# Patient Record
Sex: FEMALE | Race: WHITE | NOT HISPANIC OR LATINO | Employment: FULL TIME | URBAN - METROPOLITAN AREA
[De-identification: names, ages, dates, MRNs, and addresses within clinical notes are randomized per-mention and may not be internally consistent; named-entity substitution may affect disease eponyms.]

---

## 2018-08-25 ENCOUNTER — HOSPITAL ENCOUNTER (EMERGENCY)
Facility: HOSPITAL | Age: 58
Discharge: HOME | End: 2018-08-25
Attending: FAMILY MEDICINE
Payer: MEDICAID

## 2018-08-25 VITALS
TEMPERATURE: 98 F | HEIGHT: 62 IN | HEART RATE: 83 BPM | SYSTOLIC BLOOD PRESSURE: 131 MMHG | DIASTOLIC BLOOD PRESSURE: 58 MMHG | RESPIRATION RATE: 16 BRPM | BODY MASS INDEX: 32.2 KG/M2 | WEIGHT: 175 LBS | OXYGEN SATURATION: 96 %

## 2018-08-25 DIAGNOSIS — K57.92 DIVERTICULITIS: Primary | ICD-10-CM

## 2018-08-25 LAB
ALBUMIN SERPL BCP-MCNC: 3.6 G/DL
ALP SERPL-CCNC: 72 U/L
ALT SERPL W/O P-5'-P-CCNC: 19 U/L
ANION GAP SERPL CALC-SCNC: 8 MMOL/L
AST SERPL-CCNC: 20 U/L
BASOPHILS # BLD AUTO: 0.03 K/UL
BASOPHILS NFR BLD: 0.3 %
BILIRUB SERPL-MCNC: 0.9 MG/DL
BUN SERPL-MCNC: 17 MG/DL
CALCIUM SERPL-MCNC: 8.7 MG/DL
CHLORIDE SERPL-SCNC: 106 MMOL/L
CO2 SERPL-SCNC: 27 MMOL/L
CREAT SERPL-MCNC: 0.98 MG/DL
DIFFERENTIAL METHOD: NORMAL
EOSINOPHIL # BLD AUTO: 0.2 K/UL
EOSINOPHIL NFR BLD: 2.1 %
ERYTHROCYTE [DISTWIDTH] IN BLOOD BY AUTOMATED COUNT: 13.9 %
EST. GFR  (AFRICAN AMERICAN): >60 ML/MIN/1.73 M^2
EST. GFR  (NON AFRICAN AMERICAN): >60 ML/MIN/1.73 M^2
GLUCOSE SERPL-MCNC: 125 MG/DL
HCT VFR BLD AUTO: 47.2 %
HGB BLD-MCNC: 15.5 G/DL
LIPASE SERPL-CCNC: 184 U/L
LYMPHOCYTES # BLD AUTO: 2.7 K/UL
LYMPHOCYTES NFR BLD: 28.8 %
MCH RBC QN AUTO: 30.1 PG
MCHC RBC AUTO-ENTMCNC: 32.8 G/DL
MCV RBC AUTO: 92 FL
MONOCYTES # BLD AUTO: 0.7 K/UL
MONOCYTES NFR BLD: 7.6 %
NEUTROPHILS # BLD AUTO: 5.6 K/UL
NEUTROPHILS NFR BLD: 60.9 %
PLATELET # BLD AUTO: 230 K/UL
PMV BLD AUTO: 9.8 FL
POTASSIUM SERPL-SCNC: 4 MMOL/L
PROT SERPL-MCNC: 6.8 G/DL
RBC # BLD AUTO: 5.15 M/UL
SODIUM SERPL-SCNC: 141 MMOL/L
WBC # BLD AUTO: 9.19 K/UL

## 2018-08-25 PROCEDURE — 96374 THER/PROPH/DIAG INJ IV PUSH: CPT | Mod: 25

## 2018-08-25 PROCEDURE — 63600175 PHARM REV CODE 636 W HCPCS: Performed by: FAMILY MEDICINE

## 2018-08-25 PROCEDURE — 83690 ASSAY OF LIPASE: CPT

## 2018-08-25 PROCEDURE — 25500020 PHARM REV CODE 255: Performed by: FAMILY MEDICINE

## 2018-08-25 PROCEDURE — 99284 EMERGENCY DEPT VISIT MOD MDM: CPT | Mod: 25

## 2018-08-25 PROCEDURE — 85025 COMPLETE CBC W/AUTO DIFF WBC: CPT

## 2018-08-25 PROCEDURE — 96375 TX/PRO/DX INJ NEW DRUG ADDON: CPT

## 2018-08-25 PROCEDURE — 80053 COMPREHEN METABOLIC PANEL: CPT

## 2018-08-25 RX ORDER — LISINOPRIL 10 MG/1
10 TABLET ORAL DAILY
COMMUNITY
End: 2019-06-20 | Stop reason: SDUPTHER

## 2018-08-25 RX ORDER — CIPROFLOXACIN 500 MG/1
500 TABLET ORAL 2 TIMES DAILY
Qty: 20 TABLET | Refills: 0 | Status: SHIPPED | OUTPATIENT
Start: 2018-08-25 | End: 2018-09-04

## 2018-08-25 RX ORDER — METRONIDAZOLE 500 MG/1
500 TABLET ORAL
COMMUNITY
End: 2018-08-25

## 2018-08-25 RX ORDER — CIPROFLOXACIN 500 MG/1
500 TABLET ORAL
COMMUNITY
End: 2018-08-25

## 2018-08-25 RX ORDER — ONDANSETRON 4 MG/1
4 TABLET, FILM COATED ORAL 3 TIMES DAILY PRN
Qty: 15 TABLET | Refills: 0 | Status: SHIPPED | OUTPATIENT
Start: 2018-08-25 | End: 2019-03-26

## 2018-08-25 RX ORDER — METOPROLOL SUCCINATE 50 MG/1
50 TABLET, EXTENDED RELEASE ORAL DAILY
COMMUNITY
End: 2019-06-20 | Stop reason: SDUPTHER

## 2018-08-25 RX ORDER — HYDROCODONE BITARTRATE AND ACETAMINOPHEN 5; 325 MG/1; MG/1
1 TABLET ORAL EVERY 4 HOURS PRN
Qty: 18 TABLET | Refills: 0 | Status: SHIPPED | OUTPATIENT
Start: 2018-08-25 | End: 2019-03-26

## 2018-08-25 RX ORDER — LORATADINE 10 MG/1
10 TABLET ORAL DAILY
COMMUNITY
End: 2019-03-26

## 2018-08-25 RX ORDER — BUSPIRONE HYDROCHLORIDE 30 MG/1
30 TABLET ORAL 2 TIMES DAILY
COMMUNITY
End: 2019-06-20 | Stop reason: SDUPTHER

## 2018-08-25 RX ORDER — ZOLPIDEM TARTRATE 10 MG/1
5 TABLET ORAL NIGHTLY PRN
COMMUNITY
End: 2019-06-20 | Stop reason: SDUPTHER

## 2018-08-25 RX ORDER — MORPHINE SULFATE 4 MG/ML
6 INJECTION, SOLUTION INTRAMUSCULAR; INTRAVENOUS
Status: COMPLETED | OUTPATIENT
Start: 2018-08-25 | End: 2018-08-25

## 2018-08-25 RX ORDER — DIPHENHYDRAMINE HYDROCHLORIDE 50 MG/ML
25 INJECTION INTRAMUSCULAR; INTRAVENOUS
Status: COMPLETED | OUTPATIENT
Start: 2018-08-25 | End: 2018-08-25

## 2018-08-25 RX ORDER — METRONIDAZOLE 500 MG/1
500 TABLET ORAL EVERY 6 HOURS
Qty: 28 TABLET | Refills: 0 | Status: SHIPPED | OUTPATIENT
Start: 2018-08-25 | End: 2019-01-09

## 2018-08-25 RX ORDER — CLONAZEPAM 1 MG/1
0.5 TABLET ORAL 2 TIMES DAILY PRN
COMMUNITY
End: 2019-06-20 | Stop reason: SDUPTHER

## 2018-08-25 RX ORDER — MORPHINE SULFATE 4 MG/ML
4 INJECTION, SOLUTION INTRAMUSCULAR; INTRAVENOUS
Status: COMPLETED | OUTPATIENT
Start: 2018-08-25 | End: 2018-08-25

## 2018-08-25 RX ADMIN — MORPHINE SULFATE 6 MG: 4 INJECTION INTRAVENOUS at 07:08

## 2018-08-25 RX ADMIN — IOHEXOL 100 ML: 350 INJECTION, SOLUTION INTRAVENOUS at 08:08

## 2018-08-25 RX ADMIN — MORPHINE SULFATE 4 MG: 4 INJECTION INTRAVENOUS at 09:08

## 2018-08-25 RX ADMIN — DIPHENHYDRAMINE HYDROCHLORIDE 25 MG: 50 INJECTION, SOLUTION INTRAMUSCULAR; INTRAVENOUS at 08:08

## 2018-08-25 NOTE — ED TRIAGE NOTES
Pt reports lower abdominal pain x a couple of days. Pt denies NVD. Pt denies urinary symptoms. HX of diverticulitis

## 2018-08-26 NOTE — ED PROVIDER NOTES
Encounter Date: 2018       History     Chief Complaint   Patient presents with    Abdominal Pain     Pt reports lower abdominal pain x a couple of days. Pt denies NVD. Pt denies urinary symptoms. HX of diverticulitis     58-year-old female patient comes in with complaint of abdominal pain. Patient has a history of recurring diverticulitis.  Patient states that it feels like she has diverticulitis this occasion.  Patient otherwise has no nausea no vomiting no diarrhea but does have pain.  Patient has Cipro and Flagyl at home that she starts when she feels like she is having flare up.          Review of patient's allergies indicates:   Allergen Reactions    Succinimides Anaphylaxis     Past Medical History:   Diagnosis Date    Anxiety     Arthritis     Diverticulitis     Hypertension      Past Surgical History:   Procedure Laterality Date    BLADDER SUSPENSION      (x2)     SECTION      (x2)    TONSILLECTOMY       History reviewed. No pertinent family history.  Social History     Tobacco Use    Smoking status: Current Every Day Smoker     Packs/day: 0.50     Years: 20.00     Pack years: 10.00     Types: Cigarettes    Smokeless tobacco: Never Used   Substance Use Topics    Alcohol use: No    Drug use: No     Review of Systems   Constitutional: Negative for fever.   HENT: Negative for sore throat.    Respiratory: Negative for shortness of breath.    Cardiovascular: Negative for chest pain.   Gastrointestinal: Positive for abdominal pain. Negative for nausea.   Genitourinary: Negative for dysuria.   Musculoskeletal: Negative for back pain.   Skin: Negative for rash.   Neurological: Negative for weakness.   Hematological: Does not bruise/bleed easily.   All other systems reviewed and are negative.      Physical Exam     Initial Vitals [18 1857]   BP Pulse Resp Temp SpO2   139/64 94 18 98.2 °F (36.8 °C) 98 %      MAP       --         Physical Exam    Nursing note and vitals  reviewed.  Constitutional: She appears well-developed.   HENT:   Head: Normocephalic and atraumatic.   Right Ear: External ear normal.   Left Ear: External ear normal.   Nose: Nose normal.   Mouth/Throat: Oropharynx is clear and moist.   Eyes: Conjunctivae and EOM are normal. Pupils are equal, round, and reactive to light. Right eye exhibits no discharge. Left eye exhibits no discharge.   Neck: Normal range of motion. Neck supple. No tracheal deviation present.   Cardiovascular: Normal rate, regular rhythm and normal heart sounds.   No murmur heard.  Pulmonary/Chest: Breath sounds normal. No respiratory distress. She has no wheezes.   Abdominal: Soft. Bowel sounds are normal. There is tenderness.   Neurological: She is alert and oriented to person, place, and time.   Skin: Skin is warm and dry.         ED Course   Procedures  Labs Reviewed   COMPREHENSIVE METABOLIC PANEL - Abnormal; Notable for the following components:       Result Value    Glucose 125 (*)     All other components within normal limits   CBC W/ AUTO DIFFERENTIAL   LIPASE   URINALYSIS, REFLEX TO URINE CULTURE          Imaging Results          CT Abdomen Pelvis With Contrast (Final result)  Result time 08/25/18 20:52:56    Final result by Chalo Zhong MD (08/25/18 20:52:56)                 Impression:      1. Acute diverticulitis mid sigmoid colon.  Negative for perforation or abscess.  New since 07/20/2016.  2. Bilateral renal cysts.  No change.  All CT scans at this facility are performed  using dose modulation techniques as appropriate to performed exam including the following:  automated exposure control; adjustment of mA and/or kV according to the patients size (this includes techniques or standardized protocols for targeted exams where dose is matched to indication/reason for exam: i.e. extremities or head);  iterative reconstruction technique.      Electronically signed by: Chalo Zhong MD  Date:    08/25/2018  Time:    20:52              Narrative:    EXAMINATION:  CT ABDOMEN PELVIS WITH CONTRAST    CLINICAL HISTORY:  Infection, abdomen-pelvis;    TECHNIQUE:  Standard axial imaging performed extending from the lung bases through the pubic symphysis, following administration of intravenous contrast.  Additional 2D  reformatted sagittal and coronal images obtained.    COMPARISON:  07/20/2016.    FINDINGS:  Lung bases are clear.    The liver is normal. Gallbladder is normal.  Portal vein is patent.    The spleen is normal in size and appearance.  The pancreas is normal.  The adrenal glands are normal.  The aorta and IVC are normal.  No retroperitoneal adenopathy.    Bilateral renal cysts are present.  The largest on the left is at the upper pole measures 2.8 cm.  The largest on the right is at the upper pole measuring 3.4 cm.  No significant change compared to the previous examination.  Ureters are normal in caliber.    The stomach is normal.  The small intestine is normal.  The appendix is normal.  Diverticulosis descending colon.  Severe diverticulosis sigmoid colon.  Short segment colonic wall thickening and moderate grade surrounding inflammatory change in the mid sigmoid colon measuring approximately 6 cm in length.  Findings compatible with colitis.  No perforation or abscess.  The rectum is normal.    The pelvis demonstrates normal appearing bladder.  Uterus is unremarkable.  Adnexal regions are normal..    Degenerative changes of the spine noted.  No suspicious bony abnormality.  Grade 1 spondylolisthesis of L4 on L5 noted.  Abdominal and pelvic wall soft tissues are normal.                                 Medical Decision Making:   Initial Assessment:   Patient in moderate distress and no other complains    Differential Diagnosis:   Appendicitis , constipation, diverticulitis, colitis, gastroenteritis                        Clinical Impression:   The encounter diagnosis was Diverticulitis.                             Herb EMERSON  MD Dionicio  08/25/18 [id number]

## 2018-09-20 ENCOUNTER — HOSPITAL ENCOUNTER (OUTPATIENT)
Dept: RADIOLOGY | Facility: HOSPITAL | Age: 58
Discharge: HOME | End: 2018-09-20
Attending: NURSE PRACTITIONER
Payer: MEDICAID

## 2018-09-20 DIAGNOSIS — K57.92 DIVERTICULITIS OF INTESTINE WITHOUT PERFORATION OR ABSCESS WITHOUT BLEEDING: Primary | ICD-10-CM

## 2018-09-20 DIAGNOSIS — K57.92 DIVERTICULITIS OF INTESTINE WITHOUT PERFORATION OR ABSCESS WITHOUT BLEEDING: ICD-10-CM

## 2018-09-20 PROCEDURE — 74018 RADEX ABDOMEN 1 VIEW: CPT | Mod: TC,FY,PO

## 2018-12-05 DIAGNOSIS — Z12.39 ENCOUNTER FOR SPECIAL SCREENING EXAMINATION FOR NEOPLASM OF BREAST: Primary | ICD-10-CM

## 2018-12-05 DIAGNOSIS — Z13.820 SCREENING FOR OSTEOPOROSIS: Primary | ICD-10-CM

## 2018-12-05 LAB
HUMAN PAPILLOMAVIRUS (HPV): NORMAL
PAP SMEAR: NORMAL

## 2018-12-21 ENCOUNTER — HOSPITAL ENCOUNTER (OUTPATIENT)
Dept: RADIOLOGY | Facility: HOSPITAL | Age: 58
Discharge: HOME | End: 2018-12-21
Attending: NURSE PRACTITIONER
Payer: MEDICAID

## 2018-12-21 DIAGNOSIS — Z13.820 SCREENING FOR OSTEOPOROSIS: ICD-10-CM

## 2018-12-21 DIAGNOSIS — Z12.39 ENCOUNTER FOR SPECIAL SCREENING EXAMINATION FOR NEOPLASM OF BREAST: ICD-10-CM

## 2018-12-21 PROCEDURE — 77080 DXA BONE DENSITY AXIAL: CPT | Mod: TC,PO

## 2018-12-21 PROCEDURE — 77067 SCR MAMMO BI INCL CAD: CPT | Mod: TC,PO

## 2019-01-09 ENCOUNTER — HOSPITAL ENCOUNTER (EMERGENCY)
Facility: HOSPITAL | Age: 59
Discharge: HOME | End: 2019-01-09
Attending: EMERGENCY MEDICINE
Payer: MEDICAID

## 2019-01-09 VITALS
TEMPERATURE: 99 F | SYSTOLIC BLOOD PRESSURE: 151 MMHG | HEART RATE: 50 BPM | DIASTOLIC BLOOD PRESSURE: 70 MMHG | WEIGHT: 200 LBS | OXYGEN SATURATION: 100 % | RESPIRATION RATE: 16 BRPM | BODY MASS INDEX: 36.8 KG/M2 | HEIGHT: 62 IN

## 2019-01-09 DIAGNOSIS — Z86.69 HISTORY OF NERVE IMPINGEMENT: Primary | ICD-10-CM

## 2019-01-09 PROCEDURE — 99284 EMERGENCY DEPT VISIT MOD MDM: CPT | Mod: 25

## 2019-01-09 PROCEDURE — 25000003 PHARM REV CODE 250: Performed by: EMERGENCY MEDICINE

## 2019-01-09 PROCEDURE — 63600175 PHARM REV CODE 636 W HCPCS: Performed by: EMERGENCY MEDICINE

## 2019-01-09 PROCEDURE — 99284 EMERGENCY DEPT VISIT MOD MDM: CPT | Mod: ,,, | Performed by: EMERGENCY MEDICINE

## 2019-01-09 PROCEDURE — 99284 PR EMERGENCY DEPT VISIT,LEVEL IV: ICD-10-PCS | Mod: ,,, | Performed by: EMERGENCY MEDICINE

## 2019-01-09 PROCEDURE — 96372 THER/PROPH/DIAG INJ SC/IM: CPT

## 2019-01-09 RX ORDER — CYCLOBENZAPRINE HCL 10 MG
10 TABLET ORAL 3 TIMES DAILY PRN
Qty: 15 TABLET | Refills: 0 | Status: SHIPPED | OUTPATIENT
Start: 2019-01-09 | End: 2019-01-14

## 2019-01-09 RX ORDER — CYCLOBENZAPRINE HCL 10 MG
10 TABLET ORAL
Status: COMPLETED | OUTPATIENT
Start: 2019-01-09 | End: 2019-01-09

## 2019-01-09 RX ORDER — MELOXICAM 15 MG/1
15 TABLET ORAL DAILY
Qty: 7 TABLET | Refills: 0 | Status: SHIPPED | OUTPATIENT
Start: 2019-01-09 | End: 2019-06-20

## 2019-01-09 RX ORDER — KETOROLAC TROMETHAMINE 30 MG/ML
30 INJECTION, SOLUTION INTRAMUSCULAR; INTRAVENOUS
Status: COMPLETED | OUTPATIENT
Start: 2019-01-09 | End: 2019-01-09

## 2019-01-09 RX ADMIN — CYCLOBENZAPRINE HYDROCHLORIDE 10 MG: 10 TABLET, FILM COATED ORAL at 12:01

## 2019-01-09 RX ADMIN — KETOROLAC TROMETHAMINE 30 MG: 30 INJECTION, SOLUTION INTRAMUSCULAR at 12:01

## 2019-01-09 NOTE — ED TRIAGE NOTES
Patient with left upper back/shoulder blade pain that radiates up neck and down arm. States she is sleeping on sofa bed in hospital that has made the pain worse. No OTC meds today.

## 2019-01-09 NOTE — ED PROVIDER NOTES
"Encounter Date: 2019    SCRIBE #1 NOTE: I, Hong Barbour, am scribing for, and in the presence of,  Dr. Mcghee. I have scribed the entire note.       History     Chief Complaint   Patient presents with    Shoulder Pain     left shoulder blade pain x 4 days radiating down to left arm and neck , thinks she has a pinced nerve. slept on hospital sofa last night and woke up this am and "couldn't move"     Time patient was seen by the provider: 12:14 PM      The patient is a 58 y.o. female with co-morbidities including: HTN, diverticulitis, pancreatitis, and anxiety, who presents to the ED with a complaint of left shoulder pain radiating to neck and down arm for 4 days. She reports the pain started as discomfort like she slept wrong but has progressively worsened. She states she slept on a couch last night and reports difficulty moving her arm due to pain this morning. Taking Advil provides some relief. She endorses numbness down the back of her upper extremity to her hand.       The history is provided by the patient.     Review of patient's allergies indicates:   Allergen Reactions    Succinimides Anaphylaxis     Past Medical History:   Diagnosis Date    Anxiety     Arthritis     Diverticulitis     Hypertension     Pancreatitis      Past Surgical History:   Procedure Laterality Date    BLADDER SUSPENSION      (x2)     SECTION      (x2)    TONSILLECTOMY       Family History   Adopted: Yes   Family history unknown: Yes     Social History     Tobacco Use    Smoking status: Current Every Day Smoker     Packs/day: 0.50     Years: 20.00     Pack years: 10.00     Types: Cigarettes    Smokeless tobacco: Never Used   Substance Use Topics    Alcohol use: No    Drug use: No     Review of Systems   Constitutional: Negative for fever.   HENT: Negative for sore throat.    Eyes: Negative for visual disturbance.   Respiratory: Negative for shortness of breath.    Cardiovascular: Negative for chest pain. "   Gastrointestinal: Negative for nausea.   Genitourinary: Negative for dysuria.   Musculoskeletal: Positive for neck pain. Negative for back pain.        Left shoulder pain radiating to neck and arm.    Skin: Negative for rash.   Neurological: Positive for numbness (from left shoulder down to hand). Negative for weakness.       Physical Exam     Initial Vitals [01/09/19 1138]   BP Pulse Resp Temp SpO2   (!) 140/65 61 18 97.7 °F (36.5 °C) 97 %      MAP       --         Physical Exam    Nursing note and vitals reviewed.  Constitutional: She appears well-developed and well-nourished.   HENT:   Head: Normocephalic and atraumatic.   Eyes: Pupils are equal, round, and reactive to light.   Neck: Normal range of motion.   Neck non tender to palpation.    Cardiovascular: Normal rate and regular rhythm.   Pulmonary/Chest: Breath sounds normal.   Abdominal: Soft. There is no tenderness.   Musculoskeletal:   Tenderness to palpation over inferior border of left scapula.    Neurological: She is alert and oriented to person, place, and time.   Skin: Skin is warm and dry.         ED Course   Procedures  Labs Reviewed - No data to display       Imaging Results    None          Medical Decision Making:   History:   Old Medical Records: I decided to obtain old medical records.  ED Management:  58-year-old female complaining pain to the left scapula worse with movement that radiates under the left arm with some mild numbness under the arm intermittently.  Patient reports that she has taken ibuprofen and has felt relief from however states that the pain comes back after the ibuprofen wears.  Patient states that she has spending a lot of time in the hospital as her  is receiving chemo in recently slept on a cot which she think it is exacerbated her symptoms. Patient given Flexeril Toradol in the ED she reports her pain is gone down to 4/10.  She reports that her pain is tolerable.  Will discharge home with Mobic Flexeril and  recommend heat to say            Scribe Attestation:   Scribe #1: I performed the above scribed service and the documentation accurately describes the services I performed. I attest to the accuracy of the note.               Clinical Impression:   The encounter diagnosis was History of nerve impingement.      Disposition:   Disposition: Discharged  Condition: Stable                        Monty Mcghee MD  01/09/19 6103

## 2019-01-09 NOTE — ED NOTES
Patient identifiers verified and correct for Ms Matias  C/C: Neck and shoulder pain with no injury  APPEARANCE: awake and alert in NAD.  SKIN: warm, dry and intact. No breakdown or bruising.  MUSCULOSKELETAL: Patient moving all extremities spontaneously, no obvious swelling or deformities noted. Ambulates independently.  RESPIRATORY: Denies shortness of breath.Respirations unlabored.   CARDIAC: Denies CP, 2+ distal pulses; no peripheral edema  ABDOMEN: S/ND/NT, Denies nausea  : voids spontaneously, denies difficulty  Neurologic: AAO x 4; follows commands equal strength in all extremities; denies numbness/tingling. Denies dizziness

## 2019-02-03 ENCOUNTER — HOSPITAL ENCOUNTER (EMERGENCY)
Facility: HOSPITAL | Age: 59
Discharge: HOME | End: 2019-02-03
Attending: SURGERY
Payer: MEDICAID

## 2019-02-03 VITALS
HEIGHT: 62 IN | RESPIRATION RATE: 18 BRPM | HEART RATE: 90 BPM | BODY MASS INDEX: 34.04 KG/M2 | DIASTOLIC BLOOD PRESSURE: 58 MMHG | WEIGHT: 185 LBS | OXYGEN SATURATION: 97 % | TEMPERATURE: 98 F | SYSTOLIC BLOOD PRESSURE: 118 MMHG

## 2019-02-03 DIAGNOSIS — R50.9 FEVER: ICD-10-CM

## 2019-02-03 DIAGNOSIS — J20.9 SUBACUTE BRONCHITIS: Primary | ICD-10-CM

## 2019-02-03 LAB
FLUAV AG SPEC QL IA: NEGATIVE
FLUBV AG SPEC QL IA: NEGATIVE
SPECIMEN SOURCE: NORMAL

## 2019-02-03 PROCEDURE — 25000242 PHARM REV CODE 250 ALT 637 W/ HCPCS: Mod: ER | Performed by: SURGERY

## 2019-02-03 PROCEDURE — 63600175 PHARM REV CODE 636 W HCPCS: Mod: ER | Performed by: SURGERY

## 2019-02-03 PROCEDURE — 94640 AIRWAY INHALATION TREATMENT: CPT | Mod: ER

## 2019-02-03 PROCEDURE — 96372 THER/PROPH/DIAG INJ SC/IM: CPT | Mod: ER

## 2019-02-03 PROCEDURE — 99284 EMERGENCY DEPT VISIT MOD MDM: CPT | Mod: 25,ER

## 2019-02-03 PROCEDURE — 87400 INFLUENZA A/B EACH AG IA: CPT | Mod: 59,ER

## 2019-02-03 RX ORDER — IPRATROPIUM BROMIDE AND ALBUTEROL SULFATE 2.5; .5 MG/3ML; MG/3ML
3 SOLUTION RESPIRATORY (INHALATION) ONCE
Status: COMPLETED | OUTPATIENT
Start: 2019-02-03 | End: 2019-02-03

## 2019-02-03 RX ORDER — LEVOFLOXACIN 500 MG/1
500 TABLET, FILM COATED ORAL DAILY
Qty: 5 TABLET | Refills: 0 | Status: SHIPPED | OUTPATIENT
Start: 2019-02-03 | End: 2019-02-08

## 2019-02-03 RX ORDER — LEVOFLOXACIN 500 MG/1
500 TABLET, FILM COATED ORAL DAILY
Qty: 5 TABLET | Refills: 0 | Status: SHIPPED | OUTPATIENT
Start: 2019-02-03 | End: 2019-02-03 | Stop reason: SDUPTHER

## 2019-02-03 RX ORDER — DEXAMETHASONE SODIUM PHOSPHATE 4 MG/ML
8 INJECTION, SOLUTION INTRA-ARTICULAR; INTRALESIONAL; INTRAMUSCULAR; INTRAVENOUS; SOFT TISSUE
Status: COMPLETED | OUTPATIENT
Start: 2019-02-03 | End: 2019-02-03

## 2019-02-03 RX ORDER — HYDROCODONE BITARTRATE AND HOMATROPINE METHYLBROMIDE ORAL SOLUTION 5; 1.5 MG/5ML; MG/5ML
5 LIQUID ORAL EVERY 6 HOURS PRN
Qty: 120 ML | Refills: 0 | Status: SHIPPED | OUTPATIENT
Start: 2019-02-03 | End: 2019-03-26

## 2019-02-03 RX ADMIN — DEXAMETHASONE SODIUM PHOSPHATE 8 MG: 4 INJECTION, SOLUTION INTRAMUSCULAR; INTRAVENOUS at 05:02

## 2019-02-03 RX ADMIN — IPRATROPIUM BROMIDE AND ALBUTEROL SULFATE 3 ML: .5; 3 SOLUTION RESPIRATORY (INHALATION) at 05:02

## 2019-02-03 NOTE — ED PROVIDER NOTES
"Encounter Date: 2/3/2019       History     Chief Complaint   Patient presents with    Fever     "I have been having fever and have a cough and feel SOB at times for 1 week"     Chief complaint of fever and a cough and occasional wheezing for a week  denies chest pain      The history is provided by the patient.   Cough   This is a new problem. The current episode started several days ago. The problem has been unchanged. The cough is non-productive. There has been no fever. Associated symptoms include shortness of breath and wheezing. She has tried nothing for the symptoms. She is a smoker. Her past medical history is significant for bronchitis.     Review of patient's allergies indicates:   Allergen Reactions    Succinimides Anaphylaxis     Past Medical History:   Diagnosis Date    Anxiety     Arthritis     Diverticulitis     Hypertension     Pancreatitis      Past Surgical History:   Procedure Laterality Date    BLADDER SUSPENSION      (x2)     SECTION      (x2)    TONSILLECTOMY       Family History   Adopted: Yes   Family history unknown: Yes     Social History     Tobacco Use    Smoking status: Current Every Day Smoker     Packs/day: 0.50     Years: 20.00     Pack years: 10.00     Types: Cigarettes    Smokeless tobacco: Never Used   Substance Use Topics    Alcohol use: No    Drug use: No     Review of Systems   Constitutional: Negative.    HENT: Negative.    Eyes: Negative.    Respiratory: Positive for cough, shortness of breath and wheezing.    Gastrointestinal: Negative.    Endocrine: Negative.    Genitourinary: Negative.    Musculoskeletal: Negative.    Skin: Negative.    Allergic/Immunologic: Negative.    Neurological: Negative.    Hematological: Negative.        Physical Exam     Initial Vitals [19 1627]   BP Pulse Resp Temp SpO2   127/60 82 (!) 22 98.2 °F (36.8 °C) 95 %      MAP       --         Physical Exam    Nursing note and vitals reviewed.  Constitutional: She appears " well-developed and well-nourished.   HENT:   Head: Normocephalic.   Eyes: Conjunctivae are normal.   Neck: Normal range of motion.   Cardiovascular: Normal rate, regular rhythm and intact distal pulses.   Pulmonary/Chest: She has wheezes.   Abdominal: Soft.   Musculoskeletal: Normal range of motion.   Neurological: She is alert and oriented to person, place, and time. She has normal strength. GCS score is 15. GCS eye subscore is 4. GCS verbal subscore is 5. GCS motor subscore is 6.   Skin: Skin is warm and dry. Capillary refill takes less than 2 seconds.   Psychiatric: She has a normal mood and affect.         ED Course   Procedures  Labs Reviewed   INFLUENZA A AND B ANTIGEN          Imaging Results          X-Ray Chest PA And Lateral (Final result)  Result time 02/03/19 16:57:28    Final result by Vince Al MD (02/03/19 16:57:28)                 Impression:      No acute findings.      Electronically signed by: Vince Al MD  Date:    02/03/2019  Time:    16:57             Narrative:    EXAMINATION:  XR CHEST PA AND LATERAL    CLINICAL HISTORY  Fever,    COMPARISON:  None    FINDINGS:  The heart size is normal.  The lung fields are clear.  No acute cardiopulmonary infiltrative.                                                      Clinical Impression:   The primary encounter diagnosis was Subacute bronchitis. A diagnosis of Fever was also pertinent to this visit.      Disposition:   Disposition: Discharged  Condition: Stable                        CJohanna Cornejo III, MD  02/03/19 9006

## 2019-03-11 DIAGNOSIS — S83.91XA SPRAIN OF UNSPECIFIED SITE OF RIGHT KNEE, INITIAL ENCOUNTER: Primary | ICD-10-CM

## 2019-03-14 ENCOUNTER — HOSPITAL ENCOUNTER (OUTPATIENT)
Dept: RADIOLOGY | Facility: HOSPITAL | Age: 59
Discharge: HOME | End: 2019-03-14
Attending: NURSE PRACTITIONER
Payer: MEDICAID

## 2019-03-14 DIAGNOSIS — S83.91XA SPRAIN OF UNSPECIFIED SITE OF RIGHT KNEE, INITIAL ENCOUNTER: ICD-10-CM

## 2019-03-14 PROCEDURE — 73562 X-RAY EXAM OF KNEE 3: CPT | Mod: TC,FY,PO,RT

## 2019-03-26 ENCOUNTER — OFFICE VISIT (OUTPATIENT)
Dept: ORTHOPEDICS | Facility: CLINIC | Age: 59
End: 2019-03-26
Payer: MEDICAID

## 2019-03-26 VITALS — BODY MASS INDEX: 34.04 KG/M2 | HEIGHT: 62 IN | WEIGHT: 185 LBS

## 2019-03-26 DIAGNOSIS — M22.41 CHONDROMALACIA OF RIGHT PATELLA: Primary | ICD-10-CM

## 2019-03-26 PROCEDURE — 99202 PR OFFICE/OUTPT VISIT, NEW, LEVL II, 15-29 MIN: ICD-10-PCS | Mod: S$PBB,,, | Performed by: ORTHOPAEDIC SURGERY

## 2019-03-26 PROCEDURE — 99213 OFFICE O/P EST LOW 20 MIN: CPT | Mod: PBBFAC,PN | Performed by: ORTHOPAEDIC SURGERY

## 2019-03-26 PROCEDURE — 99202 OFFICE O/P NEW SF 15 MIN: CPT | Mod: S$PBB,,, | Performed by: ORTHOPAEDIC SURGERY

## 2019-03-26 PROCEDURE — 99999 PR PBB SHADOW E&M-EST. PATIENT-LVL III: ICD-10-PCS | Mod: PBBFAC,,, | Performed by: ORTHOPAEDIC SURGERY

## 2019-03-26 PROCEDURE — 99999 PR PBB SHADOW E&M-EST. PATIENT-LVL III: CPT | Mod: PBBFAC,,, | Performed by: ORTHOPAEDIC SURGERY

## 2019-03-26 NOTE — PROGRESS NOTES
Subjective:      Patient ID: Lucia Matias is a 58 y.o. female.    Chief Complaint: Pain, Injury, and Swelling of the Right Knee    HPI     They have experienced problems with their right knee over the past 1 month. The patient reports relevant history of injury/aggravation.  She fell on the anterior aspect of her knee at home.  Pain is located  anteriorly Associated symptoms include pseudolocking.  Symptoms are aggravated by flexion loading. They have been treated with NSAIDS.   Symptoms have recently stayed the same. Ambulation reportedly has not been impaired. Self care ADLs are not painful.  History relevant for psoriasis and rheumatoid arthritis  Review of Systems   Constitution: Negative for fever and weight loss.   HENT: Negative for congestion.    Eyes: Negative for visual disturbance.   Cardiovascular: Negative for chest pain.   Respiratory: Negative for shortness of breath.    Hematologic/Lymphatic: Negative for bleeding problem. Does not bruise/bleed easily.   Skin: Negative for poor wound healing.   Musculoskeletal: Positive for joint pain.   Gastrointestinal: Negative for abdominal pain.   Genitourinary: Negative for dysuria.   Neurological: Negative for seizures.   Psychiatric/Behavioral: Negative for altered mental status.   Allergic/Immunologic: Negative for persistent infections.         Objective:            Ortho/SPM Exam    Right knee    The patient is not in acute distress.   Body habitus is normal.   Sclera appear normal  No respiratory distress  The patient walks without a limp.  Resisted SLR negative.   The skin over the knee is remarkable for mild psoriasis.  Knee effusion 0  Tendernes is located absent.  Range of motion- Flexion full, Extension full.   Ligament exam:   MCL intact   Lachman intact              Post sag intact    LCL intact  Patellar apprehension negative.  Popliteal cyst negative  Patellar crepitation present  Flexion/pinch negative.  Pulses DP present, PT  present.  Motor normal 5/5 strength in all tested muscle groups.   Sensory normal.    I reviewed the relevant radiographic images for the patient's condition:  Recent right knee films are normal for the patient's age      Assessment:       Encounter Diagnosis   Name Primary?    Chondromalacia of right patella Yes          Plan:       Lucia was seen today for pain, injury and swelling.    Diagnoses and all orders for this visit:    Chondromalacia of right patella      I explained my diagnostic impression and the reasoning behind it in detail, using layman's terms.  Models and/or pictures were used to help in the explanation.    Continue NSAIDs per prescribing physician    Activity modification explained    Physical therapy

## 2019-03-26 NOTE — LETTER
March 26, 2019      Faby Forman NP  [id number] [facility] [id number]           [facility]  [address] [place] [address]  [facility] [address]  Phone: [phone]  Fax: [phone]          Patient: Lucia Matias   MR Number: [id number]   YOB: 1960   Date of Visit: 3/26/2019       Dear Faby Forman:    Thank you for referring Lucia Matias to me for evaluation. Attached you will find relevant portions of my assessment and plan of care.    If you have questions, please do not hesitate to call me. I look forward to following Lucia Matias along with you.    Sincerely,    Maurice De Jesus MD    Enclosure  CC:  No Recipients    If you would like to receive this communication electronically, please contact [email or web address] or [phone] to request more information on [facility] access.    For providers and/or their staff who would like to refer a patient to [facility], please contact us through our one-stop-shop provider referral line, [facility], at [phone].    If you feel you have received this communication in error or would no longer like to receive these types of communications, please e-mail [email or web address]

## 2019-05-30 ENCOUNTER — TELEPHONE (OUTPATIENT)
Dept: INTERNAL MEDICINE | Facility: CLINIC | Age: 59
End: 2019-05-30

## 2019-05-30 NOTE — TELEPHONE ENCOUNTER
----- Message from Bartolome Sears sent at 5/30/2019 11:34 AM CDT -----  Contact: self  New HIV PrEP PATIENT  Currently  on Prep started in Oct 2018 at [facility] ([facility])  Patient did not have the providers name at time of call      Requesting to be seen by  at the [facility] to est care and treatment pt can be reached at [phone]    I was not able to schedule the appointment due to no access for NP

## 2019-06-06 PROBLEM — Z78.9 IMMUNE TO HEPATITIS B: Status: ACTIVE | Noted: 2018-12-03

## 2019-06-06 PROBLEM — Z28.39 HEPATITIS A VACCINATION NOT UP TO DATE: Status: ACTIVE | Noted: 2018-12-03

## 2019-06-06 PROBLEM — Z20.6 HIV EXPOSURE: Status: ACTIVE | Noted: 2018-11-01

## 2019-06-06 PROBLEM — K57.30 DIVERTICULAR DISEASE OF COLON: Status: ACTIVE | Noted: 2019-06-06

## 2019-06-06 PROBLEM — Z20.2 POTENTIAL EXPOSURE TO STD: Status: ACTIVE | Noted: 2018-11-01

## 2019-06-06 PROBLEM — K57.32 DIVERTICULITIS OF COLON: Status: ACTIVE | Noted: 2019-06-06

## 2019-06-07 ENCOUNTER — TELEPHONE (OUTPATIENT)
Dept: PHARMACY | Facility: CLINIC | Age: 59
End: 2019-06-07

## 2019-06-07 NOTE — TELEPHONE ENCOUNTER
Patient send  with whom we are authorized to speak, to pharmacy to  [facility] as PrEP.  Per caregiver, the patient is not new the PrEP and has been taking it for some time.  He denies any difficulties, side effects, issues or complaints.  He denies any episodes of non-adherence and came to  because patient was upon her last dose from previous pharmacy and did not want to miss even one dose.     Denied full clinical consult however we will follow up with patient directly at 7 day touch base to establish a baseline.     Refill readiness for Truada confirmed with patient; name/ confirmed; no missed doses; no new medications; no side effects noted; picked up at [facility] on .  Patient states they have 1 doses remaining.     Chavez Martínez, R.Ph., AAHIVP  Clinical Pharmacist, HIV/HCV  [facility]  Phone: [phone]

## 2019-06-07 NOTE — TELEPHONE ENCOUNTER
Documentation Only:     Prior Authorization for [facility]ada IS NOT required     Patient co-pay: $250     Patient Assistance IS required.     Patient has been enrolled in [employer]ada copay card program which will reduce patient copay to $0. Copay card info is listed below.     [employer]ada Copay Card  Bin: [id number]  PCN: [id number]  Grp: [id number]  ID: [id number]  Help Desk: [phone]        Forwarding to clinical pharmacist for consult and shipment.    AKARNOLDO 9:51am

## 2019-06-14 ENCOUNTER — TELEPHONE (OUTPATIENT)
Dept: PHARMACY | Facility: CLINIC | Age: 59
End: 2019-06-14

## 2019-06-14 NOTE — TELEPHONE ENCOUNTER
Patient doing well on Truvada with no issues.  Patient was on [facility]ada prior to filling at [facility].  Patient reports no issues and denies any side effects.

## 2019-06-20 ENCOUNTER — TELEPHONE (OUTPATIENT)
Dept: DERMATOLOGY | Facility: CLINIC | Age: 59
End: 2019-06-20

## 2019-06-20 ENCOUNTER — LAB VISIT (OUTPATIENT)
Dept: LAB | Facility: OTHER | Age: 59
End: 2019-06-20
Attending: FAMILY MEDICINE
Payer: COMMERCIAL

## 2019-06-20 ENCOUNTER — OFFICE VISIT (OUTPATIENT)
Dept: INTERNAL MEDICINE | Facility: CLINIC | Age: 59
End: 2019-06-20
Attending: FAMILY MEDICINE
Payer: COMMERCIAL

## 2019-06-20 ENCOUNTER — PATIENT MESSAGE (OUTPATIENT)
Dept: INTERNAL MEDICINE | Facility: CLINIC | Age: 59
End: 2019-06-20

## 2019-06-20 VITALS
WEIGHT: 209.44 LBS | DIASTOLIC BLOOD PRESSURE: 82 MMHG | HEIGHT: 62 IN | HEART RATE: 64 BPM | SYSTOLIC BLOOD PRESSURE: 122 MMHG | BODY MASS INDEX: 38.54 KG/M2 | OXYGEN SATURATION: 96 %

## 2019-06-20 DIAGNOSIS — L40.9 PSORIASIS: ICD-10-CM

## 2019-06-20 DIAGNOSIS — R00.2 PALPITATIONS: ICD-10-CM

## 2019-06-20 DIAGNOSIS — J44.9 CHRONIC OBSTRUCTIVE PULMONARY DISEASE, UNSPECIFIED COPD TYPE: ICD-10-CM

## 2019-06-20 DIAGNOSIS — Z00.00 ANNUAL PHYSICAL EXAM: Primary | ICD-10-CM

## 2019-06-20 DIAGNOSIS — M06.9 RHEUMATOID ARTHRITIS, INVOLVING UNSPECIFIED SITE, UNSPECIFIED RHEUMATOID FACTOR PRESENCE: ICD-10-CM

## 2019-06-20 DIAGNOSIS — E78.5 HYPERLIPIDEMIA, UNSPECIFIED HYPERLIPIDEMIA TYPE: ICD-10-CM

## 2019-06-20 DIAGNOSIS — Z20.6 EXPOSURE TO HIV: ICD-10-CM

## 2019-06-20 DIAGNOSIS — R06.02 SOB (SHORTNESS OF BREATH): ICD-10-CM

## 2019-06-20 DIAGNOSIS — F41.9 ANXIETY: ICD-10-CM

## 2019-06-20 DIAGNOSIS — Z72.0 TOBACCO USE: ICD-10-CM

## 2019-06-20 DIAGNOSIS — I10 HYPERTENSION, UNSPECIFIED TYPE: ICD-10-CM

## 2019-06-20 DIAGNOSIS — Z12.9 SCREENING FOR CANCER: ICD-10-CM

## 2019-06-20 DIAGNOSIS — Z00.00 ANNUAL PHYSICAL EXAM: ICD-10-CM

## 2019-06-20 DIAGNOSIS — R07.9 CHEST PAIN, UNSPECIFIED TYPE: ICD-10-CM

## 2019-06-20 LAB
25(OH)D3+25(OH)D2 SERPL-MCNC: 24 NG/ML (ref 30–96)
ALBUMIN SERPL BCP-MCNC: 3.7 G/DL (ref 3.5–5.2)
ALP SERPL-CCNC: 107 U/L (ref 55–135)
ALT SERPL W/O P-5'-P-CCNC: 22 U/L (ref 10–44)
ANION GAP SERPL CALC-SCNC: 6 MMOL/L (ref 8–16)
AST SERPL-CCNC: 19 U/L (ref 10–40)
BASOPHILS # BLD AUTO: 0.02 K/UL (ref 0–0.2)
BASOPHILS NFR BLD: 0.4 % (ref 0–1.9)
BILIRUB SERPL-MCNC: 0.9 MG/DL (ref 0.1–1)
BUN SERPL-MCNC: 14 MG/DL (ref 6–20)
CALCIUM SERPL-MCNC: 9.3 MG/DL (ref 8.7–10.5)
CHLORIDE SERPL-SCNC: 107 MMOL/L (ref 95–110)
CHOLEST SERPL-MCNC: 109 MG/DL (ref 120–199)
CHOLEST/HDLC SERPL: 2.6 {RATIO} (ref 2–5)
CO2 SERPL-SCNC: 30 MMOL/L (ref 23–29)
CREAT SERPL-MCNC: 1 MG/DL (ref 0.5–1.4)
DIFFERENTIAL METHOD: ABNORMAL
EOSINOPHIL # BLD AUTO: 0.1 K/UL (ref 0–0.5)
EOSINOPHIL NFR BLD: 2.2 % (ref 0–8)
ERYTHROCYTE [DISTWIDTH] IN BLOOD BY AUTOMATED COUNT: 12.9 % (ref 11.5–14.5)
EST. GFR  (AFRICAN AMERICAN): >60 ML/MIN/1.73 M^2
EST. GFR  (NON AFRICAN AMERICAN): >60 ML/MIN/1.73 M^2
ESTIMATED AVG GLUCOSE: 108 MG/DL (ref 68–131)
FERRITIN SERPL-MCNC: 61 NG/ML (ref 20–300)
GLUCOSE SERPL-MCNC: 91 MG/DL (ref 70–110)
HBA1C MFR BLD HPLC: 5.4 % (ref 4–5.6)
HCT VFR BLD AUTO: 47.9 % (ref 37–48.5)
HDLC SERPL-MCNC: 42 MG/DL (ref 40–75)
HDLC SERPL: 38.5 % (ref 20–50)
HGB BLD-MCNC: 15.7 G/DL (ref 12–16)
IRON SERPL-MCNC: 67 UG/DL (ref 30–160)
LDLC SERPL CALC-MCNC: 46.4 MG/DL (ref 63–159)
LYMPHOCYTES # BLD AUTO: 1.6 K/UL (ref 1–4.8)
LYMPHOCYTES NFR BLD: 29.6 % (ref 18–48)
MCH RBC QN AUTO: 32.1 PG (ref 27–31)
MCHC RBC AUTO-ENTMCNC: 32.8 G/DL (ref 32–36)
MCV RBC AUTO: 98 FL (ref 82–98)
MONOCYTES # BLD AUTO: 0.6 K/UL (ref 0.3–1)
MONOCYTES NFR BLD: 11.3 % (ref 4–15)
NEUTROPHILS # BLD AUTO: 3.1 K/UL (ref 1.8–7.7)
NEUTROPHILS NFR BLD: 56.3 % (ref 38–73)
NONHDLC SERPL-MCNC: 67 MG/DL
PLATELET # BLD AUTO: 221 K/UL (ref 150–350)
PMV BLD AUTO: 9.9 FL (ref 9.2–12.9)
POTASSIUM SERPL-SCNC: 4.5 MMOL/L (ref 3.5–5.1)
PROT SERPL-MCNC: 6.9 G/DL (ref 6–8.4)
RBC # BLD AUTO: 4.89 M/UL (ref 4–5.4)
RPR SER QL: NORMAL
SATURATED IRON: 18 % (ref 20–50)
SODIUM SERPL-SCNC: 143 MMOL/L (ref 136–145)
T4 FREE SERPL-MCNC: 0.81 NG/DL (ref 0.71–1.51)
TOTAL IRON BINDING CAPACITY: 379 UG/DL (ref 250–450)
TRANSFERRIN SERPL-MCNC: 256 MG/DL (ref 200–375)
TRIGL SERPL-MCNC: 103 MG/DL (ref 30–150)
TSH SERPL DL<=0.005 MIU/L-ACNC: 0.61 UIU/ML (ref 0.4–4)
VIT B12 SERPL-MCNC: 322 PG/ML (ref 210–950)
WBC # BLD AUTO: 5.51 K/UL (ref 3.9–12.7)

## 2019-06-20 PROCEDURE — 80053 COMPREHEN METABOLIC PANEL: CPT

## 2019-06-20 PROCEDURE — 83540 ASSAY OF IRON: CPT

## 2019-06-20 PROCEDURE — 84443 ASSAY THYROID STIM HORMONE: CPT

## 2019-06-20 PROCEDURE — 86703 HIV-1/HIV-2 1 RESULT ANTBDY: CPT

## 2019-06-20 PROCEDURE — 80061 LIPID PANEL: CPT

## 2019-06-20 PROCEDURE — 36415 COLL VENOUS BLD VENIPUNCTURE: CPT

## 2019-06-20 PROCEDURE — 85025 COMPLETE CBC W/AUTO DIFF WBC: CPT

## 2019-06-20 PROCEDURE — 3079F DIAST BP 80-89 MM HG: CPT | Mod: CPTII,S$GLB,, | Performed by: FAMILY MEDICINE

## 2019-06-20 PROCEDURE — 99386 PREV VISIT NEW AGE 40-64: CPT | Mod: S$GLB,,, | Performed by: FAMILY MEDICINE

## 2019-06-20 PROCEDURE — 82728 ASSAY OF FERRITIN: CPT

## 2019-06-20 PROCEDURE — 82306 VITAMIN D 25 HYDROXY: CPT

## 2019-06-20 PROCEDURE — 87491 CHLMYD TRACH DNA AMP PROBE: CPT

## 2019-06-20 PROCEDURE — 3074F SYST BP LT 130 MM HG: CPT | Mod: CPTII,S$GLB,, | Performed by: FAMILY MEDICINE

## 2019-06-20 PROCEDURE — 99386 PR PREVENTIVE VISIT,NEW,40-64: ICD-10-PCS | Mod: S$GLB,,, | Performed by: FAMILY MEDICINE

## 2019-06-20 PROCEDURE — 86592 SYPHILIS TEST NON-TREP QUAL: CPT

## 2019-06-20 PROCEDURE — 99999 PR PBB SHADOW E&M-EST. PATIENT-LVL V: ICD-10-PCS | Mod: PBBFAC,,, | Performed by: FAMILY MEDICINE

## 2019-06-20 PROCEDURE — 3074F PR MOST RECENT SYSTOLIC BLOOD PRESSURE < 130 MM HG: ICD-10-PCS | Mod: CPTII,S$GLB,, | Performed by: FAMILY MEDICINE

## 2019-06-20 PROCEDURE — 3079F PR MOST RECENT DIASTOLIC BLOOD PRESSURE 80-89 MM HG: ICD-10-PCS | Mod: CPTII,S$GLB,, | Performed by: FAMILY MEDICINE

## 2019-06-20 PROCEDURE — 99999 PR PBB SHADOW E&M-EST. PATIENT-LVL V: CPT | Mod: PBBFAC,,, | Performed by: FAMILY MEDICINE

## 2019-06-20 PROCEDURE — 84439 ASSAY OF FREE THYROXINE: CPT

## 2019-06-20 PROCEDURE — 82607 VITAMIN B-12: CPT

## 2019-06-20 PROCEDURE — 80074 ACUTE HEPATITIS PANEL: CPT

## 2019-06-20 PROCEDURE — 83036 HEMOGLOBIN GLYCOSYLATED A1C: CPT

## 2019-06-20 RX ORDER — ATORVASTATIN CALCIUM 20 MG/1
20 TABLET, FILM COATED ORAL NIGHTLY
Qty: 90 TABLET | Refills: 3 | Status: SHIPPED | OUTPATIENT
Start: 2019-06-20 | End: 2020-06-25 | Stop reason: SDUPTHER

## 2019-06-20 RX ORDER — BUDESONIDE AND FORMOTEROL FUMARATE DIHYDRATE 160; 4.5 UG/1; UG/1
2 AEROSOL RESPIRATORY (INHALATION) EVERY 12 HOURS
Qty: 10.2 G | Refills: 2 | Status: SHIPPED | OUTPATIENT
Start: 2019-06-20 | End: 2019-09-05 | Stop reason: SDUPTHER

## 2019-06-20 RX ORDER — LISINOPRIL 10 MG/1
10 TABLET ORAL DAILY
Qty: 90 TABLET | Refills: 3 | Status: SHIPPED | OUTPATIENT
Start: 2019-06-20 | End: 2020-06-15 | Stop reason: SDUPTHER

## 2019-06-20 NOTE — TELEPHONE ENCOUNTER
L/M on pt,'s cell, she's to call back to schedule.----- Message from Becka Frances MA sent at 6/20/2019  9:10 AM CDT -----  Good morning,    Dr. Bee was hoping to get a pt in within the next 2 weeks. Referral is placed. Please advise.    Thank you,  Becka

## 2019-06-20 NOTE — PATIENT INSTRUCTIONS
Call to make an appointment within [facility] for psychiatry/psychology [phone]    100 fl oz water daily

## 2019-06-20 NOTE — PROGRESS NOTES
"Low dose ctSubjective:      Patient ID: Lucia Matias is a 58 y.o. female.    Chief Complaint: Establish Care; Weight Loss; and Nicotine Dependence (smoking cessation)    HPI   Patient here today for annual exam. She has anxiety and is seeing psychiatry.  She does injections every two weeks and takes cimzia. She has been on this for 1 year and has been effective. She has history of diverticulitis that is managed by diet. She ran out of lisinopril one week ago and then has been on this for 3 years. She has been sob for the last 3 months with all activities. She has copd and has been on flovent and albuterol. She has been on truvada for 8 months. Generalized headache with coughing, resolves when she stops coughing.     Breakfast  latte (2 sugars/low fat milk)  Bagel     Lunch  [place]/fruit      Dinner  Dutch quach      Review of Systems   Constitutional: Positive for unexpected weight change. Negative for activity change.   HENT: Negative for hearing loss, rhinorrhea and trouble swallowing.    Eyes: Negative for discharge and visual disturbance.   Respiratory: Positive for wheezing. Negative for chest tightness.    Cardiovascular: Positive for palpitations. Negative for chest pain.   Gastrointestinal: Negative for blood in stool, constipation, diarrhea and vomiting.   Endocrine: Negative for polydipsia and polyuria.   Genitourinary: Negative for difficulty urinating, dysuria, hematuria and menstrual problem.   Musculoskeletal: Positive for arthralgias. Negative for joint swelling and neck pain.   Neurological: Positive for headaches. Negative for weakness.   Psychiatric/Behavioral: Negative for confusion and dysphoric mood.     I personally reviewed Past Medical History, Past Surgical history,  Past Social History and Family History      Objective:   /82 (BP Location: Left arm, Patient Position: Sitting, BP Method: Large (Manual))   Pulse 64   Ht 5' 2" (1.575 m)   Wt 95 kg (209 lb 7 oz)   " SpO2 96%   BMI 38.31 kg/m²     Physical Exam   Constitutional: She is oriented to person, place, and time. She appears well-developed and well-nourished. No distress.   HENT:   Head: Normocephalic and atraumatic.   Right Ear: Hearing, tympanic membrane, external ear and ear canal normal.   Left Ear: Hearing, tympanic membrane, external ear and ear canal normal.   Nose: Nose normal.   Mouth/Throat: Uvula is midline and oropharynx is clear and moist. No oropharyngeal exudate.   Eyes: Pupils are equal, round, and reactive to light. Conjunctivae and EOM are normal. Right eye exhibits no discharge. Left eye exhibits no discharge. No scleral icterus.   Neck: Normal range of motion. Neck supple.   Cardiovascular: Normal rate, regular rhythm, normal heart sounds and intact distal pulses. Exam reveals no gallop.   No murmur heard.  Pulmonary/Chest: Effort normal and breath sounds normal. No respiratory distress. She has no wheezes. She has no rales. She exhibits no tenderness.   Abdominal: Soft. Bowel sounds are normal. She exhibits no distension and no mass. There is no tenderness. There is no rebound and no guarding.   Neurological: She is alert and oriented to person, place, and time.   Skin: Skin is warm and dry.   Vitals reviewed.      1. Annual physical exam    2. Anxiety    3. Tobacco use    4. Psoriasis    5. Rheumatoid arthritis, involving unspecified site, unspecified rheumatoid factor presence    6. SOB (shortness of breath)    7. Chest pain, unspecified type    8. Chronic obstructive pulmonary disease, unspecified COPD type    9. Hyperlipidemia, unspecified hyperlipidemia type    10. Exposure to HIV    11. Palpitations    12. Screening for cancer    13. Hypertension, unspecified type        1. Labs   2. Controlled on twice daily klonipin/ambien nightly and buspirone, she understands she will need to see psychiatry for refills for this regimen   -per patient she has failed all SSRI/SNRIs  3. Smoking cessation  program  4/5. stable, cont current regimen, derm eval to continue injections  6/7. ED prompts/stress test and PFTs  8. Switch to symbicort, return in 2 weeks   9. stable, cont current regimen   10. Cont truvada  11. holter monitor   12. Ct scan   13. Stable continue current regimen   Release of records for immunizations/colonscopy/pap smear     Orders Placed This Encounter   Procedures    C. trachomatis/N. gonorrhoeae by AMP DNA    CT Chest Lung Screening Low Dose    CBC auto differential    Comprehensive metabolic panel    Hemoglobin A1c    Lipid panel    Iron and TIBC    Ferritin    Hepatitis panel, acute    HIV 1/2 Ag/Ab (4th Gen)    Vitamin D    Vitamin B12    TSH    T4, free    RPR    Ambulatory referral to Smoking Cessation Program    Ambulatory consult to Psychiatry    Ambulatory consult to Dermatology    Holter monitor - 24 hour    SCHEDULED EKG 12-LEAD (to [place])    Echocardiogram stress test    Complete PFT with bronchodilator and FeNO     Medications Ordered This Encounter   Medications    atorvastatin (LIPITOR) 20 MG tablet     Sig: Take 1 tablet (20 mg total) by mouth every evening.     Dispense:  90 tablet     Refill:  3    budesonide-formoterol 160-4.5 mcg (SYMBICORT) 160-4.5 mcg/actuation HFAA     Sig: Inhale 2 puffs into the lungs every 12 (twelve) hours. Controller     Dispense:  10.2 g     Refill:  2    lisinopril 10 MG tablet     Sig: Take 1 tablet (10 mg total) by mouth once daily.     Dispense:  90 tablet     Refill:  3

## 2019-06-21 LAB
C TRACH DNA SPEC QL NAA+PROBE: NOT DETECTED
HAV IGM SERPL QL IA: NEGATIVE
HBV CORE IGM SERPL QL IA: NEGATIVE
HBV SURFACE AG SERPL QL IA: NEGATIVE
HCV AB SERPL QL IA: NEGATIVE
HIV 1+2 AB+HIV1 P24 AG SERPL QL IA: NEGATIVE
N GONORRHOEA DNA SPEC QL NAA+PROBE: NOT DETECTED

## 2019-06-26 ENCOUNTER — HOSPITAL ENCOUNTER (OUTPATIENT)
Dept: CARDIOLOGY | Facility: CLINIC | Age: 59
Discharge: HOME | End: 2019-06-26
Attending: FAMILY MEDICINE
Payer: COMMERCIAL

## 2019-06-26 VITALS
WEIGHT: 209.44 LBS | HEIGHT: 62 IN | SYSTOLIC BLOOD PRESSURE: 111 MMHG | BODY MASS INDEX: 38.54 KG/M2 | HEART RATE: 61 BPM | DIASTOLIC BLOOD PRESSURE: 58 MMHG

## 2019-06-26 DIAGNOSIS — R06.02 SOB (SHORTNESS OF BREATH): ICD-10-CM

## 2019-06-26 DIAGNOSIS — Z00.00 ANNUAL PHYSICAL EXAM: ICD-10-CM

## 2019-06-26 DIAGNOSIS — R00.2 PALPITATIONS: ICD-10-CM

## 2019-06-26 DIAGNOSIS — R07.9 CHEST PAIN, UNSPECIFIED TYPE: ICD-10-CM

## 2019-06-26 LAB
ASCENDING AORTA: 2.94 CM
BSA FOR ECHO PROCEDURE: 2.04 M2
CV ECHO LV RWT: 0.34 CM
CV STRESS BASE HR: 58 BPM
DIASTOLIC BLOOD PRESSURE: 58 MMHG
DOP CALC LVOT AREA: 2.8 CM2
DOP CALC LVOT DIAMETER: 1.9 CM
DOP CALC LVOT PEAK VEL: 0.98 M/S
DOP CALC LVOT STROKE VOLUME: 56.22 CM3
DOP CALCLVOT PEAK VEL VTI: 19.84 CM
E WAVE DECELERATION TIME: 147.93 MSEC
E/A RATIO: 1.47
E/E' RATIO: 10 M/S
ECHO LV POSTERIOR WALL: 0.78 CM (ref 0.6–1.1)
FRACTIONAL SHORTENING: 31 % (ref 28–44)
INTERVENTRICULAR SEPTUM: 0.78 CM (ref 0.6–1.1)
IVRT: 0.08 MSEC
LA MAJOR: 5.08 CM
LA MINOR: 5.26 CM
LA WIDTH: 3.52 CM
LEFT ATRIUM SIZE: 3.7 CM
LEFT ATRIUM VOLUME INDEX: 29.4 ML/M2
LEFT ATRIUM VOLUME: 57.22 CM3
LEFT INTERNAL DIMENSION IN SYSTOLE: 3.12 CM (ref 2.1–4)
LEFT VENTRICLE DIASTOLIC VOLUME INDEX: 48.05 ML/M2
LEFT VENTRICLE DIASTOLIC VOLUME: 93.66 ML
LEFT VENTRICLE MASS INDEX: 57 G/M2
LEFT VENTRICLE SYSTOLIC VOLUME INDEX: 19.8 ML/M2
LEFT VENTRICLE SYSTOLIC VOLUME: 38.52 ML
LEFT VENTRICULAR INTERNAL DIMENSION IN DIASTOLE: 4.53 CM (ref 3.5–6)
LEFT VENTRICULAR MASS: 111.18 G
LV LATERAL E/E' RATIO: 10 M/S
LV SEPTAL E/E' RATIO: 10 M/S
MV PEAK A VEL: 0.68 M/S
MV PEAK E VEL: 1 M/S
OHS CV CPX 1 MINUTE RECOVERY HEART RATE: 133 BPM
OHS CV CPX 85 PERCENT MAX PREDICTED HEART RATE MALE: 133
OHS CV CPX MAX PREDICTED HEART RATE: 157
OHS CV CPX PATIENT IS FEMALE: 1
OHS CV CPX PATIENT IS MALE: 0
OHS CV CPX PEAK DIASTOLIC BLOOD PRESSURE: 60 MMHG
OHS CV CPX PEAK HEAR RATE: 142 BPM
OHS CV CPX PEAK RATE PRESSURE PRODUCT: NORMAL
OHS CV CPX PEAK SYSTOLIC BLOOD PRESSURE: 131 MMHG
OHS CV CPX PERCENT MAX PREDICTED HEART RATE ACHIEVED: 91
OHS CV CPX RATE PRESSURE PRODUCT PRESENTING: 6438
PISA TR MAX VEL: 2.34 M/S
PULM VEIN S/D RATIO: 1.27
PV PEAK D VEL: 0.49 M/S
PV PEAK S VEL: 0.62 M/S
RA MAJOR: 5.01 CM
RA PRESSURE: 3 MMHG
RA WIDTH: 3.32 CM
RIGHT VENTRICULAR END-DIASTOLIC DIMENSION: 4 CM
RV TISSUE DOPPLER FREE WALL SYSTOLIC VELOCITY 1 (APICAL 4 CHAMBER VIEW): 10.1 CM/S
SINUS: 2.98 CM
STJ: 2.56 CM
SYSTOLIC BLOOD PRESSURE: 111 MMHG
TDI LATERAL: 0.1 M/S
TDI SEPTAL: 0.1 M/S
TDI: 0.1 M/S
TR MAX PG: 22 MMHG
TRICUSPID ANNULAR PLANE SYSTOLIC EXCURSION: 2.22 CM
TV REST PULMONARY ARTERY PRESSURE: 25 MMHG

## 2019-06-26 PROCEDURE — 93010 EKG 12-LEAD: ICD-10-PCS | Mod: ,,, | Performed by: INTERNAL MEDICINE

## 2019-06-26 PROCEDURE — 96372 PR INJECTION,THERAP/PROPH/DIAG2ST, IM OR SUBCUT: ICD-10-PCS | Mod: S$GLB,,, | Performed by: INTERNAL MEDICINE

## 2019-06-26 PROCEDURE — 93351 STRESS TTE COMPLETE: CPT | Mod: S$GLB,,, | Performed by: INTERNAL MEDICINE

## 2019-06-26 PROCEDURE — 96372 THER/PROPH/DIAG INJ SC/IM: CPT | Mod: S$GLB,,, | Performed by: INTERNAL MEDICINE

## 2019-06-26 PROCEDURE — 93005 EKG 12-LEAD: ICD-10-PCS | Mod: S$GLB,,, | Performed by: FAMILY MEDICINE

## 2019-06-26 PROCEDURE — 93005 ELECTROCARDIOGRAM TRACING: CPT | Mod: S$GLB,,, | Performed by: FAMILY MEDICINE

## 2019-06-26 PROCEDURE — 93351 ECHOCARDIOGRAM STRESS TEST (CUPID ONLY): ICD-10-PCS | Mod: S$GLB,,, | Performed by: INTERNAL MEDICINE

## 2019-06-26 PROCEDURE — 93010 ELECTROCARDIOGRAM REPORT: CPT | Mod: ,,, | Performed by: INTERNAL MEDICINE

## 2019-06-26 RX ORDER — DOBUTAMINE HYDROCHLORIDE 200 MG/100ML
10 INJECTION INTRAVENOUS CONTINUOUS
Status: DISCONTINUED | OUTPATIENT
Start: 2019-06-26 | End: 2021-03-31

## 2019-06-26 RX ORDER — ATROPINE SULFATE 0.1 MG/ML
1 INJECTION INTRAVENOUS
Status: COMPLETED | OUTPATIENT
Start: 2019-06-26 | End: 2019-06-26

## 2019-06-26 RX ADMIN — DOBUTAMINE HYDROCHLORIDE 40 MCG/KG/MIN: 200 INJECTION INTRAVENOUS at 04:06

## 2019-06-26 RX ADMIN — ATROPINE SULFATE 0.75 MG: 0.1 INJECTION INTRAVENOUS at 04:06

## 2019-06-26 NOTE — NURSING NOTE
Patient verified by 2 identifiers and allergies reviewed.  22g IV placed to Rt Hand.  DSE explained to patient, consent obtained & testing completed.  Pt tolerated testing well. IV removed post testing.  Post study discharge instructions reviewed with patient, patient verbalized understanding.  Patient discharged to home in stable condition.

## 2019-06-27 ENCOUNTER — HOSPITAL ENCOUNTER (OUTPATIENT)
Dept: PULMONOLOGY | Facility: OTHER | Age: 59
Discharge: HOME | End: 2019-06-27
Attending: FAMILY MEDICINE
Payer: COMMERCIAL

## 2019-06-27 DIAGNOSIS — R06.02 SOB (SHORTNESS OF BREATH): ICD-10-CM

## 2019-06-27 DIAGNOSIS — R07.9 CHEST PAIN, UNSPECIFIED TYPE: ICD-10-CM

## 2019-06-27 DIAGNOSIS — Z00.00 ANNUAL PHYSICAL EXAM: ICD-10-CM

## 2019-06-27 PROCEDURE — 95012 NITRIC OXIDE EXP GAS DETER: CPT

## 2019-06-27 PROCEDURE — 94727 GAS DIL/WSHOT DETER LNG VOL: CPT

## 2019-06-27 PROCEDURE — 94060 EVALUATION OF WHEEZING: CPT

## 2019-06-27 PROCEDURE — 94729 DIFFUSING CAPACITY: CPT

## 2019-06-28 ENCOUNTER — HOSPITAL ENCOUNTER (OUTPATIENT)
Dept: RADIOLOGY | Facility: OTHER | Age: 59
Discharge: HOME | End: 2019-06-28
Attending: FAMILY MEDICINE
Payer: COMMERCIAL

## 2019-06-28 DIAGNOSIS — Z12.9 SCREENING FOR CANCER: ICD-10-CM

## 2019-06-28 DIAGNOSIS — Z00.00 ANNUAL PHYSICAL EXAM: ICD-10-CM

## 2019-06-28 PROCEDURE — G0297 CT CHEST LUNG SCREENING LOW DOSE: ICD-10-PCS | Mod: 26,,, | Performed by: RADIOLOGY

## 2019-06-28 PROCEDURE — G0297 LDCT FOR LUNG CA SCREEN: HCPCS | Mod: 26,,, | Performed by: RADIOLOGY

## 2019-06-28 PROCEDURE — G0297 LDCT FOR LUNG CA SCREEN: HCPCS | Mod: TC

## 2019-06-30 NOTE — PROCEDURES
HISTORY OF PRESENT ILLNESS:  The patient is a 58-year-old female, 62 inches   tall, 207 pounds.  Diagnosis is shortness of breath.  The patient has a forced   vital capacity of 2.00, which is 68% of predicted.  FEV1 is 0.99 or 45% of   predicted.  FEV1/FVC is 67% of predicted.  FEF 25-75 is decreased at 15% of   predicted.  There is no significant response to bronchodilators.  Lung volume   shows air trapping.  There is no evidence of diffusion defect.  Flow volume loop   is consistent with above.  Spirogram is consistent with above.  Fractional   excretion of nitrogen oxide is at 6 parts per billion suggesting unlikely   response to corticosteroids.     Assessment  1. Spirometry shows moderately severe obstructive lung disease with no significant response to bronchodilators.  2. Lung volumes show air trapping.  3. There is no diffusion defect.  4. FeNO discussed above.  PRETTY/ISIAH  dd: 06/30/2019 12:27:05 (CDT)  td: 06/30/2019 16:14:29 (CDT)  Doc ID   #[id number]  Job ID #[id number]    CC: Saige Bee M.D.

## 2019-07-01 ENCOUNTER — TELEPHONE (OUTPATIENT)
Dept: INTERNAL MEDICINE | Facility: CLINIC | Age: 59
End: 2019-07-01

## 2019-07-01 DIAGNOSIS — R91.8 OPACITY OF LUNG ON IMAGING STUDY: Primary | ICD-10-CM

## 2019-07-01 DIAGNOSIS — R91.1 PULMONARY NODULE: ICD-10-CM

## 2019-07-01 RX ORDER — EMTRICITABINE AND TENOFOVIR DISOPROXIL FUMARATE 200; 300 MG/1; MG/1
1 TABLET, FILM COATED ORAL DAILY
Qty: 90 TABLET | Refills: 3 | Status: SHIPPED | OUTPATIENT
Start: 2019-07-01 | End: 2020-09-25

## 2019-07-01 NOTE — TELEPHONE ENCOUNTER
Called and reviewed CT scan results   She reports kidney's have been worked up at  and does not need further work up   Please schedule CT scan in 6 months

## 2019-07-05 ENCOUNTER — TELEPHONE (OUTPATIENT)
Dept: PHARMACY | Facility: CLINIC | Age: 59
End: 2019-07-05

## 2019-07-05 NOTE — TELEPHONE ENCOUNTER
Refill readiness for Truvada confirmed with patient's  and caregiver Ike with whom we are authorized to speak; name/ confirmed; no missed doses; no new medications; no side effects noted; address confirmed for  shipment and  delivery.  Patient states they have 5 doses remaining.     Chavez Martínez R.Ph., AAHIVP  Clinical Pharmacist, HIV/HCV  [facility]  Phone: [phone]

## 2019-07-10 ENCOUNTER — TELEPHONE (OUTPATIENT)
Dept: SPORTS MEDICINE | Facility: CLINIC | Age: 59
End: 2019-07-10

## 2019-07-10 NOTE — TELEPHONE ENCOUNTER
Appt scheduled with /[facility] on 7/15/19 at 4:15pm  for pt c/o continued R knee/cap pain s/p fall in January. Pt confirms time and location of appt. Appt slip mailed.

## 2019-07-10 NOTE — TELEPHONE ENCOUNTER
----- Message from SILVIA Cotton sent at 7/9/2019  9:47 AM CDT -----  Contact: Pt Portal  Could potentially be a patient for Dr. Woodward? I got this sent to our basket but Dr. Murguia does not see knees.   ----- Message -----  From: Arielle Arreguin  Sent: 7/9/2019   9:43 AM  To: [facility] Staff    Appointment Request From: Lucia Matias    With Provider: kaitlyn murguia    Preferred Date Range: 7/9/2019 - 7/10/2019    Preferred Times: Any time    Reason for visit: knee pain    Comments:  find out why my knee hurts all the time

## 2019-07-15 ENCOUNTER — OFFICE VISIT (OUTPATIENT)
Dept: SPORTS MEDICINE | Facility: CLINIC | Age: 59
End: 2019-07-15
Payer: COMMERCIAL

## 2019-07-15 ENCOUNTER — HOSPITAL ENCOUNTER (OUTPATIENT)
Dept: RADIOLOGY | Facility: HOSPITAL | Age: 59
Discharge: HOME | End: 2019-07-15
Attending: FAMILY MEDICINE
Payer: COMMERCIAL

## 2019-07-15 VITALS — TEMPERATURE: 99 F | HEIGHT: 62 IN | WEIGHT: 209 LBS | BODY MASS INDEX: 38.46 KG/M2

## 2019-07-15 DIAGNOSIS — M25.561 PAIN IN BOTH KNEES, UNSPECIFIED CHRONICITY: ICD-10-CM

## 2019-07-15 DIAGNOSIS — M25.561 MECHANICAL KNEE PAIN, RIGHT: Primary | ICD-10-CM

## 2019-07-15 DIAGNOSIS — M25.562 PAIN IN BOTH KNEES, UNSPECIFIED CHRONICITY: ICD-10-CM

## 2019-07-15 PROCEDURE — 3008F BODY MASS INDEX DOCD: CPT | Mod: CPTII,S$GLB,, | Performed by: FAMILY MEDICINE

## 2019-07-15 PROCEDURE — 73564 X-RAY EXAM KNEE 4 OR MORE: CPT | Mod: TC,50,FY,PO

## 2019-07-15 PROCEDURE — 73564 XR KNEE ORTHO BILAT WITH FLEXION: ICD-10-PCS | Mod: 26,50,, | Performed by: RADIOLOGY

## 2019-07-15 PROCEDURE — 99999 PR PBB SHADOW E&M-EST. PATIENT-LVL III: ICD-10-PCS | Mod: PBBFAC,,, | Performed by: FAMILY MEDICINE

## 2019-07-15 PROCEDURE — 99999 PR PBB SHADOW E&M-EST. PATIENT-LVL III: CPT | Mod: PBBFAC,,, | Performed by: FAMILY MEDICINE

## 2019-07-15 PROCEDURE — 99204 PR OFFICE/OUTPT VISIT, NEW, LEVL IV, 45-59 MIN: ICD-10-PCS | Mod: S$GLB,,, | Performed by: FAMILY MEDICINE

## 2019-07-15 PROCEDURE — 3008F PR BODY MASS INDEX (BMI) DOCUMENTED: ICD-10-PCS | Mod: CPTII,S$GLB,, | Performed by: FAMILY MEDICINE

## 2019-07-15 PROCEDURE — 73564 X-RAY EXAM KNEE 4 OR MORE: CPT | Mod: 26,50,, | Performed by: RADIOLOGY

## 2019-07-15 PROCEDURE — 99204 OFFICE O/P NEW MOD 45 MIN: CPT | Mod: S$GLB,,, | Performed by: FAMILY MEDICINE

## 2019-07-15 NOTE — PROGRESS NOTES
Lucia Matias, a 58 y.o. female, presents today for evaluation of her Right knee.      History of Present Illness (HPI)  Location: anterior knee, Right  Onset: Chronic, since 12.2018  Palliative:    Relative rest   Oral analgesics - IBU prn   Heat   hgPT since injury with no improvement  Provocative:    ADLS   Prolonged ambulation  Prior: none  Progression: worsening discomfort  Quality:    sharp pain  Radiation: none  Severity: per nursing documentation  Timing: intermittent w/ use  Trauma: Patient had trauma of right knee on 12.2018 with a mechanical fall. Patient reports hearing a pop.     Review of Systems (ROS)  A 10+ review of systems was performed with pertinent positives and negatives noted above in the history of present illness. Other systems were negative unless otherwise specified.    Physical Examination (PE)  General:  The patient is alert and oriented x 3. Mood is pleasant. Observation of ears, eyes and nose reveal no gross abnormalities. HEENT: NCAT, sclera anicteric.   Lungs: Respirations are equal and unlabored.  Gait is coordinated. Patient can toe walk and heel walk without difficulty.    RIGHT KNEE EXAMINATION    Observation/Inspection  Gait:   Nonantalgic   Alignment:  Neutral   Scars:   None   Muscle atrophy: Mild  Effusion:  None   Warmth:  None   Discoloration:   none     Tenderness / Crepitus (T / C):         T / C      T / C  Patella   + / -   Lateral joint line   - / -     Peripatellar medial  +  Medial joint line    + / -  Peripatellar lateral -  Medial plica   - / -  Patellar tendon -   Popliteal fossa   - / -  Quad tendon   -   Gastrocnemius   -  Prepatellar Bursa - / -   Quadricep   -  Tibial tubercle  -  Thigh/hamstring  -  Pes anserine/HS -  Fibula    -  ITB   - / -  Tibia     -  Tib/fib joint  - / -  LCL    -    MFC   - / -   MCL: Proximal  -    LFC   - / -   Distal    -          ROM: (* = pain)  PASSIVE   ACTIVE    Left :   5 / 0 / 145   5 / 0 / 145     Right :    5 / 0 /  110*   5 / 0 / 110*    Patellofemoral examination:  See above noted areas of tenderness.   Patella position    Subluxation / dislocation: Centered        Sup. / Inf;   Normal   Crepitus (PF):    present   Patellar Mobility:       Medial-lateral:   Normal    Superior-inferior:  Normal    Inferior tilt   Normal    Patellar tendon:  Normal   Lateral tilt:    Normal   J-sign:     None   Patellofemoral grind:   pain     Meniscal Signs:     Pain on terminal extension:  +  Pain on terminal flexion:  +  Costas maneuver:  +*  Squat     +*  Thessaly    +*    Ligament Examination:  ACL / Lachman:  WNL  PCL-Post.  drawer: normal 0 to 2mm  MCL- Valgus:  normal 0 to 2mm  LCL- Varus:    normal 0 to 2mm  Pivot shift:  guarding   Dial Test:   difference c/w other side   At 30° flexion: normal (< 5°)    At 90° flexion: normal (< 5°)   Reverse Pivot Shift:   normal (Equal)     Strength: (* = with pain) Painful Side  Quadriceps   3+/5 *  Hamstring:   3+/5 *    Extremity Neuro-vascular Examination:   Sensation:  Grossly intact to light touch all dermatomal regions.   Motor Function:  Fully intact motor function at hip, knee, foot and ankle    DTRs;  quadriceps and  achilles 2+.  No clonus and downgoing Babinski.    Vascular status:  DP and PT pulses 2+, brisk capillary refill, symmetric.     Other Findings:    ASSESSMENT & PLAN  Assessment:   #1 Knee pain w/ mechanical symptoms, s/p trauma, right    No evidence of neurologic pathology  No evidence of vascular pathology    Imaging studies reviewed:   X-ray knee, bilateral 19.07    Plan:    Given extreme dysfunction and discomfort, coupled with physical examination suggesting meniscal pathology and non-diagnostic x-ray imaging, we will obtain MRI imaging for further evaluation of the soft tissue structures of the knee.     [We discussed the importance of appropriate diet, weight, and regular exercise including quadriceps strengthening     We discussed options including:  #1  watchful waiting  #2 physical therapy aimed at:   Core stability   RoM knee   Strengthening quadriceps   Gait training   #3 injection therapy:   CSI iaknee     Right,     Left,    VSI iaknee    Right,     Left,    Orthobiologics   #4 consultation      The patient chooses #]    Pain management: handout given  Bracing:   Physical therapy:   Activity (e.g. sports, work) restrictions: as tolerated   school/vocation: clinical research coordinator at [employer]     Follow up after MRI knee  Should symptoms worsen or fail to resolve, consider:  Revisiting the above options

## 2019-07-17 ENCOUNTER — OFFICE VISIT (OUTPATIENT)
Dept: INTERNAL MEDICINE | Facility: CLINIC | Age: 59
End: 2019-07-17
Attending: FAMILY MEDICINE
Payer: COMMERCIAL

## 2019-07-17 VITALS
OXYGEN SATURATION: 95 % | HEIGHT: 62 IN | SYSTOLIC BLOOD PRESSURE: 120 MMHG | HEART RATE: 60 BPM | WEIGHT: 212.06 LBS | DIASTOLIC BLOOD PRESSURE: 82 MMHG | BODY MASS INDEX: 39.02 KG/M2

## 2019-07-17 DIAGNOSIS — N28.1 BILATERAL RENAL CYSTS: Primary | ICD-10-CM

## 2019-07-17 DIAGNOSIS — J44.9 CHRONIC OBSTRUCTIVE PULMONARY DISEASE, UNSPECIFIED COPD TYPE: ICD-10-CM

## 2019-07-17 DIAGNOSIS — E78.5 HYPERLIPIDEMIA, UNSPECIFIED HYPERLIPIDEMIA TYPE: ICD-10-CM

## 2019-07-17 DIAGNOSIS — E66.01 SEVERE OBESITY (BMI 35.0-39.9) WITH COMORBIDITY: ICD-10-CM

## 2019-07-17 PROCEDURE — 99214 PR OFFICE/OUTPT VISIT, EST, LEVL IV, 30-39 MIN: ICD-10-PCS | Mod: S$GLB,,, | Performed by: FAMILY MEDICINE

## 2019-07-17 PROCEDURE — 3008F PR BODY MASS INDEX (BMI) DOCUMENTED: ICD-10-PCS | Mod: CPTII,S$GLB,, | Performed by: FAMILY MEDICINE

## 2019-07-17 PROCEDURE — 3074F SYST BP LT 130 MM HG: CPT | Mod: CPTII,S$GLB,, | Performed by: FAMILY MEDICINE

## 2019-07-17 PROCEDURE — 99214 OFFICE O/P EST MOD 30 MIN: CPT | Mod: S$GLB,,, | Performed by: FAMILY MEDICINE

## 2019-07-17 PROCEDURE — 3079F DIAST BP 80-89 MM HG: CPT | Mod: CPTII,S$GLB,, | Performed by: FAMILY MEDICINE

## 2019-07-17 PROCEDURE — 99999 PR PBB SHADOW E&M-EST. PATIENT-LVL III: ICD-10-PCS | Mod: PBBFAC,,, | Performed by: FAMILY MEDICINE

## 2019-07-17 PROCEDURE — 99999 PR PBB SHADOW E&M-EST. PATIENT-LVL III: CPT | Mod: PBBFAC,,, | Performed by: FAMILY MEDICINE

## 2019-07-17 PROCEDURE — 3008F BODY MASS INDEX DOCD: CPT | Mod: CPTII,S$GLB,, | Performed by: FAMILY MEDICINE

## 2019-07-17 PROCEDURE — 3079F PR MOST RECENT DIASTOLIC BLOOD PRESSURE 80-89 MM HG: ICD-10-PCS | Mod: CPTII,S$GLB,, | Performed by: FAMILY MEDICINE

## 2019-07-17 PROCEDURE — 3074F PR MOST RECENT SYSTOLIC BLOOD PRESSURE < 130 MM HG: ICD-10-PCS | Mod: CPTII,S$GLB,, | Performed by: FAMILY MEDICINE

## 2019-07-17 RX ORDER — METFORMIN HYDROCHLORIDE 500 MG/1
500 TABLET ORAL
Qty: 30 TABLET | Refills: 3 | Status: SHIPPED | OUTPATIENT
Start: 2019-07-17 | End: 2019-12-20

## 2019-07-17 NOTE — PROGRESS NOTES
"Subjective:      Patient ID: Lucia Matias is a 59 y.o. female.    Chief Complaint: Follow-up    HPI   Patient here today for follow up. She reports resolution of chest pain. She reports also complete resolution of heart racing. She is drinking about 2-3 bottles water daily and she is drinking one coke zero daily. She has a psychiatry appointment on August 6th. She missed her smoking cessation appointment and needs to reschedule it. She was able to  Symbicort and she reports resolution of wheezing/sob on this medication. She is sleeping 8 hours interrupted.     Breakfast  Toast/margerine   Grapefruit     Lunch   Turkey sandwich (griffin/swiss cheese)    Dinner   Protein/veggie    Snacks   Chocolate     BMR 1503         Review of Systems   Constitutional: Negative for activity change and unexpected weight change.   HENT: Negative for hearing loss, rhinorrhea and trouble swallowing.    Eyes: Negative for discharge and visual disturbance.   Respiratory: Positive for wheezing. Negative for chest tightness.    Cardiovascular: Negative for chest pain and palpitations.   Gastrointestinal: Negative for blood in stool, constipation, diarrhea and vomiting.   Endocrine: Negative for polydipsia and polyuria.   Genitourinary: Negative for difficulty urinating, dysuria, hematuria and menstrual problem.   Musculoskeletal: Positive for arthralgias and joint swelling. Negative for neck pain.   Neurological: Negative for weakness and headaches.   Psychiatric/Behavioral: Negative for confusion and dysphoric mood.     I personally reviewed Past Medical History, Past Surgical history,  Past Social History and Family History      Objective:   /82 (BP Location: Left arm, Patient Position: Sitting)   Pulse 60   Ht 5' 2" (1.575 m)   Wt 96.2 kg (212 lb 1.3 oz)   SpO2 95%   BMI 38.79 kg/m²     Physical Exam   Constitutional: She is oriented to person, place, and time. She appears well-developed and well-nourished. No " distress.   HENT:   Head: Normocephalic and atraumatic.   Right Ear: Hearing, tympanic membrane, external ear and ear canal normal.   Left Ear: Hearing, tympanic membrane, external ear and ear canal normal.   Nose: Nose normal.   Mouth/Throat: Uvula is midline and oropharynx is clear and moist. No oropharyngeal exudate.   Eyes: Pupils are equal, round, and reactive to light. Conjunctivae and EOM are normal. Right eye exhibits no discharge. Left eye exhibits no discharge. No scleral icterus.   Neck: Normal range of motion. Neck supple.   Cardiovascular: Normal rate, regular rhythm, normal heart sounds and intact distal pulses. Exam reveals no gallop.   No murmur heard.  Pulmonary/Chest: Effort normal and breath sounds normal. No respiratory distress. She has no wheezes. She has no rales. She exhibits no tenderness.   Abdominal: Soft. Bowel sounds are normal. She exhibits no distension and no mass. There is no tenderness. There is no rebound and no guarding.   Neurological: She is alert and oriented to person, place, and time.   Skin: Skin is warm and dry.   Vitals reviewed.      1. Bilateral renal cysts    2. Hyperlipidemia, unspecified hyperlipidemia type    3. Chronic obstructive pulmonary disease, unspecified COPD type    4. Severe obesity (BMI 35.0-39.9) with comorbidity        1. Schedule US    2. stable, cont lipitor  3. Improved cont symbicort   4. Start metformin, discuss with psychiatrist weight loss medication options that are compatible with her medication regimen  -reviewed changes in eatings habits  -return in 4-6 weeks for weight check     Orders Placed This Encounter   Procedures    US Retroperitoneal Limited     Medications Ordered This Encounter   Medications    metFORMIN (GLUCOPHAGE) 500 MG tablet     Sig: Take 1 tablet (500 mg total) by mouth daily with breakfast.     Dispense:  30 tablet     Refill:  3

## 2019-07-17 NOTE — PATIENT INSTRUCTIONS
1350 calories a day  30% carbs (101 grams)  30 % fat (45 grams)  40 % protein (135 grams)      100 fl oz water daily       Meal Ideas for Regular Bariatric Diet  *Recipes and products available at [email or web address]      Breakfast: (15-20g protein)    - Egg white omelet: 2 egg whites or ½ cup Egg Beaters. (Optional proteins: cheese, shrimp, black beans, chicken, sliced turkey) (Optional veggies: tomatoes, salsa, spinach, mushrooms, onions, green peppers, or small slice avocado)     - Egg and sausage: 1 egg or ¼ cup Egg Beaters (any variety), with 1 sydney or 2 links of Turkey sausage or Veggie breakfast sausage ([employer] or [employer])    - Crust-less breakfast quiche: To make a glass pie dish, mix 4oz part skim Ricotta, 1 cup skim milk, and 2 eggs as your base. Add protein: shredded cheese, sliced lean ham or turkey, turkey ledesma/sausage. Add veggies: tomato, onion, green onion, mushroom, green pepper, spinach, etc.    - Yogurt parfait: Mix 1 - 6oz container Dannon Light N Fit vanilla yogurt, with ¼ cup Kashi Go Lean cereal    - Cottage cheese and fruit: ½ cup part-skim cottage cheese or ricotta cheese topped with fresh fruit or sugar free preserves     - Mya Celina's Vanilla Egg custard* (add 2 Tbsp instant coffee granules to make Cappuccino Custard*)    - Hi-Protein café latte (skim milk, decaf coffee, 1 scoop protein powder). Optional to add Sugar free syrup or extract flavoring.    Lunch: (20-30g protein)    - ½ cup Black bean soup (Homemade or Progresso), with ¼ cup shredded low-fat cheese. Top with chopped tomato or fresh salsa.     - Lean deli turkey breast and low-fat sliced cheese, mustard or light griffin to moisten, rolled up together, or wrapped in a Miguel lettuce leaf    - Chicken salad made from dinner leftovers, moisten with low-fat salad dressing or light griffin. Also try leftover salmon, shrimp, tuna or boiled eggs. Serve ½ cup over dark green salad    - Fat-free canned refried beans, topped  with ¼ cup shredded low-fat cheese. Top with chopped tomato or fresh salsa.     - Greek salad: Top mixed greens with 1-2oz grilled chicken, tomatoes, red onions, 2-3 kalamata olives, and sprinkle lightly with feta cheese. Spritz with Balsamic vinegar to taste.     - Crust-less lunch quiche: To make a glass pie dish, mix 4oz part skim Ricotta, 1 cup skim milk, and 2 eggs as your base. Add protein: shredded cheese, sliced lean ham or turkey, shrimp, chicken. Add veggies: tomato, onion, green onion, mushroom, green pepper, spinach, artichoke, broccoli, etc.    - Pizza bake: tomato sauce, low-fat shredded mozzarella and turkey pepperoni or [place] ledesma. Add any veggies.    - Cucumber crab bites: Spread ¼ cup crab dip (lump crabmeat + light cream cheese and green onions) over sliced cucumber.     - Chicken with light spinach and artichoke dip*: Puree in : 6oz cooked and drained spinach, 2 cloves garlic, 1 can cannelloni beans, ½ cup chopped green onions, 1 can drained artichoke hearts (not marinated in oil), lemon juice and basil. Mix in 2oz chopped up chicken.    Supper: (20-30g protein)    - Serve grilled fish over dark green salad tossed with low-fat dressing, served with grilled asparagus cooper     - Rotisserie chicken salad: served with sliced strawberries, walnuts, fat-free feta cheese crumbles and 1 tbsp Bolanoss Own Light Raspberry [place] Vinaigrette    - Shrimp cocktail: Dip cold boiled shrimp in homemade low-sugar cocktail sauce (1/2 cup Isaiah One Carb ketchup, 2 tbsp horseradish, 1/4 tsp hot sauce, 1 tsp Worcestershire sauce, 1 tbsp freshly-squeezed lemon juice). Serve with dark green salad, walnuts, and crumbled blue cheese drizzled with olive oil and Balsamic vinegar    - Tuna Melt: Spread tuna salad onto 2 thick slices of tomato. Top with low-fat cheese and broil until cheese is melted. May also be made with chicken salad of shrimp salad. Hercules with different types of cheeses.    -  Homemade low-fat Chili using extra lean ground beef or ground turkey. Top with shredded cheese and salsa as desired. May add dollop fat-free sour cream if desired    - Dinner Omelet with shrimp or chicken and onion, green peppers and chives.    - No noodle lasagna: Use sliced zucchini or eggplant in place of noodles.  Layer with part skim ricotta cheese and low sugar meat sauce (use very lean ground beef or ground turkey).    - Mexican chicken bake: Bake chunks of chicken breast or thigh with taco seasoning, Pace brand enchilada sauce, green onions and low-fat cheese. Serve with ¼ cup black beans or fat free refried beans topped with chopped tomatoes or salsa.    - Frances frozen meatballs, simmered in Classico Marinara sauce. Different flavors of salsa or spaghetti sauce create different dishes! Sprinkle with parmesan cheese. Serve with grilled or steamed veggies, or a dark green salad.    - Simmer boneless skinless chicken thigh chunks in Classico Marinara sauce or roasted salsa until tender with chopped onion, bell pepper, garlic, mushrooms, spinach, etc.     - Hamburger, without the bun, dressed the way you like. Served with grilled or steamed veggies.    - Eggplant parmesan: Bake slices of eggplant at 350 degrees for 15 minutes. Layer tomato sauce, sliced eggplant and low-fat mozzarella cheese in a baking dish and cover with foil. Bake 30-40 more minutes or until bubbly. Uncover and bake at 400 degrees for about 15 more minutes, or until top is slightly crisp.    - Fish tacos: grilled/baked white fish, wrapped in Miguel lettuce leaf, topped with salsa, shredded low-fat cheese, and light coleslaw.    Snacks: (100-200 calories; >5g protein)    - 1 low-fat cheese stick with 8 cherry tomatoes or 1 serving fresh fruit  - 4 thin slices fat-free turkey breast and 1 slice low-fat cheese  - 4 thin slices fat-free honey ham with wedge of melon  - 1/4 cup unsalted nuts with ½ cup fruit  - 6-oz container Oj Montilla n  Fit vanilla yogurt, topped with 1oz unsalted nuts         - apple, celery or baby carrots spread with 2 Tbsp natural peanut butter or almond butter   - apple slices with 1 oz slice low-fat cheese  - celery, cucumber, bell pepper or baby carrots dipped in ¼ cup hummus bean spread or light spinach and artichoke dip (*recipe in lunch section)  - 100 calorie bag microwave light popcorn with 3 tbsp grated parmesan cheese  - Austin Links Beef Steak - 14g protein! (similar to beef jerky)  - 2 wedges L[place] Cow - Light Herb & Garlic Cheese with sliced cucumber or green bell pepper  - 1/2 cup low-fat cottage cheese with ¼ cup fruit or ¼ cup salsa  - RTD Protein drinks: Atkins, Low Carb Slim Fast, EAS light, Muscle Milk Light, etc.  - Homemade Protein drinks: GNC Soy95, Isopure, Nectar, UNJURY, Whey Gourmet, etc. Mix 1 scoop powder with 8oz skim/1% milk or light soymilk.  - Protein bars: Atkins, EAS, Pure Protein, Think Thin, Detour, etc. Must have 0-4 grams sugar - Read the label.    Takeout Options: No more than twice/week  Deli - Salads (no pasta or rice), meats, cheeses. Roasted chicken. Lox (salmon)    Mexican - Platters which don't include tortillas, chips, or rice. Go easy on the beans. Example: Fajitas without the tortillas. Ask the  not to bring chips to the table if they are too tempting.    Greek - Meat or fish and vegetable, but no bread or rice. Including hummus, baba ganoush, etc, is OK. Most sit-down Greek restaurants can provide you with cucumber slices for dipping instead of gonzalo bread.    Fast Food (Avoid as much as possible) - Salads (no croutons and limit salad dressing to 2 tbsp), grilled chicken sandwich without the bun and ask for no griffin. Mayuris low fat chili or Taco Bell pintos and cheese.    BBQ - The meats are fine if you ask for sauces on the side, but most of the traditional side dishes are loaded with carbs. Dickson slaw, baked beans and BBQ sauce are typically made with sugar.    Chinese -  Nothing deep-fried, no rice or noodles. Many Chinese sauces have starch and sugar in them, so you'll have to use your judgement. If you find that these sauces trigger cravings, or cause Dumping, you can ask for the sauce to be made without sugar or just use soy sauce.      Liraglutide injection (Weight Management)  What is this medicine?  LIRAGLUTIDE (LIR a GLOO tide) is used with a reduced calorie diet and exercise to help you lose weight.  How should I use this medicine?  This medicine is for injection under the skin of your upper leg, stomach area, or upper arm. You will be taught how to prepare and give this medicine. Use exactly as directed. Take your medicine at regular intervals. Do not take it more often than directed.   It is important that you put your used needles and syringes in a special sharps container. Do not put them in a trash can. If you do not have a sharps container, call your pharmacist or healthcare provider to get one.   A special MedGuide will be given to you by the pharmacist with each prescription and refill. Be sure to read this information carefully each time.   Talk to your pediatrician regarding the use of this medicine in children. Special care may be needed.  What side effects may I notice from receiving this medicine?  Side effects that you should report to your doctor or health care professional as soon as possible:  · allergic reactions like skin rash, itching or hives, swelling of the face, lips, or tongue  · breathing problems  · fever, chills  · loss of appetite  · signs and symptoms of low blood sugar such as feeling anxious, confusion, dizziness, increased hunger, unusually weak or tired, sweating, shakiness, cold, irritable, headache, blurred vision, fast heartbeat, loss of consciousness  · trouble passing urine or change in the amount of urine  · unusual stomach pain or upset  · vomiting  Side effects that usually do not require medical attention (Report these to your  doctor or health care professional if they continue or are bothersome.):  · constipation  · diarrhea  · fatigue  · headache  · nausea  What may interact with this medicine?  · acetaminophen  · atorvastatin  · birth control pills  · digoxin  · griseofulvin  · lisinopril  What if I miss a dose?  If you miss a dose, take it as soon as you can. If it is almost time for your next dose, take only that dose. Do not take double or extra doses. If you miss your dose for 3 days or more, call your doctor or health care professional to talk about how to restart this medicine.  Where should I keep my medicine?  Keep out of the reach of children.  Store unopened pen in a refrigerator between 2 and 8 degrees C (36 and 46 degrees F). Do not freeze or use if the medicine has been frozen. Protect from light and excessive heat. After you first use the pen, it can be stored at room temperature between 15 and 30 degrees C (59 and 86 degrees F) or in a refrigerator. Throw away your used pen after 30 days or after the expiration date, whichever comes first.  Do not store your pen with the needle attached. If the needle is left on, medicine may leak from the pen.  What should I tell my health care provider before I take this medicine?  They need to know if you have any of these conditions:  · endocrine tumors (MEN 2) or if someone in your family had these tumors  · gallstones  · high cholesterol  · history of alcohol abuse problem  · history of pancreatitis  · kidney disease or if you are on dialysis  · liver disease  · previous swelling of the tongue, face, or lips with difficulty breathing, difficulty swallowing, hoarseness, or tightening of the throat  · stomach problems  · suicidal thoughts, plans, or attempt; a previous suicide attempt by you or a family member  · thyroid cancer or if someone in your family had thyroid cancer  · an unusual or allergic reaction to liraglutide, medicines, foods, dyes, or preservatives  · pregnant or  trying to get pregnant  · breast-feeding  What should I watch for while using this medicine?  Visit your doctor or health care professional for regular checks on your progress. This medicine is intended to be used in addition to a healthy diet and appropriate exercise. The best results are achieved this way. Do not increase or in any way change your dose without consulting your doctor or health care professional.  This medicine may affect blood sugar levels. If you have diabetes, check with your doctor or health care professional before you change your diet or the dose of your diabetic medicine.  Patients and their families should watch out for worsening depression or thoughts of suicide. Also watch out for sudden changes in feelings such as feeling anxious, agitated, panicky, irritable, hostile, aggressive, impulsive, severely restless, overly excited and hyperactive, or not being able to sleep. If this happens, especially at the beginning of treatment or after a change in dose, call your health care professional.  NOTE:This sheet is a summary. It may not cover all possible information. If you have questions about this medicine, talk to your doctor, pharmacist, or health care provider. Copyright© 2017 Gold Standard

## 2019-07-18 ENCOUNTER — HOSPITAL ENCOUNTER (OUTPATIENT)
Dept: RADIOLOGY | Facility: OTHER | Age: 59
Discharge: HOME | End: 2019-07-18
Attending: FAMILY MEDICINE
Payer: COMMERCIAL

## 2019-07-18 DIAGNOSIS — M25.561 MECHANICAL KNEE PAIN, RIGHT: ICD-10-CM

## 2019-07-18 DIAGNOSIS — N28.1 BILATERAL RENAL CYSTS: ICD-10-CM

## 2019-07-18 DIAGNOSIS — M25.562 PAIN IN BOTH KNEES, UNSPECIFIED CHRONICITY: ICD-10-CM

## 2019-07-18 DIAGNOSIS — M25.561 PAIN IN BOTH KNEES, UNSPECIFIED CHRONICITY: ICD-10-CM

## 2019-07-18 PROCEDURE — 76770 US EXAM ABDO BACK WALL COMP: CPT | Mod: 26,,, | Performed by: RADIOLOGY

## 2019-07-18 PROCEDURE — 73721 MRI JNT OF LWR EXTRE W/O DYE: CPT | Mod: 26,RT,, | Performed by: RADIOLOGY

## 2019-07-18 PROCEDURE — 76770 US RETROPERITONEAL COMPLETE: ICD-10-PCS | Mod: 26,,, | Performed by: RADIOLOGY

## 2019-07-18 PROCEDURE — 73721 MRI JNT OF LWR EXTRE W/O DYE: CPT | Mod: TC,RT

## 2019-07-18 PROCEDURE — 73721 MRI KNEE WITHOUT CONTRAST RIGHT: ICD-10-PCS | Mod: 26,RT,, | Performed by: RADIOLOGY

## 2019-07-18 PROCEDURE — 76770 US EXAM ABDO BACK WALL COMP: CPT | Mod: TC

## 2019-07-22 ENCOUNTER — OFFICE VISIT (OUTPATIENT)
Dept: SPORTS MEDICINE | Facility: CLINIC | Age: 59
End: 2019-07-22
Payer: COMMERCIAL

## 2019-07-22 ENCOUNTER — PATIENT OUTREACH (OUTPATIENT)
Dept: ADMINISTRATIVE | Facility: OTHER | Age: 59
End: 2019-07-22

## 2019-07-22 VITALS — WEIGHT: 212 LBS | BODY MASS INDEX: 39.01 KG/M2 | HEIGHT: 62 IN | TEMPERATURE: 98 F

## 2019-07-22 DIAGNOSIS — M17.0 PRIMARY OSTEOARTHRITIS OF KNEES, BILATERAL: ICD-10-CM

## 2019-07-22 DIAGNOSIS — Z12.11 ENCOUNTER FOR FIT (FECAL IMMUNOCHEMICAL TEST) SCREENING: Primary | ICD-10-CM

## 2019-07-22 DIAGNOSIS — S83.411D SPRAIN OF MEDIAL COLLATERAL LIGAMENT OF RIGHT KNEE, SUBSEQUENT ENCOUNTER: ICD-10-CM

## 2019-07-22 DIAGNOSIS — M17.11 PRIMARY OSTEOARTHRITIS OF RIGHT KNEE: Primary | ICD-10-CM

## 2019-07-22 DIAGNOSIS — M25.561 ACUTE PAIN OF RIGHT KNEE: ICD-10-CM

## 2019-07-22 PROCEDURE — 99999 PR PBB SHADOW E&M-EST. PATIENT-LVL III: ICD-10-PCS | Mod: PBBFAC,,, | Performed by: FAMILY MEDICINE

## 2019-07-22 PROCEDURE — 20611 DRAIN/INJ JOINT/BURSA W/US: CPT | Mod: RT,S$GLB,, | Performed by: FAMILY MEDICINE

## 2019-07-22 PROCEDURE — 20611 LARGE JOINT ASPIRATION/INJECTION: R KNEE: ICD-10-PCS | Mod: RT,S$GLB,, | Performed by: FAMILY MEDICINE

## 2019-07-22 PROCEDURE — 99214 PR OFFICE/OUTPT VISIT, EST, LEVL IV, 30-39 MIN: ICD-10-PCS | Mod: 25,S$GLB,, | Performed by: FAMILY MEDICINE

## 2019-07-22 PROCEDURE — 3008F BODY MASS INDEX DOCD: CPT | Mod: CPTII,S$GLB,, | Performed by: FAMILY MEDICINE

## 2019-07-22 PROCEDURE — 99214 OFFICE O/P EST MOD 30 MIN: CPT | Mod: 25,S$GLB,, | Performed by: FAMILY MEDICINE

## 2019-07-22 PROCEDURE — 99999 PR PBB SHADOW E&M-EST. PATIENT-LVL III: CPT | Mod: PBBFAC,,, | Performed by: FAMILY MEDICINE

## 2019-07-22 PROCEDURE — 3008F PR BODY MASS INDEX (BMI) DOCUMENTED: ICD-10-PCS | Mod: CPTII,S$GLB,, | Performed by: FAMILY MEDICINE

## 2019-07-22 RX ORDER — TRIAMCINOLONE ACETONIDE 40 MG/ML
40 INJECTION, SUSPENSION INTRA-ARTICULAR; INTRAMUSCULAR
Status: DISCONTINUED | OUTPATIENT
Start: 2019-07-22 | End: 2019-07-22 | Stop reason: HOSPADM

## 2019-07-22 RX ADMIN — TRIAMCINOLONE ACETONIDE 40 MG: 40 INJECTION, SUSPENSION INTRA-ARTICULAR; INTRAMUSCULAR at 05:07

## 2019-07-22 NOTE — PROGRESS NOTES
Lucia Matias, a 59 y.o. female, presents today for evaluation of her Right knee.      History of Present Illness (HPI)  Location: anterior knee, Right  Onset: Chronic, since 12.2018  Palliative:    Relative rest   Oral analgesics - IBU prn   Heat   hgPT since injury with no improvement  Provocative:    ADLS   Prolonged ambulation  Prior: none  Progression: worsening discomfort  Quality:    sharp pain  Radiation: none  Severity: per nursing documentation  Timing: intermittent w/ use  Trauma: Patient had trauma of right knee on 12.2018 with a mechanical fall. Patient reports hearing a pop.     Review of Systems (ROS)  A 10+ review of systems was performed with pertinent positives and negatives noted above in the history of present illness. Other systems were negative unless otherwise specified.    Physical Examination (PE)  General:  The patient is alert and oriented x 3. Mood is pleasant. Observation of ears, eyes and nose reveal no gross abnormalities. HEENT: NCAT, sclera anicteric.   Lungs: Respirations are equal and unlabored.  Gait is coordinated. Patient can toe walk and heel walk without difficulty.    RIGHT KNEE EXAMINATION    Observation/Inspection  Gait:   Nonantalgic   Alignment:  Neutral   Scars:   None   Muscle atrophy: Mild  Effusion:  None   Warmth:  None   Discoloration:   none     Tenderness / Crepitus (T / C):         T / C      T / C  Patella   + / -   Lateral joint line   - / -     Peripatellar medial  +  Medial joint line    + / -  Peripatellar lateral -  Medial plica   - / -  Patellar tendon -   Popliteal fossa   - / -  Quad tendon   -   Gastrocnemius   -  Prepatellar Bursa - / -   Quadricep   -  Tibial tubercle  -  Thigh/hamstring  -  Pes anserine/HS -  Fibula    -  ITB   - / -  Tibia     -  Tib/fib joint  - / -  LCL    -    MFC   - / -   MCL: Proximal  -    LFC   - / -   Distal    -          ROM: (* = pain)  PASSIVE   ACTIVE    Left :   5 / 0 / 145   5 / 0 / 145     Right :    5 / 0 /  110*   5 / 0 / 110*    Patellofemoral examination:  See above noted areas of tenderness.   Patella position    Subluxation / dislocation: Centered        Sup. / Inf;   Normal   Crepitus (PF):    present   Patellar Mobility:       Medial-lateral:   Normal    Superior-inferior:  Normal    Inferior tilt   Normal    Patellar tendon:  Normal   Lateral tilt:    Normal   J-sign:     None   Patellofemoral grind:   pain     Meniscal Signs:     Pain on terminal extension:  +  Pain on terminal flexion:  +  Costas maneuver:  +*  Squat     +*  Thessaly    +*    Ligament Examination:  ACL / Lachman:  WNL  PCL-Post.  drawer: normal 0 to 2mm  MCL- Valgus:  normal 0 to 2mm  LCL- Varus:    normal 0 to 2mm  Pivot shift:  guarding   Dial Test:   difference c/w other side   At 30° flexion: normal (< 5°)    At 90° flexion: normal (< 5°)   Reverse Pivot Shift:   normal (Equal)     Strength: (* = with pain) Painful Side  Quadriceps   3+/5 *  Hamstring:   3+/5 *    Extremity Neuro-vascular Examination:   Sensation:  Grossly intact to light touch all dermatomal regions.   Motor Function:  Fully intact motor function at hip, knee, foot and ankle    DTRs;  quadriceps and  achilles 2+.  No clonus and downgoing Babinski.    Vascular status:  DP and PT pulses 2+, brisk capillary refill, symmetric.     Other Findings:    ASSESSMENT & PLAN  Assessment:   #1 osteoarthritis of knee, bilat   W/ recent sprain of knee, right  #2 rheumatoid arth diagnosis    No evidence of neurologic pathology  No evidence of vascular pathology    Imaging studies reviewed:   X-ray knee, bilateral 19.07  MRI knee, right 19.07    Plan:    We discussed the importance of appropriate diet, weight, and regular exercise including quadriceps strengthening     We discussed options including:  #1 watchful waiting  #2 physical therapy aimed at:   Core stability   RoM knee   Strengthening quadriceps   Gait training   #3 injection therapy:   ROSA ELENAI aarti     Right,     Left,     VSI iaknee    Right,     Left,    Orthobiologics   #4 consultation      The patient chooses #2 and #3 csi iaknee right    Pain management: handout given  Bracing:   Physical therapy: fPT, @ [facility], begin as above   Activity (e.g. sports, work) restrictions: as tolerated   school/vocation: clinical research coordinator at [employer]     Follow up in 12 w  Should symptoms worsen or fail to resolve, consider:  Revisiting the above options

## 2019-07-22 NOTE — PROCEDURES
"Large Joint Aspiration/Injection: R knee  Date/Time: 7/22/2019 5:38 PM  Performed by: Eliel Hutchinson MD  Authorized by: Eliel Hutchinson MD     Consent Done?:  Yes (Verbal)  Indications:  Pain  Procedure site marked: Yes    Timeout: Prior to procedure the correct patient, procedure, and site was verified      Location:  Knee  Site:  R knee  Prep: Patient was prepped and draped in usual sterile fashion    Ultrasonic Guidance for needle placement: Yes  Images are saved and documented.  Needle size:  20 G  Approach:  Lateral  Medications:  40 mg triamcinolone acetonide 40 mg/mL  Patient tolerance:  Patient tolerated the procedure well with no immediate complications    Additional Comments: Description of ultrasound utilization for needle guidance:   Ultrasound guidance used for needle localization. Images saved and stored for documentation. The knee joint was visualized. Dynamic visualization of the 20g x 3.5" needle was continuous throughout the procedure.      "

## 2019-07-23 ENCOUNTER — OFFICE VISIT (OUTPATIENT)
Dept: DERMATOLOGY | Facility: CLINIC | Age: 59
End: 2019-07-23
Payer: COMMERCIAL

## 2019-07-23 DIAGNOSIS — L40.0 PSORIASIS VULGARIS: Primary | ICD-10-CM

## 2019-07-23 DIAGNOSIS — Z79.899 LONG-TERM USE OF HIGH-RISK MEDICATION: ICD-10-CM

## 2019-07-23 DIAGNOSIS — L40.50 PSORIATIC ARTHRITIS: ICD-10-CM

## 2019-07-23 PROCEDURE — 99203 PR OFFICE/OUTPT VISIT, NEW, LEVL III, 30-44 MIN: ICD-10-PCS | Mod: S$GLB,,, | Performed by: DERMATOLOGY

## 2019-07-23 PROCEDURE — 99203 OFFICE O/P NEW LOW 30 MIN: CPT | Mod: S$GLB,,, | Performed by: DERMATOLOGY

## 2019-07-23 RX ORDER — FLUOCINONIDE 0.5 MG/G
OINTMENT TOPICAL
Qty: 60 G | Refills: 5 | Status: SHIPPED | OUTPATIENT
Start: 2019-07-23 | End: 2019-12-20

## 2019-07-23 RX ORDER — BETAMETHASONE DIPROPIONATE 0.5 MG/G
CREAM TOPICAL
Qty: 50 G | Refills: 5 | Status: SHIPPED | OUTPATIENT
Start: 2019-07-23 | End: 2019-12-20

## 2019-07-23 RX ORDER — CERTOLIZUMAB PEGOL 200 MG/ML
400 INJECTION, SOLUTION SUBCUTANEOUS
Qty: 1 BOX | Refills: 2 | Status: SHIPPED | OUTPATIENT
Start: 2019-07-23 | End: 2019-10-22 | Stop reason: SDUPTHER

## 2019-07-23 NOTE — PROGRESS NOTES
Subjective:       Patient ID:  Lucia Matias is a 59 y.o. female who presents for   Chief Complaint   Patient presents with    Psoriasis     elbows, left lower leg, right and left hands      Psoriasis  - Initial  Affected locations: diffuse  Duration: 15 years  Signs / symptoms: scaling, rough and redness  Severity: mild  Timing: constant  Aggravated by: stress  Treatments tried: Cimzia; failed Enbrel and Humira.  Improvement on treatment: moderate (Cimzia helps a lot with joint pain, but still has some skin lesions)      Review of Systems   Constitutional: Negative for fever.   HENT: Negative for sore throat.    Gastrointestinal: Negative for diarrhea.   Musculoskeletal: Positive for arthralgias.   Skin: Positive for itching and rash.        Objective:    Physical Exam   Constitutional: She appears well-developed and well-nourished. No distress.   HENT:   Mouth/Throat: Lips normal.    Eyes: Lids are normal.  No conjunctival no injection.   Neurological: She is alert and oriented to person, place, and time. She is not disoriented.   Psychiatric: She has a normal mood and affect.   Skin:   Areas Examined (abnormalities noted in diagram):   Scalp / Hair Palpated and Inspected  Head / Face Inspection Performed  Neck Inspection Performed  Chest / Axilla Inspection Performed  Abdomen Inspection Performed  Back Inspection Performed  RUE Inspected  LUE Inspection Performed  RLE Inspected  LLE Inspection Performed  Nails and Digits Inspection Performed                  Diagram Legend     Erythematous scaling macule/papule c/w actinic keratosis       Vascular papule c/w angioma      Pigmented verrucoid papule/plaque c/w seborrheic keratosis      Yellow umbilicated papule c/w sebaceous hyperplasia      Irregularly shaped tan macule c/w lentigo     1-2 mm smooth white papules consistent with Milia      Movable subcutaneous cyst with punctum c/w epidermal inclusion cyst      Subcutaneous movable cyst c/w pilar cyst       Firm pink to brown papule c/w dermatofibroma      Pedunculated fleshy papule(s) c/w skin tag(s)      Evenly pigmented macule c/w junctional nevus     Mildly variegated pigmented, slightly irregular-bordered macule c/w mildly atypical nevus      Flesh colored to evenly pigmented papule c/w intradermal nevus       Pink pearly papule/plaque c/w basal cell carcinoma      Erythematous hyperkeratotic cursted plaque c/w SCC      Surgical scar with no sign of skin cancer recurrence      Open and closed comedones      Inflammatory papules and pustules      Verrucoid papule consistent consistent with wart     Erythematous eczematous patches and plaques     Dystrophic onycholytic nail with subungual debris c/w onychomycosis     Umbilicated papule    Erythematous-base heme-crusted tan verrucoid plaque consistent with inflamed seborrheic keratosis     Erythematous Silvery Scaling Plaque c/w Psoriasis     See annotation      Assessment / Plan:        Psoriasis vulgaris  Psoriatic arthritis  Long-term use of high-risk medication  -     fluocinonide (LIDEX) 0.05 % ointment; Apply to affected areas of body twice daily as needed for psoriasis.  Dispense: 60 g; Refill: 5  -     augmented betamethasone dipropionate (DIPROLENE-AF) 0.05 % cream; Apply to affected areas of body twice daily as needed for psoriasis.  Dispense: 50 g; Refill: 5  -     certolizumab pegol (CIMZIA) 400 mg/2 mL (200 mg/mL x 2) SyKt; Inject 2 mLs (400 mg total) into the skin every 14 (fourteen) days.  Dispense: 1 Box; Refill: 2    Check labs in 3 months:  -     CBC auto differential; Future; Expected date: 10/23/2019  -     Comprehensive metabolic panel; Future; Expected date: 10/23/2019    Last quantiferon gold 12/2018       Follow up in about 3 months (around 10/23/2019).

## 2019-07-23 NOTE — LETTER
July 23, 2019      Other  [address] [place] [address]           [facility] - Dermatology  [address] [place] [id number]  Phone: [phone]  Fax: [phone]          Patient: Lucia Matias   MR Number: [id number]   YOB: 1960   Date of Visit: 7/23/2019       Dear Other:    Thank you for referring Lucia Matias to me for evaluation. Attached you will find relevant portions of my assessment and plan of care.    If you have questions, please do not hesitate to call me. I look forward to following Lucia Matias along with you.    Sincerely,    Minerva Lara MD    Enclosure  CC:  No Recipients    If you would like to receive this communication electronically, please contact [email or web address] or [phone] to request more information on [employer] access.    For providers and/or their staff who would like to refer a patient to [facility], please contact us through our one-stop-shop provider referral line, [facility], at [phone].    If you feel you have received this communication in error or would no longer like to receive these types of communications, please e-mail [email or web address]

## 2019-07-31 ENCOUNTER — TELEPHONE (OUTPATIENT)
Dept: PHARMACY | Facility: CLINIC | Age: 59
End: 2019-07-31

## 2019-08-05 ENCOUNTER — CLINICAL SUPPORT (OUTPATIENT)
Dept: SMOKING CESSATION | Facility: CLINIC | Age: 59
End: 2019-08-05
Payer: COMMERCIAL

## 2019-08-05 DIAGNOSIS — F17.210 LIGHT CIGARETTE SMOKER (1-9 CIGS/DAY): Primary | ICD-10-CM

## 2019-08-05 PROCEDURE — 99404 PR PREVENT COUNSEL,INDIV,60 MIN: ICD-10-PCS | Mod: S$GLB,,,

## 2019-08-05 PROCEDURE — 99999 PR PBB SHADOW E&M-EST. PATIENT-LVL II: CPT | Mod: PBBFAC,,,

## 2019-08-05 PROCEDURE — 99999 PR PBB SHADOW E&M-EST. PATIENT-LVL II: ICD-10-PCS | Mod: PBBFAC,,,

## 2019-08-05 PROCEDURE — 99404 PREV MED CNSL INDIV APPRX 60: CPT | Mod: S$GLB,,,

## 2019-08-05 RX ORDER — MICONAZOLE NITRATE 2 %
2 CREAM (GRAM) TOPICAL
Qty: 100 EACH | Refills: 0 | Status: SHIPPED | OUTPATIENT
Start: 2019-08-05 | End: 2019-12-20

## 2019-08-05 RX ORDER — IBUPROFEN 200 MG
1 TABLET ORAL DAILY
Qty: 14 PATCH | Refills: 0 | Status: SHIPPED | OUTPATIENT
Start: 2019-08-05 | End: 2019-09-24 | Stop reason: DRUGHIGH

## 2019-08-05 NOTE — Clinical Note
Patient was seen for intake.  She will begin on 14 mg nicotine patch and 2 mg nicotine lozenge.  Patient has agreed to bi weekly follow in clinic.

## 2019-08-06 ENCOUNTER — OFFICE VISIT (OUTPATIENT)
Dept: PSYCHIATRY | Facility: CLINIC | Age: 59
End: 2019-08-06
Payer: COMMERCIAL

## 2019-08-06 VITALS
HEIGHT: 62 IN | SYSTOLIC BLOOD PRESSURE: 117 MMHG | BODY MASS INDEX: 39.19 KG/M2 | HEART RATE: 63 BPM | WEIGHT: 212.94 LBS | DIASTOLIC BLOOD PRESSURE: 56 MMHG

## 2019-08-06 DIAGNOSIS — F41.0 PANIC ATTACK: ICD-10-CM

## 2019-08-06 DIAGNOSIS — G47.00 INSOMNIA, UNSPECIFIED TYPE: ICD-10-CM

## 2019-08-06 DIAGNOSIS — F41.1 GAD (GENERALIZED ANXIETY DISORDER): ICD-10-CM

## 2019-08-06 DIAGNOSIS — F33.40 MDD (RECURRENT MAJOR DEPRESSIVE DISORDER) IN REMISSION: ICD-10-CM

## 2019-08-06 PROCEDURE — 3008F PR BODY MASS INDEX (BMI) DOCUMENTED: ICD-10-PCS | Mod: CPTII,S$GLB,, | Performed by: PSYCHIATRY & NEUROLOGY

## 2019-08-06 PROCEDURE — 3074F PR MOST RECENT SYSTOLIC BLOOD PRESSURE < 130 MM HG: ICD-10-PCS | Mod: CPTII,S$GLB,, | Performed by: PSYCHIATRY & NEUROLOGY

## 2019-08-06 PROCEDURE — 99203 OFFICE O/P NEW LOW 30 MIN: CPT | Mod: S$GLB,,, | Performed by: PSYCHIATRY & NEUROLOGY

## 2019-08-06 PROCEDURE — 3074F SYST BP LT 130 MM HG: CPT | Mod: CPTII,S$GLB,, | Performed by: PSYCHIATRY & NEUROLOGY

## 2019-08-06 PROCEDURE — 3008F BODY MASS INDEX DOCD: CPT | Mod: CPTII,S$GLB,, | Performed by: PSYCHIATRY & NEUROLOGY

## 2019-08-06 PROCEDURE — 99203 PR OFFICE/OUTPT VISIT, NEW, LEVL III, 30-44 MIN: ICD-10-PCS | Mod: S$GLB,,, | Performed by: PSYCHIATRY & NEUROLOGY

## 2019-08-06 PROCEDURE — 3078F PR MOST RECENT DIASTOLIC BLOOD PRESSURE < 80 MM HG: ICD-10-PCS | Mod: CPTII,S$GLB,, | Performed by: PSYCHIATRY & NEUROLOGY

## 2019-08-06 PROCEDURE — 3078F DIAST BP <80 MM HG: CPT | Mod: CPTII,S$GLB,, | Performed by: PSYCHIATRY & NEUROLOGY

## 2019-08-06 PROCEDURE — 99999 PR PBB SHADOW E&M-EST. PATIENT-LVL II: ICD-10-PCS | Mod: PBBFAC,,, | Performed by: PSYCHIATRY & NEUROLOGY

## 2019-08-06 PROCEDURE — 99999 PR PBB SHADOW E&M-EST. PATIENT-LVL II: CPT | Mod: PBBFAC,,, | Performed by: PSYCHIATRY & NEUROLOGY

## 2019-08-06 RX ORDER — CLONAZEPAM 1 MG/1
1 TABLET ORAL 2 TIMES DAILY PRN
Qty: 60 TABLET | Refills: 0 | Status: SHIPPED | OUTPATIENT
Start: 2019-09-06 | End: 2020-01-06 | Stop reason: SDUPTHER

## 2019-08-06 RX ORDER — CLONAZEPAM 1 MG/1
1 TABLET ORAL 2 TIMES DAILY PRN
Qty: 60 TABLET | Refills: 0 | Status: SHIPPED | OUTPATIENT
Start: 2019-08-06 | End: 2019-10-07 | Stop reason: SDUPTHER

## 2019-08-06 RX ORDER — ZOLPIDEM TARTRATE 10 MG/1
10 TABLET ORAL NIGHTLY PRN
Qty: 30 TABLET | Refills: 1 | Status: SHIPPED | OUTPATIENT
Start: 2019-08-06 | End: 2019-10-07 | Stop reason: SDUPTHER
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
## 2019-08-06 NOTE — PATIENT INSTRUCTIONS
To set up an appointment with a therapist, please call the [facility] behavioral health department at [phone]. Let them know that you are an established patient at [facility] and that your psychiatrist is Dr. Miller. Wait times for appointments can be long, so please call as soon as you are able!

## 2019-08-27 NOTE — PROGRESS NOTES
STAFF NOTE:  I have seen the pt & I have fully reviewed patient's medical record. Case formally discussed with resident and I concur with the resident's history, assessment, and recommendations.

## 2019-08-29 ENCOUNTER — TELEPHONE (OUTPATIENT)
Dept: PHARMACY | Facility: CLINIC | Age: 59
End: 2019-08-29

## 2019-08-29 NOTE — TELEPHONE ENCOUNTER
Clinical follow-up conducted for Truvada. Name/ confirmed. no missed doses; no new medications; no side effects noted. Patient understands to report any medication changes to [facility] and provider. All questions answered and addressed to patients satisfaction.      Patient requested call back for refill readiness on .    YADIEL Reynoso.Ph., AAHIVP  Clinical Pharmacist, HIV/HCV  [facility]  Phone: [phone]

## 2019-09-05 DIAGNOSIS — J44.9 CHRONIC OBSTRUCTIVE PULMONARY DISEASE, UNSPECIFIED COPD TYPE: ICD-10-CM

## 2019-09-05 RX ORDER — BUDESONIDE AND FORMOTEROL FUMARATE DIHYDRATE 160; 4.5 UG/1; UG/1
2 AEROSOL RESPIRATORY (INHALATION) EVERY 12 HOURS
Qty: 30.6 G | Refills: 2 | Status: SHIPPED | OUTPATIENT
Start: 2019-09-05 | End: 2019-12-11 | Stop reason: SDUPTHER

## 2019-09-12 ENCOUNTER — PATIENT MESSAGE (OUTPATIENT)
Dept: INTERNAL MEDICINE | Facility: CLINIC | Age: 59
End: 2019-09-12

## 2019-09-13 ENCOUNTER — PATIENT MESSAGE (OUTPATIENT)
Dept: INTERNAL MEDICINE | Facility: CLINIC | Age: 59
End: 2019-09-13

## 2019-09-19 ENCOUNTER — CLINICAL SUPPORT (OUTPATIENT)
Dept: SMOKING CESSATION | Facility: CLINIC | Age: 59
End: 2019-09-19
Payer: COMMERCIAL

## 2019-09-19 DIAGNOSIS — F17.210 LIGHT CIGARETTE SMOKER (1-9 CIGS/DAY): Primary | ICD-10-CM

## 2019-09-19 PROCEDURE — 99999 PR PBB SHADOW E&M-EST. PATIENT-LVL I: ICD-10-PCS | Mod: PBBFAC,,,

## 2019-09-19 PROCEDURE — 99999 PR PBB SHADOW E&M-EST. PATIENT-LVL I: CPT | Mod: PBBFAC,,,

## 2019-09-19 PROCEDURE — 99406 PR TOBACCO USE CESSATION INTERMEDIATE 3-10 MINUTES: ICD-10-PCS | Mod: S$GLB,,,

## 2019-09-19 PROCEDURE — 99406 BEHAV CHNG SMOKING 3-10 MIN: CPT | Mod: S$GLB,,,

## 2019-09-24 ENCOUNTER — CLINICAL SUPPORT (OUTPATIENT)
Dept: SMOKING CESSATION | Facility: CLINIC | Age: 59
End: 2019-09-24
Payer: COMMERCIAL

## 2019-09-24 DIAGNOSIS — F17.210 LIGHT CIGARETTE SMOKER (1-9 CIGS/DAY): Primary | ICD-10-CM

## 2019-09-24 PROCEDURE — 99999 PR PBB SHADOW E&M-EST. PATIENT-LVL I: ICD-10-PCS | Mod: PBBFAC,,,

## 2019-09-24 PROCEDURE — 99403 PREV MED CNSL INDIV APPRX 45: CPT | Mod: S$GLB,,,

## 2019-09-24 PROCEDURE — 99999 PR PBB SHADOW E&M-EST. PATIENT-LVL I: CPT | Mod: PBBFAC,,,

## 2019-09-24 PROCEDURE — 99403 PR PREVENT COUNSEL,INDIV,45 MIN: ICD-10-PCS | Mod: S$GLB,,,

## 2019-09-24 RX ORDER — NICOTINE 7MG/24HR
1 PATCH, TRANSDERMAL 24 HOURS TRANSDERMAL DAILY
Qty: 14 PATCH | Refills: 0 | Status: SHIPPED | OUTPATIENT
Start: 2019-09-24 | End: 2019-12-20

## 2019-09-24 NOTE — Clinical Note
Patient continue to smoke 10 cpd or less.  Patient discontinued 14 mg nicotine patch as she reports nausea.  Patient will try 7 mg nicotine patch this week.  We discussed triggers, cues, willpower, benefits and reasons for quitting.  We discussed tobacco cessation strategies.  Patient will rate fade to 6 cpd this week.  The patient denies any abnormal behavioral or mental changes at this time. The patient will continue with group therapy sessions and medication monitoring by CTTS. Prescribed medication management will be by physician.

## 2019-09-24 NOTE — PROGRESS NOTES
Individual Follow-Up Form    9/24/2019    Quit Date:     Clinical Status of Patient: Outpatient    Length of Service: 45 minutes    Continuing Medication: no    Other Medications: nicotine gum     Target Symptoms: Withdrawal and medication side effects. The following were  rated moderate (3) to severe (4) on TCRS:  · Moderate (3): none  · Severe (4): none    Comments: Patient continue to smoke 10 cpd or less.  Patient discontinued 14 mg nicotine patch as she reports nausea.  Patient will try 7 mg nicotine patch this week.  We discussed triggers, cues, willpower, benefits and reasons for quitting.  We discussed tobacco cessation strategies.  Patient will rate fade to 6 cpd this week.  The patient denies any abnormal behavioral or mental changes at this time. The patient will continue with group therapy sessions and medication monitoring by CTTS. Prescribed medication management will be by physician.       Diagnosis: F17.210    Next Visit: 1 week

## 2019-10-07 ENCOUNTER — OFFICE VISIT (OUTPATIENT)
Dept: PSYCHIATRY | Facility: CLINIC | Age: 59
End: 2019-10-07
Payer: COMMERCIAL

## 2019-10-07 VITALS — HEART RATE: 63 BPM | SYSTOLIC BLOOD PRESSURE: 111 MMHG | DIASTOLIC BLOOD PRESSURE: 57 MMHG

## 2019-10-07 DIAGNOSIS — F41.1 GAD (GENERALIZED ANXIETY DISORDER): Primary | ICD-10-CM

## 2019-10-07 DIAGNOSIS — G47.00 INSOMNIA, UNSPECIFIED TYPE: ICD-10-CM

## 2019-10-07 PROCEDURE — 3078F DIAST BP <80 MM HG: CPT | Mod: CPTII,S$GLB,, | Performed by: PSYCHIATRY & NEUROLOGY

## 2019-10-07 PROCEDURE — 99212 OFFICE O/P EST SF 10 MIN: CPT | Mod: S$GLB,,, | Performed by: PSYCHIATRY & NEUROLOGY

## 2019-10-07 PROCEDURE — 99999 PR PBB SHADOW E&M-EST. PATIENT-LVL II: CPT | Mod: PBBFAC,,, | Performed by: PSYCHIATRY & NEUROLOGY

## 2019-10-07 PROCEDURE — 99999 PR PBB SHADOW E&M-EST. PATIENT-LVL II: ICD-10-PCS | Mod: PBBFAC,,, | Performed by: PSYCHIATRY & NEUROLOGY

## 2019-10-07 PROCEDURE — 3078F PR MOST RECENT DIASTOLIC BLOOD PRESSURE < 80 MM HG: ICD-10-PCS | Mod: CPTII,S$GLB,, | Performed by: PSYCHIATRY & NEUROLOGY

## 2019-10-07 PROCEDURE — 3074F PR MOST RECENT SYSTOLIC BLOOD PRESSURE < 130 MM HG: ICD-10-PCS | Mod: CPTII,S$GLB,, | Performed by: PSYCHIATRY & NEUROLOGY

## 2019-10-07 PROCEDURE — 99212 PR OFFICE/OUTPT VISIT, EST, LEVL II, 10-19 MIN: ICD-10-PCS | Mod: S$GLB,,, | Performed by: PSYCHIATRY & NEUROLOGY

## 2019-10-07 PROCEDURE — 3074F SYST BP LT 130 MM HG: CPT | Mod: CPTII,S$GLB,, | Performed by: PSYCHIATRY & NEUROLOGY

## 2019-10-07 RX ORDER — CLONAZEPAM 1 MG/1
1 TABLET ORAL 2 TIMES DAILY PRN
Qty: 60 TABLET | Refills: 2 | Status: SHIPPED | OUTPATIENT
Start: 2019-10-07 | End: 2019-12-20

## 2019-10-07 RX ORDER — BUSPIRONE HYDROCHLORIDE 30 MG/1
30 TABLET ORAL 2 TIMES DAILY
Qty: 60 TABLET | Refills: 6 | Status: SHIPPED | OUTPATIENT
Start: 2019-10-07 | End: 2020-02-03 | Stop reason: SDUPTHER

## 2019-10-07 RX ORDER — ZOLPIDEM TARTRATE 5 MG/1
TABLET ORAL
Qty: 45 TABLET | Refills: 2 | Status: SHIPPED | OUTPATIENT
Start: 2019-10-07 | End: 2020-02-24 | Stop reason: SDUPTHER

## 2019-10-07 NOTE — PROGRESS NOTES
"Outpatient Psychiatry Follow-Up Visit (MD/NP)    10/7/2019    Clinical Status of Patient:  Outpatient (Ambulatory)    Chief Complaint:  Lucia Matias is a 59 y.o. female who presents today for follow-up of anxiety and MDD in remission, insomnoa Met with patient.  Last seen by my 7/8. At that time continued medicatins    Interval History and Content of Current Session:  Interim Events/Subjective Report/Content of Current Session:     Patient states that she has been doing "ok" since her last appointment, reports a good mood generally and denies feeling depressed.. Pt states that her medications are working well and has no complaints about her current regimen. Pt states that her anxiety is dependent on the day and her current stressors, says majority of her days are "good" although is having some work conflict and lost her tooth yesterday which is contributing to her anxiety. She has made an appointment with her dentist to get an implant. Sleep is good, gets 6-7 hours of sleep a night. No issues with appetite, is in the smoking cessation currently and is interested in getting hypnotized for cessation. Pt states that she only takes klonopin in the AM and at night. Did not take it this AM but says she has noticed some palpitations when she missed it and has been more anxious today. She states she does not take ambien every night and has multiple pills left over. Discussed decreasing ambien dose as 10 mg is not recommended for females, pt amenable to lowering dose. Pt reports overall doing well, Denies SI, HI. No objective s/sx of psychosis or mayur.       Review of Systems   · PSYCHIATRIC: Pertinant items are noted in the narrative.  · CONSTITUTIONAL: +wt gain (attributes to smoking cessation)   · CARDIOVASCULAR: +palpitations (attributes to missing klonopin dose today)    Past Medical, Family and Social History: The patient's past medical, family and social history have been reviewed and updated as appropriate " "within the electronic medical record - see encounter notes.    Compliance: yes    Side effects: None    Risk Parameters:  Patient reports no suicidal ideation  Patient reports no homicidal ideation  Patient reports no self-injurious behavior  Patient reports no violent behavior    Exam (detailed: at least 9 elements; comprehensive: all 15 elements)   Constitutional  Vitals:  Most recent vital signs, dated less than 90 days prior to this appointment, were reviewed.   Vitals:    10/07/19 1636   BP: (!) 111/57   Pulse: 63        General:  unremarkable, age appropriate, neatly groomed, overweight     Musculoskeletal  Muscle Strength/Tone:  no rigidity, no flaccidity, no dystonia, no tremor   Gait & Station:  non-ataxic     Psychiatric  Speech:  no latency; no press   Mood & Affect:  "Ok"  congruent and appropriate, bright, full and reactive   Thought Process:  normal and logical   Associations:  intact   Thought Content:  normal, no suicidality, no homicidality, delusions, or paranoia   Insight:  intact   Judgement: behavior is adequate to circumstances   Orientation:  grossly intact   Memory: intact for content of interview   Language: grossly intact   Attention Span & Concentration:  able to focus   Fund of Knowledge:  intact and appropriate to age and level of education     Assessment and Diagnosis   Status/Progress: Based on the examination today, the patient's problem(s) is/are well controlled.  New problems have not been presented today.   Co-morbidities, Diagnostic uncertainty and Lack of compliance are not complicating management of the primary condition.  There are no active rule-out diagnoses for this patient at this time.     General Impression:     RANDI  Panic attacks  MDD, in remission        Strengths and Liabilities: Strength: Patient accepts guidance/feedback, Strength: Patient is expressive/articulate., Strength: Patient is motivated for change., Strength: Patient has positive support network., Strength: " Patient has reasonable judgment.     Treatment Goals:  Specify outcomes written in observable, behavioral terms:   Anxiety: reducing negative automatic thoughts, reducing physical symptoms of anxiety and reducing time spent worrying (<30 minutes/day)  Panic: acquiring breathing skills, acquiring relapse prevention skills and reducing physical symptoms of anxiety/panic     Treatment Plan/Recommendations:   · continue buspar 30 mg BID for anxiety  · Continue klonopin 1 mg BID PRN for panic attacks- discussed plan to decrease over time with pt. Will start taper once ambien dose as been lowered.-3 month supply  · Taper ambien from  10 mg qhs to 5 mg qhs as this is the recommended dose for female patients.Provided pt with #45 5 mg tablets/mo (this allows for alternating of 5mg/10 mgif necessary) -3 month supply  · Provided pt with instructions about making therapy appointment as pt would likely benefit from learning new/different ways to cope and other skills that will allow for better control of sx and easier taper    RTC 3 months or sooner if necessary     Discussed diagnosis, risks and benefits of proposed treatment vs alternative treatments vs no treatment, and potential side effects of these treatments. The patient expresses understanding of the above and displays the capacity to agree with this treatment given said understanding. Patient also agrees that, currently, the benefits outweigh the risks and would like to pursue treatment at this time.        Discussed with patient potential side effects and adverse effects of benzodiazepines, including but not limited to drowsiness, dizziness, risk of falls, and abuse potential. Counseled patient on avoiding alcohol while using this medication due to risk of respiratory depression. Patient instructed to not operate any heavy machinery and use caution with driving while on this medication.     Will discuss with attending psychiatrist Dr. Rin Miller,  MD  [facility] PGY3

## 2019-10-07 NOTE — PATIENT INSTRUCTIONS
To set up an appointment with a therapist, please call the [facility] department at [phone]. Let them know that you are an established patient at [facility] and that your psychiatrist is Dr. Miller. Wait times for appointments can be long, so please call as soon as you are able!

## 2019-10-17 ENCOUNTER — PATIENT MESSAGE (OUTPATIENT)
Dept: INTERNAL MEDICINE | Facility: CLINIC | Age: 59
End: 2019-10-17

## 2019-10-17 ENCOUNTER — LAB VISIT (OUTPATIENT)
Dept: LAB | Facility: OTHER | Age: 59
End: 2019-10-17
Attending: DERMATOLOGY
Payer: COMMERCIAL

## 2019-10-17 DIAGNOSIS — Z20.6 HIV EXPOSURE: ICD-10-CM

## 2019-10-17 DIAGNOSIS — L40.0 PSORIASIS VULGARIS: ICD-10-CM

## 2019-10-17 DIAGNOSIS — Z20.6 HIV EXPOSURE: Primary | ICD-10-CM

## 2019-10-17 DIAGNOSIS — Z79.899 LONG-TERM USE OF HIGH-RISK MEDICATION: ICD-10-CM

## 2019-10-17 LAB
ALBUMIN SERPL BCP-MCNC: 3.6 G/DL (ref 3.5–5.2)
ALP SERPL-CCNC: 103 U/L (ref 55–135)
ALT SERPL W/O P-5'-P-CCNC: 28 U/L (ref 10–44)
ANION GAP SERPL CALC-SCNC: 9 MMOL/L (ref 8–16)
AST SERPL-CCNC: 18 U/L (ref 10–40)
BASOPHILS # BLD AUTO: 0.06 K/UL (ref 0–0.2)
BASOPHILS NFR BLD: 0.9 % (ref 0–1.9)
BILIRUB SERPL-MCNC: 0.8 MG/DL (ref 0.1–1)
BUN SERPL-MCNC: 21 MG/DL (ref 6–20)
CALCIUM SERPL-MCNC: 9 MG/DL (ref 8.7–10.5)
CHLORIDE SERPL-SCNC: 106 MMOL/L (ref 95–110)
CO2 SERPL-SCNC: 25 MMOL/L (ref 23–29)
CREAT SERPL-MCNC: 1.1 MG/DL (ref 0.5–1.4)
DIFFERENTIAL METHOD: ABNORMAL
EOSINOPHIL # BLD AUTO: 0.3 K/UL (ref 0–0.5)
EOSINOPHIL NFR BLD: 4.5 % (ref 0–8)
ERYTHROCYTE [DISTWIDTH] IN BLOOD BY AUTOMATED COUNT: 12.5 % (ref 11.5–14.5)
EST. GFR  (AFRICAN AMERICAN): >60 ML/MIN/1.73 M^2
EST. GFR  (NON AFRICAN AMERICAN): 56 ML/MIN/1.73 M^2
GLUCOSE SERPL-MCNC: 85 MG/DL (ref 70–110)
HCT VFR BLD AUTO: 48.8 % (ref 37–48.5)
HGB BLD-MCNC: 15.7 G/DL (ref 12–16)
IMM GRANULOCYTES # BLD AUTO: 0.03 K/UL (ref 0–0.04)
IMM GRANULOCYTES NFR BLD AUTO: 0.4 % (ref 0–0.5)
LYMPHOCYTES # BLD AUTO: 1.4 K/UL (ref 1–4.8)
LYMPHOCYTES NFR BLD: 20.2 % (ref 18–48)
MCH RBC QN AUTO: 31.5 PG (ref 27–31)
MCHC RBC AUTO-ENTMCNC: 32.2 G/DL (ref 32–36)
MCV RBC AUTO: 98 FL (ref 82–98)
MONOCYTES # BLD AUTO: 0.7 K/UL (ref 0.3–1)
MONOCYTES NFR BLD: 10.3 % (ref 4–15)
NEUTROPHILS # BLD AUTO: 4.3 K/UL (ref 1.8–7.7)
NEUTROPHILS NFR BLD: 63.7 % (ref 38–73)
NRBC BLD-RTO: 0 /100 WBC
PLATELET # BLD AUTO: 239 K/UL (ref 150–350)
PMV BLD AUTO: 10.3 FL (ref 9.2–12.9)
POTASSIUM SERPL-SCNC: 4.7 MMOL/L (ref 3.5–5.1)
PROT SERPL-MCNC: 7 G/DL (ref 6–8.4)
RBC # BLD AUTO: 4.99 M/UL (ref 4–5.4)
SODIUM SERPL-SCNC: 140 MMOL/L (ref 136–145)
WBC # BLD AUTO: 6.72 K/UL (ref 3.9–12.7)

## 2019-10-17 PROCEDURE — 36415 COLL VENOUS BLD VENIPUNCTURE: CPT

## 2019-10-17 PROCEDURE — 80053 COMPREHEN METABOLIC PANEL: CPT

## 2019-10-17 PROCEDURE — 86703 HIV-1/HIV-2 1 RESULT ANTBDY: CPT

## 2019-10-17 PROCEDURE — 86803 HEPATITIS C AB TEST: CPT

## 2019-10-17 PROCEDURE — 85025 COMPLETE CBC W/AUTO DIFF WBC: CPT

## 2019-10-18 LAB
HCV AB SERPL QL IA: NEGATIVE
HIV 1+2 AB+HIV1 P24 AG SERPL QL IA: NEGATIVE

## 2019-10-19 ENCOUNTER — PATIENT OUTREACH (OUTPATIENT)
Dept: ADMINISTRATIVE | Facility: OTHER | Age: 59
End: 2019-10-19

## 2019-10-22 ENCOUNTER — OFFICE VISIT (OUTPATIENT)
Dept: DERMATOLOGY | Facility: CLINIC | Age: 59
End: 2019-10-22
Payer: COMMERCIAL

## 2019-10-22 DIAGNOSIS — Z79.899 LONG-TERM USE OF HIGH-RISK MEDICATION: ICD-10-CM

## 2019-10-22 DIAGNOSIS — Z12.11 COLON CANCER SCREENING: ICD-10-CM

## 2019-10-22 DIAGNOSIS — L28.0 LICHEN SIMPLEX CHRONICUS: Primary | ICD-10-CM

## 2019-10-22 DIAGNOSIS — L40.0 PSORIASIS VULGARIS: ICD-10-CM

## 2019-10-22 DIAGNOSIS — J06.9 UPPER RESPIRATORY TRACT INFECTION, UNSPECIFIED TYPE: ICD-10-CM

## 2019-10-22 DIAGNOSIS — L40.50 PSORIATIC ARTHRITIS: ICD-10-CM

## 2019-10-22 DIAGNOSIS — L82.1 SEBORRHEIC KERATOSIS: ICD-10-CM

## 2019-10-22 PROCEDURE — 99214 OFFICE O/P EST MOD 30 MIN: CPT | Mod: S$GLB,,, | Performed by: DERMATOLOGY

## 2019-10-22 PROCEDURE — 99214 PR OFFICE/OUTPT VISIT, EST, LEVL IV, 30-39 MIN: ICD-10-PCS | Mod: S$GLB,,, | Performed by: DERMATOLOGY

## 2019-10-22 RX ORDER — UREA 450 MG/ML
GEL TOPICAL
Qty: 28 ML | Refills: 3 | Status: SHIPPED | OUTPATIENT
Start: 2019-10-22 | End: 2019-12-20

## 2019-10-22 RX ORDER — CERTOLIZUMAB PEGOL 200 MG/ML
400 INJECTION, SOLUTION SUBCUTANEOUS
Qty: 1 BOX | Refills: 2 | Status: SHIPPED | OUTPATIENT
Start: 2019-10-22 | End: 2019-12-27 | Stop reason: SDUPTHER

## 2019-10-22 NOTE — PROGRESS NOTES
"  Subjective:       Patient ID:  Lucia Matias is a 59 y.o. female who presents for   Chief Complaint   Patient presents with    Psoriasis     follow up, same "hot" spots.     Psoriasis  - Follow-up  Diagnosis: psoriasis, PsA.  Symptom course: stable  Currently using: Cimzia, Betamethasone 0.05% cream.  Affected locations: left lower leg, right hand and left hand  Signs / symptoms: scaling and redness  Severity: mild to moderate    Growth  - Initial  Affected locations: left cheek  Duration: 2 months  Signs / symptoms: rough and non-healing  Severity: mild  Timing: constant  Aggravated by: nothing  Relieving factors/Treatments tried: nothing      Review of Systems   Constitutional: Positive for fever.   HENT: Positive for sore throat.    Eyes: Negative for visual change.   Respiratory: Positive for cough. Negative for shortness of breath.    Gastrointestinal: Negative for diarrhea.   Musculoskeletal: Negative for joint swelling and arthralgias.   Skin: Positive for itching and rash. Negative for abscesses.   Neurological: Negative for focal weakness, seizures and numbness.        Objective:    Physical Exam   Constitutional: She appears well-developed and well-nourished. No distress.   HENT:   Mouth/Throat: Lips normal.    Eyes: Lids are normal.  No conjunctival no injection.   Neurological: She is alert and oriented to person, place, and time. She is not disoriented.   Psychiatric: She has a normal mood and affect.   Skin:   Areas Examined (abnormalities noted in diagram):   Scalp / Hair Palpated and Inspected  Head / Face Inspection Performed  Neck Inspection Performed  Chest / Axilla Inspection Performed  Abdomen Inspection Performed  Back Inspection Performed  RUE Inspected  LUE Inspection Performed  RLE Inspected  LLE Inspection Performed  Nails and Digits Inspection Performed                       Diagram Legend     Erythematous scaling macule/papule c/w actinic keratosis       Vascular papule c/w " angioma      Pigmented verrucoid papule/plaque c/w seborrheic keratosis      Yellow umbilicated papule c/w sebaceous hyperplasia      Irregularly shaped tan macule c/w lentigo     1-2 mm smooth white papules consistent with Milia      Movable subcutaneous cyst with punctum c/w epidermal inclusion cyst      Subcutaneous movable cyst c/w pilar cyst      Firm pink to brown papule c/w dermatofibroma      Pedunculated fleshy papule(s) c/w skin tag(s)      Evenly pigmented macule c/w junctional nevus     Mildly variegated pigmented, slightly irregular-bordered macule c/w mildly atypical nevus      Flesh colored to evenly pigmented papule c/w intradermal nevus       Pink pearly papule/plaque c/w basal cell carcinoma      Erythematous hyperkeratotic cursted plaque c/w SCC      Surgical scar with no sign of skin cancer recurrence      Open and closed comedones      Inflammatory papules and pustules      Verrucoid papule consistent consistent with wart     Erythematous eczematous patches and plaques     Dystrophic onycholytic nail with subungual debris c/w onychomycosis     Umbilicated papule    Erythematous-base heme-crusted tan verrucoid plaque consistent with inflamed seborrheic keratosis     Erythematous Silvery Scaling Plaque c/w Psoriasis     See annotation      Assessment / Plan:        Lichen simplex chronicus  -     urea 45 % Gel; AAA qday - bid after soak  Dispense: 1 Bottle; Refill: 3    Psoriasis vulgaris  Psoriatic arthritis  Long-term use of high-risk medication  Overall, patient's joints are well-controlled and her skin is improving on Cimzia. Will continue.  -     certolizumab pegol (CIMZIA) 400 mg/2 mL (200 mg/mL x 2) SyKt; Inject 2 mLs (400 mg total) into the skin every 14 (fourteen) days.  Dispense: 1 Box; Refill: 2  -     CBC auto differential; Future; Expected date: 10/22/2019  -     Comprehensive metabolic panel; Future; Expected date: 10/22/2019  -     Quantiferon Gold TB; Future; Expected date:  10/22/2019  -     Hepatitis B core antibody, total; Future; Expected date: 10/22/2019    Seborrheic keratosis  These are benign, inherited growths without a malignant potential. Reassurance given to patient. No treatment is necessary. Handout was provided.    Upper respiratory tract infection, unspecified type  Pt plans to f/u with her PCP. She will hold her Cimzia until improved.    Follow up in about 3 months (around 1/22/2020) for follow up, or sooner if symptoms worsening or not improving.

## 2019-10-29 ENCOUNTER — OFFICE VISIT (OUTPATIENT)
Dept: URGENT CARE | Facility: CLINIC | Age: 59
End: 2019-10-29
Payer: COMMERCIAL

## 2019-10-29 VITALS
SYSTOLIC BLOOD PRESSURE: 136 MMHG | TEMPERATURE: 97 F | HEART RATE: 70 BPM | RESPIRATION RATE: 19 BRPM | DIASTOLIC BLOOD PRESSURE: 75 MMHG | OXYGEN SATURATION: 96 % | WEIGHT: 200 LBS | HEIGHT: 62 IN | BODY MASS INDEX: 36.8 KG/M2

## 2019-10-29 DIAGNOSIS — J41.1 PURULENT BRONCHITIS: ICD-10-CM

## 2019-10-29 DIAGNOSIS — S93.402A SPRAIN OF LEFT ANKLE, UNSPECIFIED LIGAMENT, INITIAL ENCOUNTER: Primary | ICD-10-CM

## 2019-10-29 DIAGNOSIS — R05.9 COUGH: ICD-10-CM

## 2019-10-29 PROCEDURE — 3008F PR BODY MASS INDEX (BMI) DOCUMENTED: ICD-10-PCS | Mod: CPTII,S$GLB,, | Performed by: FAMILY MEDICINE

## 2019-10-29 PROCEDURE — 3078F DIAST BP <80 MM HG: CPT | Mod: CPTII,S$GLB,, | Performed by: FAMILY MEDICINE

## 2019-10-29 PROCEDURE — 3075F SYST BP GE 130 - 139MM HG: CPT | Mod: CPTII,S$GLB,, | Performed by: FAMILY MEDICINE

## 2019-10-29 PROCEDURE — 99214 PR OFFICE/OUTPT VISIT, EST, LEVL IV, 30-39 MIN: ICD-10-PCS | Mod: 25,S$GLB,, | Performed by: FAMILY MEDICINE

## 2019-10-29 PROCEDURE — 3075F PR MOST RECENT SYSTOLIC BLOOD PRESS GE 130-139MM HG: ICD-10-PCS | Mod: CPTII,S$GLB,, | Performed by: FAMILY MEDICINE

## 2019-10-29 PROCEDURE — 3008F BODY MASS INDEX DOCD: CPT | Mod: CPTII,S$GLB,, | Performed by: FAMILY MEDICINE

## 2019-10-29 PROCEDURE — 96372 PR INJECTION,THERAP/PROPH/DIAG2ST, IM OR SUBCUT: ICD-10-PCS | Mod: S$GLB,,, | Performed by: FAMILY MEDICINE

## 2019-10-29 PROCEDURE — 99214 OFFICE O/P EST MOD 30 MIN: CPT | Mod: 25,S$GLB,, | Performed by: FAMILY MEDICINE

## 2019-10-29 PROCEDURE — 73610 X-RAY EXAM OF ANKLE: CPT | Mod: FY,LT,S$GLB, | Performed by: RADIOLOGY

## 2019-10-29 PROCEDURE — 96372 THER/PROPH/DIAG INJ SC/IM: CPT | Mod: S$GLB,,, | Performed by: FAMILY MEDICINE

## 2019-10-29 PROCEDURE — 3078F PR MOST RECENT DIASTOLIC BLOOD PRESSURE < 80 MM HG: ICD-10-PCS | Mod: CPTII,S$GLB,, | Performed by: FAMILY MEDICINE

## 2019-10-29 PROCEDURE — 73610 XR ANKLE COMPLETE 3 VIEW LEFT: ICD-10-PCS | Mod: FY,LT,S$GLB, | Performed by: RADIOLOGY

## 2019-10-29 RX ORDER — BETAMETHASONE SODIUM PHOSPHATE AND BETAMETHASONE ACETATE 3; 3 MG/ML; MG/ML
9 INJECTION, SUSPENSION INTRA-ARTICULAR; INTRALESIONAL; INTRAMUSCULAR; SOFT TISSUE
Status: COMPLETED | OUTPATIENT
Start: 2019-10-29 | End: 2019-10-29

## 2019-10-29 RX ORDER — DOXYCYCLINE 100 MG/1
100 CAPSULE ORAL 2 TIMES DAILY
Qty: 20 CAPSULE | Refills: 0 | Status: SHIPPED | OUTPATIENT
Start: 2019-10-29 | End: 2019-12-20

## 2019-10-29 RX ORDER — BENZONATATE 200 MG/1
200 CAPSULE ORAL 3 TIMES DAILY PRN
Qty: 30 CAPSULE | Refills: 0 | Status: SHIPPED | OUTPATIENT
Start: 2019-10-29 | End: 2019-11-09

## 2019-10-29 RX ADMIN — BETAMETHASONE SODIUM PHOSPHATE AND BETAMETHASONE ACETATE 9 MG: 3; 3 INJECTION, SUSPENSION INTRA-ARTICULAR; INTRALESIONAL; INTRAMUSCULAR; SOFT TISSUE at 01:10

## 2019-10-29 NOTE — PROGRESS NOTES
"Subjective:       Patient ID: Lucia Matias is a 59 y.o. female.    Vitals:  height is 5' 2" (1.575 m) and weight is 90.7 kg (200 lb). Her oral temperature is 97 °F (36.1 °C). Her blood pressure is 136/75 and her pulse is 70. Her respiration is 19 and oxygen saturation is 96%.     Chief Complaint: Ankle Injury and URI    Ankle Injury    The incident occurred 3 to 5 days ago. The incident occurred at home. The injury mechanism was a twisting injury. The pain is present in the left ankle. The quality of the pain is described as aching and shooting. The pain is at a severity of 8/10. The pain is severe. The pain has been constant since onset. Associated symptoms include an inability to bear weight. She reports no foreign bodies present. The symptoms are aggravated by movement and weight bearing. She has tried NSAIDs and ice for the symptoms. The treatment provided mild relief.   URI    This is a new problem. The current episode started 1 to 4 weeks ago (3 weeks ago). The problem has been gradually worsening. There has been no fever. Associated symptoms include coughing and wheezing. Pertinent negatives include no abdominal pain, congestion, ear pain, nausea, rash, sinus pain, sore throat or vomiting. Treatments tried: Mucinex DM. The treatment provided moderate relief.       Constitution: Negative for chills, sweating, fatigue and fever.   HENT: Negative for ear pain, facial swelling, facial trauma, congestion, sinus pain, sinus pressure, sore throat and voice change.    Neck: Negative for neck stiffness and painful lymph nodes.   Cardiovascular: Negative for chest trauma.   Eyes: Negative for eye trauma, eye redness, double vision and blurred vision.   Respiratory: Positive for cough, sputum production, shortness of breath and wheezing. Negative for chest tightness, bloody sputum, COPD, stridor and asthma.    Gastrointestinal: Negative for abdominal trauma, abdominal pain, nausea, vomiting and rectal bleeding. "   Genitourinary: Negative for hematuria, missed menses, genital trauma and pelvic pain.   Musculoskeletal: Negative for pain, trauma, joint swelling, abnormal ROM of joint and muscle ache.   Skin: Negative for color change, rash, wound, abrasion, laceration and bruising.   Allergic/Immunologic: Negative for seasonal allergies and asthma.   Neurological: Negative for dizziness, history of vertigo, light-headedness, coordination disturbances, altered mental status and loss of consciousness.   Hematologic/Lymphatic: Negative for swollen lymph nodes and history of bleeding disorder.   Psychiatric/Behavioral: Negative for altered mental status.       Objective:      Physical Exam   Constitutional: She is oriented to person, place, and time. She appears well-developed and well-nourished. She is cooperative.  Non-toxic appearance. She does not appear ill. No distress.   HENT:   Head: Normocephalic and atraumatic.   Right Ear: Hearing, tympanic membrane, external ear and ear canal normal.   Left Ear: Hearing, tympanic membrane, external ear and ear canal normal.   Nose: Nose normal. No mucosal edema, rhinorrhea or nasal deformity. No epistaxis. Right sinus exhibits no maxillary sinus tenderness and no frontal sinus tenderness. Left sinus exhibits no maxillary sinus tenderness and no frontal sinus tenderness.   Mouth/Throat: Uvula is midline, oropharynx is clear and moist and mucous membranes are normal. No trismus in the jaw. Normal dentition. No uvula swelling. No posterior oropharyngeal erythema.   Eyes: Conjunctivae and lids are normal. Right eye exhibits no discharge. Left eye exhibits no discharge. No scleral icterus.   Neck: Trachea normal, normal range of motion, full passive range of motion without pain and phonation normal. Neck supple.   Cardiovascular: Normal rate, regular rhythm, normal heart sounds, intact distal pulses and normal pulses.   Pulmonary/Chest: Effort normal and breath sounds normal. No respiratory  distress.   Abdominal: Soft. Normal appearance and bowel sounds are normal. She exhibits no distension and no pulsatile midline mass. There is no tenderness.   Musculoskeletal:   Left ankle on the lateral side has swelling, tenderness, no bruise, no redness.   Good ROM, pain increase with weight bearing on the left foot.   Neurological: She is alert and oriented to person, place, and time. She exhibits normal muscle tone. Coordination normal.   Skin: Skin is warm, dry, intact, not diaphoretic and not pale.   Psychiatric: She has a normal mood and affect. Her speech is normal and behavior is normal. Judgment and thought content normal. Cognition and memory are normal.   Nursing note and vitals reviewed.        Assessment:       1. Sprain of left ankle, unspecified ligament, initial encounter    2. Purulent bronchitis    3. Cough        Plan:         Sprain of left ankle, unspecified ligament, initial encounter  -     X-Ray Ankle Complete Left; Future; Expected date: 10/29/2019  -     ORTHOPEDIC BRACING FOR HOME USE - LOWER EXTREMITY    Purulent bronchitis  -     doxycycline (VIBRAMYCIN) 100 MG Cap; Take 1 capsule (100 mg total) by mouth 2 (two) times daily.  Dispense: 20 capsule; Refill: 0  -     betamethasone acetate-betamethasone sodium phosphate injection 9 mg    Cough  -     benzonatate (TESSALON) 200 MG capsule; Take 1 capsule (200 mg total) by mouth 3 (three) times daily as needed for Cough.  Dispense: 30 capsule; Refill: 0  -     betamethasone acetate-betamethasone sodium phosphate injection 9 mg         Patient Instructions       Treating Ankle Sprains  Treatment will depend on how bad your sprain is. For a severe sprain, healing may take 3 months or more.  Right after your injury: Use R.I.C.E.  · Rest: At first, keep weight off the ankle as much as you can. You may be given crutches to help you walk without putting weight on the ankle.  · Ice: Put an ice pack on the ankle for 15 minutes. Remove the pack and  wait at least 30 minutes. Repeat for up to 3 days. This helps reduce swelling.  · Compression: To reduce swelling and keep the joint stable, you may need to wrap the ankle with an elastic bandage. For more severe sprains, you may need an ankle brace or a cast.  · Elevation: To reduce swelling, keep your ankle raised above your heart when you sit or lie down.  Medicine  Your healthcare provider may suggest oral non-steroidal anti-inflammatory medicine (NSAIDs), such as ibuprofen. This relieves the pain and helps reduce any swelling. Be sure to take your medicine as directed.  Contrast baths  After 3 days, soak your ankle in warm water for 30 seconds, then in cool water for 30 seconds. Go back and forth for 5 minutes. Doing this every 2 hours will help keep the swelling down.  Exercises    After about 2 to 3 weeks, you may be given exercises to strengthen the ligaments and muscles in the ankle. Doing these exercises will help prevent another ankle sprain. Exercises may include standing on your toes and then on your heels and doing ankle curls.  Ankle curls  · Sit on the edge of a sturdy table or lie on your back.  · Pull your toes toward you. Then point them away from you. Repeat for 2 to 3 minutes.   Date Last Reviewed: 9/28/2015  © 1455-1430 [employer]. [address] [place] [address], [place] [address]. All rights reserved. This information is not intended as a substitute for professional medical care. Always follow your healthcare professional's instructions.      What Is Acute Bronchitis?  Acute bronchitis is when the airways in your lungs (bronchial tubes) become red and swollen (inflamed). It is usually caused by a viral infection. But it can also occur because of a bacteria or allergen. Symptoms include a cough that produces yellow or greenish mucus and can last for days or sometimes weeks.  Inside healthy lungs    Air travels in and out of the lungs through the airways. The linings of these airways  produce sticky mucus. This mucus traps particles that enter the lungs. Tiny structures called cilia then sweep the particles out of the airways.     Healthy airway: Airways are normally open. Air moves in and out easily.      Healthy cilia: Tiny, hairlike cilia sweep mucus and particles up and out of the airways.   Lungs with bronchitis  Bronchitis often occurs with a cold or the flu virus. The airways become inflamed (red and swollen). There is a deep hacking cough from the extra mucus. Other symptoms may include:  · Wheezing  · Chest discomfort  · Shortness of breath  · Mild fever  A second infection, this time due to bacteria, may then occur. And airways irritated by allergens or smoke are more likely to get infected.        Inflamed airway: Inflammation and extra mucus narrow the airway, causing shortness of breath.      Impaired cilia: Extra mucus impairs cilia, causing congestion and wheezing. Smoking makes the problem worse.   Making a diagnosis  A physical exam, health history, and certain tests help your healthcare provider make the diagnosis.  Health history  Your healthcare provider will ask you about your symptoms.  The exam  Your provider listens to your chest for signs of congestion. He or she may also check your ears, nose, and throat.  Possible tests  · A sputum test for bacteria. This requires a sample of mucus from your lungs.  · A nasal or throat swab. This tests to see if you have a bacterial infection.  · A chest X-ray. This is done if your healthcare provider thinks you have pneumonia.  · Tests to check for an underlying condition. Other tests may be done to check for things such as allergies, asthma, or COPD (chronic obstructive pulmonary disease). You may need to see a specialist for more lung function testing.  Treating a cough  The main treatment for bronchitis is easing symptoms. Avoiding smoke, allergens, and other things that trigger coughing can often help. If the infection is  bacterial, you may be given antibiotics. During the illness, it's important to get plenty of sleep. To ease symptoms:  · Dont smoke. Also avoid secondhand smoke.  · Use a humidifier. Or try breathing in steam from a hot shower. This may help loosen mucus.  · Drink a lot of water and juice. They can soothe the throat and may help thin mucus.  · Sit up or use extra pillows when in bed. This helps to lessen coughing and congestion.  · Ask your provider about using medicine. Ask about using cough medicine, pain and fever medicine, or a decongestant.  Antibiotics  Most cases of bronchitis are caused by cold or flu viruses. They dont need antibiotics to treat them, even if your mucus is thick and green or yellow. Antibiotics dont treat viral illness and antibiotics have not been shown to have any benefit in cases of acute bronchitis. Taking antibiotics when they are not needed increases your risk of getting an infection later that is antibiotic-resistant. Antibiotics can also cause severe cases of diarrhea that require other antibiotics to treat.  It is important that you accept your healthcare provider's opinion to not use antibiotics. Your provider will prescribe antibiotics if the infection is caused by bacteria. If they are prescribed:  · Take all of the medicine. Take the medicine until it is used up, even if symptoms have improved. If you dont, the bronchitis may come back.  · Take the medicines as directed. For instance, some medicines should be taken with food.  · Ask about side effects. Ask your provider or pharmacist what side effects are common, and what to do about them.  Follow-up care  You should see your provider again in 2 to 3 weeks. By this time, symptoms should have improved. An infection that lasts longer may mean you have a more serious problem.  Prevention  · Avoid tobacco smoke. If you smoke, quit. Stay away from smoky places. Ask friends and family not to smoke around you, or in your home or  car.  · Get checked for allergies.  · Ask your provider about getting a yearly flu shot. Also ask about pneumococcal or pneumonia shots.  · Wash your hands often. This helps reduce the chance of picking up viruses that cause colds and flu.  Call your healthcare provider if:  · Symptoms worsen, or you have new symptoms  · Breathing problems worsen or  become severe  · Symptoms dont get better within a week, or within 3 days of taking antibiotics   Date Last Reviewed: 2/1/2017  © 0835-0936 [employer]. [address] [place] [id number]. All rights reserved. This information is not intended as a substitute for professional medical care. Always follow your healthcare professional's instructions.          Follow up with your doctor in a few days as needed.  Return to the urgent care or go to the ER if symptoms get worse.    Fidel Dorado MD

## 2019-10-29 NOTE — PATIENT INSTRUCTIONS
Treating Ankle Sprains  Treatment will depend on how bad your sprain is. For a severe sprain, healing may take 3 months or more.  Right after your injury: Use R.I.C.E.  · Rest: At first, keep weight off the ankle as much as you can. You may be given crutches to help you walk without putting weight on the ankle.  · Ice: Put an ice pack on the ankle for 15 minutes. Remove the pack and wait at least 30 minutes. Repeat for up to 3 days. This helps reduce swelling.  · Compression: To reduce swelling and keep the joint stable, you may need to wrap the ankle with an elastic bandage. For more severe sprains, you may need an ankle brace or a cast.  · Elevation: To reduce swelling, keep your ankle raised above your heart when you sit or lie down.  Medicine  Your healthcare provider may suggest oral non-steroidal anti-inflammatory medicine (NSAIDs), such as ibuprofen. This relieves the pain and helps reduce any swelling. Be sure to take your medicine as directed.  Contrast baths  After 3 days, soak your ankle in warm water for 30 seconds, then in cool water for 30 seconds. Go back and forth for 5 minutes. Doing this every 2 hours will help keep the swelling down.  Exercises    After about 2 to 3 weeks, you may be given exercises to strengthen the ligaments and muscles in the ankle. Doing these exercises will help prevent another ankle sprain. Exercises may include standing on your toes and then on your heels and doing ankle curls.  Ankle curls  · Sit on the edge of a sturdy table or lie on your back.  · Pull your toes toward you. Then point them away from you. Repeat for 2 to 3 minutes.   Date Last Reviewed: 9/28/2015  © 3624-5873 [employer]. [address] [place] [address], [place] [address]. All rights reserved. This information is not intended as a substitute for professional medical care. Always follow your healthcare professional's instructions.      What Is Acute Bronchitis?  Acute bronchitis is when the airways  in your lungs (bronchial tubes) become red and swollen (inflamed). It is usually caused by a viral infection. But it can also occur because of a bacteria or allergen. Symptoms include a cough that produces yellow or greenish mucus and can last for days or sometimes weeks.  Inside healthy lungs    Air travels in and out of the lungs through the airways. The linings of these airways produce sticky mucus. This mucus traps particles that enter the lungs. Tiny structures called cilia then sweep the particles out of the airways.     Healthy airway: Airways are normally open. Air moves in and out easily.      Healthy cilia: Tiny, hairlike cilia sweep mucus and particles up and out of the airways.   Lungs with bronchitis  Bronchitis often occurs with a cold or the flu virus. The airways become inflamed (red and swollen). There is a deep hacking cough from the extra mucus. Other symptoms may include:  · Wheezing  · Chest discomfort  · Shortness of breath  · Mild fever  A second infection, this time due to bacteria, may then occur. And airways irritated by allergens or smoke are more likely to get infected.        Inflamed airway: Inflammation and extra mucus narrow the airway, causing shortness of breath.      Impaired cilia: Extra mucus impairs cilia, causing congestion and wheezing. Smoking makes the problem worse.   Making a diagnosis  A physical exam, health history, and certain tests help your healthcare provider make the diagnosis.  Health history  Your healthcare provider will ask you about your symptoms.  The exam  Your provider listens to your chest for signs of congestion. He or she may also check your ears, nose, and throat.  Possible tests  · A sputum test for bacteria. This requires a sample of mucus from your lungs.  · A nasal or throat swab. This tests to see if you have a bacterial infection.  · A chest X-ray. This is done if your healthcare provider thinks you have pneumonia.  · Tests to check for an  underlying condition. Other tests may be done to check for things such as allergies, asthma, or COPD (chronic obstructive pulmonary disease). You may need to see a specialist for more lung function testing.  Treating a cough  The main treatment for bronchitis is easing symptoms. Avoiding smoke, allergens, and other things that trigger coughing can often help. If the infection is bacterial, you may be given antibiotics. During the illness, it's important to get plenty of sleep. To ease symptoms:  · Dont smoke. Also avoid secondhand smoke.  · Use a humidifier. Or try breathing in steam from a hot shower. This may help loosen mucus.  · Drink a lot of water and juice. They can soothe the throat and may help thin mucus.  · Sit up or use extra pillows when in bed. This helps to lessen coughing and congestion.  · Ask your provider about using medicine. Ask about using cough medicine, pain and fever medicine, or a decongestant.  Antibiotics  Most cases of bronchitis are caused by cold or flu viruses. They dont need antibiotics to treat them, even if your mucus is thick and green or yellow. Antibiotics dont treat viral illness and antibiotics have not been shown to have any benefit in cases of acute bronchitis. Taking antibiotics when they are not needed increases your risk of getting an infection later that is antibiotic-resistant. Antibiotics can also cause severe cases of diarrhea that require other antibiotics to treat.  It is important that you accept your healthcare provider's opinion to not use antibiotics. Your provider will prescribe antibiotics if the infection is caused by bacteria. If they are prescribed:  · Take all of the medicine. Take the medicine until it is used up, even if symptoms have improved. If you dont, the bronchitis may come back.  · Take the medicines as directed. For instance, some medicines should be taken with food.  · Ask about side effects. Ask your provider or pharmacist what side  effects are common, and what to do about them.  Follow-up care  You should see your provider again in 2 to 3 weeks. By this time, symptoms should have improved. An infection that lasts longer may mean you have a more serious problem.  Prevention  · Avoid tobacco smoke. If you smoke, quit. Stay away from smoky places. Ask friends and family not to smoke around you, or in your home or car.  · Get checked for allergies.  · Ask your provider about getting a yearly flu shot. Also ask about pneumococcal or pneumonia shots.  · Wash your hands often. This helps reduce the chance of picking up viruses that cause colds and flu.  Call your healthcare provider if:  · Symptoms worsen, or you have new symptoms  · Breathing problems worsen or  become severe  · Symptoms dont get better within a week, or within 3 days of taking antibiotics   Date Last Reviewed: 2/1/2017  © 5616-1466 [employer]. [address] [place] [address], [place] [address]. All rights reserved. This information is not intended as a substitute for professional medical care. Always follow your healthcare professional's instructions.          Follow up with your doctor in a few days as needed.  Return to the urgent care or go to the ER if symptoms get worse.    Fidel Dorado MD

## 2019-10-31 ENCOUNTER — PATIENT MESSAGE (OUTPATIENT)
Dept: ADMINISTRATIVE | Facility: HOSPITAL | Age: 59
End: 2019-10-31

## 2019-10-31 ENCOUNTER — PATIENT OUTREACH (OUTPATIENT)
Dept: ADMINISTRATIVE | Facility: HOSPITAL | Age: 59
End: 2019-10-31

## 2019-10-31 DIAGNOSIS — Z12.4 PAP SMEAR FOR CERVICAL CANCER SCREENING: Primary | ICD-10-CM

## 2019-10-31 PROBLEM — Z79.899 OTHER LONG TERM (CURRENT) DRUG THERAPY: Status: ACTIVE | Noted: 2019-10-31

## 2019-10-31 PROBLEM — F33.2 SEVERE RECURRENT MAJOR DEPRESSION WITHOUT PSYCHOTIC FEATURES: Status: ACTIVE | Noted: 2019-10-31

## 2019-10-31 PROBLEM — F41.0 PANIC DISORDER: Status: ACTIVE | Noted: 2019-10-31

## 2019-10-31 PROBLEM — Z72.0 TOBACCO USER: Status: ACTIVE | Noted: 2019-10-31

## 2019-11-22 ENCOUNTER — TELEPHONE (OUTPATIENT)
Dept: ADMINISTRATIVE | Facility: OTHER | Age: 59
End: 2019-11-22

## 2019-11-22 NOTE — TELEPHONE ENCOUNTER
Spoke with Ms. Matias in efforts to encourage the completion and return of her colon cancer screening fit kit. Ms. Matias confirmed she still has fit kit and verbalized her understanding of returning kit before end of year.

## 2019-11-27 ENCOUNTER — TELEPHONE (OUTPATIENT)
Dept: PHARMACY | Facility: CLINIC | Age: 59
End: 2019-11-27

## 2019-12-02 NOTE — TELEPHONE ENCOUNTER
Clinical follow-up conducted for Truvada. Name/ confirmed. no missed doses; no new medications; no side effects noted. Patient understands to report any medication changes to [facility] and provider. All questions answered and addressed to patients satisfaction.      Refill readiness for Truvada confirmed with patient; name/ confirmed; no missed doses; no new medications; no side effects noted; address confirmed for  shipment and  delivery. $0 copay. Patient states she has 5 doses remaining.     Tom Torrez, Pharm.D.  Pharmacy Resident, PGY-1   [facility]

## 2019-12-11 ENCOUNTER — PATIENT MESSAGE (OUTPATIENT)
Dept: INTERNAL MEDICINE | Facility: CLINIC | Age: 59
End: 2019-12-11

## 2019-12-11 DIAGNOSIS — J44.9 CHRONIC OBSTRUCTIVE PULMONARY DISEASE, UNSPECIFIED COPD TYPE: ICD-10-CM

## 2019-12-11 RX ORDER — BUDESONIDE AND FORMOTEROL FUMARATE DIHYDRATE 160; 4.5 UG/1; UG/1
2 AEROSOL RESPIRATORY (INHALATION) EVERY 12 HOURS
Qty: 30.6 G | Refills: 2 | Status: SHIPPED | OUTPATIENT
Start: 2019-12-11 | End: 2019-12-12 | Stop reason: SDUPTHER

## 2019-12-12 ENCOUNTER — PATIENT MESSAGE (OUTPATIENT)
Dept: INTERNAL MEDICINE | Facility: CLINIC | Age: 59
End: 2019-12-12

## 2019-12-12 DIAGNOSIS — J44.9 CHRONIC OBSTRUCTIVE PULMONARY DISEASE, UNSPECIFIED COPD TYPE: ICD-10-CM

## 2019-12-12 DIAGNOSIS — I10 HYPERTENSION, UNSPECIFIED TYPE: Primary | ICD-10-CM

## 2019-12-12 RX ORDER — METOPROLOL TARTRATE 50 MG/1
TABLET ORAL
Qty: 60 TABLET | Refills: 3 | Status: SHIPPED | OUTPATIENT
Start: 2019-12-12 | End: 2020-04-15 | Stop reason: SDUPTHER

## 2019-12-13 RX ORDER — BUDESONIDE AND FORMOTEROL FUMARATE DIHYDRATE 160; 4.5 UG/1; UG/1
2 AEROSOL RESPIRATORY (INHALATION) EVERY 12 HOURS
Qty: 30.6 G | Refills: 2 | Status: SHIPPED | OUTPATIENT
Start: 2019-12-13 | End: 2021-05-26

## 2019-12-20 ENCOUNTER — DOCUMENTATION ONLY (OUTPATIENT)
Dept: INTERNAL MEDICINE | Facility: CLINIC | Age: 59
End: 2019-12-20

## 2019-12-20 ENCOUNTER — OFFICE VISIT (OUTPATIENT)
Dept: FAMILY MEDICINE | Facility: CLINIC | Age: 59
End: 2019-12-20
Payer: COMMERCIAL

## 2019-12-20 ENCOUNTER — PATIENT MESSAGE (OUTPATIENT)
Dept: INTERNAL MEDICINE | Facility: CLINIC | Age: 59
End: 2019-12-20

## 2019-12-20 VITALS
BODY MASS INDEX: 41.46 KG/M2 | OXYGEN SATURATION: 95 % | TEMPERATURE: 98 F | WEIGHT: 225.31 LBS | HEART RATE: 63 BPM | DIASTOLIC BLOOD PRESSURE: 76 MMHG | HEIGHT: 62 IN | SYSTOLIC BLOOD PRESSURE: 124 MMHG

## 2019-12-20 DIAGNOSIS — Z72.0 TOBACCO USER: ICD-10-CM

## 2019-12-20 DIAGNOSIS — Z20.6 HIV EXPOSURE: ICD-10-CM

## 2019-12-20 DIAGNOSIS — Z87.19 HISTORY OF PANCREATITIS: ICD-10-CM

## 2019-12-20 DIAGNOSIS — F51.01 PRIMARY INSOMNIA: ICD-10-CM

## 2019-12-20 DIAGNOSIS — G44.029 CHRONIC CLUSTER HEADACHE, NOT INTRACTABLE: ICD-10-CM

## 2019-12-20 DIAGNOSIS — E78.2 MIXED HYPERLIPIDEMIA: ICD-10-CM

## 2019-12-20 DIAGNOSIS — J41.1 MUCOPURULENT CHRONIC BRONCHITIS: ICD-10-CM

## 2019-12-20 DIAGNOSIS — R10.13 EPIGASTRIC PAIN: ICD-10-CM

## 2019-12-20 DIAGNOSIS — I10 ESSENTIAL HYPERTENSION: Primary | ICD-10-CM

## 2019-12-20 DIAGNOSIS — L40.0 PSORIASIS VULGARIS: ICD-10-CM

## 2019-12-20 DIAGNOSIS — F41.8 DEPRESSION WITH ANXIETY: ICD-10-CM

## 2019-12-20 LAB — LIPASE SERPL-CCNC: 79 U/L (ref 4–60)

## 2019-12-20 PROCEDURE — 99999 PR PBB SHADOW E&M-EST. PATIENT-LVL III: CPT | Mod: PBBFAC,,, | Performed by: INTERNAL MEDICINE

## 2019-12-20 PROCEDURE — 3078F PR MOST RECENT DIASTOLIC BLOOD PRESSURE < 80 MM HG: ICD-10-PCS | Mod: CPTII,S$GLB,, | Performed by: INTERNAL MEDICINE

## 2019-12-20 PROCEDURE — 99999 PR PBB SHADOW E&M-EST. PATIENT-LVL III: ICD-10-PCS | Mod: PBBFAC,,, | Performed by: INTERNAL MEDICINE

## 2019-12-20 PROCEDURE — 99214 PR OFFICE/OUTPT VISIT, EST, LEVL IV, 30-39 MIN: ICD-10-PCS | Mod: S$GLB,,, | Performed by: INTERNAL MEDICINE

## 2019-12-20 PROCEDURE — 3008F PR BODY MASS INDEX (BMI) DOCUMENTED: ICD-10-PCS | Mod: CPTII,S$GLB,, | Performed by: INTERNAL MEDICINE

## 2019-12-20 PROCEDURE — 3074F SYST BP LT 130 MM HG: CPT | Mod: CPTII,S$GLB,, | Performed by: INTERNAL MEDICINE

## 2019-12-20 PROCEDURE — 99214 OFFICE O/P EST MOD 30 MIN: CPT | Mod: S$GLB,,, | Performed by: INTERNAL MEDICINE

## 2019-12-20 PROCEDURE — 3078F DIAST BP <80 MM HG: CPT | Mod: CPTII,S$GLB,, | Performed by: INTERNAL MEDICINE

## 2019-12-20 PROCEDURE — 83690 ASSAY OF LIPASE: CPT

## 2019-12-20 PROCEDURE — 3008F BODY MASS INDEX DOCD: CPT | Mod: CPTII,S$GLB,, | Performed by: INTERNAL MEDICINE

## 2019-12-20 PROCEDURE — 3074F PR MOST RECENT SYSTOLIC BLOOD PRESSURE < 130 MM HG: ICD-10-PCS | Mod: CPTII,S$GLB,, | Performed by: INTERNAL MEDICINE

## 2019-12-20 RX ORDER — ALBUTEROL SULFATE 90 UG/1
2 AEROSOL, METERED RESPIRATORY (INHALATION) EVERY 6 HOURS PRN
Qty: 18 G | Refills: 5 | Status: SHIPPED | OUTPATIENT
Start: 2019-12-20 | End: 2020-02-02 | Stop reason: SDUPTHER

## 2019-12-20 RX ORDER — CERTOLIZUMAB PEGOL 200 MG/ML
400 INJECTION, SOLUTION SUBCUTANEOUS
COMMUNITY
End: 2019-12-27 | Stop reason: ALTCHOICE

## 2019-12-20 NOTE — ASSESSMENT & PLAN NOTE
Starts just below ribs and shoots across left side of chest.  Happens 3 times per week.  Lasts a few minutes then goes away.  No issues with heartburn right now, did have pancreatitis in 2015/16 and had a lot of pain afterward.  Had negative stress 6/2019.  No issues with GERD right now.  Has been coughing a lot.

## 2019-12-20 NOTE — ASSESSMENT & PLAN NOTE
Gets almost every afternoon for last few weeks, sometimes wakes up with it.  Always on right side of the head.  Takes advil which works sometimes.  Had really bad one in shower last week, got dizzy.  Felt shaky. No blurry vision/double vision with normal headaches.  Drinks a lot of caffeine, trying to incorporate more water. Went to psychiatrist at [facility] in past.

## 2019-12-20 NOTE — ASSESSMENT & PLAN NOTE
Stable on buspar and klonopin BID, has taken for many years. Refuses antidepressants because aunt committed suicide on them before, feels like she is more depressed on antidepressants.  Has been on prozac, celexa, wellbutrin before.  No SI/HI/panic attack.

## 2019-12-20 NOTE — ASSESSMENT & PLAN NOTE
Has been coughing up lots of mucus.  Took mucinex in past.  SOB on exertion, feels like it has gotten worse.  Gets SOB after 1 set of stairs, can't walk 1 block without SOB.

## 2019-12-20 NOTE — PROGRESS NOTES
[facility] Note    Chief Complaint      Chief Complaint   Patient presents with    Establish Care     f/u from [facility]     Headache     History of Present Illness      Lucia Matias is a 59 y.o. female who presents today headaches.  Patient comes to appointment alone.     Problem List Items Addressed This Visit     HIV exposure    Overview     4th Generation HIV 1/2 Ag/Ab Combo Screen with Reflex   Negative 11/1/18.  Serum creatinine clearance 0.88 mg/dL, GFR 73 ml/min 11/1/2018. UA without proteinuria 11/1/2018. Started Truvada as PrEP mid-November 2018.    Partners  Single male, HIV positive, recent start of Biktarvy, approaching virologic suppression per her report.     Practices/Protection  Oral Sex:  Yes.  Give and Receive.  Barrier Protection:  No  Anal Sex:  No  Vaginal Sex:  Yes.  Receptive.  Barrier Protection:  No      Last Assessment & Plan:         Current Assessment & Plan     On Truvada for post exposure prophylaxis.         Psoriasis vulgaris    Overview     Tried and failed Enbrel x 1 yr  Tried and failed Humira x 1-2 yrs  Has been on Cimzia since 2018    Last quantiferon gold 12/2018         Current Assessment & Plan     Stable on Cimzia, sees Dr. Lara.  Hasn't taken last couple weeks because of cough.         COPD (chronic obstructive pulmonary disease)    Current Assessment & Plan     Has been coughing up lots of mucus.  Took mucinex in past.  SOB on exertion, feels like it has gotten worse.  Gets SOB after 1 set of stairs, can't walk 1 block without SOB.         Relevant Medications    albuterol (PROVENTIL/VENTOLIN HFA) 90 mcg/actuation inhaler    HLD (hyperlipidemia)    Current Assessment & Plan     Stable on lipitor 20 mg daily, no myalgias.         Depression with anxiety    Current Assessment & Plan     Stable on buspar and klonopin BID, has taken for many years. Refuses antidepressants because aunt committed suicide on them before, feels like she is more  depressed on antidepressants.  Has been on prozac, celexa, wellbutrin before.  No SI/HI/panic attack.         Insomnia    Current Assessment & Plan     Stable on ambien PRN, works well for her.         Tobacco user    Current Assessment & Plan     Still smoking.         Essential hypertension - Primary    Current Assessment & Plan     BP controlled on lisinopril 10 mg daily and metoprolol 50 mg BID         Chronic cluster headache, not intractable    Current Assessment & Plan     Gets almost every afternoon for last few weeks, sometimes wakes up with it.  Always on right side of the head.  Takes advil which works sometimes.  Had really bad one in shower last week, got dizzy.  Felt shaky. No blurry vision/double vision with normal headaches.  Drinks a lot of caffeine, trying to incorporate more water. Went to psychiatrist at [facility] in past.         Epigastric pain    Current Assessment & Plan     Starts just below ribs and shoots across left side of chest.  Happens 3 times per week.  Lasts a few minutes then goes away.  No issues with heartburn right now, did have pancreatitis in 2015/16 and had a lot of pain afterward.  Had negative stress 6/2019.  No issues with GERD right now.  Has been coughing a lot.           Other Visit Diagnoses     History of pancreatitis              Health Maintenance   Topic Date Due    Pap Smear with HPV Cotest  07/16/1983    Colonoscopy  07/16/2012    Pneumococcal Vaccine (Medium Risk) (1 of 1 - PPSV23) 12/20/2020 (Originally [birth year]/16/1981)    Mammogram  12/21/2020    Lipid Panel  06/20/2024    TETANUS VACCINE  12/05/2028    Hepatitis C Screening  Completed       Past Medical History:   Diagnosis Date    Anxiety     Arthritis     COPD (chronic obstructive pulmonary disease)     Diverticulitis     HLD (hyperlipidemia)     Hypertension     Pancreatitis     Psoriasis     Rheumatoid arthritis     Severe obesity (BMI 35.0-39.9) with comorbidity        Past Surgical  History:   Procedure Laterality Date    BLADDER SUSPENSION      (x2)     SECTION      (x2)    RHINOPLASTY      TONSILLECTOMY         She was adopted. Family history is unknown by patient.    Social History     Tobacco Use    Smoking status: Current Every Day Smoker     Packs/day: 0.50     Years: 20.00     Pack years: 10.00     Types: Cigarettes    Smokeless tobacco: Never Used   Substance Use Topics    Alcohol use: No     Frequency: Never     Drinks per session: Patient refused     Binge frequency: Never    Drug use: No       Review of Systems   Constitutional: Negative for chills and fever.   HENT: Negative for congestion, ear pain and sore throat.    Eyes: Negative for blurred vision and discharge.   Respiratory: Positive for sputum production, shortness of breath and wheezing. Negative for cough and hemoptysis.    Cardiovascular: Positive for chest pain, orthopnea, claudication and leg swelling. Negative for palpitations and PND.   Gastrointestinal: Positive for abdominal pain. Negative for constipation, diarrhea, nausea and vomiting.   Genitourinary: Negative for dysuria and hematuria.   Musculoskeletal: Negative for falls, myalgias and neck pain.   Skin: Negative for itching and rash.   Neurological: Positive for headaches. Negative for dizziness.        Outpatient Encounter Medications as of 2019   Medication Sig Dispense Refill    atorvastatin (LIPITOR) 20 MG tablet Take 1 tablet (20 mg total) by mouth every evening. 90 tablet 3    budesonide-formoterol 160-4.5 mcg (SYMBICORT) 160-4.5 mcg/actuation HFAA Inhale 2 puffs into the lungs every 12 (twelve) hours. Controller 30.6 g 2    busPIRone (BUSPAR) 30 MG Tab Take 1 tablet (30 mg total) by mouth 2 (two) times daily. 60 tablet 6    certolizumab pegol (CIMZIA) 400 mg/2 mL (200 mg/mL x 2) SyKt Inject 400 mg into the skin. Inject sub q every other week      clonazePAM (KLONOPIN) 1 MG tablet Take 1 tablet (1 mg total) by mouth 2 (two)  times daily as needed for Anxiety. 60 tablet 0    emtricitabine-tenofovir 200-300 mg (TRUVADA) 200-300 mg Tab Take 1 tablet by mouth once daily. 90 tablet 3    lisinopril 10 MG tablet Take 1 tablet (10 mg total) by mouth once daily. 90 tablet 3    metoprolol tartrate (LOPRESSOR) 50 MG tablet Take 1 tablet by mouth twice a day with food 60 tablet 3    zolpidem (AMBIEN) 5 MG Tab Take 1-2 tablets by mouth nightly as needed for sleep 45 tablet 2    albuterol (PROVENTIL/VENTOLIN HFA) 90 mcg/actuation inhaler Inhale 2 puffs into the lungs every 6 (six) hours as needed for Wheezing. Rescue 18 g 5    certolizumab pegol (CIMZIA) 400 mg/2 mL (200 mg/mL x 2) SyKt Inject 2 mLs (400 mg total) into the skin every 14 (fourteen) days. 1 Box 2    [DISCONTINUED] augmented betamethasone dipropionate (DIPROLENE-AF) 0.05 % cream Apply to affected areas of body twice daily as needed for psoriasis. (Patient not taking: Reported on 12/20/2019) 50 g 5    [DISCONTINUED] clonazePAM (KLONOPIN) 1 MG tablet Take 1 tablet (1 mg total) by mouth 2 (two) times daily as needed for Anxiety. (Patient not taking: Reported on 10/22/2019) 60 tablet 2    [DISCONTINUED] doxycycline (VIBRAMYCIN) 100 MG Cap Take 1 capsule (100 mg total) by mouth 2 (two) times daily. (Patient not taking: Reported on 12/20/2019) 20 capsule 0    [DISCONTINUED] fluocinonide (LIDEX) 0.05 % ointment Apply to affected areas of body twice daily as needed for psoriasis. (Patient not taking: Reported on 10/22/2019) 60 g 5    [DISCONTINUED] HYDROcodone-acetaminophen (NORCO) 5-325 mg per tablet Take 1 tablet by mouth every 4 to 6 hours as needed for pain (Patient not taking: Reported on 10/22/2019) 16 tablet 0    [DISCONTINUED] metFORMIN (GLUCOPHAGE) 500 MG tablet Take 1 tablet (500 mg total) by mouth daily with breakfast. (Patient not taking: Reported on 10/22/2019) 30 tablet 3    [DISCONTINUED] nicotine (NICODERM CQ) 7 mg/24 hr Place 1 patch onto the skin once daily.  "(Patient not taking: Reported on 12/20/2019) 14 patch 0    [DISCONTINUED] nicotine, polacrilex, (NICORETTE) 2 mg Gum Take 1 each (2 mg total) by mouth as needed (Use in place of cigarettes). (Patient not taking: Reported on 12/20/2019) 100 each 0    [DISCONTINUED] urea 45 % Gel Apply topically once to twice daily after soak as directed (Patient not taking: Reported on 12/20/2019) 28 mL 3     Facility-Administered Encounter Medications as of 12/20/2019   Medication Dose Route Frequency Provider Last Rate Last Dose    DOBUTamine 500mg in D5W 250mL infusion (premix)  10 mcg/kg/min Intravenous Continuous aSige Bee  mL/hr at 06/26/19 1629 40 mcg/kg/min at 06/26/19 1629        Review of patient's allergies indicates:   Allergen Reactions    Succinimides Anaphylaxis    Succinylcholine     Succinylcholine chloride      Other reaction(s): Unknown       Physical Exam      Vital Signs  Temp: 98.4 °F (36.9 °C)  Temp src: Oral  Pulse: 63  SpO2: 95 %  BP: 124/76  BP Location: Left arm  Patient Position: Sitting  Pain Score: 0-No pain  Height and Weight  Height: 5' 2" (157.5 cm)  Weight: 102.2 kg (225 lb 5 oz)  BSA (Calculated - sq m): 2.11 sq meters  BMI (Calculated): 41.2  Weight in (lb) to have BMI = 25: 136.4]    Physical Exam   Constitutional: She is oriented to person, place, and time. She appears well-developed and well-nourished.   HENT:   Head: Normocephalic and atraumatic.   Right Ear: External ear normal.   Left Ear: External ear normal.   Eyes: Right eye exhibits no discharge. Left eye exhibits no discharge.   Neck: Normal range of motion. No thyromegaly present.   Cardiovascular: Normal rate, regular rhythm, normal heart sounds and intact distal pulses.   No murmur heard.  Pulmonary/Chest: Effort normal and breath sounds normal. No respiratory distress.   Abdominal: Soft. Bowel sounds are normal. She exhibits no distension. There is no tenderness.   Musculoskeletal: Normal range of motion. She " exhibits no deformity.   Neurological: She is alert and oriented to person, place, and time.   Skin: Skin is warm and dry. No rash noted.   Psychiatric: She has a normal mood and affect. Her behavior is normal.        Laboratory:  CBC:  Recent Labs   Lab Result Units 10/17/19  0715   WBC K/uL 6.72   RBC M/uL 4.99   Hemoglobin g/dL 15.7   Hematocrit % 48.8*   Platelets K/uL 239   Mean Corpuscular Volume fL 98   Mean Corpuscular Hemoglobin pg 31.5*   Mean Corpuscular Hemoglobin Conc g/dL 32.2     CMP:  Recent Labs   Lab Result Units 10/17/19  0715   Glucose mg/dL 85   Calcium mg/dL 9.0   Albumin g/dL 3.6   Total Protein g/dL 7.0   Sodium mmol/L 140   Potassium mmol/L 4.7   CO2 mmol/L 25   Chloride mmol/L 106   BUN, Bld mg/dL 21*   Alkaline Phosphatase U/L 103   ALT U/L 28   AST U/L 18   Total Bilirubin mg/dL 0.8     URINALYSIS:  No results for input(s): COLORU, CLARITYU, SPECGRAV, PHUR, PROTEINUA, GLUCOSEU, BILIRUBINCON, BLOODU, WBCU, RBCU, BACTERIA, MUCUS, NITRITE, LEUKOCYTESUR, UROBILINOGEN, HYALINECASTS in the last 2160 hours.   LIPIDS:  No results for input(s): TSH, HDL, CHOL, TRIG, LDLCALC, CHOLHDL, NONHDLCHOL, TOTALCHOLEST in the last 2160 hours.  TSH:  No results for input(s): TSH in the last 2160 hours.  A1C:  No results for input(s): HGBA1C in the last 2160 hours.    Radiology:  No imaging on file    Assessment/Plan     Lucia Matias is a 59 y.o.female with:    1. Mixed hyperlipidemia    2. Mucopurulent chronic bronchitis  - albuterol (PROVENTIL/VENTOLIN HFA) 90 mcg/actuation inhaler; Inhale 2 puffs into the lungs every 6 (six) hours as needed for Wheezing. Rescue  Dispense: 18 g; Refill: 5    3. Psoriasis vulgaris    4. HIV exposure    5. Primary insomnia    6. Depression with anxiety    7. Essential hypertension    8. Chronic cluster headache, not intractable    9. History of pancreatitis    10. Epigastric pain    11. Tobacco user    -has a lot of post nasal drip, likely worsening COPD.  Will start  antihistamine, flonase and mucinex.  If not improving, will add Spiriva to regimen.  Send rx for rescue inhaler.  PFT's, CT chest and TTE done in 6/2019 looked stable.  Repeat CT in 1/2020 to eval left lung nodule.  -Epigastric pain likely MSK from coughing, but will check lipase to R/O recurrence of pancreatitis  -Still smoking 1/2 ppd, wants to quit in 2020  -Continue current medications and maintain follow up with specialists.  Return to clinic in 1 month.      Hetal Banks MD  [facility]    Answers for HPI/ROS submitted by the patient on 12/19/2019   Shortness of breath  Chronicity: chronic  Onset: more than 1 month ago  Frequency: daily  Progression since onset: waxing and waning  Episode duration: 2 minutes  coryza: Yes  leg pain: Yes  rhinorrhea: Yes  swollen glands: No  syncope: No  Aggravating factors: any activity  Improvement on treatment: mild  Risk factors for DVT/PE: smoking  Treatments tried: beta agonist inhalers, leukotriene antagonists, prescription cough suppressants  asthma: No  allergies: No  COPD: Yes  pneumonia: No  aspirin allergies: No  CAD: No  DVT: No  heart failure: No  PE: No  recent surgery: No  bronchiolitis: Yes  chronic lung disease: No

## 2019-12-21 ENCOUNTER — LAB VISIT (OUTPATIENT)
Dept: LAB | Facility: HOSPITAL | Age: 59
End: 2019-12-21
Attending: FAMILY MEDICINE
Payer: COMMERCIAL

## 2019-12-21 DIAGNOSIS — Z12.11 COLON CANCER SCREENING: ICD-10-CM

## 2019-12-21 PROCEDURE — 82274 ASSAY TEST FOR BLOOD FECAL: CPT

## 2019-12-23 ENCOUNTER — PATIENT OUTREACH (OUTPATIENT)
Dept: ADMINISTRATIVE | Facility: OTHER | Age: 59
End: 2019-12-23

## 2019-12-23 DIAGNOSIS — R10.13 EPIGASTRIC PAIN: Primary | ICD-10-CM

## 2019-12-23 NOTE — PROGRESS NOTES
Spoke with patient and reminded her to complete and return fitkit. Patient stated she would have complete and return after holidays.

## 2019-12-23 NOTE — PROGRESS NOTES
Lipase is only minimally elevated, likely related to chronic inflammation from her previous pancreatitis.  However, given her pain, I would like to do a CT of her abdomen to make sure there is nothing new going on.  Where would she like to go for this?

## 2019-12-26 ENCOUNTER — TELEPHONE (OUTPATIENT)
Dept: PHARMACY | Facility: CLINIC | Age: 59
End: 2019-12-26

## 2019-12-26 NOTE — TELEPHONE ENCOUNTER
RX call attempt 1 regarding Truvada from [facility]. Patient reached -- shipping 12/30 for 12/31 arrival with patients consent.   copay 0.00 @ 004.

## 2019-12-27 ENCOUNTER — PATIENT MESSAGE (OUTPATIENT)
Dept: DERMATOLOGY | Facility: CLINIC | Age: 59
End: 2019-12-27

## 2019-12-27 DIAGNOSIS — L40.50 PSORIATIC ARTHRITIS: ICD-10-CM

## 2019-12-27 DIAGNOSIS — Z79.899 LONG-TERM USE OF HIGH-RISK MEDICATION: ICD-10-CM

## 2019-12-27 DIAGNOSIS — L40.0 PSORIASIS VULGARIS: ICD-10-CM

## 2019-12-27 RX ORDER — CERTOLIZUMAB PEGOL 200 MG/ML
400 INJECTION, SOLUTION SUBCUTANEOUS
Qty: 1 BOX | Refills: 1 | Status: SHIPPED | OUTPATIENT
Start: 2019-12-27 | End: 2020-09-08

## 2020-01-02 ENCOUNTER — TELEPHONE (OUTPATIENT)
Dept: DERMATOLOGY | Facility: CLINIC | Age: 60
End: 2020-01-02

## 2020-01-02 ENCOUNTER — PATIENT MESSAGE (OUTPATIENT)
Dept: FAMILY MEDICINE | Facility: CLINIC | Age: 60
End: 2020-01-02

## 2020-01-02 ENCOUNTER — TELEPHONE (OUTPATIENT)
Dept: INTERNAL MEDICINE | Facility: CLINIC | Age: 60
End: 2020-01-02

## 2020-01-02 ENCOUNTER — TELEPHONE (OUTPATIENT)
Dept: FAMILY MEDICINE | Facility: CLINIC | Age: 60
End: 2020-01-02

## 2020-01-02 ENCOUNTER — PATIENT MESSAGE (OUTPATIENT)
Dept: INTERNAL MEDICINE | Facility: CLINIC | Age: 60
End: 2020-01-02

## 2020-01-02 ENCOUNTER — HOSPITAL ENCOUNTER (OUTPATIENT)
Dept: RADIOLOGY | Facility: OTHER | Age: 60
Discharge: HOME | End: 2020-01-02
Attending: INTERNAL MEDICINE
Payer: COMMERCIAL

## 2020-01-02 ENCOUNTER — HOSPITAL ENCOUNTER (OUTPATIENT)
Dept: RADIOLOGY | Facility: OTHER | Age: 60
Discharge: HOME | End: 2020-01-02
Attending: FAMILY MEDICINE
Payer: COMMERCIAL

## 2020-01-02 DIAGNOSIS — R91.8 PULMONARY NODULES: Primary | ICD-10-CM

## 2020-01-02 DIAGNOSIS — I10 ESSENTIAL HYPERTENSION: Primary | ICD-10-CM

## 2020-01-02 DIAGNOSIS — R10.13 EPIGASTRIC PAIN: ICD-10-CM

## 2020-01-02 DIAGNOSIS — R91.1 PULMONARY NODULE: ICD-10-CM

## 2020-01-02 DIAGNOSIS — R91.8 OPACITY OF LUNG ON IMAGING STUDY: ICD-10-CM

## 2020-01-02 PROCEDURE — 74160 CT ABDOMEN W/CONTRAST: CPT | Mod: 26,,, | Performed by: RADIOLOGY

## 2020-01-02 PROCEDURE — 25500020 PHARM REV CODE 255: Performed by: INTERNAL MEDICINE

## 2020-01-02 PROCEDURE — 71250 CT THORAX DX C-: CPT | Mod: TC

## 2020-01-02 PROCEDURE — 71250 CT CHEST WITHOUT CONTRAST: ICD-10-PCS | Mod: 26,,, | Performed by: RADIOLOGY

## 2020-01-02 PROCEDURE — 74160 CT ABDOMEN WITH CONTRAST: ICD-10-PCS | Mod: 26,,, | Performed by: RADIOLOGY

## 2020-01-02 PROCEDURE — 74160 CT ABDOMEN W/CONTRAST: CPT | Mod: TC

## 2020-01-02 PROCEDURE — 71250 CT THORAX DX C-: CPT | Mod: 26,,, | Performed by: RADIOLOGY

## 2020-01-02 RX ADMIN — IOHEXOL 100 ML: 350 INJECTION, SOLUTION INTRAVENOUS at 10:01

## 2020-01-02 NOTE — TELEPHONE ENCOUNTER
Called and spoke with pt in regards of her CT Scan results. Pt verbalized understanding of message.

## 2020-01-02 NOTE — TELEPHONE ENCOUNTER
Spoke with patient re: lab results and recent change in pulmonary nodules. Rec'd that she discontinue Cimzia until we have more information regarding the lung nodules. Rec'd that she keep appt with her PCP this month to discuss lab abnormalities--decreased GFR and elevated H&H--and to see if any further work up needs to be done.

## 2020-01-02 NOTE — TELEPHONE ENCOUNTER
Concerning changes and increase in size od pulmonary nodules on the CT scan will need further evaluation with pulmonology   Please schedule for first available appt asap

## 2020-01-02 NOTE — PROGRESS NOTES
CT scan looks fine, no issues seen with pancreas, no pancreatitis.    Labs show minor decrease in kidney function and very slight increase in blood counts.  Could be related to dehydration.  Drink plenty of water and we will repeat labs at visit scheduled later this month.

## 2020-01-03 LAB — HEMOCCULT STL QL IA: NEGATIVE

## 2020-01-06 ENCOUNTER — PATIENT MESSAGE (OUTPATIENT)
Dept: PSYCHIATRY | Facility: CLINIC | Age: 60
End: 2020-01-06

## 2020-01-06 RX ORDER — CLONAZEPAM 1 MG/1
1 TABLET ORAL 2 TIMES DAILY PRN
Qty: 60 TABLET | Refills: 0 | Status: SHIPPED | OUTPATIENT
Start: 2020-01-06 | End: 2020-02-03 | Stop reason: SDUPTHER

## 2020-01-06 RX ORDER — CLONAZEPAM 1 MG/1
1 TABLET ORAL 2 TIMES DAILY PRN
Qty: 60 TABLET | Refills: 0 | OUTPATIENT
Start: 2020-01-06 | End: 2021-01-05

## 2020-01-07 ENCOUNTER — OFFICE VISIT (OUTPATIENT)
Dept: PULMONOLOGY | Facility: CLINIC | Age: 60
End: 2020-01-07
Payer: COMMERCIAL

## 2020-01-07 VITALS
WEIGHT: 229.81 LBS | BODY MASS INDEX: 42.29 KG/M2 | OXYGEN SATURATION: 95 % | SYSTOLIC BLOOD PRESSURE: 104 MMHG | DIASTOLIC BLOOD PRESSURE: 80 MMHG | HEART RATE: 74 BPM | HEIGHT: 62 IN

## 2020-01-07 DIAGNOSIS — Z72.0 TOBACCO USER: ICD-10-CM

## 2020-01-07 DIAGNOSIS — R91.8 LUNG NODULES: Primary | ICD-10-CM

## 2020-01-07 DIAGNOSIS — R91.8 MULTIPLE LUNG NODULES ON CT: ICD-10-CM

## 2020-01-07 DIAGNOSIS — J41.1 MUCOPURULENT CHRONIC BRONCHITIS: ICD-10-CM

## 2020-01-07 PROCEDURE — 3074F PR MOST RECENT SYSTOLIC BLOOD PRESSURE < 130 MM HG: ICD-10-PCS | Mod: CPTII,S$GLB,, | Performed by: NURSE PRACTITIONER

## 2020-01-07 PROCEDURE — 99204 OFFICE O/P NEW MOD 45 MIN: CPT | Mod: S$GLB,,, | Performed by: NURSE PRACTITIONER

## 2020-01-07 PROCEDURE — 3079F DIAST BP 80-89 MM HG: CPT | Mod: CPTII,S$GLB,, | Performed by: NURSE PRACTITIONER

## 2020-01-07 PROCEDURE — 3008F BODY MASS INDEX DOCD: CPT | Mod: CPTII,S$GLB,, | Performed by: NURSE PRACTITIONER

## 2020-01-07 PROCEDURE — 3074F SYST BP LT 130 MM HG: CPT | Mod: CPTII,S$GLB,, | Performed by: NURSE PRACTITIONER

## 2020-01-07 PROCEDURE — 3079F PR MOST RECENT DIASTOLIC BLOOD PRESSURE 80-89 MM HG: ICD-10-PCS | Mod: CPTII,S$GLB,, | Performed by: NURSE PRACTITIONER

## 2020-01-07 PROCEDURE — 3008F PR BODY MASS INDEX (BMI) DOCUMENTED: ICD-10-PCS | Mod: CPTII,S$GLB,, | Performed by: NURSE PRACTITIONER

## 2020-01-07 PROCEDURE — 99999 PR PBB SHADOW E&M-EST. PATIENT-LVL III: ICD-10-PCS | Mod: PBBFAC,,, | Performed by: NURSE PRACTITIONER

## 2020-01-07 PROCEDURE — 99204 PR OFFICE/OUTPT VISIT, NEW, LEVL IV, 45-59 MIN: ICD-10-PCS | Mod: S$GLB,,, | Performed by: NURSE PRACTITIONER

## 2020-01-07 PROCEDURE — 99999 PR PBB SHADOW E&M-EST. PATIENT-LVL III: CPT | Mod: PBBFAC,,, | Performed by: NURSE PRACTITIONER

## 2020-01-07 NOTE — PROGRESS NOTES
Subjective:       Patient ID: Lucia Matias is a 59 y.o. female.    Chief Complaint: abnormal CT  HPI:   Lucia Matias is a 59 y.o. female who presents with evaluation after an abnormal CT lung screening. Nodules seen on baseline CT around 6 months ago.  Started smoking about age 19, smoked at times a pack at the most  Takes Claritin and Mucinex most days  Uses albuterol  Once daily.   Has RA and psoriasis.  Stopped Cimzia recently  Increasing mucus production over the last 6 months or so.    Lung infections as a child, possible bronchitis, did have nebs as a child that she remembers.  She is caring for her  who has just completed treatment for cancer.    Review of Systems   Constitutional: Positive for weight gain (has been caring for very ill ) and fatigue (occasionally). Negative for chills, weight loss, activity change and night sweats.   HENT: Positive for postnasal drip and rhinorrhea. Negative for trouble swallowing and congestion.    Eyes: Negative for itching.   Respiratory: Positive for cough, sputum production (clear), wheezing, dyspnea on extertion and use of rescue inhaler. Negative for hemoptysis, choking and shortness of breath.    Cardiovascular: Positive for chest pain (spasm-like, left sided lower rib cage). Negative for palpitations.   Genitourinary: Negative for difficulty urinating.   Endocrine: Negative for cold intolerance and heat intolerance.    Musculoskeletal: Negative for arthralgias.   Skin: Negative for rash.   Gastrointestinal: Negative for nausea, vomiting and acid reflux.   Neurological: Positive for dizziness (with coughing ) and light-headedness (with coughing).   Hematological: Negative for adenopathy.   Psychiatric/Behavioral: Positive for sleep disturbance (nocturia).         Social History     Tobacco Use    Smoking status: Current Every Day Smoker     Packs/day: 0.50     Years: 20.00     Pack years: 10.00     Types: Cigarettes    Smokeless tobacco:  Never Used   Substance Use Topics    Alcohol use: No     Frequency: Never     Drinks per session: Patient refused     Binge frequency: Never       Review of patient's allergies indicates:   Allergen Reactions    Succinimides Anaphylaxis    Succinylcholine     Succinylcholine chloride      Other reaction(s): Unknown     Past Medical History:   Diagnosis Date    Anxiety     Arthritis     COPD (chronic obstructive pulmonary disease)     Diverticular disease of colon 2017    Diverticulitis     HLD (hyperlipidemia)     Hypertension     Pancreatitis     Psoriasis     Rheumatoid arthritis     Severe obesity (BMI 35.0-39.9) with comorbidity      Past Surgical History:   Procedure Laterality Date    BLADDER SUSPENSION      (x2)     SECTION      (x2)    RHINOPLASTY      TONSILLECTOMY       Current Outpatient Medications on File Prior to Visit   Medication Sig    albuterol (PROVENTIL/VENTOLIN HFA) 90 mcg/actuation inhaler Inhale 2 puffs into the lungs every 6 (six) hours as needed for Wheezing. Rescue    atorvastatin (LIPITOR) 20 MG tablet Take 1 tablet (20 mg total) by mouth every evening.    budesonide-formoterol 160-4.5 mcg (SYMBICORT) 160-4.5 mcg/actuation HFAA Inhale 2 puffs into the lungs every 12 (twelve) hours. Controller    busPIRone (BUSPAR) 30 MG Tab Take 1 tablet (30 mg total) by mouth 2 (two) times daily.    certolizumab pegol (CIMZIA) 400 mg/2 mL (200 mg/mL x 2) SyKt Inject 2 mLs (400 mg total) into the skin every 14 (fourteen) days.    clonazePAM (KLONOPIN) 1 MG tablet Take 1 tablet (1 mg total) by mouth 2 (two) times daily as needed for Anxiety.    emtricitabine-tenofovir 200-300 mg (TRUVADA) 200-300 mg Tab Take 1 tablet by mouth once daily.    lisinopril 10 MG tablet Take 1 tablet (10 mg total) by mouth once daily.    metoprolol tartrate (LOPRESSOR) 50 MG tablet Take 1 tablet by mouth twice a day with food    zolpidem (AMBIEN) 5 MG Tab Take 1-2 tablets by mouth  nightly as needed for sleep     Current Facility-Administered Medications on File Prior to Visit   Medication    DOBUTamine 500mg in D5W 250mL infusion (premix)       Objective:      There were no vitals filed for this visit.  Physical Exam   Constitutional: She is oriented to person, place, and time. She appears well-developed and well-nourished. No distress.   HENT:   Head: Normocephalic.   Neck: Normal range of motion. Neck supple.   Cardiovascular: Normal rate and regular rhythm.   No murmur heard.  Pulmonary/Chest: Normal expansion, symmetric chest wall expansion and effort normal. No respiratory distress. She has decreased breath sounds. She has no wheezes. She has no rhonchi. She has no rales.   Abdominal: Soft. She exhibits no distension. There is no hepatosplenomegaly. There is no tenderness.   Musculoskeletal: Normal range of motion. She exhibits no edema.   Lymphadenopathy:     She has no cervical adenopathy.   Neurological: She is alert and oriented to person, place, and time. Gait normal.   Skin: Skin is warm and dry. Rash (psoriatic lesions on hands bilaterally) noted. No cyanosis. Nails show no clubbing.   Psychiatric: She has a normal mood and affect.   Vitals reviewed.    Personal Diagnostic Review    Imaging personally reviewed with patient CT 6/28/19, 1/2/20  PFTs personally reviewed and discussed with patient          Assessment:     Problem List Items Addressed This Visit        Pulmonary    COPD (chronic obstructive pulmonary disease)    Overview     Trial Trelegy, continue PRN GIA         Current Assessment & Plan     PFTs showed significant obstruction.  Some increasing needs for GIA. Taking Symbicort only.  Based on her obstruction recommend triple therapy.         Multiple lung nodules on CT    Overview     Interval development of multiple left-sided pulmonary nodules measuring up to 1.7 cm in size.          Current Assessment & Plan     Refer to IR for potential biopsy            Other     Tobacco user    Overview     Long time smoker, motivated to quit         Current Assessment & Plan     Will refer to smoking cessation program in coming weeks.  With pending biopsy she will need to quit asap           Other Visit Diagnoses     Lung nodules    -  Primary    Relevant Orders    NM PET CT Routine Skull to Mid Thigh

## 2020-01-07 NOTE — LETTER
January 12, 2020      Saige Bee MD  [address]  [facility] [address]           [facility]  [address], [facility] [address]  [facility] [address]  Phone: [phone]  Fax: [phone]          Patient: Lucia Matias   MR Number: [id number]   YOB: 1960   Date of Visit: 1/7/2020       Dear Dr. Saige Bee:    Thank you for referring Lucia Matias to me for evaluation. Attached you will find relevant portions of my assessment and plan of care.    If you have questions, please do not hesitate to call me. I look forward to following Lucia Matias along with you.    Sincerely,    Nayeli Matute, [facility]    Enclosure  CC:  No Recipients    If you would like to receive this communication electronically, please contact [email or web address] or [phone] to request more information on [employer] Link access.    For providers and/or their staff who would like to refer a patient to [facility], please contact us through our one-stop-shop provider referral line, [facility], at [phone].    If you feel you have received this communication in error or would no longer like to receive these types of communications, please e-mail [email or web address]

## 2020-01-08 ENCOUNTER — TELEPHONE (OUTPATIENT)
Dept: PULMONOLOGY | Facility: CLINIC | Age: 60
End: 2020-01-08

## 2020-01-08 DIAGNOSIS — R91.8 LUNG NODULES: Primary | ICD-10-CM

## 2020-01-12 PROBLEM — R91.8 MULTIPLE LUNG NODULES ON CT: Status: ACTIVE | Noted: 2020-01-12

## 2020-01-12 NOTE — ASSESSMENT & PLAN NOTE
Will refer to smoking cessation program in coming weeks.  With pending biopsy she will need to quit asap

## 2020-01-12 NOTE — ASSESSMENT & PLAN NOTE
PFTs showed significant obstruction.  Some increasing needs for GIA. Taking Symbicort only.  Based on her obstruction recommend triple therapy.

## 2020-01-16 ENCOUNTER — OFFICE VISIT (OUTPATIENT)
Dept: INTERVENTIONAL RADIOLOGY/VASCULAR | Facility: CLINIC | Age: 60
End: 2020-01-16
Payer: COMMERCIAL

## 2020-01-16 ENCOUNTER — LAB VISIT (OUTPATIENT)
Dept: LAB | Facility: HOSPITAL | Age: 60
End: 2020-01-16
Attending: RADIOLOGY
Payer: COMMERCIAL

## 2020-01-16 VITALS
BODY MASS INDEX: 42.55 KG/M2 | HEART RATE: 61 BPM | HEIGHT: 62 IN | WEIGHT: 231.25 LBS | SYSTOLIC BLOOD PRESSURE: 113 MMHG | DIASTOLIC BLOOD PRESSURE: 60 MMHG

## 2020-01-16 DIAGNOSIS — D49.1 LUNG NEOPLASM: Primary | ICD-10-CM

## 2020-01-16 DIAGNOSIS — D49.1 LUNG NEOPLASM: ICD-10-CM

## 2020-01-16 LAB
INR PPP: 0.9 (ref 0.8–1.2)
PROTHROMBIN TIME: 9.6 SEC (ref 9–12.5)

## 2020-01-16 PROCEDURE — 3074F PR MOST RECENT SYSTOLIC BLOOD PRESSURE < 130 MM HG: ICD-10-PCS | Mod: CPTII,S$GLB,, | Performed by: RADIOLOGY

## 2020-01-16 PROCEDURE — 99203 PR OFFICE/OUTPT VISIT, NEW, LEVL III, 30-44 MIN: ICD-10-PCS | Mod: S$GLB,,, | Performed by: RADIOLOGY

## 2020-01-16 PROCEDURE — 99999 PR PBB SHADOW E&M-EST. PATIENT-LVL III: CPT | Mod: PBBFAC,,,

## 2020-01-16 PROCEDURE — 36415 COLL VENOUS BLD VENIPUNCTURE: CPT

## 2020-01-16 PROCEDURE — 3008F PR BODY MASS INDEX (BMI) DOCUMENTED: ICD-10-PCS | Mod: CPTII,S$GLB,, | Performed by: RADIOLOGY

## 2020-01-16 PROCEDURE — 3008F BODY MASS INDEX DOCD: CPT | Mod: CPTII,S$GLB,, | Performed by: RADIOLOGY

## 2020-01-16 PROCEDURE — 3078F DIAST BP <80 MM HG: CPT | Mod: CPTII,S$GLB,, | Performed by: RADIOLOGY

## 2020-01-16 PROCEDURE — 99203 OFFICE O/P NEW LOW 30 MIN: CPT | Mod: S$GLB,,, | Performed by: RADIOLOGY

## 2020-01-16 PROCEDURE — 85610 PROTHROMBIN TIME: CPT

## 2020-01-16 PROCEDURE — 3074F SYST BP LT 130 MM HG: CPT | Mod: CPTII,S$GLB,, | Performed by: RADIOLOGY

## 2020-01-16 PROCEDURE — 3078F PR MOST RECENT DIASTOLIC BLOOD PRESSURE < 80 MM HG: ICD-10-PCS | Mod: CPTII,S$GLB,, | Performed by: RADIOLOGY

## 2020-01-16 PROCEDURE — 99999 PR PBB SHADOW E&M-EST. PATIENT-LVL III: ICD-10-PCS | Mod: PBBFAC,,,

## 2020-01-16 NOTE — PROGRESS NOTES
Radiology History & Physical      SUBJECTIVE:     Chief Complaint: Abnormal Lung CT    History of Present Illness:  Lucia Matias is a 59 y.o. female who presents for evaluation for lung lesion biopsy.     Patient past history reviewed with patient.     Past Medical History:   Diagnosis Date    Anxiety     Arthritis     COPD (chronic obstructive pulmonary disease)     Diverticular disease of colon 2017    Diverticulitis     HLD (hyperlipidemia)     Hypertension     Pancreatitis     Psoriasis     Rheumatoid arthritis     Severe obesity (BMI 35.0-39.9) with comorbidity      Past Surgical History:   Procedure Laterality Date    BLADDER SUSPENSION      (x2)     SECTION      (x2)    RHINOPLASTY      TONSILLECTOMY         Home Meds:   Prior to Admission medications    Medication Sig Start Date End Date Taking? Authorizing Provider   albuterol (PROVENTIL/VENTOLIN HFA) 90 mcg/actuation inhaler Inhale 2 puffs into the lungs every 6 (six) hours as needed for Wheezing. Rescue 19  Hetal Banks MD   atorvastatin (LIPITOR) 20 MG tablet Take 1 tablet (20 mg total) by mouth every evening. 19   Saige Bee MD   budesonide-formoterol 160-4.5 mcg (SYMBICORT) 160-4.5 mcg/actuation HFAA Inhale 2 puffs into the lungs every 12 (twelve) hours. Controller 19  Saige Bee MD   busPIRone (BUSPAR) 30 MG Tab Take 1 tablet (30 mg total) by mouth 2 (two) times daily. 10/7/19   Madelyn Miller MD   certolizumab pegol (CIMZIA) 400 mg/2 mL (200 mg/mL x 2) SyKt Inject 2 mLs (400 mg total) into the skin every 14 (fourteen) days.  Patient not taking: Reported on 19   Minerva Lara MD   clonazePAM (KLONOPIN) 1 MG tablet Take 1 tablet (1 mg total) by mouth 2 (two) times daily as needed for Anxiety. 20   Leta Gar MD   emtricitabine-tenofovir 200-300 mg (TRUVADA) 200-300 mg Tab Take 1 tablet by mouth once daily. 19   Saige  MD Cristal   fluticasone-umeclidin-vilanter (TRELEGY ELLIPTA) 100-62.5-25 mcg DsDv Inhale 1 puff into the lungs once daily. 1/7/20   Nayeli Matute DNP   lisinopril 10 MG tablet Take 1 tablet (10 mg total) by mouth once daily. 6/20/19   Saige Bee MD   metoprolol tartrate (LOPRESSOR) 50 MG tablet Take 1 tablet by mouth twice a day with food 12/12/19   Saige Bee MD   zolpidem (AMBIEN) 5 MG Tab Take 1-2 tablets by mouth nightly as needed for sleep 10/7/19   Madelyn Miller MD     Anticoagulants/Antiplatelets: no anticoagulation    Allergies:   Review of patient's allergies indicates:   Allergen Reactions    Succinimides Anaphylaxis    Succinylcholine     Succinylcholine chloride      Other reaction(s): Unknown     Sedation History:  no adverse reactions    Review of Systems:   Hematological: no known coagulopathies  Respiratory: no shortness of breath  Cardiovascular: no chest pain  Gastrointestinal: no abdominal pain  Genito-Urinary: no dysuria  Musculoskeletal: negative  Neurological: no TIA or stroke symptoms         OBJECTIVE:        Physical Exam:  ASA: 2  Mallampati: 2    General: no acute distress  Mental Status: alert and oriented to person, place and time  HEENT: normocephalic, atraumatic  Chest: unlabored breathing       Laboratory  No results found for: INR    Lab Results   Component Value Date    WBC 7.47 01/02/2020    HGB 16.1 (H) 01/02/2020    HCT 51.2 (H) 01/02/2020     (H) 01/02/2020     01/02/2020      Lab Results   Component Value Date    GLU 89 01/02/2020     01/02/2020    K 5.1 01/02/2020     01/02/2020    CO2 30 (H) 01/02/2020    BUN 15 01/02/2020    CREATININE 1.3 01/02/2020    CALCIUM 9.2 01/02/2020    ALT 27 01/02/2020    AST 19 01/02/2020    ALBUMIN 3.6 01/02/2020    BILITOT 1.1 (H) 01/02/2020       ASSESSMENT/PLAN:     Sedation Plan: Moderate  Patient will undergo Will plan to go under image guided biopsy of dominant left lung lesion  for tissue sampling given smoking history and abnormal screening CT. Have discussed risks,  Benefits, alternatives at length with patient including risk of bleeding, infection, pneumothorax, hemothorax, and death. Patient and family willing to move forward.     .I, Darek Garza M.D. personally reviewed and agree with the above dictated note.

## 2020-01-20 ENCOUNTER — PATIENT MESSAGE (OUTPATIENT)
Dept: FAMILY MEDICINE | Facility: CLINIC | Age: 60
End: 2020-01-20

## 2020-01-20 ENCOUNTER — TELEPHONE (OUTPATIENT)
Dept: INTERVENTIONAL RADIOLOGY/VASCULAR | Facility: HOSPITAL | Age: 60
End: 2020-01-20

## 2020-01-20 DIAGNOSIS — D49.1 LUNG NEOPLASM: Primary | ICD-10-CM

## 2020-01-22 ENCOUNTER — TELEPHONE (OUTPATIENT)
Dept: INTERVENTIONAL RADIOLOGY/VASCULAR | Facility: HOSPITAL | Age: 60
End: 2020-01-22

## 2020-01-24 ENCOUNTER — PATIENT MESSAGE (OUTPATIENT)
Dept: PULMONOLOGY | Facility: CLINIC | Age: 60
End: 2020-01-24

## 2020-01-24 ENCOUNTER — PATIENT MESSAGE (OUTPATIENT)
Dept: FAMILY MEDICINE | Facility: CLINIC | Age: 60
End: 2020-01-24

## 2020-01-24 DIAGNOSIS — J40 BRONCHITIS: Primary | ICD-10-CM

## 2020-01-24 RX ORDER — DOXYCYCLINE 100 MG/1
100 CAPSULE ORAL 2 TIMES DAILY
Qty: 14 CAPSULE | Refills: 0 | Status: SHIPPED | OUTPATIENT
Start: 2020-01-24 | End: 2020-09-25

## 2020-01-24 RX ORDER — AZITHROMYCIN 250 MG/1
TABLET, FILM COATED ORAL
Qty: 6 TABLET | Refills: 0 | Status: SHIPPED | OUTPATIENT
Start: 2020-01-24 | End: 2020-02-10

## 2020-01-26 ENCOUNTER — HOSPITAL ENCOUNTER (EMERGENCY)
Facility: HOSPITAL | Age: 60
Discharge: HOME | End: 2020-01-26
Attending: EMERGENCY MEDICINE
Payer: COMMERCIAL

## 2020-01-26 VITALS
RESPIRATION RATE: 19 BRPM | OXYGEN SATURATION: 96 % | TEMPERATURE: 101 F | HEIGHT: 62 IN | BODY MASS INDEX: 40.48 KG/M2 | SYSTOLIC BLOOD PRESSURE: 131 MMHG | HEART RATE: 102 BPM | WEIGHT: 220 LBS | DIASTOLIC BLOOD PRESSURE: 57 MMHG

## 2020-01-26 DIAGNOSIS — J10.1 INFLUENZA A: Primary | ICD-10-CM

## 2020-01-26 LAB
INFLUENZA A, MOLECULAR: POSITIVE
INFLUENZA B, MOLECULAR: NEGATIVE
SPECIMEN SOURCE: ABNORMAL

## 2020-01-26 PROCEDURE — 99284 EMERGENCY DEPT VISIT MOD MDM: CPT | Mod: 25,ER

## 2020-01-26 PROCEDURE — 25000003 PHARM REV CODE 250: Mod: ER | Performed by: EMERGENCY MEDICINE

## 2020-01-26 PROCEDURE — 87502 INFLUENZA DNA AMP PROBE: CPT | Mod: ER

## 2020-01-26 PROCEDURE — 25000242 PHARM REV CODE 250 ALT 637 W/ HCPCS: Mod: ER | Performed by: EMERGENCY MEDICINE

## 2020-01-26 PROCEDURE — 94640 AIRWAY INHALATION TREATMENT: CPT | Mod: ER

## 2020-01-26 PROCEDURE — 63600175 PHARM REV CODE 636 W HCPCS: Mod: ER | Performed by: EMERGENCY MEDICINE

## 2020-01-26 RX ORDER — BENZONATATE 100 MG/1
100 CAPSULE ORAL 3 TIMES DAILY PRN
Qty: 9 CAPSULE | Refills: 0 | Status: SHIPPED | OUTPATIENT
Start: 2020-01-26 | End: 2020-01-29

## 2020-01-26 RX ORDER — IPRATROPIUM BROMIDE AND ALBUTEROL SULFATE 2.5; .5 MG/3ML; MG/3ML
6 SOLUTION RESPIRATORY (INHALATION) ONCE
Status: COMPLETED | OUTPATIENT
Start: 2020-01-26 | End: 2020-01-26

## 2020-01-26 RX ORDER — ONDANSETRON 4 MG/1
8 TABLET, ORALLY DISINTEGRATING ORAL
Status: COMPLETED | OUTPATIENT
Start: 2020-01-26 | End: 2020-01-26

## 2020-01-26 RX ORDER — ALBUTEROL SULFATE 90 UG/1
1-2 AEROSOL, METERED RESPIRATORY (INHALATION) EVERY 6 HOURS PRN
Qty: 90 G | Refills: 0 | Status: SHIPPED | OUTPATIENT
Start: 2020-01-26 | End: 2021-04-26

## 2020-01-26 RX ORDER — PREDNISONE 20 MG/1
60 TABLET ORAL
Status: COMPLETED | OUTPATIENT
Start: 2020-01-26 | End: 2020-01-26

## 2020-01-26 RX ORDER — PREDNISONE 20 MG/1
60 TABLET ORAL DAILY
Qty: 15 TABLET | Refills: 0 | Status: SHIPPED | OUTPATIENT
Start: 2020-01-26 | End: 2020-01-31

## 2020-01-26 RX ADMIN — ONDANSETRON 8 MG: 4 TABLET, ORALLY DISINTEGRATING ORAL at 11:01

## 2020-01-26 RX ADMIN — IPRATROPIUM BROMIDE AND ALBUTEROL SULFATE 6 ML: .5; 3 SOLUTION RESPIRATORY (INHALATION) at 11:01

## 2020-01-26 RX ADMIN — IPRATROPIUM BROMIDE AND ALBUTEROL SULFATE 6 ML: .5; 3 SOLUTION RESPIRATORY (INHALATION) at 12:01

## 2020-01-26 RX ADMIN — PREDNISONE 60 MG: 20 TABLET ORAL at 11:01

## 2020-01-26 NOTE — ED PROVIDER NOTES
Chief Complaint  Chief Complaint   Patient presents with    Cough     Pt c/o cough, diarrhea, fever x 4 days.         HPI  Lucia Matias is a 59 y.o. female who presents with cough and fever and diarrhea in nausea.  Patient admits she still smokes cigarettes.  She has been sick with this infection for at least 4 days.  Her  reports it has been 7-10 days.  She denies any worsening but she is not improving.  She understands that quitting smoking would improve her health.  She denies syncope or chest pain or palpitations or lethargy.  Body aches are diffuse and moderate in intensity and exacerbated by nothing and relieved by nothing      Past medical history  Past Medical History:   Diagnosis Date    Anxiety     Arthritis     COPD (chronic obstructive pulmonary disease)     Diverticular disease of colon 06/06/2017    Diverticulitis     HLD (hyperlipidemia)     Hypertension     Pancreatitis     Psoriasis     Rheumatoid arthritis     Severe obesity (BMI 35.0-39.9) with comorbidity        Current Medications    Current Facility-Administered Medications:     DOBUTamine 500mg in D5W 250mL infusion (premix), 10 mcg/kg/min, Intravenous, Continuous, Saige Bee MD, Last Rate: 114 mL/hr at 06/26/19 1629, 40 mcg/kg/min at 06/26/19 1629    Current Outpatient Medications:     albuterol (PROVENTIL/VENTOLIN HFA) 90 mcg/actuation inhaler, Inhale 2 puffs into the lungs every 6 (six) hours as needed for Wheezing. Rescue, Disp: 18 g, Rfl: 5    albuterol (PROVENTIL/VENTOLIN HFA) 90 mcg/actuation inhaler, Inhale 1-2 puffs into the lungs every 6 (six) hours as needed for Wheezing. Rescue, Disp: 90 g, Rfl: 0    atorvastatin (LIPITOR) 20 MG tablet, Take 1 tablet (20 mg total) by mouth every evening., Disp: 90 tablet, Rfl: 3    azithromycin (Z-PEGGY) 250 MG tablet, Take 2 tablets by mouth on day 1; Take 1 tablet by mouth on days 2-5, Disp: 6 tablet, Rfl: 0    benzonatate (TESSALON) 100 MG capsule, Take 1  capsule (100 mg total) by mouth 3 (three) times daily as needed for Cough., Disp: 9 capsule, Rfl: 0    budesonide-formoterol 160-4.5 mcg (SYMBICORT) 160-4.5 mcg/actuation HFAA, Inhale 2 puffs into the lungs every 12 (twelve) hours. Controller (Patient not taking: Reported on 1/16/2020), Disp: 30.6 g, Rfl: 2    busPIRone (BUSPAR) 30 MG Tab, Take 1 tablet (30 mg total) by mouth 2 (two) times daily. (Patient not taking: Reported on 1/16/2020), Disp: 60 tablet, Rfl: 6    certolizumab pegol (CIMZIA) 400 mg/2 mL (200 mg/mL x 2) SyKt, Inject 2 mLs (400 mg total) into the skin every 14 (fourteen) days. (Patient not taking: Reported on 1/7/2020), Disp: 1 Box, Rfl: 1    clonazePAM (KLONOPIN) 1 MG tablet, Take 1 tablet (1 mg total) by mouth 2 (two) times daily as needed for Anxiety., Disp: 60 tablet, Rfl: 0    doxycycline (VIBRAMYCIN) 100 MG Cap, Take 1 capsule (100 mg total) by mouth 2 (two) times daily., Disp: 14 capsule, Rfl: 0    emtricitabine-tenofovir 200-300 mg (TRUVADA) 200-300 mg Tab, Take 1 tablet by mouth once daily., Disp: 90 tablet, Rfl: 3    fluticasone-umeclidin-vilanter (TRELEGY ELLIPTA) 100-62.5-25 mcg DsDv, Inhale 1 puff into the lungs once daily., Disp: 60 each, Rfl: 11    lisinopril 10 MG tablet, Take 1 tablet (10 mg total) by mouth once daily., Disp: 90 tablet, Rfl: 3    metoprolol tartrate (LOPRESSOR) 50 MG tablet, Take 1 tablet by mouth twice a day with food, Disp: 60 tablet, Rfl: 3    predniSONE (DELTASONE) 20 MG tablet, Take 3 tablets (60 mg total) by mouth once daily. for 5 days, Disp: 15 tablet, Rfl: 0    zolpidem (AMBIEN) 5 MG Tab, Take 1-2 tablets by mouth nightly as needed for sleep, Disp: 45 tablet, Rfl: 2    Allergies  Review of patient's allergies indicates:   Allergen Reactions    Succinimides Anaphylaxis    Succinylcholine     Succinylcholine chloride      Other reaction(s): Unknown       Surgical history  Past Surgical History:   Procedure Laterality Date    BLADDER SUSPENSION       (x2)     SECTION      (x2)    RHINOPLASTY      TONSILLECTOMY         Social history  Social History     Socioeconomic History    Marital status:      Spouse name: Not on file    Number of children: Not on file    Years of education: Not on file    Highest education level: Not on file   Occupational History    Occupation: clinical research coordinator    Social Needs    Financial resource strain: Somewhat hard    Food insecurity:     Worry: Sometimes true     Inability: Patient refused    Transportation needs:     Medical: No     Non-medical: No   Tobacco Use    Smoking status: Current Every Day Smoker     Packs/day: 0.50     Years: 20.00     Pack years: 10.00     Types: Cigarettes    Smokeless tobacco: Never Used   Substance and Sexual Activity    Alcohol use: No     Frequency: Never     Drinks per session: Patient refused     Binge frequency: Never    Drug use: No    Sexual activity: Yes     Partners: Male   Lifestyle    Physical activity:     Days per week: 0 days     Minutes per session: 0 min    Stress: Very much   Relationships    Social connections:     Talks on phone: Three times a week     Gets together: Once a week     Attends [employer] service: Not on file     Active member of club or organization: Yes     Attends meetings of clubs or organizations: Not on file     Relationship status:    Other Topics Concern    Are you pregnant or think you may be? No    Breast-feeding No   Social History Narrative    Not on file       Family History  Family History   Adopted: Yes   Family history unknown: Yes       Review of systems  Musculoskeletal: No injury; full range of motion  Skin: No rash, abscess, or laceration  Neurologic: No new focal weakness or sensory changes  All systems otherwise negative except as noted in ROS and HPI    Physical Exam  Vital signs: BP (!) 131/57 (BP Location: Left arm, Patient Position: Sitting)   Pulse 102   Temp (!) 100.5 °F (38.1 °C)  "(Oral)   Resp 19   Ht 5' 2" (1.575 m)   Wt 99.8 kg (220 lb)   SpO2 96%   BMI 40.24 kg/m²   Constitutional: No acute distress.  Well developed, alert, oriented and appropriate.  HENT: Normocephalic, atraumatic. Normal ear, nose, and throat.  Eyes: PERRL, EOMI, normal conjunctiva  Neck: Normal range of motion, no tenderness; supple.  Respiratory: Nonlabored breathing with wheezing bilaterally and diffusely  Cardiovascular: RRR with no pulse deficit  GI: Soft, nontender, no rebound or guarding  Musculoskeletal: Normal ROM, no tenderness, injury, or edema  Skin: Warm, dry skin without infection or injury  Neurologic: Normal motor, sensation with no new focal deficit  Psychiatric: Affect normal, judgement normal, mood normal.  No SI, HI, and not gravely disabled.    Labs  Pertinent labs reviewed (see chart for details)  Labs Reviewed   INFLUENZA A & B BY MOLECULAR - Abnormal; Notable for the following components:       Result Value    Influenza A, Molecular Positive (*)     All other components within normal limits       ECG  Results for orders placed or performed during the hospital encounter of 06/26/19   SCHEDULED EKG 12-LEAD (to Muse)    Collection Time: 06/26/19  3:02 PM    Narrative    Test Reason : Z00.00,R00.2,    Vent. Rate : 061 BPM     Atrial Rate : 061 BPM     P-R Int : 118 ms          QRS Dur : 082 ms      QT Int : 378 ms       P-R-T Axes : 037 064 052 degrees     QTc Int : 380 ms    Normal sinus rhythm  Normal ECG  When compared with ECG of 20-JUL-2016 03:40,  No significant change was found  Confirmed by OREN CARR MD ([id number]) on 6/26/2019 8:01:40 PM    Referred By: KATRIN SANFORD           Confirmed By:OREN CARR MD     ECG interpreted by ED MD    Radiology  X-Ray Chest AP Portable   Final Result      No acute radiographic abnormality in the chest.      Nodular opacities in the left lung likely reflect nodules seen on recent chest CT.         Electronically signed by: Eliel Paez "   Date:    01/26/2020   Time:    12:01          Procedures  Procedures    Medications   predniSONE tablet 60 mg (60 mg Oral Given 1/26/20 1125)   albuterol-ipratropium 2.5 mg-0.5 mg/3 mL nebulizer solution 6 mL (6 mLs Nebulization Given 1/26/20 1131)   ondansetron disintegrating tablet 8 mg (8 mg Oral Given 1/26/20 1128)   albuterol-ipratropium 2.5 mg-0.5 mg/3 mL nebulizer solution 6 mL (6 mLs Nebulization Given 1/26/20 1215)       ED course and medical decision making         Patient improved and was ready to go home.  She understands strict instructions to return emergency department if any change or worsening symptoms. She is not a candidate for Tamiflu.  She does feel safe going home at this time.  She understands the possible risk of worsening and the necessity to return if symptoms change or worsen in any way.  She is strongly encouraged to avoid smoking cigarettes and she will follow up with the primary doctor if not improving    Disposition    Patient discharge in stable condition      Final impression  1. Influenza A        Critical care time spent with this patient was 0 minutes.           Alli Fraga MD  01/26/20 3794

## 2020-01-26 NOTE — ED NOTES
Pt with bowel incontinence with cough.  Provided wipes, disposable underwear, peripad and paper scrub bottoms for pt.

## 2020-01-27 ENCOUNTER — TELEPHONE (OUTPATIENT)
Dept: PHARMACY | Facility: CLINIC | Age: 60
End: 2020-01-27

## 2020-01-27 NOTE — TELEPHONE ENCOUNTER
RX call attempt 1 regarding Truvada from [facility]. Patient reached -- shipping 1/29 for 1/30 arrival with patients consent.  copay 0.00 @ 004.

## 2020-02-03 ENCOUNTER — PATIENT MESSAGE (OUTPATIENT)
Dept: PSYCHIATRY | Facility: CLINIC | Age: 60
End: 2020-02-03

## 2020-02-03 DIAGNOSIS — F41.1 GAD (GENERALIZED ANXIETY DISORDER): ICD-10-CM

## 2020-02-03 DIAGNOSIS — G47.00 INSOMNIA, UNSPECIFIED TYPE: ICD-10-CM

## 2020-02-03 RX ORDER — BUSPIRONE HYDROCHLORIDE 30 MG/1
30 TABLET ORAL 2 TIMES DAILY
Qty: 60 TABLET | Refills: 0 | Status: SHIPPED | OUTPATIENT
Start: 2020-02-03 | End: 2020-02-24 | Stop reason: SDUPTHER

## 2020-02-03 RX ORDER — CLONAZEPAM 1 MG/1
1 TABLET ORAL 2 TIMES DAILY PRN
Qty: 60 TABLET | Refills: 0 | Status: SHIPPED | OUTPATIENT
Start: 2020-02-03 | End: 2020-02-24 | Stop reason: SDUPTHER

## 2020-02-06 ENCOUNTER — OFFICE VISIT (OUTPATIENT)
Dept: FAMILY MEDICINE | Facility: CLINIC | Age: 60
End: 2020-02-06
Payer: COMMERCIAL

## 2020-02-06 VITALS
HEART RATE: 75 BPM | TEMPERATURE: 98 F | OXYGEN SATURATION: 91 % | WEIGHT: 226.19 LBS | BODY MASS INDEX: 41.62 KG/M2 | HEIGHT: 62 IN | DIASTOLIC BLOOD PRESSURE: 80 MMHG | SYSTOLIC BLOOD PRESSURE: 126 MMHG

## 2020-02-06 DIAGNOSIS — J44.1 CHRONIC OBSTRUCTIVE PULMONARY DISEASE WITH ACUTE EXACERBATION: Primary | ICD-10-CM

## 2020-02-06 DIAGNOSIS — J11.1 FLU: ICD-10-CM

## 2020-02-06 DIAGNOSIS — R05.9 COUGH: ICD-10-CM

## 2020-02-06 PROCEDURE — 99214 OFFICE O/P EST MOD 30 MIN: CPT | Mod: S$GLB,,, | Performed by: INTERNAL MEDICINE

## 2020-02-06 PROCEDURE — 3079F DIAST BP 80-89 MM HG: CPT | Mod: CPTII,S$GLB,, | Performed by: INTERNAL MEDICINE

## 2020-02-06 PROCEDURE — 3008F PR BODY MASS INDEX (BMI) DOCUMENTED: ICD-10-PCS | Mod: CPTII,S$GLB,, | Performed by: INTERNAL MEDICINE

## 2020-02-06 PROCEDURE — 3079F PR MOST RECENT DIASTOLIC BLOOD PRESSURE 80-89 MM HG: ICD-10-PCS | Mod: CPTII,S$GLB,, | Performed by: INTERNAL MEDICINE

## 2020-02-06 PROCEDURE — 3008F BODY MASS INDEX DOCD: CPT | Mod: CPTII,S$GLB,, | Performed by: INTERNAL MEDICINE

## 2020-02-06 PROCEDURE — 99214 PR OFFICE/OUTPT VISIT, EST, LEVL IV, 30-39 MIN: ICD-10-PCS | Mod: S$GLB,,, | Performed by: INTERNAL MEDICINE

## 2020-02-06 PROCEDURE — 3074F SYST BP LT 130 MM HG: CPT | Mod: CPTII,S$GLB,, | Performed by: INTERNAL MEDICINE

## 2020-02-06 PROCEDURE — 3074F PR MOST RECENT SYSTOLIC BLOOD PRESSURE < 130 MM HG: ICD-10-PCS | Mod: CPTII,S$GLB,, | Performed by: INTERNAL MEDICINE

## 2020-02-06 PROCEDURE — 99999 PR PBB SHADOW E&M-EST. PATIENT-LVL IV: CPT | Mod: PBBFAC,,, | Performed by: INTERNAL MEDICINE

## 2020-02-06 PROCEDURE — 99999 PR PBB SHADOW E&M-EST. PATIENT-LVL IV: ICD-10-PCS | Mod: PBBFAC,,, | Performed by: INTERNAL MEDICINE

## 2020-02-06 RX ORDER — IPRATROPIUM BROMIDE AND ALBUTEROL SULFATE 2.5; .5 MG/3ML; MG/3ML
3 SOLUTION RESPIRATORY (INHALATION) EVERY 6 HOURS PRN
Qty: 1 BOX | Refills: 5 | Status: SHIPPED | OUTPATIENT
Start: 2020-02-06 | End: 2021-04-26

## 2020-02-06 RX ORDER — PREDNISONE 20 MG/1
40 TABLET ORAL DAILY
Qty: 14 TABLET | Refills: 0 | Status: SHIPPED | OUTPATIENT
Start: 2020-02-06 | End: 2020-09-25

## 2020-02-06 RX ORDER — NYSTATIN 100000 U/G
CREAM TOPICAL 2 TIMES DAILY
Qty: 30 G | Refills: 3 | Status: SHIPPED | OUTPATIENT
Start: 2020-02-06 | End: 2022-10-17

## 2020-02-06 RX ORDER — LEVOFLOXACIN 500 MG/1
500 TABLET, FILM COATED ORAL DAILY
Qty: 7 TABLET | Refills: 0 | Status: SHIPPED | OUTPATIENT
Start: 2020-02-06 | End: 2020-02-24

## 2020-02-06 NOTE — PROGRESS NOTES
[facility] Note    Chief Complaint      Chief Complaint   Patient presents with    URI    Cough     History of Present Illness      Lucia Matias is a 59 y.o. female who presents today for cough, flu.  Patient comes to appointment alone.     Had flu last week, seen in ED.  Was very weak but this has improved.  No fever/chills.  Was supposed to be having bronchoscope, postponed because she had flu, was supposed to have yesterday, postponed again because  is in hospital with PNA.  Was put on doxycycline by me, felt somewhat better but never felt completely well.  Given 3 days of oral steroids in ER and tessalon perls.  Still having wheezing/SOB/chest tightness.  Taking mucinex, but having lots of mucus.   Problem List Items Addressed This Visit     COPD (chronic obstructive pulmonary disease) - Primary    Overview     Trial Trelegy, continue PRN GIA         Relevant Medications    predniSONE (DELTASONE) 20 MG tablet    levoFLOXacin (LEVAQUIN) 500 MG tablet    albuterol-ipratropium (DUO-NEB) 2.5 mg-0.5 mg/3 mL nebulizer solution    Other Relevant Orders    NEBULIZER FOR HOME USE    NEBULIZER KIT (SUPPLIES) FOR HOME USE      Other Visit Diagnoses     Cough        Flu              Health Maintenance   Topic Date Due    Pap Smear with HPV Cotest  07/16/1983    Pneumococcal Vaccine (Medium Risk) (1 of 1 - PPSV23) 12/20/2020 (Originally 7/16/1981)    Mammogram  12/21/2020    Fecal Occult Blood Test (FOBT)/FitKit  01/03/2021    Lipid Panel  06/20/2024    TETANUS VACCINE  12/05/2028    Hepatitis C Screening  Completed       Past Medical History:   Diagnosis Date    Anxiety     Arthritis     COPD (chronic obstructive pulmonary disease)     Diverticular disease of colon 06/06/2017    Diverticulitis     HLD (hyperlipidemia)     Hypertension     Pancreatitis     Psoriasis     Rheumatoid arthritis     Severe obesity (BMI 35.0-39.9) with comorbidity        Past Surgical  History:   Procedure Laterality Date    BLADDER SUSPENSION      (x2)     SECTION      (x2)    RHINOPLASTY      TONSILLECTOMY         She was adopted. Family history is unknown by patient.    Social History     Tobacco Use    Smoking status: Current Every Day Smoker     Packs/day: 0.50     Years: 20.00     Pack years: 10.00     Types: Cigarettes    Smokeless tobacco: Never Used   Substance Use Topics    Alcohol use: No     Frequency: Never     Drinks per session: Patient refused     Binge frequency: Never    Drug use: No       Review of Systems   Constitutional: Negative for chills and fever.   HENT: Negative for congestion, ear pain and sore throat.    Eyes: Negative for blurred vision and discharge.   Respiratory: Positive for sputum production, shortness of breath and wheezing. Negative for cough and hemoptysis.    Cardiovascular: Positive for chest pain, orthopnea, claudication and leg swelling. Negative for palpitations and PND.   Gastrointestinal: Positive for abdominal pain. Negative for constipation, diarrhea, nausea and vomiting.   Genitourinary: Negative for dysuria and hematuria.   Musculoskeletal: Negative for falls, myalgias and neck pain.   Skin: Negative for itching and rash.   Neurological: Positive for headaches. Negative for dizziness.        Outpatient Encounter Medications as of 2020   Medication Sig Dispense Refill    albuterol (PROVENTIL/VENTOLIN HFA) 90 mcg/actuation inhaler Inhale 1-2 puffs into the lungs every 6 (six) hours as needed for Wheezing. Rescue 90 g 0    atorvastatin (LIPITOR) 20 MG tablet Take 1 tablet (20 mg total) by mouth every evening. 90 tablet 3    busPIRone (BUSPAR) 30 MG Tab Take 1 tablet (30 mg total) by mouth 2 (two) times daily. 60 tablet 0    clonazePAM (KLONOPIN) 1 MG tablet Take 1 tablet (1 mg total) by mouth 2 (two) times daily as needed for Anxiety. 60 tablet 0    emtricitabine-tenofovir 200-300 mg (TRUVADA) 200-300 mg Tab Take 1 tablet by  mouth once daily. 90 tablet 3    fluticasone-umeclidin-vilanter (TRELEGY ELLIPTA) 100-62.5-25 mcg DsDv Inhale 1 puff into the lungs once daily. 60 each 11    lisinopril 10 MG tablet Take 1 tablet (10 mg total) by mouth once daily. 90 tablet 3    metoprolol tartrate (LOPRESSOR) 50 MG tablet Take 1 tablet by mouth twice a day with food 60 tablet 3    zolpidem (AMBIEN) 5 MG Tab Take 1-2 tablets by mouth nightly as needed for sleep 45 tablet 2    albuterol-ipratropium (DUO-NEB) 2.5 mg-0.5 mg/3 mL nebulizer solution Take 3 mLs by nebulization every 6 (six) hours as needed for Wheezing. Rescue 1 Box 5    azithromycin (Z-PEGGY) 250 MG tablet Take 2 tablets by mouth on day 1; Take 1 tablet by mouth on days 2-5 (Patient not taking: Reported on 2/6/2020) 6 tablet 0    budesonide-formoterol 160-4.5 mcg (SYMBICORT) 160-4.5 mcg/actuation HFAA Inhale 2 puffs into the lungs every 12 (twelve) hours. Controller (Patient not taking: Reported on 1/16/2020) 30.6 g 2    certolizumab pegol (CIMZIA) 400 mg/2 mL (200 mg/mL x 2) SyKt Inject 2 mLs (400 mg total) into the skin every 14 (fourteen) days. (Patient not taking: Reported on 1/7/2020) 1 Box 1    doxycycline (VIBRAMYCIN) 100 MG Cap Take 1 capsule (100 mg total) by mouth 2 (two) times daily. (Patient not taking: Reported on 2/6/2020) 14 capsule 0    levoFLOXacin (LEVAQUIN) 500 MG tablet Take 1 tablet (500 mg total) by mouth once daily. 7 tablet 0    predniSONE (DELTASONE) 20 MG tablet Take 2 tablets (40 mg total) by mouth once daily. 14 tablet 0     Facility-Administered Encounter Medications as of 2/6/2020   Medication Dose Route Frequency Provider Last Rate Last Dose    DOBUTamine 500mg in D5W 250mL infusion (premix)  10 mcg/kg/min Intravenous Continuous Saige Bee  mL/hr at 06/26/19 1629 40 mcg/kg/min at 06/26/19 1629        Review of patient's allergies indicates:   Allergen Reactions    Succinimides Anaphylaxis    Succinylcholine     Succinylcholine  "chloride      Other reaction(s): Unknown       Physical Exam      Vital Signs  Temp: 98.3 °F (36.8 °C)  Temp src: Oral  Pulse: 75  SpO2: (!) 91 %  BP: 126/80  BP Location: Left arm  Patient Position: Sitting  Pain Score: 0-No pain  Height and Weight  Height: 5' 2" (157.5 cm)  Weight: 102.6 kg (226 lb 3.1 oz)  BSA (Calculated - sq m): 2.12 sq meters  BMI (Calculated): 41.4  Weight in (lb) to have BMI = 25: 136.4]    Physical Exam   Constitutional: She is oriented to person, place, and time. She appears well-developed and well-nourished.   HENT:   Head: Normocephalic and atraumatic.   Right Ear: External ear normal.   Left Ear: External ear normal.   Eyes: Right eye exhibits no discharge. Left eye exhibits no discharge.   Neck: Normal range of motion. No thyromegaly present.   Cardiovascular: Normal rate, regular rhythm, normal heart sounds and intact distal pulses.   No murmur heard.  Pulmonary/Chest: Effort normal. No respiratory distress. She has wheezes.   Abdominal: Soft. Bowel sounds are normal. She exhibits no distension. There is no tenderness.   Musculoskeletal: Normal range of motion. She exhibits no deformity.   Neurological: She is alert and oriented to person, place, and time.   Skin: Skin is warm and dry. No rash noted.   Psychiatric: She has a normal mood and affect. Her behavior is normal.        Laboratory:  CBC:  Recent Labs   Lab Result Units 01/02/20  0854   WBC K/uL 7.47   RBC M/uL 5.14   Hemoglobin g/dL 16.1*   Hematocrit % 51.2*   Platelets K/uL 240   Mean Corpuscular Volume fL 100*   Mean Corpuscular Hemoglobin pg 31.3*   Mean Corpuscular Hemoglobin Conc g/dL 31.4*     CMP:  Recent Labs   Lab Result Units 01/02/20  0854   Glucose mg/dL 89   Calcium mg/dL 9.2   Albumin g/dL 3.6   Total Protein g/dL 6.9   Sodium mmol/L 142   Potassium mmol/L 5.1   CO2 mmol/L 30*   Chloride mmol/L 104   BUN, Bld mg/dL 15   Alkaline Phosphatase U/L 111   ALT U/L 27   AST U/L 19   Total Bilirubin mg/dL 1.1* "     URINALYSIS:  No results for input(s): COLORU, CLARITYU, SPECGRAV, PHUR, PROTEINUA, GLUCOSEU, BILIRUBINCON, BLOODU, WBCU, RBCU, BACTERIA, MUCUS, NITRITE, LEUKOCYTESUR, UROBILINOGEN, HYALINECASTS in the last 2160 hours.   LIPIDS:  No results for input(s): TSH, HDL, CHOL, TRIG, LDLCALC, CHOLHDL, NONHDLCHOL, TOTALCHOLEST in the last 2160 hours.  TSH:  No results for input(s): TSH in the last 2160 hours.  A1C:  No results for input(s): HGBA1C in the last 2160 hours.    Radiology:  No imaging on file    Assessment/Plan     Lucia Matias is a 59 y.o.female with:    1. Chronic obstructive pulmonary disease with acute exacerbation  - predniSONE (DELTASONE) 20 MG tablet; Take 2 tablets (40 mg total) by mouth once daily.  Dispense: 14 tablet; Refill: 0  - levoFLOXacin (LEVAQUIN) 500 MG tablet; Take 1 tablet (500 mg total) by mouth once daily.  Dispense: 7 tablet; Refill: 0  - NEBULIZER FOR HOME USE  - NEBULIZER KIT (SUPPLIES) FOR HOME USE  - albuterol-ipratropium (DUO-NEB) 2.5 mg-0.5 mg/3 mL nebulizer solution; Take 3 mLs by nebulization every 6 (six) hours as needed for Wheezing. Rescue  Dispense: 1 Box; Refill: 5    2. Cough    3. Flu     -treat for COPD exacerbation with abx and steroids.  Will send rx for neb machine and duonebs PRN as well.  -Pending bronch with Pulm once she gets over acute illness.  PFT's, CT chest and TTE done in 6/2019 looked stable.  Repeat CT in 1/2020 to eval left lung nodule.  -Still smoking 1/2 ppd, wants to quit in 2020  -Continue current medications and maintain follow up with specialists.  Return to clinic PRN      Hetal Banks MD  [facility]

## 2020-02-10 ENCOUNTER — PATIENT OUTREACH (OUTPATIENT)
Dept: ADMINISTRATIVE | Facility: HOSPITAL | Age: 60
End: 2020-02-10

## 2020-02-19 ENCOUNTER — CLINICAL SUPPORT (OUTPATIENT)
Dept: SMOKING CESSATION | Facility: CLINIC | Age: 60
End: 2020-02-19
Payer: COMMERCIAL

## 2020-02-19 DIAGNOSIS — F17.200 NICOTINE DEPENDENCE: Primary | ICD-10-CM

## 2020-02-19 PROCEDURE — 99407 PR TOBACCO USE CESSATION INTENSIVE >10 MINUTES: ICD-10-PCS | Mod: S$GLB,,, | Performed by: INTERNAL MEDICINE

## 2020-02-19 PROCEDURE — 99407 BEHAV CHNG SMOKING > 10 MIN: CPT | Mod: S$GLB,,, | Performed by: INTERNAL MEDICINE

## 2020-02-20 NOTE — PROGRESS NOTES
Spoke with patient today in regard to smoking cessation progress for 6 month telephone follow up, she states not tobacco free. Patient not ready to return to the program at this time and states she will call to schedule at a later date. Informed patient of benefit period, future follow up, and contact information if any further help or support is needed. Will complete smart form for 3 and 6 month follow up on Quit attempt #1.

## 2020-02-24 ENCOUNTER — OFFICE VISIT (OUTPATIENT)
Dept: PSYCHIATRY | Facility: CLINIC | Age: 60
End: 2020-02-24
Payer: COMMERCIAL

## 2020-02-24 ENCOUNTER — TELEPHONE (OUTPATIENT)
Dept: FAMILY MEDICINE | Facility: CLINIC | Age: 60
End: 2020-02-24

## 2020-02-24 ENCOUNTER — PATIENT MESSAGE (OUTPATIENT)
Dept: FAMILY MEDICINE | Facility: CLINIC | Age: 60
End: 2020-02-24

## 2020-02-24 VITALS
HEART RATE: 71 BPM | SYSTOLIC BLOOD PRESSURE: 120 MMHG | BODY MASS INDEX: 42.66 KG/M2 | WEIGHT: 231.81 LBS | HEIGHT: 62 IN | DIASTOLIC BLOOD PRESSURE: 57 MMHG

## 2020-02-24 DIAGNOSIS — F41.8 DEPRESSION WITH ANXIETY: Primary | ICD-10-CM

## 2020-02-24 DIAGNOSIS — G47.00 INSOMNIA, UNSPECIFIED TYPE: ICD-10-CM

## 2020-02-24 DIAGNOSIS — F41.1 GAD (GENERALIZED ANXIETY DISORDER): ICD-10-CM

## 2020-02-24 PROCEDURE — 99213 OFFICE O/P EST LOW 20 MIN: CPT | Mod: S$GLB,,, | Performed by: PSYCHIATRY & NEUROLOGY

## 2020-02-24 PROCEDURE — 3074F PR MOST RECENT SYSTOLIC BLOOD PRESSURE < 130 MM HG: ICD-10-PCS | Mod: CPTII,S$GLB,, | Performed by: PSYCHIATRY & NEUROLOGY

## 2020-02-24 PROCEDURE — 99213 PR OFFICE/OUTPT VISIT, EST, LEVL III, 20-29 MIN: ICD-10-PCS | Mod: S$GLB,,, | Performed by: PSYCHIATRY & NEUROLOGY

## 2020-02-24 PROCEDURE — 3074F SYST BP LT 130 MM HG: CPT | Mod: CPTII,S$GLB,, | Performed by: PSYCHIATRY & NEUROLOGY

## 2020-02-24 PROCEDURE — 3008F BODY MASS INDEX DOCD: CPT | Mod: CPTII,S$GLB,, | Performed by: PSYCHIATRY & NEUROLOGY

## 2020-02-24 PROCEDURE — 3078F DIAST BP <80 MM HG: CPT | Mod: CPTII,S$GLB,, | Performed by: PSYCHIATRY & NEUROLOGY

## 2020-02-24 PROCEDURE — 99999 PR PBB SHADOW E&M-EST. PATIENT-LVL II: ICD-10-PCS | Mod: PBBFAC,,, | Performed by: PSYCHIATRY & NEUROLOGY

## 2020-02-24 PROCEDURE — 3078F PR MOST RECENT DIASTOLIC BLOOD PRESSURE < 80 MM HG: ICD-10-PCS | Mod: CPTII,S$GLB,, | Performed by: PSYCHIATRY & NEUROLOGY

## 2020-02-24 PROCEDURE — 3008F PR BODY MASS INDEX (BMI) DOCUMENTED: ICD-10-PCS | Mod: CPTII,S$GLB,, | Performed by: PSYCHIATRY & NEUROLOGY

## 2020-02-24 PROCEDURE — 99999 PR PBB SHADOW E&M-EST. PATIENT-LVL II: CPT | Mod: PBBFAC,,, | Performed by: PSYCHIATRY & NEUROLOGY

## 2020-02-24 RX ORDER — CLONAZEPAM 1 MG/1
1 TABLET ORAL 2 TIMES DAILY PRN
Qty: 60 TABLET | Refills: 2 | Status: SHIPPED | OUTPATIENT
Start: 2020-03-05 | End: 2020-05-05 | Stop reason: SDUPTHER

## 2020-02-24 RX ORDER — PHENAZOPYRIDINE HYDROCHLORIDE 200 MG/1
200 TABLET, FILM COATED ORAL 3 TIMES DAILY PRN
Qty: 30 TABLET | Refills: 0 | Status: SHIPPED | OUTPATIENT
Start: 2020-02-24 | End: 2020-03-05

## 2020-02-24 RX ORDER — BUSPIRONE HYDROCHLORIDE 30 MG/1
30 TABLET ORAL 2 TIMES DAILY
Qty: 180 TABLET | Refills: 0 | Status: SHIPPED | OUTPATIENT
Start: 2020-02-24 | End: 2020-05-05 | Stop reason: SDUPTHER

## 2020-02-24 RX ORDER — NITROFURANTOIN 25; 75 MG/1; MG/1
100 CAPSULE ORAL EVERY 12 HOURS
Qty: 10 CAPSULE | Refills: 0 | Status: SHIPPED | OUTPATIENT
Start: 2020-02-24 | End: 2020-09-04

## 2020-02-24 RX ORDER — AMITRIPTYLINE HYDROCHLORIDE 25 MG/1
25 TABLET, FILM COATED ORAL NIGHTLY PRN
Qty: 30 TABLET | Refills: 2 | Status: SHIPPED | OUTPATIENT
Start: 2020-02-24 | End: 2020-04-27 | Stop reason: SDUPTHER

## 2020-02-24 RX ORDER — ZOLPIDEM TARTRATE 5 MG/1
5 TABLET ORAL NIGHTLY
Qty: 30 TABLET | Refills: 2 | Status: SHIPPED | OUTPATIENT
Start: 2020-02-24 | End: 2020-05-05 | Stop reason: SDUPTHER

## 2020-02-24 NOTE — PROGRESS NOTES
"Outpatient Psychiatry Follow-Up Visit (MD/NP)    2/24/2020    Clinical Status of Patient:  Outpatient (Ambulatory)    Chief Complaint:  Lucia Matias is a 59 y.o. female who presents today for follow-up of anxiety and MDD in remission, insomnoa Met with patient.  Last seen by my 10/7/2019. At that time continued medications of buspar 30 BID, klonopin 1 mg BID, ambien 5 mg qhs (was given #45, was previously taking 10 mg qhs but per recommendations of FDA, only provided with #45 so that slow taper could be done)  Reviewed , klonopin 0.5 mg #60 filled 2/5/2020, ambien 5 mg #15 filled 2/5/2020     Interval History and Content of Current Session:  Interim Events/Subjective Report/Content of Current Session:   Today, pt states that she is not doing well and discusses a number of recent stressors. She and her  got the flu a few weeks ago; she was also dx with bronchitis and her  was dx with PNA and was in the hospital for 10 days. States that this was the reason why she cancelled her last appointment. Discussed at length 's prolonged hospital course and his dx of hyponatremia which lead to unusual behavior; pt describes event as "traumatic" because she had never seen him like this before. . Describes anxiety as a "roller coaster ride" with previously mentioned event and yesterday was anniversary of mother's passing. Describes mood as "down" and states she has been more isolative; she has felt this way since delgado. Partially attributes to "empty nest syndrome" and not seeing her children as often as she likes. Sleeps about 6 hours a night, will get an additional hour of sleep on the weekend when she naps. Finds that decreased ambien dose has lead to more frequent night time awakenings and will often wake up and snack. Denies anhdeonia (likes going out to eat, going to the movies, spending time with her children), denies guilt, occasional hopelessness ("it's always something"), endorses low " "energy, some difficulty concentrating. No changes in appetite. Denies SI/HI. No objective ssx of mayur or psychosis    Expresses interest in starting medication for mood. She voices concern about antidepressants because they have made her feel suicidal in the past.   Previous medication: celexa, wellbutrin, prozac, xanax, lexapro, says that she has tried multiple antidepressants and they made her feel more depressed although unable to name them all at this time "If you name it I probably tried it". Says that "they all eventually made me feel suicidal and I did not feel like they helped". Discussed options including remeron (may also aid with sleep) and elavil (may also aid with sleep and pain from arthiritis). Amenable to trial of elavil; not interested in remeron d/t possible SE of weight gain. Denies SI/HI . Cites protective factors of children and Church beliefs.         Review of Systems     MEDICAL REVIEW OF SYSTEMS  History obtained from the patient   General : NO chills or fever   Eyes: NO  visual changes   ENT: NO hearing change, nasal discharge or sore throat   Endocrine: NO weight changes   Dermatological: NO rashes   Respiratory: NO cough, shortness of breath   Cardiovascular: NO chest pain, palpitations or racing heart   Gastrointestinal: NO nausea, vomiting, constipation or diarrhea   Musculoskeletal: NO muscle pain or stiffness   Neurological: NO  dizziness, headaches or tremors   Psychiatric: please see HPI      Past Medical, Family and Social History: The patient's past medical, family and social history have been reviewed and updated as appropriate within the electronic medical record - see encounter notes.    Compliance: yes    Side effects: None    Risk Parameters:  Patient reports no suicidal ideation  Patient reports no homicidal ideation  Patient reports no self-injurious behavior  Patient reports no violent behavior    Exam (detailed: at least 9 elements; comprehensive: all 15 " "elements)   Constitutional  Vitals:  Most recent vital signs, dated less than 90 days prior to this appointment, were reviewed.   Vitals:    02/24/20 1323   BP: (!) 120/57   Pulse: 71   Weight: 105.2 kg (231 lb 13 oz)   Height: 5' 2" (1.575 m)        General:  unremarkable, age appropriate, neatly groomed, overweight     Musculoskeletal  Muscle Strength/Tone:  no rigidity, no flaccidity, no dystonia, no tremor   Gait & Station:  non-ataxic     Psychiatric  Speech:  no latency; no press   Mood & Affect:  "down"  apprpriate, full, reactive   Thought Process:  normal and logical   Associations:  intact   Thought Content:  normal, no suicidality, no homicidality, delusions, or paranoia   Insight:  intact   Judgement: behavior is adequate to circumstances   Orientation:  grossly intact   Memory: intact for content of interview   Language: grossly intact   Attention Span & Concentration:  able to focus   Fund of Knowledge:  intact and appropriate to age and level of education     Assessment and Diagnosis   Status/Progress: Based on the examination today, the patient's problem(s) is/are well controlled.  New problems have not been presented today.   Co-morbidities, Diagnostic uncertainty and Lack of compliance are not complicating management of the primary condition.  There are no active rule-out diagnoses for this patient at this time.     General Impression:     RANDI  Panic attacks  MDD, in remission        The use of this medication has been discussed in detail with this patient. I've discussed the spectrum of common side effects such as drowsiness, dry mouth, and initial daytime grogginess; and more rarely blurry vision, dizziness, constipation or trouble urinating. The medication should be taken before sleep, and an initial adjustment period is to be expected.  The initial dose will be small to minimize side effects, and the onset of action may be slow, thus an increase in the dose may be required.  I have described " this process, and she may increase to two tablets before sleep if not improving in one week, but should call me if a higher dose is felt necessary, or if there are problems with side-effects.      Strengths and Liabilities: Strength: Patient accepts guidance/feedback, Strength: Patient is expressive/articulate., Strength: Patient is motivated for change., Strength: Patient has positive support network., Strength: Patient has reasonable judgment.     Treatment Goals:  Specify outcomes written in observable, behavioral terms:   Anxiety: reducing negative automatic thoughts, reducing physical symptoms of anxiety and reducing time spent worrying (<30 minutes/day)  Panic: acquiring breathing skills, acquiring relapse prevention skills and reducing physical symptoms of anxiety/panic     Treatment Plan/Recommendations:   · continue buspar 30 mg BID for anxiety  · Start elavil 25 mg daily for mood, anxiety, and to aid with sleep  · Continue klonopin 1 mg BID PRN for panic attacks- discussed plan to decrease over time with pt. Will start taper once ambien dose as been lowered.-3 month supply  · Continue ambien 5 mg qhs as this is the recommended dose for female patients.Provided w#30  · Provided pt with instructions about making therapy appointment as pt would likely benefit from learning new/different ways to cope and other skills that will allow for better control of sx and easier taper    RTC 3 months or sooner if necessary     Discussed diagnosis, risks and benefits of proposed treatment vs alternative treatments vs no treatment, and potential side effects of these treatments. The patient expresses understanding of the above and displays the capacity to agree with this treatment given said understanding. Patient also agrees that, currently, the benefits outweigh the risks and would like to pursue treatment at this time.        Discussed with patient potential side effects and adverse effects of benzodiazepines, including  but not limited to drowsiness, dizziness, risk of falls, and abuse potential. Counseled patient on avoiding alcohol while using this medication due to risk of respiratory depression. Patient instructed to not operate any heavy machinery and use caution with driving while on this medication.     Will discuss with attending psychiatrist Dr. Rin Miller MD  [facility] PGY3

## 2020-02-24 NOTE — TELEPHONE ENCOUNTER
----- Message from Nica Goldberg sent at 2/24/2020  7:40 AM CST -----  Contact: Lucia  Type:  Sooner Apoointment Request    Caller is requesting a sooner appointment.  Caller declined first available appointment listed below.  Caller will not accept being placed on the waitlist and is requesting a message be sent to doctor.  Name of Caller: Osmel  When is the first available appointment? March 2, 2020  Symptoms: Pain when urinating and blood when she wipe after urinating.  Would the patient rather a call back or a response via [facility]?  Call Back  Best Call Back Number: [phone]  Additional Information: Pt states she has pain when urinating and blood when she wipes. Pt is requesting a call back.

## 2020-02-24 NOTE — TELEPHONE ENCOUNTER
Called and spoke with pt in regards of her message. Pt informed me that she has a bladder infection and she is having bladder spasms as well. Pt states feels like she peeing glass. Informed pt that Dr. Banks's schedule for today is full. Pt wanted to know if there is anyway you can place an order for a urine culture, and some antibiotics for her as well. Pt also would like a medication called in for her bladder spasms too. Please advise.

## 2020-02-24 NOTE — TELEPHONE ENCOUNTER
Tell patient to check her Mychart, I replied to her message earlier this morning and sent her antibiotics already.

## 2020-02-27 ENCOUNTER — TELEPHONE (OUTPATIENT)
Dept: PHARMACY | Facility: CLINIC | Age: 60
End: 2020-02-27

## 2020-02-27 NOTE — TELEPHONE ENCOUNTER
RX call attempt 1 regarding Truvada refill from [facility]. Patient reached-- shipping out 3/2 for 3/3 arrival with patients consent. Copay of 0.00 @ 004. Address and  confirmed. Patient has about 7 days of medication on hand at this time. Patient has not started any new medications, has had no missed doses and no side effects present. Patient is currently taking the medication as directed by doctors instruction, Take 1 tablet by mouth once daily. Patient does have a safe place in their residence to keep medication at desired temperature away from small children and pets. Patient also does have the capability of contacting 911 in the event of an emergency. Patient states they do not have any questions or concerns at this time.

## 2020-03-10 ENCOUNTER — TELEPHONE (OUTPATIENT)
Dept: INTERVENTIONAL RADIOLOGY/VASCULAR | Facility: HOSPITAL | Age: 60
End: 2020-03-10

## 2020-03-10 DIAGNOSIS — R91.8 LUNG NODULES: Primary | ICD-10-CM

## 2020-03-24 ENCOUNTER — PATIENT MESSAGE (OUTPATIENT)
Dept: FAMILY MEDICINE | Facility: CLINIC | Age: 60
End: 2020-03-24

## 2020-03-26 ENCOUNTER — TELEPHONE (OUTPATIENT)
Dept: INTERVENTIONAL RADIOLOGY/VASCULAR | Facility: HOSPITAL | Age: 60
End: 2020-03-26

## 2020-03-26 ENCOUNTER — TELEPHONE (OUTPATIENT)
Dept: PHARMACY | Facility: CLINIC | Age: 60
End: 2020-03-26

## 2020-03-31 ENCOUNTER — PATIENT MESSAGE (OUTPATIENT)
Dept: FAMILY MEDICINE | Facility: CLINIC | Age: 60
End: 2020-03-31

## 2020-04-15 ENCOUNTER — PATIENT MESSAGE (OUTPATIENT)
Dept: FAMILY MEDICINE | Facility: CLINIC | Age: 60
End: 2020-04-15

## 2020-04-15 DIAGNOSIS — I10 HYPERTENSION, UNSPECIFIED TYPE: ICD-10-CM

## 2020-04-15 RX ORDER — METOPROLOL TARTRATE 50 MG/1
TABLET ORAL
Qty: 180 TABLET | Refills: 3 | Status: SHIPPED | OUTPATIENT
Start: 2020-04-15 | End: 2021-04-04 | Stop reason: SDUPTHER

## 2020-04-21 DIAGNOSIS — Z01.84 ANTIBODY RESPONSE EXAMINATION: ICD-10-CM

## 2020-04-27 ENCOUNTER — PATIENT MESSAGE (OUTPATIENT)
Dept: PSYCHIATRY | Facility: CLINIC | Age: 60
End: 2020-04-27

## 2020-04-27 DIAGNOSIS — F41.1 GAD (GENERALIZED ANXIETY DISORDER): ICD-10-CM

## 2020-04-27 DIAGNOSIS — G47.00 INSOMNIA, UNSPECIFIED TYPE: ICD-10-CM

## 2020-04-27 RX ORDER — AMITRIPTYLINE HYDROCHLORIDE 25 MG/1
25 TABLET, FILM COATED ORAL NIGHTLY PRN
Qty: 30 TABLET | Refills: 2 | Status: SHIPPED | OUTPATIENT
Start: 2020-04-27 | End: 2020-05-05 | Stop reason: SDUPTHER

## 2020-04-27 RX ORDER — ZOLPIDEM TARTRATE 5 MG/1
5 TABLET ORAL NIGHTLY
Qty: 30 TABLET | Refills: 2 | Status: CANCELLED | OUTPATIENT
Start: 2020-04-27 | End: 2020-07-26

## 2020-04-29 ENCOUNTER — PATIENT MESSAGE (OUTPATIENT)
Dept: DERMATOLOGY | Facility: CLINIC | Age: 60
End: 2020-04-29

## 2020-05-01 ENCOUNTER — OFFICE VISIT (OUTPATIENT)
Dept: DERMATOLOGY | Facility: CLINIC | Age: 60
End: 2020-05-01
Payer: COMMERCIAL

## 2020-05-01 ENCOUNTER — TELEPHONE (OUTPATIENT)
Dept: RHEUMATOLOGY | Facility: CLINIC | Age: 60
End: 2020-05-01

## 2020-05-01 DIAGNOSIS — M05.79 RHEUMATOID ARTHRITIS INVOLVING MULTIPLE SITES WITH POSITIVE RHEUMATOID FACTOR: Primary | ICD-10-CM

## 2020-05-01 DIAGNOSIS — L40.0 PSORIASIS VULGARIS: ICD-10-CM

## 2020-05-01 DIAGNOSIS — R91.8 MULTIPLE LUNG NODULES ON CT: ICD-10-CM

## 2020-05-01 PROCEDURE — 99442 PR PHYSICIAN TELEPHONE EVALUATION 11-20 MIN: ICD-10-PCS | Mod: 95,,, | Performed by: DERMATOLOGY

## 2020-05-01 PROCEDURE — 99442 PR PHYSICIAN TELEPHONE EVALUATION 11-20 MIN: CPT | Mod: 95,,, | Performed by: DERMATOLOGY

## 2020-05-01 NOTE — PROGRESS NOTES
"  Subjective:       Patient ID:  Lucia Matias is a 59 y.o. female who presents for   Chief Complaint   Patient presents with    Psoriasis     The patient location is: home  The chief complaint leading to consultation is: psoriasis  Visit type: virtual visit with synchronous audio  Total time spent with patient: 15 minutes  Each patient to whom I provide medical services by telemedicine is:  (1) informed of the relationship between the physician and patient and the respective role of any other health care provider with respect to management of the patient; and (2) notified that he or she may decline to receive medical services by telemedicine and may withdraw from such care at any time.      Psoriasis  - Initial  Affected locations: scalp, left elbow, right elbow, left hand, right hand, left lower leg and right lower leg  Duration: over a year.  Signs / symptoms: cracking, crusting, irritated, itching, pain, redness and scaling (pt states that her joints are bothering her the most)  Severity: severe  Timing: constant (has rapidly gotten worse)  Aggravated by: scratching and stress  Relieving factors/Treatments tried: Rx topical steroids (was previously doing well on Cimzia but had to d/c due to lung nodules; lung biopsy was postponed due to pt having the flu, then cancelled due to COVID-19 pandemic)  Improvement on treatment: no relief      Review of Systems   Constitutional: Positive for fatigue. Negative for fever.   Musculoskeletal: Positive for arthralgias (so severe that she can barely stand; "I feel like I'm 89 years old").   Skin: Positive for itching and rash.   Psychiatric/Behavioral: Positive for high stress.        Objective:    Physical Exam   Constitutional: She appears well-developed and well-nourished. No distress.   Neurological: She is alert and oriented to person, place, and time. She is not disoriented.   Psychiatric: She has a normal mood and affect.   Skin:   Areas Examined (abnormalities " noted in diagram):   Head / Face Inspection Performed  RUE Inspected  LUE Inspection Performed             Diagram Legend     Erythematous scaling macule/papule c/w actinic keratosis       Vascular papule c/w angioma      Pigmented verrucoid papule/plaque c/w seborrheic keratosis      Yellow umbilicated papule c/w sebaceous hyperplasia      Irregularly shaped tan macule c/w lentigo     1-2 mm smooth white papules consistent with Milia      Movable subcutaneous cyst with punctum c/w epidermal inclusion cyst      Subcutaneous movable cyst c/w pilar cyst      Firm pink to brown papule c/w dermatofibroma      Pedunculated fleshy papule(s) c/w skin tag(s)      Evenly pigmented macule c/w junctional nevus     Mildly variegated pigmented, slightly irregular-bordered macule c/w mildly atypical nevus      Flesh colored to evenly pigmented papule c/w intradermal nevus       Pink pearly papule/plaque c/w basal cell carcinoma      Erythematous hyperkeratotic cursted plaque c/w SCC      Surgical scar with no sign of skin cancer recurrence      Open and closed comedones      Inflammatory papules and pustules      Verrucoid papule consistent consistent with wart     Erythematous eczematous patches and plaques     Dystrophic onycholytic nail with subungual debris c/w onychomycosis     Umbilicated papule    Erythematous-base heme-crusted tan verrucoid plaque consistent with inflamed seborrheic keratosis     Erythematous Silvery Scaling Plaque c/w Psoriasis     See annotation      Assessment / Plan:        Rheumatoid arthritis involving multiple sites with positive rheumatoid factor  Patient states that her RF was positive at [facility]. Will defer to rheum for best treatment option for her joint disease.  -     Ambulatory referral/consult to Rheumatology; Future; Expected date: 05/08/2020    Psoriasis vulgaris  Prescribed clobetasol 0.1% / niacinamide 2% cream. Apply to affected areas of body BID prn psoriasis.  Prescribed clobetasol  0.1% / niacinamide 2% solution. Apply to affected areas of scalp BID prn psoriasis.    Multiple lung nodules on CT  Discussed that immunosuppressive biologics such as Cimzia may not be the best option for her given her undiagnosed lung nodules, as well as increasing potential risk of COVID-19.  Will await recs from rheum.       Follow up in about 2 months (around 7/1/2020).

## 2020-05-04 ENCOUNTER — TELEPHONE (OUTPATIENT)
Dept: RHEUMATOLOGY | Facility: CLINIC | Age: 60
End: 2020-05-04

## 2020-05-04 NOTE — PROGRESS NOTES
Outpatient Psychiatry Follow-Up Visit (MD/NP)    5/5/2020     TELE PSYCHIATRY   Disclaimer   *The patient was informed despite using HIPPA compliant technology there may be risks including security breach, technological failure, inability to perform a comprehensive physical exam which could delay or prevent an accurate diagnosis, and potential complications from treatment decisions rendered over a telemedical platform. The patient understands and consented to the use of tele-health service as being a safe measure to mitigate during COVID Crisis.  The patient was also informed of the relationship between the physician and patient and the respective role of any other health care provider with respect to management of the patient; and notified that the pt may decline to receive medical services by telemedicine and may withdraw from such care at any time.     Patient's Current location, exact physical address: [address] [place] [address]  In Case of Emergency pts next of kin  Name: eric hood  Phone number:  [phone]  Visit type: Virtual visit with synchronous audio and video  Total time spent with patient: 16 minutes        Clinical Status of Patient:  Outpatient (Ambulatory)    Chief Complaint:  Lucia Matias is a 59 y.o. female who presents today for follow-up of anxiety and MDD in remission, insomnoa Met with patient.  Last seen by my 2/24/2020. At that time continued medications of buspar 30 BID, klonopin 1 mg BID, ambien 5 mg qhs, and started elavil 25 mg for mood, sleep, pain.  Reviewed , klonopin 0.5 mg #60 filled 4/7/2020 (refill 0/1), ambien 5 mg #30 filled 4/27/2020 (no refills left)    Interval History and Content of Current Session:  Interim Events/Subjective Report/Content of Current Session:     Pt states that she has been doing well since the last appointment and has found elavil helpful for mood, sleep, and anxiety and has noticed no SE. She rates her anxiety is a 4-5/10 (10 being the worst)  "and says that it is much better controlled than when she initially presented as a new patient.  Mood is "really good", rates mood as 7-8/10 (10 being the best), factimes with daughter every morning who is about to have a baby. Sleep has improved with amitripyline, sleeps 7-8 hours and feels well rested; however she reports energy is a little low and attributes it to arthritis pain as she has had to stop all of her injections lowering her immunity response in the setting of current pandemic. States that she will see a rheumatologist once she is able to go to the clinic.  No changes in appetite. Denies hopelessness or SI, plan or intent. Denies HI Pt is happy with current medications and would like to continue them. No objective ssx of mayur or psychosis          Review of Systems         Past Medical, Family and Social History: The patient's past medical, family and social history have been reviewed and updated as appropriate within the electronic medical record - see encounter notes.    Compliance: yes    Side effects: None    Risk Parameters:  Patient reports no suicidal ideation  Patient reports no homicidal ideation  Patient reports no self-injurious behavior  Patient reports no violent behavior    Exam (detailed: at least 9 elements; comprehensive: all 15 elements)   Constitutional  Vitals:  Most recent vital signs, dated less than 90 days prior to this appointment, were reviewed.   There were no vitals filed for this visit.     General:  unremarkable, age appropriate, neatly groomed, overweight     Musculoskeletal  Muscle Strength/Tone:  no rigidity, no flaccidity, no dystonia, no tremor   Gait & Station:  non-ataxic     Psychiatric  Speech:  no latency; no press   Mood & Affect:  happy,    apprpriate, full, reactive   Thought Process:  normal and logical   Associations:  intact   Thought Content:  normal, no suicidality, no homicidality, delusions, or paranoia   Insight:  intact   Judgement: behavior is " adequate to circumstances   Orientation:  grossly intact   Memory: intact for content of interview   Language: grossly intact   Attention Span & Concentration:  able to focus   Fund of Knowledge:  intact and appropriate to age and level of education     Assessment and Diagnosis   Status/Progress: Based on the examination today, the patient's problem(s) is/are well controlled.  New problems have not been presented today.   Co-morbidities, Diagnostic uncertainty and Lack of compliance are not complicating management of the primary condition.  There are no active rule-out diagnoses for this patient at this time.     General Impression:     RANDI  Panic attacks  MDD, in remission        The use of this medication has been discussed in detail with this patient. I've discussed the spectrum of common side effects such as drowsiness, dry mouth, and initial daytime grogginess; and more rarely blurry vision, dizziness, constipation or trouble urinating. The medication should be taken before sleep, and an initial adjustment period is to be expected.  The initial dose will be small to minimize side effects, and the onset of action may be slow, thus an increase in the dose may be required.  I have described this process, and she may increase to two tablets before sleep if not improving in one week, but should call me if a higher dose is felt necessary, or if there are problems with side-effects.      Strengths and Liabilities: Strength: Patient accepts guidance/feedback, Strength: Patient is expressive/articulate., Strength: Patient is motivated for change., Strength: Patient has positive support network., Strength: Patient has reasonable judgment.     Treatment Goals:  Specify outcomes written in observable, behavioral terms:   Anxiety: reducing negative automatic thoughts, reducing physical symptoms of anxiety and reducing time spent worrying (<30 minutes/day)  Panic: acquiring breathing skills, acquiring relapse prevention  skills and reducing physical symptoms of anxiety/panic     Treatment Plan/Recommendations:   · continue buspar 30 mg BID for anxiety  · continue elavil 25 mg daily for mood, anxiety, and to aid with sleep  · Continue klonopin 1 mg BID PRN for panic attacks- discussed plan to decrease over time with pt. Will start taper once ambien dose as been lowered  · Continue ambien 5 mg qhs as this is the recommended dose for female patients. Was previously at 10 mg when came from outside provider  · If sx continue to be stable, continue decreasing klonopin and starting gabapentin for anxiety as this could also aid with mood. May also consider increasing elavil dose while decreasing ambien. Pt would benefit from simplification of regimen and minimizing use of controlled substances  · Provided pt with instructions about making therapy appointment as pt would likely benefit from learning new/different ways to cope and other skills that will allow for better control of sx and easier taper    RTC 3 months or sooner if necessary     Discussed diagnosis, risks and benefits of proposed treatment vs alternative treatments vs no treatment, and potential side effects of these treatments. The patient expresses understanding of the above and displays the capacity to agree with this treatment given said understanding. Patient also agrees that, currently, the benefits outweigh the risks and would like to pursue treatment at this time.        Discussed with patient potential side effects and adverse effects of benzodiazepines, including but not limited to drowsiness, dizziness, risk of falls, and abuse potential. Counseled patient on avoiding alcohol while using this medication due to risk of respiratory depression. Patient instructed to not operate any heavy machinery and use caution with driving while on this medication.     Will discuss with attending psychiatrist Dr. Rin Miller MD  [facility] PGY3

## 2020-05-05 ENCOUNTER — OFFICE VISIT (OUTPATIENT)
Dept: PSYCHIATRY | Facility: CLINIC | Age: 60
End: 2020-05-05
Payer: COMMERCIAL

## 2020-05-05 DIAGNOSIS — F41.8 DEPRESSION WITH ANXIETY: Primary | ICD-10-CM

## 2020-05-05 DIAGNOSIS — F41.1 GAD (GENERALIZED ANXIETY DISORDER): ICD-10-CM

## 2020-05-05 DIAGNOSIS — G47.00 INSOMNIA, UNSPECIFIED TYPE: ICD-10-CM

## 2020-05-05 PROCEDURE — 99212 OFFICE O/P EST SF 10 MIN: CPT | Mod: 95,,, | Performed by: PSYCHIATRY & NEUROLOGY

## 2020-05-05 PROCEDURE — 99212 PR OFFICE/OUTPT VISIT, EST, LEVL II, 10-19 MIN: ICD-10-PCS | Mod: 95,,, | Performed by: PSYCHIATRY & NEUROLOGY

## 2020-05-05 RX ORDER — CLONAZEPAM 1 MG/1
1 TABLET ORAL 2 TIMES DAILY PRN
Qty: 60 TABLET | Refills: 1 | Status: SHIPPED | OUTPATIENT
Start: 2020-06-07 | End: 2020-07-21 | Stop reason: SDUPTHER

## 2020-05-05 RX ORDER — BUSPIRONE HYDROCHLORIDE 30 MG/1
30 TABLET ORAL 2 TIMES DAILY
Qty: 180 TABLET | Refills: 0 | Status: SHIPPED | OUTPATIENT
Start: 2020-05-05 | End: 2020-08-17 | Stop reason: SDUPTHER

## 2020-05-05 RX ORDER — ZOLPIDEM TARTRATE 5 MG/1
5 TABLET ORAL NIGHTLY
Qty: 30 TABLET | Refills: 2 | Status: SHIPPED | OUTPATIENT
Start: 2020-05-27 | End: 2020-07-21 | Stop reason: SDUPTHER

## 2020-05-05 RX ORDER — AMITRIPTYLINE HYDROCHLORIDE 25 MG/1
25 TABLET, FILM COATED ORAL NIGHTLY PRN
Qty: 30 TABLET | Refills: 2 | Status: SHIPPED | OUTPATIENT
Start: 2020-05-05 | End: 2020-08-17 | Stop reason: SDUPTHER

## 2020-05-15 ENCOUNTER — LAB VISIT (OUTPATIENT)
Dept: LAB | Facility: HOSPITAL | Age: 60
End: 2020-05-15
Attending: INTERNAL MEDICINE
Payer: COMMERCIAL

## 2020-05-15 DIAGNOSIS — Z01.84 ANTIBODY RESPONSE EXAMINATION: ICD-10-CM

## 2020-05-15 LAB — SARS-COV-2 IGG SERPLBLD QL IA.RAPID: NEGATIVE

## 2020-05-15 PROCEDURE — 86769 SARS-COV-2 COVID-19 ANTIBODY: CPT | Mod: PO

## 2020-05-15 PROCEDURE — 36415 COLL VENOUS BLD VENIPUNCTURE: CPT | Mod: PO

## 2020-05-22 ENCOUNTER — PATIENT MESSAGE (OUTPATIENT)
Dept: RHEUMATOLOGY | Facility: CLINIC | Age: 60
End: 2020-05-22

## 2020-05-29 ENCOUNTER — PATIENT MESSAGE (OUTPATIENT)
Dept: PSYCHIATRY | Facility: CLINIC | Age: 60
End: 2020-05-29

## 2020-06-25 ENCOUNTER — PATIENT MESSAGE (OUTPATIENT)
Dept: FAMILY MEDICINE | Facility: CLINIC | Age: 60
End: 2020-06-25

## 2020-06-26 RX ORDER — ATORVASTATIN CALCIUM 20 MG/1
20 TABLET, FILM COATED ORAL NIGHTLY
Qty: 90 TABLET | Refills: 3 | Status: SHIPPED | OUTPATIENT
Start: 2020-06-26 | End: 2020-09-26 | Stop reason: SDUPTHER

## 2020-07-17 NOTE — PROGRESS NOTES
Subjective:       Patient ID:  Lucia Matias is a 60 y.o. female who presents for   Chief Complaint   Patient presents with    Psoriasis     The patient location is: home  The chief complaint leading to consultation is: psoriasis  Visit type: virtual visit with synchronous audio and video  Total time spent with patient: 18 minutes  Each patient to whom I provide medical services by telemedicine is:  (1) informed of the relationship between the physician and patient and the respective role of any other health care provider with respect to management of the patient; and (2) notified that he or she may decline to receive medical services by telemedicine and may withdraw from such care at any time.      Lucia Matias is a 60 y.o. female with history of psoriasis, RA (per pt had a +RF in past), and with multiple lung nodules (still undiagnosed) on CT. Last seen by me on 5/1/20 with plan to see rheum for further management of joint disease. She cancelled her 5/5/20 appt with rheum due to fear of COVID-19.   She was also supposed to have a CT-guided lung biopsy on 2/4/20 which she cancelled due to flu/COPD exacerbation and again on 3/27/20 which she cancelled due to fear/concerns of COVID-19.     Rash - Follow-up  Diagnosis: psoriasis vulgaris.  Symptom course: worsening  Affected locations: diffuse  Duration: 1 year or more ago.  Signs / symptoms: crusting, dryness, redness, scaling and itching  Severity: severe  Timing: constant (It is always present, but gets better and worse)  Aggravated by: heat and scratching  Relieving factors/Treatments tried: Rx topical steroids (clobetasol was too expensive)  Improvement on treatment: no relief      Review of Systems   Constitutional: Negative for fever.   Respiratory: Negative for cough and shortness of breath.    Musculoskeletal: Positive for joint swelling (knee) and arthralgias (takes over an hour to loosen her joints (all over) in the morning ).   Skin:  Positive for itching and rash.        Objective:    Physical Exam   Constitutional: She appears well-developed and well-nourished. No distress.   HENT:   Mouth/Throat: Lips normal.    Eyes: Lids are normal.  No conjunctival no injection.   Neurological: She is alert and oriented to person, place, and time. She is not disoriented.   Psychiatric: She has a normal mood and affect.   Skin:   Areas Examined (abnormalities noted in diagram):   Head / Face Inspection Performed  Neck Inspection Performed  RUE Inspected  LUE Inspection Performed  RLE Inspected  LLE Inspection Performed             Diagram Legend     Erythematous scaling macule/papule c/w actinic keratosis       Vascular papule c/w angioma      Pigmented verrucoid papule/plaque c/w seborrheic keratosis      Yellow umbilicated papule c/w sebaceous hyperplasia      Irregularly shaped tan macule c/w lentigo     1-2 mm smooth white papules consistent with Milia      Movable subcutaneous cyst with punctum c/w epidermal inclusion cyst      Subcutaneous movable cyst c/w pilar cyst      Firm pink to brown papule c/w dermatofibroma      Pedunculated fleshy papule(s) c/w skin tag(s)      Evenly pigmented macule c/w junctional nevus     Mildly variegated pigmented, slightly irregular-bordered macule c/w mildly atypical nevus      Flesh colored to evenly pigmented papule c/w intradermal nevus       Pink pearly papule/plaque c/w basal cell carcinoma      Erythematous hyperkeratotic cursted plaque c/w SCC      Surgical scar with no sign of skin cancer recurrence      Open and closed comedones      Inflammatory papules and pustules      Verrucoid papule consistent consistent with wart     Erythematous eczematous patches and plaques     Dystrophic onycholytic nail with subungual debris c/w onychomycosis     Umbilicated papule    Erythematous-base heme-crusted tan verrucoid plaque consistent with inflamed seborrheic keratosis     Erythematous Silvery Scaling Plaque c/w  Psoriasis     See annotation      Assessment / Plan:        Psoriasis vulgaris  -     triamcinolone acetonide 0.1% (KENALOG) 0.1 % ointment; Apply to affected areas of body BID prn rash. Do not use on face, underarms, or groin.  Dispense: 454 g; Refill: 1  After applying medication, wear a layer of slightly damp pajamas or tight fitting clothes under a layer of dry pajamas or jogging suit. Wear overnight or all day. This will help the medication penetrate better for faster relief.  Discussed with pt that I cannot in good eddie start her on a biologic to treat her psoriasis while she has undiagnosed lung nodules that could be cancerous. Discussed risk of continued growth, metastasis, and death if it is a lung cancer. Emphasized the importance of following through with the biopsy.    Rheumatoid arthritis involving multiple sites with positive rheumatoid factor (+/- psoriatic arthritis)  Pt states RF was positive in the past. Will defer to rheum for best treatment option for her joint disease.  Message was sent to Dr. Abdi's staff to please call pt to reschedule her rheum appt.    Multiple lung nodules on CT  Message was sent to IR schedulers to please call pt to reschedule her CT-guided lung biopsy.    Follow up in about 2 months (around 9/20/2020) for follow up, or sooner if symptoms worsening or not improving.

## 2020-07-20 ENCOUNTER — OFFICE VISIT (OUTPATIENT)
Dept: DERMATOLOGY | Facility: CLINIC | Age: 60
End: 2020-07-20
Payer: COMMERCIAL

## 2020-07-20 DIAGNOSIS — R91.8 MULTIPLE LUNG NODULES ON CT: ICD-10-CM

## 2020-07-20 DIAGNOSIS — L40.0 PSORIASIS VULGARIS: Primary | ICD-10-CM

## 2020-07-20 DIAGNOSIS — M05.79 RHEUMATOID ARTHRITIS INVOLVING MULTIPLE SITES WITH POSITIVE RHEUMATOID FACTOR: ICD-10-CM

## 2020-07-20 PROCEDURE — 99214 OFFICE O/P EST MOD 30 MIN: CPT | Mod: 95,,, | Performed by: DERMATOLOGY

## 2020-07-20 PROCEDURE — 99214 PR OFFICE/OUTPT VISIT, EST, LEVL IV, 30-39 MIN: ICD-10-PCS | Mod: 95,,, | Performed by: DERMATOLOGY

## 2020-07-20 RX ORDER — TRIAMCINOLONE ACETONIDE 1 MG/G
OINTMENT TOPICAL
Qty: 454 G | Refills: 1 | Status: SHIPPED | OUTPATIENT
Start: 2020-07-20 | End: 2021-08-16

## 2020-07-21 DIAGNOSIS — F41.1 GAD (GENERALIZED ANXIETY DISORDER): ICD-10-CM

## 2020-07-21 DIAGNOSIS — G47.00 INSOMNIA, UNSPECIFIED TYPE: ICD-10-CM

## 2020-07-21 RX ORDER — CLONAZEPAM 1 MG/1
1 TABLET ORAL 2 TIMES DAILY PRN
Qty: 60 TABLET | Refills: 0 | Status: SHIPPED | OUTPATIENT
Start: 2020-08-05 | End: 2020-08-17 | Stop reason: SDUPTHER

## 2020-07-21 RX ORDER — ZOLPIDEM TARTRATE 5 MG/1
5 TABLET ORAL NIGHTLY
Qty: 30 TABLET | Refills: 0 | Status: SHIPPED | OUTPATIENT
Start: 2020-07-29 | End: 2020-08-17 | Stop reason: SDUPTHER

## 2020-07-28 ENCOUNTER — TELEPHONE (OUTPATIENT)
Dept: INTERVENTIONAL RADIOLOGY/VASCULAR | Facility: HOSPITAL | Age: 60
End: 2020-07-28

## 2020-08-10 ENCOUNTER — TELEPHONE (OUTPATIENT)
Dept: SMOKING CESSATION | Facility: CLINIC | Age: 60
End: 2020-08-10

## 2020-08-10 NOTE — TELEPHONE ENCOUNTER
Spoke with patient today in regard to smoking cessation progress for 12 month telephone follow up on quit 1. Patient states that she is not tobacco free at this time. Not interested in making an appointment at this time.  Informed patient of benefit period, future follow up, and contact information if any further help or support is needed. Will complete smart form for 12 month follow up on Quit attempt #1.Did not drop phone charge due to benefits not available the time of call.

## 2020-08-17 ENCOUNTER — OFFICE VISIT (OUTPATIENT)
Dept: PSYCHIATRY | Facility: CLINIC | Age: 60
End: 2020-08-17
Payer: COMMERCIAL

## 2020-08-17 DIAGNOSIS — F41.1 GAD (GENERALIZED ANXIETY DISORDER): Primary | ICD-10-CM

## 2020-08-17 DIAGNOSIS — G47.00 INSOMNIA, UNSPECIFIED TYPE: ICD-10-CM

## 2020-08-17 DIAGNOSIS — F33.40 MDD (RECURRENT MAJOR DEPRESSIVE DISORDER) IN REMISSION: ICD-10-CM

## 2020-08-17 PROCEDURE — 99213 OFFICE O/P EST LOW 20 MIN: CPT | Mod: 95,,, | Performed by: STUDENT IN AN ORGANIZED HEALTH CARE EDUCATION/TRAINING PROGRAM

## 2020-08-17 PROCEDURE — 99213 PR OFFICE/OUTPT VISIT, EST, LEVL III, 20-29 MIN: ICD-10-PCS | Mod: 95,,, | Performed by: STUDENT IN AN ORGANIZED HEALTH CARE EDUCATION/TRAINING PROGRAM

## 2020-08-17 RX ORDER — AMITRIPTYLINE HYDROCHLORIDE 25 MG/1
25 TABLET, FILM COATED ORAL NIGHTLY PRN
Qty: 30 TABLET | Refills: 2 | Status: SHIPPED | OUTPATIENT
Start: 2020-08-17 | End: 2020-11-23 | Stop reason: SDUPTHER

## 2020-08-17 RX ORDER — BUSPIRONE HYDROCHLORIDE 30 MG/1
30 TABLET ORAL 2 TIMES DAILY
Qty: 180 TABLET | Refills: 0 | Status: SHIPPED | OUTPATIENT
Start: 2020-08-17 | End: 2020-11-23 | Stop reason: SDUPTHER

## 2020-08-17 RX ORDER — ZOLPIDEM TARTRATE 5 MG/1
5 TABLET ORAL NIGHTLY
Qty: 30 TABLET | Refills: 2 | Status: SHIPPED | OUTPATIENT
Start: 2020-09-01 | End: 2020-11-30

## 2020-08-17 RX ORDER — CLONAZEPAM 1 MG/1
1 TABLET ORAL 2 TIMES DAILY PRN
Qty: 60 TABLET | Refills: 2 | Status: SHIPPED | OUTPATIENT
Start: 2020-09-05 | End: 2021-03-01

## 2020-08-17 NOTE — PROGRESS NOTES
OUTPATIENT PSYCHIATRY FOLLOW UP VISIT    ENCOUNTER DATE:  8/17/2020  SITE:  [facility], [facility]  LENGTH OF SESSION:  17 minutes    The patient location is: [address] [place] [address] [place], [place]  The chief complaint leading to consultation is: F/u for anxiety/depression & insomnia    Visit type: audiovisual    Face to Face time with patient: 17 minutes  31 minutes of total time spent on the encounter, which includes face to face time and non-face to face time preparing to see the patient (eg, review of tests), Obtaining and/or reviewing separately obtained history, Documenting clinical information in the electronic or other health record, Independently interpreting results (not separately reported) and communicating results to the patient/family/caregiver, or Care coordination (not separately reported).         Each patient to whom he or she provides medical services by telemedicine is:  (1) informed of the relationship between the physician and patient and the respective role of any other health care provider with respect to management of the patient; and (2) notified that he or she may decline to receive medical services by telemedicine and may withdraw from such care at any time.      CHIEF COMPLAINT:  Anxiety/depression, Insomnia    HISTORY OF PRESENTING ILLNESS:  Lucia Matisa is a 60 y.o. female with history of anxiety/depression & insomnia who presents for follow up appointment.      Plan at last appointment on 5/4/20 with Dr. Miller:   · continue buspar 30 mg BID for anxiety  · continue elavil 25 mg daily for mood, anxiety, and to aid with sleep  · Continue klonopin 1 mg BID PRN for panic attacks- discussed plan to decrease over time with pt. Will start taper once ambien dose as been lowered  · Continue ambien 5 mg qhs as this is the recommended dose for female patients. Was previously at 10 mg when came from outside provider  · If sx continue to be stable, continue decreasing klonopin and  starting gabapentin for anxiety as this could also aid with mood. May also consider increasing elavil dose while decreasing ambien. Pt would benefit from simplification of regimen and minimizing use of controlled substances  · Provided pt with instructions about making therapy appointment as pt would likely benefit from learning new/different ways to cope and other skills that will allow for better control of sx and easier taper      Interval history as told by Patient - & or family/friend/spouse/caregiver with pts permission    Anxiety has been higher lately (generally requiring both doses PRN klonpin daily), which pt ascribes to recent finding of nodule in one of her lungs does not know yet dx.  Also blames Covid stressors, as she is immunocompromised and working from home.  Elavil still helpful with depression, which is well controlled. Denies SI.  Appetite & sleep good so long as she takes sleeping medication.  Though she limits time out of the house due to Covid risks, still visits family and watches grandon 1 day a week to help avoid becoming too isolated. Discussed potential medication changes to address anxiety; pt prefers to remain on same regimen.      PSYCHIATRIC REVIEW OF SYSTEMS:    Symptoms of Depression: Stable & well controlled    Symptoms of Anxiety/ panic attacks:  Anxiety heightened due to Covid & recent medical findings; however, generally still functional and working    Symptoms of Stacy/Hypomania:  None    Symptoms of psychosis:  None    Sleep:  Good/stable on current meds    Appetite: Good/stable    Other: N/a    Alcohol:  N/a    Illicit Drugs:  N/a    Psychosocial stressors:     Risk Parameters:  Patient reports no suicidal ideation  Patient reports no homicidal ideation  Patient reports no self-injurious behavior  Patient reports no violent behavior    PSYCHIATRIC MED REVIEW    Current psych meds  Medication side effects:  none  Medication compliance:  full    Previous psych meds  trials  SSRIs in the  1990s - reports ineffective    PAST PSYCHIATRIC, MEDICAL, AND SOCIAL HISTORY REVIEWED  The patient's past medical, family and social history have been reviewed and updated as appropriate within the electronic medical record - see encounter notes.    MEDICAL REVIEW OF SYSTEMS:  Complete review of systems performed covering Constitutional, Musculoskeletal, Neurologic.  All systems negative/ except nervousness (no medical complaints)    ALL MEDICATIONS:    Current Outpatient Medications:     albuterol (PROVENTIL/VENTOLIN HFA) 90 mcg/actuation inhaler, Inhale 1-2 puffs into the lungs every 6 (six) hours as needed for Wheezing. Rescue, Disp: 90 g, Rfl: 0    albuterol-ipratropium (DUO-NEB) 2.5 mg-0.5 mg/3 mL nebulizer solution, Take 3 mLs by nebulization every 6 (six) hours as needed for Wheezing. Rescue, Disp: 1 Box, Rfl: 5    amitriptyline (ELAVIL) 25 MG tablet, Take 1 tablet (25 mg total) by mouth nightly as needed for Insomnia., Disp: 30 tablet, Rfl: 2    atorvastatin (LIPITOR) 20 MG tablet, Take 1 tablet (20 mg total) by mouth every evening., Disp: 90 tablet, Rfl: 3    budesonide-formoterol 160-4.5 mcg (SYMBICORT) 160-4.5 mcg/actuation HFAA, Inhale 2 puffs into the lungs every 12 (twelve) hours. Controller (Patient not taking: Reported on 1/16/2020), Disp: 30.6 g, Rfl: 2    busPIRone (BUSPAR) 30 MG Tab, Take 1 tablet (30 mg total) by mouth 2 (two) times daily., Disp: 180 tablet, Rfl: 0    certolizumab pegol (CIMZIA) 400 mg/2 mL (200 mg/mL x 2) SyKt, Inject 2 mLs (400 mg total) into the skin every 14 (fourteen) days. (Patient not taking: Reported on 1/7/2020), Disp: 1 Box, Rfl: 1    clonazePAM (KLONOPIN) 1 MG tablet, Take 1 tablet (1 mg total) by mouth 2 (two) times daily as needed for Anxiety., Disp: 60 tablet, Rfl: 0    doxycycline (VIBRAMYCIN) 100 MG Cap, Take 1 capsule (100 mg total) by mouth 2 (two) times daily. (Patient not taking: Reported on 2/6/2020), Disp: 14 capsule, Rfl: 0     emtricitabine-tenofovir 200-300 mg (TRUVADA) 200-300 mg Tab, Take 1 tablet by mouth once daily., Disp: 90 tablet, Rfl: 3    fluticasone-umeclidin-vilanter (TRELEGY ELLIPTA) 100-62.5-25 mcg DsDv, Inhale 1 puff into the lungs once daily., Disp: 60 each, Rfl: 11    lisinopriL 10 MG tablet, Take 1 tablet (10 mg total) by mouth once daily., Disp: 90 tablet, Rfl: 3    metoprolol tartrate (LOPRESSOR) 50 MG tablet, Take 1 tablet by mouth twice a day with food, Disp: 180 tablet, Rfl: 3    nitrofurantoin, macrocrystal-monohydrate, (MACROBID) 100 MG capsule, Take 1 capsule (100 mg total) by mouth every 12 (twelve) hours., Disp: 10 capsule, Rfl: 0    nystatin (MYCOSTATIN) cream, Apply topically 2 (two) times daily., Disp: 30 g, Rfl: 3    predniSONE (DELTASONE) 20 MG tablet, Take 2 tablets (40 mg total) by mouth once daily., Disp: 14 tablet, Rfl: 0    triamcinolone acetonide 0.1% (KENALOG) 0.1 % ointment, Apply to affected areas of body BID prn rash. Do not use on face, underarms, or groin., Disp: 454 g, Rfl: 1    zolpidem (AMBIEN) 5 MG Tab, Take 1 tablet (5 mg total) by mouth every evening., Disp: 30 tablet, Rfl: 0    Current Facility-Administered Medications:     DOBUTamine 500mg in D5W 250mL infusion (premix), 10 mcg/kg/min, Intravenous, Continuous, Saige Bee MD, Last Rate: 114 mL/hr at 06/26/19 1629, 40 mcg/kg/min at 06/26/19 1629    ALLERGIES:  Review of patient's allergies indicates:   Allergen Reactions    Succinimides Anaphylaxis    Succinylcholine     Succinylcholine chloride      Other reaction(s): Unknown       RELEVANT LABS/STUDIES:    Lab Results   Component Value Date    WBC 7.47 01/02/2020    HGB 16.1 (H) 01/02/2020    HCT 51.2 (H) 01/02/2020     (H) 01/02/2020     01/02/2020     BMP  Lab Results   Component Value Date     01/02/2020    K 5.1 01/02/2020     01/02/2020    CO2 30 (H) 01/02/2020    BUN 15 01/02/2020    CREATININE 1.3 01/02/2020    CALCIUM 9.2  "01/02/2020    ANIONGAP 8 01/02/2020    ESTGFRAFRICA 53 (A) 01/02/2020    EGFRNONAA 46 (A) 01/02/2020     Lab Results   Component Value Date    ALT 27 01/02/2020    AST 19 01/02/2020    ALKPHOS 111 01/02/2020    BILITOT 1.1 (H) 01/02/2020     Lab Results   Component Value Date    TSH 0.612 06/20/2019     Lab Results   Component Value Date    HGBA1C 5.4 06/20/2019       VITALS  There were no vitals filed for this visit.    PHYSICAL EXAM  General: well developed, well nourished, appears stated age, no distress  Neurologic:   Gait: Did not observe ambulation   Psychomotor signs:  No involuntary movements or tremor  AIMS: none    PSYCHIATRIC EXAM:     Mental Status Exam:  Appearance: unremarkable, age appropriate, well nourished, casually dressed  Behavior/Cooperation: normal, cooperative, eye contact normal  Speech: normal tone, normal rate, normal pitch, normal volume  Language: uses words appropriately; NO aphasia or dysarthria  Mood: "good"  Affect Full range & appropriate  Thought Process: normal and logical  Thought Content: normal, no suicidality, no homicidality, delusions, or paranoia  Level of Consciousness: Alert and Oriented x3  Memory:  Intact   3/3 immediate, 3/3 at 5 minutes    Recent:  Intact; able to report recent events   Remote:  Intact; Named 4/4 past presidents   Attention/concentration: appropriate for age/education.   Fund of Knowledge: appears adequate  Insight:   Intact  Judgment: Intact       IMPRESSION:    Lucia Matias is a 60 y.o. female with history of anxiety & depression with insomina who presents for follow up appointment for medication management.    Status/Progress:  Based on the examination today, the patient's problem(s) is/are adequately but not ideally controlled.  New problems have not been presented today.     DIAGNOSES:    ICD-10-CM ICD-9-CM   1. RANDI (generalized anxiety disorder)  F41.1 300.02   2. MDD (recurrent major depressive disorder) in remission  F33.40 296.35   3. " Insomnia, unspecified type  G47.00 780.52       PLAN:  Psych Med:   Continue  o Buspar 30mg BID  o Klonpin 1mg BID PRN anxiety/panic  o Elavil 25mg qHS  o Ambien 5mg qHS   Discussed with patient informed consent, risks vs. benefits, alternative treatments, side effect profile and the inherent unpredictability of individual responses to these treatments. Answered any questions patient may have had. The patient expresses understanding of the above and displays the capacity to agree with this current plan     Other: chart reviewed    RETURN TO CLINIC:  3 months    Sloan Carranza MD  [employer] [facility]  PGY-3  8/17/2020 1:26 PM

## 2020-08-21 DIAGNOSIS — Z01.818 PREOP EXAMINATION: Primary | ICD-10-CM

## 2020-08-24 DIAGNOSIS — R91.8 LUNG NODULES: Primary | ICD-10-CM

## 2020-08-27 ENCOUNTER — ANESTHESIA EVENT (OUTPATIENT)
Dept: ENDOSCOPY | Facility: HOSPITAL | Age: 60
End: 2020-08-27
Payer: COMMERCIAL

## 2020-08-27 DIAGNOSIS — R91.8 LUNG NODULES: Primary | ICD-10-CM

## 2020-08-27 NOTE — ANESTHESIA PREPROCEDURE EVALUATION
08/27/2020  Lucia Matias is a 60 y.o., female.    Patient Active Problem List   Diagnosis    Diverticular disease of colon    HIV exposure    Potential exposure to STD    Psoriasis vulgaris    Rheumatoid arthritis    COPD (chronic obstructive pulmonary disease)    HLD (hyperlipidemia)    Psoriatic arthritis    Depression with anxiety    Insomnia    Other long term (current) drug therapy    Tobacco user    Essential hypertension    Chronic cluster headache, not intractable    Epigastric pain    Multiple lung nodules on CT    RANDI (generalized anxiety disorder)    Lung nodules     No current facility-administered medications on file prior to encounter.      Current Outpatient Medications on File Prior to Encounter   Medication Sig Dispense Refill    amitriptyline (ELAVIL) 25 MG tablet Take 1 tablet (25 mg total) by mouth nightly as needed for Insomnia. 30 tablet 2    atorvastatin (LIPITOR) 20 MG tablet Take 1 tablet (20 mg total) by mouth every evening. 90 tablet 3    busPIRone (BUSPAR) 30 MG Tab Take 1 tablet (30 mg total) by mouth 2 (two) times daily. 180 tablet 0    [START ON 9/5/2020] clonazePAM (KLONOPIN) 1 MG tablet Take 1 tablet (1 mg total) by mouth 2 (two) times daily as needed for Anxiety. 60 tablet 2    fluticasone-umeclidin-vilanter (TRELEGY ELLIPTA) 100-62.5-25 mcg DsDv Inhale 1 puff into the lungs once daily. 60 each 11    lisinopriL 10 MG tablet Take 1 tablet (10 mg total) by mouth once daily. (Patient taking differently: Take 10 mg by mouth every morning. ) 90 tablet 3    metoprolol tartrate (LOPRESSOR) 50 MG tablet Take 1 tablet by mouth twice a day with food 180 tablet 3    triamcinolone acetonide 0.1% (KENALOG) 0.1 % ointment Apply to affected areas of body BID prn rash. Do not use on face, underarms, or groin. 454 g 1    [START ON 9/1/2020] zolpidem (AMBIEN)  5 MG Tab Take 1 tablet (5 mg total) by mouth every evening. 30 tablet 2    albuterol (PROVENTIL/VENTOLIN HFA) 90 mcg/actuation inhaler Inhale 1-2 puffs into the lungs every 6 (six) hours as needed for Wheezing. Rescue 90 g 0    albuterol-ipratropium (DUO-NEB) 2.5 mg-0.5 mg/3 mL nebulizer solution Take 3 mLs by nebulization every 6 (six) hours as needed for Wheezing. Rescue 1 Box 5    budesonide-formoterol 160-4.5 mcg (SYMBICORT) 160-4.5 mcg/actuation HFAA Inhale 2 puffs into the lungs every 12 (twelve) hours. Controller (Patient not taking: Reported on 1/16/2020) 30.6 g 2    certolizumab pegol (CIMZIA) 400 mg/2 mL (200 mg/mL x 2) SyKt Inject 2 mLs (400 mg total) into the skin every 14 (fourteen) days. (Patient not taking: Reported on 1/7/2020) 1 Box 1    doxycycline (VIBRAMYCIN) 100 MG Cap Take 1 capsule (100 mg total) by mouth 2 (two) times daily. (Patient not taking: Reported on 2/6/2020) 14 capsule 0    emtricitabine-tenofovir 200-300 mg (TRUVADA) 200-300 mg Tab Take 1 tablet by mouth once daily. 90 tablet 3    nitrofurantoin, macrocrystal-monohydrate, (MACROBID) 100 MG capsule Take 1 capsule (100 mg total) by mouth every 12 (twelve) hours. 10 capsule 0    nystatin (MYCOSTATIN) cream Apply topically 2 (two) times daily. 30 g 3    predniSONE (DELTASONE) 20 MG tablet Take 2 tablets (40 mg total) by mouth once daily. 14 tablet 0       Anesthesia Evaluation    I have reviewed the Patient Summary Reports.    I have reviewed the Nursing Notes. I have reviewed the NPO Status.      Review of Systems  Anesthesia Hx:  Family Hx of Anesthesia complications:  Malignant Hyperthermia, in a child    Social:  Smoker    Cardiovascular:   Hypertension    Pulmonary:   COPD Lung nodules   Neurological:   Headaches    Psych:   Psychiatric History anxiety depression          Physical Exam  General:  Obesity    Airway/Jaw/Neck:  Airway Findings: Mouth Opening: Normal Tongue: Normal  General Airway Assessment: Adult   Mallampati: I  TM Distance: Normal, at least 6 cm       Chest/Lungs:  Chest/Lungs Clear    Heart/Vascular:  Heart Findings: Normal       Mental Status:  Mental Status Findings:  Anxious         Anesthesia Plan  Type of Anesthesia, risks & benefits discussed:  Anesthesia Type:  general  Patient's Preference: TIVA  Intra-op Monitoring Plan: standard ASA monitors  Intra-op Monitoring Plan Comments:   Post Op Pain Control Plan: IV/PO Opioids PRN, multimodal analgesia and per primary service following discharge from PACU  Post Op Pain Control Plan Comments:   Induction:   IV  Beta Blocker:  Patient is on a Beta-Blocker and has received one dose within the past 24 hours (No further documentation required).       Informed Consent: Patient understands risks and agrees with Anesthesia plan.  Questions answered. Anesthesia consent signed with patient.  ASA Score: 3     Day of Surgery Review of History & Physical:            Ready For Surgery From Anesthesia Perspective.   Patient and her  were both adopted children with no medical family history.Their Son had his tonsils out at age 4 and spent 4-5 days in the hospital as a result of Malignant Hyperthermia.Both the patient and her  were told to never receive Succinylcholine as the origin of the son's allergy was not identified(insurance would not cover the cost of the test) .The only surgery the patient reported as having prior to the birth of her son was a tonsillectomy in 1960 and she experienced no complications or issues.

## 2020-08-27 NOTE — PRE-PROCEDURE INSTRUCTIONS
PRE-OP INSTRUCTIONS:Instructed patient to have nothing by mouth past midnight.It is ok to take AM medications with a few sips of water.Patient instructed to shower the night before surgery as well as the morning of surgery with an antibacterial soap like dial or hibiclens from the neck down.Encouraged patient to wear loose fitting, comfortable clothing .Medication instructions for pm prior to and am of surgery reviewed.Instructed patient to avoid taking vitamins,supplements,aspirin or ibuprofen the am of surgery.Patient stated an understanding.Patient informed of the current visitor policy and advised patient that one visitor may accompany each patient into the hospital and wait (socially distanced) until a member of the medical team provides an update at the conclusion of the procedure.When they enter the hospital both patient and visitor will have their temperature checked.All visitors are asked to arrive with a mask and to keep their mask on throughout the visit.Each inpatient is allowed 1 visitor per day between the hours of 8am and 6pm.This visitor must remain in the patient's room and not gather in common areas such as waiting rooms or cafeterias.The visitor must be 18 years or older and arrive with a mask and keep the mask on throughout the visit.    Patient denies any side effects or issues with anesthesia or sedation.

## 2020-08-28 ENCOUNTER — HOSPITAL ENCOUNTER (OUTPATIENT)
Facility: HOSPITAL | Age: 60
Discharge: HOME | End: 2020-08-28
Attending: RADIOLOGY | Admitting: RADIOLOGY
Payer: COMMERCIAL

## 2020-08-28 ENCOUNTER — ANESTHESIA (OUTPATIENT)
Dept: ENDOSCOPY | Facility: HOSPITAL | Age: 60
End: 2020-08-28
Payer: COMMERCIAL

## 2020-08-28 VITALS
HEART RATE: 81 BPM | SYSTOLIC BLOOD PRESSURE: 138 MMHG | TEMPERATURE: 98 F | OXYGEN SATURATION: 90 % | DIASTOLIC BLOOD PRESSURE: 65 MMHG | RESPIRATION RATE: 18 BRPM

## 2020-08-28 DIAGNOSIS — R91.8 LUNG NODULES: ICD-10-CM

## 2020-08-28 LAB
GRAM STN SPEC: NORMAL
GRAM STN SPEC: NORMAL
SARS-COV-2 RDRP RESP QL NAA+PROBE: NEGATIVE

## 2020-08-28 PROCEDURE — 63600175 PHARM REV CODE 636 W HCPCS

## 2020-08-28 PROCEDURE — 87205 SMEAR GRAM STAIN: CPT

## 2020-08-28 PROCEDURE — 87102 FUNGUS ISOLATION CULTURE: CPT

## 2020-08-28 PROCEDURE — 37000008 HC ANESTHESIA 1ST 15 MINUTES

## 2020-08-28 PROCEDURE — 88342 IMHCHEM/IMCYTCHM 1ST ANTB: CPT | Mod: 26,,, | Performed by: PATHOLOGY

## 2020-08-28 PROCEDURE — 63600175 PHARM REV CODE 636 W HCPCS: Performed by: ANESTHESIOLOGY

## 2020-08-28 PROCEDURE — D9220A PRA ANESTHESIA: Mod: ANES,,, | Performed by: ANESTHESIOLOGY

## 2020-08-28 PROCEDURE — D9220A PRA ANESTHESIA: Mod: CRNA,,, | Performed by: NURSE ANESTHETIST, CERTIFIED REGISTERED

## 2020-08-28 PROCEDURE — 63600175 PHARM REV CODE 636 W HCPCS: Performed by: NURSE ANESTHETIST, CERTIFIED REGISTERED

## 2020-08-28 PROCEDURE — 87070 CULTURE OTHR SPECIMN AEROBIC: CPT

## 2020-08-28 PROCEDURE — 87206 SMEAR FLUORESCENT/ACID STAI: CPT

## 2020-08-28 PROCEDURE — 88342 IMHCHEM/IMCYTCHM 1ST ANTB: CPT | Performed by: PATHOLOGY

## 2020-08-28 PROCEDURE — 37000009 HC ANESTHESIA EA ADD 15 MINS

## 2020-08-28 PROCEDURE — 88342 CHG IMMUNOCYTOCHEMISTRY: ICD-10-PCS | Mod: 26,,, | Performed by: PATHOLOGY

## 2020-08-28 PROCEDURE — 88312 SPECIAL STAINS GROUP 1: CPT | Performed by: PATHOLOGY

## 2020-08-28 PROCEDURE — 71000033 HC RECOVERY, INTIAL HOUR

## 2020-08-28 PROCEDURE — 88312 PR  SPECIAL STAINS,GROUP I: ICD-10-PCS | Mod: 26,,, | Performed by: PATHOLOGY

## 2020-08-28 PROCEDURE — 87116 MYCOBACTERIA CULTURE: CPT

## 2020-08-28 PROCEDURE — 88333 PATH CONSLTJ SURG CYTO XM 1: CPT | Mod: 26,,, | Performed by: PATHOLOGY

## 2020-08-28 PROCEDURE — 88305 TISSUE EXAM BY PATHOLOGIST: ICD-10-PCS | Mod: 26,,, | Performed by: PATHOLOGY

## 2020-08-28 PROCEDURE — 87015 SPECIMEN INFECT AGNT CONCNTJ: CPT

## 2020-08-28 PROCEDURE — D9220A PRA ANESTHESIA: ICD-10-PCS | Mod: CRNA,,, | Performed by: NURSE ANESTHETIST, CERTIFIED REGISTERED

## 2020-08-28 PROCEDURE — D9220A PRA ANESTHESIA: ICD-10-PCS | Mod: ANES,,, | Performed by: ANESTHESIOLOGY

## 2020-08-28 PROCEDURE — 88312 SPECIAL STAINS GROUP 1: CPT | Mod: 26,,, | Performed by: PATHOLOGY

## 2020-08-28 PROCEDURE — 25000003 PHARM REV CODE 250: Performed by: NURSE ANESTHETIST, CERTIFIED REGISTERED

## 2020-08-28 PROCEDURE — 88333 PATH CONSLTJ SURG CYTO XM 1: CPT | Performed by: PATHOLOGY

## 2020-08-28 PROCEDURE — 88305 TISSUE EXAM BY PATHOLOGIST: CPT | Mod: 26,,, | Performed by: PATHOLOGY

## 2020-08-28 PROCEDURE — 87075 CULTR BACTERIA EXCEPT BLOOD: CPT

## 2020-08-28 PROCEDURE — 88333 PR  INTRAOPERATIVE CYTO PATH CONSULT, INITIAL SITE: ICD-10-PCS | Mod: 26,,, | Performed by: PATHOLOGY

## 2020-08-28 PROCEDURE — U0002 COVID-19 LAB TEST NON-CDC: HCPCS

## 2020-08-28 PROCEDURE — 87176 TISSUE HOMOGENIZATION CULTR: CPT

## 2020-08-28 PROCEDURE — 94761 N-INVAS EAR/PLS OXIMETRY MLT: CPT

## 2020-08-28 PROCEDURE — 88305 TISSUE EXAM BY PATHOLOGIST: CPT | Performed by: PATHOLOGY

## 2020-08-28 PROCEDURE — 25000003 PHARM REV CODE 250: Performed by: PHYSICIAN ASSISTANT

## 2020-08-28 RX ORDER — SODIUM CHLORIDE 0.9 % (FLUSH) 0.9 %
10 SYRINGE (ML) INJECTION
Status: DISCONTINUED | OUTPATIENT
Start: 2020-08-28 | End: 2020-08-28 | Stop reason: HOSPADM

## 2020-08-28 RX ORDER — ONDANSETRON HYDROCHLORIDE 2 MG/ML
INJECTION, SOLUTION INTRAMUSCULAR; INTRAVENOUS
Status: DISCONTINUED | OUTPATIENT
Start: 2020-08-28 | End: 2020-08-30

## 2020-08-28 RX ORDER — LIDOCAINE HCL/PF 100 MG/5ML
SYRINGE (ML) INTRAVENOUS
Status: DISCONTINUED | OUTPATIENT
Start: 2020-08-28 | End: 2020-08-30

## 2020-08-28 RX ORDER — GLYCOPYRROLATE 0.2 MG/ML
INJECTION INTRAMUSCULAR; INTRAVENOUS
Status: DISCONTINUED | OUTPATIENT
Start: 2020-08-28 | End: 2020-08-30

## 2020-08-28 RX ORDER — FENTANYL CITRATE 50 UG/ML
25 INJECTION, SOLUTION INTRAMUSCULAR; INTRAVENOUS EVERY 5 MIN PRN
Status: COMPLETED | OUTPATIENT
Start: 2020-08-28 | End: 2020-08-28

## 2020-08-28 RX ORDER — SODIUM CHLORIDE 9 MG/ML
INJECTION, SOLUTION INTRAVENOUS CONTINUOUS
Status: DISCONTINUED | OUTPATIENT
Start: 2020-08-28 | End: 2022-10-17

## 2020-08-28 RX ORDER — PROPOFOL 10 MG/ML
VIAL (ML) INTRAVENOUS
Status: DISCONTINUED | OUTPATIENT
Start: 2020-08-28 | End: 2020-08-30

## 2020-08-28 RX ORDER — PROPOFOL 10 MG/ML
VIAL (ML) INTRAVENOUS CONTINUOUS PRN
Status: DISCONTINUED | OUTPATIENT
Start: 2020-08-28 | End: 2020-08-30

## 2020-08-28 RX ORDER — ROCURONIUM BROMIDE 10 MG/ML
INJECTION, SOLUTION INTRAVENOUS
Status: DISCONTINUED | OUTPATIENT
Start: 2020-08-28 | End: 2020-08-30

## 2020-08-28 RX ORDER — MIDAZOLAM HYDROCHLORIDE 1 MG/ML
INJECTION INTRAMUSCULAR; INTRAVENOUS
Status: DISCONTINUED | OUTPATIENT
Start: 2020-08-28 | End: 2020-08-30

## 2020-08-28 RX ORDER — FENTANYL CITRATE 50 UG/ML
INJECTION, SOLUTION INTRAMUSCULAR; INTRAVENOUS
Status: DISCONTINUED | OUTPATIENT
Start: 2020-08-28 | End: 2020-08-30

## 2020-08-28 RX ORDER — FENTANYL CITRATE 50 UG/ML
INJECTION, SOLUTION INTRAMUSCULAR; INTRAVENOUS
Status: COMPLETED
Start: 2020-08-28 | End: 2020-08-28

## 2020-08-28 RX ADMIN — SODIUM CHLORIDE: 0.9 INJECTION, SOLUTION INTRAVENOUS at 09:08

## 2020-08-28 RX ADMIN — SUGAMMADEX 400 MG: 100 INJECTION, SOLUTION INTRAVENOUS at 10:08

## 2020-08-28 RX ADMIN — FENTANYL CITRATE 25 MCG: 50 INJECTION INTRAMUSCULAR; INTRAVENOUS at 12:08

## 2020-08-28 RX ADMIN — MIDAZOLAM HYDROCHLORIDE 1 MG: 1 INJECTION, SOLUTION INTRAMUSCULAR; INTRAVENOUS at 09:08

## 2020-08-28 RX ADMIN — PROPOFOL 150 MG: 10 INJECTION, EMULSION INTRAVENOUS at 09:08

## 2020-08-28 RX ADMIN — FENTANYL CITRATE 25 MCG: 50 INJECTION INTRAMUSCULAR; INTRAVENOUS at 11:08

## 2020-08-28 RX ADMIN — ROCURONIUM BROMIDE 40 MG: 10 INJECTION, SOLUTION INTRAVENOUS at 09:08

## 2020-08-28 RX ADMIN — MIDAZOLAM HYDROCHLORIDE 2 MG: 1 INJECTION, SOLUTION INTRAMUSCULAR; INTRAVENOUS at 09:08

## 2020-08-28 RX ADMIN — LIDOCAINE HYDROCHLORIDE 100 MG: 20 INJECTION, SOLUTION INTRAVENOUS at 09:08

## 2020-08-28 RX ADMIN — ONDANSETRON 4 MG: 2 INJECTION, SOLUTION INTRAMUSCULAR; INTRAVENOUS at 10:08

## 2020-08-28 RX ADMIN — FENTANYL CITRATE 25 MCG: 50 INJECTION INTRAMUSCULAR; INTRAVENOUS at 10:08

## 2020-08-28 RX ADMIN — PROPOFOL 200 MCG/KG/MIN: 10 INJECTION, EMULSION INTRAVENOUS at 09:08

## 2020-08-28 RX ADMIN — FENTANYL CITRATE 50 MCG: 50 INJECTION, SOLUTION INTRAMUSCULAR; INTRAVENOUS at 09:08

## 2020-08-28 RX ADMIN — ROCURONIUM BROMIDE 10 MG: 10 INJECTION, SOLUTION INTRAVENOUS at 10:08

## 2020-08-28 RX ADMIN — GLYCOPYRROLATE 0.2 MG: 0.2 INJECTION, SOLUTION INTRAMUSCULAR; INTRAVENOUS at 09:08

## 2020-08-28 NOTE — H&P
Radiology History & Physical      SUBJECTIVE:     Chief Complaint: Left lung lesions requiring biopsy    History of Present Illness:  Lucia Matias is a 60 y.o. female who presents for left lung lesion biopsy.    Past Medical History:   Diagnosis Date    Anxiety     Arthritis     COPD (chronic obstructive pulmonary disease)     Diverticular disease of colon 2017    Diverticulitis     HLD (hyperlipidemia)     Hypertension     Pancreatitis     Psoriasis     Rheumatoid arthritis     Severe obesity (BMI 35.0-39.9) with comorbidity      Past Surgical History:   Procedure Laterality Date    BLADDER SUSPENSION      (x2)     SECTION      (x2)    RHINOPLASTY      TONSILLECTOMY         Home Meds:   Prior to Admission medications    Medication Sig Start Date End Date Taking? Authorizing Provider   amitriptyline (ELAVIL) 25 MG tablet Take 1 tablet (25 mg total) by mouth nightly as needed for Insomnia. 20 Yes Vince Carranza MD   atorvastatin (LIPITOR) 20 MG tablet Take 1 tablet (20 mg total) by mouth every evening. 20  Yes Hetal Banks MD   busPIRone (BUSPAR) 30 MG Tab Take 1 tablet (30 mg total) by mouth 2 (two) times daily. 20  Yes Vince Carranza MD   clonazePAM (KLONOPIN) 1 MG tablet Take 1 tablet (1 mg total) by mouth 2 (two) times daily as needed for Anxiety. 20  Yes Vince Carranza MD   fluticasone-umeclidin-vilanter (TRELEGY ELLIPTA) 100-62.5-25 mcg DsDv Inhale 1 puff into the lungs once daily. 3/16/20  Yes Nayeli Matute, GENESIS   lisinopriL 10 MG tablet Take 1 tablet (10 mg total) by mouth once daily.  Patient taking differently: Take 10 mg by mouth every morning.  6/15/20  Yes Hetal Bansk MD   metoprolol tartrate (LOPRESSOR) 50 MG tablet Take 1 tablet by mouth twice a day with food 4/15/20  Yes Hetal Banks MD   triamcinolone acetonide 0.1% (KENALOG) 0.1 % ointment Apply to affected areas of body BID prn rash. Do not use on face, underarms, or groin.  7/20/20  Yes Minerva Lara MD   zolpidem (AMBIEN) 5 MG Tab Take 1 tablet (5 mg total) by mouth every evening. 9/1/20 11/30/20 Yes Vince Carranza MD   albuterol (PROVENTIL/VENTOLIN HFA) 90 mcg/actuation inhaler Inhale 1-2 puffs into the lungs every 6 (six) hours as needed for Wheezing. Rescue 1/26/20 2/25/20  Alli Fraga MD   albuterol-ipratropium (DUO-NEB) 2.5 mg-0.5 mg/3 mL nebulizer solution Take 3 mLs by nebulization every 6 (six) hours as needed for Wheezing. Rescue 2/6/20 2/5/21  Hetal Banks MD   budesonide-formoterol 160-4.5 mcg (SYMBICORT) 160-4.5 mcg/actuation HFAA Inhale 2 puffs into the lungs every 12 (twelve) hours. Controller  Patient not taking: Reported on 1/16/2020 12/13/19 12/12/20  Saige Bee MD   certolizumab pegol (CIMZIA) 400 mg/2 mL (200 mg/mL x 2) SyKt Inject 2 mLs (400 mg total) into the skin every 14 (fourteen) days.  Patient not taking: Reported on 1/7/2020 12/27/19   Minerva Lara MD   doxycycline (VIBRAMYCIN) 100 MG Cap Take 1 capsule (100 mg total) by mouth 2 (two) times daily.  Patient not taking: Reported on 2/6/2020 1/24/20   Hetal Banks MD   emtricitabine-tenofovir 200-300 mg (TRUVADA) 200-300 mg Tab Take 1 tablet by mouth once daily. 7/1/19   Saige Bee MD   nitrofurantoin, macrocrystal-monohydrate, (MACROBID) 100 MG capsule Take 1 capsule (100 mg total) by mouth every 12 (twelve) hours. 2/24/20   Hetal Banks MD   nystatin (MYCOSTATIN) cream Apply topically 2 (two) times daily. 2/6/20   Hetal Banks MD   predniSONE (DELTASONE) 20 MG tablet Take 2 tablets (40 mg total) by mouth once daily. 2/6/20   Hetal Banks MD     Anticoagulants/Antiplatelets: no anticoagulation    Allergies:   Review of patient's allergies indicates:   Allergen Reactions    Succinimides Anaphylaxis    Succinylcholine     Succinylcholine chloride      Other reaction(s): Unknown     Sedation History:  no adverse reactions    Review of Systems:    Hematological: no known coagulopathies  Respiratory: no shortness of breath  Cardiovascular: no chest pain  Gastrointestinal: no abdominal pain  Genito-Urinary: no dysuria  Musculoskeletal: negative  Neurological: no TIA or stroke symptoms         OBJECTIVE:     Vital Signs (Most Recent)  Temp: 98.1 °F (36.7 °C) (08/28/20 0817)  Pulse: 75 (08/28/20 0817)  Resp: 20 (08/28/20 0817)  BP: 113/64 (08/28/20 0817)  SpO2: 95 % (08/28/20 0817)    Physical Exam:  ASA: II  Mallampati: II    General: no acute distress  Mental Status: alert and oriented to person, place and time  HEENT: normocephalic, atraumatic  Chest: unlabored breathing  Heart: regular heart rate  Abdomen: nondistended  Extremity: moves all extremities    Laboratory  Lab Results   Component Value Date    INR 0.9 08/28/2020       Lab Results   Component Value Date    WBC 6.88 08/28/2020    HGB 16.3 (H) 08/28/2020    HCT 50.6 (H) 08/28/2020    MCV 96 08/28/2020     08/28/2020      Lab Results   Component Value Date    GLU 89 01/02/2020     01/02/2020    K 5.1 01/02/2020     01/02/2020    CO2 30 (H) 01/02/2020    BUN 15 01/02/2020    CREATININE 1.3 01/02/2020    CALCIUM 9.2 01/02/2020    ALT 27 01/02/2020    AST 19 01/02/2020    ALBUMIN 3.6 01/02/2020    BILITOT 1.1 (H) 01/02/2020       ASSESSMENT/PLAN:     Sedation Plan: Moderate  Patient will undergo left lung lesion biopsy.    Teofilo Morrison M.D.  PGY-4  Radiology

## 2020-08-28 NOTE — PLAN OF CARE
Pt and  became frustrated with delay in bedside chest x ray and requested to go AMA. An hour delay occurred from the scheduled 1220 time of post operative x ray. OC Ilsa and bedside RN address importance and safety issues of not receiving a post operative x ray after a lung biopsy. Pt and  stated understanding. Once chest x ray was obtained, MD Morrison notified and reviewed chest x ray. Stated ok for discharge. Discharge instructions reviewed with pt at bedside. Understanding verbalized. Pt able to tolerate po intake. transported to car by RN.

## 2020-08-28 NOTE — DISCHARGE INSTRUCTIONS
CT-Guided Lung Biopsy  CT-guided lung biopsy is a procedure to collect small tissue samples from an abnormal area in your lung. During the procedure, an imaging method called CT (computed tomography) is used to show live pictures of your lung. Then a thin needle is used to remove the tissue samples. The samples are tested in a lab for cancer and other problems.     For the biopsy, a thin needle is used to remove samples of tissue from an abnormal area in the lung.   Getting ready for your procedure  Follow any instructions from your healthcare provider.  Tell your provider about any medicines you are taking. It is important for your provider to know if you are taking any blood-thinning medicines or have a bleeding disorder.  You may need to stop taking all or some medicine before the procedure. This includes:  · All prescription medicines  · Blood-thinning medicines (anticoagulants)  · Over-the-counter medicines such as aspirin or ibuprofen  · Street drugs  · Herbs, vitamins, and other supplements  Also tell your provider if you:  · Are pregnant or think you may be pregnant  · Are breastfeeding  · Are allergic to or have intolerances to any medicines  · Have a chronic cough, new cough, or other illness  · Use oxygen therapy at home  · Smoke or drink alcohol on a regular basis  Follow any directions youre given for not eating or drinking before the procedure.  The day of your procedure  The procedure takes about 60 minutes. The entire procedure (including time to prepare and recover) takes several hours. Youll likely go home the same day.  Before the procedure begins:  · An IV (intravenous) line may be put into a vein in your hand or arm. This line supplies fluids and medicines.  · To keep you free of pain during the procedure, you may be given anesthesia. Depending on the type of anesthesia used, you may be awake, drowsy, or in a deep sleep for the procedure.  During the procedure:  · Youll lie on a CT  scan table. You may be on your back, side, or stomach. Pictures of your lung are then taken using the CT scanner. This helps your provider find the best place to position the needle in your lungs.  · A saige is made on your skin where the needle will be inserted (biopsy site). The site is injected with numbing medicine.  · Using the CT pictures as a guide, the needle is passed through the numbed skin between your ribs and into your lung. Samples of tissue are then removed from the abnormal area in your lung. The samples are sent to a lab to be checked for problems.  · When the procedure is complete, the needle is removed. Pressure is applied to the biopsy site to help stop any bleeding. The site is then bandaged.  After the procedure:  · Youll be taken to a room to rest until the anesthesia wears off.  · A chest X-ray may be done. This is to make sure there was no damage to your lungs or the area where the needle was placed.  · When its time for you to go home, have an adult family member or friend ready to drive you.  Recovering at home  You may cough up a small amount of blood shortly after the procedure. Later, you may also have some soreness around the biopsy site. Once at home, follow any instructions youre given. Be sure to:  · Take all medicines as directed.  · Care for the biopsy site as instructed.  · Check for signs of infection at the biopsy site (see below).  · Do not bathe or shower until your provider says it's OK. If you wish, you may wash with a sponge or washcloth.  · Avoid heavy lifting and other strenuous activities as directed.  · If you plan any air travel, ask your provider when you can do so. You may be told not to fly for a few weeks. This is because pressure changes may affect your lungs.  When to call your provider  Call 911 or your local emergency number if you have sudden, severe difficulty breathing. This could be a life-threatening emergency.  Call your healthcare provider for less  severe symptoms that are still concerning. If you are unable to speak with your provider, go to the emergency room if you have any of the following symptoms:  · Fever of 100.4°F (38°C) or higher, or as directed by your provider  · Sudden chest pain, shortness of breath, or fainting  · Coughing up increasing amounts of blood  · Signs of infection at the biopsy site, such as increased redness or swelling, warmth, more pain, bleeding, or bad-smelling drainage   Follow-up  The provider who ordered your test will discuss the biopsy results with you during a follow-up visit. Results are usually ready within 1 to 2 weeks. If more tests or treatments are needed, your provider will discuss these with you.  Risks and possible complications  All procedures have some risk. Possible risks of this procedure include:  · An air leak in your lung (pneumothorax). This may require a stay in the hospital and treatment to re-inflate the lung.  · Bleeding into or around the lung  · Infection in the skin or lung  · Injury to other structures in the chest  · Risks of anesthesia. This will be discussed with you before the procedure.   Date Last Reviewed: 6/11/2015  © 3495-2699 [employer]. [address] [place] [address], [place] [address]. All rights reserved. This information is not intended as a substitute for professional medical care. Always follow your healthcare professional's instructions.

## 2020-08-28 NOTE — DISCHARGE SUMMARY
Radiology Discharge Summary      Hospital Course: No complications    Admit Date: 8/28/2020  Discharge Date: 08/28/2020     Instructions Given to Patient: Yes  Diet: Resume prior diet  Activity: activity as tolerated    Description of Condition on Discharge: Stable  Vital Signs (Most Recent): Temp: 97.6 °F (36.4 °C) (08/28/20 1300)  Pulse: 81 (08/28/20 1300)  Resp: 18 (08/28/20 1300)  BP: 138/65 (08/28/20 1300)  SpO2: (!) 90 % (08/28/20 1300)    Discharge Disposition: Home    Discharge Diagnosis: Post lung biopsy     Follow-up: As needed with PCP    Teofilo Morrison M.D.  PGY-4  Radiology

## 2020-08-28 NOTE — PLAN OF CARE
Patient tolerated procedure well, remains under care of anes., patient to recover in pacu 18, dressing to left back CDI, sample for cultures

## 2020-08-28 NOTE — PROCEDURES
Radiology Post-Procedure Note    Pre Op Diagnosis: Left lung mass    Post Op Diagnosis: Left lung mass    Procedure: left lung biopsy    Procedure performed by: Pavan Dash and Teofilo Morrison    Written Informed Consent Obtained: Yes    Specimen Removed: YES     Estimated Blood Loss: Minimal    Findings:   Using CT guidance a 17g sheath needle was placed into a Left lung mass. 18g Bard Warren used to take 6 core biopsy samples. Specimen sent to pathology.     Additional specimen sent to lab: Yes, for culture    Patient tolerated procedure well.    Teofilo Morrison M.D.  PGY-4  Radiology

## 2020-08-30 NOTE — TRANSFER OF CARE
Anesthesia Transfer of Care Note    Patient: Lucia Matias    Procedure(s) Performed: Procedure(s) (LRB):  [id number] (N/A)    Patient location: PACU    Anesthesia Type: general    Transport from OR: Transported from OR on 6-10 L/min O2 by face mask with adequate spontaneous ventilation    Post pain: adequate analgesia    Post assessment: no apparent anesthetic complications and tolerated procedure well    Post vital signs: stable    Level of consciousness: awake and responds to stimulation    Nausea/Vomiting: no nausea/vomiting    Complications: none    Transfer of care protocol was followed      Last vitals:   Visit Vitals  /65   Pulse 81   Temp 36.4 °C (97.6 °F) (Skin)   Resp 18   SpO2 (!) 90%   Breastfeeding No

## 2020-08-31 ENCOUNTER — PATIENT MESSAGE (OUTPATIENT)
Dept: PULMONOLOGY | Facility: CLINIC | Age: 60
End: 2020-08-31

## 2020-08-31 NOTE — ANESTHESIA POSTPROCEDURE EVALUATION
Anesthesia Post Evaluation    Patient: Lucia Matias    Procedure(s) Performed: Procedure(s) (LRB):  [id number] (N/A)    Final Anesthesia Type: general    Patient location during evaluation: PACU  Patient participation: Yes- Able to Participate  Level of consciousness: awake and alert  Post-procedure vital signs: reviewed and stable  Pain management: adequate  Airway patency: patent    PONV status at discharge: No PONV  Anesthetic complications: no      Cardiovascular status: stable  Respiratory status: spontaneous ventilation and room air  Hydration status: euvolemic  Follow-up not needed.          Vitals Value Taken Time   /65 08/28/20 1300   Temp 36.4 °C (97.6 °F) 08/28/20 1300   Pulse 81 08/28/20 1300   Resp 18 08/28/20 1300   SpO2 90 % 08/28/20 1300         Event Time   Out of Recovery 11:15:00         Pain/Fred Score: No data recorded

## 2020-09-01 LAB
ADEQUACY: NORMAL
BACTERIA SPEC AEROBE CULT: NO GROWTH
FINAL PATHOLOGIC DIAGNOSIS: NORMAL
GROSS: NORMAL

## 2020-09-02 ENCOUNTER — PATIENT OUTREACH (OUTPATIENT)
Dept: ADMINISTRATIVE | Facility: OTHER | Age: 60
End: 2020-09-02

## 2020-09-02 ENCOUNTER — TELEPHONE (OUTPATIENT)
Dept: PULMONOLOGY | Facility: CLINIC | Age: 60
End: 2020-09-02

## 2020-09-02 NOTE — TELEPHONE ENCOUNTER
Breathing ok- finds that she gets short of breath with exertion.  Seeing rheum Friday.  Nodules likely from rheum disease.  She has been off of her Cimzia for some time. She will follow with us for pulm needs, still recommend annual CT screenings, smoking cessation, continued triple therapy for COPD

## 2020-09-04 ENCOUNTER — HOSPITAL ENCOUNTER (OUTPATIENT)
Dept: RADIOLOGY | Facility: HOSPITAL | Age: 60
Discharge: HOME | End: 2020-09-04
Attending: INTERNAL MEDICINE
Payer: COMMERCIAL

## 2020-09-04 ENCOUNTER — OFFICE VISIT (OUTPATIENT)
Dept: RHEUMATOLOGY | Facility: CLINIC | Age: 60
End: 2020-09-04
Payer: COMMERCIAL

## 2020-09-04 ENCOUNTER — OFFICE VISIT (OUTPATIENT)
Dept: DERMATOLOGY | Facility: CLINIC | Age: 60
End: 2020-09-04
Payer: COMMERCIAL

## 2020-09-04 VITALS — TEMPERATURE: 98 F

## 2020-09-04 VITALS
DIASTOLIC BLOOD PRESSURE: 71 MMHG | TEMPERATURE: 99 F | BODY MASS INDEX: 43.45 KG/M2 | HEIGHT: 62 IN | SYSTOLIC BLOOD PRESSURE: 124 MMHG | HEART RATE: 117 BPM | WEIGHT: 236.13 LBS

## 2020-09-04 DIAGNOSIS — R91.8 LUNG NODULES: ICD-10-CM

## 2020-09-04 DIAGNOSIS — L40.0 PSORIASIS VULGARIS: ICD-10-CM

## 2020-09-04 DIAGNOSIS — M13.0 POLYARTHRITIS: Primary | ICD-10-CM

## 2020-09-04 DIAGNOSIS — F33.40 RECURRENT MAJOR DEPRESSIVE DISORDER, IN REMISSION: ICD-10-CM

## 2020-09-04 DIAGNOSIS — R63.5 WEIGHT GAIN: ICD-10-CM

## 2020-09-04 DIAGNOSIS — M13.0 POLYARTHRITIS: ICD-10-CM

## 2020-09-04 DIAGNOSIS — L03.317 CELLULITIS AND ABSCESS OF BUTTOCK: Primary | ICD-10-CM

## 2020-09-04 DIAGNOSIS — M54.9 DORSALGIA, UNSPECIFIED: ICD-10-CM

## 2020-09-04 DIAGNOSIS — L02.31 CELLULITIS AND ABSCESS OF BUTTOCK: Primary | ICD-10-CM

## 2020-09-04 DIAGNOSIS — J84.10 GRANULOMA PRESENT ON BIOPSY OF LUNG: ICD-10-CM

## 2020-09-04 LAB — BACTERIA SPEC ANAEROBE CULT: NORMAL

## 2020-09-04 PROCEDURE — 73030 XR SHOULDER COMPLETE 2 OR MORE VIEWS BILATERAL: ICD-10-PCS | Mod: 26,RT,, | Performed by: RADIOLOGY

## 2020-09-04 PROCEDURE — 72100 XR LUMBAR SPINE AP AND LATERAL: ICD-10-PCS | Mod: 26,,, | Performed by: RADIOLOGY

## 2020-09-04 PROCEDURE — 99999 PR PBB SHADOW E&M-EST. PATIENT-LVL V: ICD-10-PCS | Mod: PBBFAC,,, | Performed by: INTERNAL MEDICINE

## 2020-09-04 PROCEDURE — 3078F DIAST BP <80 MM HG: CPT | Mod: CPTII,S$GLB,, | Performed by: DERMATOLOGY

## 2020-09-04 PROCEDURE — 99214 OFFICE O/P EST MOD 30 MIN: CPT | Mod: 25,S$GLB,, | Performed by: DERMATOLOGY

## 2020-09-04 PROCEDURE — 87070 CULTURE OTHR SPECIMN AEROBIC: CPT

## 2020-09-04 PROCEDURE — 77077 JOINT SURVEY SINGLE VIEW: CPT | Mod: 26,,, | Performed by: RADIOLOGY

## 2020-09-04 PROCEDURE — 73030 X-RAY EXAM OF SHOULDER: CPT | Mod: TC,50

## 2020-09-04 PROCEDURE — 3078F PR MOST RECENT DIASTOLIC BLOOD PRESSURE < 80 MM HG: ICD-10-PCS | Mod: CPTII,S$GLB,, | Performed by: DERMATOLOGY

## 2020-09-04 PROCEDURE — 3074F PR MOST RECENT SYSTOLIC BLOOD PRESSURE < 130 MM HG: ICD-10-PCS | Mod: CPTII,S$GLB,, | Performed by: DERMATOLOGY

## 2020-09-04 PROCEDURE — 10060 I&D ABSCESS SIMPLE/SINGLE: CPT | Mod: S$GLB,,, | Performed by: DERMATOLOGY

## 2020-09-04 PROCEDURE — 3074F SYST BP LT 130 MM HG: CPT | Mod: CPTII,S$GLB,, | Performed by: DERMATOLOGY

## 2020-09-04 PROCEDURE — 10060 PR DRAIN SKIN ABSCESS SIMPLE: ICD-10-PCS | Mod: S$GLB,,, | Performed by: DERMATOLOGY

## 2020-09-04 PROCEDURE — 73030 X-RAY EXAM OF SHOULDER: CPT | Mod: 26,59,LT, | Performed by: RADIOLOGY

## 2020-09-04 PROCEDURE — 77077 XR ARTHRITIS SURVEY: ICD-10-PCS | Mod: 26,,, | Performed by: RADIOLOGY

## 2020-09-04 PROCEDURE — 77077 JOINT SURVEY SINGLE VIEW: CPT | Mod: TC

## 2020-09-04 PROCEDURE — 72100 X-RAY EXAM L-S SPINE 2/3 VWS: CPT | Mod: TC

## 2020-09-04 PROCEDURE — 73030 X-RAY EXAM OF SHOULDER: CPT | Mod: 26,RT,, | Performed by: RADIOLOGY

## 2020-09-04 PROCEDURE — 87075 CULTR BACTERIA EXCEPT BLOOD: CPT

## 2020-09-04 PROCEDURE — 99214 PR OFFICE/OUTPT VISIT, EST, LEVL IV, 30-39 MIN: ICD-10-PCS | Mod: 25,S$GLB,, | Performed by: DERMATOLOGY

## 2020-09-04 PROCEDURE — 99203 PR OFFICE/OUTPT VISIT, NEW, LEVL III, 30-44 MIN: ICD-10-PCS | Mod: S$GLB,,, | Performed by: INTERNAL MEDICINE

## 2020-09-04 PROCEDURE — 99999 PR PBB SHADOW E&M-EST. PATIENT-LVL V: CPT | Mod: PBBFAC,,, | Performed by: INTERNAL MEDICINE

## 2020-09-04 PROCEDURE — 72100 X-RAY EXAM L-S SPINE 2/3 VWS: CPT | Mod: 26,,, | Performed by: RADIOLOGY

## 2020-09-04 PROCEDURE — 99203 OFFICE O/P NEW LOW 30 MIN: CPT | Mod: S$GLB,,, | Performed by: INTERNAL MEDICINE

## 2020-09-04 PROCEDURE — 87076 CULTURE ANAEROBE IDENT EACH: CPT | Mod: 59

## 2020-09-04 PROCEDURE — 87147 CULTURE TYPE IMMUNOLOGIC: CPT

## 2020-09-04 RX ORDER — SULFAMETHOXAZOLE AND TRIMETHOPRIM 800; 160 MG/1; MG/1
1 TABLET ORAL 2 TIMES DAILY
Qty: 14 TABLET | Refills: 0 | Status: SHIPPED | OUTPATIENT
Start: 2020-09-04 | End: 2020-09-11

## 2020-09-04 RX ORDER — FLUOCINONIDE TOPICAL SOLUTION USP, 0.05% 0.5 MG/ML
SOLUTION TOPICAL
Qty: 60 ML | Refills: 3 | Status: SHIPPED | OUTPATIENT
Start: 2020-09-04 | End: 2021-08-16

## 2020-09-04 RX ORDER — SULINDAC 200 MG/1
200 TABLET ORAL 2 TIMES DAILY
Qty: 60 TABLET | Refills: 5 | Status: SHIPPED | OUTPATIENT
Start: 2020-09-04 | End: 2020-10-05

## 2020-09-04 ASSESSMENT — ROUTINE ASSESSMENT OF PATIENT INDEX DATA (RAPID3)
PAIN SCORE: 3.5
FATIGUE SCORE: 4
WHEN YOU AWAKENED IN THE MORNING OVER THE LAST WEEK, PLEASE INDICATE THE AMOUNT OF TIME IT TAKES UNTIL YOU ARE AS LIMBER AS YOU WILL BE FOR THE DAY: 2
MDHAQ FUNCTION SCORE: 1
PATIENT GLOBAL ASSESSMENT SCORE: 5
AM STIFFNESS SCORE: 1, YES
PSYCHOLOGICAL DISTRESS SCORE: 2.2
TOTAL RAPID3 SCORE: 3.94

## 2020-09-04 NOTE — PROGRESS NOTES
Rapid3 Question Responses and Scores 9/1/2020   MDHAQ Score 1   Psychologic Score 2.2   Pain Score 3.5   When you awakened in the morning OVER THE LAST WEEK, did you feel stiff? Yes   If Yes, please indicate the number of hours until you are as limber as you will be for the day 2   Fatigue Score 4   Global Health Score 5   RAPID3 Score 3.94

## 2020-09-04 NOTE — PROGRESS NOTES
Subjective:       Patient ID:  Lucia Matias is a 60 y.o. female who presents for   Chief Complaint   Patient presents with    Psoriasis     diffuse    Mass     left buttock     Psoriasis - Follow-up  Symptom course: worsening  Currently using: TAC 0.1% ointment   Affected locations: diffuse  Signs / symptoms: scaling, spreading, peeling and redness  Severity: moderate    Mass - Initial  Affected locations: left buttock  Duration: 2 days  Signs / symptoms: irritated, inflamed and pain  Severity: moderate to severe  Timing: constant  Aggravated by: pressure (it hurts to sit)  Relieving factors/Treatments tried: nothing      Review of Systems   Musculoskeletal: Positive for arthralgias.   Skin: Positive for rash and dry skin.   Psychiatric/Behavioral: Positive for depressed mood.        Objective:    Physical Exam   Constitutional: She appears well-developed and well-nourished. No distress.   HENT:   Mouth/Throat: Lips normal.    Eyes: Lids are normal.  No conjunctival no injection.   Neurological: She is alert and oriented to person, place, and time. She is not disoriented.   Psychiatric: She has a normal mood and affect.   Skin:   Areas Examined (abnormalities noted in diagram):   Scalp / Hair Palpated and Inspected  Head / Face Inspection Performed  Neck Inspection Performed  Chest / Axilla Inspection Performed  Abdomen Inspection Performed  Genitals / Buttocks / Groin Inspection Performed  Back Inspection Performed  RUE Inspected  LUE Inspection Performed  RLE Inspected  LLE Inspection Performed  Nails and Digits Inspection Performed                      Diagram Legend     Erythematous scaling macule/papule c/w actinic keratosis       Vascular papule c/w angioma      Pigmented verrucoid papule/plaque c/w seborrheic keratosis      Yellow umbilicated papule c/w sebaceous hyperplasia      Irregularly shaped tan macule c/w lentigo     1-2 mm smooth white papules consistent with Milia      Movable  subcutaneous cyst with punctum c/w epidermal inclusion cyst      Subcutaneous movable cyst c/w pilar cyst      Firm pink to brown papule c/w dermatofibroma      Pedunculated fleshy papule(s) c/w skin tag(s)      Evenly pigmented macule c/w junctional nevus     Mildly variegated pigmented, slightly irregular-bordered macule c/w mildly atypical nevus      Flesh colored to evenly pigmented papule c/w intradermal nevus       Pink pearly papule/plaque c/w basal cell carcinoma      Erythematous hyperkeratotic cursted plaque c/w SCC      Surgical scar with no sign of skin cancer recurrence      Open and closed comedones      Inflammatory papules and pustules      Verrucoid papule consistent consistent with wart     Erythematous eczematous patches and plaques     Dystrophic onycholytic nail with subungual debris c/w onychomycosis     Umbilicated papule    Erythematous-base heme-crusted tan verrucoid plaque consistent with inflamed seborrheic keratosis     Erythematous Silvery Scaling Plaque c/w Psoriasis     See annotation      Assessment / Plan:        Cellulitis and abscess of buttock  -     sulfamethoxazole-trimethoprim 800-160mg (BACTRIM DS) 800-160 mg Tab; Take 1 tablet by mouth 2 (two) times daily. for 7 days  Dispense: 14 tablet; Refill: 0  -     Aerobic culture  -     Culture, Anaerobic    Incision and drainage procedure note:  The site was cleaned with alcohol and anesthetized with 1% lidocaine with epinephrine. The site was incised with #15 blade, and the purulent and/or keratinous material was drained along with blunt dissection of any loculated adhesions. Bacterial culture was performed on purulent drainage. The wound was dressed with sterile gauze and paper tape. The patient tolerated the procedure well without complications.  Standard post-procedure care is explained and return precautions are given.    Discussed with the patient that the procedure performed today was not definitive removal of the underlying  suspected cyst. Discussed risks, benefits, and alternatives of excision, and if patient would like definitive removal, this procedure should be scheduled at a later date when the cyst inflammation has resolved.     Psoriasis vulgaris  Recurrent major depressive disorder, in remission  Granuloma present on biopsy of lung  Pt was seen by rheum today who suggested the possibility of Otezla; however, given her recent episode of depression, I do not think that Otezla would be her best treatment option.   Additionally, given the potentially infectious etiology of her lung nodules, I would like to have input from ID prior to placing pt on a biologic as she desires.  Will continue topical steroids for now.  -     fluocinonide (LIDEX) 0.05 % external solution; Apply to affected areas of body  once to twice daily as needed for psoriasis.  Dispense: 60 mL; Refill: 3   Pt to mix in a jar of CeraVe cream.      Follow up in about 4 weeks (around 10/2/2020) for follow up, or sooner if symptoms worsening or not improving.

## 2020-09-04 NOTE — PROGRESS NOTES
History of present illness:  60-year-old female developed psoriasis in .  It was on her arms, legs, and scalp.  She also had joint pain, especially in the hands.  She had swelling of the MCPs and PIP is.  She had some pain in the elbows.  She was seeing Dr. Mitchell.  She was told she had a positive rheumatoid factor.  She never saw a rheumatologist.  She was initially placed on methotrexate but had side effects.  She had been on Enbrel and Humira.  More recently she had been on Cimzia.  This had been helping both her joint pain and her psoriasis.    One year ago she developed some dyspnea on exertion.  She had a CT scan of the chest which was abnormal showing multiple nodules.  She was scheduled for biopsy but it was delayed until the past week.  She was taken off Cimzia because of the abnormal CT scan.  Since then her psoriasis has been worse.  She also developed increased aching all over.  She states her hands are swollen in the morning but she has no persistent joint swelling.  She has 2 hr of morning stiffness.  She has pain with activity, especially the left hip.  It does wake her up at night.    She does admit to chronic low back pain predating the onset of psoriasis by at least 20 years.  The pain is intermittent.  It does not radiate down the legs.    She has been taking Tylenol or Aleve without much response.  She has not tried heat, ice, or topical medications.    She has had no fevers.  She has occasional frontal headache when she does not eat.  She has no conjunctivitis or oral ulcers.  She has dry mouth since starting amitriptyline but no dry eye.  She has no Raynaud's phenomena, pleurisy, vaginal discharge or ulcers, chronic or bloody diarrhea.  She has no numbness or tingling.  She has no thrombophlebitis.  She is a  4 para 3 with a first-trimester miscarriage.  She does not know her family history.    Systems review:  General:  Weight has increased 75 lb over the past 18 months  Respiratory:   She has a chronic cough in the morning productive of clear sputum.  She has no history of hemoptysis.  She has a history of nasal polyps.  She has no chronic sinusitis.  She has no epistaxis.  GI:  No abdominal pain or peptic ulcer disease.  She has a prior history of pancreatitis.  No liver problems.  :  She had previous bladder suspension but has had no problems recently.    Physical examination:  Skin:  She has extensive psoriasis of the arms and legs.  ENT:  No conjunctivitis or oral ulcers.  Adequate tears in saliva.  Chest:  Clear to auscultation and percussion  Cardiac:  No murmurs, gallops, rubs  Abdomen:  No organomegaly or masses.  No tenderness to palpation  Extremities:  No pedal edema.  No clubbing or cyanosis.  No subcutaneous nodules.  Musculoskeletal:  Cervical spine has full range of motion without pain on range of motion.  She has pain on range of motion of the left shoulder.  She is tender over the acromioclavicular joint.  Right shoulder is unremarkable.  Elbows, wrists, small joints the hands are unremarkable.  She has no evidence of synovitis or arthritic deformities.  Lumbar spine has good range of motion without pain on range of motion.  Straight leg raising is negative.  Hips, knees, ankles, small joints the feet are unremarkable.  Pathology:  Biopsy shows noncaseating granuloma    Assessment:  1.  Psoriasis  2.  She has diffuse joint aching.  She has no evidence of joint inflammation.  I cannot confirm the presence of either rheumatoid arthritis or psoriatic arthritis  3.  Multiple pulmonary nodules which are granulomas.  Rheumatoid arthritis can cause granulomas of the lung but usually they are seen in association with peripheral rheumatoid nodules, which she does not have.  I do need to rule out other forms of granulomatous disease such as vasculitis or sarcoid.  Her bacterial cultures are normal but the mycobacterial and fungal cultures are not completed as yet.  This may take 6  weeks.    Plans:  1.  Laboratory studies and x-rays are obtained  2.  I have referred her to Infectious Disease to make sure we are not missing an infection  3.  I placed her on Clinoril 200 mg twice daily for her arthritic pain  4.  I have referred her to bariatric for her weight gain  5.  Would not recommend restarting biologic agents until we have ruled out infection or sarcoid.  I wonder if she would be a candidate for Otezla for her psoriasis since this does not increase the risk of infection and can be used along with biologic agents.  6.  Return to see me in 1 month    Assessment:    Answers for HPI/ROS submitted by the patient on 9/1/2020   fever: No  eye redness: No  mouth sores: No  headaches: No  shortness of breath: Yes  chest pain: No  trouble swallowing: No  diarrhea: No  constipation: No  unexpected weight change: No  genital sore: No  dysuria: No  During the last 3 days, have you had a skin rash?: Yes  Bruises or bleeds easily: No  cough: No

## 2020-09-07 LAB — BACTERIA SPEC AEROBE CULT: ABNORMAL

## 2020-09-08 ENCOUNTER — PATIENT MESSAGE (OUTPATIENT)
Dept: DERMATOLOGY | Facility: CLINIC | Age: 60
End: 2020-09-08

## 2020-09-08 ENCOUNTER — TELEPHONE (OUTPATIENT)
Dept: DERMATOLOGY | Facility: CLINIC | Age: 60
End: 2020-09-08

## 2020-09-08 DIAGNOSIS — A49.1 STREPTOCOCCUS GROUP B INFECTION: Primary | ICD-10-CM

## 2020-09-08 RX ORDER — PENICILLIN V POTASSIUM 500 MG/1
500 TABLET, FILM COATED ORAL EVERY 6 HOURS
Qty: 28 TABLET | Refills: 0 | Status: SHIPPED | OUTPATIENT
Start: 2020-09-08 | End: 2020-09-15

## 2020-09-08 NOTE — TELEPHONE ENCOUNTER
Called PT in regards to culture results. LVM for PT to call back at their convenience to discuss.

## 2020-09-10 ENCOUNTER — TELEPHONE (OUTPATIENT)
Dept: DERMATOLOGY | Facility: CLINIC | Age: 60
End: 2020-09-10

## 2020-09-10 NOTE — TELEPHONE ENCOUNTER
----- Message from Minerva Lara MD sent at 9/10/2020 12:03 PM CDT -----  Please call the lab and ask if they have sensitivities for the anaerobic culture that grew prevotella.  kk

## 2020-09-11 ENCOUNTER — CLINICAL SUPPORT (OUTPATIENT)
Dept: INFECTIOUS DISEASES | Facility: CLINIC | Age: 60
End: 2020-09-11
Payer: COMMERCIAL

## 2020-09-11 ENCOUNTER — OFFICE VISIT (OUTPATIENT)
Dept: INFECTIOUS DISEASES | Facility: CLINIC | Age: 60
End: 2020-09-11
Payer: COMMERCIAL

## 2020-09-11 ENCOUNTER — LAB VISIT (OUTPATIENT)
Dept: LAB | Facility: HOSPITAL | Age: 60
End: 2020-09-11
Attending: INTERNAL MEDICINE
Payer: COMMERCIAL

## 2020-09-11 VITALS
SYSTOLIC BLOOD PRESSURE: 124 MMHG | BODY MASS INDEX: 42.44 KG/M2 | HEIGHT: 62 IN | TEMPERATURE: 98 F | HEART RATE: 70 BPM | WEIGHT: 230.63 LBS | DIASTOLIC BLOOD PRESSURE: 70 MMHG

## 2020-09-11 DIAGNOSIS — L40.0 PSORIASIS VULGARIS: ICD-10-CM

## 2020-09-11 DIAGNOSIS — M13.0 POLYARTHRITIS: ICD-10-CM

## 2020-09-11 DIAGNOSIS — R91.8 LUNG NODULES: Primary | ICD-10-CM

## 2020-09-11 PROCEDURE — 87340 HEPATITIS B SURFACE AG IA: CPT

## 2020-09-11 PROCEDURE — 3008F PR BODY MASS INDEX (BMI) DOCUMENTED: ICD-10-PCS | Mod: CPTII,S$GLB,, | Performed by: INTERNAL MEDICINE

## 2020-09-11 PROCEDURE — 3008F BODY MASS INDEX DOCD: CPT | Mod: CPTII,S$GLB,, | Performed by: INTERNAL MEDICINE

## 2020-09-11 PROCEDURE — 86803 HEPATITIS C AB TEST: CPT

## 2020-09-11 PROCEDURE — 87449 NOS EACH ORGANISM AG IA: CPT

## 2020-09-11 PROCEDURE — 86790 VIRUS ANTIBODY NOS: CPT

## 2020-09-11 PROCEDURE — 3074F SYST BP LT 130 MM HG: CPT | Mod: CPTII,S$GLB,, | Performed by: INTERNAL MEDICINE

## 2020-09-11 PROCEDURE — 86403 PARTICLE AGGLUT ANTBDY SCRN: CPT

## 2020-09-11 PROCEDURE — 86703 HIV-1/HIV-2 1 RESULT ANTBDY: CPT

## 2020-09-11 PROCEDURE — 86706 HEP B SURFACE ANTIBODY: CPT

## 2020-09-11 PROCEDURE — 87305 ASPERGILLUS AG IA: CPT

## 2020-09-11 PROCEDURE — 3078F DIAST BP <80 MM HG: CPT | Mod: CPTII,S$GLB,, | Performed by: INTERNAL MEDICINE

## 2020-09-11 PROCEDURE — 90471 FLU VACCINE (QUAD) GREATER THAN OR EQUAL TO 3YO PRESERVATIVE FREE IM: ICD-10-PCS | Mod: S$GLB,,, | Performed by: INTERNAL MEDICINE

## 2020-09-11 PROCEDURE — 99999 PR PBB SHADOW E&M-EST. PATIENT-LVL IV: CPT | Mod: PBBFAC,,, | Performed by: INTERNAL MEDICINE

## 2020-09-11 PROCEDURE — 90686 FLU VACCINE (QUAD) GREATER THAN OR EQUAL TO 3YO PRESERVATIVE FREE IM: ICD-10-PCS | Mod: S$GLB,,, | Performed by: INTERNAL MEDICINE

## 2020-09-11 PROCEDURE — 99205 OFFICE O/P NEW HI 60 MIN: CPT | Mod: 25,S$GLB,, | Performed by: INTERNAL MEDICINE

## 2020-09-11 PROCEDURE — 90471 IMMUNIZATION ADMIN: CPT | Mod: S$GLB,,, | Performed by: INTERNAL MEDICINE

## 2020-09-11 PROCEDURE — 36415 COLL VENOUS BLD VENIPUNCTURE: CPT

## 2020-09-11 PROCEDURE — 87385 HISTOPLASMA CAPSUL AG IA: CPT

## 2020-09-11 PROCEDURE — 3078F PR MOST RECENT DIASTOLIC BLOOD PRESSURE < 80 MM HG: ICD-10-PCS | Mod: CPTII,S$GLB,, | Performed by: INTERNAL MEDICINE

## 2020-09-11 PROCEDURE — 99205 PR OFFICE/OUTPT VISIT, NEW, LEVL V, 60-74 MIN: ICD-10-PCS | Mod: 25,S$GLB,, | Performed by: INTERNAL MEDICINE

## 2020-09-11 PROCEDURE — 86635 COCCIDIOIDES ANTIBODY: CPT

## 2020-09-11 PROCEDURE — 99999 PR PBB SHADOW E&M-EST. PATIENT-LVL IV: ICD-10-PCS | Mod: PBBFAC,,, | Performed by: INTERNAL MEDICINE

## 2020-09-11 PROCEDURE — 3074F PR MOST RECENT SYSTOLIC BLOOD PRESSURE < 130 MM HG: ICD-10-PCS | Mod: CPTII,S$GLB,, | Performed by: INTERNAL MEDICINE

## 2020-09-11 PROCEDURE — 90686 IIV4 VACC NO PRSV 0.5 ML IM: CPT | Mod: S$GLB,,, | Performed by: INTERNAL MEDICINE

## 2020-09-11 NOTE — PROGRESS NOTES
Infectious Diseases Clinic Note    Subjective:       Patient ID: Lucia Matias is a 60 y.o. female.    Chief Complaint: No chief complaint on file.    HPI      59 y/o F h/o COPD, HLD, HTN, Psoriasis dx in 2005, RA on enbrel, humira in past most recently on cimzia which has helped her joint pain and psoriasis, also with pulmonary nodules on L 1.7cm (1/2/20) s/p IR biopsy of lesion 8/28/20 with large necrotizing granuloma on path with GMS and AFB negative, with cultures pending. She is here for evaluation of lung nodules.  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 [employer]  No travel outside of [place]  Went to [place] as child   [place]  No known tB exposure  0.5 pack per day, no injection drugs    1/2020 started to have SOB which was after her influenza infection       fam hx N/a    Past Medical History:   Diagnosis Date    Anxiety     Arthritis     COPD (chronic obstructive pulmonary disease)     Diverticular disease of colon 06/06/2017    Diverticulitis     HLD (hyperlipidemia)     Hypertension     Pancreatitis     Psoriasis     Rheumatoid arthritis     Severe obesity (BMI 35.0-39.9) with comorbidity        Social History     Socioeconomic History    Marital status:      Spouse name: Not on file    Number of children: Not on file    Years of education: Not on file    Highest education level: Not on file   Occupational History    Occupation: clinical research coordinator    Social Needs    Financial resource strain: Somewhat hard    Food insecurity     Worry: Sometimes true     Inability: Patient refused    Transportation needs     Medical: No     Non-medical: No    Tobacco Use    Smoking status: Current Every Day Smoker     Packs/day: 0.50     Years: 20.00     Pack years: 10.00     Types: Cigarettes    Smokeless tobacco: Never Used   Substance and Sexual Activity    Alcohol use: No     Frequency: Never     Drinks per session: Patient refused     Binge frequency: Never    Drug use: No    Sexual activity: Yes     Partners: Male   Lifestyle    Physical activity     Days per week: 0 days     Minutes per session: 0 min    Stress: Very much   Relationships    Social connections     Talks on phone: Three times a week     Gets together: Once a week     Attends Sikhism service: Not on file     Active member of club or organization: Yes     Attends meetings of clubs or organizations: Not on file     Relationship status:    Other Topics Concern    Are you pregnant or think you may be? No    Breast-feeding No   Social History Narrative    Not on file         Current Outpatient Medications:     amitriptyline (ELAVIL) 25 MG tablet, Take 1 tablet (25 mg total) by mouth nightly as needed for Insomnia., Disp: 30 tablet, Rfl: 2    atorvastatin (LIPITOR) 20 MG tablet, Take 1 tablet (20 mg total) by mouth every evening., Disp: 90 tablet, Rfl: 3    busPIRone (BUSPAR) 30 MG Tab, Take 1 tablet (30 mg total) by mouth 2 (two) times daily., Disp: 180 tablet, Rfl: 0    clonazePAM (KLONOPIN) 1 MG tablet, Take 1 tablet (1 mg total) by mouth 2 (two) times daily as needed for Anxiety., Disp: 60 tablet, Rfl: 2    fluocinonide (LIDEX) 0.05 % external solution, Apply to affected areas of body  once to twice daily as needed for psoriasis., Disp: 60 mL, Rfl: 3    fluticasone-umeclidin-vilanter (TRELEGY ELLIPTA) 100-62.5-25 mcg DsDv, Inhale 1 puff into the lungs once daily., Disp: 60 each, Rfl: 11    lisinopriL 10 MG tablet, Take 1 tablet (10 mg total) by mouth once daily. (Patient taking differently: Take 10 mg by mouth every morning. ), Disp: 90 tablet, Rfl: 3    metoprolol  tartrate (LOPRESSOR) 50 MG tablet, Take 1 tablet by mouth twice a day with food, Disp: 180 tablet, Rfl: 3    penicillin v potassium (VEETID) 500 MG tablet, Take 1 tablet (500 mg total) by mouth every 6 (six) hours. for 7 days, Disp: 28 tablet, Rfl: 0    sulindac (CLINORIL) 200 MG Tab, Take 1 tablet (200 mg total) by mouth 2 (two) times daily., Disp: 60 tablet, Rfl: 5    triamcinolone acetonide 0.1% (KENALOG) 0.1 % ointment, Apply to affected areas of body BID prn rash. Do not use on face, underarms, or groin., Disp: 454 g, Rfl: 1    zolpidem (AMBIEN) 5 MG Tab, Take 1 tablet (5 mg total) by mouth every evening., Disp: 30 tablet, Rfl: 2    albuterol (PROVENTIL/VENTOLIN HFA) 90 mcg/actuation inhaler, Inhale 1-2 puffs into the lungs every 6 (six) hours as needed for Wheezing. Rescue (Patient not taking: Reported on 9/11/2020), Disp: 90 g, Rfl: 0    albuterol-ipratropium (DUO-NEB) 2.5 mg-0.5 mg/3 mL nebulizer solution, Take 3 mLs by nebulization every 6 (six) hours as needed for Wheezing. Rescue (Patient not taking: Reported on 9/11/2020), Disp: 1 Box, Rfl: 5    budesonide-formoterol 160-4.5 mcg (SYMBICORT) 160-4.5 mcg/actuation HFAA, Inhale 2 puffs into the lungs every 12 (twelve) hours. Controller (Patient not taking: Reported on 1/16/2020), Disp: 30.6 g, Rfl: 2    doxycycline (VIBRAMYCIN) 100 MG Cap, Take 1 capsule (100 mg total) by mouth 2 (two) times daily. (Patient not taking: Reported on 2/6/2020), Disp: 14 capsule, Rfl: 0    emtricitabine-tenofovir 200-300 mg (TRUVADA) 200-300 mg Tab, Take 1 tablet by mouth once daily., Disp: 90 tablet, Rfl: 3    nystatin (MYCOSTATIN) cream, Apply topically 2 (two) times daily. (Patient not taking: Reported on 9/11/2020), Disp: 30 g, Rfl: 3    predniSONE (DELTASONE) 20 MG tablet, Take 2 tablets (40 mg total) by mouth once daily., Disp: 14 tablet, Rfl: 0    sulfamethoxazole-trimethoprim 800-160mg (BACTRIM DS) 800-160 mg Tab, Take 1 tablet by mouth 2 (two) times daily.  for 7 days (Patient not taking: Reported on 9/11/2020), Disp: 14 tablet, Rfl: 0    Current Facility-Administered Medications:     DOBUTamine 500mg in D5W 250mL infusion (premix), 10 mcg/kg/min, Intravenous, Continuous, Saige Bee MD, Last Rate: 114 mL/hr at 06/26/19 1629, 40 mcg/kg/min at 06/26/19 1629    Facility-Administered Medications Ordered in Other Visits:     0.9%  NaCl infusion, , Intravenous, Continuous, Sirena Matias PA-C    Review of Systems   Constitutional: Positive for activity change. Negative for chills and fever.   HENT: Negative for congestion, mouth sores, rhinorrhea, sinus pressure and sore throat.    Eyes: Negative for photophobia, pain and redness.   Respiratory: Negative for cough, chest tightness, shortness of breath and wheezing.    Cardiovascular: Negative for chest pain and leg swelling.   Gastrointestinal: Negative for abdominal distention, abdominal pain, diarrhea, nausea and vomiting.   Endocrine: Negative for polyuria.   Genitourinary: Negative for decreased urine volume, dysuria and flank pain.   Musculoskeletal: Positive for arthralgias, joint swelling and myalgias. Negative for neck pain.   Skin: Positive for rash. Negative for color change.   Allergic/Immunologic: Negative for food allergies.   Neurological: Negative for dizziness, weakness and headaches.   Hematological: Negative for adenopathy.   Psychiatric/Behavioral: Negative for agitation and confusion. The patient is not nervous/anxious.            Objective:      Vitals:    09/11/20 1325   BP: 124/70   Pulse: 70   Temp: 97.9 °F (36.6 °C)     Physical Exam  Constitutional:       Appearance: She is well-developed.   HENT:      Head: Normocephalic and atraumatic.   Eyes:      Pupils: Pupils are equal, round, and reactive to light.   Neck:      Musculoskeletal: Normal range of motion and neck supple.   Cardiovascular:      Rate and Rhythm: Normal rate.   Pulmonary:      Effort: Pulmonary effort is normal.       Breath sounds: Normal breath sounds.   Abdominal:      General: Bowel sounds are normal.      Palpations: Abdomen is soft.   Musculoskeletal:         General: No tenderness.   Skin:     General: Skin is warm and dry.      Comments: Psoriatic skin lesions diffusely   Neurological:      Mental Status: She is alert and oriented to person, place, and time.             Assessment/Plan:       1. Psoriasis vulgaris    2. Polyarthritis          59 y/o F h/o COPD, HLD, HTN, Psoriasis dx in 2005, RA on enbrel, humira in past most recently on cimzia which has helped her joint pain and psoriasis, also with pulmonary nodules on L 1.7cm (1/2/20) s/p IR biopsy of lesion 8/28/20 with large necrotizing granuloma on path with GMS and AFB negative, with cultures pending. She is here for evaluation of lung nodules.  Of note she was seen by derm 9/4 for cellulitis/abscess of buttock s/p I&D given empiric tmpsmx with cultures ultimately growing GBS and prevotella sp on PCN and improving  - do not think small nodule is infectious particularly with stains negative for AFB/GMS but will check  Fungal markers.  Additionally recent CXR does not reveal any progression of pulmonary nodule or have other concerning findings one would expect with an uncontrolled fungal infection (particularly in setting of immunosuppression (though last dose in 1/2020, has longer lasting immune effects)).  - even if non-tuberculous mycobacterium subsequently grows, we can decide if needs therapy and would not be absolute contraindication to IS  - Flu vaccine today  - Needs prevnar f/b pneumovax after 8 weeks  - if fungal markers sent are negative, there is no absolute obvious active infectious contraindication to immunosuppression to treat patients underlying autoimmune processes.  If there is clinical concern for worsening pulmonary process after initiation of immunosuppressive therapy, then would recommend CT chest imaging and f/u with me.  Patient is well known  to me through her husbands care and will reach out to me if any concerns  - spent over 45 minutes re above

## 2020-09-11 NOTE — LETTER
September 14, 2020      Naseem Abdi MD  [address]  [place] [address]           [facility] [address] - Infectious Disease 1st Fl  [address]  [facility] [address]  Phone: [phone]  Fax: [phone]          Patient: Lucia Matias   MR Number: [id number]   YOB: 1960   Date of Visit: 9/11/2020       Dear Dr. Naseem Abdi:    Thank you for referring Lucia Matias to me for evaluation. Attached you will find relevant portions of my assessment and plan of care.    If you have questions, please do not hesitate to call me. I look forward to following Lucia Matias along with you.    Sincerely,    Lopez Juarez MD    Enclosure  CC:  No Recipients    If you would like to receive this communication electronically, please contact [email or web address] or [phone] to request more information on [facility] access.    For providers and/or their staff who would like to refer a patient to [facility], please contact us through our one-stop-shop provider referral line, [facility], at [phone].    If you feel you have received this communication in error or would no longer like to receive these types of communications, please e-mail [email or web address]

## 2020-09-12 LAB — CRYPTOC AG SER QL LA: NEGATIVE

## 2020-09-14 LAB
GALACTOMANNAN AG SERPL IA-ACNC: <0.5 INDEX
HBV SURFACE AB SER-ACNC: POSITIVE M[IU]/ML
HBV SURFACE AG SERPL QL IA: NEGATIVE
HCV AB SERPL QL IA: NEGATIVE
HEPATITIS A ANTIBODY, IGG: NEGATIVE
HIV 1+2 AB+HIV1 P24 AG SERPL QL IA: NEGATIVE

## 2020-09-15 ENCOUNTER — PATIENT MESSAGE (OUTPATIENT)
Dept: DERMATOLOGY | Facility: CLINIC | Age: 60
End: 2020-09-15

## 2020-09-15 DIAGNOSIS — Z79.899 LONG-TERM USE OF HIGH-RISK MEDICATION: ICD-10-CM

## 2020-09-15 DIAGNOSIS — L40.0 PSORIASIS VULGARIS: Primary | ICD-10-CM

## 2020-09-15 RX ORDER — LISINOPRIL 10 MG/1
10 TABLET ORAL DAILY
Qty: 90 TABLET | Refills: 3 | Status: SHIPPED | OUTPATIENT
Start: 2020-09-15 | End: 2020-12-14 | Stop reason: SDUPTHER

## 2020-09-16 ENCOUNTER — TELEPHONE (OUTPATIENT)
Dept: PHARMACY | Facility: CLINIC | Age: 60
End: 2020-09-16

## 2020-09-16 LAB
BACTERIA SPEC ANAEROBE CULT: ABNORMAL
BACTERIA SPEC ANAEROBE CULT: ABNORMAL
BLASTOMYCES AG INTERP: NEGATIVE
BLASTOMYCES AG RESULT: NORMAL NG/ML
HISTOPLASMA AG VALUE: 0 NG/ML
HISTOPLASMA ANTIGEN URINE: NEGATIVE

## 2020-09-16 RX ORDER — SECUKINUMAB 150 MG/ML
INJECTION SUBCUTANEOUS
Qty: 6 ML | Refills: 1 | Status: SHIPPED | OUTPATIENT
Start: 2020-09-16 | End: 2021-05-04 | Stop reason: SDUPTHER

## 2020-09-16 RX ORDER — SECUKINUMAB 150 MG/ML
INJECTION SUBCUTANEOUS
Qty: 10 ML | Refills: 0 | Status: SHIPPED | OUTPATIENT
Start: 2020-09-16 | End: 2021-03-01

## 2020-09-18 LAB
C IMMITIS AB TITR SER CF: NEGATIVE {TITER}
C IMMITIS IGG SER QL: NEGATIVE
C IMMITIS IGM SER QL: NEGATIVE

## 2020-09-24 ENCOUNTER — PATIENT MESSAGE (OUTPATIENT)
Dept: DERMATOLOGY | Facility: CLINIC | Age: 60
End: 2020-09-24

## 2020-09-24 NOTE — TELEPHONE ENCOUNTER
DOCUMENTATION ONLY:  Cosentyx prior authorization approved x 6 fills.   Dates: 9/16/20 through 4/4/21  Case ID: [id number]  Co pay: $580.70  Co pay assistance IS required  Forwarded to [facility] Financial assistance team for review.  CEB

## 2020-09-25 ENCOUNTER — TELEPHONE (OUTPATIENT)
Dept: PHARMACY | Facility: CLINIC | Age: 60
End: 2020-09-25

## 2020-09-25 NOTE — TELEPHONE ENCOUNTER
Initial Cosentyx SensoReady Pens 150mg consult completed on . Cosentyx SensoReady Pens 150mg will be shipped on  to arrive at patient's home on  via [facility]. $580.40 copay. Patient will start Cosentyx SensoReady Pens 150mg on . Address confirmed, CC on file. Confirmed 2 patient identifiers - name and . Therapy Appropriate.    - Cosentyx SensoReady Pens 150mg:  Inject 2 pen (300mg) every week x 5 weeks, then 2 pen (300mg) every 4 weeks.    -Storage: Refrigerate between 36-46 degrees Fahrenheit  Use within ONE HOUR of taking out of fridge.    -Injection technique:  - Wash hands before and after injection.  - Monthly RX will come with gauze, bandaids, and alcohol swabs.  - Patient may inject in either the tops of the thighs, abdomen- but at least 2 inches away from her belly button, or the outer part of her upper arm. Patient was instructed to rotate injections sites.  - Examine device to ensure no particulates, cloudiness, etc.  - Patient is to wipe down the injection site with the alcohol pad, wait to dry.  Gently squeeze the area of the cleaned skin and hold it firmly. Place the pen flat at a 90 degree angle against the raised area of skin that is being squeezed, and then push down firmly on the pen to start the injection. The 1st 'click' indicates the start and the second 'click' indicates that the injection is almost complete. A green indicator will fill the window when completed, and pen can be removed.  - Patient will use sharps container; once full, per [place] law, she may lock the sharps container and place in her trash. She can then contact the Pharmacy and we will replace the sharps at no additional charge.    -Potential Side effects:  Injection site reactions, diarrhea, cold like symptoms.  Patient advised to call if any signs of allergic reaction, infection, or use of live vaccines.    Patient verbalized understanding. Compliance stressed. Patient advised to keep a calendar marking dates of  injections to ensure better compliance. Patient advised to call myself or provider should any questions arise. Patient plans to start Cosentyx SensoReady Pens on 9/29. Consultation included: indication; goals of treatment; administration; storage and handling; side effects; how to handle side effects; the importance of compliance; how to handle missed doses; the importance of laboratory monitoring; the importance of keeping all follow up appointments. Patient understands to report any medication changes to [facility] and provider. All questions answered and addressed to patients satisfaction. [facility] to contact patient in 3 weeks for refills.

## 2020-09-28 ENCOUNTER — OFFICE VISIT (OUTPATIENT)
Dept: BARIATRICS | Facility: CLINIC | Age: 60
End: 2020-09-28
Payer: COMMERCIAL

## 2020-09-28 VITALS
SYSTOLIC BLOOD PRESSURE: 134 MMHG | RESPIRATION RATE: 18 BRPM | BODY MASS INDEX: 42.63 KG/M2 | HEART RATE: 70 BPM | WEIGHT: 231.69 LBS | DIASTOLIC BLOOD PRESSURE: 70 MMHG | HEIGHT: 62 IN

## 2020-09-28 DIAGNOSIS — Z72.0 TOBACCO USER: ICD-10-CM

## 2020-09-28 DIAGNOSIS — E78.2 MIXED HYPERLIPIDEMIA: ICD-10-CM

## 2020-09-28 DIAGNOSIS — J44.1 CHRONIC OBSTRUCTIVE PULMONARY DISEASE WITH ACUTE EXACERBATION: ICD-10-CM

## 2020-09-28 DIAGNOSIS — I10 ESSENTIAL HYPERTENSION: ICD-10-CM

## 2020-09-28 PROCEDURE — 99999 PR PBB SHADOW E&M-EST. PATIENT-LVL V: ICD-10-PCS | Mod: PBBFAC,,, | Performed by: PHYSICIAN ASSISTANT

## 2020-09-28 PROCEDURE — 3008F BODY MASS INDEX DOCD: CPT | Mod: CPTII,S$GLB,, | Performed by: PHYSICIAN ASSISTANT

## 2020-09-28 PROCEDURE — 3075F SYST BP GE 130 - 139MM HG: CPT | Mod: CPTII,S$GLB,, | Performed by: PHYSICIAN ASSISTANT

## 2020-09-28 PROCEDURE — 3078F PR MOST RECENT DIASTOLIC BLOOD PRESSURE < 80 MM HG: ICD-10-PCS | Mod: CPTII,S$GLB,, | Performed by: PHYSICIAN ASSISTANT

## 2020-09-28 PROCEDURE — 99999 PR PBB SHADOW E&M-EST. PATIENT-LVL V: CPT | Mod: PBBFAC,,, | Performed by: PHYSICIAN ASSISTANT

## 2020-09-28 PROCEDURE — 99205 PR OFFICE/OUTPT VISIT, NEW, LEVL V, 60-74 MIN: ICD-10-PCS | Mod: S$GLB,,, | Performed by: PHYSICIAN ASSISTANT

## 2020-09-28 PROCEDURE — 3078F DIAST BP <80 MM HG: CPT | Mod: CPTII,S$GLB,, | Performed by: PHYSICIAN ASSISTANT

## 2020-09-28 PROCEDURE — 99205 OFFICE O/P NEW HI 60 MIN: CPT | Mod: S$GLB,,, | Performed by: PHYSICIAN ASSISTANT

## 2020-09-28 PROCEDURE — 3008F PR BODY MASS INDEX (BMI) DOCUMENTED: ICD-10-PCS | Mod: CPTII,S$GLB,, | Performed by: PHYSICIAN ASSISTANT

## 2020-09-28 PROCEDURE — 3075F PR MOST RECENT SYSTOLIC BLOOD PRESS GE 130-139MM HG: ICD-10-PCS | Mod: CPTII,S$GLB,, | Performed by: PHYSICIAN ASSISTANT

## 2020-09-28 RX ORDER — ATORVASTATIN CALCIUM 20 MG/1
20 TABLET, FILM COATED ORAL NIGHTLY
Qty: 90 TABLET | Refills: 3 | Status: SHIPPED | OUTPATIENT
Start: 2020-09-28 | End: 2021-10-04 | Stop reason: SDUPTHER

## 2020-09-28 NOTE — PATIENT INSTRUCTIONS
Prior to surgery you will need to complete:  - Dietitian consult and follow up appointments as needed  - Pulmonary clearance  - Labs  - Chest X-ray  - EKG  - Psychological evaluation, Please call psychiatry [phone] to schedule  - Nicotine testing       In preparation for bariatric surgery, please complete the following:   · Discuss your current medications with your primary care provider, remember your medications will need to be crushed, chewable, or in liquid form for the first 3-6 months after a gastric bypass or sleeve.  For a gastric band, your medications will need to be crushed indefinitely.    · If you take a blood thinner such as: Coumadin (warfarin), Pradaxa (dabigatran), or Plavix (clopidogrel), you will need to speak with your prescribing provider on how or if this medication can be stopped before surgery.   · If you take a medication for depression or anxiety, you will need to begin crushing or opening the capsule 1-3 months prior to surgery.  Remember to discuss this with the psychologist or psychiatrist that you see.   · If you take medication for arthritis on a daily basis that is considered a non-steroidal anti-inflammatory (NSAID), please discuss with your prescribing physician an alternative medication.  After having gastric bypass or gastric sleeve, this group of medications is not appropriate to take due to increased risk of bleeding stomach ulcers.      DEFINITIONS  Appointments: Pre-scheduled meetings or consultations with any physician, advanced practice provider, dietitian, or psychologist, and labs, imaging studies, sleep studies, etc.   Late cancellation: Cancelling an appointment 24-48 hours prior to scheduled time.  No-Show appointment:  is when    You do NOT arrive to your appointment at the time its scheduled.   You call to cancel or cancel via [facility] less than 24 hours in advance of your scheduled appointment   You show up 15 minutes AFTER your scheduled appointment time  without any notification of being late.     POLICY  1. You are allowed up to 3 cancellations for appointments.    After 3 cancellations your case will be placed on hold for 2 months and after that time you can resume the program.   2. You are allowed only 1 no-show for an appointment.    You will be re-scheduled one time and if there is a 2nd no-show at any point, your case will be placed on hold for 3 months.  After 3 months you can resume the program.     3. Upon resuming the program after being placed on hold for either above mentioned reasons, if you have a late cancel or no show for any appointment, the bariatric team will review if youre an appropriate candidate for surgery at the monthly meeting.

## 2020-09-28 NOTE — PROGRESS NOTES
BARIATRIC NEW PATIENT EVALUATION    CHIEF COMPLAINT:   Morbid Obesity Body mass index is 42.38 kg/m². and inability to lose weight.    HISTORY OF PRESENT ILLNESS:  Lucia Matias  is a 60 y.o. female presenting for morbid obesity Body mass index is 42.38 kg/m². and inability to lose weight.Pt states that she has not always been overweight, states that in the last five years life has been difficult with a lot of death so she has gained tremendous amounts of weight. Believes that she eats out of depression and anxiety. States now she finds that she has aches and pains that she had not previously had prior to the weight.   The patient has tried portion control. Presently at highest weight. Lowest adult weight 145 lbs    Pt goal: 130's   IBW: 135 lb    CXR: 2020     Impression:     No acute process seen.  ECHO: 2019  · There were no arrhythmias during stress.  · The EKG portion of this study is negative for myocardial ischemia.  · The patient reached the end of the protocol.  · Mild right ventricular enlargement.  · Normal left ventricular systolic function. The estimated ejection fraction is 60%  · Normal LV diastolic function.  · Normal right ventricular systolic function.  · Normal central venous pressure (3 mm Hg).  · The estimated PA systolic pressure is 25 mm Hg  · The stress echo portion of this study is negative for myocardial ischemia.    PAST MEDICAL HISTORY:  Past Medical History:   Diagnosis Date    Allergy     Anxiety     Arthritis     COPD (chronic obstructive pulmonary disease)     Depression     Diverticular disease of colon 2017    Diverticulitis     HLD (hyperlipidemia)     Hypertension     Pancreatitis     Psoriasis     Rheumatoid arthritis     Severe obesity (BMI 35.0-39.9) with comorbidity          PAST SURGICAL HISTORY:  Past Surgical History:   Procedure Laterality Date    BLADDER SUSPENSION      (x2)     SECTION      (x2)    LUNG BIOPSY  2020     RHINOPLASTY      TONSILLECTOMY         FAMILY HISTORY:  Family History   Adopted: Yes   Problem Relation Age of Onset    No Known Problems Daughter     No Known Problems Son     No Known Problems Daughter        SOCIAL HISTORY:  Social History     Socioeconomic History    Marital status:      Spouse name: Not on file    Number of children: Not on file    Years of education: Not on file    Highest education level: Not on file   Occupational History    Occupation: clinical research coordinator    Social Needs    Financial resource strain: Somewhat hard    Food insecurity     Worry: Sometimes true     Inability: Patient refused    Transportation needs     Medical: No     Non-medical: No   Tobacco Use    Smoking status: Current Every Day Smoker     Packs/day: 0.50     Years: 20.00     Pack years: 10.00     Types: Cigarettes    Smokeless tobacco: Never Used   Substance and Sexual Activity    Alcohol use: No     Frequency: Never     Drinks per session: Patient refused     Binge frequency: Never    Drug use: No    Sexual activity: Yes     Partners: Male     Birth control/protection: Post-menopausal   Lifestyle    Physical activity     Days per week: 0 days     Minutes per session: 0 min    Stress: Very much   Relationships    Social connections     Talks on phone: Three times a week     Gets together: Once a week     Attends Christianity service: Not on file     Active member of club or organization: Yes     Attends meetings of clubs or organizations: Not on file     Relationship status:    Other Topics Concern    Are you pregnant or think you may be? No    Breast-feeding No   Social History Narrative    Not on file       MEDICATIONS:  Current Outpatient Medications   Medication Sig Dispense Refill    albuterol (PROVENTIL/VENTOLIN HFA) 90 mcg/actuation inhaler Inhale 1-2 puffs into the lungs every 6 (six) hours as needed for Wheezing. Rescue 90 g 0    amitriptyline (ELAVIL) 25 MG tablet  Take 1 tablet (25 mg total) by mouth nightly as needed for Insomnia. 30 tablet 2    atorvastatin (LIPITOR) 20 MG tablet Take 1 tablet (20 mg total) by mouth every evening. 90 tablet 3    busPIRone (BUSPAR) 30 MG Tab Take 1 tablet (30 mg total) by mouth 2 (two) times daily. 180 tablet 0    clonazePAM (KLONOPIN) 1 MG tablet Take 1 tablet (1 mg total) by mouth 2 (two) times daily as needed for Anxiety. 60 tablet 2    COSENTYX PEN, 2 PENS, 150 mg/mL PnIj Inject 2 pens (300 mg) into the skin every week for 5 weeks, then inject 2 pens (300 mg) into the skin every 4 weeks 10 mL 0    COSENTYX PEN, 2 PENS, 150 mg/mL PnIj Inject 300mg (2 pens) into the skin every 4 weeks 6 mL 1    fluocinonide (LIDEX) 0.05 % external solution Apply to affected areas of body  once to twice daily as needed for psoriasis. 60 mL 3    fluticasone-umeclidin-vilanter (TRELEGY ELLIPTA) 100-62.5-25 mcg DsDv Inhale 1 puff into the lungs once daily. 60 each 11    lisinopriL 10 MG tablet Take 1 tablet (10 mg total) by mouth once daily. 90 tablet 3    metoprolol tartrate (LOPRESSOR) 50 MG tablet Take 1 tablet by mouth twice a day with food 180 tablet 3    nystatin (MYCOSTATIN) cream Apply topically 2 (two) times daily. 30 g 3    sulindac (CLINORIL) 200 MG Tab Take 1 tablet (200 mg total) by mouth 2 (two) times daily. 60 tablet 5    triamcinolone acetonide 0.1% (KENALOG) 0.1 % ointment Apply to affected areas of body BID prn rash. Do not use on face, underarms, or groin. 454 g 1    zolpidem (AMBIEN) 5 MG Tab Take 1 tablet (5 mg total) by mouth every evening. 30 tablet 2    albuterol-ipratropium (DUO-NEB) 2.5 mg-0.5 mg/3 mL nebulizer solution Take 3 mLs by nebulization every 6 (six) hours as needed for Wheezing. Rescue (Patient not taking: Reported on 9/11/2020) 1 Box 5     Current Facility-Administered Medications   Medication Dose Route Frequency Provider Last Rate Last Dose    DOBUTamine 500mg in D5W 250mL infusion (premix)  10 mcg/kg/min  "Intravenous Continuous Saige Bee  mL/hr at 06/26/19 1629 40 mcg/kg/min at 06/26/19 1629     Facility-Administered Medications Ordered in Other Visits   Medication Dose Route Frequency Provider Last Rate Last Dose    0.9%  NaCl infusion   Intravenous Continuous Sirena Matias PA-C           ALLERGIES:  Review of patient's allergies indicates:   Allergen Reactions    Succinimides Anaphylaxis    Succinylcholine     Succinylcholine chloride      Other reaction(s): Unknown       ROS:  Review of Systems   Constitutional: Negative for chills and fever.   HENT: Negative for sore throat and tinnitus.    Eyes: Negative for blurred vision and double vision.   Respiratory: Positive for shortness of breath. Negative for cough.    Cardiovascular: Negative for chest pain, palpitations and leg swelling.   Gastrointestinal: Negative for abdominal pain, constipation, diarrhea, heartburn, nausea and vomiting.   Genitourinary: Negative for dysuria and hematuria.   Musculoskeletal: Positive for back pain and joint pain.   Skin: Positive for rash.   Neurological: Negative for dizziness, tingling and headaches.   Psychiatric/Behavioral: Negative for depression and suicidal ideas. The patient is not nervous/anxious.        PE:  Vitals:    09/28/20 1042   BP: 134/70   Pulse: 70   Resp: 18   Weight: 105.1 kg (231 lb 11.3 oz)   Height: 5' 2" (1.575 m)       Physical Exam  Constitutional:       Appearance: She is obese.   HENT:      Head: Normocephalic and atraumatic.   Eyes:      Extraocular Movements: Extraocular movements intact.      Conjunctiva/sclera: Conjunctivae normal.      Pupils: Pupils are equal, round, and reactive to light.   Neck:      Musculoskeletal: Normal range of motion and neck supple.   Cardiovascular:      Rate and Rhythm: Normal rate.      Pulses: Normal pulses.   Pulmonary:      Effort: Pulmonary effort is normal. No respiratory distress.      Breath sounds: Wheezing (intermittent diffuse " posterior) present.   Abdominal:      General: Bowel sounds are normal. There is no distension.      Palpations: Abdomen is soft. There is no mass.      Tenderness: There is no abdominal tenderness.      Hernia: No hernia is present.   Musculoskeletal: Normal range of motion.         General: No swelling.   Skin:     General: Skin is warm.   Neurological:      General: No focal deficit present.      Mental Status: She is alert and oriented to person, place, and time.   Psychiatric:         Mood and Affect: Mood normal.         Behavior: Behavior normal.         Thought Content: Thought content normal.         Judgment: Judgment normal.          DIAGNOSIS:  1. Morbid Obesity with Body mass index is 42.38 kg/m². and inability to lose weight.  2. Co-morbidities: HTN COPD HLD   PLAN:  The patient is Good candidate for Bariatric Surgery. she is interested in sleeve gastrectomy with Dr. Rodriguez. The surgery and post-op care were discussed in detail with the patient. All questions were answered.    she understands that bariatric surgery is a tool to aid in weight loss and that she needs to be committed to the diet and exercise post-operatively for successful weight loss.  Discussed expected weight loss outcomes after surgery which is 50% of the excess weight on her frame.  Goal weight is 183 #s.  Discussed with patient that bariatric surgery is not the easy way out and that it will take plenty of dedication on the patient's part to be successful. Also discussed the possibility of weight regain if the patient strays from the diet guidelines or exercise requirements. Patient verbalized understanding and wishes to proceed with the work-up.    ORDERS:  1. Chest X-Ray and EKG  2. Psychological Consult, Bariatric Dietician Consult and Pulmonology Clearance  3. Bariatric Labs: BMP, CBC, Folate Serum, H. pylori, HgA1C, Hepatic Panel/LFT, Iron & TIBC, Lipid Profile, Magnesium, Phosphate, T3, T4, TSH, Free T4, Vitamin B12, and  Vitamin B1.  4. No NSAIDS after surgery due to increased risk of stomach ulcers. Talk to your prescribing provider about alternative options for your pain.  5. Explained to pt that she must stop smoking prior to surgery. Nicotine testing 30 days after after stop smoking     Primary Physician: Hetal Banks MD  RTC: As scheduled.    60 minute visit, over 50% of time spent counseling patient face to face on surgical options, risks, benefits, expected diet, recommended exercise regimen, and expected weight loss.

## 2020-09-28 NOTE — LETTER
September 28, 2020      Naseem Abdi MD  [address]  [facility] [address]           [facility] - Bariatric Surg 2nd Fl  [address]  [facility] [address]  Phone: [phone]  Fax: [phone]          Patient: Lucia Matias   MR Number: [id number]   YOB: 1960   Date of Visit: 9/28/2020       Dear Dr. Naseem Abdi:    Thank you for referring Lucia Matias to me for evaluation. Attached you will find relevant portions of my assessment and plan of care.    If you have questions, please do not hesitate to call me. I look forward to following Lucia aMtias along with you.    Sincerely,    Raj James PA-C    Enclosure  CC:  No Recipients    If you would like to receive this communication electronically, please contact [email or web address] or [phone] to request more information on [employer] access.    For providers and/or their staff who would like to refer a patient to [facility], please contact us through our one-stop-shop provider referral line, [facility], at [phone].    If you feel you have received this communication in error or would no longer like to receive these types of communications, please e-mail [email or web address]

## 2020-09-29 ENCOUNTER — PATIENT MESSAGE (OUTPATIENT)
Dept: OTHER | Facility: OTHER | Age: 60
End: 2020-09-29

## 2020-09-29 LAB — FUNGUS SPEC CULT: NORMAL

## 2020-10-05 ENCOUNTER — OFFICE VISIT (OUTPATIENT)
Dept: RHEUMATOLOGY | Facility: CLINIC | Age: 60
End: 2020-10-05
Payer: COMMERCIAL

## 2020-10-05 VITALS — TEMPERATURE: 99 F | HEIGHT: 62 IN | BODY MASS INDEX: 43.57 KG/M2 | WEIGHT: 236.75 LBS

## 2020-10-05 DIAGNOSIS — M13.0 POLYARTHRITIS: Primary | ICD-10-CM

## 2020-10-05 DIAGNOSIS — L40.0 PSORIASIS VULGARIS: ICD-10-CM

## 2020-10-05 PROCEDURE — 99999 PR PBB SHADOW E&M-EST. PATIENT-LVL IV: CPT | Mod: PBBFAC,,, | Performed by: INTERNAL MEDICINE

## 2020-10-05 PROCEDURE — 99213 PR OFFICE/OUTPT VISIT, EST, LEVL III, 20-29 MIN: ICD-10-PCS | Mod: S$GLB,,, | Performed by: INTERNAL MEDICINE

## 2020-10-05 PROCEDURE — 3008F BODY MASS INDEX DOCD: CPT | Mod: CPTII,S$GLB,, | Performed by: INTERNAL MEDICINE

## 2020-10-05 PROCEDURE — 99213 OFFICE O/P EST LOW 20 MIN: CPT | Mod: S$GLB,,, | Performed by: INTERNAL MEDICINE

## 2020-10-05 PROCEDURE — 99999 PR PBB SHADOW E&M-EST. PATIENT-LVL IV: ICD-10-PCS | Mod: PBBFAC,,, | Performed by: INTERNAL MEDICINE

## 2020-10-05 PROCEDURE — 3008F PR BODY MASS INDEX (BMI) DOCUMENTED: ICD-10-PCS | Mod: CPTII,S$GLB,, | Performed by: INTERNAL MEDICINE

## 2020-10-05 RX ORDER — HYDROCODONE BITARTRATE AND ACETAMINOPHEN 5; 325 MG/1; MG/1
TABLET ORAL
COMMUNITY
Start: 2020-10-01 | End: 2021-03-01

## 2020-10-05 ASSESSMENT — ROUTINE ASSESSMENT OF PATIENT INDEX DATA (RAPID3)
PAIN SCORE: 2
AM STIFFNESS SCORE: 1, YES
WHEN YOU AWAKENED IN THE MORNING OVER THE LAST WEEK, PLEASE INDICATE THE AMOUNT OF TIME IT TAKES UNTIL YOU ARE AS LIMBER AS YOU WILL BE FOR THE DAY: 5 MINUTES
MDHAQ FUNCTION SCORE: 0.6
PATIENT GLOBAL ASSESSMENT SCORE: 4
TOTAL RAPID3 SCORE: 2.67
PSYCHOLOGICAL DISTRESS SCORE: 1.1
FATIGUE SCORE: 3

## 2020-10-05 NOTE — PROGRESS NOTES
Rapid3 Question Responses and Scores 10/3/2020   MDHAQ Score 0.6   Psychologic Score 1.1   Pain Score 2   When you awakened in the morning OVER THE LAST WEEK, did you feel stiff? Yes   If Yes, please indicate the number of hours until you are as limber as you will be for the day 5   Fatigue Score 3   Global Health Score 4   RAPID3 Score 2.66

## 2020-10-06 NOTE — PROGRESS NOTES
History of present illness:  60-year-old female developed psoriasis in 2005.  It was on her arms, legs, and scalp.  She also had joint pain, especially in the hands.  She had swelling of the MCPs and PIP is.  She had some pain in the elbows.  She was seeing Dr. Mitchell.  She was told she had a positive rheumatoid factor.  She never saw a rheumatologist.  She was initially placed on methotrexate but had side effects.  She had been on Enbrel and Humira.  More recently she had been on Cimzia.  This had been helping both her joint pain and her psoriasis.     One year ago she developed some dyspnea on exertion.  She had a CT scan of the chest which was abnormal showing multiple nodules.  She was scheduled for biopsy but it was delayed until the past week.  She was taken off Cimzia because of the abnormal CT scan.  Since then her psoriasis has been worse.  She also developed increased aching all over.  She states her hands are swollen in the morning but she has no persistent joint swelling.  She has 2 hr of morning stiffness.  She has pain with activity, especially the left hip.  It does wake her up at night.    I saw her initially 1 month ago.  She was still complaining of aches and pains.  She had evidence of osteoarthritis but no inflammatory arthritis.  Her lung biopsy revealed granulomas.  Infectious workup was negative.  I referred her to Infectious Disease and the fungal workup was negative.  I placed her on sulindac for her arthritis which has been helping.  She has had less aching and stiffness.  She still complains of some tightness in her hands in the morning.  Her dermatologist started her on Cosentyx but she just received the 1st injection.    Physical examination:  Skin:  She has significant psoriatic plaques  Musculoskeletal:  She has good range of motion of all joints.  She has no synovitis.  She has scattered tender areas to palpation.  Laboratory:  Rheumatoid factor was minimally elevated 18.  This is not  clinically significant.  She had a negative DIANE and CCP antibody.  Inflammatory markers were normal.    Assessment:  No evidence of inflammatory arthritis    Plans:  1.  Continue sulindac as before  2.  Return in 6 months  Answers for HPI/ROS submitted by the patient on 10/3/2020   fever: No  eye redness: No  mouth sores: No  headaches: No  shortness of breath: No  chest pain: No  trouble swallowing: No  diarrhea: No  constipation: No  unexpected weight change: No  genital sore: No  dysuria: No  During the last 3 days, have you had a skin rash?: No  Bruises or bleeds easily: No  cough: No

## 2020-10-23 ENCOUNTER — LAB VISIT (OUTPATIENT)
Dept: LAB | Facility: HOSPITAL | Age: 60
End: 2020-10-23
Attending: INTERNAL MEDICINE
Payer: COMMERCIAL

## 2020-10-23 ENCOUNTER — CLINICAL SUPPORT (OUTPATIENT)
Dept: BARIATRICS | Facility: CLINIC | Age: 60
End: 2020-10-23
Payer: COMMERCIAL

## 2020-10-23 ENCOUNTER — HOSPITAL ENCOUNTER (OUTPATIENT)
Dept: CARDIOLOGY | Facility: CLINIC | Age: 60
Discharge: HOME | End: 2020-10-23
Payer: COMMERCIAL

## 2020-10-23 VITALS — WEIGHT: 230.81 LBS | BODY MASS INDEX: 42.22 KG/M2

## 2020-10-23 DIAGNOSIS — Z72.0 TOBACCO USER: ICD-10-CM

## 2020-10-23 DIAGNOSIS — E78.2 MIXED HYPERLIPIDEMIA: ICD-10-CM

## 2020-10-23 DIAGNOSIS — E66.01 MORBID OBESITY WITH BMI OF 40.0-44.9, ADULT: ICD-10-CM

## 2020-10-23 DIAGNOSIS — J44.1 CHRONIC OBSTRUCTIVE PULMONARY DISEASE WITH ACUTE EXACERBATION: ICD-10-CM

## 2020-10-23 DIAGNOSIS — I10 ESSENTIAL HYPERTENSION: ICD-10-CM

## 2020-10-23 DIAGNOSIS — Z71.3 DIETARY COUNSELING: ICD-10-CM

## 2020-10-23 LAB
25(OH)D3+25(OH)D2 SERPL-MCNC: 27 NG/ML (ref 30–96)
ALBUMIN SERPL BCP-MCNC: 3.3 G/DL (ref 3.5–5.2)
ALP SERPL-CCNC: 124 U/L (ref 55–135)
ALT SERPL W/O P-5'-P-CCNC: 16 U/L (ref 10–44)
ANION GAP SERPL CALC-SCNC: 10 MMOL/L (ref 8–16)
AST SERPL-CCNC: 16 U/L (ref 10–40)
BASOPHILS # BLD AUTO: 0.07 K/UL (ref 0–0.2)
BASOPHILS NFR BLD: 1 % (ref 0–1.9)
BILIRUB DIRECT SERPL-MCNC: 0.5 MG/DL (ref 0.1–0.3)
BILIRUB SERPL-MCNC: 1.1 MG/DL (ref 0.1–1)
BUN SERPL-MCNC: 15 MG/DL (ref 6–20)
CALCIUM SERPL-MCNC: 8.8 MG/DL (ref 8.7–10.5)
CHLORIDE SERPL-SCNC: 102 MMOL/L (ref 95–110)
CHOLEST SERPL-MCNC: 119 MG/DL (ref 120–199)
CHOLEST/HDLC SERPL: 3.6 {RATIO} (ref 2–5)
CO2 SERPL-SCNC: 28 MMOL/L (ref 23–29)
CREAT SERPL-MCNC: 1 MG/DL (ref 0.5–1.4)
DIFFERENTIAL METHOD: ABNORMAL
EOSINOPHIL # BLD AUTO: 0.3 K/UL (ref 0–0.5)
EOSINOPHIL NFR BLD: 4.6 % (ref 0–8)
ERYTHROCYTE [DISTWIDTH] IN BLOOD BY AUTOMATED COUNT: 13 % (ref 11.5–14.5)
EST. GFR  (AFRICAN AMERICAN): >60 ML/MIN/1.73 M^2
EST. GFR  (NON AFRICAN AMERICAN): >60 ML/MIN/1.73 M^2
ESTIMATED AVG GLUCOSE: 114 MG/DL (ref 68–131)
FOLATE SERPL-MCNC: 3.9 NG/ML (ref 4–24)
GLUCOSE SERPL-MCNC: 103 MG/DL (ref 70–110)
HBA1C MFR BLD HPLC: 5.6 % (ref 4–5.6)
HCT VFR BLD AUTO: 52.1 % (ref 37–48.5)
HDLC SERPL-MCNC: 33 MG/DL (ref 40–75)
HDLC SERPL: 27.7 % (ref 20–50)
HGB BLD-MCNC: 16.2 G/DL (ref 12–16)
IMM GRANULOCYTES # BLD AUTO: 0.03 K/UL (ref 0–0.04)
IMM GRANULOCYTES NFR BLD AUTO: 0.4 % (ref 0–0.5)
IRON SERPL-MCNC: 67 UG/DL (ref 30–160)
LDLC SERPL CALC-MCNC: 61.6 MG/DL (ref 63–159)
LYMPHOCYTES # BLD AUTO: 1.3 K/UL (ref 1–4.8)
LYMPHOCYTES NFR BLD: 19.4 % (ref 18–48)
MAGNESIUM SERPL-MCNC: 2 MG/DL (ref 1.6–2.6)
MCH RBC QN AUTO: 30.2 PG (ref 27–31)
MCHC RBC AUTO-ENTMCNC: 31.1 G/DL (ref 32–36)
MCV RBC AUTO: 97 FL (ref 82–98)
MONOCYTES # BLD AUTO: 0.5 K/UL (ref 0.3–1)
MONOCYTES NFR BLD: 7.2 % (ref 4–15)
NEUTROPHILS # BLD AUTO: 4.6 K/UL (ref 1.8–7.7)
NEUTROPHILS NFR BLD: 67.4 % (ref 38–73)
NONHDLC SERPL-MCNC: 86 MG/DL
NRBC BLD-RTO: 0 /100 WBC
PHOSPHATE SERPL-MCNC: 3.4 MG/DL (ref 2.7–4.5)
PLATELET # BLD AUTO: 289 K/UL (ref 150–350)
PMV BLD AUTO: 9.7 FL (ref 9.2–12.9)
POTASSIUM SERPL-SCNC: 4 MMOL/L (ref 3.5–5.1)
PROT SERPL-MCNC: 7.3 G/DL (ref 6–8.4)
RBC # BLD AUTO: 5.36 M/UL (ref 4–5.4)
SATURATED IRON: 19 % (ref 20–50)
SODIUM SERPL-SCNC: 140 MMOL/L (ref 136–145)
T3 SERPL-MCNC: 81 NG/DL (ref 60–180)
T4 FREE SERPL-MCNC: 0.89 NG/DL (ref 0.71–1.51)
T4 SERPL-MCNC: 5.8 UG/DL (ref 4.5–11.5)
TOTAL IRON BINDING CAPACITY: 360 UG/DL (ref 250–450)
TRANSFERRIN SERPL-MCNC: 243 MG/DL (ref 200–375)
TRIGL SERPL-MCNC: 122 MG/DL (ref 30–150)
TSH SERPL DL<=0.005 MIU/L-ACNC: 1.26 UIU/ML (ref 0.4–4)
VIT B12 SERPL-MCNC: 400 PG/ML (ref 210–950)
WBC # BLD AUTO: 6.77 K/UL (ref 3.9–12.7)

## 2020-10-23 PROCEDURE — 99499 NO LOS: ICD-10-PCS | Mod: S$GLB,,, | Performed by: DIETITIAN, REGISTERED

## 2020-10-23 PROCEDURE — 86677 HELICOBACTER PYLORI ANTIBODY: CPT

## 2020-10-23 PROCEDURE — 80076 HEPATIC FUNCTION PANEL: CPT

## 2020-10-23 PROCEDURE — 84436 ASSAY OF TOTAL THYROXINE: CPT

## 2020-10-23 PROCEDURE — 97802 MEDICAL NUTRITION INDIV IN: CPT | Mod: S$GLB,,, | Performed by: DIETITIAN, REGISTERED

## 2020-10-23 PROCEDURE — 93000 EKG 12-LEAD: ICD-10-PCS | Mod: S$GLB,,, | Performed by: INTERNAL MEDICINE

## 2020-10-23 PROCEDURE — 83036 HEMOGLOBIN GLYCOSYLATED A1C: CPT

## 2020-10-23 PROCEDURE — 82607 VITAMIN B-12: CPT

## 2020-10-23 PROCEDURE — 85025 COMPLETE CBC W/AUTO DIFF WBC: CPT

## 2020-10-23 PROCEDURE — 99999 PR PBB SHADOW E&M-EST. PATIENT-LVL II: CPT | Mod: PBBFAC,,, | Performed by: DIETITIAN, REGISTERED

## 2020-10-23 PROCEDURE — 84480 ASSAY TRIIODOTHYRONINE (T3): CPT

## 2020-10-23 PROCEDURE — 80323 ALKALOIDS NOS: CPT

## 2020-10-23 PROCEDURE — 82306 VITAMIN D 25 HYDROXY: CPT

## 2020-10-23 PROCEDURE — 84100 ASSAY OF PHOSPHORUS: CPT

## 2020-10-23 PROCEDURE — 80048 BASIC METABOLIC PNL TOTAL CA: CPT

## 2020-10-23 PROCEDURE — 83540 ASSAY OF IRON: CPT

## 2020-10-23 PROCEDURE — 84425 ASSAY OF VITAMIN B-1: CPT

## 2020-10-23 PROCEDURE — 97802 PR MED NUTR THER, 1ST, INDIV, EA 15 MIN: ICD-10-PCS | Mod: S$GLB,,, | Performed by: DIETITIAN, REGISTERED

## 2020-10-23 PROCEDURE — 80061 LIPID PANEL: CPT

## 2020-10-23 PROCEDURE — 83735 ASSAY OF MAGNESIUM: CPT

## 2020-10-23 PROCEDURE — 99499 UNLISTED E&M SERVICE: CPT | Mod: S$GLB,,, | Performed by: DIETITIAN, REGISTERED

## 2020-10-23 PROCEDURE — 93000 ELECTROCARDIOGRAM COMPLETE: CPT | Mod: S$GLB,,, | Performed by: INTERNAL MEDICINE

## 2020-10-23 PROCEDURE — 82746 ASSAY OF FOLIC ACID SERUM: CPT

## 2020-10-23 PROCEDURE — 84439 ASSAY OF FREE THYROXINE: CPT

## 2020-10-23 PROCEDURE — 84443 ASSAY THYROID STIM HORMONE: CPT

## 2020-10-23 PROCEDURE — 99999 PR PBB SHADOW E&M-EST. PATIENT-LVL II: ICD-10-PCS | Mod: PBBFAC,,, | Performed by: DIETITIAN, REGISTERED

## 2020-10-23 NOTE — PATIENT INSTRUCTIONS
1200- 1500 calorie Sample meal plan  80-120g protein per day.   Protein drinks and bars: 0-4 grams sugar  Drink lots of water throughout the day and exercise!  MENU # 1  Breakfast: 2 eggs, 1 turkey sausage sydney, 1 apple  Snack: Atkins bar  Lunch: 2 roll-ups (sliced turkey, low-fat sliced cheese, mustard), 12 baby carrots dipped in 1 Tbsp natural peanut butter  Mid-Day Snack: ¼ cup unsalted almonds, ½ cup fruit  Dinner: 1 chicken thigh simmered in tomato sauce + 2 Tbsp mozzarella cheese, ½ cup black beans, 1/2 cup steamed carrots  Evening Snack: 1/2 cup grapes with 4 cubes low-fat cheddar cheese   ___________________________________________________  MENU # 2  Breakfast: 200 calorie protein drink  Mid-morning snack : ¼ cup unsalted nuts, medium banana  Lunch: 3oz tuna or chicken salad made with 2 tbsp light girffin, over salad with tomatoes and cucumbers.   Snack: low-fat cheese stick  Dinner: 3oz hamburger sydney, slice of low-fat cheese, 1 cup boiled yellow squash and zucchini.   Snack: 6oz light yogurt  _______________________________________________________  Menu #3  Breakfast: 6oz plain Greek yogurt + fruit (½ banana, ½ cup fruit - pineapple, blueberries, strawberries, peach), may add Splenda to mitchell.  Lunch: Grilled  chicken breast w/ slice low-fat pepper julia cheese, 1/2 cup grilled/baked asparagus and small salad with Salad Spritzer.    Mid-Day snack: 200 calorie protein drink   Dinner: 4oz Grilled fish, ½ cup white beans, ½ cup cooked spinach  Evening Snack: fudgsicle no-sugar-added    Menu # 4  Breakfast: 1 scoop vanilla protein powder + 4oz skim milk + 4oz coffee   Snack: Pure protein bar  Lunch: 2 Lettuce tacos: 3oz seasoned ground turkey wrapped in a Miguel lettuce leaves with 1 Tbsp shredded cheese and dollop low-fat sour cream  Snack: ½ cup cottage cheese, ½ cup pineapple chunks  Dinner: Shrimp omelet: 2 eggs, ½ cup shrimp, green onions, and shredded  cheese  ______________________________________________________  Menu #5  Breakfast: 1 cup low-fat cottage cheese, ½ cup peaches (no sugar added)  Snack: 1 apple, 4 cubes cheese  Lunch: 3oz baked pork chop, 1 cup okra and tomato stew  Snack: 1/2 cup black beans + salsa + dollop light sour cream  Dinner: Caprese chicken salad: 3 oz chicken breast, 1oz fresh mozzarella, sliced tomato, 1 Tbsp olive oil, basil  Snack: sugar-free popsicle    Menu #6  Breakfast: ½ cup part-skim ricotta cheese, 2 Tbsp sugar-free strawberry preserves, sprinkle of slivered almonds  Snack: 1 orange  Lunch: 3 oz canned chicken, 1oz shredded cheddar cheese, ½  cup black beans, 2 Tbsp salsa  Snack: 200 calorie Protein drink  Dinner: 4 oz grilled salmon steak, over mixed green salad with 2 tbsp light dressing    Meal Ideas for Regular Bariatric Diet  *Recipes and products available at [email or web address]      Breakfast: (15-20g protein)    - Egg white omelet: 2 egg whites or ½ cup Egg Beaters. (Optional proteins: cheese, shrimp, black beans, chicken, sliced turkey) (Optional veggies: tomatoes, salsa, spinach, mushrooms, onions, green peppers, or small slice avocado)     - Egg and sausage: 1 egg or ¼ cup Egg Beaters (any variety), with 1 sydney or 2 links of Turkey sausage or Veggie breakfast sausage (dooyoo or [employer])    - Crust-less breakfast quiche: To make a glass pie dish, mix 4oz part skim Ricotta, 1 cup skim milk, and 2 eggs as your base. Add protein: shredded cheese, sliced lean ham or turkey, turkey ledesma/sausage. Add veggies: tomato, onion, green onion, mushroom, green pepper, spinach, etc.    - Yogurt parfait: Mix 1 - 6oz container Dannon Light N Fit vanilla yogurt, with ¼ cup crushed unsalted nuts    - Cottage cheese and fruit: ½ cup part-skim cottage cheese or ricotta cheese topped with fresh fruit or sugar free preserves     - Mya Patrick's Vanilla Egg custard* (add 2 Tbsp instant coffee granules to make Cappuccino  Custard*)    - Hi-Protein café latte (skim milk, decaf coffee, 1 scoop protein powder). Optional to add Sugar free syrup or extract flavoring.    - Breakfast Lox: spread fat free cream cheese on slices of smoked salmon. Serve over scrambled or egg over easy (sauteed with nonstick cookspray) OR on a cucumber slice    - Eggwhich: Scramble or cook 1 large egg over easy using nonstick cookspray. Place between 2 slices of [place]inamese ledesma and low fat cheese.     Lunch: (20-30g protein)    - ½ cup Black bean soup (Homemade or Progresso), with ¼ cup shredded low-fat cheese. Top with chopped tomato or fresh salsa.     - Lean deli turkey breast and low-fat sliced cheese, mustard or light griffin to moisten, rolled up together, or wrapped in a Miguel lettuce leaf    - Chicken salad made from dinner leftovers, moisten with low-fat salad dressing or light griffin. Also try leftover salmon, shrimp, tuna or boiled eggs. Serve ½ cup over dark green salad    - Fat-free canned refried beans, topped with ¼ cup shredded low-fat cheese. Top with chopped tomato or fresh salsa.     - Greek salad: Top mixed greens with 1-2oz grilled chicken, tomatoes, red onions, 2-3 kalamata olives, and sprinkle lightly with feta cheese. Spritz with Balsamic vinegar to taste.     - Crust-less lunch quiche: To make a glass pie dish, mix 4oz part skim Ricotta, 1 cup skim milk, and 2 eggs as your base. Add protein: shredded cheese, sliced lean ham or turkey, shrimp, chicken. Add veggies: tomato, onion, green onion, mushroom, green pepper, spinach, artichoke, broccoli, etc.    - Pizza bake: spread a  brendon johana mushroom with tomato sauce, low-fat shredded mozzarella and turkey pepperoni or [place] ledesma. Add any veggies. Roast for 10-15 minutes, until cheese melted.     - Cucumber crab bites: Spread ¼ cup crab dip (lump crabmeat + light cream cheese and green onions) over sliced cucumber.     - Chicken with light spinach and artichoke dip*: Puree in food  processor: 6oz cooked and drained spinach, 2 cloves garlic, 1 can cannelloni beans, ½ cup chopped green onions, 1 can drained artichoke hearts (not marinated in oil), lemon juice and basil. Mix in 2oz chopped up chicken.    Supper: (20-30g protein)    - Serve grilled fish over dark green salad tossed with low-fat dressing, served with grilled asparagus cooper     - Rotisserie chicken salad: served with sliced strawberries, walnuts, fat-free feta cheese crumbles and 1 tbsp Bolanoss Own Light Raspberry Payette Vinaigrette    - Shrimp cocktail: Dip cold boiled shrimp in homemade low-sugar cocktail sauce (1/2 cup Isaiah One Carb ketchup, 2 tbsp horseradish, 1/4 tsp hot sauce, 1 tsp Worcestershire sauce, 1 tbsp freshly-squeezed lemon juice). Serve with dark green salad, walnuts, and crumbled blue cheese drizzled with olive oil and Balsamic vinegar    - Tuna Melt: Spread tuna salad onto 2 thick slices of tomato. Top with low-fat cheese and broil until cheese is melted. May also be made with chicken salad of shrimp salad. [place] with different types of cheeses.    - Chicken or beef fajitas (no tortilla, rice, beans, chips). Top meat and veggies w/ fresh salsa, fat free sour cream.     - Homemade low-fat Chili using extra lean ground beef or ground turkey. Top with shredded cheese and salsa as desired. May add dollop fat-free sour cream if desired    - Chicken parmesan: Top chicken breast w/ low sugar marinara sauce, mozzarella cheese and bake until chicken reaches 165*.  Serve w/ spaghetti SQUASH or Yi cut green beans    - Dinner Omelet with shrimp or chicken and onion, green peppers and chives.    - No noodle lasagna: Use sliced zucchini or eggplant in place of noodles.  Layer with part skim ricotta cheese and low sugar meat sauce (use very lean ground beef or ground turkey).    - Mexican chicken bake: Bake chunks of chicken breast or thigh with taco seasoning, Pace brand enchilada sauce, green onions and low-fat  cheese. Serve with ¼ cup black beans or fat free refried beans topped with chopped tomatoes or salsa.    - Frances frozen meatballs, simmered in Classico Marinara sauce. Different flavors of salsa or spaghetti sauce create different dishes! Sprinkle with parmesan cheese. Serve with grilled or steamed veggies, or a dark green salad.    - Simmer boneless skinless chicken thigh chunks in Classico Marinara sauce or roasted salsa until tender with chopped onion, bell pepper, garlic, mushrooms, spinach, etc.     - Hamburger or veggie burger, without the bun, dressed the way you like. Served with grilled or steamed veggies.    - Eggplant parmesan: Bake slices of eggplant at 350 degrees for 15 minutes. Layer tomato sauce, sliced eggplant and low-fat mozzarella cheese in a baking dish and cover with foil. Bake 30-40 more minutes or until bubbly. Uncover and bake at 400 degrees for about 15 more minutes, or until top is slightly crisp.    - Fish tacos: grilled/baked white fish, wrapped in Miguel lettuce leaf, topped with salsa, shredded low-fat cheese, and light coleslaw.    - Chicken nisha: Sprinkle chicken w/ 1 tsp of hidden [place] ranch dip mix. Then grill chicken and top with black beans, salsa and 1 tsp fat free sour cream.     - Cauliflower pizza crust: Use cauliflower as crust (see recipe on bernadette, no flour!). Top w/ low fat cheese, turkey pepperoni and veggies and bake again    - chicken or turkey crust pizza: use ground chicken or turkey instead of cauliflower, spread in [place] and bake at 350 for about 20-30 minutes(may want to add garlic, black pepper, oregano and other herbs to ground meat mixture).  Remove and top w/ low fat cheese, turkey pepperoni and veggies and bake again for another 10 minutes or until cheese is browned.     Snacks: (100-200 calories; >5g protein)    - 1 low-fat cheese stick with 8 cherry tomatoes or 1 serving fresh fruit  - 4 thin slices fat-free turkey breast and 1 slice low-fat  cheese  - 4 thin slices fat-free honey ham with wedge of melon  - 6-8 edamame pods (equivalent to about 1/4 cup edamame without pods).   - 1/4 cup unsalted nuts with ½ cup fruit  - 6-oz container Dannon Light n Fit vanilla yogurt, topped with 1oz unsalted nuts         - apple, celery or baby carrots spread with 2 Tbsp PB2  - apple slices with 1 oz slice low-fat cheese  - Apple slices dipped in 2 Tbsp of PB2  - celery, cucumber, bell pepper or baby carrots dipped in ¼ cup hummus bean spread or light spinach and artichoke dip (*recipe in lunch section)  - celery, cucumber, baby carrots dipped in high protein greek yogurt (Mix 16 oz plain greek yogurt + 1 packet of hidden valley ranch dip mix)  - Austin Links Beef Steak - 14g protein! (similar to beef jerky)  - 2 wedges Laughing Cow - Light Herb & Garlic Cheese with sliced cucumber or green bell pepper  - 1/2 cup low-fat cottage cheese with ¼ cup fruit or ¼ cup salsa  - RTD Protein drinks: Atkins, Low Carb Slim Fast, EAS light, Muscle Milk Light, etc.  - Homemade Protein drinks: GNC Soy95, Isopure, Nectar, UNJURY, Whey Gourmet, etc. Mix 1 scoop powder with 8oz skim/1% milk or light soymilk.  - Protein bars: Atkins, EAS, Pure Protein, Think Thin, Detour, etc. Must have 0-4 grams sugar - Read the label.    Takeout Options: No more than twice/week  Deli - Salads (no pasta or rice), meats, cheeses. Roasted chicken. Lox (salmon)    Mexican - Platters which don't include tortillas, chips, or rice. Go easy on the beans. Example: Fajitas without the tortillas. Ask the  not to bring chips to the table if they are too tempting.    Greek - Meat or fish and vegetable, but no bread or rice. Including hummus, baba ganoush, etc, is OK. Most sit-down Greek restaurants can provide you with cucumber slices for dipping instead of gonzalo bread.    Fast Food (Avoid as much as possible) - Salads (no croutons and limit salad dressing to 2 tbsp), grilled chicken sandwich without the bun  and ask for no griffin. Mayuris low fat chili or Taco Bell pintos and cheese.    BBQ - The meats are fine if you ask for sauces on the side, but most of the traditional side dishes are loaded with carbs. Dickson slaw, baked beans and BBQ sauce are typically made with sugar.    Chinese - Nothing deep-fried, no rice or noodles. Many Chinese sauces have starch and sugar in them, so you'll have to use your judgement. If you find that these sauces trigger cravings, or cause Dumping, you can ask for the sauce to be made without sugar or just use soy sauce.

## 2020-10-23 NOTE — PROGRESS NOTES
NUTRITIONAL CONSULT    Referring Physician: Vince Rodriguez M.D.  Reason for MNT Referral: Initial assessment for sleeve gastrectomy work-up    PAST MEDICAL HISTORY:   60 y.o. female  Body mass index is 42.22 kg/m².  Weight history includes Pt states that she has not always been overweight, states that in the last five years life has been difficult with a lot of death so she has gained tremendous amounts of weight. Believes that she eats out of depression and anxiety. States now she finds that she has aches and pains that she had not previously had prior to the weight. The patient has tried portion control. Presently at highest weight. Lowest adult weight 145 lbs.    She has lost 6 lbs over the past month by making small changes to eating habits since initial consult for bariatric surgery.    Past Medical History:   Diagnosis Date    Allergy     Anxiety     Arthritis     COPD (chronic obstructive pulmonary disease)     Depression     Diverticular disease of colon 06/06/2017    Diverticulitis     HLD (hyperlipidemia)     Hypertension     Pancreatitis     Psoriasis     Rheumatoid arthritis     Severe obesity (BMI 35.0-39.9) with comorbidity        CLINICAL DATA:  60 y.o.-year-old White female.  Height: 5 ft. 2 in.  Weight: 230 lbs  IBW: 132 lbs  BMI: 42.2  The patient's goal weight (50% EBW): 180 lbs  Personal goal weight: 140 lbs    Goal for Bariatric Surgery: to improve health, to improve quality of life, to lose weight and to prevent future medical conditions    NUTRITIONAL NEEDS: Work up for bariatric surgery  9832-9556 Calories    Grams Protein    NUTRITION & HEALTH HISTORY:  Greatest challenge: sweets, starchy CHO, portion control, snacking at night, irregular meal patterns and emotional eating    Current diet recall:     - iced coffee with 2% milk and tsp sugar  B: toast, bagel or Eggo Waffle in toaster  - 1 can Coke Zero  D: fried shrimp and french fries OR white beans with sausage and  rice with mixed vegetables, can of Coke Zero  - can Coke Zero  Sn: cookie or brownie or cake or jose antonio candy      Current Diet:  Meal pattern: 2 meals + sweet snack at night  Protein supplements: none  Snacking: not a day snacker. Sweet snack at night: cookie or brownie or cake or jose antonio candy  Vegetables: Likes a variety. Doesn't like broccoli.  hates spinach. Eats almost daily.  Fruits: Likes a variety. Eats 2-3 times per week.  Beverages: water, diet soda, coffee with sugar and 2% milk  Dining out: Once Weekly. Mostly restaurants and take-out.  Cooking at home: Daily. Weekly. Mostly baked, grilled, smothered and fried meat, fish, starchy CHO and vegetables.    Note: Cannot eat seeds/nuts, popcorn d/t diverticulitis.    Exercise:  Past exercise: Fair    Current exercise: None. Swims laps in her pool during the summer. Stays busy with 11 grandchildren.  Restrictions to exercise: none    Vitamins / Minerals / Herbs:   none      Labs:   none available at this time    Food Allergies:   None known    Social:  Works regular daytime shifts. Working from home. Seated.  Lives with her .  Groceries delivered and food prep done by the pt.  Patient believes the household will be supportive after surgery.  Alcohol: None.  Smokin packs per week.    ASSESSMENT:  · Patient reports attempts at weight loss, only to regain lost weight.  · Patient demonstrated knowledge of healthy eating behaviors and exercise patterns; admits to not eating healthy and not exercising at this point.  · Patient states willingness to change lifestyle and make behavior modifications.      Barriers to Education: none    Stage of change: determination    NUTRITION DIAGNOSIS:     Morbid Obesity related to Excessive carbohydrate intake and Physical inactivity as evidence by BMI.    BARIATRIC DIET DISCUSSION/PLAN:  Discussed diet after surgery and related to patient's food record.  Reviewed nutrition guidelines for before and after  surgery.  Answered all questions.  Continue to review Bariatric Nutrition Guidebook at home and call with any questions.  Work on Bariatric Nutrition Checklist.  Work on expanding variety of vegetables.  Work on gradually cutting back on starchy CHO in the diet.  Begin trying various protein supplements to determine preference.  1200-calorie diet.  1500-calorie diet.  5-6 meals per day.  Start including protein supplements in the diet plan daily.  Increase exercise. You tube seated chair exercises    RECOMMENDATIONS:  Patient is a potential candidate for bariatric surgery - Sleeve.    Follow up in one month.  Needs additional visit(s) with RD for MSD.    Patient verbalized understanding.    Expect fair  compliance after surgery at this time.    Communicated nutrition plan with bariatric team.    SESSION TIME:  45 minutes

## 2020-10-24 ENCOUNTER — SPECIALTY PHARMACY (OUTPATIENT)
Dept: PHARMACY | Facility: CLINIC | Age: 60
End: 2020-10-24

## 2020-10-24 NOTE — TELEPHONE ENCOUNTER
Spoke with patient on 10/24.  She has 2 pens on hand for her last loading dose which will be 10/27.  She is aware [facility] will contact her 11/17 for her next refill as she will not need to start her maintenance injection until 11/24

## 2020-10-26 LAB
COTININE SERPL-MCNC: 120 NG/ML
H PYLORI IGG SERPL QL IA: NEGATIVE
NICOTINE SERPL-MCNC: 10 NG/ML

## 2020-10-28 LAB — VIT B1 BLD-MCNC: 58 UG/L (ref 38–122)

## 2020-10-30 ENCOUNTER — PATIENT MESSAGE (OUTPATIENT)
Dept: ADMINISTRATIVE | Facility: HOSPITAL | Age: 60
End: 2020-10-30

## 2020-10-30 LAB
ACID FAST MOD KINY STN SPEC: NORMAL
MYCOBACTERIUM SPEC QL CULT: NORMAL

## 2020-11-17 ENCOUNTER — SPECIALTY PHARMACY (OUTPATIENT)
Dept: PHARMACY | Facility: CLINIC | Age: 60
End: 2020-11-17

## 2020-11-20 ENCOUNTER — PATIENT MESSAGE (OUTPATIENT)
Dept: BARIATRICS | Facility: CLINIC | Age: 60
End: 2020-11-20

## 2020-11-20 ENCOUNTER — CLINICAL SUPPORT (OUTPATIENT)
Dept: BARIATRICS | Facility: CLINIC | Age: 60
End: 2020-11-20
Payer: COMMERCIAL

## 2020-11-20 DIAGNOSIS — E66.01 MORBID OBESITY WITH BMI OF 40.0-44.9, ADULT: ICD-10-CM

## 2020-11-20 DIAGNOSIS — I10 ESSENTIAL HYPERTENSION: ICD-10-CM

## 2020-11-20 DIAGNOSIS — Z71.3 DIETARY COUNSELING: ICD-10-CM

## 2020-11-20 PROCEDURE — 97803 MED NUTRITION INDIV SUBSEQ: CPT | Mod: 95,,, | Performed by: DIETITIAN, REGISTERED

## 2020-11-20 PROCEDURE — 99499 NO LOS: ICD-10-PCS | Mod: 95,,, | Performed by: DIETITIAN, REGISTERED

## 2020-11-20 PROCEDURE — 97803 PR MED NUTR THER, SUBSQ, INDIV, EA 15 MIN: ICD-10-PCS | Mod: 95,,, | Performed by: DIETITIAN, REGISTERED

## 2020-11-20 PROCEDURE — 99499 UNLISTED E&M SERVICE: CPT | Mod: 95,,, | Performed by: DIETITIAN, REGISTERED

## 2020-11-20 NOTE — PROGRESS NOTES
The patient location is: home ([place])  The chief complaint leading to consultation is: morbid obesity    Visit type: audiovisual    Face to Face time with patient: 30 min  30 minutes of total time spent on the encounter, which includes face to face time and non-face to face time preparing to see the patient (eg, review of tests), Obtaining and/or reviewing separately obtained history, Documenting clinical information in the electronic or other health record, Independently interpreting results (not separately reported) and communicating results to the patient/family/caregiver, or Care coordination (not separately reported).         Each patient to whom he or she provides medical services by telemedicine is:  (1) informed of the relationship between the physician and patient and the respective role of any other health care provider with respect to management of the patient; and (2) notified that he or she may decline to receive medical services by telemedicine and may withdraw from such care at any time.      NUTRITION NOTE    Referring Physician: Vince Rodriguez M.D.  Reason for MNT Referral: Medically Supervised Diet pending Gastric Sleeve    Patient presents for f/u visit for MSD with 8 lbs weight loss over the past month by making numerous dietary and lifestyle changes in preparation for bariatric surgery. No exercise yet. No progress with quitting smoking. Wants to conquer sweets and starchy CHO first. Plans to quit in the new year 2021. Discussed various methods to help quit and discussing with PCP for recommendations (medication, hypnosis, smoking cessation, patches, gum, etc)    CLINICAL DATA:  60 y.o. female.    Current Weight: 222 lbs  Weight Change Since Initial Visit: - 8 lbs  Ideal Body Weight: 132 lbs  BMI: 40.5    CURRENT DIET:  Reduced-calorie diet. Low carb  Diet Recall: Food records are not present.    - Atjessenia shake  - Atjessenia shake  - Atkins bar  D: meat + veg (Ex: petite filet, asparagus, green  beans, salad with oil and vinegar)  - Atkins bar    Diet Includes:   Meal Pattern: Improved.  Protein Supplements: 4 per day. Atkins shakes and bars.  Snacking: Adequate. Snacks include healthy choices.  Vegetables: Likes a variety. Eats daily.  Fruits: Likes a variety. Eats 2-3 times per week.  Beverages: diet soda and coffee without sugar. No water.  Dining Out:  Weekly. Mostly take-out.  Cooking at home: Daily. Weekly. Mostly baked, grilled and smothered meat and vegetables.    CURRENT EXERCISE:  None    Vitamins / Minerals / Herbs:   None    Social:  Works regular daytime shifts. Working from home. Seated.  Lives with her .  Groceries delivered and food prep done by the pt.  Patient believes the household will be supportive after surgery.  Alcohol: None.  Smoking: Trying to quit.     ASSESSMENT:  Patient demonstrates willingness to change lifestyle habits as evidenced by weight loss, dietary changes, including protein drinks, increased vegetables, healthier cooking at home and healthier snacking at home.    Doing well with working on greatest challenges (sweets, starchy CHO, snacking at night and emotional eating).    Barriers to Education:  none  Stage of Change:  action    NUTRITION DIAGNOSIS:  Morbid Obesity related to Excessive carbohydrate intake and Physical inactivity as evidence by BMI.  Status: Improved    PLAN:  Discuss various methods to help quit smoking with PCP (medication, hypnosis, smoking cessation, patches, gum, etc)    Diet: Maintain diet plan.    Exercise: Increase movement    Behavior Modification: Continue to document food & activity logs daily.    Weight loss prior to surgery (5-10% TBW): 11-23 lbs    Follow up in 2 months, or sooner if needed.  Needs additional visit(s) with RD for MSD.    Communicated nutrition plan with bariatric team.    SESSION TIME:  30 minutes

## 2020-11-20 NOTE — PATIENT INSTRUCTIONS
PLAN:  Call to schedule Psychology evaluation for bariatric surgery [phone]  Discuss various methods to help quit smoking with PCP (medication, hypnosis, smoking cessation, patches, gum, etc)    Diet: Maintain diet plan.    Exercise: Increase movement    Behavior Modification: Continue to document food & activity logs daily.    Weight loss prior to surgery (5-10% TBW): 11-23 lbs    Follow up in 2 months, or sooner if needed.  Needs additional visit(s) with RD for MSD.

## 2020-11-23 ENCOUNTER — OFFICE VISIT (OUTPATIENT)
Dept: PSYCHIATRY | Facility: CLINIC | Age: 60
End: 2020-11-23
Payer: COMMERCIAL

## 2020-11-23 DIAGNOSIS — F41.0 PANIC ATTACK: ICD-10-CM

## 2020-11-23 DIAGNOSIS — F41.1 GAD (GENERALIZED ANXIETY DISORDER): Primary | ICD-10-CM

## 2020-11-23 DIAGNOSIS — G47.00 INSOMNIA, UNSPECIFIED TYPE: ICD-10-CM

## 2020-11-23 DIAGNOSIS — F33.40 MDD (RECURRENT MAJOR DEPRESSIVE DISORDER) IN REMISSION: ICD-10-CM

## 2020-11-23 PROCEDURE — 99213 OFFICE O/P EST LOW 20 MIN: CPT | Mod: 95,,, | Performed by: STUDENT IN AN ORGANIZED HEALTH CARE EDUCATION/TRAINING PROGRAM

## 2020-11-23 PROCEDURE — 99213 PR OFFICE/OUTPT VISIT, EST, LEVL III, 20-29 MIN: ICD-10-PCS | Mod: 95,,, | Performed by: STUDENT IN AN ORGANIZED HEALTH CARE EDUCATION/TRAINING PROGRAM

## 2020-11-23 RX ORDER — BUSPIRONE HYDROCHLORIDE 30 MG/1
30 TABLET ORAL 2 TIMES DAILY
Qty: 180 TABLET | Refills: 0 | Status: SHIPPED | OUTPATIENT
Start: 2020-11-23 | End: 2021-02-22 | Stop reason: SDUPTHER

## 2020-11-23 RX ORDER — ZOLPIDEM TARTRATE 5 MG/1
5 TABLET ORAL NIGHTLY PRN
Qty: 30 TABLET | Refills: 2 | Status: SHIPPED | OUTPATIENT
Start: 2020-12-02 | End: 2020-12-30 | Stop reason: SDUPTHER

## 2020-11-23 RX ORDER — AMITRIPTYLINE HYDROCHLORIDE 25 MG/1
25 TABLET, FILM COATED ORAL NIGHTLY PRN
Qty: 30 TABLET | Refills: 2 | Status: SHIPPED | OUTPATIENT
Start: 2020-11-23 | End: 2021-02-22 | Stop reason: SDUPTHER

## 2020-11-23 RX ORDER — CLONAZEPAM 1 MG/1
1 TABLET ORAL 2 TIMES DAILY PRN
Qty: 60 TABLET | Refills: 2 | Status: SHIPPED | OUTPATIENT
Start: 2020-12-02 | End: 2020-12-30 | Stop reason: SDUPTHER

## 2020-11-23 NOTE — PROGRESS NOTES
OUTPATIENT PSYCHIATRY FOLLOW UP VISIT    ENCOUNTER DATE:  11/23/2020  SITE:  [facility], [address]  LENGTH OF SESSION:  19 minutes    The patient location is: confirmed pt is at home address - [address], [place], [place]  The chief complaint leading to consultation is: f/u anxiety & depression, insomnia & panic attacks    Visit type: audiovisual    Face to Face time with patient: 19 min  30 minutes of total time spent on the encounter, which includes face to face time and non-face to face time preparing to see the patient (eg, review of tests), Obtaining and/or reviewing separately obtained history, Documenting clinical information in the electronic or other health record, Independently interpreting results (not separately reported) and communicating results to the patient/family/caregiver, or Care coordination (not separately reported).         Each patient to whom he or she provides medical services by telemedicine is:  (1) informed of the relationship between the physician and patient and the respective role of any other health care provider with respect to management of the patient; and (2) notified that he or she may decline to receive medical services by telemedicine and may withdraw from such care at any time.      CHIEF COMPLAINT:  F/u anxiety & depression, insomnia, panic attacks    HISTORY OF PRESENTING ILLNESS:  Lucia Matias is a 60 y.o. female with history of anxiety/depression, panic attacks & insomnia who presents for follow up appointment.      Plan at last appointment on 8/17/2020:  PLAN:  Psych Med:  · Continue  ? Buspar 30mg BID  ? Klonpin 1mg BID PRN anxiety/panic  ? Elavil 25mg qHS  ? Ambien 5mg qHS  · Discussed with patient informed consent, risks vs. benefits, alternative treatments, side effect profile and the inherent unpredictability of individual responses to these treatments. Answered any questions patient may have had. The patient expresses understanding of the above  "and displays the capacity to agree with this current plan     Interval history as told by Patient     "Alright" doing as well as she can. Relieved noduuleis not cancerous.  Considering bariatric surgery now, possibly a gastric sleve surgery in February.  Anxiety and depression well controlled on current medications, prefers no changes to current medications.  No SI/HI, notes she had two grand children this year and is trying to make healthy choices in order to be around longer for them (forward thinking).       PSYCHIATRIC REVIEW OF SYSTEMS:(none/ yes- better/worse/stable/& what symptoms)    Symptoms of Depression: Stable/well controlled    Symptoms of Anxiety/ panic attacks: Stable/well controlled    Symptoms of Stacy/Hypomania: None    Symptoms of psychosis: None    Sleep: stable/adequate on current meds    Appetite: stable, trying to diet    Other: n/a    Alcohol: no    Illicit Drugs: no    Psychosocial stressors: no new identified, ongoing covid stress challenges    Risk Parameters:  Patient reports no suicidal ideation  Patient reports no homicidal ideation  Patient reports no self-injurious behavior  Patient reports no violent behavior    PSYCHIATRIC MED REVIEW    Current psych meds  Medication side effects:  none  Medication compliance:  full    Previous psych meds trials  Reports failing several SSRIs in 1990s    PAST PSYCHIATRIC, MEDICAL, AND SOCIAL HISTORY REVIEWED  The patient's past medical, family and social history have been reviewed and updated as appropriate within the electronic medical record - see encounter notes.    MEDICAL REVIEW OF SYSTEMS:  Complete review of systems performed covering Constitutional, Musculoskeletal, Neurologic.  All systems negative today    ALL MEDICATIONS:    Current Outpatient Medications:     albuterol (PROVENTIL/VENTOLIN HFA) 90 mcg/actuation inhaler, Inhale 1-2 puffs into the lungs every 6 (six) hours as needed for Wheezing. Rescue, Disp: 90 g, Rfl: 0    " albuterol-ipratropium (DUO-NEB) 2.5 mg-0.5 mg/3 mL nebulizer solution, Take 3 mLs by nebulization every 6 (six) hours as needed for Wheezing. Rescue, Disp: 1 Box, Rfl: 5    amitriptyline (ELAVIL) 25 MG tablet, Take 1 tablet (25 mg total) by mouth nightly as needed for Insomnia., Disp: 30 tablet, Rfl: 2    atorvastatin (LIPITOR) 20 MG tablet, Take 1 tablet (20 mg total) by mouth every evening., Disp: 90 tablet, Rfl: 3    busPIRone (BUSPAR) 30 MG Tab, Take 1 tablet (30 mg total) by mouth 2 (two) times daily., Disp: 180 tablet, Rfl: 0    clonazePAM (KLONOPIN) 1 MG tablet, Take 1 tablet (1 mg total) by mouth 2 (two) times daily as needed for Anxiety., Disp: 60 tablet, Rfl: 2    COSENTYX PEN, 2 PENS, 150 mg/mL PnIj, Inject 2 pens (300 mg) into the skin every week for 5 weeks, then inject 2 pens (300 mg) into the skin every 4 weeks, Disp: 10 mL, Rfl: 0    COSENTYX PEN, 2 PENS, 150 mg/mL PnIj, Inject 300mg (2 pens) into the skin every 4 weeks, Disp: 6 mL, Rfl: 1    fluocinonide (LIDEX) 0.05 % external solution, Apply to affected areas of body  once to twice daily as needed for psoriasis., Disp: 60 mL, Rfl: 3    fluticasone-umeclidin-vilanter (TRELEGY ELLIPTA) 100-62.5-25 mcg DsDv, Inhale 1 puff into the lungs once daily., Disp: 60 each, Rfl: 11    HYDROcodone-acetaminophen (NORCO) 5-325 mg per tablet, , Disp: , Rfl:     lisinopriL 10 MG tablet, Take 1 tablet (10 mg total) by mouth once daily., Disp: 90 tablet, Rfl: 3    metoprolol tartrate (LOPRESSOR) 50 MG tablet, Take 1 tablet by mouth twice a day with food, Disp: 180 tablet, Rfl: 3    nystatin (MYCOSTATIN) cream, Apply topically 2 (two) times daily., Disp: 30 g, Rfl: 3    triamcinolone acetonide 0.1% (KENALOG) 0.1 % ointment, Apply to affected areas of body BID prn rash. Do not use on face, underarms, or groin., Disp: 454 g, Rfl: 1    zolpidem (AMBIEN) 5 MG Tab, Take 1 tablet (5 mg total) by mouth every evening., Disp: 30 tablet, Rfl: 2    Current  Facility-Administered Medications:     DOBUTamine 500mg in D5W 250mL infusion (premix), 10 mcg/kg/min, Intravenous, Continuous, Saige Bee MD, Last Rate: 114 mL/hr at 06/26/19 1629, 40 mcg/kg/min at 06/26/19 1629    Facility-Administered Medications Ordered in Other Visits:     0.9%  NaCl infusion, , Intravenous, Continuous, Sirena Matias PA-C    ALLERGIES:  Review of patient's allergies indicates:   Allergen Reactions    Succinimides Anaphylaxis    Succinylcholine     Succinylcholine chloride      Other reaction(s): Unknown       RELEVANT LABS/STUDIES:    Lab Results   Component Value Date    WBC 6.77 10/23/2020    HGB 16.2 (H) 10/23/2020    HCT 52.1 (H) 10/23/2020    MCV 97 10/23/2020     10/23/2020     BMP  Lab Results   Component Value Date     10/23/2020    K 4.0 10/23/2020     10/23/2020    CO2 28 10/23/2020    BUN 15 10/23/2020    CREATININE 1.0 10/23/2020    CALCIUM 8.8 10/23/2020    ANIONGAP 10 10/23/2020    ESTGFRAFRICA >60.0 10/23/2020    EGFRNONAA >60.0 10/23/2020     Lab Results   Component Value Date    ALT 16 10/23/2020    AST 16 10/23/2020    ALKPHOS 124 10/23/2020    BILITOT 1.1 (H) 10/23/2020     Lab Results   Component Value Date    TSH 1.259 10/23/2020     Lab Results   Component Value Date    HGBA1C 5.6 10/23/2020       VITALS  There were no vitals filed for this visit.    PHYSICAL EXAM  General: well developed, well nourished, appears stated age, no distress, moderately obese  Neurologic:   Gait: not observed due to telemedicine visit   Psychomotor signs:  No involuntary movements or tremor  AIMS: none    PSYCHIATRIC EXAM:     Mental Status Exam:  Appearance: unremarkable, age appropriate, casually dressed, neatly groomed, overweight  Behavior/Cooperation: normal, cooperative, eye contact normal  Speech: normal tone, normal rate, normal pitch, normal volume  Language: uses words appropriately; NO aphasia or dysarthria  Mood: euthymic  Affect: full range &  appropriate  Thought Process: normal and logical  Thought Content: normal, no suicidality, no homicidality, delusions, or paranoia  Level of Consciousness: Alert and Oriented x3  Memory:  Intact   Recent:  Intact; able to report recent events   Remote:  Intact; Named 4/4 past presidents   Attention/concentration: appropriate for age/education.   Fund of Knowledge: appears adequate  Insight:  Intact  Judgment:Intact       IMPRESSION:    Lucia Matias is a 60 y.o. female with history of MDD, RANDI, Panic attacks, insomnia who presents for follow up appointment for medication management.    Status/Progress:  Based on the examination today, the patient's problem(s) is/are well controlled.  New problems have not been presented today.     DIAGNOSES:    ICD-10-CM ICD-9-CM   1. RANDI (generalized anxiety disorder)  F41.1 300.02   2. MDD (recurrent major depressive disorder) in remission  F33.40 296.35   3. Insomnia, unspecified type  G47.00 780.52   4. Panic attack  F41.0 300.01       PLAN:  Psych Med:  · Continue  ? Buspar 30mg BID  ? Klonpin 1mg BID PRN anxiety/panic  ? Elavil 25mg qHS  ? Ambien 5mg qHS  · Discussed with pt the risks associated with these medications and pt does not want to try to reduce any at this time.      Discussed with patient informed consent, risks vs. benefits, alternative treatments, side effect profile and the inherent unpredictability of individual responses to these treatments. Answered any questions patient may have had. The patient expresses understanding of the above and displays the capacity to agree with this current plan     Other: Chart reviewed, no new ordered this visit    RETURN TO CLINIC:  3 months    Sloan Carranza MD  [facility] [facility]  PGY-3  11/23/2020 10:04 AM

## 2020-12-02 ENCOUNTER — OFFICE VISIT (OUTPATIENT)
Dept: PSYCHIATRY | Facility: CLINIC | Age: 60
End: 2020-12-02
Payer: COMMERCIAL

## 2020-12-02 DIAGNOSIS — Z01.818 PREOP EXAMINATION: Primary | ICD-10-CM

## 2020-12-02 DIAGNOSIS — I10 ESSENTIAL HYPERTENSION, BENIGN: ICD-10-CM

## 2020-12-02 DIAGNOSIS — F33.40 MDD (RECURRENT MAJOR DEPRESSIVE DISORDER) IN REMISSION: ICD-10-CM

## 2020-12-02 DIAGNOSIS — E66.01 MORBID OBESITY: ICD-10-CM

## 2020-12-02 DIAGNOSIS — E78.5 HYPERLIPIDEMIA, UNSPECIFIED HYPERLIPIDEMIA TYPE: ICD-10-CM

## 2020-12-02 DIAGNOSIS — F41.1 GAD (GENERALIZED ANXIETY DISORDER): ICD-10-CM

## 2020-12-02 PROCEDURE — 90791 PR PSYCHIATRIC DIAGNOSTIC EVALUATION: ICD-10-PCS | Mod: 95,,, | Performed by: PSYCHOLOGIST

## 2020-12-02 PROCEDURE — 90791 PSYCH DIAGNOSTIC EVALUATION: CPT | Mod: 95,,, | Performed by: PSYCHOLOGIST

## 2020-12-02 NOTE — PROGRESS NOTES
"  Psychiatry Initial Visit (PhD/LCSW)   Diagnostic Interview - CPT 75006     Date: 12/02/2020    Site: [facility]     Referral source: Vince Rodriguez M.D.    Clinical status of patient: Outpatient     Mrs. Lucia Matias, a 60 y.o. female, presented for initial evaluation visit. Before this evaluation was initiated, the purposes and process of the assessment and the limits of confidentiality were discussed with the patient who expressed understanding of these issues and orally consented to proceed with the evaluation.     Chief complaint/reason for encounter: Routine psychological evaluation prior to bariatric surgery.     Type of surgery sought: Sleeve    History of present illness: Mrs. avila is a 60-year-old white female who is pursuing bariatric surgery to improve her health and quality of life. She has struggled with depression and anxiety since approximately 2017 but reports her symptoms are well-managed on her current medication regimen. She has never been hospitalized for psychiatric reasons, and denied any history of suicidality.  She has begun making positive lifestyle changes in anticipation for surgery, such as reducing portion sizes and eliminating carbohydrates, with good benefit. The patient has a Body Mass Index of 42 as documented by the referring provider.    Mrs. Matias has struggled with weight most of her life. She notes she "always had a little extra weight, even in high school when playing sports." However, in the last three to four years she reports her weight gain has been unmanageable. A number of family members passed away in close succession and her  was diagnosed with cancer and HIV.  Factors that have contributed to her weight gain over the years include: eating large portions, late night snacking, and eating lots of sweets.   In addition, Mrs. Matias acknowledges that she is an emotional eater, with anxiety as her primary emotional trigger to overeat.  She is working " "on increasing her coping mechanisms for stress, including painting and asking her  for help.  She has tried many weight loss methods on her own (i.e. Atkins diet, exercise) with little success, and she believes that her biggest weight loss challenge is late night snacking.  Her motivation for seeking surgery now is to live as long as possible and to be around to see her grandchildren grow up.  Her postsurgical goals include reducing her pain and weighing under 140 pounds.      Mrs. Matias has met with Ms. Susana Metz RD, bariatric dietician, and reports that she has made the following lifestyle changes since beginning the bariatric program: consuming protein drinks, increased vegetables, healthier cooking at home and healthier snacking at home.   She must continue meeting with Ms. Metz to demonstrate the implementation of lifestyle changes prior to clearance for bariatric surgery. She has her next follow-up visit scheduled in January 2021.     Co-morbidities: HTN, HLD    Knowledge of surgery information:  - Basics of procedure: Y- make small pouch, significantly reduce the size,"I watched an entire procedure on youtube."  - Risks: Y - infection, death  - Basics of diet: Y- liquid for a while then gradually shift to solids, will always have to focus on portion sizes    Pain: 4/10    Psychiatric Symptoms:   Depression - denied significant symptoms of depression. (PHQ-9=3)  Mayur/Hypomania - denied significant symptoms of mayur/hypomania.  Anxiety - reported mild worry related to COVID. Anxiety is well-managed with current medication (RANDI-7= 5 )  Thoughts - denied delusions, hallucinations.  Suicidal thoughts/behaviors - denied.  Substance abuse - denied abuse or dependence.   Sleep - "very good"  Self-injury - denied.    Current psychiatric treatment:Medications: Mrs. Matias has seen several psychiatry residents, all supervised by Dr. Isaiah Gar, since 2018. Currently, she takes Buspar 30mg BID, Klonpin " "1mg BID PRN, Elavil 25mg qHS, Ambien 5mg qHS.     Psychotherapy: None    Treating clinicians: Dr. Isaiah Gar MD    Health behaviors: Reported adherence to pharmacologic regimen.      Psychiatric history:   Previous diagnosis: Generalized anxiety disorder; major depressive disorder in remission    Previous hospitalizations: None     History of outpatient treatment: No outpatient psychotherapy history; outpatient psychiatry med management as described above.    Previous suicide attempt: denies      Trauma history:  Denies.    History of eating disorders:  History of bulimia: denies    History of binge-eating episodes: denies      Family history of psychiatric illness: Unknown; patient is adopted and unfamiliar with biological family history    Social history (marriage, employment, etc.): Patient was born in [place] and raised in [place], [place]. She was raised by her adoptive parents, who were  for 55 years prior to her mother's passing. She has an adopted sister, who is three years older than her. Patient completed two years of college. She denied ever having to repeat a grade or any history of behavioral problems in school. She was  to her first  for 18 years and has three children from that marriage. After her divorce, she remarried her current , who has four adult children. Together, she and her  have 11 grandchildren. 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 Prior to her current position, she did similar work for a different company.    Current psychosocial stressors: COVID    Report of coping skills: "take deep breaths, prioritize what needs to be done first and what can wait, ask my  for help"    Support system: , daughters    Substance use:   Alcohol: Denied current use; denied history of abuse or dependency.   Drugs: Denied current use; denied history of abuse or dependency.  Tobacco: Actively trying to " quit; smokes about half a pack per day. Has appointment next week to try hypnosis for tobacco cessation   Caffeine:  Working on cutting back in anticipation for surgery. Has coffee daily in morning and 1-2 Coke Zeros daily    Current medications and drug reactions (include OTC, herbal): see medication list     Strengths and liabilities: Strength: Patient accepts guidance/feedback, Strength: Patient is expressive/articulate., Strength: Patient is intelligent., Strength: Patient is motivated for change., Strength: Patient has positive support network., Strength: Patient has reasonable judgment., Strength: Patient is stable.     Current Evaluation:    Mental Status Exam:   General Appearance:  age appropriate, well dressed, neatly groomed, overweight    Speech:  normal tone, normal rate, normal pitch, normal volume    Level of Cooperation:  cooperative    Thought Processes:  normal and logical    Mood:  euthymic    Thought Content:  normal, no suicidality, no homicidality, delusions, or paranoia    Affect:  congruent and appropriate    Orientation:  oriented x3    Memory:  recent memory intact; immediate and delayed word recall 3/3  remote memory intact; able to recall remote personal events   Attention Span & Concentration:  appropriate   Fund of General Knowledge:  appropriate for education    Abstract Reasoning:  Not directly assessed   Judgment & Insight:  good    Language  intact        Diagnostic Impression - Plan:      Diagnostic Impression:  Z01.818 Pre-operative examination   E66.01   Morbid obesity  I10          Hypertension  E78.5      Hyperlipidemia  Z68.41    Body Mass Index of 42  F41.1      Generalized Anxiety Disorder  F33.40     MDD, in remission    Summary/Conclusion:   There are no overt psychological contraindications for proceeding with bariatric surgery.  The patient has a history of anxiety and depression that is well-managed by medication prescribed by her psychiatrist. She reports no  significant symptoms currently.  The patient has reasonable expectations for surgery, good social support, and has already begun making appropriate lifestyle changes in anticipation for surgery. The patient has verbalized appropriate awareness and commitment to the necessary behavioral changes associated with bariatric surgery and appears willing to comply with long-term lifestyle changes.     Recommendations:  -This patient is psychologically cleared to proceed with bariatric surgery.  - There are no recommendations for psychological treatment at this time. The patient is aware of resources available should her/his needs change in the future.

## 2020-12-04 DIAGNOSIS — Z12.11 COLON CANCER SCREENING: ICD-10-CM

## 2020-12-11 ENCOUNTER — PATIENT MESSAGE (OUTPATIENT)
Dept: OTHER | Facility: OTHER | Age: 60
End: 2020-12-11

## 2020-12-14 ENCOUNTER — PATIENT MESSAGE (OUTPATIENT)
Dept: FAMILY MEDICINE | Facility: CLINIC | Age: 60
End: 2020-12-14

## 2020-12-14 RX ORDER — LISINOPRIL 10 MG/1
10 TABLET ORAL DAILY
Qty: 90 TABLET | Refills: 3 | Status: SHIPPED | OUTPATIENT
Start: 2020-12-14 | End: 2021-03-12 | Stop reason: SDUPTHER

## 2020-12-18 ENCOUNTER — SPECIALTY PHARMACY (OUTPATIENT)
Dept: PHARMACY | Facility: CLINIC | Age: 60
End: 2020-12-18

## 2020-12-18 NOTE — TELEPHONE ENCOUNTER
Specialty Pharmacy - Refill Coordination    Specialty Medication Orders Linked to Encounter      Most Recent Value   Medication #1  COSENTYX PEN, 2 PENS, 150 mg/mL PnIj (Order#[id number], Rx#[id number])          Refill Questions - Documented Responses      Most Recent Value   Relationship to patient of person spoken to?  Self   HIPAA/medical authority confirmed?  Yes   Any changes in contact preferences or allowed representatives?  No   Has the patient had any insurance changes?  No   Has the patient had any changes to specialty medication, dose, or instructions?  No   Has the patient started taking any new medications, herbals, or supplements?  No   Has the patient been diagnosed with any new medical conditions?  No   Does the patient have any new allergies to medications or foods?  No   Does the patient have any concerns about side effects?  No   Can the patient store medication/sharps container properly (at the correct temperature, away from children/pets, etc.)?  Yes   Can the patient call emergency services (911) in the event of an emergency?  Yes   Does the patient have any concerns or questions about taking or administering this medication as prescribed?  No   How many doses did the patient miss in the past 4 weeks or since the last fill?  0   How many doses does the patient have on hand?  0   How many days does the patient report on hand quantity will last?  4   Does the number of doses/days supply remaining match pharmacy expected amounts?  Yes   Does the patient feel that this medication is effective?  Yes   During the past 4 weeks, has patient missed any activities due to condition or medication?  No   During the past 4 weeks, did patient have any of the following urgent care visits?  None   How will the patient receive the medication?  Mail   When does the patient need to receive the medication?  12/22/20   Shipping Address  Home   Address in [facility] confirmed and updated if neccessary?  Yes    Expected Copay ($)  0   Is the patient able to afford the medication copay?  Yes   Payment Method  zero copay   Days supply of Refill  28   Would patient like to speak to a pharmacist?  No   Do you want to trigger an intervention?  No   Do you want to trigger an additional referral task?  No   Refill activity completed?  Yes   Refill activity plan  Refill scheduled   Shipment/Pickup Date:  12/21/20          Current Outpatient Medications   Medication Sig    albuterol (PROVENTIL/VENTOLIN HFA) 90 mcg/actuation inhaler Inhale 1-2 puffs into the lungs every 6 (six) hours as needed for Wheezing. Rescue    albuterol-ipratropium (DUO-NEB) 2.5 mg-0.5 mg/3 mL nebulizer solution Take 3 mLs by nebulization every 6 (six) hours as needed for Wheezing. Rescue    amitriptyline (ELAVIL) 25 MG tablet Take 1 tablet (25 mg total) by mouth nightly as needed for Insomnia.    atorvastatin (LIPITOR) 20 MG tablet Take 1 tablet (20 mg total) by mouth every evening.    busPIRone (BUSPAR) 30 MG Tab Take 1 tablet (30 mg total) by mouth 2 (two) times daily.    clonazePAM (KLONOPIN) 1 MG tablet Take 1 tablet (1 mg total) by mouth 2 (two) times daily as needed for Anxiety.    clonazePAM (KLONOPIN) 1 MG tablet Take 1 tablet (1 mg total) by mouth 2 (two) times daily as needed for Anxiety (panic).    COSENTYX PEN, 2 PENS, 150 mg/mL PnIj Inject 2 pens (300 mg) into the skin every week for 5 weeks, then inject 2 pens (300 mg) into the skin every 4 weeks    COSENTYX PEN, 2 PENS, 150 mg/mL PnIj Inject 300mg (2 pens) into the skin every 4 weeks    fluocinonide (LIDEX) 0.05 % external solution Apply to affected areas of body  once to twice daily as needed for psoriasis.    fluticasone-umeclidin-vilanter (TRELEGY ELLIPTA) 100-62.5-25 mcg DsDv Inhale 1 puff into the lungs once daily.    HYDROcodone-acetaminophen (NORCO) 5-325 mg per tablet     lisinopriL 10 MG tablet Take 1 tablet (10 mg total) by mouth once daily.    metoprolol tartrate  (LOPRESSOR) 50 MG tablet Take 1 tablet by mouth twice a day with food    nystatin (MYCOSTATIN) cream Apply topically 2 (two) times daily.    triamcinolone acetonide 0.1% (KENALOG) 0.1 % ointment Apply to affected areas of body BID prn rash. Do not use on face, underarms, or groin.    zolpidem (AMBIEN) 5 MG Tab Take 1 tablet (5 mg total) by mouth nightly as needed (insomnia).   Last reviewed on 12/18/2020 11:44 AM by Nya Cardoso    Review of patient's allergies indicates:   Allergen Reactions    Succinimides Anaphylaxis    Succinylcholine     Succinylcholine chloride      Other reaction(s): Unknown    Last reviewed on  12/18/2020 11:44 AM by Nya Cardoso      Tasks added this encounter   No tasks added.   Tasks due within next 3 months   12/15/2020 - Refill Call (Auto Added)     NYA CARDOSO  [facility]  [address] [place] [id number]  Phone: [phone]  Fax: [phone]

## 2020-12-19 ENCOUNTER — PATIENT MESSAGE (OUTPATIENT)
Dept: INFECTIOUS DISEASES | Facility: CLINIC | Age: 60
End: 2020-12-19

## 2020-12-28 ENCOUNTER — PATIENT MESSAGE (OUTPATIENT)
Dept: ADMINISTRATIVE | Facility: OTHER | Age: 60
End: 2020-12-28

## 2020-12-30 ENCOUNTER — PATIENT MESSAGE (OUTPATIENT)
Dept: INFECTIOUS DISEASES | Facility: CLINIC | Age: 60
End: 2020-12-30

## 2021-01-04 ENCOUNTER — PATIENT MESSAGE (OUTPATIENT)
Dept: ADMINISTRATIVE | Facility: HOSPITAL | Age: 61
End: 2021-01-04

## 2021-01-06 DIAGNOSIS — Z12.31 OTHER SCREENING MAMMOGRAM: ICD-10-CM

## 2021-01-13 ENCOUNTER — PATIENT OUTREACH (OUTPATIENT)
Dept: ADMINISTRATIVE | Facility: OTHER | Age: 61
End: 2021-01-13

## 2021-01-14 ENCOUNTER — SPECIALTY PHARMACY (OUTPATIENT)
Dept: PHARMACY | Facility: CLINIC | Age: 61
End: 2021-01-14

## 2021-01-15 ENCOUNTER — PATIENT MESSAGE (OUTPATIENT)
Dept: PULMONOLOGY | Facility: CLINIC | Age: 61
End: 2021-01-15

## 2021-01-15 ENCOUNTER — CLINICAL SUPPORT (OUTPATIENT)
Dept: BARIATRICS | Facility: CLINIC | Age: 61
End: 2021-01-15
Payer: COMMERCIAL

## 2021-01-15 DIAGNOSIS — Z71.3 DIETARY COUNSELING: ICD-10-CM

## 2021-01-15 DIAGNOSIS — E66.01 MORBID OBESITY WITH BMI OF 40.0-44.9, ADULT: ICD-10-CM

## 2021-01-15 DIAGNOSIS — Z72.0 TOBACCO ABUSE: ICD-10-CM

## 2021-01-15 DIAGNOSIS — J41.8 MIXED SIMPLE AND MUCOPURULENT CHRONIC BRONCHITIS: Primary | ICD-10-CM

## 2021-01-15 DIAGNOSIS — Z01.818 PRE-OP TESTING: ICD-10-CM

## 2021-01-15 PROCEDURE — 97803 MED NUTRITION INDIV SUBSEQ: CPT | Mod: 95,,, | Performed by: DIETITIAN, REGISTERED

## 2021-01-15 PROCEDURE — 99499 UNLISTED E&M SERVICE: CPT | Mod: 95,,, | Performed by: DIETITIAN, REGISTERED

## 2021-01-15 PROCEDURE — 97803 PR MED NUTR THER, SUBSQ, INDIV, EA 15 MIN: ICD-10-PCS | Mod: 95,,, | Performed by: DIETITIAN, REGISTERED

## 2021-01-15 PROCEDURE — 99499 NO LOS: ICD-10-PCS | Mod: 95,,, | Performed by: DIETITIAN, REGISTERED

## 2021-01-19 ENCOUNTER — PATIENT MESSAGE (OUTPATIENT)
Dept: PULMONOLOGY | Facility: CLINIC | Age: 61
End: 2021-01-19

## 2021-01-26 ENCOUNTER — LAB VISIT (OUTPATIENT)
Dept: INTERNAL MEDICINE | Facility: CLINIC | Age: 61
End: 2021-01-26
Payer: COMMERCIAL

## 2021-01-26 DIAGNOSIS — Z01.818 PRE-OP TESTING: ICD-10-CM

## 2021-01-26 PROCEDURE — U0003 INFECTIOUS AGENT DETECTION BY NUCLEIC ACID (DNA OR RNA); SEVERE ACUTE RESPIRATORY SYNDROME CORONAVIRUS 2 (SARS-COV-2) (CORONAVIRUS DISEASE [COVID-19]), AMPLIFIED PROBE TECHNIQUE, MAKING USE OF HIGH THROUGHPUT TECHNOLOGIES AS DESCRIBED BY CMS-2020-01-R: HCPCS

## 2021-01-27 LAB — SARS-COV-2 RNA RESP QL NAA+PROBE: NOT DETECTED

## 2021-01-28 ENCOUNTER — OFFICE VISIT (OUTPATIENT)
Dept: PULMONOLOGY | Facility: CLINIC | Age: 61
End: 2021-01-28
Payer: COMMERCIAL

## 2021-01-28 ENCOUNTER — HOSPITAL ENCOUNTER (OUTPATIENT)
Dept: PULMONOLOGY | Facility: CLINIC | Age: 61
Discharge: HOME | End: 2021-01-28
Payer: COMMERCIAL

## 2021-01-28 VITALS
OXYGEN SATURATION: 96 % | HEIGHT: 62 IN | SYSTOLIC BLOOD PRESSURE: 132 MMHG | HEART RATE: 79 BPM | BODY MASS INDEX: 41.41 KG/M2 | DIASTOLIC BLOOD PRESSURE: 83 MMHG | WEIGHT: 225 LBS

## 2021-01-28 VITALS — WEIGHT: 225 LBS | BODY MASS INDEX: 41.41 KG/M2 | HEIGHT: 62 IN

## 2021-01-28 DIAGNOSIS — J41.8 MIXED SIMPLE AND MUCOPURULENT CHRONIC BRONCHITIS: ICD-10-CM

## 2021-01-28 DIAGNOSIS — R91.8 MULTIPLE LUNG NODULES ON CT: ICD-10-CM

## 2021-01-28 DIAGNOSIS — Z12.2 ENCOUNTER FOR SCREENING FOR MALIGNANT NEOPLASM OF RESPIRATORY ORGANS: ICD-10-CM

## 2021-01-28 DIAGNOSIS — J44.9 CHRONIC OBSTRUCTIVE PULMONARY DISEASE, UNSPECIFIED COPD TYPE: Primary | ICD-10-CM

## 2021-01-28 DIAGNOSIS — Z01.811 PREOPERATIVE RESPIRATORY EXAMINATION: ICD-10-CM

## 2021-01-28 PROCEDURE — 94060 PR EVAL OF BRONCHOSPASM: ICD-10-PCS | Mod: S$GLB,,, | Performed by: INTERNAL MEDICINE

## 2021-01-28 PROCEDURE — 99999 PR PBB SHADOW E&M-EST. PATIENT-LVL IV: CPT | Mod: PBBFAC,,, | Performed by: NURSE PRACTITIONER

## 2021-01-28 PROCEDURE — 3079F PR MOST RECENT DIASTOLIC BLOOD PRESSURE 80-89 MM HG: ICD-10-PCS | Mod: CPTII,S$GLB,, | Performed by: NURSE PRACTITIONER

## 2021-01-28 PROCEDURE — 94618 PULMONARY STRESS TESTING: CPT | Mod: S$GLB,,, | Performed by: INTERNAL MEDICINE

## 2021-01-28 PROCEDURE — 99214 PR OFFICE/OUTPT VISIT, EST, LEVL IV, 30-39 MIN: ICD-10-PCS | Mod: 25,S$GLB,, | Performed by: NURSE PRACTITIONER

## 2021-01-28 PROCEDURE — 3075F SYST BP GE 130 - 139MM HG: CPT | Mod: CPTII,S$GLB,, | Performed by: NURSE PRACTITIONER

## 2021-01-28 PROCEDURE — 3008F BODY MASS INDEX DOCD: CPT | Mod: CPTII,S$GLB,, | Performed by: NURSE PRACTITIONER

## 2021-01-28 PROCEDURE — 94729 DIFFUSING CAPACITY: CPT | Mod: S$GLB,,, | Performed by: INTERNAL MEDICINE

## 2021-01-28 PROCEDURE — 3008F PR BODY MASS INDEX (BMI) DOCUMENTED: ICD-10-PCS | Mod: CPTII,S$GLB,, | Performed by: NURSE PRACTITIONER

## 2021-01-28 PROCEDURE — 94727 PR PULM FUNCTION TEST BY GAS: ICD-10-PCS | Mod: S$GLB,,, | Performed by: INTERNAL MEDICINE

## 2021-01-28 PROCEDURE — 1126F AMNT PAIN NOTED NONE PRSNT: CPT | Mod: S$GLB,,, | Performed by: NURSE PRACTITIONER

## 2021-01-28 PROCEDURE — 3075F PR MOST RECENT SYSTOLIC BLOOD PRESS GE 130-139MM HG: ICD-10-PCS | Mod: CPTII,S$GLB,, | Performed by: NURSE PRACTITIONER

## 2021-01-28 PROCEDURE — 94727 GAS DIL/WSHOT DETER LNG VOL: CPT | Mod: S$GLB,,, | Performed by: INTERNAL MEDICINE

## 2021-01-28 PROCEDURE — 94618 PULMONARY STRESS TESTING: ICD-10-PCS | Mod: S$GLB,,, | Performed by: INTERNAL MEDICINE

## 2021-01-28 PROCEDURE — 94729 PR C02/MEMBANE DIFFUSE CAPACITY: ICD-10-PCS | Mod: S$GLB,,, | Performed by: INTERNAL MEDICINE

## 2021-01-28 PROCEDURE — 1126F PR PAIN SEVERITY QUANTIFIED, NO PAIN PRESENT: ICD-10-PCS | Mod: S$GLB,,, | Performed by: NURSE PRACTITIONER

## 2021-01-28 PROCEDURE — 99214 OFFICE O/P EST MOD 30 MIN: CPT | Mod: 25,S$GLB,, | Performed by: NURSE PRACTITIONER

## 2021-01-28 PROCEDURE — 3079F DIAST BP 80-89 MM HG: CPT | Mod: CPTII,S$GLB,, | Performed by: NURSE PRACTITIONER

## 2021-01-28 PROCEDURE — 94060 EVALUATION OF WHEEZING: CPT | Mod: S$GLB,,, | Performed by: INTERNAL MEDICINE

## 2021-01-28 PROCEDURE — 99999 PR PBB SHADOW E&M-EST. PATIENT-LVL IV: ICD-10-PCS | Mod: PBBFAC,,, | Performed by: NURSE PRACTITIONER

## 2021-01-28 RX ORDER — LISINOPRIL 10 MG/1
TABLET ORAL
COMMUNITY
End: 2021-03-01

## 2021-01-28 RX ORDER — EMTRICITABINE AND TENOFOVIR DISOPROXIL FUMARATE 200; 300 MG/1; MG/1
TABLET, FILM COATED ORAL
COMMUNITY
End: 2021-03-01

## 2021-01-28 RX ORDER — SULINDAC 200 MG/1
200 TABLET ORAL 2 TIMES DAILY
COMMUNITY
Start: 2020-12-08 | End: 2021-03-01

## 2021-01-28 RX ORDER — ATORVASTATIN CALCIUM 20 MG/1
TABLET, FILM COATED ORAL
COMMUNITY
End: 2021-03-16 | Stop reason: ALTCHOICE

## 2021-01-29 ENCOUNTER — HOSPITAL ENCOUNTER (OUTPATIENT)
Dept: RADIOLOGY | Facility: HOSPITAL | Age: 61
Discharge: HOME | End: 2021-01-29
Attending: INTERNAL MEDICINE
Payer: COMMERCIAL

## 2021-01-29 DIAGNOSIS — Z12.31 OTHER SCREENING MAMMOGRAM: ICD-10-CM

## 2021-01-29 PROCEDURE — 77067 SCR MAMMO BI INCL CAD: CPT | Mod: TC,PO

## 2021-02-01 ENCOUNTER — PATIENT MESSAGE (OUTPATIENT)
Dept: PSYCHIATRY | Facility: CLINIC | Age: 61
End: 2021-02-01

## 2021-02-01 ENCOUNTER — PATIENT MESSAGE (OUTPATIENT)
Dept: BARIATRICS | Facility: CLINIC | Age: 61
End: 2021-02-01

## 2021-02-02 ENCOUNTER — PATIENT MESSAGE (OUTPATIENT)
Dept: FAMILY MEDICINE | Facility: CLINIC | Age: 61
End: 2021-02-02

## 2021-02-02 ENCOUNTER — TELEPHONE (OUTPATIENT)
Dept: BARIATRICS | Facility: CLINIC | Age: 61
End: 2021-02-02

## 2021-02-02 ENCOUNTER — PATIENT MESSAGE (OUTPATIENT)
Dept: BARIATRICS | Facility: CLINIC | Age: 61
End: 2021-02-02

## 2021-02-02 ENCOUNTER — TELEPHONE (OUTPATIENT)
Dept: FAMILY MEDICINE | Facility: CLINIC | Age: 61
End: 2021-02-02

## 2021-02-02 DIAGNOSIS — E66.01 MORBID OBESITY WITH BMI OF 40.0-44.9, ADULT: Primary | ICD-10-CM

## 2021-02-02 RX ORDER — AMOXICILLIN AND CLAVULANATE POTASSIUM 875; 125 MG/1; MG/1
1 TABLET, FILM COATED ORAL EVERY 12 HOURS
Qty: 20 TABLET | Refills: 0 | Status: SHIPPED | OUTPATIENT
Start: 2021-02-02 | End: 2021-03-01

## 2021-02-09 PROBLEM — Z01.811 PREOPERATIVE RESPIRATORY EXAMINATION: Status: ACTIVE | Noted: 2021-02-09

## 2021-02-10 ENCOUNTER — SPECIALTY PHARMACY (OUTPATIENT)
Dept: PHARMACY | Facility: CLINIC | Age: 61
End: 2021-02-10

## 2021-02-17 ENCOUNTER — IMMUNIZATION (OUTPATIENT)
Dept: INTERNAL MEDICINE | Facility: CLINIC | Age: 61
End: 2021-02-17
Payer: COMMERCIAL

## 2021-02-17 DIAGNOSIS — Z23 NEED FOR VACCINATION: Primary | ICD-10-CM

## 2021-02-17 PROCEDURE — 91300 COVID-19, MRNA, LNP-S, PF, 30 MCG/0.3 ML DOSE VACCINE: CPT | Mod: PBBFAC | Performed by: INTERNAL MEDICINE

## 2021-02-18 ENCOUNTER — HOSPITAL ENCOUNTER (OUTPATIENT)
Dept: CARDIOLOGY | Facility: HOSPITAL | Age: 61
Discharge: HOME | End: 2021-02-18
Attending: PHYSICIAN ASSISTANT
Payer: COMMERCIAL

## 2021-02-18 VITALS
SYSTOLIC BLOOD PRESSURE: 112 MMHG | HEIGHT: 62 IN | DIASTOLIC BLOOD PRESSURE: 78 MMHG | BODY MASS INDEX: 40.85 KG/M2 | RESPIRATION RATE: 18 BRPM | WEIGHT: 222 LBS

## 2021-02-18 DIAGNOSIS — E66.01 MORBID OBESITY WITH BMI OF 40.0-44.9, ADULT: ICD-10-CM

## 2021-02-18 LAB
ASCENDING AORTA: 3.17 CM
BSA FOR ECHO PROCEDURE: 2.1 M2
CV ECHO LV RWT: 0.38 CM
CV STRESS BASE HR: 79 BPM
DIASTOLIC BLOOD PRESSURE: 70 MMHG
DOP CALC LVOT PEAK VEL: 0.85 M/S
DOP CALCLVOT PEAK VEL VTI: 17.59 CM
E WAVE DECELERATION TIME: 217.82 MSEC
E/A RATIO: 0.98
E/E' RATIO: 7.88 M/S
ECHO LV POSTERIOR WALL: 0.88 CM (ref 0.6–1.1)
FRACTIONAL SHORTENING: 36 % (ref 28–44)
INTERVENTRICULAR SEPTUM: 0.92 CM (ref 0.6–1.1)
LA MAJOR: 3.54 CM
LA MINOR: 3.8 CM
LA WIDTH: 2.93 CM
LEFT ATRIUM SIZE: 3.29 CM
LEFT ATRIUM VOLUME INDEX MOD: 11.6 ML/M2
LEFT ATRIUM VOLUME INDEX: 15 ML/M2
LEFT ATRIUM VOLUME MOD: 23.25 CM3
LEFT ATRIUM VOLUME: 30.03 CM3
LEFT INTERNAL DIMENSION IN SYSTOLE: 3.01 CM (ref 2.1–4)
LEFT VENTRICLE DIASTOLIC VOLUME INDEX: 50.4 ML/M2
LEFT VENTRICLE DIASTOLIC VOLUME: 100.8 ML
LEFT VENTRICLE MASS INDEX: 71 G/M2
LEFT VENTRICLE SYSTOLIC VOLUME INDEX: 17.6 ML/M2
LEFT VENTRICLE SYSTOLIC VOLUME: 35.24 ML
LEFT VENTRICULAR INTERNAL DIMENSION IN DIASTOLE: 4.67 CM (ref 3.5–6)
LEFT VENTRICULAR MASS: 141.2 G
LV LATERAL E/E' RATIO: 7 M/S
LV SEPTAL E/E' RATIO: 9 M/S
MV A" WAVE DURATION": 9.13 MSEC
MV PEAK A VEL: 0.64 M/S
MV PEAK E VEL: 0.63 M/S
MV STENOSIS PRESSURE HALF TIME: 63.17 MS
MV VALVE AREA P 1/2 METHOD: 3.48 CM2
OHS CV CPX 1 MINUTE RECOVERY HEART RATE: 118 BPM
OHS CV CPX 85 PERCENT MAX PREDICTED HEART RATE MALE: 132
OHS CV CPX MAX PREDICTED HEART RATE: 155
OHS CV CPX PATIENT IS FEMALE: 1
OHS CV CPX PATIENT IS MALE: 0
OHS CV CPX PEAK DIASTOLIC BLOOD PRESSURE: 46 MMHG
OHS CV CPX PEAK HEAR RATE: 123 BPM
OHS CV CPX PEAK RATE PRESSURE PRODUCT: 8610
OHS CV CPX PEAK SYSTOLIC BLOOD PRESSURE: 70 MMHG
OHS CV CPX PERCENT MAX PREDICTED HEART RATE ACHIEVED: 79
OHS CV CPX RATE PRESSURE PRODUCT PRESENTING: 7900
PISA TR MAX VEL: 1.99 M/S
PULM VEIN S/D RATIO: 1.09
PV PEAK D VEL: 0.46 M/S
PV PEAK S VEL: 0.5 M/S
RA MAJOR: 3.28 CM
RA PRESSURE: 3 MMHG
RA WIDTH: 2.48 CM
RIGHT VENTRICULAR END-DIASTOLIC DIMENSION: 2.72 CM
RV TISSUE DOPPLER FREE WALL SYSTOLIC VELOCITY 1 (APICAL 4 CHAMBER VIEW): 8.36 CM/S
SINUS: 2.86 CM
STJ: 2.78 CM
SYSTOLIC BLOOD PRESSURE: 100 MMHG
TDI LATERAL: 0.09 M/S
TDI SEPTAL: 0.07 M/S
TDI: 0.08 M/S
TR MAX PG: 16 MMHG
TRICUSPID ANNULAR PLANE SYSTOLIC EXCURSION: 1.88 CM
TV REST PULMONARY ARTERY PRESSURE: 19 MMHG

## 2021-02-18 PROCEDURE — 93352 STRESS ECHO (CUPID ONLY): ICD-10-PCS | Mod: ,,, | Performed by: INTERNAL MEDICINE

## 2021-02-18 PROCEDURE — 93351 STRESS ECHO (CUPID ONLY): ICD-10-PCS | Mod: 26,,, | Performed by: INTERNAL MEDICINE

## 2021-02-18 PROCEDURE — 63600175 PHARM REV CODE 636 W HCPCS: Performed by: PHYSICIAN ASSISTANT

## 2021-02-18 PROCEDURE — 93352 ADMIN ECG CONTRAST AGENT: CPT | Mod: ,,, | Performed by: INTERNAL MEDICINE

## 2021-02-18 PROCEDURE — 93351 STRESS TTE COMPLETE: CPT | Mod: 26,,, | Performed by: INTERNAL MEDICINE

## 2021-02-18 PROCEDURE — 25500020 PHARM REV CODE 255: Performed by: INTERNAL MEDICINE

## 2021-02-18 PROCEDURE — 93351 STRESS TTE COMPLETE: CPT

## 2021-02-18 RX ORDER — DOBUTAMINE HYDROCHLORIDE 400 MG/100ML
10 INJECTION INTRAVENOUS
Status: COMPLETED | OUTPATIENT
Start: 2021-02-18 | End: 2021-02-18

## 2021-02-18 RX ORDER — ATROPINE SULFATE 0.1 MG/ML
1 INJECTION INTRAVENOUS
Status: COMPLETED | OUTPATIENT
Start: 2021-02-18 | End: 2021-02-18

## 2021-02-18 RX ADMIN — ATROPINE SULFATE 1 MG: 0.1 INJECTION PARENTERAL at 09:02

## 2021-02-18 RX ADMIN — HUMAN ALBUMIN MICROSPHERES AND PERFLUTREN 0.66 MG: 10; .22 INJECTION, SOLUTION INTRAVENOUS at 09:02

## 2021-02-18 RX ADMIN — DOBUTAMINE HYDROCHLORIDE 10 MCG/KG/MIN: 400 INJECTION INTRAVENOUS at 09:02

## 2021-02-19 ENCOUNTER — PATIENT MESSAGE (OUTPATIENT)
Dept: PSYCHIATRY | Facility: CLINIC | Age: 61
End: 2021-02-19

## 2021-02-19 ENCOUNTER — PATIENT MESSAGE (OUTPATIENT)
Dept: BARIATRICS | Facility: CLINIC | Age: 61
End: 2021-02-19

## 2021-02-22 ENCOUNTER — PATIENT MESSAGE (OUTPATIENT)
Dept: BARIATRICS | Facility: CLINIC | Age: 61
End: 2021-02-22

## 2021-02-22 ENCOUNTER — OFFICE VISIT (OUTPATIENT)
Dept: PSYCHIATRY | Facility: CLINIC | Age: 61
End: 2021-02-22
Payer: COMMERCIAL

## 2021-02-22 DIAGNOSIS — F41.1 GAD (GENERALIZED ANXIETY DISORDER): ICD-10-CM

## 2021-02-22 DIAGNOSIS — G47.00 INSOMNIA, UNSPECIFIED TYPE: ICD-10-CM

## 2021-02-22 DIAGNOSIS — F41.0 PANIC ATTACK: ICD-10-CM

## 2021-02-22 DIAGNOSIS — F33.40 MDD (RECURRENT MAJOR DEPRESSIVE DISORDER) IN REMISSION: Primary | ICD-10-CM

## 2021-02-22 PROCEDURE — 99213 OFFICE O/P EST LOW 20 MIN: CPT | Mod: 95,,, | Performed by: STUDENT IN AN ORGANIZED HEALTH CARE EDUCATION/TRAINING PROGRAM

## 2021-02-22 PROCEDURE — 99213 PR OFFICE/OUTPT VISIT, EST, LEVL III, 20-29 MIN: ICD-10-PCS | Mod: 95,,, | Performed by: STUDENT IN AN ORGANIZED HEALTH CARE EDUCATION/TRAINING PROGRAM

## 2021-02-22 RX ORDER — CLONAZEPAM 1 MG/1
1 TABLET ORAL 2 TIMES DAILY PRN
Qty: 60 TABLET | Refills: 2 | Status: SHIPPED | OUTPATIENT
Start: 2021-03-01 | End: 2021-05-05 | Stop reason: SDUPTHER

## 2021-02-22 RX ORDER — BUSPIRONE HYDROCHLORIDE 30 MG/1
30 TABLET ORAL 2 TIMES DAILY
Qty: 180 TABLET | Refills: 2 | Status: SHIPPED | OUTPATIENT
Start: 2021-02-22 | End: 2021-05-18 | Stop reason: SDUPTHER

## 2021-02-22 RX ORDER — AMITRIPTYLINE HYDROCHLORIDE 25 MG/1
25 TABLET, FILM COATED ORAL NIGHTLY PRN
Qty: 30 TABLET | Refills: 2 | Status: SHIPPED | OUTPATIENT
Start: 2021-02-22 | End: 2021-05-18 | Stop reason: SDUPTHER

## 2021-02-22 RX ORDER — ZOLPIDEM TARTRATE 5 MG/1
5 TABLET ORAL NIGHTLY PRN
Qty: 30 TABLET | Refills: 2 | Status: SHIPPED | OUTPATIENT
Start: 2021-03-01 | End: 2021-05-05 | Stop reason: SDUPTHER

## 2021-02-23 ENCOUNTER — PATIENT MESSAGE (OUTPATIENT)
Dept: RHEUMATOLOGY | Facility: CLINIC | Age: 61
End: 2021-02-23

## 2021-03-01 ENCOUNTER — PATIENT OUTREACH (OUTPATIENT)
Dept: ADMINISTRATIVE | Facility: OTHER | Age: 61
End: 2021-03-01

## 2021-03-01 ENCOUNTER — OFFICE VISIT (OUTPATIENT)
Dept: DERMATOLOGY | Facility: CLINIC | Age: 61
End: 2021-03-01
Payer: COMMERCIAL

## 2021-03-01 ENCOUNTER — PATIENT MESSAGE (OUTPATIENT)
Dept: DERMATOLOGY | Facility: CLINIC | Age: 61
End: 2021-03-01

## 2021-03-01 VITALS — TEMPERATURE: 98 F

## 2021-03-01 DIAGNOSIS — L30.9 DERMATITIS: ICD-10-CM

## 2021-03-01 DIAGNOSIS — Z79.899 LONG-TERM USE OF HIGH-RISK MEDICATION: ICD-10-CM

## 2021-03-01 DIAGNOSIS — L81.4 LENTIGINES: ICD-10-CM

## 2021-03-01 DIAGNOSIS — L82.1 SEBORRHEIC KERATOSIS: ICD-10-CM

## 2021-03-01 DIAGNOSIS — D48.5 NEOPLASM OF UNCERTAIN BEHAVIOR OF SKIN: Primary | ICD-10-CM

## 2021-03-01 DIAGNOSIS — D18.01 ANGIOMA OF SKIN: ICD-10-CM

## 2021-03-01 DIAGNOSIS — L29.9 PRURITUS: ICD-10-CM

## 2021-03-01 DIAGNOSIS — L40.0 PSORIASIS VULGARIS: ICD-10-CM

## 2021-03-01 PROCEDURE — 99214 PR OFFICE/OUTPT VISIT, EST, LEVL IV, 30-39 MIN: ICD-10-PCS | Mod: 25,S$GLB,, | Performed by: DERMATOLOGY

## 2021-03-01 PROCEDURE — 88305 TISSUE EXAM BY PATHOLOGIST: CPT | Performed by: PATHOLOGY

## 2021-03-01 PROCEDURE — 88342 IMHCHEM/IMCYTCHM 1ST ANTB: CPT | Performed by: PATHOLOGY

## 2021-03-01 PROCEDURE — 11102 TANGNTL BX SKIN SINGLE LES: CPT | Mod: S$GLB,,, | Performed by: DERMATOLOGY

## 2021-03-01 PROCEDURE — 11102 PR TANGENTIAL BIOPSY, SKIN, SINGLE LESION: ICD-10-PCS | Mod: S$GLB,,, | Performed by: DERMATOLOGY

## 2021-03-01 PROCEDURE — 88312 PR  SPECIAL STAINS,GROUP I: ICD-10-PCS | Mod: 26,,, | Performed by: PATHOLOGY

## 2021-03-01 PROCEDURE — 88305 TISSUE EXAM BY PATHOLOGIST: CPT | Mod: 26,,, | Performed by: PATHOLOGY

## 2021-03-01 PROCEDURE — 88342 IMHCHEM/IMCYTCHM 1ST ANTB: CPT | Mod: 26,,, | Performed by: PATHOLOGY

## 2021-03-01 PROCEDURE — 11301 PR SHAV SKIN LES 0.6-1.0 CM TRUNK,ARM,LEG: ICD-10-PCS | Mod: 59,S$GLB,, | Performed by: DERMATOLOGY

## 2021-03-01 PROCEDURE — 88312 SPECIAL STAINS GROUP 1: CPT | Mod: 26,,, | Performed by: PATHOLOGY

## 2021-03-01 PROCEDURE — 11301 SHAVE SKIN LESION 0.6-1.0 CM: CPT | Mod: 59,S$GLB,, | Performed by: DERMATOLOGY

## 2021-03-01 PROCEDURE — 1126F AMNT PAIN NOTED NONE PRSNT: CPT | Mod: S$GLB,,, | Performed by: DERMATOLOGY

## 2021-03-01 PROCEDURE — 99214 OFFICE O/P EST MOD 30 MIN: CPT | Mod: 25,S$GLB,, | Performed by: DERMATOLOGY

## 2021-03-01 PROCEDURE — 88305 TISSUE EXAM BY PATHOLOGIST: ICD-10-PCS | Mod: 26,,, | Performed by: PATHOLOGY

## 2021-03-01 PROCEDURE — 88312 SPECIAL STAINS GROUP 1: CPT | Performed by: PATHOLOGY

## 2021-03-01 PROCEDURE — 88342 CHG IMMUNOCYTOCHEMISTRY: ICD-10-PCS | Mod: 26,,, | Performed by: PATHOLOGY

## 2021-03-01 PROCEDURE — 1126F PR PAIN SEVERITY QUANTIFIED, NO PAIN PRESENT: ICD-10-PCS | Mod: S$GLB,,, | Performed by: DERMATOLOGY

## 2021-03-01 RX ORDER — TRIAMCINOLONE ACETONIDE 1 MG/G
OINTMENT TOPICAL
Qty: 454 G | Refills: 1 | Status: SHIPPED | OUTPATIENT
Start: 2021-03-01 | End: 2021-03-16 | Stop reason: ALTCHOICE

## 2021-03-03 ENCOUNTER — PATIENT MESSAGE (OUTPATIENT)
Dept: BARIATRICS | Facility: CLINIC | Age: 61
End: 2021-03-03

## 2021-03-03 ENCOUNTER — TELEPHONE (OUTPATIENT)
Dept: BARIATRICS | Facility: CLINIC | Age: 61
End: 2021-03-03

## 2021-03-04 ENCOUNTER — PATIENT MESSAGE (OUTPATIENT)
Dept: ADMINISTRATIVE | Facility: OTHER | Age: 61
End: 2021-03-04

## 2021-03-04 ENCOUNTER — TELEPHONE (OUTPATIENT)
Dept: BARIATRICS | Facility: CLINIC | Age: 61
End: 2021-03-04

## 2021-03-04 DIAGNOSIS — I10 ESSENTIAL HYPERTENSION: ICD-10-CM

## 2021-03-04 DIAGNOSIS — E66.01 MORBID OBESITY: Primary | ICD-10-CM

## 2021-03-04 DIAGNOSIS — E78.2 MIXED HYPERLIPIDEMIA: ICD-10-CM

## 2021-03-04 DIAGNOSIS — J44.1 CHRONIC OBSTRUCTIVE PULMONARY DISEASE WITH ACUTE EXACERBATION: ICD-10-CM

## 2021-03-04 LAB
FINAL PATHOLOGIC DIAGNOSIS: NORMAL
GROSS: NORMAL
MICROSCOPIC EXAM: NORMAL

## 2021-03-09 ENCOUNTER — LAB VISIT (OUTPATIENT)
Dept: LAB | Facility: HOSPITAL | Age: 61
End: 2021-03-09
Attending: DERMATOLOGY
Payer: COMMERCIAL

## 2021-03-09 ENCOUNTER — SPECIALTY PHARMACY (OUTPATIENT)
Dept: PHARMACY | Facility: CLINIC | Age: 61
End: 2021-03-09

## 2021-03-09 DIAGNOSIS — J44.1 CHRONIC OBSTRUCTIVE PULMONARY DISEASE WITH ACUTE EXACERBATION: ICD-10-CM

## 2021-03-09 DIAGNOSIS — L40.0 PSORIASIS VULGARIS: ICD-10-CM

## 2021-03-09 DIAGNOSIS — E66.01 MORBID OBESITY: ICD-10-CM

## 2021-03-09 DIAGNOSIS — I10 ESSENTIAL HYPERTENSION: ICD-10-CM

## 2021-03-09 DIAGNOSIS — E78.2 MIXED HYPERLIPIDEMIA: ICD-10-CM

## 2021-03-09 DIAGNOSIS — Z79.899 LONG-TERM USE OF HIGH-RISK MEDICATION: ICD-10-CM

## 2021-03-09 LAB
ALBUMIN SERPL BCP-MCNC: 4.3 G/DL (ref 3.5–5.2)
ALP SERPL-CCNC: 109 U/L (ref 38–126)
ALT SERPL W/O P-5'-P-CCNC: 31 U/L (ref 10–44)
ANION GAP SERPL CALC-SCNC: 7 MMOL/L (ref 8–16)
AST SERPL-CCNC: 26 U/L (ref 15–46)
BASOPHILS # BLD AUTO: 0.04 K/UL (ref 0–0.2)
BASOPHILS NFR BLD: 0.7 % (ref 0–1.9)
BILIRUB SERPL-MCNC: 1.3 MG/DL (ref 0.1–1)
CALCIUM SERPL-MCNC: 9.7 MG/DL (ref 8.7–10.5)
CHLORIDE SERPL-SCNC: 102 MMOL/L (ref 95–110)
CO2 SERPL-SCNC: 32 MMOL/L (ref 23–29)
CREAT SERPL-MCNC: 1.03 MG/DL (ref 0.5–1.4)
DIFFERENTIAL METHOD: NORMAL
EOSINOPHIL # BLD AUTO: 0.4 K/UL (ref 0–0.5)
EOSINOPHIL NFR BLD: 6.1 % (ref 0–8)
ERYTHROCYTE [DISTWIDTH] IN BLOOD BY AUTOMATED COUNT: 11.9 % (ref 11.5–14.5)
EST. GFR  (AFRICAN AMERICAN): >60 ML/MIN/1.73 M^2
EST. GFR  (NON AFRICAN AMERICAN): >60 ML/MIN/1.73 M^2
GLUCOSE SERPL-MCNC: 107 MG/DL (ref 70–110)
HCT VFR BLD AUTO: 47.6 % (ref 37–48.5)
HGB BLD-MCNC: 15.4 G/DL (ref 12–16)
IMM GRANULOCYTES # BLD AUTO: 0.02 K/UL (ref 0–0.04)
IMM GRANULOCYTES NFR BLD AUTO: 0.3 % (ref 0–0.5)
LYMPHOCYTES # BLD AUTO: 1.2 K/UL (ref 1–4.8)
LYMPHOCYTES NFR BLD: 20 % (ref 18–48)
MCH RBC QN AUTO: 30.1 PG (ref 27–31)
MCHC RBC AUTO-ENTMCNC: 32.4 G/DL (ref 32–36)
MCV RBC AUTO: 93 FL (ref 82–98)
MONOCYTES # BLD AUTO: 0.5 K/UL (ref 0.3–1)
MONOCYTES NFR BLD: 9.2 % (ref 4–15)
NEUTROPHILS # BLD AUTO: 3.7 K/UL (ref 1.8–7.7)
NEUTROPHILS NFR BLD: 63.7 % (ref 38–73)
NRBC BLD-RTO: 0 /100 WBC
PLATELET # BLD AUTO: 265 K/UL (ref 150–350)
PMV BLD AUTO: 9.7 FL (ref 9.2–12.9)
POTASSIUM SERPL-SCNC: 4.4 MMOL/L (ref 3.5–5.1)
PROT SERPL-MCNC: 7.9 G/DL (ref 6–8.4)
RBC # BLD AUTO: 5.11 M/UL (ref 4–5.4)
SODIUM SERPL-SCNC: 141 MMOL/L (ref 136–145)
UUN UR-MCNC: 23 MG/DL (ref 7–17)
WBC # BLD AUTO: 5.75 K/UL (ref 3.9–12.7)

## 2021-03-09 PROCEDURE — 80053 COMPREHEN METABOLIC PANEL: CPT | Mod: 91,PO | Performed by: DERMATOLOGY

## 2021-03-09 PROCEDURE — 85025 COMPLETE CBC W/AUTO DIFF WBC: CPT | Mod: 91,PO | Performed by: DERMATOLOGY

## 2021-03-09 PROCEDURE — 80323 ALKALOIDS NOS: CPT | Mod: PO | Performed by: SURGERY

## 2021-03-09 RX ORDER — UBIDECARENONE 75 MG
500 CAPSULE ORAL DAILY
COMMUNITY

## 2021-03-10 ENCOUNTER — IMMUNIZATION (OUTPATIENT)
Dept: INTERNAL MEDICINE | Facility: CLINIC | Age: 61
End: 2021-03-10
Payer: COMMERCIAL

## 2021-03-10 DIAGNOSIS — Z23 NEED FOR VACCINATION: Primary | ICD-10-CM

## 2021-03-10 PROCEDURE — 0002A COVID-19, MRNA, LNP-S, PF, 30 MCG/0.3 ML DOSE VACCINE: ICD-10-PCS | Mod: CV19,S$GLB,, | Performed by: INTERNAL MEDICINE

## 2021-03-10 PROCEDURE — 91300 COVID-19, MRNA, LNP-S, PF, 30 MCG/0.3 ML DOSE VACCINE: CPT | Mod: S$GLB,,, | Performed by: INTERNAL MEDICINE

## 2021-03-10 PROCEDURE — 91300 COVID-19, MRNA, LNP-S, PF, 30 MCG/0.3 ML DOSE VACCINE: ICD-10-PCS | Mod: S$GLB,,, | Performed by: INTERNAL MEDICINE

## 2021-03-10 PROCEDURE — 0002A COVID-19, MRNA, LNP-S, PF, 30 MCG/0.3 ML DOSE VACCINE: CPT | Mod: CV19,S$GLB,, | Performed by: INTERNAL MEDICINE

## 2021-03-11 LAB
COTININE SERPL-MCNC: <3 NG/ML
NICOTINE SERPL-MCNC: <3 NG/ML

## 2021-03-14 RX ORDER — LISINOPRIL 10 MG/1
10 TABLET ORAL DAILY
Qty: 90 TABLET | Refills: 3 | Status: SHIPPED | OUTPATIENT
Start: 2021-03-14 | End: 2021-05-17

## 2021-03-16 ENCOUNTER — OFFICE VISIT (OUTPATIENT)
Dept: BARIATRICS | Facility: CLINIC | Age: 61
End: 2021-03-16
Payer: COMMERCIAL

## 2021-03-16 VITALS
BODY MASS INDEX: 41.09 KG/M2 | SYSTOLIC BLOOD PRESSURE: 124 MMHG | HEIGHT: 62 IN | DIASTOLIC BLOOD PRESSURE: 56 MMHG | OXYGEN SATURATION: 97 % | WEIGHT: 223.31 LBS | HEART RATE: 67 BPM

## 2021-03-16 DIAGNOSIS — E66.01 SEVERE OBESITY (BMI >= 40): ICD-10-CM

## 2021-03-16 PROCEDURE — 1126F PR PAIN SEVERITY QUANTIFIED, NO PAIN PRESENT: ICD-10-PCS | Mod: S$GLB,,, | Performed by: SURGERY

## 2021-03-16 PROCEDURE — 3078F DIAST BP <80 MM HG: CPT | Mod: CPTII,S$GLB,, | Performed by: SURGERY

## 2021-03-16 PROCEDURE — 99214 PR OFFICE/OUTPT VISIT, EST, LEVL IV, 30-39 MIN: ICD-10-PCS | Mod: S$GLB,,, | Performed by: SURGERY

## 2021-03-16 PROCEDURE — 3074F PR MOST RECENT SYSTOLIC BLOOD PRESSURE < 130 MM HG: ICD-10-PCS | Mod: CPTII,S$GLB,, | Performed by: SURGERY

## 2021-03-16 PROCEDURE — 3008F BODY MASS INDEX DOCD: CPT | Mod: CPTII,S$GLB,, | Performed by: SURGERY

## 2021-03-16 PROCEDURE — 3078F PR MOST RECENT DIASTOLIC BLOOD PRESSURE < 80 MM HG: ICD-10-PCS | Mod: CPTII,S$GLB,, | Performed by: SURGERY

## 2021-03-16 PROCEDURE — 99999 PR PBB SHADOW E&M-EST. PATIENT-LVL IV: ICD-10-PCS | Mod: PBBFAC,,, | Performed by: SURGERY

## 2021-03-16 PROCEDURE — 99999 PR PBB SHADOW E&M-EST. PATIENT-LVL IV: CPT | Mod: PBBFAC,,, | Performed by: SURGERY

## 2021-03-16 PROCEDURE — 1126F AMNT PAIN NOTED NONE PRSNT: CPT | Mod: S$GLB,,, | Performed by: SURGERY

## 2021-03-16 PROCEDURE — 99214 OFFICE O/P EST MOD 30 MIN: CPT | Mod: S$GLB,,, | Performed by: SURGERY

## 2021-03-16 PROCEDURE — 3008F PR BODY MASS INDEX (BMI) DOCUMENTED: ICD-10-PCS | Mod: CPTII,S$GLB,, | Performed by: SURGERY

## 2021-03-16 PROCEDURE — 3074F SYST BP LT 130 MM HG: CPT | Mod: CPTII,S$GLB,, | Performed by: SURGERY

## 2021-03-16 RX ORDER — URSODIOL 500 MG/1
TABLET, FILM COATED ORAL
Qty: 90 TABLET | Refills: 1 | Status: SHIPPED | OUTPATIENT
Start: 2021-03-16 | End: 2021-11-02

## 2021-03-16 RX ORDER — LIDOCAINE HYDROCHLORIDE 10 MG/ML
1 INJECTION, SOLUTION EPIDURAL; INFILTRATION; INTRACAUDAL; PERINEURAL ONCE
Status: CANCELLED | OUTPATIENT
Start: 2021-03-16 | End: 2021-03-16

## 2021-03-16 RX ORDER — ONDANSETRON 2 MG/ML
8 INJECTION INTRAMUSCULAR; INTRAVENOUS EVERY 6 HOURS PRN
Status: CANCELLED | OUTPATIENT
Start: 2021-03-16

## 2021-03-16 RX ORDER — ONDANSETRON 8 MG/1
8 TABLET, ORALLY DISINTEGRATING ORAL EVERY 6 HOURS PRN
Qty: 30 TABLET | Refills: 0 | Status: SHIPPED | OUTPATIENT
Start: 2021-03-16 | End: 2022-10-17

## 2021-03-16 RX ORDER — GABAPENTIN 250 MG/5ML
300 SOLUTION ORAL 3 TIMES DAILY
Status: CANCELLED | OUTPATIENT
Start: 2021-03-16

## 2021-03-16 RX ORDER — HYDROCODONE BITARTRATE AND ACETAMINOPHEN 7.5; 325 MG/15ML; MG/15ML
15 SOLUTION ORAL 4 TIMES DAILY PRN
Qty: 200 ML | Refills: 0 | Status: SHIPPED | OUTPATIENT
Start: 2021-03-16 | End: 2021-04-14

## 2021-03-16 RX ORDER — PROCHLORPERAZINE EDISYLATE 5 MG/ML
5 INJECTION INTRAMUSCULAR; INTRAVENOUS EVERY 6 HOURS PRN
Status: CANCELLED | OUTPATIENT
Start: 2021-03-16

## 2021-03-16 RX ORDER — ACETAMINOPHEN 650 MG/20.3ML
500 LIQUID ORAL EVERY 8 HOURS
Status: CANCELLED | OUTPATIENT
Start: 2021-03-16 | End: 2021-03-17

## 2021-03-16 RX ORDER — PANTOPRAZOLE SODIUM 40 MG/10ML
40 INJECTION, POWDER, LYOPHILIZED, FOR SOLUTION INTRAVENOUS 2 TIMES DAILY
Status: CANCELLED | OUTPATIENT
Start: 2021-03-16

## 2021-03-16 RX ORDER — FAMOTIDINE 10 MG/ML
20 INJECTION INTRAVENOUS ONCE
Status: CANCELLED | OUTPATIENT
Start: 2021-03-16 | End: 2021-03-16

## 2021-03-16 RX ORDER — HYDROCODONE BITARTRATE AND ACETAMINOPHEN 7.5; 325 MG/15ML; MG/15ML
15 SOLUTION ORAL EVERY 4 HOURS PRN
Status: CANCELLED | OUTPATIENT
Start: 2021-03-17

## 2021-03-16 RX ORDER — OMEPRAZOLE 40 MG/1
40 CAPSULE, DELAYED RELEASE ORAL EVERY MORNING
Qty: 30 CAPSULE | Refills: 2 | Status: SHIPPED | OUTPATIENT
Start: 2021-03-16 | End: 2021-06-28 | Stop reason: SDUPTHER

## 2021-03-16 RX ORDER — DEXTROMETHORPHAN/PSEUDOEPHED 2.5-7.5/.8
40 DROPS ORAL 4 TIMES DAILY PRN
Status: CANCELLED | OUTPATIENT
Start: 2021-03-16

## 2021-03-16 RX ORDER — SODIUM CHLORIDE 9 MG/ML
INJECTION, SOLUTION INTRAVENOUS CONTINUOUS
Status: CANCELLED | OUTPATIENT
Start: 2021-03-16

## 2021-03-16 RX ORDER — SODIUM CITRATE AND CITRIC ACID MONOHYDRATE 334; 500 MG/5ML; MG/5ML
30 SOLUTION ORAL ONCE
Status: CANCELLED | OUTPATIENT
Start: 2021-03-16 | End: 2021-03-16

## 2021-03-16 RX ORDER — MUPIROCIN 20 MG/G
OINTMENT TOPICAL
Status: CANCELLED | OUTPATIENT
Start: 2021-03-16

## 2021-03-16 RX ORDER — HYDROMORPHONE HYDROCHLORIDE 1 MG/ML
0.5 INJECTION, SOLUTION INTRAMUSCULAR; INTRAVENOUS; SUBCUTANEOUS
Status: CANCELLED | OUTPATIENT
Start: 2021-03-16

## 2021-03-16 RX ORDER — CHOLECALCIFEROL (VITAMIN D3) 50 MCG
TABLET ORAL DAILY
COMMUNITY
End: 2022-10-17

## 2021-03-16 RX ORDER — MULTIVITAMIN
1 TABLET ORAL DAILY
COMMUNITY

## 2021-03-16 RX ORDER — ENOXAPARIN SODIUM 100 MG/ML
40 INJECTION SUBCUTANEOUS EVERY 12 HOURS
Status: CANCELLED | OUTPATIENT
Start: 2021-03-16

## 2021-03-16 RX ORDER — SODIUM CHLORIDE, SODIUM LACTATE, POTASSIUM CHLORIDE, CALCIUM CHLORIDE 600; 310; 30; 20 MG/100ML; MG/100ML; MG/100ML; MG/100ML
INJECTION, SOLUTION INTRAVENOUS CONTINUOUS
Status: CANCELLED | OUTPATIENT
Start: 2021-03-16

## 2021-03-16 RX ORDER — HEPARIN SODIUM 5000 [USP'U]/ML
5000 INJECTION, SOLUTION INTRAVENOUS; SUBCUTANEOUS ONCE
Status: CANCELLED | OUTPATIENT
Start: 2021-03-16 | End: 2021-03-16

## 2021-03-16 RX ORDER — POLYETHYLENE GLYCOL 3350 17 G/17G
17 POWDER, FOR SOLUTION ORAL DAILY
Qty: 510 G | Refills: 3 | Status: SHIPPED | OUTPATIENT
Start: 2021-03-16 | End: 2021-04-14

## 2021-03-16 RX ORDER — METOCLOPRAMIDE HYDROCHLORIDE 5 MG/ML
10 INJECTION INTRAMUSCULAR; INTRAVENOUS ONCE
Status: CANCELLED | OUTPATIENT
Start: 2021-03-16 | End: 2021-03-16

## 2021-03-17 ENCOUNTER — TELEPHONE (OUTPATIENT)
Dept: BARIATRICS | Facility: CLINIC | Age: 61
End: 2021-03-17

## 2021-03-28 ENCOUNTER — LAB VISIT (OUTPATIENT)
Dept: URGENT CARE | Facility: CLINIC | Age: 61
End: 2021-03-28
Payer: COMMERCIAL

## 2021-03-28 DIAGNOSIS — I10 ESSENTIAL HYPERTENSION: ICD-10-CM

## 2021-03-28 DIAGNOSIS — E78.2 MIXED HYPERLIPIDEMIA: ICD-10-CM

## 2021-03-28 DIAGNOSIS — J44.1 CHRONIC OBSTRUCTIVE PULMONARY DISEASE WITH ACUTE EXACERBATION: ICD-10-CM

## 2021-03-28 DIAGNOSIS — E66.01 MORBID OBESITY: ICD-10-CM

## 2021-03-28 DIAGNOSIS — Z01.818 PRE-OP TESTING: ICD-10-CM

## 2021-03-28 PROCEDURE — U0003 INFECTIOUS AGENT DETECTION BY NUCLEIC ACID (DNA OR RNA); SEVERE ACUTE RESPIRATORY SYNDROME CORONAVIRUS 2 (SARS-COV-2) (CORONAVIRUS DISEASE [COVID-19]), AMPLIFIED PROBE TECHNIQUE, MAKING USE OF HIGH THROUGHPUT TECHNOLOGIES AS DESCRIBED BY CMS-2020-01-R: HCPCS | Performed by: SURGERY

## 2021-03-28 PROCEDURE — U0005 INFEC AGEN DETEC AMPLI PROBE: HCPCS | Performed by: SURGERY

## 2021-03-28 PROCEDURE — 99211 OFF/OP EST MAY X REQ PHY/QHP: CPT | Mod: S$GLB,,, | Performed by: INTERNAL MEDICINE

## 2021-03-28 PROCEDURE — 99211 PR OFFICE/OUTPT VISIT, EST, LEVL I: ICD-10-PCS | Mod: S$GLB,,, | Performed by: INTERNAL MEDICINE

## 2021-03-29 LAB — SARS-COV-2 RNA RESP QL NAA+PROBE: NOT DETECTED

## 2021-03-30 ENCOUNTER — ANESTHESIA EVENT (OUTPATIENT)
Dept: SURGERY | Facility: HOSPITAL | Age: 61
DRG: 620 | End: 2021-03-30
Payer: COMMERCIAL

## 2021-03-30 ENCOUNTER — PATIENT MESSAGE (OUTPATIENT)
Dept: SURGERY | Facility: HOSPITAL | Age: 61
End: 2021-03-30

## 2021-03-30 ENCOUNTER — TELEPHONE (OUTPATIENT)
Dept: BARIATRICS | Facility: CLINIC | Age: 61
End: 2021-03-30

## 2021-03-31 ENCOUNTER — HOSPITAL ENCOUNTER (INPATIENT)
Facility: HOSPITAL | Age: 61
LOS: 1 days | Discharge: HOME | DRG: 620 | End: 2021-04-01
Attending: SURGERY | Admitting: SURGERY
Payer: COMMERCIAL

## 2021-03-31 ENCOUNTER — ANESTHESIA (OUTPATIENT)
Dept: SURGERY | Facility: HOSPITAL | Age: 61
DRG: 620 | End: 2021-03-31
Payer: COMMERCIAL

## 2021-03-31 DIAGNOSIS — E66.9 OBESITY, UNSPECIFIED CLASSIFICATION, UNSPECIFIED OBESITY TYPE, UNSPECIFIED WHETHER SERIOUS COMORBIDITY PRESENT: Primary | ICD-10-CM

## 2021-03-31 DIAGNOSIS — E66.01 SEVERE OBESITY (BMI >= 40): ICD-10-CM

## 2021-03-31 DIAGNOSIS — E87.5 HYPERKALEMIA: ICD-10-CM

## 2021-03-31 PROCEDURE — 25000003 PHARM REV CODE 250: Performed by: SURGERY

## 2021-03-31 PROCEDURE — 36000711: Performed by: SURGERY

## 2021-03-31 PROCEDURE — 88307 TISSUE EXAM BY PATHOLOGIST: CPT | Performed by: PATHOLOGY

## 2021-03-31 PROCEDURE — 88307 TISSUE EXAM BY PATHOLOGIST: CPT | Mod: 26,,, | Performed by: PATHOLOGY

## 2021-03-31 PROCEDURE — 37000009 HC ANESTHESIA EA ADD 15 MINS: Performed by: SURGERY

## 2021-03-31 PROCEDURE — 71000016 HC POSTOP RECOV ADDL HR: Performed by: SURGERY

## 2021-03-31 PROCEDURE — 94799 UNLISTED PULMONARY SVC/PX: CPT

## 2021-03-31 PROCEDURE — 63600175 PHARM REV CODE 636 W HCPCS: Performed by: NURSE ANESTHETIST, CERTIFIED REGISTERED

## 2021-03-31 PROCEDURE — 25000242 PHARM REV CODE 250 ALT 637 W/ HCPCS: Performed by: STUDENT IN AN ORGANIZED HEALTH CARE EDUCATION/TRAINING PROGRAM

## 2021-03-31 PROCEDURE — 88307 PR  SURG PATH,LEVEL V: ICD-10-PCS | Mod: 26,,, | Performed by: PATHOLOGY

## 2021-03-31 PROCEDURE — 25000003 PHARM REV CODE 250: Performed by: STUDENT IN AN ORGANIZED HEALTH CARE EDUCATION/TRAINING PROGRAM

## 2021-03-31 PROCEDURE — 37000008 HC ANESTHESIA 1ST 15 MINUTES: Performed by: SURGERY

## 2021-03-31 PROCEDURE — 27201423 OPTIME MED/SURG SUP & DEVICES STERILE SUPPLY: Performed by: SURGERY

## 2021-03-31 PROCEDURE — 25000003 PHARM REV CODE 250: Performed by: NURSE ANESTHETIST, CERTIFIED REGISTERED

## 2021-03-31 PROCEDURE — D9220A PRA ANESTHESIA: Mod: CRNA,,, | Performed by: NURSE ANESTHETIST, CERTIFIED REGISTERED

## 2021-03-31 PROCEDURE — 71000015 HC POSTOP RECOV 1ST HR: Performed by: SURGERY

## 2021-03-31 PROCEDURE — 71000033 HC RECOVERY, INTIAL HOUR: Performed by: SURGERY

## 2021-03-31 PROCEDURE — D9220A PRA ANESTHESIA: Mod: ANES,,, | Performed by: ANESTHESIOLOGY

## 2021-03-31 PROCEDURE — 63600175 PHARM REV CODE 636 W HCPCS: Performed by: SURGERY

## 2021-03-31 PROCEDURE — 94761 N-INVAS EAR/PLS OXIMETRY MLT: CPT

## 2021-03-31 PROCEDURE — 99900035 HC TECH TIME PER 15 MIN (STAT)

## 2021-03-31 PROCEDURE — 43775 LAP SLEEVE GASTRECTOMY: CPT | Mod: ,,, | Performed by: SURGERY

## 2021-03-31 PROCEDURE — D9220A PRA ANESTHESIA: ICD-10-PCS | Mod: CRNA,,, | Performed by: NURSE ANESTHETIST, CERTIFIED REGISTERED

## 2021-03-31 PROCEDURE — 36000710: Performed by: SURGERY

## 2021-03-31 PROCEDURE — 25000003 PHARM REV CODE 250: Performed by: PHYSICIAN ASSISTANT

## 2021-03-31 PROCEDURE — 63600175 PHARM REV CODE 636 W HCPCS: Performed by: ANESTHESIOLOGY

## 2021-03-31 PROCEDURE — C9113 INJ PANTOPRAZOLE SODIUM, VIA: HCPCS | Performed by: SURGERY

## 2021-03-31 PROCEDURE — 11000001 HC ACUTE MED/SURG PRIVATE ROOM

## 2021-03-31 PROCEDURE — 25000242 PHARM REV CODE 250 ALT 637 W/ HCPCS: Performed by: ANESTHESIOLOGY

## 2021-03-31 PROCEDURE — 94640 AIRWAY INHALATION TREATMENT: CPT

## 2021-03-31 PROCEDURE — D9220A PRA ANESTHESIA: ICD-10-PCS | Mod: ANES,,, | Performed by: ANESTHESIOLOGY

## 2021-03-31 PROCEDURE — 43775 PR LAP, GAST RESTRICT PROC, LONGITUDINAL GASTRECTOMY: ICD-10-PCS | Mod: ,,, | Performed by: SURGERY

## 2021-03-31 RX ORDER — BUSPIRONE HYDROCHLORIDE 10 MG/1
30 TABLET ORAL 2 TIMES DAILY
Status: DISCONTINUED | OUTPATIENT
Start: 2021-03-31 | End: 2021-04-01 | Stop reason: HOSPADM

## 2021-03-31 RX ORDER — ACETAMINOPHEN 10 MG/ML
INJECTION, SOLUTION INTRAVENOUS
Status: DISCONTINUED | OUTPATIENT
Start: 2021-03-31 | End: 2021-03-31

## 2021-03-31 RX ORDER — SODIUM CHLORIDE 9 MG/ML
INJECTION, SOLUTION INTRAVENOUS CONTINUOUS
Status: DISCONTINUED | OUTPATIENT
Start: 2021-03-31 | End: 2021-03-31

## 2021-03-31 RX ORDER — BUPIVACAINE HYDROCHLORIDE 5 MG/ML
INJECTION, SOLUTION EPIDURAL; INTRACAUDAL
Status: DISCONTINUED | OUTPATIENT
Start: 2021-03-31 | End: 2021-03-31 | Stop reason: HOSPADM

## 2021-03-31 RX ORDER — LIDOCAINE HYDROCHLORIDE 20 MG/ML
INJECTION INTRAVENOUS
Status: DISCONTINUED | OUTPATIENT
Start: 2021-03-31 | End: 2021-03-31

## 2021-03-31 RX ORDER — MUPIROCIN 20 MG/G
OINTMENT TOPICAL
Status: DISCONTINUED | OUTPATIENT
Start: 2021-03-31 | End: 2021-03-31

## 2021-03-31 RX ORDER — SODIUM CITRATE AND CITRIC ACID MONOHYDRATE 334; 500 MG/5ML; MG/5ML
30 SOLUTION ORAL ONCE
Status: COMPLETED | OUTPATIENT
Start: 2021-03-31 | End: 2021-03-31

## 2021-03-31 RX ORDER — HYDROMORPHONE HYDROCHLORIDE 1 MG/ML
0.2 INJECTION, SOLUTION INTRAMUSCULAR; INTRAVENOUS; SUBCUTANEOUS EVERY 5 MIN PRN
Status: DISCONTINUED | OUTPATIENT
Start: 2021-03-31 | End: 2021-03-31 | Stop reason: HOSPADM

## 2021-03-31 RX ORDER — IPRATROPIUM BROMIDE AND ALBUTEROL SULFATE 2.5; .5 MG/3ML; MG/3ML
SOLUTION RESPIRATORY (INHALATION)
Status: DISPENSED
Start: 2021-03-31 | End: 2021-03-31

## 2021-03-31 RX ORDER — BUPIVACAINE HYDROCHLORIDE 2.5 MG/ML
INJECTION, SOLUTION EPIDURAL; INFILTRATION; INTRACAUDAL
Status: DISCONTINUED | OUTPATIENT
Start: 2021-03-31 | End: 2021-03-31 | Stop reason: HOSPADM

## 2021-03-31 RX ORDER — LIDOCAINE HYDROCHLORIDE 10 MG/ML
1 INJECTION, SOLUTION EPIDURAL; INFILTRATION; INTRACAUDAL; PERINEURAL ONCE
Status: COMPLETED | OUTPATIENT
Start: 2021-03-31 | End: 2021-03-31

## 2021-03-31 RX ORDER — PROCHLORPERAZINE EDISYLATE 5 MG/ML
5 INJECTION INTRAMUSCULAR; INTRAVENOUS EVERY 6 HOURS PRN
Status: DISCONTINUED | OUTPATIENT
Start: 2021-03-31 | End: 2021-04-01 | Stop reason: HOSPADM

## 2021-03-31 RX ORDER — DROPERIDOL 2.5 MG/ML
0.62 INJECTION, SOLUTION INTRAMUSCULAR; INTRAVENOUS
Status: DISCONTINUED | OUTPATIENT
Start: 2021-03-31 | End: 2021-03-31 | Stop reason: HOSPADM

## 2021-03-31 RX ORDER — METOPROLOL TARTRATE 50 MG/1
50 TABLET ORAL 2 TIMES DAILY
Status: DISCONTINUED | OUTPATIENT
Start: 2021-03-31 | End: 2021-04-01 | Stop reason: HOSPADM

## 2021-03-31 RX ORDER — ENOXAPARIN SODIUM 100 MG/ML
40 INJECTION SUBCUTANEOUS EVERY 12 HOURS
Status: DISCONTINUED | OUTPATIENT
Start: 2021-03-31 | End: 2021-04-01 | Stop reason: HOSPADM

## 2021-03-31 RX ORDER — FENTANYL CITRATE 50 UG/ML
INJECTION, SOLUTION INTRAMUSCULAR; INTRAVENOUS
Status: DISCONTINUED | OUTPATIENT
Start: 2021-03-31 | End: 2021-03-31

## 2021-03-31 RX ORDER — PROPOFOL 10 MG/ML
VIAL (ML) INTRAVENOUS
Status: DISCONTINUED | OUTPATIENT
Start: 2021-03-31 | End: 2021-03-31

## 2021-03-31 RX ORDER — METOCLOPRAMIDE HYDROCHLORIDE 5 MG/ML
10 INJECTION INTRAMUSCULAR; INTRAVENOUS ONCE
Status: COMPLETED | OUTPATIENT
Start: 2021-03-31 | End: 2021-03-31

## 2021-03-31 RX ORDER — NEOSTIGMINE METHYLSULFATE 0.5 MG/ML
INJECTION, SOLUTION INTRAVENOUS
Status: DISCONTINUED | OUTPATIENT
Start: 2021-03-31 | End: 2021-03-31

## 2021-03-31 RX ORDER — DEXAMETHASONE SODIUM PHOSPHATE 4 MG/ML
INJECTION, SOLUTION INTRA-ARTICULAR; INTRALESIONAL; INTRAMUSCULAR; INTRAVENOUS; SOFT TISSUE
Status: DISCONTINUED | OUTPATIENT
Start: 2021-03-31 | End: 2021-03-31

## 2021-03-31 RX ORDER — SODIUM CHLORIDE 0.9 % (FLUSH) 0.9 %
2 SYRINGE (ML) INJECTION
Status: DISCONTINUED | OUTPATIENT
Start: 2021-03-31 | End: 2021-03-31 | Stop reason: HOSPADM

## 2021-03-31 RX ORDER — ACETAMINOPHEN 650 MG/20.3ML
500 LIQUID ORAL EVERY 8 HOURS
Status: COMPLETED | OUTPATIENT
Start: 2021-03-31 | End: 2021-04-01

## 2021-03-31 RX ORDER — PHENYLEPHRINE HYDROCHLORIDE 10 MG/ML
INJECTION INTRAVENOUS
Status: DISCONTINUED | OUTPATIENT
Start: 2021-03-31 | End: 2021-03-31

## 2021-03-31 RX ORDER — FAMOTIDINE 10 MG/ML
20 INJECTION INTRAVENOUS ONCE
Status: COMPLETED | OUTPATIENT
Start: 2021-03-31 | End: 2021-03-31

## 2021-03-31 RX ORDER — PANTOPRAZOLE SODIUM 40 MG/10ML
40 INJECTION, POWDER, LYOPHILIZED, FOR SOLUTION INTRAVENOUS 2 TIMES DAILY
Status: DISCONTINUED | OUTPATIENT
Start: 2021-03-31 | End: 2021-04-01 | Stop reason: HOSPADM

## 2021-03-31 RX ORDER — GABAPENTIN 250 MG/5ML
300 SOLUTION ORAL 3 TIMES DAILY
Status: DISCONTINUED | OUTPATIENT
Start: 2021-03-31 | End: 2021-04-01 | Stop reason: HOSPADM

## 2021-03-31 RX ORDER — IPRATROPIUM BROMIDE AND ALBUTEROL SULFATE 2.5; .5 MG/3ML; MG/3ML
3 SOLUTION RESPIRATORY (INHALATION) EVERY 6 HOURS
Status: DISCONTINUED | OUTPATIENT
Start: 2021-03-31 | End: 2021-04-01

## 2021-03-31 RX ORDER — SODIUM CHLORIDE, SODIUM LACTATE, POTASSIUM CHLORIDE, CALCIUM CHLORIDE 600; 310; 30; 20 MG/100ML; MG/100ML; MG/100ML; MG/100ML
INJECTION, SOLUTION INTRAVENOUS CONTINUOUS
Status: DISCONTINUED | OUTPATIENT
Start: 2021-03-31 | End: 2021-04-01

## 2021-03-31 RX ORDER — IPRATROPIUM BROMIDE AND ALBUTEROL SULFATE 2.5; .5 MG/3ML; MG/3ML
3 SOLUTION RESPIRATORY (INHALATION)
Status: COMPLETED | OUTPATIENT
Start: 2021-03-31 | End: 2021-03-31

## 2021-03-31 RX ORDER — ONDANSETRON 2 MG/ML
INJECTION INTRAMUSCULAR; INTRAVENOUS
Status: DISCONTINUED | OUTPATIENT
Start: 2021-03-31 | End: 2021-03-31

## 2021-03-31 RX ORDER — DEXTROMETHORPHAN/PSEUDOEPHED 2.5-7.5/.8
40 DROPS ORAL 4 TIMES DAILY PRN
Status: DISCONTINUED | OUTPATIENT
Start: 2021-03-31 | End: 2021-04-01 | Stop reason: HOSPADM

## 2021-03-31 RX ORDER — KETAMINE HCL IN 0.9 % NACL 50 MG/5 ML
SYRINGE (ML) INTRAVENOUS
Status: DISCONTINUED | OUTPATIENT
Start: 2021-03-31 | End: 2021-03-31

## 2021-03-31 RX ORDER — PROPOFOL 10 MG/ML
VIAL (ML) INTRAVENOUS CONTINUOUS PRN
Status: DISCONTINUED | OUTPATIENT
Start: 2021-03-31 | End: 2021-03-31

## 2021-03-31 RX ORDER — ROCURONIUM BROMIDE 10 MG/ML
INJECTION, SOLUTION INTRAVENOUS
Status: DISCONTINUED | OUTPATIENT
Start: 2021-03-31 | End: 2021-03-31

## 2021-03-31 RX ORDER — HYDROCODONE BITARTRATE AND ACETAMINOPHEN 7.5; 325 MG/15ML; MG/15ML
15 SOLUTION ORAL EVERY 4 HOURS PRN
Status: DISCONTINUED | OUTPATIENT
Start: 2021-03-31 | End: 2021-04-01 | Stop reason: HOSPADM

## 2021-03-31 RX ORDER — MIDAZOLAM HYDROCHLORIDE 1 MG/ML
INJECTION, SOLUTION INTRAMUSCULAR; INTRAVENOUS
Status: DISCONTINUED | OUTPATIENT
Start: 2021-03-31 | End: 2021-03-31

## 2021-03-31 RX ORDER — HYDROMORPHONE HYDROCHLORIDE 1 MG/ML
0.5 INJECTION, SOLUTION INTRAMUSCULAR; INTRAVENOUS; SUBCUTANEOUS
Status: DISCONTINUED | OUTPATIENT
Start: 2021-03-31 | End: 2021-04-01

## 2021-03-31 RX ORDER — CLONAZEPAM 1 MG/1
1 TABLET ORAL 2 TIMES DAILY PRN
Status: DISCONTINUED | OUTPATIENT
Start: 2021-03-31 | End: 2021-04-01 | Stop reason: HOSPADM

## 2021-03-31 RX ORDER — ONDANSETRON 2 MG/ML
8 INJECTION INTRAMUSCULAR; INTRAVENOUS EVERY 6 HOURS PRN
Status: DISCONTINUED | OUTPATIENT
Start: 2021-03-31 | End: 2021-04-01 | Stop reason: HOSPADM

## 2021-03-31 RX ORDER — HEPARIN SODIUM 5000 [USP'U]/ML
5000 INJECTION, SOLUTION INTRAVENOUS; SUBCUTANEOUS ONCE
Status: COMPLETED | OUTPATIENT
Start: 2021-03-31 | End: 2021-03-31

## 2021-03-31 RX ADMIN — HYDROMORPHONE HYDROCHLORIDE 0.2 MG: 1 INJECTION, SOLUTION INTRAMUSCULAR; INTRAVENOUS; SUBCUTANEOUS at 02:03

## 2021-03-31 RX ADMIN — GABAPENTIN 300 MG: 250 SOLUTION ORAL at 02:03

## 2021-03-31 RX ADMIN — SODIUM CHLORIDE: 0.9 INJECTION, SOLUTION INTRAVENOUS at 11:03

## 2021-03-31 RX ADMIN — LIDOCAINE HYDROCHLORIDE 100 MG: 20 INJECTION, SOLUTION INTRAVENOUS at 12:03

## 2021-03-31 RX ADMIN — REMIFENTANIL HYDROCHLORIDE 0.2 MCG/KG/MIN: 1 INJECTION, POWDER, LYOPHILIZED, FOR SOLUTION INTRAVENOUS at 12:03

## 2021-03-31 RX ADMIN — Medication 30 MG: at 12:03

## 2021-03-31 RX ADMIN — ONDANSETRON 4 MG: 2 INJECTION, SOLUTION INTRAMUSCULAR; INTRAVENOUS at 01:03

## 2021-03-31 RX ADMIN — HYDROMORPHONE HYDROCHLORIDE 0.5 MG: 1 INJECTION, SOLUTION INTRAMUSCULAR; INTRAVENOUS; SUBCUTANEOUS at 04:03

## 2021-03-31 RX ADMIN — BUSPIRONE HYDROCHLORIDE 30 MG: 10 TABLET ORAL at 09:03

## 2021-03-31 RX ADMIN — IPRATROPIUM BROMIDE AND ALBUTEROL SULFATE 3 ML: .5; 2.5 SOLUTION RESPIRATORY (INHALATION) at 11:03

## 2021-03-31 RX ADMIN — MIDAZOLAM HYDROCHLORIDE 2 MG: 1 INJECTION, SOLUTION INTRAMUSCULAR; INTRAVENOUS at 12:03

## 2021-03-31 RX ADMIN — PHENYLEPHRINE HYDROCHLORIDE 200 MCG: 10 INJECTION INTRAVENOUS at 12:03

## 2021-03-31 RX ADMIN — PHENYLEPHRINE HYDROCHLORIDE 100 MCG: 10 INJECTION INTRAVENOUS at 01:03

## 2021-03-31 RX ADMIN — GABAPENTIN 300 MG: 250 SOLUTION ORAL at 09:03

## 2021-03-31 RX ADMIN — METOCLOPRAMIDE 10 MG: 5 INJECTION, SOLUTION INTRAMUSCULAR; INTRAVENOUS at 11:03

## 2021-03-31 RX ADMIN — HYDROMORPHONE HYDROCHLORIDE 0.2 MG: 1 INJECTION, SOLUTION INTRAMUSCULAR; INTRAVENOUS; SUBCUTANEOUS at 03:03

## 2021-03-31 RX ADMIN — SODIUM CITRATE AND CITRIC ACID MONOHYDRATE 30 ML: 500; 334 SOLUTION ORAL at 11:03

## 2021-03-31 RX ADMIN — ROCURONIUM BROMIDE 50 MG: 10 INJECTION, SOLUTION INTRAVENOUS at 12:03

## 2021-03-31 RX ADMIN — FAMOTIDINE 20 MG: 10 INJECTION INTRAVENOUS at 11:03

## 2021-03-31 RX ADMIN — HEPARIN SODIUM 5000 UNITS: 5000 INJECTION INTRAVENOUS; SUBCUTANEOUS at 11:03

## 2021-03-31 RX ADMIN — NEOSTIGMINE METHYLSULFATE 4 MG: 0.5 INJECTION INTRAVENOUS at 01:03

## 2021-03-31 RX ADMIN — FENTANYL CITRATE 100 MCG: 50 INJECTION, SOLUTION INTRAMUSCULAR; INTRAVENOUS at 12:03

## 2021-03-31 RX ADMIN — PROCHLORPERAZINE EDISYLATE 5 MG: 5 INJECTION INTRAMUSCULAR; INTRAVENOUS at 02:03

## 2021-03-31 RX ADMIN — PANTOPRAZOLE SODIUM 40 MG: 40 INJECTION, POWDER, FOR SOLUTION INTRAVENOUS at 09:03

## 2021-03-31 RX ADMIN — PROPOFOL 150 MCG/KG/MIN: 10 INJECTION, EMULSION INTRAVENOUS at 12:03

## 2021-03-31 RX ADMIN — PHENYLEPHRINE HYDROCHLORIDE 100 MCG: 10 INJECTION INTRAVENOUS at 12:03

## 2021-03-31 RX ADMIN — ACETAMINOPHEN 1000 MG: 10 INJECTION, SOLUTION INTRAVENOUS at 12:03

## 2021-03-31 RX ADMIN — SODIUM CHLORIDE, SODIUM LACTATE, POTASSIUM CHLORIDE, AND CALCIUM CHLORIDE: .6; .31; .03; .02 INJECTION, SOLUTION INTRAVENOUS at 02:03

## 2021-03-31 RX ADMIN — Medication 20 MG: at 01:03

## 2021-03-31 RX ADMIN — METOPROLOL TARTRATE 50 MG: 50 TABLET, FILM COATED ORAL at 09:03

## 2021-03-31 RX ADMIN — ACETAMINOPHEN ORAL SOLUTION 499.51 MG: 650 SOLUTION ORAL at 09:03

## 2021-03-31 RX ADMIN — HYDROCODONE BITARTRATE AND ACETAMINOPHEN 15 ML: 7.5; 325 SOLUTION ORAL at 02:03

## 2021-03-31 RX ADMIN — CEFAZOLIN 2 ML: 1 INJECTION, POWDER, FOR SOLUTION INTRAVENOUS at 12:03

## 2021-03-31 RX ADMIN — PROPOFOL 50 MG: 10 INJECTION, EMULSION INTRAVENOUS at 01:03

## 2021-03-31 RX ADMIN — HYDROCODONE BITARTRATE AND ACETAMINOPHEN 15 ML: 7.5; 325 SOLUTION ORAL at 09:03

## 2021-03-31 RX ADMIN — GLYCOPYRROLATE 0.4 MG: 0.2 INJECTION, SOLUTION INTRAMUSCULAR; INTRAVITREAL at 01:03

## 2021-03-31 RX ADMIN — ENOXAPARIN SODIUM 40 MG: 40 INJECTION SUBCUTANEOUS at 09:03

## 2021-03-31 RX ADMIN — DEXAMETHASONE SODIUM PHOSPHATE 8 MG: 4 INJECTION, SOLUTION INTRAMUSCULAR; INTRAVENOUS at 12:03

## 2021-03-31 RX ADMIN — MUPIROCIN: 20 OINTMENT TOPICAL at 11:03

## 2021-03-31 RX ADMIN — PROPOFOL 150 MG: 10 INJECTION, EMULSION INTRAVENOUS at 12:03

## 2021-03-31 RX ADMIN — LIDOCAINE HYDROCHLORIDE 10 MG: 10 INJECTION, SOLUTION EPIDURAL; INFILTRATION; INTRACAUDAL at 11:03

## 2021-03-31 RX ADMIN — IPRATROPIUM BROMIDE AND ALBUTEROL SULFATE 3 ML: .5; 2.5 SOLUTION RESPIRATORY (INHALATION) at 07:03

## 2021-04-01 VITALS
OXYGEN SATURATION: 94 % | HEART RATE: 95 BPM | RESPIRATION RATE: 17 BRPM | DIASTOLIC BLOOD PRESSURE: 71 MMHG | BODY MASS INDEX: 39.27 KG/M2 | SYSTOLIC BLOOD PRESSURE: 138 MMHG | TEMPERATURE: 98 F | WEIGHT: 213.38 LBS | HEIGHT: 62 IN

## 2021-04-01 LAB
ANION GAP SERPL CALC-SCNC: 7 MMOL/L (ref 8–16)
ANION GAP SERPL CALC-SCNC: 9 MMOL/L (ref 8–16)
BASOPHILS # BLD AUTO: 0.02 K/UL (ref 0–0.2)
BASOPHILS NFR BLD: 0.2 % (ref 0–1.9)
BUN SERPL-MCNC: 14 MG/DL (ref 6–20)
BUN SERPL-MCNC: 17 MG/DL (ref 6–20)
CALCIUM SERPL-MCNC: 8.3 MG/DL (ref 8.7–10.5)
CALCIUM SERPL-MCNC: 8.9 MG/DL (ref 8.7–10.5)
CHLORIDE SERPL-SCNC: 104 MMOL/L (ref 95–110)
CHLORIDE SERPL-SCNC: 105 MMOL/L (ref 95–110)
CO2 SERPL-SCNC: 25 MMOL/L (ref 23–29)
CO2 SERPL-SCNC: 27 MMOL/L (ref 23–29)
CREAT SERPL-MCNC: 1 MG/DL (ref 0.5–1.4)
CREAT SERPL-MCNC: 1 MG/DL (ref 0.5–1.4)
DIFFERENTIAL METHOD: ABNORMAL
EOSINOPHIL # BLD AUTO: 0 K/UL (ref 0–0.5)
EOSINOPHIL NFR BLD: 0 % (ref 0–8)
ERYTHROCYTE [DISTWIDTH] IN BLOOD BY AUTOMATED COUNT: 12.4 % (ref 11.5–14.5)
EST. GFR  (AFRICAN AMERICAN): >60 ML/MIN/1.73 M^2
EST. GFR  (AFRICAN AMERICAN): >60 ML/MIN/1.73 M^2
EST. GFR  (NON AFRICAN AMERICAN): >60 ML/MIN/1.73 M^2
EST. GFR  (NON AFRICAN AMERICAN): >60 ML/MIN/1.73 M^2
GLUCOSE SERPL-MCNC: 138 MG/DL (ref 70–110)
GLUCOSE SERPL-MCNC: 179 MG/DL (ref 70–110)
HCT VFR BLD AUTO: 44.3 % (ref 37–48.5)
HGB BLD-MCNC: 14 G/DL (ref 12–16)
IMM GRANULOCYTES # BLD AUTO: 0.05 K/UL (ref 0–0.04)
IMM GRANULOCYTES NFR BLD AUTO: 0.6 % (ref 0–0.5)
LYMPHOCYTES # BLD AUTO: 0.5 K/UL (ref 1–4.8)
LYMPHOCYTES NFR BLD: 5.7 % (ref 18–48)
MAGNESIUM SERPL-MCNC: 2.1 MG/DL (ref 1.6–2.6)
MCH RBC QN AUTO: 29.6 PG (ref 27–31)
MCHC RBC AUTO-ENTMCNC: 31.6 G/DL (ref 32–36)
MCV RBC AUTO: 94 FL (ref 82–98)
MONOCYTES # BLD AUTO: 0.4 K/UL (ref 0.3–1)
MONOCYTES NFR BLD: 5 % (ref 4–15)
NEUTROPHILS # BLD AUTO: 7.1 K/UL (ref 1.8–7.7)
NEUTROPHILS NFR BLD: 88.5 % (ref 38–73)
NRBC BLD-RTO: 0 /100 WBC
PHOSPHATE SERPL-MCNC: 2.6 MG/DL (ref 2.7–4.5)
PLATELET # BLD AUTO: 148 K/UL (ref 150–450)
PMV BLD AUTO: 11.7 FL (ref 9.2–12.9)
POTASSIUM SERPL-SCNC: 4.3 MMOL/L (ref 3.5–5.1)
POTASSIUM SERPL-SCNC: 5.2 MMOL/L (ref 3.5–5.1)
RBC # BLD AUTO: 4.73 M/UL (ref 4–5.4)
SODIUM SERPL-SCNC: 138 MMOL/L (ref 136–145)
SODIUM SERPL-SCNC: 139 MMOL/L (ref 136–145)
WBC # BLD AUTO: 8.05 K/UL (ref 3.9–12.7)

## 2021-04-01 PROCEDURE — 85025 COMPLETE CBC W/AUTO DIFF WBC: CPT | Performed by: SURGERY

## 2021-04-01 PROCEDURE — 83735 ASSAY OF MAGNESIUM: CPT | Performed by: SURGERY

## 2021-04-01 PROCEDURE — 36415 COLL VENOUS BLD VENIPUNCTURE: CPT | Performed by: STUDENT IN AN ORGANIZED HEALTH CARE EDUCATION/TRAINING PROGRAM

## 2021-04-01 PROCEDURE — 25000242 PHARM REV CODE 250 ALT 637 W/ HCPCS: Performed by: STUDENT IN AN ORGANIZED HEALTH CARE EDUCATION/TRAINING PROGRAM

## 2021-04-01 PROCEDURE — 99900035 HC TECH TIME PER 15 MIN (STAT)

## 2021-04-01 PROCEDURE — 27000221 HC OXYGEN, UP TO 24 HOURS

## 2021-04-01 PROCEDURE — 93010 ELECTROCARDIOGRAM REPORT: CPT | Mod: ,,, | Performed by: INTERNAL MEDICINE

## 2021-04-01 PROCEDURE — 36415 COLL VENOUS BLD VENIPUNCTURE: CPT | Performed by: SURGERY

## 2021-04-01 PROCEDURE — 94640 AIRWAY INHALATION TREATMENT: CPT

## 2021-04-01 PROCEDURE — 93010 RHYTHM STRIP: ICD-10-PCS | Mod: ,,, | Performed by: INTERNAL MEDICINE

## 2021-04-01 PROCEDURE — 94799 UNLISTED PULMONARY SVC/PX: CPT

## 2021-04-01 PROCEDURE — 80048 BASIC METABOLIC PNL TOTAL CA: CPT | Performed by: STUDENT IN AN ORGANIZED HEALTH CARE EDUCATION/TRAINING PROGRAM

## 2021-04-01 PROCEDURE — 25000003 PHARM REV CODE 250: Performed by: SURGERY

## 2021-04-01 PROCEDURE — 25000003 PHARM REV CODE 250: Performed by: STUDENT IN AN ORGANIZED HEALTH CARE EDUCATION/TRAINING PROGRAM

## 2021-04-01 PROCEDURE — C9113 INJ PANTOPRAZOLE SODIUM, VIA: HCPCS | Performed by: SURGERY

## 2021-04-01 PROCEDURE — 84100 ASSAY OF PHOSPHORUS: CPT | Performed by: SURGERY

## 2021-04-01 PROCEDURE — 80048 BASIC METABOLIC PNL TOTAL CA: CPT | Mod: 91 | Performed by: SURGERY

## 2021-04-01 PROCEDURE — 93005 ELECTROCARDIOGRAM TRACING: CPT

## 2021-04-01 PROCEDURE — 63600175 PHARM REV CODE 636 W HCPCS: Performed by: SURGERY

## 2021-04-01 PROCEDURE — 94761 N-INVAS EAR/PLS OXIMETRY MLT: CPT

## 2021-04-01 RX ORDER — IPRATROPIUM BROMIDE AND ALBUTEROL SULFATE 2.5; .5 MG/3ML; MG/3ML
3 SOLUTION RESPIRATORY (INHALATION) EVERY 6 HOURS PRN
Status: DISCONTINUED | OUTPATIENT
Start: 2021-04-01 | End: 2021-04-01 | Stop reason: HOSPADM

## 2021-04-01 RX ORDER — ALBUTEROL SULFATE 2.5 MG/.5ML
10 SOLUTION RESPIRATORY (INHALATION) ONCE
Status: COMPLETED | OUTPATIENT
Start: 2021-04-01 | End: 2021-04-01

## 2021-04-01 RX ORDER — TALC
6 POWDER (GRAM) TOPICAL NIGHTLY PRN
Status: DISCONTINUED | OUTPATIENT
Start: 2021-04-01 | End: 2021-04-01 | Stop reason: HOSPADM

## 2021-04-01 RX ADMIN — BUSPIRONE HYDROCHLORIDE 30 MG: 10 TABLET ORAL at 10:04

## 2021-04-01 RX ADMIN — ENOXAPARIN SODIUM 40 MG: 40 INJECTION SUBCUTANEOUS at 10:04

## 2021-04-01 RX ADMIN — ACETAMINOPHEN ORAL SOLUTION 499.51 MG: 650 SOLUTION ORAL at 02:04

## 2021-04-01 RX ADMIN — ACETAMINOPHEN ORAL SOLUTION 499.51 MG: 650 SOLUTION ORAL at 05:04

## 2021-04-01 RX ADMIN — IPRATROPIUM BROMIDE AND ALBUTEROL SULFATE 3 ML: .5; 2.5 SOLUTION RESPIRATORY (INHALATION) at 07:04

## 2021-04-01 RX ADMIN — PANTOPRAZOLE SODIUM 40 MG: 40 INJECTION, POWDER, FOR SOLUTION INTRAVENOUS at 10:04

## 2021-04-01 RX ADMIN — SODIUM PHOSPHATE, MONOBASIC, MONOHYDRATE 30 MMOL: 276; 142 INJECTION, SOLUTION INTRAVENOUS at 10:04

## 2021-04-01 RX ADMIN — METOPROLOL TARTRATE 50 MG: 50 TABLET, FILM COATED ORAL at 10:04

## 2021-04-01 RX ADMIN — MELATONIN TAB 3 MG 6 MG: 3 TAB at 01:04

## 2021-04-01 RX ADMIN — SODIUM CHLORIDE, SODIUM LACTATE, POTASSIUM CHLORIDE, AND CALCIUM CHLORIDE: .6; .31; .03; .02 INJECTION, SOLUTION INTRAVENOUS at 07:04

## 2021-04-01 RX ADMIN — IPRATROPIUM BROMIDE AND ALBUTEROL SULFATE 3 ML: .5; 2.5 SOLUTION RESPIRATORY (INHALATION) at 12:04

## 2021-04-01 RX ADMIN — ALBUTEROL SULFATE 10 MG: 2.5 SOLUTION RESPIRATORY (INHALATION) at 09:04

## 2021-04-01 RX ADMIN — HYDROCODONE BITARTRATE AND ACETAMINOPHEN 15 ML: 7.5; 325 SOLUTION ORAL at 01:04

## 2021-04-01 RX ADMIN — HYDROCODONE BITARTRATE AND ACETAMINOPHEN 15 ML: 7.5; 325 SOLUTION ORAL at 05:04

## 2021-04-01 RX ADMIN — GABAPENTIN 300 MG: 250 SOLUTION ORAL at 10:04

## 2021-04-01 RX ADMIN — GABAPENTIN 300 MG: 250 SOLUTION ORAL at 02:04

## 2021-04-01 RX ADMIN — HYDROCODONE BITARTRATE AND ACETAMINOPHEN 15 ML: 7.5; 325 SOLUTION ORAL at 09:04

## 2021-04-05 ENCOUNTER — PATIENT MESSAGE (OUTPATIENT)
Dept: ADMINISTRATIVE | Facility: HOSPITAL | Age: 61
End: 2021-04-05

## 2021-04-07 ENCOUNTER — SPECIALTY PHARMACY (OUTPATIENT)
Dept: PHARMACY | Facility: CLINIC | Age: 61
End: 2021-04-07

## 2021-04-07 ENCOUNTER — TELEPHONE (OUTPATIENT)
Dept: BARIATRICS | Facility: CLINIC | Age: 61
End: 2021-04-07

## 2021-04-07 LAB
FINAL PATHOLOGIC DIAGNOSIS: NORMAL
GROSS: NORMAL
Lab: NORMAL

## 2021-04-14 ENCOUNTER — TELEPHONE (OUTPATIENT)
Dept: BARIATRICS | Facility: CLINIC | Age: 61
End: 2021-04-14

## 2021-04-14 ENCOUNTER — OFFICE VISIT (OUTPATIENT)
Dept: BARIATRICS | Facility: CLINIC | Age: 61
End: 2021-04-14
Payer: COMMERCIAL

## 2021-04-14 ENCOUNTER — LAB VISIT (OUTPATIENT)
Dept: LAB | Facility: HOSPITAL | Age: 61
End: 2021-04-14
Attending: INTERNAL MEDICINE
Payer: COMMERCIAL

## 2021-04-14 ENCOUNTER — CLINICAL SUPPORT (OUTPATIENT)
Dept: BARIATRICS | Facility: CLINIC | Age: 61
End: 2021-04-14
Payer: COMMERCIAL

## 2021-04-14 VITALS
HEART RATE: 128 BPM | SYSTOLIC BLOOD PRESSURE: 126 MMHG | DIASTOLIC BLOOD PRESSURE: 74 MMHG | OXYGEN SATURATION: 93 % | BODY MASS INDEX: 37.85 KG/M2 | WEIGHT: 205.69 LBS | HEIGHT: 62 IN

## 2021-04-14 DIAGNOSIS — Z98.890 POST-OPERATIVE STATE: ICD-10-CM

## 2021-04-14 DIAGNOSIS — E66.01 MORBID OBESITY: ICD-10-CM

## 2021-04-14 DIAGNOSIS — Z98.84 S/P LAPAROSCOPIC SLEEVE GASTRECTOMY: ICD-10-CM

## 2021-04-14 DIAGNOSIS — E78.2 MIXED HYPERLIPIDEMIA: ICD-10-CM

## 2021-04-14 DIAGNOSIS — R63.4 WEIGHT LOSS: Primary | ICD-10-CM

## 2021-04-14 DIAGNOSIS — J44.1 CHRONIC OBSTRUCTIVE PULMONARY DISEASE WITH ACUTE EXACERBATION: ICD-10-CM

## 2021-04-14 DIAGNOSIS — I10 HYPERTENSION, UNSPECIFIED TYPE: ICD-10-CM

## 2021-04-14 DIAGNOSIS — I10 ESSENTIAL HYPERTENSION: ICD-10-CM

## 2021-04-14 LAB
ALBUMIN SERPL BCP-MCNC: 4.1 G/DL (ref 3.5–5.2)
ALP SERPL-CCNC: 118 U/L (ref 55–135)
ALT SERPL W/O P-5'-P-CCNC: 23 U/L (ref 10–44)
ANION GAP SERPL CALC-SCNC: 10 MMOL/L (ref 8–16)
AST SERPL-CCNC: 23 U/L (ref 10–40)
BASOPHILS # BLD AUTO: 0.05 K/UL (ref 0–0.2)
BASOPHILS NFR BLD: 0.6 % (ref 0–1.9)
BILIRUB SERPL-MCNC: 1.5 MG/DL (ref 0.1–1)
BUN SERPL-MCNC: 16 MG/DL (ref 6–20)
CALCIUM SERPL-MCNC: 10 MG/DL (ref 8.7–10.5)
CHLORIDE SERPL-SCNC: 102 MMOL/L (ref 95–110)
CO2 SERPL-SCNC: 28 MMOL/L (ref 23–29)
CREAT SERPL-MCNC: 1.1 MG/DL (ref 0.5–1.4)
DIFFERENTIAL METHOD: ABNORMAL
EOSINOPHIL # BLD AUTO: 0.2 K/UL (ref 0–0.5)
EOSINOPHIL NFR BLD: 2.7 % (ref 0–8)
ERYTHROCYTE [DISTWIDTH] IN BLOOD BY AUTOMATED COUNT: 12.8 % (ref 11.5–14.5)
EST. GFR  (AFRICAN AMERICAN): >60 ML/MIN/1.73 M^2
EST. GFR  (NON AFRICAN AMERICAN): 55.5 ML/MIN/1.73 M^2
GLUCOSE SERPL-MCNC: 119 MG/DL (ref 70–110)
HCT VFR BLD AUTO: 49.1 % (ref 37–48.5)
HGB BLD-MCNC: 16.3 G/DL (ref 12–16)
IMM GRANULOCYTES # BLD AUTO: 0.03 K/UL (ref 0–0.04)
IMM GRANULOCYTES NFR BLD AUTO: 0.4 % (ref 0–0.5)
LYMPHOCYTES # BLD AUTO: 1.1 K/UL (ref 1–4.8)
LYMPHOCYTES NFR BLD: 14.2 % (ref 18–48)
MCH RBC QN AUTO: 30.6 PG (ref 27–31)
MCHC RBC AUTO-ENTMCNC: 33.2 G/DL (ref 32–36)
MCV RBC AUTO: 92 FL (ref 82–98)
MONOCYTES # BLD AUTO: 0.5 K/UL (ref 0.3–1)
MONOCYTES NFR BLD: 7 % (ref 4–15)
NEUTROPHILS # BLD AUTO: 5.8 K/UL (ref 1.8–7.7)
NEUTROPHILS NFR BLD: 75.1 % (ref 38–73)
NRBC BLD-RTO: 0 /100 WBC
PLATELET # BLD AUTO: 267 K/UL (ref 150–450)
PMV BLD AUTO: 10.7 FL (ref 9.2–12.9)
POTASSIUM SERPL-SCNC: 4 MMOL/L (ref 3.5–5.1)
PROT SERPL-MCNC: 8.1 G/DL (ref 6–8.4)
RBC # BLD AUTO: 5.32 M/UL (ref 4–5.4)
SODIUM SERPL-SCNC: 140 MMOL/L (ref 136–145)
VIT B12 SERPL-MCNC: 938 PG/ML (ref 210–950)
WBC # BLD AUTO: 7.73 K/UL (ref 3.9–12.7)

## 2021-04-14 PROCEDURE — 82607 VITAMIN B-12: CPT | Performed by: PHYSICIAN ASSISTANT

## 2021-04-14 PROCEDURE — 99499 UNLISTED E&M SERVICE: CPT | Mod: S$GLB,,, | Performed by: DIETITIAN, REGISTERED

## 2021-04-14 PROCEDURE — 36415 COLL VENOUS BLD VENIPUNCTURE: CPT | Performed by: PHYSICIAN ASSISTANT

## 2021-04-14 PROCEDURE — 99499 NO LOS: ICD-10-PCS | Mod: S$GLB,,, | Performed by: DIETITIAN, REGISTERED

## 2021-04-14 PROCEDURE — 1126F AMNT PAIN NOTED NONE PRSNT: CPT | Mod: S$GLB,,, | Performed by: PHYSICIAN ASSISTANT

## 2021-04-14 PROCEDURE — 80053 COMPREHEN METABOLIC PANEL: CPT | Performed by: PHYSICIAN ASSISTANT

## 2021-04-14 PROCEDURE — 1126F PR PAIN SEVERITY QUANTIFIED, NO PAIN PRESENT: ICD-10-PCS | Mod: S$GLB,,, | Performed by: PHYSICIAN ASSISTANT

## 2021-04-14 PROCEDURE — 3008F BODY MASS INDEX DOCD: CPT | Mod: CPTII,S$GLB,, | Performed by: PHYSICIAN ASSISTANT

## 2021-04-14 PROCEDURE — 3008F PR BODY MASS INDEX (BMI) DOCUMENTED: ICD-10-PCS | Mod: CPTII,S$GLB,, | Performed by: PHYSICIAN ASSISTANT

## 2021-04-14 PROCEDURE — 84425 ASSAY OF VITAMIN B-1: CPT | Performed by: PHYSICIAN ASSISTANT

## 2021-04-14 PROCEDURE — 99024 PR POST-OP FOLLOW-UP VISIT: ICD-10-PCS | Mod: S$GLB,,, | Performed by: PHYSICIAN ASSISTANT

## 2021-04-14 PROCEDURE — 99999 PR PBB SHADOW E&M-EST. PATIENT-LVL IV: ICD-10-PCS | Mod: PBBFAC,,, | Performed by: PHYSICIAN ASSISTANT

## 2021-04-14 PROCEDURE — 85025 COMPLETE CBC W/AUTO DIFF WBC: CPT | Performed by: PHYSICIAN ASSISTANT

## 2021-04-14 PROCEDURE — 99024 POSTOP FOLLOW-UP VISIT: CPT | Mod: S$GLB,,, | Performed by: PHYSICIAN ASSISTANT

## 2021-04-14 PROCEDURE — 99999 PR PBB SHADOW E&M-EST. PATIENT-LVL IV: CPT | Mod: PBBFAC,,, | Performed by: PHYSICIAN ASSISTANT

## 2021-04-14 RX ORDER — METOPROLOL TARTRATE 50 MG/1
50 TABLET ORAL 2 TIMES DAILY WITH MEALS
Qty: 180 TABLET | Refills: 3 | Status: SHIPPED | OUTPATIENT
Start: 2021-04-14 | End: 2021-10-04 | Stop reason: SDUPTHER

## 2021-04-19 ENCOUNTER — TELEPHONE (OUTPATIENT)
Dept: BARIATRICS | Facility: CLINIC | Age: 61
End: 2021-04-19

## 2021-04-19 ENCOUNTER — PATIENT MESSAGE (OUTPATIENT)
Dept: BARIATRICS | Facility: CLINIC | Age: 61
End: 2021-04-19

## 2021-04-19 DIAGNOSIS — Z98.84 S/P LAPAROSCOPIC SLEEVE GASTRECTOMY: Primary | ICD-10-CM

## 2021-04-19 DIAGNOSIS — Z98.890 POST-OPERATIVE STATE: ICD-10-CM

## 2021-04-19 LAB — VIT B1 BLD-MCNC: 107 UG/L (ref 38–122)

## 2021-04-20 ENCOUNTER — LAB VISIT (OUTPATIENT)
Dept: LAB | Facility: HOSPITAL | Age: 61
End: 2021-04-20
Attending: PHYSICIAN ASSISTANT
Payer: COMMERCIAL

## 2021-04-20 ENCOUNTER — PATIENT MESSAGE (OUTPATIENT)
Dept: BARIATRICS | Facility: CLINIC | Age: 61
End: 2021-04-20

## 2021-04-20 ENCOUNTER — TELEPHONE (OUTPATIENT)
Dept: BARIATRICS | Facility: CLINIC | Age: 61
End: 2021-04-20

## 2021-04-20 DIAGNOSIS — Z98.890 POST-OPERATIVE STATE: ICD-10-CM

## 2021-04-20 DIAGNOSIS — Z98.84 S/P LAPAROSCOPIC SLEEVE GASTRECTOMY: ICD-10-CM

## 2021-04-20 LAB
BACTERIA #/AREA URNS AUTO: ABNORMAL /HPF
BILIRUB UR QL STRIP: ABNORMAL
CLARITY UR REFRACT.AUTO: ABNORMAL
COLOR UR AUTO: ABNORMAL
GLUCOSE UR QL STRIP: NEGATIVE
HGB UR QL STRIP: ABNORMAL
KETONES UR QL STRIP: NEGATIVE
LEUKOCYTE ESTERASE UR QL STRIP: ABNORMAL
MICROSCOPIC COMMENT: ABNORMAL
NITRITE UR QL STRIP: POSITIVE
PH UR STRIP: 5 [PH] (ref 5–8)
PROT UR QL STRIP: ABNORMAL
RBC #/AREA URNS AUTO: 3 /HPF (ref 0–4)
SP GR UR STRIP: 1.02 (ref 1–1.03)
URN SPEC COLLECT METH UR: ABNORMAL
UROBILINOGEN UR STRIP-ACNC: >=8 EU/DL
WBC #/AREA URNS AUTO: 25 /HPF (ref 0–5)

## 2021-04-20 PROCEDURE — 81000 URINALYSIS NONAUTO W/SCOPE: CPT | Mod: PO | Performed by: PHYSICIAN ASSISTANT

## 2021-04-26 ENCOUNTER — PATIENT MESSAGE (OUTPATIENT)
Dept: ADMINISTRATIVE | Facility: OTHER | Age: 61
End: 2021-04-26

## 2021-04-26 ENCOUNTER — PATIENT OUTREACH (OUTPATIENT)
Dept: ADMINISTRATIVE | Facility: HOSPITAL | Age: 61
End: 2021-04-26

## 2021-04-26 ENCOUNTER — OFFICE VISIT (OUTPATIENT)
Dept: FAMILY MEDICINE | Facility: CLINIC | Age: 61
End: 2021-04-26
Payer: COMMERCIAL

## 2021-04-26 ENCOUNTER — TELEPHONE (OUTPATIENT)
Dept: ADMINISTRATIVE | Facility: HOSPITAL | Age: 61
End: 2021-04-26

## 2021-04-26 VITALS
BODY MASS INDEX: 37.3 KG/M2 | TEMPERATURE: 98 F | DIASTOLIC BLOOD PRESSURE: 74 MMHG | HEART RATE: 59 BPM | WEIGHT: 203.94 LBS | SYSTOLIC BLOOD PRESSURE: 116 MMHG | OXYGEN SATURATION: 98 %

## 2021-04-26 DIAGNOSIS — F51.01 PRIMARY INSOMNIA: ICD-10-CM

## 2021-04-26 DIAGNOSIS — E66.09 CLASS 2 OBESITY DUE TO EXCESS CALORIES WITHOUT SERIOUS COMORBIDITY WITH BODY MASS INDEX (BMI) OF 37.0 TO 37.9 IN ADULT: ICD-10-CM

## 2021-04-26 DIAGNOSIS — J41.8 MIXED SIMPLE AND MUCOPURULENT CHRONIC BRONCHITIS: ICD-10-CM

## 2021-04-26 DIAGNOSIS — Z90.3 HISTORY OF SLEEVE GASTRECTOMY: ICD-10-CM

## 2021-04-26 DIAGNOSIS — L40.0 PSORIASIS VULGARIS: ICD-10-CM

## 2021-04-26 DIAGNOSIS — Z87.891 HISTORY OF TOBACCO ABUSE: ICD-10-CM

## 2021-04-26 DIAGNOSIS — E78.2 MIXED HYPERLIPIDEMIA: ICD-10-CM

## 2021-04-26 DIAGNOSIS — Z00.00 ANNUAL PHYSICAL EXAM: Primary | ICD-10-CM

## 2021-04-26 DIAGNOSIS — Z20.6 HIV EXPOSURE: ICD-10-CM

## 2021-04-26 DIAGNOSIS — Z12.11 SCREENING FOR MALIGNANT NEOPLASM OF COLON: ICD-10-CM

## 2021-04-26 DIAGNOSIS — F41.8 DEPRESSION WITH ANXIETY: ICD-10-CM

## 2021-04-26 DIAGNOSIS — R91.8 MULTIPLE LUNG NODULES ON CT: ICD-10-CM

## 2021-04-26 DIAGNOSIS — I10 ESSENTIAL HYPERTENSION: ICD-10-CM

## 2021-04-26 PROBLEM — R10.13 EPIGASTRIC PAIN: Status: RESOLVED | Noted: 2019-12-20 | Resolved: 2021-04-26

## 2021-04-26 PROBLEM — E66.9 OBESITY: Status: RESOLVED | Noted: 2021-03-31 | Resolved: 2021-04-26

## 2021-04-26 PROBLEM — E66.812 CLASS 2 OBESITY DUE TO EXCESS CALORIES WITHOUT SERIOUS COMORBIDITY WITH BODY MASS INDEX (BMI) OF 37.0 TO 37.9 IN ADULT: Status: ACTIVE | Noted: 2021-03-16

## 2021-04-26 PROBLEM — Z01.811 PREOPERATIVE RESPIRATORY EXAMINATION: Status: RESOLVED | Noted: 2021-02-09 | Resolved: 2021-04-26

## 2021-04-26 PROCEDURE — 3008F PR BODY MASS INDEX (BMI) DOCUMENTED: ICD-10-PCS | Mod: CPTII,S$GLB,, | Performed by: INTERNAL MEDICINE

## 2021-04-26 PROCEDURE — 99999 PR PBB SHADOW E&M-EST. PATIENT-LVL IV: ICD-10-PCS | Mod: PBBFAC,,, | Performed by: INTERNAL MEDICINE

## 2021-04-26 PROCEDURE — 99396 PR PREVENTIVE VISIT,EST,40-64: ICD-10-PCS | Mod: 25,S$GLB,, | Performed by: INTERNAL MEDICINE

## 2021-04-26 PROCEDURE — 99396 PREV VISIT EST AGE 40-64: CPT | Mod: 25,S$GLB,, | Performed by: INTERNAL MEDICINE

## 2021-04-26 PROCEDURE — 1126F PR PAIN SEVERITY QUANTIFIED, NO PAIN PRESENT: ICD-10-PCS | Mod: S$GLB,,, | Performed by: INTERNAL MEDICINE

## 2021-04-26 PROCEDURE — 90732 PPSV23 VACC 2 YRS+ SUBQ/IM: CPT | Mod: S$GLB,,, | Performed by: INTERNAL MEDICINE

## 2021-04-26 PROCEDURE — 90732 PNEUMOCOCCAL POLYSACCHARIDE VACCINE 23-VALENT =>2YO SQ IM: ICD-10-PCS | Mod: S$GLB,,, | Performed by: INTERNAL MEDICINE

## 2021-04-26 PROCEDURE — 3008F BODY MASS INDEX DOCD: CPT | Mod: CPTII,S$GLB,, | Performed by: INTERNAL MEDICINE

## 2021-04-26 PROCEDURE — 99999 PR PBB SHADOW E&M-EST. PATIENT-LVL IV: CPT | Mod: PBBFAC,,, | Performed by: INTERNAL MEDICINE

## 2021-04-26 PROCEDURE — 90471 PNEUMOCOCCAL POLYSACCHARIDE VACCINE 23-VALENT =>2YO SQ IM: ICD-10-PCS | Mod: S$GLB,,, | Performed by: INTERNAL MEDICINE

## 2021-04-26 PROCEDURE — 90471 IMMUNIZATION ADMIN: CPT | Mod: S$GLB,,, | Performed by: INTERNAL MEDICINE

## 2021-04-26 PROCEDURE — 1126F AMNT PAIN NOTED NONE PRSNT: CPT | Mod: S$GLB,,, | Performed by: INTERNAL MEDICINE

## 2021-04-30 ENCOUNTER — PATIENT MESSAGE (OUTPATIENT)
Dept: FAMILY MEDICINE | Facility: CLINIC | Age: 61
End: 2021-04-30

## 2021-04-30 RX ORDER — CIPROFLOXACIN HYDROCHLORIDE 3 MG/ML
1 SOLUTION/ DROPS OPHTHALMIC EVERY 4 HOURS
Qty: 2.5 ML | Refills: 0 | Status: SHIPPED | OUTPATIENT
Start: 2021-04-30 | End: 2021-05-07

## 2021-05-03 ENCOUNTER — OFFICE VISIT (OUTPATIENT)
Dept: OPTOMETRY | Facility: CLINIC | Age: 61
End: 2021-05-03
Payer: COMMERCIAL

## 2021-05-03 ENCOUNTER — PATIENT OUTREACH (OUTPATIENT)
Dept: ADMINISTRATIVE | Facility: OTHER | Age: 61
End: 2021-05-03

## 2021-05-03 ENCOUNTER — OFFICE VISIT (OUTPATIENT)
Dept: OPHTHALMOLOGY | Facility: CLINIC | Age: 61
End: 2021-05-03
Payer: COMMERCIAL

## 2021-05-03 DIAGNOSIS — H20.12 CHRONIC IRIDOCYCLITIS, LEFT EYE: Primary | ICD-10-CM

## 2021-05-03 DIAGNOSIS — Z12.11 ENCOUNTER FOR FIT (FECAL IMMUNOCHEMICAL TEST) SCREENING: Primary | ICD-10-CM

## 2021-05-03 DIAGNOSIS — H21.1X2 RUBEOSIS IRIDIS OF LEFT EYE: ICD-10-CM

## 2021-05-03 DIAGNOSIS — H20.9 IRITIS: Primary | ICD-10-CM

## 2021-05-03 DIAGNOSIS — H25.12 NUCLEAR SCLEROSIS OF LEFT EYE: ICD-10-CM

## 2021-05-03 PROCEDURE — 99999 PR PBB SHADOW E&M-EST. PATIENT-LVL II: CPT | Mod: PBBFAC,,, | Performed by: OPTOMETRIST

## 2021-05-03 PROCEDURE — 1125F AMNT PAIN NOTED PAIN PRSNT: CPT | Mod: S$GLB,,, | Performed by: OPHTHALMOLOGY

## 2021-05-03 PROCEDURE — 92002 PR EYE EXAM, NEW PATIENT,INTERMED: ICD-10-PCS | Mod: S$GLB,,, | Performed by: OPTOMETRIST

## 2021-05-03 PROCEDURE — 92002 INTRM OPH EXAM NEW PATIENT: CPT | Mod: S$GLB,,, | Performed by: OPHTHALMOLOGY

## 2021-05-03 PROCEDURE — 92002 INTRM OPH EXAM NEW PATIENT: CPT | Mod: S$GLB,,, | Performed by: OPTOMETRIST

## 2021-05-03 PROCEDURE — 1125F PR PAIN SEVERITY QUANTIFIED, PAIN PRESENT: ICD-10-PCS | Mod: S$GLB,,, | Performed by: OPHTHALMOLOGY

## 2021-05-03 PROCEDURE — 99999 PR PBB SHADOW E&M-EST. PATIENT-LVL III: ICD-10-PCS | Mod: PBBFAC,,, | Performed by: OPHTHALMOLOGY

## 2021-05-03 PROCEDURE — 99999 PR PBB SHADOW E&M-EST. PATIENT-LVL II: ICD-10-PCS | Mod: PBBFAC,,, | Performed by: OPTOMETRIST

## 2021-05-03 PROCEDURE — 92002 PR EYE EXAM, NEW PATIENT,INTERMED: ICD-10-PCS | Mod: S$GLB,,, | Performed by: OPHTHALMOLOGY

## 2021-05-03 PROCEDURE — 99999 PR PBB SHADOW E&M-EST. PATIENT-LVL III: CPT | Mod: PBBFAC,,, | Performed by: OPHTHALMOLOGY

## 2021-05-03 RX ORDER — ATROPINE SULFATE 10 MG/ML
1 SOLUTION/ DROPS OPHTHALMIC 2 TIMES DAILY
Qty: 1 BOTTLE | Refills: 1 | Status: SHIPPED | OUTPATIENT
Start: 2021-05-03 | End: 2021-08-16

## 2021-05-03 RX ORDER — PREDNISOLONE ACETATE 10 MG/ML
1 SUSPENSION/ DROPS OPHTHALMIC EVERY 4 HOURS
Qty: 5 ML | Refills: 0 | Status: SHIPPED | OUTPATIENT
Start: 2021-05-03 | End: 2021-08-16

## 2021-05-04 DIAGNOSIS — Z79.899 LONG-TERM USE OF HIGH-RISK MEDICATION: ICD-10-CM

## 2021-05-04 DIAGNOSIS — L40.0 PSORIASIS VULGARIS: ICD-10-CM

## 2021-05-04 RX ORDER — SECUKINUMAB 150 MG/ML
INJECTION SUBCUTANEOUS
Qty: 6 ML | Refills: 1 | Status: SHIPPED | OUTPATIENT
Start: 2021-05-04 | End: 2021-09-24 | Stop reason: SDUPTHER

## 2021-05-04 RX ORDER — SECUKINUMAB 150 MG/ML
INJECTION SUBCUTANEOUS
Qty: 6 ML | Refills: 1 | Status: CANCELLED | OUTPATIENT
Start: 2021-05-04

## 2021-05-06 ENCOUNTER — PATIENT MESSAGE (OUTPATIENT)
Dept: PSYCHIATRY | Facility: CLINIC | Age: 61
End: 2021-05-06

## 2021-05-06 ENCOUNTER — SPECIALTY PHARMACY (OUTPATIENT)
Dept: PHARMACY | Facility: CLINIC | Age: 61
End: 2021-05-06

## 2021-05-07 ENCOUNTER — OFFICE VISIT (OUTPATIENT)
Dept: OPHTHALMOLOGY | Facility: CLINIC | Age: 61
End: 2021-05-07
Payer: COMMERCIAL

## 2021-05-07 DIAGNOSIS — B02.32 HERPES ZOSTER ANTERIOR UVEITIS: ICD-10-CM

## 2021-05-07 DIAGNOSIS — H20.12 CHRONIC IRIDOCYCLITIS, LEFT EYE: Primary | ICD-10-CM

## 2021-05-07 PROCEDURE — 99214 OFFICE O/P EST MOD 30 MIN: CPT | Mod: S$GLB,,, | Performed by: OPHTHALMOLOGY

## 2021-05-07 PROCEDURE — 99999 PR PBB SHADOW E&M-EST. PATIENT-LVL IV: ICD-10-PCS | Mod: PBBFAC,,, | Performed by: OPHTHALMOLOGY

## 2021-05-07 PROCEDURE — 1126F PR PAIN SEVERITY QUANTIFIED, NO PAIN PRESENT: ICD-10-PCS | Mod: S$GLB,,, | Performed by: OPHTHALMOLOGY

## 2021-05-07 PROCEDURE — 1126F AMNT PAIN NOTED NONE PRSNT: CPT | Mod: S$GLB,,, | Performed by: OPHTHALMOLOGY

## 2021-05-07 PROCEDURE — 99999 PR PBB SHADOW E&M-EST. PATIENT-LVL IV: CPT | Mod: PBBFAC,,, | Performed by: OPHTHALMOLOGY

## 2021-05-07 PROCEDURE — 99214 PR OFFICE/OUTPT VISIT, EST, LEVL IV, 30-39 MIN: ICD-10-PCS | Mod: S$GLB,,, | Performed by: OPHTHALMOLOGY

## 2021-05-07 RX ORDER — DUREZOL 0.5 MG/ML
1 EMULSION OPHTHALMIC 2 TIMES DAILY
Qty: 5 ML | Refills: 3 | Status: SHIPPED | OUTPATIENT
Start: 2021-05-07 | End: 2021-06-06

## 2021-05-12 ENCOUNTER — PATIENT MESSAGE (OUTPATIENT)
Dept: DERMATOLOGY | Facility: CLINIC | Age: 61
End: 2021-05-12

## 2021-05-14 ENCOUNTER — PATIENT MESSAGE (OUTPATIENT)
Dept: DERMATOLOGY | Facility: CLINIC | Age: 61
End: 2021-05-14

## 2021-05-18 ENCOUNTER — OFFICE VISIT (OUTPATIENT)
Dept: PSYCHIATRY | Facility: CLINIC | Age: 61
End: 2021-05-18
Payer: COMMERCIAL

## 2021-05-18 VITALS
HEART RATE: 55 BPM | SYSTOLIC BLOOD PRESSURE: 112 MMHG | DIASTOLIC BLOOD PRESSURE: 57 MMHG | WEIGHT: 200.38 LBS | BODY MASS INDEX: 36.65 KG/M2

## 2021-05-18 DIAGNOSIS — G47.00 INSOMNIA, UNSPECIFIED TYPE: ICD-10-CM

## 2021-05-18 DIAGNOSIS — F41.0 PANIC ATTACK: ICD-10-CM

## 2021-05-18 DIAGNOSIS — F41.1 GAD (GENERALIZED ANXIETY DISORDER): ICD-10-CM

## 2021-05-18 DIAGNOSIS — F33.40 MDD (RECURRENT MAJOR DEPRESSIVE DISORDER) IN REMISSION: ICD-10-CM

## 2021-05-18 PROCEDURE — 99999 PR PBB SHADOW E&M-EST. PATIENT-LVL I: CPT | Mod: PBBFAC,,, | Performed by: STUDENT IN AN ORGANIZED HEALTH CARE EDUCATION/TRAINING PROGRAM

## 2021-05-18 PROCEDURE — 99213 OFFICE O/P EST LOW 20 MIN: CPT | Mod: S$GLB,,, | Performed by: STUDENT IN AN ORGANIZED HEALTH CARE EDUCATION/TRAINING PROGRAM

## 2021-05-18 PROCEDURE — 99999 PR PBB SHADOW E&M-EST. PATIENT-LVL I: ICD-10-PCS | Mod: PBBFAC,,, | Performed by: STUDENT IN AN ORGANIZED HEALTH CARE EDUCATION/TRAINING PROGRAM

## 2021-05-18 PROCEDURE — 99213 PR OFFICE/OUTPT VISIT, EST, LEVL III, 20-29 MIN: ICD-10-PCS | Mod: S$GLB,,, | Performed by: STUDENT IN AN ORGANIZED HEALTH CARE EDUCATION/TRAINING PROGRAM

## 2021-05-18 RX ORDER — BUSPIRONE HYDROCHLORIDE 15 MG/1
15 TABLET ORAL 2 TIMES DAILY
Qty: 60 TABLET | Refills: 3 | Status: SHIPPED | OUTPATIENT
Start: 2021-05-18 | End: 2021-08-16 | Stop reason: SDUPTHER

## 2021-05-18 RX ORDER — ZOLPIDEM TARTRATE 5 MG/1
5 TABLET ORAL NIGHTLY PRN
Qty: 30 TABLET | Refills: 3 | Status: SHIPPED | OUTPATIENT
Start: 2021-06-05 | End: 2021-07-06 | Stop reason: SDUPTHER

## 2021-05-18 RX ORDER — AMITRIPTYLINE HYDROCHLORIDE 25 MG/1
25 TABLET, FILM COATED ORAL NIGHTLY PRN
Qty: 30 TABLET | Refills: 3 | Status: SHIPPED | OUTPATIENT
Start: 2021-05-18 | End: 2021-08-16 | Stop reason: SDUPTHER

## 2021-05-18 RX ORDER — CLONAZEPAM 1 MG/1
1 TABLET ORAL 2 TIMES DAILY PRN
Qty: 60 TABLET | Refills: 3 | Status: SHIPPED | OUTPATIENT
Start: 2021-06-05 | End: 2021-07-06 | Stop reason: SDUPTHER

## 2021-05-26 ENCOUNTER — OFFICE VISIT (OUTPATIENT)
Dept: BARIATRICS | Facility: CLINIC | Age: 61
End: 2021-05-26
Payer: COMMERCIAL

## 2021-05-26 ENCOUNTER — CLINICAL SUPPORT (OUTPATIENT)
Dept: BARIATRICS | Facility: CLINIC | Age: 61
End: 2021-05-26
Payer: COMMERCIAL

## 2021-05-26 ENCOUNTER — LAB VISIT (OUTPATIENT)
Dept: LAB | Facility: HOSPITAL | Age: 61
End: 2021-05-26
Payer: COMMERCIAL

## 2021-05-26 VITALS
SYSTOLIC BLOOD PRESSURE: 105 MMHG | WEIGHT: 199.94 LBS | HEIGHT: 62 IN | OXYGEN SATURATION: 95 % | DIASTOLIC BLOOD PRESSURE: 52 MMHG | HEART RATE: 56 BPM | BODY MASS INDEX: 36.79 KG/M2

## 2021-05-26 DIAGNOSIS — E66.9 OBESITY (BMI 30-39.9): ICD-10-CM

## 2021-05-26 DIAGNOSIS — Z71.3 DIETARY COUNSELING AND SURVEILLANCE: ICD-10-CM

## 2021-05-26 DIAGNOSIS — E78.2 MIXED HYPERLIPIDEMIA: ICD-10-CM

## 2021-05-26 DIAGNOSIS — J44.1 CHRONIC OBSTRUCTIVE PULMONARY DISEASE WITH ACUTE EXACERBATION: ICD-10-CM

## 2021-05-26 DIAGNOSIS — Z98.890 POST-OPERATIVE STATE: Primary | ICD-10-CM

## 2021-05-26 DIAGNOSIS — Z98.84 S/P LAPAROSCOPIC SLEEVE GASTRECTOMY: ICD-10-CM

## 2021-05-26 DIAGNOSIS — R63.4 WEIGHT LOSS: ICD-10-CM

## 2021-05-26 DIAGNOSIS — Z98.84 S/P BARIATRIC SURGERY: ICD-10-CM

## 2021-05-26 DIAGNOSIS — I10 ESSENTIAL HYPERTENSION: ICD-10-CM

## 2021-05-26 DIAGNOSIS — E66.01 MORBID OBESITY: ICD-10-CM

## 2021-05-26 DIAGNOSIS — I10 ESSENTIAL HYPERTENSION: Primary | ICD-10-CM

## 2021-05-26 PROBLEM — F33.2 SEVERE RECURRENT MAJOR DEPRESSION WITHOUT PSYCHOTIC FEATURES: Status: ACTIVE | Noted: 2021-05-26

## 2021-05-26 PROBLEM — F41.0 PANIC DISORDER: Status: ACTIVE | Noted: 2020-02-24

## 2021-05-26 LAB
25(OH)D3+25(OH)D2 SERPL-MCNC: 44 NG/ML (ref 30–96)
ALBUMIN SERPL BCP-MCNC: 3.7 G/DL (ref 3.5–5.2)
ALP SERPL-CCNC: 98 U/L (ref 55–135)
ALT SERPL W/O P-5'-P-CCNC: 19 U/L (ref 10–44)
ANION GAP SERPL CALC-SCNC: 8 MMOL/L (ref 8–16)
AST SERPL-CCNC: 17 U/L (ref 10–40)
BASOPHILS # BLD AUTO: 0.04 K/UL (ref 0–0.2)
BASOPHILS NFR BLD: 0.7 % (ref 0–1.9)
BILIRUB SERPL-MCNC: 1.4 MG/DL (ref 0.1–1)
BUN SERPL-MCNC: 17 MG/DL (ref 6–20)
CALCIUM SERPL-MCNC: 9.7 MG/DL (ref 8.7–10.5)
CHLORIDE SERPL-SCNC: 105 MMOL/L (ref 95–110)
CHOLEST SERPL-MCNC: 114 MG/DL (ref 120–199)
CHOLEST/HDLC SERPL: 3.3 {RATIO} (ref 2–5)
CO2 SERPL-SCNC: 28 MMOL/L (ref 23–29)
CREAT SERPL-MCNC: 1 MG/DL (ref 0.5–1.4)
DIFFERENTIAL METHOD: NORMAL
EOSINOPHIL # BLD AUTO: 0.2 K/UL (ref 0–0.5)
EOSINOPHIL NFR BLD: 3.5 % (ref 0–8)
ERYTHROCYTE [DISTWIDTH] IN BLOOD BY AUTOMATED COUNT: 13.2 % (ref 11.5–14.5)
EST. GFR  (AFRICAN AMERICAN): >60 ML/MIN/1.73 M^2
EST. GFR  (NON AFRICAN AMERICAN): >60 ML/MIN/1.73 M^2
GLUCOSE SERPL-MCNC: 91 MG/DL (ref 70–110)
HCT VFR BLD AUTO: 46.6 % (ref 37–48.5)
HDLC SERPL-MCNC: 35 MG/DL (ref 40–75)
HDLC SERPL: 30.7 % (ref 20–50)
HGB BLD-MCNC: 15.2 G/DL (ref 12–16)
IMM GRANULOCYTES # BLD AUTO: 0.02 K/UL (ref 0–0.04)
IMM GRANULOCYTES NFR BLD AUTO: 0.3 % (ref 0–0.5)
IRON SERPL-MCNC: 100 UG/DL (ref 30–160)
LDLC SERPL CALC-MCNC: 54.8 MG/DL (ref 63–159)
LYMPHOCYTES # BLD AUTO: 1.1 K/UL (ref 1–4.8)
LYMPHOCYTES NFR BLD: 19.5 % (ref 18–48)
MCH RBC QN AUTO: 30.3 PG (ref 27–31)
MCHC RBC AUTO-ENTMCNC: 32.6 G/DL (ref 32–36)
MCV RBC AUTO: 93 FL (ref 82–98)
MONOCYTES # BLD AUTO: 0.5 K/UL (ref 0.3–1)
MONOCYTES NFR BLD: 8.4 % (ref 4–15)
NEUTROPHILS # BLD AUTO: 3.9 K/UL (ref 1.8–7.7)
NEUTROPHILS NFR BLD: 67.6 % (ref 38–73)
NONHDLC SERPL-MCNC: 79 MG/DL
NRBC BLD-RTO: 0 /100 WBC
PLATELET # BLD AUTO: 250 K/UL (ref 150–450)
PMV BLD AUTO: 10.2 FL (ref 9.2–12.9)
POTASSIUM SERPL-SCNC: 4.4 MMOL/L (ref 3.5–5.1)
PROT SERPL-MCNC: 7.3 G/DL (ref 6–8.4)
RBC # BLD AUTO: 5.01 M/UL (ref 4–5.4)
SATURATED IRON: 28 % (ref 20–50)
SODIUM SERPL-SCNC: 141 MMOL/L (ref 136–145)
TOTAL IRON BINDING CAPACITY: 355 UG/DL (ref 250–450)
TRANSFERRIN SERPL-MCNC: 240 MG/DL (ref 200–375)
TRIGL SERPL-MCNC: 121 MG/DL (ref 30–150)
VIT B12 SERPL-MCNC: 582 PG/ML (ref 210–950)
WBC # BLD AUTO: 5.74 K/UL (ref 3.9–12.7)

## 2021-05-26 PROCEDURE — 36415 COLL VENOUS BLD VENIPUNCTURE: CPT | Performed by: PHYSICIAN ASSISTANT

## 2021-05-26 PROCEDURE — 99999 PR PBB SHADOW E&M-EST. PATIENT-LVL IV: ICD-10-PCS | Mod: PBBFAC,,, | Performed by: PHYSICIAN ASSISTANT

## 2021-05-26 PROCEDURE — 82607 VITAMIN B-12: CPT | Performed by: PHYSICIAN ASSISTANT

## 2021-05-26 PROCEDURE — 82306 VITAMIN D 25 HYDROXY: CPT | Performed by: PHYSICIAN ASSISTANT

## 2021-05-26 PROCEDURE — 1126F PR PAIN SEVERITY QUANTIFIED, NO PAIN PRESENT: ICD-10-PCS | Mod: S$GLB,,, | Performed by: PHYSICIAN ASSISTANT

## 2021-05-26 PROCEDURE — 99499 NO LOS: ICD-10-PCS | Mod: S$GLB,,, | Performed by: DIETITIAN, REGISTERED

## 2021-05-26 PROCEDURE — 3008F PR BODY MASS INDEX (BMI) DOCUMENTED: ICD-10-PCS | Mod: CPTII,S$GLB,, | Performed by: PHYSICIAN ASSISTANT

## 2021-05-26 PROCEDURE — 84425 ASSAY OF VITAMIN B-1: CPT | Performed by: PHYSICIAN ASSISTANT

## 2021-05-26 PROCEDURE — 3008F BODY MASS INDEX DOCD: CPT | Mod: CPTII,S$GLB,, | Performed by: PHYSICIAN ASSISTANT

## 2021-05-26 PROCEDURE — 99499 UNLISTED E&M SERVICE: CPT | Mod: S$GLB,,, | Performed by: DIETITIAN, REGISTERED

## 2021-05-26 PROCEDURE — 99024 POSTOP FOLLOW-UP VISIT: CPT | Mod: S$GLB,,, | Performed by: PHYSICIAN ASSISTANT

## 2021-05-26 PROCEDURE — 80053 COMPREHEN METABOLIC PANEL: CPT | Performed by: PHYSICIAN ASSISTANT

## 2021-05-26 PROCEDURE — 80061 LIPID PANEL: CPT | Performed by: PHYSICIAN ASSISTANT

## 2021-05-26 PROCEDURE — 85025 COMPLETE CBC W/AUTO DIFF WBC: CPT | Performed by: PHYSICIAN ASSISTANT

## 2021-05-26 PROCEDURE — 1126F AMNT PAIN NOTED NONE PRSNT: CPT | Mod: S$GLB,,, | Performed by: PHYSICIAN ASSISTANT

## 2021-05-26 PROCEDURE — 83540 ASSAY OF IRON: CPT | Performed by: PHYSICIAN ASSISTANT

## 2021-05-26 PROCEDURE — 99024 PR POST-OP FOLLOW-UP VISIT: ICD-10-PCS | Mod: S$GLB,,, | Performed by: PHYSICIAN ASSISTANT

## 2021-05-26 PROCEDURE — 99999 PR PBB SHADOW E&M-EST. PATIENT-LVL IV: CPT | Mod: PBBFAC,,, | Performed by: PHYSICIAN ASSISTANT

## 2021-05-27 ENCOUNTER — PATIENT MESSAGE (OUTPATIENT)
Dept: FAMILY MEDICINE | Facility: CLINIC | Age: 61
End: 2021-05-27

## 2021-05-31 ENCOUNTER — PATIENT MESSAGE (OUTPATIENT)
Dept: FAMILY MEDICINE | Facility: CLINIC | Age: 61
End: 2021-05-31

## 2021-06-01 LAB — VIT B1 BLD-MCNC: 124 UG/L (ref 38–122)

## 2021-06-09 ENCOUNTER — PATIENT MESSAGE (OUTPATIENT)
Dept: BARIATRICS | Facility: CLINIC | Age: 61
End: 2021-06-09

## 2021-06-15 ENCOUNTER — TELEPHONE (OUTPATIENT)
Dept: BARIATRICS | Facility: CLINIC | Age: 61
End: 2021-06-15

## 2021-06-22 ENCOUNTER — PATIENT MESSAGE (OUTPATIENT)
Dept: FAMILY MEDICINE | Facility: CLINIC | Age: 61
End: 2021-06-22

## 2021-06-23 ENCOUNTER — PATIENT OUTREACH (OUTPATIENT)
Dept: ADMINISTRATIVE | Facility: HOSPITAL | Age: 61
End: 2021-06-23

## 2021-06-23 ENCOUNTER — PATIENT MESSAGE (OUTPATIENT)
Dept: FAMILY MEDICINE | Facility: CLINIC | Age: 61
End: 2021-06-23

## 2021-06-23 ENCOUNTER — TELEPHONE (OUTPATIENT)
Dept: ADMINISTRATIVE | Facility: HOSPITAL | Age: 61
End: 2021-06-23

## 2021-06-23 DIAGNOSIS — Z12.11 SCREENING FOR MALIGNANT NEOPLASM OF COLON: Primary | ICD-10-CM

## 2021-06-30 RX ORDER — OMEPRAZOLE 40 MG/1
40 CAPSULE, DELAYED RELEASE ORAL EVERY MORNING
Qty: 30 CAPSULE | Refills: 2 | Status: SHIPPED | OUTPATIENT
Start: 2021-06-30 | End: 2021-10-04 | Stop reason: SDUPTHER

## 2021-07-06 ENCOUNTER — SPECIALTY PHARMACY (OUTPATIENT)
Dept: PHARMACY | Facility: CLINIC | Age: 61
End: 2021-07-06

## 2021-07-06 ENCOUNTER — PATIENT MESSAGE (OUTPATIENT)
Dept: PHARMACY | Facility: CLINIC | Age: 61
End: 2021-07-06

## 2021-07-27 ENCOUNTER — PATIENT MESSAGE (OUTPATIENT)
Dept: FAMILY MEDICINE | Facility: CLINIC | Age: 61
End: 2021-07-27

## 2021-07-27 RX ORDER — NITROFURANTOIN 25; 75 MG/1; MG/1
100 CAPSULE ORAL 2 TIMES DAILY
Qty: 10 CAPSULE | Refills: 0 | Status: SHIPPED | OUTPATIENT
Start: 2021-07-27 | End: 2021-08-16

## 2021-08-03 ENCOUNTER — SPECIALTY PHARMACY (OUTPATIENT)
Dept: PHARMACY | Facility: CLINIC | Age: 61
End: 2021-08-03

## 2021-08-03 ENCOUNTER — PATIENT MESSAGE (OUTPATIENT)
Dept: PHARMACY | Facility: CLINIC | Age: 61
End: 2021-08-03

## 2021-08-05 RX ORDER — CLONAZEPAM 1 MG/1
1 TABLET ORAL 2 TIMES DAILY PRN
Qty: 60 TABLET | Refills: 0 | Status: SHIPPED | OUTPATIENT
Start: 2021-08-06 | End: 2021-08-16

## 2021-08-06 DIAGNOSIS — G47.00 INSOMNIA, UNSPECIFIED TYPE: ICD-10-CM

## 2021-08-06 RX ORDER — ZOLPIDEM TARTRATE 5 MG/1
5 TABLET ORAL NIGHTLY PRN
Qty: 30 TABLET | Refills: 0 | Status: SHIPPED | OUTPATIENT
Start: 2021-08-06 | End: 2021-08-16 | Stop reason: SDUPTHER

## 2021-08-06 RX ORDER — ZOLPIDEM TARTRATE 5 MG/1
TABLET ORAL
Qty: 45 TABLET | Refills: 2 | Status: CANCELLED | OUTPATIENT
Start: 2021-08-06

## 2021-08-16 ENCOUNTER — PATIENT MESSAGE (OUTPATIENT)
Dept: FAMILY MEDICINE | Facility: CLINIC | Age: 61
End: 2021-08-16

## 2021-08-16 ENCOUNTER — OFFICE VISIT (OUTPATIENT)
Dept: PSYCHIATRY | Facility: CLINIC | Age: 61
End: 2021-08-16
Payer: COMMERCIAL

## 2021-08-16 DIAGNOSIS — F41.0 PANIC ATTACK: ICD-10-CM

## 2021-08-16 DIAGNOSIS — G47.00 INSOMNIA, UNSPECIFIED TYPE: ICD-10-CM

## 2021-08-16 DIAGNOSIS — F41.1 GAD (GENERALIZED ANXIETY DISORDER): ICD-10-CM

## 2021-08-16 DIAGNOSIS — F33.40 MDD (RECURRENT MAJOR DEPRESSIVE DISORDER) IN REMISSION: ICD-10-CM

## 2021-08-16 PROCEDURE — 1159F PR MEDICATION LIST DOCUMENTED IN MEDICAL RECORD: ICD-10-PCS | Mod: CPTII,,, | Performed by: NURSE PRACTITIONER

## 2021-08-16 PROCEDURE — 90792 PR PSYCHIATRIC DIAGNOSTIC EVALUATION W/MEDICAL SERVICES: ICD-10-PCS | Mod: 95,,, | Performed by: NURSE PRACTITIONER

## 2021-08-16 PROCEDURE — 1159F MED LIST DOCD IN RCRD: CPT | Mod: CPTII,,, | Performed by: NURSE PRACTITIONER

## 2021-08-16 PROCEDURE — 90792 PSYCH DIAG EVAL W/MED SRVCS: CPT | Mod: 95,,, | Performed by: NURSE PRACTITIONER

## 2021-08-16 PROCEDURE — 1160F PR REVIEW ALL MEDS BY PRESCRIBER/CLIN PHARMACIST DOCUMENTED: ICD-10-PCS | Mod: CPTII,,, | Performed by: NURSE PRACTITIONER

## 2021-08-16 PROCEDURE — 1160F RVW MEDS BY RX/DR IN RCRD: CPT | Mod: CPTII,,, | Performed by: NURSE PRACTITIONER

## 2021-08-16 RX ORDER — ZOLPIDEM TARTRATE 5 MG/1
5 TABLET ORAL NIGHTLY PRN
Qty: 30 TABLET | Refills: 2 | Status: SHIPPED | OUTPATIENT
Start: 2021-09-04 | End: 2021-10-05 | Stop reason: SDUPTHER

## 2021-08-16 RX ORDER — BUSPIRONE HYDROCHLORIDE 15 MG/1
15 TABLET ORAL 2 TIMES DAILY
Qty: 60 TABLET | Refills: 3 | Status: SHIPPED | OUTPATIENT
Start: 2021-08-16 | End: 2021-10-05 | Stop reason: SDUPTHER

## 2021-08-16 RX ORDER — CLONAZEPAM 1 MG/1
1 TABLET ORAL 2 TIMES DAILY PRN
Qty: 60 TABLET | Refills: 2 | Status: SHIPPED | OUTPATIENT
Start: 2021-09-03 | End: 2021-10-05 | Stop reason: SDUPTHER

## 2021-08-16 RX ORDER — AMITRIPTYLINE HYDROCHLORIDE 25 MG/1
25 TABLET, FILM COATED ORAL NIGHTLY PRN
Qty: 30 TABLET | Refills: 3 | Status: SHIPPED | OUTPATIENT
Start: 2021-08-16 | End: 2021-10-05 | Stop reason: SDUPTHER

## 2021-09-09 ENCOUNTER — PATIENT MESSAGE (OUTPATIENT)
Dept: FAMILY MEDICINE | Facility: CLINIC | Age: 61
End: 2021-09-09

## 2021-09-09 RX ORDER — AMOXICILLIN AND CLAVULANATE POTASSIUM 875; 125 MG/1; MG/1
1 TABLET, FILM COATED ORAL EVERY 12 HOURS
Qty: 20 TABLET | Refills: 0 | Status: SHIPPED | OUTPATIENT
Start: 2021-09-09 | End: 2021-10-05

## 2021-09-16 ENCOUNTER — SPECIALTY PHARMACY (OUTPATIENT)
Dept: PHARMACY | Facility: CLINIC | Age: 61
End: 2021-09-16

## 2021-09-22 ENCOUNTER — TELEPHONE (OUTPATIENT)
Dept: FAMILY MEDICINE | Facility: CLINIC | Age: 61
End: 2021-09-22

## 2021-09-22 ENCOUNTER — PATIENT MESSAGE (OUTPATIENT)
Dept: DERMATOLOGY | Facility: CLINIC | Age: 61
End: 2021-09-22

## 2021-09-22 ENCOUNTER — OFFICE VISIT (OUTPATIENT)
Dept: BARIATRICS | Facility: CLINIC | Age: 61
End: 2021-09-22
Payer: COMMERCIAL

## 2021-09-22 VITALS
WEIGHT: 183 LBS | OXYGEN SATURATION: 98 % | BODY MASS INDEX: 33.47 KG/M2 | SYSTOLIC BLOOD PRESSURE: 118 MMHG | DIASTOLIC BLOOD PRESSURE: 82 MMHG | HEART RATE: 128 BPM

## 2021-09-22 DIAGNOSIS — Z98.84 S/P LAPAROSCOPIC SLEEVE GASTRECTOMY: ICD-10-CM

## 2021-09-22 DIAGNOSIS — R63.4 WEIGHT LOSS: Primary | ICD-10-CM

## 2021-09-22 PROCEDURE — 1159F PR MEDICATION LIST DOCUMENTED IN MEDICAL RECORD: ICD-10-PCS | Mod: CPTII,S$GLB,, | Performed by: PHYSICIAN ASSISTANT

## 2021-09-22 PROCEDURE — 3008F PR BODY MASS INDEX (BMI) DOCUMENTED: ICD-10-PCS | Mod: CPTII,S$GLB,, | Performed by: PHYSICIAN ASSISTANT

## 2021-09-22 PROCEDURE — 3079F DIAST BP 80-89 MM HG: CPT | Mod: CPTII,S$GLB,, | Performed by: PHYSICIAN ASSISTANT

## 2021-09-22 PROCEDURE — 4010F ACE/ARB THERAPY RXD/TAKEN: CPT | Mod: CPTII,S$GLB,, | Performed by: PHYSICIAN ASSISTANT

## 2021-09-22 PROCEDURE — 3074F PR MOST RECENT SYSTOLIC BLOOD PRESSURE < 130 MM HG: ICD-10-PCS | Mod: CPTII,S$GLB,, | Performed by: PHYSICIAN ASSISTANT

## 2021-09-22 PROCEDURE — 3079F PR MOST RECENT DIASTOLIC BLOOD PRESSURE 80-89 MM HG: ICD-10-PCS | Mod: CPTII,S$GLB,, | Performed by: PHYSICIAN ASSISTANT

## 2021-09-22 PROCEDURE — 1160F RVW MEDS BY RX/DR IN RCRD: CPT | Mod: CPTII,S$GLB,, | Performed by: PHYSICIAN ASSISTANT

## 2021-09-22 PROCEDURE — 99999 PR PBB SHADOW E&M-EST. PATIENT-LVL IV: ICD-10-PCS | Mod: PBBFAC,,, | Performed by: PHYSICIAN ASSISTANT

## 2021-09-22 PROCEDURE — 3008F BODY MASS INDEX DOCD: CPT | Mod: CPTII,S$GLB,, | Performed by: PHYSICIAN ASSISTANT

## 2021-09-22 PROCEDURE — 99213 PR OFFICE/OUTPT VISIT, EST, LEVL III, 20-29 MIN: ICD-10-PCS | Mod: S$GLB,,, | Performed by: PHYSICIAN ASSISTANT

## 2021-09-22 PROCEDURE — 1160F PR REVIEW ALL MEDS BY PRESCRIBER/CLIN PHARMACIST DOCUMENTED: ICD-10-PCS | Mod: CPTII,S$GLB,, | Performed by: PHYSICIAN ASSISTANT

## 2021-09-22 PROCEDURE — 99213 OFFICE O/P EST LOW 20 MIN: CPT | Mod: S$GLB,,, | Performed by: PHYSICIAN ASSISTANT

## 2021-09-22 PROCEDURE — 99999 PR PBB SHADOW E&M-EST. PATIENT-LVL IV: CPT | Mod: PBBFAC,,, | Performed by: PHYSICIAN ASSISTANT

## 2021-09-22 PROCEDURE — 4010F PR ACE/ARB THEARPY RXD/TAKEN: ICD-10-PCS | Mod: CPTII,S$GLB,, | Performed by: PHYSICIAN ASSISTANT

## 2021-09-22 PROCEDURE — 3074F SYST BP LT 130 MM HG: CPT | Mod: CPTII,S$GLB,, | Performed by: PHYSICIAN ASSISTANT

## 2021-09-22 PROCEDURE — 1159F MED LIST DOCD IN RCRD: CPT | Mod: CPTII,S$GLB,, | Performed by: PHYSICIAN ASSISTANT

## 2021-09-23 ENCOUNTER — PATIENT OUTREACH (OUTPATIENT)
Dept: ADMINISTRATIVE | Facility: OTHER | Age: 61
End: 2021-09-23

## 2021-09-24 ENCOUNTER — OFFICE VISIT (OUTPATIENT)
Dept: DERMATOLOGY | Facility: CLINIC | Age: 61
End: 2021-09-24
Payer: COMMERCIAL

## 2021-09-24 DIAGNOSIS — L40.50 PSORIATIC ARTHRITIS: ICD-10-CM

## 2021-09-24 DIAGNOSIS — L40.0 PSORIASIS VULGARIS: Primary | ICD-10-CM

## 2021-09-24 DIAGNOSIS — Z79.899 LONG-TERM USE OF HIGH-RISK MEDICATION: ICD-10-CM

## 2021-09-24 PROCEDURE — 1159F PR MEDICATION LIST DOCUMENTED IN MEDICAL RECORD: ICD-10-PCS | Mod: CPTII,S$GLB,, | Performed by: DERMATOLOGY

## 2021-09-24 PROCEDURE — 99214 OFFICE O/P EST MOD 30 MIN: CPT | Mod: S$GLB,,, | Performed by: DERMATOLOGY

## 2021-09-24 PROCEDURE — 4010F ACE/ARB THERAPY RXD/TAKEN: CPT | Mod: CPTII,S$GLB,, | Performed by: DERMATOLOGY

## 2021-09-24 PROCEDURE — 4010F PR ACE/ARB THEARPY RXD/TAKEN: ICD-10-PCS | Mod: CPTII,S$GLB,, | Performed by: DERMATOLOGY

## 2021-09-24 PROCEDURE — 99214 PR OFFICE/OUTPT VISIT, EST, LEVL IV, 30-39 MIN: ICD-10-PCS | Mod: S$GLB,,, | Performed by: DERMATOLOGY

## 2021-09-24 PROCEDURE — 1159F MED LIST DOCD IN RCRD: CPT | Mod: CPTII,S$GLB,, | Performed by: DERMATOLOGY

## 2021-09-24 PROCEDURE — 1160F PR REVIEW ALL MEDS BY PRESCRIBER/CLIN PHARMACIST DOCUMENTED: ICD-10-PCS | Mod: CPTII,S$GLB,, | Performed by: DERMATOLOGY

## 2021-09-24 PROCEDURE — 1160F RVW MEDS BY RX/DR IN RCRD: CPT | Mod: CPTII,S$GLB,, | Performed by: DERMATOLOGY

## 2021-09-24 RX ORDER — SECUKINUMAB 150 MG/ML
INJECTION SUBCUTANEOUS
Qty: 6 ML | Refills: 1 | Status: SHIPPED | OUTPATIENT
Start: 2021-09-24 | End: 2022-04-12 | Stop reason: SDUPTHER

## 2021-09-28 ENCOUNTER — IMMUNIZATION (OUTPATIENT)
Dept: INTERNAL MEDICINE | Facility: CLINIC | Age: 61
End: 2021-09-28
Payer: COMMERCIAL

## 2021-09-28 DIAGNOSIS — Z23 NEED FOR VACCINATION: Primary | ICD-10-CM

## 2021-09-28 PROCEDURE — 91300 COVID-19, MRNA, LNP-S, PF, 30 MCG/0.3 ML DOSE VACCINE: CPT | Mod: PBBFAC | Performed by: INTERNAL MEDICINE

## 2021-09-28 PROCEDURE — 0003A COVID-19, MRNA, LNP-S, PF, 30 MCG/0.3 ML DOSE VACCINE: CPT | Mod: PBBFAC | Performed by: INTERNAL MEDICINE

## 2021-10-04 ENCOUNTER — PATIENT MESSAGE (OUTPATIENT)
Dept: PSYCHIATRY | Facility: CLINIC | Age: 61
End: 2021-10-04

## 2021-10-04 DIAGNOSIS — I10 HYPERTENSION, UNSPECIFIED TYPE: ICD-10-CM

## 2021-10-05 DIAGNOSIS — F41.0 PANIC ATTACK: ICD-10-CM

## 2021-10-05 DIAGNOSIS — F33.40 MDD (RECURRENT MAJOR DEPRESSIVE DISORDER) IN REMISSION: ICD-10-CM

## 2021-10-05 DIAGNOSIS — G47.00 INSOMNIA, UNSPECIFIED TYPE: ICD-10-CM

## 2021-10-05 DIAGNOSIS — F41.1 GAD (GENERALIZED ANXIETY DISORDER): ICD-10-CM

## 2021-10-05 RX ORDER — BUSPIRONE HYDROCHLORIDE 15 MG/1
15 TABLET ORAL 2 TIMES DAILY
Qty: 60 TABLET | Refills: 0 | Status: SHIPPED | OUTPATIENT
Start: 2021-10-05 | End: 2021-11-02 | Stop reason: SDUPTHER

## 2021-10-05 RX ORDER — ATORVASTATIN CALCIUM 20 MG/1
20 TABLET, FILM COATED ORAL NIGHTLY
Qty: 90 TABLET | Refills: 3 | Status: SHIPPED | OUTPATIENT
Start: 2021-10-05 | End: 2022-10-06 | Stop reason: SDUPTHER

## 2021-10-05 RX ORDER — AMITRIPTYLINE HYDROCHLORIDE 25 MG/1
25 TABLET, FILM COATED ORAL NIGHTLY PRN
Qty: 30 TABLET | Refills: 0 | Status: SHIPPED | OUTPATIENT
Start: 2021-10-05 | End: 2021-11-02 | Stop reason: SDUPTHER

## 2021-10-05 RX ORDER — AMITRIPTYLINE HYDROCHLORIDE 25 MG/1
25 TABLET, FILM COATED ORAL NIGHTLY PRN
Qty: 30 TABLET | Refills: 0 | Status: SHIPPED | OUTPATIENT
Start: 2021-10-05 | End: 2021-10-05 | Stop reason: SDUPTHER

## 2021-10-05 RX ORDER — CLONAZEPAM 1 MG/1
1 TABLET ORAL 2 TIMES DAILY PRN
Qty: 60 TABLET | Refills: 0 | Status: SHIPPED | OUTPATIENT
Start: 2021-10-05 | End: 2021-11-02 | Stop reason: SDUPTHER

## 2021-10-05 RX ORDER — ZOLPIDEM TARTRATE 5 MG/1
5 TABLET ORAL NIGHTLY PRN
Qty: 30 TABLET | Refills: 0 | Status: SHIPPED | OUTPATIENT
Start: 2021-10-05 | End: 2021-10-05 | Stop reason: SDUPTHER

## 2021-10-05 RX ORDER — BUSPIRONE HYDROCHLORIDE 15 MG/1
15 TABLET ORAL 2 TIMES DAILY
Qty: 60 TABLET | Refills: 0 | Status: SHIPPED | OUTPATIENT
Start: 2021-10-05 | End: 2021-10-05 | Stop reason: SDUPTHER

## 2021-10-05 RX ORDER — METOPROLOL TARTRATE 50 MG/1
50 TABLET ORAL 2 TIMES DAILY WITH MEALS
Qty: 180 TABLET | Refills: 3 | Status: SHIPPED | OUTPATIENT
Start: 2021-10-05 | End: 2022-10-06 | Stop reason: SDUPTHER

## 2021-10-05 RX ORDER — ZOLPIDEM TARTRATE 5 MG/1
5 TABLET ORAL NIGHTLY PRN
Qty: 30 TABLET | Refills: 0 | Status: SHIPPED | OUTPATIENT
Start: 2021-10-05 | End: 2021-11-02 | Stop reason: SDUPTHER

## 2021-10-05 RX ORDER — CLONAZEPAM 1 MG/1
1 TABLET ORAL 2 TIMES DAILY PRN
Qty: 60 TABLET | Refills: 0 | Status: SHIPPED | OUTPATIENT
Start: 2021-10-05 | End: 2021-10-05 | Stop reason: SDUPTHER

## 2021-10-06 RX ORDER — OMEPRAZOLE 40 MG/1
40 CAPSULE, DELAYED RELEASE ORAL EVERY MORNING
Qty: 30 CAPSULE | Refills: 2 | Status: SHIPPED | OUTPATIENT
Start: 2021-10-06 | End: 2022-02-18 | Stop reason: SDUPTHER

## 2021-10-09 ENCOUNTER — SPECIALTY PHARMACY (OUTPATIENT)
Dept: PHARMACY | Facility: CLINIC | Age: 61
End: 2021-10-09

## 2021-10-15 DIAGNOSIS — J44.9 CHRONIC OBSTRUCTIVE PULMONARY DISEASE, UNSPECIFIED COPD TYPE: ICD-10-CM

## 2021-10-20 ENCOUNTER — TELEPHONE (OUTPATIENT)
Dept: FAMILY MEDICINE | Facility: CLINIC | Age: 61
End: 2021-10-20

## 2021-10-20 ENCOUNTER — PATIENT MESSAGE (OUTPATIENT)
Dept: CARDIOLOGY | Facility: CLINIC | Age: 61
End: 2021-10-20

## 2021-10-20 ENCOUNTER — PATIENT MESSAGE (OUTPATIENT)
Dept: FAMILY MEDICINE | Facility: CLINIC | Age: 61
End: 2021-10-20
Payer: COMMERCIAL

## 2021-10-20 DIAGNOSIS — J44.9 CHRONIC OBSTRUCTIVE PULMONARY DISEASE, UNSPECIFIED COPD TYPE: ICD-10-CM

## 2021-10-20 RX ORDER — METHYLPREDNISOLONE 4 MG/1
TABLET ORAL
Qty: 21 EACH | Refills: 0 | Status: SHIPPED | OUTPATIENT
Start: 2021-10-20 | End: 2021-10-21 | Stop reason: SDUPTHER

## 2021-10-20 RX ORDER — CEFDINIR 300 MG/1
300 CAPSULE ORAL 2 TIMES DAILY
Qty: 20 CAPSULE | Refills: 0 | Status: SHIPPED | OUTPATIENT
Start: 2021-10-20 | End: 2021-10-21 | Stop reason: SDUPTHER

## 2021-10-21 RX ORDER — METHYLPREDNISOLONE 4 MG/1
TABLET ORAL
Qty: 21 EACH | Refills: 0 | Status: SHIPPED | OUTPATIENT
Start: 2021-10-21 | End: 2021-11-11

## 2021-10-21 RX ORDER — CEFDINIR 300 MG/1
300 CAPSULE ORAL 2 TIMES DAILY
Qty: 20 CAPSULE | Refills: 0 | Status: SHIPPED | OUTPATIENT
Start: 2021-10-21 | End: 2021-10-31

## 2021-11-02 ENCOUNTER — OFFICE VISIT (OUTPATIENT)
Dept: PSYCHIATRY | Facility: CLINIC | Age: 61
End: 2021-11-02
Payer: COMMERCIAL

## 2021-11-02 DIAGNOSIS — G47.00 INSOMNIA, UNSPECIFIED TYPE: ICD-10-CM

## 2021-11-02 DIAGNOSIS — F33.40 MDD (RECURRENT MAJOR DEPRESSIVE DISORDER) IN REMISSION: ICD-10-CM

## 2021-11-02 DIAGNOSIS — F41.1 GAD (GENERALIZED ANXIETY DISORDER): ICD-10-CM

## 2021-11-02 DIAGNOSIS — F41.0 PANIC ATTACK: ICD-10-CM

## 2021-11-02 PROCEDURE — 1159F MED LIST DOCD IN RCRD: CPT | Mod: CPTII,95,, | Performed by: NURSE PRACTITIONER

## 2021-11-02 PROCEDURE — 1160F PR REVIEW ALL MEDS BY PRESCRIBER/CLIN PHARMACIST DOCUMENTED: ICD-10-PCS | Mod: CPTII,95,, | Performed by: NURSE PRACTITIONER

## 2021-11-02 PROCEDURE — 1160F RVW MEDS BY RX/DR IN RCRD: CPT | Mod: CPTII,95,, | Performed by: NURSE PRACTITIONER

## 2021-11-02 PROCEDURE — 99214 PR OFFICE/OUTPT VISIT, EST, LEVL IV, 30-39 MIN: ICD-10-PCS | Mod: 95,,, | Performed by: NURSE PRACTITIONER

## 2021-11-02 PROCEDURE — 99214 OFFICE O/P EST MOD 30 MIN: CPT | Mod: 95,,, | Performed by: NURSE PRACTITIONER

## 2021-11-02 PROCEDURE — 1159F PR MEDICATION LIST DOCUMENTED IN MEDICAL RECORD: ICD-10-PCS | Mod: CPTII,95,, | Performed by: NURSE PRACTITIONER

## 2021-11-02 PROCEDURE — 4010F ACE/ARB THERAPY RXD/TAKEN: CPT | Mod: CPTII,95,, | Performed by: NURSE PRACTITIONER

## 2021-11-02 PROCEDURE — 4010F PR ACE/ARB THEARPY RXD/TAKEN: ICD-10-PCS | Mod: CPTII,95,, | Performed by: NURSE PRACTITIONER

## 2021-11-02 RX ORDER — BUSPIRONE HYDROCHLORIDE 15 MG/1
15 TABLET ORAL 2 TIMES DAILY
Qty: 60 TABLET | Refills: 2 | Status: SHIPPED | OUTPATIENT
Start: 2021-11-02 | End: 2022-02-17 | Stop reason: SDUPTHER

## 2021-11-02 RX ORDER — AMITRIPTYLINE HYDROCHLORIDE 50 MG/1
50 TABLET, FILM COATED ORAL NIGHTLY PRN
Qty: 30 TABLET | Refills: 0 | Status: SHIPPED | OUTPATIENT
Start: 2021-11-02 | End: 2021-12-30 | Stop reason: SDUPTHER

## 2021-11-02 RX ORDER — CLONAZEPAM 1 MG/1
1 TABLET ORAL 2 TIMES DAILY PRN
Qty: 60 TABLET | Refills: 2 | Status: SHIPPED | OUTPATIENT
Start: 2021-11-02 | End: 2022-02-07 | Stop reason: SDUPTHER

## 2021-11-02 RX ORDER — ZOLPIDEM TARTRATE 5 MG/1
5 TABLET ORAL NIGHTLY PRN
Qty: 30 TABLET | Refills: 2 | Status: SHIPPED | OUTPATIENT
Start: 2021-11-02 | End: 2022-02-07 | Stop reason: SDUPTHER

## 2021-11-05 ENCOUNTER — SPECIALTY PHARMACY (OUTPATIENT)
Dept: PHARMACY | Facility: CLINIC | Age: 61
End: 2021-11-05
Payer: COMMERCIAL

## 2021-12-01 ENCOUNTER — SPECIALTY PHARMACY (OUTPATIENT)
Dept: PHARMACY | Facility: CLINIC | Age: 61
End: 2021-12-01
Payer: COMMERCIAL

## 2021-12-30 ENCOUNTER — SPECIALTY PHARMACY (OUTPATIENT)
Dept: PHARMACY | Facility: CLINIC | Age: 61
End: 2021-12-30
Payer: COMMERCIAL

## 2021-12-30 DIAGNOSIS — F41.1 GAD (GENERALIZED ANXIETY DISORDER): ICD-10-CM

## 2021-12-30 DIAGNOSIS — G47.00 INSOMNIA, UNSPECIFIED TYPE: ICD-10-CM

## 2021-12-30 DIAGNOSIS — F33.40 MDD (RECURRENT MAJOR DEPRESSIVE DISORDER) IN REMISSION: ICD-10-CM

## 2021-12-30 RX ORDER — AMITRIPTYLINE HYDROCHLORIDE 50 MG/1
50 TABLET, FILM COATED ORAL NIGHTLY PRN
Qty: 30 TABLET | Refills: 0 | Status: SHIPPED | OUTPATIENT
Start: 2021-12-30 | End: 2022-01-23 | Stop reason: SDUPTHER

## 2022-01-18 ENCOUNTER — LAB VISIT (OUTPATIENT)
Dept: LAB | Facility: OTHER | Age: 62
End: 2022-01-18
Attending: DERMATOLOGY
Payer: COMMERCIAL

## 2022-01-18 ENCOUNTER — PATIENT MESSAGE (OUTPATIENT)
Dept: DERMATOLOGY | Facility: CLINIC | Age: 62
End: 2022-01-18
Payer: COMMERCIAL

## 2022-01-18 DIAGNOSIS — L40.50 PSORIATIC ARTHRITIS: ICD-10-CM

## 2022-01-18 DIAGNOSIS — L40.0 PSORIASIS VULGARIS: ICD-10-CM

## 2022-01-18 DIAGNOSIS — L40.0 PSORIASIS VULGARIS: Primary | ICD-10-CM

## 2022-01-18 DIAGNOSIS — Z79.899 LONG-TERM USE OF HIGH-RISK MEDICATION: ICD-10-CM

## 2022-01-18 PROCEDURE — 86480 TB TEST CELL IMMUN MEASURE: CPT | Performed by: DERMATOLOGY

## 2022-01-18 PROCEDURE — 36415 COLL VENOUS BLD VENIPUNCTURE: CPT | Performed by: DERMATOLOGY

## 2022-01-20 ENCOUNTER — PATIENT MESSAGE (OUTPATIENT)
Dept: DERMATOLOGY | Facility: CLINIC | Age: 62
End: 2022-01-20
Payer: COMMERCIAL

## 2022-01-20 LAB
GAMMA INTERFERON BACKGROUND BLD IA-ACNC: 0.02 IU/ML
M TB IFN-G CD4+ BCKGRND COR BLD-ACNC: 0.01 IU/ML
MITOGEN IGNF BCKGRD COR BLD-ACNC: 4.38 IU/ML
TB GOLD PLUS: NEGATIVE
TB2 - NIL: 0.01 IU/ML

## 2022-01-23 DIAGNOSIS — F41.1 GAD (GENERALIZED ANXIETY DISORDER): ICD-10-CM

## 2022-01-23 DIAGNOSIS — F33.40 MDD (RECURRENT MAJOR DEPRESSIVE DISORDER) IN REMISSION: ICD-10-CM

## 2022-01-23 DIAGNOSIS — G47.00 INSOMNIA, UNSPECIFIED TYPE: ICD-10-CM

## 2022-01-24 RX ORDER — AMITRIPTYLINE HYDROCHLORIDE 50 MG/1
50 TABLET, FILM COATED ORAL NIGHTLY PRN
Qty: 30 TABLET | Refills: 0 | Status: SHIPPED | OUTPATIENT
Start: 2022-01-24 | End: 2022-02-07 | Stop reason: SDUPTHER

## 2022-01-26 ENCOUNTER — PATIENT OUTREACH (OUTPATIENT)
Dept: ADMINISTRATIVE | Facility: OTHER | Age: 62
End: 2022-01-26
Payer: COMMERCIAL

## 2022-01-26 DIAGNOSIS — Z12.31 ENCOUNTER FOR SCREENING MAMMOGRAM FOR MALIGNANT NEOPLASM OF BREAST: Primary | ICD-10-CM

## 2022-01-27 ENCOUNTER — OFFICE VISIT (OUTPATIENT)
Dept: OPTOMETRY | Facility: CLINIC | Age: 62
End: 2022-01-27
Payer: COMMERCIAL

## 2022-01-27 DIAGNOSIS — Z97.3 WEARS CONTACT LENSES: ICD-10-CM

## 2022-01-27 DIAGNOSIS — H52.4 HYPEROPIA WITH ASTIGMATISM AND PRESBYOPIA, LEFT: ICD-10-CM

## 2022-01-27 DIAGNOSIS — H52.02 HYPEROPIA WITH ASTIGMATISM AND PRESBYOPIA, LEFT: ICD-10-CM

## 2022-01-27 DIAGNOSIS — H25.13 SENILE NUCLEAR SCLEROSIS, BILATERAL: ICD-10-CM

## 2022-01-27 DIAGNOSIS — H52.201 MYOPIA WITH ASTIGMATISM AND PRESBYOPIA, RIGHT: Primary | ICD-10-CM

## 2022-01-27 DIAGNOSIS — H52.11 MYOPIA WITH ASTIGMATISM AND PRESBYOPIA, RIGHT: Primary | ICD-10-CM

## 2022-01-27 DIAGNOSIS — H52.202 HYPEROPIA WITH ASTIGMATISM AND PRESBYOPIA, LEFT: ICD-10-CM

## 2022-01-27 DIAGNOSIS — H52.4 MYOPIA WITH ASTIGMATISM AND PRESBYOPIA, RIGHT: Primary | ICD-10-CM

## 2022-01-27 DIAGNOSIS — Z46.0 FITTING AND ADJUSTMENT OF SPECTACLES AND CONTACT LENSES: Primary | ICD-10-CM

## 2022-01-27 PROCEDURE — 92015 DETERMINE REFRACTIVE STATE: CPT | Mod: S$GLB,,, | Performed by: OPTOMETRIST

## 2022-01-27 PROCEDURE — 92014 PR EYE EXAM, EST PATIENT,COMPREHESV: ICD-10-PCS | Mod: S$GLB,,, | Performed by: OPTOMETRIST

## 2022-01-27 PROCEDURE — 99999 PR PBB SHADOW E&M-EST. PATIENT-LVL III: ICD-10-PCS | Mod: PBBFAC,,, | Performed by: OPTOMETRIST

## 2022-01-27 PROCEDURE — 99999 PR PBB SHADOW E&M-EST. PATIENT-LVL III: CPT | Mod: PBBFAC,,, | Performed by: OPTOMETRIST

## 2022-01-27 PROCEDURE — 99999 PR PBB SHADOW E&M-EST. PATIENT-LVL II: ICD-10-PCS | Mod: PBBFAC,,, | Performed by: OPTOMETRIST

## 2022-01-27 PROCEDURE — 92014 COMPRE OPH EXAM EST PT 1/>: CPT | Mod: S$GLB,,, | Performed by: OPTOMETRIST

## 2022-01-27 PROCEDURE — 92310 PR CONTACT LENS FITTING (NO CHANGE): ICD-10-PCS | Mod: S$GLB,,, | Performed by: OPTOMETRIST

## 2022-01-27 PROCEDURE — 92015 PR REFRACTION: ICD-10-PCS | Mod: S$GLB,,, | Performed by: OPTOMETRIST

## 2022-01-27 PROCEDURE — 99999 PR PBB SHADOW E&M-EST. PATIENT-LVL II: CPT | Mod: PBBFAC,,, | Performed by: OPTOMETRIST

## 2022-01-27 PROCEDURE — 92310 CONTACT LENS FITTING OU: CPT | Mod: S$GLB,,, | Performed by: OPTOMETRIST

## 2022-01-27 NOTE — PROGRESS NOTES
Care Everywhere: updated  Immunization: updated  Health Maintenance: updated  Media Review: review for outside colon cancer report   Legacy Review:   DIS:  Order placed: mammogram   Upcoming appts:  EFAX:sent to Dr. Vu office to possibly obtain update colon cancer report   Task Tickets:  Referrals:

## 2022-01-27 NOTE — PROGRESS NOTES
ESTHER LIU: 2015   Chief complaint (CC): patient complains of blurred vision at distance and   near with glasses   Glasses? +  Contacts? -  H/o eye surgery, injections or laser: -  H/o eye injury: -  Known eye conditions? -  Family h/o eye conditions? -  Eye gtts? -      (-) Flashes (-)  Floaters (-) Mucous   (-)  Tearing (-) Itching (-) Burning   (-) Headaches (-) Eye Pain/discomfort (-) Irritation   (-)  Redness (-) Double vision (+) Blurry vision    Diabetic? -  A1c? -        Last edited by Ebony Cummings on 1/27/2022  3:36 PM. (History)            Assessment /Plan     For exam results, see Encounter Report.    Myopia with astigmatism and presbyopia, right    Hyperopia with astigmatism and presbyopia, left    Senile nuclear sclerosis, bilateral      1-2. SRx released to patient. Patient educated on lens options. Normal ocular health. RTC 1 year for routine exam.   3. Nuclear sclerotic cataract - not visually significant. Observe.

## 2022-01-31 ENCOUNTER — TELEPHONE (OUTPATIENT)
Dept: OPTOMETRY | Facility: CLINIC | Age: 62
End: 2022-01-31
Payer: COMMERCIAL

## 2022-01-31 NOTE — TELEPHONE ENCOUNTER
----- Message from Brenda Bella sent at 1/31/2022  1:32 PM CST -----  Regarding: trial contacts  The trial contacts are in for this patient.  Thanks,  Brenda

## 2022-02-01 ENCOUNTER — TELEPHONE (OUTPATIENT)
Dept: OPTOMETRY | Facility: CLINIC | Age: 62
End: 2022-02-01
Payer: COMMERCIAL

## 2022-02-01 ENCOUNTER — SPECIALTY PHARMACY (OUTPATIENT)
Dept: PHARMACY | Facility: CLINIC | Age: 62
End: 2022-02-01
Payer: COMMERCIAL

## 2022-02-01 NOTE — TELEPHONE ENCOUNTER
Specialty Pharmacy - Refill Coordination    Specialty Medication Orders Linked to Encounter    Flowsheet Row Most Recent Value   Medication #1 COSENTYX PEN, 2 PENS, 150 mg/mL PnIj (Order#[id number], Rx#[id number])          Refill Questions - Documented Responses    Flowsheet Row Most Recent Value   Patient Availability and HIPAA Verification    Does patient want to proceed with activity? Yes   HIPAA/medical authority confirmed? Yes   Relationship to patient of person spoken to? Self   Refill Screening Questions    Changes to allergies? No   Changes to medications? No   New conditions since last clinic visit? No   Unplanned office visit, urgent care, ED, or hospital admission in the last 4 weeks? No   How does patient/caregiver feel medication is working? Good   Financial problems or insurance changes? No   How many doses of your specialty medications were missed in the last 4 weeks? 0   Would patient like to speak to a pharmacist? No   When does the patient need to receive the medication? 02/09/22   Refill Delivery Questions    How will the patient receive the medication? Delivery Lorene   When does the patient need to receive the medication? 02/09/22   Shipping Address Home   Address in [facility] confirmed and updated if neccessary? Yes   Expected Copay ($) 441.99   Is the patient able to afford the medication copay? Yes   Payment Method  CC on file   Days supply of Refill 28   Supplies needed? No supplies needed   Refill activity completed? Yes   Refill activity plan Refill scheduled   Shipment/Pickup Date: 02/07/22          Current Outpatient Medications   Medication Sig    amitriptyline (ELAVIL) 50 MG tablet Take 1 tablet (50 mg total) by mouth nightly as needed for Insomnia.    atorvastatin (LIPITOR) 20 MG tablet Take 1 tablet (20 mg total) by mouth every evening.    B-complex with vitamin C (VITAMIN B COMPLEX-C ORAL) Take by mouth.    busPIRone (BUSPAR) 15 MG tablet Take 1 tablet (15 mg  total) by mouth 2 (two) times daily.    calcium-vitamin D 250-100 mg-unit per tablet Take 1 tablet by mouth 2 (two) times daily.    cholecalciferol, vitamin D3, (VITAMIN D3) 50 mcg (2,000 unit) Tab Take by mouth once daily.    clonazePAM (KLONOPIN) 1 MG tablet Take 1 tablet (1 mg total) by mouth 2 (two) times daily as needed for Anxiety (panic).    COSENTYX PEN, 2 PENS, 150 mg/mL PnIj Inject 300mg (2 pens) into the skin every 4 weeks (Patient taking differently: Inject 300mg (2 pens) into the skin every 4 weeks)    cyanocobalamin 500 MCG tablet Take 500 mcg by mouth once daily.    fluticasone-umeclidin-vilanter (TRELEGY ELLIPTA) 100-62.5-25 mcg DsDv Inhale 1 puff into the lungs once daily.    metoprolol tartrate (LOPRESSOR) 50 MG tablet Take 1 tablet (50 mg total) by mouth 2 (two) times daily with meals.    multivitamin (THERAGRAN) per tablet Take 1 tablet by mouth once daily.    nystatin (MYCOSTATIN) cream Apply topically 2 (two) times daily.    omeprazole (PRILOSEC) 40 MG capsule Take 1 capsule (40 mg total) by mouth every morning. Open capsule and take with apple sauce.    ondansetron (ZOFRAN-ODT) 8 MG TbDL Dissolve 1 tablet (8 mg total) by mouth every 6 (six) hours as needed.    zolpidem (AMBIEN) 5 MG Tab Take 1 tablet (5 mg total) by mouth nightly as needed (insomnia).   Last reviewed on 1/31/2022 10:30 AM by Indira Paez OD    Review of patient's allergies indicates:   Allergen Reactions    Succinimides Anaphylaxis     Son has     Last reviewed on  1/31/2022 10:30 AM by Indira Paez      Tasks added this encounter   No tasks added.   Tasks due within next 3 months   1/29/2022 - Refill Call (Auto Added)     Mykel Jurado [facility]  [address] [place] [address]  Phone: [phone]  Fax: [phone]

## 2022-02-01 NOTE — TELEPHONE ENCOUNTER
Spoke to patient and scheduled CL fu----- Message from Orlin Lau sent at 2/1/2022  8:03 AM CST -----  Pt calling to speak w/ nurse about scheduling contacts fitting appt today.    [phone]

## 2022-02-07 DIAGNOSIS — F33.40 MDD (RECURRENT MAJOR DEPRESSIVE DISORDER) IN REMISSION: ICD-10-CM

## 2022-02-07 DIAGNOSIS — F41.0 PANIC ATTACK: ICD-10-CM

## 2022-02-07 DIAGNOSIS — G47.00 INSOMNIA, UNSPECIFIED TYPE: ICD-10-CM

## 2022-02-07 DIAGNOSIS — F41.1 GAD (GENERALIZED ANXIETY DISORDER): ICD-10-CM

## 2022-02-09 ENCOUNTER — OFFICE VISIT (OUTPATIENT)
Dept: OPTOMETRY | Facility: CLINIC | Age: 62
End: 2022-02-09
Payer: COMMERCIAL

## 2022-02-09 ENCOUNTER — PATIENT MESSAGE (OUTPATIENT)
Dept: PSYCHIATRY | Facility: CLINIC | Age: 62
End: 2022-02-09
Payer: COMMERCIAL

## 2022-02-09 DIAGNOSIS — H52.4 MYOPIA WITH ASTIGMATISM AND PRESBYOPIA, RIGHT: Primary | ICD-10-CM

## 2022-02-09 DIAGNOSIS — H52.202 HYPEROPIA WITH ASTIGMATISM AND PRESBYOPIA, LEFT: ICD-10-CM

## 2022-02-09 DIAGNOSIS — Z12.31 ENCOUNTER FOR SCREENING MAMMOGRAM FOR MALIGNANT NEOPLASM OF BREAST: ICD-10-CM

## 2022-02-09 DIAGNOSIS — H52.201 MYOPIA WITH ASTIGMATISM AND PRESBYOPIA, RIGHT: Primary | ICD-10-CM

## 2022-02-09 DIAGNOSIS — H52.4 HYPEROPIA WITH ASTIGMATISM AND PRESBYOPIA, LEFT: ICD-10-CM

## 2022-02-09 DIAGNOSIS — H52.02 HYPEROPIA WITH ASTIGMATISM AND PRESBYOPIA, LEFT: ICD-10-CM

## 2022-02-09 DIAGNOSIS — H52.11 MYOPIA WITH ASTIGMATISM AND PRESBYOPIA, RIGHT: Primary | ICD-10-CM

## 2022-02-09 PROCEDURE — 4010F PR ACE/ARB THEARPY RXD/TAKEN: ICD-10-PCS | Mod: CPTII,S$GLB,, | Performed by: OPTOMETRIST

## 2022-02-09 PROCEDURE — 1160F PR REVIEW ALL MEDS BY PRESCRIBER/CLIN PHARMACIST DOCUMENTED: ICD-10-PCS | Mod: CPTII,S$GLB,, | Performed by: OPTOMETRIST

## 2022-02-09 PROCEDURE — 4010F ACE/ARB THERAPY RXD/TAKEN: CPT | Mod: CPTII,S$GLB,, | Performed by: OPTOMETRIST

## 2022-02-09 PROCEDURE — 1159F MED LIST DOCD IN RCRD: CPT | Mod: CPTII,S$GLB,, | Performed by: OPTOMETRIST

## 2022-02-09 PROCEDURE — 1160F RVW MEDS BY RX/DR IN RCRD: CPT | Mod: CPTII,S$GLB,, | Performed by: OPTOMETRIST

## 2022-02-09 PROCEDURE — 92499 PR CONTACT LENS F/U LEV 1: ICD-10-PCS | Mod: S$GLB,,, | Performed by: OPTOMETRIST

## 2022-02-09 PROCEDURE — 1159F PR MEDICATION LIST DOCUMENTED IN MEDICAL RECORD: ICD-10-PCS | Mod: CPTII,S$GLB,, | Performed by: OPTOMETRIST

## 2022-02-09 PROCEDURE — 92499 UNLISTED OPH SVC/PROCEDURE: CPT | Mod: S$GLB,,, | Performed by: OPTOMETRIST

## 2022-02-09 RX ORDER — CLONAZEPAM 1 MG/1
1 TABLET ORAL 2 TIMES DAILY PRN
Qty: 60 TABLET | Refills: 0 | Status: SHIPPED | OUTPATIENT
Start: 2022-02-09 | End: 2022-02-17 | Stop reason: SDUPTHER

## 2022-02-09 RX ORDER — AMITRIPTYLINE HYDROCHLORIDE 50 MG/1
50 TABLET, FILM COATED ORAL NIGHTLY PRN
Qty: 30 TABLET | Refills: 0 | Status: SHIPPED | OUTPATIENT
Start: 2022-02-09 | End: 2022-02-17 | Stop reason: SDUPTHER

## 2022-02-09 RX ORDER — LISINOPRIL 10 MG/1
TABLET ORAL
COMMUNITY
End: 2022-05-03 | Stop reason: CLARIF

## 2022-02-09 RX ORDER — EMTRICITABINE AND TENOFOVIR DISOPROXIL FUMARATE 200; 300 MG/1; MG/1
TABLET, FILM COATED ORAL
COMMUNITY
End: 2022-08-05

## 2022-02-09 RX ORDER — ZOLPIDEM TARTRATE 5 MG/1
5 TABLET ORAL NIGHTLY PRN
Qty: 30 TABLET | Refills: 0 | Status: SHIPPED | OUTPATIENT
Start: 2022-02-09 | End: 2022-02-17 | Stop reason: SDUPTHER

## 2022-02-09 NOTE — PROGRESS NOTES
ESTHER LIU 01/27/22 Patient is here to  trial contacts today    Last edited by Magui Goodwin on 2/9/2022  3:09 PM. (History)            Assessment /Plan     For exam results, see Encounter Report.    Myopia with astigmatism and presbyopia, right    Encounter for screening mammogram for malignant neoplasm of breast  -     Mammo Digital Screening Bilat w/ Gil    Hyperopia with astigmatism and presbyopia, left      Poor DVA w/MF CL Tried spherical Monovision lenses.   Good VA w/monovision.   CLRx released.   RTC 1 year.

## 2022-02-10 ENCOUNTER — PATIENT OUTREACH (OUTPATIENT)
Dept: ADMINISTRATIVE | Facility: OTHER | Age: 62
End: 2022-02-10
Payer: COMMERCIAL

## 2022-02-17 ENCOUNTER — OFFICE VISIT (OUTPATIENT)
Dept: PSYCHIATRY | Facility: CLINIC | Age: 62
End: 2022-02-17
Payer: COMMERCIAL

## 2022-02-17 DIAGNOSIS — G47.00 INSOMNIA, UNSPECIFIED TYPE: ICD-10-CM

## 2022-02-17 DIAGNOSIS — F41.0 PANIC ATTACK: ICD-10-CM

## 2022-02-17 DIAGNOSIS — F33.40 MDD (RECURRENT MAJOR DEPRESSIVE DISORDER) IN REMISSION: ICD-10-CM

## 2022-02-17 DIAGNOSIS — F41.1 GAD (GENERALIZED ANXIETY DISORDER): Primary | ICD-10-CM

## 2022-02-17 PROCEDURE — 99214 PR OFFICE/OUTPT VISIT, EST, LEVL IV, 30-39 MIN: ICD-10-PCS | Mod: 95,,, | Performed by: NURSE PRACTITIONER

## 2022-02-17 PROCEDURE — 4010F ACE/ARB THERAPY RXD/TAKEN: CPT | Mod: CPTII,95,, | Performed by: NURSE PRACTITIONER

## 2022-02-17 PROCEDURE — 99214 OFFICE O/P EST MOD 30 MIN: CPT | Mod: 95,,, | Performed by: NURSE PRACTITIONER

## 2022-02-17 PROCEDURE — 4010F PR ACE/ARB THEARPY RXD/TAKEN: ICD-10-PCS | Mod: CPTII,95,, | Performed by: NURSE PRACTITIONER

## 2022-02-17 RX ORDER — AMITRIPTYLINE HYDROCHLORIDE 50 MG/1
50 TABLET, FILM COATED ORAL NIGHTLY PRN
Qty: 90 TABLET | Refills: 1 | Status: SHIPPED | OUTPATIENT
Start: 2022-02-17 | End: 2022-08-05 | Stop reason: SDUPTHER

## 2022-02-17 RX ORDER — CLONAZEPAM 1 MG/1
1 TABLET ORAL 2 TIMES DAILY PRN
Qty: 60 TABLET | Refills: 2 | Status: SHIPPED | OUTPATIENT
Start: 2022-03-09 | End: 2022-06-13 | Stop reason: SDUPTHER

## 2022-02-17 RX ORDER — BUSPIRONE HYDROCHLORIDE 15 MG/1
15 TABLET ORAL 2 TIMES DAILY
Qty: 180 TABLET | Refills: 1 | Status: SHIPPED | OUTPATIENT
Start: 2022-02-17 | End: 2022-08-05 | Stop reason: SDUPTHER

## 2022-02-17 RX ORDER — ZOLPIDEM TARTRATE 5 MG/1
5 TABLET ORAL NIGHTLY PRN
Qty: 30 TABLET | Refills: 2 | Status: SHIPPED | OUTPATIENT
Start: 2022-03-09 | End: 2022-06-13 | Stop reason: SDUPTHER

## 2022-02-17 NOTE — PROGRESS NOTES
Outpatient Psychiatry Follow-Up Visit (MD/NP)    2/17/2022     The patient location is: [place]  The chief complaint leading to consultation is: RANDI, insomnia    Visit type: audiovisual    Face to Face time with patient: 12 minutes  14 minutes of total time spent on the encounter, which includes face to face time and non-face to face time preparing to see the patient (eg, review of tests), Obtaining and/or reviewing separately obtained history, Documenting clinical information in the electronic or other health record, Independently interpreting results (not separately reported) and communicating results to the patient/family/caregiver, or Care coordination (not separately reported).     Each patient to whom he or she provides medical services by telemedicine is:  (1) informed of the relationship between the physician and patient and the respective role of any other health care provider with respect to management of the patient; and (2) notified that he or she may decline to receive medical services by telemedicine and may withdraw from such care at any time.    Clinical Status of Patient:  Outpatient (Ambulatory)    Chief Complaint:  Lucia Matias is a 61 y.o. female who presents today for follow-up of depression, anxiety and insomnia.  Met with patient.      Interval History and Content of Current Session:  Interim Events/Subjective Report/Content of Current Session:    Lucia Matias checked in on time for her appointment. Last visit we increased Elavil for anxiety. Today she reports not feeling as anxious despite being in a situation right now that she feels would normally cause great anxiety. Living in a camper in her front yard, battling insurance companies about house. Overall she feels she is at a good place considering but her end goal is to get off of both klonopin and Ambien but does not feel right now is not the correct time. Denies SI/HI/AVH. No objective s/sx of psychosis or mayur. Patient verbalized  motivation for compliance with medications and all other elements of treatment plan.       Previous medication trial:  Prozac, Paxil, Wellbutrin - notes she felt worse, more depressed, SI - admits it could have also been the situation she was in at the time.        Review of Systems   · PSYCHIATRIC: Pertinant items are noted in the narrative.  · CONSTITUTIONAL: No weight gain or loss.   · MUSCULOSKELETAL: No pain or stiffness of the joints.  · NEUROLOGIC: No weakness, sensory changes, seizures, confusion, memory loss, tremor or other abnormal movements.  · ENT: No dizziness, tinnitus or hearing loss.  · RESPIRATORY: No shortness of breath.  · CARDIOVASCULAR: No tachycardia or chest pain.    Past Medical, Family and Social History: The patient's past medical, family and social history have been reviewed and updated as appropriate within the electronic medical record - see encounter notes.    Compliance: yes    Side effects: None    Risk Parameters:  Patient reports no suicidal ideation  Patient reports no homicidal ideation  Patient reports no self-injurious behavior  Patient reports no violent behavior    Exam (detailed: at least 9 elements; comprehensive: all 15 elements)   Constitutional  Vitals:     There were no vitals filed for this visit.     General:  unremarkable, age appropriate     Musculoskeletal  Muscle Strength/Tone:  not examined   Gait & Station:  ANASTASIIA due to virtual visit     Psychiatric  Speech:  no latency; no press   Mood & Affect:  steady  congruent and appropriate   Thought Process:  normal and logical   Associations:  intact   Thought Content:  normal, no suicidality, no homicidality, delusions, or paranoia   Insight:  intact   Judgement: behavior is adequate to circumstances   Orientation:  grossly intact   Memory: intact for content of interview   Language: grossly intact   Attention Span & Concentration:  able to focus   Fund of Knowledge:  intact and appropriate to age and level of education  "    Assessment and Diagnosis   Status/Progress: Based on the examination today, the patient's problem(s) is/are improved.  New problems have not been presented today.   Co-morbidities, Diagnostic uncertainty and Lack of compliance are not complicating management of the primary condition.  There are no active rule-out diagnoses for this patient at this time.     General Impression:Lucia Matias is a 61 y.o. female who presents today for follow-up of anxiety. She reports she has "always been anxious and had occasional panic attacks but that her depression started ~ 5 years ago after a number of family members passed away in close succession and her  being dx with cancer and HIV."  Patient seen and chart reviewed. Previous patient of Dr. TREVOR Carranza - Last seen 5/18/21- continue on Elavil, Ambien, Klonopin, Buspar. Presents 8/16/21- no changes. Presents 11/2/21- increase Elavil Presents 2/17/22- symptoms improvement with increase in Elavil.       ICD-10-CM ICD-9-CM   1. RANDI (generalized anxiety disorder)  F41.1 300.02   2. Insomnia, unspecified type  G47.00 780.52   3. MDD (recurrent major depressive disorder) in remission  F33.40 296.35   4. Panic attack  F41.0 300.01       Intervention/Counseling/Treatment Plan   · Medication Management: Continue current medications.  · Buspirone 15 mg BID  · Elavil 50 mg qHs  · Ambien 5mg qHS  · Klonpin 1mg BID PRN anxiety/panic for past couple of years  · Checked [place]  and no irregularities were noted.  · Informed pt of the risks of continuous Benzodiazepine use including tolerance, dependence and withdrawals that may be life threatening upon abrupt cessation. Also advised not to take Benzodiazepines with Opiates or other sedatives and also not to drive or operate heavy machinery while using Benzodiazepines.  · The risks and benefits of medication were discussed with the patient.  · Discussed diagnosis, risks and benefits of proposed treatment above vs alternative treatments vs " no treatment, and potential side effects of these treatments. The patient expresses understanding of the above and displays the capacity to agree with this treatment given said understanding. Patient also agrees that, currently, the benefits outweigh the risks and would like to pursue treatment at this time.  · Discussed inherent unpredictability of medications in each individual.   · Encouraged Patient to keep future appointments.   · Take medications as prescribed and abstain from substance abuse.   · In the event of an emergency patient was advised to go to the emergency room      Return to Clinic: 3 months    Atiya Pruett DNP-BC PMHNP  [facility]

## 2022-02-18 DIAGNOSIS — Z98.84 S/P LAPAROSCOPIC SLEEVE GASTRECTOMY: Primary | ICD-10-CM

## 2022-02-18 DIAGNOSIS — R63.4 WEIGHT LOSS: ICD-10-CM

## 2022-02-22 ENCOUNTER — CLINICAL SUPPORT (OUTPATIENT)
Dept: OTHER | Facility: CLINIC | Age: 62
End: 2022-02-22
Payer: COMMERCIAL

## 2022-02-22 DIAGNOSIS — Z00.8 ENCOUNTER FOR OTHER GENERAL EXAMINATION: ICD-10-CM

## 2022-02-23 VITALS — BODY MASS INDEX: 33.47 KG/M2 | HEIGHT: 62 IN

## 2022-02-23 LAB
GLUCOSE SERPL-MCNC: 97 MG/DL (ref 60–140)
HDLC SERPL-MCNC: 29 MG/DL
POC CHOLESTEROL, LDL (DOCK): 48 MG/DL
POC CHOLESTEROL, TOTAL: 110 MG/DL
TRIGL SERPL-MCNC: 169 MG/DL

## 2022-02-23 RX ORDER — OMEPRAZOLE 40 MG/1
40 CAPSULE, DELAYED RELEASE ORAL EVERY MORNING
Qty: 90 CAPSULE | Refills: 1 | Status: SHIPPED | OUTPATIENT
Start: 2022-02-23 | End: 2022-08-18 | Stop reason: SDUPTHER

## 2022-03-03 ENCOUNTER — SPECIALTY PHARMACY (OUTPATIENT)
Dept: PHARMACY | Facility: CLINIC | Age: 62
End: 2022-03-03
Payer: COMMERCIAL

## 2022-03-03 ENCOUNTER — PATIENT MESSAGE (OUTPATIENT)
Dept: PHARMACY | Facility: CLINIC | Age: 62
End: 2022-03-03
Payer: COMMERCIAL

## 2022-03-03 NOTE — TELEPHONE ENCOUNTER
Specialty Pharmacy - Refill Coordination    Specialty Medication Orders Linked to Encounter    Flowsheet Row Most Recent Value   Medication #1 COSENTYX PEN, 2 PENS, 150 mg/mL PnIj (Order#[id number], Rx#[id number])          Refill Questions - Documented Responses    Flowsheet Row Most Recent Value   Patient Availability and HIPAA Verification    Does patient want to proceed with activity? Yes   HIPAA/medical authority confirmed? Yes   Relationship to patient of person spoken to? Self   Refill Screening Questions    Changes to allergies? No   Changes to medications? No   New conditions since last clinic visit? No   Unplanned office visit, urgent care, ED, or hospital admission in the last 4 weeks? No   How does patient/caregiver feel medication is working? Good   Financial problems or insurance changes? No   How many doses of your specialty medications were missed in the last 4 weeks? 0   Would patient like to speak to a pharmacist? No   When does the patient need to receive the medication? 03/12/22   Refill Delivery Questions    How will the patient receive the medication? Delivery Lorene   When does the patient need to receive the medication? 03/12/22   Shipping Address Home   Address in [facility] confirmed and updated if neccessary? Yes   Expected Copay ($) 441.99   Is the patient able to afford the medication copay? Yes   Payment Method  CC on file   Days supply of Refill 28   Supplies needed? No supplies needed   Refill activity completed? Yes   Refill activity plan Refill scheduled   Shipment/Pickup Date: 03/10/22          Current Outpatient Medications   Medication Sig    amitriptyline (ELAVIL) 50 MG tablet Take 1 tablet (50 mg total) by mouth nightly as needed for Insomnia.    atorvastatin (LIPITOR) 20 MG tablet Take 1 tablet (20 mg total) by mouth every evening.    B-complex with vitamin C (VITAMIN B COMPLEX-C ORAL) Take by mouth.    busPIRone (BUSPAR) 15 MG tablet Take 1 tablet (15 mg  total) by mouth 2 (two) times daily.    calcium-vitamin D 250-100 mg-unit per tablet Take 1 tablet by mouth 2 (two) times daily.    cholecalciferol, vitamin D3, (VITAMIN D3) 50 mcg (2,000 unit) Tab Take by mouth once daily.    [START ON 3/9/2022] clonazePAM (KLONOPIN) 1 MG tablet Take 1 tablet (1 mg total) by mouth 2 (two) times daily as needed for Anxiety (panic).    COSENTYX PEN, 2 PENS, 150 mg/mL PnIj Inject 300mg (2 pens) into the skin every 4 weeks (Patient taking differently: Inject 300mg (2 pens) into the skin every 4 weeks)    cyanocobalamin 500 MCG tablet Take 500 mcg by mouth once daily.    emtricitabine-tenofovir 200-300 mg (TRUVADA) 200-300 mg Tab 1    fluticasone-umeclidin-vilanter (TRELEGY ELLIPTA) 100-62.5-25 mcg DsDv Inhale 1 puff into the lungs once daily.    lisinopriL 10 MG tablet 1 tablet    metoprolol tartrate (LOPRESSOR) 50 MG tablet Take 1 tablet (50 mg total) by mouth 2 (two) times daily with meals.    multivitamin (THERAGRAN) per tablet Take 1 tablet by mouth once daily.    nystatin (MYCOSTATIN) cream Apply topically 2 (two) times daily.    omeprazole (PRILOSEC) 40 MG capsule Take 1 capsule (40 mg total) by mouth every morning. Take one capsule by mouth every morning.    ondansetron (ZOFRAN-ODT) 8 MG TbDL Dissolve 1 tablet (8 mg total) by mouth every 6 (six) hours as needed.    [START ON 3/9/2022] zolpidem (AMBIEN) 5 MG Tab Take 1 tablet (5 mg total) by mouth nightly as needed (insomnia).   Last reviewed on 2/9/2022  3:47 PM by Indira Paez OD    Review of patient's allergies indicates:   Allergen Reactions    Succinimides Anaphylaxis     Son has     Last reviewed on  2/17/2022 2:33 PM by Atiya Pruett      Tasks added this encounter   4/2/2022 - Refill Call (Auto Added)   Tasks due within next 3 months   No tasks due.     Yamile Amezquita, PharmD  [facility]  [address]  [facility] [address]  Phone: [phone]  Fax:  [phone]

## 2022-03-07 ENCOUNTER — PATIENT MESSAGE (OUTPATIENT)
Dept: BARIATRICS | Facility: CLINIC | Age: 62
End: 2022-03-07
Payer: COMMERCIAL

## 2022-04-01 ENCOUNTER — TELEPHONE (OUTPATIENT)
Dept: DERMATOLOGY | Facility: CLINIC | Age: 62
End: 2022-04-01
Payer: COMMERCIAL

## 2022-04-01 NOTE — TELEPHONE ENCOUNTER
Informed that we would work on the Pa and should hear back next week----- Message from Marva Glynn sent at 4/1/2022  2:43 PM CDT -----  Contact: [phone]  Pt needs to get a 6 month follw up appt.    Pt pharmacy called to get another prescription sent over to the pharmacy for \COSENTYX PEN, 2 PENS, 150 mg/mL PnIj.    Cover my meds would need a PA for the new medication.    Cover my meds said they are gonna sent over the paperwork.    Cover My med     [employer] rep  [phone]  Case #   [id number]

## 2022-04-02 DIAGNOSIS — Z79.899 LONG-TERM USE OF HIGH-RISK MEDICATION: ICD-10-CM

## 2022-04-02 DIAGNOSIS — L40.50 PSORIATIC ARTHRITIS: ICD-10-CM

## 2022-04-02 DIAGNOSIS — L40.0 PSORIASIS VULGARIS: ICD-10-CM

## 2022-04-04 RX ORDER — SECUKINUMAB 150 MG/ML
INJECTION SUBCUTANEOUS
Qty: 6 ML | Refills: 1 | OUTPATIENT
Start: 2022-04-04

## 2022-04-06 ENCOUNTER — SPECIALTY PHARMACY (OUTPATIENT)
Dept: PHARMACY | Facility: CLINIC | Age: 62
End: 2022-04-06
Payer: COMMERCIAL

## 2022-04-06 ENCOUNTER — PATIENT MESSAGE (OUTPATIENT)
Dept: DERMATOLOGY | Facility: CLINIC | Age: 62
End: 2022-04-06
Payer: COMMERCIAL

## 2022-04-06 NOTE — TELEPHONE ENCOUNTER
Outgoing call regarding Cosentyx. Informed pt that the MDO denied refill request due to pt needing appointment. Pt will follow up with Md regarding appointment and sending new rx.

## 2022-04-07 ENCOUNTER — PATIENT MESSAGE (OUTPATIENT)
Dept: BARIATRICS | Facility: CLINIC | Age: 62
End: 2022-04-07
Payer: COMMERCIAL

## 2022-04-12 ENCOUNTER — PATIENT MESSAGE (OUTPATIENT)
Dept: BARIATRICS | Facility: CLINIC | Age: 62
End: 2022-04-12
Payer: COMMERCIAL

## 2022-04-12 DIAGNOSIS — L40.50 PSORIATIC ARTHRITIS: ICD-10-CM

## 2022-04-12 DIAGNOSIS — Z79.899 LONG-TERM USE OF HIGH-RISK MEDICATION: ICD-10-CM

## 2022-04-12 DIAGNOSIS — L40.0 PSORIASIS VULGARIS: ICD-10-CM

## 2022-04-12 RX ORDER — SECUKINUMAB 150 MG/ML
INJECTION SUBCUTANEOUS
Qty: 2 ML | Refills: 0 | Status: SHIPPED | OUTPATIENT
Start: 2022-04-12 | End: 2022-05-19 | Stop reason: SDUPTHER

## 2022-04-18 ENCOUNTER — PATIENT MESSAGE (OUTPATIENT)
Dept: ADMINISTRATIVE | Facility: OTHER | Age: 62
End: 2022-04-18
Payer: COMMERCIAL

## 2022-04-18 ENCOUNTER — PATIENT MESSAGE (OUTPATIENT)
Dept: CARDIOLOGY | Facility: CLINIC | Age: 62
End: 2022-04-18
Payer: COMMERCIAL

## 2022-04-18 DIAGNOSIS — J44.9 CHRONIC OBSTRUCTIVE PULMONARY DISEASE, UNSPECIFIED COPD TYPE: ICD-10-CM

## 2022-04-25 NOTE — TELEPHONE ENCOUNTER
Specialty Pharmacy - Refill Coordination    Specialty Medication Orders Linked to Encounter    Flowsheet Row Most Recent Value   Medication #1 COSENTYX PEN, 2 PENS, 150 mg/mL PnIj (Order#[id number], Rx#[id number])          Refill Questions - Documented Responses    Flowsheet Row Most Recent Value   Patient Availability and HIPAA Verification    Does patient want to proceed with activity? Yes   HIPAA/medical authority confirmed? Yes   Relationship to patient of person spoken to? Self   Refill Screening Questions    Changes to allergies? No   Changes to medications? No   New conditions since last clinic visit? No   Unplanned office visit, urgent care, ED, or hospital admission in the last 4 weeks? No   How does patient/caregiver feel medication is working? Excellent   Financial problems or insurance changes? No   How many doses of your specialty medications were missed in the last 4 weeks? 0   Would patient like to speak to a pharmacist? No   When does the patient need to receive the medication? 04/26/22   Refill Delivery Questions    How will the patient receive the medication? Delivery Lorene   When does the patient need to receive the medication? 04/26/22   Shipping Address Home   Address in [facility] confirmed and updated if neccessary? Yes   Expected Copay ($) 441.99   Is the patient able to afford the medication copay? Yes   Payment Method one time CC provided   Days supply of Refill 28   Supplies needed? No supplies needed   Refill activity completed? Yes   Refill activity plan Refill scheduled   Shipment/Pickup Date: 04/26/22          Current Outpatient Medications   Medication Sig    amitriptyline (ELAVIL) 50 MG tablet Take 1 tablet (50 mg total) by mouth nightly as needed for Insomnia.    atorvastatin (LIPITOR) 20 MG tablet Take 1 tablet (20 mg total) by mouth every evening.    B-complex with vitamin C (VITAMIN B COMPLEX-C ORAL) Take by mouth.    busPIRone (BUSPAR) 15 MG tablet Take 1 tablet (15 mg  total) by mouth 2 (two) times daily.    calcium-vitamin D 250-100 mg-unit per tablet Take 1 tablet by mouth 2 (two) times daily.    cholecalciferol, vitamin D3, (VITAMIN D3) 50 mcg (2,000 unit) Tab Take by mouth once daily.    clonazePAM (KLONOPIN) 1 MG tablet Take 1 tablet (1 mg total) by mouth 2 (two) times daily as needed for Anxiety (panic).    COSENTYX PEN, 2 PENS, 150 mg/mL PnIj Inject 300mg (2 pens) into the skin every 4 weeks    cyanocobalamin 500 MCG tablet Take 500 mcg by mouth once daily.    emtricitabine-tenofovir 200-300 mg (TRUVADA) 200-300 mg Tab 1    fluticasone-umeclidin-vilanter (TRELEGY ELLIPTA) 100-62.5-25 mcg DsDv Inhale 1 puff into the lungs once daily.    lisinopriL 10 MG tablet 1 tablet    metoprolol tartrate (LOPRESSOR) 50 MG tablet Take 1 tablet (50 mg total) by mouth 2 (two) times daily with meals.    multivitamin (THERAGRAN) per tablet Take 1 tablet by mouth once daily.    nystatin (MYCOSTATIN) cream Apply topically 2 (two) times daily.    omeprazole (PRILOSEC) 40 MG capsule Take 1 capsule (40 mg total) by mouth every morning. Take one capsule by mouth every morning.    ondansetron (ZOFRAN-ODT) 8 MG TbDL Dissolve 1 tablet (8 mg total) by mouth every 6 (six) hours as needed.    zolpidem (AMBIEN) 5 MG Tab Take 1 tablet (5 mg total) by mouth nightly as needed (insomnia).   Last reviewed on 2/9/2022  3:47 PM by Indira Paez OD    Review of patient's allergies indicates:   Allergen Reactions    Succinimides Anaphylaxis     Son has     Last reviewed on  2/17/2022 2:33 PM by Atiya Pruett      Tasks added this encounter   No tasks added.   Tasks due within next 3 months   4/2/2022 - Refill Call (Auto Added)     [facility]  [address] [place], [place] [address]  [facility] [address]  Phone: [phone]  Fax: [phone]

## 2022-04-27 ENCOUNTER — OFFICE VISIT (OUTPATIENT)
Dept: DERMATOLOGY | Facility: CLINIC | Age: 62
End: 2022-04-27
Payer: COMMERCIAL

## 2022-04-27 DIAGNOSIS — Z79.899 LONG-TERM USE OF HIGH-RISK MEDICATION: ICD-10-CM

## 2022-04-27 DIAGNOSIS — L81.4 LENTIGINES: ICD-10-CM

## 2022-04-27 DIAGNOSIS — L82.1 SEBORRHEIC KERATOSIS: ICD-10-CM

## 2022-04-27 DIAGNOSIS — L40.50 PSORIATIC ARTHRITIS: ICD-10-CM

## 2022-04-27 DIAGNOSIS — L40.0 PSORIASIS VULGARIS: Primary | ICD-10-CM

## 2022-04-27 PROCEDURE — 1159F PR MEDICATION LIST DOCUMENTED IN MEDICAL RECORD: ICD-10-PCS | Mod: CPTII,S$GLB,, | Performed by: DERMATOLOGY

## 2022-04-27 PROCEDURE — 4010F ACE/ARB THERAPY RXD/TAKEN: CPT | Mod: CPTII,S$GLB,, | Performed by: DERMATOLOGY

## 2022-04-27 PROCEDURE — 99214 OFFICE O/P EST MOD 30 MIN: CPT | Mod: S$GLB,,, | Performed by: DERMATOLOGY

## 2022-04-27 PROCEDURE — 99214 PR OFFICE/OUTPT VISIT, EST, LEVL IV, 30-39 MIN: ICD-10-PCS | Mod: S$GLB,,, | Performed by: DERMATOLOGY

## 2022-04-27 PROCEDURE — 1159F MED LIST DOCD IN RCRD: CPT | Mod: CPTII,S$GLB,, | Performed by: DERMATOLOGY

## 2022-04-27 PROCEDURE — 4010F PR ACE/ARB THEARPY RXD/TAKEN: ICD-10-PCS | Mod: CPTII,S$GLB,, | Performed by: DERMATOLOGY

## 2022-04-27 PROCEDURE — 1160F RVW MEDS BY RX/DR IN RCRD: CPT | Mod: CPTII,S$GLB,, | Performed by: DERMATOLOGY

## 2022-04-27 PROCEDURE — 1160F PR REVIEW ALL MEDS BY PRESCRIBER/CLIN PHARMACIST DOCUMENTED: ICD-10-PCS | Mod: CPTII,S$GLB,, | Performed by: DERMATOLOGY

## 2022-04-27 NOTE — PROGRESS NOTES
"  Patient Information  Name: Lucia Matias  : 1960  MRN: [id number]     Referring Physician:  No ref. provider found   Primary Care Physician:  Hetal Banks MD   Date of Visit: 2022      Subjective:     History of Present lllness:    Lucia Matias is a 61 y.o. female who presents with a chief complaint of psoriasis.    Diagnosis: Psoriasis vulgaris  Signs/Symptoms: currently clear  Symptom course: stable  Current treatment: Cosentyx    Location: face  Duration: years  Signs/Symptoms: "age spots"  Relieving factors/Prior treatments: none    Clinical documentation obtained by nursing staff reviewed.    Review of Systems   Constitutional: Negative for fever.   HENT: Negative for sore throat.    Eyes: Negative for visual change.   Respiratory: Negative for cough and shortness of breath.    Gastrointestinal: Negative for diarrhea.   Musculoskeletal: Negative for joint swelling and arthralgias.   Skin: Positive for rash. Negative for itching and abscesses.   Neurological: Negative for focal weakness, seizures and numbness.       Objective:   Physical Exam   Constitutional: She appears well-developed and well-nourished. No distress.   Neurological: She is alert and oriented to person, place, and time. She is not disoriented.   Psychiatric: She has a normal mood and affect.   Skin:   Areas Examined (abnormalities noted in diagram):   Head / Face Inspection Performed  Neck Inspection Performed  RUE Inspected  LUE Inspection Performed  RLE Inspected  LLE Inspection Performed                 Diagram Legend     Erythematous scaling macule/papule c/w actinic keratosis       Vascular papule c/w angioma      Pigmented verrucoid papule/plaque c/w seborrheic keratosis      Yellow umbilicated papule c/w sebaceous hyperplasia      Irregularly shaped tan macule c/w lentigo     1-2 mm smooth white papules consistent with Milia      Movable subcutaneous cyst with punctum c/w epidermal inclusion cyst      Subcutaneous movable " cyst c/w pilar cyst      Firm pink to brown papule c/w dermatofibroma      Pedunculated fleshy papule(s) c/w skin tag(s)      Evenly pigmented macule c/w junctional nevus     Mildly variegated pigmented, slightly irregular-bordered macule c/w mildly atypical nevus      Flesh colored to evenly pigmented papule c/w intradermal nevus       Pink pearly papule/plaque c/w basal cell carcinoma      Erythematous hyperkeratotic cursted plaque c/w SCC      Surgical scar with no sign of skin cancer recurrence      Open and closed comedones      Inflammatory papules and pustules      Verrucoid papule consistent consistent with wart     Erythematous eczematous patches and plaques     Dystrophic onycholytic nail with subungual debris c/w onychomycosis     Umbilicated papule    Erythematous-base heme-crusted tan verrucoid plaque consistent with inflamed seborrheic keratosis     Erythematous Silvery Scaling Plaque c/w Psoriasis     See annotation    Lab Results   Component Value Date    TBGOLDPLUS Negative 01/18/2022       Assessment / Plan:        Psoriasis vulgaris  - chronic problem, stable, with medication management  Psoriatic arthritis  - chronic problem, stable, with medication management  Long-term use of high-risk medication  Doing well on current treatment. Continue Cosentyx.  Pt to follow up with PCP and schedule all age appropriate cancer screenings.  Check CBC, BMP, LFTs this week. Last quant gold negative 1/2022.     Lentigines  These are benign sun spots which should be monitored for changes. Daily sun protection will reduce the number of new lesions.   Recommend using a broad-spectrum, water-resistant sunscreen with SPF of 30 or higher--reapply every 2 hours. Seek shade, wear sun-protective clothing, and perform regular skin self-exams.    Seborrheic keratosis  These are benign, inherited growths without a malignant potential. Reassurance given to patient. No treatment is necessary.    Follow up in about 1 year  (around 4/27/2023) for follow up, or sooner if symptoms worsening or not improving.      Minerva Lara MD, FAAD  [facility]

## 2022-05-03 ENCOUNTER — HOSPITAL ENCOUNTER (EMERGENCY)
Facility: HOSPITAL | Age: 62
Discharge: HOME | End: 2022-05-03
Attending: EMERGENCY MEDICINE
Payer: COMMERCIAL

## 2022-05-03 VITALS
SYSTOLIC BLOOD PRESSURE: 139 MMHG | HEART RATE: 121 BPM | TEMPERATURE: 98 F | WEIGHT: 160 LBS | DIASTOLIC BLOOD PRESSURE: 60 MMHG | OXYGEN SATURATION: 98 % | RESPIRATION RATE: 23 BRPM | BODY MASS INDEX: 29.26 KG/M2

## 2022-05-03 DIAGNOSIS — R00.0 TACHYCARDIA: ICD-10-CM

## 2022-05-03 DIAGNOSIS — N30.01 ACUTE CYSTITIS WITH HEMATURIA: Primary | ICD-10-CM

## 2022-05-03 LAB
ALBUMIN SERPL BCP-MCNC: 4.5 G/DL (ref 3.5–5.2)
ALP SERPL-CCNC: 99 U/L (ref 38–126)
ALT SERPL W/O P-5'-P-CCNC: 25 U/L (ref 10–44)
ANION GAP SERPL CALC-SCNC: 10 MMOL/L (ref 8–16)
AST SERPL-CCNC: 27 U/L (ref 15–46)
BACTERIA #/AREA URNS AUTO: ABNORMAL /HPF
BASOPHILS # BLD AUTO: 0.05 K/UL (ref 0–0.2)
BASOPHILS NFR BLD: 0.5 % (ref 0–1.9)
BILIRUB SERPL-MCNC: 1.2 MG/DL (ref 0.1–1)
BILIRUB UR QL STRIP: NEGATIVE
CALCIUM SERPL-MCNC: 9.5 MG/DL (ref 8.7–10.5)
CHLORIDE SERPL-SCNC: 101 MMOL/L (ref 95–110)
CLARITY UR REFRACT.AUTO: CLEAR
CO2 SERPL-SCNC: 31 MMOL/L (ref 23–29)
COLOR UR AUTO: YELLOW
CREAT SERPL-MCNC: 0.91 MG/DL (ref 0.5–1.4)
DIFFERENTIAL METHOD: ABNORMAL
EOSINOPHIL # BLD AUTO: 0.2 K/UL (ref 0–0.5)
EOSINOPHIL NFR BLD: 1.4 % (ref 0–8)
ERYTHROCYTE [DISTWIDTH] IN BLOOD BY AUTOMATED COUNT: 12.5 % (ref 11.5–14.5)
EST. GFR  (AFRICAN AMERICAN): >60 ML/MIN/1.73 M^2
EST. GFR  (NON AFRICAN AMERICAN): >60 ML/MIN/1.73 M^2
GLUCOSE SERPL-MCNC: 90 MG/DL (ref 70–110)
GLUCOSE UR QL STRIP: NEGATIVE
HCT VFR BLD AUTO: 49.8 % (ref 37–48.5)
HGB BLD-MCNC: 16.5 G/DL (ref 12–16)
HGB UR QL STRIP: ABNORMAL
HYALINE CASTS UR QL AUTO: 0 /LPF
IMM GRANULOCYTES # BLD AUTO: 0.04 K/UL (ref 0–0.04)
IMM GRANULOCYTES NFR BLD AUTO: 0.4 % (ref 0–0.5)
KETONES UR QL STRIP: NEGATIVE
LEUKOCYTE ESTERASE UR QL STRIP: ABNORMAL
LIPASE SERPL-CCNC: 172 U/L (ref 23–300)
LYMPHOCYTES # BLD AUTO: 1.9 K/UL (ref 1–4.8)
LYMPHOCYTES NFR BLD: 17.6 % (ref 18–48)
MCH RBC QN AUTO: 31 PG (ref 27–31)
MCHC RBC AUTO-ENTMCNC: 33.1 G/DL (ref 32–36)
MCV RBC AUTO: 94 FL (ref 82–98)
MICROSCOPIC COMMENT: ABNORMAL
MONOCYTES # BLD AUTO: 0.7 K/UL (ref 0.3–1)
MONOCYTES NFR BLD: 6.8 % (ref 4–15)
NEUTROPHILS # BLD AUTO: 7.7 K/UL (ref 1.8–7.7)
NEUTROPHILS NFR BLD: 73.3 % (ref 38–73)
NITRITE UR QL STRIP: NEGATIVE
NRBC BLD-RTO: 0 /100 WBC
PH UR STRIP: 7 [PH] (ref 5–8)
PLATELET # BLD AUTO: 267 K/UL (ref 150–450)
PMV BLD AUTO: 10.2 FL (ref 9.2–12.9)
POTASSIUM SERPL-SCNC: 4 MMOL/L (ref 3.5–5.1)
PROT SERPL-MCNC: 8.1 G/DL (ref 6–8.4)
PROT UR QL STRIP: ABNORMAL
RBC # BLD AUTO: 5.32 M/UL (ref 4–5.4)
RBC #/AREA URNS AUTO: 40 /HPF (ref 0–4)
SODIUM SERPL-SCNC: 142 MMOL/L (ref 136–145)
SP GR UR STRIP: 1.01 (ref 1–1.03)
URN SPEC COLLECT METH UR: ABNORMAL
UROBILINOGEN UR STRIP-ACNC: NEGATIVE EU/DL
UUN UR-MCNC: 14 MG/DL (ref 7–17)
WBC # BLD AUTO: 10.56 K/UL (ref 3.9–12.7)
WBC #/AREA URNS AUTO: >100 /HPF (ref 0–5)
WBC CLUMPS UR QL AUTO: ABNORMAL

## 2022-05-03 PROCEDURE — 96375 TX/PRO/DX INJ NEW DRUG ADDON: CPT | Mod: ER

## 2022-05-03 PROCEDURE — 80053 COMPREHEN METABOLIC PANEL: CPT | Mod: ER | Performed by: EMERGENCY MEDICINE

## 2022-05-03 PROCEDURE — 81000 URINALYSIS NONAUTO W/SCOPE: CPT | Mod: ER | Performed by: EMERGENCY MEDICINE

## 2022-05-03 PROCEDURE — 83690 ASSAY OF LIPASE: CPT | Mod: ER | Performed by: EMERGENCY MEDICINE

## 2022-05-03 PROCEDURE — 87186 SC STD MICRODIL/AGAR DIL: CPT | Mod: ER | Performed by: EMERGENCY MEDICINE

## 2022-05-03 PROCEDURE — 96365 THER/PROPH/DIAG IV INF INIT: CPT | Mod: ER

## 2022-05-03 PROCEDURE — 99285 EMERGENCY DEPT VISIT HI MDM: CPT | Mod: 25,ER

## 2022-05-03 PROCEDURE — 85025 COMPLETE CBC W/AUTO DIFF WBC: CPT | Mod: ER | Performed by: EMERGENCY MEDICINE

## 2022-05-03 PROCEDURE — 87077 CULTURE AEROBIC IDENTIFY: CPT | Mod: ER | Performed by: EMERGENCY MEDICINE

## 2022-05-03 PROCEDURE — 96361 HYDRATE IV INFUSION ADD-ON: CPT | Mod: ER

## 2022-05-03 PROCEDURE — 25000003 PHARM REV CODE 250: Mod: ER | Performed by: EMERGENCY MEDICINE

## 2022-05-03 PROCEDURE — 87088 URINE BACTERIA CULTURE: CPT | Mod: ER | Performed by: EMERGENCY MEDICINE

## 2022-05-03 PROCEDURE — 93010 EKG 12-LEAD: ICD-10-PCS | Mod: ,,, | Performed by: INTERNAL MEDICINE

## 2022-05-03 PROCEDURE — 87086 URINE CULTURE/COLONY COUNT: CPT | Mod: ER | Performed by: EMERGENCY MEDICINE

## 2022-05-03 PROCEDURE — 93005 ELECTROCARDIOGRAM TRACING: CPT | Mod: ER

## 2022-05-03 PROCEDURE — 63600175 PHARM REV CODE 636 W HCPCS: Mod: ER | Performed by: EMERGENCY MEDICINE

## 2022-05-03 PROCEDURE — 93010 ELECTROCARDIOGRAM REPORT: CPT | Mod: ,,, | Performed by: INTERNAL MEDICINE

## 2022-05-03 RX ORDER — PHENAZOPYRIDINE HYDROCHLORIDE 200 MG/1
200 TABLET, FILM COATED ORAL 3 TIMES DAILY
Qty: 6 TABLET | Refills: 0 | Status: SHIPPED | OUTPATIENT
Start: 2022-05-03 | End: 2022-05-05

## 2022-05-03 RX ORDER — SODIUM CHLORIDE 9 MG/ML
INJECTION, SOLUTION INTRAVENOUS
Status: COMPLETED | OUTPATIENT
Start: 2022-05-03 | End: 2022-05-03

## 2022-05-03 RX ORDER — CEPHALEXIN 500 MG/1
500 CAPSULE ORAL 4 TIMES DAILY
Qty: 20 CAPSULE | Refills: 0 | Status: SHIPPED | OUTPATIENT
Start: 2022-05-03 | End: 2022-05-08

## 2022-05-03 RX ORDER — METOPROLOL TARTRATE 1 MG/ML
5 INJECTION, SOLUTION INTRAVENOUS
Status: COMPLETED | OUTPATIENT
Start: 2022-05-03 | End: 2022-05-03

## 2022-05-03 RX ADMIN — LORAZEPAM 0.5 MG: 2 INJECTION INTRAMUSCULAR; INTRAVENOUS at 03:05

## 2022-05-03 RX ADMIN — SODIUM CHLORIDE: 0.9 INJECTION, SOLUTION INTRAVENOUS at 03:05

## 2022-05-03 RX ADMIN — CEFTRIAXONE 1 G: 1 INJECTION, SOLUTION INTRAVENOUS at 04:05

## 2022-05-03 RX ADMIN — METOROPROLOL TARTRATE 5 MG: 5 INJECTION, SOLUTION INTRAVENOUS at 05:05

## 2022-05-03 NOTE — ED PROVIDER NOTES
"Encounter Date: 5/3/2022       History     Chief Complaint   Patient presents with    Rectal Bleeding     On Saturday I was impacted and had to manually get it out then again on  it happened but not as much. Then today at work I urinated and had stomach pain and then have pain when I urinated and feels like glass when I urinate. I also dont know where the blood is coming from rectal urinary or vaginal. I havent seen a gyn in a very long time.        61-year-old female with a history of diverticulitis, COPD, hypertension, colon polyps, pancreatitis, rheumatoid arthritis presents with intermittent rectal bleeding since the weekend.  Also complains of pain with urination described as "urinating glass. "  Patient said today when she urinated she saw a clot of blood in the toilet but is on sure where it came from.  She denies fever, vomiting.  Patient says she frequently gets constipated from her vitamin supplements.  Over the weekend she had to manually disimpact herself which is when she noticed the blood.  She presumed that it was a hemorrhoid.        Review of patient's allergies indicates:   Allergen Reactions    Succinimides Anaphylaxis     Son has MH     Past Medical History:   Diagnosis Date    Allergy     Anxiety     Arthritis     Colon polyps     COPD (chronic obstructive pulmonary disease)     Depression     Diverticular disease of colon 2017    Diverticulitis     HLD (hyperlipidemia)     Hypertension     Pancreatitis     Psoriasis     Rheumatoid arthritis     Severe obesity (BMI 35.0-39.9) with comorbidity      Past Surgical History:   Procedure Laterality Date    BLADDER SUSPENSION      (x2)     SECTION      (x2)    ESOPHAGOGASTRODUODENOSCOPY N/A 3/31/2021    Procedure: EGD (ESOPHAGOGASTRODUODENOSCOPY);  Surgeon: Vince Rodriguez MD;  Location: [facility] OR [address] [place] [address];  Service: General;  Laterality: N/A;    LAPAROSCOPIC SLEEVE GASTRECTOMY N/A 3/31/2021    Procedure: " GASTRECTOMY, SLEEVE, LAPAROSCOPIC, with intraop EGD;  Surgeon: Vince Rodriguez MD;  Location: [facility] OR [address] [place] [address];  Service: General;  Laterality: N/A;    LUNG BIOPSY  09/2020    RHINOPLASTY      TONSILLECTOMY       Family History   Adopted: Yes   Problem Relation Age of Onset    No Known Problems Daughter     No Known Problems Son     No Known Problems Daughter      Social History     Tobacco Use    Smoking status: Former Smoker     Packs/day: 0.00     Years: 20.00     Pack years: 0.00    Smokeless tobacco: Never Used   Substance Use Topics    Alcohol use: No    Drug use: No     Review of Systems   Constitutional: Negative for fever.   Respiratory: Negative for shortness of breath.    Cardiovascular: Negative for chest pain.   Gastrointestinal: Positive for abdominal pain and blood in stool. Negative for vomiting.   Genitourinary: Positive for dysuria.   Musculoskeletal: Negative for back pain and neck pain.   Skin: Negative for rash.   Neurological: Negative for headaches.       Physical Exam     Initial Vitals [05/03/22 1440]   BP Pulse Resp Temp SpO2   122/73 (!) 123 17 98.1 °F (36.7 °C) 98 %      MAP       --         Physical Exam    Nursing note and vitals reviewed.  HENT:   Head: Atraumatic.   Eyes: Conjunctivae and EOM are normal.   Neck:   Normal range of motion.  Cardiovascular: Exam reveals no gallop and no friction rub.    No murmur heard.  Tachycardia   Pulmonary/Chest: Breath sounds normal. No respiratory distress.   Abdominal: Abdomen is soft. She exhibits no distension. There is abdominal tenderness (Mild suprapubic tenderness).   PAULINA: no gross blood, + External hemorrhoids but not bleeding There is no rebound.   Genitourinary:    Genitourinary Comments: No blood in vagina     Musculoskeletal:      Cervical back: Normal range of motion.     Neurological: She is alert and oriented to person, place, and time.   Psychiatric: She has a normal mood and affect.         ED Course    Procedures  Labs Reviewed   CBC W/ AUTO DIFFERENTIAL - Abnormal; Notable for the following components:       Result Value    Hemoglobin 16.5 (*)     Hematocrit 49.8 (*)     Gran % 73.3 (*)     Lymph % 17.6 (*)     All other components within normal limits   COMPREHENSIVE METABOLIC PANEL - Abnormal; Notable for the following components:    CO2 31 (*)     Total Bilirubin 1.2 (*)     All other components within normal limits   URINALYSIS, REFLEX TO URINE CULTURE - Abnormal; Notable for the following components:    Protein, UA 1+ (*)     Occult Blood UA 3+ (*)     Leukocytes, UA 3+ (*)     All other components within normal limits    Narrative:     Preferred Collection Type->Urine, Clean Catch  Specimen Source->Urine  Collection Type->Urine, Clean Catch   URINALYSIS MICROSCOPIC - Abnormal; Notable for the following components:    RBC, UA 40 (*)     WBC, UA >100 (*)     WBC Clumps, UA Few (*)     Bacteria Few (*)     All other components within normal limits    Narrative:     Preferred Collection Type->Urine, Clean Catch  Specimen Source->Urine  Collection Type->Urine, Clean Catch   CULTURE, URINE   LIPASE        ECG Results          EKG 12-lead (In process)  Result time 05/03/22 16:38:05    In process by Interface, Lab In [facility] (05/03/22 16:38:05)                 Narrative:    Test Reason : R00.0,    Vent. Rate : 119 BPM     Atrial Rate : 238 BPM     P-R Int : 000 ms          QRS Dur : 084 ms      QT Int : 308 ms       P-R-T Axes : 261 093 075 degrees     QTc Int : 433 ms    Atrial flutter with 2:1 A-V conduction  Pulmonary disease pattern  Possible Right ventricular hypertrophy  Nonspecific ST abnormality  Abnormal ECG  When compared with ECG of 01-APR-2021 08:53,  Atrial flutter has replaced Sinus rhythm  ST now depressed in Inferior leads  ST now depressed in Anterior leads    Referred By: SARAH HENDERSON           Confirmed By:                             Imaging Results          CT Abdomen Pelvis  Without Contrast  (Final result)  Result time 05/03/22 16:47:27    Final result by Jesus Zayas III, MD (05/03/22 16:47:27)                 Impression:      No acute abnormality identified in the abdomen or pelvis.  Chronic findings, as above.    Note: All CT scans at this facility are performed  using dose modulation techniques as appropriate to performed exam including the following:  automated exposure control; adjustment of mA and/or kV according to the patients size (this includes techniques or standardized protocols for targeted exams where dose is matched to indication/reason for exam: i.e. extremities or head);  iterative reconstruction technique.      Electronically signed by: Jesus Zayas MD  Date:    05/03/2022  Time:    16:47             Narrative:    EXAMINATION:  CT ABDOMEN PELVIS WITHOUT CONTRAST    CLINICAL HISTORY:  Flank pain, kidney stone suspected;    TECHNIQUE:  Routine CT abdomen and pelvis performed without IV contrast.  Coronal and sagittal reformatted images obtained.    COMPARISON:  1/2/20    FINDINGS:  Lung bases: No acute findings.  Interval decrease in size and number of the left lower lobe pulmonary nodules measuring up to 1.2 cm, previously 1.7 cm.    Bones: No acute osseous abnormality or suspicious bone lesions.    Kidneys and ureters: Bilateral renal cysts are again noted.  No evidence of urolithiasis, hydronephrosis or other acute findings.    Stomach and bowel: No acute findings.  Prior sleeve gastrectomy without acute complication.  Client diverticulosis without acute diverticulitis.    Appendix: No evidence of appendicitis.    Liver: Unremarkable for noncontrast technique.    Gallbladder and bile ducts: Possible cholelithiasis or sludge without acute cholecystitis.    Pancreas: Unremarkable for noncontrast technique.    Spleen: Unremarkable for noncontrast technique.    Adrenals: Unremarkable.    Bladder: Unremarkable.    Aorta: No aneurysm.    Reproductive organs: Within normal  limits.    Miscellaneous: No free intraperitoneal fluid, free air or abscess identified. No pathologic lymphadenopathy.                                 Medications   0.9%  NaCl infusion ( Intravenous Stopped 5/3/22 1617)   lorazepam (ATIVAN) injection 0.5 mg (0.5 mg Intravenous Given 5/3/22 1549)   cefTRIAXone (ROCEPHIN) 1 g/50 mL D5W IVPB (0 g Intravenous Stopped 5/3/22 1704)   metoprolol injection 5 mg (5 mg Intravenous Given 5/3/22 1704)     Medical Decision Making:   Initial Assessment:     61-year-old female presenting with intermittent rectal bleeding and suprapubic abdominal pain with dysuria.  Vital signs notable for tachycardia.  Mild suprapubic tenderness.  Rectal exam shows no gross blood.  Vaginal exam without blood.   Labs show no leukocytosis or anemia. No metabolic disturbance.  U/a shows evidence of UTI. Rocephin given.  Of note, patient has been tachycardic in the ED with a heart rate in the 120s.  Patient says she has a baseline tachycardia and her heart rate is usually above 100. On review of records, the patient has had clinic visits in the past with heart rates in the 120s and she said this has been evaluated and worked up and she used to be on a beta-blocker for it.  An EKG today was performed which shows atrial flutter.  Patient is already on beta blocker.  It sounds like patient is at baseline.  CT abd/pelvis shows no acute abnormalities.   Plan to treat for hemorrhagic cystitis. pcp follow up                      Clinical Impression:   Final diagnoses:  [R00.0] Tachycardia  [N30.01] Acute cystitis with hematuria (Primary)          ED Disposition Condition    Discharge Stable        ED Prescriptions     Medication Sig Dispense Start Date End Date Auth. Provider    cephALEXin (KEFLEX) 500 MG capsule Take 1 capsule (500 mg total) by mouth 4 (four) times daily. for 5 days 20 capsule 5/3/2022 5/8/2022 Bryan Crowell MD    phenazopyridine (PYRIDIUM) 200 MG tablet Take 1 tablet (200 mg total) by  mouth 3 (three) times daily. for 2 days 6 tablet 5/3/2022 5/5/2022 Bryan Crowell MD        Follow-up Information     Follow up With Specialties Details Why Contact Info    Hetal Banks MD Internal Medicine Schedule an appointment as soon as possible for a visit in 2 days  [address] [facility]  [address]  [facility] [address]  [phone]             Bryan Crowell MD  05/03/22 7482

## 2022-05-03 NOTE — ED NOTES
APPEARANCE: Pt clean and well groomed with clothes appropriately fastened. No distress is noted.    NEURO: Pt is awake and alert x3, Pt follows commands and affect is appropriate. Pt is moving all extremities well and sensation is intact. Speech is clear.    RESPIRATORY: Respirations are even and unlabored on room air. No accessory muscle use noted. Normal rate and effort is noted. Pt placed on continuous pulse ox with O2 sats noted at  98    CARDIAC:  No abnormal heart sounds noted. Radial and dorsalis pedis pulses are palpable and +2. No edema noted. Cap refill is < 3    GASTRO: States bowel movements have been regular. Bowel sounds are present and normal.     : see comments below    SKIN: Skin is warm, dry and intact. Skin color is appropriate for ethnicity. Normal skin turgor noted. Mucous membranes are moist.     MUSCULOSKELETAL: No abnormalities are noted.    Pt presents to ER with c/o lower pelvic pain and cramping. States she went to the restroom this afternoon had sharp stabbing like glass pain to lower pelvic area and passed a blood clot. Unsure if it was in her urine, from her vagina, or from her rectum. Denies any fever or n/v/d.   Pt provided urine specimen no blood but urine is dark in color. Denies any blood when she wiped.

## 2022-05-03 NOTE — ED NOTES
Assisted Dr. Crowell with vaginal and rectal exam. Pt tolerated well. No obvious bleeding. Occult blood specimen obtained and sent to lab.

## 2022-05-05 ENCOUNTER — PATIENT MESSAGE (OUTPATIENT)
Dept: BARIATRICS | Facility: CLINIC | Age: 62
End: 2022-05-05
Payer: COMMERCIAL

## 2022-05-06 LAB — BACTERIA UR CULT: ABNORMAL

## 2022-05-10 ENCOUNTER — PATIENT MESSAGE (OUTPATIENT)
Dept: BARIATRICS | Facility: CLINIC | Age: 62
End: 2022-05-10
Payer: COMMERCIAL

## 2022-05-11 NOTE — TELEPHONE ENCOUNTER
Specialty Pharmacy - Refill Coordination    Specialty Medication Orders Linked to Encounter      Most Recent Value   Medication #1  COSENTYX PEN, 2 PENS, 150 mg/mL PnIj (Order#[id number], Rx#[id number])          Refill Questions - Documented Responses      Most Recent Value   Relationship to patient of person spoken to?  Self   HIPAA/medical authority confirmed?  Yes   Any changes in contact preferences or allowed representatives?  No   Has the patient had any insurance changes?  No   Has the patient had any changes to specialty medication, dose, or instructions?  No   Has the patient started taking any new medications, herbals, or supplements?  No   Has the patient been diagnosed with any new medical conditions?  No   Does the patient have any new allergies to medications or foods?  No   Does the patient have any concerns about side effects?  No   Can the patient store medication/sharps container properly (at the correct temperature, away from children/pets, etc.)?  Yes   Can the patient call emergency services (021) in the event of an emergency?  Yes   Does the patient have any concerns or questions about taking or administering this medication as prescribed?  No   How many doses did the patient miss in the past 4 weeks or since the last fill?  0   How many doses does the patient have on hand?  0   How many days does the patient report on hand quantity will last?  0   Does the number of doses/days supply remaining match pharmacy expected amounts?  Yes   Does the patient feel that this medication is effective?  Yes   How will the patient receive the medication?  Mail   When does the patient need to receive the medication?  11/24/20   Shipping Address  Home   Address in [facility] confirmed and updated if neccessary?  Yes   Expected Copay ($)  53.07   Is the patient able to afford the medication copay?  Yes   Payment Method  CC on file   Days supply of Refill  28   Would patient like to speak to a pharmacist?   No   Do you want to trigger an intervention?  No   Do you want to trigger an additional referral task?  No   Refill activity completed?  Yes   Refill activity plan  Refill scheduled   Shipment/Pickup Date:  11/19/20          Current Outpatient Medications   Medication Sig    albuterol (PROVENTIL/VENTOLIN HFA) 90 mcg/actuation inhaler Inhale 1-2 puffs into the lungs every 6 (six) hours as needed for Wheezing. Rescue    albuterol-ipratropium (DUO-NEB) 2.5 mg-0.5 mg/3 mL nebulizer solution Take 3 mLs by nebulization every 6 (six) hours as needed for Wheezing. Rescue    amitriptyline (ELAVIL) 25 MG tablet Take 1 tablet (25 mg total) by mouth nightly as needed for Insomnia.    atorvastatin (LIPITOR) 20 MG tablet Take 1 tablet (20 mg total) by mouth every evening.    busPIRone (BUSPAR) 30 MG Tab Take 1 tablet (30 mg total) by mouth 2 (two) times daily.    clonazePAM (KLONOPIN) 1 MG tablet Take 1 tablet (1 mg total) by mouth 2 (two) times daily as needed for Anxiety.    COSENTYX PEN, 2 PENS, 150 mg/mL PnIj Inject 2 pens (300 mg) into the skin every week for 5 weeks, then inject 2 pens (300 mg) into the skin every 4 weeks    COSENTYX PEN, 2 PENS, 150 mg/mL PnIj Inject 300mg (2 pens) into the skin every 4 weeks    fluocinonide (LIDEX) 0.05 % external solution Apply to affected areas of body  once to twice daily as needed for psoriasis.    fluticasone-umeclidin-vilanter (TRELEGY ELLIPTA) 100-62.5-25 mcg DsDv Inhale 1 puff into the lungs once daily.    HYDROcodone-acetaminophen (NORCO) 5-325 mg per tablet     lisinopriL 10 MG tablet Take 1 tablet (10 mg total) by mouth once daily.    metoprolol tartrate (LOPRESSOR) 50 MG tablet Take 1 tablet by mouth twice a day with food    nystatin (MYCOSTATIN) cream Apply topically 2 (two) times daily.    triamcinolone acetonide 0.1% (KENALOG) 0.1 % ointment Apply to affected areas of body BID prn rash. Do not use on face, underarms, or groin.    zolpidem (AMBIEN) 5 MG Tab  Take 1 tablet (5 mg total) by mouth every evening.   Last reviewed on 11/17/2020  1:56 PM by Lorne Claire    Review of patient's allergies indicates:   Allergen Reactions    Succinimides Anaphylaxis    Succinylcholine     Succinylcholine chloride      Other reaction(s): Unknown    Last reviewed on  11/17/2020 1:56 PM by Lorne Claire      Tasks added this encounter   No tasks added.   Tasks due within next 3 months   11/17/2020 - Refill Call  12/17/2020 - Clinical - Follow Up Assesement (90 day)     Lorne Claire  [facility]  [address] [place] [id number]  Phone: [phone]  Fax: [phone]       Never

## 2022-05-13 ENCOUNTER — PATIENT MESSAGE (OUTPATIENT)
Dept: PSYCHIATRY | Facility: CLINIC | Age: 62
End: 2022-05-13
Payer: COMMERCIAL

## 2022-05-19 DIAGNOSIS — L40.0 PSORIASIS VULGARIS: ICD-10-CM

## 2022-05-19 DIAGNOSIS — Z79.899 LONG-TERM USE OF HIGH-RISK MEDICATION: ICD-10-CM

## 2022-05-19 DIAGNOSIS — L40.50 PSORIATIC ARTHRITIS: ICD-10-CM

## 2022-05-19 RX ORDER — SECUKINUMAB 150 MG/ML
INJECTION SUBCUTANEOUS
Qty: 2 ML | Refills: 11 | Status: SHIPPED | OUTPATIENT
Start: 2022-05-19 | End: 2023-06-30 | Stop reason: SDUPTHER

## 2022-05-20 ENCOUNTER — SPECIALTY PHARMACY (OUTPATIENT)
Dept: PHARMACY | Facility: CLINIC | Age: 62
End: 2022-05-20
Payer: COMMERCIAL

## 2022-05-20 NOTE — TELEPHONE ENCOUNTER
Specialty Pharmacy - Refill Coordination    Specialty Medication Orders Linked to Encounter    Flowsheet Row Most Recent Value   Medication #1 COSENTYX PEN, 2 PENS, 150 mg/mL PnIj (Order#[id number], Rx#[id number])          Refill Questions - Documented Responses    Flowsheet Row Most Recent Value   Patient Availability and HIPAA Verification    Does patient want to proceed with activity? Yes   HIPAA/medical authority confirmed? Yes   Relationship to patient of person spoken to? Self   Refill Screening Questions    Changes to allergies? No   Changes to medications? No   New conditions since last clinic visit? No   Unplanned office visit, urgent care, ED, or hospital admission in the last 4 weeks? No   How does patient/caregiver feel medication is working? Excellent   Financial problems or insurance changes? No   How many doses of your specialty medications were missed in the last 4 weeks? 0   Would patient like to speak to a pharmacist? No   When does the patient need to receive the medication? 05/24/22   Refill Delivery Questions    How will the patient receive the medication? Delivery Lorene   When does the patient need to receive the medication? 05/24/22   Shipping Address Home   Address in [facility] confirmed and updated if neccessary? Yes   Expected Copay ($) 441.99   Is the patient able to afford the medication copay? Yes   Payment Method  CC on file, one time CC provided   Days supply of Refill 28   Supplies needed? No supplies needed   Refill activity completed? Yes   Refill activity plan Refill scheduled   Shipment/Pickup Date: 05/24/22          Current Outpatient Medications   Medication Sig    amitriptyline (ELAVIL) 50 MG tablet Take 1 tablet (50 mg total) by mouth nightly as needed for Insomnia.    atorvastatin (LIPITOR) 20 MG tablet Take 1 tablet (20 mg total) by mouth every evening.    B-complex with vitamin C (VITAMIN B COMPLEX-C ORAL) Take by mouth.    busPIRone (BUSPAR) 15 MG  tablet Take 1 tablet (15 mg total) by mouth 2 (two) times daily.    calcium-vitamin D 250-100 mg-unit per tablet Take 1 tablet by mouth 2 (two) times daily.    cholecalciferol, vitamin D3, (VITAMIN D3) 50 mcg (2,000 unit) Tab Take by mouth once daily.    clonazePAM (KLONOPIN) 1 MG tablet Take 1 tablet (1 mg total) by mouth 2 (two) times daily as needed for Anxiety (panic).    COSENTYX PEN, 2 PENS, 150 mg/mL PnIj Inject 300mg (2 pens) into the skin every 4 weeks    cyanocobalamin 500 MCG tablet Take 500 mcg by mouth once daily.    emtricitabine-tenofovir 200-300 mg (TRUVADA) 200-300 mg Tab 1    fluticasone-umeclidin-vilanter (TRELEGY ELLIPTA) 100-62.5-25 mcg DsDv Inhale 1 puff into the lungs once daily.    metoprolol tartrate (LOPRESSOR) 50 MG tablet Take 1 tablet (50 mg total) by mouth 2 (two) times daily with meals.    multivitamin (THERAGRAN) per tablet Take 1 tablet by mouth once daily.    nystatin (MYCOSTATIN) cream Apply topically 2 (two) times daily.    omeprazole (PRILOSEC) 40 MG capsule Take 1 capsule (40 mg total) by mouth every morning. Take one capsule by mouth every morning.    ondansetron (ZOFRAN-ODT) 8 MG TbDL Dissolve 1 tablet (8 mg total) by mouth every 6 (six) hours as needed.    sulfamethoxazole-trimethoprim 800-160mg (BACTRIM DS) 800-160 mg Tab Take 1 tablet by mouth twice daily for 7 days    zolpidem (AMBIEN) 5 MG Tab Take 1 tablet (5 mg total) by mouth nightly as needed (insomnia).   Last reviewed on 5/3/2022  2:43 PM by Jacquelyn Dickerson, RN    Review of patient's allergies indicates:   Allergen Reactions    Succinimides Anaphylaxis     Son has     Last reviewed on  5/3/2022 2:41 PM by Jacquelyn Dickerson      Tasks added this encounter   No tasks added.   Tasks due within next 3 months   5/17/2022 - Refill Call (Auto Added)     Master HogueD  [facility]  [address] [facility] [address]  Phone: [phone]  Fax: [phone]

## 2022-06-06 DIAGNOSIS — J44.9 CHRONIC OBSTRUCTIVE PULMONARY DISEASE, UNSPECIFIED COPD TYPE: ICD-10-CM

## 2022-06-07 DIAGNOSIS — J44.9 CHRONIC OBSTRUCTIVE PULMONARY DISEASE, UNSPECIFIED COPD TYPE: ICD-10-CM

## 2022-06-10 ENCOUNTER — PATIENT MESSAGE (OUTPATIENT)
Dept: PSYCHIATRY | Facility: CLINIC | Age: 62
End: 2022-06-10
Payer: COMMERCIAL

## 2022-06-10 ENCOUNTER — PATIENT MESSAGE (OUTPATIENT)
Dept: BARIATRICS | Facility: CLINIC | Age: 62
End: 2022-06-10
Payer: COMMERCIAL

## 2022-06-11 ENCOUNTER — SPECIALTY PHARMACY (OUTPATIENT)
Dept: PHARMACY | Facility: CLINIC | Age: 62
End: 2022-06-11
Payer: COMMERCIAL

## 2022-06-11 NOTE — TELEPHONE ENCOUNTER
Specialty Pharmacy - Clinical Reassessment    Specialty Medication Orders Linked to Encounter    Flowsheet Row Most Recent Value   Medication #1 COSENTYX PEN, 2 PENS, 150 mg/mL PnIj (Order#[id number], Rx#[id number])        Patient Diagnosis   L40.0 - Psoriasis vulgaris    Specialty clinical pharmacist review completed for an annual review of reassessment. Reviewed the following areas: current med list, reports of adverse effects, adherence and progress towards therapeutic goals.    Recommendations: none at this time.   Patient recently seen by provider in clinic. Doing well on Cosentyx therapy, most recent gap/delay due to needing to be seen by provider in clinic + insurance reauthorization.     Tasks added this encounter   3/11/2023 - Clinical - Follow Up Assesement (Annual)   Tasks due within next 3 months   6/14/2022 - Refill Call (Auto Added)     Indu Gutierrez, PharmD  [facility]  [address] [place], [place] [address]  [facility] [address]  Phone: [phone]  Fax: [phone]

## 2022-06-13 ENCOUNTER — PATIENT MESSAGE (OUTPATIENT)
Dept: PSYCHIATRY | Facility: CLINIC | Age: 62
End: 2022-06-13
Payer: COMMERCIAL

## 2022-06-13 DIAGNOSIS — F41.0 PANIC ATTACK: ICD-10-CM

## 2022-06-13 DIAGNOSIS — F41.1 GAD (GENERALIZED ANXIETY DISORDER): ICD-10-CM

## 2022-06-13 DIAGNOSIS — G47.00 INSOMNIA, UNSPECIFIED TYPE: ICD-10-CM

## 2022-06-13 RX ORDER — CLONAZEPAM 1 MG/1
1 TABLET ORAL 2 TIMES DAILY PRN
Qty: 60 TABLET | Refills: 0 | Status: SHIPPED | OUTPATIENT
Start: 2022-06-13 | End: 2022-06-13 | Stop reason: SDUPTHER

## 2022-06-13 RX ORDER — ZOLPIDEM TARTRATE 5 MG/1
5 TABLET ORAL NIGHTLY PRN
Qty: 30 TABLET | Refills: 0 | Status: SHIPPED | OUTPATIENT
Start: 2022-06-13 | End: 2022-06-13 | Stop reason: SDUPTHER

## 2022-06-13 RX ORDER — CLONAZEPAM 1 MG/1
1 TABLET ORAL 2 TIMES DAILY PRN
Qty: 75 TABLET | Refills: 0 | Status: SHIPPED | OUTPATIENT
Start: 2022-06-13 | End: 2022-07-21 | Stop reason: SDUPTHER

## 2022-06-13 RX ORDER — ZOLPIDEM TARTRATE 5 MG/1
5 TABLET ORAL NIGHTLY PRN
Qty: 30 TABLET | Refills: 1 | Status: SHIPPED | OUTPATIENT
Start: 2022-06-13 | End: 2022-08-05 | Stop reason: SDUPTHER

## 2022-06-14 ENCOUNTER — SPECIALTY PHARMACY (OUTPATIENT)
Dept: PHARMACY | Facility: CLINIC | Age: 62
End: 2022-06-14
Payer: COMMERCIAL

## 2022-06-14 ENCOUNTER — PATIENT MESSAGE (OUTPATIENT)
Dept: BARIATRICS | Facility: CLINIC | Age: 62
End: 2022-06-14
Payer: COMMERCIAL

## 2022-06-14 NOTE — TELEPHONE ENCOUNTER
Specialty Pharmacy - Refill Coordination    Specialty Medication Orders Linked to Encounter    Flowsheet Row Most Recent Value   Medication #1 COSENTYX PEN, 2 PENS, 150 mg/mL PnIj (Order#[id number], Rx#[id number])          Refill Questions - Documented Responses    Flowsheet Row Most Recent Value   Patient Availability and HIPAA Verification    Does patient want to proceed with activity? Yes   HIPAA/medical authority confirmed? Yes   Relationship to patient of person spoken to? Self   Refill Screening Questions    Changes to allergies? No   Changes to medications? No   New conditions since last clinic visit? No   Unplanned office visit, urgent care, ED, or hospital admission in the last 4 weeks? No   How does patient/caregiver feel medication is working? Good   Financial problems or insurance changes? No   How many doses of your specialty medications were missed in the last 4 weeks? 0   Would patient like to speak to a pharmacist? No   When does the patient need to receive the medication? 06/21/22   Refill Delivery Questions    How will the patient receive the medication? Delivery Lorene   When does the patient need to receive the medication? 06/21/22   Shipping Address Home   Address in [facility] confirmed and updated if neccessary? Yes   Expected Copay ($) 441.99   Is the patient able to afford the medication copay? Yes   Payment Method one time CC provided   Days supply of Refill 28   Supplies needed? No supplies needed   Refill activity completed? Yes   Refill activity plan Refill scheduled   Shipment/Pickup Date: 06/17/22          Current Outpatient Medications   Medication Sig    amitriptyline (ELAVIL) 50 MG tablet Take 1 tablet (50 mg total) by mouth nightly as needed for Insomnia.    atorvastatin (LIPITOR) 20 MG tablet Take 1 tablet (20 mg total) by mouth every evening.    B-complex with vitamin C (VITAMIN B COMPLEX-C ORAL) Take by mouth.    busPIRone (BUSPAR) 15 MG tablet Take 1 tablet (15 mg  total) by mouth 2 (two) times daily.    calcium-vitamin D 250-100 mg-unit per tablet Take 1 tablet by mouth 2 (two) times daily.    cholecalciferol, vitamin D3, (VITAMIN D3) 50 mcg (2,000 unit) Tab Take by mouth once daily.    clonazePAM (KLONOPIN) 1 MG tablet Take 1 tablet (1 mg total) by mouth 2 (two) times daily as needed for Anxiety (panic).    COSENTYX PEN, 2 PENS, 150 mg/mL PnIj Inject 300mg (2 pens) into the skin every 4 weeks    cyanocobalamin 500 MCG tablet Take 500 mcg by mouth once daily.    emtricitabine-tenofovir 200-300 mg (TRUVADA) 200-300 mg Tab 1    fluticasone-umeclidin-vilanter (TRELEGY ELLIPTA) 100-62.5-25 mcg DsDv Inhale 1 puff into the lungs once daily.    metoprolol tartrate (LOPRESSOR) 50 MG tablet Take 1 tablet (50 mg total) by mouth 2 (two) times daily with meals.    multivitamin (THERAGRAN) per tablet Take 1 tablet by mouth once daily.    nystatin (MYCOSTATIN) cream Apply topically 2 (two) times daily.    omeprazole (PRILOSEC) 40 MG capsule Take 1 capsule (40 mg total) by mouth every morning. Take one capsule by mouth every morning.    ondansetron (ZOFRAN-ODT) 8 MG TbDL Dissolve 1 tablet (8 mg total) by mouth every 6 (six) hours as needed.    sulfamethoxazole-trimethoprim 800-160mg (BACTRIM DS) 800-160 mg Tab Take 1 tablet by mouth twice daily for 7 days    zolpidem (AMBIEN) 5 MG Tab Take 1 tablet (5 mg total) by mouth nightly as needed (insomnia).   Last reviewed on 5/3/2022  2:43 PM by Jacquelyn Dickerson, RN    Review of patient's allergies indicates:   Allergen Reactions    Succinimides Anaphylaxis     Son has     Last reviewed on  5/3/2022 2:41 PM by Jacquelyn Dickerson      Tasks added this encounter   7/12/2022 - Refill Call (Auto Added)   Tasks due within next 3 months   No tasks due.     Tami Huynh, Patient Care Assistant  [facility]  [address] [facility] [address]  Phone: [phone]  Fax: [phone]

## 2022-06-16 ENCOUNTER — RESEARCH ENCOUNTER (OUTPATIENT)
Dept: RESEARCH | Facility: HOSPITAL | Age: 62
End: 2022-06-16
Payer: COMMERCIAL

## 2022-06-16 ENCOUNTER — PATIENT MESSAGE (OUTPATIENT)
Dept: RESEARCH | Facility: HOSPITAL | Age: 62
End: 2022-06-16
Payer: COMMERCIAL

## 2022-06-16 NOTE — PROGRESS NOTES
The Patient, Lucia Matias, was called today regarding the patient's participation in (IRB #[id number] PI: Nba).   The Verbal Informed Consent was read and discussed by the consenter. The following was discussed:   Types of specimens to be collected   All medical information released to researchers will be stripped of identifiers and no patient information will be given to anyone outside of this research project.    Participating in a research study is not the same as getting regular medical care and will not improve the patient's health. The purpose of a research study is to gather information.  Being in this study does not interfere with your regular medical care.   The patient does not have to participate in this study. If they do not join, their care at [facility] will not be affected.  The person granting permission was provided adequate time to ask questions regarding the scope and purpose of the study.  Permission was obtained by telephone.   The above statements were read by the person obtaining permission to the person granting permission and witnessed by Pilar Vera, who also confirmed the patient's consent to the study. The witness information was documented on the verbal consent form as well.  This Verbal Informed Consent process was conducted prior to initiation of any study procedures.

## 2022-07-05 ENCOUNTER — PATIENT MESSAGE (OUTPATIENT)
Dept: BARIATRICS | Facility: CLINIC | Age: 62
End: 2022-07-05
Payer: COMMERCIAL

## 2022-07-06 ENCOUNTER — OFFICE VISIT (OUTPATIENT)
Dept: URGENT CARE | Facility: CLINIC | Age: 62
End: 2022-07-06
Payer: COMMERCIAL

## 2022-07-06 VITALS
RESPIRATION RATE: 18 BRPM | HEIGHT: 62 IN | WEIGHT: 160 LBS | OXYGEN SATURATION: 96 % | BODY MASS INDEX: 29.44 KG/M2 | HEART RATE: 128 BPM | TEMPERATURE: 99 F

## 2022-07-06 DIAGNOSIS — J02.9 SORE THROAT: ICD-10-CM

## 2022-07-06 DIAGNOSIS — H60.501 ACUTE OTITIS EXTERNA OF RIGHT EAR, UNSPECIFIED TYPE: Primary | ICD-10-CM

## 2022-07-06 LAB
CTP QC/QA: YES
SARS-COV-2 RDRP RESP QL NAA+PROBE: NEGATIVE

## 2022-07-06 PROCEDURE — 1160F RVW MEDS BY RX/DR IN RCRD: CPT | Mod: CPTII,S$GLB,, | Performed by: NURSE PRACTITIONER

## 2022-07-06 PROCEDURE — 3008F BODY MASS INDEX DOCD: CPT | Mod: CPTII,S$GLB,, | Performed by: NURSE PRACTITIONER

## 2022-07-06 PROCEDURE — 1159F MED LIST DOCD IN RCRD: CPT | Mod: CPTII,S$GLB,, | Performed by: NURSE PRACTITIONER

## 2022-07-06 PROCEDURE — 3008F PR BODY MASS INDEX (BMI) DOCUMENTED: ICD-10-PCS | Mod: CPTII,S$GLB,, | Performed by: NURSE PRACTITIONER

## 2022-07-06 PROCEDURE — U0002 COVID-19 LAB TEST NON-CDC: HCPCS | Mod: QW,S$GLB,, | Performed by: NURSE PRACTITIONER

## 2022-07-06 PROCEDURE — 99214 PR OFFICE/OUTPT VISIT, EST, LEVL IV, 30-39 MIN: ICD-10-PCS | Mod: S$GLB,,, | Performed by: NURSE PRACTITIONER

## 2022-07-06 PROCEDURE — 99214 OFFICE O/P EST MOD 30 MIN: CPT | Mod: S$GLB,,, | Performed by: NURSE PRACTITIONER

## 2022-07-06 PROCEDURE — U0002: ICD-10-PCS | Mod: QW,S$GLB,, | Performed by: NURSE PRACTITIONER

## 2022-07-06 PROCEDURE — 1159F PR MEDICATION LIST DOCUMENTED IN MEDICAL RECORD: ICD-10-PCS | Mod: CPTII,S$GLB,, | Performed by: NURSE PRACTITIONER

## 2022-07-06 PROCEDURE — 1160F PR REVIEW ALL MEDS BY PRESCRIBER/CLIN PHARMACIST DOCUMENTED: ICD-10-PCS | Mod: CPTII,S$GLB,, | Performed by: NURSE PRACTITIONER

## 2022-07-06 RX ORDER — NEOMYCIN SULFATE, POLYMYXIN B SULFATE, HYDROCORTISONE 3.5; 10000; 1 MG/ML; [USP'U]/ML; MG/ML
4 SOLUTION/ DROPS AURICULAR (OTIC) EVERY 6 HOURS
Qty: 10 ML | Refills: 0 | Status: SHIPPED | OUTPATIENT
Start: 2022-07-06 | End: 2022-07-16

## 2022-07-06 NOTE — PATIENT INSTRUCTIONS
Restart Flonase and Claritin daily for 1 week.  Drops as directed.  Rest.  F/u with PCP and or ENT as needed.  Return here or go to the ER as needed for worsening condition.  Okay to take otc Tylenol or Ibuprofen as directed as needed for pain.

## 2022-07-06 NOTE — PROGRESS NOTES
"Subjective:       Patient ID: Lucia Matias is a 61 y.o. female.    Vitals:  height is 5' 2" (1.575 m) and weight is 72.6 kg (160 lb). Her temperature is 98.8 °F (37.1 °C). Her pulse is 128 (abnormal). Her respiration is 18 and oxygen saturation is 96%.     Chief Complaint: Sore Throat    This is a 61 y.o. female who presents today with a chief complaint of   Sore throat, right ear pain, headache, and some congestion. Symptoms started at 330am. Pt taking Tylenol     Sore Throat   This is a new problem. The current episode started today. The problem has been gradually worsening. The pain is worse on the right side. There has been no fever. The pain is at a severity of 4/10. The pain is moderate. Associated symptoms include ear pain and headaches. Pertinent negatives include no abdominal pain, congestion, coughing, diarrhea, drooling, ear discharge, hoarse voice, plugged ear sensation, neck pain, shortness of breath, stridor, swollen glands, trouble swallowing or vomiting. She has had no exposure to strep or mono. She has tried acetaminophen for the symptoms. The treatment provided mild relief.       HENT: Positive for ear pain and sore throat. Negative for ear discharge, drooling, congestion and trouble swallowing.    Neck: Negative for neck pain.   Respiratory: Negative for cough, shortness of breath and stridor.    Gastrointestinal: Negative for abdominal pain, vomiting and diarrhea.   Neurological: Positive for headaches.       Objective:      Physical Exam   Constitutional: She is oriented to person, place, and time. She appears well-developed. She is cooperative.  Non-toxic appearance. She does not appear ill. No distress.   HENT:   Head: Normocephalic and atraumatic.   Ears:   Right Ear: Hearing, external ear and ear canal normal. There is tenderness. No drainage or swelling. Tympanic membrane is injected. Tympanic membrane is not perforated. No middle ear effusion.   Left Ear: Hearing, tympanic membrane, " external ear and ear canal normal.   Nose: Nose normal. No mucosal edema, rhinorrhea or nasal deformity. No epistaxis. Right sinus exhibits no maxillary sinus tenderness and no frontal sinus tenderness. Left sinus exhibits no maxillary sinus tenderness and no frontal sinus tenderness.   Mouth/Throat: Uvula is midline, oropharynx is clear and moist and mucous membranes are normal. No trismus in the jaw. Normal dentition. No uvula swelling. No oropharyngeal exudate, posterior oropharyngeal edema or posterior oropharyngeal erythema.   Eyes: Conjunctivae and lids are normal. No scleral icterus.   Neck: Trachea normal and phonation normal. Neck supple. No edema present. No erythema present. No neck rigidity present.   Cardiovascular: Normal rate, regular rhythm, normal heart sounds and normal pulses.   Pulmonary/Chest: Effort normal and breath sounds normal. No respiratory distress. She has no decreased breath sounds. She has no rhonchi.   Abdominal: Normal appearance.   Musculoskeletal: Normal range of motion.         General: No deformity. Normal range of motion.   Neurological: She is alert and oriented to person, place, and time. She exhibits normal muscle tone. Coordination normal.   Skin: Skin is warm, dry, intact, not diaphoretic and not pale.   Psychiatric: Her speech is normal and behavior is normal. Judgment and thought content normal.   Nursing note and vitals reviewed.    Results for orders placed or performed in visit on 07/06/22   POCT COVID-19 Rapid Screening   Result Value Ref Range    POC Rapid COVID Negative Negative     Acceptable Yes            Assessment:       1. Acute otitis externa of right ear, unspecified type    2. Sore throat          Plan:       Lab reviewed.  BP cuff unavailable in clinic for reading.  Acute otitis externa of right ear, unspecified type  -     neomycin-polymyxin-hydrocortisone (CORTISPORIN) otic solution; Place 4 drops into the right ear every 6 (six) hours.  for 10 days  Dispense: 10 mL; Refill: 0    Sore throat  -     POCT COVID-19 Rapid Screening      Patient Instructions   Restart Flonase and Claritin daily for 1 week.  Drops as directed.  Rest.  F/u with PCP and or ENT as needed.  Return here or go to the ER as needed for worsening condition.  Okay to take otc Tylenol or Ibuprofen as directed as needed for pain.

## 2022-07-12 ENCOUNTER — TELEPHONE (OUTPATIENT)
Dept: INTERNAL MEDICINE | Facility: CLINIC | Age: 62
End: 2022-07-12
Payer: COMMERCIAL

## 2022-07-12 ENCOUNTER — SPECIALTY PHARMACY (OUTPATIENT)
Dept: PHARMACY | Facility: CLINIC | Age: 62
End: 2022-07-12
Payer: COMMERCIAL

## 2022-07-12 DIAGNOSIS — J01.10 ACUTE NON-RECURRENT FRONTAL SINUSITIS: Primary | ICD-10-CM

## 2022-07-12 RX ORDER — AMOXICILLIN 500 MG/1
500 CAPSULE ORAL EVERY 12 HOURS
Qty: 20 CAPSULE | Refills: 0 | Status: SHIPPED | OUTPATIENT
Start: 2022-07-12 | End: 2022-07-23

## 2022-07-12 NOTE — TELEPHONE ENCOUNTER
Went to  last week, was told she has swimmers ear -didn't listen her chest or anything else. Did covid testing twice, both negative. C/o low fever, ear pain, cough, congestion x1 week. States she is prone to bronchitis. Cannot take zpak. Can you address for Dr PIERO gracia?

## 2022-07-12 NOTE — TELEPHONE ENCOUNTER
----- Message from Mirella Feliciano sent at 7/12/2022 11:39 AM CDT -----  Contact: [phone]  [email or web address]     Patient is calling for Medical Advice regarding:loww fever cough and tightness in chest sinus and right ear     How long has patient had these symptoms:1 week     Pharmacy name and phone#:    [facility]  [address] [address]  Phone: [phone] Fax: [phone]       Would like response via [employer]:  no     Comments:

## 2022-07-12 NOTE — TELEPHONE ENCOUNTER
Specialty Pharmacy - Refill Coordination    Specialty Medication Orders Linked to Encounter    Flowsheet Row Most Recent Value   Medication #1 COSENTYX PEN, 2 PENS, 150 mg/mL PnIj (Order#[id number], Rx#[id number])          Refill Questions - Documented Responses    Flowsheet Row Most Recent Value   Patient Availability and HIPAA Verification    Does patient want to proceed with activity? Yes   HIPAA/medical authority confirmed? Yes   Relationship to patient of person spoken to? Self   Refill Screening Questions    Changes to allergies? No   Changes to medications? No   New conditions since last clinic visit? No   Unplanned office visit, urgent care, ED, or hospital admission in the last 4 weeks? No   How does patient/caregiver feel medication is working? Good   Financial problems or insurance changes? No   How many doses of your specialty medications were missed in the last 4 weeks? 0   Would patient like to speak to a pharmacist? No   When does the patient need to receive the medication? 07/19/22   Refill Delivery Questions    How will the patient receive the medication? Delivery Lorene   When does the patient need to receive the medication? 07/19/22   Shipping Address Home   Address in [facility] confirmed and updated if neccessary? Yes   Expected Copay ($) 441.99   Is the patient able to afford the medication copay? Yes   Payment Method  CC on file   Days supply of Refill 28   Supplies needed? No supplies needed   Refill activity completed? Yes   Refill activity plan Refill scheduled   Shipment/Pickup Date: 07/14/22          Current Outpatient Medications   Medication Sig    amitriptyline (ELAVIL) 50 MG tablet Take 1 tablet (50 mg total) by mouth nightly as needed for Insomnia.    atorvastatin (LIPITOR) 20 MG tablet Take 1 tablet (20 mg total) by mouth every evening.    B-complex with vitamin C (VITAMIN B COMPLEX-C ORAL) Take by mouth.    busPIRone (BUSPAR) 15 MG tablet Take 1 tablet (15 mg  total) by mouth 2 (two) times daily.    calcium-vitamin D 250-100 mg-unit per tablet Take 1 tablet by mouth 2 (two) times daily.    cholecalciferol, vitamin D3, (VITAMIN D3) 50 mcg (2,000 unit) Tab Take by mouth once daily.    clonazePAM (KLONOPIN) 1 MG tablet Take 1 tablet (1 mg total) by mouth 2 (two) times daily as needed for Anxiety (panic).    COSENTYX PEN, 2 PENS, 150 mg/mL PnIj Inject 300mg (2 pens) into the skin every 4 weeks    cyanocobalamin 500 MCG tablet Take 500 mcg by mouth once daily.    emtricitabine-tenofovir 200-300 mg (TRUVADA) 200-300 mg Tab 1    fluticasone-umeclidin-vilanter (TRELEGY ELLIPTA) 100-62.5-25 mcg DsDv Inhale 1 puff into the lungs once daily.    metoprolol tartrate (LOPRESSOR) 50 MG tablet Take 1 tablet (50 mg total) by mouth 2 (two) times daily with meals.    multivitamin (THERAGRAN) per tablet Take 1 tablet by mouth once daily.    neomycin-polymyxin-hydrocortisone (CORTISPORIN) otic solution Place 4 drops into the right ear every 6 (six) hours. for 10 days    nystatin (MYCOSTATIN) cream Apply topically 2 (two) times daily.    omeprazole (PRILOSEC) 40 MG capsule Take 1 capsule (40 mg total) by mouth every morning. Take one capsule by mouth every morning.    ondansetron (ZOFRAN-ODT) 8 MG TbDL Dissolve 1 tablet (8 mg total) by mouth every 6 (six) hours as needed.    sulfamethoxazole-trimethoprim 800-160mg (BACTRIM DS) 800-160 mg Tab Take 1 tablet by mouth twice daily for 7 days (Patient not taking: Reported on 7/6/2022)    zolpidem (AMBIEN) 5 MG Tab Take 1 tablet (5 mg total) by mouth nightly as needed (insomnia).   Last reviewed on 7/6/2022 11:09 AM by Mercy Issa, NP    Review of patient's allergies indicates:   Allergen Reactions    Succinimides Anaphylaxis     Son has MH    Last reviewed on  7/6/2022 11:09 AM by Mercy Issa      Tasks added this encounter   8/9/2022 - Refill Call (Auto Added)   Tasks due within next 3 months   No tasks due.      [facility]  [address]  [facility] [address]  Phone: [phone]  Fax: [phone]

## 2022-07-15 ENCOUNTER — PATIENT MESSAGE (OUTPATIENT)
Dept: INFECTIOUS DISEASES | Facility: CLINIC | Age: 62
End: 2022-07-15
Payer: COMMERCIAL

## 2022-07-15 DIAGNOSIS — R53.83 FATIGUE, UNSPECIFIED TYPE: Primary | ICD-10-CM

## 2022-07-15 LAB
CTP QC/QA: YES
SARS-COV-2 AG RESP QL IA.RAPID: POSITIVE

## 2022-07-19 ENCOUNTER — TELEPHONE (OUTPATIENT)
Dept: INFECTIOUS DISEASES | Facility: HOSPITAL | Age: 62
End: 2022-07-19
Payer: COMMERCIAL

## 2022-07-20 ENCOUNTER — PATIENT MESSAGE (OUTPATIENT)
Dept: PSYCHIATRY | Facility: CLINIC | Age: 62
End: 2022-07-20
Payer: COMMERCIAL

## 2022-07-20 NOTE — TELEPHONE ENCOUNTER
Spoke to patient tested positive for covid few days ago, feeling overall improved but still with cough, no fever or SOB.  paxlovid with significant drug interactions. She will reach out if feeling worse

## 2022-07-21 DIAGNOSIS — F41.0 PANIC ATTACK: ICD-10-CM

## 2022-07-21 DIAGNOSIS — F41.1 GAD (GENERALIZED ANXIETY DISORDER): ICD-10-CM

## 2022-07-21 RX ORDER — CLONAZEPAM 1 MG/1
1 TABLET ORAL 2 TIMES DAILY PRN
Qty: 60 TABLET | Refills: 0 | Status: SHIPPED | OUTPATIENT
Start: 2022-07-21 | End: 2022-08-05 | Stop reason: SDUPTHER

## 2022-08-02 ENCOUNTER — PATIENT MESSAGE (OUTPATIENT)
Dept: OPTOMETRY | Facility: CLINIC | Age: 62
End: 2022-08-02
Payer: COMMERCIAL

## 2022-08-02 ENCOUNTER — PATIENT MESSAGE (OUTPATIENT)
Dept: BARIATRICS | Facility: CLINIC | Age: 62
End: 2022-08-02
Payer: COMMERCIAL

## 2022-08-04 ENCOUNTER — PATIENT MESSAGE (OUTPATIENT)
Dept: BARIATRICS | Facility: CLINIC | Age: 62
End: 2022-08-04
Payer: COMMERCIAL

## 2022-08-05 ENCOUNTER — OFFICE VISIT (OUTPATIENT)
Dept: PSYCHIATRY | Facility: CLINIC | Age: 62
End: 2022-08-05
Payer: COMMERCIAL

## 2022-08-05 DIAGNOSIS — G47.00 INSOMNIA, UNSPECIFIED TYPE: ICD-10-CM

## 2022-08-05 DIAGNOSIS — F33.40 MDD (RECURRENT MAJOR DEPRESSIVE DISORDER) IN REMISSION: ICD-10-CM

## 2022-08-05 DIAGNOSIS — F41.0 PANIC ATTACK: ICD-10-CM

## 2022-08-05 DIAGNOSIS — F41.1 GAD (GENERALIZED ANXIETY DISORDER): ICD-10-CM

## 2022-08-05 PROCEDURE — 1159F PR MEDICATION LIST DOCUMENTED IN MEDICAL RECORD: ICD-10-PCS | Mod: CPTII,95,, | Performed by: NURSE PRACTITIONER

## 2022-08-05 PROCEDURE — 99214 PR OFFICE/OUTPT VISIT, EST, LEVL IV, 30-39 MIN: ICD-10-PCS | Mod: 95,,, | Performed by: NURSE PRACTITIONER

## 2022-08-05 PROCEDURE — 1160F PR REVIEW ALL MEDS BY PRESCRIBER/CLIN PHARMACIST DOCUMENTED: ICD-10-PCS | Mod: CPTII,95,, | Performed by: NURSE PRACTITIONER

## 2022-08-05 PROCEDURE — 1159F MED LIST DOCD IN RCRD: CPT | Mod: CPTII,95,, | Performed by: NURSE PRACTITIONER

## 2022-08-05 PROCEDURE — 1160F RVW MEDS BY RX/DR IN RCRD: CPT | Mod: CPTII,95,, | Performed by: NURSE PRACTITIONER

## 2022-08-05 PROCEDURE — 99214 OFFICE O/P EST MOD 30 MIN: CPT | Mod: 95,,, | Performed by: NURSE PRACTITIONER

## 2022-08-05 RX ORDER — AMITRIPTYLINE HYDROCHLORIDE 50 MG/1
50 TABLET, FILM COATED ORAL NIGHTLY PRN
Qty: 90 TABLET | Refills: 1 | Status: SHIPPED | OUTPATIENT
Start: 2022-08-05 | End: 2022-12-06 | Stop reason: ALTCHOICE

## 2022-08-05 RX ORDER — BUSPIRONE HYDROCHLORIDE 15 MG/1
15 TABLET ORAL 2 TIMES DAILY
Qty: 180 TABLET | Refills: 1 | Status: SHIPPED | OUTPATIENT
Start: 2022-08-05 | End: 2022-10-17

## 2022-08-05 RX ORDER — CLONAZEPAM 1 MG/1
1 TABLET ORAL 2 TIMES DAILY PRN
Qty: 60 TABLET | Refills: 0 | Status: SHIPPED | OUTPATIENT
Start: 2022-08-05 | End: 2022-09-19 | Stop reason: SDUPTHER

## 2022-08-05 RX ORDER — ZOLPIDEM TARTRATE 5 MG/1
5 TABLET ORAL NIGHTLY PRN
Qty: 30 TABLET | Refills: 1 | Status: SHIPPED | OUTPATIENT
Start: 2022-08-05 | End: 2022-10-18 | Stop reason: SDUPTHER

## 2022-08-05 NOTE — PROGRESS NOTES
Outpatient Psychiatry Follow-Up Visit (MD/NP)    8/5/2022     The patient location is: [place]  The chief complaint leading to consultation is: RANDI, insomnia    Visit type: audiovisual    Face to Face time with patient: 17 minutes  19 minutes of total time spent on the encounter, which includes face to face time and non-face to face time preparing to see the patient (eg, review of tests), Obtaining and/or reviewing separately obtained history, Documenting clinical information in the electronic or other health record, Independently interpreting results (not separately reported) and communicating results to the patient/family/caregiver, or Care coordination (not separately reported).     Each patient to whom he or she provides medical services by telemedicine is:  (1) informed of the relationship between the physician and patient and the respective role of any other health care provider with respect to management of the patient; and (2) notified that he or she may decline to receive medical services by telemedicine and may withdraw from such care at any time.    Clinical Status of Patient:  Outpatient (Ambulatory)    Chief Complaint:  Lucia Matias is a 62 y.o. female who presents today for follow-up of depression, anxiety and insomnia.  Met with patient.      Interval History and Content of Current Session:  Interim Events/Subjective Report/Content of Current Session:    Lucia Matias checked in on time for her appointment. Presents for follow-up - no changes made last visit. Today she reports she has been doing well - moved into her house last week. Regarding anxiety she reports there have been some rough patches related to house issues but this is coming to an end so anxiety overall isn't bad. Reports when living in the trailer she was talking in her sleep and even grabbing her , sleeping walking too. Notes she doesn't believe she has done this since she moved into her house last week. Agreeable to klonopin  taper starting next visit. Denies SI/HI/AVH. No objective s/sx of psychosis or mayur. Patient verbalized motivation for compliance with medications and all other elements of treatment plan.       Previous medication trial:  Prozac, Paxil, Wellbutrin - notes she felt worse, more depressed, SI - admits it could have also been the situation she was in at the time.        Review of Systems   · PSYCHIATRIC: Pertinant items are noted in the narrative.  · CONSTITUTIONAL: No weight gain or loss.   · MUSCULOSKELETAL: No pain or stiffness of the joints.  · ENT: No dizziness, tinnitus or hearing loss.  · RESPIRATORY: No shortness of breath.  · CARDIOVASCULAR: No tachycardia or chest pain.  · GASTROINTESTINAL: No nausea, vomiting, pain, constipation or diarrhea.    Past Medical, Family and Social History: The patient's past medical, family and social history have been reviewed and updated as appropriate within the electronic medical record - see encounter notes.    Compliance: yes    Side effects: None    Risk Parameters:  Patient reports no suicidal ideation  Patient reports no homicidal ideation  Patient reports no self-injurious behavior  Patient reports no violent behavior    Exam (detailed: at least 9 elements; comprehensive: all 15 elements)   Constitutional  Vitals:     There were no vitals filed for this visit.     General:  unremarkable, age appropriate     Musculoskeletal  Muscle Strength/Tone:  not examined   Gait & Station:  ANASTASIIA due to virtual visit     Psychiatric  Speech:  no latency; no press   Mood & Affect:  steady  congruent and appropriate   Thought Process:  normal and logical   Associations:  intact   Thought Content:  normal, no suicidality, no homicidality, delusions, or paranoia   Insight:  intact   Judgement: behavior is adequate to circumstances   Orientation:  grossly intact   Memory: intact for content of interview   Language: grossly intact   Attention Span & Concentration:  able to focus   Fund of  "Knowledge:  intact and appropriate to age and level of education     Assessment and Diagnosis   Status/Progress: Based on the examination today, the patient's problem(s) is/are well controlled.  New problems have not been presented today.   Co-morbidities, Diagnostic uncertainty and Lack of compliance are not complicating management of the primary condition.  There are no active rule-out diagnoses for this patient at this time.     General Impression:Lucia Matias is a 61 y.o. female who presents today for follow-up of anxiety. She reports she has "always been anxious and had occasional panic attacks but that her depression started ~ 5 years ago after a number of family members passed away in close succession and her  being dx with cancer and HIV."  Patient seen and chart reviewed. Previous patient of Dr. TREVOR Carranza - Last seen 5/18/21- continue on Elavil, Ambien, Klonopin, Buspar. Presents 8/16/21- no changes. Presents 11/2/21- increase Elavil Presents 2/17/22- symptoms improvement with increase in Elavil. Presents 8/5/22- well controled- will start klonopin taper next visit      ICD-10-CM ICD-9-CM   1. RANDI (generalized anxiety disorder)  F41.1 300.02   2. Panic attack  F41.0 300.01   3. Insomnia, unspecified type  G47.00 780.52   4. MDD (recurrent major depressive disorder) in remission  F33.40 296.35       Intervention/Counseling/Treatment Plan   · Medication Management: Continue current medications.  · Buspirone 15 mg BID  · Elavil 50 mg qHs  · Ambien 5mg qHS - sleep walking   · Klonpin 1mg BID PRN anxiety/panic for past couple of years  · Checked [place]  and no irregularities were noted.  · Informed pt of the risks of continuous Benzodiazepine use including tolerance, dependence and withdrawals that may be life threatening upon abrupt cessation. Also advised not to take Benzodiazepines with Opiates or other sedatives and also not to drive or operate heavy machinery while using Benzodiazepines.  · The risks " and benefits of medication were discussed with the patient.  · Discussed diagnosis, risks and benefits of proposed treatment above vs alternative treatments vs no treatment, and potential side effects of these treatments. The patient expresses understanding of the above and displays the capacity to agree with this treatment given said understanding. Patient also agrees that, currently, the benefits outweigh the risks and would like to pursue treatment at this time.  · Discussed inherent unpredictability of medications in each individual.   · Encouraged Patient to keep future appointments.   · Take medications as prescribed and abstain from substance abuse.   · In the event of an emergency patient was advised to go to the emergency room      Return to Clinic: 3 months    Atiya Pruett DNP-BC PMHNP  [facility]

## 2022-08-09 ENCOUNTER — SPECIALTY PHARMACY (OUTPATIENT)
Dept: PHARMACY | Facility: CLINIC | Age: 62
End: 2022-08-09
Payer: COMMERCIAL

## 2022-08-09 NOTE — TELEPHONE ENCOUNTER
Patient missed dose due to COVID in July. Patient is due to inject on 8/15. Patient reports skin is fine. [facility] will set up refill for Sep 15.

## 2022-08-17 ENCOUNTER — OFFICE VISIT (OUTPATIENT)
Dept: URGENT CARE | Facility: CLINIC | Age: 62
End: 2022-08-17
Payer: COMMERCIAL

## 2022-08-17 VITALS
WEIGHT: 165 LBS | RESPIRATION RATE: 18 BRPM | BODY MASS INDEX: 30.36 KG/M2 | HEART RATE: 123 BPM | OXYGEN SATURATION: 96 % | TEMPERATURE: 98 F | HEIGHT: 62 IN | SYSTOLIC BLOOD PRESSURE: 122 MMHG | DIASTOLIC BLOOD PRESSURE: 88 MMHG

## 2022-08-17 DIAGNOSIS — N10 ACUTE PYELONEPHRITIS: Primary | ICD-10-CM

## 2022-08-17 DIAGNOSIS — R10.9 FLANK PAIN: ICD-10-CM

## 2022-08-17 LAB
BILIRUB UR QL STRIP: NEGATIVE
CLARITY UR: CLEAR
COLOR URINE: YELLOW
GLUCOSE UR QL STRIP: NEGATIVE
KETONES UR QL STRIP: NEGATIVE
LEUKOCYTE ESTERASE UR QL STRIP: POSITIVE
PH, POC UA: 7.5 (ref 5–8)
POC BLOOD, URINE: POSITIVE
POC NITRATES, URINE: NEGATIVE
PROT UR QL STRIP: POSITIVE
SP GR UR STRIP: 1 (ref 1–1.03)
UROBILINOGEN UR STRIP-ACNC: NORMAL (ref 0.1–1.1)

## 2022-08-17 PROCEDURE — 1159F MED LIST DOCD IN RCRD: CPT | Mod: CPTII,S$GLB,, | Performed by: NURSE PRACTITIONER

## 2022-08-17 PROCEDURE — 1159F PR MEDICATION LIST DOCUMENTED IN MEDICAL RECORD: ICD-10-PCS | Mod: CPTII,S$GLB,, | Performed by: NURSE PRACTITIONER

## 2022-08-17 PROCEDURE — 1160F PR REVIEW ALL MEDS BY PRESCRIBER/CLIN PHARMACIST DOCUMENTED: ICD-10-PCS | Mod: CPTII,S$GLB,, | Performed by: NURSE PRACTITIONER

## 2022-08-17 PROCEDURE — 87086 URINE CULTURE/COLONY COUNT: CPT | Performed by: NURSE PRACTITIONER

## 2022-08-17 PROCEDURE — 81003 POCT URINALYSIS, DIPSTICK, AUTOMATED, W/O SCOPE: ICD-10-PCS | Mod: QW,S$GLB,, | Performed by: NURSE PRACTITIONER

## 2022-08-17 PROCEDURE — 87186 SC STD MICRODIL/AGAR DIL: CPT | Performed by: NURSE PRACTITIONER

## 2022-08-17 PROCEDURE — 3079F DIAST BP 80-89 MM HG: CPT | Mod: CPTII,S$GLB,, | Performed by: NURSE PRACTITIONER

## 2022-08-17 PROCEDURE — 1160F RVW MEDS BY RX/DR IN RCRD: CPT | Mod: CPTII,S$GLB,, | Performed by: NURSE PRACTITIONER

## 2022-08-17 PROCEDURE — 96372 THER/PROPH/DIAG INJ SC/IM: CPT | Mod: S$GLB,,, | Performed by: NURSE PRACTITIONER

## 2022-08-17 PROCEDURE — 3008F PR BODY MASS INDEX (BMI) DOCUMENTED: ICD-10-PCS | Mod: CPTII,S$GLB,, | Performed by: NURSE PRACTITIONER

## 2022-08-17 PROCEDURE — 3008F BODY MASS INDEX DOCD: CPT | Mod: CPTII,S$GLB,, | Performed by: NURSE PRACTITIONER

## 2022-08-17 PROCEDURE — 3079F PR MOST RECENT DIASTOLIC BLOOD PRESSURE 80-89 MM HG: ICD-10-PCS | Mod: CPTII,S$GLB,, | Performed by: NURSE PRACTITIONER

## 2022-08-17 PROCEDURE — 87088 URINE BACTERIA CULTURE: CPT | Performed by: NURSE PRACTITIONER

## 2022-08-17 PROCEDURE — 81003 URINALYSIS AUTO W/O SCOPE: CPT | Mod: QW,S$GLB,, | Performed by: NURSE PRACTITIONER

## 2022-08-17 PROCEDURE — 87077 CULTURE AEROBIC IDENTIFY: CPT | Mod: 59 | Performed by: NURSE PRACTITIONER

## 2022-08-17 PROCEDURE — 3074F SYST BP LT 130 MM HG: CPT | Mod: CPTII,S$GLB,, | Performed by: NURSE PRACTITIONER

## 2022-08-17 PROCEDURE — 3074F PR MOST RECENT SYSTOLIC BLOOD PRESSURE < 130 MM HG: ICD-10-PCS | Mod: CPTII,S$GLB,, | Performed by: NURSE PRACTITIONER

## 2022-08-17 PROCEDURE — 99213 PR OFFICE/OUTPT VISIT, EST, LEVL III, 20-29 MIN: ICD-10-PCS | Mod: 25,S$GLB,, | Performed by: NURSE PRACTITIONER

## 2022-08-17 PROCEDURE — 96372 PR INJECTION,THERAP/PROPH/DIAG2ST, IM OR SUBCUT: ICD-10-PCS | Mod: S$GLB,,, | Performed by: NURSE PRACTITIONER

## 2022-08-17 PROCEDURE — 99213 OFFICE O/P EST LOW 20 MIN: CPT | Mod: 25,S$GLB,, | Performed by: NURSE PRACTITIONER

## 2022-08-17 RX ORDER — KETOROLAC TROMETHAMINE 30 MG/ML
30 INJECTION, SOLUTION INTRAMUSCULAR; INTRAVENOUS
Status: COMPLETED | OUTPATIENT
Start: 2022-08-17 | End: 2022-08-17

## 2022-08-17 RX ORDER — CEFDINIR 300 MG/1
300 CAPSULE ORAL 2 TIMES DAILY
Qty: 14 CAPSULE | Refills: 0 | Status: SHIPPED | OUTPATIENT
Start: 2022-08-17 | End: 2022-08-24

## 2022-08-17 RX ADMIN — KETOROLAC TROMETHAMINE 30 MG: 30 INJECTION, SOLUTION INTRAMUSCULAR; INTRAVENOUS at 01:08

## 2022-08-17 NOTE — PROGRESS NOTES
"Subjective:       Patient ID: Lucia Matias is a 62 y.o. female.    Vitals:  height is 5' 2" (1.575 m) and weight is 74.8 kg (165 lb). Her oral temperature is 98 °F (36.7 °C). Her blood pressure is 122/88 and her pulse is 123 (abnormal). Her respiration is 18 and oxygen saturation is 96%.     Chief Complaint: Flank Pain    Right flank pain x 2 weeks with dysuria which began this am.  Pain is severe. No trauma but did recently move into her home. No fever or chills.     Flank Pain  This is a new problem. The current episode started 1 to 4 weeks ago. The problem occurs constantly. The quality of the pain is described as aching and burning. The pain does not radiate. The pain is at a severity of 8/10. The pain is moderate. The pain is the same all the time. The symptoms are aggravated by bending, lying down, urinating, coughing and position. Stiffness is present all day. Associated symptoms include dysuria and pelvic pain. Pertinent negatives include no abdominal pain, chest pain or fever. Treatments tried: advil. The treatment provided no relief.       Constitution: Negative for chills, sweating, fatigue and fever.   Neck: Negative for painful lymph nodes.   Cardiovascular: Negative for chest pain, palpitations and sob on exertion.   Respiratory: Negative for chest tightness, cough and shortness of breath.    Gastrointestinal: Negative for abdominal pain, nausea, vomiting, constipation, diarrhea, bright red blood in stool, dark colored stools and rectal bleeding.   Genitourinary: Positive for dysuria, flank pain and pelvic pain. Negative for frequency, urgency, hematuria, vaginal pain, vaginal discharge, vaginal bleeding, vaginal odor and genital sore.   Musculoskeletal: Negative for muscle ache.   Skin: Negative for color change, pale and rash.   Hematologic/Lymphatic: Negative for swollen lymph nodes.       Objective:      Physical Exam   Constitutional: She is oriented to person, place, and time. She appears " well-developed. She is cooperative.  Non-toxic appearance. She does not appear ill. No distress.   HENT:   Head: Normocephalic and atraumatic.   Ears:   Right Ear: External ear normal.   Left Ear: External ear normal.   Nose: Nose normal.   Mouth/Throat: Oropharynx is clear and moist and mucous membranes are normal.   Eyes: Conjunctivae and lids are normal.   Neck: Trachea normal and phonation normal. Neck supple.   Cardiovascular: Normal rate.   Pulmonary/Chest: Effort normal. No respiratory distress.   Abdominal: Normal appearance and bowel sounds are normal. She exhibits no distension, no abdominal bruit, no pulsatile midline mass and no mass. Soft. There is no abdominal tenderness. There is no rebound, no guarding, no left CVA tenderness and no right CVA tenderness.   Musculoskeletal: Normal range of motion.         General: No deformity. Normal range of motion.   Neurological: She is alert and oriented to person, place, and time. She has normal strength and normal reflexes. No sensory deficit.   Skin: Skin is warm, dry, intact, not diaphoretic and not pale.   Psychiatric: Her speech is normal and behavior is normal. Judgment and thought content normal.   Nursing note and vitals reviewed.        Results for orders placed or performed in visit on 08/17/22   POCT Urinalysis, Dipstick, Automated, W/O Scope   Result Value Ref Range    POC Blood, Urine Positive (A) Negative    POC Bilirubin, Urine Negative Negative    POC Urobilinogen, Urine Normal 0.1 - 1.1    POC Ketones, Urine Negative Negative    POC Protein, Urine Positive (A) Negative    POC Nitrates, Urine Negative Negative    POC Glucose, Urine Negative Negative    pH, UA 7.5 5 - 8    POC Specific Gravity, Urine 1.005 1.003 - 1.029    POC Leukocytes, Urine Positive (A) Negative    COLOR URINE Yellow     Clarity, UA Clear        Assessment:       1. Acute pyelonephritis    2. Flank pain          Plan:         Acute pyelonephritis - possible nephrolithiasis,  unable to perform radiology services. Recommend xray at other location. ER precautions.    -     cefdinir (OMNICEF) 300 MG capsule; Take 1 capsule (300 mg total) by mouth 2 (two) times daily. for 7 days  Dispense: 14 capsule; Refill: 0  -     ketorolac injection 30 mg    Flank pain  -     POCT Urinalysis, Dipstick, Automated, W/O Scope        -     Urine culture               Patient Instructions     See additional patient Instructions provided    Drink plenty of water  You may take AZO (Pyridium or phenazophyridine) for discomfort which is over the counter. Take as directed on  packaging. It will turn urine bright orange but this will resolve when you stop the medication  Avoid a full bladder, do not postpone urination  Avoid deodorant soaps, body wash, bubble bath, douches, scented toilet paper, deodorant tampons or pads, feminine wipes, chronic pad use   Avoid tight, synthetic clothing, chlorine and wearing wet bathing suits for prolonged periods  Wear cotton underwear, avoid thong underwear and no underwear to bed   Take showers instead of baths and use a hair dryer on cool setting afterwards to dry   Wear cotton to exercise and shower immediately after exercise and change clothes   Perineal hygiene, wipe front to back, regular bladder habits, increase oral fluid intake at first sign of infection  Void after intercourse  Go to the ER for : fever, chills, n/v, back pain, worsening dysuria, hematuria;  Call your PCP if symptoms are not resolved at end of therapy or if new symptoms develop       Patient Instructions   - You must understand that you have received an Urgent Care treatment only and that you may be released before all of your medical problems are known or treated.   - You, the patient, will arrange for follow up care as instructed.   - If your condition worsens or fails to improve we recommend that you receive another evaluation at the ER immediately or contact your PCP to discuss your  concerns or return here.     Advised on return/follow-up precautions. Advised on ER precautions. Answered all patient questions. Patient verbalized understanding and voiced agreement with current treatment plan.

## 2022-08-17 NOTE — PATIENT INSTRUCTIONS
See additional patient Instructions provided    Drink plenty of water  You may take AZO (Pyridium or phenazophyridine) for discomfort which is over the counter. Take as directed on  packaging. It will turn urine bright orange but this will resolve when you stop the medication  Avoid a full bladder, do not postpone urination  Avoid deodorant soaps, body wash, bubble bath, douches, scented toilet paper, deodorant tampons or pads, feminine wipes, chronic pad use   Avoid tight, synthetic clothing, chlorine and wearing wet bathing suits for prolonged periods  Wear cotton underwear, avoid thong underwear and no underwear to bed   Take showers instead of baths and use a hair dryer on cool setting afterwards to dry   Wear cotton to exercise and shower immediately after exercise and change clothes   Perineal hygiene, wipe front to back, regular bladder habits, increase oral fluid intake at first sign of infection  Void after intercourse  Go to the ER for : fever, chills, n/v, back pain, worsening dysuria, hematuria;  Call your PCP if symptoms are not resolved at end of therapy or if new symptoms develop       Patient Instructions   - You must understand that you have received an Urgent Care treatment only and that you may be released before all of your medical problems are known or treated.   - You, the patient, will arrange for follow up care as instructed.   - If your condition worsens or fails to improve we recommend that you receive another evaluation at the ER immediately or contact your PCP to discuss your concerns or return here.     Advised on return/follow-up precautions. Advised on ER precautions. Answered all patient questions. Patient verbalized understanding and voiced agreement with current treatment plan.

## 2022-08-18 DIAGNOSIS — Z98.84 S/P LAPAROSCOPIC SLEEVE GASTRECTOMY: ICD-10-CM

## 2022-08-18 DIAGNOSIS — R63.4 WEIGHT LOSS: ICD-10-CM

## 2022-08-19 RX ORDER — OMEPRAZOLE 40 MG/1
40 CAPSULE, DELAYED RELEASE ORAL EVERY MORNING
Qty: 90 CAPSULE | Refills: 1 | Status: SHIPPED | OUTPATIENT
Start: 2022-08-19 | End: 2023-03-22 | Stop reason: SDUPTHER

## 2022-08-20 LAB
BACTERIA UR CULT: ABNORMAL
BACTERIA UR CULT: ABNORMAL

## 2022-08-23 ENCOUNTER — TELEPHONE (OUTPATIENT)
Dept: NEPHROLOGY | Facility: CLINIC | Age: 62
End: 2022-08-23
Payer: COMMERCIAL

## 2022-08-23 NOTE — TELEPHONE ENCOUNTER
Tried calling pt 3 times phone was answered then hung up .    ----- Message from Carmen Rivers sent at 8/22/2022  8:08 AM CDT -----  Regarding: New patient appointment  Pt contacted office on this morning through Live Chat wanting to schedule an appointment with Nephrology as soon as possible.  She has not had any labs done and has not been referred by another physician.  I informed labs are required.  Can we contact the patient at [phone] to inform of the required information and discuss what may be going on with her?  Thank you

## 2022-08-30 ENCOUNTER — PATIENT MESSAGE (OUTPATIENT)
Dept: ADMINISTRATIVE | Facility: OTHER | Age: 62
End: 2022-08-30
Payer: COMMERCIAL

## 2022-08-31 ENCOUNTER — TELEPHONE (OUTPATIENT)
Dept: URGENT CARE | Facility: CLINIC | Age: 62
End: 2022-08-31
Payer: COMMERCIAL

## 2022-08-31 ENCOUNTER — TELEPHONE (OUTPATIENT)
Dept: UROLOGY | Facility: CLINIC | Age: 62
End: 2022-08-31

## 2022-08-31 ENCOUNTER — OFFICE VISIT (OUTPATIENT)
Dept: UROLOGY | Facility: CLINIC | Age: 62
End: 2022-08-31
Payer: COMMERCIAL

## 2022-08-31 VITALS — HEART RATE: 120 BPM | DIASTOLIC BLOOD PRESSURE: 59 MMHG | SYSTOLIC BLOOD PRESSURE: 124 MMHG

## 2022-08-31 DIAGNOSIS — R30.0 DYSURIA: Primary | ICD-10-CM

## 2022-08-31 DIAGNOSIS — N39.0 RECURRENT UTI: ICD-10-CM

## 2022-08-31 DIAGNOSIS — R39.15 URINARY URGENCY: ICD-10-CM

## 2022-08-31 DIAGNOSIS — R35.0 URINARY FREQUENCY: ICD-10-CM

## 2022-08-31 PROCEDURE — 1160F PR REVIEW ALL MEDS BY PRESCRIBER/CLIN PHARMACIST DOCUMENTED: ICD-10-PCS | Mod: CPTII,S$GLB,, | Performed by: NURSE PRACTITIONER

## 2022-08-31 PROCEDURE — 99203 OFFICE O/P NEW LOW 30 MIN: CPT | Mod: S$GLB,,, | Performed by: NURSE PRACTITIONER

## 2022-08-31 PROCEDURE — 99203 PR OFFICE/OUTPT VISIT, NEW, LEVL III, 30-44 MIN: ICD-10-PCS | Mod: S$GLB,,, | Performed by: NURSE PRACTITIONER

## 2022-08-31 PROCEDURE — 3078F PR MOST RECENT DIASTOLIC BLOOD PRESSURE < 80 MM HG: ICD-10-PCS | Mod: CPTII,S$GLB,, | Performed by: NURSE PRACTITIONER

## 2022-08-31 PROCEDURE — 3078F DIAST BP <80 MM HG: CPT | Mod: CPTII,S$GLB,, | Performed by: NURSE PRACTITIONER

## 2022-08-31 PROCEDURE — 1159F PR MEDICATION LIST DOCUMENTED IN MEDICAL RECORD: ICD-10-PCS | Mod: CPTII,S$GLB,, | Performed by: NURSE PRACTITIONER

## 2022-08-31 PROCEDURE — 1159F MED LIST DOCD IN RCRD: CPT | Mod: CPTII,S$GLB,, | Performed by: NURSE PRACTITIONER

## 2022-08-31 PROCEDURE — 3074F SYST BP LT 130 MM HG: CPT | Mod: CPTII,S$GLB,, | Performed by: NURSE PRACTITIONER

## 2022-08-31 PROCEDURE — 3074F PR MOST RECENT SYSTOLIC BLOOD PRESSURE < 130 MM HG: ICD-10-PCS | Mod: CPTII,S$GLB,, | Performed by: NURSE PRACTITIONER

## 2022-08-31 PROCEDURE — 1160F RVW MEDS BY RX/DR IN RCRD: CPT | Mod: CPTII,S$GLB,, | Performed by: NURSE PRACTITIONER

## 2022-08-31 RX ORDER — PHENAZOPYRIDINE HYDROCHLORIDE 200 MG/1
200 TABLET, FILM COATED ORAL 3 TIMES DAILY PRN
Qty: 30 TABLET | Refills: 0 | Status: SHIPPED | OUTPATIENT
Start: 2022-08-31 | End: 2022-09-10

## 2022-08-31 RX ORDER — CIPROFLOXACIN 500 MG/1
500 TABLET ORAL EVERY 12 HOURS
Qty: 28 TABLET | Refills: 0 | Status: SHIPPED | OUTPATIENT
Start: 2022-08-31 | End: 2022-09-14

## 2022-08-31 NOTE — PROGRESS NOTES
"Subjective:      Lucia Matias is a 62 y.o. female who was self-referred for evaluation of her frequent UTIs.      Recurrent UTIs  Patient complains of recurrent urinary tract infections.  These have been occuring for 4 months and she reports 3 infections in the past year.  Her most recent infection was in August of this year.  Of her recent infections, 2 are culture proven, including E. coli.  She describes associated symptoms during these episodes as burning with urination, frequency, urgency, and suprapubic pain and right flank pain .  She denies associated fever.  She reports that symptoms improve briefly with antibiotic treatment, which have included Amoxicillin, Trimethoprim/sulfa, and cefdinir .  She is is sexually active.  The timing of her infections is related to intercourse.  Other treatments have included none antibiotics.  Other urologic history includes none.    Component Urine Culture, Routine   Latest Ref Rng & Units    8/17/2022    ESCHERICHIA COLI ESBL (A) . . ESCHERICHIA COLI (A) . . .   5/3/2022 ESCHERICHIA COLI ESBL (A) . . .     Completed rx for keflex x 5 days in May adjusted to bactrim x 7 days once the sensitivities were reviewed. Most recently omnicef x 7 days; however the bacteria is resistant to omnicef.  CT abdomen/pelvis (5/3/22)- "Kidneys and ureters: Bilateral renal cysts are again noted.  No evidence of urolithiasis, hydronephrosis or other acute findings."    Today she reports dysuria, frequency, urgency, suprapubic pain and right flank pain. Denies N/V and fever/chills. Drinks mostly water during the day. Eats yogurt. Denies vaginal dryness.     The following portions of the patient's history were reviewed and updated as appropriate: allergies, current medications, past family history, past medical history, past social history, past surgical history and problem list.    Review of Systems  Constitutional: no fever or chills  ENT: no nasal congestion or sore throat  Respiratory: no " "cough or shortness of breath  Cardiovascular: no chest pain or palpitations  Gastrointestinal: no nausea or vomiting, tolerating diet  Genitourinary: as per HPI  Hematologic/Lymphatic: no easy bruising or lymphadenopathy  Musculoskeletal: no arthralgias or myalgias  Neurological: no seizures or tremors  Behavioral/Psych: no auditory or visual hallucinations     Objective:   Vital Signs:  Vitals:    08/31/22 0713   BP: (!) 124/59   Pulse: (!) 120     Physical Exam   General: alert and oriented, no acute distress  Head: normocephalic, atraumatic  Neck: supple, normal ROM  Respiratory: Symmetric expansion, non-labored breathing  Cardiovascular: regular rate and rhythm  Abdomen: soft, non tender, non distended  Pelvic: deferred  Skin: normal coloration and turgor, no rashes, no suspicious skin lesions noted  Neuro: alert and oriented x3, no gross deficits  Psych: normal judgment and insight, normal mood/affect, and non-anxious    Lab Review   Urinalysis demonstrates : leukocytes and RBCs  Lab Results   Component Value Date    WBC 10.56 05/03/2022    HGB 16.5 (H) 05/03/2022    HCT 49.8 (H) 05/03/2022    MCV 94 05/03/2022     05/03/2022     Lab Results   Component Value Date    CREATININE 0.91 05/03/2022    BUN 14 05/03/2022       Imaging   (all images personally reviewed; agree with report below)  CT abdomen/pelvis- "Kidneys and ureters: Bilateral renal cysts are again noted.  No evidence of urolithiasis, hydronephrosis or other acute findings."  Assessment:   Dysuria  Recurrent UTI  Urinary frequency  Urinary urgency  Plan:   Diagnoses and all orders for this visit:    Dysuria  -     Urine culture  -     ciprofloxacin HCl (CIPRO) 500 MG tablet; Take 1 tablet (500 mg total) by mouth every 12 (twelve) hours. for 14 days  -     phenazopyridine (PYRIDIUM) 200 MG tablet; Take 1 tablet (200 mg total) by mouth 3 (three) times daily as needed for Pain.    Recurrent UTI  -     Urine culture; Standing    Urinary " frequency    Urinary urgency    Plan:    Discussed various etiologies for recurrent UTIs as well as the difference between recurrent and persistent UTIs. Suspect that her infection has been undertreated  Will send urine culture today and treat as indicated. Cipro BID x 14 days, will adjust if needed based on culture results   Repeat culture after she completes the antibiotic to ensure it has resolved- standing order placed   Expressed importance of obtaining culture any time she thinks she has UT  Discussed preventive measures including adequate hydration, regular voiding, and voiding after intercourse.  Defer estrace for now  Discussed antibiotic prophylaxis and post coital antibiotics, defer for now

## 2022-09-01 ENCOUNTER — LAB VISIT (OUTPATIENT)
Dept: LAB | Facility: OTHER | Age: 62
End: 2022-09-01
Attending: NURSE PRACTITIONER
Payer: COMMERCIAL

## 2022-09-01 DIAGNOSIS — N39.0 RECURRENT UTI: ICD-10-CM

## 2022-09-01 DIAGNOSIS — R30.0 DYSURIA: ICD-10-CM

## 2022-09-01 PROCEDURE — 87086 URINE CULTURE/COLONY COUNT: CPT | Mod: 59 | Performed by: NURSE PRACTITIONER

## 2022-09-02 LAB
BACTERIA UR CULT: NO GROWTH
BACTERIA UR CULT: NO GROWTH

## 2022-09-06 ENCOUNTER — SPECIALTY PHARMACY (OUTPATIENT)
Dept: PHARMACY | Facility: CLINIC | Age: 62
End: 2022-09-06
Payer: COMMERCIAL

## 2022-09-06 NOTE — TELEPHONE ENCOUNTER
Specialty Pharmacy - Refill Coordination    Specialty Medication Orders Linked to Encounter      Flowsheet Row Most Recent Value   Medication #1 COSENTYX PEN, 2 PENS, 150 mg/mL PnIj (Order#[id number], Rx#[id number])            Refill Questions - Documented Responses      Flowsheet Row Most Recent Value   Patient Availability and HIPAA Verification    Does patient want to proceed with activity? Yes   HIPAA/medical authority confirmed? Yes   Relationship to patient of person spoken to? Self   Refill Screening Questions    Changes to allergies? No   Changes to medications? Yes  [Macrobid, penicillin, ciprofloxacin]   New conditions since last clinic visit? No   Unplanned office visit, urgent care, ED, or hospital admission in the last 4 weeks? No   How does patient/caregiver feel medication is working? Very good   Financial problems or insurance changes? No   How many doses of your specialty medications were missed in the last 4 weeks? 0   Would patient like to speak to a pharmacist? No   When does the patient need to receive the medication? 09/15/22   Refill Delivery Questions    How will the patient receive the medication? Delivery Lorene   When does the patient need to receive the medication? 09/15/22   Shipping Address Home   Address in [facility] confirmed and updated if neccessary? Yes   Expected Copay ($) 441.99   Is the patient able to afford the medication copay? Yes   Payment Method  CC on file, one time CC provided   Days supply of Refill 28   Supplies needed? No supplies needed   Refill activity completed? Yes   Refill activity plan Refill scheduled   Shipment/Pickup Date: 09/12/22            Current Outpatient Medications   Medication Sig    amitriptyline (ELAVIL) 50 MG tablet Take 1 tablet (50 mg total) by mouth nightly as needed for Insomnia.    atorvastatin (LIPITOR) 20 MG tablet Take 1 tablet (20 mg total) by mouth every evening.    B-complex with vitamin C (VITAMIN B COMPLEX-C ORAL)  Take by mouth.    busPIRone (BUSPAR) 15 MG tablet Take 1 tablet (15 mg total) by mouth 2 (two) times daily.    calcium-vitamin D 250-100 mg-unit per tablet Take 1 tablet by mouth 2 (two) times daily.    cholecalciferol, vitamin D3, (VITAMIN D3) 50 mcg (2,000 unit) Tab Take by mouth once daily.    ciprofloxacin HCl (CIPRO) 500 MG tablet Take 1 tablet (500 mg total) by mouth every 12 (twelve) hours. for 14 days    clonazePAM (KLONOPIN) 1 MG tablet Take 1 tablet (1 mg total) by mouth 2 (two) times daily as needed for Anxiety (panic).    COSENTYX PEN, 2 PENS, 150 mg/mL PnIj Inject 300mg (2 pens) into the skin every 4 weeks    cyanocobalamin 500 MCG tablet Take 500 mcg by mouth once daily.    fluticasone-umeclidin-vilanter (TRELEGY ELLIPTA) 100-62.5-25 mcg DsDv Inhale 1 puff into the lungs once daily.    metoprolol tartrate (LOPRESSOR) 50 MG tablet Take 1 tablet (50 mg total) by mouth 2 (two) times daily with meals.    multivitamin (THERAGRAN) per tablet Take 1 tablet by mouth once daily.    nystatin (MYCOSTATIN) cream Apply topically 2 (two) times daily. (Patient not taking: No sig reported)    omeprazole (PRILOSEC) 40 MG capsule Take 1 capsule (40 mg total) by mouth every morning. Take one capsule by mouth every morning.    ondansetron (ZOFRAN-ODT) 8 MG TbDL Dissolve 1 tablet (8 mg total) by mouth every 6 (six) hours as needed. (Patient not taking: Reported on 8/31/2022)    phenazopyridine (PYRIDIUM) 200 MG tablet Take 1 tablet (200 mg total) by mouth 3 (three) times daily as needed for Pain.    zolpidem (AMBIEN) 5 MG Tab Take 1 tablet (5 mg total) by mouth nightly as needed (insomnia).   Last reviewed on 8/31/2022  7:51 AM by Gardenia Irizarry, NP    Review of patient's allergies indicates:   Allergen Reactions    Succinimides Anaphylaxis     Son has MH    Last reviewed on  8/31/2022 7:28 AM by Gardenia Irizarry      Tasks added this encounter   10/6/2022 - Refill Call (Auto Added)   Tasks due within next 3 months   No  tasks due.     Lisseth Johnson, PharmD  [facility]  [address]  [facility] [address]  Phone: [phone]  Fax: [phone]

## 2022-09-07 ENCOUNTER — PATIENT MESSAGE (OUTPATIENT)
Dept: BARIATRICS | Facility: CLINIC | Age: 62
End: 2022-09-07
Payer: COMMERCIAL

## 2022-09-19 DIAGNOSIS — F41.0 PANIC ATTACK: ICD-10-CM

## 2022-09-19 DIAGNOSIS — F41.1 GAD (GENERALIZED ANXIETY DISORDER): ICD-10-CM

## 2022-09-19 RX ORDER — CLONAZEPAM 1 MG/1
1 TABLET ORAL 2 TIMES DAILY PRN
Qty: 60 TABLET | Refills: 0 | Status: SHIPPED | OUTPATIENT
Start: 2022-09-19 | End: 2022-10-18 | Stop reason: SDUPTHER

## 2022-09-27 ENCOUNTER — IMMUNIZATION (OUTPATIENT)
Dept: INTERNAL MEDICINE | Facility: CLINIC | Age: 62
End: 2022-09-27
Payer: COMMERCIAL

## 2022-09-27 DIAGNOSIS — Z23 NEED FOR VACCINATION: Primary | ICD-10-CM

## 2022-09-27 PROCEDURE — 91312 COVID-19, MRNA, LNP-S, BIVALENT BOOSTER, PF, 30 MCG/0.3 ML DOSE: ICD-10-PCS | Mod: S$GLB,,, | Performed by: INTERNAL MEDICINE

## 2022-09-27 PROCEDURE — 91312 COVID-19, MRNA, LNP-S, BIVALENT BOOSTER, PF, 30 MCG/0.3 ML DOSE: CPT | Mod: S$GLB,,, | Performed by: INTERNAL MEDICINE

## 2022-09-27 PROCEDURE — 0124A COVID-19, MRNA, LNP-S, BIVALENT BOOSTER, PF, 30 MCG/0.3 ML DOSE: CPT | Mod: CV19,PBBFAC | Performed by: INTERNAL MEDICINE

## 2022-09-28 ENCOUNTER — RESEARCH ENCOUNTER (OUTPATIENT)
Dept: RESEARCH | Facility: OTHER | Age: 62
End: 2022-09-28
Payer: COMMERCIAL

## 2022-09-28 NOTE — RESEARCH
Sponsor: [employer]     Study Title/IRB Number: [id number]    Principle Investigator: Dr. Gil Calvin    Patient eligibility was checked prior to enrollment in the study. Patient met the following inclusion and exclusion criteria:     INCLUSION CRITERIA  Participant age is 50 years or older at the time of signing the Informed Consent form  Participant is capable of giving signed Informed Consent, which includes compliance with the requirements and restrictions in the informed consent form and the protocol    EXCLUSION CRITERIA  Participant is undergoing or referred for diagnostic evaluation due to clinical suspicion for cancer  Participant has a personal history of invasive or hematologic malignancy, diagnosed within the last 3 years prior to expected enrollment date or diagnosed greater than 3 years prior to expected enrollment date and never treated  Participant has had definitive treatment for invasive or hematologic malignancy within the 3 years prior to expected enrollment date  Participant is not able to comply with protocol procedures  Participant is not a current patient at a participating center  Participant is currently enrolled or was previously enrolled in another [employer]-sponsored study  Participant is current or previous employee/contractor of [employer]  Participant is currently pregnant (by participant's self-report of pregnancy status)    Prior to the Informed Consent (IC) being signed, or any study protocol required data collection, testing, procedure, or intervention being performed, the following was done and/or discussed:  Patient was given a copy of the IC for review   Purpose of the study and qualifications to participate   Study design, Follow up schedule, and tests or procedures done at each visit  Confidentiality and HIPAA Authorization for Release of Medical Records for the research trial/ subject's rights/research related injury  Risk, Benefits, Alternative Treatments, Compensation and  Costs  Participation in the research trial is voluntary and patient may withdraw at anytime  Contact information for study related questions    Patient verbalizes understanding of the above: Yes  Contact information for CRC and PI given to patient: Yes  Patient able to adequately summarize: the purpose of the study, the risks associated with the study, and all procedures, testing, and follow-ups associated with the study: Yes    Patient signed the informed consent form for the research study with an IRB approval date of 4/25/2022. Each page of the consent form was reviewed with patient and all questions answered satisfactorily.   Patient received a copy of the consent form. The original consent was scanned into electronic medical records.    Following IC being signed and prior to blood draw, patient completed all baseline/pretest questionnaires. The following specimens were collected from the pt at the time of this encounter: 40ml blood.     Blood draw time: 9:07 am  Blood draw location: Right arm    Rey Jovel was present in the room during consent.

## 2022-10-04 DIAGNOSIS — I10 HYPERTENSION, UNSPECIFIED TYPE: ICD-10-CM

## 2022-10-04 RX ORDER — METOPROLOL TARTRATE 50 MG/1
50 TABLET ORAL 2 TIMES DAILY WITH MEALS
Qty: 180 TABLET | Refills: 3 | OUTPATIENT
Start: 2022-10-04

## 2022-10-04 RX ORDER — ATORVASTATIN CALCIUM 20 MG/1
20 TABLET, FILM COATED ORAL NIGHTLY
Qty: 90 TABLET | Refills: 3 | OUTPATIENT
Start: 2022-10-04

## 2022-10-04 NOTE — TELEPHONE ENCOUNTER
Care Due:                  Date            Visit Type   Department     Provider  --------------------------------------------------------------------------------                                EP -                              PRIMARY      DESC FAMILY  Last Visit: 04-      [facility]  Hetal Banks  Next Visit: None Scheduled  None         None Found                                                            Last  Test          Frequency    Reason                     Performed    Due Date  --------------------------------------------------------------------------------    Office Visit  12 months..  atorvastatin,              04- 04-                             fluticasone-umeclidin-migel                             anter, metoprolol........    [employer] Embedded Care Gaps. Reference number: [id number]. 10/04/2022   9:01:39 AM CDT

## 2022-10-06 ENCOUNTER — PATIENT MESSAGE (OUTPATIENT)
Dept: BARIATRICS | Facility: CLINIC | Age: 62
End: 2022-10-06
Payer: COMMERCIAL

## 2022-10-06 ENCOUNTER — SPECIALTY PHARMACY (OUTPATIENT)
Dept: PHARMACY | Facility: CLINIC | Age: 62
End: 2022-10-06
Payer: COMMERCIAL

## 2022-10-06 ENCOUNTER — PATIENT OUTREACH (OUTPATIENT)
Dept: ADMINISTRATIVE | Facility: HOSPITAL | Age: 62
End: 2022-10-06
Payer: COMMERCIAL

## 2022-10-06 DIAGNOSIS — I10 HYPERTENSION, UNSPECIFIED TYPE: ICD-10-CM

## 2022-10-06 RX ORDER — ATORVASTATIN CALCIUM 20 MG/1
20 TABLET, FILM COATED ORAL NIGHTLY
Qty: 30 TABLET | Refills: 0 | Status: SHIPPED | OUTPATIENT
Start: 2022-10-06 | End: 2022-10-17 | Stop reason: SDUPTHER

## 2022-10-06 RX ORDER — METOPROLOL TARTRATE 50 MG/1
50 TABLET ORAL 2 TIMES DAILY WITH MEALS
Qty: 60 TABLET | Refills: 0 | Status: SHIPPED | OUTPATIENT
Start: 2022-10-06 | End: 2022-10-17 | Stop reason: SDUPTHER

## 2022-10-06 NOTE — TELEPHONE ENCOUNTER
No new care gaps identified.  [facility] Embedded Care Gaps. Reference number: [id number]. 10/06/2022   2:58:39 PM CDT

## 2022-10-06 NOTE — TELEPHONE ENCOUNTER
Specialty Pharmacy - Refill Coordination    Specialty Medication Orders Linked to Encounter      Flowsheet Row Most Recent Value   Medication #1 COSENTYX PEN, 2 PENS, 150 mg/mL PnIj (Order#[id number], Rx#[id number])            Refill Questions - Documented Responses      Flowsheet Row Most Recent Value   Patient Availability and HIPAA Verification    Does patient want to proceed with activity? Yes   HIPAA/medical authority confirmed? Yes   Relationship to patient of person spoken to? Self   Refill Screening Questions    Changes to allergies? No   Changes to medications? No   New conditions since last clinic visit? No   Unplanned office visit, urgent care, ED, or hospital admission in the last 4 weeks? No   How does patient/caregiver feel medication is working? Good   Financial problems or insurance changes? No   How many doses of your specialty medications were missed in the last 4 weeks? 0   Would patient like to speak to a pharmacist? No   When does the patient need to receive the medication? 10/15/22   Refill Delivery Questions    How will the patient receive the medication? MEDRx   When does the patient need to receive the medication? 10/15/22   Shipping Address Home   Address in [facility] confirmed and updated if neccessary? Yes   Expected Copay ($) 441.99   Is the patient able to afford the medication copay? Yes   Payment Method  CC on file   Days supply of Refill 28   Supplies needed? No supplies needed   Refill activity completed? Yes   Refill activity plan Refill scheduled   Shipment/Pickup Date: 10/11/22            Current Outpatient Medications   Medication Sig    amitriptyline (ELAVIL) 50 MG tablet Take 1 tablet (50 mg total) by mouth nightly as needed for Insomnia.    atorvastatin (LIPITOR) 20 MG tablet Take 1 tablet (20 mg total) by mouth every evening.    B-complex with vitamin C (VITAMIN B COMPLEX-C ORAL) Take by mouth.    busPIRone (BUSPAR) 15 MG tablet Take 1 tablet (15 mg  total) by mouth 2 (two) times daily.    calcium-vitamin D 250-100 mg-unit per tablet Take 1 tablet by mouth 2 (two) times daily.    cholecalciferol, vitamin D3, (VITAMIN D3) 50 mcg (2,000 unit) Tab Take by mouth once daily.    clonazePAM (KLONOPIN) 1 MG tablet Take 1 tablet (1 mg total) by mouth 2 (two) times daily as needed for Anxiety (panic).    COSENTYX PEN, 2 PENS, 150 mg/mL PnIj Inject 300mg (2 pens) into the skin every 4 weeks    cyanocobalamin 500 MCG tablet Take 500 mcg by mouth once daily.    fluticasone-umeclidin-vilanter (TRELEGY ELLIPTA) 100-62.5-25 mcg DsDv Inhale 1 puff into the lungs once daily.    metoprolol tartrate (LOPRESSOR) 50 MG tablet Take 1 tablet (50 mg total) by mouth 2 (two) times daily with meals.    multivitamin (THERAGRAN) per tablet Take 1 tablet by mouth once daily.    nystatin (MYCOSTATIN) cream Apply topically 2 (two) times daily. (Patient not taking: No sig reported)    omeprazole (PRILOSEC) 40 MG capsule Take 1 capsule (40 mg total) by mouth every morning. Take one capsule by mouth every morning.    ondansetron (ZOFRAN-ODT) 8 MG TbDL Dissolve 1 tablet (8 mg total) by mouth every 6 (six) hours as needed. (Patient not taking: Reported on 8/31/2022)    zolpidem (AMBIEN) 5 MG Tab Take 1 tablet (5 mg total) by mouth nightly as needed (insomnia).   Last reviewed on 8/31/2022  7:51 AM by Gardenia Irizarry NP    Review of patient's allergies indicates:   Allergen Reactions    Succinimides Anaphylaxis     Son has     Last reviewed on  8/31/2022 7:28 AM by Gardenia Irizarry      Tasks added this encounter   11/5/2022 - Refill Call (Auto Added)   Tasks due within next 3 months   No tasks due.     Marina Hernandez  [facility]  [address] [facility] [address]  Phone: [phone]  Fax: [phone]

## 2022-10-11 DIAGNOSIS — G47.00 INSOMNIA, UNSPECIFIED TYPE: ICD-10-CM

## 2022-10-11 DIAGNOSIS — J44.9 CHRONIC OBSTRUCTIVE PULMONARY DISEASE, UNSPECIFIED COPD TYPE: ICD-10-CM

## 2022-10-11 RX ORDER — ZOLPIDEM TARTRATE 5 MG/1
5 TABLET ORAL NIGHTLY PRN
Qty: 30 TABLET | Refills: 1 | Status: CANCELLED | OUTPATIENT
Start: 2022-10-11

## 2022-10-13 ENCOUNTER — RESEARCH ENCOUNTER (OUTPATIENT)
Dept: RESEARCH | Facility: OTHER | Age: 62
End: 2022-10-13
Payer: COMMERCIAL

## 2022-10-13 NOTE — PROGRESS NOTES
Angel Ortiz ID: [id number]  Date:  10/13/2022     Patient was called and notified about test results, there was no signal detected.  Patient was reminded to not interpret cancer signal not detected test results as the absence of cancer and to continue to adhere to guideline-recommended cancer screening.  Patient was asked about completing 30 day questionnaire online and was agreeable.

## 2022-10-14 DIAGNOSIS — F41.0 PANIC ATTACK: ICD-10-CM

## 2022-10-14 DIAGNOSIS — F41.1 GAD (GENERALIZED ANXIETY DISORDER): ICD-10-CM

## 2022-10-14 RX ORDER — CLONAZEPAM 1 MG/1
1 TABLET ORAL 2 TIMES DAILY PRN
Qty: 60 TABLET | Refills: 0 | Status: CANCELLED | OUTPATIENT
Start: 2022-10-14

## 2022-10-17 ENCOUNTER — OFFICE VISIT (OUTPATIENT)
Dept: INTERNAL MEDICINE | Facility: CLINIC | Age: 62
End: 2022-10-17
Payer: COMMERCIAL

## 2022-10-17 ENCOUNTER — PATIENT MESSAGE (OUTPATIENT)
Dept: PSYCHIATRY | Facility: CLINIC | Age: 62
End: 2022-10-17
Payer: COMMERCIAL

## 2022-10-17 VITALS
DIASTOLIC BLOOD PRESSURE: 86 MMHG | TEMPERATURE: 98 F | BODY MASS INDEX: 31.89 KG/M2 | HEART RATE: 83 BPM | WEIGHT: 173.31 LBS | SYSTOLIC BLOOD PRESSURE: 138 MMHG | OXYGEN SATURATION: 96 % | HEIGHT: 62 IN

## 2022-10-17 DIAGNOSIS — Z12.11 SCREENING FOR MALIGNANT NEOPLASM OF COLON: ICD-10-CM

## 2022-10-17 DIAGNOSIS — F51.01 PRIMARY INSOMNIA: ICD-10-CM

## 2022-10-17 DIAGNOSIS — F41.8 DEPRESSION WITH ANXIETY: ICD-10-CM

## 2022-10-17 DIAGNOSIS — L40.0 PSORIASIS VULGARIS: ICD-10-CM

## 2022-10-17 DIAGNOSIS — E78.2 MIXED HYPERLIPIDEMIA: ICD-10-CM

## 2022-10-17 DIAGNOSIS — J41.8 MIXED SIMPLE AND MUCOPURULENT CHRONIC BRONCHITIS: Primary | ICD-10-CM

## 2022-10-17 DIAGNOSIS — R91.8 MULTIPLE LUNG NODULES ON CT: ICD-10-CM

## 2022-10-17 DIAGNOSIS — Z00.00 ANNUAL PHYSICAL EXAM: ICD-10-CM

## 2022-10-17 DIAGNOSIS — I10 HYPERTENSION, UNSPECIFIED TYPE: ICD-10-CM

## 2022-10-17 DIAGNOSIS — Z12.31 ENCOUNTER FOR SCREENING MAMMOGRAM FOR MALIGNANT NEOPLASM OF BREAST: ICD-10-CM

## 2022-10-17 DIAGNOSIS — I10 ESSENTIAL HYPERTENSION: ICD-10-CM

## 2022-10-17 PROBLEM — F41.0 PANIC DISORDER: Status: RESOLVED | Noted: 2020-02-24 | Resolved: 2022-10-17

## 2022-10-17 PROBLEM — Z20.6 HIV EXPOSURE: Status: RESOLVED | Noted: 2018-11-01 | Resolved: 2022-10-17

## 2022-10-17 PROBLEM — F33.2 SEVERE RECURRENT MAJOR DEPRESSION WITHOUT PSYCHOTIC FEATURES: Status: RESOLVED | Noted: 2021-05-26 | Resolved: 2022-10-17

## 2022-10-17 PROBLEM — Z20.2 POTENTIAL EXPOSURE TO STD: Status: RESOLVED | Noted: 2018-11-01 | Resolved: 2022-10-17

## 2022-10-17 PROCEDURE — 3079F PR MOST RECENT DIASTOLIC BLOOD PRESSURE 80-89 MM HG: ICD-10-PCS | Mod: CPTII,S$GLB,, | Performed by: INTERNAL MEDICINE

## 2022-10-17 PROCEDURE — 99999 PR PBB SHADOW E&M-EST. PATIENT-LVL V: ICD-10-PCS | Mod: PBBFAC,,, | Performed by: INTERNAL MEDICINE

## 2022-10-17 PROCEDURE — 1159F PR MEDICATION LIST DOCUMENTED IN MEDICAL RECORD: ICD-10-PCS | Mod: CPTII,S$GLB,, | Performed by: INTERNAL MEDICINE

## 2022-10-17 PROCEDURE — 99999 PR PBB SHADOW E&M-EST. PATIENT-LVL V: CPT | Mod: PBBFAC,,, | Performed by: INTERNAL MEDICINE

## 2022-10-17 PROCEDURE — 3075F SYST BP GE 130 - 139MM HG: CPT | Mod: CPTII,S$GLB,, | Performed by: INTERNAL MEDICINE

## 2022-10-17 PROCEDURE — 99214 OFFICE O/P EST MOD 30 MIN: CPT | Mod: S$GLB,,, | Performed by: INTERNAL MEDICINE

## 2022-10-17 PROCEDURE — 3075F PR MOST RECENT SYSTOLIC BLOOD PRESS GE 130-139MM HG: ICD-10-PCS | Mod: CPTII,S$GLB,, | Performed by: INTERNAL MEDICINE

## 2022-10-17 PROCEDURE — 3079F DIAST BP 80-89 MM HG: CPT | Mod: CPTII,S$GLB,, | Performed by: INTERNAL MEDICINE

## 2022-10-17 PROCEDURE — 1159F MED LIST DOCD IN RCRD: CPT | Mod: CPTII,S$GLB,, | Performed by: INTERNAL MEDICINE

## 2022-10-17 PROCEDURE — 99214 PR OFFICE/OUTPT VISIT, EST, LEVL IV, 30-39 MIN: ICD-10-PCS | Mod: S$GLB,,, | Performed by: INTERNAL MEDICINE

## 2022-10-17 RX ORDER — METOPROLOL TARTRATE 50 MG/1
75 TABLET ORAL 2 TIMES DAILY WITH MEALS
Qty: 270 TABLET | Refills: 3 | Status: ON HOLD | OUTPATIENT
Start: 2022-10-17 | End: 2022-11-03 | Stop reason: HOSPADM

## 2022-10-17 RX ORDER — ATORVASTATIN CALCIUM 20 MG/1
20 TABLET, FILM COATED ORAL NIGHTLY
Qty: 90 TABLET | Refills: 3 | Status: SHIPPED | OUTPATIENT
Start: 2022-10-17 | End: 2023-10-31 | Stop reason: SDUPTHER

## 2022-10-17 NOTE — ASSESSMENT & PLAN NOTE
Stable on Trelegy with PRN Ventolin/Duoneb.  Breathing has been better since quitting smoking and losing weight.

## 2022-10-17 NOTE — PROGRESS NOTES
[facility]    Chief Complaint      Chief Complaint   Patient presents with    Hypertension    Medication Refill     History of Present Illness      Lucia Matias is a 62 y.o. female who presents today for HTN follow up.  Patient comes to appointment alone.    Had Galleri testing done, results were negative.    Problem List Items Addressed This Visit       Psoriasis vulgaris    Overview     Tried and failed Enbrel x 1 yr  Tried and failed Humira x 1-2 yrs  Cimzia 5910-8444  Cosentyx 2020 -     Lab Results   Component Value Date    TBGOLDPLUS Negative 03/09/2021              Current Assessment & Plan     Sees Dr. Lara, stable on Cosentyx which is working well.         COPD (chronic obstructive pulmonary disease)    Current Assessment & Plan     Stable on Trelegy with PRN Ventolin/Duoneb.  Breathing has been better since quitting smoking and losing weight.         HLD (hyperlipidemia)    Current Assessment & Plan     Stable on lipitor 20 mg daily, no myalgias.  The ASCVD Risk score (Caren DK, et al., 2019) failed to calculate for the following reasons:    The valid total cholesterol range is 130 to 320 mg/dL           Relevant Medications    atorvastatin (LIPITOR) 20 MG tablet    Depression with anxiety    Current Assessment & Plan     Sees Psych Dr. Carranza. Stable on elavil and klonopin BID, has taken for many years. Refuses antidepressants because aunt committed suicide on them before, feels like she is more depressed on antidepressants.  Has been on prozac, celexa, buspar, wellbutrin before.  No SI/HI/panic attack.         Insomnia    Current Assessment & Plan     Stable on ambien PRN, works well for her.         Essential hypertension    Current Assessment & Plan     BP controlled on metoprolol 50 mg BID. On lisinopril prior to weight loss. No CP/HA.         Multiple lung nodules on CT    Overview     Nodules thought to be secondary to RA.  No malignancy detected on biopsy 8/2020  Recommend  annual LDCT- sooner if symptomatic  Discussed LDCT at length,  shared decision making occurred.           Current Assessment & Plan     Sees Pulm NP Nayeli Matute.  Pending repeat LDCT to follow up.         RESOLVED: HIV exposure     Other Visit Diagnoses       Screening for malignant neoplasm of colon    -  Primary    Relevant Orders    Ambulatory referral/consult to Endo Procedure     Hypertension, unspecified type        Relevant Medications    metoprolol tartrate (LOPRESSOR) 50 MG tablet    Encounter for screening mammogram for malignant neoplasm of breast        Relevant Orders    Mammo Digital Screening Bilat w/ Gil            Health Maintenance   Topic Date Due    Mammogram  2022    Lipid Panel  2027    TETANUS VACCINE  2028    Hepatitis C Screening  Completed       Past Medical History:   Diagnosis Date    Allergy     Anxiety     Arthritis     Colon polyps     COPD (chronic obstructive pulmonary disease)     Depression     Diverticular disease of colon 2017    Diverticulitis     HLD (hyperlipidemia)     Hypertension     Pancreatitis     Psoriasis     Rheumatoid arthritis     Severe obesity (BMI 35.0-39.9) with comorbidity        Past Surgical History:   Procedure Laterality Date    ADENOIDECTOMY  1966    Tonsillitis and adnoids    BLADDER SUSPENSION      (x2)     SECTION      (x2)    ESOPHAGOGASTRODUODENOSCOPY N/A 2021    Procedure: EGD (ESOPHAGOGASTRODUODENOSCOPY);  Surgeon: Vince Rodriguez MD;  Location: [facility] OR [address] [place] [address];  Service: General;  Laterality: N/A;    LAPAROSCOPIC SLEEVE GASTRECTOMY N/A 2021    Procedure: GASTRECTOMY, SLEEVE, LAPAROSCOPIC, with intraop EGD;  Surgeon: Vince Rodriguez MD;  Location: [facility] OR [address] [place] [address];  Service: General;  Laterality: N/A;    LUNG BIOPSY  2020    RHINOPLASTY      TONSILLECTOMY         family history includes No Known Problems in her daughter, daughter, and son. She was adopted.    Social History      Tobacco Use    Smoking status: Former     Packs/day: 0.00     Years: 20.00     Pack years: 0.00     Types: Cigarettes     Start date: 1979     Quit date: 2020     Years since quittin.8    Smokeless tobacco: Never   Substance Use Topics    Alcohol use: Yes     Alcohol/week: 1.0 standard drink     Types: 1 Glasses of wine per week    Drug use: No       Review of Systems   Constitutional:  Negative for chills and fever.   HENT:  Negative for sore throat.    Respiratory:  Negative for cough and shortness of breath.    Cardiovascular:  Negative for chest pain and palpitations.   Gastrointestinal:  Negative for constipation, diarrhea, nausea and vomiting.   Genitourinary:  Negative for dysuria and hematuria.   Musculoskeletal:  Negative for falls.   Neurological:  Negative for headaches.      Outpatient Encounter Medications as of 10/17/2022   Medication Sig Note Dispense Refill    amitriptyline (ELAVIL) 50 MG tablet Take 1 tablet (50 mg total) by mouth nightly as needed for Insomnia.  90 tablet 1    B-complex with vitamin C (VITAMIN B COMPLEX-C ORAL) Take by mouth.       calcium-vitamin D 250-100 mg-unit per tablet Take 1 tablet by mouth 2 (two) times daily.       clonazePAM (KLONOPIN) 1 MG tablet Take 1 tablet (1 mg total) by mouth 2 (two) times daily as needed for Anxiety (panic).  60 tablet 0    COSENTYX PEN, 2 PENS, 150 mg/mL PnIj Inject 300mg (2 pens) into the skin every 4 weeks  2 mL 11    cyanocobalamin 500 MCG tablet Take 500 mcg by mouth once daily.       fluticasone-umeclidin-vilanter (TRELEGY ELLIPTA) 100-62.5-25 mcg DsDv Inhale 1 puff into the lungs once daily.  60 each 3    multivitamin (THERAGRAN) per tablet Take 1 tablet by mouth once daily.       omeprazole (PRILOSEC) 40 MG capsule Take 1 capsule (40 mg total) by mouth every morning. Take one capsule by mouth every morning.  90 capsule 1    zolpidem (AMBIEN) 5 MG Tab Take 1 tablet (5 mg total) by mouth nightly as needed (insomnia).  30  "tablet 1    [DISCONTINUED] atorvastatin (LIPITOR) 20 MG tablet Take 1 tablet (20 mg total) by mouth every evening.  30 tablet 0    [DISCONTINUED] metoprolol tartrate (LOPRESSOR) 50 MG tablet Take 1 tablet (50 mg total) by mouth 2 (two) times daily with meals.  60 tablet 0    atorvastatin (LIPITOR) 20 MG tablet Take 1 tablet (20 mg total) by mouth every evening.  90 tablet 3    metoprolol tartrate (LOPRESSOR) 50 MG tablet Take 1.5 tablets (75 mg total) by mouth 2 (two) times daily with meals.  270 tablet 3    [DISCONTINUED] budesonide-formoterol 160-4.5 mcg (SYMBICORT) 160-4.5 mcg/actuation HFAA Inhale 2 puffs into the lungs every 12 (twelve) hours. Controller  30.6 g 2    [DISCONTINUED] busPIRone (BUSPAR) 15 MG tablet Take 1 tablet (15 mg total) by mouth 2 (two) times daily.  180 tablet 1    [DISCONTINUED] cholecalciferol, vitamin D3, (VITAMIN D3) 50 mcg (2,000 unit) Tab Take by mouth once daily.       [DISCONTINUED] nystatin (MYCOSTATIN) cream Apply topically 2 (two) times daily. (Patient not taking: No sig reported)  30 g 3    [DISCONTINUED] ondansetron (ZOFRAN-ODT) 8 MG TbDL Dissolve 1 tablet (8 mg total) by mouth every 6 (six) hours as needed. (Patient not taking: Reported on 8/31/2022) 5/26/2021: PRN 30 tablet 0     Facility-Administered Encounter Medications as of 10/17/2022   Medication Dose Route Frequency Provider Last Rate Last Admin    [DISCONTINUED] 0.9%  NaCl infusion   Intravenous Continuous Sirena Matias PA-C   New Bag at 08/28/20 0910        Review of patient's allergies indicates:   Allergen Reactions    Succinimides Anaphylaxis     Son has MH       Physical Exam      Vital Signs  Temp: 97.7 °F (36.5 °C)  Pulse: 83  SpO2: 96 %  BP: 138/86  Pain Score: 0-No pain  Height and Weight  Height: 5' 2" (157.5 cm)  Weight: 78.6 kg (173 lb 4.5 oz)  BSA (Calculated - sq m): 1.85 sq meters  BMI (Calculated): 31.7  Weight in (lb) to have BMI = 25: 136.4]    Physical Exam  Constitutional:       Appearance: " She is well-developed.   HENT:      Head: Normocephalic and atraumatic.      Right Ear: External ear normal.      Left Ear: External ear normal.   Eyes:      General:         Right eye: No discharge.         Left eye: No discharge.   Cardiovascular:      Rate and Rhythm: Normal rate and regular rhythm.      Heart sounds: Normal heart sounds. No murmur heard.  Pulmonary:      Effort: Pulmonary effort is normal. No respiratory distress.      Breath sounds: Normal breath sounds.   Abdominal:      General: There is no distension.      Palpations: Abdomen is soft.      Tenderness: There is no abdominal tenderness. There is no guarding.   Musculoskeletal:         General: Normal range of motion.      Cervical back: Normal range of motion.   Skin:     General: Skin is warm and dry.   Neurological:      Mental Status: She is alert and oriented to person, place, and time.   Psychiatric:         Behavior: Behavior normal.        Laboratory:  CBC:  No results for input(s): WBC, RBC, HGB, HCT, PLT, MCV, MCH, MCHC in the last 2160 hours.  CMP:  No results for input(s): GLU, CALCIUM, ALBUMIN, PROT, NA, K, CO2, CL, BUN, ALKPHOS, ALT, AST, BILITOT in the last 2160 hours.    Invalid input(s): CREATININ  URINALYSIS:  Recent Labs   Lab Result Units 08/17/22  1259   pH, UA  7.5      LIPIDS:  No results for input(s): TSH, HDL, CHOL, TRIG, LDLCALC, CHOLHDL, NONHDLCHOL, TOTALCHOLEST in the last 2160 hours.  TSH:  No results for input(s): TSH in the last 2160 hours.  A1C:  No results for input(s): HGBA1C in the last 2160 hours.    Radiology:  No results found in the last 30 days.     Assessment/Plan     Lucia Matias is a 62 y.o.female with:    1. HIV exposure    2. Mixed simple and mucopurulent chronic bronchitis    3. Psoriasis vulgaris    4. Depression with anxiety    5. Essential hypertension    6. Mixed hyperlipidemia  - atorvastatin (LIPITOR) 20 MG tablet; Take 1 tablet (20 mg total) by mouth every evening.  Dispense: 90 tablet;  Refill: 3    7. Hypertension, unspecified type  - metoprolol tartrate (LOPRESSOR) 50 MG tablet; Take 1.5 tablets (75 mg total) by mouth 2 (two) times daily with meals.  Dispense: 270 tablet; Refill: 3    8. Primary insomnia    9. Multiple lung nodules on CT    10. Screening for malignant neoplasm of colon  - Ambulatory referral/consult to Endo Procedure ; Future    11. Encounter for screening mammogram for malignant neoplasm of breast  - Mammo Digital Screening Bilat w/ Gil; Future    -Continue current medications and maintain follow up with specialists.    -Follow up in about 6 months (around 4/17/2023) for follow up of medical problems.       Hetal Banks MD  [facility]

## 2022-10-17 NOTE — ASSESSMENT & PLAN NOTE
Stable on lipitor 20 mg daily, no myalgias.  The ASCVD Risk score (Caren BRAR, et al., 2019) failed to calculate for the following reasons:    The valid total cholesterol range is 130 to 320 mg/dL

## 2022-10-17 NOTE — ASSESSMENT & PLAN NOTE
Sees Psych Dr. Carranza. Stable on elavil and klonopin BID, has taken for many years. Refuses antidepressants because aunt committed suicide on them before, feels like she is more depressed on antidepressants.  Has been on prozac, celexa, buspar, wellbutrin before.  No SI/HI/panic attack.

## 2022-10-18 DIAGNOSIS — F41.0 PANIC ATTACK: ICD-10-CM

## 2022-10-18 DIAGNOSIS — F41.1 GAD (GENERALIZED ANXIETY DISORDER): ICD-10-CM

## 2022-10-18 DIAGNOSIS — G47.00 INSOMNIA, UNSPECIFIED TYPE: ICD-10-CM

## 2022-10-18 RX ORDER — ZOLPIDEM TARTRATE 5 MG/1
5 TABLET ORAL NIGHTLY PRN
Qty: 30 TABLET | Refills: 2 | Status: SHIPPED | OUTPATIENT
Start: 2022-10-18 | End: 2022-11-07 | Stop reason: SDUPTHER

## 2022-10-18 RX ORDER — CLONAZEPAM 1 MG/1
1 TABLET ORAL 2 TIMES DAILY PRN
Qty: 60 TABLET | Refills: 2 | Status: SHIPPED | OUTPATIENT
Start: 2022-10-18 | End: 2022-11-07 | Stop reason: SDUPTHER

## 2022-10-19 ENCOUNTER — LAB VISIT (OUTPATIENT)
Dept: LAB | Facility: OTHER | Age: 62
End: 2022-10-19
Attending: OBSTETRICS & GYNECOLOGY
Payer: COMMERCIAL

## 2022-10-19 DIAGNOSIS — Z00.00 ANNUAL PHYSICAL EXAM: ICD-10-CM

## 2022-10-19 LAB
ALBUMIN SERPL BCP-MCNC: 3.6 G/DL (ref 3.5–5.2)
ALP SERPL-CCNC: 104 U/L (ref 55–135)
ALT SERPL W/O P-5'-P-CCNC: 33 U/L (ref 10–44)
ANION GAP SERPL CALC-SCNC: 2 MMOL/L (ref 8–16)
AST SERPL-CCNC: 25 U/L (ref 10–40)
BASOPHILS # BLD AUTO: 0.03 K/UL (ref 0–0.2)
BASOPHILS NFR BLD: 0.5 % (ref 0–1.9)
BILIRUB SERPL-MCNC: 1 MG/DL (ref 0.1–1)
BUN SERPL-MCNC: 17 MG/DL (ref 6–20)
CALCIUM SERPL-MCNC: 9.7 MG/DL (ref 8.7–10.5)
CHLORIDE SERPL-SCNC: 105 MMOL/L (ref 95–110)
CHOLEST SERPL-MCNC: 121 MG/DL (ref 120–199)
CHOLEST/HDLC SERPL: 2.9 {RATIO} (ref 2–5)
CO2 SERPL-SCNC: 31 MMOL/L (ref 23–29)
CREAT SERPL-MCNC: 1 MG/DL (ref 0.5–1.4)
DIFFERENTIAL METHOD: ABNORMAL
EOSINOPHIL # BLD AUTO: 0.2 K/UL (ref 0–0.5)
EOSINOPHIL NFR BLD: 3 % (ref 0–8)
ERYTHROCYTE [DISTWIDTH] IN BLOOD BY AUTOMATED COUNT: 12.5 % (ref 11.5–14.5)
EST. GFR  (NO RACE VARIABLE): >60 ML/MIN/1.73 M^2
ESTIMATED AVG GLUCOSE: 94 MG/DL (ref 68–131)
GLUCOSE SERPL-MCNC: 71 MG/DL (ref 70–110)
HBA1C MFR BLD: 4.9 % (ref 4–5.6)
HCT VFR BLD AUTO: 49.5 % (ref 37–48.5)
HDLC SERPL-MCNC: 42 MG/DL (ref 40–75)
HDLC SERPL: 34.7 % (ref 20–50)
HGB BLD-MCNC: 16.4 G/DL (ref 12–16)
IMM GRANULOCYTES # BLD AUTO: 0.02 K/UL (ref 0–0.04)
IMM GRANULOCYTES NFR BLD AUTO: 0.3 % (ref 0–0.5)
LDLC SERPL CALC-MCNC: 49.2 MG/DL (ref 63–159)
LYMPHOCYTES # BLD AUTO: 1.5 K/UL (ref 1–4.8)
LYMPHOCYTES NFR BLD: 24.1 % (ref 18–48)
MCH RBC QN AUTO: 30.5 PG (ref 27–31)
MCHC RBC AUTO-ENTMCNC: 33.1 G/DL (ref 32–36)
MCV RBC AUTO: 92 FL (ref 82–98)
MONOCYTES # BLD AUTO: 0.6 K/UL (ref 0.3–1)
MONOCYTES NFR BLD: 9.1 % (ref 4–15)
NEUTROPHILS # BLD AUTO: 3.9 K/UL (ref 1.8–7.7)
NEUTROPHILS NFR BLD: 63 % (ref 38–73)
NONHDLC SERPL-MCNC: 79 MG/DL
NRBC BLD-RTO: 0 /100 WBC
PLATELET # BLD AUTO: 232 K/UL (ref 150–450)
PMV BLD AUTO: 10 FL (ref 9.2–12.9)
POTASSIUM SERPL-SCNC: 4.4 MMOL/L (ref 3.5–5.1)
PROT SERPL-MCNC: 7 G/DL (ref 6–8.4)
RBC # BLD AUTO: 5.38 M/UL (ref 4–5.4)
SODIUM SERPL-SCNC: 138 MMOL/L (ref 136–145)
TRIGL SERPL-MCNC: 149 MG/DL (ref 30–150)
TSH SERPL DL<=0.005 MIU/L-ACNC: 1.55 UIU/ML (ref 0.4–4)
WBC # BLD AUTO: 6.26 K/UL (ref 3.9–12.7)

## 2022-10-19 PROCEDURE — 84443 ASSAY THYROID STIM HORMONE: CPT | Performed by: INTERNAL MEDICINE

## 2022-10-19 PROCEDURE — 85025 COMPLETE CBC W/AUTO DIFF WBC: CPT | Performed by: INTERNAL MEDICINE

## 2022-10-19 PROCEDURE — 83036 HEMOGLOBIN GLYCOSYLATED A1C: CPT | Performed by: INTERNAL MEDICINE

## 2022-10-19 PROCEDURE — 80061 LIPID PANEL: CPT | Performed by: INTERNAL MEDICINE

## 2022-10-19 PROCEDURE — 80053 COMPREHEN METABOLIC PANEL: CPT | Performed by: INTERNAL MEDICINE

## 2022-10-19 PROCEDURE — 36415 COLL VENOUS BLD VENIPUNCTURE: CPT | Performed by: INTERNAL MEDICINE

## 2022-10-24 ENCOUNTER — PATIENT MESSAGE (OUTPATIENT)
Dept: INTERNAL MEDICINE | Facility: CLINIC | Age: 62
End: 2022-10-24
Payer: COMMERCIAL

## 2022-10-24 RX ORDER — BENZONATATE 200 MG/1
200 CAPSULE ORAL 3 TIMES DAILY PRN
Qty: 45 CAPSULE | Refills: 0 | Status: SHIPPED | OUTPATIENT
Start: 2022-10-24 | End: 2022-11-09

## 2022-10-24 RX ORDER — LEVOFLOXACIN 500 MG/1
500 TABLET, FILM COATED ORAL DAILY
Qty: 7 TABLET | Refills: 0 | Status: ON HOLD | OUTPATIENT
Start: 2022-10-24 | End: 2022-11-03 | Stop reason: HOSPADM

## 2022-10-31 ENCOUNTER — HOSPITAL ENCOUNTER (INPATIENT)
Facility: HOSPITAL | Age: 62
LOS: 1 days | Discharge: HOME | DRG: 274 | End: 2022-11-03
Attending: EMERGENCY MEDICINE | Admitting: INTERNAL MEDICINE
Payer: COMMERCIAL

## 2022-10-31 DIAGNOSIS — I48.92 ATRIAL FLUTTER, UNSPECIFIED TYPE: Primary | ICD-10-CM

## 2022-10-31 DIAGNOSIS — R05.9 COUGH: ICD-10-CM

## 2022-10-31 DIAGNOSIS — Z86.79 S/P ABLATION OF ATRIAL FLUTTER: ICD-10-CM

## 2022-10-31 DIAGNOSIS — R07.9 CHEST PAIN: ICD-10-CM

## 2022-10-31 DIAGNOSIS — R00.0 TACHYCARDIA: ICD-10-CM

## 2022-10-31 DIAGNOSIS — I48.92 ATRIAL FLUTTER: ICD-10-CM

## 2022-10-31 DIAGNOSIS — I48.3 TYPICAL ATRIAL FLUTTER: ICD-10-CM

## 2022-10-31 DIAGNOSIS — R06.02 SHORTNESS OF BREATH: ICD-10-CM

## 2022-10-31 DIAGNOSIS — Z98.890 S/P ABLATION OF ATRIAL FLUTTER: ICD-10-CM

## 2022-10-31 DIAGNOSIS — J44.1 COPD EXACERBATION: ICD-10-CM

## 2022-10-31 PROBLEM — M06.9 RHEUMATOID ARTHRITIS: Status: ACTIVE | Noted: 2022-10-31

## 2022-10-31 PROBLEM — L40.50 PSORIATIC ARTHRITIS: Status: ACTIVE | Noted: 2022-10-31

## 2022-10-31 LAB
ALBUMIN SERPL BCP-MCNC: 3.5 G/DL (ref 3.5–5.2)
ALLENS TEST: ABNORMAL
ALP SERPL-CCNC: 89 U/L (ref 55–135)
ALT SERPL W/O P-5'-P-CCNC: 26 U/L (ref 10–44)
ANION GAP SERPL CALC-SCNC: 11 MMOL/L (ref 8–16)
ASCENDING AORTA: 2.79 CM
AST SERPL-CCNC: 20 U/L (ref 10–40)
AV INDEX (PROSTH): 0.87
AV MEAN GRADIENT: 2 MMHG
AV PEAK GRADIENT: 4 MMHG
AV VALVE AREA: 2.85 CM2
AV VELOCITY RATIO: 0.77
BASOPHILS # BLD AUTO: 0.05 K/UL (ref 0–0.2)
BASOPHILS NFR BLD: 0.5 % (ref 0–1.9)
BILIRUB SERPL-MCNC: 1.2 MG/DL (ref 0.1–1)
BNP SERPL-MCNC: 19 PG/ML (ref 0–99)
BSA FOR ECHO PROCEDURE: 1.78 M2
BUN SERPL-MCNC: 20 MG/DL (ref 6–20)
CALCIUM SERPL-MCNC: 9.6 MG/DL (ref 8.7–10.5)
CHLORIDE SERPL-SCNC: 106 MMOL/L (ref 95–110)
CO2 SERPL-SCNC: 23 MMOL/L (ref 23–29)
CREAT SERPL-MCNC: 1.1 MG/DL (ref 0.5–1.4)
CV ECHO LV RWT: 0.29 CM
D DIMER PPP IA.FEU-MCNC: 0.53 MG/L FEU
DELSYS: ABNORMAL
DIFFERENTIAL METHOD: ABNORMAL
DOP CALC AO PEAK VEL: 1.03 M/S
DOP CALC AO VTI: 15.38 CM
DOP CALC LVOT AREA: 3.3 CM2
DOP CALC LVOT DIAMETER: 2.04 CM
DOP CALC LVOT PEAK VEL: 0.79 M/S
DOP CALC LVOT STROKE VOLUME: 43.81 CM3
DOP CALCLVOT PEAK VEL VTI: 13.41 CM
E/E' RATIO: 12.56 M/S
ECHO LV POSTERIOR WALL: 0.68 CM (ref 0.6–1.1)
EJECTION FRACTION: 50 %
EOSINOPHIL # BLD AUTO: 0.2 K/UL (ref 0–0.5)
EOSINOPHIL NFR BLD: 2.2 % (ref 0–8)
ERYTHROCYTE [DISTWIDTH] IN BLOOD BY AUTOMATED COUNT: 12.6 % (ref 11.5–14.5)
EST. GFR  (NO RACE VARIABLE): 57.5 ML/MIN/1.73 M^2
FRACTIONAL SHORTENING: 28 % (ref 28–44)
GLUCOSE SERPL-MCNC: 81 MG/DL (ref 70–110)
HCO3 UR-SCNC: 23.1 MMOL/L (ref 24–28)
HCT VFR BLD AUTO: 50.2 % (ref 37–48.5)
HCV AB SERPL QL IA: NORMAL
HGB BLD-MCNC: 16.3 G/DL (ref 12–16)
HIV 1+2 AB+HIV1 P24 AG SERPL QL IA: NORMAL
IMM GRANULOCYTES # BLD AUTO: 0.04 K/UL (ref 0–0.04)
IMM GRANULOCYTES NFR BLD AUTO: 0.4 % (ref 0–0.5)
INFLUENZA A, MOLECULAR: NEGATIVE
INFLUENZA B, MOLECULAR: NEGATIVE
INTERVENTRICULAR SEPTUM: 0.74 CM (ref 0.6–1.1)
IVRT: 57.09 MSEC
LA MAJOR: 5.02 CM
LA MINOR: 5.01 CM
LA WIDTH: 4.11 CM
LEFT ATRIUM SIZE: 3.56 CM
LEFT ATRIUM VOLUME INDEX MOD: 30.2 ML/M2
LEFT ATRIUM VOLUME INDEX: 35.8 ML/M2
LEFT ATRIUM VOLUME MOD: 52.51 CM3
LEFT ATRIUM VOLUME: 62.37 CM3
LEFT INTERNAL DIMENSION IN SYSTOLE: 3.37 CM (ref 2.1–4)
LEFT VENTRICLE DIASTOLIC VOLUME INDEX: 58.58 ML/M2
LEFT VENTRICLE DIASTOLIC VOLUME: 101.93 ML
LEFT VENTRICLE MASS INDEX: 60 G/M2
LEFT VENTRICLE SYSTOLIC VOLUME INDEX: 26.7 ML/M2
LEFT VENTRICLE SYSTOLIC VOLUME: 46.5 ML
LEFT VENTRICULAR INTERNAL DIMENSION IN DIASTOLE: 4.69 CM (ref 3.5–6)
LEFT VENTRICULAR MASS: 104.55 G
LV LATERAL E/E' RATIO: 12.56 M/S
LV SEPTAL E/E' RATIO: 12.56 M/S
LYMPHOCYTES # BLD AUTO: 2 K/UL (ref 1–4.8)
LYMPHOCYTES NFR BLD: 21.7 % (ref 18–48)
MCH RBC QN AUTO: 30.4 PG (ref 27–31)
MCHC RBC AUTO-ENTMCNC: 32.5 G/DL (ref 32–36)
MCV RBC AUTO: 94 FL (ref 82–98)
MONOCYTES # BLD AUTO: 0.8 K/UL (ref 0.3–1)
MONOCYTES NFR BLD: 8.3 % (ref 4–15)
MV PEAK E VEL: 1.13 M/S
NEUTROPHILS # BLD AUTO: 6.2 K/UL (ref 1.8–7.7)
NEUTROPHILS NFR BLD: 66.9 % (ref 38–73)
NRBC BLD-RTO: 0 /100 WBC
PCO2 BLDA: 40.7 MMHG (ref 35–45)
PH SMN: 7.36 [PH] (ref 7.35–7.45)
PISA TR MAX VEL: 2.08 M/S
PLATELET # BLD AUTO: 268 K/UL (ref 150–450)
PMV BLD AUTO: 10.1 FL (ref 9.2–12.9)
PO2 BLDA: 72 MMHG (ref 80–100)
POC BE: -2 MMOL/L
POC SATURATED O2: 94 % (ref 95–100)
POC TCO2: 24 MMOL/L (ref 23–27)
POTASSIUM SERPL-SCNC: 3.8 MMOL/L (ref 3.5–5.1)
PROT SERPL-MCNC: 6.9 G/DL (ref 6–8.4)
PULM VEIN S/D RATIO: 0.86
PV PEAK D VEL: 0.64 M/S
PV PEAK S VEL: 0.55 M/S
RA MAJOR: 4.97 CM
RA PRESSURE: 3 MMHG
RA WIDTH: 3.87 CM
RBC # BLD AUTO: 5.37 M/UL (ref 4–5.4)
RIGHT VENTRICULAR END-DIASTOLIC DIMENSION: 3.5 CM
RSV AG SPEC QL IA: NEGATIVE
RV TISSUE DOPPLER FREE WALL SYSTOLIC VELOCITY 1 (APICAL 4 CHAMBER VIEW): 11.88 CM/S
SAMPLE: ABNORMAL
SARS-COV-2 RDRP RESP QL NAA+PROBE: NEGATIVE
SINUS: 3.21 CM
SITE: ABNORMAL
SODIUM SERPL-SCNC: 140 MMOL/L (ref 136–145)
SPECIMEN SOURCE: NORMAL
SPECIMEN SOURCE: NORMAL
STJ: 2.46 CM
TDI LATERAL: 0.09 M/S
TDI SEPTAL: 0.09 M/S
TDI: 0.09 M/S
TR MAX PG: 17 MMHG
TRICUSPID ANNULAR PLANE SYSTOLIC EXCURSION: 1.7 CM
TROPONIN I SERPL DL<=0.01 NG/ML-MCNC: <0.006 NG/ML (ref 0–0.03)
TV REST PULMONARY ARTERY PRESSURE: 20 MMHG
WBC # BLD AUTO: 9.23 K/UL (ref 3.9–12.7)

## 2022-10-31 PROCEDURE — 84484 ASSAY OF TROPONIN QUANT: CPT | Performed by: PHYSICIAN ASSISTANT

## 2022-10-31 PROCEDURE — 83880 ASSAY OF NATRIURETIC PEPTIDE: CPT | Performed by: PHYSICIAN ASSISTANT

## 2022-10-31 PROCEDURE — 85025 COMPLETE CBC W/AUTO DIFF WBC: CPT | Performed by: PHYSICIAN ASSISTANT

## 2022-10-31 PROCEDURE — 80053 COMPREHEN METABOLIC PANEL: CPT | Performed by: PHYSICIAN ASSISTANT

## 2022-10-31 PROCEDURE — 25000242 PHARM REV CODE 250 ALT 637 W/ HCPCS: Performed by: PHYSICIAN ASSISTANT

## 2022-10-31 PROCEDURE — 25000003 PHARM REV CODE 250: Performed by: PHYSICIAN ASSISTANT

## 2022-10-31 PROCEDURE — 99900035 HC TECH TIME PER 15 MIN (STAT)

## 2022-10-31 PROCEDURE — 99220 PR INITIAL OBSERVATION CARE,LEVL III: CPT | Mod: ,,,

## 2022-10-31 PROCEDURE — U0002 COVID-19 LAB TEST NON-CDC: HCPCS | Performed by: PHYSICIAN ASSISTANT

## 2022-10-31 PROCEDURE — G0378 HOSPITAL OBSERVATION PER HR: HCPCS

## 2022-10-31 PROCEDURE — 96361 HYDRATE IV INFUSION ADD-ON: CPT

## 2022-10-31 PROCEDURE — 99285 EMERGENCY DEPT VISIT HI MDM: CPT | Mod: 25

## 2022-10-31 PROCEDURE — 87634 RSV DNA/RNA AMP PROBE: CPT | Performed by: PHYSICIAN ASSISTANT

## 2022-10-31 PROCEDURE — 87502 INFLUENZA DNA AMP PROBE: CPT | Performed by: PHYSICIAN ASSISTANT

## 2022-10-31 PROCEDURE — 36600 WITHDRAWAL OF ARTERIAL BLOOD: CPT

## 2022-10-31 PROCEDURE — 96375 TX/PRO/DX INJ NEW DRUG ADDON: CPT

## 2022-10-31 PROCEDURE — 99220 PR INITIAL OBSERVATION CARE,LEVL III: ICD-10-PCS | Mod: ,,,

## 2022-10-31 PROCEDURE — 94640 AIRWAY INHALATION TREATMENT: CPT | Mod: XB

## 2022-10-31 PROCEDURE — 87389 HIV-1 AG W/HIV-1&-2 AB AG IA: CPT | Performed by: PHYSICIAN ASSISTANT

## 2022-10-31 PROCEDURE — 25000003 PHARM REV CODE 250

## 2022-10-31 PROCEDURE — 87502 INFLUENZA DNA AMP PROBE: CPT

## 2022-10-31 PROCEDURE — 36415 COLL VENOUS BLD VENIPUNCTURE: CPT

## 2022-10-31 PROCEDURE — 93010 ELECTROCARDIOGRAM REPORT: CPT | Mod: ,,, | Performed by: INTERNAL MEDICINE

## 2022-10-31 PROCEDURE — 93010 EKG 12-LEAD: ICD-10-PCS | Mod: ,,, | Performed by: INTERNAL MEDICINE

## 2022-10-31 PROCEDURE — 63600175 PHARM REV CODE 636 W HCPCS: Performed by: PHYSICIAN ASSISTANT

## 2022-10-31 PROCEDURE — 85379 FIBRIN DEGRADATION QUANT: CPT

## 2022-10-31 PROCEDURE — 99285 EMERGENCY DEPT VISIT HI MDM: CPT | Mod: CS,,, | Performed by: PHYSICIAN ASSISTANT

## 2022-10-31 PROCEDURE — 99285 PR EMERGENCY DEPT VISIT,LEVEL V: ICD-10-PCS | Mod: CS,,, | Performed by: PHYSICIAN ASSISTANT

## 2022-10-31 PROCEDURE — 86803 HEPATITIS C AB TEST: CPT | Performed by: PHYSICIAN ASSISTANT

## 2022-10-31 PROCEDURE — 93005 ELECTROCARDIOGRAM TRACING: CPT

## 2022-10-31 PROCEDURE — 94761 N-INVAS EAR/PLS OXIMETRY MLT: CPT

## 2022-10-31 RX ORDER — LEVALBUTEROL 1.25 MG/.5ML
1.25 SOLUTION, CONCENTRATE RESPIRATORY (INHALATION) EVERY 8 HOURS
Status: DISCONTINUED | OUTPATIENT
Start: 2022-10-31 | End: 2022-11-02

## 2022-10-31 RX ORDER — IBUPROFEN 200 MG
24 TABLET ORAL
Status: DISCONTINUED | OUTPATIENT
Start: 2022-10-31 | End: 2022-11-01

## 2022-10-31 RX ORDER — GLUCAGON 1 MG
1 KIT INJECTION
Status: DISCONTINUED | OUTPATIENT
Start: 2022-10-31 | End: 2022-11-01

## 2022-10-31 RX ORDER — PANTOPRAZOLE SODIUM 40 MG/1
40 TABLET, DELAYED RELEASE ORAL DAILY
Status: DISCONTINUED | OUTPATIENT
Start: 2022-11-01 | End: 2022-11-03 | Stop reason: HOSPADM

## 2022-10-31 RX ORDER — ATORVASTATIN CALCIUM 20 MG/1
20 TABLET, FILM COATED ORAL NIGHTLY
Status: DISCONTINUED | OUTPATIENT
Start: 2022-10-31 | End: 2022-11-03 | Stop reason: HOSPADM

## 2022-10-31 RX ORDER — METHYLPREDNISOLONE SOD SUCC 125 MG
125 VIAL (EA) INJECTION
Status: COMPLETED | OUTPATIENT
Start: 2022-10-31 | End: 2022-10-31

## 2022-10-31 RX ORDER — PREDNISONE 20 MG/1
40 TABLET ORAL DAILY
Qty: 10 TABLET | Refills: 0 | Status: SHIPPED | OUTPATIENT
Start: 2022-10-31 | End: 2022-10-31 | Stop reason: CLARIF

## 2022-10-31 RX ORDER — ACETAMINOPHEN 325 MG/1
650 TABLET ORAL EVERY 8 HOURS PRN
Status: DISCONTINUED | OUTPATIENT
Start: 2022-10-31 | End: 2022-11-03 | Stop reason: HOSPADM

## 2022-10-31 RX ORDER — POLYETHYLENE GLYCOL 3350 17 G/17G
17 POWDER, FOR SOLUTION ORAL DAILY PRN
Status: DISCONTINUED | OUTPATIENT
Start: 2022-10-31 | End: 2022-11-03 | Stop reason: HOSPADM

## 2022-10-31 RX ORDER — PREDNISONE 20 MG/1
40 TABLET ORAL DAILY
Status: DISCONTINUED | OUTPATIENT
Start: 2022-11-01 | End: 2022-11-02

## 2022-10-31 RX ORDER — METOPROLOL TARTRATE 1 MG/ML
5 INJECTION, SOLUTION INTRAVENOUS ONCE
Status: DISCONTINUED | OUTPATIENT
Start: 2022-10-31 | End: 2022-11-01

## 2022-10-31 RX ORDER — IPRATROPIUM BROMIDE AND ALBUTEROL SULFATE 2.5; .5 MG/3ML; MG/3ML
3 SOLUTION RESPIRATORY (INHALATION)
Status: COMPLETED | OUTPATIENT
Start: 2022-10-31 | End: 2022-10-31

## 2022-10-31 RX ORDER — CLONAZEPAM 0.5 MG/1
1 TABLET ORAL 2 TIMES DAILY PRN
Status: DISCONTINUED | OUTPATIENT
Start: 2022-10-31 | End: 2022-11-03 | Stop reason: HOSPADM

## 2022-10-31 RX ORDER — TALC
6 POWDER (GRAM) TOPICAL NIGHTLY PRN
Status: DISCONTINUED | OUTPATIENT
Start: 2022-10-31 | End: 2022-11-03 | Stop reason: HOSPADM

## 2022-10-31 RX ORDER — NALOXONE HCL 0.4 MG/ML
0.02 VIAL (ML) INJECTION
Status: DISCONTINUED | OUTPATIENT
Start: 2022-10-31 | End: 2022-11-03 | Stop reason: HOSPADM

## 2022-10-31 RX ORDER — BENZONATATE 100 MG/1
200 CAPSULE ORAL 3 TIMES DAILY PRN
Status: DISCONTINUED | OUTPATIENT
Start: 2022-10-31 | End: 2022-11-03 | Stop reason: HOSPADM

## 2022-10-31 RX ORDER — BISACODYL 10 MG
10 SUPPOSITORY, RECTAL RECTAL DAILY PRN
Status: DISCONTINUED | OUTPATIENT
Start: 2022-10-31 | End: 2022-11-03 | Stop reason: HOSPADM

## 2022-10-31 RX ORDER — SODIUM CHLORIDE 0.9 % (FLUSH) 0.9 %
10 SYRINGE (ML) INJECTION EVERY 12 HOURS PRN
Status: DISCONTINUED | OUTPATIENT
Start: 2022-10-31 | End: 2022-11-03 | Stop reason: HOSPADM

## 2022-10-31 RX ORDER — FLUTICASONE FUROATE AND VILANTEROL 100; 25 UG/1; UG/1
1 POWDER RESPIRATORY (INHALATION) DAILY
Status: DISCONTINUED | OUTPATIENT
Start: 2022-10-31 | End: 2022-11-03 | Stop reason: HOSPADM

## 2022-10-31 RX ORDER — AMITRIPTYLINE HYDROCHLORIDE 25 MG/1
50 TABLET, FILM COATED ORAL NIGHTLY
Status: DISCONTINUED | OUTPATIENT
Start: 2022-10-31 | End: 2022-11-03 | Stop reason: HOSPADM

## 2022-10-31 RX ORDER — ZOLPIDEM TARTRATE 5 MG/1
5 TABLET ORAL NIGHTLY PRN
Status: DISCONTINUED | OUTPATIENT
Start: 2022-10-31 | End: 2022-11-03 | Stop reason: HOSPADM

## 2022-10-31 RX ORDER — IBUPROFEN 200 MG
16 TABLET ORAL
Status: DISCONTINUED | OUTPATIENT
Start: 2022-10-31 | End: 2022-11-01

## 2022-10-31 RX ORDER — SODIUM CHLORIDE 0.9 % (FLUSH) 0.9 %
3 SYRINGE (ML) INJECTION
Status: DISCONTINUED | OUTPATIENT
Start: 2022-10-31 | End: 2022-11-03 | Stop reason: HOSPADM

## 2022-10-31 RX ORDER — IBUPROFEN 600 MG/1
600 TABLET ORAL
Status: COMPLETED | OUTPATIENT
Start: 2022-10-31 | End: 2022-10-31

## 2022-10-31 RX ORDER — ONDANSETRON 8 MG/1
8 TABLET, ORALLY DISINTEGRATING ORAL EVERY 8 HOURS PRN
Status: DISCONTINUED | OUTPATIENT
Start: 2022-10-31 | End: 2022-11-03 | Stop reason: HOSPADM

## 2022-10-31 RX ORDER — IPRATROPIUM BROMIDE AND ALBUTEROL SULFATE 2.5; .5 MG/3ML; MG/3ML
3 SOLUTION RESPIRATORY (INHALATION)
Status: DISCONTINUED | OUTPATIENT
Start: 2022-10-31 | End: 2022-10-31

## 2022-10-31 RX ORDER — PROCHLORPERAZINE EDISYLATE 5 MG/ML
5 INJECTION INTRAMUSCULAR; INTRAVENOUS EVERY 6 HOURS PRN
Status: DISCONTINUED | OUTPATIENT
Start: 2022-10-31 | End: 2022-11-03 | Stop reason: HOSPADM

## 2022-10-31 RX ORDER — AMITRIPTYLINE HYDROCHLORIDE 50 MG/1
50 TABLET, FILM COATED ORAL NIGHTLY PRN
Status: DISCONTINUED | OUTPATIENT
Start: 2022-10-31 | End: 2022-10-31

## 2022-10-31 RX ADMIN — SODIUM CHLORIDE 1000 ML: 0.9 INJECTION, SOLUTION INTRAVENOUS at 12:10

## 2022-10-31 RX ADMIN — IPRATROPIUM BROMIDE AND ALBUTEROL SULFATE 3 ML: 2.5; .5 SOLUTION RESPIRATORY (INHALATION) at 08:10

## 2022-10-31 RX ADMIN — CLONAZEPAM 1 MG: 0.5 TABLET ORAL at 09:10

## 2022-10-31 RX ADMIN — IBUPROFEN 600 MG: 600 TABLET ORAL at 08:10

## 2022-10-31 RX ADMIN — METHYLPREDNISOLONE SODIUM SUCCINATE 125 MG: 125 INJECTION, POWDER, FOR SOLUTION INTRAMUSCULAR; INTRAVENOUS at 09:10

## 2022-10-31 RX ADMIN — ZOLPIDEM TARTRATE 5 MG: 5 TABLET ORAL at 09:10

## 2022-10-31 RX ADMIN — METOPROLOL TARTRATE 75 MG: 50 TABLET, FILM COATED ORAL at 06:10

## 2022-10-31 RX ADMIN — ATORVASTATIN CALCIUM 20 MG: 20 TABLET, FILM COATED ORAL at 09:10

## 2022-10-31 RX ADMIN — SODIUM CHLORIDE 1000 ML: 0.9 INJECTION, SOLUTION INTRAVENOUS at 08:10

## 2022-10-31 RX ADMIN — AMITRIPTYLINE HYDROCHLORIDE 50 MG: 50 TABLET, FILM COATED ORAL at 09:10

## 2022-10-31 RX ADMIN — APIXABAN 5 MG: 5 TABLET, FILM COATED ORAL at 09:10

## 2022-10-31 NOTE — SUBJECTIVE & OBJECTIVE
Past Medical History:   Diagnosis Date    Allergy     Anxiety     Arthritis     Colon polyps     COPD (chronic obstructive pulmonary disease)     Depression     Diverticular disease of colon 2017    Diverticulitis     HLD (hyperlipidemia)     Hypertension     Pancreatitis     Psoriasis     Rheumatoid arthritis     Severe obesity (BMI 35.0-39.9) with comorbidity        Past Surgical History:   Procedure Laterality Date    ADENOIDECTOMY  1966    Tonsillitis and adnoids    BLADDER SUSPENSION      (x2)     SECTION      (x2)    ESOPHAGOGASTRODUODENOSCOPY N/A 2021    Procedure: EGD (ESOPHAGOGASTRODUODENOSCOPY);  Surgeon: Vince Rodriguez MD;  Location: [facility] OR [address] [place] [address];  Service: General;  Laterality: N/A;    LAPAROSCOPIC SLEEVE GASTRECTOMY N/A 2021    Procedure: GASTRECTOMY, SLEEVE, LAPAROSCOPIC, with intraop EGD;  Surgeon: Vince Rodriguez MD;  Location: [facility] OR [address] [place] [address];  Service: General;  Laterality: N/A;    LUNG BIOPSY  2020    RHINOPLASTY      TONSILLECTOMY         Review of patient's allergies indicates:   Allergen Reactions    Succinimides Anaphylaxis     Son has        No current facility-administered medications on file prior to encounter.     Current Outpatient Medications on File Prior to Encounter   Medication Sig    amitriptyline (ELAVIL) 50 MG tablet Take 1 tablet (50 mg total) by mouth nightly as needed for Insomnia.    atorvastatin (LIPITOR) 20 MG tablet Take 1 tablet (20 mg total) by mouth every evening.    B-complex with vitamin C (VITAMIN B COMPLEX-C ORAL) Take by mouth.    benzonatate (TESSALON) 200 MG capsule Take 1 capsule (200 mg total) by mouth 3 (three) times daily as needed for Cough.    calcium-vitamin D 250-100 mg-unit per tablet Take 1 tablet by mouth 2 (two) times daily.    clonazePAM (KLONOPIN) 1 MG tablet Take 1 tablet (1 mg total) by mouth 2 (two) times daily as needed for Anxiety (panic).    COSENTYX PEN, 2 PENS, 150 mg/mL PnIj Inject 300mg  (2 pens) into the skin every 4 weeks    cyanocobalamin 500 MCG tablet Take 500 mcg by mouth once daily.    fluticasone-umeclidin-vilanter (TRELEGY ELLIPTA) 100-62.5-25 mcg DsDv Inhale 1 puff into the lungs once daily.    levoFLOXacin (LEVAQUIN) 500 MG tablet Take 1 tablet (500 mg total) by mouth once daily.    metoprolol tartrate (LOPRESSOR) 50 MG tablet Take 1.5 tablets (75 mg total) by mouth 2 (two) times daily with meals.    multivitamin (THERAGRAN) per tablet Take 1 tablet by mouth once daily.    omeprazole (PRILOSEC) 40 MG capsule Take 1 capsule (40 mg total) by mouth every morning. Take one capsule by mouth every morning.    zolpidem (AMBIEN) 5 MG Tab Take 1 tablet (5 mg total) by mouth nightly as needed (insomnia).    [DISCONTINUED] budesonide-formoterol 160-4.5 mcg (SYMBICORT) 160-4.5 mcg/actuation HFAA Inhale 2 puffs into the lungs every 12 (twelve) hours. Controller     Family History       Problem Relation (Age of Onset)    No Known Problems Daughter, Son, Daughter          Tobacco Use    Smoking status: Former     Packs/day: 0.00     Years: 20.00     Pack years: 0.00     Types: Cigarettes     Start date: 1979     Quit date: 2020     Years since quittin.8    Smokeless tobacco: Never   Substance and Sexual Activity    Alcohol use: Yes     Alcohol/week: 1.0 standard drink     Types: 1 Glasses of wine per week    Drug use: No    Sexual activity: Yes     Partners: Male     Birth control/protection: Post-menopausal     Review of Systems   Constitutional:  Positive for activity change and fatigue. Negative for chills, diaphoresis and fever.   HENT:  Negative for congestion, facial swelling, rhinorrhea and sore throat.    Eyes:  Negative for photophobia, itching and visual disturbance.   Respiratory:  Positive for cough, chest tightness, shortness of breath and wheezing.    Cardiovascular:  Negative for chest pain, palpitations and leg swelling.   Gastrointestinal:  Negative for abdominal  distention, abdominal pain, blood in stool, constipation, diarrhea, nausea and vomiting.   Genitourinary:  Negative for difficulty urinating, dysuria, frequency, hematuria and urgency.   Musculoskeletal:  Negative for arthralgias, back pain and neck stiffness.   Neurological:  Positive for dizziness and light-headedness. Negative for tremors, seizures, syncope, weakness, numbness and headaches.   Psychiatric/Behavioral:  Negative for agitation, confusion and hallucinations.      Objective:     Vital Signs (Most Recent):  Temp: 97.8 °F (36.6 °C) (10/31/22 1219)  Pulse: (!) 118 (10/31/22 1219)  Resp: 20 (10/31/22 1219)  BP: (!) 120/57 (10/31/22 1219)  SpO2: 97 % (10/31/22 1219)   Vital Signs (24h Range):  Temp:  [97.6 °F (36.4 °C)-97.8 °F (36.6 °C)] 97.8 °F (36.6 °C)  Pulse:  [] 118  Resp:  [18-20] 20  SpO2:  [97 %-98 %] 97 %  BP: (106-120)/(57-71) 120/57     Weight: 72.6 kg (160 lb)  Body mass index is 29.26 kg/m².    Physical Exam  Vitals and nursing note reviewed.   Constitutional:       General: She is not in acute distress.     Appearance: She is well-developed. She is not diaphoretic.   HENT:      Head: Normocephalic and atraumatic.      Right Ear: External ear normal.      Left Ear: External ear normal.      Nose: Nose normal. No congestion.      Mouth/Throat:      Pharynx: Oropharynx is clear.   Eyes:      General: No scleral icterus.     Extraocular Movements: Extraocular movements intact.   Cardiovascular:      Rate and Rhythm: Normal rate and regular rhythm.      Pulses: Normal pulses.      Heart sounds: Normal heart sounds. No murmur heard.  Pulmonary:      Effort: Pulmonary effort is normal. No respiratory distress.      Breath sounds: Wheezing present. No rales.      Comments: Expiratory wheezing in all lung fields  Abdominal:      General: Bowel sounds are normal. There is no distension.      Palpations: Abdomen is soft.      Tenderness: There is no abdominal tenderness. There is no guarding or  "rebound.   Musculoskeletal:      Cervical back: Normal range of motion.      Right lower leg: No edema.      Left lower leg: No edema.   Skin:     General: Skin is warm and dry.      Capillary Refill: Capillary refill takes less than 2 seconds.   Neurological:      General: No focal deficit present.      Mental Status: She is alert and oriented to person, place, and time. Mental status is at baseline.   Psychiatric:         Mood and Affect: Mood normal.         Behavior: Behavior normal.         Thought Content: Thought content normal.        Significant Labs: All pertinent labs within the past 24 hours have been reviewed.    ABGs:   Recent Labs   Lab 10/31/22  1347   PH 7.362   PCO2 40.7   HCO3 23.1*   POCSATURATED 94*   BE -2   PO2 72*     CBC:   Recent Labs   Lab 10/31/22  0808   WBC 9.23   HGB 16.3*   HCT 50.2*        CMP:   Recent Labs   Lab 10/31/22  0808      K 3.8      CO2 23   GLU 81   BUN 20   CREATININE 1.1   CALCIUM 9.6   PROT 6.9   ALBUMIN 3.5   BILITOT 1.2*   ALKPHOS 89   AST 20   ALT 26   ANIONGAP 11     Troponin:   Recent Labs   Lab 10/31/22  0921   TROPONINI <0.006     Significant Imaging: I have reviewed all pertinent imaging results/findings within the past 24 hours.      Imaging Results               X-Ray Chest PA And Lateral (Final result)  Result time 10/31/22 11:56:11      Final result by Yoshi Olivo MD (10/31/22 11:56:11)                   Impression:      No acute cardiopulmonary finding.    Lung nodules in the left lung which may correlate with findings identified on prior CT chest.  Suggest correlation with prior lung biopsy and consider follow-up CT chest if the patient has risk factors for malignancy.    This report was flagged in Epic as abnormal.      Electronically signed by: Yoshi Olivo MD  Date:    10/31/2022  Time:    11:56               Narrative:    EXAMINATION:  XR CHEST PA AND LATERAL    CLINICAL HISTORY:  Provided history is "  Cough, " "unspecified".    TECHNIQUE:  Frontal and lateral views of the chest were performed.    COMPARISON:  Chest radiograph, 08/28/2020.  Chest CT, 01/02/2020.    FINDINGS:  Cardiac silhouette is stable in size and not significantly enlarged.  No focal consolidation.  There are nodular opacities in the left lung which may correlate with abnormal pulmonary nodules identified on prior CT chest dated 01/02/2020.  Correlation with prior lung biopsy on 08/28/2020 is advised.  No sizable pleural effusion.  No pneumothorax.                                      "

## 2022-10-31 NOTE — ED NOTES
Telemetry Verification   Patient placed on Telemetry Box  Verified with War Room  Box # [id number]   Monitor Tech Robyn   Rate 114   Rhythm Sinus tach

## 2022-10-31 NOTE — ASSESSMENT & PLAN NOTE
COPD (chronic obstructive pulmonary disease)    62 y.o. with COPD arrived to the ED complaining of shortness of breath. Patient had URI that began last week, prescribed a course of Levaquin that was completed today. Now with continued shortness of breath, dyspnea on exertion, dizziness, and intermittently productive cough (+white sputum) which prompted her to come to the ED.  - Afebrile, tachycardic, hypotensive in ED  - Given DuoNebs x 3, Solumedrol x 1, NS bolus x 2 in ED  - Maintaining O2 saturation on RA, does not use O2 at home  - CXR with no acute cardiopulmonary process  - Labs wnl  - Orthostatics pending  - Patient is on the COPD exacerbation pathway  - Trelegy, rescue albuterol inhaler at home  - Levalbuterol q8h   - avoiding albuterol in the setting of a flutter  - Breo qD  - Prednisone 40 mg qD  - Tessalon prn cough  - Monitor labs, vitals, and tele  - Fall precautions
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
## 2022-10-31 NOTE — ASSESSMENT & PLAN NOTE
Psoriatic Arthritis  Psoriasis Vulgaris  - Chronic, controlled  - Cosentyx at home  - Per patient, skipped this month due to URI, will continue once exacerbation resolves
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
## 2022-10-31 NOTE — ASSESSMENT & PLAN NOTE
62 y.o. with COPD arrived to the ED complaining of shortness of breath. Patient had URI that began last week, prescribed a course of Levaquin that was completed today. Now with continued shortness of breath, dyspnea on exertion, dizziness, and intermittently productive cough (+white sputum) which prompted her to come to the ED.  - Afebrile, tachycardic, hypotensive in ED  - Given DuoNebs x 3, Solumedrol x 1, NS bolus x 2 in ED  - Maintaining O2 saturation on RA, does not use O2 at home  - CXR with no acute cardiopulmonary process  - Labs wnl  - Orthostatics pending  - Patient is on the COPD exacerbation pathway  - Trelegy, rescue albuterol inhaler at home  - Levalbuterol q8h   - avoiding albuterol in the setting of a flutter  - Breo qD  - Prednisone 40 mg qD  - Tessalon prn cough  - Monitor labs, vitals, and tele  - Fall precautions

## 2022-10-31 NOTE — ASSESSMENT & PLAN NOTE
Patient has persistent depression which is mild and is currently controlled. Will Continue anti-depressant medications. We will not consult psychiatry at this time. Patient does not display psychosis at this time. Continue to monitor closely and adjust plan of care as needed.  - Continue home amitriptyline qhs, clonazepam prn BID

## 2022-10-31 NOTE — ED PROVIDER NOTES
Encounter Date: 10/31/2022       History     Chief Complaint   Patient presents with    Cough     Patient states her  is currently hospitalized for RSV and pt has been taking abx and finished her course this AM for a URI. Endorses cough and SOB on exertion.      This is a 62 year old female with a PMH of HTN, COPD, psoriasis (on cosentyx), sleeve gastrectomy presenting to the ED with a chief complaint of shortness of breath. She started with URI symptoms last week including fever, chills, nasal congestion, myalgias, cough, and chest tightness. She started Levaquin and tessalon that her PCP sent for her . The URI symptoms improved but she continues to experience significant SOB and coughing. She reports lightheadedness with standing and KRUSE. She notes post tussive emesis. Her  is currently admitted with respiratory complications from RSV and she reports her 2 year old grandson who just started  has similar symptoms. Denies PND or orthopnea, edema, nausea, abdominal pain, diarrhea, dysuria. She is vaccinated for flu and covid.    Review of patient's allergies indicates:   Allergen Reactions    Succinimides Anaphylaxis     Son has MH     Past Medical History:   Diagnosis Date    Allergy     Anxiety     Arthritis     Colon polyps     COPD (chronic obstructive pulmonary disease)     Depression     Diverticular disease of colon 2017    Diverticulitis     HLD (hyperlipidemia)     Hypertension     Pancreatitis     Psoriasis     Rheumatoid arthritis     Severe obesity (BMI 35.0-39.9) with comorbidity      Past Surgical History:   Procedure Laterality Date    ADENOIDECTOMY  1966    Tonsillitis and adnoids    BLADDER SUSPENSION      (x2)     SECTION      (x2)    ESOPHAGOGASTRODUODENOSCOPY N/A 2021    Procedure: EGD (ESOPHAGOGASTRODUODENOSCOPY);  Surgeon: Vince Rodriguez MD;  Location: [facility] OR [address] [place] [address];  Service: General;  Laterality: N/A;    LAPAROSCOPIC SLEEVE GASTRECTOMY N/A  2021    Procedure: GASTRECTOMY, SLEEVE, LAPAROSCOPIC, with intraop EGD;  Surgeon: Vince Rodriguez MD;  Location: [facility] OR [address] [place] [address];  Service: General;  Laterality: N/A;    LUNG BIOPSY  2020    RHINOPLASTY      TONSILLECTOMY       Family History   Adopted: Yes   Problem Relation Age of Onset    No Known Problems Daughter     No Known Problems Son     No Known Problems Daughter      Social History     Tobacco Use    Smoking status: Former     Packs/day: 0.00     Years: 20.00     Pack years: 0.00     Types: Cigarettes     Start date: 1979     Quit date: 2020     Years since quittin.8    Smokeless tobacco: Never   Substance Use Topics    Alcohol use: Yes     Alcohol/week: 1.0 standard drink     Types: 1 Glasses of wine per week    Drug use: No     Review of Systems   Constitutional:  Negative for chills and fever.   HENT:  Positive for congestion. Negative for sore throat.    Respiratory:  Positive for cough, chest tightness, shortness of breath and wheezing.    Cardiovascular:  Positive for chest pain. Negative for leg swelling.   Gastrointestinal:  Negative for nausea and vomiting.   Genitourinary:  Negative for dysuria.   Musculoskeletal:  Negative for back pain.   Skin:  Negative for rash.   Allergic/Immunologic: Positive for immunocompromised state.   Neurological:  Negative for weakness and numbness.   Hematological:  Does not bruise/bleed easily.     Physical Exam     Initial Vitals [10/31/22 0727]   BP Pulse Resp Temp SpO2   118/66 (!) 122 20 97.6 °F (36.4 °C) 98 %      MAP       --         Physical Exam    Constitutional: She appears well-developed and well-nourished. She appears lethargic. She is Obese . She is easily aroused. No distress.   HENT:   Head: Atraumatic.   Eyes: Conjunctivae and EOM are normal. Pupils are equal, round, and reactive to light.   Cardiovascular:  Regular rhythm and normal heart sounds.   Tachycardia present.         Pulmonary/Chest: No accessory muscle  usage. No tachypnea. No respiratory distress. She has decreased breath sounds. She has wheezes.   Abdominal: Abdomen is soft. Bowel sounds are normal. There is no abdominal tenderness.     Neurological: She is oriented to person, place, and time and easily aroused. She appears lethargic.   Skin: Skin is warm and dry. No rash noted.       ED Course   Procedures  Labs Reviewed   CBC W/ AUTO DIFFERENTIAL - Abnormal; Notable for the following components:       Result Value    Hemoglobin 16.3 (*)     Hematocrit 50.2 (*)     All other components within normal limits    Narrative:     Release to patient->Immediate   COMPREHENSIVE METABOLIC PANEL - Abnormal; Notable for the following components:    Total Bilirubin 1.2 (*)     eGFR 57.5 (*)     All other components within normal limits    Narrative:     Release to patient->Immediate   ISTAT PROCEDURE - Abnormal; Notable for the following components:    POC PO2 72 (*)     POC HCO3 23.1 (*)     POC SATURATED O2 94 (*)     All other components within normal limits   INFLUENZA A & B BY MOLECULAR   HIV 1 / 2 ANTIBODY    Narrative:     Release to patient->Immediate   HEPATITIS C ANTIBODY    Narrative:     Release to patient->Immediate   SARS-COV-2 RNA AMPLIFICATION, QUAL   RSV ANTIGEN DETECTION   B-TYPE NATRIURETIC PEPTIDE   TROPONIN I        ECG Results              EKG 12-lead (Final result)  Result time 10/31/22 08:25:02      Final result by Interface, Lab In [facility] (10/31/22 08:25:02)                   Narrative:    Test Reason : R06.02,    Vent. Rate : 122 BPM     Atrial Rate : 244 BPM     P-R Int : 000 ms          QRS Dur : 084 ms      QT Int : 254 ms       P-R-T Axes : 260 -80 079 degrees     QTc Int : 361 ms    Atrial flutter with 2:1 A-V conduction  Left axis deviation  Nonspecific ST abnormality  Abnormal ECG  When compared with ECG of 03-MAY-2022 16:26,  No significant change was found  Confirmed by Alonso ENAMORADO MD ([id number]) on 10/31/2022 8:24:52 AM    Referred By:  "[email or web address]   SELF           Confirmed By:Alonso ENAMORADO MD                                  Imaging Results               X-Ray Chest PA And Lateral (Final result)  Result time 10/31/22 11:56:11      Final result by Yoshi Olivo MD (10/31/22 11:56:11)                   Impression:      No acute cardiopulmonary finding.    Lung nodules in the left lung which may correlate with findings identified on prior CT chest.  Suggest correlation with prior lung biopsy and consider follow-up CT chest if the patient has risk factors for malignancy.    This report was flagged in Epic as abnormal.      Electronically signed by: Yoshi Olivo MD  Date:    10/31/2022  Time:    11:56               Narrative:    EXAMINATION:  XR CHEST PA AND LATERAL    CLINICAL HISTORY:  Provided history is "  Cough, unspecified".    TECHNIQUE:  Frontal and lateral views of the chest were performed.    COMPARISON:  Chest radiograph, 08/28/2020.  Chest CT, 01/02/2020.    FINDINGS:  Cardiac silhouette is stable in size and not significantly enlarged.  No focal consolidation.  There are nodular opacities in the left lung which may correlate with abnormal pulmonary nodules identified on prior CT chest dated 01/02/2020.  Correlation with prior lung biopsy on 08/28/2020 is advised.  No sizable pleural effusion.  No pneumothorax.                                       Medications   sodium chloride 0.9% flush 3 mL (has no administration in time range)   melatonin tablet 6 mg (has no administration in time range)   ondansetron disintegrating tablet 8 mg (has no administration in time range)   prochlorperazine injection Soln 5 mg (has no administration in time range)   sodium chloride 0.9% flush 10 mL (has no administration in time range)   polyethylene glycol packet 17 g (has no administration in time range)   bisacodyL suppository 10 mg (has no administration in time range)   acetaminophen tablet 650 mg (has no administration in time range)   naloxone 0.4 " mg/mL injection 0.02 mg (has no administration in time range)   glucose chewable tablet 16 g (has no administration in time range)   glucose chewable tablet 24 g (has no administration in time range)   glucagon (human recombinant) injection 1 mg (has no administration in time range)   dextrose 10% bolus 125 mL (has no administration in time range)   dextrose 10% bolus 250 mL (has no administration in time range)   predniSONE tablet 40 mg (has no administration in time range)   levalbuterol nebulizer solution 1.25 mg (0 mg Nebulization Hold 10/31/22 1345)   fluticasone furoate-vilanteroL 100-25 mcg/dose diskus inhaler 1 puff (has no administration in time range)   atorvastatin tablet 20 mg (has no administration in time range)   benzonatate capsule 200 mg (has no administration in time range)   clonazePAM tablet 1 mg (has no administration in time range)   metoprolol tartrate tablet 75 mg (has no administration in time range)   pantoprazole EC tablet 40 mg (has no administration in time range)   zolpidem tablet 5 mg (has no administration in time range)   amitriptyline tablet 50 mg (has no administration in time range)   metoprolol injection 5 mg (has no administration in time range)   apixaban tablet 5 mg (has no administration in time range)   sodium chloride 0.9% bolus 1,000 mL (0 mLs Intravenous Stopped 10/31/22 0930)   albuterol-ipratropium 2.5 mg-0.5 mg/3 mL nebulizer solution 3 mL (3 mLs Nebulization Given 10/31/22 0830)   ibuprofen tablet 600 mg (600 mg Oral Given 10/31/22 0811)   methylPREDNISolone sodium succinate injection 125 mg (125 mg Intravenous Given 10/31/22 0930)   sodium chloride 0.9% bolus 1,000 mL (0 mLs Intravenous Stopped 10/31/22 1335)     Medical Decision Making:   History:   Old Medical Records: I decided to obtain old medical records.  Old Records Summarized: records from clinic visits.  Clinical Tests:   Lab Tests: Ordered and Reviewed  Radiological Study: Ordered and Reviewed     APC /  Resident Notes:   62 y.o. year old female presenting with cough and SOB.    DDx includes but is not limited to influenza, covid, viral syndrome, COPD exacerbation. ACS, CHF and PE felt to be less likely.    EKG a flutter, rate of 122  Flu and Covid negative, RSV pending  Labs with Hgb 16    Plan  Duonebs and steroids given in ED for COPD exacerbation. She continues to exhibit wheezing on reassessment. Her HR remained elevated to 120 despite fluid resuscitation. She has taken her home dose of Metoprolol. The Atrial flutter was seen on a prior EKG in May but does not look like she was started on anticoagulation. CHADsVASC2 - would consider anticoagulating her. Will admit for COPD pathway.    Discussed findings and plan with patient who verbalized understanding and agrees with the plan and course of treatment. I discussed the care of this patient with my supervising physician.                       Clinical Impression:   Final diagnoses:  [R06.02] Shortness of breath  [R05.9] Cough  [J44.1] COPD exacerbation (Primary)  [I48.92] Atrial flutter, unspecified type        ED Disposition Condition    Observation Stable                Nisa Turcios PA-C  10/31/22 6685

## 2022-10-31 NOTE — ASSESSMENT & PLAN NOTE
- New onset atrial flutter  - Tachy to 122  - Took morning home Metoprolol 75 mg BID  - Metoprolol 5 mg IV once  - [id number] score: 2 (HTN and female)  - Will begin anticoagulation with Eliquis 5 BID  - Echo pending  - Continue to monitor on tele  - Cardiology outpatient referral to be placed at discharge

## 2022-11-01 LAB
ANION GAP SERPL CALC-SCNC: 6 MMOL/L (ref 8–16)
BASOPHILS # BLD AUTO: 0.02 K/UL (ref 0–0.2)
BASOPHILS NFR BLD: 0.1 % (ref 0–1.9)
BUN SERPL-MCNC: 19 MG/DL (ref 6–20)
CALCIUM SERPL-MCNC: 8.8 MG/DL (ref 8.7–10.5)
CHLORIDE SERPL-SCNC: 107 MMOL/L (ref 95–110)
CO2 SERPL-SCNC: 23 MMOL/L (ref 23–29)
CREAT SERPL-MCNC: 0.9 MG/DL (ref 0.5–1.4)
DIFFERENTIAL METHOD: ABNORMAL
EOSINOPHIL # BLD AUTO: 0 K/UL (ref 0–0.5)
EOSINOPHIL NFR BLD: 0 % (ref 0–8)
ERYTHROCYTE [DISTWIDTH] IN BLOOD BY AUTOMATED COUNT: 12.4 % (ref 11.5–14.5)
EST. GFR  (NO RACE VARIABLE): >60 ML/MIN/1.73 M^2
GLUCOSE SERPL-MCNC: 177 MG/DL (ref 70–110)
HCT VFR BLD AUTO: 43.7 % (ref 37–48.5)
HGB BLD-MCNC: 14.8 G/DL (ref 12–16)
IMM GRANULOCYTES # BLD AUTO: 0.14 K/UL (ref 0–0.04)
IMM GRANULOCYTES NFR BLD AUTO: 1 % (ref 0–0.5)
LYMPHOCYTES # BLD AUTO: 1 K/UL (ref 1–4.8)
LYMPHOCYTES NFR BLD: 7 % (ref 18–48)
MAGNESIUM SERPL-MCNC: 2.8 MG/DL (ref 1.6–2.6)
MCH RBC QN AUTO: 30.6 PG (ref 27–31)
MCHC RBC AUTO-ENTMCNC: 33.9 G/DL (ref 32–36)
MCV RBC AUTO: 91 FL (ref 82–98)
MONOCYTES # BLD AUTO: 0.4 K/UL (ref 0.3–1)
MONOCYTES NFR BLD: 3.2 % (ref 4–15)
NEUTROPHILS # BLD AUTO: 12 K/UL (ref 1.8–7.7)
NEUTROPHILS NFR BLD: 88.7 % (ref 38–73)
NRBC BLD-RTO: 0 /100 WBC
PHOSPHATE SERPL-MCNC: 1.9 MG/DL (ref 2.7–4.5)
PLATELET # BLD AUTO: 235 K/UL (ref 150–450)
PMV BLD AUTO: 10.3 FL (ref 9.2–12.9)
POCT GLUCOSE: 184 MG/DL (ref 70–110)
POTASSIUM SERPL-SCNC: 4.1 MMOL/L (ref 3.5–5.1)
RBC # BLD AUTO: 4.83 M/UL (ref 4–5.4)
SODIUM SERPL-SCNC: 136 MMOL/L (ref 136–145)
WBC # BLD AUTO: 13.57 K/UL (ref 3.9–12.7)

## 2022-11-01 PROCEDURE — 84100 ASSAY OF PHOSPHORUS: CPT

## 2022-11-01 PROCEDURE — G0378 HOSPITAL OBSERVATION PER HR: HCPCS

## 2022-11-01 PROCEDURE — 63600175 PHARM REV CODE 636 W HCPCS: Performed by: NURSE PRACTITIONER

## 2022-11-01 PROCEDURE — 93010 ELECTROCARDIOGRAM REPORT: CPT | Mod: ,,, | Performed by: INTERNAL MEDICINE

## 2022-11-01 PROCEDURE — 93005 ELECTROCARDIOGRAM TRACING: CPT

## 2022-11-01 PROCEDURE — 80048 BASIC METABOLIC PNL TOTAL CA: CPT

## 2022-11-01 PROCEDURE — 63600175 PHARM REV CODE 636 W HCPCS

## 2022-11-01 PROCEDURE — 94640 AIRWAY INHALATION TREATMENT: CPT | Mod: XB

## 2022-11-01 PROCEDURE — 99222 PR INITIAL HOSPITAL CARE,LEVL II: ICD-10-PCS | Mod: ,,, | Performed by: INTERNAL MEDICINE

## 2022-11-01 PROCEDURE — 27000221 HC OXYGEN, UP TO 24 HOURS

## 2022-11-01 PROCEDURE — 25500020 PHARM REV CODE 255: Performed by: INTERNAL MEDICINE

## 2022-11-01 PROCEDURE — 85025 COMPLETE CBC W/AUTO DIFF WBC: CPT

## 2022-11-01 PROCEDURE — 94761 N-INVAS EAR/PLS OXIMETRY MLT: CPT

## 2022-11-01 PROCEDURE — 25000242 PHARM REV CODE 250 ALT 637 W/ HCPCS

## 2022-11-01 PROCEDURE — 99226 PR SUBSEQUENT OBSERVATION CARE,LEVEL III: ICD-10-PCS | Mod: ,,,

## 2022-11-01 PROCEDURE — 83735 ASSAY OF MAGNESIUM: CPT

## 2022-11-01 PROCEDURE — 99900035 HC TECH TIME PER 15 MIN (STAT)

## 2022-11-01 PROCEDURE — 25000003 PHARM REV CODE 250: Performed by: NURSE PRACTITIONER

## 2022-11-01 PROCEDURE — 25000003 PHARM REV CODE 250

## 2022-11-01 PROCEDURE — 99222 1ST HOSP IP/OBS MODERATE 55: CPT | Mod: ,,, | Performed by: INTERNAL MEDICINE

## 2022-11-01 PROCEDURE — 36415 COLL VENOUS BLD VENIPUNCTURE: CPT

## 2022-11-01 PROCEDURE — 99226 PR SUBSEQUENT OBSERVATION CARE,LEVEL III: CPT | Mod: ,,,

## 2022-11-01 PROCEDURE — 96365 THER/PROPH/DIAG IV INF INIT: CPT

## 2022-11-01 PROCEDURE — 96366 THER/PROPH/DIAG IV INF ADDON: CPT

## 2022-11-01 PROCEDURE — 93010 EKG 12-LEAD: ICD-10-PCS | Mod: ,,, | Performed by: INTERNAL MEDICINE

## 2022-11-01 RX ORDER — INSULIN ASPART 100 [IU]/ML
0-5 INJECTION, SOLUTION INTRAVENOUS; SUBCUTANEOUS
Status: DISCONTINUED | OUTPATIENT
Start: 2022-11-01 | End: 2022-11-03 | Stop reason: HOSPADM

## 2022-11-01 RX ORDER — MAGNESIUM SULFATE HEPTAHYDRATE 40 MG/ML
2 INJECTION, SOLUTION INTRAVENOUS ONCE
Status: COMPLETED | OUTPATIENT
Start: 2022-11-01 | End: 2022-11-01

## 2022-11-01 RX ORDER — GUAIFENESIN 600 MG/1
600 TABLET, EXTENDED RELEASE ORAL 2 TIMES DAILY
Status: DISCONTINUED | OUTPATIENT
Start: 2022-11-01 | End: 2022-11-03 | Stop reason: HOSPADM

## 2022-11-01 RX ORDER — IBUPROFEN 200 MG
24 TABLET ORAL
Status: DISCONTINUED | OUTPATIENT
Start: 2022-11-01 | End: 2022-11-03 | Stop reason: HOSPADM

## 2022-11-01 RX ORDER — METOPROLOL TARTRATE 25 MG/1
25 TABLET, FILM COATED ORAL ONCE
Status: COMPLETED | OUTPATIENT
Start: 2022-11-01 | End: 2022-11-01

## 2022-11-01 RX ORDER — IBUPROFEN 200 MG
16 TABLET ORAL
Status: DISCONTINUED | OUTPATIENT
Start: 2022-11-01 | End: 2022-11-03 | Stop reason: HOSPADM

## 2022-11-01 RX ORDER — GLUCAGON 1 MG
1 KIT INJECTION
Status: DISCONTINUED | OUTPATIENT
Start: 2022-11-01 | End: 2022-11-03 | Stop reason: HOSPADM

## 2022-11-01 RX ADMIN — LEVALBUTEROL 1.25 MG: 1.25 SOLUTION, CONCENTRATE RESPIRATORY (INHALATION) at 07:11

## 2022-11-01 RX ADMIN — MAGNESIUM SULFATE 2 G: 2 INJECTION INTRAVENOUS at 01:11

## 2022-11-01 RX ADMIN — APIXABAN 5 MG: 5 TABLET, FILM COATED ORAL at 08:11

## 2022-11-01 RX ADMIN — METOPROLOL TARTRATE 25 MG: 25 TABLET, FILM COATED ORAL at 01:11

## 2022-11-01 RX ADMIN — GUAIFENESIN 600 MG: 600 TABLET, EXTENDED RELEASE ORAL at 12:11

## 2022-11-01 RX ADMIN — IOHEXOL 75 ML: 350 INJECTION, SOLUTION INTRAVENOUS at 01:11

## 2022-11-01 RX ADMIN — BENZONATATE 200 MG: 100 CAPSULE ORAL at 08:11

## 2022-11-01 RX ADMIN — PANTOPRAZOLE SODIUM 40 MG: 40 TABLET, DELAYED RELEASE ORAL at 09:11

## 2022-11-01 RX ADMIN — METOPROLOL TARTRATE 75 MG: 50 TABLET, FILM COATED ORAL at 09:11

## 2022-11-01 RX ADMIN — FLUTICASONE FUROATE AND VILANTEROL TRIFENATATE 1 PUFF: 100; 25 POWDER RESPIRATORY (INHALATION) at 07:11

## 2022-11-01 RX ADMIN — ATORVASTATIN CALCIUM 20 MG: 20 TABLET, FILM COATED ORAL at 08:11

## 2022-11-01 RX ADMIN — LEVALBUTEROL 1.25 MG: 1.25 SOLUTION, CONCENTRATE RESPIRATORY (INHALATION) at 02:11

## 2022-11-01 RX ADMIN — PREDNISONE 40 MG: 20 TABLET ORAL at 09:11

## 2022-11-01 RX ADMIN — METOPROLOL TARTRATE 75 MG: 50 TABLET, FILM COATED ORAL at 05:11

## 2022-11-01 RX ADMIN — CLONAZEPAM 1 MG: 0.5 TABLET ORAL at 09:11

## 2022-11-01 RX ADMIN — APIXABAN 5 MG: 5 TABLET, FILM COATED ORAL at 09:11

## 2022-11-01 RX ADMIN — AMITRIPTYLINE HYDROCHLORIDE 50 MG: 50 TABLET, FILM COATED ORAL at 08:11

## 2022-11-01 RX ADMIN — DIBASIC SODIUM PHOSPHATE, MONOBASIC POTASSIUM PHOSPHATE AND MONOBASIC SODIUM PHOSPHATE 2 TABLET: 852; 155; 130 TABLET ORAL at 08:11

## 2022-11-01 RX ADMIN — GUAIFENESIN 600 MG: 600 TABLET, EXTENDED RELEASE ORAL at 08:11

## 2022-11-01 RX ADMIN — CLONAZEPAM 1 MG: 0.5 TABLET ORAL at 08:11

## 2022-11-01 RX ADMIN — LEVALBUTEROL 1.25 MG: 1.25 SOLUTION, CONCENTRATE RESPIRATORY (INHALATION) at 12:11

## 2022-11-01 RX ADMIN — ZOLPIDEM TARTRATE 5 MG: 5 TABLET ORAL at 08:11

## 2022-11-01 NOTE — NURSING
Notified ANAYELI Crespo NP of patient's HR sustaining in 120s. One time dose of metoprolol 25mg and magnesium 2g IV ordered. No other interventions at this time. Patient asystematic. Will continue to monitor.
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
## 2022-11-01 NOTE — ASSESSMENT & PLAN NOTE
COPD (chronic obstructive pulmonary disease)  62 y.o. with COPD arrived to the ED complaining of shortness of breath. Patient had URI that began last week, prescribed a course of Levaquin that was completed today. Now with continued shortness of breath, dyspnea on exertion, dizziness, and intermittently productive cough (+white sputum) which prompted her to come to the ED.  - Afebrile, tachycardic, hypotensive in ED  - Given DuoNebs x 3, Solumedrol x 1, NS bolus x 2 in ED  - Maintaining O2 saturation on RA, does not use O2 at home  - CXR with no acute cardiopulmonary process  - Labs wnl  - Orthostatics pending  - Patient is on the COPD exacerbation pathway  - Trelegy, rescue albuterol inhaler at home  - Levalbuterol q8h   - avoiding albuterol in the setting of a flutter  - Breo qD  - Prednisone 40 mg qD  - Mucinex BID  - Tessalon prn cough  - Monitor labs, vitals, and tele  - Fall precautions

## 2022-11-01 NOTE — CONSULTS
[facility]  Cardiology  Consult Note    Patient Name: Lucia Matias  MRN: [id number]  Admission Date: 10/31/2022  Hospital Length of Stay: 0 days  Code Status: Full Code   Attending Provider: Tee Montes MD   Consulting Provider: Razia Vogt MD  Primary Care Physician: Hetal Banks MD  Principal Problem:COPD exacerbation    Patient information was obtained from patient and ER records.     Inpatient consult to Cardiology  Consult performed by: Razia Vogt MD  Consult ordered by: Darren Gregory PA-C        Subjective:     Chief Complaint:  dyspnea     HPI:   62 year old female with HTN, HLD, depression/anxiety, insomnia, RA, sleeve gastrectomy, and COPD admitted with COPD exacerbation. Had URI and completed a course of levaquin.  unwell with RSV. Wheezing improved and had ongoing oxygen requirement. EKG obtained which showed atrial flutter with HR ~125. Atrial flutter shown on telemetry as well. TTE showing EF 50%, indeterminate LV diastolic function. Pt asymptomatic. Denies previous h/o arrhthymias. Started on apixaban and lopressor.     Cardiology consulted for new onset atrial flutter.      Past Medical History:   Diagnosis Date    Allergy     Anxiety     Arthritis     Colon polyps     COPD (chronic obstructive pulmonary disease)     COPD exacerbation 10/31/2022    Depression     Diverticular disease of colon 2017    Diverticulitis     HLD (hyperlipidemia)     Hypertension     Pancreatitis     Psoriasis     Rheumatoid arthritis     Severe obesity (BMI 35.0-39.9) with comorbidity      Past Surgical History:   Procedure Laterality Date    ADENOIDECTOMY  1966    Tonsillitis and adnoids    BLADDER SUSPENSION      (x2)     SECTION      (x2)    ESOPHAGOGASTRODUODENOSCOPY N/A 2021    Procedure: EGD (ESOPHAGOGASTRODUODENOSCOPY);  Surgeon: Vince Rodriguez MD;  Location: [facility] OR [address] [place] [address];  Service: General;  Laterality: N/A;    LAPAROSCOPIC SLEEVE  GASTRECTOMY N/A 03/31/2021    Procedure: GASTRECTOMY, SLEEVE, LAPAROSCOPIC, with intraop EGD;  Surgeon: Vince Rodriguez MD;  Location: [facility] OR [address] [place] [address];  Service: General;  Laterality: N/A;    LUNG BIOPSY  09/2020    RHINOPLASTY      TONSILLECTOMY       Review of patient's allergies indicates:   Allergen Reactions    Succinimides Anaphylaxis     Son has MH     No current facility-administered medications on file prior to encounter.     Current Outpatient Medications on File Prior to Encounter   Medication Sig    amitriptyline (ELAVIL) 50 MG tablet Take 1 tablet (50 mg total) by mouth nightly as needed for Insomnia.    atorvastatin (LIPITOR) 20 MG tablet Take 1 tablet (20 mg total) by mouth every evening.    B-complex with vitamin C (VITAMIN B COMPLEX-C ORAL) Take by mouth.    benzonatate (TESSALON) 200 MG capsule Take 1 capsule (200 mg total) by mouth 3 (three) times daily as needed for Cough.    calcium-vitamin D 250-100 mg-unit per tablet Take 1 tablet by mouth 2 (two) times daily.    clonazePAM (KLONOPIN) 1 MG tablet Take 1 tablet (1 mg total) by mouth 2 (two) times daily as needed for Anxiety (panic).    COSENTYX PEN, 2 PENS, 150 mg/mL PnIj Inject 300mg (2 pens) into the skin every 4 weeks    cyanocobalamin 500 MCG tablet Take 500 mcg by mouth once daily.    fluticasone-umeclidin-vilanter (TRELEGY ELLIPTA) 100-62.5-25 mcg DsDv Inhale 1 puff into the lungs once daily.    levoFLOXacin (LEVAQUIN) 500 MG tablet Take 1 tablet (500 mg total) by mouth once daily.    metoprolol tartrate (LOPRESSOR) 50 MG tablet Take 1.5 tablets (75 mg total) by mouth 2 (two) times daily with meals.    multivitamin (THERAGRAN) per tablet Take 1 tablet by mouth once daily.    omeprazole (PRILOSEC) 40 MG capsule Take 1 capsule (40 mg total) by mouth every morning. Take one capsule by mouth every morning.    zolpidem (AMBIEN) 5 MG Tab Take 1 tablet (5 mg total) by mouth nightly as needed (insomnia).    [DISCONTINUED]  budesonide-formoterol 160-4.5 mcg (SYMBICORT) 160-4.5 mcg/actuation HFAA Inhale 2 puffs into the lungs every 12 (twelve) hours. Controller     Family History       Problem Relation (Age of Onset)    No Known Problems Daughter, Son, Daughter          Tobacco Use    Smoking status: Former     Packs/day: 0.00     Years: 20.00     Pack years: 0.00     Types: Cigarettes     Start date: 1979     Quit date: 2020     Years since quittin.9    Smokeless tobacco: Never   Substance and Sexual Activity    Alcohol use: Yes     Alcohol/week: 1.0 standard drink     Types: 1 Glasses of wine per week    Drug use: No    Sexual activity: Yes     Partners: Male     Birth control/protection: Post-menopausal     Review of Systems   Constitutional: Negative for chills and fever.   HENT:  Negative for congestion and sore throat.    Eyes:  Negative for pain and photophobia.   Cardiovascular:  Negative for chest pain, dyspnea on exertion, leg swelling and palpitations.   Respiratory:  Positive for cough, shortness of breath and sputum production. Negative for wheezing.    Skin: Negative.    Musculoskeletal: Negative.    Gastrointestinal:  Negative for abdominal pain, nausea and vomiting.   Genitourinary:  Negative for dysuria and hematuria.   Neurological: Negative.    Psychiatric/Behavioral: Negative.     Objective:     Vital Signs (Most Recent):  Temp: 98 °F (36.7 °C) (22)  Pulse: (!) 126 (22)  Resp: 17 (22)  BP: 129/61 (22)  SpO2: 95 % (22)   Vital Signs (24h Range):  Temp:  [96 °F (35.6 °C)-98 °F (36.7 °C)] 98 °F (36.7 °C)  Pulse:  [105-126] 126  Resp:  [16-18] 17  SpO2:  [90 %-99 %] 95 %  BP: (107-129)/(53-68) 129/61     Weight: 73.5 kg (162 lb 1.6 oz)  Body mass index is 29.65 kg/m².    SpO2: 95 %  O2 Device (Oxygen Therapy): room air (nasal. cannula connected for use prn)    No intake or output data in the 24 hours ending 22 1823    Lines/Drains/Airways        Peripheral Intravenous Line  Duration                  Peripheral IV - Single Lumen 10/31/22 0808 20 G Right Antecubital 1 day                    Physical Exam  Vitals and nursing note reviewed.   Constitutional:       General: She is not in acute distress.     Appearance: Normal appearance. She is not ill-appearing.   HENT:      Head: Normocephalic and atraumatic.      Mouth/Throat:      Mouth: Mucous membranes are moist.   Eyes:      Extraocular Movements: Extraocular movements intact.      Pupils: Pupils are equal, round, and reactive to light.   Cardiovascular:      Rate and Rhythm: Tachycardia present. Rhythm irregular.      Pulses: Normal pulses.      Heart sounds: Normal heart sounds. No murmur heard.  Pulmonary:      Effort: Pulmonary effort is normal.      Breath sounds: Normal breath sounds. No wheezing, rhonchi or rales.   Abdominal:      General: Bowel sounds are normal. There is no distension.      Palpations: Abdomen is soft.      Tenderness: There is no abdominal tenderness.   Musculoskeletal:         General: No tenderness.      Right lower leg: No edema.      Left lower leg: No edema.   Skin:     General: Skin is warm.      Capillary Refill: Capillary refill takes less than 2 seconds.      Findings: No erythema or rash.   Neurological:      General: No focal deficit present.      Mental Status: She is alert and oriented to person, place, and time.     Significant Labs: CMP   Recent Labs   Lab 10/31/22  0808 11/01/22 0341    136   K 3.8 4.1    107   CO2 23 23   GLU 81 177*   BUN 20 19   CREATININE 1.1 0.9   CALCIUM 9.6 8.8   PROT 6.9  --    ALBUMIN 3.5  --    BILITOT 1.2*  --    ALKPHOS 89  --    AST 20  --    ALT 26  --    ANIONGAP 11 6*   , CBC   Recent Labs   Lab 10/31/22  0808 11/01/22 0341   WBC 9.23 13.57*   HGB 16.3* 14.8   HCT 50.2* 43.7    235   , Troponin   Recent Labs   Lab 10/31/22  0921   TROPONINI <0.006   , and   Recent Lab Results         11/01/22 0341    10/31/22  1908        Anion Gap 6         Baso # 0.02         Basophil % 0.1         BUN 19         Calcium 8.8         Chloride 107         CO2 23         Creatinine 0.9         D-Dimer   0.53  Comment: The quantitative D-dimer assay should be used as an aid in   the diagnosis of deep vein thrombosis and pulmonary embolism  in patients with the appropriate presentation and clinical  history. The upper limit of the reference interval and the clinical   cut off   point are identical. Causes of a positive (>0.50 mg/L FEU) D-Dimer   test  include, but are not limited to: DVT, PE, DIC, thrombolytic   therapy, anticoagulant therapy, recent surgery, trauma, or   pregnancy, disseminated malignancy, aortic aneurysm, cirrhosis,  and severe infection. False negative results may occur in   patients with distal DVT.         Differential Method Automated         eGFR >60.0         Eos # 0.0         Eosinophil % 0.0         Glucose 177         Gran # (ANC) 12.0         Gran % 88.7         Hematocrit 43.7         Hemoglobin 14.8         Immature Grans (Abs) 0.14  Comment: Mild elevation in immature granulocytes is non specific and   can be seen in a variety of conditions including stress response,   acute inflammation, trauma and pregnancy. Correlation with other   laboratory and clinical findings is essential.           Immature Granulocytes 1.0         Lymph # 1.0         Lymph % 7.0         Magnesium 2.8         MCH 30.6         MCHC 33.9         MCV 91         Mono # 0.4         Mono % 3.2         MPV 10.3         nRBC 0         Phosphorus 1.9         Platelets 235         Potassium 4.1         RBC 4.83         RDW 12.4         Sodium 136         WBC 13.57                 Significant Imaging: All imaging from the past 24 hours has been reviewed.     Assessment and Plan:     Atrial flutter  Admitted with COPD exacerbation  Has new onset atrial flutter  Pt asymptomatic, denies chest pain or palpitations;dyspnea improved and on  RA  Started on apixaban and lopressor    Recommendations:  - continue apixaban 5mg BID  - continue lopressor 75mg BID  - cardiac monitoring  - inpatient EP consult for consideration of DCCV vs ablation      VTE Risk Mitigation (From admission, onward)           Ordered     apixaban tablet 5 mg  2 times daily         10/31/22 1535     Place sequential compression device  Until discontinued         10/31/22 1326     IP VTE LOW RISK PATIENT  Once         10/31/22 1326                  Thank you for your consult. I will sign off. Please contact us if you have any additional questions.    Razia Vogt MD  Cardiology   [facility]

## 2022-11-01 NOTE — ASSESSMENT & PLAN NOTE
Admitted with COPD exacerbation  Has new onset atrial flutter  Pt asymptomatic, denies chest pain or palpitations;dyspnea improved and on RA  Started on apixaban and lopressor    Recommendations:  - continue apixaban 5mg BID  - continue lopressor 75mg BID  - cardiac monitoring  - inpatient EP consult for consideration of DCCV vs ablation

## 2022-11-01 NOTE — PLAN OF CARE
[facility]  Initial Discharge Assessment       Primary Care Provider: Hetal Banks MD    Admission Diagnosis: Atrial flutter [I48.92]  Shortness of breath [R06.02]  Cough [R05.9]  COPD exacerbation [J44.1]  Chest pain [R07.9]  Atrial flutter, unspecified type [I48.92]    Admission Date: 10/31/2022  Expected Discharge Date: 11/2/2022         Payor: BLUE CROSS [employer]EFIT / Plan: [employer] [place]UE [place]OSS [place] / Product Type: Self Funded /     Extended Emergency Contact Information  Primary Emergency Contact: Luan Matias  Address: [address] [place] [address] [place] [address] [facility]  Home Phone: [phone]  Mobile Phone: [phone]  Relation: Spouse  Secondary Emergency Contact: BimalKasandra  Address: [address] [place] [address] [place] [address] [facility]  Home Phone: [phone]  Mobile Phone: [phone]  Relation: Daughter    Discharge Plan A: (P) Home with family  Discharge Plan B: (P) Home with family      [facility]  [address]  [facility] [address]  Phone: [phone] Fax: [phone]    [facility]  [address]  [place] [address]  Phone: [phone] Fax: [phone]    [id number] - [place], [place] - [address] [address]  [address] [facility], [address]  [place] [address]  Phone: [phone] Fax: [phone]    [id number] - [place], [place] - [address] AT [facility]  [address]  [facility] [facility] [address]  Phone: [phone] Fax: [phone]           SW completed Discharge Planning Assessment with patient via bedside. Discharge planning booklet given to patient/family and whiteboard updated with LESTER and phone #. All questions answered.    Patient reported that family will provide transportation upon discharge.     Patient reported that she lives at home with her  and prior to hospitalization she was independent with her ADL's.  Patient reported that she is not on dialysis and does not go to a Coumadin clinic.      Patient lives in a [facility] with 0 steps to enter.       Suzanna Katz LMSW  [facility]  Ext. [phone]

## 2022-11-01 NOTE — HPI
62 year old female with prior atrial flutter, HTN, HLD, depression/anxiety, insomnia, RA, sleeve gastrectomy, and COPD admitted with COPD exacerbation. Had URI and completed a course of levaquin.  unwell with RSV. Wheezing improved and had ongoing oxygen requirement. EKG obtained which showed atrial flutter with HR ~125. Atrial flutter shown on telemetry as well. TTE showing EF 50%, indeterminate LV diastolic function. Pt presented with dyspnea and dizziness but denies palpitations. Started on apixaban and lopressor.      EP consulted for recurrent onset atrial flutter. Pt doing well at this time and denies any issues       2/18/21 Stress ECHO  Sensitivity is impaired due to the patient's failure to achieve target heart rate ( stopped due to hypotension).  There were no arrhythmias during stress.  The ECG portion of this study is negative for myocardial ischemia.  The left ventricle is normal in size with normal systolic function. The estimated ejection fraction is 58%  Normal left ventricular diastolic function.  Normal right ventricular size with normal right ventricular systolic function.  Normal central venous pressure (3 mmHg).  The estimated PA systolic pressure is 19 mmHg.  The stress echo portion of this study is negative for myocardial ischemia.     Dysphagia or odynophagia:  No  Liver Disease, esophageal disease, or known varices:  No  Upper GI Bleeding: No  Snoring:  No  Sleep Apnea:  No  Prior neck surgery or radiation:  No  History of anesthetic difficulties:  No  Family history of anesthetic difficulties:  No  Last oral intake:  24 hours ago  Able to move neck in all directions:  Yes

## 2022-11-01 NOTE — ASSESSMENT & PLAN NOTE
- New onset atrial flutter  - Tachy to 125  - Metoprolol 75 mg BID  - Metoprolol 5 mg IV once (ordered, not given)  - Metoprolol 25 mg PO and Magnesium 2 g IV given overnight on 10/31 for persistent tachycardia with minimal improvement  - [id number] score: 2 (HTN and female) > will begin anticoagulation with Eliquis 5 BID  - Echo with EF of 50%, left/right ventricles with low normal systolic function, and indeterminate left ventricular diastolic function  - D-dimer: 0.53  - CTA ordered  - Repeat EKG on 11/1 with continued flutter  - Cardiology consulted, appreciate assistance  - Continue to monitor on tele  - Cardiology outpatient referral to be placed at discharge

## 2022-11-01 NOTE — SUBJECTIVE & OBJECTIVE
Interval History: Patient requiring 2 L NC overnight as she desatted to 90% on RA. Additionally, she was given Metoprolol 25 mg PO and IV Mag for persistent tachycardia. Cardiology consulted due to persistent new onset atrial flutter, appreciate assistance. Initiated eliquis 5 mg BID last night. Patient reports improvement in SOB and wheezing despite new oxygen requirement. D-dimer mildly elevated, CTA ordered in setting of persistent tachycardia and new hypoxia. Denies chest pain, shortness of breath. Reports continued cough and chest congestion/tightness. Will schedule Mucinex.    Review of Systems   Constitutional:  Positive for activity change and fatigue. Negative for chills, diaphoresis and fever.   HENT:  Negative for congestion, facial swelling, rhinorrhea and sore throat.    Eyes:  Negative for photophobia, itching and visual disturbance.   Respiratory:  Positive for cough and chest tightness. Negative for shortness of breath and wheezing.    Cardiovascular:  Negative for chest pain, palpitations and leg swelling.   Gastrointestinal:  Negative for abdominal distention, abdominal pain, blood in stool, constipation, diarrhea, nausea and vomiting.   Genitourinary:  Negative for difficulty urinating, dysuria, frequency, hematuria and urgency.   Musculoskeletal:  Negative for arthralgias, back pain and neck stiffness.   Neurological:  Positive for dizziness and light-headedness. Negative for tremors, seizures, syncope, weakness, numbness and headaches.   Psychiatric/Behavioral:  Negative for agitation, confusion and hallucinations.      Objective:     Vital Signs (Most Recent):  Temp: 97.8 °F (36.6 °C) (11/01/22 1131)  Pulse: (!) 119 (11/01/22 1131)  Resp: 17 (11/01/22 1131)  BP: (!) 107/57 (11/01/22 1131)  SpO2: 98 % (11/01/22 1131)   Vital Signs (24h Range):  Temp:  [96 °F (35.6 °C)-97.8 °F (36.6 °C)] 97.8 °F (36.6 °C)  Pulse:  [103-125] 119  Resp:  [17-18] 17  SpO2:  [90 %-99 %] 98 %  BP: (107-124)/(53-68)  107/57     Weight: 73.5 kg (162 lb 1.6 oz)  Body mass index is 29.65 kg/m².  No intake or output data in the 24 hours ending 11/01/22 1237    Physical Exam  Vitals and nursing note reviewed.   Constitutional:       General: She is not in acute distress.     Appearance: She is well-developed. She is not diaphoretic.      Comments: On 2 L NC   HENT:      Head: Normocephalic and atraumatic.      Right Ear: External ear normal.      Left Ear: External ear normal.      Nose: Nose normal. No congestion.      Mouth/Throat:      Pharynx: Oropharynx is clear.   Eyes:      General: No scleral icterus.     Extraocular Movements: Extraocular movements intact.   Cardiovascular:      Rate and Rhythm: Normal rate and regular rhythm.      Pulses: Normal pulses.      Heart sounds: Normal heart sounds. No murmur heard.  Pulmonary:      Effort: Pulmonary effort is normal. No respiratory distress.      Breath sounds: No wheezing or rales.   Abdominal:      General: Bowel sounds are normal. There is no distension.      Palpations: Abdomen is soft.      Tenderness: There is no abdominal tenderness. There is no guarding or rebound.   Musculoskeletal:      Cervical back: Normal range of motion.      Right lower leg: No edema.      Left lower leg: No edema.   Skin:     General: Skin is warm and dry.      Capillary Refill: Capillary refill takes less than 2 seconds.   Neurological:      General: No focal deficit present.      Mental Status: She is alert and oriented to person, place, and time. Mental status is at baseline.   Psychiatric:         Mood and Affect: Mood normal.         Behavior: Behavior normal.         Thought Content: Thought content normal.       Significant Labs: All pertinent labs within the past 24 hours have been reviewed.    Significant Imaging: I have reviewed all pertinent imaging results/findings within the past 24 hours.

## 2022-11-01 NOTE — SUBJECTIVE & OBJECTIVE
Past Medical History:   Diagnosis Date    Allergy     Anxiety     Arthritis     Colon polyps     COPD (chronic obstructive pulmonary disease)     COPD exacerbation 10/31/2022    Depression     Diverticular disease of colon 2017    Diverticulitis     HLD (hyperlipidemia)     Hypertension     Pancreatitis     Psoriasis     Rheumatoid arthritis     Severe obesity (BMI 35.0-39.9) with comorbidity      Past Surgical History:   Procedure Laterality Date    ADENOIDECTOMY  1966    Tonsillitis and adnoids    BLADDER SUSPENSION      (x2)     SECTION      (x2)    ESOPHAGOGASTRODUODENOSCOPY N/A 2021    Procedure: EGD (ESOPHAGOGASTRODUODENOSCOPY);  Surgeon: Vince Rodriguez MD;  Location: [facility] OR [address] [place] [address];  Service: General;  Laterality: N/A;    LAPAROSCOPIC SLEEVE GASTRECTOMY N/A 2021    Procedure: GASTRECTOMY, SLEEVE, LAPAROSCOPIC, with intraop EGD;  Surgeon: Vince Rodriguez MD;  Location: [facility] OR [address] [place] [address];  Service: General;  Laterality: N/A;    LUNG BIOPSY  2020    RHINOPLASTY      TONSILLECTOMY       Review of patient's allergies indicates:   Allergen Reactions    Succinimides Anaphylaxis     Son has      No current facility-administered medications on file prior to encounter.     Current Outpatient Medications on File Prior to Encounter   Medication Sig    amitriptyline (ELAVIL) 50 MG tablet Take 1 tablet (50 mg total) by mouth nightly as needed for Insomnia.    atorvastatin (LIPITOR) 20 MG tablet Take 1 tablet (20 mg total) by mouth every evening.    B-complex with vitamin C (VITAMIN B COMPLEX-C ORAL) Take by mouth.    benzonatate (TESSALON) 200 MG capsule Take 1 capsule (200 mg total) by mouth 3 (three) times daily as needed for Cough.    calcium-vitamin D 250-100 mg-unit per tablet Take 1 tablet by mouth 2 (two) times daily.    clonazePAM (KLONOPIN) 1 MG tablet Take 1 tablet (1 mg total) by mouth 2 (two) times daily as needed for Anxiety (panic).    COSENTYX PEN, 2 PENS, 150  mg/mL PnIj Inject 300mg (2 pens) into the skin every 4 weeks    cyanocobalamin 500 MCG tablet Take 500 mcg by mouth once daily.    fluticasone-umeclidin-vilanter (TRELEGY ELLIPTA) 100-62.5-25 mcg DsDv Inhale 1 puff into the lungs once daily.    levoFLOXacin (LEVAQUIN) 500 MG tablet Take 1 tablet (500 mg total) by mouth once daily.    metoprolol tartrate (LOPRESSOR) 50 MG tablet Take 1.5 tablets (75 mg total) by mouth 2 (two) times daily with meals.    multivitamin (THERAGRAN) per tablet Take 1 tablet by mouth once daily.    omeprazole (PRILOSEC) 40 MG capsule Take 1 capsule (40 mg total) by mouth every morning. Take one capsule by mouth every morning.    zolpidem (AMBIEN) 5 MG Tab Take 1 tablet (5 mg total) by mouth nightly as needed (insomnia).    [DISCONTINUED] budesonide-formoterol 160-4.5 mcg (SYMBICORT) 160-4.5 mcg/actuation HFAA Inhale 2 puffs into the lungs every 12 (twelve) hours. Controller     Family History       Problem Relation (Age of Onset)    No Known Problems Daughter, Son, Daughter          Tobacco Use    Smoking status: Former     Packs/day: 0.00     Years: 20.00     Pack years: 0.00     Types: Cigarettes     Start date: 1979     Quit date: 2020     Years since quittin.9    Smokeless tobacco: Never   Substance and Sexual Activity    Alcohol use: Yes     Alcohol/week: 1.0 standard drink     Types: 1 Glasses of wine per week    Drug use: No    Sexual activity: Yes     Partners: Male     Birth control/protection: Post-menopausal     Review of Systems   Constitutional: Negative for chills and fever.   HENT:  Negative for congestion and sore throat.    Eyes:  Negative for pain and photophobia.   Cardiovascular:  Negative for chest pain, dyspnea on exertion, leg swelling and palpitations.   Respiratory:  Positive for cough, shortness of breath and sputum production. Negative for wheezing.    Skin: Negative.    Musculoskeletal: Negative.    Gastrointestinal:  Negative for abdominal pain,  nausea and vomiting.   Genitourinary:  Negative for dysuria and hematuria.   Neurological: Negative.    Psychiatric/Behavioral: Negative.     Objective:     Vital Signs (Most Recent):  Temp: 98 °F (36.7 °C) (11/01/22 1557)  Pulse: (!) 126 (11/01/22 1557)  Resp: 17 (11/01/22 1557)  BP: 129/61 (11/01/22 1557)  SpO2: 95 % (11/01/22 1557)   Vital Signs (24h Range):  Temp:  [96 °F (35.6 °C)-98 °F (36.7 °C)] 98 °F (36.7 °C)  Pulse:  [105-126] 126  Resp:  [16-18] 17  SpO2:  [90 %-99 %] 95 %  BP: (107-129)/(53-68) 129/61     Weight: 73.5 kg (162 lb 1.6 oz)  Body mass index is 29.65 kg/m².    SpO2: 95 %  O2 Device (Oxygen Therapy): room air (nasal. cannula connected for use prn)    No intake or output data in the 24 hours ending 11/01/22 1823    Lines/Drains/Airways       Peripheral Intravenous Line  Duration                  Peripheral IV - Single Lumen 10/31/22 0808 20 G Right Antecubital 1 day                    Physical Exam  Vitals and nursing note reviewed.   Constitutional:       General: She is not in acute distress.     Appearance: Normal appearance. She is not ill-appearing.   HENT:      Head: Normocephalic and atraumatic.      Mouth/Throat:      Mouth: Mucous membranes are moist.   Eyes:      Extraocular Movements: Extraocular movements intact.      Pupils: Pupils are equal, round, and reactive to light.   Cardiovascular:      Rate and Rhythm: Tachycardia present. Rhythm irregular.      Pulses: Normal pulses.      Heart sounds: Normal heart sounds. No murmur heard.  Pulmonary:      Effort: Pulmonary effort is normal.      Breath sounds: Normal breath sounds. No wheezing, rhonchi or rales.   Abdominal:      General: Bowel sounds are normal. There is no distension.      Palpations: Abdomen is soft.      Tenderness: There is no abdominal tenderness.   Musculoskeletal:         General: No tenderness.      Right lower leg: No edema.      Left lower leg: No edema.   Skin:     General: Skin is warm.      Capillary  Refill: Capillary refill takes less than 2 seconds.      Findings: No erythema or rash.   Neurological:      General: No focal deficit present.      Mental Status: She is alert and oriented to person, place, and time.     Significant Labs: CMP   Recent Labs   Lab 10/31/22  0808 11/01/22  0341    136   K 3.8 4.1    107   CO2 23 23   GLU 81 177*   BUN 20 19   CREATININE 1.1 0.9   CALCIUM 9.6 8.8   PROT 6.9  --    ALBUMIN 3.5  --    BILITOT 1.2*  --    ALKPHOS 89  --    AST 20  --    ALT 26  --    ANIONGAP 11 6*   , CBC   Recent Labs   Lab 10/31/22  0808 11/01/22  0341   WBC 9.23 13.57*   HGB 16.3* 14.8   HCT 50.2* 43.7    235   , Troponin   Recent Labs   Lab 10/31/22  0921   TROPONINI <0.006   , and   Recent Lab Results         11/01/22  0341   10/31/22  1908        Anion Gap 6         Baso # 0.02         Basophil % 0.1         BUN 19         Calcium 8.8         Chloride 107         CO2 23         Creatinine 0.9         D-Dimer   0.53  Comment: The quantitative D-dimer assay should be used as an aid in   the diagnosis of deep vein thrombosis and pulmonary embolism  in patients with the appropriate presentation and clinical  history. The upper limit of the reference interval and the clinical   cut off   point are identical. Causes of a positive (>0.50 mg/L FEU) D-Dimer   test  include, but are not limited to: DVT, PE, DIC, thrombolytic   therapy, anticoagulant therapy, recent surgery, trauma, or   pregnancy, disseminated malignancy, aortic aneurysm, cirrhosis,  and severe infection. False negative results may occur in   patients with distal DVT.         Differential Method Automated         eGFR >60.0         Eos # 0.0         Eosinophil % 0.0         Glucose 177         Gran # (ANC) 12.0         Gran % 88.7         Hematocrit 43.7         Hemoglobin 14.8         Immature Grans (Abs) 0.14  Comment: Mild elevation in immature granulocytes is non specific and   can be seen in a variety of conditions  including stress response,   acute inflammation, trauma and pregnancy. Correlation with other   laboratory and clinical findings is essential.           Immature Granulocytes 1.0         Lymph # 1.0         Lymph % 7.0         Magnesium 2.8         MCH 30.6         MCHC 33.9         MCV 91         Mono # 0.4         Mono % 3.2         MPV 10.3         nRBC 0         Phosphorus 1.9         Platelets 235         Potassium 4.1         RBC 4.83         RDW 12.4         Sodium 136         WBC 13.57                 Significant Imaging: All imaging from the past 24 hours has been reviewed.

## 2022-11-01 NOTE — HOSPITAL COURSE
Patient admitted to Obs for COPD exacerbation and new onset atrial flutter. Patient's wheezing improved. However, patient requiring 2 L NC to maintain O2 saturation >92%. Given shortness of breath, chest tightness, and smoking history, a d-dimer was drawn and mildly elevated to 0.53. Repeat EKG with continued A flutter. CTA was ordered, no evidence of PE, with previously observed pulmonary nodules. Orthostatics negative. Patient no longer in need of NC, maintaining O2 saturation on RA. She remains tachycardic to 125 despite Metoprolol 75 mg BID, a one time dose of Metoprolol 25 mg, and Magnesium 2g IV. Metoprolol IV was ordered, but not given due patient being in the ER. Echo showed a tachycardia to 120, EF of 50%, left/right ventricles with low normal systolic function, and indeterminate left ventricular diastolic function. Cardiology was consulted, recommended continuing current medication regimen and an inpatient EP consult for consideration of DCCV vs ablation. EP consulted, ablation performed 11/3 without complication. HR controlled, cards rec d/c metoprolol and follow up with Dr. Duke. Patient agreeable to plan.

## 2022-11-02 ENCOUNTER — ANESTHESIA (OUTPATIENT)
Dept: MEDSURG UNIT | Facility: HOSPITAL | Age: 62
DRG: 274 | End: 2022-11-02
Payer: COMMERCIAL

## 2022-11-02 ENCOUNTER — ANESTHESIA EVENT (OUTPATIENT)
Dept: MEDSURG UNIT | Facility: HOSPITAL | Age: 62
DRG: 274 | End: 2022-11-02
Payer: COMMERCIAL

## 2022-11-02 LAB
ABO + RH BLD: NORMAL
ANION GAP SERPL CALC-SCNC: 6 MMOL/L (ref 8–16)
BASOPHILS # BLD AUTO: 0.03 K/UL (ref 0–0.2)
BASOPHILS NFR BLD: 0.2 % (ref 0–1.9)
BLD GP AB SCN CELLS X3 SERPL QL: NORMAL
BSA FOR ECHO PROCEDURE: 1.79 M2
BUN SERPL-MCNC: 20 MG/DL (ref 6–20)
CALCIUM SERPL-MCNC: 8.6 MG/DL (ref 8.7–10.5)
CHLORIDE SERPL-SCNC: 108 MMOL/L (ref 95–110)
CO2 SERPL-SCNC: 26 MMOL/L (ref 23–29)
CREAT SERPL-MCNC: 0.8 MG/DL (ref 0.5–1.4)
DIFFERENTIAL METHOD: ABNORMAL
EJECTION FRACTION: 45 %
EOSINOPHIL # BLD AUTO: 0 K/UL (ref 0–0.5)
EOSINOPHIL NFR BLD: 0.1 % (ref 0–8)
ERYTHROCYTE [DISTWIDTH] IN BLOOD BY AUTOMATED COUNT: 12.9 % (ref 11.5–14.5)
EST. GFR  (NO RACE VARIABLE): >60 ML/MIN/1.73 M^2
GLUCOSE SERPL-MCNC: 109 MG/DL (ref 70–110)
HCT VFR BLD AUTO: 42.1 % (ref 37–48.5)
HGB BLD-MCNC: 14 G/DL (ref 12–16)
IMM GRANULOCYTES # BLD AUTO: 0.12 K/UL (ref 0–0.04)
IMM GRANULOCYTES NFR BLD AUTO: 0.9 % (ref 0–0.5)
INR PPP: 1 (ref 0.8–1.2)
LACTATE SERPL-SCNC: 1.6 MMOL/L (ref 0.5–2.2)
LYMPHOCYTES # BLD AUTO: 1.8 K/UL (ref 1–4.8)
LYMPHOCYTES NFR BLD: 14 % (ref 18–48)
MAGNESIUM SERPL-MCNC: 2.2 MG/DL (ref 1.6–2.6)
MCH RBC QN AUTO: 30.8 PG (ref 27–31)
MCHC RBC AUTO-ENTMCNC: 33.3 G/DL (ref 32–36)
MCV RBC AUTO: 93 FL (ref 82–98)
MONOCYTES # BLD AUTO: 0.9 K/UL (ref 0.3–1)
MONOCYTES NFR BLD: 7.2 % (ref 4–15)
NEUTROPHILS # BLD AUTO: 10.1 K/UL (ref 1.8–7.7)
NEUTROPHILS NFR BLD: 77.6 % (ref 38–73)
NRBC BLD-RTO: 0 /100 WBC
PHOSPHATE SERPL-MCNC: 2.3 MG/DL (ref 2.7–4.5)
PLATELET # BLD AUTO: 219 K/UL (ref 150–450)
PMV BLD AUTO: 10.6 FL (ref 9.2–12.9)
POCT GLUCOSE: 107 MG/DL (ref 70–110)
POCT GLUCOSE: 96 MG/DL (ref 70–110)
POCT GLUCOSE: 99 MG/DL (ref 70–110)
POTASSIUM SERPL-SCNC: 3.7 MMOL/L (ref 3.5–5.1)
PROTHROMBIN TIME: 10.7 SEC (ref 9–12.5)
RBC # BLD AUTO: 4.54 M/UL (ref 4–5.4)
SODIUM SERPL-SCNC: 140 MMOL/L (ref 136–145)
WBC # BLD AUTO: 12.99 K/UL (ref 3.9–12.7)

## 2022-11-02 PROCEDURE — 36415 COLL VENOUS BLD VENIPUNCTURE: CPT | Performed by: INTERNAL MEDICINE

## 2022-11-02 PROCEDURE — 85025 COMPLETE CBC W/AUTO DIFF WBC: CPT

## 2022-11-02 PROCEDURE — 93005 ELECTROCARDIOGRAM TRACING: CPT

## 2022-11-02 PROCEDURE — C1733 CATH, EP, OTHR THAN COOL-TIP: HCPCS | Performed by: INTERNAL MEDICINE

## 2022-11-02 PROCEDURE — 63600175 PHARM REV CODE 636 W HCPCS: Performed by: INTERNAL MEDICINE

## 2022-11-02 PROCEDURE — 99233 SBSQ HOSP IP/OBS HIGH 50: CPT | Mod: ,,,

## 2022-11-02 PROCEDURE — 25000003 PHARM REV CODE 250: Performed by: INTERNAL MEDICINE

## 2022-11-02 PROCEDURE — 93010 EKG 12-LEAD: ICD-10-PCS | Mod: ,,, | Performed by: INTERNAL MEDICINE

## 2022-11-02 PROCEDURE — 63600175 PHARM REV CODE 636 W HCPCS: Performed by: ANESTHESIOLOGY

## 2022-11-02 PROCEDURE — 25000003 PHARM REV CODE 250: Performed by: ANESTHESIOLOGY

## 2022-11-02 PROCEDURE — 83605 ASSAY OF LACTIC ACID: CPT | Performed by: INTERNAL MEDICINE

## 2022-11-02 PROCEDURE — 99233 PR SUBSEQUENT HOSPITAL CARE,LEVL III: ICD-10-PCS | Mod: ,,,

## 2022-11-02 PROCEDURE — 25000003 PHARM REV CODE 250: Performed by: STUDENT IN AN ORGANIZED HEALTH CARE EDUCATION/TRAINING PROGRAM

## 2022-11-02 PROCEDURE — 27201423 OPTIME MED/SURG SUP & DEVICES STERILE SUPPLY: Performed by: INTERNAL MEDICINE

## 2022-11-02 PROCEDURE — 85610 PROTHROMBIN TIME: CPT | Performed by: STUDENT IN AN ORGANIZED HEALTH CARE EDUCATION/TRAINING PROGRAM

## 2022-11-02 PROCEDURE — 36415 COLL VENOUS BLD VENIPUNCTURE: CPT

## 2022-11-02 PROCEDURE — 63600175 PHARM REV CODE 636 W HCPCS: Performed by: STUDENT IN AN ORGANIZED HEALTH CARE EDUCATION/TRAINING PROGRAM

## 2022-11-02 PROCEDURE — 25000003 PHARM REV CODE 250

## 2022-11-02 PROCEDURE — 94640 AIRWAY INHALATION TREATMENT: CPT

## 2022-11-02 PROCEDURE — D9220A PRA ANESTHESIA: Mod: CRNA,,, | Performed by: STUDENT IN AN ORGANIZED HEALTH CARE EDUCATION/TRAINING PROGRAM

## 2022-11-02 PROCEDURE — 93010 ELECTROCARDIOGRAM REPORT: CPT | Mod: 76,,, | Performed by: INTERNAL MEDICINE

## 2022-11-02 PROCEDURE — C1894 INTRO/SHEATH, NON-LASER: HCPCS | Performed by: INTERNAL MEDICINE

## 2022-11-02 PROCEDURE — 93010 ELECTROCARDIOGRAM REPORT: CPT | Mod: ,,, | Performed by: INTERNAL MEDICINE

## 2022-11-02 PROCEDURE — 93653 COMPRE EP EVAL TX SVT: CPT | Mod: ,,, | Performed by: INTERNAL MEDICINE

## 2022-11-02 PROCEDURE — D9220A PRA ANESTHESIA: Mod: ANES,,, | Performed by: ANESTHESIOLOGY

## 2022-11-02 PROCEDURE — D9220A PRA ANESTHESIA: ICD-10-PCS | Mod: ANES,,, | Performed by: ANESTHESIOLOGY

## 2022-11-02 PROCEDURE — 84100 ASSAY OF PHOSPHORUS: CPT

## 2022-11-02 PROCEDURE — 86901 BLOOD TYPING SEROLOGIC RH(D): CPT | Performed by: STUDENT IN AN ORGANIZED HEALTH CARE EDUCATION/TRAINING PROGRAM

## 2022-11-02 PROCEDURE — 83735 ASSAY OF MAGNESIUM: CPT

## 2022-11-02 PROCEDURE — 25000242 PHARM REV CODE 250 ALT 637 W/ HCPCS

## 2022-11-02 PROCEDURE — 93653 PR ELECTROPHYS EVAL, COMPREHEN, W/SUPRAVENT TACHYCARD TRMT: ICD-10-PCS | Mod: ,,, | Performed by: INTERNAL MEDICINE

## 2022-11-02 PROCEDURE — C1893 INTRO/SHEATH, FIXED,NON-PEEL: HCPCS | Performed by: INTERNAL MEDICINE

## 2022-11-02 PROCEDURE — 94761 N-INVAS EAR/PLS OXIMETRY MLT: CPT

## 2022-11-02 PROCEDURE — 99233 SBSQ HOSP IP/OBS HIGH 50: CPT | Mod: ,,, | Performed by: INTERNAL MEDICINE

## 2022-11-02 PROCEDURE — 37000009 HC ANESTHESIA EA ADD 15 MINS: Performed by: INTERNAL MEDICINE

## 2022-11-02 PROCEDURE — 99233 PR SUBSEQUENT HOSPITAL CARE,LEVL III: ICD-10-PCS | Mod: ,,, | Performed by: INTERNAL MEDICINE

## 2022-11-02 PROCEDURE — 93653 COMPRE EP EVAL TX SVT: CPT | Performed by: INTERNAL MEDICINE

## 2022-11-02 PROCEDURE — 37000008 HC ANESTHESIA 1ST 15 MINUTES: Performed by: INTERNAL MEDICINE

## 2022-11-02 PROCEDURE — 80048 BASIC METABOLIC PNL TOTAL CA: CPT

## 2022-11-02 PROCEDURE — C1730 CATH, EP, 19 OR FEW ELECT: HCPCS | Performed by: INTERNAL MEDICINE

## 2022-11-02 PROCEDURE — 20600001 HC STEP DOWN PRIVATE ROOM

## 2022-11-02 PROCEDURE — D9220A PRA ANESTHESIA: ICD-10-PCS | Mod: CRNA,,, | Performed by: STUDENT IN AN ORGANIZED HEALTH CARE EDUCATION/TRAINING PROGRAM

## 2022-11-02 PROCEDURE — 63600175 PHARM REV CODE 636 W HCPCS

## 2022-11-02 RX ORDER — LIDOCAINE HYDROCHLORIDE 20 MG/ML
INJECTION INTRAVENOUS
Status: DISCONTINUED | OUTPATIENT
Start: 2022-11-02 | End: 2022-11-02

## 2022-11-02 RX ORDER — MIDAZOLAM HYDROCHLORIDE 1 MG/ML
INJECTION INTRAMUSCULAR; INTRAVENOUS
Status: DISCONTINUED | OUTPATIENT
Start: 2022-11-02 | End: 2022-11-02

## 2022-11-02 RX ORDER — LIDOCAINE HYDROCHLORIDE 10 MG/ML
INJECTION INFILTRATION; PERINEURAL
Status: DISCONTINUED | OUTPATIENT
Start: 2022-11-02 | End: 2022-11-03

## 2022-11-02 RX ORDER — PHENYLEPHRINE HCL IN 0.9% NACL 1 MG/10 ML
SYRINGE (ML) INTRAVENOUS
Status: DISCONTINUED | OUTPATIENT
Start: 2022-11-02 | End: 2022-11-02

## 2022-11-02 RX ORDER — ONDANSETRON 2 MG/ML
4 INJECTION INTRAMUSCULAR; INTRAVENOUS DAILY PRN
Status: DISCONTINUED | OUTPATIENT
Start: 2022-11-02 | End: 2022-11-03 | Stop reason: HOSPADM

## 2022-11-02 RX ORDER — LIDOCAINE HYDROCHLORIDE 20 MG/ML
SOLUTION OROPHARYNGEAL
Status: DISCONTINUED | OUTPATIENT
Start: 2022-11-02 | End: 2022-11-02

## 2022-11-02 RX ORDER — FENTANYL CITRATE 50 UG/ML
INJECTION, SOLUTION INTRAMUSCULAR; INTRAVENOUS
Status: DISCONTINUED | OUTPATIENT
Start: 2022-11-02 | End: 2022-11-02

## 2022-11-02 RX ORDER — FENTANYL CITRATE 50 UG/ML
25 INJECTION, SOLUTION INTRAMUSCULAR; INTRAVENOUS EVERY 5 MIN PRN
Status: DISCONTINUED | OUTPATIENT
Start: 2022-11-02 | End: 2022-11-03

## 2022-11-02 RX ORDER — KETAMINE HCL IN 0.9 % NACL 50 MG/5 ML
SYRINGE (ML) INTRAVENOUS
Status: DISCONTINUED | OUTPATIENT
Start: 2022-11-02 | End: 2022-11-02

## 2022-11-02 RX ORDER — DEXMEDETOMIDINE HYDROCHLORIDE 100 UG/ML
INJECTION, SOLUTION INTRAVENOUS
Status: DISCONTINUED | OUTPATIENT
Start: 2022-11-02 | End: 2022-11-02

## 2022-11-02 RX ORDER — PROPOFOL 10 MG/ML
VIAL (ML) INTRAVENOUS CONTINUOUS PRN
Status: DISCONTINUED | OUTPATIENT
Start: 2022-11-02 | End: 2022-11-02

## 2022-11-02 RX ORDER — DIPHENHYDRAMINE HYDROCHLORIDE 50 MG/ML
INJECTION INTRAMUSCULAR; INTRAVENOUS
Status: DISCONTINUED | OUTPATIENT
Start: 2022-11-02 | End: 2022-11-02

## 2022-11-02 RX ORDER — PROPOFOL 10 MG/ML
VIAL (ML) INTRAVENOUS
Status: DISCONTINUED | OUTPATIENT
Start: 2022-11-02 | End: 2022-11-02

## 2022-11-02 RX ORDER — ONDANSETRON 2 MG/ML
INJECTION INTRAMUSCULAR; INTRAVENOUS
Status: DISCONTINUED | OUTPATIENT
Start: 2022-11-02 | End: 2022-11-02

## 2022-11-02 RX ORDER — HEPARIN SOD,PORCINE/0.9 % NACL 1000/500ML
INTRAVENOUS SOLUTION INTRAVENOUS
Status: DISCONTINUED | OUTPATIENT
Start: 2022-11-02 | End: 2022-11-03

## 2022-11-02 RX ADMIN — PANTOPRAZOLE SODIUM 40 MG: 40 TABLET, DELAYED RELEASE ORAL at 08:11

## 2022-11-02 RX ADMIN — FLUTICASONE FUROATE AND VILANTEROL TRIFENATATE 1 PUFF: 100; 25 POWDER RESPIRATORY (INHALATION) at 11:11

## 2022-11-02 RX ADMIN — DEXMEDETOMIDINE HYDROCHLORIDE 2 MCG: 100 INJECTION, SOLUTION INTRAVENOUS at 02:11

## 2022-11-02 RX ADMIN — Medication 100 MCG: at 12:11

## 2022-11-02 RX ADMIN — DIPHENHYDRAMINE HYDROCHLORIDE 25 MG: 50 INJECTION, SOLUTION INTRAMUSCULAR; INTRAVENOUS at 12:11

## 2022-11-02 RX ADMIN — FENTANYL CITRATE 50 MCG: 0.05 INJECTION, SOLUTION INTRAMUSCULAR; INTRAVENOUS at 12:11

## 2022-11-02 RX ADMIN — LIDOCAINE HYDROCHLORIDE 15 ML: 20 SOLUTION ORAL; TOPICAL at 12:11

## 2022-11-02 RX ADMIN — APIXABAN 5 MG: 5 TABLET, FILM COATED ORAL at 08:11

## 2022-11-02 RX ADMIN — ZOLPIDEM TARTRATE 5 MG: 5 TABLET ORAL at 09:11

## 2022-11-02 RX ADMIN — LIDOCAINE HYDROCHLORIDE 100 MG: 20 INJECTION INTRAVENOUS at 12:11

## 2022-11-02 RX ADMIN — DEXMEDETOMIDINE HYDROCHLORIDE 2 MCG: 100 INJECTION, SOLUTION INTRAVENOUS at 01:11

## 2022-11-02 RX ADMIN — LEVALBUTEROL 1.25 MG: 1.25 SOLUTION, CONCENTRATE RESPIRATORY (INHALATION) at 12:11

## 2022-11-02 RX ADMIN — SODIUM CHLORIDE, SODIUM GLUCONATE, SODIUM ACETATE, POTASSIUM CHLORIDE, MAGNESIUM CHLORIDE, SODIUM PHOSPHATE, DIBASIC, AND POTASSIUM PHOSPHATE: .53; .5; .37; .037; .03; .012; .00082 INJECTION, SOLUTION INTRAVENOUS at 12:11

## 2022-11-02 RX ADMIN — ONDANSETRON 4 MG: 2 INJECTION INTRAMUSCULAR; INTRAVENOUS at 12:11

## 2022-11-02 RX ADMIN — LEVALBUTEROL 1.25 MG: 1.25 SOLUTION, CONCENTRATE RESPIRATORY (INHALATION) at 11:11

## 2022-11-02 RX ADMIN — SODIUM CHLORIDE 250 ML: 0.9 INJECTION, SOLUTION INTRAVENOUS at 04:11

## 2022-11-02 RX ADMIN — PROPOFOL 150 MCG/KG/MIN: 10 INJECTION, EMULSION INTRAVENOUS at 12:11

## 2022-11-02 RX ADMIN — PROPOFOL 50 MG: 10 INJECTION, EMULSION INTRAVENOUS at 12:11

## 2022-11-02 RX ADMIN — GUAIFENESIN 600 MG: 600 TABLET, EXTENDED RELEASE ORAL at 09:11

## 2022-11-02 RX ADMIN — METOPROLOL TARTRATE 75 MG: 50 TABLET, FILM COATED ORAL at 08:11

## 2022-11-02 RX ADMIN — APIXABAN 5 MG: 5 TABLET, FILM COATED ORAL at 09:11

## 2022-11-02 RX ADMIN — ATORVASTATIN CALCIUM 20 MG: 20 TABLET, FILM COATED ORAL at 09:11

## 2022-11-02 RX ADMIN — FENTANYL CITRATE 25 MCG: 0.05 INJECTION, SOLUTION INTRAMUSCULAR; INTRAVENOUS at 04:11

## 2022-11-02 RX ADMIN — Medication 10 MG: at 01:11

## 2022-11-02 RX ADMIN — GLYCOPYRROLATE 0.2 MG: 0.2 INJECTION, SOLUTION INTRAMUSCULAR; INTRAVITREAL at 12:11

## 2022-11-02 RX ADMIN — PREDNISONE 40 MG: 20 TABLET ORAL at 08:11

## 2022-11-02 RX ADMIN — FENTANYL CITRATE 25 MCG: 0.05 INJECTION, SOLUTION INTRAMUSCULAR; INTRAVENOUS at 01:11

## 2022-11-02 RX ADMIN — Medication 20 MG: at 12:11

## 2022-11-02 RX ADMIN — AMITRIPTYLINE HYDROCHLORIDE 50 MG: 50 TABLET, FILM COATED ORAL at 09:11

## 2022-11-02 RX ADMIN — MIDAZOLAM HYDROCHLORIDE 2 MG: 1 INJECTION, SOLUTION INTRAMUSCULAR; INTRAVENOUS at 12:11

## 2022-11-02 RX ADMIN — CLONAZEPAM 1 MG: 0.5 TABLET ORAL at 09:11

## 2022-11-02 RX ADMIN — FENTANYL CITRATE 25 MCG: 0.05 INJECTION, SOLUTION INTRAMUSCULAR; INTRAVENOUS at 02:11

## 2022-11-02 RX ADMIN — METOPROLOL TARTRATE 75 MG: 50 TABLET, FILM COATED ORAL at 04:11

## 2022-11-02 RX ADMIN — ACETAMINOPHEN 650 MG: 325 TABLET ORAL at 03:11

## 2022-11-02 RX ADMIN — GUAIFENESIN 600 MG: 600 TABLET, EXTENDED RELEASE ORAL at 08:11
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
## 2022-11-02 NOTE — HOSPITAL COURSE
HPI:   62 year old female with prior atrial flutter, HTN, HLD, depression/anxiety, insomnia, RA, sleeve gastrectomy, and COPD admitted with COPD exacerbation. Had URI and completed a course of levaquin.  unwell with RSV. Wheezing improved and had ongoing oxygen requirement. EKG obtained which showed atrial flutter with HR ~125. Atrial flutter shown on telemetry as well. TTE showing EF 50%, indeterminate LV diastolic function. Pt presented with dyspnea and dizziness but denies palpitations. Started on apixaban and lopressor.      EP consulted for recurrent onset atrial flutter.

## 2022-11-02 NOTE — AI DETERIORATION ALERT
"RAPID RESPONSE NURSE AI ALERT       AI alert received.    Chart Reviewed: 11/02/2022, 11:40 AM    MRN: [id number]  Bed: 1106/1106 A    Dx: COPD exacerbation    Lucia Matias has a past medical history of Allergy, Anxiety, Arthritis, Colon polyps, COPD (chronic obstructive pulmonary disease), COPD exacerbation, Depression, Diverticular disease of colon, Diverticulitis, HLD (hyperlipidemia), Hypertension, Pancreatitis, Psoriasis, Rheumatoid arthritis, and Severe obesity (BMI 35.0-39.9) with comorbidity.    Last VS: /75 (BP Location: Right arm, Patient Position: Lying)   Pulse (!) 117   Temp 97.8 °F (36.6 °C) (Oral)   Resp 18   Ht 5' 2" (1.575 m)   Wt 73.5 kg (162 lb 1.6 oz)   SpO2 (!) 94%   Breastfeeding No   BMI 29.65 kg/m²     24H Vital Sign Range:  Temp:  [96.3 °F (35.7 °C)-98 °F (36.7 °C)]   Pulse:  []   Resp:  [16-18]   BP: (107-138)/(55-75)   SpO2:  [93 %-97 %]     Level of Consciousness (AVPU): alert    Recent Labs     10/31/22  0808 11/01/22  0341 11/02/22  0256   WBC 9.23 13.57* 12.99*   HGB 16.3* 14.8 14.0   HCT 50.2* 43.7 42.1    235 219       Recent Labs     10/31/22  0808 11/01/22  0341 11/02/22  0256    136 140   K 3.8 4.1 3.7    107 108   CO2 23 23 26   CREATININE 1.1 0.9 0.8   GLU 81 177* 109   PHOS  --  1.9* 2.3*   MG  --  2.8* 2.2        Recent Labs     10/31/22  1347   PH 7.362   PCO2 40.7   PO2 72*   HCO3 23.1*   POCSATURATED 94*   BE -2        OXYGEN:  Flow (L/min): 2     O2 Device (Oxygen Therapy): room air    MEWS score: 4    Please call [phone] for further concerns or assistance.    Jessenia Hua RN        "

## 2022-11-02 NOTE — ASSESSMENT & PLAN NOTE
1. SHAYLEE for evaluation of OLIVIA prior to DCCV   -No absolute contraindications of esophageal stricture, tumor, perforation, laceration,or diverticulum and/or active GI bleed  -The risks, benefits & alternatives of the procedure were explained to the patient.   -The risks of transesophageal echo include but are not limited to:  Dental trauma, esophageal trauma/perforation, bleeding, laryngospasm/brochospasm, aspiration, sore throat/hoarseness, & dislodgement of the endotracheal tube/nasogastric tube (where applicable).    -The risks of moderate sedation include hypotension, respiratory depression, arrhythmias, bronchospasm, & death.    -Informed consent was obtained. The patient is agreeable to proceed with the procedure and all questions and concerns addressed.    Case discussed with an attending in echocardiography lab.     Further recommendations per attending addendum

## 2022-11-02 NOTE — PLAN OF CARE
Problem: Functional Ability Impaired COPD (Chronic Obstructive Pulmonary Disease)  Goal: Optimal Level of Functional N[place]  Outcome: Ongoing, Progressing     Problem: Infection COPD (Chronic Obstructive Pulmonary Disease)  Goal: Absence of Infection Signs and Symptoms  Outcome: Ongoing, Progressing     Problem: Oral Intake Inadequate COPD (Chronic Obstructive Pulmonary Disease)  Goal: Improved Nutrition Intake  Outcome: Ongoing, Progressing     Problem: Adult Inpatient Plan of Care  Goal: Absence of Hospital-Acquired Illness or Injury  Outcome: Ongoing, Progressing  Goal: Optimal Comfort and Wellbeing  Outcome: Ongoing, Progressing     Problem: Infection  Goal: Absence of Infection Signs and Symptoms  Outcome: Ongoing, Progressing

## 2022-11-02 NOTE — ASSESSMENT & PLAN NOTE
COPD (chronic obstructive pulmonary disease)  62 y.o. with COPD arrived to the ED complaining of shortness of breath. Patient had URI that began last week, prescribed a course of Levaquin that was completed today. Now with continued shortness of breath, dyspnea on exertion, dizziness, and intermittently productive cough (+white sputum) which prompted her to come to the ED.  - Afebrile, tachycardic, hypotensive in ED  - Given DuoNebs x 3, Solumedrol x 1, NS bolus x 2 in ED  - Maintaining O2 saturation on RA, does not use O2 at home   - Required 2L ON 10/31-11/01, now back on RA  - CXR with no acute cardiopulmonary process  - Labs wnl  - Orthostatics negative  - Patient is on the COPD exacerbation pathway.   - Exacerbation resolved, lungs CTAB  - Trelegy, rescue albuterol inhaler at home  - Levalbuterol q8h   - avoiding albuterol in the setting of a flutter   - Discontinued  - Prednisone 40 mg qD   - Discontinued  - Breo qD  - Mucinex BID  - Tessalon prn cough  - Monitor labs, vitals, and tele  - Fall precautions

## 2022-11-02 NOTE — ASSESSMENT & PLAN NOTE
History of previous atrial flutter based on prior EKGs  On metoprolol tartrate, recently outpatient increased from 50mg to 75mg BID  Not on anticoagulation prior to admission 10/31    Plan  NPO for typical atrial flutter ablation today with Dr. Duke. SHAYLEE prior to ablation  Had a popsickle and sip of water with meds at 730am, discuss with anesthesia but plan for case this afternoon  Consents signed with EP fellow and discussed with staff  Allergy to succinimides (anaphylaxis)

## 2022-11-02 NOTE — SUBJECTIVE & OBJECTIVE
Past Medical History:   Diagnosis Date    Allergy     Anxiety     Arthritis     Colon polyps     COPD (chronic obstructive pulmonary disease)     COPD exacerbation 10/31/2022    Depression     Diverticular disease of colon 2017    Diverticulitis     HLD (hyperlipidemia)     Hypertension     Pancreatitis     Psoriasis     Rheumatoid arthritis     Severe obesity (BMI 35.0-39.9) with comorbidity      Past Surgical History:   Procedure Laterality Date    ADENOIDECTOMY  1966    Tonsillitis and adnoids    BLADDER SUSPENSION      (x2)     SECTION      (x2)    ESOPHAGOGASTRODUODENOSCOPY N/A 2021    Procedure: EGD (ESOPHAGOGASTRODUODENOSCOPY);  Surgeon: Vince Rodriguez MD;  Location: [facility] OR [address] [place] [address];  Service: General;  Laterality: N/A;    LAPAROSCOPIC SLEEVE GASTRECTOMY N/A 2021    Procedure: GASTRECTOMY, SLEEVE, LAPAROSCOPIC, with intraop EGD;  Surgeon: Vince Rodriguez MD;  Location: [facility] OR [address] [place] [address];  Service: General;  Laterality: N/A;    LUNG BIOPSY  2020    RHINOPLASTY      TONSILLECTOMY       Review of patient's allergies indicates:   Allergen Reactions    Succinimides Anaphylaxis     Son has      No current facility-administered medications on file prior to encounter.     Current Outpatient Medications on File Prior to Encounter   Medication Sig    amitriptyline (ELAVIL) 50 MG tablet Take 1 tablet (50 mg total) by mouth nightly as needed for Insomnia.    atorvastatin (LIPITOR) 20 MG tablet Take 1 tablet (20 mg total) by mouth every evening.    B-complex with vitamin C (VITAMIN B COMPLEX-C ORAL) Take by mouth.    benzonatate (TESSALON) 200 MG capsule Take 1 capsule (200 mg total) by mouth 3 (three) times daily as needed for Cough.    calcium-vitamin D 250-100 mg-unit per tablet Take 1 tablet by mouth 2 (two) times daily.    clonazePAM (KLONOPIN) 1 MG tablet Take 1 tablet (1 mg total) by mouth 2 (two) times daily as needed for Anxiety (panic).    COSENTYX PEN, 2 PENS, 150  mg/mL PnIj Inject 300mg (2 pens) into the skin every 4 weeks    cyanocobalamin 500 MCG tablet Take 500 mcg by mouth once daily.    fluticasone-umeclidin-vilanter (TRELEGY ELLIPTA) 100-62.5-25 mcg DsDv Inhale 1 puff into the lungs once daily.    levoFLOXacin (LEVAQUIN) 500 MG tablet Take 1 tablet (500 mg total) by mouth once daily.    metoprolol tartrate (LOPRESSOR) 50 MG tablet Take 1.5 tablets (75 mg total) by mouth 2 (two) times daily with meals.    multivitamin (THERAGRAN) per tablet Take 1 tablet by mouth once daily.    omeprazole (PRILOSEC) 40 MG capsule Take 1 capsule (40 mg total) by mouth every morning. Take one capsule by mouth every morning.    zolpidem (AMBIEN) 5 MG Tab Take 1 tablet (5 mg total) by mouth nightly as needed (insomnia).    [DISCONTINUED] budesonide-formoterol 160-4.5 mcg (SYMBICORT) 160-4.5 mcg/actuation HFAA Inhale 2 puffs into the lungs every 12 (twelve) hours. Controller     Family History       Problem Relation (Age of Onset)    No Known Problems Daughter, Son, Daughter          Tobacco Use    Smoking status: Former     Packs/day: 0.00     Years: 20.00     Pack years: 0.00     Types: Cigarettes     Start date: 1979     Quit date: 2020     Years since quittin.9    Smokeless tobacco: Never   Substance and Sexual Activity    Alcohol use: Yes     Alcohol/week: 1.0 standard drink     Types: 1 Glasses of wine per week    Drug use: No    Sexual activity: Yes     Partners: Male     Birth control/protection: Post-menopausal     Review of Systems   Constitutional: Negative for chills and fever.   HENT:  Negative for congestion and sore throat.    Eyes:  Negative for pain and photophobia.   Cardiovascular:  Positive for dyspnea on exertion and irregular heartbeat. Negative for chest pain, leg swelling and palpitations.   Respiratory:  Positive for cough, shortness of breath and sputum production. Negative for wheezing.    Skin: Negative.    Musculoskeletal: Negative.     Gastrointestinal:  Negative for abdominal pain, nausea and vomiting.   Genitourinary:  Negative for dysuria and hematuria.   Neurological:  Positive for dizziness.   Psychiatric/Behavioral: Negative.     Objective:     Vital Signs (Most Recent):  Temp: 96.8 °F (36 °C) (11/02/22 0728)  Pulse: 96 (11/02/22 0853)  Resp: 17 (11/02/22 0853)  BP: 138/63 (11/02/22 0728)  SpO2: 95 % (11/02/22 0853)   Vital Signs (24h Range):  Temp:  [96.3 °F (35.7 °C)-98 °F (36.7 °C)] 96.8 °F (36 °C)  Pulse:  [] 96  Resp:  [16-18] 17  SpO2:  [93 %-98 %] 95 %  BP: (107-138)/(55-71) 138/63     Weight: 73.5 kg (162 lb 1.6 oz)  Body mass index is 29.65 kg/m².    SpO2: 95 %  O2 Device (Oxygen Therapy): room air    No intake or output data in the 24 hours ending 11/02/22 0902    Lines/Drains/Airways       Peripheral Intravenous Line  Duration                  Peripheral IV - Single Lumen 10/31/22 0808 20 G Right Antecubital 2 days                  Physical Exam  Vitals and nursing note reviewed.   Constitutional:       General: She is not in acute distress.     Appearance: Normal appearance. She is not ill-appearing.   HENT:      Head: Normocephalic and atraumatic.      Mouth/Throat:      Mouth: Mucous membranes are moist.   Eyes:      Extraocular Movements: Extraocular movements intact.      Pupils: Pupils are equal, round, and reactive to light.   Cardiovascular:      Rate and Rhythm: Regular rhythm. Tachycardia present.      Pulses: Normal pulses.      Heart sounds: Normal heart sounds. No murmur heard.  Pulmonary:      Effort: Pulmonary effort is normal.      Breath sounds: Normal breath sounds. No wheezing, rhonchi or rales.   Abdominal:      General: Bowel sounds are normal. There is no distension.      Palpations: Abdomen is soft.      Tenderness: There is no abdominal tenderness.   Musculoskeletal:         General: No tenderness.      Right lower leg: No edema.      Left lower leg: No edema.   Skin:     General: Skin is warm.       Capillary Refill: Capillary refill takes less than 2 seconds.      Findings: No erythema or rash.   Neurological:      General: No focal deficit present.      Mental Status: She is alert and oriented to person, place, and time.     Significant Labs: CMP   Recent Labs   Lab 11/01/22  0341 11/02/22  0256    140   K 4.1 3.7    108   CO2 23 26   * 109   BUN 19 20   CREATININE 0.9 0.8   CALCIUM 8.8 8.6*   ANIONGAP 6* 6*     CBC   Recent Labs   Lab 11/01/22  0341 11/02/22  0256   WBC 13.57* 12.99*   HGB 14.8 14.0   HCT 43.7 42.1    219     , Troponin   Recent Labs   Lab 10/31/22  0921   TROPONINI <0.006       Significant Imaging: All imaging from the past 24 hours has been reviewed.

## 2022-11-02 NOTE — SUBJECTIVE & OBJECTIVE
Past Medical History:   Diagnosis Date    Allergy     Anxiety     Arthritis     Colon polyps     COPD (chronic obstructive pulmonary disease)     COPD exacerbation 10/31/2022    Depression     Diverticular disease of colon 2017    Diverticulitis     HLD (hyperlipidemia)     Hypertension     Pancreatitis     Psoriasis     Rheumatoid arthritis     Severe obesity (BMI 35.0-39.9) with comorbidity        Past Surgical History:   Procedure Laterality Date    ADENOIDECTOMY  1966    Tonsillitis and adnoids    BLADDER SUSPENSION      (x2)     SECTION      (x2)    ESOPHAGOGASTRODUODENOSCOPY N/A 2021    Procedure: EGD (ESOPHAGOGASTRODUODENOSCOPY);  Surgeon: Vince Rodriguez MD;  Location: [facility] OR [address] [place] [address];  Service: General;  Laterality: N/A;    LAPAROSCOPIC SLEEVE GASTRECTOMY N/A 2021    Procedure: GASTRECTOMY, SLEEVE, LAPAROSCOPIC, with intraop EGD;  Surgeon: Vince Rodriguez MD;  Location: [facility] OR [address] [place] [address];  Service: General;  Laterality: N/A;    LUNG BIOPSY  2020    RHINOPLASTY      TONSILLECTOMY         Review of patient's allergies indicates:   Allergen Reactions    Succinimides Anaphylaxis     Son has        No current facility-administered medications on file prior to encounter.     Current Outpatient Medications on File Prior to Encounter   Medication Sig    amitriptyline (ELAVIL) 50 MG tablet Take 1 tablet (50 mg total) by mouth nightly as needed for Insomnia.    atorvastatin (LIPITOR) 20 MG tablet Take 1 tablet (20 mg total) by mouth every evening.    B-complex with vitamin C (VITAMIN B COMPLEX-C ORAL) Take by mouth.    benzonatate (TESSALON) 200 MG capsule Take 1 capsule (200 mg total) by mouth 3 (three) times daily as needed for Cough.    calcium-vitamin D 250-100 mg-unit per tablet Take 1 tablet by mouth 2 (two) times daily.    clonazePAM (KLONOPIN) 1 MG tablet Take 1 tablet (1 mg total) by mouth 2 (two) times daily as needed for Anxiety (panic).    COSENTYX PEN, 2  PENS, 150 mg/mL PnIj Inject 300mg (2 pens) into the skin every 4 weeks    cyanocobalamin 500 MCG tablet Take 500 mcg by mouth once daily.    fluticasone-umeclidin-vilanter (TRELEGY ELLIPTA) 100-62.5-25 mcg DsDv Inhale 1 puff into the lungs once daily.    levoFLOXacin (LEVAQUIN) 500 MG tablet Take 1 tablet (500 mg total) by mouth once daily.    metoprolol tartrate (LOPRESSOR) 50 MG tablet Take 1.5 tablets (75 mg total) by mouth 2 (two) times daily with meals.    multivitamin (THERAGRAN) per tablet Take 1 tablet by mouth once daily.    omeprazole (PRILOSEC) 40 MG capsule Take 1 capsule (40 mg total) by mouth every morning. Take one capsule by mouth every morning.    zolpidem (AMBIEN) 5 MG Tab Take 1 tablet (5 mg total) by mouth nightly as needed (insomnia).    [DISCONTINUED] budesonide-formoterol 160-4.5 mcg (SYMBICORT) 160-4.5 mcg/actuation HFAA Inhale 2 puffs into the lungs every 12 (twelve) hours. Controller     Family History       Problem Relation (Age of Onset)    No Known Problems Daughter, Son, Daughter          Tobacco Use    Smoking status: Former     Packs/day: 0.00     Years: 20.00     Pack years: 0.00     Types: Cigarettes     Start date: 1979     Quit date: 2020     Years since quittin.9    Smokeless tobacco: Never   Substance and Sexual Activity    Alcohol use: Yes     Alcohol/week: 1.0 standard drink     Types: 1 Glasses of wine per week    Drug use: No    Sexual activity: Yes     Partners: Male     Birth control/protection: Post-menopausal     Review of Systems   Constitutional: Negative for fever and malaise/fatigue.   Eyes:  Negative for blurred vision and pain.   Cardiovascular:  Negative for chest pain, dyspnea on exertion, leg swelling, orthopnea, palpitations and paroxysmal nocturnal dyspnea.   Respiratory:  Negative for cough, shortness of breath, sputum production and wheezing.    Hematologic/Lymphatic: Negative for adenopathy and bleeding problem.   Skin:  Negative for rash.    Musculoskeletal:  Negative for back pain and neck pain.   Gastrointestinal:  Negative for abdominal pain, constipation, diarrhea, nausea and vomiting.   Genitourinary:  Negative for dysuria.   Neurological:  Negative for dizziness, headaches, light-headedness and weakness.   Objective:     Vital Signs (Most Recent):  Temp: 96.8 °F (36 °C) (11/02/22 0728)  Pulse: 96 (11/02/22 0853)  Resp: 17 (11/02/22 0853)  BP: 138/63 (11/02/22 0728)  SpO2: 95 % (11/02/22 0853) Vital Signs (24h Range):  Temp:  [96.3 °F (35.7 °C)-98 °F (36.7 °C)] 96.8 °F (36 °C)  Pulse:  [] 96  Resp:  [16-18] 17  SpO2:  [93 %-98 %] 95 %  BP: (107-138)/(55-71) 138/63     Weight: 73.5 kg (162 lb 1.6 oz)  Body mass index is 29.65 kg/m².    SpO2: 95 %  O2 Device (Oxygen Therapy): room air      Intake/Output Summary (Last 24 hours) at 11/2/2022 0949  Last data filed at 11/2/2022 0945  Gross per 24 hour   Intake --   Output 1 ml   Net -1 ml       Lines/Drains/Airways       Peripheral Intravenous Line  Duration                  Peripheral IV - Single Lumen 10/31/22 0808 20 G Right Antecubital 2 days                    Physical Exam  Constitutional:       General: She is not in acute distress.     Appearance: Normal appearance. She is not ill-appearing, toxic-appearing or diaphoretic.   HENT:      Head: Normocephalic and atraumatic.      Nose: Nose normal.   Eyes:      Extraocular Movements: Extraocular movements intact.      Pupils: Pupils are equal, round, and reactive to light.   Cardiovascular:      Rate and Rhythm: Normal rate and regular rhythm.      Heart sounds: No murmur heard.    No friction rub. No gallop.   Pulmonary:      Effort: Pulmonary effort is normal. No respiratory distress.      Breath sounds: Normal breath sounds. No wheezing or rales.   Abdominal:      General: Abdomen is flat. There is no distension.      Palpations: Abdomen is soft. There is no mass.      Tenderness: There is no abdominal tenderness.   Musculoskeletal:          General: No swelling. Normal range of motion.      Cervical back: Normal range of motion. No rigidity.      Right lower leg: No edema.      Left lower leg: No edema.   Skin:     General: Skin is warm and dry.      Coloration: Skin is not jaundiced.      Findings: No bruising.   Neurological:      General: No focal deficit present.      Mental Status: She is alert and oriented to person, place, and time.      Cranial Nerves: No cranial nerve deficit.      Motor: No weakness.       Significant Labs: BMP:   Recent Labs   Lab 11/01/22  0341 11/02/22  0256   * 109    140   K 4.1 3.7    108   CO2 23 26   BUN 19 20   CREATININE 0.9 0.8   CALCIUM 8.8 8.6*   MG 2.8* 2.2    and CBC   Recent Labs   Lab 11/01/22 0341 11/02/22  0256   WBC 13.57* 12.99*   HGB 14.8 14.0   HCT 43.7 42.1    219       Significant Imaging: Reviewed

## 2022-11-02 NOTE — ASSESSMENT & PLAN NOTE
New onset atrial flutter, denies chest pain, but endorses lightheadedness/dizziness when standing.  - Tachy to 126  - Metoprolol 75 mg BID  - Metoprolol 5 mg IV once (ordered, not given)  - Metoprolol 25 mg PO and Magnesium 2 g IV given overnight on 10/31 for persistent tachycardia with minimal improvement  - [id number] score: 2 (HTN and female) > will begin anticoagulation with Eliquis 5 BID  - Echo with EF of 50%, left/right ventricles with low normal systolic function, and indeterminate left ventricular diastolic function  - D-dimer: 0.53  - CTA negative for PE  - Repeat EKG on 11/1 with continued flutter  - Cardiology consulted   - Recommend continuing current medication regimen and consulting EP  - EP consulted   - Plan for ablation 11/02   - NPO at this time  - Continue to monitor on tele  - Cardiology outpatient referral to be placed at discharge

## 2022-11-02 NOTE — CONSULTS
[facility] [place]  Cardiac Electrophysiology  Progress Note    Admission Date: 10/31/2022  Code Status: Full Code   Attending Physician: Tee Montes MD   Expected Discharge Date: 11/3/2022  Principal Problem:COPD exacerbation    Subjective:   HPI:   62 year old female with prior atrial flutter, HTN, HLD, depression/anxiety, insomnia, RA, sleeve gastrectomy, and COPD admitted with COPD exacerbation. Had URI and completed a course of levaquin.  unwell with RSV. Wheezing improved and had ongoing oxygen requirement. EKG obtained which showed atrial flutter with HR ~125. Atrial flutter shown on telemetry as well. TTE showing EF 50%, indeterminate LV diastolic function. Pt presented with dyspnea and dizziness but denies palpitations. Started on apixaban and lopressor.      EP consulted for recurrent onset atrial flutter.     Past Medical History:   Diagnosis Date    Allergy     Anxiety     Arthritis     Colon polyps     COPD (chronic obstructive pulmonary disease)     COPD exacerbation 10/31/2022    Depression     Diverticular disease of colon 2017    Diverticulitis     HLD (hyperlipidemia)     Hypertension     Pancreatitis     Psoriasis     Rheumatoid arthritis     Severe obesity (BMI 35.0-39.9) with comorbidity      Past Surgical History:   Procedure Laterality Date    ADENOIDECTOMY  1966    Tonsillitis and adnoids    BLADDER SUSPENSION      (x2)     SECTION      (x2)    ESOPHAGOGASTRODUODENOSCOPY N/A 2021    Procedure: EGD (ESOPHAGOGASTRODUODENOSCOPY);  Surgeon: Vince Rodriguez MD;  Location: [facility] OR [address] [place] [address];  Service: General;  Laterality: N/A;    LAPAROSCOPIC SLEEVE GASTRECTOMY N/A 2021    Procedure: GASTRECTOMY, SLEEVE, LAPAROSCOPIC, with intraop EGD;  Surgeon: Vince Rodriguez MD;  Location: [facility] OR [address] [place] [address];  Service: General;  Laterality: N/A;    LUNG BIOPSY  2020    RHINOPLASTY      TONSILLECTOMY       Review of patient's  allergies indicates:   Allergen Reactions    Succinimides Anaphylaxis     Son has MH     No current facility-administered medications on file prior to encounter.     Current Outpatient Medications on File Prior to Encounter   Medication Sig    amitriptyline (ELAVIL) 50 MG tablet Take 1 tablet (50 mg total) by mouth nightly as needed for Insomnia.    atorvastatin (LIPITOR) 20 MG tablet Take 1 tablet (20 mg total) by mouth every evening.    B-complex with vitamin C (VITAMIN B COMPLEX-C ORAL) Take by mouth.    benzonatate (TESSALON) 200 MG capsule Take 1 capsule (200 mg total) by mouth 3 (three) times daily as needed for Cough.    calcium-vitamin D 250-100 mg-unit per tablet Take 1 tablet by mouth 2 (two) times daily.    clonazePAM (KLONOPIN) 1 MG tablet Take 1 tablet (1 mg total) by mouth 2 (two) times daily as needed for Anxiety (panic).    COSENTYX PEN, 2 PENS, 150 mg/mL PnIj Inject 300mg (2 pens) into the skin every 4 weeks    cyanocobalamin 500 MCG tablet Take 500 mcg by mouth once daily.    fluticasone-umeclidin-vilanter (TRELEGY ELLIPTA) 100-62.5-25 mcg DsDv Inhale 1 puff into the lungs once daily.    levoFLOXacin (LEVAQUIN) 500 MG tablet Take 1 tablet (500 mg total) by mouth once daily.    metoprolol tartrate (LOPRESSOR) 50 MG tablet Take 1.5 tablets (75 mg total) by mouth 2 (two) times daily with meals.    multivitamin (THERAGRAN) per tablet Take 1 tablet by mouth once daily.    omeprazole (PRILOSEC) 40 MG capsule Take 1 capsule (40 mg total) by mouth every morning. Take one capsule by mouth every morning.    zolpidem (AMBIEN) 5 MG Tab Take 1 tablet (5 mg total) by mouth nightly as needed (insomnia).    [DISCONTINUED] budesonide-formoterol 160-4.5 mcg (SYMBICORT) 160-4.5 mcg/actuation HFAA Inhale 2 puffs into the lungs every 12 (twelve) hours. Controller     Family History       Problem Relation (Age of Onset)    No Known Problems Daughter, Son, Daughter          Tobacco Use    Smoking status:  Former     Packs/day: 0.00     Years: 20.00     Pack years: 0.00     Types: Cigarettes     Start date: 1979     Quit date: 2020     Years since quittin.9    Smokeless tobacco: Never   Substance and Sexual Activity    Alcohol use: Yes     Alcohol/week: 1.0 standard drink     Types: 1 Glasses of wine per week    Drug use: No    Sexual activity: Yes     Partners: Male     Birth control/protection: Post-menopausal     Review of Systems   Constitutional: Negative for chills and fever.   HENT:  Negative for congestion and sore throat.    Eyes:  Negative for pain and photophobia.   Cardiovascular:  Positive for dyspnea on exertion and irregular heartbeat. Negative for chest pain, leg swelling and palpitations.   Respiratory:  Positive for cough, shortness of breath and sputum production. Negative for wheezing.    Skin: Negative.    Musculoskeletal: Negative.    Gastrointestinal:  Negative for abdominal pain, nausea and vomiting.   Genitourinary:  Negative for dysuria and hematuria.   Neurological:  Positive for dizziness.   Psychiatric/Behavioral: Negative.     Objective:     Vital Signs (Most Recent):  Temp: 96.8 °F (36 °C) (22)  Pulse: 96 (22)  Resp: 17 (22)  BP: 138/63 (22)  SpO2: 95 % (22)   Vital Signs (24h Range):  Temp:  [96.3 °F (35.7 °C)-98 °F (36.7 °C)] 96.8 °F (36 °C)  Pulse:  [] 96  Resp:  [16-18] 17  SpO2:  [93 %-98 %] 95 %  BP: (107-138)/(55-71) 138/63     Weight: 73.5 kg (162 lb 1.6 oz)  Body mass index is 29.65 kg/m².    SpO2: 95 %  O2 Device (Oxygen Therapy): room air    No intake or output data in the 24 hours ending 22 0902    Lines/Drains/Airways       Peripheral Intravenous Line  Duration                  Peripheral IV - Single Lumen 10/31/22 0808 20 G Right Antecubital 2 days                  Physical Exam  Vitals and nursing note reviewed.   Constitutional:       General: She is not in acute distress.     Appearance:  Normal appearance. She is not ill-appearing.   HENT:      Head: Normocephalic and atraumatic.      Mouth/Throat:      Mouth: Mucous membranes are moist.   Eyes:      Extraocular Movements: Extraocular movements intact.      Pupils: Pupils are equal, round, and reactive to light.   Cardiovascular:      Rate and Rhythm: Regular rhythm. Tachycardia present.      Pulses: Normal pulses.      Heart sounds: Normal heart sounds. No murmur heard.  Pulmonary:      Effort: Pulmonary effort is normal.      Breath sounds: Normal breath sounds. No wheezing, rhonchi or rales.   Abdominal:      General: Bowel sounds are normal. There is no distension.      Palpations: Abdomen is soft.      Tenderness: There is no abdominal tenderness.   Musculoskeletal:         General: No tenderness.      Right lower leg: No edema.      Left lower leg: No edema.   Skin:     General: Skin is warm.      Capillary Refill: Capillary refill takes less than 2 seconds.      Findings: No erythema or rash.   Neurological:      General: No focal deficit present.      Mental Status: She is alert and oriented to person, place, and time.     Significant Labs: CMP   Recent Labs   Lab 11/01/22  0341 11/02/22  0256    140   K 4.1 3.7    108   CO2 23 26   * 109   BUN 19 20   CREATININE 0.9 0.8   CALCIUM 8.8 8.6*   ANIONGAP 6* 6*     CBC   Recent Labs   Lab 11/01/22  0341 11/02/22  0256   WBC 13.57* 12.99*   HGB 14.8 14.0   HCT 43.7 42.1    219     , Troponin   Recent Labs   Lab 10/31/22  0921   TROPONINI <0.006       Significant Imaging: All imaging from the past 24 hours has been reviewed.     Assessment and Plan:     Atrial flutter  History of previous atrial flutter based on prior EKGs  On metoprolol tartrate, recently outpatient increased from 50mg to 75mg BID  Not on anticoagulation prior to admission 10/31    Plan  NPO for typical atrial flutter ablation today with Dr. Duke. SHAYLEE prior to ablation  Had a popsickle and sip of water  with meds at 730am, discuss with anesthesia but plan for case this afternoon  Consents signed with EP fellow and discussed with staff  Allergy to succinimides (anaphylaxis)          Lake Echeverria MD  Cardiac Electrophysiology  [facility] - [facility]

## 2022-11-02 NOTE — SUBJECTIVE & OBJECTIVE
Interval History: NAEON, patient remains tachycardic to 124, otherwise AFVSS. Patient seen and examined at bedside this am. Reports resolution in shortness of breath. She endorses continued light-headedness when standing from sitting and an intermittently productive cough (+white sputum). Denies headaches, chest pain, abdominal pain, or lower extremity edema. Evaluated by EP this morning with plans for an ablation this afternoon. Patient agreeable to this plan.     Review of Systems   Constitutional:  Positive for activity change and fatigue. Negative for chills, diaphoresis and fever.   HENT:  Negative for congestion, facial swelling, rhinorrhea and sore throat.    Eyes:  Negative for photophobia, itching and visual disturbance.   Respiratory:  Positive for cough. Negative for chest tightness, shortness of breath and wheezing.    Cardiovascular:  Negative for chest pain, palpitations and leg swelling.   Gastrointestinal:  Negative for abdominal distention, abdominal pain, blood in stool, constipation, diarrhea, nausea and vomiting.   Genitourinary:  Negative for difficulty urinating, dysuria, frequency, hematuria and urgency.   Musculoskeletal:  Negative for arthralgias, back pain and neck stiffness.   Neurological:  Positive for dizziness and light-headedness. Negative for tremors, seizures, syncope, weakness, numbness and headaches.   Psychiatric/Behavioral:  Negative for agitation, confusion and hallucinations.      Objective:     Vital Signs (Most Recent):  Temp: 97.8 °F (36.6 °C) (11/02/22 1140)  Pulse: (!) 117 (11/02/22 1140)  Resp: 18 (11/02/22 1140)  BP: 125/75 (11/02/22 1222)  SpO2: (!) 94 % (11/02/22 1140)   Vital Signs (24h Range):  Temp:  [96.3 °F (35.7 °C)-98 °F (36.7 °C)] 97.8 °F (36.6 °C)  Pulse:  [] 117  Resp:  [16-18] 18  SpO2:  [93 %-97 %] 94 %  BP: (107-138)/(55-75) 125/75     Weight: 73.5 kg (162 lb)  Body mass index is 29.63 kg/m².    Intake/Output Summary (Last 24 hours) at 11/2/2022  1514  Last data filed at 11/2/2022 1448  Gross per 24 hour   Intake 700 ml   Output 1 ml   Net 699 ml      Physical Exam  Vitals and nursing note reviewed.   Constitutional:       General: She is not in acute distress.     Appearance: She is well-developed. She is not diaphoretic.   HENT:      Head: Normocephalic and atraumatic.      Right Ear: External ear normal.      Left Ear: External ear normal.      Nose: Nose normal. No congestion.      Mouth/Throat:      Pharynx: Oropharynx is clear.   Eyes:      General: No scleral icterus.     Extraocular Movements: Extraocular movements intact.   Cardiovascular:      Rate and Rhythm: Regular rhythm. Tachycardia present.      Pulses: Normal pulses.      Heart sounds: Normal heart sounds. No murmur heard.  Pulmonary:      Effort: Pulmonary effort is normal. No respiratory distress.      Breath sounds: No wheezing or rales.   Abdominal:      General: Bowel sounds are normal. There is no distension.      Palpations: Abdomen is soft.      Tenderness: There is no abdominal tenderness. There is no guarding or rebound.   Musculoskeletal:      Cervical back: Normal range of motion.      Right lower leg: No edema.      Left lower leg: No edema.   Skin:     General: Skin is warm and dry.      Capillary Refill: Capillary refill takes less than 2 seconds.   Neurological:      General: No focal deficit present.      Mental Status: She is alert and oriented to person, place, and time. Mental status is at baseline.   Psychiatric:         Mood and Affect: Mood normal.         Behavior: Behavior normal.         Thought Content: Thought content normal.       Significant Labs: All pertinent labs within the past 24 hours have been reviewed.    Significant Imaging: I have reviewed all pertinent imaging results/findings within the past 24 hours.

## 2022-11-02 NOTE — PLAN OF CARE
Problem: Adjustment to Illness COPD (Chronic Obstructive Pulmonary Disease)  Goal: Optimal Chronic Illness Coping  11/2/2022 0614 by Carlita Mendez LPN  Outcome: Ongoing, Progressing  11/2/2022 0613 by Carlita Mendez LPN  Outcome: Ongoing, Progressing     Problem: Functional Ability Impaired COPD (Chronic Obstructive Pulmonary Disease)  Goal: Optimal Level of Functional [place]  11/2/2022 0614 by Carlita Mendez LPN  Outcome: Ongoing, Progressing  11/2/2022 0613 by Carlita Mendez LPN  Outcome: Ongoing, Progressing     Problem: Infection COPD (Chronic Obstructive Pulmonary Disease)  Goal: Absence of Infection Signs and Symptoms  11/2/2022 0614 by Carlita Mendez LPN  Outcome: Ongoing, Progressing  11/2/2022 0613 by Carlita Mendez LPN  Outcome: Ongoing, Progressing     Problem: Oral Intake Inadequate COPD (Chronic Obstructive Pulmonary Disease)  Goal: Improved Nutrition Intake  11/2/2022 0614 by Carlita Mendez LPN  Outcome: Ongoing, Progressing  11/2/2022 0613 by Carlita Mendez LPN  Outcome: Ongoing, Progressing     Problem: Respiratory Compromise COPD (Chronic Obstructive Pulmonary Disease)  Goal: Effective Oxygenation and Ventilation  11/2/2022 0614 by Carlita Mendez LPN  Outcome: Ongoing, Progressing  11/2/2022 0613 by Carlita Mendez LPN  Outcome: Ongoing, Progressing     Problem: Adult Inpatient Plan of Care  Goal: Plan of Care Review  11/2/2022 0614 by Carlita Mendez LPN  Outcome: Ongoing, Progressing  11/2/2022 0613 by Carlita Mendez LPN  Outcome: Ongoing, Progressing  Goal: Patient-Specific Goal (Individualized)  11/2/2022 0614 by Carlita Mendez LPN  Outcome: Ongoing, Progressing  11/2/2022 0613 by Carlita Mendez LPN  Outcome: Ongoing, Progressing  Goal: Absence of Hospital-Acquired Illness or Injury  11/2/2022 0614 by Carlita Mendez LPN  Outcome: Ongoing, Progressing  11/2/2022 0613 by Carlita Mendez LPN  Outcome: Ongoing, Progressing  Goal: Optimal Comfort and Wellbeing  11/2/2022 0614 by Carlita Mendez  LPN  Outcome: Ongoing, Progressing  11/2/2022 0613 by Carlita Mendez LPN  Outcome: Ongoing, Progressing  Goal: Readiness for Transition of Care  11/2/2022 0614 by Carlita Mendez LPN  Outcome: Ongoing, Progressing  11/2/2022 0613 by Carlita Mendez LPN  Outcome: Ongoing, Progressing     Problem: Infection  Goal: Absence of Infection Signs and Symptoms  11/2/2022 0614 by Carlita Mendez LPN  Outcome: Ongoing, Progressing  11/2/2022 0613 by Carlita Mendez LPN  Outcome: Ongoing, Progressing

## 2022-11-02 NOTE — NURSING TRANSFER
Nursing Transfer Note      11/2/2022     Reason patient is being transferred: to assigned room post recovery    Transfer To: 300    Transfer via bed    Transfer with cardiac monitoring    Transported by escort x2    Medicines sent: none    Any special needs or follow-up needed: continue frequent monitoring and lay flat until 7pm    Chart send with patient: Yes    Notified: spouse, daughter    Patient reassessed at: to be done by receiving RN (date, time)

## 2022-11-02 NOTE — ANESTHESIA PREPROCEDURE EVALUATION
11/02/2022  Lucia Matias is a 62 y.o., female with atrial flutter here for ablation and SHAYLEE.    Pre-operative evaluation for Procedure(s) (LRB):  Ablation, Atrial Flutter, Typical (N/A)  ECHOCARDIOGRAM, TRANSESOPHAGEAL (N/A)        Patient Active Problem List   Diagnosis    Diverticular disease of colon    Psoriasis vulgaris    COPD (chronic obstructive pulmonary disease)    HLD (hyperlipidemia)    Depression with anxiety    Insomnia    Other long term (current) drug therapy    History of tobacco abuse    Essential hypertension    Multiple lung nodules on CT    Class 2 obesity due to excess calories without serious comorbidity with body mass index (BMI) of 37.0 to 37.9 in adult    History of sleeve gastrectomy    Wears contact lenses    Rheumatoid arthritis    Atrial flutter    Psoriatic arthritis    COPD exacerbation       Review of patient's allergies indicates:   Allergen Reactions    Succinimides Anaphylaxis     Son has MH       No current facility-administered medications on file prior to encounter.     Current Outpatient Medications on File Prior to Encounter   Medication Sig Dispense Refill    amitriptyline (ELAVIL) 50 MG tablet Take 1 tablet (50 mg total) by mouth nightly as needed for Insomnia. 90 tablet 1    atorvastatin (LIPITOR) 20 MG tablet Take 1 tablet (20 mg total) by mouth every evening. 90 tablet 3    B-complex with vitamin C (VITAMIN B COMPLEX-C ORAL) Take by mouth.      benzonatate (TESSALON) 200 MG capsule Take 1 capsule (200 mg total) by mouth 3 (three) times daily as needed for Cough. 45 capsule 0    calcium-vitamin D 250-100 mg-unit per tablet Take 1 tablet by mouth 2 (two) times daily.      clonazePAM (KLONOPIN) 1 MG tablet Take 1 tablet (1 mg total) by mouth 2 (two) times daily as needed for Anxiety (panic). 60 tablet 2    COSENTYX PEN, 2 PENS, 150 mg/mL PnIj  Inject 300mg (2 pens) into the skin every 4 weeks 2 mL 11    cyanocobalamin 500 MCG tablet Take 500 mcg by mouth once daily.      fluticasone-umeclidin-vilanter (TRELEGY ELLIPTA) 100-62.5-25 mcg DsDv Inhale 1 puff into the lungs once daily. 60 each 3    levoFLOXacin (LEVAQUIN) 500 MG tablet Take 1 tablet (500 mg total) by mouth once daily. 7 tablet 0    metoprolol tartrate (LOPRESSOR) 50 MG tablet Take 1.5 tablets (75 mg total) by mouth 2 (two) times daily with meals. 270 tablet 3    multivitamin (THERAGRAN) per tablet Take 1 tablet by mouth once daily.      omeprazole (PRILOSEC) 40 MG capsule Take 1 capsule (40 mg total) by mouth every morning. Take one capsule by mouth every morning. 90 capsule 1    zolpidem (AMBIEN) 5 MG Tab Take 1 tablet (5 mg total) by mouth nightly as needed (insomnia). 30 tablet 2    [DISCONTINUED] budesonide-formoterol 160-4.5 mcg (SYMBICORT) 160-4.5 mcg/actuation HFAA Inhale 2 puffs into the lungs every 12 (twelve) hours. Controller 30.6 g 2       Past Surgical History:   Procedure Laterality Date    ADENOIDECTOMY  1966    Tonsillitis and adnoids    BLADDER SUSPENSION      (x2)     SECTION      (x2)    ESOPHAGOGASTRODUODENOSCOPY N/A 2021    Procedure: EGD (ESOPHAGOGASTRODUODENOSCOPY);  Surgeon: Vince Rodriguez MD;  Location: [address];  Service: General;  Laterality: N/A;    LAPAROSCOPIC SLEEVE GASTRECTOMY N/A 2021    Procedure: GASTRECTOMY, SLEEVE, LAPAROSCOPIC, with intraop EGD;  Surgeon: Vince Rodriguez MD;  Location: [facility] OR [address] [place] [address];  Service: General;  Laterality: N/A;    LUNG BIOPSY  2020    RHINOPLASTY      TONSILLECTOMY         Social History     Socioeconomic History    Marital status:    Occupational History    Occupation: clinical research coordinator    Tobacco Use    Smoking status: Former     Packs/day: 0.00     Years: 20.00     Pack years: 0.00     Types: Cigarettes     Start date: 1979     Quit date:  2020     Years since quittin.9    Smokeless tobacco: Never   Substance and Sexual Activity    Alcohol use: Yes     Alcohol/week: 1.0 standard drink     Types: 1 Glasses of wine per week    Drug use: No    Sexual activity: Yes     Partners: Male     Birth control/protection: Post-menopausal   Other Topics Concern    Are you pregnant or think you may be? No    Breast-feeding No     Social Determinants of Health     Financial Resource Strain: Low Risk     Difficulty of Paying Living Expenses: Not very hard   Food Insecurity: No Food Insecurity    Worried About Running Out of Food in the Last Year: Never true    Ran Out of Food in the Last Year: Never true   Transportation Needs: No Transportation Needs    Lack of Transportation (Medical): No    Lack of Transportation (Non-Medical): No   Physical Activity: Inactive    Days of Exercise per Week: 0 days    Minutes of Exercise per Session: 0 min   Stress: Stress Concern Present    Feeling of Stress : Very much   Social Connections: Unknown    Frequency of Communication with Friends and Family: More than three times a week    Frequency of Social Gatherings with Friends and Family: Once a week    Active Member of Clubs or Organizations: Yes    Attends Club or Organization Meetings: Patient refused    Marital Status:    Housing Stability: High Risk    Unable to Pay for Housing in the Last Year: No    Number of Places Lived in the Last Year: 4    Unstable Housing in the Last Year: No         CBC:   Recent Labs     22  0341 22  0256   WBC 13.57* 12.99*   RBC 4.83 4.54   HGB 14.8 14.0   HCT 43.7 42.1    219   MCV 91 93   MCH 30.6 30.8   MCHC 33.9 33.3       CMP:   Recent Labs     10/31/22  0808 22  0341 22  0256    136 140   K 3.8 4.1 3.7    107 108   CO2 23 23 26   BUN 20 19 20   CREATININE 1.1 0.9 0.8   GLU 81 177* 109   MG  --  2.8* 2.2   PHOS  --  1.9* 2.3*   CALCIUM 9.6 8.8 8.6*   ALBUMIN 3.5   --   --    PROT 6.9  --   --    ALKPHOS 89  --   --    ALT 26  --   --    AST 20  --   --    BILITOT 1.2*  --   --        INR  No results for input(s): PT, INR, PROTIME, APTT in the last 72 hours.          2D Echo:  No results found for this or any previous visit.        Pre-op Assessment    I have reviewed the Patient Summary Reports.     I have reviewed the Nursing Notes.    I have reviewed the Medications.     Review of Systems  Anesthesia Hx:  No problems with previous Anesthesia    Hematology/Oncology:  Hematology Normal   Oncology Normal     EENT/Dental:EENT/Dental Normal   Cardiovascular:   Hypertension Dysrhythmias    Pulmonary:   COPD, moderate Asthma    Renal/:  Renal/ Normal     Hepatic/GI:  Hepatic/GI Normal    Musculoskeletal:  Musculoskeletal Normal    Neurological:  Neurology Normal    Endocrine:  Endocrine Normal    Dermatological:  Skin Normal    Psych:   Psychiatric History          Physical Exam  General: Well nourished    Airway:  Mallampati: II   Mouth Opening: Normal  TM Distance: Normal  Tongue: Normal  Neck ROM: Normal ROM    Dental:  Intact    Chest/Lungs:  Normal Respiratory Rate, Rhonchi    Heart:  Rate: Normal  Rhythm: Irregularly Irregular  Sounds: Normal        Anesthesia Plan  Type of Anesthesia, risks & benefits discussed:    Anesthesia Type: Gen Natural Airway, Gen ETT  Intra-op Monitoring Plan: Standard ASA Monitors  Post Op Pain Control Plan: multimodal analgesia  Induction:  IV  Informed Consent: Informed consent signed with the Patient and all parties understand the risks and agree with anesthesia plan.  All questions answered.   ASA Score: 3  Anesthesia Plan Notes: Pt with son with diagnosis of malignant hyperthermia. Will proceed with non triggering anesthetic plan. Pt has tolerated well in the past. All questions answered and plan discussed. Consent obtained.    Ready For Surgery From Anesthesia Perspective.     .

## 2022-11-02 NOTE — TRANSFER OF CARE
"Anesthesia Transfer of Care Note    Patient: Lucia Matias    Procedure(s) Performed: Procedure(s) (LRB):  Ablation, Atrial Flutter, Typical (N/A)  ECHOCARDIOGRAM, TRANSESOPHAGEAL (N/A)    Patient location: PACU    Anesthesia Type: general    Transport from OR: Transported from OR on 6-10 L/min O2 by face mask with adequate spontaneous ventilation    Post pain: adequate analgesia    Post assessment: no apparent anesthetic complications    Post vital signs: stable    Level of consciousness: sedated and responds to stimulation    Nausea/Vomiting: no nausea/vomiting    Complications: none    Transfer of care protocol was followed      Last vitals:   Visit Vitals  /75   Pulse (!) 117   Temp 36.6 °C (97.8 °F) (Oral)   Resp 18   Ht 5' 2" (1.575 m)   Wt 73.5 kg (162 lb)   SpO2 (!) 94%   Breastfeeding No   BMI 29.63 kg/m²     "

## 2022-11-02 NOTE — H&P
[facility] - [facility]  Cardiology  History and Physical     Patient Name: Lucia Matias  MRN: [id number]  Admission Date: 10/31/2022  Code Status: Full Code   Attending Provider: Tee Montes MD   Primary Care Physician: Hetal Banks MD  Principal Problem:COPD exacerbation    Patient information was obtained from patient and ER records.     Subjective:     Chief Complaint:  AFL     HPI:  62 year old female with prior atrial flutter, HTN, HLD, depression/anxiety, insomnia, RA, sleeve gastrectomy, and COPD admitted with COPD exacerbation. Had URI and completed a course of levaquin.  unwell with RSV. Wheezing improved and had ongoing oxygen requirement. EKG obtained which showed atrial flutter with HR ~125. Atrial flutter shown on telemetry as well. TTE showing EF 50%, indeterminate LV diastolic function. Pt presented with dyspnea and dizziness but denies palpitations. Started on apixaban and lopressor.      EP consulted for recurrent onset atrial flutter. Pt doing well at this time and denies any issues       2/18/21 Stress ECHO   Sensitivity is impaired due to the patient's failure to achieve target heart rate ( stopped due to hypotension).   There were no arrhythmias during stress.   The ECG portion of this study is negative for myocardial ischemia.   The left ventricle is normal in size with normal systolic function. The estimated ejection fraction is 58%   Normal left ventricular diastolic function.   Normal right ventricular size with normal right ventricular systolic function.   Normal central venous pressure (3 mmHg).   The estimated PA systolic pressure is 19 mmHg.   The stress echo portion of this study is negative for myocardial ischemia.     Dysphagia or odynophagia:  No  Liver Disease, esophageal disease, or known varices:  No  Upper GI Bleeding: No  Snoring:  No  Sleep Apnea:  No  Prior neck surgery or radiation:  No  History of anesthetic difficulties:  No  Family history of  anesthetic difficulties:  No  Last oral intake:  24 hours ago  Able to move neck in all directions:  Yes        Past Medical History:   Diagnosis Date    Allergy     Anxiety     Arthritis     Colon polyps     COPD (chronic obstructive pulmonary disease)     COPD exacerbation 10/31/2022    Depression     Diverticular disease of colon 2017    Diverticulitis     HLD (hyperlipidemia)     Hypertension     Pancreatitis     Psoriasis     Rheumatoid arthritis     Severe obesity (BMI 35.0-39.9) with comorbidity        Past Surgical History:   Procedure Laterality Date    ADENOIDECTOMY  1966    Tonsillitis and adnoids    BLADDER SUSPENSION      (x2)     SECTION      (x2)    ESOPHAGOGASTRODUODENOSCOPY N/A 2021    Procedure: EGD (ESOPHAGOGASTRODUODENOSCOPY);  Surgeon: Vince Rodriguez MD;  Location: [facility] OR [address] [place] [address];  Service: General;  Laterality: N/A;    LAPAROSCOPIC SLEEVE GASTRECTOMY N/A 2021    Procedure: GASTRECTOMY, SLEEVE, LAPAROSCOPIC, with intraop EGD;  Surgeon: Vince Rodriguez MD;  Location: [facility] OR [address] [place] [address];  Service: General;  Laterality: N/A;    LUNG BIOPSY  2020    RHINOPLASTY      TONSILLECTOMY         Review of patient's allergies indicates:   Allergen Reactions    Succinimides Anaphylaxis     Son has        No current facility-administered medications on file prior to encounter.     Current Outpatient Medications on File Prior to Encounter   Medication Sig    amitriptyline (ELAVIL) 50 MG tablet Take 1 tablet (50 mg total) by mouth nightly as needed for Insomnia.    atorvastatin (LIPITOR) 20 MG tablet Take 1 tablet (20 mg total) by mouth every evening.    B-complex with vitamin C (VITAMIN B COMPLEX-C ORAL) Take by mouth.    benzonatate (TESSALON) 200 MG capsule Take 1 capsule (200 mg total) by mouth 3 (three) times daily as needed for Cough.    calcium-vitamin D 250-100 mg-unit per tablet Take 1 tablet by mouth 2 (two) times daily.     clonazePAM (KLONOPIN) 1 MG tablet Take 1 tablet (1 mg total) by mouth 2 (two) times daily as needed for Anxiety (panic).    COSENTYX PEN, 2 PENS, 150 mg/mL PnIj Inject 300mg (2 pens) into the skin every 4 weeks    cyanocobalamin 500 MCG tablet Take 500 mcg by mouth once daily.    fluticasone-umeclidin-vilanter (TRELEGY ELLIPTA) 100-62.5-25 mcg DsDv Inhale 1 puff into the lungs once daily.    levoFLOXacin (LEVAQUIN) 500 MG tablet Take 1 tablet (500 mg total) by mouth once daily.    metoprolol tartrate (LOPRESSOR) 50 MG tablet Take 1.5 tablets (75 mg total) by mouth 2 (two) times daily with meals.    multivitamin (THERAGRAN) per tablet Take 1 tablet by mouth once daily.    omeprazole (PRILOSEC) 40 MG capsule Take 1 capsule (40 mg total) by mouth every morning. Take one capsule by mouth every morning.    zolpidem (AMBIEN) 5 MG Tab Take 1 tablet (5 mg total) by mouth nightly as needed (insomnia).    [DISCONTINUED] budesonide-formoterol 160-4.5 mcg (SYMBICORT) 160-4.5 mcg/actuation HFAA Inhale 2 puffs into the lungs every 12 (twelve) hours. Controller     Family History       Problem Relation (Age of Onset)    No Known Problems Daughter, Son, Daughter          Tobacco Use    Smoking status: Former     Packs/day: 0.00     Years: 20.00     Pack years: 0.00     Types: Cigarettes     Start date: 1979     Quit date: 2020     Years since quittin.9    Smokeless tobacco: Never   Substance and Sexual Activity    Alcohol use: Yes     Alcohol/week: 1.0 standard drink     Types: 1 Glasses of wine per week    Drug use: No    Sexual activity: Yes     Partners: Male     Birth control/protection: Post-menopausal     Review of Systems   Constitutional: Negative for fever and malaise/fatigue.   Eyes:  Negative for blurred vision and pain.   Cardiovascular:  Negative for chest pain, dyspnea on exertion, leg swelling, orthopnea, palpitations and paroxysmal nocturnal dyspnea.   Respiratory:  Negative for cough,  shortness of breath, sputum production and wheezing.    Hematologic/Lymphatic: Negative for adenopathy and bleeding problem.   Skin:  Negative for rash.   Musculoskeletal:  Negative for back pain and neck pain.   Gastrointestinal:  Negative for abdominal pain, constipation, diarrhea, nausea and vomiting.   Genitourinary:  Negative for dysuria.   Neurological:  Negative for dizziness, headaches, light-headedness and weakness.   Objective:     Vital Signs (Most Recent):  Temp: 96.8 °F (36 °C) (11/02/22 0728)  Pulse: 96 (11/02/22 0853)  Resp: 17 (11/02/22 0853)  BP: 138/63 (11/02/22 0728)  SpO2: 95 % (11/02/22 0853) Vital Signs (24h Range):  Temp:  [96.3 °F (35.7 °C)-98 °F (36.7 °C)] 96.8 °F (36 °C)  Pulse:  [] 96  Resp:  [16-18] 17  SpO2:  [93 %-98 %] 95 %  BP: (107-138)/(55-71) 138/63     Weight: 73.5 kg (162 lb 1.6 oz)  Body mass index is 29.65 kg/m².    SpO2: 95 %  O2 Device (Oxygen Therapy): room air      Intake/Output Summary (Last 24 hours) at 11/2/2022 0949  Last data filed at 11/2/2022 0945  Gross per 24 hour   Intake --   Output 1 ml   Net -1 ml       Lines/Drains/Airways       Peripheral Intravenous Line  Duration                  Peripheral IV - Single Lumen 10/31/22 0808 20 G Right Antecubital 2 days                    Physical Exam  Constitutional:       General: She is not in acute distress.     Appearance: Normal appearance. She is not ill-appearing, toxic-appearing or diaphoretic.   HENT:      Head: Normocephalic and atraumatic.      Nose: Nose normal.   Eyes:      Extraocular Movements: Extraocular movements intact.      Pupils: Pupils are equal, round, and reactive to light.   Cardiovascular:      Rate and Rhythm: Normal rate and regular rhythm.      Heart sounds: No murmur heard.    No friction rub. No gallop.   Pulmonary:      Effort: Pulmonary effort is normal. No respiratory distress.      Breath sounds: Normal breath sounds. No wheezing or rales.   Abdominal:      General: Abdomen is flat.  There is no distension.      Palpations: Abdomen is soft. There is no mass.      Tenderness: There is no abdominal tenderness.   Musculoskeletal:         General: No swelling. Normal range of motion.      Cervical back: Normal range of motion. No rigidity.      Right lower leg: No edema.      Left lower leg: No edema.   Skin:     General: Skin is warm and dry.      Coloration: Skin is not jaundiced.      Findings: No bruising.   Neurological:      General: No focal deficit present.      Mental Status: She is alert and oriented to person, place, and time.      Cranial Nerves: No cranial nerve deficit.      Motor: No weakness.       Significant Labs: BMP:   Recent Labs   Lab 11/01/22  0341 11/02/22  0256   * 109    140   K 4.1 3.7    108   CO2 23 26   BUN 19 20   CREATININE 0.9 0.8   CALCIUM 8.8 8.6*   MG 2.8* 2.2    and CBC   Recent Labs   Lab 11/01/22 0341 11/02/22  0256   WBC 13.57* 12.99*   HGB 14.8 14.0   HCT 43.7 42.1    219       Significant Imaging: Reviewed     Assessment and Plan:     Atrial flutter  1. SHAYLEE for evaluation of OLIVIA prior to DCCV   -No absolute contraindications of esophageal stricture, tumor, perforation, laceration,or diverticulum and/or active GI bleed  -The risks, benefits & alternatives of the procedure were explained to the patient.   -The risks of transesophageal echo include but are not limited to:  Dental trauma, esophageal trauma/perforation, bleeding, laryngospasm/brochospasm, aspiration, sore throat/hoarseness, & dislodgement of the endotracheal tube/nasogastric tube (where applicable).    -The risks of moderate sedation include hypotension, respiratory depression, arrhythmias, bronchospasm, & death.    -Informed consent was obtained. The patient is agreeable to proceed with the procedure and all questions and concerns addressed.    Case discussed with an attending in echocardiography lab.     Further recommendations per attending addendum          VTE Risk  Mitigation (From admission, onward)         Ordered     apixaban tablet 5 mg  2 times daily         10/31/22 1535     Place sequential compression device  Until discontinued         10/31/22 1326     IP VTE LOW RISK PATIENT  Once         10/31/22 1326                Darren Guidry MD  Cardiology   [facility] - Observation 11H

## 2022-11-03 VITALS
DIASTOLIC BLOOD PRESSURE: 57 MMHG | OXYGEN SATURATION: 94 % | WEIGHT: 162 LBS | BODY MASS INDEX: 29.81 KG/M2 | TEMPERATURE: 98 F | HEART RATE: 92 BPM | RESPIRATION RATE: 14 BRPM | SYSTOLIC BLOOD PRESSURE: 105 MMHG | HEIGHT: 62 IN

## 2022-11-03 DIAGNOSIS — I48.3 TYPICAL ATRIAL FLUTTER: Primary | ICD-10-CM

## 2022-11-03 LAB
ANION GAP SERPL CALC-SCNC: 6 MMOL/L (ref 8–16)
BASOPHILS # BLD AUTO: 0.02 K/UL (ref 0–0.2)
BASOPHILS NFR BLD: 0.2 % (ref 0–1.9)
BUN SERPL-MCNC: 24 MG/DL (ref 6–20)
CALCIUM SERPL-MCNC: 8.1 MG/DL (ref 8.7–10.5)
CHLORIDE SERPL-SCNC: 106 MMOL/L (ref 95–110)
CO2 SERPL-SCNC: 28 MMOL/L (ref 23–29)
CREAT SERPL-MCNC: 0.9 MG/DL (ref 0.5–1.4)
DIFFERENTIAL METHOD: NORMAL
EOSINOPHIL # BLD AUTO: 0 K/UL (ref 0–0.5)
EOSINOPHIL NFR BLD: 0.2 % (ref 0–8)
ERYTHROCYTE [DISTWIDTH] IN BLOOD BY AUTOMATED COUNT: 13.6 % (ref 11.5–14.5)
EST. GFR  (NO RACE VARIABLE): >60 ML/MIN/1.73 M^2
GLUCOSE SERPL-MCNC: 75 MG/DL (ref 70–110)
HCT VFR BLD AUTO: 41.4 % (ref 37–48.5)
HGB BLD-MCNC: 13.4 G/DL (ref 12–16)
IMM GRANULOCYTES # BLD AUTO: 0.03 K/UL (ref 0–0.04)
IMM GRANULOCYTES NFR BLD AUTO: 0.3 % (ref 0–0.5)
LYMPHOCYTES # BLD AUTO: 2.7 K/UL (ref 1–4.8)
LYMPHOCYTES NFR BLD: 30 % (ref 18–48)
MAGNESIUM SERPL-MCNC: 2.1 MG/DL (ref 1.6–2.6)
MCH RBC QN AUTO: 30.8 PG (ref 27–31)
MCHC RBC AUTO-ENTMCNC: 32.4 G/DL (ref 32–36)
MCV RBC AUTO: 95 FL (ref 82–98)
MONOCYTES # BLD AUTO: 1 K/UL (ref 0.3–1)
MONOCYTES NFR BLD: 10.8 % (ref 4–15)
NEUTROPHILS # BLD AUTO: 5.2 K/UL (ref 1.8–7.7)
NEUTROPHILS NFR BLD: 58.5 % (ref 38–73)
NRBC BLD-RTO: 0 /100 WBC
PHOSPHATE SERPL-MCNC: 3.7 MG/DL (ref 2.7–4.5)
PLATELET # BLD AUTO: 197 K/UL (ref 150–450)
PMV BLD AUTO: 10.4 FL (ref 9.2–12.9)
POTASSIUM SERPL-SCNC: 4 MMOL/L (ref 3.5–5.1)
RBC # BLD AUTO: 4.35 M/UL (ref 4–5.4)
SODIUM SERPL-SCNC: 140 MMOL/L (ref 136–145)
WBC # BLD AUTO: 8.83 K/UL (ref 3.9–12.7)

## 2022-11-03 PROCEDURE — 85025 COMPLETE CBC W/AUTO DIFF WBC: CPT

## 2022-11-03 PROCEDURE — 80048 BASIC METABOLIC PNL TOTAL CA: CPT

## 2022-11-03 PROCEDURE — 99233 SBSQ HOSP IP/OBS HIGH 50: CPT | Mod: ,,, | Performed by: INTERNAL MEDICINE

## 2022-11-03 PROCEDURE — 25000003 PHARM REV CODE 250

## 2022-11-03 PROCEDURE — 83735 ASSAY OF MAGNESIUM: CPT

## 2022-11-03 PROCEDURE — 99233 PR SUBSEQUENT HOSPITAL CARE,LEVL III: ICD-10-PCS | Mod: ,,, | Performed by: INTERNAL MEDICINE

## 2022-11-03 PROCEDURE — 36415 COLL VENOUS BLD VENIPUNCTURE: CPT

## 2022-11-03 PROCEDURE — 84100 ASSAY OF PHOSPHORUS: CPT

## 2022-11-03 PROCEDURE — 99239 PR HOSPITAL DISCHARGE DAY,>30 MIN: ICD-10-PCS | Mod: ,,,

## 2022-11-03 PROCEDURE — 99239 HOSP IP/OBS DSCHRG MGMT >30: CPT | Mod: ,,,

## 2022-11-03 RX ADMIN — PANTOPRAZOLE SODIUM 40 MG: 40 TABLET, DELAYED RELEASE ORAL at 08:11

## 2022-11-03 RX ADMIN — GUAIFENESIN 600 MG: 600 TABLET, EXTENDED RELEASE ORAL at 08:11

## 2022-11-03 RX ADMIN — CLONAZEPAM 1 MG: 0.5 TABLET ORAL at 08:11

## 2022-11-03 RX ADMIN — APIXABAN 5 MG: 5 TABLET, FILM COATED ORAL at 08:11

## 2022-11-03 NOTE — PLAN OF CARE
Pt discharged home with no post-acute needs, transported by her spouse who will provide care if needed.    Atiya King [facility]  Case Management Department  [email or web address]         11/03/22 1137   Final Note   Assessment Type Final Discharge Note   Anticipated Discharge Disposition Home   Hospital Resources/Appts/Education Provided Appointments scheduled by Navigator/Coordinator;Provided patient/caregiver with written discharge plan information   Post-Acute Status   Discharge Delays None known at this time       Future Appointments   Date Time Provider Department Center   11/4/2022 11:30 AM Atiya Pruett DNP [facility] PSYCH [facility]   11/21/2022  7:00 AM [facility] MAMMO1 [facility] MAMMO [facility]   11/30/2022  3:00 PM PRE-ADMIT, ENDO -[facility] ENDO4 [facility]

## 2022-11-03 NOTE — PROGRESS NOTES
[facility] - Cardiology Stepdown  Cardiac Electrophysiology  Progress Note    Admission Date: 10/31/2022  Code Status: Full Code   Attending Physician: Swetha Barnard MD   Expected Discharge Date: 11/3/2022  Principal Problem:Atrial flutter    Subjective:     Doing well overnight post-ablation.    Objective:     Vital Signs (Most Recent):  Temp: 97.8 °F (36.6 °C) (11/03/22 0757)  Pulse: 78 (11/03/22 0757)  Resp: 14 (11/03/22 0757)  BP: (!) 105/57 (11/03/22 0757)  SpO2: (!) 94 % (11/03/22 0757)   Vital Signs (24h Range):  Temp:  [97.4 °F (36.3 °C)-98.6 °F (37 °C)] 97.8 °F (36.6 °C)  Pulse:  [] 78  Resp:  [13-20] 14  SpO2:  [93 %-100 %] 94 %  BP: ()/() 105/57     Weight: 73.5 kg (162 lb)  Body mass index is 29.63 kg/m².    SpO2: (!) 94 %  O2 Device (Oxygen Therapy): room air      Intake/Output Summary (Last 24 hours) at 11/3/2022 0857  Last data filed at 11/2/2022 1652  Gross per 24 hour   Intake 1010 ml   Output 1 ml   Net 1009 ml       Physical Exam  Constitutional:       Appearance: She is not ill-appearing.   HENT:      Head: Normocephalic and atraumatic.   Cardiovascular:      Rate and Rhythm: Normal rate and regular rhythm.      Pulses: Normal pulses.      Heart sounds: Normal heart sounds. No murmur heard.  Pulmonary:      Effort: Pulmonary effort is normal.      Breath sounds: No wheezing, rhonchi or rales.   Abdominal:      General: There is no distension.      Tenderness: There is no abdominal tenderness.   Musculoskeletal:      Right lower leg: No edema.      Left lower leg: No edema.      Comments: Groin site c/d/I. No hematoma.   Skin:     General: Skin is warm.      Capillary Refill: Capillary refill takes less than 2 seconds.      Findings: No erythema or rash.   Neurological:      General: No focal deficit present.      Mental Status: She is alert and oriented to person, place, and time.     Significant Labs: CMP   Recent Labs   Lab 11/02/22  0256 11/03/22  0517    140   K 3.7  4.0    106   CO2 26 28    75   BUN 20 24*   CREATININE 0.8 0.9   CALCIUM 8.6* 8.1*   ANIONGAP 6* 6*     CBC   Recent Labs   Lab 11/02/22  0256 11/03/22  0517   WBC 12.99* 8.83   HGB 14.0 13.4   HCT 42.1 41.4    197     Significant Imaging: reviewed  Assessment and Plan:     * Atrial flutter  History of previous atrial flutter based on prior EKGs  On metoprolol tartrate, recently outpatient increased from 50mg to 75mg BID  Not on anticoagulation prior to admission 10/31    Plan  Successful ablation for typical atrial flutter 11/2/22 with Dr. Duke.   Continue eliquis 5mg BID  Discontinue metoprolol  Stable for discharge from EP perspective   on smoking cessation  Follow up with Dr. Duke in 4 months.      Lake Echeverria MD  Cardiac Electrophysiology  [facility] - Cardiology Stepdown

## 2022-11-03 NOTE — SUBJECTIVE & OBJECTIVE
Objective:     Vital Signs (Most Recent):  Temp: 98.6 °F (37 °C) (11/03/22 0418)  Pulse: 78 (11/03/22 0418)  Resp: 17 (11/03/22 0418)  BP: (!) 96/54 (11/03/22 0418)  SpO2: (!) 93 % (11/03/22 0418)   Vital Signs (24h Range):  Temp:  [96.8 °F (36 °C)-98.6 °F (37 °C)] 98.6 °F (37 °C)  Pulse:  [] 78  Resp:  [13-20] 17  SpO2:  [93 %-100 %] 93 %  BP: ()/() 96/54     Weight: 73.5 kg (162 lb)  Body mass index is 29.63 kg/m².    SpO2: (!) 93 %  O2 Device (Oxygen Therapy): room air      Intake/Output Summary (Last 24 hours) at 11/3/2022 0548  Last data filed at 11/2/2022 1652  Gross per 24 hour   Intake 1010 ml   Output 1 ml   Net 1009 ml     Physical Exam  Constitutional:       Appearance: She is not ill-appearing.   HENT:      Head: Normocephalic and atraumatic.   Cardiovascular:      Rate and Rhythm: Normal rate and regular rhythm.      Pulses: Normal pulses.      Heart sounds: Normal heart sounds. No murmur heard.  Pulmonary:      Effort: Pulmonary effort is normal.      Breath sounds: No wheezing, rhonchi or rales.   Abdominal:      General: There is no distension.      Tenderness: There is no abdominal tenderness.   Musculoskeletal:      Right lower leg: No edema.      Left lower leg: No edema.      Comments: Groin site c/d/I. No hematoma.   Skin:     General: Skin is warm.      Capillary Refill: Capillary refill takes less than 2 seconds.      Findings: No erythema or rash.   Neurological:      General: No focal deficit present.      Mental Status: She is alert and oriented to person, place, and time.     Significant Labs: CMP   Recent Labs   Lab 11/02/22 0256      K 3.7      CO2 26      BUN 20   CREATININE 0.8   CALCIUM 8.6*   ANIONGAP 6*     CBC   Recent Labs   Lab 11/02/22 0256   WBC 12.99*   HGB 14.0   HCT 42.1        Significant Imaging: reviewed

## 2022-11-03 NOTE — ANESTHESIA POSTPROCEDURE EVALUATION
Anesthesia Post Evaluation    Patient: Lucia Matias    Procedure(s) Performed: Procedure(s) (LRB):  Ablation, Atrial Flutter, Typical (N/A)  ECHOCARDIOGRAM, TRANSESOPHAGEAL (N/A)    Final Anesthesia Type: general      Patient location during evaluation: PACU  Patient participation: Yes- Able to Participate  Level of consciousness: awake and alert  Post-procedure vital signs: reviewed and stable  Pain management: adequate  Airway patency: patent    PONV status at discharge: No PONV  Anesthetic complications: no      Cardiovascular status: blood pressure returned to baseline  Respiratory status: unassisted  Hydration status: euvolemic  Follow-up not needed.          Vitals Value Taken Time   /85 11/02/22 1753   Temp 36.3 °C (97.4 °F) 11/02/22 1753   Pulse 92 11/02/22 1905   Resp 16 11/02/22 1753   SpO2 94 % 11/02/22 1753         No case tracking events are documented in the log.      Pain/Fred Score: Pain Rating Prior to Med Admin: 6 (11/2/2022  4:14 PM)  Pain Rating Post Med Admin: 5 (11/2/2022  5:15 PM)  Fred Score: 10 (11/2/2022  5:53 PM)

## 2022-11-03 NOTE — ASSESSMENT & PLAN NOTE
History of previous atrial flutter based on prior EKGs  On metoprolol tartrate, recently outpatient increased from 50mg to 75mg BID  Not on anticoagulation prior to admission 10/31    Plan  Successful ablation for typical atrial flutter 11/2/22 with Dr. Duke.   Continue eliquis 5mg BID  Discontinue metoprolol  Stable for discharge from EP perspective   on smoking cessation  Follow up with Dr. Duke in 4 months.

## 2022-11-03 NOTE — ASSESSMENT & PLAN NOTE
History of previous atrial flutter based on prior EKGs  On metoprolol tartrate, recently outpatient increased from 50mg to 75mg BID  Not on anticoagulation prior to admission 10/31    Plan  Successful ablation for typical atrial flutter 11/2/22 with Dr. Duke.   Continue eliquis 5mg BID  Switch metoprolol tartrate 75mg BID to metoprolol succinate 150mg daily  Stable for discharge from EP perspective   on smoking cessation  Follow up with Dr. Duke in 4 months.

## 2022-11-03 NOTE — PLAN OF CARE
Patient remained free from falls and injury throughout shift. No acute events overnight. Patient alert and oriented x4; vital signs stable.  Safety measures addressed --bed locked and in low position with siderails up x2 and call light/personal items within reach. Plan of care reviewed with patient; all questions and concerns addressed. Patient verbalizes understanding. Will continue to monitor.
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
## 2022-11-03 NOTE — SUBJECTIVE & OBJECTIVE
Objective:     Vital Signs (Most Recent):  Temp: 97.8 °F (36.6 °C) (11/03/22 0757)  Pulse: 78 (11/03/22 0757)  Resp: 14 (11/03/22 0757)  BP: (!) 105/57 (11/03/22 0757)  SpO2: (!) 94 % (11/03/22 0757)   Vital Signs (24h Range):  Temp:  [97.4 °F (36.3 °C)-98.6 °F (37 °C)] 97.8 °F (36.6 °C)  Pulse:  [] 78  Resp:  [13-20] 14  SpO2:  [93 %-100 %] 94 %  BP: ()/() 105/57     Weight: 73.5 kg (162 lb)  Body mass index is 29.63 kg/m².    SpO2: (!) 94 %  O2 Device (Oxygen Therapy): room air      Intake/Output Summary (Last 24 hours) at 11/3/2022 0857  Last data filed at 11/2/2022 1652  Gross per 24 hour   Intake 1010 ml   Output 1 ml   Net 1009 ml       Physical Exam  Constitutional:       Appearance: She is not ill-appearing.   HENT:      Head: Normocephalic and atraumatic.   Cardiovascular:      Rate and Rhythm: Normal rate and regular rhythm.      Pulses: Normal pulses.      Heart sounds: Normal heart sounds. No murmur heard.  Pulmonary:      Effort: Pulmonary effort is normal.      Breath sounds: No wheezing, rhonchi or rales.   Abdominal:      General: There is no distension.      Tenderness: There is no abdominal tenderness.   Musculoskeletal:      Right lower leg: No edema.      Left lower leg: No edema.      Comments: Groin site c/d/I. No hematoma.   Skin:     General: Skin is warm.      Capillary Refill: Capillary refill takes less than 2 seconds.      Findings: No erythema or rash.   Neurological:      General: No focal deficit present.      Mental Status: She is alert and oriented to person, place, and time.     Significant Labs: CMP   Recent Labs   Lab 11/02/22  0256 11/03/22  0517    140   K 3.7 4.0    106   CO2 26 28    75   BUN 20 24*   CREATININE 0.8 0.9   CALCIUM 8.6* 8.1*   ANIONGAP 6* 6*     CBC   Recent Labs   Lab 11/02/22  0256 11/03/22  0517   WBC 12.99* 8.83   HGB 14.0 13.4   HCT 42.1 41.4    197     Significant Imaging: reviewed

## 2022-11-03 NOTE — ASSESSMENT & PLAN NOTE
New onset atrial flutter, denies chest pain, but endorses lightheadedness/dizziness when standing.  - Tachy to 126  - Metoprolol 75 mg BID  - Metoprolol 5 mg IV once (ordered, not given)  - Metoprolol 25 mg PO and Magnesium 2 g IV given overnight on 10/31 for persistent tachycardia with minimal improvement  - [id number] score: 2 (HTN and female) > will begin anticoagulation with Eliquis 5 BID  - Echo with EF of 50%, left/right ventricles with low normal systolic function, and indeterminate left ventricular diastolic function  - D-dimer: 0.53  - CTA negative for PE  - Repeat EKG on 11/1 with continued flutter  - Cardiology consulted   - Recommend continuing current medication regimen and consulting EP  - EP consulted   - Performed ablation 11/3.   - Continue to monitor on tele  - Dc metoprolol at discharge. EP f/u with Dr. Duke

## 2022-11-04 ENCOUNTER — PATIENT MESSAGE (OUTPATIENT)
Dept: BARIATRICS | Facility: CLINIC | Age: 62
End: 2022-11-04
Payer: COMMERCIAL

## 2022-11-04 ENCOUNTER — PATIENT MESSAGE (OUTPATIENT)
Dept: PSYCHIATRY | Facility: CLINIC | Age: 62
End: 2022-11-04
Payer: COMMERCIAL

## 2022-11-05 ENCOUNTER — PATIENT MESSAGE (OUTPATIENT)
Dept: ELECTROPHYSIOLOGY | Facility: CLINIC | Age: 62
End: 2022-11-05
Payer: COMMERCIAL

## 2022-11-07 ENCOUNTER — SPECIALTY PHARMACY (OUTPATIENT)
Dept: PHARMACY | Facility: CLINIC | Age: 62
End: 2022-11-07
Payer: COMMERCIAL

## 2022-11-07 ENCOUNTER — PATIENT MESSAGE (OUTPATIENT)
Dept: PSYCHIATRY | Facility: CLINIC | Age: 62
End: 2022-11-07

## 2022-11-07 ENCOUNTER — OFFICE VISIT (OUTPATIENT)
Dept: PSYCHIATRY | Facility: CLINIC | Age: 62
End: 2022-11-07
Payer: COMMERCIAL

## 2022-11-07 DIAGNOSIS — F41.0 PANIC ATTACK: ICD-10-CM

## 2022-11-07 DIAGNOSIS — F41.1 GAD (GENERALIZED ANXIETY DISORDER): ICD-10-CM

## 2022-11-07 DIAGNOSIS — G47.00 INSOMNIA, UNSPECIFIED TYPE: ICD-10-CM

## 2022-11-07 PROCEDURE — 1111F PR DISCHARGE MEDS RECONCILED W/ CURRENT OUTPATIENT MED LIST: ICD-10-PCS | Mod: CPTII,95,S$GLB, | Performed by: NURSE PRACTITIONER

## 2022-11-07 PROCEDURE — 1160F PR REVIEW ALL MEDS BY PRESCRIBER/CLIN PHARMACIST DOCUMENTED: ICD-10-PCS | Mod: CPTII,95,S$GLB, | Performed by: NURSE PRACTITIONER

## 2022-11-07 PROCEDURE — 1159F PR MEDICATION LIST DOCUMENTED IN MEDICAL RECORD: ICD-10-PCS | Mod: CPTII,95,S$GLB, | Performed by: NURSE PRACTITIONER

## 2022-11-07 PROCEDURE — 99214 PR OFFICE/OUTPT VISIT, EST, LEVL IV, 30-39 MIN: ICD-10-PCS | Mod: 95,S$GLB,, | Performed by: NURSE PRACTITIONER

## 2022-11-07 PROCEDURE — 1111F DSCHRG MED/CURRENT MED MERGE: CPT | Mod: CPTII,95,S$GLB, | Performed by: NURSE PRACTITIONER

## 2022-11-07 PROCEDURE — 99999 PR PBB SHADOW E&M-EST. PATIENT-LVL III: CPT | Mod: PBBFAC,,, | Performed by: NURSE PRACTITIONER

## 2022-11-07 PROCEDURE — 99214 OFFICE O/P EST MOD 30 MIN: CPT | Mod: 95,S$GLB,, | Performed by: NURSE PRACTITIONER

## 2022-11-07 PROCEDURE — 1160F RVW MEDS BY RX/DR IN RCRD: CPT | Mod: CPTII,95,S$GLB, | Performed by: NURSE PRACTITIONER

## 2022-11-07 PROCEDURE — 1159F MED LIST DOCD IN RCRD: CPT | Mod: CPTII,95,S$GLB, | Performed by: NURSE PRACTITIONER

## 2022-11-07 PROCEDURE — 3044F HG A1C LEVEL LT 7.0%: CPT | Mod: CPTII,95,S$GLB, | Performed by: NURSE PRACTITIONER

## 2022-11-07 PROCEDURE — 3044F PR MOST RECENT HEMOGLOBIN A1C LEVEL <7.0%: ICD-10-PCS | Mod: CPTII,95,S$GLB, | Performed by: NURSE PRACTITIONER

## 2022-11-07 PROCEDURE — 99999 PR PBB SHADOW E&M-EST. PATIENT-LVL III: ICD-10-PCS | Mod: PBBFAC,,, | Performed by: NURSE PRACTITIONER

## 2022-11-07 RX ORDER — ZOLPIDEM TARTRATE 5 MG/1
5 TABLET ORAL NIGHTLY PRN
Qty: 30 TABLET | Refills: 2 | Status: SHIPPED | OUTPATIENT
Start: 2022-11-07 | End: 2023-01-12

## 2022-11-07 RX ORDER — CLONAZEPAM 1 MG/1
1 TABLET ORAL 2 TIMES DAILY PRN
Qty: 60 TABLET | Refills: 2 | Status: SHIPPED | OUTPATIENT
Start: 2022-11-07 | End: 2023-01-12

## 2022-11-07 NOTE — PROGRESS NOTES
Outpatient Psychiatry Follow-Up Visit (MD/NP)    11/7/2022     The patient location is: [place]  The chief complaint leading to consultation is: RANDI, insomnia    Visit type: audiovisual    Face to Face time with patient: 27 minutes  30 minutes of total time spent on the encounter, which includes face to face time and non-face to face time preparing to see the patient (eg, review of tests), Obtaining and/or reviewing separately obtained history, Documenting clinical information in the electronic or other health record, Independently interpreting results (not separately reported) and communicating results to the patient/family/caregiver, or Care coordination (not separately reported).     Each patient to whom he or she provides medical services by telemedicine is:  (1) informed of the relationship between the physician and patient and the respective role of any other health care provider with respect to management of the patient; and (2) notified that he or she may decline to receive medical services by telemedicine and may withdraw from such care at any time.    Clinical Status of Patient:  Outpatient (Ambulatory)    Chief Complaint:  Lucia Matias is a 62 y.o. female who presents today for follow-up of depression, anxiety and insomnia .  Met with patient.      Interval History and Content of Current Session:  Interim Events/Subjective Report/Content of Current Session:    Lucia Matias checked in on time for her appointment. Presents for follow-up - no changes made last visit. Was recently admitted for Atrial Flutter. Now questions if her physical symptoms were caused my medical issues rather than anxiety. Her end goal is to stop all medications except vitamins if possible. Will stop Elavil today due to cardiac arrhythmia. Discussed stopping Ambien- she notes when she does not take it she finds it harder to fall asleep but does eventually. Will taper off over the next few months. Has stopped Buspirone due to cost  and has not notice a difference.  Denies SI/HI/AVH. No objective s/sx of psychosis or mayur. Patient verbalized motivation for compliance with medications and all other elements of treatment plan.       Previous medication trial:  Prozac, Paxil, Wellbutrin - notes she felt worse, more depressed, SI - admits it could have also been the situation she was in at the time.        Review of Systems   PSYCHIATRIC: Pertinant items are noted in the narrative.  CONSTITUTIONAL: No weight gain or loss.   MUSCULOSKELETAL: No pain or stiffness of the joints.  NEUROLOGIC: No weakness, sensory changes, seizures, confusion, memory loss, tremor or other abnormal movements.  RESPIRATORY: No shortness of breath.  CARDIOVASCULAR: No tachycardia or chest pain.  GASTROINTESTINAL: No nausea, vomiting, pain, constipation or diarrhea.    Past Medical, Family and Social History: The patient's past medical, family and social history have been reviewed and updated as appropriate within the electronic medical record - see encounter notes.    Compliance: No    Side effects: None    Risk Parameters:  Patient reports no suicidal ideation  Patient reports no homicidal ideation  Patient reports no self-injurious behavior  Patient reports no violent behavior    Exam (detailed: at least 9 elements; comprehensive: all 15 elements)   Constitutional  Vitals:     There were no vitals filed for this visit.     General:  unremarkable, age appropriate     Musculoskeletal  Muscle Strength/Tone:  not examined   Gait & Station:  ANASTASIIA due to virtual visit     Psychiatric  Speech:  no latency; no press   Mood & Affect:  steady  congruent and appropriate   Thought Process:  normal and logical   Associations:  intact   Thought Content:  normal, no suicidality, no homicidality, delusions, or paranoia   Insight:  intact   Judgement: behavior is adequate to circumstances   Orientation:  grossly intact   Memory: intact for content of interview   Language: grossly intact  "  Attention Span & Concentration:  able to focus   Fund of Knowledge:  intact and appropriate to age and level of education     Assessment and Diagnosis   Status/Progress: Based on the examination today, the patient's problem(s) is/are well controlled.  New problems have not been presented today.   Co-morbidities, Diagnostic uncertainty and Lack of compliance are not complicating management of the primary condition.  There are no active rule-out diagnoses for this patient at this time.     General Impression:Lucia Matias is a 62 y.o. female who presents today for follow-up of anxiety. She reports she has "always been anxious and had occasional panic attacks but that her depression started ~ 5 years ago after a number of family members passed away in close succession and her  being dx with cancer and HIV."  Patient seen and chart reviewed. Previous patient of Dr. TREVOR Carranza - Last seen 5/18/21- continue on Elavil, Ambien, Klonopin, Buspar. Presents 8/16/21- no changes. Presents 11/2/21- increase Elavil Presents 2/17/22- symptoms improvement with increase in Elavil. Presents 8/5/22- well controled- will start klonopin taper next visit Presents 11/7/22- now believes her palpitations are due to cardiac issues rather than anxiety - motivated to taper off medications. Will stop Elavil      ICD-10-CM ICD-9-CM   1. RANDI (generalized anxiety disorder)  F41.1 300.02   2. Panic attack  F41.0 300.01   3. Insomnia, unspecified type  G47.00 780.52         Intervention/Counseling/Treatment Plan   Medication Management: Continue current medications.  Stopped Buspirone 15 mg BID  Stop Elavil 25 mg qHs  Ambien 5mg qHS - sleep walking   Klonpin 1mg BID PRN anxiety/panic for past couple of years  Checked LA  and no irregularities were noted.  Informed pt of the risks of continuous Benzodiazepine use including tolerance, dependence and withdrawals that may be life threatening upon abrupt cessation. Also advised not to take " Benzodiazepines with Opiates or other sedatives and also not to drive or operate heavy machinery while using Benzodiazepines.  The risks and benefits of medication were discussed with the patient.  Discussed diagnosis, risks and benefits of proposed treatment above vs alternative treatments vs no treatment, and potential side effects of these treatments. The patient expresses understanding of the above and displays the capacity to agree with this treatment given said understanding. Patient also agrees that, currently, the benefits outweigh the risks and would like to pursue treatment at this time.  Discussed inherent unpredictability of medications in each individual.   Encouraged Patient to keep future appointments.   Take medications as prescribed and abstain from substance abuse.   In the event of an emergency patient was advised to go to the emergency room      Return to Clinic: 3 months    Atiya Pruett DNP-BC PMHNP  [facility]

## 2022-11-07 NOTE — TELEPHONE ENCOUNTER
Specialty Pharmacy - Refill Coordination    Specialty Medication Orders Linked to Encounter      Flowsheet Row Most Recent Value   Medication #1 COSENTYX PEN, 2 PENS, 150 mg/mL PnIj (Order#[id number], Rx#[id number])            Refill Questions - Documented Responses      Flowsheet Row Most Recent Value   Patient Availability and HIPAA Verification    Does patient want to proceed with activity? Yes   HIPAA/medical authority confirmed? Yes   Relationship to patient of person spoken to? Self   Refill Screening Questions    Changes to allergies? No   Changes to medications? No   New conditions since last clinic visit? No   Unplanned office visit, urgent care, ED, or hospital admission in the last 4 weeks? No   How does patient/caregiver feel medication is working? Excellent   Financial problems or insurance changes? No   How many doses of your specialty medications were missed in the last 4 weeks? 0   Would patient like to speak to a pharmacist? No   When does the patient need to receive the medication? 11/15/22   Refill Delivery Questions    How will the patient receive the medication? MEDRx   When does the patient need to receive the medication? 11/15/22   Shipping Address Home   Address in [facility] confirmed and updated if neccessary? Yes   Expected Copay ($) 441.99   Is the patient able to afford the medication copay? Yes   Payment Method one time CC provided,  CC on file   Days supply of Refill 28   Supplies needed? No supplies needed   Refill activity completed? Yes   Refill activity plan Refill scheduled   Shipment/Pickup Date: 11/10/22            Current Outpatient Medications   Medication Sig    amitriptyline (ELAVIL) 50 MG tablet Take 1 tablet (50 mg total) by mouth nightly as needed for Insomnia.    apixaban (ELIQUIS) 5 mg Tab Take 1 tablet (5 mg total) by mouth 2 (two) times daily.    atorvastatin (LIPITOR) 20 MG tablet Take 1 tablet (20 mg total) by mouth every evening.    B-complex  with vitamin C (VITAMIN B COMPLEX-C ORAL) Take by mouth.    benzonatate (TESSALON) 200 MG capsule Take 1 capsule (200 mg total) by mouth 3 (three) times daily as needed for Cough.    calcium-vitamin D 250-100 mg-unit per tablet Take 1 tablet by mouth 2 (two) times daily.    clonazePAM (KLONOPIN) 1 MG tablet Take 1 tablet (1 mg total) by mouth 2 (two) times daily as needed for Anxiety (panic).    COSENTYX PEN, 2 PENS, 150 mg/mL PnIj Inject 300mg (2 pens) into the skin every 4 weeks    cyanocobalamin 500 MCG tablet Take 500 mcg by mouth once daily.    fluticasone-umeclidin-vilanter (TRELEGY ELLIPTA) 100-62.5-25 mcg DsDv Inhale 1 puff into the lungs once daily.    multivitamin (THERAGRAN) per tablet Take 1 tablet by mouth once daily.    omeprazole (PRILOSEC) 40 MG capsule Take 1 capsule (40 mg total) by mouth every morning. Take one capsule by mouth every morning.    zolpidem (AMBIEN) 5 MG Tab Take 1 tablet (5 mg total) by mouth nightly as needed (insomnia).   Last reviewed on 11/7/2022  8:37 AM by Atiya Pruett DNP    Review of patient's allergies indicates:   Allergen Reactions    Succinimides Anaphylaxis     Son has     Last reviewed on  11/7/2022 8:37 AM by Atiya Pruett      Tasks added this encounter   No tasks added.   Tasks due within next 3 months   11/5/2022 - Refill Call (Auto Added)     Master HogueD  [facility]  [address] [place] [id number]  Phone: [phone]  Fax: [phone]

## 2022-11-08 ENCOUNTER — TELEPHONE (OUTPATIENT)
Dept: ELECTROPHYSIOLOGY | Facility: CLINIC | Age: 62
End: 2022-11-08
Payer: COMMERCIAL

## 2022-11-08 DIAGNOSIS — I48.92 ATRIAL FLUTTER, UNSPECIFIED TYPE: Primary | ICD-10-CM

## 2022-11-08 NOTE — TELEPHONE ENCOUNTER
Spoke to patient regarding post op care. Patient states she has been in communication with Dr. Duke's nurse.   She stated that for the first few days post procedure her HR was staying elevated.  She notified her MD and was told to stop taking the Amitriptyline.  So she notes that now off the medication her HR is in the 80's and she is feeling so much better.   She denies any CP or SOB.  Wound site dry and intact without redness, swelling, hematoma or drainage. Patient denies any fever or pain.   Patient  has resumed medications and has started new prescriptions Eliquis 5mg BID as ordered at discharge.Patient instructed on risk of intercranial bleeding if she were to fall and hit her head.  Instructed that all patients are instructed to go to the ER if they experience a fall and strike their head.   Patient verbalizes understanding of all post op instructions.

## 2022-11-09 ENCOUNTER — PATIENT MESSAGE (OUTPATIENT)
Dept: ELECTROPHYSIOLOGY | Facility: CLINIC | Age: 62
End: 2022-11-09
Payer: COMMERCIAL

## 2022-11-09 ENCOUNTER — HOSPITAL ENCOUNTER (OUTPATIENT)
Dept: CARDIOLOGY | Facility: HOSPITAL | Age: 62
Discharge: HOME | End: 2022-11-09
Attending: INTERNAL MEDICINE
Payer: COMMERCIAL

## 2022-11-09 DIAGNOSIS — I48.92 ATRIAL FLUTTER, UNSPECIFIED TYPE: ICD-10-CM

## 2022-11-09 PROCEDURE — 93005 ELECTROCARDIOGRAM TRACING: CPT | Mod: PO

## 2022-11-09 PROCEDURE — 93010 ELECTROCARDIOGRAM REPORT: CPT | Mod: ,,, | Performed by: INTERNAL MEDICINE

## 2022-11-09 PROCEDURE — 93010 EKG 12-LEAD: ICD-10-PCS | Mod: ,,, | Performed by: INTERNAL MEDICINE

## 2022-11-21 ENCOUNTER — HOSPITAL ENCOUNTER (OUTPATIENT)
Dept: RADIOLOGY | Facility: HOSPITAL | Age: 62
Discharge: HOME | End: 2022-11-21
Attending: INTERNAL MEDICINE
Payer: COMMERCIAL

## 2022-11-21 DIAGNOSIS — R92.8 ABNORMAL MAMMOGRAM OF LEFT BREAST: Primary | ICD-10-CM

## 2022-11-21 DIAGNOSIS — Z12.31 ENCOUNTER FOR SCREENING MAMMOGRAM FOR MALIGNANT NEOPLASM OF BREAST: ICD-10-CM

## 2022-11-21 PROCEDURE — 77067 SCR MAMMO BI INCL CAD: CPT | Mod: TC,PO

## 2022-11-30 ENCOUNTER — CLINICAL SUPPORT (OUTPATIENT)
Dept: ENDOSCOPY | Facility: HOSPITAL | Age: 62
End: 2022-11-30
Attending: INTERNAL MEDICINE
Payer: COMMERCIAL

## 2022-11-30 DIAGNOSIS — Z12.11 SCREENING FOR MALIGNANT NEOPLASM OF COLON: ICD-10-CM

## 2022-11-30 NOTE — PLAN OF CARE
Pt. Needs to postpone Colonoscopy neha 2023. Had ablation on 11/5/22 and on Blood thinners which cannot be held at this time. New PAT scheduled for March 2023.

## 2022-12-01 ENCOUNTER — PATIENT MESSAGE (OUTPATIENT)
Dept: DERMATOLOGY | Facility: CLINIC | Age: 62
End: 2022-12-01
Payer: COMMERCIAL

## 2022-12-01 DIAGNOSIS — L40.50 PSORIATIC ARTHRITIS: ICD-10-CM

## 2022-12-01 DIAGNOSIS — Z79.899 LONG-TERM USE OF HIGH-RISK MEDICATION: ICD-10-CM

## 2022-12-01 DIAGNOSIS — L40.0 PSORIASIS VULGARIS: Primary | ICD-10-CM

## 2022-12-02 ENCOUNTER — LAB VISIT (OUTPATIENT)
Dept: LAB | Facility: OTHER | Age: 62
End: 2022-12-02
Attending: DERMATOLOGY
Payer: COMMERCIAL

## 2022-12-02 DIAGNOSIS — L40.0 PSORIASIS VULGARIS: ICD-10-CM

## 2022-12-02 DIAGNOSIS — Z79.899 LONG-TERM USE OF HIGH-RISK MEDICATION: ICD-10-CM

## 2022-12-02 DIAGNOSIS — L40.50 PSORIATIC ARTHRITIS: ICD-10-CM

## 2022-12-02 LAB
ALBUMIN SERPL BCP-MCNC: 3.7 G/DL (ref 3.5–5.2)
ALP SERPL-CCNC: 96 U/L (ref 55–135)
ALT SERPL W/O P-5'-P-CCNC: 29 U/L (ref 10–44)
ANION GAP SERPL CALC-SCNC: 6 MMOL/L (ref 8–16)
AST SERPL-CCNC: 20 U/L (ref 10–40)
BILIRUB SERPL-MCNC: 0.9 MG/DL (ref 0.1–1)
BUN SERPL-MCNC: 13 MG/DL (ref 6–20)
CALCIUM SERPL-MCNC: 9.6 MG/DL (ref 8.7–10.5)
CHLORIDE SERPL-SCNC: 108 MMOL/L (ref 95–110)
CO2 SERPL-SCNC: 28 MMOL/L (ref 23–29)
CREAT SERPL-MCNC: 0.8 MG/DL (ref 0.5–1.4)
EST. GFR  (NO RACE VARIABLE): >60 ML/MIN/1.73 M^2
GLUCOSE SERPL-MCNC: 94 MG/DL (ref 70–110)
POTASSIUM SERPL-SCNC: 3.8 MMOL/L (ref 3.5–5.1)
PROT SERPL-MCNC: 7 G/DL (ref 6–8.4)
SODIUM SERPL-SCNC: 142 MMOL/L (ref 136–145)

## 2022-12-02 PROCEDURE — 80053 COMPREHEN METABOLIC PANEL: CPT | Performed by: DERMATOLOGY

## 2022-12-02 PROCEDURE — 36415 COLL VENOUS BLD VENIPUNCTURE: CPT | Performed by: DERMATOLOGY

## 2022-12-06 ENCOUNTER — SPECIALTY PHARMACY (OUTPATIENT)
Dept: PHARMACY | Facility: CLINIC | Age: 62
End: 2022-12-06
Payer: COMMERCIAL

## 2022-12-06 NOTE — TELEPHONE ENCOUNTER
Outgoing call regarding Cosentyx refill. Pt was admitted to hospital in early November and stated some changes to her medications. Transferred to [facility].

## 2022-12-06 NOTE — TELEPHONE ENCOUNTER
Specialty Pharmacy - Refill Coordination    Specialty Medication Orders Linked to Encounter      Flowsheet Row Most Recent Value   Medication #1 COSENTYX PEN, 2 PENS, 150 mg/mL PnIj (Order#[id number], Rx#[id number])            Refill Questions - Documented Responses      Flowsheet Row Most Recent Value   Patient Availability and HIPAA Verification    Does patient want to proceed with activity? Yes   HIPAA/medical authority confirmed? Yes   Relationship to patient of person spoken to? Self   Refill Screening Questions    Changes to allergies? No   Changes to medications? Yes  [Stopped metoprolol and stopped elavil. Started Eliquis 5 mg BID d/t atrial flutter event from 10/31 through 11/4]   New conditions since last clinic visit? Yes  [Had an atrial flutter event 10/31 through 11/04]   Unplanned office visit, urgent care, ED, or hospital admission in the last 4 weeks? Yes  [yes was hospitalzied from 10/31 through 11/04]   How does patient/caregiver feel medication is working? Very good   Financial problems or insurance changes? No   How many doses of your specialty medications were missed in the last 4 weeks? 0   Would patient like to speak to a pharmacist? No  [call transferred to me]   When does the patient need to receive the medication? 12/15/22   Refill Delivery Questions    How will the patient receive the medication? [facility]   When does the patient need to receive the medication? 12/15/22   Shipping Address Home   Address in [facility] confirmed and updated if neccessary? Yes   Expected Copay ($) 441.99   Is the patient able to afford the medication copay? Yes   Payment Method  CC on file   Days supply of Refill 28   Supplies needed? No supplies needed   Refill activity completed? Yes   Refill activity plan Refill scheduled   Shipment/Pickup Date: 12/07/22            Current Outpatient Medications   Medication Sig    apixaban (ELIQUIS) 5 mg Tab Take 1 tablet (5 mg total) by mouth 2 (two) times  daily.    atorvastatin (LIPITOR) 20 MG tablet Take 1 tablet (20 mg total) by mouth every evening.    B-complex with vitamin C (VITAMIN B COMPLEX-C ORAL) Take by mouth.    calcium-vitamin D 250-100 mg-unit per tablet Take 1 tablet by mouth 2 (two) times daily.    clonazePAM (KLONOPIN) 1 MG tablet Take 1 tablet (1 mg total) by mouth 2 (two) times daily as needed for Anxiety (panic).    COSENTYX PEN, 2 PENS, 150 mg/mL PnIj Inject 300mg (2 pens) into the skin every 4 weeks    cyanocobalamin 500 MCG tablet Take 500 mcg by mouth once daily.    fluticasone-umeclidin-vilanter (TRELEGY ELLIPTA) 100-62.5-25 mcg DsDv Inhale 1 puff into the lungs once daily.    multivitamin (THERAGRAN) per tablet Take 1 tablet by mouth once daily.    omeprazole (PRILOSEC) 40 MG capsule Take 1 capsule (40 mg total) by mouth every morning.    zolpidem (AMBIEN) 5 MG Tab Take 1 tablet (5 mg total) by mouth nightly as needed (insomnia).   Last reviewed on 11/7/2022  8:37 AM by Atiya Pruett DNP    Review of patient's allergies indicates:   Allergen Reactions    Succinimides Anaphylaxis     Son has     Last reviewed on  11/8/2022 3:57 PM by Corina Sullivan    Interventions added this encounter   Closed: [facility] Patient Intervention - Drug safety     Tasks added this encounter   1/5/2023 - Refill Call (Auto Added)   Tasks due within next 3 months   No tasks due.     Mecry Edouard, PharmD  [facility]  [address] [place] [id number]  Phone: [phone]  Fax: [phone]

## 2022-12-07 ENCOUNTER — PATIENT MESSAGE (OUTPATIENT)
Dept: BARIATRICS | Facility: CLINIC | Age: 62
End: 2022-12-07
Payer: COMMERCIAL

## 2022-12-13 ENCOUNTER — PATIENT MESSAGE (OUTPATIENT)
Dept: ELECTROPHYSIOLOGY | Facility: CLINIC | Age: 62
End: 2022-12-13
Payer: COMMERCIAL

## 2022-12-13 ENCOUNTER — HOSPITAL ENCOUNTER (OUTPATIENT)
Dept: RADIOLOGY | Facility: HOSPITAL | Age: 62
Discharge: HOME | End: 2022-12-13
Attending: INTERNAL MEDICINE
Payer: COMMERCIAL

## 2022-12-13 DIAGNOSIS — R92.8 ABNORMAL MAMMOGRAM OF LEFT BREAST: ICD-10-CM

## 2022-12-13 PROCEDURE — 77065 DX MAMMO INCL CAD UNI: CPT | Mod: TC,PO,LT

## 2022-12-13 PROCEDURE — 76642 ULTRASOUND BREAST LIMITED: CPT | Mod: TC,PO,LT

## 2022-12-14 ENCOUNTER — PATIENT MESSAGE (OUTPATIENT)
Dept: INTERNAL MEDICINE | Facility: CLINIC | Age: 62
End: 2022-12-14
Payer: COMMERCIAL

## 2022-12-14 RX ORDER — ALPRAZOLAM 0.5 MG/1
0.5 TABLET ORAL DAILY PRN
Qty: 10 TABLET | Refills: 0 | Status: SHIPPED | OUTPATIENT
Start: 2022-12-14 | End: 2023-01-03 | Stop reason: SDUPTHER

## 2022-12-15 ENCOUNTER — HOSPITAL ENCOUNTER (OUTPATIENT)
Dept: RADIOLOGY | Facility: OTHER | Age: 62
Discharge: HOME | End: 2022-12-15
Attending: INTERNAL MEDICINE
Payer: COMMERCIAL

## 2022-12-15 DIAGNOSIS — N63.0 BREAST MASS IN FEMALE: Primary | ICD-10-CM

## 2022-12-15 DIAGNOSIS — R92.8 ABNORMAL MAMMOGRAM: ICD-10-CM

## 2022-12-15 PROCEDURE — 88341 IMHCHEM/IMCYTCHM EA ADD ANTB: CPT | Mod: 26,,, | Performed by: STUDENT IN AN ORGANIZED HEALTH CARE EDUCATION/TRAINING PROGRAM

## 2022-12-15 PROCEDURE — 88341 PR IHC OR ICC EACH ADD'L SINGLE ANTIBODY  STAINPR: ICD-10-PCS | Mod: 26,,, | Performed by: STUDENT IN AN ORGANIZED HEALTH CARE EDUCATION/TRAINING PROGRAM

## 2022-12-15 PROCEDURE — 88305 TISSUE EXAM BY PATHOLOGIST: ICD-10-PCS | Mod: 26,,, | Performed by: STUDENT IN AN ORGANIZED HEALTH CARE EDUCATION/TRAINING PROGRAM

## 2022-12-15 PROCEDURE — 88305 TISSUE EXAM BY PATHOLOGIST: CPT | Mod: 26,,, | Performed by: STUDENT IN AN ORGANIZED HEALTH CARE EDUCATION/TRAINING PROGRAM

## 2022-12-15 PROCEDURE — 25000003 PHARM REV CODE 250: Performed by: INTERNAL MEDICINE

## 2022-12-15 PROCEDURE — 77065 DX MAMMO INCL CAD UNI: CPT | Mod: TC,LT

## 2022-12-15 PROCEDURE — 77065 MAMMO DIGITAL DIAGNOSTIC LEFT: ICD-10-PCS | Mod: 26,LT,, | Performed by: RADIOLOGY

## 2022-12-15 PROCEDURE — 77065 DX MAMMO INCL CAD UNI: CPT | Mod: 26,LT,, | Performed by: RADIOLOGY

## 2022-12-15 PROCEDURE — 88305 TISSUE EXAM BY PATHOLOGIST: CPT | Performed by: STUDENT IN AN ORGANIZED HEALTH CARE EDUCATION/TRAINING PROGRAM

## 2022-12-15 PROCEDURE — 88342 IMHCHEM/IMCYTCHM 1ST ANTB: CPT | Mod: 26,,, | Performed by: STUDENT IN AN ORGANIZED HEALTH CARE EDUCATION/TRAINING PROGRAM

## 2022-12-15 PROCEDURE — 19083 US BREAST BIOPSY WITH IMAGING 1ST SITE LEFT: ICD-10-PCS | Mod: LT,,, | Performed by: RADIOLOGY

## 2022-12-15 PROCEDURE — 88342 CHG IMMUNOCYTOCHEMISTRY: ICD-10-PCS | Mod: 26,,, | Performed by: STUDENT IN AN ORGANIZED HEALTH CARE EDUCATION/TRAINING PROGRAM

## 2022-12-15 PROCEDURE — 88342 IMHCHEM/IMCYTCHM 1ST ANTB: CPT | Performed by: STUDENT IN AN ORGANIZED HEALTH CARE EDUCATION/TRAINING PROGRAM

## 2022-12-15 PROCEDURE — 19083 BX BREAST 1ST LESION US IMAG: CPT | Mod: LT,,, | Performed by: RADIOLOGY

## 2022-12-15 PROCEDURE — 88341 IMHCHEM/IMCYTCHM EA ADD ANTB: CPT | Mod: 59 | Performed by: STUDENT IN AN ORGANIZED HEALTH CARE EDUCATION/TRAINING PROGRAM

## 2022-12-15 PROCEDURE — 27201068 US BREAST BIOPSY WITH IMAGING 1ST SITE LEFT

## 2022-12-15 RX ORDER — LIDOCAINE HYDROCHLORIDE AND EPINEPHRINE 20; 10 MG/ML; UG/ML
10 INJECTION, SOLUTION INFILTRATION; PERINEURAL ONCE
Status: COMPLETED | OUTPATIENT
Start: 2022-12-15 | End: 2022-12-15

## 2022-12-15 RX ORDER — LIDOCAINE HYDROCHLORIDE 10 MG/ML
5 INJECTION INFILTRATION; PERINEURAL ONCE
Status: COMPLETED | OUTPATIENT
Start: 2022-12-15 | End: 2022-12-15

## 2022-12-15 RX ADMIN — LIDOCAINE HYDROCHLORIDE 5 ML: 10 INJECTION, SOLUTION EPIDURAL; INFILTRATION; INTRACAUDAL at 02:12

## 2022-12-15 RX ADMIN — LIDOCAINE HYDROCHLORIDE,EPINEPHRINE BITARTRATE 10 ML: 20; .01 INJECTION, SOLUTION INFILTRATION; PERINEURAL at 02:12

## 2022-12-21 ENCOUNTER — TELEPHONE (OUTPATIENT)
Dept: RADIOLOGY | Facility: OTHER | Age: 62
End: 2022-12-21
Payer: COMMERCIAL

## 2022-12-21 ENCOUNTER — TELEPHONE (OUTPATIENT)
Dept: SURGERY | Facility: CLINIC | Age: 62
End: 2022-12-21
Payer: COMMERCIAL

## 2022-12-21 NOTE — TELEPHONE ENCOUNTER
Patient notified of left breast biopsy results per pathology reported on 12/21/2022. Diagnosis: INVASIVE DUCTAL CARCINOMA. Explained to patient results are positive for breast cancer. Instructed on need for consultation with breast surgeon. Questions answered. Appointment with breast surgeon requested. Office will call patient directly to confirm appointment. Patient verbalized understanding. Biopsy site WNL. Encouraged to call [phone] for further questions or concerns.

## 2022-12-21 NOTE — TELEPHONE ENCOUNTER
Called patient and scheduled appt with Dr. Chung on 12/28/2022. Reviewed location of appt. Patient verbalized understanding    ----- Message from Yoli Berrios RN sent at 12/21/2022  2:03 PM CST -----  Regarding: New Cancer  Hello,    Path dx: invasive ductal carcinoma    Please schedule with a breast surgeon. Pt is anxious. She is an [employer] employee.    Thank you,  CD

## 2022-12-28 ENCOUNTER — LAB VISIT (OUTPATIENT)
Dept: LAB | Facility: HOSPITAL | Age: 62
End: 2022-12-28
Attending: SURGERY
Payer: COMMERCIAL

## 2022-12-28 ENCOUNTER — PATIENT MESSAGE (OUTPATIENT)
Dept: SURGERY | Facility: CLINIC | Age: 62
End: 2022-12-28

## 2022-12-28 ENCOUNTER — DOCUMENTATION ONLY (OUTPATIENT)
Dept: HEMATOLOGY/ONCOLOGY | Facility: CLINIC | Age: 62
End: 2022-12-28
Payer: COMMERCIAL

## 2022-12-28 ENCOUNTER — DOCUMENTATION ONLY (OUTPATIENT)
Dept: SURGERY | Facility: CLINIC | Age: 62
End: 2022-12-28
Payer: COMMERCIAL

## 2022-12-28 ENCOUNTER — PATIENT MESSAGE (OUTPATIENT)
Dept: ADMINISTRATIVE | Facility: OTHER | Age: 62
End: 2022-12-28
Payer: COMMERCIAL

## 2022-12-28 ENCOUNTER — OFFICE VISIT (OUTPATIENT)
Dept: SURGERY | Facility: CLINIC | Age: 62
End: 2022-12-28
Payer: COMMERCIAL

## 2022-12-28 VITALS
SYSTOLIC BLOOD PRESSURE: 116 MMHG | TEMPERATURE: 98 F | HEIGHT: 62 IN | OXYGEN SATURATION: 97 % | WEIGHT: 178 LBS | HEART RATE: 89 BPM | DIASTOLIC BLOOD PRESSURE: 67 MMHG | BODY MASS INDEX: 32.76 KG/M2

## 2022-12-28 DIAGNOSIS — Z01.818 PRE-OP TESTING: ICD-10-CM

## 2022-12-28 DIAGNOSIS — C50.112 MALIGNANT NEOPLASM OF CENTRAL PORTION OF LEFT BREAST IN FEMALE, ESTROGEN RECEPTOR POSITIVE: Primary | ICD-10-CM

## 2022-12-28 DIAGNOSIS — C50.912 INVASIVE DUCTAL CARCINOMA OF BREAST, LEFT: ICD-10-CM

## 2022-12-28 DIAGNOSIS — C50.912 INVASIVE DUCTAL CARCINOMA OF BREAST, LEFT: Primary | ICD-10-CM

## 2022-12-28 DIAGNOSIS — Z17.0 MALIGNANT NEOPLASM OF CENTRAL PORTION OF LEFT BREAST IN FEMALE, ESTROGEN RECEPTOR POSITIVE: Primary | ICD-10-CM

## 2022-12-28 LAB
ALBUMIN SERPL BCP-MCNC: 3.7 G/DL (ref 3.5–5.2)
ALP SERPL-CCNC: 101 U/L (ref 55–135)
ALT SERPL W/O P-5'-P-CCNC: 24 U/L (ref 10–44)
ANION GAP SERPL CALC-SCNC: 9 MMOL/L (ref 8–16)
AST SERPL-CCNC: 21 U/L (ref 10–40)
BASOPHILS # BLD AUTO: 0.06 K/UL (ref 0–0.2)
BASOPHILS NFR BLD: 0.9 % (ref 0–1.9)
BILIRUB SERPL-MCNC: 1.4 MG/DL (ref 0.1–1)
BUN SERPL-MCNC: 13 MG/DL (ref 6–20)
CALCIUM SERPL-MCNC: 10.2 MG/DL (ref 8.7–10.5)
CHLORIDE SERPL-SCNC: 106 MMOL/L (ref 95–110)
CO2 SERPL-SCNC: 28 MMOL/L (ref 23–29)
CREAT SERPL-MCNC: 0.9 MG/DL (ref 0.5–1.4)
DIFFERENTIAL METHOD: ABNORMAL
EOSINOPHIL # BLD AUTO: 0.1 K/UL (ref 0–0.5)
EOSINOPHIL NFR BLD: 1.9 % (ref 0–8)
ERYTHROCYTE [DISTWIDTH] IN BLOOD BY AUTOMATED COUNT: 12.2 % (ref 11.5–14.5)
EST. GFR  (NO RACE VARIABLE): >60 ML/MIN/1.73 M^2
GLUCOSE SERPL-MCNC: 101 MG/DL (ref 70–110)
HCT VFR BLD AUTO: 47.4 % (ref 37–48.5)
HGB BLD-MCNC: 15 G/DL (ref 12–16)
IMM GRANULOCYTES # BLD AUTO: 0.02 K/UL (ref 0–0.04)
IMM GRANULOCYTES NFR BLD AUTO: 0.3 % (ref 0–0.5)
LYMPHOCYTES # BLD AUTO: 1.6 K/UL (ref 1–4.8)
LYMPHOCYTES NFR BLD: 23.7 % (ref 18–48)
MCH RBC QN AUTO: 29.7 PG (ref 27–31)
MCHC RBC AUTO-ENTMCNC: 31.6 G/DL (ref 32–36)
MCV RBC AUTO: 94 FL (ref 82–98)
MONOCYTES # BLD AUTO: 0.5 K/UL (ref 0.3–1)
MONOCYTES NFR BLD: 7.2 % (ref 4–15)
NEUTROPHILS # BLD AUTO: 4.4 K/UL (ref 1.8–7.7)
NEUTROPHILS NFR BLD: 66 % (ref 38–73)
NRBC BLD-RTO: 0 /100 WBC
PLATELET # BLD AUTO: 267 K/UL (ref 150–450)
PMV BLD AUTO: 10.1 FL (ref 9.2–12.9)
POTASSIUM SERPL-SCNC: 3.9 MMOL/L (ref 3.5–5.1)
PROT SERPL-MCNC: 7.1 G/DL (ref 6–8.4)
RBC # BLD AUTO: 5.05 M/UL (ref 4–5.4)
SODIUM SERPL-SCNC: 143 MMOL/L (ref 136–145)
WBC # BLD AUTO: 6.68 K/UL (ref 3.9–12.7)

## 2022-12-28 PROCEDURE — 1159F MED LIST DOCD IN RCRD: CPT | Mod: CPTII,S$GLB,, | Performed by: SURGERY

## 2022-12-28 PROCEDURE — 1159F PR MEDICATION LIST DOCUMENTED IN MEDICAL RECORD: ICD-10-PCS | Mod: CPTII,S$GLB,, | Performed by: SURGERY

## 2022-12-28 PROCEDURE — 99205 PR OFFICE/OUTPT VISIT, NEW, LEVL V, 60-74 MIN: ICD-10-PCS | Mod: S$GLB,,, | Performed by: SURGERY

## 2022-12-28 PROCEDURE — 99205 OFFICE O/P NEW HI 60 MIN: CPT | Mod: S$GLB,,, | Performed by: SURGERY

## 2022-12-28 PROCEDURE — 3008F BODY MASS INDEX DOCD: CPT | Mod: CPTII,S$GLB,, | Performed by: SURGERY

## 2022-12-28 PROCEDURE — 3078F PR MOST RECENT DIASTOLIC BLOOD PRESSURE < 80 MM HG: ICD-10-PCS | Mod: CPTII,S$GLB,, | Performed by: SURGERY

## 2022-12-28 PROCEDURE — 85025 COMPLETE CBC W/AUTO DIFF WBC: CPT | Performed by: SURGERY

## 2022-12-28 PROCEDURE — 3008F PR BODY MASS INDEX (BMI) DOCUMENTED: ICD-10-PCS | Mod: CPTII,S$GLB,, | Performed by: SURGERY

## 2022-12-28 PROCEDURE — 3074F PR MOST RECENT SYSTOLIC BLOOD PRESSURE < 130 MM HG: ICD-10-PCS | Mod: CPTII,S$GLB,, | Performed by: SURGERY

## 2022-12-28 PROCEDURE — 3044F PR MOST RECENT HEMOGLOBIN A1C LEVEL <7.0%: ICD-10-PCS | Mod: CPTII,S$GLB,, | Performed by: SURGERY

## 2022-12-28 PROCEDURE — 3078F DIAST BP <80 MM HG: CPT | Mod: CPTII,S$GLB,, | Performed by: SURGERY

## 2022-12-28 PROCEDURE — 3044F HG A1C LEVEL LT 7.0%: CPT | Mod: CPTII,S$GLB,, | Performed by: SURGERY

## 2022-12-28 PROCEDURE — 99999 PR PBB SHADOW E&M-EST. PATIENT-LVL IV: CPT | Mod: PBBFAC,,, | Performed by: SURGERY

## 2022-12-28 PROCEDURE — 1160F PR REVIEW ALL MEDS BY PRESCRIBER/CLIN PHARMACIST DOCUMENTED: ICD-10-PCS | Mod: CPTII,S$GLB,, | Performed by: SURGERY

## 2022-12-28 PROCEDURE — 80053 COMPREHEN METABOLIC PANEL: CPT | Performed by: SURGERY

## 2022-12-28 PROCEDURE — 99999 PR PBB SHADOW E&M-EST. PATIENT-LVL IV: ICD-10-PCS | Mod: PBBFAC,,, | Performed by: SURGERY

## 2022-12-28 PROCEDURE — 3074F SYST BP LT 130 MM HG: CPT | Mod: CPTII,S$GLB,, | Performed by: SURGERY

## 2022-12-28 PROCEDURE — 36415 COLL VENOUS BLD VENIPUNCTURE: CPT | Performed by: SURGERY

## 2022-12-28 PROCEDURE — 1160F RVW MEDS BY RX/DR IN RCRD: CPT | Mod: CPTII,S$GLB,, | Performed by: SURGERY

## 2022-12-28 NOTE — PROGRESS NOTES
Breast Surgery  [facility]  Department of Surgery      REFERRING PROVIDER: Hetal Banks MD  [address]   [address]    Chief Complaint: Breast Cancer (New Patient Left Breast Invasive Ductal Carcinoma 12/15/22 .)      Subjective:      Patient ID: Lucia Matias is a 62 y.o. female who presents with left breast Invasive Ductal Carcinoma.     She presented for screening mammogram on 22 for yearly scheduled screening. This identified Left breast focal asymmetry at the upper outer position. Follow-up mammogram and ultrasound on 22 showed a worrisome spiculated mass, 1.4 x 0.7 x 0.6 cm, 12 o'clock left breast 7 CMFN. An ultrasound guided biopsy was performed on 12/15/22 with pathology revealing infiltrating ductal carcinoma of the breast.     Findings at that time were the following:   Lesion 1:    Location: 12:00, 7cm FTN    Clip: twirl, in expected position  Tumor size: 1.4 cm   Tumor stgstrstastdstest:st st1st Estrogen Receptor: pos   Progesterone Receptor: pos   Her-2 isaiah: neg   Lymph node status: clinically negative     Patient does routinely do self breast exams.  Patient has not noted a change on breast exam.  Patient denies nipple discharge. Patient denies previous breast biopsy. Patient denies a personal history of breast cancer. Family history is unknown as she was adopted.    GYN History:  Age of menarche was 13. Age of menopause was 46.  Patient denies hormonal therapy but took OCP for approximately 15 years in the past. Patient is . Age of first live birth was 25. Patient did breast feed for 6 months.    Past Medical History:   Diagnosis Date    Allergy     Anxiety     Arthritis     Colon polyps     COPD (chronic obstructive pulmonary disease)     COPD exacerbation 10/31/2022    Depression     Diverticular disease of colon 2017    Diverticulitis     HLD (hyperlipidemia)     Hypertension     Pancreatitis     Psoriasis     Rheumatoid arthritis      Severe obesity (BMI 35.0-39.9) with comorbidity      Past Surgical History:   Procedure Laterality Date    ADENOIDECTOMY  1966    Tonsillitis and adnoids    BLADDER SUSPENSION      (x2)     SECTION      (x2)    ESOPHAGOGASTRODUODENOSCOPY N/A 2021    Procedure: EGD (ESOPHAGOGASTRODUODENOSCOPY);  Surgeon: Vince Rodriguez MD;  Location: [address];  Service: General;  Laterality: N/A;    LAPAROSCOPIC SLEEVE GASTRECTOMY N/A 2021    Procedure: GASTRECTOMY, SLEEVE, LAPAROSCOPIC, with intraop EGD;  Surgeon: Vince Rodriguez MD;  Location: [address];  Service: General;  Laterality: N/A;    LUNG BIOPSY  2020    RHINOPLASTY      TONSILLECTOMY      TRANSESOPHAGEAL ECHOCARDIOGRAPHY N/A 2022    Procedure: ECHOCARDIOGRAM, TRANSESOPHAGEAL;  Surgeon: Kellie Grover MD;  Location: [facility] EP LAB;  Service: Cardiology;  Laterality: N/A;     Current Outpatient Medications on File Prior to Visit   Medication Sig Dispense Refill    ALPRAZolam (XANAX) 0.5 MG tablet Take 1 tablet (0.5 mg total) by mouth daily as needed for Anxiety. 10 tablet 0    apixaban (ELIQUIS) 5 mg Tab Take 1 tablet (5 mg total) by mouth 2 (two) times daily. 60 tablet 5    atorvastatin (LIPITOR) 20 MG tablet Take 1 tablet (20 mg total) by mouth every evening. 90 tablet 3    B-complex with vitamin C (VITAMIN B COMPLEX-C ORAL) Take by mouth.      calcium-vitamin D 250-100 mg-unit per tablet Take 1 tablet by mouth 2 (two) times daily.      clonazePAM (KLONOPIN) 1 MG tablet Take 1 tablet (1 mg total) by mouth 2 (two) times daily as needed for Anxiety (panic). 60 tablet 2    COSENTYX PEN, 2 PENS, 150 mg/mL PnIj Inject 300mg (2 pens) into the skin every 4 weeks 2 mL 11    cyanocobalamin 500 MCG tablet Take 500 mcg by mouth once daily.      fluticasone-umeclidin-vilanter (TRELEGY ELLIPTA) 100-62.5-25 mcg DsDv Inhale 1 puff into the lungs once daily. 60 each 3    multivitamin (THERAGRAN) per tablet Take 1 tablet by mouth  once daily.      omeprazole (PRILOSEC) 40 MG capsule Take 1 capsule (40 mg total) by mouth every morning. 90 capsule 1    zolpidem (AMBIEN) 5 MG Tab Take 1 tablet (5 mg total) by mouth nightly as needed (insomnia). 30 tablet 2    [DISCONTINUED] budesonide-formoterol 160-4.5 mcg (SYMBICORT) 160-4.5 mcg/actuation HFAA Inhale 2 puffs into the lungs every 12 (twelve) hours. Controller 30.6 g 2     No current facility-administered medications on file prior to visit.     Social History     Socioeconomic History    Marital status:    Occupational History    Occupation: clinical research coordinator    Tobacco Use    Smoking status: Every Day     Packs/day: 0.00     Years: 20.00     Pack years: 0.00     Types: Cigarettes     Start date: 1979     Last attempt to quit: 2020     Years since quittin.0    Smokeless tobacco: Current   Substance and Sexual Activity    Alcohol use: Yes     Alcohol/week: 1.0 standard drink     Types: 1 Glasses of wine per week    Drug use: No    Sexual activity: Yes     Partners: Male     Birth control/protection: Post-menopausal   Other Topics Concern    Are you pregnant or think you may be? No    Breast-feeding No     Social Determinants of Health     Financial Resource Strain: Low Risk     Difficulty of Paying Living Expenses: Not very hard   Food Insecurity: No Food Insecurity    Worried About Running Out of Food in the Last Year: Never true    Ran Out of Food in the Last Year: Never true   Transportation Needs: No Transportation Needs    Lack of Transportation (Medical): No    Lack of Transportation (Non-Medical): No   Physical Activity: Inactive    Days of Exercise per Week: 0 days    Minutes of Exercise per Session: 0 min   Stress: Stress Concern Present    Feeling of Stress : Very much   Social Connections: Unknown    Frequency of Communication with Friends and Family: More than three times a week    Frequency of Social Gatherings with Friends and Family: Once a week     "Active Member of Clubs or Organizations: Yes    Attends Club or Organization Meetings: Patient refused    Marital Status:    Housing Stability: High Risk    Unable to Pay for Housing in the Last Year: No    Number of Places Lived in the Last Year: 4    Unstable Housing in the Last Year: No     Family History   Adopted: Yes   Problem Relation Age of Onset    No Known Problems Daughter     No Known Problems Son     No Known Problems Daughter         Review of Systems   All other systems reviewed and are negative.  Objective:   /67 (BP Location: Left arm, Patient Position: Sitting, BP Method: Large (Automatic))   Pulse 89   Temp 97.8 °F (36.6 °C) (Oral)   Ht 5' 2" (1.575 m)   Wt 80.7 kg (178 lb)   SpO2 97%   BMI 32.56 kg/m²     Physical Exam   Vitals reviewed.  Constitutional: She is oriented to person, place, and time.   Cardiovascular:  Normal rate.      Exam reveals no gallop.       No murmur heard.  Pulmonary/Chest: Effort normal. No respiratory distress. She has no wheezes. Right breast exhibits no inverted nipple, no mass, no nipple discharge, no skin change and no tenderness. Left breast exhibits no inverted nipple, no mass, no nipple discharge, no skin change and no tenderness.       Abdominal: Normal appearance.   Lymphadenopathy:     She has no cervical adenopathy.        Right cervical: No superficial cervical adenopathy present.       Left cervical: No superficial cervical adenopathy present.        Right: No supraclavicular adenopathy present.        Left: No supraclavicular adenopathy present.   Neurological: She is alert and oriented to person, place, and time.   Skin: Skin is warm and dry.     Psychiatric: Her behavior is normal. Mood, judgment and thought content normal.     Radiology review: Images personally reviewed by me in the clinic and shown to the patient during the consultation.     11/21/22  Result:   Mammo Digital Screening Bilat w/ Gil     History:  Patient is 62 y.o. " and is seen for a screening mammogram.     Films Compared:  Prior images (if available) were compared.     Findings:  This procedure was performed using tomosynthesis. Computer-aided detection was utilized in the interpretation of this examination.  The breasts are almost entirely fatty.      Left  There is a focal asymmetry seen in the upper outer quadrant of the left breast, 8 cm from the nipple.      Right  There is no evidence of suspicious masses, calcifications, or other abnormal findings in the right breast.     Impression:  Left  Focal Asymmetry: Left breast focal asymmetry at the upper outer position. Assessment: 0 - Incomplete. Diagnostic Mammogram and/or Ultrasound is recommended.      Right  There is no mammographic evidence of malignancy in the right breast.     BI-RADS Category:   Overall: 0 - Incomplete: Needs Additional Imaging Evaluation    12/13/22  Result:   US Breast Left Limited     History:  Patient is 62 y.o. and is seen for diagnostic imaging.     Films Compared:  Prior images (if available) were compared.     Findings:      Targeted left breast ultrasound 12 o'clock 7 cm from the nipple reveals a worrisome solid mass with irregular spiculated margins, 1.4 x 0.7 x 0.6 cm. No color doppler flow. This represents the mammographic finding.      Impression:  Worrisome spiculated mass, 1.4 x 0.7 x 0.6 cm, 12 o'clock left breast 7 CMFN.         BI-RADS Category:   Overall: 5 - Highly Suggestive of Malignancy    Assessment:       1. Malignant neoplasm of central portion of left breast in female, estrogen receptor positive    2. Pre-op testing        Plan:   left breast, Clinical Stage IA (T1N0M0), invasive ductal carcinoma of the Central portion of breast, estrogen receptor Positive, progesterone receptor Positive, HER2 Negative    Multidisciplinary nature of breast cancer care was discussed in detail at today's visit.    Smoking cessation was discussed with the patient.  We discussed importance of  smoking cessation for surgery.  Smoking affects but the skin healing ability and she would be at high risk of wound complications if she continues to smoke.  The plastic surgeon would likely limit her reconstructive options in the setting of active tobacco use.  The use of nicotine has an impact on wound healing in the us the use of nicotine products for smoking cessation is not recommended.    We discussed that surgical options would include a lumpectomy versus a mastectomy.  We discussed there is no survival benefit to undergoing a mastectomy compared to lumpectomy.  Based on clinical exam and imaging, she would be a good candidate for breast conservation.  Surgical technique and rationale was discussed with the patient.  We discussed that this would be done as a reflector localized excision of the primary tumor along with a surrounding margin of normal breast tissue.  The concept of local control was explained to the patient.  She understands that we would aim to achieve negative margins at the time of initial surgery.  There is however an approximately 15% risk of a positive margin that would require take back for reexcision.  Risks and benefits of the procedure were discussed in detail.  Risks include but are not limited to infection, bleeding, poor cosmesis, positive margins requiring reexcision, need for additional surgery, and local and distant recurrence.     We discussed the option for mastectomy.  We discussed the risks and benefits of mastectomy. Risks include but are not limited to risk of bleeding, infection, poor cosmesis, need for additional surgery, clip placement, scaring, pain including phantom pain, fluid collections, shoulder stiffness, prolonged healing, and recurrence. Reconstruction after mastectomy was discussed.  Reconstructive generally include implant or autologous tissue reconstruction. There are certain health qualifications that the patient must meet in order to be a candidate for  reconstruction.  Due to her history of tobacco use she would not currently be a candidate for breast reconstruction.  She reports she is willing to quit smoking today and will not do so in the future.  She was told the importance of avoiding all nicotine products while pursuing smoking cessation.  We discussed that if she is unable to meet these goals or the plastic surgeon does not deem her a good candidate for reconstruction then she could be considered for delayed reconstruction.  We discussed that most patients require tissue expander placement at the time of her initial breast surgery with a 2nd surgery for final reconstruction.  Reconstruction often requires multiple operations for the desired cosmetic result.  She strongly desires bilateral mastectomy with reconstruction.  She would prefer autologous tissue based reconstruction. We discussed that this may not be an option for her given her tobacco use.  However due to her desire for the surgery and willingness to quit smoking,  I will facilitate consultation with Plastic surgery to further discuss her options for reconstruction.    We discussed surgical incision would be based on both oncologic and cosmetic concerns. Based on the location of her tumor she is  a candidate for nipple sparing approach. The plastics surgeon will also evaluate her breast shape and size to determine if it be cosmetically acceptable to spare the nipple.  However due to the large degree of ptosis in her breast she most likely will not be a good candidate for nipple sparing approach.  Local recurrence at the nipple is about 3%. There is about a 5% risk of necrosis or ischemia requiring reoperation and a 10% risk of epidermolysis not requiring reoperation. We discussed the option for contralateral prophylactic mastectomy.  Undergoing a contralateral prophylactic mastectomy does not improve the cure rate for the known cancer or reduce the risk of the that cancer returning.  Overall most  women diagnosed with breast cancer have a 2-6% risk of developing a breast cancer in the opposite breast in the next 10 years.  Contralateral prophylactic mastectomy is not 100% protected against development of a new breast cancer.  Undergoing surgery on the contralateral breast has the risk of surgical site complications which could delay cancer treatments.  Most women who proceed with contralateral prophylactic mastectomy do so due to symmetry concerns or for peace of mind.  Based on our discussion she would like to proceed with contralateral mastectomy if reconstruction is an option.    We also discussed axillary staging using sentinel node biopsy.  Rector lymph node biopsies performed utilizing the injection of blue and radioactive dye.  This dye travels to the 1st few lymph nodes that drain the breast.  Lymph nodes that uptake the blue or radioactive dye or are palpable are surgically removed and sent to pathology.  Typically 1-5 lymph nodes are removed during this procedure although exact numbers vary depending on the patient.  This procedure allows sampling of the lymph nodes most at risk for metastasis.  The risks and benefits of the procedure discussed the patient.  Risks include but are not limited to lymphedema, bleeding, infection, poor cosmesis, numbness of the incision site, seroma, failure of dye to map, nerve or vascular injury leading to permanent arm numbness and or muscle weakness, possibility of a false negative finding, blood clots and need for additional surgery.  If metastatic disease is identified on pathology of the lymph nodes been axillary dissection (a second surgery) may be recommended.  Side effects of the blue dye which include: discoloration of the urine, stool and breast and a small risk of anaphylaxis.      Comordities include current tobacco smoking, COPD and obesity with a BMI of 32. Based on her medical history she is at a moderate risk of complications in particular wound  complications. Materials utilized in the surgery include surgical metal clips, suture material, and skin adhesives. These materials can lead to allergic reactions, skin irration, and other complications. Medications utilized in surgery include but are not limited to antibiotics, isosulfan blue dye, and technetium sulfa colloid.  Given the patient's allergy history there is no expected allergic reaction.     The role of adjuvant radiation therapy following breast conservation surgery was also discussed with the patient. We discussed that when looking at all patient populations risk of local recurrence with lumpectomy alone is high and radiation therapy is recommended in most cases.  Radiation is only sometimes utilized after mastectomy when the cancer is larger or there is lymph node involvement.  We discussed that final recommendations on radiation would be based on final surgical pathology. The duration and treatment side effects were discussed with  the patient.  She will be referred to Radiation Oncology after surgery if indicated.    We also discussed the role of systemic therapy in the treatment of early stage breast cancer. We discussed that this is based on tumor biology and shane status and will be determined based on final pathology.  She has an early stage hormone receptor positive tumor. I do not anticipate she will need chemotherapy although final recommendations are pending surgical pathology.  Due to the tumor size she will likely require Oncotype DX testing after surgery.  She will be recommended for anti-estrogen therapy. We discussed that if the cancer is hormone positive, endocrine therapy would be recommended in most cases and its use can reduce the risk of recurrence as well as improve survival. Side effects of treatment were briefly discussed. She'll be referred to medical oncology post-operatively for further discussion of her options.     We discussed option for genetic testing she would like  to proceed.    Following her discussion today, Lucia Matias is most interested in bilateral mastectomy with left axillary sentinel lymph node biopsy however desires autologous tissue based reconstruction.  We discussed at length that the she may not be a candidate for this type of reconstruction immediately due to her tobacco use.  She is also considering left EDDA localized lumpectomy with left axillary sentinel lymph node biopsy as an alternative.  She does not desire to proceed with bilateral mastectomy without reconstruction even if delayed reconstruction is planned.  To aid in her decision we will have her meet with Plastic surgery to discuss reconstructive options further and to determine what qualifications she would need to meet in order to get the type of reconstruction she desires.     Surgery will be scheduled. Follow-up in clinic roughly 14 days after surgery.      [facility].  All her questions were answered.    Total time spent with the patient: 60 minutes.  55 minutes of face to face consultation and 5 minutes of chart review and coordination of care.

## 2022-12-28 NOTE — PROGRESS NOTES
Genetics Lay Navigator Note:    Nurse Navigator : LEEROY Quan RN    Met with patient at her consult with Dr. Chung (12/28/2022), to facilitate genetic testing. Set patient up with [employer] genetic counselor over the phone to complete counseling prior to testing. Patient verbalized understanding to all counseling information. [employer] brochure with number to call with questions or concerns provided to patient as well as my card. Encouraged patient to call me or [employer] at any time.     Lab appointment made and patient escorted with [employer] kit to lab for specimen draw and processing. Patient instructed that results will be provided as soon as they are available. No questions or concerns from patient about plan of care.     [employer] Genetic Pedigree scanned in media and attached to documentation note.    [employer] Tracking # [id number]

## 2022-12-28 NOTE — PROGRESS NOTES
Nurse Navigator Note:     Met with patient during her consult with Dr. Chung.  Patient and I reviewed the information she discussed with Dr. Chung, including treatment options, diagnosis, and future plans for workup. Patient and I went through the new patient binder, explained some of the information and why it is provided.     Also offered patient consults with our other specialty clinics: Dr. Bowman for Integrative Therapies, Survivorship and/or Women's Gynecologic needs, our breast physical therapy department for pre-op and post-operative assessments, Oncologic Psychology for psychological support, and Oncologic Nutrition for nutritional counseling. Explained to patient that all of these support services are completely optional. Discussed that physical therapy may call patient to offer pre-op appt, and what that appt would entail.     Patient was given a copy of her appointments, Dr. Chung's card, and my card. Encouraged her to call me if she has any questions or concerns or would like to schedule any additional appointments. Verbalized understanding of all information.

## 2022-12-29 PROBLEM — Z17.0 MALIGNANT NEOPLASM OF CENTRAL PORTION OF LEFT BREAST IN FEMALE, ESTROGEN RECEPTOR POSITIVE: Status: ACTIVE | Noted: 2022-12-29

## 2022-12-29 PROBLEM — C50.112 MALIGNANT NEOPLASM OF CENTRAL PORTION OF LEFT BREAST IN FEMALE, ESTROGEN RECEPTOR POSITIVE: Status: ACTIVE | Noted: 2022-12-29

## 2023-01-03 ENCOUNTER — PATIENT MESSAGE (OUTPATIENT)
Dept: PSYCHIATRY | Facility: CLINIC | Age: 63
End: 2023-01-03
Payer: COMMERCIAL

## 2023-01-03 ENCOUNTER — TELEPHONE (OUTPATIENT)
Dept: SURGERY | Facility: CLINIC | Age: 63
End: 2023-01-03
Payer: COMMERCIAL

## 2023-01-03 ENCOUNTER — PATIENT MESSAGE (OUTPATIENT)
Dept: INTERNAL MEDICINE | Facility: CLINIC | Age: 63
End: 2023-01-03
Payer: COMMERCIAL

## 2023-01-03 RX ORDER — ALPRAZOLAM 0.5 MG/1
0.5 TABLET ORAL DAILY PRN
Qty: 30 TABLET | Refills: 0 | Status: SHIPPED | OUTPATIENT
Start: 2023-01-03 | End: 2023-02-09

## 2023-01-05 ENCOUNTER — OFFICE VISIT (OUTPATIENT)
Dept: SURGERY | Facility: CLINIC | Age: 63
End: 2023-01-05
Payer: COMMERCIAL

## 2023-01-05 ENCOUNTER — SPECIALTY PHARMACY (OUTPATIENT)
Dept: PHARMACY | Facility: CLINIC | Age: 63
End: 2023-01-05
Payer: COMMERCIAL

## 2023-01-05 ENCOUNTER — PATIENT MESSAGE (OUTPATIENT)
Dept: SURGERY | Facility: CLINIC | Age: 63
End: 2023-01-05

## 2023-01-05 VITALS
BODY MASS INDEX: 32.76 KG/M2 | DIASTOLIC BLOOD PRESSURE: 57 MMHG | WEIGHT: 178 LBS | HEIGHT: 62 IN | TEMPERATURE: 98 F | OXYGEN SATURATION: 98 % | HEART RATE: 62 BPM | SYSTOLIC BLOOD PRESSURE: 115 MMHG

## 2023-01-05 DIAGNOSIS — Z01.818 PRE-OP TESTING: ICD-10-CM

## 2023-01-05 DIAGNOSIS — C50.112 MALIGNANT NEOPLASM OF CENTRAL PORTION OF LEFT BREAST IN FEMALE, ESTROGEN RECEPTOR POSITIVE: Primary | ICD-10-CM

## 2023-01-05 DIAGNOSIS — Z17.0 MALIGNANT NEOPLASM OF CENTRAL PORTION OF LEFT BREAST IN FEMALE, ESTROGEN RECEPTOR POSITIVE: Primary | ICD-10-CM

## 2023-01-05 LAB
FINAL PATHOLOGIC DIAGNOSIS: NORMAL
GROSS: NORMAL
Lab: NORMAL

## 2023-01-05 PROCEDURE — 99215 OFFICE O/P EST HI 40 MIN: CPT | Mod: S$GLB,,, | Performed by: SURGERY

## 2023-01-05 PROCEDURE — 99999 PR PBB SHADOW E&M-EST. PATIENT-LVL IV: CPT | Mod: PBBFAC,,, | Performed by: SURGERY

## 2023-01-05 PROCEDURE — 3074F SYST BP LT 130 MM HG: CPT | Mod: CPTII,S$GLB,, | Performed by: SURGERY

## 2023-01-05 PROCEDURE — 99215 PR OFFICE/OUTPT VISIT, EST, LEVL V, 40-54 MIN: ICD-10-PCS | Mod: S$GLB,,, | Performed by: SURGERY

## 2023-01-05 PROCEDURE — 3008F BODY MASS INDEX DOCD: CPT | Mod: CPTII,S$GLB,, | Performed by: SURGERY

## 2023-01-05 PROCEDURE — 3008F PR BODY MASS INDEX (BMI) DOCUMENTED: ICD-10-PCS | Mod: CPTII,S$GLB,, | Performed by: SURGERY

## 2023-01-05 PROCEDURE — 99999 PR PBB SHADOW E&M-EST. PATIENT-LVL IV: ICD-10-PCS | Mod: PBBFAC,,, | Performed by: SURGERY

## 2023-01-05 PROCEDURE — 3074F PR MOST RECENT SYSTOLIC BLOOD PRESSURE < 130 MM HG: ICD-10-PCS | Mod: CPTII,S$GLB,, | Performed by: SURGERY

## 2023-01-05 PROCEDURE — 3078F PR MOST RECENT DIASTOLIC BLOOD PRESSURE < 80 MM HG: ICD-10-PCS | Mod: CPTII,S$GLB,, | Performed by: SURGERY

## 2023-01-05 PROCEDURE — 3078F DIAST BP <80 MM HG: CPT | Mod: CPTII,S$GLB,, | Performed by: SURGERY

## 2023-01-05 NOTE — PROGRESS NOTES
Breast Surgery  [facility]  Department of Surgery      REFERRING PROVIDER: Aaareferral Self  No address on file    Chief Complaint: Breast Cancer (New Patient 2nd Opinion Left Breast Invasive Ductal Carcinoma 12/15/22 .)      Subjective:      Patient ID: Lucia Matias is a 62 y.o. female who presents with left breast Invasive Ductal Carcinoma.      She presented for screening mammogram on 22 for yearly scheduled screening. This identified Left breast focal asymmetry at the upper outer position. Follow-up mammogram and ultrasound on 22 showed a worrisome spiculated mass, 1.4 x 0.7 x 0.6 cm, 12 o'clock left breast 7 CMFN. An ultrasound guided biopsy was performed on 12/15/22 with pathology revealing infiltrating ductal carcinoma of the breast.      Findings at that time were the following:   Lesion 1:               Location: 12:00, 7cm FTN               Clip: twirl, in expected position  Tumor size: 1.4 cm   Tumor stgstrstastdstest:st st1st Estrogen Receptor: pos   Progesterone Receptor: pos   Her-2 isaiah: neg   Lymph node status: clinically negative      Patient does routinely do self breast exams.  Patient has not noted a change on breast exam.  Patient denies nipple discharge. Patient denies previous breast biopsy. Patient denies a personal history of breast cancer. Family history is unknown as she was adopted.     GYN History:  Age of menarche was 13. Age of menopause was 46.  Patient denies hormonal therapy but took OCP for approximately 15 years in the past. Patient is . Age of first live birth was 25. Patient did breast feed for 6 months.    Past Medical History:   Diagnosis Date    Allergy     Anxiety     Arthritis     Colon polyps     COPD (chronic obstructive pulmonary disease)     COPD exacerbation 10/31/2022    Depression     Diverticular disease of colon 2017    Diverticulitis     HLD (hyperlipidemia)     Hypertension     Malignant neoplasm of central portion of left breast in female,  estrogen receptor positive 2022    Pancreatitis     Psoriasis     Rheumatoid arthritis     Severe obesity (BMI 35.0-39.9) with comorbidity      Past Surgical History:   Procedure Laterality Date    ADENOIDECTOMY  1966    Tonsillitis and adnoids    BLADDER SUSPENSION      (x2)     SECTION      (x2)    ESOPHAGOGASTRODUODENOSCOPY N/A 2021    Procedure: EGD (ESOPHAGOGASTRODUODENOSCOPY);  Surgeon: Vince Rodriguez MD;  Location: [facility] OR [address] [place] [address];  Service: General;  Laterality: N/A;    LAPAROSCOPIC SLEEVE GASTRECTOMY N/A 2021    Procedure: GASTRECTOMY, SLEEVE, LAPAROSCOPIC, with intraop EGD;  Surgeon: Vince Rodriguez MD;  Location: [facility] OR [address] [place] [address];  Service: General;  Laterality: N/A;    LUNG BIOPSY  2020    RHINOPLASTY      TONSILLECTOMY      TRANSESOPHAGEAL ECHOCARDIOGRAPHY N/A 2022    Procedure: ECHOCARDIOGRAM, TRANSESOPHAGEAL;  Surgeon: Kellie Grover MD;  Location: [facility] EP LAB;  Service: Cardiology;  Laterality: N/A;     Current Outpatient Medications on File Prior to Visit   Medication Sig Dispense Refill    ALPRAZolam (XANAX) 0.5 MG tablet Take 1 tablet (0.5 mg total) by mouth daily as needed for Anxiety. 30 tablet 0    apixaban (ELIQUIS) 5 mg Tab Take 1 tablet (5 mg total) by mouth 2 (two) times daily. 60 tablet 5    atorvastatin (LIPITOR) 20 MG tablet Take 1 tablet (20 mg total) by mouth every evening. 90 tablet 3    B-complex with vitamin C (VITAMIN B COMPLEX-C ORAL) Take by mouth.      calcium-vitamin D 250-100 mg-unit per tablet Take 1 tablet by mouth 2 (two) times daily.      clonazePAM (KLONOPIN) 1 MG tablet Take 1 tablet (1 mg total) by mouth 2 (two) times daily as needed for Anxiety (panic). 60 tablet 2    COSENTYX PEN, 2 PENS, 150 mg/mL PnIj Inject 300mg (2 pens) into the skin every 4 weeks 2 mL 11    cyanocobalamin 500 MCG tablet Take 500 mcg by mouth once daily.      fluticasone-umeclidin-vilanter (TRELEGY ELLIPTA) 100-62.5-25 mcg DsDv Inhale 1 puff  into the lungs once daily. 60 each 3    multivitamin (THERAGRAN) per tablet Take 1 tablet by mouth once daily.      omeprazole (PRILOSEC) 40 MG capsule Take 1 capsule (40 mg total) by mouth every morning. 90 capsule 1    zolpidem (AMBIEN) 5 MG Tab Take 1 tablet (5 mg total) by mouth nightly as needed (insomnia). 30 tablet 2    [DISCONTINUED] budesonide-formoterol 160-4.5 mcg (SYMBICORT) 160-4.5 mcg/actuation HFAA Inhale 2 puffs into the lungs every 12 (twelve) hours. Controller 30.6 g 2     No current facility-administered medications on file prior to visit.     Social History     Socioeconomic History    Marital status:    Occupational History    Occupation: clinical research coordinator    Tobacco Use    Smoking status: Every Day     Packs/day: 0.00     Years: 20.00     Pack years: 0.00     Types: Cigarettes     Start date: 1979     Last attempt to quit: 2020     Years since quittin.0    Smokeless tobacco: Current   Substance and Sexual Activity    Alcohol use: Yes     Alcohol/week: 1.0 standard drink     Types: 1 Glasses of wine per week    Drug use: No    Sexual activity: Yes     Partners: Male     Birth control/protection: Post-menopausal   Other Topics Concern    Are you pregnant or think you may be? No    Breast-feeding No     Social Determinants of Health     Financial Resource Strain: Low Risk     Difficulty of Paying Living Expenses: Not very hard   Food Insecurity: No Food Insecurity    Worried About Running Out of Food in the Last Year: Never true    Ran Out of Food in the Last Year: Never true   Transportation Needs: No Transportation Needs    Lack of Transportation (Medical): No    Lack of Transportation (Non-Medical): No   Physical Activity: Inactive    Days of Exercise per Week: 0 days    Minutes of Exercise per Session: 0 min   Stress: Stress Concern Present    Feeling of Stress : Very much   Social Connections: Unknown    Frequency of Communication with Friends and Family: More  "than three times a week    Frequency of Social Gatherings with Friends and Family: Once a week    Active Member of Clubs or Organizations: Yes    Attends Club or Organization Meetings: Patient refused    Marital Status:    Housing Stability: High Risk    Unable to Pay for Housing in the Last Year: No    Number of Places Lived in the Last Year: 4    Unstable Housing in the Last Year: No     Family History   Adopted: Yes   Problem Relation Age of Onset    No Known Problems Daughter     No Known Problems Son     No Known Problems Daughter         Review of Systems   Constitutional:  Negative for appetite change, chills, fever and unexpected weight change.   HENT:  Negative for facial swelling, postnasal drip and sore throat.    Eyes:  Negative for redness and itching.   Respiratory:  Negative for chest tightness and shortness of breath.    Cardiovascular:  Negative for chest pain and palpitations.   Gastrointestinal:  Negative for blood in stool, diarrhea, nausea and vomiting.   Genitourinary:  Negative for difficulty urinating and dysuria.   Musculoskeletal:  Negative for arthralgias and joint swelling.   Skin:  Negative for rash and wound.   Neurological:  Negative for dizziness and syncope.   Hematological:  Negative for adenopathy.   Psychiatric/Behavioral:  Negative for agitation. The patient is not nervous/anxious.    Objective:   BP (!) 115/57 (BP Location: Left arm, Patient Position: Sitting, BP Method: Large (Automatic))   Pulse 62   Temp 98.2 °F (36.8 °C) (Oral)   Ht 5' 2" (1.575 m)   Wt 80.7 kg (178 lb)   SpO2 98%   BMI 32.56 kg/m²     Physical Exam   Vitals reviewed.  Constitutional: She is oriented to person, place, and time.   HENT:   Head: Normocephalic and atraumatic.   Nose: Nose normal.   Eyes: Pupils are equal, round, and reactive to light. Right eye exhibits no discharge. Left eye exhibits no discharge.   Pulmonary/Chest: Effort normal and breath sounds normal. No stridor. No " respiratory distress. She exhibits no mass, no tenderness and no edema. Right breast exhibits no inverted nipple, no mass, no nipple discharge, no skin change and no tenderness. Left breast exhibits no inverted nipple, no mass, no nipple discharge, no skin change and no tenderness. No breast swelling or bleeding. Breasts are symmetrical.   Abdominal: Normal appearance.   Genitourinary: No breast swelling or bleeding.   Neurological: She is alert and oriented to person, place, and time.   Skin: Skin is warm and dry.     Psychiatric: Her behavior is normal. Mood, judgment and thought content normal.     Radiology review: Images personally reviewed by me in the clinic.     11/21/22 Bilateral Screening Mammo:    Findings:  This procedure was performed using tomosynthesis. Computer-aided detection was utilized in the interpretation of this examination.  The breasts are almost entirely fatty.      Left  There is a focal asymmetry seen in the upper outer quadrant of the left breast, 8 cm from the nipple.      Right  There is no evidence of suspicious masses, calcifications, or other abnormal findings in the right breast.     Impression:  Left  Focal Asymmetry: Left breast focal asymmetry at the upper outer position. Assessment: 0 - Incomplete. Diagnostic Mammogram and/or Ultrasound is recommended.      Right  There is no mammographic evidence of malignancy in the right breast.     BI-RADS Category:   Overall: 0 - Incomplete: Needs Additional Imaging Evaluation        Recommendation:  Diagnostic mammogram with possible ultrasound (if indicated) is recommended.    12/13/22 Left Diagnostic Mammo/US:    Findings:  This procedure was performed using tomosynthesis. Computer-aided detection was utilized in the interpretation of this examination.  Left  The left breast is almost entirely fatty.     There is an irregularly shaped mass with spiculated margins seen in the left breast at 12 o'clock in the middle depth on the MLO and  CC views.       Targeted left breast ultrasound 12 o'clock 7 cm from the nipple reveals a worrisome solid mass with irregular spiculated margins, 1.4 x 0.7 x 0.6 cm. No color doppler flow. This represents the mammographic finding.         Impression:  Worrisome spiculated mass, 1.4 x 0.7 x 0.6 cm, 12 o'clock left breast 7 CMFN.         BI-RADS Category:   Overall: 5 - Highly Suggestive of Malignancy        Recommendation:  Biopsy is recommended.      Assessment:       1. Malignant neoplasm of central portion of left breast in female, estrogen receptor positive    2. Pre-op testing        Plan:     Options for management were discussed with the patient and her family. We reviewed the existing data noting the equivalency of breast conserving surgery with radiation therapy and mastectomy. We also reviewed the guidelines of the National Comprehensive Cancer Network for Stage Ia carcinoma. We discussed the need for lumpectomy margins to be negative for carcinoma, the necessity for postoperative radiation therapy after breast conservation in most cases, the possibility of a failed or false negative sentinel lymph node biopsy and the potential need for complete lymphadenectomy for a failed or positive sentinel lymph node biopsy were fully discussed. In the setting of mastectomy, delayed or immediate reconstruction options are available and were discussed.     In the setting of lumpectomy, radiation therapy would be recommended majority of the time.  The duration and treatment side effects were discussed with the patient.  This will coordinated with the radiation oncologist pending final pathology.    We also discussed the role of systemic therapy in the treatment of early stage breast cancer.  We discussed that this is based on tumor biology and shane status and will be determined based on final pathology.  We discussed that if the cancer is hormone positive, endocrine therapy would be recommended in most cases and its use  can reduce the risk of recurrence as well as improve survival. Side effects of treatment were briefly discussed. We also discussed the potential role for chemotherapy based on a number of factors such as tumor phenotype (ER+ vs. triple negative vs. Srt0iyh+) and this would be determined in coordination with the medical oncologist.    Upon further review of pathology, suspect invasive lobular carcinoma instead of originally stated invasive ductal carcinoma due to negative e-cadherin.  Due to the possibility of undetected cancer on mammography, further imaging with MRI with contrast was recommended.  Following imaging results and plastic surgery recommendations, we will establish a surgical plan at her follow-up visit.  Patient expressed understanding and was amenable to the plan.       Follow-up for surgical planning will be scheduled after MRI.   She would like a bilateral mastectomy with NEHA, but understands she is high risk for complications due to smoking history (although she says she quit on 12/28).  Referral to plastic surgery.    Patient was educated on breast cancer, receptors, wire localization lumpectomy, mastectomy, sentinel lymph node mapping and biopsy, axillary lymph node dissection, reconstruction, breast prosthesis with post-mastectomy bra and radiation therapy. Patient was given patient information [facility].  All her questions were answered.    Total time spent with the patient: 60 minutes.  40 minutes of face to face consultation and 20 minutes of chart review and coordination of care.

## 2023-01-05 NOTE — TELEPHONE ENCOUNTER
Outgoing call regarding Cosentyx refill. PT stated that she is due to inject on 1/15/23. Pt stated that she was just diagnosed with Carcinoma in her right breast and is unsure what her next step is going to be. PT asked for a call back at a later time. Routing to assigned pharmacist.

## 2023-01-06 ENCOUNTER — DOCUMENTATION ONLY (OUTPATIENT)
Dept: HEMATOLOGY/ONCOLOGY | Facility: CLINIC | Age: 63
End: 2023-01-06
Payer: COMMERCIAL

## 2023-01-06 ENCOUNTER — SURGICAL CONSULT (OUTPATIENT)
Dept: PLASTIC SURGERY | Facility: CLINIC | Age: 63
End: 2023-01-06
Attending: PLASTIC SURGERY
Payer: COMMERCIAL

## 2023-01-06 VITALS — HEART RATE: 70 BPM | SYSTOLIC BLOOD PRESSURE: 130 MMHG | DIASTOLIC BLOOD PRESSURE: 58 MMHG

## 2023-01-06 DIAGNOSIS — C50.912 MALIGNANT NEOPLASM OF LEFT FEMALE BREAST, UNSPECIFIED ESTROGEN RECEPTOR STATUS, UNSPECIFIED SITE OF BREAST: Primary | ICD-10-CM

## 2023-01-06 PROCEDURE — 99202 OFFICE O/P NEW SF 15 MIN: CPT | Mod: S$GLB,,, | Performed by: PLASTIC SURGERY

## 2023-01-06 PROCEDURE — 99202 PR OFFICE/OUTPT VISIT, NEW, LEVL II, 15-29 MIN: ICD-10-PCS | Mod: S$GLB,,, | Performed by: PLASTIC SURGERY

## 2023-01-06 NOTE — NURSING
Met with pt during her 2nd opinion discussion with Dr Meehan.  MRI scheduled, no additional needs at present.  Oncology Navigation   Intake  Date Worked: 01/06/23     Treatment              Procedures: MRI  MRI Schedule Date: 01/09/23                 Acuity      Follow Up  No follow-ups on file.

## 2023-01-06 NOTE — PROGRESS NOTES
Patient presents for evaluation.  She is most recently saw Dr. Huynh and is awaiting the results of her genetic testing along the MRI.    Saw she is here to discuss risks reconstructive options.      She did undergo bariatric surgery quite sometimes ago that resulted in 75 lb weight loss.  Unfortunately she did put on an additional 15 lb for the past few months.      She is been an active smoker for quite some time but quit on December 29th.      On exam:  Imf to nipple is 29 cm bilaterally    Base width is 16 cm bilaterally     Imf to nipple is 8 cm bilaterally     Her BMI is 32     There was adequate infraumbilical adipose tissue     Assessment: Breast cancer     Plan we discussed extensively the restorative options.      If the patient decides to proceed with a mastectomy then bridge expander would be the option in order to reshape the soft tissue envelope and because she has a recent history of smoking.      Eventually would proceed with autogenous reconstruction once we have a longer period nicotine free     The option of oncoplastic procedure was also discussed with the patient     We will have a better understanding of the plan once additional testing is obtained

## 2023-01-09 ENCOUNTER — PATIENT MESSAGE (OUTPATIENT)
Dept: BARIATRICS | Facility: CLINIC | Age: 63
End: 2023-01-09
Payer: COMMERCIAL

## 2023-01-09 ENCOUNTER — HOSPITAL ENCOUNTER (OUTPATIENT)
Dept: RADIOLOGY | Facility: OTHER | Age: 63
Discharge: HOME | End: 2023-01-09
Attending: SURGERY
Payer: COMMERCIAL

## 2023-01-09 DIAGNOSIS — C50.112 MALIGNANT NEOPLASM OF CENTRAL PORTION OF LEFT BREAST IN FEMALE, ESTROGEN RECEPTOR POSITIVE: ICD-10-CM

## 2023-01-09 DIAGNOSIS — Z17.0 MALIGNANT NEOPLASM OF CENTRAL PORTION OF LEFT BREAST IN FEMALE, ESTROGEN RECEPTOR POSITIVE: ICD-10-CM

## 2023-01-09 PROCEDURE — 77049 MRI BREAST C-+ W/CAD BI: CPT | Mod: 26,,, | Performed by: RADIOLOGY

## 2023-01-09 PROCEDURE — 77049 MRI BREAST W/WO CONTRAST, W/CAD, BILATERAL: ICD-10-PCS | Mod: 26,,, | Performed by: RADIOLOGY

## 2023-01-09 PROCEDURE — A9577 INJ MULTIHANCE: HCPCS | Performed by: SURGERY

## 2023-01-09 PROCEDURE — 25500020 PHARM REV CODE 255: Performed by: SURGERY

## 2023-01-09 PROCEDURE — 77049 MRI BREAST C-+ W/CAD BI: CPT | Mod: TC

## 2023-01-09 RX ADMIN — GADOBENATE DIMEGLUMINE 17 ML: 529 INJECTION, SOLUTION INTRAVENOUS at 02:01

## 2023-01-10 ENCOUNTER — PATIENT MESSAGE (OUTPATIENT)
Dept: INTERNAL MEDICINE | Facility: CLINIC | Age: 63
End: 2023-01-10
Payer: COMMERCIAL

## 2023-01-10 ENCOUNTER — PATIENT MESSAGE (OUTPATIENT)
Dept: ELECTROPHYSIOLOGY | Facility: CLINIC | Age: 63
End: 2023-01-10
Payer: COMMERCIAL

## 2023-01-10 ENCOUNTER — PATIENT MESSAGE (OUTPATIENT)
Dept: CARDIOLOGY | Facility: CLINIC | Age: 63
End: 2023-01-10
Payer: COMMERCIAL

## 2023-01-10 ENCOUNTER — PATIENT MESSAGE (OUTPATIENT)
Dept: PLASTIC SURGERY | Facility: CLINIC | Age: 63
End: 2023-01-10

## 2023-01-10 ENCOUNTER — HOSPITAL ENCOUNTER (OUTPATIENT)
Dept: RADIOLOGY | Facility: OTHER | Age: 63
Discharge: HOME | End: 2023-01-10
Attending: INTERNAL MEDICINE
Payer: COMMERCIAL

## 2023-01-10 ENCOUNTER — OFFICE VISIT (OUTPATIENT)
Dept: PLASTIC SURGERY | Facility: CLINIC | Age: 63
End: 2023-01-10
Attending: PLASTIC SURGERY
Payer: COMMERCIAL

## 2023-01-10 DIAGNOSIS — C50.912 MALIGNANT NEOPLASM OF LEFT FEMALE BREAST, UNSPECIFIED ESTROGEN RECEPTOR STATUS, UNSPECIFIED SITE OF BREAST: Primary | ICD-10-CM

## 2023-01-10 DIAGNOSIS — J01.10 ACUTE NON-RECURRENT FRONTAL SINUSITIS: Primary | ICD-10-CM

## 2023-01-10 DIAGNOSIS — J01.10 ACUTE NON-RECURRENT FRONTAL SINUSITIS: ICD-10-CM

## 2023-01-10 PROCEDURE — 1159F PR MEDICATION LIST DOCUMENTED IN MEDICAL RECORD: ICD-10-PCS | Mod: CPTII,S$GLB,, | Performed by: PLASTIC SURGERY

## 2023-01-10 PROCEDURE — 71046 XR CHEST PA AND LATERAL: ICD-10-PCS | Mod: 26,,, | Performed by: RADIOLOGY

## 2023-01-10 PROCEDURE — 99211 PR OFFICE/OUTPT VISIT, EST, LEVL I: ICD-10-PCS | Mod: S$GLB,,, | Performed by: PLASTIC SURGERY

## 2023-01-10 PROCEDURE — 99211 OFF/OP EST MAY X REQ PHY/QHP: CPT | Mod: S$GLB,,, | Performed by: PLASTIC SURGERY

## 2023-01-10 PROCEDURE — 71046 X-RAY EXAM CHEST 2 VIEWS: CPT | Mod: TC,FY

## 2023-01-10 PROCEDURE — 1159F MED LIST DOCD IN RCRD: CPT | Mod: CPTII,S$GLB,, | Performed by: PLASTIC SURGERY

## 2023-01-10 PROCEDURE — 71046 X-RAY EXAM CHEST 2 VIEWS: CPT | Mod: 26,,, | Performed by: RADIOLOGY

## 2023-01-10 RX ORDER — DOXYCYCLINE HYCLATE 100 MG
100 TABLET ORAL 2 TIMES DAILY
Qty: 14 TABLET | Refills: 0 | Status: SHIPPED | OUTPATIENT
Start: 2023-01-10 | End: 2023-02-09

## 2023-01-10 NOTE — TELEPHONE ENCOUNTER
Scheduled pre op clearance with Dr. Pool Dorado on 1/25/23 @ 10:20 am, Patient accepted appointment.

## 2023-01-10 NOTE — PROGRESS NOTES
Patient presents for follow-up.  She is here today to as she has more question regarding our consult that we had last week.      She spends a significant amount of time and now would desire a bilateral mastectomy with autogenous reconstruction    She still has to discuss this with Dr. Huynh.      We discussed also the fact that she will require cardiology clearance along with pulmonology clearance    She is still nicotine free and stop on December 28     The plan date for surgery is February 15 which will give her a full 6 weeks nicotine free     She will reach out again to Dr. Huynh to confirm that she is comfortable with this plan

## 2023-01-11 ENCOUNTER — PATIENT MESSAGE (OUTPATIENT)
Dept: INTERNAL MEDICINE | Facility: CLINIC | Age: 63
End: 2023-01-11
Payer: COMMERCIAL

## 2023-01-12 ENCOUNTER — TELEPHONE (OUTPATIENT)
Dept: SURGERY | Facility: CLINIC | Age: 63
End: 2023-01-12
Payer: COMMERCIAL

## 2023-01-12 ENCOUNTER — OFFICE VISIT (OUTPATIENT)
Dept: INTERNAL MEDICINE | Facility: CLINIC | Age: 63
End: 2023-01-12
Payer: COMMERCIAL

## 2023-01-12 ENCOUNTER — OFFICE VISIT (OUTPATIENT)
Dept: PSYCHIATRY | Facility: CLINIC | Age: 63
End: 2023-01-12
Payer: COMMERCIAL

## 2023-01-12 ENCOUNTER — PATIENT MESSAGE (OUTPATIENT)
Dept: HEMATOLOGY/ONCOLOGY | Facility: CLINIC | Age: 63
End: 2023-01-12
Payer: COMMERCIAL

## 2023-01-12 VITALS
DIASTOLIC BLOOD PRESSURE: 70 MMHG | SYSTOLIC BLOOD PRESSURE: 122 MMHG | BODY MASS INDEX: 33.41 KG/M2 | WEIGHT: 181.56 LBS | TEMPERATURE: 98 F | HEIGHT: 62 IN | HEART RATE: 76 BPM | OXYGEN SATURATION: 96 %

## 2023-01-12 VITALS
WEIGHT: 181.75 LBS | BODY MASS INDEX: 33.25 KG/M2 | DIASTOLIC BLOOD PRESSURE: 62 MMHG | HEART RATE: 76 BPM | SYSTOLIC BLOOD PRESSURE: 128 MMHG

## 2023-01-12 DIAGNOSIS — Z01.818 PRE-OP EXAM: ICD-10-CM

## 2023-01-12 DIAGNOSIS — Z87.891 HISTORY OF TOBACCO ABUSE: ICD-10-CM

## 2023-01-12 DIAGNOSIS — C50.112 MALIGNANT NEOPLASM OF CENTRAL PORTION OF LEFT BREAST IN FEMALE, ESTROGEN RECEPTOR POSITIVE: Primary | ICD-10-CM

## 2023-01-12 DIAGNOSIS — Z17.0 MALIGNANT NEOPLASM OF CENTRAL PORTION OF LEFT BREAST IN FEMALE, ESTROGEN RECEPTOR POSITIVE: Primary | ICD-10-CM

## 2023-01-12 DIAGNOSIS — F41.1 GAD (GENERALIZED ANXIETY DISORDER): Primary | ICD-10-CM

## 2023-01-12 DIAGNOSIS — I10 ESSENTIAL HYPERTENSION: ICD-10-CM

## 2023-01-12 DIAGNOSIS — E66.09 CLASS 1 OBESITY DUE TO EXCESS CALORIES WITHOUT SERIOUS COMORBIDITY WITH BODY MASS INDEX (BMI) OF 33.0 TO 33.9 IN ADULT: ICD-10-CM

## 2023-01-12 DIAGNOSIS — F41.0 PANIC ATTACK: ICD-10-CM

## 2023-01-12 DIAGNOSIS — F51.01 PRIMARY INSOMNIA: ICD-10-CM

## 2023-01-12 DIAGNOSIS — Z17.0 MALIGNANT NEOPLASM OF CENTRAL PORTION OF LEFT BREAST IN FEMALE, ESTROGEN RECEPTOR POSITIVE: ICD-10-CM

## 2023-01-12 DIAGNOSIS — C50.112 MALIGNANT NEOPLASM OF CENTRAL PORTION OF LEFT BREAST IN FEMALE, ESTROGEN RECEPTOR POSITIVE: ICD-10-CM

## 2023-01-12 DIAGNOSIS — I48.92 ATRIAL FLUTTER, UNSPECIFIED TYPE: ICD-10-CM

## 2023-01-12 DIAGNOSIS — J41.8 MIXED SIMPLE AND MUCOPURULENT CHRONIC BRONCHITIS: ICD-10-CM

## 2023-01-12 DIAGNOSIS — F33.40 MDD (RECURRENT MAJOR DEPRESSIVE DISORDER) IN REMISSION: ICD-10-CM

## 2023-01-12 DIAGNOSIS — Z15.89 MONOALLELIC MUTATION OF MUTYH GENE: Primary | ICD-10-CM

## 2023-01-12 DIAGNOSIS — G47.00 INSOMNIA, UNSPECIFIED TYPE: ICD-10-CM

## 2023-01-12 PROBLEM — J44.1 COPD EXACERBATION: Status: RESOLVED | Noted: 2022-10-31 | Resolved: 2023-01-12

## 2023-01-12 PROBLEM — E66.811 CLASS 1 OBESITY DUE TO EXCESS CALORIES WITHOUT SERIOUS COMORBIDITY WITH BODY MASS INDEX (BMI) OF 33.0 TO 33.9 IN ADULT: Status: ACTIVE | Noted: 2021-03-16

## 2023-01-12 LAB — INTEGRATED BRAC ANALYSIS: NORMAL

## 2023-01-12 PROCEDURE — 3078F DIAST BP <80 MM HG: CPT | Mod: CPTII,S$GLB,, | Performed by: NURSE PRACTITIONER

## 2023-01-12 PROCEDURE — 90677 PCV20 VACCINE IM: CPT | Mod: S$GLB,,, | Performed by: INTERNAL MEDICINE

## 2023-01-12 PROCEDURE — 90471 IMMUNIZATION ADMIN: CPT | Mod: S$GLB,,, | Performed by: INTERNAL MEDICINE

## 2023-01-12 PROCEDURE — 3078F PR MOST RECENT DIASTOLIC BLOOD PRESSURE < 80 MM HG: ICD-10-PCS | Mod: CPTII,S$GLB,, | Performed by: INTERNAL MEDICINE

## 2023-01-12 PROCEDURE — 1159F MED LIST DOCD IN RCRD: CPT | Mod: CPTII,S$GLB,, | Performed by: NURSE PRACTITIONER

## 2023-01-12 PROCEDURE — 1159F MED LIST DOCD IN RCRD: CPT | Mod: CPTII,S$GLB,, | Performed by: INTERNAL MEDICINE

## 2023-01-12 PROCEDURE — 99999 PR PBB SHADOW E&M-EST. PATIENT-LVL V: ICD-10-PCS | Mod: PBBFAC,,, | Performed by: INTERNAL MEDICINE

## 2023-01-12 PROCEDURE — 1160F PR REVIEW ALL MEDS BY PRESCRIBER/CLIN PHARMACIST DOCUMENTED: ICD-10-PCS | Mod: CPTII,S$GLB,, | Performed by: NURSE PRACTITIONER

## 2023-01-12 PROCEDURE — 99214 OFFICE O/P EST MOD 30 MIN: CPT | Mod: 25,S$GLB,, | Performed by: INTERNAL MEDICINE

## 2023-01-12 PROCEDURE — 3008F PR BODY MASS INDEX (BMI) DOCUMENTED: ICD-10-PCS | Mod: CPTII,S$GLB,, | Performed by: INTERNAL MEDICINE

## 2023-01-12 PROCEDURE — 1159F PR MEDICATION LIST DOCUMENTED IN MEDICAL RECORD: ICD-10-PCS | Mod: CPTII,S$GLB,, | Performed by: NURSE PRACTITIONER

## 2023-01-12 PROCEDURE — 3078F PR MOST RECENT DIASTOLIC BLOOD PRESSURE < 80 MM HG: ICD-10-PCS | Mod: CPTII,S$GLB,, | Performed by: NURSE PRACTITIONER

## 2023-01-12 PROCEDURE — 3008F BODY MASS INDEX DOCD: CPT | Mod: CPTII,S$GLB,, | Performed by: INTERNAL MEDICINE

## 2023-01-12 PROCEDURE — 99214 OFFICE O/P EST MOD 30 MIN: CPT | Mod: S$GLB,,, | Performed by: NURSE PRACTITIONER

## 2023-01-12 PROCEDURE — 90471 PNEUMOCOCCAL CONJUGATE VACCINE 20-VALENT: ICD-10-PCS | Mod: S$GLB,,, | Performed by: INTERNAL MEDICINE

## 2023-01-12 PROCEDURE — 90677 PNEUMOCOCCAL CONJUGATE VACCINE 20-VALENT: ICD-10-PCS | Mod: S$GLB,,, | Performed by: INTERNAL MEDICINE

## 2023-01-12 PROCEDURE — 3078F DIAST BP <80 MM HG: CPT | Mod: CPTII,S$GLB,, | Performed by: INTERNAL MEDICINE

## 2023-01-12 PROCEDURE — 3074F SYST BP LT 130 MM HG: CPT | Mod: CPTII,S$GLB,, | Performed by: INTERNAL MEDICINE

## 2023-01-12 PROCEDURE — 99999 PR PBB SHADOW E&M-EST. PATIENT-LVL IV: CPT | Mod: PBBFAC,,, | Performed by: NURSE PRACTITIONER

## 2023-01-12 PROCEDURE — 3074F PR MOST RECENT SYSTOLIC BLOOD PRESSURE < 130 MM HG: ICD-10-PCS | Mod: CPTII,S$GLB,, | Performed by: NURSE PRACTITIONER

## 2023-01-12 PROCEDURE — 3074F PR MOST RECENT SYSTOLIC BLOOD PRESSURE < 130 MM HG: ICD-10-PCS | Mod: CPTII,S$GLB,, | Performed by: INTERNAL MEDICINE

## 2023-01-12 PROCEDURE — 99214 PR OFFICE/OUTPT VISIT, EST, LEVL IV, 30-39 MIN: ICD-10-PCS | Mod: S$GLB,,, | Performed by: NURSE PRACTITIONER

## 2023-01-12 PROCEDURE — 3074F SYST BP LT 130 MM HG: CPT | Mod: CPTII,S$GLB,, | Performed by: NURSE PRACTITIONER

## 2023-01-12 PROCEDURE — 99999 PR PBB SHADOW E&M-EST. PATIENT-LVL IV: ICD-10-PCS | Mod: PBBFAC,,, | Performed by: NURSE PRACTITIONER

## 2023-01-12 PROCEDURE — 99214 PR OFFICE/OUTPT VISIT, EST, LEVL IV, 30-39 MIN: ICD-10-PCS | Mod: 25,S$GLB,, | Performed by: INTERNAL MEDICINE

## 2023-01-12 PROCEDURE — 1160F RVW MEDS BY RX/DR IN RCRD: CPT | Mod: CPTII,S$GLB,, | Performed by: NURSE PRACTITIONER

## 2023-01-12 PROCEDURE — 3008F PR BODY MASS INDEX (BMI) DOCUMENTED: ICD-10-PCS | Mod: CPTII,S$GLB,, | Performed by: NURSE PRACTITIONER

## 2023-01-12 PROCEDURE — 3008F BODY MASS INDEX DOCD: CPT | Mod: CPTII,S$GLB,, | Performed by: NURSE PRACTITIONER

## 2023-01-12 PROCEDURE — 99999 PR PBB SHADOW E&M-EST. PATIENT-LVL V: CPT | Mod: PBBFAC,,, | Performed by: INTERNAL MEDICINE

## 2023-01-12 PROCEDURE — 1159F PR MEDICATION LIST DOCUMENTED IN MEDICAL RECORD: ICD-10-PCS | Mod: CPTII,S$GLB,, | Performed by: INTERNAL MEDICINE

## 2023-01-12 RX ORDER — DIAZEPAM 5 MG/1
5 TABLET ORAL DAILY PRN
Qty: 30 TABLET | Refills: 2 | Status: SHIPPED | OUTPATIENT
Start: 2023-01-12 | End: 2023-02-13 | Stop reason: SDUPTHER

## 2023-01-12 RX ORDER — VENLAFAXINE HYDROCHLORIDE 75 MG/1
75 CAPSULE, EXTENDED RELEASE ORAL DAILY
Qty: 30 CAPSULE | Refills: 11 | Status: SHIPPED | OUTPATIENT
Start: 2023-01-12 | End: 2023-02-13 | Stop reason: SDUPTHER

## 2023-01-12 RX ORDER — VENLAFAXINE HYDROCHLORIDE 37.5 MG/1
37.5 CAPSULE, EXTENDED RELEASE ORAL DAILY
Qty: 7 CAPSULE | Refills: 0 | Status: SHIPPED | OUTPATIENT
Start: 2023-01-12 | End: 2023-02-13

## 2023-01-12 RX ORDER — TRAZODONE HYDROCHLORIDE 100 MG/1
100 TABLET ORAL NIGHTLY PRN
Qty: 30 TABLET | Refills: 11 | Status: SHIPPED | OUTPATIENT
Start: 2023-01-12 | End: 2023-02-13 | Stop reason: SDUPTHER

## 2023-01-12 NOTE — PROGRESS NOTES
Outpatient Psychiatry Follow-Up Visit (MD/NP)    1/12/2023    Clinical Status of Patient:  Outpatient (Ambulatory)    Chief Complaint:  Lucia Matias is a 62 y.o. female who presents today for follow-up of anxiety.  Met with patient.  Pt new to me    Last visit was: 11/07/22 with Atiya Pruett NP. Chart and  reviewed.     Interval History and Content of Current Session:  Current Psychiatric Medications/changes  Stopped Buspirone 15 mg BID  Stop Elavil 25 mg qHs  Ambien 5mg qHS - sleep walking   Klonpin 1mg BID PRN anxiety/panic for past couple of years      Established therapeutic rapport and transition of care from previous provider. Discussed medical stressors. States that she was recently dx with breast cancer. Discussed anxiety regarding cancer treatment. Pt has fears of body image issues from upcoming masectomy.  Recommended pt establish care with social work for therapy.     Discussed medications: Ambien helps her fall asleep but has hx of adverse effects. Reports no benefit from Klonopin for anxiety. Reports chronic anxiety but now having panic attacks.  Stuggling to focus at work. Works for [employer].      Affect bright. Thought processes appear clear and organized. Denies SI/HI/AVH.    Previous medication trial:  Prozac, Paxil, Wellbutrin - notes she felt worse, more depressed, SI - admits it could have also been the situation she was in at the time.     Psychotherapy:  Target symptoms: anxiety   Why chosen therapy is appropriate versus another modality: relevant to diagnosis  Outcome monitoring methods: self-report  Therapeutic intervention type: insight oriented psychotherapy  Topics discussed/themes: building skills sets for symptom management, symptom recognition  The patient's response to the intervention is accepting. The patient's progress toward treatment goals is good.   Duration of intervention: 15 minutes.    Review of Systems   PSYCHIATRIC: Pertinant items are noted in the  narrative.  CONSTITUTIONAL: No weight gain or loss.   MUSCULOSKELETAL: No pain or stiffness of the joints.  NEUROLOGIC: No weakness, sensory changes, seizures, confusion, memory loss, tremor or other abnormal movements.  ENDOCRINE: No polydipsia or polyuria.  INTEGUMENTARY: No rashes or lacerations.  EYES: No exophthalmos, jaundice or blindness.  ENT: No dizziness, tinnitus or hearing loss.  RESPIRATORY: No shortness of breath.  CARDIOVASCULAR: No tachycardia or chest pain.  GASTROINTESTINAL: No nausea, vomiting, pain, constipation or diarrhea.  GENITOURINARY: No frequency, dysuria or sexual dysfunction.  HEMATOLOGIC/LYMPHATIC: No excessive bleeding, prolonged or excessive bleeding after dental extraction/injury.  ALLERGIC/IMMUNOLOGIC: No allergic response to materials, foods or animals at this time.    Past Medical, Family and Social History: The patient's past medical, family and social history have been reviewed and updated as appropriate within the electronic medical record - see encounter notes.    Compliance: yes    Side effects: see above    Risk Parameters:  Patient reports no suicidal ideation  Patient reports no homicidal ideation  Patient reports no self-injurious behavior  Patient reports no violent behavior    Exam (detailed: at least 9 elements; comprehensive: all 15 elements)   Constitutional  Vitals:  Most recent vital signs, dated greater than 90 days prior to this appointment, were reviewed.   Vitals:    01/12/23 0815   BP: 128/62   Pulse: 76   Weight: 82.5 kg (181 lb 12.3 oz)        General:  unremarkable, age appropriate     Musculoskeletal  Muscle Strength/Tone:  not examined   Gait & Station:  non-ataxic     Psychiatric  Speech:  no latency; no press   Mood & Affect:  steady  congruent and appropriate   Thought Process:  normal and logical   Associations:  intact   Thought Content:  normal, no suicidality, no homicidality, delusions, or paranoia   Insight:  intact   Judgement: behavior is  adequate to circumstances   Orientation:  grossly intact   Memory: intact for content of interview   Language: grossly intact   Attention Span & Concentration:  able to focus   Fund of Knowledge:  intact and appropriate to age and level of education     Assessment and Diagnosis   Status/Progress: Based on the examination today, the patient's problem(s) is/are inadequately controlled.  New problems have not been presented today.   Co-morbidities and Lack of compliance are not complicating management of the primary condition.  There are no active rule-out diagnoses for this patient at this time.     General Impression:       ICD-10-CM ICD-9-CM   1. RANDI (generalized anxiety disorder)  F41.1 300.02   2. MDD (recurrent major depressive disorder) in remission  F33.40 296.35   3. Panic attack  F41.0 300.01   4. Insomnia, unspecified type  G47.00 780.52       Intervention/Counseling/Treatment Plan   Medication Management: The risks and benefits of medication were discussed with the patient.  Effexor XR 37.5 mg daily x 1 week then increase to 75 mg daily  Stop Ambien and Klonopin  Valium 5 mg daily as needed for severe anxiety  Trazodone 100 mg before bed as needed for insomnia    Follow up with me in 8 weeks    Recommend establishing care with  for counseling / therapy    Return to Clinic: 2 months    Risks, benefits, side effects and alternative treatments discussed with patient. Patient agrees with the current plan as documented.  Encouraged Patient to keep future appointments.  Take medications as prescribed and abstain from substance abuse.  Pt to present to ED for thoughts to harm herself or others

## 2023-01-12 NOTE — TELEPHONE ENCOUNTER
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 Discussed that this mutation carries an increased risk of colorectal cancer. Patient follows with a GI specialist but put off her colonoscopy due to her recent breast cancer diagnosis. Plans to follow up with them after her breast cancer is managed. Patient is adopted so limited family history. She would like to meet with a genetic counselor to discuss these findings in more detail. All questions were asked and answered. Referral to genetics will be placed.

## 2023-01-12 NOTE — PROGRESS NOTES
[facility] Note    Chief Complaint      Chief Complaint   Patient presents with    Pre-op Exam     History of Present Illness      Lucia Matias is a 62 y.o. female who presents today for HTN follow up.  Patient comes to appointment alone.  EP: Leilani, Cards: Ave, Psych: MUKUND Pruett, Breast Surg: Zoran    Planning for double mastectomy for breast cancer with Dr. Meehan on 2/15/23, reconstruction with Dr. Corbett.  No CP/SOB, no issues with anesthesia in past.  Does all ADL's without difficulty, can walk up stairs.  Seeing cards on 1/25/23 for clearance.    Problem List Items Addressed This Visit       COPD (chronic obstructive pulmonary disease)    Current Assessment & Plan     Stable on Trelegy with PRN Ventolin/Duoneb.  Breathing has been better since quitting smoking and losing weight.         Relevant Orders    Complete PFT w/ bronchodilator    Pneumococcal Conjugate Vaccine (20 Valent) (IM)    Insomnia    Current Assessment & Plan     Just swapped to Trazodone by psych in place of Ambien.          History of tobacco abuse    Overview     Long time smoker, motivated to quit         Current Assessment & Plan     Quit in preparation for double mastectomy.         Essential hypertension    Current Assessment & Plan     BP controlled on no meds. On lisinopril prior to weight loss. No CP/HA.         Class 1 obesity due to excess calories without serious comorbidity with body mass index (BMI) of 33.0 to 33.9 in adult    Current Assessment & Plan     Hx of bariatric surgery         Atrial flutter    Current Assessment & Plan     Ablation on 11/3 with Dr. Duke. Not on anything for rate control.  On Eliquis for AC.         Malignant neoplasm of central portion of left breast in female, estrogen receptor positive - Primary     Other Visit Diagnoses       Pre-op exam                Health Maintenance   Topic Date Due    Mammogram  12/15/2023    Lipid Panel  10/19/2027    TETANUS VACCINE  12/05/2028     Hepatitis C Screening  Completed       Past Medical History:   Diagnosis Date    Allergy     Anxiety     Arthritis     Colon polyps     COPD (chronic obstructive pulmonary disease)     COPD exacerbation 10/31/2022    Depression     Diverticular disease of colon 2017    Diverticulitis     HLD (hyperlipidemia)     Hypertension     Malignant neoplasm of central portion of left breast in female, estrogen receptor positive 2022    Pancreatitis     Psoriasis     Rheumatoid arthritis     Severe obesity (BMI 35.0-39.9) with comorbidity        Past Surgical History:   Procedure Laterality Date    ADENOIDECTOMY  1966    Tonsillitis and adnoids    BLADDER SUSPENSION      (x2)     SECTION      (x2)    ESOPHAGOGASTRODUODENOSCOPY N/A 2021    Procedure: EGD (ESOPHAGOGASTRODUODENOSCOPY);  Surgeon: Vince Rodriguez MD;  Location: [facility] OR [address] [place] [address];  Service: General;  Laterality: N/A;    LAPAROSCOPIC SLEEVE GASTRECTOMY N/A 2021    Procedure: GASTRECTOMY, SLEEVE, LAPAROSCOPIC, with intraop EGD;  Surgeon: Vince Rodriguez MD;  Location: [facility] OR [address] [place] [address];  Service: General;  Laterality: N/A;    LUNG BIOPSY  2020    RHINOPLASTY      TONSILLECTOMY      TRANSESOPHAGEAL ECHOCARDIOGRAPHY N/A 2022    Procedure: ECHOCARDIOGRAM, TRANSESOPHAGEAL;  Surgeon: Kellie Grover MD;  Location: [facility] EP LAB;  Service: Cardiology;  Laterality: N/A;       family history includes No Known Problems in her daughter, daughter, and son. She was adopted.    Social History     Tobacco Use    Smoking status: Every Day     Packs/day: 0.00     Years: 20.00     Pack years: 0.00     Types: Cigarettes     Start date: 1979     Last attempt to quit: 2020     Years since quittin.0    Smokeless tobacco: Current   Substance Use Topics    Alcohol use: Yes     Alcohol/week: 1.0 standard drink     Types: 1 Glasses of wine per week    Drug use: No       Review of Systems   Constitutional:  Negative for  chills and fever.   HENT:  Negative for sore throat.    Respiratory:  Negative for cough and shortness of breath.    Cardiovascular:  Negative for chest pain and palpitations.   Gastrointestinal:  Negative for constipation, diarrhea, nausea and vomiting.   Genitourinary:  Negative for dysuria and hematuria.   Musculoskeletal:  Negative for falls.   Neurological:  Negative for headaches.      Outpatient Encounter Medications as of 1/12/2023   Medication Sig Dispense Refill    apixaban (ELIQUIS) 5 mg Tab Take 1 tablet (5 mg total) by mouth 2 (two) times daily. 60 tablet 5    atorvastatin (LIPITOR) 20 MG tablet Take 1 tablet (20 mg total) by mouth every evening. 90 tablet 3    B-complex with vitamin C (VITAMIN B COMPLEX-C ORAL) Take by mouth.      calcium-vitamin D 250-100 mg-unit per tablet Take 1 tablet by mouth 2 (two) times daily.      COSENTYX PEN, 2 PENS, 150 mg/mL PnIj Inject 300mg (2 pens) into the skin every 4 weeks 2 mL 11    cyanocobalamin 500 MCG tablet Take 500 mcg by mouth once daily.      diazePAM (VALIUM) 5 MG tablet Take 1 tablet (5 mg total) by mouth daily as needed for Anxiety. 30 tablet 2    doxycycline (VIBRA-TABS) 100 MG tablet Take 1 tablet (100 mg total) by mouth 2 (two) times daily. 14 tablet 0    fluticasone-umeclidin-vilanter (TRELEGY ELLIPTA) 100-62.5-25 mcg DsDv Inhale 1 puff into the lungs once daily. 60 each 3    multivitamin (THERAGRAN) per tablet Take 1 tablet by mouth once daily.      omeprazole (PRILOSEC) 40 MG capsule Take 1 capsule (40 mg total) by mouth every morning. 90 capsule 1    traZODone (DESYREL) 100 MG tablet Take 1 tablet (100 mg total) by mouth nightly as needed for Insomnia. 30 tablet 11    venlafaxine (EFFEXOR-XR) 37.5 MG 24 hr capsule Take 1 capsule (37.5 mg total) by mouth once daily. for 7 days 7 capsule 0    venlafaxine (EFFEXOR-XR) 75 MG 24 hr capsule Take 1 capsule (75 mg total) by mouth once daily. Start on Week 2 30 capsule 11    ALPRAZolam (XANAX) 0.5 MG tablet  "Take 1 tablet (0.5 mg total) by mouth daily as needed for Anxiety. (Patient not taking: Reported on 1/12/2023) 30 tablet 0    [DISCONTINUED] budesonide-formoterol 160-4.5 mcg (SYMBICORT) 160-4.5 mcg/actuation HFAA Inhale 2 puffs into the lungs every 12 (twelve) hours. Controller 30.6 g 2    [DISCONTINUED] clonazePAM (KLONOPIN) 1 MG tablet Take 1 tablet (1 mg total) by mouth 2 (two) times daily as needed for Anxiety (panic). 60 tablet 2    [DISCONTINUED] zolpidem (AMBIEN) 5 MG Tab Take 1 tablet (5 mg total) by mouth nightly as needed (insomnia). 30 tablet 2     No facility-administered encounter medications on file as of 1/12/2023.        Review of patient's allergies indicates:   Allergen Reactions    Succinimides Anaphylaxis     Son has MH       Physical Exam      Vital Signs  Temp: 97.9 °F (36.6 °C)  Pulse: 76  SpO2: 96 %  BP: 122/70  Pain Score:   2  Pain Loc: Breast  Height and Weight  Height: 5' 2" (157.5 cm)  Weight: 82.3 kg (181 lb 8.8 oz)  BSA (Calculated - sq m): 1.9 sq meters  BMI (Calculated): 33.2  Weight in (lb) to have BMI = 25: 136.4]    Physical Exam  Constitutional:       Appearance: She is well-developed.   HENT:      Head: Normocephalic and atraumatic.      Right Ear: External ear normal.      Left Ear: External ear normal.   Eyes:      General:         Right eye: No discharge.         Left eye: No discharge.   Cardiovascular:      Rate and Rhythm: Normal rate and regular rhythm.      Heart sounds: Normal heart sounds. No murmur heard.  Pulmonary:      Effort: Pulmonary effort is normal. No respiratory distress.      Breath sounds: Normal breath sounds.   Abdominal:      General: There is no distension.      Palpations: Abdomen is soft.      Tenderness: There is no abdominal tenderness. There is no guarding.   Musculoskeletal:         General: Normal range of motion.      Cervical back: Normal range of motion.   Skin:     General: Skin is warm and dry.   Neurological:      Mental Status: She is " alert and oriented to person, place, and time.   Psychiatric:         Behavior: Behavior normal.        Laboratory:  CBC:  Recent Labs   Lab Result Units 11/02/22  0256 11/03/22  0517 12/28/22  1058   WBC K/uL 12.99* 8.83 6.68   RBC M/uL 4.54 4.35 5.05   Hemoglobin g/dL 14.0 13.4 15.0   Hematocrit % 42.1 41.4 47.4   Platelets K/uL 219 197 267   MCV fL 93 95 94   MCH pg 30.8 30.8 29.7   MCHC g/dL 33.3 32.4 31.6*     CMP:  Recent Labs   Lab Result Units 10/31/22  0808 11/01/22  0341 12/02/22  0724 12/28/22  1058   Glucose mg/dL 81   < > 94 101   Calcium mg/dL 9.6   < > 9.6 10.2   Albumin g/dL 3.5  --  3.7 3.7   Total Protein g/dL 6.9  --  7.0 7.1   Sodium mmol/L 140   < > 142 143   Potassium mmol/L 3.8   < > 3.8 3.9   CO2 mmol/L 23   < > 28 28   Chloride mmol/L 106   < > 108 106   BUN mg/dL 20   < > 13 13   Alkaline Phosphatase U/L 89  --  96 101   ALT U/L 26  --  29 24   AST U/L 20  --  20 21   Total Bilirubin mg/dL 1.2*  --  0.9 1.4*    < > = values in this interval not displayed.     URINALYSIS:  No results for input(s): COLORU, CLARITYU, SPECGRAV, PHUR, PROTEINUA, GLUCOSEU, BILIRUBINCON, BLOODU, WBCU, RBCU, BACTERIA, MUCUS, NITRITE, LEUKOCYTESUR, UROBILINOGEN, HYALINECASTS in the last 2160 hours.     LIPIDS:  Recent Labs   Lab Result Units 10/19/22  0718   TSH uIU/mL 1.555   HDL mg/dL 42   Cholesterol mg/dL 121   Triglycerides mg/dL 149   LDL Cholesterol mg/dL 49.2*   HDL/Cholesterol Ratio % 34.7   Non-HDL Cholesterol mg/dL 79   Total Cholesterol/HDL Ratio  2.9     TSH:  Recent Labs   Lab Result Units 10/19/22  0718   TSH uIU/mL 1.555     A1C:  Recent Labs   Lab Result Units 10/19/22  0718   Hemoglobin A1C % 4.9       Radiology:  No results found in the last 30 days.     Assessment/Plan     Lucia Matias is a 62 y.o.female with:    1. Mixed simple and mucopurulent chronic bronchitis  - Complete PFT w/ bronchodilator; Future  - Pneumococcal Conjugate Vaccine (20 Valent) (IM)    2. Essential hypertension    3.  Malignant neoplasm of central portion of left breast in female, estrogen receptor positive    4. Pre-op exam    5. Atrial flutter, unspecified type    6. Primary insomnia    7. History of tobacco abuse    8. Class 1 obesity due to excess calories without serious comorbidity with body mass index (BMI) of 33.0 to 33.9 in adult    -mod risk (controlled HTN, controlled COPD, Aflutter s/p ablation) for moderate risk procedure, ok to proceed pending cardiology clearance and PFT's  -PFT's ordered  -prevnar 20 today  -schedule colonoscopy once done with breast surgeries  -Continue current medications and maintain follow up with specialists.    -Follow up if symptoms worsen or fail to improve.       Hetal Banks MD  [facility]

## 2023-01-12 NOTE — PATIENT INSTRUCTIONS
Effexor XR 37.5 mg daily x 1 week then increase to 75 mg daily  Stop Ambien and Klonopin  Valium 5 mg daily as needed for severe anxiety  Trazodone 100 mg before bed as needed for insomnia    Follow up with me in 8 weeks    Recommend establishing care with  for counseling / therapy

## 2023-01-17 ENCOUNTER — HOSPITAL ENCOUNTER (OUTPATIENT)
Dept: PULMONOLOGY | Facility: OTHER | Age: 63
Discharge: HOME | End: 2023-01-17
Attending: INTERNAL MEDICINE
Payer: COMMERCIAL

## 2023-01-17 VITALS — RESPIRATION RATE: 18 BRPM | HEART RATE: 70 BPM

## 2023-01-17 DIAGNOSIS — J41.8 MIXED SIMPLE AND MUCOPURULENT CHRONIC BRONCHITIS: ICD-10-CM

## 2023-01-17 PROCEDURE — 94727 PR PULM FUNCTION TEST BY GAS: ICD-10-PCS | Mod: 26,,, | Performed by: INTERNAL MEDICINE

## 2023-01-17 PROCEDURE — 94060 PR EVAL OF BRONCHOSPASM: ICD-10-PCS | Mod: 26,,, | Performed by: INTERNAL MEDICINE

## 2023-01-17 PROCEDURE — 94729 PR C02/MEMBANE DIFFUSE CAPACITY: ICD-10-PCS | Mod: 26,,, | Performed by: INTERNAL MEDICINE

## 2023-01-17 PROCEDURE — 94729 DIFFUSING CAPACITY: CPT | Mod: 26,,, | Performed by: INTERNAL MEDICINE

## 2023-01-17 PROCEDURE — 94060 EVALUATION OF WHEEZING: CPT | Mod: 26,,, | Performed by: INTERNAL MEDICINE

## 2023-01-17 PROCEDURE — 94729 DIFFUSING CAPACITY: CPT

## 2023-01-17 PROCEDURE — 94727 GAS DIL/WSHOT DETER LNG VOL: CPT

## 2023-01-17 PROCEDURE — 94060 EVALUATION OF WHEEZING: CPT

## 2023-01-17 PROCEDURE — 25000242 PHARM REV CODE 250 ALT 637 W/ HCPCS: Performed by: INTERNAL MEDICINE

## 2023-01-17 PROCEDURE — 94727 GAS DIL/WSHOT DETER LNG VOL: CPT | Mod: 26,,, | Performed by: INTERNAL MEDICINE

## 2023-01-17 RX ORDER — ALBUTEROL SULFATE 2.5 MG/.5ML
2.5 SOLUTION RESPIRATORY (INHALATION) ONCE
Status: COMPLETED | OUTPATIENT
Start: 2023-01-17 | End: 2023-01-17

## 2023-01-17 RX ADMIN — ALBUTEROL SULFATE 2.5 MG: 2.5 SOLUTION RESPIRATORY (INHALATION) at 08:01

## 2023-01-17 NOTE — TELEPHONE ENCOUNTER
Pt is scheduled for breast cancer at the end of February, and she may have to hold a dose of Cosentyx when it gets closer. For now she will continue to inject on her normal schedule.    Specialty Pharmacy - Refill Coordination    Specialty Medication Orders Linked to Encounter      Flowsheet Row Most Recent Value   Medication #1 COSENTYX PEN, 2 PENS, 150 mg/mL PnIj (Order#[id number], Rx#[id number])            Refill Questions - Documented Responses      Flowsheet Row Most Recent Value   Patient Availability and HIPAA Verification    Does patient want to proceed with activity? Yes   HIPAA/medical authority confirmed? Yes   Relationship to patient of person spoken to? Self   Refill Screening Questions    Changes to allergies? No   Changes to medications? No   New conditions since last clinic visit? No   Unplanned office visit, urgent care, ED, or hospital admission in the last 4 weeks? No   How does patient/caregiver feel medication is working? Excellent   Financial problems or insurance changes? No   How many doses of your specialty medications were missed in the last 4 weeks? 0   Would patient like to speak to a pharmacist? No   When does the patient need to receive the medication? 01/19/23   Refill Delivery Questions    How will the patient receive the medication? [facility]   When does the patient need to receive the medication? 01/19/23   Shipping Address Home   Address in [facility] confirmed and updated if neccessary? Yes   Expected Copay ($) 441.99   Is the patient able to afford the medication copay? Yes   Payment Method  CC on file, one time CC provided   Days supply of Refill 28   Supplies needed? No supplies needed   Refill activity completed? Yes   Refill activity plan Refill scheduled   Shipment/Pickup Date: 01/18/23            Current Outpatient Medications   Medication Sig    ALPRAZolam (XANAX) 0.5 MG tablet Take 1 tablet (0.5 mg total) by mouth daily as needed for Anxiety. (Patient not  taking: Reported on 1/12/2023)    apixaban (ELIQUIS) 5 mg Tab Take 1 tablet (5 mg total) by mouth 2 (two) times daily.    atorvastatin (LIPITOR) 20 MG tablet Take 1 tablet (20 mg total) by mouth every evening.    B-complex with vitamin C (VITAMIN B COMPLEX-C ORAL) Take by mouth.    calcium-vitamin D 250-100 mg-unit per tablet Take 1 tablet by mouth 2 (two) times daily.    COSENTYX PEN, 2 PENS, 150 mg/mL PnIj Inject 300mg (2 pens) into the skin every 4 weeks    cyanocobalamin 500 MCG tablet Take 500 mcg by mouth once daily.    diazePAM (VALIUM) 5 MG tablet Take 1 tablet (5 mg total) by mouth daily as needed for Anxiety.    doxycycline (VIBRA-TABS) 100 MG tablet Take 1 tablet (100 mg total) by mouth 2 (two) times daily.    fluticasone-umeclidin-vilanter (TRELEGY ELLIPTA) 100-62.5-25 mcg DsDv Inhale 1 puff into the lungs once daily.    multivitamin (THERAGRAN) per tablet Take 1 tablet by mouth once daily.    omeprazole (PRILOSEC) 40 MG capsule Take 1 capsule (40 mg total) by mouth every morning.    traZODone (DESYREL) 100 MG tablet Take 1 tablet (100 mg total) by mouth nightly as needed for Insomnia.    venlafaxine (EFFEXOR-XR) 37.5 MG 24 hr capsule Take 1 capsule (37.5 mg total) by mouth once daily. for 7 days    venlafaxine (EFFEXOR-XR) 75 MG 24 hr capsule Take 1 capsule (75 mg total) by mouth once daily. Start on Week 2   Last reviewed on 1/12/2023 12:42 PM by Jose Morales III, MUKUND    Review of patient's allergies indicates:   Allergen Reactions    Succinimides Anaphylaxis     Son has     Last reviewed on  1/17/2023 8:14 AM by Shahnaz Woodward      Tasks added this encounter   No tasks added.   Tasks due within next 3 months   3/11/2023 - Clinical - Follow Up Assesement (Annual)  1/5/2023 - Refill Call (Auto Added)     [facility]  [address] [place] [id number]  Phone: [phone]  Fax: [phone]

## 2023-01-18 ENCOUNTER — PATIENT MESSAGE (OUTPATIENT)
Dept: INTERNAL MEDICINE | Facility: CLINIC | Age: 63
End: 2023-01-18
Payer: COMMERCIAL

## 2023-01-19 ENCOUNTER — HOSPITAL ENCOUNTER (OUTPATIENT)
Dept: RADIOLOGY | Facility: OTHER | Age: 63
Discharge: HOME | End: 2023-01-19
Attending: PLASTIC SURGERY
Payer: COMMERCIAL

## 2023-01-19 DIAGNOSIS — C50.912 MALIGNANT NEOPLASM OF LEFT FEMALE BREAST, UNSPECIFIED ESTROGEN RECEPTOR STATUS, UNSPECIFIED SITE OF BREAST: ICD-10-CM

## 2023-01-19 PROCEDURE — 74174 CTA ABD&PLVS W/CONTRAST: CPT | Mod: 26,,, | Performed by: RADIOLOGY

## 2023-01-19 PROCEDURE — 25500020 PHARM REV CODE 255: Performed by: PLASTIC SURGERY

## 2023-01-19 PROCEDURE — 74174 CTA ABD&PLVS W/CONTRAST: CPT | Mod: TC

## 2023-01-19 PROCEDURE — 74174: ICD-10-PCS | Mod: 26,,, | Performed by: RADIOLOGY

## 2023-01-19 RX ADMIN — IOHEXOL 100 ML: 350 INJECTION, SOLUTION INTRAVENOUS at 07:01

## 2023-01-23 ENCOUNTER — PATIENT MESSAGE (OUTPATIENT)
Dept: SURGERY | Facility: OTHER | Age: 63
End: 2023-01-23
Payer: COMMERCIAL

## 2023-01-25 ENCOUNTER — HOSPITAL ENCOUNTER (OUTPATIENT)
Dept: CARDIOLOGY | Facility: OTHER | Age: 63
Discharge: HOME | End: 2023-01-25
Attending: INTERNAL MEDICINE
Payer: COMMERCIAL

## 2023-01-25 ENCOUNTER — OFFICE VISIT (OUTPATIENT)
Dept: CARDIOLOGY | Facility: CLINIC | Age: 63
End: 2023-01-25
Payer: COMMERCIAL

## 2023-01-25 VITALS
WEIGHT: 183 LBS | OXYGEN SATURATION: 96 % | HEIGHT: 62 IN | SYSTOLIC BLOOD PRESSURE: 112 MMHG | HEART RATE: 80 BPM | BODY MASS INDEX: 33.68 KG/M2 | DIASTOLIC BLOOD PRESSURE: 76 MMHG

## 2023-01-25 VITALS
SYSTOLIC BLOOD PRESSURE: 112 MMHG | DIASTOLIC BLOOD PRESSURE: 76 MMHG | HEIGHT: 62 IN | BODY MASS INDEX: 33.68 KG/M2 | WEIGHT: 183 LBS

## 2023-01-25 DIAGNOSIS — I48.92 ATRIAL FLUTTER, UNSPECIFIED TYPE: ICD-10-CM

## 2023-01-25 DIAGNOSIS — Z17.0 MALIGNANT NEOPLASM OF CENTRAL PORTION OF LEFT BREAST IN FEMALE, ESTROGEN RECEPTOR POSITIVE: ICD-10-CM

## 2023-01-25 DIAGNOSIS — E78.2 MIXED HYPERLIPIDEMIA: ICD-10-CM

## 2023-01-25 DIAGNOSIS — C50.112 MALIGNANT NEOPLASM OF CENTRAL PORTION OF LEFT BREAST IN FEMALE, ESTROGEN RECEPTOR POSITIVE: ICD-10-CM

## 2023-01-25 DIAGNOSIS — I48.92 ATRIAL FLUTTER, UNSPECIFIED TYPE: Primary | ICD-10-CM

## 2023-01-25 DIAGNOSIS — I10 ESSENTIAL HYPERTENSION: ICD-10-CM

## 2023-01-25 LAB
ASCENDING AORTA: 3 CM
AV INDEX (PROSTH): 0.92
AV MEAN GRADIENT: 4 MMHG
AV PEAK GRADIENT: 8 MMHG
AV VALVE AREA: 2.8 CM2
AV VELOCITY RATIO: 0.91
BSA FOR ECHO PROCEDURE: 1.91 M2
CV ECHO LV RWT: 0.52 CM
DOP CALC AO PEAK VEL: 1.39 M/S
DOP CALC AO VTI: 26.1 CM
DOP CALC LVOT AREA: 3 CM2
DOP CALC LVOT DIAMETER: 1.97 CM
DOP CALC LVOT PEAK VEL: 1.26 M/S
DOP CALC LVOT STROKE VOLUME: 73.12 CM3
DOP CALCLVOT PEAK VEL VTI: 24 CM
E WAVE DECELERATION TIME: 182.4 MSEC
E/A RATIO: 1.27
E/E' RATIO: 9.89 M/S
ECHO LV POSTERIOR WALL: 1.1 CM (ref 0.6–1.1)
EJECTION FRACTION: 70 %
FRACTIONAL SHORTENING: 39 % (ref 28–44)
INTERVENTRICULAR SEPTUM: 1.01 CM (ref 0.6–1.1)
IVC DIAMETER: 1.34 CM
IVRT: 94.2 MSEC
LA MAJOR: 5.64 CM
LA MINOR: 5.39 CM
LA WIDTH: 3.4 CM
LEFT ATRIUM SIZE: 3.77 CM
LEFT ATRIUM VOLUME INDEX MOD: 20.4 ML/M2
LEFT ATRIUM VOLUME INDEX: 32.6 ML/M2
LEFT ATRIUM VOLUME MOD: 37.58 CM3
LEFT ATRIUM VOLUME: 60.06 CM3
LEFT INTERNAL DIMENSION IN SYSTOLE: 2.57 CM (ref 2.1–4)
LEFT VENTRICLE DIASTOLIC VOLUME INDEX: 43.75 ML/M2
LEFT VENTRICLE DIASTOLIC VOLUME: 80.5 ML
LEFT VENTRICLE MASS INDEX: 82 G/M2
LEFT VENTRICLE SYSTOLIC VOLUME INDEX: 13 ML/M2
LEFT VENTRICLE SYSTOLIC VOLUME: 23.92 ML
LEFT VENTRICULAR INTERNAL DIMENSION IN DIASTOLE: 4.24 CM (ref 3.5–6)
LEFT VENTRICULAR MASS: 150.21 G
LV LATERAL E/E' RATIO: 8.55 M/S
LV SEPTAL E/E' RATIO: 11.75 M/S
LVOT MG: 3.45 MMHG
LVOT MV: 0.87 CM/S
MV PEAK A VEL: 0.74 M/S
MV PEAK E VEL: 0.94 M/S
MV STENOSIS PRESSURE HALF TIME: 52.9 MS
MV VALVE AREA P 1/2 METHOD: 4.16 CM2
PISA TR MAX VEL: 2.42 M/S
PULM VEIN S/D RATIO: 1.28
PV PEAK D VEL: 0.5 M/S
PV PEAK S VEL: 0.64 M/S
PV PEAK VELOCITY: 0.99 CM/S
RA MAJOR: 4.8 CM
RA PRESSURE: 3 MMHG
RA WIDTH: 3.4 CM
RIGHT VENTRICULAR END-DIASTOLIC DIMENSION: 3.14 CM
RV TISSUE DOPPLER FREE WALL SYSTOLIC VELOCITY 1 (APICAL 4 CHAMBER VIEW): 13 CM/S
SINUS: 2.9 CM
STJ: 2.58 CM
TDI LATERAL: 0.11 M/S
TDI SEPTAL: 0.08 M/S
TDI: 0.1 M/S
TR MAX PG: 23 MMHG
TRICUSPID ANNULAR PLANE SYSTOLIC EXCURSION: 1.9 CM
TV REST PULMONARY ARTERY PRESSURE: 26 MMHG

## 2023-01-25 PROCEDURE — 99204 OFFICE O/P NEW MOD 45 MIN: CPT | Mod: 25,S$GLB,, | Performed by: INTERNAL MEDICINE

## 2023-01-25 PROCEDURE — 3078F DIAST BP <80 MM HG: CPT | Mod: CPTII,S$GLB,, | Performed by: INTERNAL MEDICINE

## 2023-01-25 PROCEDURE — 1159F PR MEDICATION LIST DOCUMENTED IN MEDICAL RECORD: ICD-10-PCS | Mod: CPTII,S$GLB,, | Performed by: INTERNAL MEDICINE

## 2023-01-25 PROCEDURE — 3008F PR BODY MASS INDEX (BMI) DOCUMENTED: ICD-10-PCS | Mod: CPTII,S$GLB,, | Performed by: INTERNAL MEDICINE

## 2023-01-25 PROCEDURE — 99999 PR PBB SHADOW E&M-EST. PATIENT-LVL IV: ICD-10-PCS | Mod: PBBFAC,,, | Performed by: INTERNAL MEDICINE

## 2023-01-25 PROCEDURE — 3074F PR MOST RECENT SYSTOLIC BLOOD PRESSURE < 130 MM HG: ICD-10-PCS | Mod: CPTII,S$GLB,, | Performed by: INTERNAL MEDICINE

## 2023-01-25 PROCEDURE — 93306 TTE W/DOPPLER COMPLETE: CPT

## 2023-01-25 PROCEDURE — 99999 PR PBB SHADOW E&M-EST. PATIENT-LVL IV: CPT | Mod: PBBFAC,,, | Performed by: INTERNAL MEDICINE

## 2023-01-25 PROCEDURE — 93005 ELECTROCARDIOGRAM TRACING: CPT

## 2023-01-25 PROCEDURE — 1159F MED LIST DOCD IN RCRD: CPT | Mod: CPTII,S$GLB,, | Performed by: INTERNAL MEDICINE

## 2023-01-25 PROCEDURE — 93306 ECHO (CUPID ONLY): ICD-10-PCS | Mod: 26,,, | Performed by: INTERNAL MEDICINE

## 2023-01-25 PROCEDURE — 93010 ELECTROCARDIOGRAM REPORT: CPT | Mod: S$GLB,,, | Performed by: INTERNAL MEDICINE

## 2023-01-25 PROCEDURE — 3078F PR MOST RECENT DIASTOLIC BLOOD PRESSURE < 80 MM HG: ICD-10-PCS | Mod: CPTII,S$GLB,, | Performed by: INTERNAL MEDICINE

## 2023-01-25 PROCEDURE — 93306 TTE W/DOPPLER COMPLETE: CPT | Mod: 26,,, | Performed by: INTERNAL MEDICINE

## 2023-01-25 PROCEDURE — 3008F BODY MASS INDEX DOCD: CPT | Mod: CPTII,S$GLB,, | Performed by: INTERNAL MEDICINE

## 2023-01-25 PROCEDURE — 99204 PR OFFICE/OUTPT VISIT, NEW, LEVL IV, 45-59 MIN: ICD-10-PCS | Mod: 25,S$GLB,, | Performed by: INTERNAL MEDICINE

## 2023-01-25 PROCEDURE — 3074F SYST BP LT 130 MM HG: CPT | Mod: CPTII,S$GLB,, | Performed by: INTERNAL MEDICINE

## 2023-01-25 PROCEDURE — 93010 EKG 12-LEAD: ICD-10-PCS | Mod: S$GLB,,, | Performed by: INTERNAL MEDICINE
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
## 2023-01-26 ENCOUNTER — PATIENT MESSAGE (OUTPATIENT)
Dept: PLASTIC SURGERY | Facility: CLINIC | Age: 63
End: 2023-01-26
Payer: COMMERCIAL

## 2023-01-30 ENCOUNTER — PATIENT MESSAGE (OUTPATIENT)
Dept: INTERNAL MEDICINE | Facility: CLINIC | Age: 63
End: 2023-01-30
Payer: COMMERCIAL

## 2023-01-30 ENCOUNTER — PATIENT MESSAGE (OUTPATIENT)
Dept: SURGERY | Facility: OTHER | Age: 63
End: 2023-01-30
Payer: COMMERCIAL

## 2023-01-30 RX ORDER — AMOXICILLIN AND CLAVULANATE POTASSIUM 875; 125 MG/1; MG/1
1 TABLET, FILM COATED ORAL EVERY 12 HOURS
Qty: 20 TABLET | Refills: 0 | Status: ON HOLD | OUTPATIENT
Start: 2023-01-30 | End: 2023-02-17 | Stop reason: HOSPADM

## 2023-01-31 ENCOUNTER — PATIENT MESSAGE (OUTPATIENT)
Dept: SURGERY | Facility: CLINIC | Age: 63
End: 2023-01-31
Payer: COMMERCIAL

## 2023-02-06 ENCOUNTER — PATIENT MESSAGE (OUTPATIENT)
Dept: SURGERY | Facility: OTHER | Age: 63
End: 2023-02-06
Payer: COMMERCIAL

## 2023-02-06 DIAGNOSIS — J44.9 CHRONIC OBSTRUCTIVE PULMONARY DISEASE, UNSPECIFIED COPD TYPE: ICD-10-CM

## 2023-02-06 NOTE — TELEPHONE ENCOUNTER
No new care gaps identified.  [facility] Embedded Care Gaps. Reference number: [id number]. 2/06/2023   3:46:26 PM CST

## 2023-02-06 NOTE — TELEPHONE ENCOUNTER
Refill Decision Note   Lucia Matias  is requesting a refill authorization.  Brief Assessment and Rationale for Refill:  Approve     Medication Therapy Plan:       Medication Reconciliation Completed: No   Comments:     No Care Gaps recommended.     Note composed:3:47 PM 02/06/2023

## 2023-02-08 DIAGNOSIS — C50.912 MALIGNANT NEOPLASM OF LEFT FEMALE BREAST, UNSPECIFIED ESTROGEN RECEPTOR STATUS, UNSPECIFIED SITE OF BREAST: Primary | ICD-10-CM

## 2023-02-08 DIAGNOSIS — C50.919 BREAST CANCER: ICD-10-CM

## 2023-02-08 RX ORDER — SODIUM CHLORIDE 9 MG/ML
INJECTION, SOLUTION INTRAVENOUS CONTINUOUS
Status: CANCELLED | OUTPATIENT
Start: 2023-02-08

## 2023-02-08 RX ORDER — HEPARIN SODIUM 5000 [USP'U]/ML
5000 INJECTION, SOLUTION INTRAVENOUS; SUBCUTANEOUS ONCE
Status: CANCELLED | OUTPATIENT
Start: 2023-02-15

## 2023-02-08 RX ORDER — MUPIROCIN 20 MG/G
OINTMENT TOPICAL
Status: CANCELLED | OUTPATIENT
Start: 2023-02-08

## 2023-02-09 ENCOUNTER — HOSPITAL ENCOUNTER (OUTPATIENT)
Dept: PREADMISSION TESTING | Facility: OTHER | Age: 63
Discharge: HOME | End: 2023-02-09
Attending: SURGERY
Payer: COMMERCIAL

## 2023-02-09 ENCOUNTER — OFFICE VISIT (OUTPATIENT)
Dept: PLASTIC SURGERY | Facility: CLINIC | Age: 63
End: 2023-02-09
Attending: PLASTIC SURGERY
Payer: COMMERCIAL

## 2023-02-09 ENCOUNTER — PATIENT MESSAGE (OUTPATIENT)
Dept: BARIATRICS | Facility: CLINIC | Age: 63
End: 2023-02-09
Payer: COMMERCIAL

## 2023-02-09 ENCOUNTER — ANESTHESIA EVENT (OUTPATIENT)
Dept: SURGERY | Facility: OTHER | Age: 63
DRG: 580 | End: 2023-02-09
Payer: COMMERCIAL

## 2023-02-09 VITALS
TEMPERATURE: 98 F | BODY MASS INDEX: 33.68 KG/M2 | WEIGHT: 183 LBS | OXYGEN SATURATION: 96 % | HEIGHT: 62 IN | SYSTOLIC BLOOD PRESSURE: 133 MMHG | HEART RATE: 97 BPM | RESPIRATION RATE: 16 BRPM | DIASTOLIC BLOOD PRESSURE: 70 MMHG

## 2023-02-09 VITALS — HEART RATE: 97 BPM | DIASTOLIC BLOOD PRESSURE: 70 MMHG | SYSTOLIC BLOOD PRESSURE: 133 MMHG

## 2023-02-09 DIAGNOSIS — C50.912 MALIGNANT NEOPLASM OF LEFT FEMALE BREAST, UNSPECIFIED ESTROGEN RECEPTOR STATUS, UNSPECIFIED SITE OF BREAST: Primary | ICD-10-CM

## 2023-02-09 PROCEDURE — 1159F MED LIST DOCD IN RCRD: CPT | Mod: CPTII,S$GLB,, | Performed by: PLASTIC SURGERY

## 2023-02-09 PROCEDURE — 1159F PR MEDICATION LIST DOCUMENTED IN MEDICAL RECORD: ICD-10-PCS | Mod: CPTII,S$GLB,, | Performed by: PLASTIC SURGERY

## 2023-02-09 PROCEDURE — 3078F PR MOST RECENT DIASTOLIC BLOOD PRESSURE < 80 MM HG: ICD-10-PCS | Mod: CPTII,S$GLB,, | Performed by: PLASTIC SURGERY

## 2023-02-09 PROCEDURE — 99211 PR OFFICE/OUTPT VISIT, EST, LEVL I: ICD-10-PCS | Mod: S$GLB,,, | Performed by: PLASTIC SURGERY

## 2023-02-09 PROCEDURE — 3075F SYST BP GE 130 - 139MM HG: CPT | Mod: CPTII,S$GLB,, | Performed by: PLASTIC SURGERY

## 2023-02-09 PROCEDURE — 3078F DIAST BP <80 MM HG: CPT | Mod: CPTII,S$GLB,, | Performed by: PLASTIC SURGERY

## 2023-02-09 PROCEDURE — 99211 OFF/OP EST MAY X REQ PHY/QHP: CPT | Mod: S$GLB,,, | Performed by: PLASTIC SURGERY

## 2023-02-09 PROCEDURE — 3075F PR MOST RECENT SYSTOLIC BLOOD PRESS GE 130-139MM HG: ICD-10-PCS | Mod: CPTII,S$GLB,, | Performed by: PLASTIC SURGERY

## 2023-02-09 RX ORDER — PREGABALIN 50 MG/1
50 CAPSULE ORAL ONCE
Status: CANCELLED | OUTPATIENT
Start: 2023-02-09 | End: 2023-02-09

## 2023-02-09 RX ORDER — ALBUTEROL SULFATE 2.5 MG/.5ML
2.5 SOLUTION RESPIRATORY (INHALATION)
Status: CANCELLED | OUTPATIENT
Start: 2023-02-09 | End: 2023-02-09

## 2023-02-09 RX ORDER — LIDOCAINE HYDROCHLORIDE 10 MG/ML
0.5 INJECTION, SOLUTION EPIDURAL; INFILTRATION; INTRACAUDAL; PERINEURAL ONCE
Status: CANCELLED | OUTPATIENT
Start: 2023-02-09 | End: 2023-02-09

## 2023-02-09 RX ORDER — ACETAMINOPHEN 500 MG
1000 TABLET ORAL
Status: CANCELLED | OUTPATIENT
Start: 2023-02-09 | End: 2023-02-09

## 2023-02-09 RX ORDER — SODIUM CHLORIDE, SODIUM LACTATE, POTASSIUM CHLORIDE, CALCIUM CHLORIDE 600; 310; 30; 20 MG/100ML; MG/100ML; MG/100ML; MG/100ML
INJECTION, SOLUTION INTRAVENOUS CONTINUOUS
Status: CANCELLED | OUTPATIENT
Start: 2023-02-09

## 2023-02-09 NOTE — H&P
Subjective:       Patient ID: Lucia Matias is a 62 y.o. female to undergo b/l mastectomy and immediate reconstruction    Chief Complaint: No chief complaint on file.      Past Medical History:   Diagnosis Date    Allergy     Anxiety     Arthritis     Atrial flutter     Colon polyps     COPD (chronic obstructive pulmonary disease)     COPD exacerbation 10/31/2022    Depression     Diverticular disease of colon 2017    Diverticulitis     HLD (hyperlipidemia)     Hypertension     Malignant neoplasm of central portion of left breast in female, estrogen receptor positive 2022    Pancreatitis     Psoriasis     Rheumatoid arthritis     Severe obesity (BMI 35.0-39.9) with comorbidity      Past Surgical History:   Procedure Laterality Date    ABLATION  2022    Cardiac Ablation for A Flutter, Successful    ADENOIDECTOMY  1966    Tonsillitis and adnoids    BLADDER SUSPENSION      (x2)     SECTION      (x2)    ESOPHAGOGASTRODUODENOSCOPY N/A 2021    Procedure: EGD (ESOPHAGOGASTRODUODENOSCOPY);  Surgeon: Vince Rodriguez MD;  Location: [facility] OR [address] [place] [address];  Service: General;  Laterality: N/A;    LAPAROSCOPIC SLEEVE GASTRECTOMY N/A 2021    Procedure: GASTRECTOMY, SLEEVE, LAPAROSCOPIC, with intraop EGD;  Surgeon: Vince Rodriguez MD;  Location: [facility] OR [address] [place] [address];  Service: General;  Laterality: N/A;    LUNG BIOPSY  2020    RHINOPLASTY      TONSILLECTOMY      TRANSESOPHAGEAL ECHOCARDIOGRAPHY N/A 2022    Procedure: ECHOCARDIOGRAM, TRANSESOPHAGEAL;  Surgeon: Kellie Grover MD;  Location: [facility] EP LAB;  Service: Cardiology;  Laterality: N/A;     Family History   Adopted: Yes   Problem Relation Age of Onset    No Known Problems Daughter     No Known Problems Son     No Known Problems Daughter      Social History     Socioeconomic History    Marital status:    Occupational History    Occupation: clinical research coordinator    Tobacco Use    Smoking status:  Former     Packs/day: 0.00     Years: 20.00     Pack years: 0.00     Types: Cigarettes     Start date: 1979     Quit date: 2020     Years since quittin.1    Smokeless tobacco: Current   Substance and Sexual Activity    Alcohol use: Yes     Alcohol/week: 1.0 standard drink     Types: 1 Glasses of wine per week     Comment: social    Drug use: No    Sexual activity: Yes     Partners: Male     Birth control/protection: Post-menopausal   Other Topics Concern    Are you pregnant or think you may be? No    Breast-feeding No     Social Determinants of Health     Financial Resource Strain: Low Risk     Difficulty of Paying Living Expenses: Not very hard   Food Insecurity: No Food Insecurity    Worried About Running Out of Food in the Last Year: Never true    Ran Out of Food in the Last Year: Never true   Transportation Needs: No Transportation Needs    Lack of Transportation (Medical): No    Lack of Transportation (Non-Medical): No   Physical Activity: Insufficiently Active    Days of Exercise per Week: 2 days    Minutes of Exercise per Session: 20 min   Stress: Stress Concern Present    Feeling of Stress : Very much   Social Connections: Unknown    Frequency of Communication with Friends and Family: More than three times a week    Frequency of Social Gatherings with Friends and Family: Once a week    Active Member of Clubs or Organizations: Yes    Attends Club or Organization Meetings: 1 to 4 times per year    Marital Status:    Housing Stability: Low Risk     Unable to Pay for Housing in the Last Year: No    Number of Places Lived in the Last Year: 1    Unstable Housing in the Last Year: No       Current Outpatient Medications   Medication Sig Dispense Refill    acetaminophen (TYLENOL ORAL) Take by mouth as needed.      amoxicillin-clavulanate 875-125mg (AUGMENTIN) 875-125 mg per tablet Take 1 tablet by mouth every 12 (twelve) hours. 20 tablet 0    apixaban (ELIQUIS) 5 mg Tab Take 1 tablet (5 mg total)  by mouth 2 (two) times daily. (Patient taking differently: Take 5 mg by mouth 2 (two) times daily. Will hold 2/13 & 2/14) 60 tablet 5    atorvastatin (LIPITOR) 20 MG tablet Take 1 tablet (20 mg total) by mouth every evening. 90 tablet 3    B-complex with vitamin C (VITAMIN B COMPLEX-C ORAL) Take by mouth.      calcium-vitamin D 250-100 mg-unit per tablet Take 1 tablet by mouth 2 (two) times daily.      COSENTYX PEN, 2 PENS, 150 mg/mL PnIj Inject 300mg (2 pens) into the skin every 4 weeks 2 mL 11    cyanocobalamin 500 MCG tablet Take 500 mcg by mouth once daily.      diazePAM (VALIUM) 5 MG tablet Take 1 tablet (5 mg total) by mouth daily as needed for Anxiety. 30 tablet 2    fluticasone-umeclidin-vilanter (TRELEGY ELLIPTA) 100-62.5-25 mcg DsDv Inhale 1 puff into the lungs once daily. 180 each 3    multivitamin (THERAGRAN) per tablet Take 1 tablet by mouth once daily.      multivitamin with minerals (HAIR,SKIN AND NAILS ORAL) Take by mouth.      mv-mn/iron/folic acid/herb 190 (VITAMIN D3 COMPLETE ORAL) Take by mouth.      omeprazole (PRILOSEC) 40 MG capsule Take 1 capsule (40 mg total) by mouth every morning. 90 capsule 1    traZODone (DESYREL) 100 MG tablet Take 1 tablet (100 mg total) by mouth nightly as needed for Insomnia. 30 tablet 11    venlafaxine (EFFEXOR-XR) 75 MG 24 hr capsule Take 1 capsule (75 mg total) by mouth once daily. Start on Week 2 30 capsule 11     No current facility-administered medications for this visit.     Review of patient's allergies indicates:   Allergen Reactions    Succinimides Anaphylaxis     Son has        Review of Systems   All other systems reviewed and are negative.    Objective:      Vitals:    02/09/23 1207   BP: 133/70   Pulse: 97     Physical Exam  Imf to nipple is 29 cm bilaterally    Base width is 16 cm bilaterally     Imf to nipple is 8 cm bilaterally     Her BMI is 32     There was adequate infraumbilical adipose tissue     Lab Review: CBC:   Lab Results   Component  Value Date    WBC 6.68 12/28/2022    RBC 5.05 12/28/2022    HGB 15.0 12/28/2022    HCT 47.4 12/28/2022     12/28/2022     Diagnostics Review: CT: Reviewed     Assessment:       Breast cancer    Plan:     To [facility] for b/l mastectomy and NEHA flfaps

## 2023-02-09 NOTE — PRE ADMISSION SCREENING
"As per Dr. Hetal Banks via secure chat, "her PFT"s are stable, she is ok for surgery"    PFT done on 1/17/2023 located, printed and reviewed by Dr. Banrard.   "

## 2023-02-09 NOTE — DISCHARGE INSTRUCTIONS
Information to Prepare you for your Surgery    PRE-ADMIT TESTING -  [phone]    [address] [facility]        Your surgery has been scheduled at [facility]. We are pleased to have the opportunity to serve you. For Further Information please call [phone].    On the day of surgery please report to the Information Desk on the 1st floor.    CONTACT YOUR PHYSICIAN'S OFFICE THE DAY PRIOR TO YOUR SURGERY TO OBTAIN YOUR ARRIVAL TIME.     The evening before surgery do not eat anything after 9 p.m. ( this includes hard candy, chewing gum and mints).  You may only have GATORADE, POWERADE AND WATER  from 9 p.m. until you leave your home.   DO NOT DRINK ANY LIQUIDS ON THE WAY TO THE HOSPITAL.      Why does your anesthesiologist allow you to drink Gatorade/Powerade before surgery?  Gatorade/Powerade helps to increase your comfort before surgery and to decrease your nausea after surgery. The carbohydrates in Gatorade/Powerade help reduce your body's stress response to surgery.  If you are a diabetic-drink only water prior to surgery.      Current Visitor policy(12/27/2021) - Patients may have 2 visitors pre and post procedure. Only 2 visitors will be allowed in the Surgical building with the patient. No one under the age of 12 will be allowed into the facility.    SPECIAL MEDICATION INSTRUCTIONS: TAKE medications checked off by the Anesthesiologist on your Medication List.    Surgery Patients:  If you take ASPIRIN - Your PHYSICIAN/SURGEON will need to inform you IF/OR when you need to stop taking aspirin prior to your surgery.     Starting one week prior to surgery, do not take NSAIDS (Advil, Aleve, Anaprox, BC, Celebrex, Diclofenac, Goody's, Ibuprofen, Indocin, Indomethacin, Motrin, Naproxen, Voltaren, etc)   If you are not sure if you should take a medicine please call your surgeon's office.  You may take Tylenol.     Do Not Wear any make-up (especially eye  make-up) to surgery. Please remove any false eyelashes or eyelash extensions. If you arrive the day of surgery with makeup/eyelashes on you will be required to remove prior to surgery. (There is a risk of corneal abrasions if eye makeup/eyelash extensions are not removed)    Leave all valuables at home.   Do not wear any jewelry or watches, including any metal in body piercings. Jewelry must be removed prior to coming to the hospital.  There is a possibility that rings that are unable to be removed may be cut off if they are on the surgical extremity.    Please remove all hair extensions, wigs, clips and any other metal accessories/ ornaments from your hair.  These items may pose a flammable/fire risk in Surgery and must be removed.    Do not shave your surgical area at least 5 days prior to your surgery. The surgical prep will be performed at the hospital according to Infection Control regulations.    Contact Lens must be removed before surgery. Please either do not wear contact lens or bring a case and solution for storage.  Please bring a container for eyeglasses &/or dentures.  Bring any paperwork your physician has provided, such as consent forms, history and physicals, doctor's orders, etc.   Bring comfortable clothes that are loose fitting to wear upon discharge. Take into consideration the type of surgery being performed.  Maintain your diet as advised per your physician the day prior to surgery.    Adequate rest the night before surgery is advised.   Park in the Parking lot behind the Interventional Imaging Building or in the Interventional Imaging Parking Garage across the street from the parking lot. Parking is complimentary.  If you will be discharged the same day as your procedure, please arrange for a responsible adult to drive you home or to accompany you if traveling by taxi.   YOU WILL NOT BE PERMITTED TO DRIVE OR TO LEAVE THE HOSPITAL ALONE AFTER SURGERY.   If you are being discharged the same day, it is strongly recommended  that you arrange for someone to remain with you for the first 24 hrs following your surgery.    The Surgeon will speak to your family/visitor after your surgery regarding the outcome of your surgery and post op care.  The Surgeon may speak to you after your surgery, but there is a possibility you may not remember the details.  Please check with your family members regarding the conversation with the Surgeon.    We strongly recommend whoever is bringing you home be present for discharge instructions.  This will ensure a thorough understanding for your post op home care.    Visitors-Refer to current Visitor policy handouts.    Thank you for your cooperation.  The Staff of [facility].            Bathing Instructions with Hibiclens    Shower the evening before and morning of your procedure with Chlorhexidine (Hibiclens)  Do not use Chlorhexidine on your face or genitals. Do not get in your eyes.  Wash your face with water and your regular face wash/soap  Use your regular shampoo  Apply Chlorhexidine (Hibiclens) directly on your skin or on a wet washcloth and wash gently. When showering: Move away from the shower stream when applying Chlorhexidine (Hibiclens) to avoid rinsing off too soon.  Rinse thoroughly with warm water  Do not dilute Chlorhexidine (Hibiclens)   Dry off as usual. Do not apply deodorant, powder, body lotion, perfume, after shave or cologne the morning of your procedure.

## 2023-02-09 NOTE — ANESTHESIA PREPROCEDURE EVALUATION
02/09/2023  Lucia Matias is a 62 y.o., female.      Pre-op Assessment    I have reviewed the Patient Summary Reports.     I have reviewed the Nursing Notes. I have reviewed the NPO Status.   I have reviewed the Medications.     Review of Systems  Anesthesia Hx:  Family Hx of Anesthesia complications:  Malignant Hyperthermia, in a child   Denies Personal Hx of Anesthesia complications.   Social:  Former Smoker Quit 12/22, 1/2 ppd previous   Hematology/Oncology:  Hematology Normal      Current/Recent Cancer. Breast left   EENT/Dental:EENT/Dental Normal   Cardiovascular:   Hypertension Dysrhythmias (hx atrial flutter, s/p successful ablation 11/22) hyperlipidemia ECG has been reviewed.    Pulmonary:   COPD (maintained on trilogy, does not use rescue inhaler), moderate Denies Asthma. Recent URI Nodules, bx neg  Serial CTs    Finished antibx 2/8 for sinus infection, rare cough, no fever, covid negative   Renal/:  Renal/ Normal     Hepatic/GI:  Hepatic/GI Normal Gastric sleeve   Musculoskeletal:   Arthritis (RA, psoriatic)     Neurological:  Neurology Normal    Endocrine:  Endocrine Normal  Obesity / BMI > 30  Dermatological:  Skin Normal Psoriasis   Psych:  Psychiatric Normal           Physical Exam  General: Cooperative, Alert and Oriented    Airway:  Mallampati: I   Mouth Opening: Normal  TM Distance: Normal  Neck ROM: Normal ROM    Dental:  Intact        Anesthesia Plan  Type of Anesthesia, risks & benefits discussed:    Anesthesia Type: Gen ETT  Intra-op Monitoring Plan: Standard ASA Monitors  Post Op Pain Control Plan: multimodal analgesia and IV/PO Opioids PRN  Induction:  IV  Airway Plan: Video  Informed Consent: Informed consent signed with the Patient and all parties understand the risks and agree with anesthesia plan.  All questions answered.   ASA Score: 3  Anesthesia Plan Notes: YADIEL  side devices    NOTE: FAMILY HX MH    EKG NSR 69, Repeat echo EF up to 70%  Card clearance, Dr. Dorado, in epic  Ok'd to be off [employer]    Labs in [facility]    PCP also cleared patient  PFTs on paper chart, show mod-severe COPD, no improvement with bronchodilator    Ready For Surgery From Anesthesia Perspective.     .

## 2023-02-13 ENCOUNTER — PATIENT MESSAGE (OUTPATIENT)
Dept: PHARMACY | Facility: CLINIC | Age: 63
End: 2023-02-13
Payer: COMMERCIAL

## 2023-02-13 ENCOUNTER — OFFICE VISIT (OUTPATIENT)
Dept: PSYCHIATRY | Facility: CLINIC | Age: 63
End: 2023-02-13
Payer: COMMERCIAL

## 2023-02-13 ENCOUNTER — SPECIALTY PHARMACY (OUTPATIENT)
Dept: PHARMACY | Facility: CLINIC | Age: 63
End: 2023-02-13
Payer: COMMERCIAL

## 2023-02-13 DIAGNOSIS — F33.40 MDD (RECURRENT MAJOR DEPRESSIVE DISORDER) IN REMISSION: ICD-10-CM

## 2023-02-13 DIAGNOSIS — F41.1 GAD (GENERALIZED ANXIETY DISORDER): ICD-10-CM

## 2023-02-13 DIAGNOSIS — F41.0 PANIC ATTACK: ICD-10-CM

## 2023-02-13 DIAGNOSIS — G47.00 INSOMNIA, UNSPECIFIED TYPE: ICD-10-CM

## 2023-02-13 PROCEDURE — 1160F RVW MEDS BY RX/DR IN RCRD: CPT | Mod: CPTII,95,, | Performed by: NURSE PRACTITIONER

## 2023-02-13 PROCEDURE — 99213 PR OFFICE/OUTPT VISIT, EST, LEVL III, 20-29 MIN: ICD-10-PCS | Mod: 95,,, | Performed by: NURSE PRACTITIONER

## 2023-02-13 PROCEDURE — 1160F PR REVIEW ALL MEDS BY PRESCRIBER/CLIN PHARMACIST DOCUMENTED: ICD-10-PCS | Mod: CPTII,95,, | Performed by: NURSE PRACTITIONER

## 2023-02-13 PROCEDURE — 1159F PR MEDICATION LIST DOCUMENTED IN MEDICAL RECORD: ICD-10-PCS | Mod: CPTII,95,, | Performed by: NURSE PRACTITIONER

## 2023-02-13 PROCEDURE — 1159F MED LIST DOCD IN RCRD: CPT | Mod: CPTII,95,, | Performed by: NURSE PRACTITIONER

## 2023-02-13 PROCEDURE — 99213 OFFICE O/P EST LOW 20 MIN: CPT | Mod: 95,,, | Performed by: NURSE PRACTITIONER

## 2023-02-13 RX ORDER — TRAZODONE HYDROCHLORIDE 100 MG/1
100 TABLET ORAL NIGHTLY PRN
Qty: 90 TABLET | Refills: 3 | Status: SHIPPED | OUTPATIENT
Start: 2023-02-13 | End: 2023-05-17

## 2023-02-13 RX ORDER — VENLAFAXINE HYDROCHLORIDE 75 MG/1
75 CAPSULE, EXTENDED RELEASE ORAL DAILY
Qty: 90 CAPSULE | Refills: 3 | Status: SHIPPED | OUTPATIENT
Start: 2023-02-13 | End: 2023-06-01 | Stop reason: SDUPTHER

## 2023-02-13 RX ORDER — DIAZEPAM 5 MG/1
5 TABLET ORAL DAILY PRN
Qty: 30 TABLET | Refills: 2 | Status: SHIPPED | OUTPATIENT
Start: 2023-02-13 | End: 2023-04-12 | Stop reason: SDUPTHER

## 2023-02-13 NOTE — PROGRESS NOTES
Outpatient Psychiatry Follow-Up Visit (MD/NP)    2/13/2023    Clinical Status of Patient:  Outpatient (Ambulatory)    Chief Complaint:  Lucia Matias is a 62 y.o. female who presents today for follow-up of anxiety.  Met with patient.      Last visit was: 1/12/23  Chart and  reviewed.   The patient location is: home   The chief complaint leading to consultation is: anxiety    Visit type: audiovisual    Face to Face time with patient: 29 minutes  30 minutes of total time spent on the encounter, which includes face to face time and non-face to face time preparing to see the patient (eg, review of tests), Obtaining and/or reviewing separately obtained history, Documenting clinical information in the electronic or other health record, Independently interpreting results (not separately reported) and communicating results to the patient/family/caregiver, or Care coordination (not separately reported).     Each patient to whom he or she provides medical services by telemedicine is:  (1) informed of the relationship between the physician and patient and the respective role of any other health care provider with respect to management of the patient; and (2) notified that he or she may decline to receive medical services by telemedicine and may withdraw from such care at any time.    Interval History and Content of Current Session:  Current Psychiatric Medications/changes  Effexor XR 37.5 mg daily x 1 week then increase to 75 mg daily  Stop Ambien and Klonopin  Valium 5 mg daily as needed for severe anxiety  Trazodone 100 mg before bed as needed for insomnia      Virtual Visit: Pt presents bright affect and euthymic mood. Pt reports good response to Effexor for anxiety and mood. Feels situational anxiety about masectomy this week but feels she will feel better afterwards.   Pt has fears of body image issues from upcoming masectomy.  Recommended pt establish care with social work for therapy. Reports good  response to Trazodone for sleep and Valium for anxiety. No overuse. Thought processes appear clear and organized. Denies SI/HI/AVH.    Previous medication trial:  Prozac, Paxil, Wellbutrin - notes she felt worse, more depressed, SI - admits it could have also been the situation she was in at the time.     Psychotherapy:  Target symptoms: anxiety   Why chosen therapy is appropriate versus another modality: relevant to diagnosis  Outcome monitoring methods: self-report  Therapeutic intervention type: insight oriented psychotherapy  Topics discussed/themes: building skills sets for symptom management, symptom recognition  The patient's response to the intervention is accepting. The patient's progress toward treatment goals is good.   Duration of intervention: 15 minutes.    Review of Systems   PSYCHIATRIC: Pertinant items are noted in the narrative.  CONSTITUTIONAL: No weight gain or loss.   MUSCULOSKELETAL: No pain or stiffness of the joints.  NEUROLOGIC: No weakness, sensory changes, seizures, confusion, memory loss, tremor or other abnormal movements.  ENDOCRINE: No polydipsia or polyuria.  INTEGUMENTARY: No rashes or lacerations.  EYES: No exophthalmos, jaundice or blindness.  ENT: No dizziness, tinnitus or hearing loss.  RESPIRATORY: No shortness of breath.  CARDIOVASCULAR: No tachycardia or chest pain.  GASTROINTESTINAL: No nausea, vomiting, pain, constipation or diarrhea.  GENITOURINARY: No frequency, dysuria or sexual dysfunction.  HEMATOLOGIC/LYMPHATIC: No excessive bleeding, prolonged or excessive bleeding after dental extraction/injury.  ALLERGIC/IMMUNOLOGIC: No allergic response to materials, foods or animals at this time.    Past Medical, Family and Social History: The patient's past medical, family and social history have been reviewed and updated as appropriate within the electronic medical record - see encounter notes.    Compliance: yes    Side effects: see above    Risk Parameters:  Patient reports no  suicidal ideation  Patient reports no homicidal ideation  Patient reports no self-injurious behavior  Patient reports no violent behavior    Exam (detailed: at least 9 elements; comprehensive: all 15 elements)   Constitutional  Vitals:  Most recent vital signs, dated greater than 90 days prior to this appointment, were reviewed.   There were no vitals filed for this visit.       General:  unremarkable, age appropriate     Musculoskeletal  Muscle Strength/Tone:  not examined   Gait & Station:  non-ataxic     Psychiatric  Speech:  no latency; no press   Mood & Affect:  steady  congruent and appropriate   Thought Process:  normal and logical   Associations:  intact   Thought Content:  normal, no suicidality, no homicidality, delusions, or paranoia   Insight:  intact   Judgement: behavior is adequate to circumstances   Orientation:  grossly intact   Memory: intact for content of interview   Language: grossly intact   Attention Span & Concentration:  able to focus   Fund of Knowledge:  intact and appropriate to age and level of education     Assessment and Diagnosis   Status/Progress: Based on the examination today, the patient's problem(s) is/are inadequately controlled.  New problems have not been presented today.   Co-morbidities and Lack of compliance are not complicating management of the primary condition.  There are no active rule-out diagnoses for this patient at this time.     General Impression:       ICD-10-CM ICD-9-CM   1. RANDI (generalized anxiety disorder)  F41.1 300.02   2. Panic attack  F41.0 300.01   3. Insomnia, unspecified type  G47.00 780.52   4. MDD (recurrent major depressive disorder) in remission  F33.40 296.35       Intervention/Counseling/Treatment Plan   Medication Management: The risks and benefits of medication were discussed with the patient.  Continue Effexor XR 75 mg daily  Continue Valium 5 mg daily as needed for severe anxiety  Trazodone 100 mg before bed as needed for insomnia    Return  to Clinic: 3 months    Risks, benefits, side effects and alternative treatments discussed with patient. Patient agrees with the current plan as documented.  Encouraged Patient to keep future appointments.  Take medications as prescribed and abstain from substance abuse.  Pt to present to ED for thoughts to harm herself or others

## 2023-02-13 NOTE — TELEPHONE ENCOUNTER
Incoming call regarding Cosentyx refill. Pt stated that she is due to inject on 2/15/23 and she is also having double Masectomy on 2/15/23. Routing to assigned pharmacist. Pt stated [facility] can call her back.

## 2023-02-14 ENCOUNTER — TELEPHONE (OUTPATIENT)
Dept: SURGERY | Facility: CLINIC | Age: 63
End: 2023-02-14
Payer: COMMERCIAL

## 2023-02-14 ENCOUNTER — PATIENT MESSAGE (OUTPATIENT)
Dept: BARIATRICS | Facility: CLINIC | Age: 63
End: 2023-02-14
Payer: COMMERCIAL

## 2023-02-14 NOTE — TELEPHONE ENCOUNTER
Confirmed arrival time for surgery at the [facility] on 2/15/23 with Dr.Amy Meehan. Arrival time for surgery is 5 am surgery is scheduled for 7 am . Nothing to eat after 9 pm this evening 2/14/23. Clear liquids Gatorade until leaving home . Leave all jewelry .  voiced understanding to this call.

## 2023-02-15 ENCOUNTER — HOSPITAL ENCOUNTER (INPATIENT)
Facility: OTHER | Age: 63
LOS: 2 days | Discharge: HOME | DRG: 580 | End: 2023-02-17
Attending: SURGERY | Admitting: PLASTIC SURGERY
Payer: COMMERCIAL

## 2023-02-15 ENCOUNTER — ANESTHESIA (OUTPATIENT)
Dept: SURGERY | Facility: OTHER | Age: 63
DRG: 580 | End: 2023-02-15
Payer: COMMERCIAL

## 2023-02-15 ENCOUNTER — HOSPITAL ENCOUNTER (OUTPATIENT)
Dept: RADIOLOGY | Facility: OTHER | Age: 63
Discharge: HOME | DRG: 580 | End: 2023-02-15
Attending: SURGERY
Payer: COMMERCIAL

## 2023-02-15 ENCOUNTER — HOSPITAL ENCOUNTER (OUTPATIENT)
Dept: RADIOLOGY | Facility: OTHER | Age: 63
Discharge: HOME | DRG: 580 | End: 2023-02-15
Attending: SURGERY | Admitting: SURGERY
Payer: COMMERCIAL

## 2023-02-15 DIAGNOSIS — C50.912 MALIGNANT NEOPLASM OF LEFT FEMALE BREAST, UNSPECIFIED ESTROGEN RECEPTOR STATUS, UNSPECIFIED SITE OF BREAST: ICD-10-CM

## 2023-02-15 DIAGNOSIS — C50.112 MALIGNANT NEOPLASM OF CENTRAL PORTION OF LEFT BREAST IN FEMALE, ESTROGEN RECEPTOR POSITIVE: ICD-10-CM

## 2023-02-15 DIAGNOSIS — I48.92 ATRIAL FLUTTER, UNSPECIFIED TYPE: ICD-10-CM

## 2023-02-15 DIAGNOSIS — C50.919 BREAST CANCER: ICD-10-CM

## 2023-02-15 DIAGNOSIS — Z17.0 MALIGNANT NEOPLASM OF CENTRAL PORTION OF LEFT BREAST IN FEMALE, ESTROGEN RECEPTOR POSITIVE: ICD-10-CM

## 2023-02-15 DIAGNOSIS — J44.9 CHRONIC OBSTRUCTIVE PULMONARY DISEASE, UNSPECIFIED COPD TYPE: Primary | ICD-10-CM

## 2023-02-15 DIAGNOSIS — I48.92 ATRIAL FLUTTER: ICD-10-CM

## 2023-02-15 LAB — POCT GLUCOSE: 95 MG/DL (ref 70–110)

## 2023-02-15 PROCEDURE — 36000709 HC OR TIME LEV III EA ADD 15 MIN: Performed by: SURGERY

## 2023-02-15 PROCEDURE — P9045 ALBUMIN (HUMAN), 5%, 250 ML: HCPCS | Mod: JG | Performed by: NURSE ANESTHETIST, CERTIFIED REGISTERED

## 2023-02-15 PROCEDURE — 88341 PR IHC OR ICC EACH ADD'L SINGLE ANTIBODY  STAINPR: ICD-10-PCS | Mod: 26,,, | Performed by: PATHOLOGY

## 2023-02-15 PROCEDURE — C9290 INJ, BUPIVACAINE LIPOSOME: HCPCS | Performed by: PLASTIC SURGERY

## 2023-02-15 PROCEDURE — A4216 STERILE WATER/SALINE, 10 ML: HCPCS | Performed by: NURSE ANESTHETIST, CERTIFIED REGISTERED

## 2023-02-15 PROCEDURE — 25000003 PHARM REV CODE 250: Performed by: PLASTIC SURGERY

## 2023-02-15 PROCEDURE — A6011 COLLAGEN GEL/PASTE WOUND FIL: HCPCS | Performed by: SURGERY

## 2023-02-15 PROCEDURE — 25000003 PHARM REV CODE 250: Performed by: NURSE ANESTHETIST, CERTIFIED REGISTERED

## 2023-02-15 PROCEDURE — 63600175 PHARM REV CODE 636 W HCPCS: Performed by: NURSE ANESTHETIST, CERTIFIED REGISTERED

## 2023-02-15 PROCEDURE — 88307 PR  SURG PATH,LEVEL V: ICD-10-PCS | Mod: 26,,, | Performed by: PATHOLOGY

## 2023-02-15 PROCEDURE — 37000009 HC ANESTHESIA EA ADD 15 MINS: Performed by: SURGERY

## 2023-02-15 PROCEDURE — 88342 IMHCHEM/IMCYTCHM 1ST ANTB: CPT | Mod: 26,,, | Performed by: PATHOLOGY

## 2023-02-15 PROCEDURE — 63600175 PHARM REV CODE 636 W HCPCS: Performed by: ANESTHESIOLOGY

## 2023-02-15 PROCEDURE — 25000003 PHARM REV CODE 250: Performed by: STUDENT IN AN ORGANIZED HEALTH CARE EDUCATION/TRAINING PROGRAM

## 2023-02-15 PROCEDURE — 63600175 PHARM REV CODE 636 W HCPCS: Performed by: PLASTIC SURGERY

## 2023-02-15 PROCEDURE — 19301 PR MASTECTOMY, PARTIAL: ICD-10-PCS | Mod: 50,,, | Performed by: SURGERY

## 2023-02-15 PROCEDURE — 36000708 HC OR TIME LEV III 1ST 15 MIN: Performed by: SURGERY

## 2023-02-15 PROCEDURE — 71000033 HC RECOVERY, INTIAL HOUR: Performed by: SURGERY

## 2023-02-15 PROCEDURE — 19301 PARTIAL MASTECTOMY: CPT | Mod: 50,,, | Performed by: SURGERY

## 2023-02-15 PROCEDURE — C1769 GUIDE WIRE: HCPCS | Performed by: SURGERY

## 2023-02-15 PROCEDURE — 88331 PATH CONSLTJ SURG 1 BLK 1SPC: CPT | Mod: 26,,, | Performed by: PATHOLOGY

## 2023-02-15 PROCEDURE — 71000039 HC RECOVERY, EACH ADD'L HOUR: Performed by: SURGERY

## 2023-02-15 PROCEDURE — 37000008 HC ANESTHESIA 1ST 15 MINUTES: Performed by: SURGERY

## 2023-02-15 PROCEDURE — 76098 X-RAY EXAM SURGICAL SPECIMEN: CPT | Mod: TC

## 2023-02-15 PROCEDURE — 88331 PATH CONSLTJ SURG 1 BLK 1SPC: CPT | Performed by: PATHOLOGY

## 2023-02-15 PROCEDURE — 27800903 OPTIME MED/SURG SUP & DEVICES OTHER IMPLANTS: Performed by: SURGERY

## 2023-02-15 PROCEDURE — 88341 IMHCHEM/IMCYTCHM EA ADD ANTB: CPT | Mod: 26,,, | Performed by: PATHOLOGY

## 2023-02-15 PROCEDURE — 88307 TISSUE EXAM BY PATHOLOGIST: CPT | Mod: 59 | Performed by: PATHOLOGY

## 2023-02-15 PROCEDURE — 88342 IMHCHEM/IMCYTCHM 1ST ANTB: CPT | Mod: 59 | Performed by: PATHOLOGY

## 2023-02-15 PROCEDURE — 38500 BIOPSY/REMOVAL LYMPH NODES: CPT | Mod: 51,LT,, | Performed by: SURGERY

## 2023-02-15 PROCEDURE — 88331 PR  PATH CONSULT IN SURG,W FRZ SEC: ICD-10-PCS | Mod: 26,,, | Performed by: PATHOLOGY

## 2023-02-15 PROCEDURE — C1729 CATH, DRAINAGE: HCPCS | Performed by: SURGERY

## 2023-02-15 PROCEDURE — 38792 PR IDENTIFY SENTINEL 2DE: ICD-10-PCS | Mod: LT,,, | Performed by: SURGERY

## 2023-02-15 PROCEDURE — 27201423 OPTIME MED/SURG SUP & DEVICES STERILE SUPPLY: Performed by: SURGERY

## 2023-02-15 PROCEDURE — 25000003 PHARM REV CODE 250: Performed by: ANESTHESIOLOGY

## 2023-02-15 PROCEDURE — A9520 TC99 TILMANOCEPT DIAG 0.5MCI: HCPCS

## 2023-02-15 PROCEDURE — 88341 IMHCHEM/IMCYTCHM EA ADD ANTB: CPT | Mod: 59 | Performed by: PATHOLOGY

## 2023-02-15 PROCEDURE — S2068 PR  BREAST DIEP OR SIEA FLAP: ICD-10-PCS | Mod: 82,50,, | Performed by: PLASTIC SURGERY

## 2023-02-15 PROCEDURE — 38792 RA TRACER ID OF SENTINL NODE: CPT | Mod: LT,,, | Performed by: SURGERY

## 2023-02-15 PROCEDURE — 99900035 HC TECH TIME PER 15 MIN (STAT)

## 2023-02-15 PROCEDURE — 88342 CHG IMMUNOCYTOCHEMISTRY: ICD-10-PCS | Mod: 26,,, | Performed by: PATHOLOGY

## 2023-02-15 PROCEDURE — 63600175 PHARM REV CODE 636 W HCPCS: Performed by: STUDENT IN AN ORGANIZED HEALTH CARE EDUCATION/TRAINING PROGRAM

## 2023-02-15 PROCEDURE — S5010 5% DEXTROSE AND 0.45% SALINE: HCPCS | Performed by: STUDENT IN AN ORGANIZED HEALTH CARE EDUCATION/TRAINING PROGRAM

## 2023-02-15 PROCEDURE — 88307 TISSUE EXAM BY PATHOLOGIST: CPT | Mod: 26,,, | Performed by: PATHOLOGY

## 2023-02-15 PROCEDURE — 94799 UNLISTED PULMONARY SVC/PX: CPT

## 2023-02-15 PROCEDURE — S2068 BREAST DIEP OR SIEA FLAP: HCPCS | Mod: 82,50,, | Performed by: PLASTIC SURGERY

## 2023-02-15 PROCEDURE — 11000001 HC ACUTE MED/SURG PRIVATE ROOM

## 2023-02-15 PROCEDURE — 38500 PR BIOPSY/EXCISION, LYMPH NODE(S): ICD-10-PCS | Mod: 51,LT,, | Performed by: SURGERY

## 2023-02-15 DEVICE — COUPLER 2.0MM: Type: IMPLANTABLE DEVICE | Site: BREAST | Status: FUNCTIONAL

## 2023-02-15 DEVICE — COUPLER MICROVAS ANSTMS 2.5MM: Type: IMPLANTABLE DEVICE | Site: BREAST | Status: FUNCTIONAL

## 2023-02-15 RX ORDER — MEPERIDINE HYDROCHLORIDE 25 MG/ML
12.5 INJECTION INTRAMUSCULAR; INTRAVENOUS; SUBCUTANEOUS ONCE AS NEEDED
Status: DISCONTINUED | OUTPATIENT
Start: 2023-02-15 | End: 2023-02-15 | Stop reason: HOSPADM

## 2023-02-15 RX ORDER — DIPHENHYDRAMINE HYDROCHLORIDE 50 MG/ML
25 INJECTION INTRAMUSCULAR; INTRAVENOUS EVERY 6 HOURS PRN
Status: DISCONTINUED | OUTPATIENT
Start: 2023-02-15 | End: 2023-02-15 | Stop reason: HOSPADM

## 2023-02-15 RX ORDER — SUCRALFATE 1 G/10ML
1 SUSPENSION ORAL EVERY 6 HOURS
Status: DISCONTINUED | OUTPATIENT
Start: 2023-02-15 | End: 2023-02-15

## 2023-02-15 RX ORDER — ACETAMINOPHEN 500 MG
1000 TABLET ORAL
Status: COMPLETED | OUTPATIENT
Start: 2023-02-15 | End: 2023-02-15

## 2023-02-15 RX ORDER — KETAMINE HCL IN 0.9 % NACL 50 MG/5 ML
SYRINGE (ML) INTRAVENOUS
Status: DISCONTINUED | OUTPATIENT
Start: 2023-02-15 | End: 2023-02-15

## 2023-02-15 RX ORDER — DIAZEPAM 5 MG/1
5 TABLET ORAL DAILY PRN
Status: DISCONTINUED | OUTPATIENT
Start: 2023-02-15 | End: 2023-02-17 | Stop reason: HOSPADM

## 2023-02-15 RX ORDER — CEFAZOLIN SODIUM 1 G/3ML
2 INJECTION, POWDER, FOR SOLUTION INTRAMUSCULAR; INTRAVENOUS
Status: COMPLETED | OUTPATIENT
Start: 2023-02-15 | End: 2023-02-15

## 2023-02-15 RX ORDER — PROPOFOL 10 MG/ML
VIAL (ML) INTRAVENOUS
Status: DISCONTINUED | OUTPATIENT
Start: 2023-02-15 | End: 2023-02-15

## 2023-02-15 RX ORDER — SODIUM CHLORIDE 0.9 % (FLUSH) 0.9 %
3 SYRINGE (ML) INJECTION
Status: DISCONTINUED | OUTPATIENT
Start: 2023-02-15 | End: 2023-02-15 | Stop reason: HOSPADM

## 2023-02-15 RX ORDER — OXYCODONE HYDROCHLORIDE 5 MG/1
5 TABLET ORAL
Status: DISCONTINUED | OUTPATIENT
Start: 2023-02-15 | End: 2023-02-15 | Stop reason: HOSPADM

## 2023-02-15 RX ORDER — ACETAMINOPHEN 325 MG/1
650 TABLET ORAL EVERY 6 HOURS
Status: DISCONTINUED | OUTPATIENT
Start: 2023-02-15 | End: 2023-02-17 | Stop reason: HOSPADM

## 2023-02-15 RX ORDER — ONDANSETRON 2 MG/ML
4 INJECTION INTRAMUSCULAR; INTRAVENOUS EVERY 8 HOURS PRN
Status: DISCONTINUED | OUTPATIENT
Start: 2023-02-15 | End: 2023-02-17 | Stop reason: HOSPADM

## 2023-02-15 RX ORDER — TRANEXAMIC ACID 100 MG/ML
INJECTION, SOLUTION INTRAVENOUS
Status: DISCONTINUED | OUTPATIENT
Start: 2023-02-15 | End: 2023-02-15

## 2023-02-15 RX ORDER — IPRATROPIUM BROMIDE AND ALBUTEROL SULFATE 2.5; .5 MG/3ML; MG/3ML
3 SOLUTION RESPIRATORY (INHALATION) EVERY 6 HOURS PRN
Status: DISCONTINUED | OUTPATIENT
Start: 2023-02-15 | End: 2023-02-17 | Stop reason: HOSPADM

## 2023-02-15 RX ORDER — SODIUM CHLORIDE 9 MG/ML
INJECTION, SOLUTION INTRAVENOUS CONTINUOUS
Status: DISCONTINUED | OUTPATIENT
Start: 2023-02-15 | End: 2023-02-15

## 2023-02-15 RX ORDER — ONDANSETRON HYDROCHLORIDE 2 MG/ML
INJECTION, SOLUTION INTRAMUSCULAR; INTRAVENOUS
Status: DISCONTINUED | OUTPATIENT
Start: 2023-02-15 | End: 2023-02-15

## 2023-02-15 RX ORDER — FENTANYL CITRATE 50 UG/ML
INJECTION, SOLUTION INTRAMUSCULAR; INTRAVENOUS
Status: DISCONTINUED | OUTPATIENT
Start: 2023-02-15 | End: 2023-02-15

## 2023-02-15 RX ORDER — ROCURONIUM BROMIDE 10 MG/ML
INJECTION, SOLUTION INTRAVENOUS
Status: DISCONTINUED | OUTPATIENT
Start: 2023-02-15 | End: 2023-02-15

## 2023-02-15 RX ORDER — OXYCODONE HYDROCHLORIDE 5 MG/1
5 TABLET ORAL EVERY 4 HOURS PRN
Status: DISCONTINUED | OUTPATIENT
Start: 2023-02-15 | End: 2023-02-17 | Stop reason: HOSPADM

## 2023-02-15 RX ORDER — DEXTROSE MONOHYDRATE AND SODIUM CHLORIDE 5; .45 G/100ML; G/100ML
INJECTION, SOLUTION INTRAVENOUS CONTINUOUS
Status: DISCONTINUED | OUTPATIENT
Start: 2023-02-15 | End: 2023-02-16

## 2023-02-15 RX ORDER — LIDOCAINE HYDROCHLORIDE AND EPINEPHRINE 10; 10 MG/ML; UG/ML
INJECTION, SOLUTION INFILTRATION; PERINEURAL
Status: DISCONTINUED | OUTPATIENT
Start: 2023-02-15 | End: 2023-02-15 | Stop reason: HOSPADM

## 2023-02-15 RX ORDER — PROCHLORPERAZINE EDISYLATE 5 MG/ML
5 INJECTION INTRAMUSCULAR; INTRAVENOUS EVERY 30 MIN PRN
Status: DISCONTINUED | OUTPATIENT
Start: 2023-02-15 | End: 2023-02-15 | Stop reason: HOSPADM

## 2023-02-15 RX ORDER — VENLAFAXINE HYDROCHLORIDE 37.5 MG/1
75 CAPSULE, EXTENDED RELEASE ORAL DAILY
Status: DISCONTINUED | OUTPATIENT
Start: 2023-02-15 | End: 2023-02-17 | Stop reason: HOSPADM

## 2023-02-15 RX ORDER — PAPAVERINE HYDROCHLORIDE 30 MG/ML
INJECTION INTRAMUSCULAR; INTRAVENOUS
Status: DISCONTINUED | OUTPATIENT
Start: 2023-02-15 | End: 2023-02-15 | Stop reason: HOSPADM

## 2023-02-15 RX ORDER — ACETAMINOPHEN 325 MG/1
650 TABLET ORAL EVERY 4 HOURS PRN
Status: DISCONTINUED | OUTPATIENT
Start: 2023-02-15 | End: 2023-02-15

## 2023-02-15 RX ORDER — ALBUMIN HUMAN 50 G/1000ML
SOLUTION INTRAVENOUS CONTINUOUS PRN
Status: DISCONTINUED | OUTPATIENT
Start: 2023-02-15 | End: 2023-02-15

## 2023-02-15 RX ORDER — SODIUM CHLORIDE, SODIUM LACTATE, POTASSIUM CHLORIDE, CALCIUM CHLORIDE 600; 310; 30; 20 MG/100ML; MG/100ML; MG/100ML; MG/100ML
INJECTION, SOLUTION INTRAVENOUS CONTINUOUS
Status: DISCONTINUED | OUTPATIENT
Start: 2023-02-15 | End: 2023-02-15

## 2023-02-15 RX ORDER — METHOCARBAMOL 500 MG/1
500 TABLET, FILM COATED ORAL 4 TIMES DAILY
Status: DISCONTINUED | OUTPATIENT
Start: 2023-02-15 | End: 2023-02-17 | Stop reason: HOSPADM

## 2023-02-15 RX ORDER — OXYCODONE HYDROCHLORIDE 5 MG/1
10 TABLET ORAL EVERY 4 HOURS PRN
Status: DISCONTINUED | OUTPATIENT
Start: 2023-02-15 | End: 2023-02-17 | Stop reason: HOSPADM

## 2023-02-15 RX ORDER — HYDROMORPHONE HYDROCHLORIDE 2 MG/ML
0.4 INJECTION, SOLUTION INTRAMUSCULAR; INTRAVENOUS; SUBCUTANEOUS EVERY 5 MIN PRN
Status: DISCONTINUED | OUTPATIENT
Start: 2023-02-15 | End: 2023-02-15 | Stop reason: HOSPADM

## 2023-02-15 RX ORDER — HYDROMORPHONE HYDROCHLORIDE 2 MG/ML
INJECTION, SOLUTION INTRAMUSCULAR; INTRAVENOUS; SUBCUTANEOUS
Status: DISCONTINUED | OUTPATIENT
Start: 2023-02-15 | End: 2023-02-15

## 2023-02-15 RX ORDER — MIDAZOLAM HYDROCHLORIDE 1 MG/ML
INJECTION INTRAMUSCULAR; INTRAVENOUS
Status: DISCONTINUED | OUTPATIENT
Start: 2023-02-15 | End: 2023-02-15

## 2023-02-15 RX ORDER — EPHEDRINE SULFATE 50 MG/ML
INJECTION, SOLUTION INTRAVENOUS
Status: DISCONTINUED | OUTPATIENT
Start: 2023-02-15 | End: 2023-02-15

## 2023-02-15 RX ORDER — TRAZODONE HYDROCHLORIDE 100 MG/1
100 TABLET ORAL NIGHTLY PRN
Status: DISCONTINUED | OUTPATIENT
Start: 2023-02-15 | End: 2023-02-17 | Stop reason: HOSPADM

## 2023-02-15 RX ORDER — HEPARIN SODIUM 10000 [USP'U]/ML
INJECTION, SOLUTION INTRAVENOUS; SUBCUTANEOUS
Status: DISCONTINUED | OUTPATIENT
Start: 2023-02-15 | End: 2023-02-15 | Stop reason: HOSPADM

## 2023-02-15 RX ORDER — DEXAMETHASONE SODIUM PHOSPHATE 4 MG/ML
INJECTION, SOLUTION INTRA-ARTICULAR; INTRALESIONAL; INTRAMUSCULAR; INTRAVENOUS; SOFT TISSUE
Status: DISCONTINUED | OUTPATIENT
Start: 2023-02-15 | End: 2023-02-15

## 2023-02-15 RX ORDER — KETOROLAC TROMETHAMINE 30 MG/ML
15 INJECTION, SOLUTION INTRAMUSCULAR; INTRAVENOUS 3 TIMES DAILY
Status: DISCONTINUED | OUTPATIENT
Start: 2023-02-15 | End: 2023-02-17 | Stop reason: HOSPADM

## 2023-02-15 RX ORDER — HEPARIN SODIUM 5000 [USP'U]/ML
5000 INJECTION, SOLUTION INTRAVENOUS; SUBCUTANEOUS ONCE
Status: COMPLETED | OUTPATIENT
Start: 2023-02-15 | End: 2023-02-15

## 2023-02-15 RX ORDER — VECURONIUM BROMIDE FOR INJECTION 1 MG/ML
INJECTION, POWDER, LYOPHILIZED, FOR SOLUTION INTRAVENOUS
Status: DISCONTINUED | OUTPATIENT
Start: 2023-02-15 | End: 2023-02-15

## 2023-02-15 RX ORDER — LIDOCAINE HYDROCHLORIDE 10 MG/ML
0.5 INJECTION, SOLUTION EPIDURAL; INFILTRATION; INTRACAUDAL; PERINEURAL ONCE
Status: DISCONTINUED | OUTPATIENT
Start: 2023-02-15 | End: 2023-02-15 | Stop reason: HOSPADM

## 2023-02-15 RX ORDER — MUPIROCIN 20 MG/G
OINTMENT TOPICAL
Status: DISCONTINUED | OUTPATIENT
Start: 2023-02-15 | End: 2023-02-15 | Stop reason: HOSPADM

## 2023-02-15 RX ORDER — PREGABALIN 50 MG/1
50 CAPSULE ORAL ONCE
Status: COMPLETED | OUTPATIENT
Start: 2023-02-15 | End: 2023-02-15

## 2023-02-15 RX ORDER — CEFAZOLIN SODIUM 2 G/50ML
2 SOLUTION INTRAVENOUS
Status: COMPLETED | OUTPATIENT
Start: 2023-02-15 | End: 2023-02-16

## 2023-02-15 RX ORDER — MAG HYDROX/ALUMINUM HYD/SIMETH 200-200-20
30 SUSPENSION, ORAL (FINAL DOSE FORM) ORAL
Status: DISCONTINUED | OUTPATIENT
Start: 2023-02-15 | End: 2023-02-15

## 2023-02-15 RX ORDER — LIDOCAINE HYDROCHLORIDE 20 MG/ML
INJECTION, SOLUTION EPIDURAL; INFILTRATION; INTRACAUDAL; PERINEURAL
Status: DISCONTINUED | OUTPATIENT
Start: 2023-02-15 | End: 2023-02-15

## 2023-02-15 RX ORDER — ENOXAPARIN SODIUM 100 MG/ML
40 INJECTION SUBCUTANEOUS EVERY 24 HOURS
Status: DISCONTINUED | OUTPATIENT
Start: 2023-02-15 | End: 2023-02-17 | Stop reason: HOSPADM

## 2023-02-15 RX ORDER — CYCLOBENZAPRINE HCL 5 MG
10 TABLET ORAL 3 TIMES DAILY
Status: DISCONTINUED | OUTPATIENT
Start: 2023-02-15 | End: 2023-02-15

## 2023-02-15 RX ADMIN — CEFAZOLIN 2 G: 330 INJECTION, POWDER, FOR SOLUTION INTRAMUSCULAR; INTRAVENOUS at 07:02

## 2023-02-15 RX ADMIN — OXYCODONE HYDROCHLORIDE 10 MG: 5 TABLET ORAL at 09:02

## 2023-02-15 RX ADMIN — PROPOFOL 50 MG: 10 INJECTION, EMULSION INTRAVENOUS at 08:02

## 2023-02-15 RX ADMIN — PROPOFOL 160 MG: 10 INJECTION, EMULSION INTRAVENOUS at 07:02

## 2023-02-15 RX ADMIN — VECURONIUM BROMIDE FOR INJECTION 2 MG: 1 INJECTION, POWDER, LYOPHILIZED, FOR SOLUTION INTRAVENOUS at 10:02

## 2023-02-15 RX ADMIN — HYDROMORPHONE HYDROCHLORIDE 0.6 MG: 2 INJECTION INTRAMUSCULAR; INTRAVENOUS; SUBCUTANEOUS at 08:02

## 2023-02-15 RX ADMIN — PROPOFOL 100 MG: 10 INJECTION, EMULSION INTRAVENOUS at 11:02

## 2023-02-15 RX ADMIN — CEFAZOLIN 2 G: 330 INJECTION, POWDER, FOR SOLUTION INTRAMUSCULAR; INTRAVENOUS at 11:02

## 2023-02-15 RX ADMIN — KETOROLAC TROMETHAMINE 15 MG: 30 INJECTION, SOLUTION INTRAMUSCULAR; INTRAVENOUS at 04:02

## 2023-02-15 RX ADMIN — SUGAMMADEX 100 MG: 100 INJECTION, SOLUTION INTRAVENOUS at 12:02

## 2023-02-15 RX ADMIN — TRAZODONE HYDROCHLORIDE 100 MG: 100 TABLET ORAL at 09:02

## 2023-02-15 RX ADMIN — SODIUM CHLORIDE 0.2 MCG/KG/HR: 9 INJECTION, SOLUTION INTRAMUSCULAR; INTRAVENOUS; SUBCUTANEOUS at 07:02

## 2023-02-15 RX ADMIN — PROPOFOL 50 MCG/KG/MIN: 10 INJECTION, EMULSION INTRAVENOUS at 07:02

## 2023-02-15 RX ADMIN — METHOCARBAMOL 500 MG: 500 TABLET ORAL at 09:02

## 2023-02-15 RX ADMIN — HYDROMORPHONE HYDROCHLORIDE 0.4 MG: 2 INJECTION INTRAMUSCULAR; INTRAVENOUS; SUBCUTANEOUS at 10:02

## 2023-02-15 RX ADMIN — VECURONIUM BROMIDE FOR INJECTION 4 MG: 1 INJECTION, POWDER, LYOPHILIZED, FOR SOLUTION INTRAVENOUS at 08:02

## 2023-02-15 RX ADMIN — DEXTROSE AND SODIUM CHLORIDE: 5; .45 INJECTION, SOLUTION INTRAVENOUS at 04:02

## 2023-02-15 RX ADMIN — ALBUMIN (HUMAN): 12.5 SOLUTION INTRAVENOUS at 08:02

## 2023-02-15 RX ADMIN — LIDOCAINE HYDROCHLORIDE 100 MG: 20 INJECTION, SOLUTION EPIDURAL; INFILTRATION; INTRACAUDAL; PERINEURAL at 07:02

## 2023-02-15 RX ADMIN — Medication 50 MG: at 07:02

## 2023-02-15 RX ADMIN — METHOCARBAMOL 500 MG: 500 TABLET ORAL at 04:02

## 2023-02-15 RX ADMIN — HYDROMORPHONE HYDROCHLORIDE 0.4 MG: 2 INJECTION INTRAMUSCULAR; INTRAVENOUS; SUBCUTANEOUS at 07:02

## 2023-02-15 RX ADMIN — SUGAMMADEX 200 MG: 100 INJECTION, SOLUTION INTRAVENOUS at 12:02

## 2023-02-15 RX ADMIN — FENTANYL CITRATE 100 MCG: 0.05 INJECTION, SOLUTION INTRAMUSCULAR; INTRAVENOUS at 07:02

## 2023-02-15 RX ADMIN — DEXAMETHASONE SODIUM PHOSPHATE 8 MG: 4 INJECTION, SOLUTION INTRAMUSCULAR; INTRAVENOUS at 07:02

## 2023-02-15 RX ADMIN — FENTANYL CITRATE 50 MCG: 0.05 INJECTION, SOLUTION INTRAMUSCULAR; INTRAVENOUS at 09:02

## 2023-02-15 RX ADMIN — ALBUMIN (HUMAN): 12.5 SOLUTION INTRAVENOUS at 07:02

## 2023-02-15 RX ADMIN — ONDANSETRON 4 MG: 2 INJECTION INTRAMUSCULAR; INTRAVENOUS at 11:02

## 2023-02-15 RX ADMIN — PHENYLEPHRINE HYDROCHLORIDE 0.2 MCG/KG/MIN: 10 INJECTION INTRAVENOUS at 07:02

## 2023-02-15 RX ADMIN — PROPOFOL 50 MG: 10 INJECTION, EMULSION INTRAVENOUS at 10:02

## 2023-02-15 RX ADMIN — ACETAMINOPHEN 650 MG: 325 TABLET, FILM COATED ORAL at 05:02

## 2023-02-15 RX ADMIN — DIAZEPAM 5 MG: 5 TABLET ORAL at 09:02

## 2023-02-15 RX ADMIN — VECURONIUM BROMIDE FOR INJECTION 2 MG: 1 INJECTION, POWDER, LYOPHILIZED, FOR SOLUTION INTRAVENOUS at 09:02

## 2023-02-15 RX ADMIN — EPHEDRINE SULFATE 5 MG: 50 INJECTION INTRAVENOUS at 07:02

## 2023-02-15 RX ADMIN — CEFAZOLIN SODIUM 2 G: 2 SOLUTION INTRAVENOUS at 06:02

## 2023-02-15 RX ADMIN — MUPIROCIN: 20 OINTMENT TOPICAL at 06:02

## 2023-02-15 RX ADMIN — KETOROLAC TROMETHAMINE 15 MG: 30 INJECTION, SOLUTION INTRAMUSCULAR; INTRAVENOUS at 09:02

## 2023-02-15 RX ADMIN — TRANEXAMIC ACID 1000 MG: 100 INJECTION, SOLUTION INTRAVENOUS at 09:02

## 2023-02-15 RX ADMIN — PREGABALIN 50 MG: 50 CAPSULE ORAL at 06:02

## 2023-02-15 RX ADMIN — SODIUM CHLORIDE, SODIUM LACTATE, POTASSIUM CHLORIDE, AND CALCIUM CHLORIDE: 600; 310; 30; 20 INJECTION, SOLUTION INTRAVENOUS at 07:02

## 2023-02-15 RX ADMIN — FENTANYL CITRATE 50 MCG: 0.05 INJECTION, SOLUTION INTRAMUSCULAR; INTRAVENOUS at 10:02

## 2023-02-15 RX ADMIN — ROCURONIUM BROMIDE 30 MG: 10 SOLUTION INTRAVENOUS at 07:02

## 2023-02-15 RX ADMIN — HEPARIN SODIUM 5000 UNITS: 5000 INJECTION INTRAVENOUS; SUBCUTANEOUS at 06:02

## 2023-02-15 RX ADMIN — ENOXAPARIN SODIUM 40 MG: 40 INJECTION SUBCUTANEOUS at 04:02

## 2023-02-15 RX ADMIN — ACETAMINOPHEN 1000 MG: 500 TABLET, FILM COATED ORAL at 06:02

## 2023-02-15 RX ADMIN — MIDAZOLAM HYDROCHLORIDE 2 MG: 1 INJECTION, SOLUTION INTRAMUSCULAR; INTRAVENOUS at 07:02

## 2023-02-15 RX ADMIN — ACETAMINOPHEN 650 MG: 325 TABLET, FILM COATED ORAL at 11:02

## 2023-02-15 RX ADMIN — FENTANYL CITRATE 50 MCG: 0.05 INJECTION, SOLUTION INTRAMUSCULAR; INTRAVENOUS at 08:02

## 2023-02-15 RX ADMIN — VECURONIUM BROMIDE FOR INJECTION 2 MG: 1 INJECTION, POWDER, LYOPHILIZED, FOR SOLUTION INTRAVENOUS at 11:02

## 2023-02-15 RX ADMIN — HYDROMORPHONE HYDROCHLORIDE 0.6 MG: 2 INJECTION INTRAMUSCULAR; INTRAVENOUS; SUBCUTANEOUS at 10:02

## 2023-02-15 NOTE — ANESTHESIA PROCEDURE NOTES
Intubation    Date/Time: 2/15/2023 7:23 AM  Performed by: Faby Braun CRNA  Authorized by: Lucio Clark MD     Intubation:     Induction:  Intravenous    Intubated:  Postinduction    Mask Ventilation:  Easy mask    Attempts:  1    Attempted By:  Student    Method of Intubation:  Video laryngoscopy    Blade:  Dean 3    Laryngeal View Grade: Grade I - full view of cords      Difficult Airway Encountered?: No      Complications:  None    Airway Device:  Direct and oral endotracheal tube    Airway Device Size:  7.0    Style/Cuff Inflation:  Cuffed (inflated to minimal occlusive pressure)    Inflation Amount (mL):  6    Tube secured:  22    Secured at:  The lips    Placement Verified By:  Capnometry    Complicating Factors:  None    Findings Post-Intubation:  BS equal bilateral and atraumatic/condition of teeth unchanged

## 2023-02-15 NOTE — ANESTHESIA POSTPROCEDURE EVALUATION
Anesthesia Post Evaluation    Patient: Lucia Matias    Procedure(s) Performed: Procedure(s) (LRB):  MASTECTOMY, BILATERAL (Bilateral)  BIOPSY, LYMPH NODE, SENTINEL (Left)  INJECTION, FOR SENTINEL NODE IDENTIFICATION (Left)  RECONSTRUCTION, BREAST, USING NEHA FREE FLAP (Bilateral)    Final Anesthesia Type: general      Patient location during evaluation: PACU  Patient participation: Yes- Able to Participate  Level of consciousness: awake and alert  Post-procedure vital signs: reviewed and stable  Pain management: adequate  Airway patency: patent    PONV status at discharge: No PONV  Anesthetic complications: no      Cardiovascular status: blood pressure returned to baseline  Respiratory status: unassisted and spontaneous ventilation  Hydration status: euvolemic  Follow-up not needed.          Vitals Value Taken Time   BP 95/51 02/15/23 1341   Temp 36.4 °C (97.5 °F) 02/15/23 1308   Pulse 93 02/15/23 1346   Resp 16 02/15/23 1308   SpO2 98 % 02/15/23 1346   Vitals shown include unvalidated device data.      No case tracking events are documented in the log.      Pain/Fred Score: Pain Rating Prior to Med Admin: 0 (2/15/2023  6:10 AM)  Fred Score: 4 (2/15/2023  1:38 PM)

## 2023-02-15 NOTE — BRIEF OP NOTE
[facility], [place] [facility] ([place])  Brief Operative Note    SUMMARY     Surgery Date: 2/15/2023     Surgeon(s) and Role:  Panel 1:     * Marcela Meehan MD - Primary  Panel 2:     * Ming Milian MD - Primary     * Chalo Yoon MD    Assisting Surgeon: None    Pre-op Diagnosis:  Malignant neoplasm of left female breast, unspecified estrogen receptor status, unspecified site of breast [C50.912]    Post-op Diagnosis:  Post-Op Diagnosis Codes:     * Malignant neoplasm of left female breast, unspecified estrogen receptor status, unspecified site of breast [C50.912]    Procedure(s) (LRB):  MASTECTOMY, BILATERAL (Bilateral)  BIOPSY, LYMPH NODE, SENTINEL (Left)  INJECTION, FOR SENTINEL NODE IDENTIFICATION (Left)    RECONSTRUCTION, BREAST, USING NEHA FREE FLAP (Bilateral)    Anesthesia: General    Operative Findings: Bilateral mastectomy without complication  Left sentinel lymph node biopsy  Case passed onto plastic surgery team for NEHA      Estimated Blood Loss: minimal    Estimated Blood Loss has been documented.         Specimens:   Specimen (24h ago, onward)      None        See path    [id number]

## 2023-02-15 NOTE — H&P
PRS H&P    H&P reviewed, no changes to be made  To OR for bilateral mastectomy and breast reconstruction with NEHA flaps    Fahad Coles MD  HOV  PRS  02/15/2023

## 2023-02-15 NOTE — OP NOTE
Operative Note     2/15/2023    PRE-OP DIAGNOSIS: Malignant neoplasm of left female breast, positive estrogen receptor status, overlapping site of breast       POST-OP DIAGNOSIS: same    PROCEDURES:    Procedure(s):  MASTECTOMY, BILATERAL  BIOPSY, LYMPH NODE, SENTINEL  INJECTION, FOR SENTINEL NODE IDENTIFICATION    RECONSTRUCTION, BREAST, USING NEHA FREE FLAP - to be dictated separately by Dr. Milian    SURGEON: Surgeon(s) and Role:  Panel 1:     * Marcela Meehan MD - Primary  Panel 2:     * Ming Milian MD - Primary     * Chalo Yoon MD     RESIDENT:       ANESTHESIA: General     OPERATIVE FINDINGS: Healthy appearing skin flaps at the completion of the mastectomy.  Right axillary sentinel nodes negative on frozen.    INDICATION FOR PROCEDURE: This patient presents with a history of invasive carcinoma of the left breast    PROCEDURE IN DETAIL:  Lucia Matias is a 62 y.o. female brought to the operating room for definitive surgery of invasive carcinoma of the left breast.  The patient has elected to undergo bilateral simple mastectomy with sentinel lymph node biopsy for shane assessment. The patient was informed of the possible risks and complications of the procedure, including but not limited to anesthetic risks, bleeding, infection, and need for additional surgery.  The patient concurred with the proposed plan, and has given informed consent.  The site of surgery was properly noted/marked in the preoperative holding area.     The patient was then brought to the operating room and placed in the supine position with both upper extremities extended.  local and general anesthesia was administered. Perioperative antibiotics were administered consisting of Ancef and a time out was performed confirming the patient, site, and procedure.  The patient's left breast was injected with technetium to facilitate sentinel lymph node identification. The bilateral chest and axilla was then prepped and  draped in the usual sterile fashion.    We first turned our attention to the right prophylactic breast where an a lollipop incision was created to incorporate the nipple areolar complex.  The incision was made with a plasma-blade and deepened through the subcutaneous tissues with Bovie electrocautery.  Skin flaps were raised to the clavicle superiorly, to the lateral border of the sternum medially, to the inframammary fold inferiorly, and to the anterior border of the latissimus dorsi muscle laterally. The breast tissue was sharply excised off the chest wall taking care to incorporate the pectoralis fascia while leaving the serratus fascia behind.  The resulting mastectomy specimen was marked using a short stitch superiorly and a long stitch laterally.  The breast was sent to pathology for permanent evaluation.   The operative field was irrigated with normal saline and all bleeding points were secured with Bovie electrocautery.       We then turned our attention to the left breast where a matching incision was fashioned to incorporate the nipple areolar complex.  The incision was made with a plasma-blade and extended through the subcutaneous tissues with Bovie electrocautery.  Skin flaps were raised to the clavicle superiorly.  We then  turned our attention to the left axilla.  The gamma probe was used to identify an area of increased radioactivity within the lower axilla. The clavipectoral sheath was sharply incised to reveal the level I axillary lymph nodes. The probe was used to identify a single node with increased radioactivity.  This node was brought into the operative field and carefully dissected free of the surrounding lymphovascular structures.  The highest ex vivo count of the node was elevated.  The node was then sent to pathology for frozen section evaluation, labeled as sentinel node #1.  A total of 2 axillary sentinel nodes and 0 axillary non-sentinel nodes were identified, excised and submitted to  pathology.  Bed counts were obtained to confirm that the 10% rule had not been violated.   The wound was irrigated with normal saline, and all bleeding points were secured with Bovie electrocautery.     We then proceeded to raise the remainder of the flaps to the lateral border of the sternum medially, to the inframammary fold inferiorly, and to the anterior border of the latissimus dorsi muscle laterally. The breast tissue was sharply excised off the chest wall taking care to incorporate the pectoralis fascia while leaving the serratus fascia behind.  The resulting mastectomy specimen was marked using a short stitch superiorly and long stitch laterally.  The breast was sent to pathology for permanent evaluation.      Frozen section shane evaluation revealed no evidence of metastatic disease.  Therefore, the operative field was irrigated with normal saline and all bleeding points were secured with Bovie electrocautery.  A 19 Fr shelbie drain was placed under the mastectomy flap. The incision was closed by the plastic surgery service.      Dermabond was applied. A post surgical bra was placed on the patient. At the end of the operation, all sponge, instrument, and needle counts x 2 were correct.    ESTIMATED BLOOD LOSS: less than 50 mL    COMPLICATIONS: none    DISPOSITION: PACU - hemodynamically stable.    ATTESTATION:   I performed the procedure.    [place] Node Biopsy for Breast Cancer - Left  Operation performed with curative intent Yes   Tracer(s) used to identify sentinel nodes in the upfront surgery (non-neoadjuvant) setting Radioactive tracer   Tracer(s) used to identify sentinel nodes in the neoadjuvant setting N/A   All nodes (colored or non-colored) present at the end of a dye-filled lymphatic channel were removed N/A   All significantly radioactive nodes were removed Yes   All palpably suspicious nodes were removed N/A   Biopsy-proven positive nodes marked with clips prior to chemotherapy were identified  and removed N/A

## 2023-02-15 NOTE — OR NURSING
Left breast appears larger. Continues warm, blanching and soft to palpate. Good arterial signal and no change. Secure chat message to Dr Sanket stovall. Pt without other changes.

## 2023-02-15 NOTE — H&P
Breast Surgery  [facility]  Department of Surgery        REFERRING PROVIDER: Aaareferral Self  No address on file     Chief Complaint: Breast Cancer (New Patient 2nd Opinion Left Breast Invasive Ductal Carcinoma 12/15/22 .)        Subjective:      Patient ID: Lucia Matias is a 62 y.o. female who presents with left breast Invasive Ductal Carcinoma.      She presented for screening mammogram on 22 for yearly scheduled screening. This identified Left breast focal asymmetry at the upper outer position. Follow-up mammogram and ultrasound on 22 showed a worrisome spiculated mass, 1.4 x 0.7 x 0.6 cm, 12 o'clock left breast 7 CMFN. An ultrasound guided biopsy was performed on 12/15/22 with pathology revealing infiltrating ductal carcinoma of the breast.      Findings at that time were the following:   Lesion 1:               Location: 12:00, 7cm FTN               Clip: twirl, in expected position  Tumor size: 1.4 cm   Tumor stgstrstastdstest:st st1st Estrogen Receptor: pos   Progesterone Receptor: pos   Her-2 isaiah: neg   Lymph node status: clinically negative      Patient does routinely do self breast exams.  Patient has not noted a change on breast exam.  Patient denies nipple discharge. Patient denies previous breast biopsy. Patient denies a personal history of breast cancer. Family history is unknown as she was adopted.     GYN History:  Age of menarche was 13. Age of menopause was 46.  Patient denies hormonal therapy but took OCP for approximately 15 years in the past. Patient is . Age of first live birth was 25. Patient did breast feed for 6 months.          Past Medical History:   Diagnosis Date    Allergy      Anxiety      Arthritis      Colon polyps     COPD (chronic obstructive pulmonary disease)      COPD exacerbation 10/31/2022    Depression      Diverticular disease of colon 2017    Diverticulitis      HLD (hyperlipidemia)      Hypertension      Malignant neoplasm of central portion of left  breast in female, estrogen receptor positive 2022    Pancreatitis      Psoriasis      Rheumatoid arthritis      Severe obesity (BMI 35.0-39.9) with comorbidity              Past Surgical History:   Procedure Laterality Date    ADENOIDECTOMY   1966     Tonsillitis and adnoids    BLADDER SUSPENSION         (x2)     SECTION         (x2)    ESOPHAGOGASTRODUODENOSCOPY N/A 2021     Procedure: EGD (ESOPHAGOGASTRODUODENOSCOPY);  Surgeon: Vince Rodriguez MD;  Location: [facility] OR [address] [place] [address];  Service: General;  Laterality: N/A;    LAPAROSCOPIC SLEEVE GASTRECTOMY N/A 2021     Procedure: GASTRECTOMY, SLEEVE, LAPAROSCOPIC, with intraop EGD;  Surgeon: Vince Rodriguez MD;  Location: [facility] OR [address] [place] [address];  Service: General;  Laterality: N/A;    LUNG BIOPSY   2020    RHINOPLASTY        TONSILLECTOMY        TRANSESOPHAGEAL ECHOCARDIOGRAPHY N/A 2022     Procedure: ECHOCARDIOGRAM, TRANSESOPHAGEAL;  Surgeon: Kellie Grover MD;  Location: [facility] EP LAB;  Service: Cardiology;  Laterality: N/A;             Current Outpatient Medications on File Prior to Visit   Medication Sig Dispense Refill    ALPRAZolam (XANAX) 0.5 MG tablet Take 1 tablet (0.5 mg total) by mouth daily as needed for Anxiety. 30 tablet 0    apixaban (ELIQUIS) 5 mg Tab Take 1 tablet (5 mg total) by mouth 2 (two) times daily. 60 tablet 5    atorvastatin (LIPITOR) 20 MG tablet Take 1 tablet (20 mg total) by mouth every evening. 90 tablet 3    B-complex with vitamin C (VITAMIN B COMPLEX-C ORAL) Take by mouth.        calcium-vitamin D 250-100 mg-unit per tablet Take 1 tablet by mouth 2 (two) times daily.        clonazePAM (KLONOPIN) 1 MG tablet Take 1 tablet (1 mg total) by mouth 2 (two) times daily as needed for Anxiety (panic). 60 tablet 2    COSENTYX PEN, 2 PENS, 150 mg/mL PnIj Inject 300mg (2 pens) into the skin every 4 weeks 2 mL 11    cyanocobalamin 500 MCG tablet Take 500 mcg by mouth once daily.         fluticasone-umeclidin-vilanter (TRELEGY ELLIPTA) 100-62.5-25 mcg DsDv Inhale 1 puff into the lungs once daily. 60 each 3    multivitamin (THERAGRAN) per tablet Take 1 tablet by mouth once daily.        omeprazole (PRILOSEC) 40 MG capsule Take 1 capsule (40 mg total) by mouth every morning. 90 capsule 1    zolpidem (AMBIEN) 5 MG Tab Take 1 tablet (5 mg total) by mouth nightly as needed (insomnia). 30 tablet 2    [DISCONTINUED] budesonide-formoterol 160-4.5 mcg (SYMBICORT) 160-4.5 mcg/actuation HFAA Inhale 2 puffs into the lungs every 12 (twelve) hours. Controller 30.6 g 2      No current facility-administered medications on file prior to visit.      Social History               Socioeconomic History    Marital status:    Occupational History    Occupation: clinical research coordinator    Tobacco Use    Smoking status: Every Day       Packs/day: 0.00       Years: 20.00       Pack years: 0.00       Types: Cigarettes       Start date: 1979       Last attempt to quit: 2020       Years since quittin.0    Smokeless tobacco: Current   Substance and Sexual Activity    Alcohol use: Yes       Alcohol/week: 1.0 standard drink       Types: 1 Glasses of wine per week    Drug use: No    Sexual activity: Yes       Partners: Male       Birth control/protection: Post-menopausal   Other Topics Concern    Are you pregnant or think you may be? No    Breast-feeding No      Social Determinants of Health          Financial Resource Strain: Low Risk     Difficulty of Paying Living Expenses: Not very hard   Food Insecurity: No Food Insecurity    Worried About Running Out of Food in the Last Year: Never true    Ran Out of Food in the Last Year: Never true   Transportation Needs: No Transportation Needs    Lack of Transportation (Medical): No    Lack of Transportation (Non-Medical): No   Physical Activity: Inactive    Days of Exercise per Week: 0 days    Minutes of Exercise per Session: 0 min   Stress: Stress Concern  "Present    Feeling of Stress : Very much   Social Connections: Unknown    Frequency of Communication with Friends and Family: More than three times a week    Frequency of Social Gatherings with Friends and Family: Once a week    Active Member of Clubs or Organizations: Yes    Attends Club or Organization Meetings: Patient refused    Marital Status:    Housing Stability: High Risk    Unable to Pay for Housing in the Last Year: No    Number of Places Lived in the Last Year: 4    Unstable Housing in the Last Year: No               Family History   Adopted: Yes   Problem Relation Age of Onset    No Known Problems Daughter      No Known Problems Son      No Known Problems Daughter           Review of Systems   Constitutional:  Negative for appetite change, chills, fever and unexpected weight change.   HENT:  Negative for facial swelling, postnasal drip and sore throat.    Eyes:  Negative for redness and itching.   Respiratory:  Negative for chest tightness and shortness of breath.    Cardiovascular:  Negative for chest pain and palpitations.   Gastrointestinal:  Negative for blood in stool, diarrhea, nausea and vomiting.   Genitourinary:  Negative for difficulty urinating and dysuria.   Musculoskeletal:  Negative for arthralgias and joint swelling.   Skin:  Negative for rash and wound.   Neurological:  Negative for dizziness and syncope.   Hematological:  Negative for adenopathy.   Psychiatric/Behavioral:  Negative for agitation. The patient is not nervous/anxious.    Objective:   BP (!) 115/57 (BP Location: Left arm, Patient Position: Sitting, BP Method: Large (Automatic))   Pulse 62   Temp 98.2 °F (36.8 °C) (Oral)   Ht 5' 2" (1.575 m)   Wt 80.7 kg (178 lb)   SpO2 98%   BMI 32.56 kg/m²      Physical Exam   Vitals reviewed.  Constitutional: She is oriented to person, place, and time.   HENT:   Head: Normocephalic and atraumatic.   Nose: Nose normal.   Eyes: Pupils are equal, round, and reactive to light. " Right eye exhibits no discharge. Left eye exhibits no discharge.   Pulmonary/Chest: Effort normal and breath sounds normal. No stridor. No respiratory distress. She exhibits no mass, no tenderness and no edema. Right breast exhibits no inverted nipple, no mass, no nipple discharge, no skin change and no tenderness. Left breast exhibits no inverted nipple, no mass, no nipple discharge, no skin change and no tenderness. No breast swelling or bleeding. Breasts are symmetrical.   Abdominal: Normal appearance.   Genitourinary: No breast swelling or bleeding.   Neurological: She is alert and oriented to person, place, and time.   Skin: Skin is warm and dry.      Psychiatric: Her behavior is normal. Mood, judgment and thought content normal.      Radiology review: Images personally reviewed by me in the clinic.      11/21/22 Bilateral Screening Mammo:     Findings:  This procedure was performed using tomosynthesis. Computer-aided detection was utilized in the interpretation of this examination.  The breasts are almost entirely fatty.      Left  There is a focal asymmetry seen in the upper outer quadrant of the left breast, 8 cm from the nipple.      Right  There is no evidence of suspicious masses, calcifications, or other abnormal findings in the right breast.     Impression:  Left  Focal Asymmetry: Left breast focal asymmetry at the upper outer position. Assessment: 0 - Incomplete. Diagnostic Mammogram and/or Ultrasound is recommended.      Right  There is no mammographic evidence of malignancy in the right breast.     BI-RADS Category:   Overall: 0 - Incomplete: Needs Additional Imaging Evaluation        Recommendation:  Diagnostic mammogram with possible ultrasound (if indicated) is recommended.     12/13/22 Left Diagnostic Mammo/US:     Findings:  This procedure was performed using tomosynthesis. Computer-aided detection was utilized in the interpretation of this examination.  Left  The left breast is almost entirely  fatty.     There is an irregularly shaped mass with spiculated margins seen in the left breast at 12 o'clock in the middle depth on the MLO and CC views.       Targeted left breast ultrasound 12 o'clock 7 cm from the nipple reveals a worrisome solid mass with irregular spiculated margins, 1.4 x 0.7 x 0.6 cm. No color doppler flow. This represents the mammographic finding.         Impression:  Worrisome spiculated mass, 1.4 x 0.7 x 0.6 cm, 12 o'clock left breast 7 CMFN.         BI-RADS Category:   Overall: 5 - Highly Suggestive of Malignancy        Recommendation:  Biopsy is recommended.        Assessment:       1. Malignant neoplasm of central portion of left breast in female, estrogen receptor positive    2. Pre-op testing        Plan:      Options for management were discussed with the patient and her family. We reviewed the existing data noting the equivalency of breast conserving surgery with radiation therapy and mastectomy. We also reviewed the guidelines of the National Comprehensive Cancer Network for Stage Ia carcinoma. We discussed the need for lumpectomy margins to be negative for carcinoma, the necessity for postoperative radiation therapy after breast conservation in most cases, the possibility of a failed or false negative sentinel lymph node biopsy and the potential need for complete lymphadenectomy for a failed or positive sentinel lymph node biopsy were fully discussed. In the setting of mastectomy, delayed or immediate reconstruction options are available and were discussed.      In the setting of lumpectomy, radiation therapy would be recommended majority of the time.  The duration and treatment side effects were discussed with the patient.  This will coordinated with the radiation oncologist pending final pathology.     We also discussed the role of systemic therapy in the treatment of early stage breast cancer.  We discussed that this is based on tumor biology and shane status and will be  determined based on final pathology.  We discussed that if the cancer is hormone positive, endocrine therapy would be recommended in most cases and its use can reduce the risk of recurrence as well as improve survival. Side effects of treatment were briefly discussed. We also discussed the potential role for chemotherapy based on a number of factors such as tumor phenotype (ER+ vs. triple negative vs. Smx7fgy+) and this would be determined in coordination with the medical oncologist.     Upon further review of pathology, suspect invasive lobular carcinoma instead of originally stated invasive ductal carcinoma due to negative e-cadherin.  Due to the possibility of undetected cancer on mammography, further imaging with MRI with contrast was recommended.  Following imaging results and plastic surgery recommendations, we will establish a surgical plan at her follow-up visit.  Patient expressed understanding and was amenable to the plan.        Follow-up for surgical planning will be scheduled after MRI.   She would like a bilateral mastectomy with NEAH, but understands she is high risk for complications due to smoking history (although she says she quit on 12/28).  Referral to plastic surgery.     Patient was educated on breast cancer, receptors, wire localization lumpectomy, mastectomy, sentinel lymph node mapping and biopsy, axillary lymph node dissection, reconstruction, breast prosthesis with post-mastectomy bra and radiation therapy. Patient was given patient information binder including [facility] breast cancer treatment brochure.  All her questions were answered.     Total time spent with the patient: 60 minutes.  40 minutes of face to face consultation and 20 minutes of chart review and coordination of care.

## 2023-02-15 NOTE — OP NOTE
[facility] Surg ([address])  Plastic Surgery  Operative Note    SUMMARY     Date of Procedure: 2/15/2023     Procedure: Bilateral breast reconstruction with SIEA flap    Surgeon(s) and Role:  Panel 1:     * Marcela Meehan MD - Primary  Panel 2:     * iMng Milian MD - Primary     * Chalo Yoon MD    Assisting Surgeon: None    Pre-Operative Diagnosis: Malignant neoplasm of left female breast, unspecified estrogen receptor status, unspecified site of breast [C50.912]    Post-Operative Diagnosis: * No post-op diagnosis entered *    Anesthesia: General    Description of Technical Procedures:   The patient was marked for appropriate anatomical landmarks and excision lines in the preoperative holding area by the Plastic Surgery Service. The patient was brought into the operating room and placed on a well-padded operating table in the supine position with her arms padded. Appropriate perioperative antibiotics were administered. Sequential compression devices were placed on her calves. General endotracheal anesthesia was administered via the anesthesia department. A Olson was placed under sterile conditions by nursing. The patient was prepped and draped in the usual sterile fashion. A timeout was performed per hospital protocol with all team members in agreement.     Left mastectomy was performed along with SLNB by the breast surgery team, please see their operative dictation for more information.   We then inspected the left breast pocket. Hemostasis was obtained, Exparel was injected locally to the tissue of the chest wall and mastectomy flaps. A 15F Rafi drain was placed in the breast pocket and secured at a separate exit site with 2-0 nylon suture. Lateral breast tissue was secured to the chest wall with 2-0 vicryl sutures. We then turned our attention to the medial aspect of the breast pocket. Large internal mammary vein (IMV) and internal mammary artery (JUSTIN) perforators were then identified and  dissected to the intercostal space between the second and third rib. These were deemed appropriate for anastomosis to the SIEA vessels of the flap. Hemostasis was assured. Moist sponge was placed in the chest.      Elevation of the right SIEA flaps then commenced. Beginning inferiorly, the markings of the abdomen were incised using 10 blade scalpel and dissection performed with Bovie electrocautery bilaterally. We were able to identify and preserve the superficial inferior epigastric vein (SIEV) and SIEA. The flaps was then elevated and  perforators were ligated and divided. The SIEA/SIEV vessels were dissected circumferentially to their takeoff. We then hemoclipped and transected the SIEA/SIEV at their origins. The flap weight was 812 grams.     Afterwards the flap was brought to the L breast mastectomy site. The vessels of both the flap and recipient artery and vein were prepared by trimming the adventitia. A 2.0 venous anastomotic  was used to anastomose the SIEV with the IMV . The arterial anastomosis was performed with a 9-0 nylon suture in interrupted fashion in an anterograde fashion. At the conclusion of the anastomosis, excellent blood flow was clinically observed in the artery and both veins. Bright red bleeding was observed on the flap and on the skin paddle. The flap was noted to be warm, pink, and well-perfused. A skin paddle was designed and the remainder of the flap was de-epithelialized with facelift scissors. The flap was then approximated within the breast pocket with staples.     We then inspected the right breast pocket. Exparel was injected locally to the tissue of the chest wall and mastectomy flaps. A 15F Rafi drain was placed in the breast pocket and secured at a separate exit site with 2-0 nylon suture. Lateral breast tissue was secured to the chest wall with 2-0 vicryl sutures. Hemostasis was assured. We were unable to find large perforators and therefore we then moved to  exposure of the internal mammary vessels on the right chest. The third rib was palpated. The pectoralis muscle overlying the third rib was then dissected from the sternum to reveal third rib. We then identified the third costochondral cartilage. The cartilage was then scored with the electrocautery. The perichodrium was then elevated off the cartilage using a freer elevator. The cartilage was then removed piecemeal with a Ronjeur. The internal mammary vein (IMV) and internal mammary artery (JUSTIN) were then identified running under a thin layer of the perichondrium posteriorly. An IMV  was also identified and preserved. To assure sufficient length for the anastomosis, the intercostal muscle in the intercostal space between the second and third rib was dissected free and removed. The JUSTIN and IMV were then circumferentially dissected with Bipolar cautery. Moist sponge was placed in the chest.     Elevation of the left SIEA flaps then commenced. Beginning inferiorly, the markings of the abdomen were incised using 10 blade scalpel and dissection performed with Bovie electrocautery bilaterally. We were able to identify and preserve the superficial inferior epigastric vein (SIEV) and SIEA. The flaps was then elevated and  perforators were ligated and divided. The SIEA/SIEV vessels were dissected circumferentially to their takeoff. We then hemoclipped and transected the SIEA/SIEV at their origins. The flap weight was 802 grams.    The flap was brought to the R breast mastectomy site. The vessels of both the flap and recipient artery and vein were prepared by trimming the adventitia. A 2.5 venous anastomotic  was used to anastomose the DIEV with the IMV VC in an anterograde fashion. The arterial anastomosis was performed with a 9-0 nylon suture in interrupted fashion in an anterograde fashion. At the conclusion of the anastomosis, excellent blood flow was clinically observed in the artery and both veins.  Bright red bleeding was observed on the flap and on the skin paddle. The flap was noted to be warm, pink, and well-perfused. A skin paddle was designed and the remainder of the flap was de-epithelialized with facelift scissors. The flap was then approximated within the breast pocket with staples.      After shaping the right and left flaps to create aesthetically pleasing appearance, we inset the flaps within the breast pockets. The mastectomy flaps were tailor tacked to remove excess inferior vertical and horizontal mastectomy skin. The flap was inset to the chest wall with 2-0 vicryl sutures. Then the breast envelope was closed using 2-0 Quill to reapproximate the dermis/epidermis. A strong external doppler signal was found on both skin paddles of bilateral breast flaps.      The donor site on the abdomen was then closed. The wound was irrigated with copious amounts of normal saline and hemostasis was achieved with Bovie electrocautery. The rectus fascia was then repaired with 0 prolene suture in a running fashion. The abdominal flap elevation was then continued superiorly toward the xiphoid and costal margins bilaterally.  The patient was then placed in a flexed position. Hemostasis was assured. Cellerate was applied to the abdominal wall. 2 19 Fr VIANEY drain was placed under the abdominal skin flaps bilaterally and secured laterally using 3-0 nylon suture. Exparel was injected locally to the tissue. Hemostasis was assured. The abdomen was then closed using 2-0 vicryl suture re-approximate Willi's fascia and 2 layer Quill to reapproximate the dermis/epidermis  The incisions of the abdomen were cleaned, dried, and Exofin surgical tape and adhesive were applied. Xeroform was applied to the breasts. dry dressings including ABDs were placed atop both the breast and the abdomen.     At this time we had difficulty finding the external doppler signal on the L breast. The incisions were re-opened and the anastomosis  inspected. A strong pulse up to the flap was observed with no issues of kinking. An internal doppler was then placed on the artery and breast was then closed in the previous manner.       A surgical bra were placed. At the end of the procedure, all needle, sponge, and instrument counts were correct on two occasions. An external and internal doppler signal was appreciated on both the right and left flaps respectively. The patient was then awakened from general anesthesia without complication and returned to the post-anesthesia recovery unit in stable condition.      Dr. Milian and Dr. Yoon were present and scrubbed through key portions of the case.     Estimated Blood Loss (EBL): 150 mL           Implants:   Implant Name Type Inv. Item Serial No.  Lot No. LRB No. Used Action    2.0MM - [id number]   2.0MM  [employer] [id number] Left 1 Implanted    [employer] ANSTMS 2.5MM - [id number]   [employer] ANSTMS 2.5MM  [employer] [id number] Right 1 Implanted       Specimens:   Specimen (24h ago, onward)       Start     Ordered    02/15/23 1203  Specimen to Pathology, Surgery Breast  Once        Comments: Pre-op Diagnosis: Malignant neoplasm of left female breast, unspecified estrogen receptor status, unspecified site of breast [C50.912]Procedure(s):MASTECTOMY, BILATERALBIOPSY, LYMPH NODE, SENTINELINJECTION, FOR SENTINEL NODE IDENTIFICATIONRECONSTRUCTION, BREAST, USING NEHA FREE FLAP Number of specimens: 4Name of specimens: 1. Left breast, short stitch superior, long lateral2. Left axillary sentinel lymph node, hot 445 (frozen)3. Left axillary sentinel lymph node, hot 85 (frozen)4. Right breast, short stitch superior, long lateral     References:    Click here for ordering Quick Tip   Question Answer Comment   Procedure Type: Breast    Specimen Class: Known or suspected malignancy    Which provider would you like to cc? STEVE DOUGLAS    Release to  patient Immediate        02/15/23 1229                            Condition: Good    Disposition: PACU - hemodynamically stable.

## 2023-02-15 NOTE — TRANSFER OF CARE
Anesthesia Transfer of Care Note    Patient: Lucia Matias    Procedure(s) Performed: Procedure(s) (LRB):  MASTECTOMY, BILATERAL (Bilateral)  BIOPSY, LYMPH NODE, SENTINEL (Left)  INJECTION, FOR SENTINEL NODE IDENTIFICATION (Left)  RECONSTRUCTION, BREAST, USING NEHA FREE FLAP (Bilateral)    Patient location: PACU    Anesthesia Type: general    Transport from OR: Transported from OR on 2-3 L/min O2 by NC with adequate spontaneous ventilation    Post pain: adequate analgesia    Post assessment: no apparent anesthetic complications    Post vital signs: stable    Level of consciousness: awake, alert and oriented    Nausea/Vomiting: no nausea/vomiting    Complications: none    Transfer of care protocol was followed      Last vitals:   Visit Vitals  /71   Temp 36.6 °C (97.9 °F)   Breastfeeding No

## 2023-02-16 PROBLEM — E66.811 CLASS 1 OBESITY DUE TO EXCESS CALORIES WITHOUT SERIOUS COMORBIDITY WITH BODY MASS INDEX (BMI) OF 33.0 TO 33.9 IN ADULT: Chronic | Status: ACTIVE | Noted: 2021-03-16

## 2023-02-16 PROBLEM — F41.8 DEPRESSION WITH ANXIETY: Chronic | Status: ACTIVE | Noted: 2019-08-06

## 2023-02-16 PROBLEM — E66.09 CLASS 1 OBESITY DUE TO EXCESS CALORIES WITHOUT SERIOUS COMORBIDITY WITH BODY MASS INDEX (BMI) OF 33.0 TO 33.9 IN ADULT: Chronic | Status: ACTIVE | Noted: 2021-03-16

## 2023-02-16 PROBLEM — I10 ESSENTIAL HYPERTENSION: Chronic | Status: ACTIVE | Noted: 2019-12-20

## 2023-02-16 LAB
ANION GAP SERPL CALC-SCNC: 8 MMOL/L (ref 8–16)
BASOPHILS # BLD AUTO: 0.01 K/UL (ref 0–0.2)
BASOPHILS NFR BLD: 0.1 % (ref 0–1.9)
BUN SERPL-MCNC: 12 MG/DL (ref 6–20)
CALCIUM SERPL-MCNC: 8.8 MG/DL (ref 8.7–10.5)
CHLORIDE SERPL-SCNC: 107 MMOL/L (ref 95–110)
CO2 SERPL-SCNC: 24 MMOL/L (ref 23–29)
CREAT SERPL-MCNC: 0.8 MG/DL (ref 0.5–1.4)
DIFFERENTIAL METHOD: ABNORMAL
EOSINOPHIL # BLD AUTO: 0 K/UL (ref 0–0.5)
EOSINOPHIL NFR BLD: 0 % (ref 0–8)
ERYTHROCYTE [DISTWIDTH] IN BLOOD BY AUTOMATED COUNT: 12.3 % (ref 11.5–14.5)
EST. GFR  (NO RACE VARIABLE): >60 ML/MIN/1.73 M^2
GLUCOSE SERPL-MCNC: 120 MG/DL (ref 70–110)
HCT VFR BLD AUTO: 38 % (ref 37–48.5)
HGB BLD-MCNC: 12.4 G/DL (ref 12–16)
IMM GRANULOCYTES # BLD AUTO: 0.09 K/UL (ref 0–0.04)
IMM GRANULOCYTES NFR BLD AUTO: 0.7 % (ref 0–0.5)
LYMPHOCYTES # BLD AUTO: 0.7 K/UL (ref 1–4.8)
LYMPHOCYTES NFR BLD: 5.5 % (ref 18–48)
MCH RBC QN AUTO: 30.2 PG (ref 27–31)
MCHC RBC AUTO-ENTMCNC: 32.6 G/DL (ref 32–36)
MCV RBC AUTO: 93 FL (ref 82–98)
MONOCYTES # BLD AUTO: 0.9 K/UL (ref 0.3–1)
MONOCYTES NFR BLD: 7.3 % (ref 4–15)
NEUTROPHILS # BLD AUTO: 10.8 K/UL (ref 1.8–7.7)
NEUTROPHILS NFR BLD: 86.4 % (ref 38–73)
NRBC BLD-RTO: 0 /100 WBC
PLATELET # BLD AUTO: 199 K/UL (ref 150–450)
PMV BLD AUTO: 9.6 FL (ref 9.2–12.9)
POTASSIUM SERPL-SCNC: 4.2 MMOL/L (ref 3.5–5.1)
RBC # BLD AUTO: 4.11 M/UL (ref 4–5.4)
SODIUM SERPL-SCNC: 139 MMOL/L (ref 136–145)
WBC # BLD AUTO: 12.52 K/UL (ref 3.9–12.7)

## 2023-02-16 PROCEDURE — 36415 COLL VENOUS BLD VENIPUNCTURE: CPT | Performed by: STUDENT IN AN ORGANIZED HEALTH CARE EDUCATION/TRAINING PROGRAM

## 2023-02-16 PROCEDURE — 25000003 PHARM REV CODE 250: Performed by: INTERNAL MEDICINE

## 2023-02-16 PROCEDURE — 97162 PT EVAL MOD COMPLEX 30 MIN: CPT

## 2023-02-16 PROCEDURE — 99223 1ST HOSP IP/OBS HIGH 75: CPT | Mod: ,,, | Performed by: INTERNAL MEDICINE

## 2023-02-16 PROCEDURE — 99222 1ST HOSP IP/OBS MODERATE 55: CPT | Mod: ,,, | Performed by: INTERNAL MEDICINE

## 2023-02-16 PROCEDURE — 25000003 PHARM REV CODE 250: Performed by: STUDENT IN AN ORGANIZED HEALTH CARE EDUCATION/TRAINING PROGRAM

## 2023-02-16 PROCEDURE — 80048 BASIC METABOLIC PNL TOTAL CA: CPT | Performed by: STUDENT IN AN ORGANIZED HEALTH CARE EDUCATION/TRAINING PROGRAM

## 2023-02-16 PROCEDURE — 94640 AIRWAY INHALATION TREATMENT: CPT

## 2023-02-16 PROCEDURE — 94761 N-INVAS EAR/PLS OXIMETRY MLT: CPT

## 2023-02-16 PROCEDURE — 25000242 PHARM REV CODE 250 ALT 637 W/ HCPCS: Performed by: INTERNAL MEDICINE

## 2023-02-16 PROCEDURE — 85025 COMPLETE CBC W/AUTO DIFF WBC: CPT | Performed by: STUDENT IN AN ORGANIZED HEALTH CARE EDUCATION/TRAINING PROGRAM

## 2023-02-16 PROCEDURE — 63600175 PHARM REV CODE 636 W HCPCS: Performed by: STUDENT IN AN ORGANIZED HEALTH CARE EDUCATION/TRAINING PROGRAM

## 2023-02-16 PROCEDURE — 97530 THERAPEUTIC ACTIVITIES: CPT

## 2023-02-16 PROCEDURE — 99223 PR INITIAL HOSPITAL CARE,LEVL III: ICD-10-PCS | Mod: ,,, | Performed by: INTERNAL MEDICINE

## 2023-02-16 PROCEDURE — 21400001 HC TELEMETRY ROOM

## 2023-02-16 PROCEDURE — 99900035 HC TECH TIME PER 15 MIN (STAT)

## 2023-02-16 PROCEDURE — 99222 PR INITIAL HOSPITAL CARE,LEVL II: ICD-10-PCS | Mod: ,,, | Performed by: INTERNAL MEDICINE

## 2023-02-16 PROCEDURE — 25000003 PHARM REV CODE 250: Performed by: PHYSICIAN ASSISTANT

## 2023-02-16 RX ORDER — FLUTICASONE FUROATE AND VILANTEROL 100; 25 UG/1; UG/1
1 POWDER RESPIRATORY (INHALATION) DAILY
Status: DISCONTINUED | OUTPATIENT
Start: 2023-02-16 | End: 2023-02-17 | Stop reason: HOSPADM

## 2023-02-16 RX ORDER — METOPROLOL TARTRATE 1 MG/ML
5 INJECTION, SOLUTION INTRAVENOUS EVERY 4 HOURS PRN
Status: DISCONTINUED | OUTPATIENT
Start: 2023-02-16 | End: 2023-02-17 | Stop reason: HOSPADM

## 2023-02-16 RX ORDER — AMOXICILLIN 250 MG
1 CAPSULE ORAL DAILY PRN
Status: DISCONTINUED | OUTPATIENT
Start: 2023-02-16 | End: 2023-02-17 | Stop reason: HOSPADM

## 2023-02-16 RX ORDER — PANTOPRAZOLE SODIUM 40 MG/1
40 TABLET, DELAYED RELEASE ORAL DAILY
Status: DISCONTINUED | OUTPATIENT
Start: 2023-02-17 | End: 2023-02-17 | Stop reason: HOSPADM

## 2023-02-16 RX ORDER — METOPROLOL SUCCINATE 25 MG/1
25 TABLET, EXTENDED RELEASE ORAL NIGHTLY
Status: DISCONTINUED | OUTPATIENT
Start: 2023-02-16 | End: 2023-02-17 | Stop reason: HOSPADM

## 2023-02-16 RX ORDER — ATORVASTATIN CALCIUM 20 MG/1
20 TABLET, FILM COATED ORAL NIGHTLY
Status: DISCONTINUED | OUTPATIENT
Start: 2023-02-16 | End: 2023-02-17 | Stop reason: HOSPADM

## 2023-02-16 RX ADMIN — OXYCODONE HYDROCHLORIDE 10 MG: 5 TABLET ORAL at 02:02

## 2023-02-16 RX ADMIN — CEFAZOLIN SODIUM 2 G: 2 SOLUTION INTRAVENOUS at 02:02

## 2023-02-16 RX ADMIN — ENOXAPARIN SODIUM 40 MG: 40 INJECTION SUBCUTANEOUS at 06:02

## 2023-02-16 RX ADMIN — ACETAMINOPHEN 650 MG: 325 TABLET, FILM COATED ORAL at 06:02

## 2023-02-16 RX ADMIN — TIOTROPIUM BROMIDE INHALATION SPRAY 2 PUFF: 3.12 SPRAY, METERED RESPIRATORY (INHALATION) at 04:02

## 2023-02-16 RX ADMIN — ATORVASTATIN CALCIUM 20 MG: 20 TABLET, FILM COATED ORAL at 08:02

## 2023-02-16 RX ADMIN — METHOCARBAMOL 500 MG: 500 TABLET ORAL at 02:02

## 2023-02-16 RX ADMIN — KETOROLAC TROMETHAMINE 15 MG: 30 INJECTION, SOLUTION INTRAMUSCULAR; INTRAVENOUS at 08:02

## 2023-02-16 RX ADMIN — FLUTICASONE FUROATE AND VILANTEROL TRIFENATATE 1 PUFF: 100; 25 POWDER RESPIRATORY (INHALATION) at 04:02

## 2023-02-16 RX ADMIN — METOPROLOL SUCCINATE 25 MG: 25 TABLET, EXTENDED RELEASE ORAL at 08:02

## 2023-02-16 RX ADMIN — ACETAMINOPHEN 650 MG: 325 TABLET, FILM COATED ORAL at 05:02

## 2023-02-16 RX ADMIN — SENNOSIDES AND DOCUSATE SODIUM 1 TABLET: 50; 8.6 TABLET ORAL at 10:02

## 2023-02-16 RX ADMIN — DIAZEPAM 5 MG: 5 TABLET ORAL at 08:02

## 2023-02-16 RX ADMIN — OXYCODONE HYDROCHLORIDE 10 MG: 5 TABLET ORAL at 11:02

## 2023-02-16 RX ADMIN — METHOCARBAMOL 500 MG: 500 TABLET ORAL at 06:02

## 2023-02-16 RX ADMIN — VENLAFAXINE HYDROCHLORIDE 75 MG: 37.5 CAPSULE, EXTENDED RELEASE ORAL at 08:02

## 2023-02-16 RX ADMIN — OXYCODONE HYDROCHLORIDE 10 MG: 5 TABLET ORAL at 04:02

## 2023-02-16 RX ADMIN — OXYCODONE HYDROCHLORIDE 10 MG: 5 TABLET ORAL at 07:02

## 2023-02-16 RX ADMIN — ACETAMINOPHEN 650 MG: 325 TABLET, FILM COATED ORAL at 11:02

## 2023-02-16 RX ADMIN — TRAZODONE HYDROCHLORIDE 100 MG: 100 TABLET ORAL at 09:02

## 2023-02-16 RX ADMIN — METHOCARBAMOL 500 MG: 500 TABLET ORAL at 08:02

## 2023-02-16 RX ADMIN — KETOROLAC TROMETHAMINE 15 MG: 30 INJECTION, SOLUTION INTRAMUSCULAR; INTRAVENOUS at 02:02

## 2023-02-16 NOTE — ASSESSMENT & PLAN NOTE
- Continue albuterol-ipratropium 2.5-0.5mg inhaled q6hr PRN.  - On Trelegy at home; continue with fluticasone-vilanterol 100-25mcg inhaled daily, tiotropium 5mcg inhaled daily.

## 2023-02-16 NOTE — ASSESSMENT & PLAN NOTE
- s/p ablation 11/2022; tachycardic post-procedurally to high 130s.  - Start metoprolol 5mg IV q4hr PRN for sustained rate >130; likely start metoprolol tartrate but will defer to cardiology.  - Resume apixaban when appropriate as per primary.

## 2023-02-16 NOTE — PROGRESS NOTES
PRS PROGRESS NOTE    S  Tachycardic this AM  Olson removed    O  Vitals:    02/16/23 0504   BP: (!) 126/56   Pulse: (!) 139   Resp: 18   Temp: 98.2 °F (36.8 °C)   AAO  Tachycardic by doppler pulse  Non labored breathing on room air  R breast: soft to palpation. Incisions intact with no drainage, flap with normal capillary refill. Mastectomy flap with no overlying skin changes. Strong external doppler signal. Vianey drain with SS output (30cc)  L breast:  large than R. Soft to palpation. Incisions intact with no drainage, flap with normal capillary refill. Mastectomy flap with no overlying skin changes. Strong internal doppler signal. Vianey drain with SS output (55cc)  Abdomen:Soft to palpation, incisions intact. VIANEY drains with SS output (20/18cc)    Labs  WBC 12.5  Hgb 12.4    A/P  POD 1 s/p b/l mastectomy and SIEA flaps for breast reconstruction  - will order stat EKG, holding eliquis   - continue q4h flap checks  - regular diet  - ambulate/PT  - MM pain control  - lov 40 for DVT ppx    Fahad Coles MD  HOV  PRS  02/16/2023

## 2023-02-16 NOTE — PLAN OF CARE
Problem: Physical Therapy  Goal: Physical Therapy Goal  Description: Goals to be met by: 3/18/23    Patient will perform the following to increase strength, improve mobility, and return to prior level of function:    1. Supine <> sit with SBA.  2. Sit<>stand with SPV with least restrictive assistive device.  3. Gait x 100 feet with SPV with least restrictive assistive device.  4. Verbalize post-mastectomy precautions.    Outcome: Ongoing, Progressing     PT orders received. PT evaluation completed and goals/POC established. Pt tolerated evaluation well with no adverse reactions. Discussed precautions s/p surgery: avoid flexion and abduction greater than 90 degrees, log rolling for bed mobility to protect abdomen. Precautions maintained throughout session. Pt performed bed mobility with minimum assistance, sit <> stand with CGA, and ambulation x 45 ft with contact guard assistance and HHA. Pt will benefit from skilled PT services to address impairments and functional limitations. Recommend discharge to home with family assistance as needed.

## 2023-02-16 NOTE — HPI
Ms. Matias is a 62/F with PMH HTN, HLD, depression, COPD, A-flutter s/p ablation 11/2022 who presented to [facility] 02/15 for bilateral mastectomy with SIEA flaps with Dr. Milian. POD #1 she was noted to have significant tachycardia with rate to 139. Of note, she was previously on metoprolol until her hospitalization for A-flutter with ablation 11/03/22; subsequently she had stable rate and metoprolol was discontinued. She reports she had been in her usual state of health prior to presentation other than intermittent anxiety. She denies any symptoms with her tachycardia the morning of consultation. With elevated HR, hospital medicine and cardiology were consulted.

## 2023-02-16 NOTE — NURSING
Pt HR tachycardic 130s-140s. Manual pulse taken with -126 bpm. MD Rich called and notified of pt vitals, history of aflutter, and pt not receiving blood thinners post surgery. MD informed that pt does attribute increase in heart rate to be relatively normal for her and due to anxiety. Valium given last night, but unable to give again. Order is only daily. Pt otherwise asymptomatic. Parameters requested. RN to continue to monitor vitals per MD. No new orders.

## 2023-02-16 NOTE — PLAN OF CARE
Problem: Adult Inpatient Plan of Care  Goal: Plan of Care Review  Outcome: Ongoing, Progressing  Goal: Patient-Specific Goal (Individualized)  Outcome: Ongoing, Progressing     Problem: Fall Injury Risk  Goal: Absence of Fall and Fall-Related Injury  Outcome: Ongoing, Progressing  Intervention: Promote Injury-Free Environment  Flowsheets (Taken 2/16/2023 8610)  Safety Promotion/Fall Prevention:   assistive device/personal item within reach   nonskid shoes/socks when out of bed   side rails raised x 2   instructed to call staff for mobility

## 2023-02-16 NOTE — PLAN OF CARE
02/16/23 1519   Discharge Assessment   Assessment Type Discharge Planning Assessment   Confirmed/corrected address, phone number and insurance Yes   Confirmed Demographics Correct on Facesheet   Source of Information patient   People in Home spouse   Do you expect to return to your current living situation? Yes   Do you have help at home or someone to help you manage your care at home? Yes   Prior to hospitilization cognitive status: Alert/Oriented   Current cognitive status: Alert/Oriented   Walking or Climbing Stairs   (None)   Dressing/Bathing   (None)   Equipment Currently Used at Home none   Readmission within 30 days? No   Patient currently being followed by outpatient case management? No   Do you currently have service(s) that help you manage your care at home? No   Do you take prescription medications? Yes   Do you have prescription coverage? Yes   Do you have any problems affording any of your prescribed medications? No   Is the patient taking medications as prescribed? yes   Who is going to help you get home at discharge? Spouse   How do you get to doctors appointments? car, drives self;family or friend will provide   Are you on dialysis? No   Do you take coumadin? No   Discharge Plan A Home Health   Discharge Plan B Home with family   DME Needed Upon Discharge  none   Discharge Plan discussed with: Patient   Discharge Barriers Identified None     Pt's  will take her home at discharge.  Pt's PCP is correct in Epic.

## 2023-02-16 NOTE — PROGRESS NOTES
Meter dose in haler given , instructed patient on medication, no averse reaction noted at this time.

## 2023-02-16 NOTE — PT/OT/SLP EVAL
Physical Therapy Evaluation    Patient Name:  Lucia Matias   MRN:  [id number]    Recommendations:     Discharge Recommendations: home   Discharge Equipment Recommendations: none   Barriers to discharge:  medical treatment    Assessment:     Lucia Matias is a 62 y.o. female admitted with a medical diagnosis of Malignant neoplasm of central portion of left breast in female, estrogen receptor positive. She is s/p bilateral mastectomy and breast reconstruction using NEHA free flap. She has a past medical history that includes but is not limited to diverticular disease of the colon, COPD, HLD, RA, HTN, anxiety and depression, tobacco abuse, sleeve gastrectomy and A-flutter.  She presents with the following impairments/functional limitations: weakness, impaired endurance, impaired self care skills, impaired functional mobility, gait instability, impaired balance, decreased upper extremity function, pain.    PT orders received. PT evaluation completed and goals/POC established. Pt tolerated evaluation well with no adverse reactions. Discussed precautions s/p surgery: avoid flexion and abduction greater than 90 degrees, log rolling for bed mobility to protect abdomen. Precautions maintained throughout session. Pt performed bed mobility with minimum assistance, sit <> stand with CGA, and ambulation x 45 ft with contact guard assistance and HHA. Pt will benefit from skilled PT services to address impairments and functional limitations. Recommend discharge to home with family assistance as needed.     Rehab Prognosis: Good; patient would benefit from acute skilled PT services to address these deficits and reach maximum level of function.    Recent Surgery: Procedure(s) (LRB):  MASTECTOMY, BILATERAL (Bilateral)  BIOPSY, LYMPH NODE, SENTINEL (Left)  INJECTION, FOR SENTINEL NODE IDENTIFICATION (Left)  RECONSTRUCTION, BREAST, USING NEHA FREE FLAP (Bilateral) 1 Day Post-Op    Plan:     During this  hospitalization, patient to be seen 4 x/week to address the identified rehab impairments via gait training, therapeutic activities, therapeutic exercises, neuromuscular re-education and progress toward the following goals:    Plan of Care Expires:  03/18/23    Subjective     Chief Complaint: breast / abdominal pain with transitional movements  Patient/Family Comments/goals: Pt agreeable to therapy session; pt interested in sitting up in chair, however, no bedside recliner in room. RN aware.   Pain/Comfort:  Pain Rating 1: 3/10  Location - Side 1: Bilateral  Location - Orientation 1: generalized  Location 1:  (breast and abdomen)  Pain Addressed 1: Pre-medicate for activity, Reposition, Distraction, Cessation of Activity, Nurse notified    Patients cultural, spiritual, Jainism conflicts given the current situation: no    Living Environment:  Pt lives with  in a [facility] with no steps to enter. She has access to a walk-in shower in the home. 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  Equipment used at home: none.  DME owned (not currently used): none.  Upon discharge, patient will have assistance from .    Objective:     Communicated with NORMA Randle prior to session.  Patient found HOB elevated with telemetry, peripheral IV, VIANEY drain, SCD  upon PT entry to room.    General Precautions: Standard, fall  Orthopedic Precautions:N/A   Braces: N/A  Respiratory Status: Room air    Exams:  Cognition:   Patient is oriented to person, place, time, and situation.  Pt follows approximately 100% of multiple step commands.    Mood: pleasant and cooperative  Musculoskeletal:  Posture:  Protective guarding of surgical sites (slightly forward flexed posture to guard abdomen and chest), forward head, rounded shoulders  LE ROM/Strength:   R ROM: WNL  L ROM: WNL  R Strength:   WFL  L Strength:   WFL  Neuromuscular:  Sensation: Intact to light touch bilateral LEs.    Tone/Reflexes/Coordination: No impairments identified with functional mobility. No formal testing performed.   Balance:   Static sitting: Normal - SBA for sitting EOB  Dynamic sitting: Normal - SBA for dynamic sitting balance at EOB  Static standing: Good - CGA  Dynamic standing: Good - CGA with HHA for ambulation  Visual-vestibular: No impairments identified with functional mobility. No formal testing performed.  Integument: Incisions to bilateral chest and abdomen. VIANEY drains x 4.  Surgical bra and abdominal binder in place.    Functional Mobility: Non-slip socks donned prior to mobility. Gait belt not used d/t location of incisions.  Bed Mobility:     Supine to Sit: minimum assistance with HOB elevated and using bedrails.  Assistance required to lift trunk due to pain and precautions s/p surgery.  Transfers:     Sit to Stand: contact guard assistance with HHA from edge of bed; supervision with use of grab bars from toilet.   Gait: ~45 ft combined with contact guard assistance and HHA.  Demonstrated decreased tay, impaired balance, and lack of arm swing.  Arms held in protective guarding position of chest/abdomen during ambulation.  Verbal cues for increased step length.    AM-PAC 6 CLICK MOBILITY  Total Score:18     Treatment & Education:  Bed mobility, transfer, and gait training as described above.  Pt sat EOB for ~5 minutes with SBA for balance.   Pt stood at sink for ~5 minutes with CGA for dynamic standing balance.   Pt stood EOB for ~5 minutes with CGA for dynamic balance.      PT educated patient re:   PT plan of care/role of PT  Precautions/movements to avoid  Activity recommendations  Fall and safety precautions  Gait deviations  Discharge recommendations   Use of call light (don't get up without assistance)  Pt verbalized understanding     The patient is safe to transfer with RN assist  Patient encouraged to sit up in chair throughout the day to prevent deconditioning.     Patient left HOB elevated  with all lines intact and call button in reach.    GOALS:   Multidisciplinary Problems       Physical Therapy Goals          Problem: Physical Therapy    Goal Priority Disciplines Outcome Goal Variances Interventions   Physical Therapy Goal     PT, PT/OT Ongoing, Progressing     Description: Goals to be met by: 3/18/23    Patient will perform the following to increase strength, improve mobility, and return to prior level of function:    1. Supine <> sit with SBA.  2. Sit<>stand with SPV with least restrictive assistive device.  3. Gait x 100 feet with SPV with least restrictive assistive device.  4. Verbalize post-mastectomy precautions.                         History:     Past Medical History:   Diagnosis Date    Allergy     Anxiety     Arthritis     Atrial flutter     Colon polyps     COPD (chronic obstructive pulmonary disease)     COPD exacerbation 10/31/2022    Depression     Diverticular disease of colon 2017    Diverticulitis     HLD (hyperlipidemia)     Hypertension     Malignant neoplasm of central portion of left breast in female, estrogen receptor positive 2022    Pancreatitis     Psoriasis     Rheumatoid arthritis     Severe obesity (BMI 35.0-39.9) with comorbidity        Past Surgical History:   Procedure Laterality Date    ABLATION  2022    Cardiac Ablation for A Flutter, Successful    ADENOIDECTOMY  1966    Tonsillitis and adnoids    BILATERAL MASTECTOMY Bilateral 2/15/2023    Procedure: MASTECTOMY, BILATERAL;  Surgeon: Marcela Meehan MD;  Location: [facility];  Service: General;  Laterality: Bilateral;  EMAIL SENT  @ 11:44 LK / 2.5 HOURS    BLADDER SUSPENSION      (x2)     SECTION      (x2)    ESOPHAGOGASTRODUODENOSCOPY N/A 2021    Procedure: EGD (ESOPHAGOGASTRODUODENOSCOPY);  Surgeon: Vince Rodriguez MD;  Location: [address];  Service: General;  Laterality: N/A;    INJECTION FOR SENTINEL NODE IDENTIFICATION Left 2/15/2023    Procedure: INJECTION, FOR  SENTINEL NODE IDENTIFICATION;  Surgeon: Marcela Meehan MD;  Location: [facility];  Service: General;  Laterality: Left;    LAPAROSCOPIC SLEEVE GASTRECTOMY N/A 03/31/2021    Procedure: GASTRECTOMY, SLEEVE, LAPAROSCOPIC, with intraop EGD;  Surgeon: Vince Rodriguez MD;  [address];  Service: General;  Laterality: N/A;    LUNG BIOPSY  09/2020    RECONSTRUCTION OF BREAST WITH DEEP INFERIOR EPIGASTRIC ARTERY  (NEHA) FREE FLAP Bilateral 2/15/2023    Procedure: RECONSTRUCTION, BREAST, USING NEHA FREE FLAP;  Surgeon: Ming Milian MD;  Location: [facility];  Service: Plastics;  Laterality: Bilateral;    RHINOPLASTY      SENTINEL LYMPH NODE BIOPSY Left 2/15/2023    Procedure: BIOPSY, LYMPH NODE, SENTINEL;  Surgeon: Marcela Meehan MD;  Location: [facility];  Service: General;  Laterality: Left;    TONSILLECTOMY      TRANSESOPHAGEAL ECHOCARDIOGRAPHY N/A 11/02/2022    Procedure: ECHOCARDIOGRAM, TRANSESOPHAGEAL;  Surgeon: Kellie Grover MD;  Location: [facility];  Service: Cardiology;  Laterality: N/A;       Time Tracking:     PT Received On: 02/16/23  PT Start Time: 0850     PT Stop Time: 0919  PT Total Time (min): 29 min     Billable Minutes: Evaluation 10 and Therapeutic Activity 19 02/16/2023

## 2023-02-16 NOTE — CONSULTS
[facility] ([address] Medicine  Consult Note    Patient Name: Lucia Matias  MRN: [id number]  Admission Date: 2/15/2023  Hospital Length of Stay: 1 days  Attending Physician: Ming Milian MD   Primary Care Provider: Hetal Banks MD     Patient information was obtained from patient and past medical records.     Inpatient consult to Hospital Medicine  Consult performed by: ABDOUL Granados MD  Consult ordered by: Fahad Coles MD      Subjective:     Principal Problem: Malignant neoplasm of central portion of left breast in female, estrogen receptor positive    Chief Complaint: No chief complaint on file.       HPI: Ms. Matias is a 62/F with PMH HTN, HLD, depression, COPD, A-flutter s/p ablation 11/2022 who presented to [facility] 02/15 for bilateral mastectomy with SIEA flaps with Dr. Milian. POD #1 she was noted to have significant tachycardia with rate to 139. Of note, she was previously on metoprolol until her hospitalization for A-flutter with ablation 11/03/22; subsequently she had stable rate and metoprolol was discontinued. She reports she had been in her usual state of health prior to presentation other than intermittent anxiety. She denies any symptoms with her tachycardia the morning of consultation. With elevated HR, hospital medicine and cardiology were consulted.    Past Medical History:   Diagnosis Date    Allergy     Anxiety     Arthritis     Atrial flutter     Colon polyps 2016    COPD (chronic obstructive pulmonary disease)     COPD exacerbation 10/31/2022    Depression     Diverticular disease of colon 06/06/2017    Diverticulitis     HLD (hyperlipidemia)     Hypertension     Malignant neoplasm of central portion of left breast in female, estrogen receptor positive 12/29/2022    Pancreatitis     Psoriasis     Rheumatoid arthritis     Severe obesity (BMI 35.0-39.9) with comorbidity        Past Surgical History:   Procedure Laterality Date    ABLATION  11/2022     Cardiac Ablation for A Flutter, Successful    ADENOIDECTOMY  1966    Tonsillitis and adnoids    BILATERAL MASTECTOMY Bilateral 2/15/2023    Procedure: MASTECTOMY, BILATERAL;  Surgeon: Marcela Meehan MD;  Location: [facility];  Service: General;  Laterality: Bilateral;  EMAIL SENT  @ 11:44 LK / 2.5 HOURS    BLADDER SUSPENSION      (x2)     SECTION      (x2)    ESOPHAGOGASTRODUODENOSCOPY N/A 2021    Procedure: EGD (ESOPHAGOGASTRODUODENOSCOPY);  Surgeon: Vince Rodriguez MD;  [address];  Service: General;  Laterality: N/A;    INJECTION FOR SENTINEL NODE IDENTIFICATION Left 2/15/2023    Procedure: INJECTION, FOR SENTINEL NODE IDENTIFICATION;  Surgeon: Marcela Meehan MD;  Location: [facility];  Service: General;  Laterality: Left;    LAPAROSCOPIC SLEEVE GASTRECTOMY N/A 2021    Procedure: GASTRECTOMY, SLEEVE, LAPAROSCOPIC, with intraop EGD;  Surgeon: Vince Rodriguez MD;  [address];  Service: General;  Laterality: N/A;    LUNG BIOPSY  2020    RECONSTRUCTION OF BREAST WITH DEEP INFERIOR EPIGASTRIC ARTERY  (NEHA) FREE FLAP Bilateral 2/15/2023    Procedure: RECONSTRUCTION, BREAST, USING NEHA FREE FLAP;  Surgeon: Ming Milian MD;  Location: [facility];  Service: Plastics;  Laterality: Bilateral;    RHINOPLASTY      SENTINEL LYMPH NODE BIOPSY Left 2/15/2023    Procedure: BIOPSY, LYMPH NODE, SENTINEL;  Surgeon: Marcela Meehan MD;  Location: [facility];  Service: General;  Laterality: Left;    TONSILLECTOMY      TRANSESOPHAGEAL ECHOCARDIOGRAPHY N/A 2022    Procedure: ECHOCARDIOGRAM, TRANSESOPHAGEAL;  Surgeon: Kellie Grover MD;  Location: [facility] EP LAB;  Service: Cardiology;  Laterality: N/A;       Review of patient's allergies indicates:   Allergen Reactions    Succinimides Anaphylaxis     Son has MH       No current facility-administered medications on file prior to encounter.     Current Outpatient Medications on File Prior to Encounter    Medication Sig    atorvastatin (LIPITOR) 20 MG tablet Take 1 tablet (20 mg total) by mouth every evening.    omeprazole (PRILOSEC) 40 MG capsule Take 1 capsule (40 mg total) by mouth every morning.    apixaban (ELIQUIS) 5 mg Tab Take 1 tablet (5 mg total) by mouth 2 (two) times daily. (Patient taking differently: Take 5 mg by mouth 2 (two) times daily. Will hold  & )    B-complex with vitamin C (VITAMIN B COMPLEX-C ORAL) Take by mouth.    calcium-vitamin D 250-100 mg-unit per tablet Take 1 tablet by mouth 2 (two) times daily.    COSENTYX PEN, 2 PENS, 150 mg/mL PnIj Inject 300mg (2 pens) into the skin every 4 weeks    cyanocobalamin 500 MCG tablet Take 500 mcg by mouth once daily.    multivitamin (THERAGRAN) per tablet Take 1 tablet by mouth once daily.    [DISCONTINUED] budesonide-formoterol 160-4.5 mcg (SYMBICORT) 160-4.5 mcg/actuation HFAA Inhale 2 puffs into the lungs every 12 (twelve) hours. Controller     Family History       Problem Relation (Age of Onset)    No Known Problems Daughter, Son, Daughter          Tobacco Use    Smoking status: Former     Packs/day: 0.00     Years: 20.00     Pack years: 0.00     Types: Cigarettes     Start date: 1979     Quit date: 2020     Years since quittin.1    Smokeless tobacco: Current   Substance and Sexual Activity    Alcohol use: Yes     Alcohol/week: 1.0 standard drink     Types: 1 Glasses of wine per week     Comment: social    Drug use: No    Sexual activity: Yes     Partners: Male     Birth control/protection: Post-menopausal     Review of Systems   Constitutional:  Negative for chills and fever.   HENT:  Negative for sore throat and trouble swallowing.    Eyes:  Negative for pain and visual disturbance.   Respiratory:  Negative for cough and shortness of breath.    Cardiovascular:  Negative for chest pain and palpitations.   Gastrointestinal:  Negative for abdominal pain, nausea and vomiting.   Genitourinary:  Negative for difficulty urinating  and dysuria.   Musculoskeletal:  Negative for arthralgias and myalgias.   Skin:  Negative for rash and wound.   Neurological:  Negative for weakness and numbness.   Objective:     Vital Signs (Most Recent):  Temp: 98.4 °F (36.9 °C) (02/16/23 1209)  Pulse: 101 (02/16/23 1400)  Resp: 16 (02/16/23 1209)  BP: 136/62 (02/16/23 1209)  SpO2: (!) 94 % (02/16/23 1209)   Vital Signs (24h Range):  Temp:  [98 °F (36.7 °C)-98.5 °F (36.9 °C)] 98.4 °F (36.9 °C)  Pulse:  [] 101  Resp:  [16-20] 16  SpO2:  [91 %-96 %] 94 %  BP: (106-136)/(52-62) 136/62     Weight: 83 kg (183 lb)  Body mass index is 33.47 kg/m².    Physical Exam  Vitals and nursing note reviewed.   Constitutional:       General: She is not in acute distress.     Appearance: She is well-developed.   HENT:      Head: Normocephalic and atraumatic.   Eyes:      General:         Right eye: No discharge.         Left eye: No discharge.      Conjunctiva/sclera: Conjunctivae normal.   Cardiovascular:      Rate and Rhythm: Normal rate and regular rhythm.      Pulses: Normal pulses.   Pulmonary:      Effort: Pulmonary effort is normal. No respiratory distress.   Abdominal:      Palpations: Abdomen is soft.      Tenderness: There is no abdominal tenderness.   Musculoskeletal:         General: Normal range of motion.      Right lower leg: No edema.      Left lower leg: No edema.   Skin:     General: Skin is warm and dry.   Neurological:      Mental Status: She is alert and oriented to person, place, and time.     Significant Labs:   CBC:  Recent Labs   Lab 02/16/23  0425   WBC 12.52   HGB 12.4   HCT 38.0      GRAN 86.4*  10.8*   LYMPH 5.5*  0.7*   MONO 7.3  0.9   EOS 0.0   BASO 0.01     BMP:  Recent Labs   Lab 02/16/23  0425      K 4.2      CO2 24   BUN 12   CREATININE 0.8   *   CALCIUM 8.8   ANIONGAP 8      Significant Imaging:   No new imaging this morning.    Assessment/Plan:     * Malignant neoplasm of central portion of left breast in  female, estrogen receptor positive  - Primary management, pain control as per plastics.    Atrial flutter  - s/p ablation 11/2022; tachycardic post-procedurally to high 130s.  - Start metoprolol 5mg IV q4hr PRN for sustained rate >130; likely start metoprolol tartrate but will defer to cardiology.  - Resume apixaban when appropriate as per primary.    Essential hypertension  - Normotensive; no listed home medications.    Depression with anxiety  - Continue venlafaxine 75mg PO daily, trazodone 100mg PO qHS PRN.    HLD (hyperlipidemia)  - Continue atorvastatin 20mg PO qHS.    COPD (chronic obstructive pulmonary disease)  - Continue albuterol-ipratropium 2.5-0.5mg inhaled q6hr PRN.  - On Trelegy at home; continue with fluticasone-vilanterol 100-25mcg inhaled daily, tiotropium 5mcg inhaled daily.      VTE Risk Mitigation (From admission, onward)           Ordered     enoxaparin injection 40 mg  Daily         02/15/23 1448     Place HERBER hose  Until discontinued         02/15/23 1448     IP VTE HIGH RISK PATIENT  Once         02/15/23 1448     Place sequential compression device  Until discontinued         02/15/23 1448                  Thank you for your consult. I will sign off. Please do not hesitate to contact me with any questions or concerns.    NIHARIKA Granados MD  Department of Hospital Medicine   [facility] ([address])

## 2023-02-16 NOTE — PROGRESS NOTES
S/P NEHA Flap. VSS. Shahida CL diet. Olson intact. IVF inf R hand. Hip to hip inc with prineo intact. B breast inc with sutures intact, vaseline gauze, & ABD in place. Surgical bra on. VIANEY to BS @ R breast, L breast, R hip, & L hip. L breast with internal doppler & R breast with external doppler. Teds & SCD on.  supportive. Pleasant.

## 2023-02-16 NOTE — SUBJECTIVE & OBJECTIVE
Past Medical History:   Diagnosis Date    Allergy     Anxiety     Arthritis     Atrial flutter     Colon polyps     COPD (chronic obstructive pulmonary disease)     COPD exacerbation 10/31/2022    Depression     Diverticular disease of colon 2017    Diverticulitis     HLD (hyperlipidemia)     Hypertension     Malignant neoplasm of central portion of left breast in female, estrogen receptor positive 2022    Pancreatitis     Psoriasis     Rheumatoid arthritis     Severe obesity (BMI 35.0-39.9) with comorbidity        Past Surgical History:   Procedure Laterality Date    ABLATION  2022    Cardiac Ablation for A Flutter, Successful    ADENOIDECTOMY  1966    Tonsillitis and adnoids    BILATERAL MASTECTOMY Bilateral 2/15/2023    Procedure: MASTECTOMY, BILATERAL;  Surgeon: Marcela Meehan MD;  Location: [facility];  Service: General;  Laterality: Bilateral;  EMAIL SENT  @ 11:44 LK / 2.5 HOURS    BLADDER SUSPENSION      (x2)     SECTION      (x2)    ESOPHAGOGASTRODUODENOSCOPY N/A 2021    Procedure: EGD (ESOPHAGOGASTRODUODENOSCOPY);  Surgeon: Vince Rodriguez MD;  Location: [facility] OR [address] [place] [address];  Service: General;  Laterality: N/A;    INJECTION FOR SENTINEL NODE IDENTIFICATION Left 2/15/2023    Procedure: INJECTION, FOR SENTINEL NODE IDENTIFICATION;  Surgeon: Marcela Meehan MD;  Location: [facility];  Service: General;  Laterality: Left;    LAPAROSCOPIC SLEEVE GASTRECTOMY N/A 2021    Procedure: GASTRECTOMY, SLEEVE, LAPAROSCOPIC, with intraop EGD;  Surgeon: Vince Rodriguez MD;  Location: [facility] OR 2ND FLR;  Service: General;  Laterality: N/A;    LUNG BIOPSY  2020    RECONSTRUCTION OF BREAST WITH DEEP INFERIOR EPIGASTRIC ARTERY  (NEHA) FREE FLAP Bilateral 2/15/2023    Procedure: RECONSTRUCTION, BREAST, USING NEHA FREE FLAP;  Surgeon: Ming Milian MD;  Location: [facility];  Service: Plastics;  Laterality: Bilateral;    RHINOPLASTY      SENTINEL LYMPH NODE BIOPSY Left  2/15/2023    Procedure: BIOPSY, LYMPH NODE, SENTINEL;  Surgeon: Marcela Meehan MD;  Location: [facility] OR;  Service: General;  Laterality: Left;    TONSILLECTOMY      TRANSESOPHAGEAL ECHOCARDIOGRAPHY N/A 2022    Procedure: ECHOCARDIOGRAM, TRANSESOPHAGEAL;  Surgeon: Kellie Grover MD;  Location: [facility] EP LAB;  Service: Cardiology;  Laterality: N/A;       Review of patient's allergies indicates:   Allergen Reactions    Succinimides Anaphylaxis     Son has MH       No current facility-administered medications on file prior to encounter.     Current Outpatient Medications on File Prior to Encounter   Medication Sig    atorvastatin (LIPITOR) 20 MG tablet Take 1 tablet (20 mg total) by mouth every evening.    omeprazole (PRILOSEC) 40 MG capsule Take 1 capsule (40 mg total) by mouth every morning.    apixaban (ELIQUIS) 5 mg Tab Take 1 tablet (5 mg total) by mouth 2 (two) times daily. (Patient taking differently: Take 5 mg by mouth 2 (two) times daily. Will hold  & )    B-complex with vitamin C (VITAMIN B COMPLEX-C ORAL) Take by mouth.    calcium-vitamin D 250-100 mg-unit per tablet Take 1 tablet by mouth 2 (two) times daily.    COSENTYX PEN, 2 PENS, 150 mg/mL PnIj Inject 300mg (2 pens) into the skin every 4 weeks    cyanocobalamin 500 MCG tablet Take 500 mcg by mouth once daily.    multivitamin (THERAGRAN) per tablet Take 1 tablet by mouth once daily.    [DISCONTINUED] budesonide-formoterol 160-4.5 mcg (SYMBICORT) 160-4.5 mcg/actuation HFAA Inhale 2 puffs into the lungs every 12 (twelve) hours. Controller     Family History       Problem Relation (Age of Onset)    No Known Problems Daughter, Son, Daughter          Tobacco Use    Smoking status: Former     Packs/day: 0.00     Years: 20.00     Pack years: 0.00     Types: Cigarettes     Start date: 1979     Quit date: 2020     Years since quittin.1    Smokeless tobacco: Current   Substance and Sexual Activity    Alcohol use: Yes     Alcohol/week:  1.0 standard drink     Types: 1 Glasses of wine per week     Comment: social    Drug use: No    Sexual activity: Yes     Partners: Male     Birth control/protection: Post-menopausal     Review of Systems   Constitutional:  Negative for chills and fever.   HENT:  Negative for sore throat and trouble swallowing.    Eyes:  Negative for pain and visual disturbance.   Respiratory:  Negative for cough and shortness of breath.    Cardiovascular:  Negative for chest pain and palpitations.   Gastrointestinal:  Negative for abdominal pain, nausea and vomiting.   Genitourinary:  Negative for difficulty urinating and dysuria.   Musculoskeletal:  Negative for arthralgias and myalgias.   Skin:  Negative for rash and wound.   Neurological:  Negative for weakness and numbness.   Objective:     Vital Signs (Most Recent):  Temp: 98.4 °F (36.9 °C) (02/16/23 1209)  Pulse: 101 (02/16/23 1400)  Resp: 16 (02/16/23 1209)  BP: 136/62 (02/16/23 1209)  SpO2: (!) 94 % (02/16/23 1209)   Vital Signs (24h Range):  Temp:  [98 °F (36.7 °C)-98.5 °F (36.9 °C)] 98.4 °F (36.9 °C)  Pulse:  [] 101  Resp:  [16-20] 16  SpO2:  [91 %-96 %] 94 %  BP: (106-136)/(52-62) 136/62     Weight: 83 kg (183 lb)  Body mass index is 33.47 kg/m².    Physical Exam  Vitals and nursing note reviewed.   Constitutional:       General: She is not in acute distress.     Appearance: She is well-developed.   HENT:      Head: Normocephalic and atraumatic.   Eyes:      General:         Right eye: No discharge.         Left eye: No discharge.      Conjunctiva/sclera: Conjunctivae normal.   Cardiovascular:      Rate and Rhythm: Normal rate and regular rhythm.      Pulses: Normal pulses.   Pulmonary:      Effort: Pulmonary effort is normal. No respiratory distress.   Abdominal:      Palpations: Abdomen is soft.      Tenderness: There is no abdominal tenderness.   Musculoskeletal:         General: Normal range of motion.      Right lower leg: No edema.      Left lower leg: No  edema.   Skin:     General: Skin is warm and dry.   Neurological:      Mental Status: She is alert and oriented to person, place, and time.     Significant Labs:   CBC:  Recent Labs   Lab 02/16/23  0425   WBC 12.52   HGB 12.4   HCT 38.0      GRAN 86.4*  10.8*   LYMPH 5.5*  0.7*   MONO 7.3  0.9   EOS 0.0   BASO 0.01     BMP:  Recent Labs   Lab 02/16/23  0425      K 4.2      CO2 24   BUN 12   CREATININE 0.8   *   CALCIUM 8.8   ANIONGAP 8      Significant Imaging:   No new imaging this morning.

## 2023-02-16 NOTE — PROGRESS NOTES
Aerosol therapy not indicated at this time, met incentive spirometer goals, oxygen not indicated at this time,

## 2023-02-16 NOTE — CONSULTS
Cardiology Consult  2/16/2023  4:26 PM    Attending Cardiologist: Pool Dorado M.D.  Primary Care Provider: Hetal Banks MD  Chief Complaint/Reason For Consultation:  Tachycardia      Problem list  Patient Active Problem List   Diagnosis    Diverticular disease of colon    Psoriasis vulgaris    COPD (chronic obstructive pulmonary disease)    HLD (hyperlipidemia)    Depression with anxiety    Insomnia    Other long term (current) drug therapy    History of tobacco abuse    Essential hypertension    Multiple lung nodules on CT    Class 1 obesity due to excess calories without serious comorbidity with body mass index (BMI) of 33.0 to 33.9 in adult    History of sleeve gastrectomy    Wears contact lenses    Rheumatoid arthritis    Atrial flutter    Psoriatic arthritis    Malignant neoplasm of central portion of left breast in female, estrogen receptor positive       CC:  Fast heart rate    HPI:  Lucia Matias is a 62 y.o.year-old female with PMH of HTN, HLP, atrial flutter s/p ablation, on Eliquis who underwent mastectomy for breast cancer.  Postop yesterday, when coming back to her room, she states heart rate was fast.  She was in pain.  Was also condition did not work and as result she was waiting she was given 1 dose metoprolol and heart rate improved.  Today she has no complaints.  She is in sinus rhythm on telemetry.    Medications  Current Facility-Administered Medications   Medication Dose Route Frequency Provider Last Rate Last Admin    acetaminophen tablet 650 mg  650 mg Oral Q6H Fahad Coles MD   650 mg at 02/16/23 1138    albuterol-ipratropium 2.5 mg-0.5 mg/3 mL nebulizer solution 3 mL  3 mL Nebulization Q6H PRN Fahad Coles MD        atorvastatin tablet 20 mg  20 mg Oral QHS D. Bryan Granados MD        diazePAM tablet 5 mg  5 mg Oral Daily PRN Fahad Coles MD   5 mg at 02/15/23 2141    enoxaparin injection 40 mg  40 mg Subcutaneous Daily Fahad Coles MD   40 mg at 02/15/23 1655    fluticasone  furoate-vilanteroL 100-25 mcg/dose diskus inhaler 1 puff  1 puff Inhalation Daily ABDOUL Granados MD        ketorolac injection 15 mg  15 mg Intravenous TID Fahad Coles MD   15 mg at 02/16/23 1423    methocarbamoL tablet 500 mg  500 mg Oral QID Fahad Coles MD   500 mg at 02/16/23 1423    metoprolol injection 5 mg  5 mg Intravenous Q4H PRN ABDOUL Granados MD        ondansetron injection 4 mg  4 mg Intravenous Q8H PRN Fahad Coles MD        oxyCODONE immediate release tablet 10 mg  10 mg Oral Q4H PRN Fahad Coles MD   10 mg at 02/16/23 1608    oxyCODONE immediate release tablet 5 mg  5 mg Oral Q4H PRN Fahad Coles MD        [START ON 2/17/2023] pantoprazole EC tablet 40 mg  40 mg Oral Daily ABDOUL Granados MD        tiotropium bromide 2.5 mcg/actuation inhaler 2 puff  2 puff Inhalation Daily ABDOUL Granados MD        traZODone tablet 100 mg  100 mg Oral Nightly PRN Fahad Coles MD   100 mg at 02/15/23 2140    venlafaxine 24 hr capsule 75 mg  75 mg Oral Daily Fahad Coles MD   75 mg at 02/16/23 0825      Prior to Admission medications    Medication Sig Start Date End Date Taking? Authorizing Provider   atorvastatin (LIPITOR) 20 MG tablet Take 1 tablet (20 mg total) by mouth every evening. 10/17/22  Yes Hetal Banks MD   diazePAM (VALIUM) 5 MG tablet Take 1 tablet (5 mg total) by mouth daily as needed for Anxiety. 2/13/23  Yes Jose Morales III, NP   fluticasone-umeclidin-vilanter (TRELEGY ELLIPTA) 100-62.5-25 mcg DsDv Inhale 1 puff into the lungs once daily. 2/6/23  Yes Hetal Banks MD   omeprazole (PRILOSEC) 40 MG capsule Take 1 capsule (40 mg total) by mouth every morning. 8/19/22  Yes Elisabet Rascon NP   traZODone (DESYREL) 100 MG tablet Take 1 tablet (100 mg total) by mouth nightly as needed for Insomnia. 2/13/23  Yes Jose Morales III, NP   venlafaxine (EFFEXOR-XR) 75 MG 24 hr capsule Take 1 capsule (75 mg total) by mouth once daily. Start on Week 2 2/13/23  Yes Jose Morales III, NP   acetaminophen (TYLENOL  ORAL) Take by mouth as needed.    Historical Provider   amoxicillin-clavulanate 875-125mg (AUGMENTIN) 875-125 mg per tablet Take 1 tablet by mouth every 12 (twelve) hours. 1/30/23   Hetal Banks MD   apixaban (ELIQUIS) 5 mg Tab Take 1 tablet (5 mg total) by mouth 2 (two) times daily.  Patient taking differently: Take 5 mg by mouth 2 (two) times daily. Will hold 2/13 & 2/14 11/3/22 5/2/23  [place] Echeverria MD   B-complex with vitamin C (VITAMIN B COMPLEX-C ORAL) Take by mouth.    Historical Provider   calcium-vitamin D 250-100 mg-unit per tablet Take 1 tablet by mouth 2 (two) times daily.    Historical Provider   COSENTYX PEN, 2 PENS, 150 mg/mL PnIj Inject 300mg (2 pens) into the skin every 4 weeks 5/19/22   Minerva Lara MD   cyanocobalamin 500 MCG tablet Take 500 mcg by mouth once daily.    Historical Provider   multivitamin (THERAGRAN) per tablet Take 1 tablet by mouth once daily.    Historical Provider   multivitamin with minerals (HAIR,SKIN AND NAILS ORAL) Take by mouth.    Historical Provider   mv-mn/iron/folic acid/herb 190 (VITAMIN D3 COMPLETE ORAL) Take by mouth.    Historical Provider   budesonide-formoterol 160-4.5 mcg (SYMBICORT) 160-4.5 mcg/actuation HFAA Inhale 2 puffs into the lungs every 12 (twelve) hours. Controller 12/13/19 5/26/21  Saige Bee MD         History  Past Medical History:   Diagnosis Date    Allergy     Anxiety     Arthritis     Atrial flutter     Colon polyps 2016    COPD (chronic obstructive pulmonary disease)     COPD exacerbation 10/31/2022    Depression     Diverticular disease of colon 06/06/2017    Diverticulitis     HLD (hyperlipidemia)     Hypertension     Malignant neoplasm of central portion of left breast in female, estrogen receptor positive 12/29/2022    Pancreatitis     Psoriasis     Rheumatoid arthritis     Severe obesity (BMI 35.0-39.9) with comorbidity      Past Surgical History:   Procedure Laterality Date    ABLATION  11/2022    Cardiac Ablation  for A Flutter, Successful    ADENOIDECTOMY  1966    Tonsillitis and adnoids    BILATERAL MASTECTOMY Bilateral 2/15/2023    Procedure: MASTECTOMY, BILATERAL;  Surgeon: Marcela Meehan MD;  Location: [facility];  Service: General;  Laterality: Bilateral;  EMAIL SENT  @ 11:44 LK / 2.5 HOURS    BLADDER SUSPENSION      (x2)     SECTION      (x2)    ESOPHAGOGASTRODUODENOSCOPY N/A 2021    Procedure: EGD (ESOPHAGOGASTRODUODENOSCOPY);  Surgeon: Vince Rodriguez MD;  [address];  Service: General;  Laterality: N/A;    INJECTION FOR SENTINEL NODE IDENTIFICATION Left 2/15/2023    Procedure: INJECTION, FOR SENTINEL NODE IDENTIFICATION;  Surgeon: Marcela Meehan MD;  Location: [facility];  Service: General;  Laterality: Left;    LAPAROSCOPIC SLEEVE GASTRECTOMY N/A 2021    Procedure: GASTRECTOMY, SLEEVE, LAPAROSCOPIC, with intraop EGD;  Surgeon: Vince Rodriguez MD;  [address];  Service: General;  Laterality: N/A;    LUNG BIOPSY  2020    RECONSTRUCTION OF BREAST WITH DEEP INFERIOR EPIGASTRIC ARTERY  (NEHA) FREE FLAP Bilateral 2/15/2023    Procedure: RECONSTRUCTION, BREAST, USING NEHA FREE FLAP;  Surgeon: Ming Milian MD;  Location: [facility];  Service: Plastics;  Laterality: Bilateral;    RHINOPLASTY      SENTINEL LYMPH NODE BIOPSY Left 2/15/2023    Procedure: BIOPSY, LYMPH NODE, SENTINEL;  Surgeon: Marcela Meehan MD;  Location: [facility];  Service: General;  Laterality: Left;    TONSILLECTOMY      TRANSESOPHAGEAL ECHOCARDIOGRAPHY N/A 2022    Procedure: ECHOCARDIOGRAM, TRANSESOPHAGEAL;  Surgeon: Kellie Grover MD;  Location: [facility];  Service: Cardiology;  Laterality: N/A;     Social History     Socioeconomic History    Marital status:    Occupational History    Occupation: clinical research coordinator    Tobacco Use    Smoking status: Former     Packs/day: 0.00     Years: 20.00     Pack years: 0.00     Types: Cigarettes     Start  date: 1979     Quit date: 2020     Years since quittin.1    Smokeless tobacco: Current   Substance and Sexual Activity    Alcohol use: Yes     Alcohol/week: 1.0 standard drink     Types: 1 Glasses of wine per week     Comment: social    Drug use: No    Sexual activity: Yes     Partners: Male     Birth control/protection: Post-menopausal   Other Topics Concern    Are you pregnant or think you may be? No    Breast-feeding No     Social Determinants of Health     Financial Resource Strain: Low Risk     Difficulty of Paying Living Expenses: Not very hard   Food Insecurity: No Food Insecurity    Worried About Running Out of Food in the Last Year: Never true    Ran Out of Food in the Last Year: Never true   Transportation Needs: No Transportation Needs    Lack of Transportation (Medical): No    Lack of Transportation (Non-Medical): No   Physical Activity: Insufficiently Active    Days of Exercise per Week: 2 days    Minutes of Exercise per Session: 20 min   Stress: Stress Concern Present    Feeling of Stress : Very much   Social Connections: Unknown    Frequency of Communication with Friends and Family: More than three times a week    Frequency of Social Gatherings with Friends and Family: Once a week    Active Member of Clubs or Organizations: Yes    Attends Club or Organization Meetings: 1 to 4 times per year    Marital Status:    Housing Stability: Low Risk     Unable to Pay for Housing in the Last Year: No    Number of Places Lived in the Last Year: 1    Unstable Housing in the Last Year: No         Allergies  Review of patient's allergies indicates:   Allergen Reactions    Succinimides Anaphylaxis     Son has          Review of Systems   Review of Systems   Cardiovascular: Negative.    Respiratory: Negative.         Physical Exam  Wt Readings from Last 1 Encounters:   02/15/23 83 kg (183 lb)     BP Readings from Last 3 Encounters:   23 138/63   23 133/70   23 133/70     Pulse  Readings from Last 1 Encounters:   02/16/23 86     Body mass index is 33.47 kg/m².    Physical Exam  Constitutional:       Appearance: She is well-developed.   HENT:      Head: Atraumatic.   Eyes:      General: No scleral icterus.  Neck:      Vascular: Normal carotid pulses. No carotid bruit, hepatojugular reflux or JVD.   Cardiovascular:      Rate and Rhythm: Normal rate and regular rhythm.      Chest Wall: PMI is not displaced.      Pulses: Intact distal pulses.           Carotid pulses are 2+ on the right side and 2+ on the left side.       Radial pulses are 2+ on the right side and 2+ on the left side.        Dorsalis pedis pulses are 2+ on the right side and 2+ on the left side.      Heart sounds: Normal heart sounds, S1 normal and S2 normal. No murmur heard.    No friction rub.   Pulmonary:      Effort: Pulmonary effort is normal. No respiratory distress.      Breath sounds: Normal breath sounds. No stridor. No wheezing or rales.   Chest:      Chest wall: No tenderness.   Abdominal:      General: Bowel sounds are normal.      Palpations: Abdomen is soft.   Musculoskeletal:      Cervical back: Neck supple. No edema.   Skin:     General: Skin is warm and dry.      Nails: There is no clubbing.   Neurological:      Mental Status: She is alert and oriented to person, place, and time.   Psychiatric:         Behavior: Behavior normal.         Thought Content: Thought content normal.         Laboratory:  Trended Lab Data:  Recent Labs   Lab 02/16/23  0425   WBC 12.52   HGB 12.4   HCT 38.0          Recent Labs   Lab 02/16/23  0425      K 4.2      CO2 24   BUN 12   *   CALCIUM 8.8       No results for input(s): PROT, ALBUMIN, BILITOT, AST, ALT, ALKPHOS in the last 168 hours.    No results for input(s): PROTIME, PTT, INR in the last 168 hours.    Cardiac: No results for input(s): TROPONINI, CKTOTAL, CKMB, BNP in the last 168 hours.    FLP:   Lab Results   Component Value Date    CHOL 121  10/19/2022    HDL 42 10/19/2022    LDLCALC 49.2 (L) 10/19/2022    TRIG 149 10/19/2022    CHOLHDL 34.7 10/19/2022     DM:   Lab Results   Component Value Date    HGBA1C 4.9 10/19/2022    HGBA1C 5.6 10/23/2020    HGBA1C 5.4 06/20/2019    LDLCALC 49.2 (L) 10/19/2022    CREATININE 0.8 02/16/2023     Thyroid:   Lab Results   Component Value Date    TSH 1.555 10/19/2022    FREET4 0.89 10/23/2020    T1WDSWH 5.8 10/23/2020    I4JICVQ 81 10/23/2020     Anemia:   Lab Results   Component Value Date    IRON 100 05/26/2021    TIBC 355 05/26/2021    FERRITIN 61 06/20/2019    DFBNIAIV64 582 05/26/2021    FOLATE 3.9 (L) 10/23/2020     Urinalysis:   Lab Results   Component Value Date    LABURIN No growth 09/01/2022    LABURIN No growth 09/01/2022    COLORU Yellow 05/03/2022    SPECGRAV 1.010 05/03/2022    NITRITE Negative 05/03/2022    KETONESU Negative 05/03/2022    UROBILINOGEN Negative 05/03/2022     @    Other Results:  EKG (my interpretation):    TELEMETRY:  sinus    Echo:   Results for orders placed or performed during the hospital encounter of 01/25/23   Echo   Result Value Ref Range    BSA 1.91 m2    TDI SEPTAL 0.08 m/s    LV LATERAL E/E' RATIO 8.55 m/s    LV SEPTAL E/E' RATIO 11.75 m/s    LA WIDTH 3.40 cm    Right Atrial Pressure (from IVC) 3 mmHg    IVC diameter 1.34 cm    Left Ventricular Outflow Tract Mean Velocity 0.87 cm/s    Left Ventricular Outflow Tract Mean Gradient 3.45 mmHg    TDI LATERAL 0.11 m/s    PV PEAK VELOCITY 0.99 cm/s    LVIDd 4.24 3.5 - 6.0 cm    IVS 1.01 0.6 - 1.1 cm    Posterior Wall 1.10 0.6 - 1.1 cm    LVIDs 2.57 2.1 - 4.0 cm    FS 39 28 - 44 %    LA volume 60.06 cm3    Sinus 2.90 cm    STJ 2.58 cm    Ascending aorta 3.00 cm    LV mass 150.21 g    LA size 3.77 cm    RVDD 3.14 cm    TAPSE 1.90 cm    RV S' 13 cm/s    Left Ventricle Relative Wall Thickness 0.52 cm    AV mean gradient 4 mmHg    AV valve area 2.80 cm2    AV Velocity Ratio 0.91     AV index (prosthetic) 0.92     MV valve area p 1/2 method  4.16 cm2    E/A ratio 1.27     Mean e' 0.10 m/s    E wave deceleration time 182.40 msec    IVRT 94.20 msec    Pulm vein S/D ratio 1.28     LVOT diameter 1.97 cm    LVOT area 3.0 cm2    LVOT peak titi 1.26 m/s    LVOT peak VTI 24.00 cm    Ao peak titi 1.39 m/s    Ao VTI 26.1 cm    LVOT stroke volume 73.12 cm3    AV peak gradient 8 mmHg    TV rest pulmonary artery pressure 26 mmHg    E/E' ratio 9.89 m/s    MV Peak E Titi 0.94 m/s    TR Max Titi 2.42 m/s    MV stenosis pressure 1/2 time 52.90 ms    MV Peak A Titi 0.74 m/s    PV Peak S Titi 0.64 m/s    PV Peak D Titi 0.50 m/s    LV Systolic Volume 23.92 mL    LV Systolic Volume Index 13.0 mL/m2    LV Diastolic Volume 80.50 mL    LV Diastolic Volume Index 43.75 mL/m2    LA Volume Index 32.6 mL/m2    LV Mass Index 82 g/m2    RA Major Axis 4.80 cm    Left Atrium Minor Axis 5.39 cm    Left Atrium Major Axis 5.64 cm    Triscuspid Valve Regurgitation Peak Gradient 23 mmHg    LA Volume Index (Mod) 20.4 mL/m2    LA volume (mod) 37.58 cm3    RA Width 3.40 cm    EF 70 %    Narrative    · The left ventricle is normal in size with normal systolic function.  · Normal central venous pressure (3 mmHg).  · The estimated PA systolic pressure is 26 mmHg.  · The estimated ejection fraction is 70%.  · Normal left ventricular diastolic function.  · Normal right ventricular size with normal right ventricular systolic   function.          Radiology:  Echo    Result Date: 1/25/2023  · The left ventricle is normal in size with normal systolic function. · Normal central venous pressure (3 mmHg). · The estimated PA systolic pressure is 26 mmHg. · The estimated ejection fraction is 70%. · Normal left ventricular diastolic function. · Normal right ventricular size with normal right ventricular systolic function.      CTA Abdominal Wall Fern Flap Incl Pelvis    Result Date: 1/19/2023  EXAMINATION: CTA ABDOMINAL WALL FERN FLAP INCL PELVIS CLINICAL HISTORY: preop for FERN reconstruction; Malignant neoplasm  of unspecified site of left female breast TECHNIQUE: Contiguous 2.5-mm axial images were obtained through the abdomen and pelvis through the mid thigh following the administration of 100 cc of intravenous Omnipaque 350.  Sagittal and coronal reformats and maximum intensity projections were also submitted. COMPARISON: None FINDINGS: Right deep inferior epigastric artery: The right DIEA demonstrates a type I branching pattern. Dominant  #1: This vessel perforates the anterior rectus abdominal muscle fascia approximately 6.8 cm lateral to and 6.4 cm caudal to the umbilicus. Dominant  #2: This vessel perforates the anterior rectus abdominal muscle fascia approximately 2.6 cm lateral to and 2.5 cm caudal to the umbilicus. Dominant  #3: This vessel perforates the anterior rectus abdominal muscle fascia approximately 7.7 cm lateral to and 0.7 cm cranial to the umbilicus. Left deep inferior epigastric artery: The left DIEA demonstrates a type I branching pattern. Dominant  #1: This vessel perforates the anterior rectus abdominal muscle fascia approximately 7.6 cm lateral to and at the level of the umbilicus. Dominant  #2:  This vessel perforates the anterior rectus abdominal muscle fascia approximately 5.0 cm lateral to and 0.3 cm cranial to the umbilicus. Dominant  #3: This vessel perforates the anterior rectus abdominal muscle fascia approximately 2.4 cm lateral to and 2.9 cm cranial to the umbilicus. Additional findings: Limited evaluation lung bases show no concerning masses or nodules.  The liver is normal in size.  No solid mass or biliary ductal dilatation.  Gallbladder is unremarkable.  Spleen, adrenal glands, and pancreas show no focal abnormality.  There are bilateral renal cysts.  No hydronephrosis or solid mass.  Urinary bladder shows no wall thickening.  Uterus is grossly unremarkable.  Gastrointestinal tract shows no wall thickening or obstruction.   There is extensive colonic diverticulosis without acute diverticulitis.  A normal appendix is present.  No free fluid or free gas.  No concerning lymphadenopathy.  Remaining vascular structures show atherosclerosis without aneurysm.  No acute fracture or destructive osseous lesion.  Grade 1 anterolisthesis of L4 on L5 with degenerative changes of the spine.     The vessels described as dominant perforators #1 on the right and #1 or 2 on the left would be the best DIE arterial perforators bilaterally. Electronically signed by: Azam Hammond MD Date:    01/19/2023 Time:    08:19    NM [place] Lymph Node Injection Only_Surgeon Performed    Result Date: 2/15/2023  See Surgery Notes This procedure was auto-finalized. Radiopharmaceutical injected by surgeon with no imaging acquired, please refer to surgery notes for further detailed report.         Current Medications:     Infusions:       Scheduled:   acetaminophen  650 mg Oral Q6H    atorvastatin  20 mg Oral QHS    enoxaparin  40 mg Subcutaneous Daily    fluticasone furoate-vilanteroL  1 puff Inhalation Daily    ketorolac  15 mg Intravenous TID    methocarbamoL  500 mg Oral QID    [START ON 2/17/2023] pantoprazole  40 mg Oral Daily    tiotropium bromide  2 puff Inhalation Daily    venlafaxine  75 mg Oral Daily        PRN:  albuterol-ipratropium, diazePAM, metoprolol, ondansetron, oxyCODONE, oxyCODONE, traZODone      Assessment and Plan:  Atrial flutter s/p ablation.  In sinus.  -resume Eliquis when cleared from surgery standpoint  -start low dose metoprolol xl 25mg QHS    HTN, controlled    HLP    Thank you for allowing me to participate in the care of Lucia Matias.      Pool Dorado MD, F.A.C.C, F.S.C.A.I.    Disclaimer: This document was created using voice recognition software (M*Modal Fluency Direct). Although it may be edited, this document may contain errors related to incorrect recognition of the spoken word. Please call the physician if  clarification is needed.

## 2023-02-16 NOTE — NURSING
Nurses Note -- 4 Eyes      2/15/2023   7:31 PM      Skin assessed during: Transfer from PACU      [x] No Pressure Injuries Present    [x]Prevention Measures Documented      [] Yes- Altered Skin Integrity Present or Discovered   [] LDA Added if Not in Epic (Describe Wound)   [] New Altered Skin Integrity was Present on Admit and Documented in LDA   [] Wound Image Taken    Wound Care Consulted? No    Attending Nurse:  Razia Garcia RN     Second RN/Staff Member:  NORMA Medrano--PACU

## 2023-02-17 VITALS
WEIGHT: 183 LBS | TEMPERATURE: 98 F | HEIGHT: 62 IN | HEART RATE: 80 BPM | BODY MASS INDEX: 33.68 KG/M2 | OXYGEN SATURATION: 96 % | SYSTOLIC BLOOD PRESSURE: 106 MMHG | RESPIRATION RATE: 18 BRPM | DIASTOLIC BLOOD PRESSURE: 52 MMHG

## 2023-02-17 PROCEDURE — 25000003 PHARM REV CODE 250: Performed by: INTERNAL MEDICINE

## 2023-02-17 PROCEDURE — 94640 AIRWAY INHALATION TREATMENT: CPT

## 2023-02-17 PROCEDURE — 63600175 PHARM REV CODE 636 W HCPCS: Performed by: STUDENT IN AN ORGANIZED HEALTH CARE EDUCATION/TRAINING PROGRAM

## 2023-02-17 PROCEDURE — 94761 N-INVAS EAR/PLS OXIMETRY MLT: CPT

## 2023-02-17 PROCEDURE — 25000003 PHARM REV CODE 250: Performed by: STUDENT IN AN ORGANIZED HEALTH CARE EDUCATION/TRAINING PROGRAM

## 2023-02-17 RX ORDER — OXYCODONE AND ACETAMINOPHEN 7.5; 325 MG/1; MG/1
1 TABLET ORAL EVERY 4 HOURS PRN
Qty: 20 TABLET | Refills: 0 | Status: SHIPPED | OUTPATIENT
Start: 2023-02-17 | End: 2023-02-22 | Stop reason: SDUPTHER

## 2023-02-17 RX ORDER — OXYCODONE AND ACETAMINOPHEN 7.5; 325 MG/1; MG/1
1 TABLET ORAL EVERY 4 HOURS PRN
Qty: 20 TABLET | Refills: 0 | Status: SHIPPED | OUTPATIENT
Start: 2023-02-17 | End: 2023-02-17 | Stop reason: SDUPTHER

## 2023-02-17 RX ORDER — AMOXICILLIN 250 MG
1 CAPSULE ORAL DAILY PRN
Qty: 15 TABLET | Refills: 0 | Status: ON HOLD | OUTPATIENT
Start: 2023-02-17 | End: 2023-03-29 | Stop reason: HOSPADM

## 2023-02-17 RX ORDER — METHOCARBAMOL 500 MG/1
500 TABLET, FILM COATED ORAL 4 TIMES DAILY
Qty: 40 TABLET | Refills: 0 | Status: SHIPPED | OUTPATIENT
Start: 2023-02-17 | End: 2023-02-27

## 2023-02-17 RX ADMIN — PANTOPRAZOLE SODIUM 40 MG: 40 TABLET, DELAYED RELEASE ORAL at 09:02

## 2023-02-17 RX ADMIN — ACETAMINOPHEN 650 MG: 325 TABLET, FILM COATED ORAL at 12:02

## 2023-02-17 RX ADMIN — TIOTROPIUM BROMIDE INHALATION SPRAY 2 PUFF: 3.12 SPRAY, METERED RESPIRATORY (INHALATION) at 07:02

## 2023-02-17 RX ADMIN — KETOROLAC TROMETHAMINE 15 MG: 30 INJECTION, SOLUTION INTRAMUSCULAR; INTRAVENOUS at 11:02

## 2023-02-17 RX ADMIN — VENLAFAXINE HYDROCHLORIDE 75 MG: 37.5 CAPSULE, EXTENDED RELEASE ORAL at 09:02

## 2023-02-17 RX ADMIN — OXYCODONE HYDROCHLORIDE 10 MG: 5 TABLET ORAL at 03:02

## 2023-02-17 RX ADMIN — FLUTICASONE FUROATE AND VILANTEROL TRIFENATATE 1 PUFF: 100; 25 POWDER RESPIRATORY (INHALATION) at 07:02

## 2023-02-17 RX ADMIN — OXYCODONE HYDROCHLORIDE 10 MG: 5 TABLET ORAL at 09:02

## 2023-02-17 RX ADMIN — METHOCARBAMOL 500 MG: 500 TABLET ORAL at 09:02

## 2023-02-17 NOTE — PLAN OF CARE
02/17/23 1001   Final Note   Assessment Type Final Discharge Note   Anticipated Discharge Disposition Home   Hospital Resources/Appts/Education Provided Appointments scheduled and added to AVS   Post-Acute Status   Discharge Delays None known at this time     Pt's  will take her home.  Pt is clear for discharge from a CM perspective.

## 2023-02-17 NOTE — CARE UPDATE
02/16/23 2001   Patient Assessment/Suction   Level of Consciousness (AVPU) alert   Respiratory Effort Normal;Unlabored   Expansion/Accessory Muscles/Retractions expansion symmetric;no retractions;no use of accessory muscles   All Lung Fields Breath Sounds clear;equal bilaterally   Rhythm/Pattern, Respiratory depth regular;pattern regular;unlabored   PRE-TX-O2   Device (Oxygen Therapy) room air   SpO2 96 %   Pulse Oximetry Type Intermittent   $ Pulse Oximetry - Multiple Charge Pulse Oximetry - Multiple   Pulse 97

## 2023-02-17 NOTE — PT/OT/SLP PROGRESS
Physical Therapy      Patient Name:  Lucia Matias   MRN:  [id number]    Patient not seen today secondary to pt dressed and ready to discharge home. Reviewed home safety , as well as mastectomy precautions w/ pt and . All questions answered.

## 2023-02-17 NOTE — DISCHARGE SUMMARY
[facility] ([address])  Discharge Note  Short Stay    Procedure(s) (LRB):  MASTECTOMY, BILATERAL (Bilateral)  BIOPSY, LYMPH NODE, SENTINEL (Left)  INJECTION, FOR SENTINEL NODE IDENTIFICATION (Left)  RECONSTRUCTION, BREAST, USING NEHA FREE FLAP (Bilateral)      OUTCOME: Patient tolerated treatment/procedure well without complication and is now ready for discharge. During hospital stay cardiology/internal medicine consulted regarding tachycardia and SVT. Resolved with metoprolol. Patient discharged once deemed stable.     DISPOSITION: Home or Self Care    FINAL DIAGNOSIS:  Malignant neoplasm of central portion of left breast in female, estrogen receptor positive    FOLLOWUP: In clinic    DISCHARGE INSTRUCTIONS:    Discharge Procedure Orders   Lifting restrictions   Order Comments: No heavy lifting, pushing, pulling     Notify your health care provider if you experience any of the following:  temperature >100.4     Notify your health care provider if you experience any of the following:  severe uncontrolled pain     Notify your health care provider if you experience any of the following:  redness, tenderness, or signs of infection (pain, swelling, redness, odor or green/yellow discharge around incision site)     No dressing needed   Order Comments: May take showers, no baths/pools        TIME SPENT ON DISCHARGE: 10 minutes

## 2023-02-17 NOTE — PROGRESS NOTES
VSS. R hand SL intact. B breast inc/ Flap with sutures & vaseline gauze intact. Surgical bra in place. Hip to hip inc with prineo intact. VIANEY to BS at R breast, L breast, R hip, & L hip. Telemetry in [facility]--[facility] consult. Teds on. SCD available. Neha diet. Voiding well. Up as neha.Neha Oxy 10mg for pain. Sat in recliner all afternoon.  @ bedside all day--supportive. Pleasant.

## 2023-02-20 NOTE — TELEPHONE ENCOUNTER
Specialty Pharmacy - Refill Coordination    Specialty Medication Orders Linked to Encounter      Flowsheet Row Most Recent Value   Medication #1 COSENTYX PEN, 2 PENS, 150 mg/mL PnIj (Order#[id number], Rx#[id number])            Refill Questions - Documented Responses      Flowsheet Row Most Recent Value   Patient Availability and HIPAA Verification    Does patient want to proceed with activity? Yes   HIPAA/medical authority confirmed? Yes   Relationship to patient of person spoken to? Self   Refill Screening Questions    Changes to allergies? No   Changes to medications? No   New conditions since last clinic visit? No   Unplanned office visit, urgent care, ED, or hospital admission in the last 4 weeks? No   How does patient/caregiver feel medication is working? Excellent   Financial problems or insurance changes? No   How many doses of your specialty medications were missed in the last 4 weeks? 0   Would patient like to speak to a pharmacist? No   When does the patient need to receive the medication? 02/23/23   Refill Delivery Questions    How will the patient receive the medication? MED[facility]   When does the patient need to receive the medication? 02/23/23   Shipping Address Home   Address in [facility] confirmed and updated if neccessary? Yes   Expected Copay ($) 441.99   Is the patient able to afford the medication copay? Yes   Payment Method one time CC provided,  CC on file   Days supply of Refill 28   Supplies needed? No supplies needed   Refill activity completed? Yes   Refill activity plan Refill scheduled   Shipment/Pickup Date: 02/22/23            Current Outpatient Medications   Medication Sig    acetaminophen (TYLENOL ORAL) Take by mouth as needed.    apixaban (ELIQUIS) 5 mg Tab Take 1 tablet (5 mg total) by mouth 2 (two) times daily. Will hold 2/13 & 2/14    atorvastatin (LIPITOR) 20 MG tablet Take 1 tablet (20 mg total) by mouth every evening.    B-complex with vitamin C (VITAMIN B  COMPLEX-C ORAL) Take by mouth.    calcium-vitamin D 250-100 mg-unit per tablet Take 1 tablet by mouth 2 (two) times daily.    COSENTYX PEN, 2 PENS, 150 mg/mL PnIj Inject 300mg (2 pens) into the skin every 4 weeks    cyanocobalamin 500 MCG tablet Take 500 mcg by mouth once daily.    diazePAM (VALIUM) 5 MG tablet Take 1 tablet (5 mg total) by mouth daily as needed for Anxiety.    fluticasone-umeclidin-vilanter (TRELEGY ELLIPTA) 100-62.5-25 mcg DsDv Inhale 1 puff into the lungs once daily.    methocarbamoL (ROBAXIN) 500 MG Tab Take 1 tablet (500 mg total) by mouth 4 (four) times daily. for 10 days    multivitamin (THERAGRAN) per tablet Take 1 tablet by mouth once daily.    multivitamin with minerals (HAIR,SKIN AND NAILS ORAL) Take by mouth.    mv-mn/iron/folic acid/herb 190 (VITAMIN D3 COMPLETE ORAL) Take by mouth.    omeprazole (PRILOSEC) 40 MG capsule Take 1 capsule (40 mg total) by mouth every morning.    oxyCODONE-acetaminophen (PERCOCET) 7.5-325 mg per tablet Take 1 tablet by mouth every 4 (four) hours as needed for Pain.    senna-docusate 8.6-50 mg (PERICOLACE) 8.6-50 mg per tablet Take 1 tablet by mouth daily as needed for Constipation.    traZODone (DESYREL) 100 MG tablet Take 1 tablet (100 mg total) by mouth nightly as needed for Insomnia.    venlafaxine (EFFEXOR-XR) 75 MG 24 hr capsule Take 1 capsule (75 mg total) by mouth once daily. Start on Week 2   Last reviewed on 2/15/2023  6:43 AM by Yomaira Clayton RN    Review of patient's allergies indicates:   Allergen Reactions    Succinimides Anaphylaxis     Son has     Last reviewed on  2/15/2023 12:52 PM by Marcela Edmond      Tasks added this encounter   No tasks added.   Tasks due within next 3 months   3/11/2023 - Clinical - Follow Up Assesement (Annual)  2/9/2023 - Refill Call (Auto Added)     Mykel, PharmD  [facility]  [address] [facility] [address]  Phone: [phone]  Fax: [phone]

## 2023-02-22 ENCOUNTER — OFFICE VISIT (OUTPATIENT)
Dept: PLASTIC SURGERY | Facility: CLINIC | Age: 63
End: 2023-02-22
Attending: PLASTIC SURGERY
Payer: COMMERCIAL

## 2023-02-22 ENCOUNTER — PATIENT MESSAGE (OUTPATIENT)
Dept: BARIATRICS | Facility: CLINIC | Age: 63
End: 2023-02-22
Payer: COMMERCIAL

## 2023-02-22 VITALS — DIASTOLIC BLOOD PRESSURE: 58 MMHG | HEART RATE: 102 BPM | SYSTOLIC BLOOD PRESSURE: 126 MMHG

## 2023-02-22 DIAGNOSIS — C50.912 MALIGNANT NEOPLASM OF LEFT FEMALE BREAST, UNSPECIFIED ESTROGEN RECEPTOR STATUS, UNSPECIFIED SITE OF BREAST: Primary | ICD-10-CM

## 2023-02-22 PROCEDURE — 99024 POSTOP FOLLOW-UP VISIT: CPT | Mod: S$GLB,,, | Performed by: PLASTIC SURGERY

## 2023-02-22 PROCEDURE — 1159F MED LIST DOCD IN RCRD: CPT | Mod: CPTII,S$GLB,, | Performed by: PLASTIC SURGERY

## 2023-02-22 PROCEDURE — 3078F DIAST BP <80 MM HG: CPT | Mod: CPTII,S$GLB,, | Performed by: PLASTIC SURGERY

## 2023-02-22 PROCEDURE — 3074F SYST BP LT 130 MM HG: CPT | Mod: CPTII,S$GLB,, | Performed by: PLASTIC SURGERY

## 2023-02-22 PROCEDURE — 3078F PR MOST RECENT DIASTOLIC BLOOD PRESSURE < 80 MM HG: ICD-10-PCS | Mod: CPTII,S$GLB,, | Performed by: PLASTIC SURGERY

## 2023-02-22 PROCEDURE — 99024 PR POST-OP FOLLOW-UP VISIT: ICD-10-PCS | Mod: S$GLB,,, | Performed by: PLASTIC SURGERY

## 2023-02-22 PROCEDURE — 3074F PR MOST RECENT SYSTOLIC BLOOD PRESSURE < 130 MM HG: ICD-10-PCS | Mod: CPTII,S$GLB,, | Performed by: PLASTIC SURGERY

## 2023-02-22 PROCEDURE — 1159F PR MEDICATION LIST DOCUMENTED IN MEDICAL RECORD: ICD-10-PCS | Mod: CPTII,S$GLB,, | Performed by: PLASTIC SURGERY

## 2023-02-22 RX ORDER — OXYCODONE AND ACETAMINOPHEN 10; 325 MG/1; MG/1
1 TABLET ORAL EVERY 4 HOURS PRN
Qty: 20 TABLET | Refills: 0 | Status: ON HOLD | OUTPATIENT
Start: 2023-02-22 | End: 2023-03-03 | Stop reason: SDUPTHER

## 2023-02-22 RX ORDER — OXYCODONE AND ACETAMINOPHEN 7.5; 325 MG/1; MG/1
1 TABLET ORAL EVERY 4 HOURS PRN
Qty: 20 TABLET | Refills: 0 | Status: ON HOLD | OUTPATIENT
Start: 2023-02-22 | End: 2023-03-03 | Stop reason: HOSPADM

## 2023-02-22 NOTE — PROGRESS NOTES
Patient presents for follow-up.  She is 1 week status post bilateral breast reconstruction with abdominal based flap.      No complaints     On exam: Both mastectomy skin flaps are well perfused     Skin paddles of both breasts are well perfused as well     Both drains at the chest produce less than 30/24 hours were removed without problem     The abdominal incision is intact but the drain still produce more than 30 cc per 24 hours of serosanguineous material     Assessment:  Stable     Plan will see her again in 1 week from now     She did request renewal of her pain medicine which we did

## 2023-02-28 ENCOUNTER — OFFICE VISIT (OUTPATIENT)
Dept: SURGERY | Facility: CLINIC | Age: 63
End: 2023-02-28
Payer: COMMERCIAL

## 2023-02-28 ENCOUNTER — PATIENT MESSAGE (OUTPATIENT)
Dept: HEMATOLOGY/ONCOLOGY | Facility: CLINIC | Age: 63
End: 2023-02-28
Payer: COMMERCIAL

## 2023-02-28 VITALS
WEIGHT: 180 LBS | SYSTOLIC BLOOD PRESSURE: 150 MMHG | DIASTOLIC BLOOD PRESSURE: 82 MMHG | BODY MASS INDEX: 33.13 KG/M2 | HEIGHT: 62 IN | HEART RATE: 100 BPM

## 2023-02-28 DIAGNOSIS — Z17.0 MALIGNANT NEOPLASM OF CENTRAL PORTION OF LEFT BREAST IN FEMALE, ESTROGEN RECEPTOR POSITIVE: Primary | ICD-10-CM

## 2023-02-28 DIAGNOSIS — C50.112 MALIGNANT NEOPLASM OF CENTRAL PORTION OF LEFT BREAST IN FEMALE, ESTROGEN RECEPTOR POSITIVE: Primary | ICD-10-CM

## 2023-02-28 PROCEDURE — 1159F MED LIST DOCD IN RCRD: CPT | Mod: CPTII,S$GLB,, | Performed by: SURGERY

## 2023-02-28 PROCEDURE — 1160F PR REVIEW ALL MEDS BY PRESCRIBER/CLIN PHARMACIST DOCUMENTED: ICD-10-PCS | Mod: CPTII,S$GLB,, | Performed by: SURGERY

## 2023-02-28 PROCEDURE — 99024 POSTOP FOLLOW-UP VISIT: CPT | Mod: S$GLB,,, | Performed by: SURGERY

## 2023-02-28 PROCEDURE — 3008F PR BODY MASS INDEX (BMI) DOCUMENTED: ICD-10-PCS | Mod: CPTII,S$GLB,, | Performed by: SURGERY

## 2023-02-28 PROCEDURE — 3077F SYST BP >= 140 MM HG: CPT | Mod: CPTII,S$GLB,, | Performed by: SURGERY

## 2023-02-28 PROCEDURE — 99999 PR PBB SHADOW E&M-EST. PATIENT-LVL IV: CPT | Mod: PBBFAC,,, | Performed by: SURGERY

## 2023-02-28 PROCEDURE — 3079F PR MOST RECENT DIASTOLIC BLOOD PRESSURE 80-89 MM HG: ICD-10-PCS | Mod: CPTII,S$GLB,, | Performed by: SURGERY

## 2023-02-28 PROCEDURE — 99024 PR POST-OP FOLLOW-UP VISIT: ICD-10-PCS | Mod: S$GLB,,, | Performed by: SURGERY

## 2023-02-28 PROCEDURE — 99999 PR PBB SHADOW E&M-EST. PATIENT-LVL IV: ICD-10-PCS | Mod: PBBFAC,,, | Performed by: SURGERY

## 2023-02-28 PROCEDURE — 1159F PR MEDICATION LIST DOCUMENTED IN MEDICAL RECORD: ICD-10-PCS | Mod: CPTII,S$GLB,, | Performed by: SURGERY

## 2023-02-28 PROCEDURE — 1160F RVW MEDS BY RX/DR IN RCRD: CPT | Mod: CPTII,S$GLB,, | Performed by: SURGERY

## 2023-02-28 PROCEDURE — 3008F BODY MASS INDEX DOCD: CPT | Mod: CPTII,S$GLB,, | Performed by: SURGERY

## 2023-02-28 PROCEDURE — 3079F DIAST BP 80-89 MM HG: CPT | Mod: CPTII,S$GLB,, | Performed by: SURGERY

## 2023-02-28 PROCEDURE — 3077F PR MOST RECENT SYSTOLIC BLOOD PRESSURE >= 140 MM HG: ICD-10-PCS | Mod: CPTII,S$GLB,, | Performed by: SURGERY

## 2023-02-28 RX ORDER — LEVOFLOXACIN 500 MG/1
500 TABLET, FILM COATED ORAL
Status: ON HOLD | COMMUNITY
Start: 2023-02-27 | End: 2023-03-03 | Stop reason: SDUPTHER

## 2023-02-28 NOTE — PROGRESS NOTES
Breast Surgery Clinic  [facility]  Department of Surgery    Subjective:     Lucia Matias is a 62 y.o. female with a stage pT1c N0 M0 grade 2 ER + OR + HER2 - invasive ductal carcinoma of the left breast. She presents to clinic for follow up s/p bilateral mastectomy with left sentinel node biopsy on 2/15/2023 followed by immediate NEHA flap reconstruction. She denies fever, chills, chest pain or shortness of breath. Reports redness and increased pain to the left breast. She was started on Levaquin from Dr. Milian's office. Reports pain to the left breast, has been taking pain medication with some improvement.     Final Pathology:  1. Breast, left, mastectomy:   - Invasive lobular carcinoma, [place] grade 2   - Tumor measures 18 mm in maximal dimension   - Lobular carcinoma in-situ, pleomorphic type   - Nipple-areolar complex with no significant histopathologic abnormality    Margins of resection are negative for in-situ and invasive carcinoma   - Please see SYNOPTIC REPORT below   2. Lymph node, left axillary, sentinel, excision:   - One lymph node negative for malignancy (0/1)   - Immunostains were performed with appropriately reactive controls and the   lymph node is negative for AE1/AE3, WSK, and CAM5.2, supporting the above   interpretation.   3. Lymph node, left axillary, sentinel, excision:   - One lymph node negative for malignancy (0/1)   - Immunostains were performed with appropriately reactive controls and the   lymph node is negative for AE1/AE3, WSK, and CAM5.2, supporting the above   interpretation.   4. Breast, right, mastectomy:   - Benign breast tissue   - Nipple-areolar complex with no significant histopathologic abnormality   - Margins of resection are negative for malignancy   - Negative for in-situ and invasive carcinoma     Medications:  Current Outpatient Medications on File Prior to Visit   Medication Sig Dispense Refill    acetaminophen (TYLENOL ORAL) Take by  mouth as needed.      apixaban (ELIQUIS) 5 mg Tab Take 1 tablet (5 mg total) by mouth 2 (two) times daily. Will hold  &  60 tablet 5    atorvastatin (LIPITOR) 20 MG tablet Take 1 tablet (20 mg total) by mouth every evening. 90 tablet 3    B-complex with vitamin C (VITAMIN B COMPLEX-C ORAL) Take by mouth.      calcium-vitamin D 250-100 mg-unit per tablet Take 1 tablet by mouth 2 (two) times daily.      COSENTYX PEN, 2 PENS, 150 mg/mL PnIj Inject 300mg (2 pens) into the skin every 4 weeks 2 mL 11    cyanocobalamin 500 MCG tablet Take 500 mcg by mouth once daily.      diazePAM (VALIUM) 5 MG tablet Take 1 tablet (5 mg total) by mouth daily as needed for Anxiety. 30 tablet 2    fluticasone-umeclidin-vilanter (TRELEGY ELLIPTA) 100-62.5-25 mcg DsDv Inhale 1 puff into the lungs once daily. 180 each 3    levoFLOXacin (LEVAQUIN) 500 MG tablet Take 500 mg by mouth.      multivitamin (THERAGRAN) per tablet Take 1 tablet by mouth once daily.      multivitamin with minerals (HAIR,SKIN AND NAILS ORAL) Take by mouth.      mv-mn/iron/folic acid/herb 190 (VITAMIN D3 COMPLETE ORAL) Take by mouth.      omeprazole (PRILOSEC) 40 MG capsule Take 1 capsule (40 mg total) by mouth every morning. 90 capsule 1    oxyCODONE-acetaminophen (PERCOCET)  mg per tablet Take 1 tablet by mouth every 4 (four) hours as needed for Pain. 20 tablet 0    oxyCODONE-acetaminophen (PERCOCET) 7.5-325 mg per tablet Take 1 tablet by mouth every 4 (four) hours as needed for Pain. 20 tablet 0    senna-docusate 8.6-50 mg (PERICOLACE) 8.6-50 mg per tablet Take 1 tablet by mouth daily as needed for Constipation. 15 tablet 0    traZODone (DESYREL) 100 MG tablet Take 1 tablet (100 mg total) by mouth nightly as needed for Insomnia. 90 tablet 3    venlafaxine (EFFEXOR-XR) 75 MG 24 hr capsule Take 1 capsule (75 mg total) by mouth once daily. Start on Week 2 90 capsule 3    [] methocarbamoL (ROBAXIN) 500 MG Tab Take 1 tablet (500 mg total) by mouth 4  (four) times daily. for 10 days 40 tablet 0    [DISCONTINUED] budesonide-formoterol 160-4.5 mcg (SYMBICORT) 160-4.5 mcg/actuation HFAA Inhale 2 puffs into the lungs every 12 (twelve) hours. Controller 30.6 g 2     No current facility-administered medications on file prior to visit.         Objective:     PHYSICAL EXAM:  Vital Signs (Most Recent)  Pulse: 100 (02/28/23 1348)  BP: (!) 150/82 (02/28/23 1348)    ROS A 10+ review of systems was performed with pertinent positives and negatives noted above in the history of present illness.  Other systems were negative unless otherwise specified.    Physical Exam:  Gen: no apparent distress, awake and alert  CV: regular rate/rhythm  Pulm: non-labored breathing, equal and bilateral chest rise  Breast: right incision c/d/I, no surrounding erythema or edema. Left breast with surrounding erythema with drainage.   Abd: soft, non-distended  Ext: warm and well perfused, no edema  Neuro: motor and sensation grossly intact and symmetric         Assessment:     Lucia Matias is a 62 y.o. female presenting to clinic today for follow up of stage pT1c N0 M0 grade 2 ER + IA + HER2 - invasive ductal carcinoma of the left breast s/p bilateral mastectomy with left sentinel node biopsy on 2/15/23 followed by immediate NEHA flap    Plan:      - Return in 6 months for a follow up office visit and breast exam   - mammogram not recommended    - The patient is advised in continued exam of the breast chest wall and to report to this office sooner should she note any areas of abnormality or concern.    - She has been instructed to meet with med onc and rad onc for discussion of adjuvant treatment recommendations

## 2023-03-01 ENCOUNTER — HOSPITAL ENCOUNTER (INPATIENT)
Facility: OTHER | Age: 63
LOS: 3 days | Discharge: HOME | DRG: 200 | End: 2023-03-04
Attending: EMERGENCY MEDICINE | Admitting: STUDENT IN AN ORGANIZED HEALTH CARE EDUCATION/TRAINING PROGRAM
Payer: COMMERCIAL

## 2023-03-01 ENCOUNTER — DOCUMENTATION ONLY (OUTPATIENT)
Dept: HEMATOLOGY/ONCOLOGY | Facility: CLINIC | Age: 63
End: 2023-03-01
Payer: COMMERCIAL

## 2023-03-01 ENCOUNTER — OFFICE VISIT (OUTPATIENT)
Dept: PLASTIC SURGERY | Facility: CLINIC | Age: 63
End: 2023-03-01
Attending: PLASTIC SURGERY
Payer: COMMERCIAL

## 2023-03-01 VITALS — DIASTOLIC BLOOD PRESSURE: 69 MMHG | OXYGEN SATURATION: 89 % | SYSTOLIC BLOOD PRESSURE: 137 MMHG | HEART RATE: 110 BPM

## 2023-03-01 DIAGNOSIS — J93.9 PNEUMOTHORAX: ICD-10-CM

## 2023-03-01 DIAGNOSIS — C50.912 MALIGNANT NEOPLASM OF LEFT FEMALE BREAST, UNSPECIFIED ESTROGEN RECEPTOR STATUS, UNSPECIFIED SITE OF BREAST: Primary | ICD-10-CM

## 2023-03-01 DIAGNOSIS — R00.0 TACHYCARDIA: ICD-10-CM

## 2023-03-01 DIAGNOSIS — J93.9 PNEUMOTHORAX ON LEFT: ICD-10-CM

## 2023-03-01 DIAGNOSIS — R00.0 SINUS TACHYCARDIA: ICD-10-CM

## 2023-03-01 DIAGNOSIS — J95.811 POSTOPERATIVE PNEUMOTHORAX: Primary | ICD-10-CM

## 2023-03-01 DIAGNOSIS — Z96.89 CHEST TUBE IN PLACE: ICD-10-CM

## 2023-03-01 DIAGNOSIS — R06.02 SOB (SHORTNESS OF BREATH): ICD-10-CM

## 2023-03-01 DIAGNOSIS — D05.02 LOBULAR CARCINOMA IN SITU (LCIS) OF LEFT BREAST: Primary | ICD-10-CM

## 2023-03-01 LAB
ALBUMIN SERPL BCP-MCNC: 2.9 G/DL (ref 3.5–5.2)
ALP SERPL-CCNC: 103 U/L (ref 55–135)
ALT SERPL W/O P-5'-P-CCNC: 24 U/L (ref 10–44)
ANION GAP SERPL CALC-SCNC: 9 MMOL/L (ref 8–16)
AST SERPL-CCNC: 20 U/L (ref 10–40)
BASOPHILS # BLD AUTO: 0.05 K/UL (ref 0–0.2)
BASOPHILS NFR BLD: 0.6 % (ref 0–1.9)
BILIRUB SERPL-MCNC: 0.5 MG/DL (ref 0.1–1)
BILIRUB UR QL STRIP: NEGATIVE
BNP SERPL-MCNC: 20 PG/ML (ref 0–99)
BUN SERPL-MCNC: 13 MG/DL (ref 6–20)
CALCIUM SERPL-MCNC: 9.2 MG/DL (ref 8.7–10.5)
CHLORIDE SERPL-SCNC: 104 MMOL/L (ref 95–110)
CLARITY UR: CLEAR
CO2 SERPL-SCNC: 27 MMOL/L (ref 23–29)
COLOR UR: YELLOW
CREAT SERPL-MCNC: 0.9 MG/DL (ref 0.5–1.4)
D DIMER PPP IA.FEU-MCNC: 0.94 MG/L FEU
DIFFERENTIAL METHOD: ABNORMAL
EOSINOPHIL # BLD AUTO: 0.2 K/UL (ref 0–0.5)
EOSINOPHIL NFR BLD: 2.4 % (ref 0–8)
ERYTHROCYTE [DISTWIDTH] IN BLOOD BY AUTOMATED COUNT: 12.3 % (ref 11.5–14.5)
EST. GFR  (NO RACE VARIABLE): >60 ML/MIN/1.73 M^2
GLUCOSE SERPL-MCNC: 83 MG/DL (ref 70–110)
GLUCOSE UR QL STRIP: NEGATIVE
HCT VFR BLD AUTO: 38.2 % (ref 37–48.5)
HGB BLD-MCNC: 12.2 G/DL (ref 12–16)
HGB UR QL STRIP: NEGATIVE
IMM GRANULOCYTES # BLD AUTO: 0.02 K/UL (ref 0–0.04)
IMM GRANULOCYTES NFR BLD AUTO: 0.2 % (ref 0–0.5)
KETONES UR QL STRIP: NEGATIVE
LEUKOCYTE ESTERASE UR QL STRIP: NEGATIVE
LYMPHOCYTES # BLD AUTO: 1.3 K/UL (ref 1–4.8)
LYMPHOCYTES NFR BLD: 15.8 % (ref 18–48)
MCH RBC QN AUTO: 29.7 PG (ref 27–31)
MCHC RBC AUTO-ENTMCNC: 31.9 G/DL (ref 32–36)
MCV RBC AUTO: 93 FL (ref 82–98)
MONOCYTES # BLD AUTO: 0.6 K/UL (ref 0.3–1)
MONOCYTES NFR BLD: 7.2 % (ref 4–15)
NEUTROPHILS # BLD AUTO: 6.3 K/UL (ref 1.8–7.7)
NEUTROPHILS NFR BLD: 73.8 % (ref 38–73)
NITRITE UR QL STRIP: NEGATIVE
NRBC BLD-RTO: 0 /100 WBC
PH UR STRIP: 6 [PH] (ref 5–8)
PLATELET # BLD AUTO: 459 K/UL (ref 150–450)
PMV BLD AUTO: 9 FL (ref 9.2–12.9)
POTASSIUM SERPL-SCNC: 4.1 MMOL/L (ref 3.5–5.1)
PROT SERPL-MCNC: 6.8 G/DL (ref 6–8.4)
PROT UR QL STRIP: ABNORMAL
RBC # BLD AUTO: 4.11 M/UL (ref 4–5.4)
SODIUM SERPL-SCNC: 140 MMOL/L (ref 136–145)
SP GR UR STRIP: 1.02 (ref 1–1.03)
URN SPEC COLLECT METH UR: ABNORMAL
UROBILINOGEN UR STRIP-ACNC: NEGATIVE EU/DL
WBC # BLD AUTO: 8.47 K/UL (ref 3.9–12.7)

## 2023-03-01 PROCEDURE — 3075F SYST BP GE 130 - 139MM HG: CPT | Mod: CPTII,S$GLB,, | Performed by: PLASTIC SURGERY

## 2023-03-01 PROCEDURE — 93010 ELECTROCARDIOGRAM REPORT: CPT | Mod: ,,, | Performed by: INTERNAL MEDICINE

## 2023-03-01 PROCEDURE — 25000003 PHARM REV CODE 250: Performed by: EMERGENCY MEDICINE

## 2023-03-01 PROCEDURE — 3075F PR MOST RECENT SYSTOLIC BLOOD PRESS GE 130-139MM HG: ICD-10-PCS | Mod: CPTII,S$GLB,, | Performed by: PLASTIC SURGERY

## 2023-03-01 PROCEDURE — 63600175 PHARM REV CODE 636 W HCPCS: Performed by: EMERGENCY MEDICINE

## 2023-03-01 PROCEDURE — 81003 URINALYSIS AUTO W/O SCOPE: CPT | Performed by: NURSE PRACTITIONER

## 2023-03-01 PROCEDURE — 99024 PR POST-OP FOLLOW-UP VISIT: ICD-10-PCS | Mod: S$GLB,,, | Performed by: PLASTIC SURGERY

## 2023-03-01 PROCEDURE — 99285 EMERGENCY DEPT VISIT HI MDM: CPT | Mod: 25

## 2023-03-01 PROCEDURE — 80053 COMPREHEN METABOLIC PANEL: CPT | Performed by: NURSE PRACTITIONER

## 2023-03-01 PROCEDURE — 85379 FIBRIN DEGRADATION QUANT: CPT | Performed by: NURSE PRACTITIONER

## 2023-03-01 PROCEDURE — 99152 MOD SED SAME PHYS/QHP 5/>YRS: CPT

## 2023-03-01 PROCEDURE — 3078F DIAST BP <80 MM HG: CPT | Mod: CPTII,S$GLB,, | Performed by: PLASTIC SURGERY

## 2023-03-01 PROCEDURE — 32551 INSERTION OF CHEST TUBE: CPT

## 2023-03-01 PROCEDURE — 93010 EKG 12-LEAD: ICD-10-PCS | Mod: ,,, | Performed by: INTERNAL MEDICINE

## 2023-03-01 PROCEDURE — 96376 TX/PRO/DX INJ SAME DRUG ADON: CPT

## 2023-03-01 PROCEDURE — 96375 TX/PRO/DX INJ NEW DRUG ADDON: CPT

## 2023-03-01 PROCEDURE — 93005 ELECTROCARDIOGRAM TRACING: CPT

## 2023-03-01 PROCEDURE — 3078F PR MOST RECENT DIASTOLIC BLOOD PRESSURE < 80 MM HG: ICD-10-PCS | Mod: CPTII,S$GLB,, | Performed by: PLASTIC SURGERY

## 2023-03-01 PROCEDURE — 21400001 HC TELEMETRY ROOM

## 2023-03-01 PROCEDURE — 83880 ASSAY OF NATRIURETIC PEPTIDE: CPT | Performed by: NURSE PRACTITIONER

## 2023-03-01 PROCEDURE — 96374 THER/PROPH/DIAG INJ IV PUSH: CPT

## 2023-03-01 PROCEDURE — 85025 COMPLETE CBC W/AUTO DIFF WBC: CPT | Performed by: NURSE PRACTITIONER

## 2023-03-01 PROCEDURE — 99024 POSTOP FOLLOW-UP VISIT: CPT | Mod: S$GLB,,, | Performed by: PLASTIC SURGERY

## 2023-03-01 RX ORDER — ACETAMINOPHEN 325 MG/1
650 TABLET ORAL EVERY 6 HOURS PRN
Status: DISCONTINUED | OUTPATIENT
Start: 2023-03-02 | End: 2023-03-04 | Stop reason: HOSPADM

## 2023-03-01 RX ORDER — ACETAMINOPHEN AND CODEINE PHOSPHATE 300; 30 MG/1; MG/1
1 TABLET ORAL EVERY 4 HOURS PRN
Qty: 15 TABLET | Refills: 0 | Status: ON HOLD | OUTPATIENT
Start: 2023-03-01 | End: 2023-03-03 | Stop reason: HOSPADM

## 2023-03-01 RX ORDER — AMOXICILLIN 250 MG
1 CAPSULE ORAL 2 TIMES DAILY PRN
Status: DISCONTINUED | OUTPATIENT
Start: 2023-03-02 | End: 2023-03-04 | Stop reason: HOSPADM

## 2023-03-01 RX ORDER — TALC
6 POWDER (GRAM) TOPICAL NIGHTLY PRN
Status: DISCONTINUED | OUTPATIENT
Start: 2023-03-02 | End: 2023-03-04 | Stop reason: HOSPADM

## 2023-03-01 RX ORDER — NALOXONE HCL 0.4 MG/ML
0.02 VIAL (ML) INJECTION
Status: DISCONTINUED | OUTPATIENT
Start: 2023-03-02 | End: 2023-03-04 | Stop reason: HOSPADM

## 2023-03-01 RX ORDER — ATORVASTATIN CALCIUM 20 MG/1
20 TABLET, FILM COATED ORAL NIGHTLY
Status: DISCONTINUED | OUTPATIENT
Start: 2023-03-02 | End: 2023-03-04 | Stop reason: HOSPADM

## 2023-03-01 RX ORDER — DIAZEPAM 5 MG/1
5 TABLET ORAL DAILY PRN
Status: DISCONTINUED | OUTPATIENT
Start: 2023-03-02 | End: 2023-03-04 | Stop reason: HOSPADM

## 2023-03-01 RX ORDER — MUPIROCIN 20 MG/G
OINTMENT TOPICAL 2 TIMES DAILY
Status: DISCONTINUED | OUTPATIENT
Start: 2023-03-02 | End: 2023-03-04 | Stop reason: HOSPADM

## 2023-03-01 RX ORDER — PROPOFOL 10 MG/ML
40 VIAL (ML) INTRAVENOUS ONCE
Status: COMPLETED | OUTPATIENT
Start: 2023-03-01 | End: 2023-03-01

## 2023-03-01 RX ORDER — OXYCODONE AND ACETAMINOPHEN 10; 325 MG/1; MG/1
1 TABLET ORAL EVERY 4 HOURS PRN
Status: DISCONTINUED | OUTPATIENT
Start: 2023-03-02 | End: 2023-03-04 | Stop reason: HOSPADM

## 2023-03-01 RX ORDER — PROPOFOL 10 MG/ML
20 VIAL (ML) INTRAVENOUS ONCE
Status: COMPLETED | OUTPATIENT
Start: 2023-03-01 | End: 2023-03-01

## 2023-03-01 RX ORDER — TRAZODONE HYDROCHLORIDE 100 MG/1
100 TABLET ORAL NIGHTLY PRN
Status: DISCONTINUED | OUTPATIENT
Start: 2023-03-02 | End: 2023-03-04 | Stop reason: HOSPADM

## 2023-03-01 RX ORDER — IPRATROPIUM BROMIDE AND ALBUTEROL SULFATE 2.5; .5 MG/3ML; MG/3ML
3 SOLUTION RESPIRATORY (INHALATION) EVERY 4 HOURS PRN
Status: DISCONTINUED | OUTPATIENT
Start: 2023-03-02 | End: 2023-03-04 | Stop reason: HOSPADM

## 2023-03-01 RX ORDER — KETOROLAC TROMETHAMINE 30 MG/ML
10 INJECTION, SOLUTION INTRAMUSCULAR; INTRAVENOUS
Status: COMPLETED | OUTPATIENT
Start: 2023-03-01 | End: 2023-03-01

## 2023-03-01 RX ORDER — LEVOFLOXACIN 250 MG/1
500 TABLET ORAL DAILY
Status: DISCONTINUED | OUTPATIENT
Start: 2023-03-02 | End: 2023-03-04 | Stop reason: HOSPADM

## 2023-03-01 RX ORDER — HYDROMORPHONE HYDROCHLORIDE 1 MG/ML
1 INJECTION, SOLUTION INTRAMUSCULAR; INTRAVENOUS; SUBCUTANEOUS
Status: COMPLETED | OUTPATIENT
Start: 2023-03-01 | End: 2023-03-01

## 2023-03-01 RX ORDER — SODIUM CHLORIDE 0.9 % (FLUSH) 0.9 %
10 SYRINGE (ML) INJECTION EVERY 8 HOURS
Status: DISCONTINUED | OUTPATIENT
Start: 2023-03-02 | End: 2023-03-04 | Stop reason: HOSPADM

## 2023-03-01 RX ORDER — LIDOCAINE HYDROCHLORIDE AND EPINEPHRINE 10; 10 MG/ML; UG/ML
15 INJECTION, SOLUTION INFILTRATION; PERINEURAL ONCE
Status: COMPLETED | OUTPATIENT
Start: 2023-03-01 | End: 2023-03-01

## 2023-03-01 RX ORDER — KETAMINE HYDROCHLORIDE 50 MG/ML
1 INJECTION, SOLUTION INTRAMUSCULAR; INTRAVENOUS
Status: COMPLETED | OUTPATIENT
Start: 2023-03-01 | End: 2023-03-01

## 2023-03-01 RX ORDER — VENLAFAXINE HYDROCHLORIDE 75 MG/1
75 CAPSULE, EXTENDED RELEASE ORAL DAILY
Status: DISCONTINUED | OUTPATIENT
Start: 2023-03-02 | End: 2023-03-04 | Stop reason: HOSPADM

## 2023-03-01 RX ADMIN — HYDROMORPHONE HYDROCHLORIDE 1 MG: 1 INJECTION, SOLUTION INTRAMUSCULAR; INTRAVENOUS; SUBCUTANEOUS at 09:03

## 2023-03-01 RX ADMIN — HYDROMORPHONE HYDROCHLORIDE 1 MG: 1 INJECTION, SOLUTION INTRAMUSCULAR; INTRAVENOUS; SUBCUTANEOUS at 10:03

## 2023-03-01 RX ADMIN — KETAMINE HYDROCHLORIDE 81.5 MG: 50 INJECTION INTRAMUSCULAR; INTRAVENOUS at 08:03

## 2023-03-01 RX ADMIN — PROPOFOL 20 MG: 10 INJECTION, EMULSION INTRAVENOUS at 09:03

## 2023-03-01 RX ADMIN — LIDOCAINE HYDROCHLORIDE,EPINEPHRINE BITARTRATE 15 ML: 10; .01 INJECTION, SOLUTION INFILTRATION; PERINEURAL at 09:03

## 2023-03-01 RX ADMIN — KETOROLAC TROMETHAMINE 10 MG: 30 INJECTION, SOLUTION INTRAMUSCULAR; INTRAVENOUS at 09:03

## 2023-03-01 RX ADMIN — HYDROMORPHONE HYDROCHLORIDE 1 MG: 1 INJECTION, SOLUTION INTRAMUSCULAR; INTRAVENOUS; SUBCUTANEOUS at 07:03

## 2023-03-01 RX ADMIN — PROPOFOL 40 MG: 10 INJECTION, EMULSION INTRAVENOUS at 09:03

## 2023-03-01 NOTE — PROGRESS NOTES
Patient presents for follow-up.  She is 2 weeks status post bilateral breast reconstruction with NEHA flap     She did see Dr. Huynh yesterday    I did call her Levaquin yesterday she sent me a picture with some erythema of the left mastectomy skin flap     Today she presents with some shortness of breath     On exam:  O2 sat is 89     Heart rate:  123     Blood pressure 137/82     The right breast is well-perfused with no erythema or edema     There is some erythema of the left breast that seemed to improve since the pictures she sent me yesterday     The abdominal incision is well approximated    I did remove the right abdominal drain as it was producing less than 30 cc per 24 hours     Assessment:  Short of shortness of breath status post bilateral breast reconstruction with NEHA flap     She will be brought to the emergency room for a thorough workup to rule out a PE

## 2023-03-01 NOTE — PROGRESS NOTES
Pt met with Dr Meehan for post op appointment.  med onc appt made, reviewed location, date and time, pt verbalized understanding.  Oncotype sent.  No additional needs at present.     Oncology Navigation   Intake  Date of Diagnosis: 12/15/22  Cancer Type: Breast  Internal / External Referral: Internal  Date of Referral: 12/13/22  Initial Nurse Navigator Contact: 01/06/23  Referral to Initial Contact Timeline (days): 24  Date Worked: 02/28/23  First Appointment Available: 01/10/23  Appointment Date: 01/10/23  First Available Date vs. Scheduled Date (days): 0     Treatment  Current Status: Staging work-up    Surgical Oncologist: Zoran  Plastic Surgeon: St Johnson  Type of Surgery: Bilateral Mastectomy  Consult Date: 01/06/23  Surgery Schedule Date: 02/15/23    Medical Oncologist: [facility]  Consult Date: 03/15/23       Procedures: Genomic testing  CT Schedule Date: 01/19/23  MRI Schedule Date: 01/09/23  Genomic Testing Date Sent: 03/01/23       ER: Positive  VT: Positive  Her2: Negative       Support Systems: Family members     Acuity      Follow Up  No follow-ups on file.

## 2023-03-01 NOTE — Clinical Note
Diagnosis: Pneumothorax [850854]   Admitting Provider:: SHANT CAROLINA [[id number]]   Future Attending Provider: SHANT CAROLINA [[id number]]   Reason for IP Medical Treatment  (Clinical interventions that can only be accomplished in the IP setting? ) :: pneumothorax   I certify that Inpatient services for greater than or equal to 2 midnights are medically necessary:: Yes   Plans for Post-Acute care--if anticipated (pick the single best option):: C. Discharge home with home health services

## 2023-03-01 NOTE — FIRST PROVIDER EVALUATION
Emergency Department TeleTriage Encounter Note      CHIEF COMPLAINT    Chief Complaint   Patient presents with    Shortness of Breath     Reports sob since double mastectomy february 15th 2023. Denies cp. Pt was started back on her blood thinner eliquis 2 days after the surgery. Reports compliance with meds. 89%RA       VITAL SIGNS   Initial Vitals [03/01/23 1642]   BP Pulse Resp Temp SpO2   134/70 107 (!) 22 97.9 °F (36.6 °C) (!) 90 %      MAP       --            ALLERGIES    Review of patient's allergies indicates:   Allergen Reactions    Succinimides Anaphylaxis     Son has        PROVIDER TRIAGE NOTE  TeleTriage Note: Lucia Matias, a nontoxic/well appearing, 62 y.o. female, presented to the ED with c/o SOB since her surgery on 2/15/23 (double mastectomy). Taken off of Eliquis for the surgery and restarted her Eliquis 2 days after surgery. Placed on oxygen upon presenting to the ED due to O2 sats being 90%.     All ED beds are full at present; patient notified of this status.  Patient seen and medically screened by Nurse Practitioner via teletriage. Orders initiated at triage to expedite care.  Patient is stable to return to the waiting room and will be placed in an ED bed when available.  Care will be transferred to an alternate provider when patient has been placed in an Exam Room from the [facility] for physical exam, additional orders, and disposition.  4:47 PM Elisabet Neely DNP, FNP-C        ORDERS  Labs Reviewed   CBC W/ AUTO DIFFERENTIAL   COMPREHENSIVE METABOLIC PANEL   B-TYPE NATRIURETIC PEPTIDE   URINALYSIS, REFLEX TO URINE CULTURE   D DIMER, QUANTITATIVE       ED Orders (720h ago, onward)      Start Ordered     Status Ordering Provider    03/01/23 1650 03/01/23 1649  Urinalysis, Reflex to Urine Culture Urine, Clean Catch  STAT         Ordered ELISABET NEELY    03/01/23 1650 03/01/23 1649  X-Ray Chest PA And Lateral  1 time imaging         Ordered ELISABET NEELY    03/01/23 1650  03/01/23 1649  EKG 12-lead  Once         Ordered ALLAN, ABRAHAM MUJICAJohanna    03/01/23 1650 03/01/23 1649  D dimer, quantitative  STAT         Ordered ALLAN, ABRAHAM MUJICAJohanna    03/01/23 1649 03/01/23 1649  Insert peripheral IV  Continuous         Ordered ALLAN, ABRAHAM MUJICAJohanna    03/01/23 1649 03/01/23 1649  CBC auto differential  STAT         Ordered ALLAN, ABARHAM MUJICAJohanna    03/01/23 1649 03/01/23 1649  Comprehensive metabolic panel  STAT         Ordered ALLAN, ABRAHAM MUJICAJohanna    03/01/23 1649 03/01/23 1649  Brain natriuretic peptide  STAT         Ordered ALLAN, ABRAHAM MUJICAJohanna              Virtual Visit Note: The provider triage portion of this emergency department evaluation and documentation was performed via [employer], a HIPAA-compliant telemedicine application, in concert with a tele-presenter in the room. A face to face patient evaluation with one of my colleagues will occur once the patient is placed in an emergency department room.      DISCLAIMER: This note was prepared with [employer] voice recognition transcription software. Garbled syntax, mangled pronouns, and other bizarre constructions may be attributed to that software system.

## 2023-03-01 NOTE — ED TRIAGE NOTES
Pt reports SOB since mastectomy, pt states she is unable to perform normal ADLs without feeling short of breath. Pt states she isnt able to walk around like she usually does. Pt states she change started after the surgery. Pt is alert and oriented, ambulatory, respirations are even unlabored.

## 2023-03-02 LAB
ANION GAP SERPL CALC-SCNC: 8 MMOL/L (ref 8–16)
BASOPHILS # BLD AUTO: 0.04 K/UL (ref 0–0.2)
BASOPHILS NFR BLD: 0.5 % (ref 0–1.9)
BUN SERPL-MCNC: 12 MG/DL (ref 6–20)
CALCIUM SERPL-MCNC: 8.9 MG/DL (ref 8.7–10.5)
CHLORIDE SERPL-SCNC: 104 MMOL/L (ref 95–110)
CO2 SERPL-SCNC: 29 MMOL/L (ref 23–29)
CREAT SERPL-MCNC: 0.9 MG/DL (ref 0.5–1.4)
DIFFERENTIAL METHOD: ABNORMAL
EOSINOPHIL # BLD AUTO: 0.3 K/UL (ref 0–0.5)
EOSINOPHIL NFR BLD: 3.3 % (ref 0–8)
ERYTHROCYTE [DISTWIDTH] IN BLOOD BY AUTOMATED COUNT: 12.4 % (ref 11.5–14.5)
EST. GFR  (NO RACE VARIABLE): >60 ML/MIN/1.73 M^2
GLUCOSE SERPL-MCNC: 86 MG/DL (ref 70–110)
HCT VFR BLD AUTO: 37.3 % (ref 37–48.5)
HGB BLD-MCNC: 11.4 G/DL (ref 12–16)
IMM GRANULOCYTES # BLD AUTO: 0.03 K/UL (ref 0–0.04)
IMM GRANULOCYTES NFR BLD AUTO: 0.4 % (ref 0–0.5)
LYMPHOCYTES # BLD AUTO: 1.3 K/UL (ref 1–4.8)
LYMPHOCYTES NFR BLD: 17.3 % (ref 18–48)
MAGNESIUM SERPL-MCNC: 2 MG/DL (ref 1.6–2.6)
MCH RBC QN AUTO: 29.5 PG (ref 27–31)
MCHC RBC AUTO-ENTMCNC: 30.6 G/DL (ref 32–36)
MCV RBC AUTO: 96 FL (ref 82–98)
MONOCYTES # BLD AUTO: 0.7 K/UL (ref 0.3–1)
MONOCYTES NFR BLD: 9 % (ref 4–15)
NEUTROPHILS # BLD AUTO: 5.2 K/UL (ref 1.8–7.7)
NEUTROPHILS NFR BLD: 69.5 % (ref 38–73)
NRBC BLD-RTO: 0 /100 WBC
PLATELET # BLD AUTO: 363 K/UL (ref 150–450)
PMV BLD AUTO: 8.9 FL (ref 9.2–12.9)
POTASSIUM SERPL-SCNC: 4.2 MMOL/L (ref 3.5–5.1)
RBC # BLD AUTO: 3.87 M/UL (ref 4–5.4)
SODIUM SERPL-SCNC: 141 MMOL/L (ref 136–145)
WBC # BLD AUTO: 7.53 K/UL (ref 3.9–12.7)

## 2023-03-02 PROCEDURE — 27000221 HC OXYGEN, UP TO 24 HOURS

## 2023-03-02 PROCEDURE — 25000003 PHARM REV CODE 250: Performed by: PHYSICIAN ASSISTANT

## 2023-03-02 PROCEDURE — A4216 STERILE WATER/SALINE, 10 ML: HCPCS | Performed by: PHYSICIAN ASSISTANT

## 2023-03-02 PROCEDURE — 99223 1ST HOSP IP/OBS HIGH 75: CPT | Mod: ,,, | Performed by: INTERNAL MEDICINE

## 2023-03-02 PROCEDURE — 63600175 PHARM REV CODE 636 W HCPCS: Performed by: PHYSICIAN ASSISTANT

## 2023-03-02 PROCEDURE — 80048 BASIC METABOLIC PNL TOTAL CA: CPT | Performed by: PHYSICIAN ASSISTANT

## 2023-03-02 PROCEDURE — 99223 PR INITIAL HOSPITAL CARE,LEVL III: ICD-10-PCS | Mod: ,,, | Performed by: INTERNAL MEDICINE

## 2023-03-02 PROCEDURE — 99900035 HC TECH TIME PER 15 MIN (STAT)

## 2023-03-02 PROCEDURE — 94761 N-INVAS EAR/PLS OXIMETRY MLT: CPT

## 2023-03-02 PROCEDURE — 36415 COLL VENOUS BLD VENIPUNCTURE: CPT | Performed by: PHYSICIAN ASSISTANT

## 2023-03-02 PROCEDURE — 25000003 PHARM REV CODE 250: Performed by: STUDENT IN AN ORGANIZED HEALTH CARE EDUCATION/TRAINING PROGRAM

## 2023-03-02 PROCEDURE — 85025 COMPLETE CBC W/AUTO DIFF WBC: CPT | Performed by: PHYSICIAN ASSISTANT

## 2023-03-02 PROCEDURE — 21400001 HC TELEMETRY ROOM

## 2023-03-02 PROCEDURE — 83735 ASSAY OF MAGNESIUM: CPT | Performed by: PHYSICIAN ASSISTANT

## 2023-03-02 PROCEDURE — 99223 1ST HOSP IP/OBS HIGH 75: CPT | Mod: ,,, | Performed by: PHYSICIAN ASSISTANT

## 2023-03-02 PROCEDURE — 99223 PR INITIAL HOSPITAL CARE,LEVL III: ICD-10-PCS | Mod: ,,, | Performed by: PHYSICIAN ASSISTANT

## 2023-03-02 PROCEDURE — 63600175 PHARM REV CODE 636 W HCPCS: Performed by: INTERNAL MEDICINE

## 2023-03-02 RX ORDER — FLUTICASONE FUROATE AND VILANTEROL 200; 25 UG/1; UG/1
1 POWDER RESPIRATORY (INHALATION) DAILY
Status: CANCELLED | OUTPATIENT
Start: 2023-03-02

## 2023-03-02 RX ORDER — HYDROMORPHONE HYDROCHLORIDE 1 MG/ML
0.5 INJECTION, SOLUTION INTRAMUSCULAR; INTRAVENOUS; SUBCUTANEOUS ONCE
Status: COMPLETED | OUTPATIENT
Start: 2023-03-02 | End: 2023-03-02

## 2023-03-02 RX ORDER — ONDANSETRON 2 MG/ML
8 INJECTION INTRAMUSCULAR; INTRAVENOUS EVERY 6 HOURS PRN
Status: DISCONTINUED | OUTPATIENT
Start: 2023-03-02 | End: 2023-03-04 | Stop reason: HOSPADM

## 2023-03-02 RX ORDER — HYDROMORPHONE HYDROCHLORIDE 1 MG/ML
0.5 INJECTION, SOLUTION INTRAMUSCULAR; INTRAVENOUS; SUBCUTANEOUS EVERY 6 HOURS PRN
Status: DISCONTINUED | OUTPATIENT
Start: 2023-03-02 | End: 2023-03-03

## 2023-03-02 RX ORDER — FLUTICASONE FUROATE AND VILANTEROL 100; 25 UG/1; UG/1
1 POWDER RESPIRATORY (INHALATION) DAILY
Status: DISCONTINUED | OUTPATIENT
Start: 2023-03-03 | End: 2023-03-04 | Stop reason: HOSPADM

## 2023-03-02 RX ORDER — HYDROMORPHONE HYDROCHLORIDE 1 MG/ML
0.5 INJECTION, SOLUTION INTRAMUSCULAR; INTRAVENOUS; SUBCUTANEOUS EVERY 6 HOURS PRN
Status: DISCONTINUED | OUTPATIENT
Start: 2023-03-02 | End: 2023-03-02

## 2023-03-02 RX ADMIN — ATORVASTATIN CALCIUM 20 MG: 20 TABLET, FILM COATED ORAL at 08:03

## 2023-03-02 RX ADMIN — APIXABAN 5 MG: 2.5 TABLET, FILM COATED ORAL at 01:03

## 2023-03-02 RX ADMIN — Medication 10 ML: at 02:03

## 2023-03-02 RX ADMIN — HYDROMORPHONE HYDROCHLORIDE 0.5 MG: 1 INJECTION, SOLUTION INTRAMUSCULAR; INTRAVENOUS; SUBCUTANEOUS at 03:03

## 2023-03-02 RX ADMIN — Medication 1 TABLET: at 08:03

## 2023-03-02 RX ADMIN — OXYCODONE AND ACETAMINOPHEN 1 TABLET: 10; 325 TABLET ORAL at 01:03

## 2023-03-02 RX ADMIN — VENLAFAXINE HYDROCHLORIDE 75 MG: 37.5 CAPSULE, EXTENDED RELEASE ORAL at 08:03

## 2023-03-02 RX ADMIN — ONDANSETRON 8 MG: 2 INJECTION INTRAMUSCULAR; INTRAVENOUS at 08:03

## 2023-03-02 RX ADMIN — OXYCODONE AND ACETAMINOPHEN 1 TABLET: 10; 325 TABLET ORAL at 08:03

## 2023-03-02 RX ADMIN — LEVOFLOXACIN 500 MG: 500 TABLET, FILM COATED ORAL at 09:03

## 2023-03-02 RX ADMIN — HYDROMORPHONE HYDROCHLORIDE 0.5 MG: 1 INJECTION, SOLUTION INTRAMUSCULAR; INTRAVENOUS; SUBCUTANEOUS at 12:03

## 2023-03-02 RX ADMIN — APIXABAN 5 MG: 2.5 TABLET, FILM COATED ORAL at 08:03

## 2023-03-02 RX ADMIN — MUPIROCIN: 20 OINTMENT TOPICAL at 08:03

## 2023-03-02 RX ADMIN — ATORVASTATIN CALCIUM 20 MG: 20 TABLET, FILM COATED ORAL at 01:03

## 2023-03-02 RX ADMIN — DIAZEPAM 5 MG: 5 TABLET ORAL at 08:03

## 2023-03-02 RX ADMIN — Medication 10 ML: at 05:03

## 2023-03-02 RX ADMIN — OXYCODONE AND ACETAMINOPHEN 1 TABLET: 10; 325 TABLET ORAL at 03:03

## 2023-03-02 RX ADMIN — HYDROMORPHONE HYDROCHLORIDE 0.5 MG: 1 INJECTION, SOLUTION INTRAMUSCULAR; INTRAVENOUS; SUBCUTANEOUS at 04:03

## 2023-03-02 RX ADMIN — Medication 10 ML: at 10:03

## 2023-03-02 RX ADMIN — HYDROMORPHONE HYDROCHLORIDE 0.5 MG: 1 INJECTION, SOLUTION INTRAMUSCULAR; INTRAVENOUS; SUBCUTANEOUS at 06:03

## 2023-03-02 NOTE — ASSESSMENT & PLAN NOTE
- s/p ablation Nov 2022   - last Echo w/ EF 70% on 1/25/23   - EKG in ED shows NSR rate 97  - monitor on tele

## 2023-03-02 NOTE — EICU
Post placement of left chest tube small pneumothorax present.  Chest tube to water seal now.    Plan:  Advised to place chest tube to low wall suction under water seal.

## 2023-03-02 NOTE — CONSULTS
[facility] Intensive [facility] ([place])  Wound Care    Patient Name:  Lucia Matias   MRN:  [id number]  Date: 3/2/2023  Diagnosis: Pneumothorax on left    History:     Past Medical History:   Diagnosis Date    Allergy     Anxiety     Arthritis     Atrial flutter     Colon polyps     COPD (chronic obstructive pulmonary disease)     COPD exacerbation 10/31/2022    Depression     Diverticular disease of colon 2017    Diverticulitis     HLD (hyperlipidemia)     Hypertension     Malignant neoplasm of central portion of left breast in female, estrogen receptor positive 2022    Pancreatitis     Psoriasis     Rheumatoid arthritis     Severe obesity (BMI 35.0-39.9) with comorbidity        Social History     Socioeconomic History    Marital status:    Occupational History    Occupation: clinical research coordinator    Tobacco Use    Smoking status: Former     Packs/day: 0.00     Years: 20.00     Pack years: 0.00     Types: Cigarettes     Start date: 1979     Quit date: 2020     Years since quittin.2    Smokeless tobacco: Current   Substance and Sexual Activity    Alcohol use: Yes     Alcohol/week: 1.0 standard drink     Types: 1 Glasses of wine per week     Comment: social    Drug use: No    Sexual activity: Yes     Partners: Male     Birth control/protection: Post-menopausal   Other Topics Concern    Are you pregnant or think you may be? No    Breast-feeding No     Social Determinants of Health     Financial Resource Strain: Low Risk     Difficulty of Paying Living Expenses: Not very hard   Food Insecurity: No Food Insecurity    Worried About Running Out of Food in the Last Year: Never true    Ran Out of Food in the Last Year: Never true   Transportation Needs: No Transportation Needs    Lack of Transportation (Medical): No    Lack of Transportation (Non-Medical): No   Physical Activity: Insufficiently Active    Days of Exercise per Week: 2 days    Minutes of Exercise per Session: 20  min   Stress: Stress Concern Present    Feeling of Stress : Very much   Social Connections: Unknown    Frequency of Communication with Friends and Family: More than three times a week    Frequency of Social Gatherings with Friends and Family: Once a week    Active Member of Clubs or Organizations: Yes    Attends Club or Organization Meetings: 1 to 4 times per year    Marital Status:    Housing Stability: Low Risk     Unable to Pay for Housing in the Last Year: No    Number of Places Lived in the Last Year: 1    Unstable Housing in the Last Year: No       Precautions:     Allergies as of 03/01/2023 - Reviewed 03/01/2023   Allergen Reaction Noted    Succinimides Anaphylaxis 07/20/2016       [facility] Assessment Details/Treatment     Wound care consult received for assessment of left breast incision. Patient is a 62 year old female with PMH of breast cancer s/p double mastectomy 2/15/23, HTN, COPD, HLD, obesity, RA, A. Flutter, psoriatic arthritis, who presented to [facility] ED due to shortness of breath.     Upon assessment to left breast skin flap noted mild erythema, swelling and necrotic tissue toward distal end of incision. Cleansed with Vashe wound solution and applied medihoney to slough area of incision. Recommend enzymatic debridement with Santyl ointment to promote moist wound healing. Nursing and MD team notified. Patient educated on how to complete dressing changes at home. Spoke to St. Alex MD with plastics and he is okay with santyl ointment. Orders placed. Will continue to assist with pt prn.        03/02/23 0940        Incision/Site 02/15/23 1044 Left Breast   Date First Assessed/Time First Assessed: 02/15/23 1044   Side: Left  Location: Breast   Wound Image    Dressing Appearance Dry;Intact;Moist drainage   Drainage Amount Small   Drainage Characteristics/Odor Yellow;Creamy   Appearance Red;Slough;Eschar   Periwound Area Redness   Wound Edges Undefined   Wound Length (cm) 7 cm   Wound Width (cm) 6 cm    Wound Surface Area (cm^2) 42 cm^2   Care Cleansed with:;Antimicrobial agent   Dressing Applied;Honey;Absorptive Pad   Dressing Change Due 03/03/23 03/02/2023

## 2023-03-02 NOTE — ASSESSMENT & PLAN NOTE
On trelegy at home. Will restart triple therapy with what we have on formulary here.  - Breo daily  - Spiriva BID

## 2023-03-02 NOTE — OPERATIVE NOTE ADDENDUM
Certification of Assistant at Surgery       Surgery Date: 2/15/2023     Participating Surgeons:  Surgeon(s) and Role:  Panel 1:     * Marcela Meehan MD - Primary  Panel 2:     * Ming Milian MD - Primary     * Chalo Yoon MD    Procedures:  Procedure(s) (LRB):  MASTECTOMY, BILATERAL (Bilateral)  BIOPSY, LYMPH NODE, SENTINEL (Left)  INJECTION, FOR SENTINEL NODE IDENTIFICATION (Left)  RECONSTRUCTION, BREAST, USING NEHA FREE FLAP (Bilateral)    Assistant Surgeon's Certification of Necessity:  I understand that section 1842 (b) (6) (d) of the Social Security Act generally prohibits Medicare Part B reasonable charge payment for the services of assistants at surgery in teaching hospitals when qualified residents are available to furnish such services. I certify that the services for which payment is claimed were medically necessary, and that no qualified resident was available to perform the services. I further understand that these services are subject to post-payment review by the Medicare carrier.      Chalo Yoon MD    03/02/2023  11:18 AM

## 2023-03-02 NOTE — H&P
[facility] Intensive [facility] Medicine  History & Physical    Patient Name: Lucia Matias  MRN: [id number]  Patient Class: IP- Inpatient  Admission Date: 3/1/2023  Attending Physician: ABDOUL Granados MD   Primary Care Provider: Hetal Banks MD         Patient information was obtained from patient, past medical records and ER records.     Subjective:     Principal Problem:Pneumothorax on left    Chief Complaint:   Chief Complaint   Patient presents with    Shortness of Breath     Reports sob since double mastectomy february 15th 2023. Denies cp. Pt was started back on her blood thinner eliquis 2 days after the surgery. Reports compliance with meds. 89%RA        HPI: Ms. Lucia Matias is a 62 y.o. female, with PMH of breast cancer s/p double mastectomy 2/15/23, HTN, COPD, HLD, obesity, RA, A. Flutter, psoriatic arthritis, who presented to [facility] ED on 3/1/23 due to shortness of breath since her mastectomy. She had room air oxygen sats of 89%. She notes associated chest pain and left breast swelling after surgery. She was restarted on her Eliquis 2 days after surgery and has been compliant with taking it since that time. She was evaluated in the ED with CXR showing a moderate large or large left sided pneumothorax. A CT Chest showed a moderately large left pneumothorax with emphysematous changes and bronchiectasis as well as multiple b/l pulmonary nodules and a small to moderate pericardial effusion. A chest tube was placed in the ED and repeat CXR showed a small residual left-sided pneumothorax. She was admitted to inpatient status to the ICU.       Past Medical History:   Diagnosis Date    Allergy     Anxiety     Arthritis     Atrial flutter     Colon polyps 2016    COPD (chronic obstructive pulmonary disease)     COPD exacerbation 10/31/2022    Depression     Diverticular disease of colon 06/06/2017    Diverticulitis     HLD (hyperlipidemia)     Hypertension      Malignant neoplasm of central portion of left breast in female, estrogen receptor positive 2022    Pancreatitis     Psoriasis     Rheumatoid arthritis     Severe obesity (BMI 35.0-39.9) with comorbidity        Past Surgical History:   Procedure Laterality Date    ABLATION  2022    Cardiac Ablation for A Flutter, Successful    ADENOIDECTOMY  1966    Tonsillitis and adnoids    BILATERAL MASTECTOMY Bilateral 2/15/2023    Procedure: MASTECTOMY, BILATERAL;  Surgeon: Marcela Meehan MD;  Location: [facility];  Service: General;  Laterality: Bilateral;  EMAIL SENT  @ 11:44 LK / 2.5 HOURS    BLADDER SUSPENSION      (x2)     SECTION      (x2)    ESOPHAGOGASTRODUODENOSCOPY N/A 2021    Procedure: EGD (ESOPHAGOGASTRODUODENOSCOPY);  Surgeon: Vince Rodriguez MD;  Location: [facility] OR [address] [place] [address];  Service: General;  Laterality: N/A;    INJECTION FOR SENTINEL NODE IDENTIFICATION Left 2/15/2023    Procedure: INJECTION, FOR SENTINEL NODE IDENTIFICATION;  Surgeon: Marcela Meehan MD;  Location: [facility];  Service: General;  Laterality: Left;    LAPAROSCOPIC SLEEVE GASTRECTOMY N/A 2021    Procedure: GASTRECTOMY, SLEEVE, LAPAROSCOPIC, with intraop EGD;  Surgeon: Vince Rodriguez MD;  [facility];  Service: General;  Laterality: N/A;    LUNG BIOPSY  2020    RECONSTRUCTION OF BREAST WITH DEEP INFERIOR EPIGASTRIC ARTERY  (NEHA) FREE FLAP Bilateral 2/15/2023    Procedure: RECONSTRUCTION, BREAST, USING NEHA FREE FLAP;  Surgeon: Ming Milian MD;  Location: [facility];  Service: Plastics;  Laterality: Bilateral;    RHINOPLASTY      SENTINEL LYMPH NODE BIOPSY Left 2/15/2023    Procedure: BIOPSY, LYMPH NODE, SENTINEL;  Surgeon: Marcela Meehan MD;  Location: [facility];  Service: General;  Laterality: Left;    TONSILLECTOMY      TRANSESOPHAGEAL ECHOCARDIOGRAPHY N/A 2022    Procedure: ECHOCARDIOGRAM, TRANSESOPHAGEAL;  Surgeon: Kellie Grover MD;  Location:  [facility] EP LAB;  Service: Cardiology;  Laterality: N/A;       Review of patient's allergies indicates:   Allergen Reactions    Succinimides Anaphylaxis     Son has MH       No current facility-administered medications on file prior to encounter.     Current Outpatient Medications on File Prior to Encounter   Medication Sig    acetaminophen (TYLENOL ORAL) Take by mouth as needed.    acetaminophen-codeine 300-30mg (TYLENOL-CODEINE #3) 300-30 mg Tab Take 1 tablet by mouth every 4 (four) hours as needed.    apixaban (ELIQUIS) 5 mg Tab Take 1 tablet (5 mg total) by mouth 2 (two) times daily. Will hold 2/13 & 2/14    atorvastatin (LIPITOR) 20 MG tablet Take 1 tablet (20 mg total) by mouth every evening.    B-complex with vitamin C (VITAMIN B COMPLEX-C ORAL) Take by mouth.    calcium-vitamin D 250-100 mg-unit per tablet Take 1 tablet by mouth 2 (two) times daily.    COSENTYX PEN, 2 PENS, 150 mg/mL PnIj Inject 300mg (2 pens) into the skin every 4 weeks    cyanocobalamin 500 MCG tablet Take 500 mcg by mouth once daily.    diazePAM (VALIUM) 5 MG tablet Take 1 tablet (5 mg total) by mouth daily as needed for Anxiety.    fluticasone-umeclidin-vilanter (TRELEGY ELLIPTA) 100-62.5-25 mcg DsDv Inhale 1 puff into the lungs once daily.    levoFLOXacin (LEVAQUIN) 500 MG tablet Take 500 mg by mouth.    multivitamin (THERAGRAN) per tablet Take 1 tablet by mouth once daily.    multivitamin with minerals (HAIR,SKIN AND NAILS ORAL) Take by mouth.    mv-mn/iron/folic acid/herb 190 (VITAMIN D3 COMPLETE ORAL) Take by mouth.    omeprazole (PRILOSEC) 40 MG capsule Take 1 capsule (40 mg total) by mouth every morning.    oxyCODONE-acetaminophen (PERCOCET)  mg per tablet Take 1 tablet by mouth every 4 (four) hours as needed for Pain.    oxyCODONE-acetaminophen (PERCOCET) 7.5-325 mg per tablet Take 1 tablet by mouth every 4 (four) hours as needed for Pain.    senna-docusate 8.6-50 mg (PERICOLACE) 8.6-50 mg per tablet Take 1  tablet by mouth daily as needed for Constipation.    traZODone (DESYREL) 100 MG tablet Take 1 tablet (100 mg total) by mouth nightly as needed for Insomnia.    venlafaxine (EFFEXOR-XR) 75 MG 24 hr capsule Take 1 capsule (75 mg total) by mouth once daily. Start on Week 2    [DISCONTINUED] budesonide-formoterol 160-4.5 mcg (SYMBICORT) 160-4.5 mcg/actuation HFAA Inhale 2 puffs into the lungs every 12 (twelve) hours. Controller     Family History       Problem Relation (Age of Onset)    No Known Problems Daughter, Son, Daughter          Tobacco Use    Smoking status: Former     Packs/day: 0.00     Years: 20.00     Pack years: 0.00     Types: Cigarettes     Start date: 1979     Quit date: 2020     Years since quittin.2    Smokeless tobacco: Current   Substance and Sexual Activity    Alcohol use: Yes     Alcohol/week: 1.0 standard drink     Types: 1 Glasses of wine per week     Comment: social    Drug use: No    Sexual activity: Yes     Partners: Male     Birth control/protection: Post-menopausal     Review of Systems   Constitutional:  Negative for chills, diaphoresis, fatigue and fever.   HENT:  Negative for congestion, ear pain, postnasal drip, rhinorrhea, sinus pressure, sore throat and trouble swallowing.    Respiratory:  Positive for shortness of breath. Negative for cough and wheezing.    Cardiovascular:  Negative for chest pain and palpitations.   Gastrointestinal:  Negative for abdominal distention, abdominal pain, constipation, diarrhea, nausea and vomiting.   Genitourinary:  Negative for dysuria, flank pain, frequency, hematuria and urgency.   Musculoskeletal:  Negative for arthralgias, back pain, gait problem, myalgias, neck pain and neck stiffness.   Skin:  Negative for pallor and wound.   Neurological:  Negative for dizziness, syncope, weakness, light-headedness and headaches.   Psychiatric/Behavioral:  Negative for agitation and confusion.    Objective:     Vital Signs (Most  Recent):  Temp: 98.4 °F (36.9 °C) (03/01/23 2301)  Pulse: 87 (03/02/23 0043)  Resp: 14 (03/02/23 0429)  BP: (!) 118/55 (03/01/23 2301)  SpO2: 98 % (03/02/23 0043)   Vital Signs (24h Range):  Temp:  [97.9 °F (36.6 °C)-98.4 °F (36.9 °C)] 98.4 °F (36.9 °C)  Pulse:  [] 87  Resp:  [10-23] 14  SpO2:  [89 %-100 %] 98 %  BP: (104-151)/(50-80) 118/55     Weight: 86.6 kg (190 lb 14.7 oz)  Body mass index is 34.92 kg/m².    Physical Exam  Vitals and nursing note reviewed.   Constitutional:       General: She is not in acute distress.     Appearance: Normal appearance. She is well-developed and normal weight. She is not ill-appearing, toxic-appearing or diaphoretic.   HENT:      Head: Normocephalic and atraumatic.   Eyes:      General: No scleral icterus.        Right eye: No discharge.         Left eye: No discharge.      Conjunctiva/sclera: Conjunctivae normal.   Neck:      Trachea: No tracheal deviation.   Cardiovascular:      Rate and Rhythm: Normal rate and regular rhythm.      Heart sounds: Normal heart sounds. No murmur heard.    No gallop.   Pulmonary:      Effort: Pulmonary effort is normal. No respiratory distress.      Breath sounds: Normal breath sounds. No stridor. No wheezing or rales.      Comments: Chest tube in place without significant drainage.  Abdominal:      General: Bowel sounds are normal. There is no distension.      Palpations: Abdomen is soft. There is no mass.      Tenderness: There is no abdominal tenderness. There is no guarding.   Musculoskeletal:         General: No deformity. Normal range of motion.      Cervical back: Normal range of motion and neck supple.      Comments: Surgical drain in place with clear red tinged serious appearing fluid.    Skin:     General: Skin is warm and dry.      Coloration: Skin is not pale.      Findings: No erythema or rash.   Neurological:      General: No focal deficit present.      Mental Status: She is alert and oriented to person, place, and time.       Cranial Nerves: No cranial nerve deficit.      Motor: No abnormal muscle tone.   Psychiatric:         Mood and Affect: Mood normal.         Behavior: Behavior normal.         Thought Content: Thought content normal.         Judgment: Judgment normal.           Significant Labs: All pertinent labs within the past 24 hours have been reviewed.  BMP:   Recent Labs   Lab 03/02/23  0323   GLU 86      K 4.2      CO2 29   BUN 12   CREATININE 0.9   CALCIUM 8.9   MG 2.0     CBC:   Recent Labs   Lab 03/01/23  1702 03/02/23 0323   WBC 8.47 7.53   HGB 12.2 11.4*   HCT 38.2 37.3   * 363     CMP:   Recent Labs   Lab 03/01/23 1702 03/02/23 0323    141   K 4.1 4.2    104   CO2 27 29   GLU 83 86   BUN 13 12   CREATININE 0.9 0.9   CALCIUM 9.2 8.9   PROT 6.8  --    ALBUMIN 2.9*  --    BILITOT 0.5  --    ALKPHOS 103  --    AST 20  --    ALT 24  --    ANIONGAP 9 8     Urine Culture: No results for input(s): LABURIN in the last 48 hours.  Urine Studies:   Recent Labs   Lab 03/01/23 2017   COLORU Yellow   APPEARANCEUA Clear   PHUR 6.0   SPECGRAV 1.025   PROTEINUA Trace*   GLUCUA Negative   KETONESU Negative   BILIRUBINUA Negative   OCCULTUA Negative   NITRITE Negative   UROBILINOGEN Negative   LEUKOCYTESUR Negative       Significant Imaging: I have reviewed all pertinent imaging results/findings within the past 24 hours.  Imaging Results              X-Ray Chest AP Portable (Final result)  Result time 03/01/23 21:51:49      Final result by Cinthya Khanna MD (03/01/23 21:51:49)                   Impression:      Small residual left-sided pneumothorax.      Electronically signed by: Cinthya Khanna  Date:    03/01/2023  Time:    21:51               Narrative:    EXAMINATION:  XR CHEST AP PORTABLE    CLINICAL HISTORY:  Presence of other specified functional implants    TECHNIQUE:  Single frontal view of the chest was performed.    COMPARISON:  Same day prior    FINDINGS:  Interval placement of a  left-sided chest tube.  Small residual pneumothorax.  Known bilateral lung nodules are better seen on same day CT.  Right lung is clear.  Normal heart size.                                        CT Chest Without Contrast (Final result)  Result time 03/01/23 20:44:42      Final result by Aleja Godinez MD (03/01/23 20:44:42)                   Impression:      Moderately large left pneumothorax.  Partial atelectatic changes involving the affected left lung.  Mild emphysematous changes and bronchiectasis.    Multiple bilateral pulmonary nodules measuring up to 14 x 17 mm.    Small to moderate pericardial effusion.    Dr. Carlos aware of left-sided pneumothorax on 03/01/2023 at 17:57.    This report was flagged in Epic as abnormal.      Electronically signed by: Aleja Godinez  Date:    03/01/2023  Time:    20:44               Narrative:    EXAMINATION:  CT CHEST WITHOUT CONTRAST    CLINICAL HISTORY:  Persistent cough.  Status post mastectomy 2 weeks ago.    TECHNIQUE:  Contiguous axial 5 mm images was obtained from the lung apices through the lung bases.  No intravenous contrast was given.  Coronal and sagittal reformatted images were provided.    COMPARISON:  CTA chest 11/01/2022    FINDINGS:  Postsurgical changes of bilateral mastectomies are seen.  There is subcutaneous emphysema over the anterior chest walls left greater than right.  Overlying the right pectoralis major muscle is a triangular shaped area, which is likely postoperative in nature.    There are multiple bilateral pulmonary nodules.  The largest pulmonary nodules are in the partially atelectatic left upper lobe.  The 2 largest representative lymph nodes measure 14 x 17 mm and 14 x 12 mm respectively (series 4 axial image 175 and axial image 190).  There are additional right-sided pulmonary nodules with the largest representative nodules in the right apex and superior segment of the right lower lobe measuring approximately 6 mm (series 4 axial  image 79 and axial image 137).  There are mild underlying emphysematous changes.  There is mild bronchiectasis.    There is moderately large left pneumothorax.  There is partial atelectatic changes of the left pulmonary lobes.    There is no evidence of mediastinal, hilar, or axillary adenopathy.    Small to moderate pericardial effusion is present.  There is no pleural effusion.    The heart size is within normal limits.    In the visualized upper abdomen, there has been gastric surgery.  Colonic diverticulosis is present.  Bilateral renal cysts are noted.    Mild kyphosis is present.  There is spondylitic changes.    Calcifications seen in the right thyroid gland.                                        X-Ray Chest PA And Lateral (Final result)  Result time 03/01/23 18:02:53      Final result by Aleja Godinez MD (03/01/23 18:02:53)                   Impression:      Moderately large or large left pneumothorax.    Findings called to Dr. Carlos, at 17:57 on 03/01/2023.    This report was flagged in Epic as abnormal.      Electronically signed by: Aleja Godinez  Date:    03/01/2023  Time:    18:02               Narrative:    EXAMINATION:  CHEST PA AND LATERAL    CLINICAL HISTORY:  Shortness of breath    TECHNIQUE:  PA and lateral chest radiograph    COMPARISON:  01/10/2023    FINDINGS:  The cardiac silhouette is within normal limits. Vascular calcifications seen at the aortic knob.  There is a moderately large or large left pneumothorax.  No focal consolidation is detected.  Surgical clips are projecting over the right mid chest, left lateral chest and left lateral chest wall.                                       Assessment/Plan:     * Pneumothorax on left  - Ms. Lucia Matias presented with shortness of breath since her double mastectomy on 2/15/23   - imaging showed a left sided moderately sized pneumothorax   - s/p chest tube insertion the pneumothorax is now small in size  - monitor in ICU    - continue supplemental O2   - PRN meds for pain       Malignant neoplasm of central portion of left breast in female, estrogen receptor positive  - s/p s/p bilateral mastectomy with left sentinel node biopsy on 2/15/2023 followed by immediate NEHA flap reconstruction   - instructed to meet with med onc and rad onc for discussion of adjuvant treatment recommendations      COPD (chronic obstructive pulmonary disease)  - no increased sputum or change in sputum color or fever   - PRN DuoNebs ordered   - uses Trelegy at home, non-formulary will need to bring in if desires to continue while in the hospital        Atrial flutter  - s/p ablation Nov 2022   - last Echo w/ EF 70% on 1/25/23   - EKG in ED shows NSR rate 97  - monitor on tele       Essential hypertension  - chronic   - normotensive upon admission   - home meds: none   - monitor and treat PRN       HLD (hyperlipidemia)  - continue statin       Depression with anxiety  - continue home meds      VTE Risk Mitigation (From admission, onward)         Ordered     apixaban tablet 5 mg  2 times daily         03/01/23 2358     IP VTE HIGH RISK PATIENT  Once         03/01/23 2342     Place sequential compression device  Until discontinued         03/01/23 2342                   Magui Childers PA-C  Department of Hospital Medicine   [facility] - Intensive Care ([place])

## 2023-03-02 NOTE — PLAN OF CARE
03/02/23 1610   Discharge Assessment   Assessment Type Discharge Planning Assessment   Confirmed/corrected address, phone number and insurance Yes   Confirmed Demographics Correct on Facesheet   Source of Information patient   Communicated LESTER with patient/caregiver Date not available/Unable to determine   People in Home spouse   Do you expect to return to your current living situation? Yes   Do you have help at home or someone to help you manage your care at home? No   Prior to hospitilization cognitive status: Alert/Oriented   Current cognitive status: Alert/Oriented   Equipment Currently Used at Home none   Readmission within 30 days? No   Do you currently have service(s) that help you manage your care at home? No   Do you take prescription medications? Yes   Do you have prescription coverage? Yes   Do you have any problems affording any of your prescribed medications? No   Is the patient taking medications as prescribed? yes   How do you get to doctors appointments? family or friend will provide   Are you on dialysis? No   Do you take coumadin? No   Discharge Plan A Home with family   Discharge Plan discussed with: Patient   Discharge Barriers Identified None

## 2023-03-02 NOTE — ASSESSMENT & PLAN NOTE
- Ms. Lucia Matias presented with shortness of breath since her double mastectomy on 2/15/23   - imaging showed a left sided moderately sized pneumothorax   - s/p chest tube insertion the pneumothorax is now small in size  - monitor in ICU   - continue supplemental O2   - PRN meds for pain

## 2023-03-02 NOTE — NURSING
Nurses Note -- 4 Eyes      03/01/23   10:46 PM      Skin assessed during: Admit      [] No Pressure Injuries Present    []Prevention Measures Documented      [x] Yes- Altered Skin Integrity Present or Discovered   [x] LDA Added if Not in Epic (Describe Wound)   [x] New Altered Skin Integrity was Present on Admit and Documented in LDA   [] Wound Image Taken    Wound to L breast flap.    Wound Care Consulted? Yes    Attending Nurse:  Bessie Wallace RN     Second RN/Staff Member:  Riccardo Calderon RN

## 2023-03-02 NOTE — ED PROVIDER NOTES
Encounter Date: 3/1/2023    SCRIBE #1 NOTE: I, Flor Kelley am scribing for, and in the presence of,  Silvestre López MD.   SCRIBE #2 NOTE: I, Jesusita Verduzco, dakota scribing for, and in the presence of,  Silvestre López MD.   History     Chief Complaint   Patient presents with    Shortness of Breath     Reports sob since double mastectomy february 15th 2023. Denies cp. Pt was started back on her blood thinner eliquis 2 days after the surgery. Reports compliance with meds. 89%RA     Time seen by provider: 6:20 PM    This is a 62 y.o. female with PMHx of HTN, HLD, atrial flutter, COPD, and breast cancer who presents with complaint of shortness of breath since her double mastectomy on 2/15/23. She also reports chest pain and swelling in her left breast after the surgery. She takes Eliquis and is compliant with medications but stopped Eliquis for two days before and after her surgery. This is the extent of the patient's complaints at this time.    The history is provided by the patient.   Review of patient's allergies indicates:   Allergen Reactions    Succinimides Anaphylaxis     Son has MH     Past Medical History:   Diagnosis Date    Allergy     Anxiety     Arthritis     Atrial flutter     Colon polyps 2016    COPD (chronic obstructive pulmonary disease)     COPD exacerbation 10/31/2022    Depression     Diverticular disease of colon 06/06/2017    Diverticulitis     HLD (hyperlipidemia)     Hypertension     Malignant neoplasm of central portion of left breast in female, estrogen receptor positive 12/29/2022    Pancreatitis     Psoriasis     Rheumatoid arthritis     Severe obesity (BMI 35.0-39.9) with comorbidity      Past Surgical History:   Procedure Laterality Date    ABLATION  11/2022    Cardiac Ablation for A Flutter, Successful    ADENOIDECTOMY  1966    Tonsillitis and adnoids    BILATERAL MASTECTOMY Bilateral 2/15/2023    Procedure: MASTECTOMY, BILATERAL;  Surgeon: Marcela Meehan MD;  Location: [facility];   Service: General;  Laterality: Bilateral;  EMAIL SENT  @ 11:44 LK / 2.5 HOURS    BLADDER SUSPENSION      (x2)     SECTION      (x2)    ESOPHAGOGASTRODUODENOSCOPY N/A 2021    Procedure: EGD (ESOPHAGOGASTRODUODENOSCOPY);  Surgeon: Vince Rodriguez MD;  Location: [address];  Service: General;  Laterality: N/A;    INJECTION FOR SENTINEL NODE IDENTIFICATION Left 2/15/2023    Procedure: INJECTION, FOR SENTINEL NODE IDENTIFICATION;  Surgeon: Marcela Meehan MD;  Location: [facility];  Service: General;  Laterality: Left;    LAPAROSCOPIC SLEEVE GASTRECTOMY N/A 2021    Procedure: GASTRECTOMY, SLEEVE, LAPAROSCOPIC, with intraop EGD;  Surgeon: Vince Rodriguez MD;  Location: [address];  Service: General;  Laterality: N/A;    LUNG BIOPSY  2020    RECONSTRUCTION OF BREAST WITH DEEP INFERIOR EPIGASTRIC ARTERY  (NEHA) FREE FLAP Bilateral 2/15/2023    Procedure: RECONSTRUCTION, BREAST, USING NEHA FREE FLAP;  Surgeon: Ming Milian MD;  Location: [facility];  Service: Plastics;  Laterality: Bilateral;    RHINOPLASTY      SENTINEL LYMPH NODE BIOPSY Left 2/15/2023    Procedure: BIOPSY, LYMPH NODE, SENTINEL;  Surgeon: Marcela Meehan MD;  Location: [facility];  Service: General;  Laterality: Left;    TONSILLECTOMY      TRANSESOPHAGEAL ECHOCARDIOGRAPHY N/A 2022    Procedure: ECHOCARDIOGRAM, TRANSESOPHAGEAL;  Surgeon: Kellie Grover MD;  Location: [facility] EP LAB;  Service: Cardiology;  Laterality: N/A;     Family History   Adopted: Yes   Problem Relation Age of Onset    No Known Problems Daughter     No Known Problems Son     No Known Problems Daughter      Social History     Tobacco Use    Smoking status: Former     Packs/day: 0.00     Years: 20.00     Pack years: 0.00     Types: Cigarettes     Start date: 1979     Quit date: 2020     Years since quittin.2    Smokeless tobacco: Current   Substance Use Topics    Alcohol use: Yes     Alcohol/week: 1.0 standard  drink     Types: 1 Glasses of wine per week     Comment: social    Drug use: No     Review of Systems  Constitutional-no fever  HEENT-no congestion  Eyes-no redness  Respiratory-notes shortness of breath  Cardio-notes chest pain  GI-no abdominal pain  Endocrine-no cold intolerance  -no difficulty urinating  MSK-notes myalgias, swelling  Skin-no rashes  Allergy-no environmental allergy  Neurologic-no headache  Hematology-no swollen nodes  Behavioral-no confusion    Physical Exam     Initial Vitals [03/01/23 1642]   BP Pulse Resp Temp SpO2   134/70 107 (!) 22 97.9 °F (36.6 °C) (!) 90 %      MAP       --         Physical Exam  Constitutional:  Uncomfortable appearing 62-year-old female in moderate distress  Eyes: Conjunctivae normal.  ENT       Head: Normocephalic, atraumatic.       Nose: No congestion.       Mouth/Throat: Mucous membranes are moist.  Hematological/Lymphatic/Immunilogical: No cervical lymphadenopathy.  Cardiovascular: Normal rate, regular rhythm. Normal and symmetric distal pulses.  Postoperative Bra place, left postoperative wound site demonstrates some granulation tissue and mild serous drainage with some erythema, no fluctuance  Respiratory:  Tachypnea, diminished breath sounds entire left chest wall, normal breath sounds in the right chest wall, no wheezes, no rhonchi  Gastrointestinal: Soft, nontender.   Musculoskeletal: Normal range of motion in all extremities. No obvious deformities or swelling.  Neurologic: Alert, oriented. Normal speech and language. No gross focal neurologic deficits are appreciated.  Skin: Skin is warm, dry. No rash noted.  Psychiatric: Mood and affect are normal.     ED Course   Pre-Assessment for Procedural Sedation    Date/Time: 3/1/2023 7:34 PM  Performed by: Silvestre López MD  Authorized by: Silvestre López MD         ASA Class::  Class 2 - Mild Illness without functional impairment.       Mallampati Score::  Class 2 - Visualization of the soft palate,  fauces, and uvula.    Date/Time of last solid:  3/1/2023 10:00 AM    Contents of Last Food Intake:  Yogurt    Date/Time of last fluid:  3/1/2023 1:00 PM    Contents of Last Fluid Intake:  Water    Patient/Family history of anesthesia or sedation complications: Yes    Sedation:     Sedation type: moderate (conscious) sedation      Sedation:  See MAR for details    Analgesia:  Ketamine  Procedural Sedation        Date/Time: 3/1/2023 8:09 PM  Performed by: Silvestre López MD  Authorized by: Silvestre López MD   Consent Done: Emergent Situation  ASA Class: Class 2 - Mild Illness without functional impairment.  Mallampati Score: Class 2 - Visualization of the soft palate, fauces, and uvula.     Equipment: on cardiac monitor., on BP monitor., on CO2 monitor., airway equipment available., suction available. and on supplemental oxygen.     Sedation type: moderate (conscious) sedation    Sedatives: ketamine and propofol  Analgesia: ketamine  Complications: patient became over sedated (Post propofol administration did briefly administer a jaw thrust maneuver with a transient desaturation to 86% on pulse oximetry with a brisk return to 95% within 10-15 seconds)   Patient/Family history of anesthesia or sedation complications: Yes (Succinylcholine Allergy)    Chest Tube    Date/Time: 3/1/2023 8:09 PM  Location procedure was performed: [facility] EMERGENCY DEPARTMENT  Performed by: Silvestre López MD  Authorized by: Silvestre López MD   Consent Done: Emergent Situation  Indications: pneumothorax    Patient sedated: yes  Sedation type: moderate (conscious) sedation    Sedatives: ketamine and propofol  Analgesia: ketamine and hydromorphone  Vitals: Vital signs were monitored during sedation.    Anesthesia:  Local Anesthetic: lidocaine 1% with epinephrine  Preparation: skin prepped with ChloraPrep, sterile field established and skin prepped with Hibeclense  Placement location: left lateral  Scalpel size: 11  Tube size  (Lithuanian): 14.  Dissection instrument: 18g finder needle.  Ultrasound guidance: no  Tension pneumothorax heard: no  Tube connected to: water seal  Drainage amount: 0 ml  Suture material: 2-0 silk  Dressing: Xeroform gauze  Post-insertion x-ray findings: tube in good position  Patient tolerance: Patient tolerated the procedure well with no immediate complications  Complications: No  Estimated blood loss (mL): 1  Specimens: No  Implants: Yes (chest tube in place)      Critical Care    Date/Time: 3/1/2023 7:00 PM  Performed by: Silvestre López MD  Authorized by: Silvestre López MD   Direct patient critical care time: 45 minutes  Additional history critical care time: 8 minutes  Ordering / reviewing critical care time: 7 minutes  Documentation critical care time: 7 minutes  Consulting other physicians critical care time: 8 minutes  Total critical care time (exclusive of procedural time) : 75 minutes  Critical care time was exclusive of teaching time and separately billable procedures and treating other patients.  Critical care was necessary to treat or prevent imminent or life-threatening deterioration of the following conditions: pneumothorax, chest pain.  Critical care was time spent personally by me on the following activities: blood draw for specimens, development of treatment plan with patient or surrogate, discussions with consultants, discussions with primary provider, interpretation of cardiac output measurements, evaluation of patient's response to treatment, examination of patient, obtaining history from patient or surrogate, ordering and performing treatments and interventions, ordering and review of laboratory studies, ordering and review of radiographic studies, pulse oximetry, re-evaluation of patient's condition and review of old charts.      Labs Reviewed   CBC W/ AUTO DIFFERENTIAL - Abnormal; Notable for the following components:       Result Value    MCHC 31.9 (*)     Platelets 459 (*)     MPV  9.0 (*)     Gran % 73.8 (*)     Lymph % 15.8 (*)     All other components within normal limits   COMPREHENSIVE METABOLIC PANEL - Abnormal; Notable for the following components:    Albumin 2.9 (*)     All other components within normal limits   URINALYSIS, REFLEX TO URINE CULTURE - Abnormal; Notable for the following components:    Protein, UA Trace (*)     All other components within normal limits    Narrative:     Specimen Source->Urine   D DIMER, QUANTITATIVE - Abnormal; Notable for the following components:    D-Dimer 0.94 (*)     All other components within normal limits   B-TYPE NATRIURETIC PEPTIDE        ECG Results              EKG 12-lead (Preliminary result)  Result time 03/01/23 19:41:38      Wet Read by Silvestre López MD (03/01/23 19:41:38, [facility] Emergency Dept, Emergency Medicine)    My ekg interpretation sinus rhythm 97 BPM, normal axis no st segment changes, low voltage QRS, when compared to ekg from 1/25/23 ekg is now low voltage                                  Imaging Results              X-Ray Chest AP Portable (Final result)  Result time 03/01/23 21:51:49      Final result by Cinthya Khanna MD (03/01/23 21:51:49)                   Impression:      Small residual left-sided pneumothorax.      Electronically signed by: Cinthya Khanna  Date:    03/01/2023  Time:    21:51               Narrative:    EXAMINATION:  XR CHEST AP PORTABLE    CLINICAL HISTORY:  Presence of other specified functional implants    TECHNIQUE:  Single frontal view of the chest was performed.    COMPARISON:  Same day prior    FINDINGS:  Interval placement of a left-sided chest tube.  Small residual pneumothorax.  Known bilateral lung nodules are better seen on same day CT.  Right lung is clear.  Normal heart size.                                        CT Chest Without Contrast (Final result)  Result time 03/01/23 20:44:42      Final result by Aleja Godinez MD (03/01/23 20:44:42)                    Impression:      Moderately large left pneumothorax.  Partial atelectatic changes involving the affected left lung.  Mild emphysematous changes and bronchiectasis.    Multiple bilateral pulmonary nodules measuring up to 14 x 17 mm.    Small to moderate pericardial effusion.    Dr. Carlos aware of left-sided pneumothorax on 03/01/2023 at 17:57.    This report was flagged in Epic as abnormal.      Electronically signed by: Aleja Godinez  Date:    03/01/2023  Time:    20:44               Narrative:    EXAMINATION:  CT CHEST WITHOUT CONTRAST    CLINICAL HISTORY:  Persistent cough.  Status post mastectomy 2 weeks ago.    TECHNIQUE:  Contiguous axial 5 mm images was obtained from the lung apices through the lung bases.  No intravenous contrast was given.  Coronal and sagittal reformatted images were provided.    COMPARISON:  CTA chest 11/01/2022    FINDINGS:  Postsurgical changes of bilateral mastectomies are seen.  There is subcutaneous emphysema over the anterior chest walls left greater than right.  Overlying the right pectoralis major muscle is a triangular shaped area, which is likely postoperative in nature.    There are multiple bilateral pulmonary nodules.  The largest pulmonary nodules are in the partially atelectatic left upper lobe.  The 2 largest representative lymph nodes measure 14 x 17 mm and 14 x 12 mm respectively (series 4 axial image 175 and axial image 190).  There are additional right-sided pulmonary nodules with the largest representative nodules in the right apex and superior segment of the right lower lobe measuring approximately 6 mm (series 4 axial image 79 and axial image 137).  There are mild underlying emphysematous changes.  There is mild bronchiectasis.    There is moderately large left pneumothorax.  There is partial atelectatic changes of the left pulmonary lobes.    There is no evidence of mediastinal, hilar, or axillary adenopathy.    Small to moderate pericardial effusion is present.   There is no pleural effusion.    The heart size is within normal limits.    In the visualized upper abdomen, there has been gastric surgery.  Colonic diverticulosis is present.  Bilateral renal cysts are noted.    Mild kyphosis is present.  There is spondylitic changes.    Calcifications seen in the right thyroid gland.                                        X-Ray Chest PA And Lateral (Final result)  Result time 03/01/23 18:02:53      Final result by Aleja Godinez MD (03/01/23 18:02:53)                   Impression:      Moderately large or large left pneumothorax.    Findings called to Dr. Carlos at 17:57 on 03/01/2023.    This report was flagged in Epic as abnormal.      Electronically signed by: Aleja Godinez  Date:    03/01/2023  Time:    18:02               Narrative:    EXAMINATION:  CHEST PA AND LATERAL    CLINICAL HISTORY:  Shortness of breath    TECHNIQUE:  PA and lateral chest radiograph    COMPARISON:  01/10/2023    FINDINGS:  The cardiac silhouette is within normal limits. Vascular calcifications seen at the aortic knob.  There is a moderately large or large left pneumothorax.  No focal consolidation is detected.  Surgical clips are projecting over the right mid chest, left lateral chest and left lateral chest wall.                                       Medications   sodium chloride 0.9% flush 10 mL (10 mLs Intravenous Given 3/2/23 0530)   melatonin tablet 6 mg (has no administration in time range)   senna-docusate 8.6-50 mg per tablet 1 tablet (has no administration in time range)   acetaminophen tablet 650 mg (has no administration in time range)   naloxone 0.4 mg/mL injection 0.02 mg (has no administration in time range)   mupirocin 2 % ointment ( Nasal Given 3/2/23 0824)   albuterol-ipratropium 2.5 mg-0.5 mg/3 mL nebulizer solution 3 mL (has no administration in time range)   apixaban tablet 5 mg (5 mg Oral Given 3/2/23 0824)   atorvastatin tablet 20 mg (20 mg Oral Given 3/2/23 0112)    diazePAM tablet 5 mg (has no administration in time range)   levoFLOXacin tablet 500 mg (500 mg Oral Given 3/2/23 0959)   multivitamin tablet (1 tablet Oral Given 3/2/23 0824)   oxyCODONE-acetaminophen  mg per tablet 1 tablet (1 tablet Oral Given 3/2/23 0824)   traZODone tablet 100 mg (has no administration in time range)   venlafaxine 24 hr capsule 75 mg (75 mg Oral Given 3/2/23 0824)   HYDROmorphone injection 0.5 mg (0.5 mg Intravenous Given 3/2/23 0357)   collagenase ointment (has no administration in time range)   HYDROmorphone injection 1 mg (1 mg Intravenous Given 3/1/23 2237)   LIDOcaine-EPINEPHrine 1%-1:100,000 injection 15 mL (15 mLs Intradermal Given 3/1/23 2102)   ketamine injection 81.5 mg (81.5 mg Intravenous Given 3/1/23 2058)   propofol (DIPRIVAN) 10 mg/mL IVP (40 mg Intravenous Given 3/1/23 2100)   propofol (DIPRIVAN) 10 mg/mL IVP (20 mg Intravenous Given 3/1/23 2112)   ketorolac injection 9.999 mg (9.999 mg Intravenous Given 3/1/23 2152)   HYDROmorphone injection 0.5 mg (0.5 mg Intravenous Given 3/2/23 0429)     Medical Decision Making:   History:   Old Medical Records: I decided to obtain old medical records.  Old Records Summarized: records from clinic visits and records from previous admission(s).  Independently Interpreted Test(s):   I have ordered and independently interpreted X-rays - see summary below.       <> Summary of X-Ray Reading(s): Initial chest x-ray-moderate-to-large left-sided pneumothorax, no tension physiology   Repeat chest x-ray post chest tube placement, moderate re-expansion of the lung, scant residual pneumothorax, mild subcutaneous emphysema near insertion site  I have ordered and independently interpreted EKG Reading(s) - see prior notes  Clinical Tests:   Lab Tests: Ordered and Reviewed  Radiological Study: Ordered and Reviewed  Medical Tests: Ordered and Reviewed  ED Management:  This 62-year-old female with moderate health problems and significant medical  comorbidities who presented with chest pain from postoperative evaluation post a mastectomy and flap creation.    X-ray is consistent with a pneumothorax which is large in size on left side of the chest.    She is complicated by the fact that she recently did have a surgery with a flap creation and a postoperative wound site infection which is being treated currently by her outpatient breast surgeon and plastic surgeon.    I discussed with her for some time the risks and benefits of chest tube placement at this time.  She is normal platelet count, reassuring H&H from her baseline however she is currently on anticoagulation.    Ultimately did proceed with a conscious sedation with ketamine and propofol, did proceed with chest tube placement using a 14 gauge weight catheter.    Discussed with on-call Hospital Medicine team will plan for admission at this time, ongoing monitoring, reassessment, administration of analgesics.    Will plan for the administration of ice packs along left axilla for subcutaneous emphysema.      Other:   I discussed test(s) with the performing physician.       <> Summary of the Findings: Discussed with her primary breast surgeon Dr. Meehan.  She is not vision any contraindication to chest tube placement at this time despite the recent surgical intervention.            Scribe Attestation:   Scribe #1: I performed the above scribed service and the documentation accurately describes the services I performed. I attest to the accuracy of the note.  Scribe #2: I performed the above scribed service and the documentation accurately describes the services I performed. I attest to the accuracy of the note.    Physician Attestation for Scribe: I, masoud schaffer, reviewed documentation as scribed in my presence, which is both accurate and complete.                     Clinical Impression:   Final diagnoses:  [R06.02] SOB (shortness of breath)  [Z96.89] Chest tube in place  [J93.9]  Pneumothorax  [J95.811] Postoperative pneumothorax (Primary)        ED Disposition Condition    Admit Stable                Silvestre López MD  03/02/23 1158

## 2023-03-02 NOTE — ASSESSMENT & PLAN NOTE
- no increased sputum or change in sputum color or fever   - PRN DuoNebs ordered   - uses Trelegy at home, non-formulary will need to bring in if desires to continue while in the hospital

## 2023-03-02 NOTE — SUBJECTIVE & OBJECTIVE
Past Medical History:   Diagnosis Date    Allergy     Anxiety     Arthritis     Atrial flutter     Colon polyps     COPD (chronic obstructive pulmonary disease)     COPD exacerbation 10/31/2022    Depression     Diverticular disease of colon 2017    Diverticulitis     HLD (hyperlipidemia)     Hypertension     Malignant neoplasm of central portion of left breast in female, estrogen receptor positive 2022    Pancreatitis     Psoriasis     Rheumatoid arthritis     Severe obesity (BMI 35.0-39.9) with comorbidity        Past Surgical History:   Procedure Laterality Date    ABLATION  2022    Cardiac Ablation for A Flutter, Successful    ADENOIDECTOMY  1966    Tonsillitis and adnoids    BILATERAL MASTECTOMY Bilateral 2/15/2023    Procedure: MASTECTOMY, BILATERAL;  Surgeon: Marcela Meehan MD;  Location: [facility];  Service: General;  Laterality: Bilateral;  EMAIL SENT  @ 11:44 LK / 2.5 HOURS    BLADDER SUSPENSION      (x2)     SECTION      (x2)    ESOPHAGOGASTRODUODENOSCOPY N/A 2021    Procedure: EGD (ESOPHAGOGASTRODUODENOSCOPY);  Surgeon: Vince Rodriguez MD;  Location: [facility] OR [address] [place] [address];  Service: General;  Laterality: N/A;    INJECTION FOR SENTINEL NODE IDENTIFICATION Left 2/15/2023    Procedure: INJECTION, FOR SENTINEL NODE IDENTIFICATION;  Surgeon: Marcela Meehan MD;  Location: [facility];  Service: General;  Laterality: Left;    LAPAROSCOPIC SLEEVE GASTRECTOMY N/A 2021    Procedure: GASTRECTOMY, SLEEVE, LAPAROSCOPIC, with intraop EGD;  Surgeon: Vince Rodriguez MD;  Location: [facility] OR 2ND FLR;  Service: General;  Laterality: N/A;    LUNG BIOPSY  2020    RECONSTRUCTION OF BREAST WITH DEEP INFERIOR EPIGASTRIC ARTERY  (NEHA) FREE FLAP Bilateral 2/15/2023    Procedure: RECONSTRUCTION, BREAST, USING NEHA FREE FLAP;  Surgeon: Ming Milian MD;  Location: [facility];  Service: Plastics;  Laterality: Bilateral;    RHINOPLASTY      SENTINEL LYMPH NODE BIOPSY Left  2/15/2023    Procedure: BIOPSY, LYMPH NODE, SENTINEL;  Surgeon: Marcela Meehan MD;  Location: [facility] OR;  Service: General;  Laterality: Left;    TONSILLECTOMY      TRANSESOPHAGEAL ECHOCARDIOGRAPHY N/A 11/02/2022    Procedure: ECHOCARDIOGRAM, TRANSESOPHAGEAL;  Surgeon: Kellie Grover MD;  Location: [facility] EP LAB;  Service: Cardiology;  Laterality: N/A;       Review of patient's allergies indicates:   Allergen Reactions    Succinimides Anaphylaxis     Son has MH       No current facility-administered medications on file prior to encounter.     Current Outpatient Medications on File Prior to Encounter   Medication Sig    acetaminophen (TYLENOL ORAL) Take by mouth as needed.    acetaminophen-codeine 300-30mg (TYLENOL-CODEINE #3) 300-30 mg Tab Take 1 tablet by mouth every 4 (four) hours as needed.    apixaban (ELIQUIS) 5 mg Tab Take 1 tablet (5 mg total) by mouth 2 (two) times daily. Will hold 2/13 & 2/14    atorvastatin (LIPITOR) 20 MG tablet Take 1 tablet (20 mg total) by mouth every evening.    B-complex with vitamin C (VITAMIN B COMPLEX-C ORAL) Take by mouth.    calcium-vitamin D 250-100 mg-unit per tablet Take 1 tablet by mouth 2 (two) times daily.    COSENTYX PEN, 2 PENS, 150 mg/mL PnIj Inject 300mg (2 pens) into the skin every 4 weeks    cyanocobalamin 500 MCG tablet Take 500 mcg by mouth once daily.    diazePAM (VALIUM) 5 MG tablet Take 1 tablet (5 mg total) by mouth daily as needed for Anxiety.    fluticasone-umeclidin-vilanter (TRELEGY ELLIPTA) 100-62.5-25 mcg DsDv Inhale 1 puff into the lungs once daily.    levoFLOXacin (LEVAQUIN) 500 MG tablet Take 500 mg by mouth.    multivitamin (THERAGRAN) per tablet Take 1 tablet by mouth once daily.    multivitamin with minerals (HAIR,SKIN AND NAILS ORAL) Take by mouth.    mv-mn/iron/folic acid/herb 190 (VITAMIN D3 COMPLETE ORAL) Take by mouth.    omeprazole (PRILOSEC) 40 MG capsule Take 1 capsule (40 mg total) by mouth every morning.    oxyCODONE-acetaminophen  (PERCOCET)  mg per tablet Take 1 tablet by mouth every 4 (four) hours as needed for Pain.    oxyCODONE-acetaminophen (PERCOCET) 7.5-325 mg per tablet Take 1 tablet by mouth every 4 (four) hours as needed for Pain.    senna-docusate 8.6-50 mg (PERICOLACE) 8.6-50 mg per tablet Take 1 tablet by mouth daily as needed for Constipation.    traZODone (DESYREL) 100 MG tablet Take 1 tablet (100 mg total) by mouth nightly as needed for Insomnia.    venlafaxine (EFFEXOR-XR) 75 MG 24 hr capsule Take 1 capsule (75 mg total) by mouth once daily. Start on Week 2    [DISCONTINUED] budesonide-formoterol 160-4.5 mcg (SYMBICORT) 160-4.5 mcg/actuation HFAA Inhale 2 puffs into the lungs every 12 (twelve) hours. Controller     Family History       Problem Relation (Age of Onset)    No Known Problems Daughter, Son, Daughter          Tobacco Use    Smoking status: Former     Packs/day: 0.00     Years: 20.00     Pack years: 0.00     Types: Cigarettes     Start date: 1979     Quit date: 2020     Years since quittin.2    Smokeless tobacco: Current   Substance and Sexual Activity    Alcohol use: Yes     Alcohol/week: 1.0 standard drink     Types: 1 Glasses of wine per week     Comment: social    Drug use: No    Sexual activity: Yes     Partners: Male     Birth control/protection: Post-menopausal     Review of Systems   Constitutional:  Negative for chills, diaphoresis, fatigue and fever.   HENT:  Negative for congestion, ear pain, postnasal drip, rhinorrhea, sinus pressure, sore throat and trouble swallowing.    Respiratory:  Positive for shortness of breath. Negative for cough and wheezing.    Cardiovascular:  Negative for chest pain and palpitations.   Gastrointestinal:  Negative for abdominal distention, abdominal pain, constipation, diarrhea, nausea and vomiting.   Genitourinary:  Negative for dysuria, flank pain, frequency, hematuria and urgency.   Musculoskeletal:  Negative for arthralgias, back pain, gait problem,  myalgias, neck pain and neck stiffness.   Skin:  Negative for pallor and wound.   Neurological:  Negative for dizziness, syncope, weakness, light-headedness and headaches.   Psychiatric/Behavioral:  Negative for agitation and confusion.    Objective:     Vital Signs (Most Recent):  Temp: 98.4 °F (36.9 °C) (03/01/23 2301)  Pulse: 87 (03/02/23 0043)  Resp: 14 (03/02/23 0429)  BP: (!) 118/55 (03/01/23 2301)  SpO2: 98 % (03/02/23 0043)   Vital Signs (24h Range):  Temp:  [97.9 °F (36.6 °C)-98.4 °F (36.9 °C)] 98.4 °F (36.9 °C)  Pulse:  [] 87  Resp:  [10-23] 14  SpO2:  [89 %-100 %] 98 %  BP: (104-151)/(50-80) 118/55     Weight: 86.6 kg (190 lb 14.7 oz)  Body mass index is 34.92 kg/m².    Physical Exam  Vitals and nursing note reviewed.   Constitutional:       General: She is not in acute distress.     Appearance: Normal appearance. She is well-developed and normal weight. She is not ill-appearing, toxic-appearing or diaphoretic.   HENT:      Head: Normocephalic and atraumatic.   Eyes:      General: No scleral icterus.        Right eye: No discharge.         Left eye: No discharge.      Conjunctiva/sclera: Conjunctivae normal.   Neck:      Trachea: No tracheal deviation.   Cardiovascular:      Rate and Rhythm: Normal rate and regular rhythm.      Heart sounds: Normal heart sounds. No murmur heard.    No gallop.   Pulmonary:      Effort: Pulmonary effort is normal. No respiratory distress.      Breath sounds: Normal breath sounds. No stridor. No wheezing or rales.      Comments: Chest tube in place without significant drainage.  Abdominal:      General: Bowel sounds are normal. There is no distension.      Palpations: Abdomen is soft. There is no mass.      Tenderness: There is no abdominal tenderness. There is no guarding.   Musculoskeletal:         General: No deformity. Normal range of motion.      Cervical back: Normal range of motion and neck supple.      Comments: Surgical drain in place with clear red tinged  serious appearing fluid.    Skin:     General: Skin is warm and dry.      Coloration: Skin is not pale.      Findings: No erythema or rash.   Neurological:      General: No focal deficit present.      Mental Status: She is alert and oriented to person, place, and time.      Cranial Nerves: No cranial nerve deficit.      Motor: No abnormal muscle tone.   Psychiatric:         Mood and Affect: Mood normal.         Behavior: Behavior normal.         Thought Content: Thought content normal.         Judgment: Judgment normal.           Significant Labs: All pertinent labs within the past 24 hours have been reviewed.  BMP:   Recent Labs   Lab 03/02/23  0323   GLU 86      K 4.2      CO2 29   BUN 12   CREATININE 0.9   CALCIUM 8.9   MG 2.0     CBC:   Recent Labs   Lab 03/01/23 1702 03/02/23 0323   WBC 8.47 7.53   HGB 12.2 11.4*   HCT 38.2 37.3   * 363     CMP:   Recent Labs   Lab 03/01/23 1702 03/02/23 0323    141   K 4.1 4.2    104   CO2 27 29   GLU 83 86   BUN 13 12   CREATININE 0.9 0.9   CALCIUM 9.2 8.9   PROT 6.8  --    ALBUMIN 2.9*  --    BILITOT 0.5  --    ALKPHOS 103  --    AST 20  --    ALT 24  --    ANIONGAP 9 8     Urine Culture: No results for input(s): LABURIN in the last 48 hours.  Urine Studies:   Recent Labs   Lab 03/01/23 2017   COLORU Yellow   APPEARANCEUA Clear   PHUR 6.0   SPECGRAV 1.025   PROTEINUA Trace*   GLUCUA Negative   KETONESU Negative   BILIRUBINUA Negative   OCCULTUA Negative   NITRITE Negative   UROBILINOGEN Negative   LEUKOCYTESUR Negative       Significant Imaging: I have reviewed all pertinent imaging results/findings within the past 24 hours.  Imaging Results              X-Ray Chest AP Portable (Final result)  Result time 03/01/23 21:51:49      Final result by Cinthya Khanna MD (03/01/23 21:51:49)                   Impression:      Small residual left-sided pneumothorax.      Electronically signed by: Cinthya  Jey  Date:    03/01/2023  Time:    21:51               Narrative:    EXAMINATION:  XR CHEST AP PORTABLE    CLINICAL HISTORY:  Presence of other specified functional implants    TECHNIQUE:  Single frontal view of the chest was performed.    COMPARISON:  Same day prior    FINDINGS:  Interval placement of a left-sided chest tube.  Small residual pneumothorax.  Known bilateral lung nodules are better seen on same day CT.  Right lung is clear.  Normal heart size.                                        CT Chest Without Contrast (Final result)  Result time 03/01/23 20:44:42      Final result by Aleja Godinez MD (03/01/23 20:44:42)                   Impression:      Moderately large left pneumothorax.  Partial atelectatic changes involving the affected left lung.  Mild emphysematous changes and bronchiectasis.    Multiple bilateral pulmonary nodules measuring up to 14 x 17 mm.    Small to moderate pericardial effusion.    Dr. Carlos aware of left-sided pneumothorax on 03/01/2023 at 17:57.    This report was flagged in Epic as abnormal.      Electronically signed by: Aleja Godinez  Date:    03/01/2023  Time:    20:44               Narrative:    EXAMINATION:  CT CHEST WITHOUT CONTRAST    CLINICAL HISTORY:  Persistent cough.  Status post mastectomy 2 weeks ago.    TECHNIQUE:  Contiguous axial 5 mm images was obtained from the lung apices through the lung bases.  No intravenous contrast was given.  Coronal and sagittal reformatted images were provided.    COMPARISON:  CTA chest 11/01/2022    FINDINGS:  Postsurgical changes of bilateral mastectomies are seen.  There is subcutaneous emphysema over the anterior chest walls left greater than right.  Overlying the right pectoralis major muscle is a triangular shaped area, which is likely postoperative in nature.    There are multiple bilateral pulmonary nodules.  The largest pulmonary nodules are in the partially atelectatic left upper lobe.  The 2 largest representative  lymph nodes measure 14 x 17 mm and 14 x 12 mm respectively (series 4 axial image 175 and axial image 190).  There are additional right-sided pulmonary nodules with the largest representative nodules in the right apex and superior segment of the right lower lobe measuring approximately 6 mm (series 4 axial image 79 and axial image 137).  There are mild underlying emphysematous changes.  There is mild bronchiectasis.    There is moderately large left pneumothorax.  There is partial atelectatic changes of the left pulmonary lobes.    There is no evidence of mediastinal, hilar, or axillary adenopathy.    Small to moderate pericardial effusion is present.  There is no pleural effusion.    The heart size is within normal limits.    In the visualized upper abdomen, there has been gastric surgery.  Colonic diverticulosis is present.  Bilateral renal cysts are noted.    Mild kyphosis is present.  There is spondylitic changes.    Calcifications seen in the right thyroid gland.                                        X-Ray Chest PA And Lateral (Final result)  Result time 03/01/23 18:02:53      Final result by Aleja Godinez MD (03/01/23 18:02:53)                   Impression:      Moderately large or large left pneumothorax.    Findings called to Dr. Carlos, at 17:57 on 03/01/2023.    This report was flagged in Epic as abnormal.      Electronically signed by: Aleja Godinez  Date:    03/01/2023  Time:    18:02               Narrative:    EXAMINATION:  CHEST PA AND LATERAL    CLINICAL HISTORY:  Shortness of breath    TECHNIQUE:  PA and lateral chest radiograph    COMPARISON:  01/10/2023    FINDINGS:  The cardiac silhouette is within normal limits. Vascular calcifications seen at the aortic knob.  There is a moderately large or large left pneumothorax.  No focal consolidation is detected.  Surgical clips are projecting over the right mid chest, left lateral chest and left lateral chest wall.

## 2023-03-02 NOTE — ASSESSMENT & PLAN NOTE
Pt with symptomatic pneumothorax on presentation. Recent bilateral mastectomy with reconstruction on 2/15. Now s/p pigtail catheter placement 3/1 to suction with lung expansion. Unclear etiology at this point but there is some notable SC air abutting the pleural surface anteriorly under the pectoralis. She also has a known post-operative infection to the L breast which was being treated on an outpatient basis.  - Chest tube management    - will continue to -20 suction for the morning    - follow up CXR this afternoon    - if it still looks good, will place to water seal in the evening    - repeat CXR in the AM  - if pt has distress on water seal, please repeat CXR and place on suction.

## 2023-03-02 NOTE — SUBJECTIVE & OBJECTIVE
Past Medical History:   Diagnosis Date    Allergy     Anxiety     Arthritis     Atrial flutter     Colon polyps     COPD (chronic obstructive pulmonary disease)     COPD exacerbation 10/31/2022    Depression     Diverticular disease of colon 2017    Diverticulitis     HLD (hyperlipidemia)     Hypertension     Malignant neoplasm of central portion of left breast in female, estrogen receptor positive 2022    Pancreatitis     Psoriasis     Rheumatoid arthritis     Severe obesity (BMI 35.0-39.9) with comorbidity        Past Surgical History:   Procedure Laterality Date    ABLATION  2022    Cardiac Ablation for A Flutter, Successful    ADENOIDECTOMY  1966    Tonsillitis and adnoids    BILATERAL MASTECTOMY Bilateral 2/15/2023    Procedure: MASTECTOMY, BILATERAL;  Surgeon: Marcela Meehan MD;  Location: [facility];  Service: General;  Laterality: Bilateral;  EMAIL SENT  @ 11:44 LK / 2.5 HOURS    BLADDER SUSPENSION      (x2)     SECTION      (x2)    ESOPHAGOGASTRODUODENOSCOPY N/A 2021    Procedure: EGD (ESOPHAGOGASTRODUODENOSCOPY);  Surgeon: Vince Rodriguez MD;  Location: [facility] OR [address] [place] [address];  Service: General;  Laterality: N/A;    INJECTION FOR SENTINEL NODE IDENTIFICATION Left 2/15/2023    Procedure: INJECTION, FOR SENTINEL NODE IDENTIFICATION;  Surgeon: Marcela Meehan MD;  Location: [facility];  Service: General;  Laterality: Left;    LAPAROSCOPIC SLEEVE GASTRECTOMY N/A 2021    Procedure: GASTRECTOMY, SLEEVE, LAPAROSCOPIC, with intraop EGD;  Surgeon: Vince Rodriguez MD;  Location: [facility] OR 2ND FLR;  Service: General;  Laterality: N/A;    LUNG BIOPSY  2020    RECONSTRUCTION OF BREAST WITH DEEP INFERIOR EPIGASTRIC ARTERY  (NEHA) FREE FLAP Bilateral 2/15/2023    Procedure: RECONSTRUCTION, BREAST, USING NEHA FREE FLAP;  Surgeon: Ming Milian MD;  Location: [facility];  Service: Plastics;  Laterality: Bilateral;    RHINOPLASTY      SENTINEL LYMPH NODE BIOPSY Left  2/15/2023    Procedure: BIOPSY, LYMPH NODE, SENTINEL;  Surgeon: Marcela Meehan MD;  Location: [facility] OR;  Service: General;  Laterality: Left;    TONSILLECTOMY      TRANSESOPHAGEAL ECHOCARDIOGRAPHY N/A 2022    Procedure: ECHOCARDIOGRAM, TRANSESOPHAGEAL;  Surgeon: Kellie Grover MD;  Location: [facility] EP LAB;  Service: Cardiology;  Laterality: N/A;       Review of patient's allergies indicates:   Allergen Reactions    Succinimides Anaphylaxis     Son has MH       Family History       Problem Relation (Age of Onset)    No Known Problems Daughter, Son, Daughter          Tobacco Use    Smoking status: Former     Packs/day: 0.00     Years: 20.00     Pack years: 0.00     Types: Cigarettes     Start date: 1979     Quit date: 2020     Years since quittin.2    Smokeless tobacco: Current   Substance and Sexual Activity    Alcohol use: Yes     Alcohol/week: 1.0 standard drink     Types: 1 Glasses of wine per week     Comment: social    Drug use: No    Sexual activity: Yes     Partners: Male     Birth control/protection: Post-menopausal         Review of Systems   Constitutional:  Positive for fatigue. Negative for chills and fever.   HENT: Negative.     Eyes: Negative.    Respiratory:  Positive for shortness of breath. Negative for cough, chest tightness and wheezing.    Cardiovascular:  Positive for chest pain (related to her breasts).   Gastrointestinal:  Positive for nausea. Negative for vomiting.   Musculoskeletal:  Positive for back pain.   Skin:  Positive for wound (L breast wound).   Neurological: Negative.    Psychiatric/Behavioral:  The patient is nervous/anxious.    Objective:     Vital Signs (Most Recent):  Temp: 97.7 °F (36.5 °C) (23 0301)  Pulse: 80 (23 06)  Resp: 17 (23)  BP: (!) 113/57 (23 06)  SpO2: 99 % (23)   Vital Signs (24h Range):  Temp:  [97.7 °F (36.5 °C)-98.4 °F (36.9 °C)] 97.7 °F (36.5 °C)  Pulse:  [] 80  Resp:  [10-23] 17  SpO2:   [89 %-100 %] 99 %  BP: (101-151)/(50-80) 113/57     Weight: 86.6 kg (190 lb 14.7 oz)  Body mass index is 34.92 kg/m².      Intake/Output Summary (Last 24 hours) at 3/2/2023 0724  Last data filed at 3/2/2023 0610  Gross per 24 hour   Intake --   Output 30 ml   Net -30 ml       Physical Exam  Vitals and nursing note reviewed.   Constitutional:       Appearance: Normal appearance. She is obese. She is ill-appearing.   HENT:      Head: Normocephalic and atraumatic.   Eyes:      General: No scleral icterus.     Conjunctiva/sclera: Conjunctivae normal.   Cardiovascular:      Rate and Rhythm: Regular rhythm. Tachycardia present.      Heart sounds: No murmur heard.  Pulmonary:      Effort: Pulmonary effort is normal. No respiratory distress.      Breath sounds: Normal breath sounds. No rhonchi or rales.   Abdominal:      Palpations: Abdomen is soft.   Skin:     General: Skin is warm.      Capillary Refill: Capillary refill takes less than 2 seconds.      Comments: L mastectomy wound with erythema, mild discharge around areola reconstruction area; L chest VIANEY drain to suction    L sided chest tube in tact to suction; no tidaling, no air leak, minimal output   Neurological:      General: No focal deficit present.      Mental Status: She is alert and oriented to person, place, and time.   Psychiatric:         Mood and Affect: Mood normal.         Behavior: Behavior normal.       Vents:       Lines/Drains/Airways       Drain  Duration                  Closed/Suction Drain 02/15/23 Abdomen Bulb 19 Fr. 15 days         Chest Tube 03/01/23 2102 Left Midaxillary 14 Fr. <1 day              Peripheral Intravenous Line  Duration                  Peripheral IV - Single Lumen 03/01/23 1830 20 G Right Antecubital <1 day                    Significant Labs:    CBC/Anemia Profile:  Recent Labs   Lab 03/01/23  1702 03/02/23  0323   WBC 8.47 7.53   HGB 12.2 11.4*   HCT 38.2 37.3   * 363   MCV 93 96   RDW 12.3 12.4         Chemistries:  Recent Labs   Lab 03/01/23  1702 03/02/23  0323    141   K 4.1 4.2    104   CO2 27 29   BUN 13 12   CREATININE 0.9 0.9   CALCIUM 9.2 8.9   ALBUMIN 2.9*  --    PROT 6.8  --    BILITOT 0.5  --    ALKPHOS 103  --    ALT 24  --    AST 20  --    MG  --  2.0       All pertinent labs within the past 24 hours have been reviewed.    Significant Imaging:   CXR 3/1/23:  FINDINGS:  The cardiac silhouette is within normal limits. Vascular calcifications seen at the aortic knob.  There is a moderately large or large left pneumothorax.  No focal consolidation is detected.  Surgical clips are projecting over the right mid chest, left lateral chest and left lateral chest wall.     Impression:  Moderately large or large left pneumothorax.    CT Chest 3/1:  Impression:  Moderately large left pneumothorax.  Partial atelectatic changes involving the affected left lung.  Mild emphysematous changes and bronchiectasis.     Multiple bilateral pulmonary nodules measuring up to 14 x 17 mm.  Small to moderate pericardial effusion.    CXR 3/1 Post-chest tube:  FINDINGS:  Interval placement of a left-sided chest tube.  Small residual pneumothorax.  Known bilateral lung nodules are better seen on same day CT.  Right lung is clear.  Normal heart size.     Impression:  Small residual left-sided pneumothorax.

## 2023-03-02 NOTE — HPI
62 y.o. female, with PMH of breast cancer s/p double mastectomy 2/15/23, HTN, COPD, HLD, obesity, RA, A. Flutter, psoriatic arthritis, who presented to [facility] ED on 3/1/23 due to shortness of breath since her mastectomy. She had room air oxygen sats of 89%. She notes associated chest pain and left breast swelling after surgery. She was restarted on her Eliquis 2 days after surgery and has been compliant with taking it since that time. She was evaluated in the ED with CXR showing a moderate large or large left sided pneumothorax. A CT Chest showed a moderately large left pneumothorax with emphysematous changes and bronchiectasis as well as multiple b/l pulmonary nodules and a small to moderate pericardial effusion. A chest tube was placed in the ED and repeat CXR showed a small residual left-sided pneumothorax. She was admitted to inpatient status to the ICU.     Record review indicates:  - Breast Cancer: Clinical Stage IA (T1N0M0), invasive ductal carcinoma of the Central portion of breast, estrogen receptor Positive, progesterone receptor Positive, HER2 Negative

## 2023-03-02 NOTE — CONSULTS
[facility] - Intensive Care ([place])  Pulmonology  Consult Note    Patient Name: Lucia Matias  MRN: [id number]  Admission Date: 3/1/2023  Hospital Length of Stay: 1 days  Code Status: Full Code  Attending Physician: ABDOUL Granados MD  Primary Care Provider: Hetal Banks MD   Principal Problem: Pneumothorax on left    Inpatient consult to Pulmonary Critical Care  Consult performed by: Justin Ellerman, MD  Consult ordered by: Magui Childers PA-C        Subjective:     HPI:  62 y.o. female, with PMH of breast cancer s/p double mastectomy 2/15/23, HTN, COPD, HLD, obesity, RA, A. Flutter, psoriatic arthritis, who presented to [facility] ED on 3/1/23 due to shortness of breath since her mastectomy. She had room air oxygen sats of 89%. She notes associated chest pain and left breast swelling after surgery. She was restarted on her Eliquis 2 days after surgery and has been compliant with taking it since that time. She was evaluated in the ED with CXR showing a moderate large or large left sided pneumothorax. A CT Chest showed a moderately large left pneumothorax with emphysematous changes and bronchiectasis as well as multiple b/l pulmonary nodules and a small to moderate pericardial effusion. A chest tube was placed in the ED and repeat CXR showed a small residual left-sided pneumothorax. She was admitted to inpatient status to the ICU.     Record review indicates:  - Breast Cancer: Clinical Stage IA (T1N0M0), invasive ductal carcinoma of the Central portion of breast, estrogen receptor Positive, progesterone receptor Positive, HER2 Negative      Past Medical History:   Diagnosis Date    Allergy     Anxiety     Arthritis     Atrial flutter     Colon polyps 2016    COPD (chronic obstructive pulmonary disease)     COPD exacerbation 10/31/2022    Depression     Diverticular disease of colon 06/06/2017    Diverticulitis     HLD (hyperlipidemia)     Hypertension     Malignant neoplasm of central  portion of left breast in female, estrogen receptor positive 2022    Pancreatitis     Psoriasis     Rheumatoid arthritis     Severe obesity (BMI 35.0-39.9) with comorbidity        Past Surgical History:   Procedure Laterality Date    ABLATION  2022    Cardiac Ablation for A Flutter, Successful    ADENOIDECTOMY  1966    Tonsillitis and adnoids    BILATERAL MASTECTOMY Bilateral 2/15/2023    Procedure: MASTECTOMY, BILATERAL;  Surgeon: Marcela Meehan MD;  Location: [facility];  Service: General;  Laterality: Bilateral;  EMAIL SENT  @ 11:44 LK / 2.5 HOURS    BLADDER SUSPENSION      (x2)     SECTION      (x2)    ESOPHAGOGASTRODUODENOSCOPY N/A 2021    Procedure: EGD (ESOPHAGOGASTRODUODENOSCOPY);  Surgeon: Vince Rodriguez MD;  Location: [address] FLR;  Service: General;  Laterality: N/A;    INJECTION FOR SENTINEL NODE IDENTIFICATION Left 2/15/2023    Procedure: INJECTION, FOR SENTINEL NODE IDENTIFICATION;  Surgeon: Marcela Meehan MD;  Location: [facility];  Service: General;  Laterality: Left;    LAPAROSCOPIC SLEEVE GASTRECTOMY N/A 2021    Procedure: GASTRECTOMY, SLEEVE, LAPAROSCOPIC, with intraop EGD;  Surgeon: Vince Rodriguez MD;  Location: [facility] FLR;  Service: General;  Laterality: N/A;    LUNG BIOPSY  2020    RECONSTRUCTION OF BREAST WITH DEEP INFERIOR EPIGASTRIC ARTERY  (NEHA) FREE FLAP Bilateral 2/15/2023    Procedure: RECONSTRUCTION, BREAST, USING NEHA FREE FLAP;  Surgeon: Ming Milian MD;  Location: [facility];  Service: Plastics;  Laterality: Bilateral;    RHINOPLASTY      SENTINEL LYMPH NODE BIOPSY Left 2/15/2023    Procedure: BIOPSY, LYMPH NODE, SENTINEL;  Surgeon: Marcela Meehan MD;  Location: [facility];  Service: General;  Laterality: Left;    TONSILLECTOMY      TRANSESOPHAGEAL ECHOCARDIOGRAPHY N/A 2022    Procedure: ECHOCARDIOGRAM, TRANSESOPHAGEAL;  Surgeon: Kellie Grover MD;  Location: [facility] EP LAB;  Service:  Cardiology;  Laterality: N/A;       Review of patient's allergies indicates:   Allergen Reactions    Succinimides Anaphylaxis     Son has MH       Family History       Problem Relation (Age of Onset)    No Known Problems Daughter, Son, Daughter          Tobacco Use    Smoking status: Former     Packs/day: 0.00     Years: 20.00     Pack years: 0.00     Types: Cigarettes     Start date: 1979     Quit date: 2020     Years since quittin.2    Smokeless tobacco: Current   Substance and Sexual Activity    Alcohol use: Yes     Alcohol/week: 1.0 standard drink     Types: 1 Glasses of wine per week     Comment: social    Drug use: No    Sexual activity: Yes     Partners: Male     Birth control/protection: Post-menopausal         Review of Systems   Constitutional:  Positive for fatigue. Negative for chills and fever.   HENT: Negative.     Eyes: Negative.    Respiratory:  Positive for shortness of breath. Negative for cough, chest tightness and wheezing.    Cardiovascular:  Positive for chest pain (related to her breasts).   Gastrointestinal:  Positive for nausea. Negative for vomiting.   Musculoskeletal:  Positive for back pain.   Skin:  Positive for wound (L breast wound).   Neurological: Negative.    Psychiatric/Behavioral:  The patient is nervous/anxious.    Objective:     Vital Signs (Most Recent):  Temp: 97.7 °F (36.5 °C) (23 0301)  Pulse: 80 (23)  Resp: 17 (23)  BP: (!) 113/57 (23)  SpO2: 99 % (23)   Vital Signs (24h Range):  Temp:  [97.7 °F (36.5 °C)-98.4 °F (36.9 °C)] 97.7 °F (36.5 °C)  Pulse:  [] 80  Resp:  [10-23] 17  SpO2:  [89 %-100 %] 99 %  BP: (101-151)/(50-80) 113/57     Weight: 86.6 kg (190 lb 14.7 oz)  Body mass index is 34.92 kg/m².      Intake/Output Summary (Last 24 hours) at 3/2/2023 0734  Last data filed at 3/2/2023 0610  Gross per 24 hour   Intake --   Output 30 ml   Net -30 ml       Physical Exam  Vitals and nursing note  reviewed.   Constitutional:       Appearance: Normal appearance. She is obese. She is ill-appearing.   HENT:      Head: Normocephalic and atraumatic.   Eyes:      General: No scleral icterus.     Conjunctiva/sclera: Conjunctivae normal.   Cardiovascular:      Rate and Rhythm: Regular rhythm. Tachycardia present.      Heart sounds: No murmur heard.  Pulmonary:      Effort: Pulmonary effort is normal. No respiratory distress.      Breath sounds: Normal breath sounds. No rhonchi or rales.   Abdominal:      Palpations: Abdomen is soft.   Skin:     General: Skin is warm.      Capillary Refill: Capillary refill takes less than 2 seconds.      Comments: L mastectomy wound with erythema, mild discharge around areola reconstruction area; L chest VIANEY drain to suction    L sided chest tube in tact to suction; no tidaling, no air leak, minimal output   Neurological:      General: No focal deficit present.      Mental Status: She is alert and oriented to person, place, and time.   Psychiatric:         Mood and Affect: Mood normal.         Behavior: Behavior normal.       Vents:       Lines/Drains/Airways       Drain  Duration                  Closed/Suction Drain 02/15/23 Abdomen Bulb 19 Fr. 15 days         Chest Tube 03/01/23 2102 Left Midaxillary 14 Fr. <1 day              Peripheral Intravenous Line  Duration                  Peripheral IV - Single Lumen 03/01/23 1830 20 G Right Antecubital <1 day                    Significant Labs:    CBC/Anemia Profile:  Recent Labs   Lab 03/01/23  1702 03/02/23  0323   WBC 8.47 7.53   HGB 12.2 11.4*   HCT 38.2 37.3   * 363   MCV 93 96   RDW 12.3 12.4        Chemistries:  Recent Labs   Lab 03/01/23  1702 03/02/23  0323    141   K 4.1 4.2    104   CO2 27 29   BUN 13 12   CREATININE 0.9 0.9   CALCIUM 9.2 8.9   ALBUMIN 2.9*  --    PROT 6.8  --    BILITOT 0.5  --    ALKPHOS 103  --    ALT 24  --    AST 20  --    MG  --  2.0       All pertinent labs within the past 24 hours  have been reviewed.    Significant Imaging:   CXR 3/1/23:  FINDINGS:  The cardiac silhouette is within normal limits. Vascular calcifications seen at the aortic knob.  There is a moderately large or large left pneumothorax.  No focal consolidation is detected.  Surgical clips are projecting over the right mid chest, left lateral chest and left lateral chest wall.     Impression:  Moderately large or large left pneumothorax.    CT Chest 3/1:  Impression:  Moderately large left pneumothorax.  Partial atelectatic changes involving the affected left lung.  Mild emphysematous changes and bronchiectasis.     Multiple bilateral pulmonary nodules measuring up to 14 x 17 mm.  Small to moderate pericardial effusion.    CXR 3/1 Post-chest tube:  FINDINGS:  Interval placement of a left-sided chest tube.  Small residual pneumothorax.  Known bilateral lung nodules are better seen on same day CT.  Right lung is clear.  Normal heart size.     Impression:  Small residual left-sided pneumothorax.    Assessment/Plan:     Pulmonary  * Pneumothorax on left  Pt with symptomatic pneumothorax on presentation. Recent bilateral mastectomy with reconstruction on 2/15. Now s/p pigtail catheter placement 3/1 to suction with lung expansion. Unclear etiology at this point but there is some notable SC air abutting the pleural surface anteriorly under the pectoralis. She also has a known post-operative infection to the L breast which was being treated on an outpatient basis.  - Chest tube management    - will continue to -20 suction for the morning    - follow up CXR this afternoon    - if it still looks good, will place to water seal in the evening    - repeat CXR in the AM  - if pt has distress on water seal, please repeat CXR and place on suction.    COPD (chronic obstructive pulmonary disease)  On trelegy at home. Will restart triple therapy with what we have on formulary here.  - Breo daily  - Spiriva BID    Cardiac/Vascular  Atrial flutter  -  continue maintenance eliquis          Thank you for your consult. Pulmonary will continue to assist with chest tube management.     Justin Ellerman, MD  Pulmonology  [facility] - Intensive [facility] ([place])

## 2023-03-02 NOTE — ASSESSMENT & PLAN NOTE
- s/p s/p bilateral mastectomy with left sentinel node biopsy on 2/15/2023 followed by immediate NEHA flap reconstruction   - instructed to meet with med onc and rad onc for discussion of adjuvant treatment recommendations

## 2023-03-02 NOTE — HPI
Ms. Lucia Matias is a 62 y.o. female, with PMH of breast cancer s/p double mastectomy 2/15/23, HTN, COPD, HLD, obesity, RA, A. Flutter, psoriatic arthritis, who [place] [facility] [place]1/23 due to shortness of breath since her mastectomy. She had room air oxygen sats of 89%. She notes associated chest pain and left breast swelling after surgery. She was restarted on her Eliquis 2 days after surgery and has been compliant with taking it since that time. She was evaluated in the ED with CXR showing a moderate large or large left sided pneumothorax. A CT Chest showed a moderately large left pneumothorax with emphysematous changes and bronchiectasis as well as multiple b/l pulmonary nodules and a small to moderate pericardial effusion. A chest tube was placed in the ED and repeat CXR showed a small residual left-sided pneumothorax. She was admitted to inpatient status to the ICU.

## 2023-03-03 LAB
ANION GAP SERPL CALC-SCNC: 8 MMOL/L (ref 8–16)
BASOPHILS # BLD AUTO: 0.04 K/UL (ref 0–0.2)
BASOPHILS NFR BLD: 0.5 % (ref 0–1.9)
BUN SERPL-MCNC: 16 MG/DL (ref 6–20)
CALCIUM SERPL-MCNC: 9.2 MG/DL (ref 8.7–10.5)
CHLORIDE SERPL-SCNC: 102 MMOL/L (ref 95–110)
CO2 SERPL-SCNC: 28 MMOL/L (ref 23–29)
CREAT SERPL-MCNC: 0.8 MG/DL (ref 0.5–1.4)
DIFFERENTIAL METHOD: ABNORMAL
EOSINOPHIL # BLD AUTO: 0.3 K/UL (ref 0–0.5)
EOSINOPHIL NFR BLD: 3.4 % (ref 0–8)
ERYTHROCYTE [DISTWIDTH] IN BLOOD BY AUTOMATED COUNT: 12.2 % (ref 11.5–14.5)
EST. GFR  (NO RACE VARIABLE): >60 ML/MIN/1.73 M^2
GLUCOSE SERPL-MCNC: 76 MG/DL (ref 70–110)
HCT VFR BLD AUTO: 39.6 % (ref 37–48.5)
HGB BLD-MCNC: 12.4 G/DL (ref 12–16)
IMM GRANULOCYTES # BLD AUTO: 0.03 K/UL (ref 0–0.04)
IMM GRANULOCYTES NFR BLD AUTO: 0.4 % (ref 0–0.5)
LYMPHOCYTES # BLD AUTO: 1.2 K/UL (ref 1–4.8)
LYMPHOCYTES NFR BLD: 16.7 % (ref 18–48)
MAGNESIUM SERPL-MCNC: 1.8 MG/DL (ref 1.6–2.6)
MCH RBC QN AUTO: 29.5 PG (ref 27–31)
MCHC RBC AUTO-ENTMCNC: 31.3 G/DL (ref 32–36)
MCV RBC AUTO: 94 FL (ref 82–98)
MONOCYTES # BLD AUTO: 0.5 K/UL (ref 0.3–1)
MONOCYTES NFR BLD: 7.1 % (ref 4–15)
NEUTROPHILS # BLD AUTO: 5.3 K/UL (ref 1.8–7.7)
NEUTROPHILS NFR BLD: 71.9 % (ref 38–73)
NRBC BLD-RTO: 0 /100 WBC
PLATELET # BLD AUTO: 410 K/UL (ref 150–450)
PMV BLD AUTO: 8.6 FL (ref 9.2–12.9)
POTASSIUM SERPL-SCNC: 4.1 MMOL/L (ref 3.5–5.1)
RBC # BLD AUTO: 4.2 M/UL (ref 4–5.4)
SODIUM SERPL-SCNC: 138 MMOL/L (ref 136–145)
WBC # BLD AUTO: 7.43 K/UL (ref 3.9–12.7)

## 2023-03-03 PROCEDURE — 25000003 PHARM REV CODE 250: Performed by: PHYSICIAN ASSISTANT

## 2023-03-03 PROCEDURE — 99233 SBSQ HOSP IP/OBS HIGH 50: CPT | Mod: ,,, | Performed by: INTERNAL MEDICINE

## 2023-03-03 PROCEDURE — 94761 N-INVAS EAR/PLS OXIMETRY MLT: CPT

## 2023-03-03 PROCEDURE — 99233 PR SUBSEQUENT HOSPITAL CARE,LEVL III: ICD-10-PCS | Mod: ,,, | Performed by: INTERNAL MEDICINE

## 2023-03-03 PROCEDURE — 80048 BASIC METABOLIC PNL TOTAL CA: CPT | Performed by: PHYSICIAN ASSISTANT

## 2023-03-03 PROCEDURE — 63600175 PHARM REV CODE 636 W HCPCS: Performed by: PHYSICIAN ASSISTANT

## 2023-03-03 PROCEDURE — 94640 AIRWAY INHALATION TREATMENT: CPT

## 2023-03-03 PROCEDURE — 85025 COMPLETE CBC W/AUTO DIFF WBC: CPT | Performed by: PHYSICIAN ASSISTANT

## 2023-03-03 PROCEDURE — 21400001 HC TELEMETRY ROOM

## 2023-03-03 PROCEDURE — 25000242 PHARM REV CODE 250 ALT 637 W/ HCPCS: Performed by: INTERNAL MEDICINE

## 2023-03-03 PROCEDURE — 83735 ASSAY OF MAGNESIUM: CPT | Performed by: PHYSICIAN ASSISTANT

## 2023-03-03 PROCEDURE — 93005 ELECTROCARDIOGRAM TRACING: CPT

## 2023-03-03 PROCEDURE — 25000003 PHARM REV CODE 250: Performed by: INTERNAL MEDICINE

## 2023-03-03 PROCEDURE — 63600175 PHARM REV CODE 636 W HCPCS: Performed by: INTERNAL MEDICINE

## 2023-03-03 PROCEDURE — 99900035 HC TECH TIME PER 15 MIN (STAT)

## 2023-03-03 PROCEDURE — 93010 ELECTROCARDIOGRAM REPORT: CPT | Mod: ,,, | Performed by: INTERNAL MEDICINE

## 2023-03-03 PROCEDURE — 36415 COLL VENOUS BLD VENIPUNCTURE: CPT | Performed by: PHYSICIAN ASSISTANT

## 2023-03-03 PROCEDURE — A4216 STERILE WATER/SALINE, 10 ML: HCPCS | Performed by: PHYSICIAN ASSISTANT

## 2023-03-03 PROCEDURE — 93010 EKG 12-LEAD: ICD-10-PCS | Mod: ,,, | Performed by: INTERNAL MEDICINE

## 2023-03-03 RX ORDER — HYDROMORPHONE HYDROCHLORIDE 1 MG/ML
0.5 INJECTION, SOLUTION INTRAMUSCULAR; INTRAVENOUS; SUBCUTANEOUS ONCE
Status: COMPLETED | OUTPATIENT
Start: 2023-03-03 | End: 2023-03-03

## 2023-03-03 RX ORDER — METOPROLOL TARTRATE 1 MG/ML
5 INJECTION, SOLUTION INTRAVENOUS EVERY 4 HOURS PRN
Status: DISCONTINUED | OUTPATIENT
Start: 2023-03-03 | End: 2023-03-04 | Stop reason: HOSPADM

## 2023-03-03 RX ORDER — METOPROLOL SUCCINATE 25 MG/1
25 TABLET, EXTENDED RELEASE ORAL DAILY
Status: DISCONTINUED | OUTPATIENT
Start: 2023-03-03 | End: 2023-03-04 | Stop reason: HOSPADM

## 2023-03-03 RX ORDER — OXYCODONE AND ACETAMINOPHEN 10; 325 MG/1; MG/1
1 TABLET ORAL EVERY 4 HOURS PRN
Qty: 18 TABLET | Refills: 0 | Status: ON HOLD | OUTPATIENT
Start: 2023-03-03 | End: 2023-03-29 | Stop reason: SDUPTHER

## 2023-03-03 RX ORDER — LEVOFLOXACIN 500 MG/1
500 TABLET, FILM COATED ORAL DAILY
Qty: 7 TABLET | Refills: 0 | Status: SHIPPED | OUTPATIENT
Start: 2023-03-03 | End: 2023-03-10

## 2023-03-03 RX ORDER — HYDROMORPHONE HYDROCHLORIDE 1 MG/ML
1 INJECTION, SOLUTION INTRAMUSCULAR; INTRAVENOUS; SUBCUTANEOUS EVERY 4 HOURS PRN
Status: DISCONTINUED | OUTPATIENT
Start: 2023-03-03 | End: 2023-03-04 | Stop reason: HOSPADM

## 2023-03-03 RX ADMIN — VENLAFAXINE HYDROCHLORIDE 75 MG: 37.5 CAPSULE, EXTENDED RELEASE ORAL at 08:03

## 2023-03-03 RX ADMIN — FLUTICASONE FUROATE AND VILANTEROL TRIFENATATE 1 PUFF: 100; 25 POWDER RESPIRATORY (INHALATION) at 08:03

## 2023-03-03 RX ADMIN — HYDROMORPHONE HYDROCHLORIDE 0.5 MG: 1 INJECTION, SOLUTION INTRAMUSCULAR; INTRAVENOUS; SUBCUTANEOUS at 05:03

## 2023-03-03 RX ADMIN — HYDROMORPHONE HYDROCHLORIDE 0.5 MG: 1 INJECTION, SOLUTION INTRAMUSCULAR; INTRAVENOUS; SUBCUTANEOUS at 12:03

## 2023-03-03 RX ADMIN — HYDROMORPHONE HYDROCHLORIDE 1 MG: 1 INJECTION, SOLUTION INTRAMUSCULAR; INTRAVENOUS; SUBCUTANEOUS at 11:03

## 2023-03-03 RX ADMIN — METOROPROLOL TARTRATE 5 MG: 5 INJECTION, SOLUTION INTRAVENOUS at 09:03

## 2023-03-03 RX ADMIN — Medication 10 ML: at 05:03

## 2023-03-03 RX ADMIN — Medication 10 ML: at 02:03

## 2023-03-03 RX ADMIN — METOPROLOL SUCCINATE 25 MG: 25 TABLET, EXTENDED RELEASE ORAL at 08:03

## 2023-03-03 RX ADMIN — HYDROMORPHONE HYDROCHLORIDE 1 MG: 1 INJECTION, SOLUTION INTRAMUSCULAR; INTRAVENOUS; SUBCUTANEOUS at 03:03

## 2023-03-03 RX ADMIN — DIAZEPAM 5 MG: 5 TABLET ORAL at 08:03

## 2023-03-03 RX ADMIN — APIXABAN 5 MG: 2.5 TABLET, FILM COATED ORAL at 08:03

## 2023-03-03 RX ADMIN — OXYCODONE AND ACETAMINOPHEN 1 TABLET: 10; 325 TABLET ORAL at 08:03

## 2023-03-03 RX ADMIN — TIOTROPIUM BROMIDE INHALATION SPRAY 2 PUFF: 3.12 SPRAY, METERED RESPIRATORY (INHALATION) at 08:03

## 2023-03-03 RX ADMIN — MUPIROCIN: 20 OINTMENT TOPICAL at 08:03

## 2023-03-03 RX ADMIN — ACETAMINOPHEN 650 MG: 325 TABLET, FILM COATED ORAL at 06:03

## 2023-03-03 RX ADMIN — Medication 10 ML: at 09:03

## 2023-03-03 RX ADMIN — ATORVASTATIN CALCIUM 20 MG: 20 TABLET, FILM COATED ORAL at 08:03

## 2023-03-03 RX ADMIN — LEVOFLOXACIN 500 MG: 500 TABLET, FILM COATED ORAL at 08:03

## 2023-03-03 RX ADMIN — Medication 1 TABLET: at 09:03

## 2023-03-03 NOTE — PROGRESS NOTES
[facility] Medicine  Progress Note    Patient Name: Lucia Matias  MRN: [id number]  Patient Class: IP- Inpatient   Admission Date: 3/1/2023  Length of Stay: 2 days  Attending Physician: ABDOUL Granados MD  Primary Care Provider: Hetal Banks MD        Subjective:     Principal Problem:Pneumothorax on left        HPI:  Ms. Lucia Matias is a 62 y.o. female, with PMH of breast cancer s/p double mastectomy 2/15/23, HTN, COPD, HLD, obesity, RA, A. Flutter, psoriatic arthritis, who [place] [facility] [place] due to shortness of breath since her mastectomy. She had room air oxygen sats of 89%. She notes associated chest pain and left breast swelling after surgery. She was restarted on her Eliquis 2 days after surgery and has been compliant with taking it since that time. She was evaluated in the ED with CXR showing a moderate large or large left sided pneumothorax. A CT Chest showed a moderately large left pneumothorax with emphysematous changes and bronchiectasis as well as multiple b/l pulmonary nodules and a small to moderate pericardial effusion. A chest tube was placed in the ED and repeat CXR showed a small residual left-sided pneumothorax. She was admitted to inpatient status to the ICU.       Overview/Hospital Course:  No notes on file    Interval History: Feeling relatively well, though had more pain overnight. Breathing doing well.    Review of Systems   Constitutional:  Negative for chills and fever.   Respiratory:  Negative for cough and shortness of breath.    Cardiovascular:  Negative for chest pain and palpitations.   Gastrointestinal:  Negative for abdominal pain, nausea and vomiting.   Objective:     Vital Signs (Most Recent):  Temp: 98 °F (36.7 °C) (03/03/23 1224)  Pulse: 89 (03/03/23 1417)  Resp: 16 (03/03/23 1515)  BP: (!) 142/81 (03/03/23 1224)  SpO2: (!) 93 % (03/03/23 1224)   Vital Signs (24h Range):  Temp:  [98 °F (36.7 °C)-99.4 °F (37.4 °C)]  98 °F (36.7 °C)  Pulse:  [] 89  Resp:  [14-21] 16  SpO2:  [91 %-95 %] 93 %  BP: (115-146)/(56-84) 142/81     Weight: 86.6 kg (190 lb 14.7 oz)  Body mass index is 34.92 kg/m².    Intake/Output Summary (Last 24 hours) at 3/3/2023 1532  Last data filed at 3/3/2023 1329  Gross per 24 hour   Intake 540 ml   Output 30 ml   Net 510 ml      Physical Exam  Vitals and nursing note reviewed.   Constitutional:       General: She is not in acute distress.     Appearance: She is well-developed.   HENT:      Head: Normocephalic and atraumatic.   Eyes:      General:         Right eye: No discharge.         Left eye: No discharge.      Conjunctiva/sclera: Conjunctivae normal.   Cardiovascular:      Rate and Rhythm: Normal rate.      Pulses: Normal pulses.   Pulmonary:      Effort: Pulmonary effort is normal. No respiratory distress.      Comments: Chest tube in place on R to water seal.  Abdominal:      Palpations: Abdomen is soft.      Tenderness: There is no abdominal tenderness.   Musculoskeletal:         General: Normal range of motion.      Right lower leg: No edema.      Left lower leg: No edema.   Skin:     General: Skin is warm and dry.   Neurological:      Mental Status: She is alert and oriented to person, place, and time.     Significant Labs:   CBC:  Recent Labs   Lab 03/01/23 1702 03/02/23 0323 03/03/23  0513   WBC 8.47 7.53 7.43   HGB 12.2 11.4* 12.4   HCT 38.2 37.3 39.6   * 363 410   GRAN 73.8*  6.3 69.5  5.2 71.9  5.3   LYMPH 15.8*  1.3 17.3*  1.3 16.7*  1.2   MONO 7.2  0.6 9.0  0.7 7.1  0.5   EOS 0.2 0.3 0.3   BASO 0.05 0.04 0.04   BMP:  Recent Labs   Lab 03/01/23 1702 03/02/23 0323 03/03/23  0513    141 138   K 4.1 4.2 4.1    104 102   CO2 27 29 28   BUN 13 12 16   CREATININE 0.9 0.9 0.8   GLU 83 86 76   CALCIUM 9.2 8.9 9.2   MG  --  2.0 1.8     Significant Imaging:   No new imaging this morning.      Assessment/Plan:      * Pneumothorax on left  - s/p chest tube placement  03/01 in ED.  - Pulmonology following; appreciate assistance. Chest tube to water seal; potentially discontinue later today.  - Continue hydromorphone 1mg IV q4hr PRN, oxycodone-acetaminophen 10-325mg PO q4hr PRN.    Malignant neoplasm of central portion of left breast in female, estrogen receptor positive  - Follow-up with plastics in 1 week, continue levofloxacin 500mg PO daily.    Atrial flutter  - Elevated HR overnight; EKG with sinus tach. Resume metoprolol 25mg PO daily.    Essential hypertension  - Stable.    Depression with anxiety  - Continue venlafaxine 75mg PO daily, trazodone 100mg PO qHS PRN.    HLD (hyperlipidemia)  - Continue atorvastatin 20mg PO qHS.    COPD (chronic obstructive pulmonary disease)  - Continue fluticasone-vilanterol 100-25mcg inhaled daily, albuterol-ipratropium 2.5-0.5mg inhaled q4hr PRN.    VTE Risk Mitigation (From admission, onward)         Ordered     apixaban tablet 5 mg  2 times daily         03/01/23 2358     IP VTE HIGH RISK PATIENT  Once         03/01/23 2342     Place sequential compression device  Until discontinued         03/01/23 2342                Discharge Planning   LESTER:      Code Status: Full Code   Is the patient medically ready for discharge?:     Reason for patient still in hospital (select all that apply): Treatment  Discharge Plan A: Home with family                  D Bryan Granados MD  Department of Hospital Medicine   [facility])

## 2023-03-03 NOTE — PLAN OF CARE
POC reviewed w/ pt. AAOx4. Cardiac monitor maintained. VSS on RA. B/P low at end of shift, pt asymptomatic. Notified MD, no new orders. C/o pain treated w/ PRN pain medication per MAR. Turn Q2/frequent weight shift encouraged. Instructed to call for help ambulating. No injuries, falls, or trauma occurred during shift. Purposeful rounding completed. Bed alarm set, bed low and locked, side rails up x3, call light within reach.

## 2023-03-03 NOTE — SUBJECTIVE & OBJECTIVE
Interval History: Feeling relatively well, though had more pain overnight. Breathing doing well.    Review of Systems   Constitutional:  Negative for chills and fever.   Respiratory:  Negative for cough and shortness of breath.    Cardiovascular:  Negative for chest pain and palpitations.   Gastrointestinal:  Negative for abdominal pain, nausea and vomiting.   Objective:     Vital Signs (Most Recent):  Temp: 98 °F (36.7 °C) (03/03/23 1224)  Pulse: 89 (03/03/23 1417)  Resp: 16 (03/03/23 1515)  BP: (!) 142/81 (03/03/23 1224)  SpO2: (!) 93 % (03/03/23 1224)   Vital Signs (24h Range):  Temp:  [98 °F (36.7 °C)-99.4 °F (37.4 °C)] 98 °F (36.7 °C)  Pulse:  [] 89  Resp:  [14-21] 16  SpO2:  [91 %-95 %] 93 %  BP: (115-146)/(56-84) 142/81     Weight: 86.6 kg (190 lb 14.7 oz)  Body mass index is 34.92 kg/m².    Intake/Output Summary (Last 24 hours) at 3/3/2023 1532  Last data filed at 3/3/2023 1329  Gross per 24 hour   Intake 540 ml   Output 30 ml   Net 510 ml      Physical Exam  Vitals and nursing note reviewed.   Constitutional:       General: She is not in acute distress.     Appearance: She is well-developed.   HENT:      Head: Normocephalic and atraumatic.   Eyes:      General:         Right eye: No discharge.         Left eye: No discharge.      Conjunctiva/sclera: Conjunctivae normal.   Cardiovascular:      Rate and Rhythm: Normal rate.      Pulses: Normal pulses.   Pulmonary:      Effort: Pulmonary effort is normal. No respiratory distress.      Comments: Chest tube in place on R to water seal.  Abdominal:      Palpations: Abdomen is soft.      Tenderness: There is no abdominal tenderness.   Musculoskeletal:         General: Normal range of motion.      Right lower leg: No edema.      Left lower leg: No edema.   Skin:     General: Skin is warm and dry.   Neurological:      Mental Status: She is alert and oriented to person, place, and time.     Significant Labs:   CBC:  Recent Labs   Lab 03/01/23  1702  03/02/23  0323 03/03/23  0513   WBC 8.47 7.53 7.43   HGB 12.2 11.4* 12.4   HCT 38.2 37.3 39.6   * 363 410   GRAN 73.8*  6.3 69.5  5.2 71.9  5.3   LYMPH 15.8*  1.3 17.3*  1.3 16.7*  1.2   MONO 7.2  0.6 9.0  0.7 7.1  0.5   EOS 0.2 0.3 0.3   BASO 0.05 0.04 0.04   BMP:  Recent Labs   Lab 03/01/23  1702 03/02/23  0323 03/03/23  0513    141 138   K 4.1 4.2 4.1    104 102   CO2 27 29 28   BUN 13 12 16   CREATININE 0.9 0.9 0.8   GLU 83 86 76   CALCIUM 9.2 8.9 9.2   MG  --  2.0 1.8     Significant Imaging:   No new imaging this morning.

## 2023-03-03 NOTE — SUBJECTIVE & OBJECTIVE
Interval History: Pt did well overnight with no respiratory complaints. She would mostly like to know when she can go home. Chest tube to water-seal since yesterday afternoon. Pt was also transferred out of the ICU to the floor.      Objective:     Vital Signs (Most Recent):  Temp: 98.9 °F (37.2 °C) (03/03/23 0751)  Pulse: 96 (03/03/23 1008)  Resp: 18 (03/03/23 0807)  BP: (!) 146/84 (03/03/23 0751)  SpO2: (!) 91 % (03/03/23 0751)   Vital Signs (24h Range):  Temp:  [97.7 °F (36.5 °C)-99.4 °F (37.4 °C)] 98.9 °F (37.2 °C)  Pulse:  [] 96  Resp:  [14-27] 18  SpO2:  [91 %-99 %] 91 %  BP: (105-146)/(55-84) 146/84     Weight: 86.6 kg (190 lb 14.7 oz)  Body mass index is 34.92 kg/m².      Intake/Output Summary (Last 24 hours) at 3/3/2023 1057  Last data filed at 3/3/2023 0523  Gross per 24 hour   Intake --   Output 30 ml   Net -30 ml       Physical Exam  Vitals and nursing note reviewed.   Constitutional:       Appearance: Normal appearance. She is obese. She is not ill-appearing.   Cardiovascular:      Rate and Rhythm: Regular rhythm. Tachycardia present.      Heart sounds: No murmur heard.  Pulmonary:      Effort: Pulmonary effort is normal. No respiratory distress.      Breath sounds: Normal breath sounds. No rhonchi or rales.   Abdominal:      Palpations: Abdomen is soft.   Skin:     General: Skin is warm.      Capillary Refill: Capillary refill takes less than 2 seconds.      Comments: L mastectomy wound with erythema, mild discharge around areola reconstruction area; L chest VIANEY drain to suction    L sided chest tube in tact, to water seal; no tidaling, no air leak, minimal output   Neurological:      General: No focal deficit present.      Mental Status: She is alert and oriented to person, place, and time.   Psychiatric:         Mood and Affect: Mood normal.         Behavior: Behavior normal.     Review of Systems    Vents:       Lines/Drains/Airways       Drain  Duration                  Closed/Suction Drain  02/15/23 Abdomen Bulb 19 Fr. 16 days         Chest Tube 03/01/23 2102 Left Midaxillary 14 Fr. 1 day              Peripheral Intravenous Line  Duration                  Peripheral IV - Single Lumen 03/01/23 1830 20 G Right Antecubital 1 day                    Significant Labs:    CBC/Anemia Profile:  Recent Labs   Lab 03/01/23 1702 03/02/23 0323 03/03/23  0513   WBC 8.47 7.53 7.43   HGB 12.2 11.4* 12.4   HCT 38.2 37.3 39.6   * 363 410   MCV 93 96 94   RDW 12.3 12.4 12.2        Chemistries:  Recent Labs   Lab 03/01/23 1702 03/02/23 0323 03/03/23  0513    141 138   K 4.1 4.2 4.1    104 102   CO2 27 29 28   BUN 13 12 16   CREATININE 0.9 0.9 0.8   CALCIUM 9.2 8.9 9.2   ALBUMIN 2.9*  --   --    PROT 6.8  --   --    BILITOT 0.5  --   --    ALKPHOS 103  --   --    ALT 24  --   --    AST 20  --   --    MG  --  2.0 1.8       All pertinent labs within the past 24 hours have been reviewed.    Significant Imaging:  CXR: I have reviewed all pertinent results/findings within the past 24 hours and my personal findings are:  CXR this morning shows no pneumothorax with CT secured in the same position.

## 2023-03-03 NOTE — PROGRESS NOTES
[facility] ([place])  Pulmonology  Progress Note    Patient Name: Lucia Matias  MRN: [id number]  Admission Date: 3/1/2023  Hospital Length of Stay: 2 days  Code Status: Full Code  Attending Provider: ABDOUL Granados MD  Primary Care Provider: Hetal Banks MD   Principal Problem: Pneumothorax on left    Subjective:     Interval History: Pt did well overnight with no respiratory complaints. She would mostly like to know when she can go home. Chest tube to water-seal since yesterday afternoon. Pt was also transferred out of the ICU to the floor.      Objective:     Vital Signs (Most Recent):  Temp: 98.9 °F (37.2 °C) (03/03/23 0751)  Pulse: 96 (03/03/23 1008)  Resp: 18 (03/03/23 0807)  BP: (!) 146/84 (03/03/23 0751)  SpO2: (!) 91 % (03/03/23 0751)   Vital Signs (24h Range):  Temp:  [97.7 °F (36.5 °C)-99.4 °F (37.4 °C)] 98.9 °F (37.2 °C)  Pulse:  [] 96  Resp:  [14-27] 18  SpO2:  [91 %-99 %] 91 %  BP: (105-146)/(55-84) 146/84     Weight: 86.6 kg (190 lb 14.7 oz)  Body mass index is 34.92 kg/m².      Intake/Output Summary (Last 24 hours) at 3/3/2023 1057  Last data filed at 3/3/2023 0523  Gross per 24 hour   Intake --   Output 30 ml   Net -30 ml       Physical Exam  Vitals and nursing note reviewed.   Constitutional:       Appearance: Normal appearance. She is obese. She is not ill-appearing.   Cardiovascular:      Rate and Rhythm: Regular rhythm. Tachycardia present.      Heart sounds: No murmur heard.  Pulmonary:      Effort: Pulmonary effort is normal. No respiratory distress.      Breath sounds: Normal breath sounds. No rhonchi or rales.   Abdominal:      Palpations: Abdomen is soft.   Skin:     General: Skin is warm.      Capillary Refill: Capillary refill takes less than 2 seconds.      Comments: L mastectomy wound with erythema, mild discharge around areola reconstruction area; L chest VIANEY drain to suction    L sided chest tube in tact, to water seal; no tidaling, no air leak, minimal output    Neurological:      General: No focal deficit present.      Mental Status: She is alert and oriented to person, place, and time.   Psychiatric:         Mood and Affect: Mood normal.         Behavior: Behavior normal.     Review of Systems    Vents:       Lines/Drains/Airways       Drain  Duration                  Closed/Suction Drain 02/15/23 Abdomen Bulb 19 Fr. 16 days         Chest Tube 03/01/23 2102 Left Midaxillary 14 Fr. 1 day              Peripheral Intravenous Line  Duration                  Peripheral IV - Single Lumen 03/01/23 1830 20 G Right Antecubital 1 day                    Significant Labs:    CBC/Anemia Profile:  Recent Labs   Lab 03/01/23 1702 03/02/23 0323 03/03/23  0513   WBC 8.47 7.53 7.43   HGB 12.2 11.4* 12.4   HCT 38.2 37.3 39.6   * 363 410   MCV 93 96 94   RDW 12.3 12.4 12.2        Chemistries:  Recent Labs   Lab 03/01/23 1702 03/02/23 0323 03/03/23  0513    141 138   K 4.1 4.2 4.1    104 102   CO2 27 29 28   BUN 13 12 16   CREATININE 0.9 0.9 0.8   CALCIUM 9.2 8.9 9.2   ALBUMIN 2.9*  --   --    PROT 6.8  --   --    BILITOT 0.5  --   --    ALKPHOS 103  --   --    ALT 24  --   --    AST 20  --   --    MG  --  2.0 1.8       All pertinent labs within the past 24 hours have been reviewed.    Significant Imaging:  CXR: I have reviewed all pertinent results/findings within the past 24 hours and my personal findings are:  CXR this morning shows no pneumothorax with CT secured in the same position.    Assessment/Plan:     Pulmonary  * Pneumothorax on left  Pt with symptomatic pneumothorax on presentation. Recent bilateral mastectomy with reconstruction on 2/15. Now s/p pigtail catheter placement 3/1 to suction with lung expansion. Unclear etiology at this point but there is some notable SC air abutting the pleural surface anteriorly under the pectoralis. She also has a known post-operative infection to the L breast which was being treated on an outpatient basis.  - Chest tube  management    - clamped this morning at 10am --> CXR at 1430, if okay, will remove CT    - if she continues to be asymptomatic, okay to discharge in the morning    - no need for follow CXR unless patient has changes  - if pt has distress on clamping, repeat CXR    - if there is recurrent PTX, place to suction    COPD (chronic obstructive pulmonary disease)  On trelegy at home. Will restart triple therapy with what we have on formulary here.  - Breo daily  - Spiriva BID  - okay to resume medications at discharge    Cardiac/Vascular  Atrial flutter  - continue maintenance eliquis      Thank you for this consult. Pulmonology will sign off w/ removal of chest tube this afternoon, pending CXR.     Justin Ellerman, MD  Pulmonology  [facility] ([place])

## 2023-03-03 NOTE — NURSING
0655  Pt HR sustaining in 130s/140s- sinus tach. Pt asymptomatic- reports migraine. Acetaminophen given.  Dr. Granados notified. See new orders for STAT EKG.

## 2023-03-03 NOTE — NURSING
Patient is awake and sitting up in bed.  No distress noted. 30 cc output noted to VIANEY drain during shift. Scant output noted from chest tube- not enough to document. Pt medicated for pain x3 and nausea x1 during shift.  Pt remains standby assist to BSC.

## 2023-03-03 NOTE — RESEARCH
Sponsor: Dr. Hernan Salinas M.D.    Study Title/IRB Number: A computer vision approach to prevention of injury from falling  IRB #: 2020.256    Principle Investigator: Hernan Salinas M.D.    Present for Discussion: Yes/Patient only     Is LAR Consenting for Subject: No    Prior to the Informed Consent (IC) being signed, or any study protocol required data collection, testing, procedure, or intervention being performed, the following was done and/or discussed:  Patient was given a copy of the IC for review   Purpose of the study and qualifications to participate   Study design, follow up schedule, and tests or procedures done at each visit  Confidentiality and HIPAA Authorization for Release of Medical Records for the research trial/ subject's rights/research related injury  Risk, Benefits, Alternative Treatments, Compensation and Costs  Participation in the research trial is voluntary and patient may withdraw at anytime  Contact information for study related questions    Patient verbalizes understanding of the above: Yes  Contact information for CRC and PI given to patient: Yes  Patient able to adequately summarize: the purpose of the study, the risks associated with the study, and all procedures, testing, and follow-ups associated with the study: Yes    Patient signed the informed consent form for the A Computer Vision Approach to Prevent Injury From Falling research study with an IRB approval date of 07/09/2020.  Each page of the consent form was reviewed with patient and all questions answered satisfactorily. Patient signed the consent form and received a copy of same. The original consent was scanned into electronic medical records (EPIC) and filed into the subject's research study chart.    Mrs. Matias was able to complete the following upon entry of the study:    - Subject was outfitted with white and black checkered gown: No  - Subject understands that they must wear the white and black checkered gown  for the entirety of the 24 hour observation period: No   - Subject understands that visitors and staff will also be recorded while in the view of visual recording equipment: Yes  - Subject was able to read consent and understands that they'll only be visually recorded for 24 hours and not audio recorded: Yes  -Subject states that they understand that this is an investigational study and that the WOW (Workstation on Wheels) doesn't prevent or lower risks of falls or injuries, and that they should always follow their provider's orders and use their call bell to alert hospital staff before ambulating or becoming mobile: Yes    Dr. Hernan Salinas M.D. has reviewed the following inclusion/ exclusion criteria and confirms that subject meets all Inclusion and no Exclusion criteria at this time:      Inclusion-   - Subject is currently admitted as an inpatient with acute care needs to [facility] and is at risk for falling.  - Subject is awake, alert, oriented and can speak and understand English without        difficulty.  - Subject can stand and ambulate with or without assistance.  - Subject must be literate.  - Subject must have a BMI that allows for proper fit of white and black      checkered gown.  - Subject must be able to provide informed consent.  - Subject will be admitted for > 24 hours.    Exclusion-  - Subject cannot read.  - Subject is immobile e.g. (paralyzed)  - Subject has BMI that prevents them from properly fitting in white and black checkered      gown.  - Subject has COVID-19 or other airborne infectious diseases.  - Subject is on reverse-isolation for immunosuppressive diseases.  - Subject has altered mental status or diagnosis of dementia.  - Subject is expected to be discharged in < 24 hours.    Pt AAO x 4, lying quietly supine in bed with HOB elevated X 75 degrees. Respirations even and unlabored without distress noted or complaints voiced. Pt is calm with a  pleasant affect. Pt reminded that WOW and video recording is not monitored and provides no emergency benefits or reduction or elimination from the risk of injury from falls; pt voiced understanding. Pt reminded to follow plan of care put forth by provider and to call RN for all assistance to reduce risk of problematic situations and potential injuries; pt voiced understanding. Recording started without incident. Advised pt that their participation is voluntary and if for any reasons they decide to cease the study that they only needed to inform their nurse and study session would be stopped; pt voiced understanding. Call bell within reach. Charge nurse notified of continuous video monitoring and of other study initiatives.

## 2023-03-03 NOTE — PROGRESS NOTES
BREAST SURGERY DAILY PROGRESS NOTE    Subjective:  Patient admitted from ED with large left pneumothorax. Was initially admitted to ICU but was stepped down yesterday. CT removed this afternoon. Continued on abx for left breast infection.    She is sitting comfortably in the chair this afternoon, satting well on RA. She reports that her pain is well controlled. She denies shortness of breath stating that it resolved right after the CT placement. Does not some fullness and edema of the left breast but states that it has improved. Is ambulating in the halls without difficulty. She is eager to be discharged home.      Objective:  Lab Results   Component Value Date/Time    WBC 7.43 03/03/2023 05:13 AM    HGB 12.4 03/03/2023 05:13 AM    HCT 39.6 03/03/2023 05:13 AM     03/03/2023 05:13 AM    INR 1.0 11/02/2022 10:50 AM      Lab Results   Component Value Date/Time     03/03/2023 05:13 AM    K 4.1 03/03/2023 05:13 AM     03/03/2023 05:13 AM    CO2 28 03/03/2023 05:13 AM    BUN 16 03/03/2023 05:13 AM    CREATININE 0.8 03/03/2023 05:13 AM    MG 1.8 03/03/2023 05:13 AM    PHOS 3.7 11/03/2022 05:17 AM         Vitals:    03/03/23 1515   BP:    Pulse:    Resp: 16   Temp:         Physical Exam:  Gen: no apparent distress, awake and alert  CV: regular rate/rhythm  Pulm: non-labored breathing, equal and bilateral chest rise  Breast: right breast incision c/d/I; left breast with surrounding erythema  Abd: soft, non-distended, non-tender  Ext: warm and well perfused, no edema  Skin: warm and dry, no lesions appreciated  Neuro: motor and sensation grossly intact and symmetric     Assessment/Plan:  62 y.o. female with ER+ [place]+ Her2- invasive ductal carcinoma of the left breast s/p bilateral mastectomy with left sentinel node biopsy on 2/15/23 followed by immediate NEHA flap admitted with pneumothorax.    - Patient appears to be recovering well  - Continue abx for infection of right breast  - Continue to wear  supportive and compressive bra  - Avoid lifting with upper body, no vigorous activity with upper body  - Patient is scheduled to follow up with plastic surgery next week for continued monitoring of infection  - To follow up as outpatient with breast surgery for monitoring in 6 months  - Discussed with patient that she can and should call our clinic with any questions or concerns      Mayo Bell MD  General Surgery PGY-1

## 2023-03-03 NOTE — PROGRESS NOTES
PRS PROGRESS NOTE    S  Transferred to floor yesterday  Feels well, wants to go home  Tachycardia this AM, EKG ordered    O  Vitals:    03/03/23 0751   BP: (!) 146/84   Pulse: 104   Resp: 20   Temp: 98.9 °F (37.2 °C)     AAO  CT to water seal  Non labored breathing on room air  L breast: erythema/induration at inferior aspect of breast, improved. Skin paddle soft with appropriate capillary refill. Breast is soft to palpation, incisions intact except at inferior portion of the skin paddle where there is a small amount of separation and fibrinous exudate. No active purulent drainage. CT present at anterior axillary line    R breast: Skin paddle soft with appropriate capillary refill. Breast is soft to palpation, incisions intact.    A/P  Patient is a 61 yo female 2 weeks status post bilateral breast reconstruction with NEHA flap. Admitted to hospital due to L pneumothorax  - recommend continuing antibiotics  - may ambulate from PRS perspective  - Please discharge patient with 1 week of levoquin and fu with PRS in 1 week    MD CESARIO BrarV  PRS  03/03/2023

## 2023-03-03 NOTE — NURSING TRANSFER
Nursing Transfer Note      3/2/2023     Reason patient is being transferred:     Transfer To: 324    Transfer via bed    Transfer with     Transported by RN and transport    Medicines sent: y    Any special needs or follow-up needed:     Chart send with patient: Yes    Notified: spouse    Patient reassessed at: 3/2/23 @ 1745     Upon arrival to floor: Receiving RN @ bedside. cardiac monitor applied, patient oriented to room, call bell in reach, and bed in lowest position.

## 2023-03-03 NOTE — ASSESSMENT & PLAN NOTE
On trelegy at home. Will restart triple therapy with what we have on formulary here.  - Breo daily  - Spiriva BID  - okay to resume medications at discharge

## 2023-03-03 NOTE — ASSESSMENT & PLAN NOTE
Pt with symptomatic pneumothorax on presentation. Recent bilateral mastectomy with reconstruction on 2/15. Now s/p pigtail catheter placement 3/1 to suction with lung expansion. Unclear etiology at this point but there is some notable SC air abutting the pleural surface anteriorly under the pectoralis. She also has a known post-operative infection to the L breast which was being treated on an outpatient basis.  - Chest tube management    - clamped this morning at 10am --> CXR at 1430, if okay, will remove CT    - if she continues to be asymptomatic, okay to discharge in the morning    - no need for follow CXR unless patient has changes  - if pt has distress on clamping, repeat CXR    - if there is recurrent PTX, place to suction

## 2023-03-04 VITALS
DIASTOLIC BLOOD PRESSURE: 70 MMHG | BODY MASS INDEX: 35.14 KG/M2 | HEIGHT: 62 IN | WEIGHT: 190.94 LBS | SYSTOLIC BLOOD PRESSURE: 111 MMHG | TEMPERATURE: 98 F | HEART RATE: 79 BPM | RESPIRATION RATE: 20 BRPM | OXYGEN SATURATION: 93 %

## 2023-03-04 LAB
ANION GAP SERPL CALC-SCNC: 5 MMOL/L (ref 8–16)
BASOPHILS # BLD AUTO: 0.04 K/UL (ref 0–0.2)
BASOPHILS NFR BLD: 0.7 % (ref 0–1.9)
BUN SERPL-MCNC: 15 MG/DL (ref 6–20)
CALCIUM SERPL-MCNC: 8.7 MG/DL (ref 8.7–10.5)
CHLORIDE SERPL-SCNC: 104 MMOL/L (ref 95–110)
CO2 SERPL-SCNC: 29 MMOL/L (ref 23–29)
CREAT SERPL-MCNC: 0.8 MG/DL (ref 0.5–1.4)
DIFFERENTIAL METHOD: ABNORMAL
EOSINOPHIL # BLD AUTO: 0.3 K/UL (ref 0–0.5)
EOSINOPHIL NFR BLD: 4.6 % (ref 0–8)
ERYTHROCYTE [DISTWIDTH] IN BLOOD BY AUTOMATED COUNT: 12.1 % (ref 11.5–14.5)
EST. GFR  (NO RACE VARIABLE): >60 ML/MIN/1.73 M^2
GLUCOSE SERPL-MCNC: 103 MG/DL (ref 70–110)
HCT VFR BLD AUTO: 35.4 % (ref 37–48.5)
HGB BLD-MCNC: 11.3 G/DL (ref 12–16)
IMM GRANULOCYTES # BLD AUTO: 0.02 K/UL (ref 0–0.04)
IMM GRANULOCYTES NFR BLD AUTO: 0.4 % (ref 0–0.5)
LYMPHOCYTES # BLD AUTO: 1.1 K/UL (ref 1–4.8)
LYMPHOCYTES NFR BLD: 19.1 % (ref 18–48)
MAGNESIUM SERPL-MCNC: 1.9 MG/DL (ref 1.6–2.6)
MCH RBC QN AUTO: 29.5 PG (ref 27–31)
MCHC RBC AUTO-ENTMCNC: 31.9 G/DL (ref 32–36)
MCV RBC AUTO: 92 FL (ref 82–98)
MONOCYTES # BLD AUTO: 0.4 K/UL (ref 0.3–1)
MONOCYTES NFR BLD: 7.2 % (ref 4–15)
NEUTROPHILS # BLD AUTO: 3.9 K/UL (ref 1.8–7.7)
NEUTROPHILS NFR BLD: 68 % (ref 38–73)
NRBC BLD-RTO: 0 /100 WBC
PLATELET # BLD AUTO: 385 K/UL (ref 150–450)
PMV BLD AUTO: 8.7 FL (ref 9.2–12.9)
POTASSIUM SERPL-SCNC: 3.8 MMOL/L (ref 3.5–5.1)
RBC # BLD AUTO: 3.83 M/UL (ref 4–5.4)
SODIUM SERPL-SCNC: 138 MMOL/L (ref 136–145)
WBC # BLD AUTO: 5.7 K/UL (ref 3.9–12.7)

## 2023-03-04 PROCEDURE — A4216 STERILE WATER/SALINE, 10 ML: HCPCS | Performed by: PHYSICIAN ASSISTANT

## 2023-03-04 PROCEDURE — 25000242 PHARM REV CODE 250 ALT 637 W/ HCPCS: Performed by: INTERNAL MEDICINE

## 2023-03-04 PROCEDURE — 99900035 HC TECH TIME PER 15 MIN (STAT)

## 2023-03-04 PROCEDURE — 25000003 PHARM REV CODE 250: Performed by: PHYSICIAN ASSISTANT

## 2023-03-04 PROCEDURE — 99239 HOSP IP/OBS DSCHRG MGMT >30: CPT | Mod: ,,, | Performed by: INTERNAL MEDICINE

## 2023-03-04 PROCEDURE — 27000221 HC OXYGEN, UP TO 24 HOURS

## 2023-03-04 PROCEDURE — 94640 AIRWAY INHALATION TREATMENT: CPT

## 2023-03-04 PROCEDURE — 99239 PR HOSPITAL DISCHARGE DAY,>30 MIN: ICD-10-PCS | Mod: ,,, | Performed by: INTERNAL MEDICINE

## 2023-03-04 PROCEDURE — 36415 COLL VENOUS BLD VENIPUNCTURE: CPT | Performed by: PHYSICIAN ASSISTANT

## 2023-03-04 PROCEDURE — 83735 ASSAY OF MAGNESIUM: CPT | Performed by: PHYSICIAN ASSISTANT

## 2023-03-04 PROCEDURE — 25000003 PHARM REV CODE 250: Performed by: INTERNAL MEDICINE

## 2023-03-04 PROCEDURE — 94761 N-INVAS EAR/PLS OXIMETRY MLT: CPT

## 2023-03-04 PROCEDURE — 80048 BASIC METABOLIC PNL TOTAL CA: CPT | Performed by: PHYSICIAN ASSISTANT

## 2023-03-04 PROCEDURE — 85025 COMPLETE CBC W/AUTO DIFF WBC: CPT | Performed by: PHYSICIAN ASSISTANT

## 2023-03-04 RX ADMIN — COLLAGENASE SANTYL: 250 OINTMENT TOPICAL at 08:03

## 2023-03-04 RX ADMIN — OXYCODONE AND ACETAMINOPHEN 1 TABLET: 10; 325 TABLET ORAL at 08:03

## 2023-03-04 RX ADMIN — Medication 1 TABLET: at 08:03

## 2023-03-04 RX ADMIN — Medication 10 ML: at 06:03

## 2023-03-04 RX ADMIN — APIXABAN 5 MG: 2.5 TABLET, FILM COATED ORAL at 08:03

## 2023-03-04 RX ADMIN — METOPROLOL SUCCINATE 25 MG: 25 TABLET, EXTENDED RELEASE ORAL at 08:03

## 2023-03-04 RX ADMIN — TIOTROPIUM BROMIDE INHALATION SPRAY 2 PUFF: 3.12 SPRAY, METERED RESPIRATORY (INHALATION) at 07:03

## 2023-03-04 RX ADMIN — OXYCODONE AND ACETAMINOPHEN 1 TABLET: 10; 325 TABLET ORAL at 01:03

## 2023-03-04 RX ADMIN — MUPIROCIN: 20 OINTMENT TOPICAL at 08:03

## 2023-03-04 RX ADMIN — FLUTICASONE FUROATE AND VILANTEROL TRIFENATATE 1 PUFF: 100; 25 POWDER RESPIRATORY (INHALATION) at 07:03

## 2023-03-04 RX ADMIN — VENLAFAXINE HYDROCHLORIDE 75 MG: 37.5 CAPSULE, EXTENDED RELEASE ORAL at 08:03

## 2023-03-04 RX ADMIN — LEVOFLOXACIN 500 MG: 500 TABLET, FILM COATED ORAL at 08:03

## 2023-03-04 NOTE — PLAN OF CARE
Patient educated on discharge instructions and verbalized understanding.  All questions answered to patient's satisfaction.  IV removed without complication.   at bedside.  Patient to be transported off unit via wheelchair.

## 2023-03-04 NOTE — PLAN OF CARE
Spoke with patient regarding discharge - aware of need for follow up with PCP & pulmonology - patient will contact offices when they reopen Monday to schedule appts - follow up appts scheduled with plastics & cardiology - appt info on AVS - meds escribed for bedside delivery -  will provide transportation home     [facility] ([place]  Discharge Final Note    Primary Care Provider: Hetal Banks MD    Expected Discharge Date: 3/4/2023    Final Discharge Note (most recent)       Final Note - 03/04/23 1002          Final Note    Assessment Type Final Discharge Note     Anticipated Discharge Disposition Home or Self Care     What phone number can be called within the next 1-3 days to see how you are doing after discharge? [phone]     Hospital Resources/Appts/Education Provided Provided patient/caregiver with written discharge plan information;Appointments scheduled and added to AVS        Post-Acute Status    Discharge Delays None known at this time                     Contact Info       Antonio Garcia MD   Specialty: Pulmonary Disease    [facility]  [address] [facility] [address]   Phone: [phone]       Next Steps: Schedule an appointment as soon as possible for a visit    Instructions: Make an appointment to follow up on your lung health. You need to get annual chest CT screening for lung cancer.    Hetal Banks MD   Specialty: Internal Medicine   Relationship: PCP - General  Hypertension Digital Medicine Responsible Provider    [address] [place] [address]  [address]  [facility] [address]   Phone: [phone]       Next Steps: Follow up in 2 week(s)    Instructions: post-hospital follow-up

## 2023-03-04 NOTE — ASSESSMENT & PLAN NOTE
- Continue fluticasone-vilanterol 100-25mcg inhaled daily, albuterol-ipratropium 2.5-0.5mg inhaled q4hr PRN.

## 2023-03-04 NOTE — ASSESSMENT & PLAN NOTE
- s/p chest tube placement 03/01 in ED.  - Pulmonology following; appreciate assistance. Chest tube to water seal; potentially discontinue later today.  - Continue hydromorphone 1mg IV q4hr PRN, oxycodone-acetaminophen 10-325mg PO q4hr PRN.

## 2023-03-04 NOTE — PROGRESS NOTES
PRS PROGRESS NOTE    S  Tachycardia once to 115, otherwise controlled and vital signs stable  Chest tube removed yesterday   No leukocytosis   Abdominal drain with 25 ml output   Patient states she is doing well, tolerating diet with no n/v, no f/c, reports improving breast pain and erythema    O  Vitals:    03/04/23 0750   BP: 111/70   Pulse: 79   Resp: 20   Temp: 97.7 °F (36.5 °C)     AAO  Dressings over previous chest tube.   Non labored breathing on room air  L breast: erythema/induration at inferior aspect of breast. Skin paddle soft with appropriate capillary refill. Breast is soft to palpation, incisions intact except at inferior portion of the skin paddle where there is a small amount of separation and fibrinous exudate. No active purulent drainage.    R breast: Skin paddle soft with appropriate capillary refill. Breast is soft to palpation, incisions intact.    A/P  Patient is a 63 yo female 2 weeks status post bilateral breast reconstruction with NEHA flap. Admitted to hospital due to L pneumothorax  - recommend continuing antibiotics  - may ambulate from PRS perspective  - Please discharge patient with 1 week of levoquin and fu with PRS in 1 week    Lance Garza MD  HOIII  PRS  03/04/2023

## 2023-03-04 NOTE — NURSING
Patient is lying in bed with eyes closed.  No distress noted.  Pt responds easily to verbal stimuli. VSS.  No acute events note during shift.  Pt remains on room air. Pt ambulates independently.  VIANEY drain remains intact. Safety maintained.  Report given to oncoming nurse.

## 2023-03-05 NOTE — DISCHARGE SUMMARY
[facility] Medicine  Discharge Summary      Patient Name: Lucia Matias  MRN: [id number]  UMBERTO: [id number]  Patient Class: IP- Inpatient  Admission Date: 3/1/2023  Hospital Length of Stay: 3 days  Discharge Date and Time: 3/4/2023 11:21 AM  Attending Physician: No att. providers found   Discharging Provider: NIHARIKA Granados MD  Primary Care Provider: Hetal Banks MD    Primary Care Team: Networked reference to record PCT     HPI:   Ms. Lucia Matias is a 62 y.o. female, with PMH of breast cancer s/p double mastectomy 2/15/23, HTN, COPD, HLD, obesity, RA, A. Flutter, psoriatic arthritis, who presented to [facility] ED on 3/1/23 due to shortness of breath since her mastectomy. She had room air oxygen sats of 89%. She notes associated chest pain and left breast swelling after surgery. She was restarted on her Eliquis 2 days after surgery and has been compliant with taking it since that time. She was evaluated in the ED with CXR showing a moderate large or large left sided pneumothorax. A CT Chest showed a moderately large left pneumothorax with emphysematous changes and bronchiectasis as well as multiple b/l pulmonary nodules and a small to moderate pericardial effusion. A chest tube was placed in the ED and repeat CXR showed a small residual left-sided pneumothorax. She was admitted to inpatient status to the ICU.       * No surgery found *      Hospital Course:   Admitted with pneumothormax and chest tube placed in ED. Pulm/Crit consulted. Respiratory status improved, chest tube converted from suction to water seal and then discontinued when imaging showed stability. Pain controlled with PO medications. With resolution of pneumothorax and respiratory stability, she was prepared for discharge home.       Goals of Care Treatment Preferences:  Code Status: Full Code      Consults:   Consults (From admission, onward)        Status Ordering Provider     Inpatient consult to  Pulmonary Critical Care  Once        Provider:  (Not yet assigned)    Completed SHOBHA ALVAREZ        Final Active Diagnoses:    Diagnosis Date Noted POA    PRINCIPAL PROBLEM:  Pneumothorax on left [J93.9] 03/01/2023 Yes    Malignant neoplasm of central portion of left breast in female, estrogen receptor positive [C50.112, Z17.0] 12/29/2022 Not Applicable    Atrial flutter [I48.92] 10/31/2022 Yes    Essential hypertension [I10] 12/20/2019 Yes     Chronic    Depression with anxiety [F41.8] 08/06/2019 Yes     Chronic    COPD (chronic obstructive pulmonary disease) [J44.9]  Yes     Chronic    HLD (hyperlipidemia) [E78.5]  Yes     Chronic      Problems Resolved During this Admission:       Discharged Condition: good    Disposition: Home or Self Care    Follow Up:   Follow-up Information     Antonio Garcia MD. Schedule an appointment as soon as possible for a visit.    Specialty: Pulmonary Disease  Why: Make an appointment to follow up on your lung health. You need to get annual chest CT screening for lung cancer.  Contact information:  [address] [facility]  [address]  [facility] [address]  [phone]             Hetal Banks MD Follow up in 2 week(s).    Specialty: Internal Medicine  Why: post-hospital follow-up  Contact information:  [address]  [address]  [facility] [id number] [phone]                       Patient Instructions:      Diet Adult Regular     Notify your health care provider if you experience any of the following:  temperature >100.4     Notify your health care provider if you experience any of the following:  severe uncontrolled pain     Notify your health care provider if you experience any of the following:  difficulty breathing or increased cough     Notify your health care provider if you experience any of the following:  increased confusion or weakness     Notify your health care provider if you experience any of the following:  persistent dizziness, light-headedness, or  visual disturbances     Activity as tolerated       Significant Diagnostic Studies:   CBC:  Recent Labs   Lab 03/02/23 0323 03/03/23 0513 03/04/23 0523   WBC 7.53 7.43 5.70   HGB 11.4* 12.4 11.3*   HCT 37.3 39.6 35.4*    410 385   GRAN 69.5  5.2 71.9  5.3 68.0  3.9   LYMPH 17.3*  1.3 16.7*  1.2 19.1  1.1   MONO 9.0  0.7 7.1  0.5 7.2  0.4   EOS 0.3 0.3 0.3   BASO 0.04 0.04 0.04     CMP:  Recent Labs   Lab 03/01/23  1702 03/02/23 0323 03/03/23 0513 03/04/23 0523    141 138 138   K 4.1 4.2 4.1 3.8    104 102 104   CO2 27 29 28 29   BUN 13 12 16 15   CREATININE 0.9 0.9 0.8 0.8   GLU 83 86 76 103   CALCIUM 9.2 8.9 9.2 8.7   MG  --  2.0 1.8 1.9   ALKPHOS 103  --   --   --    AST 20  --   --   --    ALT 24  --   --   --    BILITOT 0.5  --   --   --    PROT 6.8  --   --   --    ALBUMIN 2.9*  --   --   --    ANIONGAP 9 8 8 5*     Imaging Results          X-Ray Chest AP Portable (Final result)  Result time 03/01/23 21:51:49    Final result by Cinthya Khanna MD (03/01/23 21:51:49)                 Impression:      Small residual left-sided pneumothorax.      Electronically signed by: Cinthya Khanna  Date:    03/01/2023  Time:    21:51             Narrative:    EXAMINATION:  XR CHEST AP PORTABLE    CLINICAL HISTORY:  Presence of other specified functional implants    TECHNIQUE:  Single frontal view of the chest was performed.    COMPARISON:  Same day prior    FINDINGS:  Interval placement of a left-sided chest tube.  Small residual pneumothorax.  Known bilateral lung nodules are better seen on same day CT.  Right lung is clear.  Normal heart size.                                CT Chest Without Contrast (Final result)  Result time 03/01/23 20:44:42    Final result by Aleja Godinez MD (03/01/23 20:44:42)                 Impression:      Moderately large left pneumothorax.  Partial atelectatic changes involving the affected left lung.  Mild emphysematous changes and  bronchiectasis.    Multiple bilateral pulmonary nodules measuring up to 14 x 17 mm.    Small to moderate pericardial effusion.    Dr. Carlos aware of left-sided pneumothorax on 03/01/2023 at 17:57.    This report was flagged in Epic as abnormal.      Electronically signed by: Aleja Godinez  Date:    03/01/2023  Time:    20:44             Narrative:    EXAMINATION:  CT CHEST WITHOUT CONTRAST    CLINICAL HISTORY:  Persistent cough.  Status post mastectomy 2 weeks ago.    TECHNIQUE:  Contiguous axial 5 mm images was obtained from the lung apices through the lung bases.  No intravenous contrast was given.  Coronal and sagittal reformatted images were provided.    COMPARISON:  CTA chest 11/01/2022    FINDINGS:  Postsurgical changes of bilateral mastectomies are seen.  There is subcutaneous emphysema over the anterior chest walls left greater than right.  Overlying the right pectoralis major muscle is a triangular shaped area, which is likely postoperative in nature.    There are multiple bilateral pulmonary nodules.  The largest pulmonary nodules are in the partially atelectatic left upper lobe.  The 2 largest representative lymph nodes measure 14 x 17 mm and 14 x 12 mm respectively (series 4 axial image 175 and axial image 190).  There are additional right-sided pulmonary nodules with the largest representative nodules in the right apex and superior segment of the right lower lobe measuring approximately 6 mm (series 4 axial image 79 and axial image 137).  There are mild underlying emphysematous changes.  There is mild bronchiectasis.    There is moderately large left pneumothorax.  There is partial atelectatic changes of the left pulmonary lobes.    There is no evidence of mediastinal, hilar, or axillary adenopathy.    Small to moderate pericardial effusion is present.  There is no pleural effusion.    The heart size is within normal limits.    In the visualized upper abdomen, there has been gastric surgery.  Colonic  diverticulosis is present.  Bilateral renal cysts are noted.    Mild kyphosis is present.  There is spondylitic changes.    Calcifications seen in the right thyroid gland.                                X-Ray Chest PA And Lateral (Final result)  Result time 03/01/23 18:02:53    Final result by Aleja Godinez MD (03/01/23 18:02:53)                 Impression:      Moderately large or large left pneumothorax.    Findings called to Dr. Carlos at 17:57 on 03/01/2023.    This report was flagged in Epic as abnormal.      Electronically signed by: Aleja Godinez  Date:    03/01/2023  Time:    18:02             Narrative:    EXAMINATION:  CHEST PA AND LATERAL    CLINICAL HISTORY:  Shortness of breath    TECHNIQUE:  PA and lateral chest radiograph    COMPARISON:  01/10/2023    FINDINGS:  The cardiac silhouette is within normal limits. Vascular calcifications seen at the aortic knob.  There is a moderately large or large left pneumothorax.  No focal consolidation is detected.  Surgical clips are projecting over the right mid chest, left lateral chest and left lateral chest wall.                                Pending Diagnostic Studies:     None         Medications:  Reconciled Home Medications:      Medication List      CHANGE how you take these medications    levoFLOXacin 500 MG tablet  Commonly known as: LEVAQUIN  Take 1 tablet (500 mg total) by mouth once daily. for 7 days  What changed: when to take this     oxyCODONE-acetaminophen  mg per tablet  Commonly known as: PERCOCET  Take 1 tablet by mouth every 4 (four) hours as needed for Pain.  What changed: Another medication with the same name was removed. Continue taking this medication, and follow the directions you see here.        CONTINUE taking these medications    apixaban 5 mg Tab  Commonly known as: ELIQUIS  Take 1 tablet (5 mg total) by mouth 2 (two) times daily. Will hold 2/13 & 2/14     atorvastatin 20 MG tablet  Commonly known as: LIPITOR  Take 1  tablet (20 mg total) by mouth every evening.     calcium-vitamin D 250 mg-2.5 mcg (100 unit) per tablet  Take 1 tablet by mouth 2 (two) times daily.     COSENTYX PEN (2 PENS) 150 mg/mL Pnij  Generic drug: secukinumab  Inject 300mg (2 pens) into the skin every 4 weeks     cyanocobalamin 500 MCG tablet  Take 500 mcg by mouth once daily.     diazePAM 5 MG tablet  Commonly known as: VALIUM  Take 1 tablet (5 mg total) by mouth daily as needed for Anxiety.     HAIR,SKIN AND NAILS ORAL  Take by mouth.     multivitamin per tablet  Commonly known as: THERAGRAN  Take 1 tablet by mouth once daily.     omeprazole 40 MG capsule  Commonly known as: PRILOSEC  Take 1 capsule (40 mg total) by mouth every morning.     STOOL SOFTENER-LAXATIVE 8.6-50 mg per tablet  Generic drug: senna-docusate 8.6-50 mg  Take 1 tablet by mouth daily as needed for Constipation.     traZODone 100 MG tablet  Commonly known as: DESYREL  Take 1 tablet (100 mg total) by mouth nightly as needed for Insomnia.     TRELEGY ELLIPTA 100-62.5-25 mcg Dsdv  Generic drug: fluticasone-umeclidin-vilanter  Inhale 1 puff into the lungs once daily.     TYLENOL ORAL  Take by mouth as needed.     venlafaxine 75 MG 24 hr capsule  Commonly known as: EFFEXOR-XR  Take 1 capsule (75 mg total) by mouth once daily. Start on Week 2     VITAMIN B COMPLEX-C ORAL  Take by mouth.     VITAMIN D3 COMPLETE ORAL  Take by mouth.        STOP taking these medications    acetaminophen-codeine 300-30mg 300-30 mg Tab  Commonly known as: TYLENOL-CODEINE #3            Indwelling Lines/Drains at time of discharge:   Lines/Drains/Airways     Drain  Duration                Closed/Suction Drain 02/15/23 Abdomen Bulb 19 Fr. 17 days                Time spent on the discharge of patient: 35 minutes         NIHARIKA Granados MD  Department of Hospital Medicine  [facility]

## 2023-03-05 NOTE — HOSPITAL COURSE
Admitted with pneumothormax and chest tube placed in ED. Pulm/Crit consulted. Respiratory status improved, chest tube converted from suction to water seal and then discontinued when imaging showed stability. Pain controlled with PO medications. With resolution of pneumothorax and respiratory stability, she was prepared for discharge home.

## 2023-03-06 ENCOUNTER — PATIENT OUTREACH (OUTPATIENT)
Dept: ADMINISTRATIVE | Facility: CLINIC | Age: 63
End: 2023-03-06
Payer: COMMERCIAL

## 2023-03-07 RX ORDER — METOPROLOL TARTRATE 25 MG/1
25 TABLET, FILM COATED ORAL 2 TIMES DAILY
COMMUNITY
End: 2023-03-10

## 2023-03-08 ENCOUNTER — PATIENT MESSAGE (OUTPATIENT)
Dept: BARIATRICS | Facility: CLINIC | Age: 63
End: 2023-03-08
Payer: COMMERCIAL

## 2023-03-09 ENCOUNTER — OFFICE VISIT (OUTPATIENT)
Dept: PLASTIC SURGERY | Facility: CLINIC | Age: 63
End: 2023-03-09
Attending: PLASTIC SURGERY
Payer: COMMERCIAL

## 2023-03-09 VITALS — DIASTOLIC BLOOD PRESSURE: 63 MMHG | HEART RATE: 74 BPM | SYSTOLIC BLOOD PRESSURE: 133 MMHG

## 2023-03-09 DIAGNOSIS — C50.912 MALIGNANT NEOPLASM OF LEFT FEMALE BREAST, UNSPECIFIED ESTROGEN RECEPTOR STATUS, UNSPECIFIED SITE OF BREAST: Primary | ICD-10-CM

## 2023-03-09 NOTE — PROGRESS NOTES
Subjective: Lucia is here for a follow up visit after bilateral breast reconstruction with abdominally based free flaps. He postoperative course was complicated by L pneumothorax. She presents to clinic today for drain removal    Objective:  NAD, cooperative  Bilateral breasts soft healthy and viable  Small amount of periincisional wound healing issues around the left breast flap  Abdominal incisions CDI    Assesment/Plan:  Patient is a 62 year old F presenting to clinic today for f/u after bilateral breast reconstruction with abdominally based free flaps.  - removed drain in clinic today  - continue to use santyl on left breast  - f/u in clinic in 2 weeks    Joe Chinchilla MD  Microsurgery Fellow  Plastic and Reconstructive Surgery

## 2023-03-10 ENCOUNTER — CLINICAL SUPPORT (OUTPATIENT)
Dept: ENDOSCOPY | Facility: HOSPITAL | Age: 63
End: 2023-03-10
Attending: INTERNAL MEDICINE
Payer: COMMERCIAL

## 2023-03-10 ENCOUNTER — OFFICE VISIT (OUTPATIENT)
Dept: ELECTROPHYSIOLOGY | Facility: CLINIC | Age: 63
End: 2023-03-10
Payer: COMMERCIAL

## 2023-03-10 VITALS — DIASTOLIC BLOOD PRESSURE: 80 MMHG | HEART RATE: 75 BPM | SYSTOLIC BLOOD PRESSURE: 120 MMHG

## 2023-03-10 DIAGNOSIS — I48.92 ATRIAL FLUTTER, UNSPECIFIED TYPE: ICD-10-CM

## 2023-03-10 DIAGNOSIS — E66.01 SEVERE OBESITY (BMI >= 40): Primary | ICD-10-CM

## 2023-03-10 DIAGNOSIS — E66.09 CLASS 1 OBESITY DUE TO EXCESS CALORIES WITHOUT SERIOUS COMORBIDITY WITH BODY MASS INDEX (BMI) OF 33.0 TO 33.9 IN ADULT: Chronic | ICD-10-CM

## 2023-03-10 DIAGNOSIS — Z79.899 OTHER LONG TERM (CURRENT) DRUG THERAPY: ICD-10-CM

## 2023-03-10 DIAGNOSIS — Z12.11 SCREENING FOR MALIGNANT NEOPLASM OF COLON: ICD-10-CM

## 2023-03-10 DIAGNOSIS — I10 ESSENTIAL HYPERTENSION: Chronic | ICD-10-CM

## 2023-03-10 DIAGNOSIS — E78.2 MIXED HYPERLIPIDEMIA: Chronic | ICD-10-CM

## 2023-03-10 DIAGNOSIS — J41.8 MIXED SIMPLE AND MUCOPURULENT CHRONIC BRONCHITIS: Chronic | ICD-10-CM

## 2023-03-10 PROCEDURE — 3079F PR MOST RECENT DIASTOLIC BLOOD PRESSURE 80-89 MM HG: ICD-10-PCS | Mod: CPTII,95,, | Performed by: INTERNAL MEDICINE

## 2023-03-10 PROCEDURE — 1159F MED LIST DOCD IN RCRD: CPT | Mod: CPTII,95,, | Performed by: INTERNAL MEDICINE

## 2023-03-10 PROCEDURE — 1159F PR MEDICATION LIST DOCUMENTED IN MEDICAL RECORD: ICD-10-PCS | Mod: CPTII,95,, | Performed by: INTERNAL MEDICINE

## 2023-03-10 PROCEDURE — 99215 OFFICE O/P EST HI 40 MIN: CPT | Mod: 95,,, | Performed by: INTERNAL MEDICINE

## 2023-03-10 PROCEDURE — 99215 PR OFFICE/OUTPT VISIT, EST, LEVL V, 40-54 MIN: ICD-10-PCS | Mod: 95,,, | Performed by: INTERNAL MEDICINE

## 2023-03-10 PROCEDURE — 3074F PR MOST RECENT SYSTOLIC BLOOD PRESSURE < 130 MM HG: ICD-10-PCS | Mod: CPTII,95,, | Performed by: INTERNAL MEDICINE

## 2023-03-10 PROCEDURE — 3074F SYST BP LT 130 MM HG: CPT | Mod: CPTII,95,, | Performed by: INTERNAL MEDICINE

## 2023-03-10 PROCEDURE — 3079F DIAST BP 80-89 MM HG: CPT | Mod: CPTII,95,, | Performed by: INTERNAL MEDICINE

## 2023-03-10 PROCEDURE — 1111F PR DISCHARGE MEDS RECONCILED W/ CURRENT OUTPATIENT MED LIST: ICD-10-PCS | Mod: CPTII,95,, | Performed by: INTERNAL MEDICINE

## 2023-03-10 PROCEDURE — 1111F DSCHRG MED/CURRENT MED MERGE: CPT | Mod: CPTII,95,, | Performed by: INTERNAL MEDICINE

## 2023-03-10 NOTE — PROGRESS NOTES
The patient location is: home  The chief complaint leading to consultation is: home  Visit type: audiovisual  Total time spent with patient: 30 min  Each patient to whom he or she provides medical services by telemedicine is:  (1) informed of the relationship between the physician and patient and the respective role of any other health care provider with respect to management of the patient; and (2) notified that he or she may decline to receive medical services by telemedicine and may withdraw from such care at any time.      Subjective:    Patient ID:  Lucia Matias is a 62 y.o. female who presents for evaluation of AF    HPI  62 y.o. F  HTN HL RA sleeve gastrectomy COPD  AFL s/p RFA  breast CA s/p     During a COPD exacerbation 11/22, typical AFL was found and ablated.  In meantime, dx'd with breast cancer, and underwent bilateral mastectomy with complication of large L PTX. HR was in 130s during that. Subsequently, in 70s.  She's not completely healed from mastectomy yet, and is pending another surgery in a few months.  Feels well    1/23 echo 70% LVEF    Review of Systems   Constitutional: Negative. Negative for malaise/fatigue.   HENT: Negative.  Negative for ear pain and tinnitus.    Eyes:  Negative for blurred vision.   Cardiovascular: Negative.  Negative for chest pain, dyspnea on exertion, near-syncope, palpitations and syncope.   Respiratory: Negative.  Negative for shortness of breath.    Endocrine: Negative.  Negative for polyuria.   Hematologic/Lymphatic: Does not bruise/bleed easily.   Skin:  Positive for poor wound healing. Negative for rash.   Musculoskeletal: Negative.  Negative for joint pain and muscle weakness.   Gastrointestinal: Negative.  Negative for abdominal pain and change in bowel habit.   Genitourinary:  Negative for frequency.   Neurological: Negative.  Negative for dizziness and weakness.   Psychiatric/Behavioral: Negative.  Negative for depression. The patient is not  nervous/anxious.    Allergic/Immunologic: Negative for environmental allergies.      Objective:      Well developed, well nourished.   No distress.  Speaks in full sentences.        Assessment:       1. Severe obesity (BMI >= 40)    2. Essential hypertension    3. Atrial flutter, unspecified type    4. Mixed hyperlipidemia    5. Mixed simple and mucopurulent chronic bronchitis    6. Class 1 obesity due to excess calories without serious comorbidity with body mass index (BMI) of 33.0 to 33.9 in adult    7. Other long term (current) drug therapy         Plan:       s/p AFL ablation.  Asx regarding heart.    Continue a/c.  f/u 6 mos. Consider ILR at that point to r/o concomitant AF (and educate decisions re: anticoag).

## 2023-03-14 ENCOUNTER — PATIENT MESSAGE (OUTPATIENT)
Dept: BARIATRICS | Facility: CLINIC | Age: 63
End: 2023-03-14
Payer: COMMERCIAL

## 2023-03-15 ENCOUNTER — OFFICE VISIT (OUTPATIENT)
Dept: HEMATOLOGY/ONCOLOGY | Facility: CLINIC | Age: 63
End: 2023-03-15
Payer: COMMERCIAL

## 2023-03-15 VITALS
RESPIRATION RATE: 18 BRPM | HEART RATE: 91 BPM | OXYGEN SATURATION: 96 % | BODY MASS INDEX: 32.99 KG/M2 | DIASTOLIC BLOOD PRESSURE: 52 MMHG | SYSTOLIC BLOOD PRESSURE: 93 MMHG | HEIGHT: 62 IN | TEMPERATURE: 98 F | WEIGHT: 179.25 LBS

## 2023-03-15 DIAGNOSIS — L40.50 PSORIATIC ARTHRITIS: ICD-10-CM

## 2023-03-15 DIAGNOSIS — Z17.0 MALIGNANT NEOPLASM OF CENTRAL PORTION OF LEFT BREAST IN FEMALE, ESTROGEN RECEPTOR POSITIVE: Primary | ICD-10-CM

## 2023-03-15 DIAGNOSIS — C50.112 MALIGNANT NEOPLASM OF CENTRAL PORTION OF LEFT BREAST IN FEMALE, ESTROGEN RECEPTOR POSITIVE: Primary | ICD-10-CM

## 2023-03-15 PROCEDURE — 99999 PR PBB SHADOW E&M-EST. PATIENT-LVL III: CPT | Mod: PBBFAC,,, | Performed by: STUDENT IN AN ORGANIZED HEALTH CARE EDUCATION/TRAINING PROGRAM

## 2023-03-15 PROCEDURE — 3008F PR BODY MASS INDEX (BMI) DOCUMENTED: ICD-10-PCS | Mod: CPTII,S$GLB,, | Performed by: STUDENT IN AN ORGANIZED HEALTH CARE EDUCATION/TRAINING PROGRAM

## 2023-03-15 PROCEDURE — 1111F PR DISCHARGE MEDS RECONCILED W/ CURRENT OUTPATIENT MED LIST: ICD-10-PCS | Mod: CPTII,S$GLB,, | Performed by: STUDENT IN AN ORGANIZED HEALTH CARE EDUCATION/TRAINING PROGRAM

## 2023-03-15 PROCEDURE — 99205 OFFICE O/P NEW HI 60 MIN: CPT | Mod: S$GLB,,, | Performed by: STUDENT IN AN ORGANIZED HEALTH CARE EDUCATION/TRAINING PROGRAM

## 2023-03-15 PROCEDURE — 3008F BODY MASS INDEX DOCD: CPT | Mod: CPTII,S$GLB,, | Performed by: STUDENT IN AN ORGANIZED HEALTH CARE EDUCATION/TRAINING PROGRAM

## 2023-03-15 PROCEDURE — 1111F DSCHRG MED/CURRENT MED MERGE: CPT | Mod: CPTII,S$GLB,, | Performed by: STUDENT IN AN ORGANIZED HEALTH CARE EDUCATION/TRAINING PROGRAM

## 2023-03-15 PROCEDURE — 99999 PR PBB SHADOW E&M-EST. PATIENT-LVL III: ICD-10-PCS | Mod: PBBFAC,,, | Performed by: STUDENT IN AN ORGANIZED HEALTH CARE EDUCATION/TRAINING PROGRAM

## 2023-03-15 PROCEDURE — 3074F PR MOST RECENT SYSTOLIC BLOOD PRESSURE < 130 MM HG: ICD-10-PCS | Mod: CPTII,S$GLB,, | Performed by: STUDENT IN AN ORGANIZED HEALTH CARE EDUCATION/TRAINING PROGRAM

## 2023-03-15 PROCEDURE — 3074F SYST BP LT 130 MM HG: CPT | Mod: CPTII,S$GLB,, | Performed by: STUDENT IN AN ORGANIZED HEALTH CARE EDUCATION/TRAINING PROGRAM

## 2023-03-15 PROCEDURE — 3078F PR MOST RECENT DIASTOLIC BLOOD PRESSURE < 80 MM HG: ICD-10-PCS | Mod: CPTII,S$GLB,, | Performed by: STUDENT IN AN ORGANIZED HEALTH CARE EDUCATION/TRAINING PROGRAM

## 2023-03-15 PROCEDURE — 3078F DIAST BP <80 MM HG: CPT | Mod: CPTII,S$GLB,, | Performed by: STUDENT IN AN ORGANIZED HEALTH CARE EDUCATION/TRAINING PROGRAM

## 2023-03-15 PROCEDURE — 99205 PR OFFICE/OUTPT VISIT, NEW, LEVL V, 60-74 MIN: ICD-10-PCS | Mod: S$GLB,,, | Performed by: STUDENT IN AN ORGANIZED HEALTH CARE EDUCATION/TRAINING PROGRAM

## 2023-03-15 NOTE — PROGRESS NOTES
Oncology Clinic   Initial Consult Note    Patient: Lucia Matias  MRN: [id number]  Date: 3/15/2023    Chief Complaint: HR+ breast cancer    Ms. Matias is a domo 63yo woman with recently diagnosed HR+ breast cancer who presents today for evaluation. Her oncologic history is as follows:      Oncologic History:   22: annual mammogram indentified left focal asymmetry at the upper outer position.   Follow-up mammogram and ultrasound on 22 showed a worrisome spiculated mass, 1.4 x 0.7 x 0.6 cm, 12 o'clock left breast 7 CMFN. An ultrasound guided biopsy was performed on 12/15/22 with pathology revealing infiltrating ductal carcinoma of the breast. Grade 2, ER >95%, NM 85-90%, Her2 1+, Ki67 25-30%  2023: MRI breast shwoed Left breast 12 mm x 11 mm x 7 mm mass at the middle 12 o'clock position. Several pulmonary nodules are noted incidentally in the left hemithorax.  The patient has a history of bilateral pulmonary nodule seen on previous thoracic CT exams most recently in .  One of the nodules underwent biopsy in  with benign results.    Underwent b/l mastectomies with SLN bx 2/15/2023 with bilateral breast reconstruction with abdominally based free flaps. He postoperative course was complicated by L pneumothorax.  Post op path showed Invasive lobular carcinoma in left breast grade 2 measuring 18 mm with LCIS Grade 2 ER NM positive and Her2 negative. pT1c pN0.     GYN History:  Age of menarche was 13. Age of menopause was 46.  Patient denies hormonal therapy but took OCP for approximately 15 years in the past. Patient is . Age of first live birth was 25. Patient did breast feed for 6 months. S/p uterine ablation for fibroids in early 40s, no menstrual cycle since. Had menopausal symptoms in mid-late 40s.     Other Medical hx:   HTN HL RA sleeve gastrectomy COPD  AFL s/p RFA    Family hx:  Patient is adopted, no FH that she is aware of    Interval History:  Ms. Matias reports  that she is feeling well today, although anxious about plan for treatment. Notes that she has continued to recovery fairly well from surgery. Post-op course has been complicated by PTX, as well as incision site infection in the L breast. She continues to have mild tenderness at her incisions sites, but minimal drainage and pain. 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      Past Medical History:   Past Medical History:   Diagnosis Date    Allergy     Anxiety     Arthritis     Atrial flutter     Colon polyps     COPD (chronic obstructive pulmonary disease)     COPD exacerbation 10/31/2022    Depression     Diverticular disease of colon 2017    Diverticulitis     HLD (hyperlipidemia)     Hypertension     Malignant neoplasm of central portion of left breast in female, estrogen receptor positive 2022    Pancreatitis     Psoriasis     Rheumatoid arthritis     Severe obesity (BMI 35.0-39.9) with comorbidity        Past Surgical HIstory:   Past Surgical History:   Procedure Laterality Date    ABLATION  2022    Cardiac Ablation for A Flutter, Successful    ADENOIDECTOMY  1966    Tonsillitis and adnoids    BILATERAL MASTECTOMY Bilateral 2/15/2023    Procedure: MASTECTOMY, BILATERAL;  Surgeon: Marcela Meehan MD;  Location: [facility];  Service: General;  Laterality: Bilateral;  EMAIL SENT  @ 11:44 LK / 2.5 HOURS    BLADDER SUSPENSION      (x2)     SECTION      (x2)    ESOPHAGOGASTRODUODENOSCOPY N/A 2021    Procedure: EGD (ESOPHAGOGASTRODUODENOSCOPY);  Surgeon: Vince Rodriguez MD;  Location: [address];  Service: General;  Laterality: N/A;    INJECTION FOR SENTINEL NODE IDENTIFICATION Left 2/15/2023    Procedure: INJECTION, FOR SENTINEL NODE IDENTIFICATION;  Surgeon: Marcela Meehan MD;  Location: [facility];  Service: General;  Laterality: Left;    LAPAROSCOPIC SLEEVE GASTRECTOMY N/A 2021     Procedure: GASTRECTOMY, SLEEVE, LAPAROSCOPIC, with intraop EGD;  Surgeon: Vince Rodriguez MD;  Location: [facility];  Service: General;  Laterality: N/A;    LUNG BIOPSY  09/2020    RECONSTRUCTION OF BREAST WITH DEEP INFERIOR EPIGASTRIC ARTERY  (NEHA) FREE FLAP Bilateral 2/15/2023    Procedure: RECONSTRUCTION, BREAST, USING NEHA FREE FLAP;  Surgeon: Ming Milian MD;  Location: [facility] OR;  Service: Plastics;  Laterality: Bilateral;    RHINOPLASTY      SENTINEL LYMPH NODE BIOPSY Left 2/15/2023    Procedure: BIOPSY, LYMPH NODE, SENTINEL;  Surgeon: Marcela Meehan MD;  Location: [facility] OR;  Service: General;  Laterality: Left;    TONSILLECTOMY      TRANSESOPHAGEAL ECHOCARDIOGRAPHY N/A 11/02/2022    Procedure: ECHOCARDIOGRAM, TRANSESOPHAGEAL;  Surgeon: Kellie Grover MD;  Location: [facility];  Service: Cardiology;  Laterality: N/A;       Family History:   Family History   Adopted: Yes   Problem Relation Age of Onset    No Known Problems Daughter     No Known Problems Son     No Known Problems Daughter        Social History:  reports that she quit smoking about 2 years ago. Her smoking use included cigarettes. She started smoking about 44 years ago. She uses smokeless tobacco. She reports current alcohol use of about 1.0 standard drink per week. She reports that she does not use drugs.    Medications:  Current Outpatient Medications   Medication Sig Dispense Refill    acetaminophen (TYLENOL ORAL) Take by mouth as needed.      apixaban (ELIQUIS) 5 mg Tab Take 1 tablet (5 mg total) by mouth 2 (two) times daily. Will hold 2/13 & 2/14 60 tablet 5    atorvastatin (LIPITOR) 20 MG tablet Take 1 tablet (20 mg total) by mouth every evening. 90 tablet 3    B-complex with vitamin C (VITAMIN B COMPLEX-C ORAL) Take by mouth.      calcium-vitamin D 250-100 mg-unit per tablet Take 1 tablet by mouth 2 (two) times daily.      COSENTYX PEN, 2 PENS, 150 mg/mL PnIj Inject 300mg (2 pens) into the  "skin every 4 weeks 2 mL 11    cyanocobalamin 500 MCG tablet Take 500 mcg by mouth once daily.      diazePAM (VALIUM) 5 MG tablet Take 1 tablet (5 mg total) by mouth daily as needed for Anxiety. 30 tablet 2    fluticasone-umeclidin-vilanter (TRELEGY ELLIPTA) 100-62.5-25 mcg DsDv Inhale 1 puff into the lungs once daily. 180 each 3    multivitamin (THERAGRAN) per tablet Take 1 tablet by mouth once daily.      multivitamin with minerals (HAIR,SKIN AND NAILS ORAL) Take by mouth.      mv-mn/iron/folic acid/herb 190 (VITAMIN D3 COMPLETE ORAL) Take by mouth.      omeprazole (PRILOSEC) 40 MG capsule Take 1 capsule (40 mg total) by mouth every morning. 90 capsule 1    oxyCODONE-acetaminophen (PERCOCET)  mg per tablet Take 1 tablet by mouth every 4 (four) hours as needed for Pain. 18 tablet 0    senna-docusate 8.6-50 mg (PERICOLACE) 8.6-50 mg per tablet Take 1 tablet by mouth daily as needed for Constipation. (Patient not taking: Reported on 3/7/2023) 15 tablet 0    traZODone (DESYREL) 100 MG tablet Take 1 tablet (100 mg total) by mouth nightly as needed for Insomnia. 90 tablet 3    venlafaxine (EFFEXOR-XR) 75 MG 24 hr capsule Take 1 capsule (75 mg total) by mouth once daily. Start on Week 2 90 capsule 3     No current facility-administered medications for this visit.       Review of Systems  12pt ROS negative except as noted above    Objective:     Vitals:    03/15/23 1206   BP: (!) 93/52   Pulse: 91   Resp: 18   Temp: 97.7 °F (36.5 °C)   TempSrc: Oral   SpO2: 96%   Weight: 81.3 kg (179 lb 3.7 oz)   Height: 5' 2" (1.575 m)       BMI: Body mass index is 32.78 kg/m².     Physical Exam:  ECOG 0   General: well appearing, in no apparent distress  HEENT: Normocephalic, EOMI, anicteric sclerae, MMM  Neck: supple, without cervical or supraclavicular lymphadenopathy.  Heart: regular rate and rhythm, normal S1 and S2, no murmurs, gallops or rubs.  Lungs: Clear to auscultation bilaterally, no increased wob  Breast: s/p " bilateral mastectomy with flap reconstruction, small area of wound dehiscence over inferior incision of L nipple with minimal drainage. No appreciable axillary LAD   Abdomen: Soft, nontender, nondistended with normal bowel sounds. Abdominal incision c/d/I. No hepatosplenomegaly.  Extremities: No LE edema or joint effusion  Skin: warm, well-perfused, no rash  Neurologic: Alert and oriented x 4, normal speech and gait   Psychiatric: Conversing appropriately with providers throughout today's encounter.    Laboratory Data:  No visits with results within 1 Week(s) from this visit.   Latest known visit with results is:   Admission on 03/01/2023, Discharged on 03/04/2023   Component Date Value    WBC 03/01/2023 8.47     RBC 03/01/2023 4.11     Hemoglobin 03/01/2023 12.2     Hematocrit 03/01/2023 38.2     MCV 03/01/2023 93     MCH 03/01/2023 29.7     MCHC 03/01/2023 31.9 (L)     RDW 03/01/2023 12.3     Platelets 03/01/2023 459 (H)     MPV 03/01/2023 9.0 (L)     Immature Granulocytes 03/01/2023 0.2     Gran # (ANC) 03/01/2023 6.3     Immature Grans (Abs) 03/01/2023 0.02     Lymph # 03/01/2023 1.3     Mono # 03/01/2023 0.6     Eos # 03/01/2023 0.2     Baso # 03/01/2023 0.05     nRBC 03/01/2023 0     Gran % 03/01/2023 73.8 (H)     Lymph % 03/01/2023 15.8 (L)     Mono % 03/01/2023 7.2     Eosinophil % 03/01/2023 2.4     Basophil % 03/01/2023 0.6     Differential Method 03/01/2023 Automated     Sodium 03/01/2023 140     Potassium 03/01/2023 4.1     Chloride 03/01/2023 104     CO2 03/01/2023 27     Glucose 03/01/2023 83     BUN 03/01/2023 13     Creatinine 03/01/2023 0.9     Calcium 03/01/2023 9.2     Total Protein 03/01/2023 6.8     Albumin 03/01/2023 2.9 (L)     Total Bilirubin 03/01/2023 0.5     Alkaline Phosphatase 03/01/2023 103     AST 03/01/2023 20     ALT 03/01/2023 24     Anion Gap 03/01/2023 9     eGFR 03/01/2023 >60     BNP 03/01/2023 20     Specimen UA 03/01/2023 Urine, Clean  Catch     Color, UA 03/01/2023 Yellow     Appearance, UA 03/01/2023 Clear     pH, UA 03/01/2023 6.0     Specific [place], UA 03/01/2023 1.025     Protein, UA 03/01/2023 Trace (A)     Glucose, UA 03/01/2023 Negative     Ketones, UA 03/01/2023 Negative     Bilirubin (UA) 03/01/2023 Negative     Occult Blood UA 03/01/2023 Negative     Nitrite, UA 03/01/2023 Negative     Urobilinogen, UA 03/01/2023 Negative     Leukocytes, UA 03/01/2023 Negative     D-Dimer 03/01/2023 0.94 (H)     Sodium 03/02/2023 141     Potassium 03/02/2023 4.2     Chloride 03/02/2023 104     CO2 03/02/2023 29     Glucose 03/02/2023 86     BUN 03/02/2023 12     Creatinine 03/02/2023 0.9     Calcium 03/02/2023 8.9     Anion Gap 03/02/2023 8     eGFR 03/02/2023 >60     Magnesium 03/02/2023 2.0     WBC 03/02/2023 7.53     RBC 03/02/2023 3.87 (L)     Hemoglobin 03/02/2023 11.4 (L)     Hematocrit 03/02/2023 37.3     MCV 03/02/2023 96     MCH 03/02/2023 29.5     MCHC 03/02/2023 30.6 (L)     RDW 03/02/2023 12.4     Platelets 03/02/2023 363     MPV 03/02/2023 8.9 (L)     Immature Granulocytes 03/02/2023 0.4     Gran # (ANC) 03/02/2023 5.2     Immature Grans (Abs) 03/02/2023 0.03     Lymph # 03/02/2023 1.3     Mono # 03/02/2023 0.7     Eos # 03/02/2023 0.3     Baso # 03/02/2023 0.04     nRBC 03/02/2023 0     Gran % 03/02/2023 69.5     Lymph % 03/02/2023 17.3 (L)     Mono % 03/02/2023 9.0     Eosinophil % 03/02/2023 3.3     Basophil % 03/02/2023 0.5     Differential Method 03/02/2023 Automated     Sodium 03/03/2023 138     Potassium 03/03/2023 4.1     Chloride 03/03/2023 102     CO2 03/03/2023 28     Glucose 03/03/2023 76     BUN 03/03/2023 16     Creatinine 03/03/2023 0.8     Calcium 03/03/2023 9.2     Anion Gap 03/03/2023 8     eGFR 03/03/2023 >60     Magnesium 03/03/2023 1.8     WBC 03/03/2023 7.43     RBC 03/03/2023 4.20     Hemoglobin 03/03/2023 12.4     Hematocrit 03/03/2023 39.6     MCV  03/03/2023 94     MCH 03/03/2023 29.5     MCHC 03/03/2023 31.3 (L)     RDW 03/03/2023 12.2     Platelets 03/03/2023 410     MPV 03/03/2023 8.6 (L)     Immature Granulocytes 03/03/2023 0.4     Gran # (ANC) 03/03/2023 5.3     Immature Grans (Abs) 03/03/2023 0.03     Lymph # 03/03/2023 1.2     Mono # 03/03/2023 0.5     Eos # 03/03/2023 0.3     Baso # 03/03/2023 0.04     nRBC 03/03/2023 0     Gran % 03/03/2023 71.9     Lymph % 03/03/2023 16.7 (L)     Mono % 03/03/2023 7.1     Eosinophil % 03/03/2023 3.4     Basophil % 03/03/2023 0.5     Differential Method 03/03/2023 Automated     Sodium 03/04/2023 138     Potassium 03/04/2023 3.8     Chloride 03/04/2023 104     CO2 03/04/2023 29     Glucose 03/04/2023 103     BUN 03/04/2023 15     Creatinine 03/04/2023 0.8     Calcium 03/04/2023 8.7     Anion Gap 03/04/2023 5 (L)     eGFR 03/04/2023 >60     Magnesium 03/04/2023 1.9     WBC 03/04/2023 5.70     RBC 03/04/2023 3.83 (L)     Hemoglobin 03/04/2023 11.3 (L)     Hematocrit 03/04/2023 35.4 (L)     MCV 03/04/2023 92     MCH 03/04/2023 29.5     MCHC 03/04/2023 31.9 (L)     RDW 03/04/2023 12.1     Platelets 03/04/2023 385     MPV 03/04/2023 8.7 (L)     Immature Granulocytes 03/04/2023 0.4     Gran # (ANC) 03/04/2023 3.9     Immature Grans (Abs) 03/04/2023 0.02     Lymph # 03/04/2023 1.1     Mono # 03/04/2023 0.4     Eos # 03/04/2023 0.3     Baso # 03/04/2023 0.04     nRBC 03/04/2023 0     Gran % 03/04/2023 68.0     Lymph % 03/04/2023 19.1     Mono % 03/04/2023 7.2     Eosinophil % 03/04/2023 4.6     Basophil % 03/04/2023 0.7     Differential Method 03/04/2023 Automated    I personally reviewed all recent labs, imaging and pathology.    Assessment and Plan:   Ms. Matias is a domo 63yo woman with hx of Aflutter s/p ablation, COPD, RA and recently diagnosed Stage IA HR+ breast cancer s/p bilateral mastectomy with reconstruction who presents today for evaluation.     Today we  discussed her recent diagnosis and the natural history of early stage, HR+ breast cancer. I reviewed that the standard of care for clinically low risk ER+ disease is genomic assay to further evaluate recurrence risk and potential benefit of chemotherapy. OncotypeDX is a genomic assay of 21 genes unique to the tumor, assigning a score based on expression of high- or low-risk genes. The test reveals prognostic and predictive results based on NSABP B-14, NSABP B-20, TAILORx and RxPONDER clinical trials. These trials demonstrated that patients with low recurrence score do not benefit from the addition of chemotherapy to adjuvant endocrine therapy. Those patient with a high recurrence score do benefit from the addition of chemotherapy to adjuvant endocrine therapy.    At the time of today's visit, his Oncotype RS was pending. I briefly discussed options for adjuvant therapy including anastrozole versus TC x4 followed by anastrozole. Will plan to see her back in 1 week to discuss RS results and finalize plan for treatment.     #Breast cancer:  --awaiting Oncotype RS results, RTC in 1 week to discuss  --of note, osteopenia noted on DEXA 12/2018- pt on Ca/VitD. If proceeding with ET alone, will plan to repeat prior to initiation    All questions were answered to her apparent satisfaction. Will plan to see her back in one week or sooner should the need arise.     Roya Madrid MD      Med Onc Chart Routing      Follow up with physician 1 week. RTC 3/22 as scheduled   Follow up with LINDY    Infusion scheduling note    Injection scheduling note    Labs    Imaging    Pharmacy appointment    Other referrals

## 2023-03-16 ENCOUNTER — SPECIALTY PHARMACY (OUTPATIENT)
Dept: PHARMACY | Facility: CLINIC | Age: 63
End: 2023-03-16
Payer: COMMERCIAL

## 2023-03-16 DIAGNOSIS — L40.0 PSORIASIS VULGARIS: Primary | ICD-10-CM

## 2023-03-16 NOTE — TELEPHONE ENCOUNTER
Specialty Pharmacy - Refill Coordination  Specialty Pharmacy - Clinical Reassessment    Specialty Medication Orders Linked to Encounter      Flowsheet Row Most Recent Value   Medication #1 COSENTYX PEN, 2 PENS, 150 mg/mL PnIj (Order#[id number], Rx#[id number])            Refill Questions - Documented Responses      Flowsheet Row Most Recent Value   Patient Availability and HIPAA Verification    Does patient want to proceed with activity? Yes   HIPAA/medical authority confirmed? Yes   Relationship to patient of person spoken to? Self   Refill Screening Questions    Changes to allergies? No   Changes to medications? No   New conditions since last clinic visit? No   Unplanned office visit, urgent care, ED, or hospital admission in the last 4 weeks? No   How does patient/caregiver feel medication is working? Excellent   Financial problems or insurance changes? No   How many doses of your specialty medications were missed in the last 4 weeks? 0   Would patient like to speak to a pharmacist? No   When does the patient need to receive the medication? 03/25/23   Refill Delivery Questions    How will the patient receive the medication? MEDRx   When does the patient need to receive the medication? 03/25/23   Shipping Address Home   Address in [facility] confirmed and updated if neccessary? Yes   Expected Copay ($) 441.99   Is the patient able to afford the medication copay? Yes   Payment Method one time CC provided,  CC on file   Days supply of Refill 28   Supplies needed? No supplies needed   Refill activity completed? Yes   Refill activity plan Refill scheduled   Shipment/Pickup Date: 03/22/23            Current Outpatient Medications   Medication Sig    acetaminophen (TYLENOL ORAL) Take by mouth as needed.    apixaban (ELIQUIS) 5 mg Tab Take 1 tablet (5 mg total) by mouth 2 (two) times daily. Will hold 2/13 & 2/14    atorvastatin (LIPITOR) 20 MG tablet Take 1 tablet (20 mg total) by mouth every evening.     B-complex with vitamin C (VITAMIN B COMPLEX-C ORAL) Take by mouth.    calcium-vitamin D 250-100 mg-unit per tablet Take 1 tablet by mouth 2 (two) times daily.    COSENTYX PEN, 2 PENS, 150 mg/mL PnIj Inject 300mg (2 pens) into the skin every 4 weeks    cyanocobalamin 500 MCG tablet Take 500 mcg by mouth once daily.    diazePAM (VALIUM) 5 MG tablet Take 1 tablet (5 mg total) by mouth daily as needed for Anxiety.    fluticasone-umeclidin-vilanter (TRELEGY ELLIPTA) 100-62.5-25 mcg DsDv Inhale 1 puff into the lungs once daily.    multivitamin (THERAGRAN) per tablet Take 1 tablet by mouth once daily.    multivitamin with minerals (HAIR,SKIN AND NAILS ORAL) Take by mouth.    mv-mn/iron/folic acid/herb 190 (VITAMIN D3 COMPLETE ORAL) Take by mouth.    omeprazole (PRILOSEC) 40 MG capsule Take 1 capsule (40 mg total) by mouth every morning.    oxyCODONE-acetaminophen (PERCOCET)  mg per tablet Take 1 tablet by mouth every 4 (four) hours as needed for Pain. (Patient not taking: Reported on 3/15/2023)    senna-docusate 8.6-50 mg (PERICOLACE) 8.6-50 mg per tablet Take 1 tablet by mouth daily as needed for Constipation. (Patient not taking: Reported on 3/7/2023)    traZODone (DESYREL) 100 MG tablet Take 1 tablet (100 mg total) by mouth nightly as needed for Insomnia.    venlafaxine (EFFEXOR-XR) 75 MG 24 hr capsule Take 1 capsule (75 mg total) by mouth once daily. Start on Week 2   Last reviewed on 3/16/2023 10:58 AM by Mykel Mauro PharmD    Review of patient's allergies indicates:   Allergen Reactions    Succinimides Anaphylaxis     Son has     Last reviewed on  3/16/2023 10:58 AM by Mykel Mauro      Tasks added this encounter   No tasks added.   Tasks due within next 3 months   3/11/2023 - Clinical - Follow Up Assesement (Annual)  3/16/2023 - Refill Call (Auto Added)     Sarah Hogue  [facility]  [address] [place] [address]  Phone: [phone]  Fax: [phone]

## 2023-03-16 NOTE — TELEPHONE ENCOUNTER
Specialty Pharmacy - Refill Coordination  Specialty Pharmacy - Clinical Reassessment    Specialty Medication Orders Linked to Encounter      Flowsheet Row Most Recent Value   Medication #1 COSENTYX PEN, 2 PENS, 150 mg/mL PnIj (Order#[id number], Rx#[id number])          Patient Diagnosis   L40.0 - Psoriasis vulgaris    Lucia Matias is a 62 y.o. female, who is followed by the specialty pharmacy service for management and education of her Cosentyx.  She has been on therapy with Cosentyx for 30 months.  I have reviewed her electronic medical record and current medication list and determined that specialty medication adjustment Is not needed at this time.    Patient has not experienced adverse events.  She Is adherent reporting 1 missed dose since last review.  Adherence has been encouraged with the following mechanism(s): Pt keeps injection dates on her calendar.  She is meeting goals of therapy and will continue treatment.        3/16/2023 2/20/2023 1/17/2023 12/6/2022 11/7/2022 10/6/2022 9/6/2022   Follow Up Review   # of missed doses 0 0 0 0 0 0 0   New Medications? No No No Yes No No Yes   New Conditions? No No No Yes No No No   New Allergies? No No No No No No No   Med Effective? Excellent Excellent Excellent Very good Excellent Good Very good   Urgent Care? No No No Yes No No No   Requested Pharmacist? No No No No No No No            Therapy is appropriate to continue.    Therapy is effective: Yes  On scale of 1 to 10, how does patient rank quality of life? (10 - Best): 9  Recommendations: none at this time.  Review Method: Patient Contact    Tasks added this encounter   No tasks added.   Tasks due within next 3 months   3/11/2023 - Clinical - Follow Up Assesement (Annual)  3/16/2023 - Refill Call (Auto Added)     Sarah Hogue - Specialty Pharmacy  [address]  [facility] [address]  Phone: [phone]  Fax: [phone]

## 2023-03-20 LAB
FINAL PATHOLOGIC DIAGNOSIS: NORMAL
FROZEN SECTION DIAGNOSIS: NORMAL
GROSS: NORMAL
Lab: NORMAL
SUPPLEMENTAL DIAGNOSIS: NORMAL

## 2023-03-22 ENCOUNTER — OFFICE VISIT (OUTPATIENT)
Dept: PLASTIC SURGERY | Facility: CLINIC | Age: 63
End: 2023-03-22
Attending: PLASTIC SURGERY
Payer: COMMERCIAL

## 2023-03-22 ENCOUNTER — OFFICE VISIT (OUTPATIENT)
Dept: HEMATOLOGY/ONCOLOGY | Facility: CLINIC | Age: 63
End: 2023-03-22
Payer: COMMERCIAL

## 2023-03-22 VITALS
BODY MASS INDEX: 32.94 KG/M2 | TEMPERATURE: 99 F | HEIGHT: 62 IN | HEART RATE: 76 BPM | SYSTOLIC BLOOD PRESSURE: 111 MMHG | RESPIRATION RATE: 18 BRPM | DIASTOLIC BLOOD PRESSURE: 57 MMHG | WEIGHT: 179 LBS | OXYGEN SATURATION: 97 %

## 2023-03-22 VITALS — SYSTOLIC BLOOD PRESSURE: 123 MMHG | DIASTOLIC BLOOD PRESSURE: 58 MMHG | HEART RATE: 81 BPM

## 2023-03-22 DIAGNOSIS — C50.912 MALIGNANT NEOPLASM OF LEFT FEMALE BREAST, UNSPECIFIED ESTROGEN RECEPTOR STATUS, UNSPECIFIED SITE OF BREAST: Primary | ICD-10-CM

## 2023-03-22 DIAGNOSIS — Z17.0 MALIGNANT NEOPLASM OF CENTRAL PORTION OF LEFT BREAST IN FEMALE, ESTROGEN RECEPTOR POSITIVE: Primary | ICD-10-CM

## 2023-03-22 DIAGNOSIS — C50.112 MALIGNANT NEOPLASM OF CENTRAL PORTION OF LEFT BREAST IN FEMALE, ESTROGEN RECEPTOR POSITIVE: Primary | ICD-10-CM

## 2023-03-22 DIAGNOSIS — R63.4 WEIGHT LOSS: ICD-10-CM

## 2023-03-22 DIAGNOSIS — Z98.84 S/P LAPAROSCOPIC SLEEVE GASTRECTOMY: ICD-10-CM

## 2023-03-22 PROCEDURE — 3078F DIAST BP <80 MM HG: CPT | Mod: CPTII,S$GLB,, | Performed by: STUDENT IN AN ORGANIZED HEALTH CARE EDUCATION/TRAINING PROGRAM

## 2023-03-22 PROCEDURE — 3008F BODY MASS INDEX DOCD: CPT | Mod: CPTII,S$GLB,, | Performed by: STUDENT IN AN ORGANIZED HEALTH CARE EDUCATION/TRAINING PROGRAM

## 2023-03-22 PROCEDURE — 99999 PR PBB SHADOW E&M-EST. PATIENT-LVL V: CPT | Mod: PBBFAC,,, | Performed by: STUDENT IN AN ORGANIZED HEALTH CARE EDUCATION/TRAINING PROGRAM

## 2023-03-22 PROCEDURE — 3074F PR MOST RECENT SYSTOLIC BLOOD PRESSURE < 130 MM HG: ICD-10-PCS | Mod: CPTII,S$GLB,, | Performed by: STUDENT IN AN ORGANIZED HEALTH CARE EDUCATION/TRAINING PROGRAM

## 2023-03-22 PROCEDURE — 1111F PR DISCHARGE MEDS RECONCILED W/ CURRENT OUTPATIENT MED LIST: ICD-10-PCS | Mod: CPTII,S$GLB,, | Performed by: STUDENT IN AN ORGANIZED HEALTH CARE EDUCATION/TRAINING PROGRAM

## 2023-03-22 PROCEDURE — 99215 OFFICE O/P EST HI 40 MIN: CPT | Mod: S$GLB,,, | Performed by: STUDENT IN AN ORGANIZED HEALTH CARE EDUCATION/TRAINING PROGRAM

## 2023-03-22 PROCEDURE — 1111F DSCHRG MED/CURRENT MED MERGE: CPT | Mod: CPTII,S$GLB,, | Performed by: STUDENT IN AN ORGANIZED HEALTH CARE EDUCATION/TRAINING PROGRAM

## 2023-03-22 PROCEDURE — 99999 PR PBB SHADOW E&M-EST. PATIENT-LVL V: ICD-10-PCS | Mod: PBBFAC,,, | Performed by: STUDENT IN AN ORGANIZED HEALTH CARE EDUCATION/TRAINING PROGRAM

## 2023-03-22 PROCEDURE — 99215 PR OFFICE/OUTPT VISIT, EST, LEVL V, 40-54 MIN: ICD-10-PCS | Mod: S$GLB,,, | Performed by: STUDENT IN AN ORGANIZED HEALTH CARE EDUCATION/TRAINING PROGRAM

## 2023-03-22 PROCEDURE — 3008F PR BODY MASS INDEX (BMI) DOCUMENTED: ICD-10-PCS | Mod: CPTII,S$GLB,, | Performed by: STUDENT IN AN ORGANIZED HEALTH CARE EDUCATION/TRAINING PROGRAM

## 2023-03-22 PROCEDURE — 3078F PR MOST RECENT DIASTOLIC BLOOD PRESSURE < 80 MM HG: ICD-10-PCS | Mod: CPTII,S$GLB,, | Performed by: STUDENT IN AN ORGANIZED HEALTH CARE EDUCATION/TRAINING PROGRAM

## 2023-03-22 PROCEDURE — 3074F SYST BP LT 130 MM HG: CPT | Mod: CPTII,S$GLB,, | Performed by: STUDENT IN AN ORGANIZED HEALTH CARE EDUCATION/TRAINING PROGRAM

## 2023-03-22 RX ORDER — OMEPRAZOLE 40 MG/1
40 CAPSULE, DELAYED RELEASE ORAL EVERY MORNING
Qty: 90 CAPSULE | Refills: 1 | Status: SHIPPED | OUTPATIENT
Start: 2023-03-22 | End: 2023-09-10 | Stop reason: SDUPTHER

## 2023-03-22 NOTE — PROGRESS NOTES
Subjective: Lucia is here for a follow up visit after bilateral breast reconstruction with abdominally based free flaps. He postoperative course was complicated by L pneumothorax. She presents to clinic today for fu appt  Reports oncologist plans on starting chemotherapy      Objective:  NAD, cooperative  R breast soft healthy and viable. Small wound at central inferior aspect of breast. Fibrinous exudate at base of wound  L breast more firm, however appropriately soft. Wound at medial aspect of skin paddle. Healthy granulating tissue with fibrinous exudate. No signs of infection  Abdominal incisions CDI     Assesment/Plan:  Patient is a 62 year old F presenting to clinic today for f/u after bilateral breast reconstruction with abdominally based free flaps.  - will plan on closure of wounds/revision of bilateral reconstructed breasts next week  - local wound are until that time    Fahad Coles MD  HOV  PRS  03/22/2023

## 2023-03-22 NOTE — PROGRESS NOTES
Oncology Clinic   Progress Note    Patient: Lucia Matias  MRN: [id number]  Date: 3/22/2023    Chief Complaint: HR+ breast cancer    Ms. Matias is a domo 61yo woman with recently diagnosed HR+ breast cancer who presents today for evaluation. Her oncologic history is as follows:    Oncologic History:   22: annual mammogram indentified left focal asymmetry at the upper outer position.   Follow-up mammogram and ultrasound on 22 showed a worrisome spiculated mass, 1.4 x 0.7 x 0.6 cm, 12 o'clock left breast 7 CMFN. An ultrasound guided biopsy was performed on 12/15/22 with pathology revealing infiltrating ductal carcinoma of the breast. Grade 2, ER >95%, LA 85-90%, Her2 1+, Ki67 25-30%  2023: MRI breast shwoed Left breast 12 mm x 11 mm x 7 mm mass at the middle 12 o'clock position. Several pulmonary nodules are noted incidentally in the left hemithorax.  The patient has a history of bilateral pulmonary nodule seen on previous thoracic CT exams most recently in .  One of the nodules underwent biopsy in  with benign results.    Underwent b/l mastectomies with SLN bx 2/15/2023 with bilateral breast reconstruction with abdominally based free flaps. He postoperative course was complicated by L pneumothorax.  Post op path showed Invasive lobular carcinoma in left breast grade 2 measuring 18 mm with LCIS Grade 2 ER LA positive and Her2 negative. pT1c pN0. Oncotype 35    GYN History:  Age of menarche was 13. Age of menopause was 46.  Patient denies hormonal therapy but took OCP for approximately 15 years in the past. Patient is . Age of first live birth was 25. Patient did breast feed for 6 months. S/p uterine ablation for fibroids in early 40s, no menstrual cycle since. Had menopausal symptoms in mid-late 40s.     Other Medical hx:   HTN HL RA sleeve gastrectomy COPD  AFL s/p RFA    Family hx:  Patient is adopted, no FH that she is aware of    Interval History:  Ms. Matias returns  today for follow up. Notes that she has been doing fairly well since he last visit, although has had ongoing difficulty with wound healing. She explains that she has noticed increased discharge from her L breast incision; scheduled for follow up with plastic surgery this afternoon. Otherwise she has been feeling well and denies new complaints.       Medications:  Current Outpatient Medications   Medication Sig Dispense Refill    acetaminophen (TYLENOL ORAL) Take by mouth as needed.      apixaban (ELIQUIS) 5 mg Tab Take 1 tablet (5 mg total) by mouth 2 (two) times daily. Will hold 2/13 & 2/14 60 tablet 5    atorvastatin (LIPITOR) 20 MG tablet Take 1 tablet (20 mg total) by mouth every evening. 90 tablet 3    B-complex with vitamin C (VITAMIN B COMPLEX-C ORAL) Take by mouth.      calcium-vitamin D 250-100 mg-unit per tablet Take 1 tablet by mouth 2 (two) times daily.      COSENTYX PEN, 2 PENS, 150 mg/mL PnIj Inject 300mg (2 pens) into the skin every 4 weeks 2 mL 11    cyanocobalamin 500 MCG tablet Take 500 mcg by mouth once daily.      diazePAM (VALIUM) 5 MG tablet Take 1 tablet (5 mg total) by mouth daily as needed for Anxiety. 30 tablet 2    fluticasone-umeclidin-vilanter (TRELEGY ELLIPTA) 100-62.5-25 mcg DsDv Inhale 1 puff into the lungs once daily. 180 each 3    multivitamin (THERAGRAN) per tablet Take 1 tablet by mouth once daily.      multivitamin with minerals (HAIR,SKIN AND NAILS ORAL) Take by mouth.      mv-mn/iron/folic acid/herb 190 (VITAMIN D3 COMPLETE ORAL) Take by mouth.      omeprazole (PRILOSEC) 40 MG capsule Take 1 capsule (40 mg total) by mouth every morning. 90 capsule 1    traZODone (DESYREL) 100 MG tablet Take 1 tablet (100 mg total) by mouth nightly as needed for Insomnia. 90 tablet 3    venlafaxine (EFFEXOR-XR) 75 MG 24 hr capsule Take 1 capsule (75 mg total) by mouth once daily. Start on Week 2 90 capsule 3    oxyCODONE-acetaminophen (PERCOCET)  mg per tablet Take 1 tablet by mouth every  "4 (four) hours as needed for Pain. (Patient not taking: Reported on 3/15/2023) 18 tablet 0    senna-docusate 8.6-50 mg (PERICOLACE) 8.6-50 mg per tablet Take 1 tablet by mouth daily as needed for Constipation. (Patient not taking: Reported on 3/7/2023) 15 tablet 0     No current facility-administered medications for this visit.     Review of Systems:  Answers submitted by the patient for this visit:  Review of Systems Questionnaire (Submitted on 3/22/2023)  appetite change : No  unexpected weight change: No  mouth sores: No  visual disturbance: No  cough: No  shortness of breath: No  chest pain: No  abdominal pain: No  diarrhea: No  frequency: No  back pain: No  rash: No  headaches: No  adenopathy: No  nervous/ anxious: Yes        Objective:     Vitals:    03/22/23 1330   BP: (!) 111/57   Pulse: 76   Resp: 18   Temp: 98.7 °F (37.1 °C)   TempSrc: Oral   SpO2: 97%   Weight: 81.2 kg (179 lb 0.2 oz)   Height: 5' 2" (1.575 m)       BMI: Body mass index is 32.74 kg/m².     Physical Exam:  ECOG 0   General: well appearing, in no apparent distress  HEENT: Normocephalic, EOMI, anicteric sclerae, MMM  Neck: supple, without cervical or supraclavicular lymphadenopathy.  Heart: regular rate and rhythm, normal S1 and S2, no murmurs, gallops or rubs.  Lungs: Clear to auscultation bilaterally, no increased wob  Breast: s/p bilateral mastectomy with flap reconstruction, area of wound dehiscence over inferior L nipple with moderate serosanguinous drainage. Rt breast incision healing improved. No appreciable axillary LAD   Abdomen: Soft, nontender, nondistended with normal bowel sounds. Abdominal incision c/d/I. No hepatosplenomegaly.  Extremities: No LE edema or joint effusion  Skin: warm, well-perfused, no rash  Neurologic: Alert and oriented x 4, normal speech and gait   Psychiatric: Conversing appropriately with providers throughout today's encounter.    Laboratory Data:  No visits with results within 1 Week(s) from this visit. "   Latest known visit with results is:   Admission on 03/01/2023, Discharged on 03/04/2023   Component Date Value    WBC 03/01/2023 8.47     RBC 03/01/2023 4.11     Hemoglobin 03/01/2023 12.2     Hematocrit 03/01/2023 38.2     MCV 03/01/2023 93     MCH 03/01/2023 29.7     MCHC 03/01/2023 31.9 (L)     RDW 03/01/2023 12.3     Platelets 03/01/2023 459 (H)     MPV 03/01/2023 9.0 (L)     Immature Granulocytes 03/01/2023 0.2     Gran # (ANC) 03/01/2023 6.3     Immature Grans (Abs) 03/01/2023 0.02     Lymph # 03/01/2023 1.3     Mono # 03/01/2023 0.6     Eos # 03/01/2023 0.2     Baso # 03/01/2023 0.05     nRBC 03/01/2023 0     Gran % 03/01/2023 73.8 (H)     Lymph % 03/01/2023 15.8 (L)     Mono % 03/01/2023 7.2     Eosinophil % 03/01/2023 2.4     Basophil % 03/01/2023 0.6     Differential Method 03/01/2023 Automated     Sodium 03/01/2023 140     Potassium 03/01/2023 4.1     Chloride 03/01/2023 104     CO2 03/01/2023 27     Glucose 03/01/2023 83     BUN 03/01/2023 13     Creatinine 03/01/2023 0.9     Calcium 03/01/2023 9.2     Total Protein 03/01/2023 6.8     Albumin 03/01/2023 2.9 (L)     Total Bilirubin 03/01/2023 0.5     Alkaline Phosphatase 03/01/2023 103     AST 03/01/2023 20     ALT 03/01/2023 24     Anion Gap 03/01/2023 9     eGFR 03/01/2023 >60     BNP 03/01/2023 20     Specimen UA 03/01/2023 Urine, Clean Catch     Color, UA 03/01/2023 Yellow     Appearance, UA 03/01/2023 Clear     pH, UA 03/01/2023 6.0     Specific Gravity, UA 03/01/2023 1.025     Protein, UA 03/01/2023 Trace (A)     Glucose, UA 03/01/2023 Negative     Ketones, UA 03/01/2023 Negative     Bilirubin (UA) 03/01/2023 Negative     Occult Blood UA 03/01/2023 Negative     Nitrite, UA 03/01/2023 Negative     Urobilinogen, UA 03/01/2023 Negative     Leukocytes, UA 03/01/2023 Negative     D-Dimer 03/01/2023 0.94 (H)     Sodium 03/02/2023 141     Potassium 03/02/2023 4.2     Chloride 03/02/2023 104     CO2 03/02/2023 29     Glucose 03/02/2023 86     BUN  03/02/2023 12     Creatinine 03/02/2023 0.9     Calcium 03/02/2023 8.9     Anion Gap 03/02/2023 8     eGFR 03/02/2023 >60     Magnesium 03/02/2023 2.0     WBC 03/02/2023 7.53     RBC 03/02/2023 3.87 (L)     Hemoglobin 03/02/2023 11.4 (L)     Hematocrit 03/02/2023 37.3     MCV 03/02/2023 96     MCH 03/02/2023 29.5     MCHC 03/02/2023 30.6 (L)     RDW 03/02/2023 12.4     Platelets 03/02/2023 363     MPV 03/02/2023 8.9 (L)     Immature Granulocytes 03/02/2023 0.4     Gran # (ANC) 03/02/2023 5.2     Immature Grans (Abs) 03/02/2023 0.03     Lymph # 03/02/2023 1.3     Mono # 03/02/2023 0.7     Eos # 03/02/2023 0.3     Baso # 03/02/2023 0.04     nRBC 03/02/2023 0     Gran % 03/02/2023 69.5     Lymph % 03/02/2023 17.3 (L)     Mono % 03/02/2023 9.0     Eosinophil % 03/02/2023 3.3     Basophil % 03/02/2023 0.5     Differential Method 03/02/2023 Automated     Sodium 03/03/2023 138     Potassium 03/03/2023 4.1     Chloride 03/03/2023 102     CO2 03/03/2023 28     Glucose 03/03/2023 76     BUN 03/03/2023 16     Creatinine 03/03/2023 0.8     Calcium 03/03/2023 9.2     Anion Gap 03/03/2023 8     eGFR 03/03/2023 >60     Magnesium 03/03/2023 1.8     WBC 03/03/2023 7.43     RBC 03/03/2023 4.20     Hemoglobin 03/03/2023 12.4     Hematocrit 03/03/2023 39.6     MCV 03/03/2023 94     MCH 03/03/2023 29.5     MCHC 03/03/2023 31.3 (L)     RDW 03/03/2023 12.2     Platelets 03/03/2023 410     MPV 03/03/2023 8.6 (L)     Immature Granulocytes 03/03/2023 0.4     Gran # (ANC) 03/03/2023 5.3     Immature Grans (Abs) 03/03/2023 0.03     Lymph # 03/03/2023 1.2     Mono # 03/03/2023 0.5     Eos # 03/03/2023 0.3     Baso # 03/03/2023 0.04     nRBC 03/03/2023 0     Gran % 03/03/2023 71.9     Lymph % 03/03/2023 16.7 (L)     Mono % 03/03/2023 7.1     Eosinophil % 03/03/2023 3.4     Basophil % 03/03/2023 0.5     Differential Method 03/03/2023 Automated     Sodium 03/04/2023 138     Potassium 03/04/2023 3.8     Chloride 03/04/2023 104     CO2 03/04/2023 29      Glucose 03/04/2023 103     BUN 03/04/2023 15     Creatinine 03/04/2023 0.8     Calcium 03/04/2023 8.7     Anion Gap 03/04/2023 5 (L)     eGFR 03/04/2023 >60     Magnesium 03/04/2023 1.9     WBC 03/04/2023 5.70     RBC 03/04/2023 3.83 (L)     Hemoglobin 03/04/2023 11.3 (L)     Hematocrit 03/04/2023 35.4 (L)     MCV 03/04/2023 92     MCH 03/04/2023 29.5     MCHC 03/04/2023 31.9 (L)     RDW 03/04/2023 12.1     Platelets 03/04/2023 385     MPV 03/04/2023 8.7 (L)     Immature Granulocytes 03/04/2023 0.4     Gran # (ANC) 03/04/2023 3.9     Immature Grans (Abs) 03/04/2023 0.02     Lymph # 03/04/2023 1.1     Mono # 03/04/2023 0.4     Eos # 03/04/2023 0.3     Baso # 03/04/2023 0.04     nRBC 03/04/2023 0     Gran % 03/04/2023 68.0     Lymph % 03/04/2023 19.1     Mono % 03/04/2023 7.2     Eosinophil % 03/04/2023 4.6     Basophil % 03/04/2023 0.7     Differential Method 03/04/2023 Automated    I personally reviewed all recent labs, imaging and pathology.    Assessment and Plan:   Ms. Matias is a domo 63yo woman with hx of Aflutter s/p ablation, COPD, RA and recently diagnosed Stage IA HR+ breast cancer s/p bilateral mastectomy with reconstruction who presents today for evaluation.     Today we discussed her recent diagnosis and the natural history of early stage, HR+ breast cancer. I reviewed that the standard of care for clinically low risk ER+ disease is genomic assay to further evaluate recurrence risk and potential benefit of chemotherapy. OncotypeDX is a genomic assay of 21 genes unique to the tumor, assigning a score based on expression of high- or low-risk genes. The test reveals prognostic and predictive results based on NSABP B-14, NSABP B-20, TAILORx and RxPONDER clinical trials. These trials demonstrated that patients with low recurrence score do not benefit from the addition of chemotherapy to adjuvant endocrine therapy. Those patient with a high recurrence score do benefit from the addition of chemotherapy to  adjuvant endocrine therapy.    Given her Oncotype of 35, we discussed the potential benefit of adjuvant chemotherapy over proceeding with endocrine therapy alone. Per RSClin tool, her estimated risk of distant recurrence at 10 years is 13% with endocrine therapy alone, with absolutely chemotherapy benefit of 8%. I discussed the recommendation for adjuvant docetaxel 75mg/m2 and cyclophosphamide 600mg/m2 iv every 21 days for a total of 4 cycles.     Overall side effects of chemotherapy were discussed including alopecia, neutropenia leading top increased risk of infection, anemia, thrombocytopenia, nausea, vomiting and neuropathy. Other side effects such as fatigue, nail changes, mouth sores, and bone pain were also reviewed. The need for growth factor support was also discussed. Neulasta related bone pain was discussed. Rare but serious side effects such as increased risk of secondary leukemia with cyclophosphamide. She was in agreement with the above plan.       #Breast cancer:  --pt planning to undergo breast revision next week; will want to see her back in clinic prior to chemo start to ensure good wound healing  --amb refer to chemo school  --following completion of chemotherapy will plan to start ET with anastrozole  --of note, osteopenia noted on DEXA 12/2018- pt on Ca/VitD. Will plan to discuss role of bisphosphonate at follow up      All questions were answered to her apparent satisfaction. Will plan to see her back in 3 weeks or sooner should the need arise.     Roya Madrid MD      Med Onc Chart Routing  Urgent    Follow up with physician 4 weeks. RTC in 4 weeks for chemo start; also needs chemo education   Follow up with [facility]    Infusion scheduling note please schedule out infusions, q3 weeks x4   Injection scheduling note will need day 2 injection with each infusion   Labs CBC and CMP   Scheduling:  Preferred lab:  Lab interval: every 3 weeks     Imaging    Pharmacy appointment    Other referrals

## 2023-03-23 RX ORDER — DEXAMETHASONE 4 MG/1
8 TABLET ORAL EVERY 12 HOURS
Qty: 36 TABLET | Refills: 0 | Status: SHIPPED | OUTPATIENT
Start: 2023-03-23 | End: 2023-04-07

## 2023-03-23 RX ORDER — ONDANSETRON HYDROCHLORIDE 8 MG/1
8 TABLET, FILM COATED ORAL EVERY 12 HOURS PRN
Qty: 30 TABLET | Refills: 2 | Status: SHIPPED | OUTPATIENT
Start: 2023-03-23 | End: 2024-03-22

## 2023-03-24 ENCOUNTER — PATIENT MESSAGE (OUTPATIENT)
Dept: PLASTIC SURGERY | Facility: CLINIC | Age: 63
End: 2023-03-24
Payer: COMMERCIAL

## 2023-03-24 ENCOUNTER — ANESTHESIA EVENT (OUTPATIENT)
Dept: SURGERY | Facility: OTHER | Age: 63
End: 2023-03-24
Payer: COMMERCIAL

## 2023-03-24 DIAGNOSIS — C50.919 BREAST CA: ICD-10-CM

## 2023-03-24 DIAGNOSIS — C50.912 MALIGNANT NEOPLASM OF LEFT FEMALE BREAST, UNSPECIFIED ESTROGEN RECEPTOR STATUS, UNSPECIFIED SITE OF BREAST: Primary | ICD-10-CM

## 2023-03-24 RX ORDER — MUPIROCIN 20 MG/G
OINTMENT TOPICAL
Status: CANCELLED | OUTPATIENT
Start: 2023-03-24

## 2023-03-24 RX ORDER — SODIUM CHLORIDE 9 MG/ML
INJECTION, SOLUTION INTRAVENOUS CONTINUOUS
Status: CANCELLED | OUTPATIENT
Start: 2023-03-24

## 2023-03-24 NOTE — PRE-PROCEDURE INSTRUCTIONS
Pre admit phone call completed.    Instructions given to patient about NPO status as follows:     The evening before surgery do not eat anything after 9 p.m. ( this includes hard candy, chewing gum and mints).  You may only have GATORADE, POWERADE AND WATER from 9 p.m. until you leave your home. DO NOT  DRINK ANY LIQUIDS ON THE WAY TO THE HOSPITAL.      Patient was also instructed on the below information:    Park in the Parking lot behind the hospital or in the [facility] across the street from the parking lot.  Parking is complimentary.  If you will be discharged the same day as your procedure, please arrange for a responsible adult to drive you home or  to accompany you if traveling by taxi.  YOU WILL NOT BE PERMITTED TO DRIVE OR TO LEAVE THE HOSPITAL ALONE AFTER SURGERY.  It is strongly recommended that you arrange for someone to remain with you for the first 24 hrs following your surgery.    Patient verbalized understanding of above instructions.

## 2023-03-28 ENCOUNTER — PATIENT MESSAGE (OUTPATIENT)
Dept: HEMATOLOGY/ONCOLOGY | Facility: CLINIC | Age: 63
End: 2023-03-28
Payer: COMMERCIAL

## 2023-03-28 ENCOUNTER — OFFICE VISIT (OUTPATIENT)
Dept: CARDIOLOGY | Facility: CLINIC | Age: 63
End: 2023-03-28
Payer: COMMERCIAL

## 2023-03-28 VITALS
HEIGHT: 62 IN | BODY MASS INDEX: 32.55 KG/M2 | DIASTOLIC BLOOD PRESSURE: 68 MMHG | HEART RATE: 92 BPM | WEIGHT: 176.88 LBS | OXYGEN SATURATION: 95 % | SYSTOLIC BLOOD PRESSURE: 106 MMHG

## 2023-03-28 DIAGNOSIS — I31.39 PERICARDIAL EFFUSION: ICD-10-CM

## 2023-03-28 DIAGNOSIS — I10 ESSENTIAL HYPERTENSION: Chronic | ICD-10-CM

## 2023-03-28 DIAGNOSIS — I48.92 ATRIAL FLUTTER, UNSPECIFIED TYPE: Primary | ICD-10-CM

## 2023-03-28 DIAGNOSIS — E78.2 MIXED HYPERLIPIDEMIA: Chronic | ICD-10-CM

## 2023-03-28 DIAGNOSIS — E66.09 CLASS 1 OBESITY DUE TO EXCESS CALORIES WITHOUT SERIOUS COMORBIDITY WITH BODY MASS INDEX (BMI) OF 33.0 TO 33.9 IN ADULT: Chronic | ICD-10-CM

## 2023-03-28 PROCEDURE — 3078F DIAST BP <80 MM HG: CPT | Mod: CPTII,S$GLB,, | Performed by: INTERNAL MEDICINE

## 2023-03-28 PROCEDURE — 1159F MED LIST DOCD IN RCRD: CPT | Mod: CPTII,S$GLB,, | Performed by: INTERNAL MEDICINE

## 2023-03-28 PROCEDURE — 3008F PR BODY MASS INDEX (BMI) DOCUMENTED: ICD-10-PCS | Mod: CPTII,S$GLB,, | Performed by: INTERNAL MEDICINE

## 2023-03-28 PROCEDURE — 3008F BODY MASS INDEX DOCD: CPT | Mod: CPTII,S$GLB,, | Performed by: INTERNAL MEDICINE

## 2023-03-28 PROCEDURE — 3078F PR MOST RECENT DIASTOLIC BLOOD PRESSURE < 80 MM HG: ICD-10-PCS | Mod: CPTII,S$GLB,, | Performed by: INTERNAL MEDICINE

## 2023-03-28 PROCEDURE — 1111F PR DISCHARGE MEDS RECONCILED W/ CURRENT OUTPATIENT MED LIST: ICD-10-PCS | Mod: CPTII,S$GLB,, | Performed by: INTERNAL MEDICINE

## 2023-03-28 PROCEDURE — 99999 PR PBB SHADOW E&M-EST. PATIENT-LVL IV: ICD-10-PCS | Mod: PBBFAC,,, | Performed by: INTERNAL MEDICINE

## 2023-03-28 PROCEDURE — 99214 PR OFFICE/OUTPT VISIT, EST, LEVL IV, 30-39 MIN: ICD-10-PCS | Mod: S$GLB,,, | Performed by: INTERNAL MEDICINE

## 2023-03-28 PROCEDURE — 3074F SYST BP LT 130 MM HG: CPT | Mod: CPTII,S$GLB,, | Performed by: INTERNAL MEDICINE

## 2023-03-28 PROCEDURE — 99999 PR PBB SHADOW E&M-EST. PATIENT-LVL IV: CPT | Mod: PBBFAC,,, | Performed by: INTERNAL MEDICINE

## 2023-03-28 PROCEDURE — 1159F PR MEDICATION LIST DOCUMENTED IN MEDICAL RECORD: ICD-10-PCS | Mod: CPTII,S$GLB,, | Performed by: INTERNAL MEDICINE

## 2023-03-28 PROCEDURE — 3074F PR MOST RECENT SYSTOLIC BLOOD PRESSURE < 130 MM HG: ICD-10-PCS | Mod: CPTII,S$GLB,, | Performed by: INTERNAL MEDICINE

## 2023-03-28 PROCEDURE — 1111F DSCHRG MED/CURRENT MED MERGE: CPT | Mod: CPTII,S$GLB,, | Performed by: INTERNAL MEDICINE

## 2023-03-28 PROCEDURE — 99214 OFFICE O/P EST MOD 30 MIN: CPT | Mod: S$GLB,,, | Performed by: INTERNAL MEDICINE

## 2023-03-28 NOTE — PROGRESS NOTES
Oncology Clinic   Progress Note    Patient: Lucia Matias  MRN: [id number]  Date: 3/28/2023    Chief Complaint: HR+ breast cancer    Ms. Matias is a domo 61yo woman with recently diagnosed HR+ breast cancer who presents today for evaluation. Her oncologic history is as follows:    Per Dr. Madrid's previous note: Oncologic History:   22: annual mammogram indentified left focal asymmetry at the upper outer position.   Follow-up mammogram and ultrasound on 22 showed a worrisome spiculated mass, 1.4 x 0.7 x 0.6 cm, 12 o'clock left breast 7 CMFN. An ultrasound guided biopsy was performed on 12/15/22 with pathology revealing infiltrating ductal carcinoma of the breast. Grade 2, ER >95%, FL 85-90%, Her2 1+, Ki67 25-30%  2023: MRI breast shwoed Left breast 12 mm x 11 mm x 7 mm mass at the middle 12 o'clock position. Several pulmonary nodules are noted incidentally in the left hemithorax.  The patient has a history of bilateral pulmonary nodule seen on previous thoracic CT exams most recently in .  One of the nodules underwent biopsy in  with benign results.    Underwent b/l mastectomies with SLN bx 2/15/2023 with bilateral breast reconstruction with abdominally based free flaps. He postoperative course was complicated by L pneumothorax.  Post op path showed Invasive lobular carcinoma in left breast grade 2 measuring 18 mm with LCIS Grade 2 ER FL positive and Her2 negative. pT1c pN0. Oncotype 35    GYN History:  Age of menarche was 13. Age of menopause was 46.  Patient denies hormonal therapy but took OCP for approximately 15 years in the past. Patient is . Age of first live birth was 25. Patient did breast feed for 6 months. S/p uterine ablation for fibroids in early 40s, no menstrual cycle since. Had menopausal symptoms in mid-late 40s.     Other Medical hx:   HTN HL RA sleeve gastrectomy COPD  AFL s/p RFA    Family hx:  Patient is adopted, no FH that she is aware  of    Interval History:  Ms. Matias returns today for follow up. She is doing well. Recovered from surgery, which was complicated. She has had a long 2 years with  having non-Hodgkin's lymphoma and Theresa. She presents today for chemo education. She will be receiving TC x 4 cycles with growth factor support.       Medications:  Current Outpatient Medications   Medication Sig Dispense Refill    acetaminophen (TYLENOL ORAL) Take by mouth as needed.      apixaban (ELIQUIS) 5 mg Tab Take 1 tablet (5 mg total) by mouth 2 (two) times daily. Will hold 2/13 & 2/14 (Patient not taking: Reported on 3/28/2023) 60 tablet 5    atorvastatin (LIPITOR) 20 MG tablet Take 1 tablet (20 mg total) by mouth every evening. 90 tablet 3    B-complex with vitamin C (VITAMIN B COMPLEX-C ORAL) Take by mouth Daily.      calcium-vitamin D 250-100 mg-unit per tablet Take 1 tablet by mouth 2 (two) times daily.      COSENTYX PEN, 2 PENS, 150 mg/mL PnIj Inject 300mg (2 pens) into the skin every 4 weeks 2 mL 11    cyanocobalamin 500 MCG tablet Take 500 mcg by mouth once daily.      dexAMETHasone (DECADRON) 4 MG Tab Take 2 tablets (8 mg total) by mouth every 12 (twelve) hours. for 9 days (Patient not taking: Reported on 3/28/2023) 36 tablet 0    diazePAM (VALIUM) 5 MG tablet Take 1 tablet (5 mg total) by mouth daily as needed for Anxiety. 30 tablet 2    fluticasone-umeclidin-vilanter (TRELEGY ELLIPTA) 100-62.5-25 mcg DsDv Inhale 1 puff into the lungs once daily. 180 each 3    multivitamin (THERAGRAN) per tablet Take 1 tablet by mouth once daily.      multivitamin with minerals (HAIR,SKIN AND NAILS ORAL) Take by mouth.      mv-mn/iron/folic acid/herb 190 (VITAMIN D3 COMPLETE ORAL) Take by mouth.      omeprazole (PRILOSEC) 40 MG capsule Take 1 capsule (40 mg total) by mouth every morning. 90 capsule 1    ondansetron (ZOFRAN) 8 MG tablet Take 1 tablet (8 mg total) by mouth every 12 (twelve) hours as needed for Nausea. (Patient not taking: Reported on  "3/28/2023) 30 tablet 2    oxyCODONE-acetaminophen (PERCOCET)  mg per tablet Take 1 tablet by mouth every 4 (four) hours as needed for Pain. (Patient not taking: Reported on 3/15/2023) 18 tablet 0    senna-docusate 8.6-50 mg (PERICOLACE) 8.6-50 mg per tablet Take 1 tablet by mouth daily as needed for Constipation. (Patient not taking: Reported on 3/7/2023) 15 tablet 0    traZODone (DESYREL) 100 MG tablet Take 1 tablet (100 mg total) by mouth nightly as needed for Insomnia. 90 tablet 3    venlafaxine (EFFEXOR-XR) 75 MG 24 hr capsule Take 1 capsule (75 mg total) by mouth once daily. Start on Week 2 90 capsule 3     No current facility-administered medications for this visit.     Review of Systems:  All systems reviewed.         Objective:     Vitals:    04/03/23 1357   BP: 134/62   Pulse: 73   Resp: 18   Temp: 98.1 °F (36.7 °C)   TempSrc: Oral   SpO2: 96%   Weight: 81.3 kg (179 lb 3.7 oz)   Height: 5' 2" (1.575 m)         BMI: Body mass index is 32.78 kg/m².     Physical Exam:  ECOG 0   General: well appearing, in no apparent distress  HEENT: Normocephalic, EOMI, anicteric sclerae, MMM  Neck: supple, without cervical or supraclavicular lymphadenopathy.  Heart: regular rate and rhythm, normal S1 and S2, no murmurs, gallops or rubs.  Lungs: Clear to auscultation bilaterally, no increased wob  Breast: s/p bilateral mastectomy with flap reconstruction, area of wound dehiscence over inferior L nipple with moderate serosanguinous drainage. Rt breast incision healing improved. No appreciable axillary LAD   Abdomen: Soft, nontender, nondistended with normal bowel sounds. Abdominal incision c/d/I. No hepatosplenomegaly.  Extremities: No LE edema or joint effusion  Skin: warm, well-perfused, no rash  Neurologic: Alert and oriented x 4, normal speech and gait   Psychiatric: Conversing appropriately with providers throughout today's encounter.      Assessment and Plan:   Ms. Matias is a domo 61yo woman with hx of Aflutter " s/p ablation, COPD, RA and recently diagnosed Stage IA HR+ breast cancer s/p bilateral mastectomy with reconstruction who presents today for evaluation.     Today we discussed her recent diagnosis and the natural history of early stage, HR+ breast cancer. I reviewed that the standard of care for clinically low risk ER+ disease is genomic assay to further evaluate recurrence risk and potential benefit of chemotherapy. OncotypeDX is a genomic assay of 21 genes unique to the tumor, assigning a score based on expression of high- or low-risk genes. The test reveals prognostic and predictive results based on NSABP B-14, NSABP B-20, TAILORx and RxPONDER clinical trials. These trials demonstrated that patients with low recurrence score do not benefit from the addition of chemotherapy to adjuvant endocrine therapy. Those patient with a high recurrence score do benefit from the addition of chemotherapy to adjuvant endocrine therapy.    Given her Oncotype of 35, we discussed the potential benefit of adjuvant chemotherapy over proceeding with endocrine therapy alone. Per RSClin tool, her estimated risk of distant recurrence at 10 years is 13% with endocrine therapy alone, with absolutely chemotherapy benefit of 8%. I discussed the recommendation for adjuvant docetaxel 75mg/m2 and cyclophosphamide 600mg/m2 iv every 21 days for a total of 4 cycles.     #Breast cancer:  --declined a port  --chemo school completed   --following completion of chemotherapy will plan to start ET with anastrozole  --of note, osteopenia noted on DEXA 12/2018- pt on Ca/VitD. Will plan to discuss role of bisphosphonate at follow up      All questions were answered to her apparent satisfaction. Will plan to see her back in 3 weeks or sooner should the need arise.     Patient is in agreement with the proposed treatment plan. All questions were answered to the patient's satisfaction. Patient knows to call clinic for any new or worsening symptoms and if  anything is needed before the next clinic visit.          Helena Bailey, FNP-C  Hematology & Medical Oncology   [address] [place] [address]  ph. [phone]  Fax. [phone]    Collaborating physician, Dr. Madrid.    Approximately 45 minutes were spent face-to-face with the patient.  Approximately 60 minutes in total were spent on this encounter, which includes face-to-face time and non-face-to-face time preparing to see the patient (e.g., review of tests), obtaining and/or reviewing separately obtained history, documenting clinical information in the electronic or other health record, independently interpreting results (not separately reported) and communicating results to the patient/family/caregiver, or care coordination (not separately reported).     Lucia Matias was consented for chemotherapy/immunotherapy to treat breast cancer. Lucia Matias was consented to receive taxotere and cytoxan.   The consent was discussed and reviewed with patient.     2. Patient was education on what to expect when receiving chemotherapy including: checking in, receiving an ID band, 1 guest allowed in the infusion suite during infusion, can alternate people as well, pole going with you once you are hooked up, warm blankets are available, you may bring lunch and snacks, minimal snacks are available, take medications as regularly unless told otherwise, warm blankets, will be available. Education about what to expect during their chemo cycle and how often their regimen is given.     3. An extensive discussion was had which included a thorough discussion of the risk and benefits of treatment and alternatives.  Risks, including but not limited to, possible hair loss, bone marrow damage (anemia, thrombocytopenia, immune suppression, neutropenia), damage to body organs (brain, heart, liver, kidney, lungs, nervous system, skin, and others), allergic reactions, sterility, nausea/vomiting,  constipation/diarrhea, sores in the mouth, secondary cancers, local damage at possible injection sites, and rarely death were all discussed. Specific side effects pertaining to their chemotherapy/immunotherapy medications were discussed as well.The patient agrees with the plan, and all questions and their support system's questions have been answered to their satisfaction. Contraindications and potential side effects discussed as listed in micromedx.     4. Patient was given binder which includes: contact information for the [facility], an immunotherapy side effect guide (if applicable to patient), resources including, but not limited to: women's wellness, acupuncture, physical therapy, , urgent care within oncology and financial assistance.     5. Patient was educated on when to call (and given the numbers to call and knows to message via [facility] if possible) or notify the provider including, but not limited to:   Persistent Nausea and/or Vomiting  Dehydration  Persistent Diarrhea  Fever of 100.4 > 1 hour in duration or any isolated fever > 101   Rash (while on active chemotherapy or immunotherapy)   Severe pain or new onset pain not controlled by current medication regimen  Or any other symptom you feel is related to your current hematology or oncology treatment    6. Patient was provided with additional resources that [facility] offers including, but not limited to: financial counseling, , psychologist, palliative care, support groups, transportation, dietician, rehab services, women's wellness and urgent care visits within the oncology department.     7. Patient was offered and signed up for chemo care companion. Educated on daily vital signs and daily questionnaire.     8. Patient has an established PCP, Dr. Hetal Banks.      9. Patient was offered a virtual visit or a phone call 1 week post their Cycle 1 Day 1 chemotherapy and agreed or declined.         Patient will Proceed  with cycle 1 day 1 of 4/19/23. Patient will be seen ~1 week for a post chemo visit with LINDY.       Med Onc Chart Routing      Follow up with physician    Follow up with LINDY . Please schedule a virtual with me on 4/25 or 4/27   Infusion scheduling note    Injection scheduling note    Labs    Imaging    Pharmacy appointment    Other referrals                                       Complex Repair And Flap Additional Text (Will Appearing After The Standard Complex Repair Text): The complex repair was not sufficient to completely close the primary defect. The remaining additional defect was repaired with the flap mentioned below.

## 2023-03-28 NOTE — PROGRESS NOTES
"        Cardiology    3/28/2023  11:17 AM    Problem list  Patient Active Problem List   Diagnosis    Diverticular disease of colon    Psoriasis vulgaris    COPD (chronic obstructive pulmonary disease)    HLD (hyperlipidemia)    Depression with anxiety    Insomnia    Other long term (current) drug therapy    History of tobacco abuse    Essential hypertension    Multiple lung nodules on CT    Class 1 obesity due to excess calories without serious comorbidity with body mass index (BMI) of 33.0 to 33.9 in adult    History of sleeve gastrectomy    Wears contact lenses    Rheumatoid arthritis    Atrial flutter    Psoriatic arthritis    Malignant neoplasm of central portion of left breast in female, estrogen receptor positive    Pneumothorax on left    Severe obesity (BMI >= 40)       CC:  F/u    HPI:  She is here for follow-up.  At the last visit echocardiogram in January was performed (pre chemotherapy) which showed normal ejection fraction of 70%.  In February, she underwent double mastectomy.  She was discharged home.  She started having shortness of breath with sats 89% and went back to the hospital and found to have spontaneous pneumothorax which required a chest tube.  CT scan performed during the workup for spontaneous pneumothorax showed "small to moderate" pericardial effusion.  She is scheduled to have left breast revision tomorrow.  She denies any angina  or KRUSE.    Medications  Current Outpatient Medications   Medication Sig Dispense Refill    COSENTYX PEN, 2 PENS, 150 mg/mL PnIj Inject 300mg (2 pens) into the skin every 4 weeks 2 mL 11    diazePAM (VALIUM) 5 MG tablet Take 1 tablet (5 mg total) by mouth daily as needed for Anxiety. 30 tablet 2    fluticasone-umeclidin-vilanter (TRELEGY ELLIPTA) 100-62.5-25 mcg DsDv Inhale 1 puff into the lungs once daily. 180 each 3    omeprazole (PRILOSEC) 40 MG capsule Take 1 capsule (40 mg total) by mouth every morning. 90 capsule 1    traZODone (DESYREL) 100 MG tablet " Take 1 tablet (100 mg total) by mouth nightly as needed for Insomnia. 90 tablet 3    venlafaxine (EFFEXOR-XR) 75 MG 24 hr capsule Take 1 capsule (75 mg total) by mouth once daily. Start on Week 2 90 capsule 3    acetaminophen (TYLENOL ORAL) Take by mouth as needed.      apixaban (ELIQUIS) 5 mg Tab Take 1 tablet (5 mg total) by mouth 2 (two) times daily. Will hold 2/13 & 2/14 (Patient not taking: Reported on 3/28/2023) 60 tablet 5    atorvastatin (LIPITOR) 20 MG tablet Take 1 tablet (20 mg total) by mouth every evening. 90 tablet 3    B-complex with vitamin C (VITAMIN B COMPLEX-C ORAL) Take by mouth Daily.      calcium-vitamin D 250-100 mg-unit per tablet Take 1 tablet by mouth 2 (two) times daily.      cyanocobalamin 500 MCG tablet Take 500 mcg by mouth once daily.      dexAMETHasone (DECADRON) 4 MG Tab Take 2 tablets (8 mg total) by mouth every 12 (twelve) hours. for 9 days (Patient not taking: Reported on 3/28/2023) 36 tablet 0    multivitamin (THERAGRAN) per tablet Take 1 tablet by mouth once daily.      multivitamin with minerals (HAIR,SKIN AND NAILS ORAL) Take by mouth.      mv-mn/iron/folic acid/herb 190 (VITAMIN D3 COMPLETE ORAL) Take by mouth.      ondansetron (ZOFRAN) 8 MG tablet Take 1 tablet (8 mg total) by mouth every 12 (twelve) hours as needed for Nausea. (Patient not taking: Reported on 3/28/2023) 30 tablet 2    oxyCODONE-acetaminophen (PERCOCET)  mg per tablet Take 1 tablet by mouth every 4 (four) hours as needed for Pain. (Patient not taking: Reported on 3/15/2023) 18 tablet 0    senna-docusate 8.6-50 mg (PERICOLACE) 8.6-50 mg per tablet Take 1 tablet by mouth daily as needed for Constipation. (Patient not taking: Reported on 3/7/2023) 15 tablet 0     No current facility-administered medications for this visit.      Prior to Admission medications    Medication Sig Start Date End Date Taking? Authorizing Provider   COSENTYX PEN, 2 PENS, 150 mg/mL PnIj Inject 300mg (2 pens) into the skin every 4  weeks 5/19/22  Yes Minerva Lara MD   diazePAM (VALIUM) 5 MG tablet Take 1 tablet (5 mg total) by mouth daily as needed for Anxiety. 2/13/23  Yes Jose Morales III, NP   fluticasone-umeclidin-vilanter (TRELEGY ELLIPTA) 100-62.5-25 mcg DsDv Inhale 1 puff into the lungs once daily. 2/6/23  Yes Hetal Banks MD   omeprazole (PRILOSEC) 40 MG capsule Take 1 capsule (40 mg total) by mouth every morning. 3/22/23  Yes Elisabet Rascon NP   traZODone (DESYREL) 100 MG tablet Take 1 tablet (100 mg total) by mouth nightly as needed for Insomnia. 2/13/23  Yes Jose Morales III, NP   venlafaxine (EFFEXOR-XR) 75 MG 24 hr capsule Take 1 capsule (75 mg total) by mouth once daily. Start on Week 2 2/13/23  Yes Jose Morales III, NP   acetaminophen (TYLENOL ORAL) Take by mouth as needed.    Historical Provider   apixaban (ELIQUIS) 5 mg Tab Take 1 tablet (5 mg total) by mouth 2 (two) times daily. Will hold 2/13 & 2/14  Patient not taking: Reported on 3/28/2023 2/17/23 8/16/23  Fahad Coles MD   atorvastatin (LIPITOR) 20 MG tablet Take 1 tablet (20 mg total) by mouth every evening. 10/17/22   Hetal Banks MD   B-complex with vitamin C (VITAMIN B COMPLEX-C ORAL) Take by mouth Daily.    Historical Provider   calcium-vitamin D 250-100 mg-unit per tablet Take 1 tablet by mouth 2 (two) times daily.    Historical Provider   cyanocobalamin 500 MCG tablet Take 500 mcg by mouth once daily.    Historical Provider   dexAMETHasone (DECADRON) 4 MG Tab Take 2 tablets (8 mg total) by mouth every 12 (twelve) hours. for 9 days  Patient not taking: Reported on 3/28/2023 3/23/23 4/1/23  Roya Madrid MD   multivitamin (THERAGRAN) per tablet Take 1 tablet by mouth once daily.    Historical Provider   multivitamin with minerals (HAIR,SKIN AND NAILS ORAL) Take by mouth.    Historical Provider   mv-mn/iron/folic acid/herb 190 (VITAMIN D3 COMPLETE ORAL) Take by mouth.    Historical Provider   ondansetron (ZOFRAN) 8 MG tablet Take 1  tablet (8 mg total) by mouth every 12 (twelve) hours as needed for Nausea.  Patient not taking: Reported on 3/28/2023 3/23/23 3/22/24  Roya Madrid MD   oxyCODONE-acetaminophen (PERCOCET)  mg per tablet Take 1 tablet by mouth every 4 (four) hours as needed for Pain.  Patient not taking: Reported on 3/15/2023 3/3/23   ABDOUL Granados MD   senna-docusate 8.6-50 mg (PERICOLACE) 8.6-50 mg per tablet Take 1 tablet by mouth daily as needed for Constipation.  Patient not taking: Reported on 3/7/2023 2/17/23   Fahad Coles MD   budesonide-formoterol 160-4.5 mcg (SYMBICORT) 160-4.5 mcg/actuation HFAA Inhale 2 puffs into the lungs every 12 (twelve) hours. Controller 19  Saige Bee MD         History  Past Medical History:   Diagnosis Date    Allergy     Anxiety     Arthritis     Atrial flutter     Colon polyps     COPD (chronic obstructive pulmonary disease)     COPD exacerbation 10/31/2022    Depression     Diverticular disease of colon 2017    Diverticulitis     HLD (hyperlipidemia)     Malignant neoplasm of central portion of left breast in female, estrogen receptor positive 2022    Pancreatitis     Psoriasis     Rheumatoid arthritis     Severe obesity (BMI 35.0-39.9) with comorbidity      Past Surgical History:   Procedure Laterality Date    ABLATION  2022    Cardiac Ablation for A Flutter, Successful    ADENOIDECTOMY  1966    Tonsillitis and adnoids    BILATERAL MASTECTOMY Bilateral 2/15/2023    Procedure: MASTECTOMY, BILATERAL;  Surgeon: Marcela Meehan MD;  Location: [facility];  Service: General;  Laterality: Bilateral;  EMAIL SENT  @ 11:44 LK / 2.5 HOURS    BLADDER SUSPENSION      (x2)     SECTION      (x2)    ESOPHAGOGASTRODUODENOSCOPY N/A 2021    Procedure: EGD (ESOPHAGOGASTRODUODENOSCOPY);  Surgeon: Vince Rodriguez MD;  Location: [address];  Service: General;  Laterality: N/A;    INJECTION FOR SENTINEL NODE IDENTIFICATION Left  2/15/2023    Procedure: INJECTION, FOR SENTINEL NODE IDENTIFICATION;  Surgeon: Marcela Meehan MD;  Location: [facility] OR;  Service: General;  Laterality: Left;    LAPAROSCOPIC SLEEVE GASTRECTOMY N/A 2021    Procedure: GASTRECTOMY, SLEEVE, LAPAROSCOPIC, with intraop EGD;  Surgeon: Vince Rodriguez MD;  Location: [facility] OR [facility];  Service: General;  Laterality: N/A;    LUNG BIOPSY  2020    RECONSTRUCTION OF BREAST WITH DEEP INFERIOR EPIGASTRIC ARTERY  (NEHA) FREE FLAP Bilateral 2/15/2023    Procedure: RECONSTRUCTION, BREAST, USING NEHA FREE FLAP;  Surgeon: Ming Milian MD;  Location: [facility] OR;  Service: Plastics;  Laterality: Bilateral;    RHINOPLASTY      SENTINEL LYMPH NODE BIOPSY Left 2/15/2023    Procedure: BIOPSY, LYMPH NODE, SENTINEL;  Surgeon: Marcela Meehan MD;  Location: [facility] OR;  Service: General;  Laterality: Left;    TONSILLECTOMY      TRANSESOPHAGEAL ECHOCARDIOGRAPHY N/A 2022    Procedure: ECHOCARDIOGRAM, TRANSESOPHAGEAL;  Surgeon: Kellie Grover MD;  Location: [facility] EP LAB;  Service: Cardiology;  Laterality: N/A;     Social History     Socioeconomic History    Marital status:    Occupational History    Occupation: clinical research coordinator    Tobacco Use    Smoking status: Former     Packs/day: 0.00     Years: 20.00     Pack years: 0.00     Types: Cigarettes     Start date: 1979     Quit date: 2020     Years since quittin.3    Smokeless tobacco: Current   Substance and Sexual Activity    Alcohol use: Yes     Alcohol/week: 1.0 standard drink     Types: 1 Glasses of wine per week     Comment: social    Drug use: No    Sexual activity: Yes     Partners: Male     Birth control/protection: Post-menopausal   Other Topics Concern    Are you pregnant or think you may be? No    Breast-feeding No     Social Determinants of Health     Financial Resource Strain: Low Risk     Difficulty of Paying Living Expenses: Not very hard   Food Insecurity: No Food  Insecurity    Worried About Running Out of Food in the Last Year: Never true    Ran Out of Food in the Last Year: Never true   Transportation Needs: No Transportation Needs    Lack of Transportation (Medical): No    Lack of Transportation (Non-Medical): No   Physical Activity: Unknown    Days of Exercise per Week: Patient refused    Minutes of Exercise per Session: 20 min   Stress: Stress Concern Present    Feeling of Stress : To some extent   Social Connections: Unknown    Frequency of Communication with Friends and Family: More than three times a week    Frequency of Social Gatherings with Friends and Family: Once a week    Active Member of Clubs or Organizations: Yes    Attends Club or Organization Meetings: Patient refused    Marital Status:    Housing Stability: Low Risk     Unable to Pay for Housing in the Last Year: No    Number of Places Lived in the Last Year: 1    Unstable Housing in the Last Year: No         Allergies  Review of patient's allergies indicates:   Allergen Reactions    Succinimides Anaphylaxis     Son has          Review of Systems   Review of Systems   Constitutional: Negative for decreased appetite, fever and weight loss.   HENT:  Negative for congestion and nosebleeds.    Eyes:  Negative for double vision, vision loss in left eye, vision loss in right eye and visual disturbance.   Cardiovascular:  Negative for chest pain, claudication, cyanosis, dyspnea on exertion, irregular heartbeat, leg swelling, near-syncope, orthopnea, palpitations, paroxysmal nocturnal dyspnea and syncope.   Respiratory:  Negative for cough, hemoptysis, shortness of breath, sleep disturbances due to breathing, snoring, sputum production and wheezing.    Endocrine: Negative for cold intolerance and heat intolerance.   Skin:  Negative for nail changes and rash.   Musculoskeletal:  Negative for joint pain, muscle cramps, muscle weakness and myalgias.   Gastrointestinal:  Negative for change in bowel habit,  heartburn, hematemesis, hematochezia, hemorrhoids and melena.   Neurological:  Negative for dizziness, focal weakness and headaches.       Physical Exam  Wt Readings from Last 1 Encounters:   03/28/23 80.2 kg (176 lb 14.4 oz)     BP Readings from Last 3 Encounters:   03/28/23 106/68   03/22/23 (!) 123/58   03/22/23 (!) 111/57     Pulse Readings from Last 1 Encounters:   03/28/23 92     Body mass index is 32.36 kg/m².    Physical Exam  Constitutional:       Appearance: She is well-developed.   HENT:      Head: Atraumatic.   Eyes:      General: No scleral icterus.  Neck:      Vascular: Normal carotid pulses. No carotid bruit, hepatojugular reflux or JVD.   Cardiovascular:      Rate and Rhythm: Normal rate and regular rhythm.      Chest Wall: PMI is not displaced.      Pulses: Intact distal pulses.           Carotid pulses are 2+ on the right side and 2+ on the left side.       Radial pulses are 2+ on the right side and 2+ on the left side.        Dorsalis pedis pulses are 2+ on the right side and 2+ on the left side.      Heart sounds: Normal heart sounds, S1 normal and S2 normal. No murmur heard.    No friction rub.   Pulmonary:      Effort: Pulmonary effort is normal. No respiratory distress.      Breath sounds: Normal breath sounds. No stridor. No wheezing or rales.   Chest:      Chest wall: No tenderness.   Abdominal:      General: Bowel sounds are normal.      Palpations: Abdomen is soft.   Musculoskeletal:      Cervical back: Neck supple. No edema.   Skin:     General: Skin is warm and dry.      Nails: There is no clubbing.   Neurological:      Mental Status: She is alert and oriented to person, place, and time.   Psychiatric:         Behavior: Behavior normal.         Thought Content: Thought content normal.           Assessment  1. Atrial flutter, unspecified type  In sinus    2. Essential hypertension  Well controlled on meds    3. Mixed hyperlipidemia  stable    4. Class 1 obesity due to excess calories  "without serious comorbidity with body mass index (BMI) of 33.0 to 33.9 in adult  unchanged    5. Pericardial effusion  "Small to moderate" on CT scan  - Echo; Future        Plan and Discussion  Discussed that blood pressure is well controlled.  Discussed her CT of the lungs performed March 1st showed small to moderate pericardial effusion.  She is clinically stable without any evidence of hemodynamic compromise.  Will follow up with echocardiogram to evaluate her pericardial effusion.    Follow Up  1-2 months      Pool Dorado MD, F.A.C.C, F.S.C.A.I.        35 minutes were spent in chart review, documentation and review of results, and evaluation, treatment, and counseling of patient on the same day of service.    Disclaimer: This document was created using voice recognition software ([employer]). Although it may be edited, this document may contain errors related to incorrect recognition of the spoken word. Please call the physician if clarification is needed.     "

## 2023-03-29 ENCOUNTER — ANESTHESIA (OUTPATIENT)
Dept: SURGERY | Facility: OTHER | Age: 63
End: 2023-03-29
Payer: COMMERCIAL

## 2023-03-29 ENCOUNTER — HOSPITAL ENCOUNTER (OUTPATIENT)
Facility: OTHER | Age: 63
Discharge: HOME | End: 2023-03-29
Attending: PLASTIC SURGERY | Admitting: PLASTIC SURGERY
Payer: COMMERCIAL

## 2023-03-29 DIAGNOSIS — C50.919 BREAST CA: ICD-10-CM

## 2023-03-29 DIAGNOSIS — C50.912 MALIGNANT NEOPLASM OF LEFT FEMALE BREAST, UNSPECIFIED ESTROGEN RECEPTOR STATUS, UNSPECIFIED SITE OF BREAST: ICD-10-CM

## 2023-03-29 PROCEDURE — 71000016 HC POSTOP RECOV ADDL HR: Performed by: PLASTIC SURGERY

## 2023-03-29 PROCEDURE — 25000003 PHARM REV CODE 250: Performed by: NURSE ANESTHETIST, CERTIFIED REGISTERED

## 2023-03-29 PROCEDURE — 88302 PR  SURG PATH,LEVEL II: ICD-10-PCS | Mod: 26,,, | Performed by: PATHOLOGY

## 2023-03-29 PROCEDURE — 25000003 PHARM REV CODE 250: Performed by: ANESTHESIOLOGY

## 2023-03-29 PROCEDURE — 36000707: Performed by: PLASTIC SURGERY

## 2023-03-29 PROCEDURE — 63600175 PHARM REV CODE 636 W HCPCS: Performed by: NURSE ANESTHETIST, CERTIFIED REGISTERED

## 2023-03-29 PROCEDURE — 63600175 PHARM REV CODE 636 W HCPCS: Performed by: PLASTIC SURGERY

## 2023-03-29 PROCEDURE — 71000033 HC RECOVERY, INTIAL HOUR: Performed by: PLASTIC SURGERY

## 2023-03-29 PROCEDURE — 37000008 HC ANESTHESIA 1ST 15 MINUTES: Performed by: PLASTIC SURGERY

## 2023-03-29 PROCEDURE — 88302 TISSUE EXAM BY PATHOLOGIST: CPT | Performed by: PATHOLOGY

## 2023-03-29 PROCEDURE — 36000706: Performed by: PLASTIC SURGERY

## 2023-03-29 PROCEDURE — 88302 TISSUE EXAM BY PATHOLOGIST: CPT | Mod: 26,,, | Performed by: PATHOLOGY

## 2023-03-29 PROCEDURE — 71000015 HC POSTOP RECOV 1ST HR: Performed by: PLASTIC SURGERY

## 2023-03-29 PROCEDURE — 37000009 HC ANESTHESIA EA ADD 15 MINS: Performed by: PLASTIC SURGERY

## 2023-03-29 PROCEDURE — 25000003 PHARM REV CODE 250: Performed by: PLASTIC SURGERY

## 2023-03-29 PROCEDURE — 63600175 PHARM REV CODE 636 W HCPCS: Performed by: ANESTHESIOLOGY

## 2023-03-29 RX ORDER — SODIUM CHLORIDE, SODIUM LACTATE, POTASSIUM CHLORIDE, CALCIUM CHLORIDE 600; 310; 30; 20 MG/100ML; MG/100ML; MG/100ML; MG/100ML
INJECTION, SOLUTION INTRAVENOUS CONTINUOUS
Status: DISCONTINUED | OUTPATIENT
Start: 2023-03-29 | End: 2024-01-03

## 2023-03-29 RX ORDER — PROPOFOL 10 MG/ML
VIAL (ML) INTRAVENOUS CONTINUOUS PRN
Status: DISCONTINUED | OUTPATIENT
Start: 2023-03-29 | End: 2023-03-29

## 2023-03-29 RX ORDER — SODIUM CHLORIDE 0.9 % (FLUSH) 0.9 %
3 SYRINGE (ML) INJECTION
Status: DISCONTINUED | OUTPATIENT
Start: 2023-03-29 | End: 2023-03-29 | Stop reason: HOSPADM

## 2023-03-29 RX ORDER — LIDOCAINE HYDROCHLORIDE AND EPINEPHRINE 10; 10 MG/ML; UG/ML
INJECTION, SOLUTION INFILTRATION; PERINEURAL
Status: DISCONTINUED | OUTPATIENT
Start: 2023-03-29 | End: 2023-03-29 | Stop reason: HOSPADM

## 2023-03-29 RX ORDER — PROCHLORPERAZINE EDISYLATE 5 MG/ML
5 INJECTION INTRAMUSCULAR; INTRAVENOUS EVERY 30 MIN PRN
Status: DISCONTINUED | OUTPATIENT
Start: 2023-03-29 | End: 2023-03-29 | Stop reason: HOSPADM

## 2023-03-29 RX ORDER — OXYCODONE AND ACETAMINOPHEN 10; 325 MG/1; MG/1
1 TABLET ORAL EVERY 4 HOURS PRN
Qty: 10 TABLET | Refills: 0 | Status: SHIPPED | OUTPATIENT
Start: 2023-03-29 | End: 2023-04-19

## 2023-03-29 RX ORDER — SODIUM CHLORIDE 9 MG/ML
INJECTION, SOLUTION INTRAVENOUS CONTINUOUS
Status: DISCONTINUED | OUTPATIENT
Start: 2023-03-29 | End: 2023-03-29 | Stop reason: HOSPADM

## 2023-03-29 RX ORDER — PHENYLEPHRINE HYDROCHLORIDE 10 MG/ML
INJECTION INTRAVENOUS
Status: DISCONTINUED | OUTPATIENT
Start: 2023-03-29 | End: 2023-03-29

## 2023-03-29 RX ORDER — ONDANSETRON 2 MG/ML
INJECTION INTRAMUSCULAR; INTRAVENOUS
Status: DISCONTINUED | OUTPATIENT
Start: 2023-03-29 | End: 2023-03-29

## 2023-03-29 RX ORDER — LIDOCAINE HYDROCHLORIDE 20 MG/ML
INJECTION INTRAVENOUS
Status: DISCONTINUED | OUTPATIENT
Start: 2023-03-29 | End: 2023-03-29

## 2023-03-29 RX ORDER — DEXAMETHASONE SODIUM PHOSPHATE 4 MG/ML
INJECTION, SOLUTION INTRA-ARTICULAR; INTRALESIONAL; INTRAMUSCULAR; INTRAVENOUS; SOFT TISSUE
Status: DISCONTINUED | OUTPATIENT
Start: 2023-03-29 | End: 2023-03-29

## 2023-03-29 RX ORDER — ACETAMINOPHEN 500 MG
1000 TABLET ORAL
Status: COMPLETED | OUTPATIENT
Start: 2023-03-29 | End: 2023-03-29

## 2023-03-29 RX ORDER — MUPIROCIN 20 MG/G
OINTMENT TOPICAL
Status: DISCONTINUED | OUTPATIENT
Start: 2023-03-29 | End: 2023-03-29 | Stop reason: HOSPADM

## 2023-03-29 RX ORDER — DIPHENHYDRAMINE HYDROCHLORIDE 50 MG/ML
12.5 INJECTION INTRAMUSCULAR; INTRAVENOUS EVERY 30 MIN PRN
Status: DISCONTINUED | OUTPATIENT
Start: 2023-03-29 | End: 2023-03-29 | Stop reason: HOSPADM

## 2023-03-29 RX ORDER — KETAMINE HCL IN 0.9 % NACL 50 MG/5 ML
SYRINGE (ML) INTRAVENOUS
Status: DISCONTINUED | OUTPATIENT
Start: 2023-03-29 | End: 2023-03-29

## 2023-03-29 RX ORDER — MIDAZOLAM HYDROCHLORIDE 1 MG/ML
INJECTION INTRAMUSCULAR; INTRAVENOUS
Status: DISCONTINUED | OUTPATIENT
Start: 2023-03-29 | End: 2023-03-29

## 2023-03-29 RX ORDER — HYDROMORPHONE HYDROCHLORIDE 2 MG/ML
0.4 INJECTION, SOLUTION INTRAMUSCULAR; INTRAVENOUS; SUBCUTANEOUS EVERY 5 MIN PRN
Status: DISCONTINUED | OUTPATIENT
Start: 2023-03-29 | End: 2023-03-29 | Stop reason: HOSPADM

## 2023-03-29 RX ORDER — LIDOCAINE HYDROCHLORIDE 10 MG/ML
0.5 INJECTION, SOLUTION EPIDURAL; INFILTRATION; INTRACAUDAL; PERINEURAL ONCE
Status: DISCONTINUED | OUTPATIENT
Start: 2023-03-29 | End: 2024-01-03

## 2023-03-29 RX ORDER — EPHEDRINE SULFATE 50 MG/ML
INJECTION, SOLUTION INTRAVENOUS
Status: DISCONTINUED | OUTPATIENT
Start: 2023-03-29 | End: 2023-03-29

## 2023-03-29 RX ORDER — PROPOFOL 10 MG/ML
VIAL (ML) INTRAVENOUS
Status: DISCONTINUED | OUTPATIENT
Start: 2023-03-29 | End: 2023-03-29

## 2023-03-29 RX ORDER — CEFAZOLIN SODIUM 1 G/3ML
2 INJECTION, POWDER, FOR SOLUTION INTRAMUSCULAR; INTRAVENOUS
Status: COMPLETED | OUTPATIENT
Start: 2023-03-29 | End: 2023-03-29

## 2023-03-29 RX ORDER — FENTANYL CITRATE 50 UG/ML
INJECTION, SOLUTION INTRAMUSCULAR; INTRAVENOUS
Status: DISCONTINUED | OUTPATIENT
Start: 2023-03-29 | End: 2023-03-29

## 2023-03-29 RX ORDER — OXYCODONE HYDROCHLORIDE 5 MG/1
5 TABLET ORAL
Status: DISCONTINUED | OUTPATIENT
Start: 2023-03-29 | End: 2023-03-29 | Stop reason: HOSPADM

## 2023-03-29 RX ADMIN — EPHEDRINE SULFATE 10 MG: 50 INJECTION INTRAVENOUS at 08:03

## 2023-03-29 RX ADMIN — EPHEDRINE SULFATE 15 MG: 50 INJECTION INTRAVENOUS at 07:03

## 2023-03-29 RX ADMIN — DEXAMETHASONE SODIUM PHOSPHATE 8 MG: 4 INJECTION, SOLUTION INTRAMUSCULAR; INTRAVENOUS at 07:03

## 2023-03-29 RX ADMIN — ACETAMINOPHEN 1000 MG: 500 TABLET, FILM COATED ORAL at 06:03

## 2023-03-29 RX ADMIN — LIDOCAINE HYDROCHLORIDE 75 MG: 20 INJECTION, SOLUTION INTRAVENOUS at 07:03

## 2023-03-29 RX ADMIN — PHENYLEPHRINE HYDROCHLORIDE 200 MCG: 10 INJECTION INTRAVENOUS at 07:03

## 2023-03-29 RX ADMIN — MIDAZOLAM HYDROCHLORIDE 5 MG: 1 INJECTION, SOLUTION INTRAMUSCULAR; INTRAVENOUS at 07:03

## 2023-03-29 RX ADMIN — PROPOFOL 400 MG: 10 INJECTION, EMULSION INTRAVENOUS at 07:03

## 2023-03-29 RX ADMIN — OXYCODONE HYDROCHLORIDE 5 MG: 5 TABLET ORAL at 08:03

## 2023-03-29 RX ADMIN — Medication 50 MG: at 07:03

## 2023-03-29 RX ADMIN — CARBOXYMETHYLCELLULOSE SODIUM 2 DROP: 2.5 SOLUTION/ DROPS OPHTHALMIC at 07:03

## 2023-03-29 RX ADMIN — ONDANSETRON HYDROCHLORIDE 4 MG: 2 INJECTION INTRAMUSCULAR; INTRAVENOUS at 08:03

## 2023-03-29 RX ADMIN — MUPIROCIN: 20 OINTMENT TOPICAL at 06:03

## 2023-03-29 RX ADMIN — CEFAZOLIN 2 G: 330 INJECTION, POWDER, FOR SOLUTION INTRAMUSCULAR; INTRAVENOUS at 07:03

## 2023-03-29 RX ADMIN — GLYCOPYRROLATE 0.2 MG: 0.2 INJECTION, SOLUTION INTRAMUSCULAR; INTRAVITREAL at 08:03

## 2023-03-29 RX ADMIN — SODIUM CHLORIDE, SODIUM LACTATE, POTASSIUM CHLORIDE, AND CALCIUM CHLORIDE: 600; 310; 30; 20 INJECTION, SOLUTION INTRAVENOUS at 06:03

## 2023-03-29 RX ADMIN — FENTANYL CITRATE 100 MCG: 50 INJECTION, SOLUTION INTRAMUSCULAR; INTRAVENOUS at 07:03

## 2023-03-29 RX ADMIN — PROPOFOL 250 MCG/KG/MIN: 10 INJECTION, EMULSION INTRAVENOUS at 07:03

## 2023-03-29 NOTE — BRIEF OP NOTE
[facility] - Surgery ([place])  Discharge Note  Short Stay    Procedure(s) (LRB):  BREAST REVISION SURGERY / BILATERAL BREAST FLAP REVISION (Bilateral)      OUTCOME: Patient tolerated treatment/procedure well without complication and is now ready for discharge.    DISPOSITION: Home or Self Care    FINAL DIAGNOSIS:  acquired breast deformity    FOLLOWUP: In clinic    DISCHARGE INSTRUCTIONS:    Discharge Procedure Orders   Lifting restrictions   Order Comments: No heavy lifting, pushing, pulling     Notify your health care provider if you experience any of the following:  temperature >100.4     Notify your health care provider if you experience any of the following:  severe uncontrolled pain     Notify your health care provider if you experience any of the following:  redness, tenderness, or signs of infection (pain, swelling, redness, odor or green/yellow discharge around incision site)      Remove dressing in 72 hours        TIME SPENT ON DISCHARGE: 10 minutes

## 2023-03-29 NOTE — ANESTHESIA POSTPROCEDURE EVALUATION
Anesthesia Post Evaluation    Patient: Lucia Matias    Procedure(s) Performed: Procedure(s) (LRB):  BREAST REVISION SURGERY / BILATERAL BREAST FLAP REVISION (Bilateral)    Final Anesthesia Type: general      Patient location during evaluation: PACU  Patient participation: Yes- Able to Participate  Level of consciousness: awake and alert  Post-procedure vital signs: reviewed and stable  Pain management: adequate  Airway patency: patent    PONV status at discharge: No PONV  Anesthetic complications: no      Cardiovascular status: blood pressure returned to baseline and stable  Respiratory status: room air  Hydration status: euvolemic  Follow-up not needed.          Vitals Value Taken Time   /68 03/29/23 0915   Temp 36.6 °C (97.9 °F) 03/29/23 0915   Pulse 92 03/29/23 0915   Resp 18 03/29/23 0915   SpO2 96 % 03/29/23 0915         Event Time   Out of Recovery 09:18:00         Pain/Fred Score: Pain Rating Prior to Med Admin: 5 (3/29/2023  9:15 AM)  Pain Rating Post Med Admin: 0 (3/29/2023  9:15 AM)  Fred Score: 10 (3/29/2023  9:15 AM)

## 2023-03-29 NOTE — OP NOTE
[facility]  Plastic Surgery  Operative Note    SUMMARY     Date of Procedure: 3/29/2023     Procedure:   Revision of bilateral reconstructed breast    Surgeon(s) and Role:     * Ming Milian MD - Primary     * Fahad Coles MD - Resident not chief    Assisting Surgeon: None    Pre-Operative Diagnosis: Bilateral acquired breast deformity    Post-Operative Diagnosis: same    Anesthesia: General    Indications for procedure: Patient is a 61 yo female with history of long standing tobacco use (now quit) breast cancer s/p autologous breast reconstruction. Post-operative course c/b full thickness wounds of bilateral breasts. Due to plans to start chemotherapy, decision was made to hasten wound recovery by closure in the operating room. Nature of condition and specifics of the procedure were discussed with the patient in clinic and pre-operatively. Written consent signed.     Operative Findings (including complications, if any): full thickness wounds of bilateral breasts, all wounds closed at end of procedure     Description of Technical Procedures:   The patient was brought into the operating room and placed on a well-padded operating table in the supine position with her arms padded and abducted. Appropriate perioperative antibiotics were administered. Sequential compression devices were placed on her calves. General endotracheal anesthesia was administered via the anesthesia department. The patient was prepped and draped in the usual sterile fashion. A timeout was performed per hospital protocol with all team members in agreement.     Attention was first turned to the L breast. The wound edges were excised and the base of the wound with developing healthy granulation tissue was debrided with the edge of a scalpel. All fibrinous exudate/non-viable tissue was excised. The wound was then irrigated and mastectomy flap minimally undermined bluntly. 3-0 buried monocryl sutures was used to tack the central  skin paddle to the surrounding edges at 3/6/9 o'clock positions then used to perform a running circumferential deep dermal closure. Horizontal mattresses with 3-0 prolene were then used to dante edges of the skin. This completed revision of the left reconstructed breast    Attention was then turned to the R breast. The wound was excised en block with combination of scalpel and cautery. A small seroma cavity was encountered. This was scored with bovie cautery and deep dermal sutures were placed with 3-0 monocryl. Horizontal mattresses with 3-0 prolene were then used to complete skin closure. This completed revision of the right reconstructed breast.     At the end of the case the surgical sites were cleaned and mepilex AG was placed. At the end of the procedure, all needle, sponge, and instrument counts were correct on two occasions. The patient was then awakened from general anesthesia without complication and returned to the post-anesthesia recovery unit in stable condition.   Dr. Milian was present for the critical portions of the case      Estimated Blood Loss (EBL): 20cc           Implants: * No implants in log *    Specimens:   Specimen (24h ago, onward)      None                    Condition: Good    Disposition: PACU - hemodynamically stable.

## 2023-03-29 NOTE — OR NURSING
"Dr. Coles at bedside to assess pt. MD removed surgical dressing. Upon assessment, scant amount of bleeding present from medial incision. Removed 1 suture at base of breast to release some of the "tightness." Bleeding has subsided. Surgical dressing reapplied and new ABD placed by MD  "

## 2023-03-29 NOTE — H&P
PRS H&P    HPI   Ms. Matias has hx of  bilateral breast reconstruction with abdominally based free flaps. He postoperative course was complicated by L pneumothorax. Reports oncologist plans on starting chemotherapy        Past Medical History:   Diagnosis Date    Allergy     Anxiety     Arthritis     Atrial flutter     Colon polyps     COPD (chronic obstructive pulmonary disease)     COPD exacerbation 10/31/2022    Depression     Diverticular disease of colon 2017    Diverticulitis     HLD (hyperlipidemia)     Malignant neoplasm of central portion of left breast in female, estrogen receptor positive 2022    Pancreatitis     Psoriasis     Rheumatoid arthritis     Severe obesity (BMI 35.0-39.9) with comorbidity      Past Surgical History:   Procedure Laterality Date    ABLATION  2022    Cardiac Ablation for A Flutter, Successful    ADENOIDECTOMY  1966    Tonsillitis and adnoids    BILATERAL MASTECTOMY Bilateral 2/15/2023    Procedure: MASTECTOMY, BILATERAL;  Surgeon: Marcela Meehan MD;  Location: [facility];  Service: General;  Laterality: Bilateral;  EMAIL SENT  @ 11:44 LK / 2.5 HOURS    BLADDER SUSPENSION      (x2)    BREAST REVISION SURGERY Bilateral 3/29/2023    Procedure: BREAST REVISION SURGERY / BILATERAL BREAST FLAP REVISION;  Surgeon: Ming Milian MD;  Location: [facility];  Service: Plastics;  Laterality: Bilateral;  90 MINUTES     SECTION      (x2)    DEBRIDEMENT, BREAST Bilateral 3/29/2023    Procedure: DEBRIDEMENT, BREAST;  Surgeon: Ming Milian MD;  Location: [facility];  Service: Plastics;  Laterality: Bilateral;    ESOPHAGOGASTRODUODENOSCOPY N/A 2021    Procedure: EGD (ESOPHAGOGASTRODUODENOSCOPY);  Surgeon: Vince Rodriguez MD;  Location: [address];  Service: General;  Laterality: N/A;    INJECTION FOR SENTINEL NODE IDENTIFICATION Left 2/15/2023    Procedure: INJECTION, FOR SENTINEL NODE IDENTIFICATION;  Surgeon: Marcela Meehan MD;  Location: [facility];   Service: General;  Laterality: Left;    LAPAROSCOPIC SLEEVE GASTRECTOMY N/A 2021    Procedure: GASTRECTOMY, SLEEVE, LAPAROSCOPIC, with intraop EGD;  Surgeon: Vince Rodriguez MD;  Location: [address];  Service: General;  Laterality: N/A;    LUNG BIOPSY  2020    RECONSTRUCTION OF BREAST WITH DEEP INFERIOR EPIGASTRIC ARTERY  (NEHA) FREE FLAP Bilateral 2/15/2023    Procedure: RECONSTRUCTION, BREAST, USING NEHA FREE FLAP;  Surgeon: Ming Milian MD;  Location: [facility];  Service: Plastics;  Laterality: Bilateral;    RHINOPLASTY      SENTINEL LYMPH NODE BIOPSY Left 2/15/2023    Procedure: BIOPSY, LYMPH NODE, SENTINEL;  Surgeon: Marcela Meehan MD;  Location: [facility];  Service: General;  Laterality: Left;    TONSILLECTOMY      TRANSESOPHAGEAL ECHOCARDIOGRAPHY N/A 2022    Procedure: ECHOCARDIOGRAM, TRANSESOPHAGEAL;  Surgeon: Kellie Grover MD;  Location: [facility];  Service: Cardiology;  Laterality: N/A;     Social History     Socioeconomic History    Marital status:    Occupational History    Occupation: clinical research coordinator    Tobacco Use    Smoking status: Former     Packs/day: 0.00     Years: 20.00     Pack years: 0.00     Types: Cigarettes     Start date: 1979     Quit date: 2020     Years since quittin.3    Smokeless tobacco: Current   Substance and Sexual Activity    Alcohol use: Yes     Alcohol/week: 1.0 standard drink     Types: 1 Glasses of wine per week     Comment: social    Drug use: No    Sexual activity: Yes     Partners: Male     Birth control/protection: Post-menopausal   Other Topics Concern    Are you pregnant or think you may be? No    Breast-feeding No     Social Determinants of Health     Financial Resource Strain: Low Risk     Difficulty of Paying Living Expenses: Not very hard   Food Insecurity: No Food Insecurity    Worried About Running Out of Food in the Last Year: Never true    Ran Out of Food in the Last Year: Never  true   Transportation Needs: No Transportation Needs    Lack of Transportation (Medical): No    Lack of Transportation (Non-Medical): No   Physical Activity: Unknown    Days of Exercise per Week: Patient refused    Minutes of Exercise per Session: 20 min   Stress: Stress Concern Present    Feeling of Stress : To some extent   Social Connections: Unknown    Frequency of Communication with Friends and Family: More than three times a week    Frequency of Social Gatherings with Friends and Family: Once a week    Active Member of Clubs or Organizations: Yes    Attends Club or Organization Meetings: Patient refused    Marital Status:    Housing Stability: Low Risk     Unable to Pay for Housing in the Last Year: No    Number of Places Lived in the Last Year: 1    Unstable Housing in the Last Year: No     No current facility-administered medications on file prior to encounter.     Current Outpatient Medications on File Prior to Encounter   Medication Sig Dispense Refill    acetaminophen (TYLENOL ORAL) Take by mouth as needed.      apixaban (ELIQUIS) 5 mg Tab Take 1 tablet (5 mg total) by mouth 2 (two) times daily. Will hold 2/13 & 2/14 60 tablet 5    atorvastatin (LIPITOR) 20 MG tablet Take 1 tablet (20 mg total) by mouth every evening. 90 tablet 3    B-complex with vitamin C (VITAMIN B COMPLEX-C ORAL) Take by mouth Daily.      calcium-vitamin D 250-100 mg-unit per tablet Take 1 tablet by mouth 2 (two) times daily.      COSENTYX PEN, 2 PENS, 150 mg/mL PnIj Inject 300mg (2 pens) into the skin every 4 weeks 2 mL 11    cyanocobalamin 500 MCG tablet Take 500 mcg by mouth once daily.      diazePAM (VALIUM) 5 MG tablet Take 1 tablet (5 mg total) by mouth daily as needed for Anxiety. 30 tablet 2    fluticasone-umeclidin-vilanter (TRELEGY ELLIPTA) 100-62.5-25 mcg DsDv Inhale 1 puff into the lungs once daily. 180 each 3    multivitamin (THERAGRAN) per tablet Take 1 tablet by mouth once daily.      multivitamin with minerals  (HAIR,SKIN AND NAILS ORAL) Take by mouth.      mv-mn/iron/folic acid/herb 190 (VITAMIN D3 COMPLETE ORAL) Take by mouth.      omeprazole (PRILOSEC) 40 MG capsule Take 1 capsule (40 mg total) by mouth every morning. 90 capsule 1    ondansetron (ZOFRAN) 8 MG tablet Take 1 tablet (8 mg total) by mouth every 12 (twelve) hours as needed for Nausea. 30 tablet 2    traZODone (DESYREL) 100 MG tablet Take 1 tablet (100 mg total) by mouth nightly as needed for Insomnia. 90 tablet 3    venlafaxine (EFFEXOR-XR) 75 MG 24 hr capsule Take 1 capsule (75 mg total) by mouth once daily. Start on Week 2 90 capsule 3    dexAMETHasone (DECADRON) 4 MG Tab Take 2 tablets (8 mg total) by mouth every 12 (twelve) hours. for 9 days (Patient not taking: Reported on 3/28/2023) 36 tablet 0    [DISCONTINUED] budesonide-formoterol 160-4.5 mcg (SYMBICORT) 160-4.5 mcg/actuation HFAA Inhale 2 puffs into the lungs every 12 (twelve) hours. Controller 30.6 g 2     Review of patient's allergies indicates:   Allergen Reactions    Succinimides Anaphylaxis     Son has        Objective:  NAD, cooperative  R breast soft healthy and viable. Small wound at central inferior aspect of breast. Fibrinous exudate at base of wound  L breast more firm, however appropriately soft. Wound at medial aspect of skin paddle. Healthy granulating tissue with fibrinous exudate. No signs of infection  Abdominal incisions CDI     Assesment/Plan:  To OR for closure of wounds/revision of bilateral reconstructed breasts     Fahad Coles MD  HOV  PRS

## 2023-03-29 NOTE — DISCHARGE INSTRUCTIONS

## 2023-03-29 NOTE — ANESTHESIA PREPROCEDURE EVALUATION
03/29/2023  Lucia Matias is a 62 y.o., female.      Pre-op Assessment    I have reviewed the Patient Summary Reports.     I have reviewed the Nursing Notes. I have reviewed the NPO Status.   I have reviewed the Medications.     Review of Systems  Anesthesia Hx:  History of prior surgery of interest to airway management or planning: Previous anesthesia: General 2/15/23 alberto mast/flap with general anesthesia.  Airway issues documented on chart review include mask, easy, GETA, videolaryngoscope used  Denies Family Hx of Anesthesia complications.   Denies Personal Hx of Anesthesia complications.   Social:  Former Smoker Quit 12/22, 1/2 ppd previous   Hematology/Oncology:  Hematology Normal      Current/Recent Cancer. Breast left no lymphedema   EENT/Dental:EENT/Dental Normal   Cardiovascular:   Hypertension Dysrhythmias (hx atrial flutter, s/p successful ablation 11/22) hyperlipidemia ECG has been reviewed. Trivial pericardial effusion found on w/u for pnuemothorax   Pulmonary:   COPD (maintained on trilogy, does not use rescue inhaler), moderate Denies Asthma. Recent URI Nodules, bx neg  Serial CTs    Finished antibx 2/8 for sinus infection, rare cough, no fever, covid negative    Interim hx spontaneous pneumothorax   Renal/:  Renal/ Normal     Hepatic/GI:  Hepatic/GI Normal Gastric sleeve   Musculoskeletal:   Arthritis (RA, psoriatic)     Neurological:  Neurology Normal    Endocrine:  Endocrine Normal  Obesity / BMI > 30  Dermatological:  Skin Normal Psoriasis   Psych:  Psychiatric Normal           Physical Exam  General: Cooperative, Alert and Oriented    Airway:  Mallampati: I   Mouth Opening: Normal  TM Distance: Normal  Neck ROM: Normal ROM    Dental:  Intact        Anesthesia Plan  Type of Anesthesia, risks & benefits discussed:    Anesthesia Type: Gen ETT  Intra-op Monitoring Plan:  HM updated with eye exam.   Standard ASA Monitors  Post Op Pain Control Plan: multimodal analgesia and IV/PO Opioids PRN  Induction:  IV  Airway Plan: Video  Informed Consent: Informed consent signed with the Patient and all parties understand the risks and agree with anesthesia plan.  All questions answered.   ASA Score: 3  Anesthesia Plan Notes: R side devices    NOTE: FAMILY HX MH    EKG NSR 69, Repeat echo EF up to 70%  Card clearance, Dr. Dorado, in epic  Ok'd to be off eliquis    Day of surgery update:  Labs in [facility] WNL x hb 11.3  Interim hx spontaneous L pneumothorax required chest tube  Very anxious           PCP also cleared patient  PFTs on paper chart, show mod-severe COPD, no improvement with bronchodilator    Ready For Surgery From Anesthesia Perspective.     .

## 2023-03-30 VITALS
DIASTOLIC BLOOD PRESSURE: 78 MMHG | SYSTOLIC BLOOD PRESSURE: 132 MMHG | WEIGHT: 178 LBS | HEART RATE: 83 BPM | RESPIRATION RATE: 16 BRPM | TEMPERATURE: 98 F | OXYGEN SATURATION: 96 % | BODY MASS INDEX: 32.76 KG/M2 | HEIGHT: 62 IN

## 2023-04-03 ENCOUNTER — CLINICAL SUPPORT (OUTPATIENT)
Dept: HEMATOLOGY/ONCOLOGY | Facility: CLINIC | Age: 63
End: 2023-04-03
Payer: COMMERCIAL

## 2023-04-03 VITALS
SYSTOLIC BLOOD PRESSURE: 134 MMHG | HEIGHT: 62 IN | TEMPERATURE: 98 F | WEIGHT: 179.25 LBS | DIASTOLIC BLOOD PRESSURE: 62 MMHG | OXYGEN SATURATION: 96 % | BODY MASS INDEX: 32.99 KG/M2 | RESPIRATION RATE: 18 BRPM | HEART RATE: 73 BPM

## 2023-04-03 DIAGNOSIS — Z17.0 MALIGNANT NEOPLASM OF CENTRAL PORTION OF LEFT BREAST IN FEMALE, ESTROGEN RECEPTOR POSITIVE: Primary | ICD-10-CM

## 2023-04-03 DIAGNOSIS — I10 ESSENTIAL HYPERTENSION: Chronic | ICD-10-CM

## 2023-04-03 DIAGNOSIS — E66.09 CLASS 1 OBESITY DUE TO EXCESS CALORIES WITHOUT SERIOUS COMORBIDITY WITH BODY MASS INDEX (BMI) OF 33.0 TO 33.9 IN ADULT: Chronic | ICD-10-CM

## 2023-04-03 DIAGNOSIS — E78.2 MIXED HYPERLIPIDEMIA: Chronic | ICD-10-CM

## 2023-04-03 DIAGNOSIS — C50.112 MALIGNANT NEOPLASM OF CENTRAL PORTION OF LEFT BREAST IN FEMALE, ESTROGEN RECEPTOR POSITIVE: Primary | ICD-10-CM

## 2023-04-03 PROCEDURE — 99215 PR OFFICE/OUTPT VISIT, EST, LEVL V, 40-54 MIN: ICD-10-PCS | Mod: S$GLB,,, | Performed by: NURSE PRACTITIONER

## 2023-04-03 PROCEDURE — 99999 PR PBB SHADOW E&M-EST. PATIENT-LVL V: ICD-10-PCS | Mod: PBBFAC,,, | Performed by: NURSE PRACTITIONER

## 2023-04-03 PROCEDURE — 99999 PR PBB SHADOW E&M-EST. PATIENT-LVL V: CPT | Mod: PBBFAC,,, | Performed by: NURSE PRACTITIONER

## 2023-04-03 PROCEDURE — 99215 OFFICE O/P EST HI 40 MIN: CPT | Mod: S$GLB,,, | Performed by: NURSE PRACTITIONER

## 2023-04-04 LAB
FINAL PATHOLOGIC DIAGNOSIS: NORMAL
GROSS: NORMAL
Lab: NORMAL

## 2023-04-05 ENCOUNTER — PATIENT MESSAGE (OUTPATIENT)
Dept: BARIATRICS | Facility: CLINIC | Age: 63
End: 2023-04-05
Payer: COMMERCIAL

## 2023-04-05 ENCOUNTER — OFFICE VISIT (OUTPATIENT)
Dept: PLASTIC SURGERY | Facility: CLINIC | Age: 63
End: 2023-04-05
Attending: PLASTIC SURGERY
Payer: COMMERCIAL

## 2023-04-05 VITALS — SYSTOLIC BLOOD PRESSURE: 143 MMHG | DIASTOLIC BLOOD PRESSURE: 83 MMHG | HEART RATE: 83 BPM

## 2023-04-05 DIAGNOSIS — C50.912 MALIGNANT NEOPLASM OF LEFT FEMALE BREAST, UNSPECIFIED ESTROGEN RECEPTOR STATUS, UNSPECIFIED SITE OF BREAST: Primary | ICD-10-CM

## 2023-04-05 PROCEDURE — 99024 PR POST-OP FOLLOW-UP VISIT: ICD-10-PCS | Mod: S$GLB,,, | Performed by: PLASTIC SURGERY

## 2023-04-05 PROCEDURE — 3077F PR MOST RECENT SYSTOLIC BLOOD PRESSURE >= 140 MM HG: ICD-10-PCS | Mod: CPTII,S$GLB,, | Performed by: PLASTIC SURGERY

## 2023-04-05 PROCEDURE — 1159F MED LIST DOCD IN RCRD: CPT | Mod: CPTII,S$GLB,, | Performed by: PLASTIC SURGERY

## 2023-04-05 PROCEDURE — 99024 POSTOP FOLLOW-UP VISIT: CPT | Mod: S$GLB,,, | Performed by: PLASTIC SURGERY

## 2023-04-05 PROCEDURE — 1159F PR MEDICATION LIST DOCUMENTED IN MEDICAL RECORD: ICD-10-PCS | Mod: CPTII,S$GLB,, | Performed by: PLASTIC SURGERY

## 2023-04-05 PROCEDURE — 3077F SYST BP >= 140 MM HG: CPT | Mod: CPTII,S$GLB,, | Performed by: PLASTIC SURGERY

## 2023-04-05 PROCEDURE — 3079F PR MOST RECENT DIASTOLIC BLOOD PRESSURE 80-89 MM HG: ICD-10-PCS | Mod: CPTII,S$GLB,, | Performed by: PLASTIC SURGERY

## 2023-04-05 PROCEDURE — 3079F DIAST BP 80-89 MM HG: CPT | Mod: CPTII,S$GLB,, | Performed by: PLASTIC SURGERY

## 2023-04-05 NOTE — PROGRESS NOTES
Patient presents follow-up.  She is status post revision both reconstructed breasts prior to chemotherapy     On exam: The incisions are well healed    There are some Prolene sutures in the wounds are well approximated at both breast    There is resolving ecchymosis bilaterally     Assessment:  Stable     Plan will see her again on the 18th for suture removal she is to start chemotherapy to following day

## 2023-04-10 ENCOUNTER — PATIENT MESSAGE (OUTPATIENT)
Dept: ADMINISTRATIVE | Facility: OTHER | Age: 63
End: 2023-04-10
Payer: COMMERCIAL

## 2023-04-11 ENCOUNTER — PATIENT MESSAGE (OUTPATIENT)
Dept: BARIATRICS | Facility: CLINIC | Age: 63
End: 2023-04-11
Payer: COMMERCIAL

## 2023-04-11 ENCOUNTER — PATIENT MESSAGE (OUTPATIENT)
Dept: ADMINISTRATIVE | Facility: OTHER | Age: 63
End: 2023-04-11
Payer: COMMERCIAL

## 2023-04-12 ENCOUNTER — PATIENT MESSAGE (OUTPATIENT)
Dept: ADMINISTRATIVE | Facility: OTHER | Age: 63
End: 2023-04-12
Payer: COMMERCIAL

## 2023-04-12 ENCOUNTER — PATIENT MESSAGE (OUTPATIENT)
Dept: PSYCHIATRY | Facility: CLINIC | Age: 63
End: 2023-04-12
Payer: COMMERCIAL

## 2023-04-12 DIAGNOSIS — F41.0 PANIC ATTACK: ICD-10-CM

## 2023-04-12 DIAGNOSIS — F41.1 GAD (GENERALIZED ANXIETY DISORDER): ICD-10-CM

## 2023-04-12 RX ORDER — DIAZEPAM 5 MG/1
5 TABLET ORAL DAILY PRN
Qty: 30 TABLET | Refills: 2 | Status: SHIPPED | OUTPATIENT
Start: 2023-04-12 | End: 2023-06-01 | Stop reason: SDUPTHER

## 2023-04-13 ENCOUNTER — HOSPITAL ENCOUNTER (OUTPATIENT)
Dept: CARDIOLOGY | Facility: OTHER | Age: 63
Discharge: HOME | End: 2023-04-13
Attending: INTERNAL MEDICINE
Payer: COMMERCIAL

## 2023-04-13 VITALS
DIASTOLIC BLOOD PRESSURE: 83 MMHG | BODY MASS INDEX: 32.94 KG/M2 | HEIGHT: 62 IN | SYSTOLIC BLOOD PRESSURE: 143 MMHG | WEIGHT: 179 LBS

## 2023-04-13 DIAGNOSIS — I31.39 PERICARDIAL EFFUSION: ICD-10-CM

## 2023-04-13 LAB
ASCENDING AORTA: 3.18 CM
AV INDEX (PROSTH): 0.76
AV MEAN GRADIENT: 3 MMHG
AV PEAK GRADIENT: 5 MMHG
AV VALVE AREA: 2.33 CM2
AV VELOCITY RATIO: 0.78
BSA FOR ECHO PROCEDURE: 1.88 M2
CV ECHO LV RWT: 0.48 CM
DOP CALC AO PEAK VEL: 1.08 M/S
DOP CALC AO VTI: 22.1 CM
DOP CALC LVOT AREA: 3 CM2
DOP CALC LVOT DIAMETER: 1.97 CM
DOP CALC LVOT PEAK VEL: 0.84 M/S
DOP CALC LVOT STROKE VOLUME: 51.49 CM3
DOP CALCLVOT PEAK VEL VTI: 16.9 CM
E WAVE DECELERATION TIME: 258.92 MSEC
E/A RATIO: 0.75
E/E' RATIO: 8.83 M/S
ECHO LV POSTERIOR WALL: 0.91 CM (ref 0.6–1.1)
EJECTION FRACTION: 60 %
FRACTIONAL SHORTENING: 36 % (ref 28–44)
INTERVENTRICULAR SEPTUM: 0.78 CM (ref 0.6–1.1)
IVC DIAMETER: 1.25 CM
IVRT: 125.59 MSEC
LA MAJOR: 4.98 CM
LA MINOR: 4.77 CM
LA WIDTH: 4.1 CM
LEFT ATRIUM SIZE: 2.9 CM
LEFT ATRIUM VOLUME INDEX MOD: 26.9 ML/M2
LEFT ATRIUM VOLUME INDEX: 27.1 ML/M2
LEFT ATRIUM VOLUME MOD: 49 CM3
LEFT ATRIUM VOLUME: 49.25 CM3
LEFT INTERNAL DIMENSION IN SYSTOLE: 2.43 CM (ref 2.1–4)
LEFT VENTRICLE DIASTOLIC VOLUME INDEX: 34.38 ML/M2
LEFT VENTRICLE DIASTOLIC VOLUME: 62.58 ML
LEFT VENTRICLE MASS INDEX: 51 G/M2
LEFT VENTRICLE SYSTOLIC VOLUME INDEX: 11.4 ML/M2
LEFT VENTRICLE SYSTOLIC VOLUME: 20.82 ML
LEFT VENTRICULAR INTERNAL DIMENSION IN DIASTOLE: 3.82 CM (ref 3.5–6)
LEFT VENTRICULAR MASS: 93.4 G
LV LATERAL E/E' RATIO: 10.6 M/S
LV SEPTAL E/E' RATIO: 7.57 M/S
LVOT MG: 1.58 MMHG
LVOT MV: 0.59 CM/S
MV PEAK A VEL: 0.71 M/S
MV PEAK E VEL: 0.53 M/S
MV STENOSIS PRESSURE HALF TIME: 75.09 MS
MV VALVE AREA P 1/2 METHOD: 2.93 CM2
PISA TR MAX VEL: 2.3 M/S
PULM VEIN S/D RATIO: 1.67
PV MV: 0.43 M/S
PV PEAK D VEL: 0.36 M/S
PV PEAK S VEL: 0.6 M/S
PV PEAK VELOCITY: 0.62 CM/S
RA MAJOR: 4.72 CM
RIGHT VENTRICULAR END-DIASTOLIC DIMENSION: 3.22 CM
SINUS: 2.1 CM
STJ: 2.13 CM
TDI LATERAL: 0.05 M/S
TDI SEPTAL: 0.07 M/S
TDI: 0.06 M/S
TR MAX PG: 21 MMHG
TRICUSPID ANNULAR PLANE SYSTOLIC EXCURSION: 1.5 CM

## 2023-04-13 PROCEDURE — 93306 TTE W/DOPPLER COMPLETE: CPT

## 2023-04-13 PROCEDURE — 93306 ECHO (CUPID ONLY): ICD-10-PCS | Mod: 26,,, | Performed by: INTERNAL MEDICINE

## 2023-04-13 PROCEDURE — 93306 TTE W/DOPPLER COMPLETE: CPT | Mod: 26,,, | Performed by: INTERNAL MEDICINE

## 2023-04-14 ENCOUNTER — PATIENT MESSAGE (OUTPATIENT)
Dept: ADMINISTRATIVE | Facility: OTHER | Age: 63
End: 2023-04-14
Payer: COMMERCIAL

## 2023-04-14 ENCOUNTER — PATIENT MESSAGE (OUTPATIENT)
Dept: HEMATOLOGY/ONCOLOGY | Facility: CLINIC | Age: 63
End: 2023-04-14
Payer: COMMERCIAL

## 2023-04-17 ENCOUNTER — SPECIALTY PHARMACY (OUTPATIENT)
Dept: PHARMACY | Facility: CLINIC | Age: 63
End: 2023-04-17
Payer: COMMERCIAL

## 2023-04-17 ENCOUNTER — PATIENT MESSAGE (OUTPATIENT)
Dept: ADMINISTRATIVE | Facility: OTHER | Age: 63
End: 2023-04-17
Payer: COMMERCIAL

## 2023-04-17 NOTE — TELEPHONE ENCOUNTER
Outgoing call regarding cosentyx refill; per pt, she's due to inject on 4/20; informed her that a PA is required, and once approved [facility] will follow up to schedule delivery; routed to [facility]

## 2023-04-18 ENCOUNTER — PATIENT MESSAGE (OUTPATIENT)
Dept: ADMINISTRATIVE | Facility: OTHER | Age: 63
End: 2023-04-18
Payer: COMMERCIAL

## 2023-04-18 ENCOUNTER — OFFICE VISIT (OUTPATIENT)
Dept: PLASTIC SURGERY | Facility: CLINIC | Age: 63
End: 2023-04-18
Attending: PLASTIC SURGERY
Payer: COMMERCIAL

## 2023-04-18 DIAGNOSIS — C50.112 MALIGNANT NEOPLASM OF CENTRAL PORTION OF LEFT BREAST IN FEMALE, ESTROGEN RECEPTOR POSITIVE: Primary | ICD-10-CM

## 2023-04-18 DIAGNOSIS — Z17.0 MALIGNANT NEOPLASM OF CENTRAL PORTION OF LEFT BREAST IN FEMALE, ESTROGEN RECEPTOR POSITIVE: Primary | ICD-10-CM

## 2023-04-18 NOTE — PROGRESS NOTES
Plastic Surgery Clinic Note    HPI  Patient is a 62F with history of bilateral mastectomy and bilateral SIEA flap reconstruction 2/15/23.  This was complicated by left pneumothorax treated with chest tube.  She then had wound healing issues bilaterally and was taken for revision of bilateral breasts 3/29.      S:  Today she follows-up for routine visit.  She complains primarily of left breast firmness that has been present since the surgery.  Denies open wounds, drainage, fevers, chills.  Otherwise feels well.    O:  There were no vitals filed for this visit.    Alert, oriented x3, non-toxic appearing  Breathing comfortably on room air, normal effort and expansion, no dyspnea or pleuritic pain    R breast soft healthy and viable.    Skin paddle warm, normal color, prolene sutures in place and incision is well healed.  Sutures removed.    L breast has purple discoloration of the skin paddle with delayed cap refill, the breast is larger and more firm soft superolaterally.  Incisions around the skin paddle and vertical limb have all healed.  Prolenes removed.    Abdominal incision has healed for the most part, several very small and superficial yellow exudative wounds.     Assesment/Plan:  Patient is a 62F with history of bilateral mastectomy and bilateral SIEA flap reconstruction 2/15/23.  This was complicated by left pneumothorax treated with chest tube.  She then had wound healing issues bilaterally and was taken for revision of bilateral breasts 3/29.      Sutures removed, healing well, ointment and bandaid to the suture removal sites, ok to start chemotherapy (4 rounds q3 weeks).    RTC 1 month    Dvaid Fang MD  Plastic and Reconstructive Surgery, [facility]

## 2023-04-19 ENCOUNTER — TELEPHONE (OUTPATIENT)
Dept: HEMATOLOGY/ONCOLOGY | Facility: CLINIC | Age: 63
End: 2023-04-19
Payer: COMMERCIAL

## 2023-04-19 ENCOUNTER — PATIENT MESSAGE (OUTPATIENT)
Dept: HEMATOLOGY/ONCOLOGY | Facility: CLINIC | Age: 63
End: 2023-04-19

## 2023-04-19 ENCOUNTER — PATIENT MESSAGE (OUTPATIENT)
Dept: ADMINISTRATIVE | Facility: OTHER | Age: 63
End: 2023-04-19
Payer: COMMERCIAL

## 2023-04-19 ENCOUNTER — OFFICE VISIT (OUTPATIENT)
Dept: HEMATOLOGY/ONCOLOGY | Facility: CLINIC | Age: 63
End: 2023-04-19
Payer: COMMERCIAL

## 2023-04-19 ENCOUNTER — INFUSION (OUTPATIENT)
Dept: INFUSION THERAPY | Facility: HOSPITAL | Age: 63
End: 2023-04-19
Attending: STUDENT IN AN ORGANIZED HEALTH CARE EDUCATION/TRAINING PROGRAM
Payer: COMMERCIAL

## 2023-04-19 ENCOUNTER — LAB VISIT (OUTPATIENT)
Dept: LAB | Facility: HOSPITAL | Age: 63
End: 2023-04-19
Attending: STUDENT IN AN ORGANIZED HEALTH CARE EDUCATION/TRAINING PROGRAM
Payer: COMMERCIAL

## 2023-04-19 VITALS
DIASTOLIC BLOOD PRESSURE: 63 MMHG | RESPIRATION RATE: 18 BRPM | HEART RATE: 111 BPM | HEIGHT: 62 IN | SYSTOLIC BLOOD PRESSURE: 146 MMHG | OXYGEN SATURATION: 98 % | BODY MASS INDEX: 32.52 KG/M2 | WEIGHT: 176.69 LBS

## 2023-04-19 VITALS
RESPIRATION RATE: 18 BRPM | TEMPERATURE: 98 F | OXYGEN SATURATION: 97 % | HEART RATE: 95 BPM | DIASTOLIC BLOOD PRESSURE: 65 MMHG | SYSTOLIC BLOOD PRESSURE: 137 MMHG

## 2023-04-19 DIAGNOSIS — Z17.0 MALIGNANT NEOPLASM OF CENTRAL PORTION OF LEFT BREAST IN FEMALE, ESTROGEN RECEPTOR POSITIVE: Primary | ICD-10-CM

## 2023-04-19 DIAGNOSIS — C50.112 MALIGNANT NEOPLASM OF CENTRAL PORTION OF LEFT BREAST IN FEMALE, ESTROGEN RECEPTOR POSITIVE: Primary | ICD-10-CM

## 2023-04-19 DIAGNOSIS — Z17.0 MALIGNANT NEOPLASM OF CENTRAL PORTION OF LEFT BREAST IN FEMALE, ESTROGEN RECEPTOR POSITIVE: ICD-10-CM

## 2023-04-19 DIAGNOSIS — L40.50 PSORIATIC ARTHRITIS: ICD-10-CM

## 2023-04-19 DIAGNOSIS — C50.112 MALIGNANT NEOPLASM OF CENTRAL PORTION OF LEFT BREAST IN FEMALE, ESTROGEN RECEPTOR POSITIVE: ICD-10-CM

## 2023-04-19 LAB
ALBUMIN SERPL BCP-MCNC: 3.7 G/DL (ref 3.5–5.2)
ALP SERPL-CCNC: 107 U/L (ref 55–135)
ALT SERPL W/O P-5'-P-CCNC: 19 U/L (ref 10–44)
ANION GAP SERPL CALC-SCNC: 8 MMOL/L (ref 8–16)
AST SERPL-CCNC: 20 U/L (ref 10–40)
BASOPHILS # BLD AUTO: 0.05 K/UL (ref 0–0.2)
BASOPHILS NFR BLD: 1 % (ref 0–1.9)
BILIRUB SERPL-MCNC: 0.7 MG/DL (ref 0.1–1)
BUN SERPL-MCNC: 13 MG/DL (ref 6–20)
CALCIUM SERPL-MCNC: 9.8 MG/DL (ref 8.7–10.5)
CHLORIDE SERPL-SCNC: 106 MMOL/L (ref 95–110)
CO2 SERPL-SCNC: 30 MMOL/L (ref 23–29)
CREAT SERPL-MCNC: 0.9 MG/DL (ref 0.5–1.4)
DIFFERENTIAL METHOD: NORMAL
EOSINOPHIL # BLD AUTO: 0.1 K/UL (ref 0–0.5)
EOSINOPHIL NFR BLD: 2.7 % (ref 0–8)
ERYTHROCYTE [DISTWIDTH] IN BLOOD BY AUTOMATED COUNT: 13.1 % (ref 11.5–14.5)
EST. GFR  (NO RACE VARIABLE): >60 ML/MIN/1.73 M^2
GLUCOSE SERPL-MCNC: 112 MG/DL (ref 70–110)
HCT VFR BLD AUTO: 43.2 % (ref 37–48.5)
HGB BLD-MCNC: 14.2 G/DL (ref 12–16)
IMM GRANULOCYTES # BLD AUTO: 0.02 K/UL (ref 0–0.04)
IMM GRANULOCYTES NFR BLD AUTO: 0.4 % (ref 0–0.5)
LYMPHOCYTES # BLD AUTO: 1 K/UL (ref 1–4.8)
LYMPHOCYTES NFR BLD: 19.8 % (ref 18–48)
MCH RBC QN AUTO: 29.6 PG (ref 27–31)
MCHC RBC AUTO-ENTMCNC: 32.9 G/DL (ref 32–36)
MCV RBC AUTO: 90 FL (ref 82–98)
MONOCYTES # BLD AUTO: 0.4 K/UL (ref 0.3–1)
MONOCYTES NFR BLD: 7 % (ref 4–15)
NEUTROPHILS # BLD AUTO: 3.6 K/UL (ref 1.8–7.7)
NEUTROPHILS NFR BLD: 69.1 % (ref 38–73)
NRBC BLD-RTO: 0 /100 WBC
PLATELET # BLD AUTO: 246 K/UL (ref 150–450)
PMV BLD AUTO: 9.2 FL (ref 9.2–12.9)
POTASSIUM SERPL-SCNC: 4 MMOL/L (ref 3.5–5.1)
PROT SERPL-MCNC: 7.2 G/DL (ref 6–8.4)
RBC # BLD AUTO: 4.79 M/UL (ref 4–5.4)
SODIUM SERPL-SCNC: 144 MMOL/L (ref 136–145)
WBC # BLD AUTO: 5.26 K/UL (ref 3.9–12.7)

## 2023-04-19 PROCEDURE — 99999 PR PBB SHADOW E&M-EST. PATIENT-LVL IV: CPT | Mod: PBBFAC,,, | Performed by: STUDENT IN AN ORGANIZED HEALTH CARE EDUCATION/TRAINING PROGRAM

## 2023-04-19 PROCEDURE — 96375 TX/PRO/DX INJ NEW DRUG ADDON: CPT

## 2023-04-19 PROCEDURE — 3078F PR MOST RECENT DIASTOLIC BLOOD PRESSURE < 80 MM HG: ICD-10-PCS | Mod: CPTII,S$GLB,, | Performed by: STUDENT IN AN ORGANIZED HEALTH CARE EDUCATION/TRAINING PROGRAM

## 2023-04-19 PROCEDURE — 25000003 PHARM REV CODE 250: Performed by: STUDENT IN AN ORGANIZED HEALTH CARE EDUCATION/TRAINING PROGRAM

## 2023-04-19 PROCEDURE — 1159F PR MEDICATION LIST DOCUMENTED IN MEDICAL RECORD: ICD-10-PCS | Mod: CPTII,S$GLB,, | Performed by: STUDENT IN AN ORGANIZED HEALTH CARE EDUCATION/TRAINING PROGRAM

## 2023-04-19 PROCEDURE — 96413 CHEMO IV INFUSION 1 HR: CPT

## 2023-04-19 PROCEDURE — 3008F PR BODY MASS INDEX (BMI) DOCUMENTED: ICD-10-PCS | Mod: CPTII,S$GLB,, | Performed by: STUDENT IN AN ORGANIZED HEALTH CARE EDUCATION/TRAINING PROGRAM

## 2023-04-19 PROCEDURE — 96367 TX/PROPH/DG ADDL SEQ IV INF: CPT

## 2023-04-19 PROCEDURE — 3077F PR MOST RECENT SYSTOLIC BLOOD PRESSURE >= 140 MM HG: ICD-10-PCS | Mod: CPTII,S$GLB,, | Performed by: STUDENT IN AN ORGANIZED HEALTH CARE EDUCATION/TRAINING PROGRAM

## 2023-04-19 PROCEDURE — 3078F DIAST BP <80 MM HG: CPT | Mod: CPTII,S$GLB,, | Performed by: STUDENT IN AN ORGANIZED HEALTH CARE EDUCATION/TRAINING PROGRAM

## 2023-04-19 PROCEDURE — 3008F BODY MASS INDEX DOCD: CPT | Mod: CPTII,S$GLB,, | Performed by: STUDENT IN AN ORGANIZED HEALTH CARE EDUCATION/TRAINING PROGRAM

## 2023-04-19 PROCEDURE — 80053 COMPREHEN METABOLIC PANEL: CPT | Performed by: STUDENT IN AN ORGANIZED HEALTH CARE EDUCATION/TRAINING PROGRAM

## 2023-04-19 PROCEDURE — 99999 PR PBB SHADOW E&M-EST. PATIENT-LVL IV: ICD-10-PCS | Mod: PBBFAC,,, | Performed by: STUDENT IN AN ORGANIZED HEALTH CARE EDUCATION/TRAINING PROGRAM

## 2023-04-19 PROCEDURE — 1159F MED LIST DOCD IN RCRD: CPT | Mod: CPTII,S$GLB,, | Performed by: STUDENT IN AN ORGANIZED HEALTH CARE EDUCATION/TRAINING PROGRAM

## 2023-04-19 PROCEDURE — 85025 COMPLETE CBC W/AUTO DIFF WBC: CPT | Performed by: STUDENT IN AN ORGANIZED HEALTH CARE EDUCATION/TRAINING PROGRAM

## 2023-04-19 PROCEDURE — 96417 CHEMO IV INFUS EACH ADDL SEQ: CPT

## 2023-04-19 PROCEDURE — 63600175 PHARM REV CODE 636 W HCPCS: Performed by: STUDENT IN AN ORGANIZED HEALTH CARE EDUCATION/TRAINING PROGRAM

## 2023-04-19 PROCEDURE — 3077F SYST BP >= 140 MM HG: CPT | Mod: CPTII,S$GLB,, | Performed by: STUDENT IN AN ORGANIZED HEALTH CARE EDUCATION/TRAINING PROGRAM

## 2023-04-19 PROCEDURE — 99215 PR OFFICE/OUTPT VISIT, EST, LEVL V, 40-54 MIN: ICD-10-PCS | Mod: S$GLB,,, | Performed by: STUDENT IN AN ORGANIZED HEALTH CARE EDUCATION/TRAINING PROGRAM

## 2023-04-19 PROCEDURE — 99215 OFFICE O/P EST HI 40 MIN: CPT | Mod: S$GLB,,, | Performed by: STUDENT IN AN ORGANIZED HEALTH CARE EDUCATION/TRAINING PROGRAM

## 2023-04-19 RX ORDER — SODIUM CHLORIDE 0.9 % (FLUSH) 0.9 %
10 SYRINGE (ML) INJECTION
Status: DISCONTINUED | OUTPATIENT
Start: 2023-04-19 | End: 2023-04-19 | Stop reason: HOSPADM

## 2023-04-19 RX ORDER — SODIUM CHLORIDE 0.9 % (FLUSH) 0.9 %
10 SYRINGE (ML) INJECTION
Status: CANCELLED | OUTPATIENT
Start: 2023-04-19

## 2023-04-19 RX ORDER — HEPARIN 100 UNIT/ML
500 SYRINGE INTRAVENOUS
Status: CANCELLED | OUTPATIENT
Start: 2023-04-19

## 2023-04-19 RX ORDER — HEPARIN 100 UNIT/ML
500 SYRINGE INTRAVENOUS
Status: DISCONTINUED | OUTPATIENT
Start: 2023-04-19 | End: 2023-04-19 | Stop reason: HOSPADM

## 2023-04-19 RX ADMIN — DEXAMETHASONE SODIUM PHOSPHATE 0.25 MG: 4 INJECTION, SOLUTION INTRA-ARTICULAR; INTRALESIONAL; INTRAMUSCULAR; INTRAVENOUS; SOFT TISSUE at 12:04

## 2023-04-19 RX ADMIN — SODIUM CHLORIDE: 9 INJECTION, SOLUTION INTRAVENOUS at 11:04

## 2023-04-19 RX ADMIN — CYCLOPHOSPHAMIDE 1120 MG: 200 INJECTION, SOLUTION INTRAVENOUS at 02:04

## 2023-04-19 RX ADMIN — DOCETAXEL ANHYDROUS 140 MG: 10 INJECTION, SOLUTION INTRAVENOUS at 01:04

## 2023-04-19 RX ADMIN — APREPITANT 130 MG: 130 INJECTION, EMULSION INTRAVENOUS at 12:04

## 2023-04-19 NOTE — TELEPHONE ENCOUNTER
Specialty Pharmacy - Refill Coordination  Specialty Pharmacy - Medication/Referral Authorization    Specialty Medication Orders Linked to Encounter      Flowsheet Row Most Recent Value   Medication #1 COSENTYX PEN, 2 PENS, 150 mg/mL PnIj (Order#[id number], Rx#[id number])            Refill Questions - Documented Responses      Flowsheet Row Most Recent Value   Patient Availability and HIPAA Verification    Does patient want to proceed with activity? Yes   HIPAA/medical authority confirmed? Yes   Relationship to patient of person spoken to? Self   Refill Screening Questions    Changes to allergies? No   Changes to medications? No   New conditions since last clinic visit? No   Unplanned office visit, urgent care, ED, or hospital admission in the last 4 weeks? No   How does patient/caregiver feel medication is working? Good   Financial problems or insurance changes? No   How many doses of your specialty medications were missed in the last 4 weeks? 0   Would patient like to speak to a pharmacist? No   When does the patient need to receive the medication? 04/20/23   Refill Delivery Questions    How will the patient receive the medication? [facility]   When does the patient need to receive the medication? 04/20/23   Shipping Address Home   Address in [facility] confirmed and updated if neccessary? Yes   Expected Copay ($) 438.18   Is the patient able to afford the medication copay? Yes   Payment Method  CC on file   Days supply of Refill 28   Supplies needed? No supplies needed   Refill activity completed? Yes   Refill activity plan Refill scheduled   Shipment/Pickup Date: 04/19/23            Current Outpatient Medications   Medication Sig    acetaminophen (TYLENOL ORAL) Take by mouth as needed.    apixaban (ELIQUIS) 5 mg Tab Take 1 tablet (5 mg total) by mouth 2 (two) times daily. Will hold 2/13 & 2/14    atorvastatin (LIPITOR) 20 MG tablet Take 1 tablet (20 mg total) by mouth every evening.    B-complex  with vitamin C (VITAMIN B COMPLEX-C ORAL) Take by mouth Daily.    calcium-vitamin D 250-100 mg-unit per tablet Take 1 tablet by mouth 2 (two) times daily.    COSENTYX PEN, 2 PENS, 150 mg/mL PnIj Inject 300mg (2 pens) into the skin every 4 weeks    cyanocobalamin 500 MCG tablet Take 500 mcg by mouth once daily.    diazePAM (VALIUM) 5 MG tablet Take 1 tablet (5 mg total) by mouth daily as needed for Anxiety.    fluticasone-umeclidin-vilanter (TRELEGY ELLIPTA) 100-62.5-25 mcg DsDv Inhale 1 puff into the lungs once daily.    multivitamin (THERAGRAN) per tablet Take 1 tablet by mouth once daily.    multivitamin with minerals (HAIR,SKIN AND NAILS ORAL) Take by mouth.    mv-mn/iron/folic acid/herb 190 (VITAMIN D3 COMPLETE ORAL) Take by mouth.    omeprazole (PRILOSEC) 40 MG capsule Take 1 capsule (40 mg total) by mouth every morning.    ondansetron (ZOFRAN) 8 MG tablet Take 1 tablet (8 mg total) by mouth every 12 (twelve) hours as needed for Nausea.    oxyCODONE-acetaminophen (PERCOCET)  mg per tablet Take 1 tablet by mouth every 4 (four) hours as needed for Pain.    traZODone (DESYREL) 100 MG tablet Take 1 tablet (100 mg total) by mouth nightly as needed for Insomnia.    venlafaxine (EFFEXOR-XR) 75 MG 24 hr capsule Take 1 capsule (75 mg total) by mouth once daily. Start on Week 2   Last reviewed on 4/5/2023  4:37 PM by Petra Norton MA    Review of patient's allergies indicates:   Allergen Reactions    Succinimides Anaphylaxis     Son has     Last reviewed on  4/18/2023 10:09 AM by Petra Norton      Tasks added this encounter   5/11/2023 - Refill Call (Auto Added)   Tasks due within next 3 months   4/18/2023 - Referral Authorization - Reauthorization     Karlene Jurado [facility]  [address] [place] [id number]  Phone: [phone]  Fax: [phone]

## 2023-04-19 NOTE — PROGRESS NOTES
Oncology Clinic   Progress Note    Patient: Lucia Matias  MRN: [id number]  Date: 2023    Chief Complaint: HR+ breast cancer    Ms. Matias is a domo 63yo woman with recently diagnosed HR+ breast cancer who presents today for evaluation. Her oncologic history is as follows:    Oncologic History:   22: annual mammogram indentified left focal asymmetry at the upper outer position.   Follow-up mammogram and ultrasound on 22 showed a worrisome spiculated mass, 1.4 x 0.7 x 0.6 cm, 12 o'clock left breast 7 CMFN. An ultrasound guided biopsy was performed on 12/15/22 with pathology revealing infiltrating ductal carcinoma of the breast. Grade 2, ER >95%, CO 85-90%, Her2 1+, Ki67 25-30%  2023: MRI breast shwoed Left breast 12 mm x 11 mm x 7 mm mass at the middle 12 o'clock position. Several pulmonary nodules are noted incidentally in the left hemithorax.  The patient has a history of bilateral pulmonary nodule seen on previous thoracic CT exams most recently in .  One of the nodules underwent biopsy in  with benign results.    Underwent b/l mastectomies with SLN bx 2/15/2023 with bilateral breast reconstruction with abdominally based free flaps. He postoperative course was complicated by L pneumothorax.  Post op path showed Invasive lobular carcinoma in left breast grade 2 measuring 18 mm with LCIS Grade 2 ER CO positive and Her2 negative. pT1c pN0. Oncotype 35    GYN History:  Age of menarche was 13. Age of menopause was 46.  Patient denies hormonal therapy but took OCP for approximately 15 years in the past. Patient is . Age of first live birth was 25. Patient did breast feed for 6 months. S/p uterine ablation for fibroids in early 40s, no menstrual cycle since. Had menopausal symptoms in mid-late 40s.     Other Medical hx:   HTN HL RA sleeve gastrectomy COPD  AFL s/p RFA    Family hx:  Patient is adopted, no FH that she is aware of    Interval History:  Ms. Matias returns  today for follow up. She reports feeling anxious about starting treatment, especially after the confusion surrounding authorization this morning, but is ready to get started. Notes that her breast incisions are now healing well. Denies new complaints today otherwise.         Medications:  Current Outpatient Medications   Medication Sig Dispense Refill    acetaminophen (TYLENOL ORAL) Take by mouth as needed.      apixaban (ELIQUIS) 5 mg Tab Take 1 tablet (5 mg total) by mouth 2 (two) times daily. Will hold 2/13 & 2/14 60 tablet 5    atorvastatin (LIPITOR) 20 MG tablet Take 1 tablet (20 mg total) by mouth every evening. 90 tablet 3    B-complex with vitamin C (VITAMIN B COMPLEX-C ORAL) Take by mouth Daily.      calcium-vitamin D 250-100 mg-unit per tablet Take 1 tablet by mouth 2 (two) times daily.      COSENTYX PEN, 2 PENS, 150 mg/mL PnIj Inject 300mg (2 pens) into the skin every 4 weeks 2 mL 11    cyanocobalamin 500 MCG tablet Take 500 mcg by mouth once daily.      diazePAM (VALIUM) 5 MG tablet Take 1 tablet (5 mg total) by mouth daily as needed for Anxiety. 30 tablet 2    fluticasone-umeclidin-vilanter (TRELEGY ELLIPTA) 100-62.5-25 mcg DsDv Inhale 1 puff into the lungs once daily. 180 each 3    multivitamin (THERAGRAN) per tablet Take 1 tablet by mouth once daily.      multivitamin with minerals (HAIR,SKIN AND NAILS ORAL) Take by mouth.      mv-mn/iron/folic acid/herb 190 (VITAMIN D3 COMPLETE ORAL) Take by mouth.      omeprazole (PRILOSEC) 40 MG capsule Take 1 capsule (40 mg total) by mouth every morning. 90 capsule 1    ondansetron (ZOFRAN) 8 MG tablet Take 1 tablet (8 mg total) by mouth every 12 (twelve) hours as needed for Nausea. 30 tablet 2    oxyCODONE-acetaminophen (PERCOCET)  mg per tablet Take 1 tablet by mouth every 4 (four) hours as needed for Pain. 10 tablet 0    traZODone (DESYREL) 100 MG tablet Take 1 tablet (100 mg total) by mouth nightly as needed for Insomnia. 90 tablet 3    venlafaxine  "(EFFEXOR-XR) 75 MG 24 hr capsule Take 1 capsule (75 mg total) by mouth once daily. Start on Week 2 90 capsule 3     No current facility-administered medications for this visit.     Facility-Administered Medications Ordered in Other Visits   Medication Dose Route Frequency Provider Last Rate Last Admin    alteplase injection 2 mg  2 mg Intra-Catheter PRN Roya Madrid MD        cycloPHOSphamide 600 mg/m2 = 1,120 mg in sodium chloride 0.9% 290.6 mL chemo infusion  600 mg/m2 (Treatment Plan Recorded) Intravenous 1 time in Clinic/HOD Roya Madrid .6 mL/hr at 04/19/23 1415 1,120 mg at 04/19/23 1415    heparin, porcine (PF) 100 unit/mL injection flush 500 Units  500 Units Intravenous PRN Roya Madrid MD        lactated ringers infusion   Intravenous Continuous Yahaira Barnard MD   New Bag at 03/29/23 0638    LIDOcaine (PF) 10 mg/ml (1%) injection 5 mg  0.5 mL Intradermal Once Yahaira Barnard MD        LIDOcaine HCL 10 mg/ml (1%) 50 mL, EPINEPHrine 1 mg in lactated Ringers 1,000 mL irrigation   Irrigation On Call Procedure Ming Milian MD        sodium chloride 0.9% flush 10 mL  10 mL Intravenous PRN Roya Madrid MD         Review of Systems:  + anxiety  12pt ROS negative except as noted above        Objective:     Vitals:    04/19/23 1057   BP: (!) 146/63   BP Location: Right arm   Patient Position: Sitting   BP Method: Large (Automatic)   Pulse: (!) 111   Resp: 18   SpO2: 98%   Weight: 80.2 kg (176 lb 11.2 oz)   Height: 5' 2" (1.575 m)       BMI: Body mass index is 32.32 kg/m².     Physical Exam:  ECOG 0   General: well appearing, in no apparent distress  HEENT: Normocephalic, EOMI, anicteric sclerae, MMM  Neck: supple, without cervical or supraclavicular lymphadenopathy.  Heart: regular rate and rhythm, normal S1 and S2, no murmurs, gallops or rubs.  Lungs: Clear to auscultation bilaterally, no increased wob  Breast: s/p bilateral mastectomy with flap reconstruction, bilateral breast " incisions healing well and c/d/i  Abdomen: Soft, nontender, nondistended with normal bowel sounds. Abdominal incision c/d/I. No hepatosplenomegaly.  Extremities: No LE edema or joint effusion  Skin: warm, well-perfused, no rash  Neurologic: Alert and oriented x 4, normal speech and gait   Psychiatric: Conversing appropriately with providers throughout today's encounter.    Laboratory Data:  Lab Visit on 04/19/2023   Component Date Value    WBC 04/19/2023 5.26     RBC 04/19/2023 4.79     Hemoglobin 04/19/2023 14.2     Hematocrit 04/19/2023 43.2     MCV 04/19/2023 90     MCH 04/19/2023 29.6     MCHC 04/19/2023 32.9     RDW 04/19/2023 13.1     Platelets 04/19/2023 246     MPV 04/19/2023 9.2     Immature Granulocytes 04/19/2023 0.4     Gran # (ANC) 04/19/2023 3.6     Immature Grans (Abs) 04/19/2023 0.02     Lymph # 04/19/2023 1.0     Mono # 04/19/2023 0.4     Eos # 04/19/2023 0.1     Baso # 04/19/2023 0.05     nRBC 04/19/2023 0     Gran % 04/19/2023 69.1     Lymph % 04/19/2023 19.8     Mono % 04/19/2023 7.0     Eosinophil % 04/19/2023 2.7     Basophil % 04/19/2023 1.0     Differential Method 04/19/2023 Automated     Sodium 04/19/2023 144     Potassium 04/19/2023 4.0     Chloride 04/19/2023 106     CO2 04/19/2023 30 (H)     Glucose 04/19/2023 112 (H)     BUN 04/19/2023 13     Creatinine 04/19/2023 0.9     Calcium 04/19/2023 9.8     Total Protein 04/19/2023 7.2     Albumin 04/19/2023 3.7     Total Bilirubin 04/19/2023 0.7     Alkaline Phosphatase 04/19/2023 107     AST 04/19/2023 20     ALT 04/19/2023 19     Anion Gap 04/19/2023 8     eGFR 04/19/2023 >60.0    Hospital Outpatient Visit on 04/13/2023   Component Date Value    BSA 04/13/2023 1.88     TDI SEPTAL 04/13/2023 0.07     LV LATERAL E/E' RATIO 04/13/2023 10.60     LV SEPTAL E/E' RATIO 04/13/2023 7.57     LA WIDTH 04/13/2023 4.10     IVC diameter 04/13/2023 1.25     Left Ventricular Outflow* 04/13/2023 0.59     Left Ventricular Outflow* 04/13/2023 1.58     Pulmonary  Valve Mean Titi* 04/13/2023 0.43     TDI LATERAL 04/13/2023 0.05     PV PEAK VELOCITY 04/13/2023 0.62     LVIDd 04/13/2023 3.82     IVS 04/13/2023 0.78     Posterior Wall 04/13/2023 0.91     LVIDs 04/13/2023 2.43     FS 04/13/2023 36     LA volume 04/13/2023 49.25     Sinus 04/13/2023 2.10     STJ 04/13/2023 2.13     Ascending aorta 04/13/2023 3.18     LV mass 04/13/2023 93.40     LA size 04/13/2023 2.90     RVDD 04/13/2023 3.22     TAPSE 04/13/2023 1.50     Left Ventricle Relative * 04/13/2023 0.48     AV mean gradient 04/13/2023 3     AV valve area 04/13/2023 2.33     AV Velocity Ratio 04/13/2023 0.78     AV index (prosthetic) 04/13/2023 0.76     MV valve area p 1/2 meth* 04/13/2023 2.93     E/A ratio 04/13/2023 0.75     Mean e' 04/13/2023 0.06     E wave deceleration time 04/13/2023 258.92     IVRT 04/13/2023 125.59     Pulm vein S/D ratio 04/13/2023 1.67     LVOT diameter 04/13/2023 1.97     LVOT area 04/13/2023 3.0     LVOT peak titi 04/13/2023 0.84     LVOT peak VTI 04/13/2023 16.90     Ao peak titi 04/13/2023 1.08     Ao VTI 04/13/2023 22.1     LVOT stroke volume 04/13/2023 51.49     AV peak gradient 04/13/2023 5     E/E' ratio 04/13/2023 8.83     MV Peak E Titi 04/13/2023 0.53     TR Max Titi 04/13/2023 2.30     MV stenosis pressure 1/2* 04/13/2023 75.09     MV Peak A Titi 04/13/2023 0.71     PV Peak S Titi 04/13/2023 0.60     PV Peak D Titi 04/13/2023 0.36     LV Systolic Volume 04/13/2023 20.82     LV Systolic Volume Index 04/13/2023 11.4     LV Diastolic Volume 04/13/2023 62.58     LV Diastolic Volume Index 04/13/2023 34.38     LA Volume Index 04/13/2023 27.1     LV Mass Index 04/13/2023 51     RA Major Axis 04/13/2023 4.72     Left Atrium Minor Axis 04/13/2023 4.77     Left Atrium Major Axis 04/13/2023 4.98     Triscuspid Valve Regurgi* 04/13/2023 21     LA Volume Index (Mod) 04/13/2023 26.9     LA volume (mod) 04/13/2023 49.00     EF 04/13/2023 60    I personally reviewed all recent labs, imaging and  pathology.    Assessment and Plan:   Ms. Matias is a domo 61yo woman with hx of Aflutter s/p ablation, COPD, RA and recently diagnosed Stage IA HR+ breast cancer s/p bilateral mastectomy with reconstruction who presents today for evaluation.     Given her Oncotype of 35, we have decided to proceed with adjuvant docetaxel 75mg/m2 and cyclophosphamide 600mg/m2 iv every 21 days for a total of 4 cycles.     #Breast cancer:  --labs today reviewed and ok to proceed with C1D1 TC as scheduled  --reviewed use of anti-emetics and steroids  --following completion of chemotherapy will plan to start ET with anastrozole  --of note, osteopenia noted on DEXA 12/2018- pt on Ca/VitD. Will plan to repeat prior to ET start and discuss role of bisphosphonate if needed  --enrolled in [facility]  --RTC in 3 weeks    #Psoriasis/psoriatic arthritis:  --encouraged holding cosentyx while on chemotherapy; will reach out to dermatology if symptoms worsening      All questions were answered to her apparent satisfaction. Will plan to see her back in 3 weeks or sooner should the need arise.     Roya Madrid MD      Med Onc Chart Routing      Follow up with physician 3 weeks. as scheduled   Follow up with LINDY    Infusion scheduling note as scheduled- q3 weeks for total of 4 infusions   Injection scheduling note needs Day 2 injection x4   Labs CBC and CMP   Scheduling: Labs same day as infusion  Preferred lab:  Lab interval: every 3 weeks     Imaging    Pharmacy appointment    Other referrals

## 2023-04-19 NOTE — PLAN OF CARE
1520-Pt tolerated C1D1 Taxotere/Cytoxan infusions well, no complications or side effects, POC and new meds discussed with pt, new pt chemo binder given to pt, pt aware of upcoming appts, pt knows to call MD with any questions or concerns and/or go to ER with any adverse reactions, pt verbalized understanding. Pt ambulated off unit, no distress noted.

## 2023-04-20 ENCOUNTER — INFUSION (OUTPATIENT)
Dept: INFUSION THERAPY | Facility: HOSPITAL | Age: 63
End: 2023-04-20
Payer: COMMERCIAL

## 2023-04-20 ENCOUNTER — PATIENT MESSAGE (OUTPATIENT)
Dept: ADMINISTRATIVE | Facility: OTHER | Age: 63
End: 2023-04-20
Payer: COMMERCIAL

## 2023-04-20 DIAGNOSIS — C50.112 MALIGNANT NEOPLASM OF CENTRAL PORTION OF LEFT BREAST IN FEMALE, ESTROGEN RECEPTOR POSITIVE: Primary | ICD-10-CM

## 2023-04-20 DIAGNOSIS — Z17.0 MALIGNANT NEOPLASM OF CENTRAL PORTION OF LEFT BREAST IN FEMALE, ESTROGEN RECEPTOR POSITIVE: Primary | ICD-10-CM

## 2023-04-20 PROCEDURE — 63600175 PHARM REV CODE 636 W HCPCS: Mod: JZ,JG | Performed by: STUDENT IN AN ORGANIZED HEALTH CARE EDUCATION/TRAINING PROGRAM

## 2023-04-20 PROCEDURE — 96372 THER/PROPH/DIAG INJ SC/IM: CPT

## 2023-04-20 RX ADMIN — PEGFILGRASTIM-CBQV 6 MG: 6 INJECTION, SOLUTION SUBCUTANEOUS at 12:04

## 2023-04-21 ENCOUNTER — PATIENT MESSAGE (OUTPATIENT)
Dept: ADMINISTRATIVE | Facility: OTHER | Age: 63
End: 2023-04-21
Payer: COMMERCIAL

## 2023-04-22 ENCOUNTER — PATIENT MESSAGE (OUTPATIENT)
Dept: ADMINISTRATIVE | Facility: OTHER | Age: 63
End: 2023-04-22
Payer: COMMERCIAL

## 2023-04-23 ENCOUNTER — PATIENT MESSAGE (OUTPATIENT)
Dept: ADMINISTRATIVE | Facility: OTHER | Age: 63
End: 2023-04-23
Payer: COMMERCIAL

## 2023-04-24 ENCOUNTER — HOSPITAL ENCOUNTER (INPATIENT)
Facility: OTHER | Age: 63
LOS: 5 days | Discharge: HOME HEALTH | DRG: 809 | End: 2023-04-29
Attending: HOSPITALIST | Admitting: HOSPITALIST
Payer: COMMERCIAL

## 2023-04-24 ENCOUNTER — HOSPITAL ENCOUNTER (EMERGENCY)
Facility: HOSPITAL | Age: 63
Discharge: ANOTHER HOSPITAL | End: 2023-04-24
Attending: FAMILY MEDICINE | Admitting: FAMILY MEDICINE
Payer: COMMERCIAL

## 2023-04-24 ENCOUNTER — PATIENT MESSAGE (OUTPATIENT)
Dept: ADMINISTRATIVE | Facility: OTHER | Age: 63
End: 2023-04-24
Payer: COMMERCIAL

## 2023-04-24 ENCOUNTER — TELEPHONE (OUTPATIENT)
Dept: HEMATOLOGY/ONCOLOGY | Facility: CLINIC | Age: 63
End: 2023-04-24
Payer: COMMERCIAL

## 2023-04-24 VITALS
WEIGHT: 172 LBS | TEMPERATURE: 99 F | SYSTOLIC BLOOD PRESSURE: 134 MMHG | RESPIRATION RATE: 18 BRPM | DIASTOLIC BLOOD PRESSURE: 66 MMHG | OXYGEN SATURATION: 99 % | HEART RATE: 83 BPM | BODY MASS INDEX: 31.46 KG/M2

## 2023-04-24 DIAGNOSIS — Z17.0 MALIGNANT NEOPLASM OF CENTRAL PORTION OF LEFT BREAST IN FEMALE, ESTROGEN RECEPTOR POSITIVE: Primary | ICD-10-CM

## 2023-04-24 DIAGNOSIS — B96.20 BACTEREMIA, ESCHERICHIA COLI: ICD-10-CM

## 2023-04-24 DIAGNOSIS — C50.112 MALIGNANT NEOPLASM OF CENTRAL PORTION OF LEFT BREAST IN FEMALE, ESTROGEN RECEPTOR POSITIVE: Primary | ICD-10-CM

## 2023-04-24 DIAGNOSIS — R50.81 NEUTROPENIC FEVER: Primary | ICD-10-CM

## 2023-04-24 DIAGNOSIS — R50.81 NEUTROPENIC FEVER: ICD-10-CM

## 2023-04-24 DIAGNOSIS — R00.0 TACHYCARDIA: ICD-10-CM

## 2023-04-24 DIAGNOSIS — D70.9 NEUTROPENIC FEVER: Primary | ICD-10-CM

## 2023-04-24 DIAGNOSIS — D70.9 NEUTROPENIC FEVER: ICD-10-CM

## 2023-04-24 DIAGNOSIS — R78.81 BACTEREMIA, ESCHERICHIA COLI: ICD-10-CM

## 2023-04-24 DIAGNOSIS — M25.50 ARTHRALGIA, UNSPECIFIED JOINT: ICD-10-CM

## 2023-04-24 PROBLEM — R21 RASH IN ADULT: Status: ACTIVE | Noted: 2023-04-24

## 2023-04-24 LAB
ANION GAP SERPL CALC-SCNC: 7 MMOL/L (ref 8–16)
BASOPHILS # BLD AUTO: ABNORMAL K/UL (ref 0–0.2)
BASOPHILS NFR BLD: 0 % (ref 0–1.9)
CALCIUM SERPL-MCNC: 8.8 MG/DL (ref 8.7–10.5)
CHLORIDE SERPL-SCNC: 107 MMOL/L (ref 95–110)
CO2 SERPL-SCNC: 24 MMOL/L (ref 23–29)
CREAT SERPL-MCNC: 0.73 MG/DL (ref 0.5–1.4)
DIFFERENTIAL METHOD: ABNORMAL
EOSINOPHIL # BLD AUTO: ABNORMAL K/UL (ref 0–0.5)
EOSINOPHIL NFR BLD: 7 % (ref 0–8)
ERYTHROCYTE [DISTWIDTH] IN BLOOD BY AUTOMATED COUNT: 12.6 % (ref 11.5–14.5)
EST. GFR  (NO RACE VARIABLE): >60 ML/MIN/1.73 M^2
GLUCOSE SERPL-MCNC: 94 MG/DL (ref 70–110)
HCT VFR BLD AUTO: 38.4 % (ref 37–48.5)
HGB BLD-MCNC: 13.1 G/DL (ref 12–16)
IMM GRANULOCYTES # BLD AUTO: ABNORMAL K/UL (ref 0–0.04)
IMM GRANULOCYTES NFR BLD AUTO: ABNORMAL % (ref 0–0.5)
LYMPHOCYTES # BLD AUTO: ABNORMAL K/UL (ref 1–4.8)
LYMPHOCYTES NFR BLD: 68 % (ref 18–48)
MAGNESIUM SERPL-MCNC: 1.5 MG/DL (ref 1.6–2.6)
MCH RBC QN AUTO: 29.1 PG (ref 27–31)
MCHC RBC AUTO-ENTMCNC: 34.1 G/DL (ref 32–36)
MCV RBC AUTO: 85 FL (ref 82–98)
MONOCYTES # BLD AUTO: ABNORMAL K/UL (ref 0.3–1)
MONOCYTES NFR BLD: 5 % (ref 4–15)
NEUTROPHILS NFR BLD: 20 % (ref 38–73)
NRBC BLD-RTO: 0 /100 WBC
PLATELET # BLD AUTO: 170 K/UL (ref 150–450)
PLATELET BLD QL SMEAR: ABNORMAL
PMV BLD AUTO: 9.9 FL (ref 9.2–12.9)
POTASSIUM SERPL-SCNC: 3.8 MMOL/L (ref 3.5–5.1)
PROCALCITONIN SERPL IA-MCNC: 0.05 NG/ML
RBC # BLD AUTO: 4.5 M/UL (ref 4–5.4)
SODIUM SERPL-SCNC: 138 MMOL/L (ref 136–145)
UUN UR-MCNC: 17 MG/DL (ref 7–17)
WBC # BLD AUTO: 0.7 K/UL (ref 3.9–12.7)

## 2023-04-24 PROCEDURE — 25000003 PHARM REV CODE 250: Performed by: NURSE PRACTITIONER

## 2023-04-24 PROCEDURE — 87040 BLOOD CULTURE FOR BACTERIA: CPT | Mod: ER | Performed by: PHYSICIAN ASSISTANT

## 2023-04-24 PROCEDURE — 96366 THER/PROPH/DIAG IV INF ADDON: CPT | Mod: ER

## 2023-04-24 PROCEDURE — 87186 SC STD MICRODIL/AGAR DIL: CPT | Mod: ER | Performed by: PHYSICIAN ASSISTANT

## 2023-04-24 PROCEDURE — 85007 BL SMEAR W/DIFF WBC COUNT: CPT | Mod: ER | Performed by: PHYSICIAN ASSISTANT

## 2023-04-24 PROCEDURE — 96375 TX/PRO/DX INJ NEW DRUG ADDON: CPT | Mod: ER

## 2023-04-24 PROCEDURE — 84145 PROCALCITONIN (PCT): CPT | Performed by: NURSE PRACTITIONER

## 2023-04-24 PROCEDURE — 27000207 HC ISOLATION

## 2023-04-24 PROCEDURE — 85027 COMPLETE CBC AUTOMATED: CPT | Mod: ER | Performed by: PHYSICIAN ASSISTANT

## 2023-04-24 PROCEDURE — 96365 THER/PROPH/DIAG IV INF INIT: CPT | Mod: ER

## 2023-04-24 PROCEDURE — 11000001 HC ACUTE MED/SURG PRIVATE ROOM

## 2023-04-24 PROCEDURE — 99223 PR INITIAL HOSPITAL CARE,LEVL III: ICD-10-PCS | Mod: ,,, | Performed by: NURSE PRACTITIONER

## 2023-04-24 PROCEDURE — 99223 1ST HOSP IP/OBS HIGH 75: CPT | Mod: ,,, | Performed by: NURSE PRACTITIONER

## 2023-04-24 PROCEDURE — 25000003 PHARM REV CODE 250: Mod: ER | Performed by: PHYSICIAN ASSISTANT

## 2023-04-24 PROCEDURE — 87154 CUL TYP ID BLD PTHGN 6+ TRGT: CPT | Mod: ER | Performed by: PHYSICIAN ASSISTANT

## 2023-04-24 PROCEDURE — 80048 BASIC METABOLIC PNL TOTAL CA: CPT | Mod: ER | Performed by: PHYSICIAN ASSISTANT

## 2023-04-24 PROCEDURE — 99285 EMERGENCY DEPT VISIT HI MDM: CPT | Mod: ER

## 2023-04-24 PROCEDURE — 96372 THER/PROPH/DIAG INJ SC/IM: CPT | Mod: 59 | Performed by: PHYSICIAN ASSISTANT

## 2023-04-24 PROCEDURE — 83735 ASSAY OF MAGNESIUM: CPT | Performed by: NURSE PRACTITIONER

## 2023-04-24 PROCEDURE — 25000003 PHARM REV CODE 250: Performed by: HOSPITALIST

## 2023-04-24 PROCEDURE — 63600175 PHARM REV CODE 636 W HCPCS: Performed by: NURSE PRACTITIONER

## 2023-04-24 PROCEDURE — 63600175 PHARM REV CODE 636 W HCPCS: Mod: ER | Performed by: PHYSICIAN ASSISTANT

## 2023-04-24 PROCEDURE — 87077 CULTURE AEROBIC IDENTIFY: CPT | Mod: ER | Performed by: PHYSICIAN ASSISTANT

## 2023-04-24 PROCEDURE — 36415 COLL VENOUS BLD VENIPUNCTURE: CPT | Performed by: NURSE PRACTITIONER

## 2023-04-24 RX ORDER — ONDANSETRON 2 MG/ML
4 INJECTION INTRAMUSCULAR; INTRAVENOUS EVERY 8 HOURS PRN
Status: DISCONTINUED | OUTPATIENT
Start: 2023-04-24 | End: 2023-04-29 | Stop reason: HOSPADM

## 2023-04-24 RX ORDER — HYDROMORPHONE HYDROCHLORIDE 1 MG/ML
1 INJECTION, SOLUTION INTRAMUSCULAR; INTRAVENOUS; SUBCUTANEOUS EVERY 4 HOURS PRN
Status: DISCONTINUED | OUTPATIENT
Start: 2023-04-24 | End: 2023-04-29 | Stop reason: HOSPADM

## 2023-04-24 RX ORDER — AMOXICILLIN 250 MG
1 CAPSULE ORAL 2 TIMES DAILY
Status: DISCONTINUED | OUTPATIENT
Start: 2023-04-24 | End: 2023-04-29 | Stop reason: HOSPADM

## 2023-04-24 RX ORDER — DIPHENHYDRAMINE HCL 25 MG
25 CAPSULE ORAL EVERY 6 HOURS PRN
Status: DISCONTINUED | OUTPATIENT
Start: 2023-04-24 | End: 2023-04-29 | Stop reason: HOSPADM

## 2023-04-24 RX ORDER — ATORVASTATIN CALCIUM 20 MG/1
20 TABLET, FILM COATED ORAL NIGHTLY
Status: DISCONTINUED | OUTPATIENT
Start: 2023-04-24 | End: 2023-04-29 | Stop reason: HOSPADM

## 2023-04-24 RX ORDER — FERROUS SULFATE, DRIED 160(50) MG
1 TABLET, EXTENDED RELEASE ORAL 2 TIMES DAILY
Status: DISCONTINUED | OUTPATIENT
Start: 2023-04-24 | End: 2023-04-29 | Stop reason: HOSPADM

## 2023-04-24 RX ORDER — MAGNESIUM SULFATE HEPTAHYDRATE 40 MG/ML
2 INJECTION, SOLUTION INTRAVENOUS ONCE
Status: COMPLETED | OUTPATIENT
Start: 2023-04-24 | End: 2023-04-24

## 2023-04-24 RX ORDER — DIPHENHYDRAMINE HYDROCHLORIDE 50 MG/ML
25 INJECTION INTRAMUSCULAR; INTRAVENOUS
Status: COMPLETED | OUTPATIENT
Start: 2023-04-24 | End: 2023-04-24

## 2023-04-24 RX ORDER — MORPHINE SULFATE 4 MG/ML
2 INJECTION, SOLUTION INTRAMUSCULAR; INTRAVENOUS
Status: COMPLETED | OUTPATIENT
Start: 2023-04-24 | End: 2023-04-24

## 2023-04-24 RX ORDER — ACETAMINOPHEN AND CODEINE PHOSPHATE 300; 30 MG/1; MG/1
1 TABLET ORAL EVERY 4 HOURS PRN
Status: DISCONTINUED | OUTPATIENT
Start: 2023-04-24 | End: 2023-04-26

## 2023-04-24 RX ORDER — MUPIROCIN 20 MG/G
OINTMENT TOPICAL 2 TIMES DAILY
Status: DISCONTINUED | OUTPATIENT
Start: 2023-04-24 | End: 2023-04-29 | Stop reason: HOSPADM

## 2023-04-24 RX ORDER — PANTOPRAZOLE SODIUM 40 MG/1
40 TABLET, DELAYED RELEASE ORAL DAILY
Status: DISCONTINUED | OUTPATIENT
Start: 2023-04-25 | End: 2023-04-29 | Stop reason: HOSPADM

## 2023-04-24 RX ORDER — TRAZODONE HYDROCHLORIDE 100 MG/1
100 TABLET ORAL NIGHTLY PRN
Status: DISCONTINUED | OUTPATIENT
Start: 2023-04-24 | End: 2023-04-29 | Stop reason: HOSPADM

## 2023-04-24 RX ORDER — POLYETHYLENE GLYCOL 3350 17 G/17G
17 POWDER, FOR SOLUTION ORAL 2 TIMES DAILY PRN
Status: DISCONTINUED | OUTPATIENT
Start: 2023-04-24 | End: 2023-04-29 | Stop reason: HOSPADM

## 2023-04-24 RX ORDER — ACETAMINOPHEN 325 MG/1
650 TABLET ORAL EVERY 4 HOURS PRN
Status: DISCONTINUED | OUTPATIENT
Start: 2023-04-24 | End: 2023-04-29 | Stop reason: HOSPADM

## 2023-04-24 RX ORDER — DIAZEPAM 5 MG/1
5 TABLET ORAL DAILY PRN
Status: DISCONTINUED | OUTPATIENT
Start: 2023-04-24 | End: 2023-04-29 | Stop reason: HOSPADM

## 2023-04-24 RX ORDER — ACETAMINOPHEN 325 MG/1
650 TABLET ORAL
Status: COMPLETED | OUTPATIENT
Start: 2023-04-24 | End: 2023-04-24

## 2023-04-24 RX ORDER — KETOROLAC TROMETHAMINE 30 MG/ML
15 INJECTION, SOLUTION INTRAMUSCULAR; INTRAVENOUS
Status: COMPLETED | OUTPATIENT
Start: 2023-04-24 | End: 2023-04-24

## 2023-04-24 RX ORDER — BISACODYL 10 MG
10 SUPPOSITORY, RECTAL RECTAL DAILY PRN
Status: DISCONTINUED | OUTPATIENT
Start: 2023-04-24 | End: 2023-04-29 | Stop reason: HOSPADM

## 2023-04-24 RX ORDER — ACETAMINOPHEN AND CODEINE PHOSPHATE 300; 30 MG/1; MG/1
1 TABLET ORAL EVERY 4 HOURS PRN
COMMUNITY
Start: 2023-03-15 | End: 2023-05-05

## 2023-04-24 RX ORDER — METHOCARBAMOL 500 MG/1
1500 TABLET, FILM COATED ORAL
Status: COMPLETED | OUTPATIENT
Start: 2023-04-24 | End: 2023-04-24

## 2023-04-24 RX ORDER — VENLAFAXINE HYDROCHLORIDE 37.5 MG/1
75 CAPSULE, EXTENDED RELEASE ORAL DAILY
Status: DISCONTINUED | OUTPATIENT
Start: 2023-04-25 | End: 2023-04-29 | Stop reason: HOSPADM

## 2023-04-24 RX ORDER — CYANOCOBALAMIN (VITAMIN B-12) 250 MCG
500 TABLET ORAL DAILY
Status: DISCONTINUED | OUTPATIENT
Start: 2023-04-25 | End: 2023-04-29 | Stop reason: HOSPADM

## 2023-04-24 RX ORDER — FLUTICASONE FUROATE AND VILANTEROL 200; 25 UG/1; UG/1
1 POWDER RESPIRATORY (INHALATION) DAILY
Status: DISCONTINUED | OUTPATIENT
Start: 2023-04-25 | End: 2023-04-29 | Stop reason: HOSPADM

## 2023-04-24 RX ORDER — SODIUM CHLORIDE 0.9 % (FLUSH) 0.9 %
10 SYRINGE (ML) INJECTION
Status: DISCONTINUED | OUTPATIENT
Start: 2023-04-24 | End: 2023-04-29 | Stop reason: HOSPADM

## 2023-04-24 RX ORDER — DIPHENHYDRAMINE HYDROCHLORIDE 50 MG/ML
12.5 INJECTION INTRAMUSCULAR; INTRAVENOUS
Status: COMPLETED | OUTPATIENT
Start: 2023-04-24 | End: 2023-04-24

## 2023-04-24 RX ORDER — DIPHENHYDRAMINE HYDROCHLORIDE 50 MG/ML
25 INJECTION INTRAMUSCULAR; INTRAVENOUS
Status: DISCONTINUED | OUTPATIENT
Start: 2023-04-24 | End: 2023-04-24

## 2023-04-24 RX ORDER — DIPHENHYDRAMINE HYDROCHLORIDE 50 MG/ML
12.5 INJECTION INTRAMUSCULAR; INTRAVENOUS
Status: DISCONTINUED | OUTPATIENT
Start: 2023-04-24 | End: 2023-04-24

## 2023-04-24 RX ORDER — ONDANSETRON 8 MG/1
8 TABLET, ORALLY DISINTEGRATING ORAL EVERY 8 HOURS PRN
Status: DISCONTINUED | OUTPATIENT
Start: 2023-04-24 | End: 2023-04-29 | Stop reason: HOSPADM

## 2023-04-24 RX ORDER — SODIUM CHLORIDE 9 MG/ML
INJECTION, SOLUTION INTRAVENOUS CONTINUOUS
Status: DISCONTINUED | OUTPATIENT
Start: 2023-04-24 | End: 2023-04-26

## 2023-04-24 RX ADMIN — VANCOMYCIN HYDROCHLORIDE 2000 MG: 1 INJECTION, POWDER, LYOPHILIZED, FOR SOLUTION INTRAVENOUS at 01:04

## 2023-04-24 RX ADMIN — METHOCARBAMOL 1500 MG: 500 TABLET ORAL at 12:04

## 2023-04-24 RX ADMIN — KETOROLAC TROMETHAMINE 15 MG: 30 INJECTION, SOLUTION INTRAMUSCULAR; INTRAVENOUS at 11:04

## 2023-04-24 RX ADMIN — DIAZEPAM 5 MG: 5 TABLET ORAL at 09:04

## 2023-04-24 RX ADMIN — ACETAMINOPHEN 650 MG: 325 TABLET ORAL at 12:04

## 2023-04-24 RX ADMIN — PIPERACILLIN AND TAZOBACTAM 4.5 G: 4; .5 INJECTION, POWDER, LYOPHILIZED, FOR SOLUTION INTRAVENOUS; PARENTERAL at 05:04

## 2023-04-24 RX ADMIN — MORPHINE SULFATE 2 MG: 4 INJECTION INTRAVENOUS at 03:04

## 2023-04-24 RX ADMIN — SODIUM CHLORIDE: 9 INJECTION, SOLUTION INTRAVENOUS at 08:04

## 2023-04-24 RX ADMIN — SODIUM CHLORIDE 1000 ML: 0.9 INJECTION, SOLUTION INTRAVENOUS at 03:04

## 2023-04-24 RX ADMIN — MAGNESIUM SULFATE HEPTAHYDRATE 2 G: 40 INJECTION, SOLUTION INTRAVENOUS at 09:04

## 2023-04-24 RX ADMIN — SENNOSIDES AND DOCUSATE SODIUM 1 TABLET: 50; 8.6 TABLET ORAL at 09:04

## 2023-04-24 RX ADMIN — DIPHENHYDRAMINE HYDROCHLORIDE 12.5 MG: 50 INJECTION, SOLUTION INTRAMUSCULAR; INTRAVENOUS at 04:04

## 2023-04-24 RX ADMIN — Medication 1 TABLET: at 09:04

## 2023-04-24 RX ADMIN — HYDROMORPHONE HYDROCHLORIDE 1 MG: 1 INJECTION, SOLUTION INTRAMUSCULAR; INTRAVENOUS; SUBCUTANEOUS at 09:04

## 2023-04-24 RX ADMIN — TRAZODONE HYDROCHLORIDE 100 MG: 100 TABLET ORAL at 09:04

## 2023-04-24 RX ADMIN — APIXABAN 5 MG: 2.5 TABLET, FILM COATED ORAL at 09:04

## 2023-04-24 RX ADMIN — DIPHENHYDRAMINE HYDROCHLORIDE 25 MG: 50 INJECTION, SOLUTION INTRAMUSCULAR; INTRAVENOUS at 12:04

## 2023-04-24 RX ADMIN — DIPHENHYDRAMINE HYDROCHLORIDE 25 MG: 25 CAPSULE ORAL at 10:04

## 2023-04-24 RX ADMIN — SODIUM CHLORIDE 1000 ML: 0.9 INJECTION, SOLUTION INTRAVENOUS at 05:04

## 2023-04-24 RX ADMIN — ATORVASTATIN CALCIUM 20 MG: 20 TABLET, FILM COATED ORAL at 09:04

## 2023-04-24 RX ADMIN — MUPIROCIN: 20 OINTMENT TOPICAL at 09:04

## 2023-04-24 RX ADMIN — ACETAMINOPHEN 650 MG: 325 TABLET, FILM COATED ORAL at 09:04

## 2023-04-24 RX ADMIN — ONDANSETRON 8 MG: 8 TABLET, ORALLY DISINTEGRATING ORAL at 09:04

## 2023-04-24 NOTE — TELEPHONE ENCOUNTER
Care Companion Intervention    Reason for intervention: Questionnaire response  Comment:  ER    Intervention: Patient instructed to go to emergency department  Comment:  Called patient she was at the ER awaiting bed for admit.

## 2023-04-24 NOTE — PHARMACY MED REC
"      [facility]           Pharmacy  Admission Medication History     The home medication history was taken by Karen Weldon.      Medication history obtained from Medications listed below were obtained from: [employer] software- [employer]. Unable to assess patient    Based on information gathered for medication list, you may go to "Admission" then "Reconcile Home Medications" tabs to review and/or act upon those items.     The home medication list has been updated by the Pharmacy department.   Please read ALL comments highlighted in yellow.   Please address this information as you see fit.    Feel free to contact us if you have any questions or require assistance.     Current Facility-Administered Medications on File Prior to Encounter   Medication Dose Route Frequency Provider Last Rate Last Admin    lactated ringers infusion   Intravenous Continuous Yahaira Barnard MD   New Bag at 03/29/23 0638    LIDOcaine (PF) 10 mg/ml (1%) injection 5 mg  0.5 mL Intradermal Once Yahaira Barnard MD        LIDOcaine HCL 10 mg/ml (1%) 50 mL, EPINEPHrine 1 mg in lactated Ringers 1,000 mL irrigation   Irrigation On Call Procedure Ming Milian MD         Current Outpatient Medications on File Prior to Encounter   Medication Sig Dispense Refill    apixaban (ELIQUIS) 5 mg Tab Take 1 tablet (5 mg total) by mouth 2 (two) times daily. Will hold 2/13 & 2/14 60 tablet 5    atorvastatin (LIPITOR) 20 MG tablet Take 1 tablet (20 mg total) by mouth every evening. 90 tablet 3    COSENTYX PEN, 2 PENS, 150 mg/mL PnIj Inject 300mg (2 pens) into the skin every 4 weeks 2 mL 11    diazePAM (VALIUM) 5 MG tablet Take 1 tablet (5 mg total) by mouth daily as needed for Anxiety. 30 tablet 2    fluticasone-umeclidin-vilanter (TRELEGY ELLIPTA) 100-62.5-25 mcg DsDv Inhale 1 puff into the lungs once daily. 180 each 3    omeprazole (PRILOSEC) 40 MG capsule Take 1 capsule (40 mg total) by mouth every morning. 90 capsule 1    ondansetron " (ZOFRAN) 8 MG tablet Take 1 tablet (8 mg total) by mouth every 12 (twelve) hours as needed for Nausea. 30 tablet 2    traZODone (DESYREL) 100 MG tablet Take 1 tablet (100 mg total) by mouth nightly as needed for Insomnia. 90 tablet 3    venlafaxine (EFFEXOR-XR) 75 MG 24 hr capsule Take 1 capsule (75 mg total) by mouth once daily. Start on Week 2 90 capsule 3    acetaminophen-codeine 300-30mg (TYLENOL #3) 300-30 mg Tab Take 1 tablet by mouth every 4 (four) hours as needed.      B-complex with vitamin C (VITAMIN B COMPLEX-C ORAL) Take by mouth Daily.      calcium-vitamin D 250-100 mg-unit per tablet Take 1 tablet by mouth 2 (two) times daily.      cyanocobalamin 500 MCG tablet Take 500 mcg by mouth once daily.      multivitamin (THERAGRAN) per tablet Take 1 tablet by mouth once daily.      multivitamin with minerals (HAIR,SKIN AND NAILS ORAL) Take by mouth.      mv-mn/iron/folic acid/herb 190 (VITAMIN D3 COMPLETE ORAL) Take by mouth.         Please address this information as you see fit.  Feel free to contact us if you have any questions or require assistance.    Karen Weldon  [phone]            .

## 2023-04-24 NOTE — PROVIDER TRANSFER
Outside Transfer Acceptance Note / Regional Referral Center    Referring facility: [facility] ED   Referring provider: ABRAHAM POLANCO  Accepting facility: [facility]  Accepting provider: Vik Hernandez MD  Admitting provider: [facility] provider  Reason for transfer:  oncology consultation   Transfer diagnosis: bilateral hip pain and facial rash  Transfer specialty requested: Hematology and Oncology  Transfer specialty notified: yes  Transfer level: NUMBER 1-5: 2  Bed type requested: med/surg  Isolation status: Contact and Neutropenic   Admission class or status: IP- Inpatient      Narrative     Ms. Lucia Matias is a 62 year old woman with PMH of HTN, HLD, depression, COPD, A-flutter s/p ablation (11/2022), h/o sleeve gastrectomy, recently diagnosed HR+ breast cancer s/p bilateral SIEA flap reconstruction (2/15/2023) and psoriasis (holding cosentyx while on chemotherapy) who presented to [facility] ED with chief complaint for bilateral hip pain and rash on face, neck and scalp. She reports that the hip pain started around 1 AM the morning prior to ED arrival. She started her first round of chemotherapy on Wednesday, 4/19/23, and received the Udenyca injection the following day. She also mentions constipation, chills and low grade fever with temp 100.1 the morning prior to ED arrival. She also notes a rash on her neck, face and scalp that started 2 days ago.     She is hemodynamically stable, on room air and vital signs are listed below. Labwork significant for WBC 0.70, H/H stable and BMP wnl. She was given one dose of IV vancomycin/zosyn, robaxin, toradol, IM benadryl and oral tylenol.     Abraham Polanco PA-C ([facility] ED) contacted [facility] to request patient transfer for higher level of care for oncology consultation. Given that Ms. Matias follows with Dr. Roya Madrid for her oncological care, [facility] proceeded with transfer to [facility] but there is currently an extended wait  for bed availability. Therefore, to expedite care, patient was agreeable to transfer to [facility] for further evaluation and management of her b/l hip pain and rash. [facility] notified Dr. Nora Hinojosa ([facility] oncology) and the case was discussed. Dr. Hinojosa agrees to consult on the patient with admission to Hospital Medicine. Patient will require med/surg bed.     Objective     Vitals: Temp: 99.2 °F (37.3 °C) (04/24/23 1031)  Pulse: 84 (04/24/23 1252)  Resp: 20 (04/24/23 1031)  BP: (!) 103/52 (04/24/23 1252)  SpO2: 98 % (04/24/23 1252)  Recent Labs: Blood Culture: No results for input(s): LABBLOO in the last 48 hours.  CBC:   Recent Labs   Lab 04/24/23  1144   WBC 0.70*   HGB 13.1   HCT 38.4        CMP:   Recent Labs   Lab 04/24/23  1144      K 3.8      CO2 24   GLU 94   BUN 17   CREATININE 0.73   CALCIUM 8.8   ANIONGAP 7*       Recent imaging:   Imaging Results    None          Airway:   RA  Vent settings:  N/A       IV access:        Peripheral IV - Single Lumen Right Hand (Active)   Site Assessment Clean 04/24/23 1144   Extremity Assessment Distal to IV No abnormal discoloration;No redness;No swelling;No warmth 04/24/23 1144   Line Status Blood return noted 04/24/23 1144   Dressing Status Clean 04/24/23 1144   Dressing Intervention First dressing 04/24/23 1144     Infusions: None  Allergies:   Review of patient's allergies indicates:   Allergen Reactions    Succinimides Anaphylaxis     Son has       NPO: No    Anticoagulation:   Anticoagulants       None             Instructions      [facility]  Admit to Hospital Medicine  Upon patient arrival to floor, please contact Hospital Medicine on call.

## 2023-04-24 NOTE — ED PROVIDER NOTES
Encounter Date: 4/24/2023       History     Chief Complaint   Patient presents with    Medication Reaction     Patient is being treated for breast cancer and had her first chemo treatment on Wednesday. Patient is now having left hip pain.     Rash     Rash to head and neck for the past 2 days.      HPI: Lucia Matias, a 62 y.o. female  has a past medical history of Allergy, Anxiety, Arthritis, Atrial flutter, Colon polyps (2016), COPD (chronic obstructive pulmonary disease), COPD exacerbation (10/31/2022), Depression, Diverticular disease of colon (06/06/2017), Diverticulitis, HLD (hyperlipidemia), Malignant neoplasm of central portion of left breast in female, estrogen receptor positive (12/29/2022), Pancreatitis, Psoriasis, Rheumatoid arthritis, and Severe obesity (BMI 35.0-39.9) with comorbidity.     She presents to ED with bilateral hip pain and rash. She reports that hip pain started around 1 am this morning. Patient reports the pain is constant and deep within the bone in both hips. She reports the urge to constantly move her legs. She started her first round of chemo on Wednesday and received the Udenyca shot on Thursday. She reports constipation, chills, and low grade fever this morning (101.1). Patient denies fall, trauma, or urinary complaints. Patient took tylenol and valium this morning with no relief. She also reports a rash on her neck, face, and scalp area x 2 days. She has not taken any medications for her rash.     The history is provided by the patient.   Review of patient's allergies indicates:   Allergen Reactions    Succinimides Anaphylaxis     Son has      Past Medical History:   Diagnosis Date    Allergy     Anxiety     Arthritis     Atrial flutter     Colon polyps 2016    COPD (chronic obstructive pulmonary disease)     COPD exacerbation 10/31/2022    Depression     Diverticular disease of colon 06/06/2017    Diverticulitis     HLD (hyperlipidemia)     Malignant neoplasm of  central portion of left breast in female, estrogen receptor positive 2022    Pancreatitis     Psoriasis     Rheumatoid arthritis     Severe obesity (BMI 35.0-39.9) with comorbidity      Past Surgical History:   Procedure Laterality Date    ABLATION  2022    Cardiac Ablation for A Flutter, Successful    ADENOIDECTOMY  1966    Tonsillitis and adnoids    BILATERAL MASTECTOMY Bilateral 2/15/2023    Procedure: MASTECTOMY, BILATERAL;  Surgeon: Marcela Meehan MD;  Location: [facility];  Service: General;  Laterality: Bilateral;  EMAIL SENT  @ 11:44 LK / 2.5 HOURS    BLADDER SUSPENSION      (x2)    BREAST REVISION SURGERY Bilateral 3/29/2023    Procedure: BREAST REVISION SURGERY / BILATERAL BREAST FLAP REVISION;  Surgeon: Ming Milian MD;  Location: [facility] OR;  Service: Plastics;  Laterality: Bilateral;  90 MINUTES     SECTION      (x2)    DEBRIDEMENT, BREAST Bilateral 3/29/2023    Procedure: DEBRIDEMENT, BREAST;  Surgeon: Ming Milian MD;  Location: [facility];  Service: Plastics;  Laterality: Bilateral;    ESOPHAGOGASTRODUODENOSCOPY N/A 2021    Procedure: EGD (ESOPHAGOGASTRODUODENOSCOPY);  Surgeon: Vince Rodriguez MD;  Location: [facility] OR 2ND FLR;  Service: General;  Laterality: N/A;    INJECTION FOR SENTINEL NODE IDENTIFICATION Left 2/15/2023    Procedure: INJECTION, FOR SENTINEL NODE IDENTIFICATION;  Surgeon: Marcela Meehan MD;  Location: [facility];  Service: General;  Laterality: Left;    LAPAROSCOPIC SLEEVE GASTRECTOMY N/A 2021    Procedure: GASTRECTOMY, SLEEVE, LAPAROSCOPIC, with intraop EGD;  Surgeon: Vince Rodriguez MD;  Location: [facility] OR 2ND FLR;  Service: General;  Laterality: N/A;    LUNG BIOPSY  2020    RECONSTRUCTION OF BREAST WITH DEEP INFERIOR EPIGASTRIC ARTERY  (NEHA) FREE FLAP Bilateral 2/15/2023    Procedure: RECONSTRUCTION, BREAST, USING NEHA FREE FLAP;  Surgeon: Ming Milian MD;  Location: [facility];  Service: Plastics;  Laterality: Bilateral;     RHINOPLASTY      SENTINEL LYMPH NODE BIOPSY Left 2/15/2023    Procedure: BIOPSY, LYMPH NODE, SENTINEL;  Surgeon: Marcela Meehan MD;  Location: [facility] OR;  Service: General;  Laterality: Left;    TONSILLECTOMY      TRANSESOPHAGEAL ECHOCARDIOGRAPHY N/A 2022    Procedure: ECHOCARDIOGRAM, TRANSESOPHAGEAL;  Surgeon: Kellie Grover MD;  Location: [facility] EP LAB;  Service: Cardiology;  Laterality: N/A;     Family History   Adopted: Yes   Problem Relation Age of Onset    No Known Problems Daughter     No Known Problems Son     No Known Problems Daughter      Social History     Tobacco Use    Smoking status: Former     Packs/day: 0.00     Years: 20.00     Pack years: 0.00     Types: Cigarettes     Start date: 1979     Quit date: 2020     Years since quittin.3    Smokeless tobacco: Current   Substance Use Topics    Alcohol use: Yes     Alcohol/week: 1.0 standard drink     Types: 1 Glasses of wine per week     Comment: social    Drug use: No     Review of Systems   Constitutional:  Positive for chills (resolved) and fever (resolved). Negative for diaphoresis.   Eyes:  Negative for photophobia and discharge.   Respiratory:  Negative for shortness of breath.    Cardiovascular:  Negative for chest pain.   Gastrointestinal:  Positive for constipation and nausea (resolved). Negative for abdominal pain, diarrhea and vomiting.   Genitourinary:  Negative for dysuria.   Musculoskeletal:  Positive for arthralgias (bilateral hips).   Skin:  Positive for rash (head and neck).   Allergic/Immunologic: Positive for immunocompromised state (chemo).   Neurological:  Negative for dizziness and weakness.   Psychiatric/Behavioral:  Negative for agitation.    All other systems reviewed and are negative.    Physical Exam     Initial Vitals [23 1031]   BP Pulse Resp Temp SpO2   137/64 105 20 99.2 °F (37.3 °C) 96 %      MAP       --         Physical Exam    Nursing note and vitals reviewed.  Constitutional: She appears  well-developed and well-nourished. She is not diaphoretic. No distress.   HENT:   Head: Normocephalic and atraumatic.   Right Ear: External ear normal.   Left Ear: External ear normal.   Nose: Nose normal.   Eyes: Conjunctivae and EOM are normal.   Neck:   Normal range of motion.  Cardiovascular:  Normal heart sounds.   Tachycardia present.         Pulmonary/Chest: Breath sounds normal. No respiratory distress. She has no wheezes. She has no rhonchi. She has no rales. She exhibits no tenderness.   Abdominal: Abdomen is soft. Bowel sounds are normal. She exhibits no distension. There is no abdominal tenderness. There is no rebound and no guarding.   Musculoskeletal:         General: Normal range of motion.      Cervical back: Normal range of motion.     Neurological: She is alert and oriented to person, place, and time.   Skin: Skin is warm and dry. Capillary refill takes less than 2 seconds. Rash (scalp, face, and mid decollete) noted. Rash is papular. There is erythema.   Psychiatric: She has a normal mood and affect. Thought content normal.                 ED Course   Procedures  Labs Reviewed   CBC W/ AUTO DIFFERENTIAL - Abnormal; Notable for the following components:       Result Value    WBC 0.70 (*)     All other components within normal limits    Narrative:       WBC critical result(s) called and verbal readback obtained from   Kirsten Rm RN by TYLER 04/24/2023 11:59   BASIC METABOLIC PANEL - Abnormal; Notable for the following components:    Anion Gap 7 (*)     All other components within normal limits   CULTURE, BLOOD   CULTURE, BLOOD          Imaging Results    None          Medications   ketorolac injection 15 mg (15 mg Intravenous Given 4/24/23 1144)   diphenhydrAMINE injection 25 mg (25 mg Intramuscular Given 4/24/23 1207)   methocarbamoL tablet 1,500 mg (1,500 mg Oral Given 4/24/23 1250)   acetaminophen tablet 650 mg (650 mg Oral Given 4/24/23 1250)   vancomycin (VANCOCIN) 2,000 mg in dextrose 5 %  (D5W) 500 mL IVPB (0 mg Intravenous Stopped 4/24/23 1543)   morphine injection 2 mg (2 mg Intravenous Given 4/24/23 1513)   sodium chloride 0.9% bolus 1,000 mL 1,000 mL (0 mLs Intravenous Stopped 4/24/23 1612)   diphenhydrAMINE injection 12.5 mg (12.5 mg Intravenous Given 4/24/23 1632)   piperacillin-tazobactam (ZOSYN) 4.5 g in dextrose 5 % in water (D5W) 5 % 100 mL IVPB (MB+) (4.5 g Intravenous New Bag 4/24/23 1700)   sodium chloride 0.9% bolus 1,000 mL 1,000 mL (1,000 mLs Intravenous New Bag 4/24/23 1715)     Medical Decision Making:   Initial Assessment:   Fever in chemo patient  Rash  Arthralgia   Differential Diagnosis:   Medication reaction, allergic reaction, neutropenia   Clinical Tests:   Lab Tests: Reviewed and Ordered  The following lab test(s) were unremarkable: CBC and BMP  Radiological Study: Ordered and Reviewed  ED Management:  Pt presents to ED for evaluation of chills, body aches and rash.  Given benadryl, pepcid with improvement of rash and itching.  WBC at 0.70, concerning for neutropenic fever.  Precautions placed.  Patient given vanc and Zosyn and blood cultures were ordered.  Consulted with Dr. Calvin, as pt wishes to be admitted to [facility] since her Hem/Onc team is at this facility.  Informed of significant weight to get in with [facility].  Currently planning for placement at [facility].      Patient has been accepted to [facility] for further evaluation and treatment of her neutropenic fever.                        Clinical Impression:   Final diagnoses:  [D70.9, R50.81] Neutropenic fever (Primary)  [M25.50] Arthralgia, unspecified joint        ED Disposition Condition    Transfer to Another Facility Stable                Elisabet Costello PA-C  04/24/23 [id number]

## 2023-04-25 LAB
ACINETOBACTER CALCOACETICUS/BAUMANNII COMPLEX: NOT DETECTED
ADENOVIRUS: NOT DETECTED
ALBUMIN SERPL BCP-MCNC: 2.8 G/DL (ref 3.5–5.2)
ALP SERPL-CCNC: 72 U/L (ref 55–135)
ALT SERPL W/O P-5'-P-CCNC: 24 U/L (ref 10–44)
ANION GAP SERPL CALC-SCNC: 5 MMOL/L (ref 8–16)
AST SERPL-CCNC: 16 U/L (ref 10–40)
BACTEROIDES FRAGILIS: NOT DETECTED
BASOPHILS NFR BLD: 1 % (ref 0–1.9)
BILIRUB SERPL-MCNC: 1.4 MG/DL (ref 0.1–1)
BILIRUB UR QL STRIP: NEGATIVE
BORDETELLA PARAPERTUSSIS (IS1001): NOT DETECTED
BORDETELLA PERTUSSIS (PTXP): NOT DETECTED
BUN SERPL-MCNC: 14 MG/DL (ref 6–20)
CALCIUM SERPL-MCNC: 8.3 MG/DL (ref 8.7–10.5)
CANDIDA ALBICANS: NOT DETECTED
CANDIDA AURIS: NOT DETECTED
CANDIDA GLABRATA: NOT DETECTED
CANDIDA KRUSEI: NOT DETECTED
CANDIDA PARAPSILOSIS: NOT DETECTED
CANDIDA TROPICALIS: NOT DETECTED
CHLAMYDIA PNEUMONIAE: NOT DETECTED
CHLORIDE SERPL-SCNC: 107 MMOL/L (ref 95–110)
CLARITY UR: ABNORMAL
CO2 SERPL-SCNC: 27 MMOL/L (ref 23–29)
COLOR UR: YELLOW
CORONAVIRUS 229E, COMMON COLD VIRUS: NOT DETECTED
CORONAVIRUS HKU1, COMMON COLD VIRUS: NOT DETECTED
CORONAVIRUS NL63, COMMON COLD VIRUS: NOT DETECTED
CORONAVIRUS OC43, COMMON COLD VIRUS: NOT DETECTED
CREAT SERPL-MCNC: 0.9 MG/DL (ref 0.5–1.4)
CRP SERPL-MCNC: 51 MG/L (ref 0–8.2)
CRYPTOCOCCUS NEOFORMANS/GATTII: NOT DETECTED
CTX-M GENE: DETECTED
DIFFERENTIAL METHOD: ABNORMAL
ENTEROBACTER CLOACAE COMPLEX: NOT DETECTED
ENTEROBACTERALES: ABNORMAL
ENTEROCOCCUS FAECALIS: NOT DETECTED
ENTEROCOCCUS FAECIUM: NOT DETECTED
EOSINOPHIL NFR BLD: 0 % (ref 0–8)
ERYTHROCYTE [DISTWIDTH] IN BLOOD BY AUTOMATED COUNT: 13 % (ref 11.5–14.5)
ERYTHROCYTE [SEDIMENTATION RATE] IN BLOOD: 10 MM/HR (ref 0–20)
ESCHERICHIA COLI: DETECTED
EST. GFR  (NO RACE VARIABLE): >60 ML/MIN/1.73 M^2
FLUBV RNA NPH QL NAA+NON-PROBE: NOT DETECTED
GLUCOSE SERPL-MCNC: 101 MG/DL (ref 70–110)
GLUCOSE UR QL STRIP: NEGATIVE
HAEMOPHILUS INFLUENZAE: NOT DETECTED
HCT VFR BLD AUTO: 37.8 % (ref 37–48.5)
HGB BLD-MCNC: 12.4 G/DL (ref 12–16)
HGB UR QL STRIP: NEGATIVE
HPIV1 RNA NPH QL NAA+NON-PROBE: NOT DETECTED
HPIV2 RNA NPH QL NAA+NON-PROBE: NOT DETECTED
HPIV3 RNA NPH QL NAA+NON-PROBE: DETECTED
HPIV4 RNA NPH QL NAA+NON-PROBE: NOT DETECTED
HUMAN METAPNEUMOVIRUS: NOT DETECTED
IMM GRANULOCYTES # BLD AUTO: ABNORMAL K/UL (ref 0–0.04)
IMM GRANULOCYTES NFR BLD AUTO: ABNORMAL % (ref 0–0.5)
IMP GENE: NOT DETECTED
INFLUENZA A (SUBTYPES H1,H1-2009,H3): NOT DETECTED
KETONES UR QL STRIP: NEGATIVE
KLEBSIELLA AEROGENES: NOT DETECTED
KLEBSIELLA OXYTOCA: NOT DETECTED
KLEBSIELLA PNEUMONIAE GROUP: NOT DETECTED
KPC: NOT DETECTED
LEUKOCYTE ESTERASE UR QL STRIP: NEGATIVE
LISTERIA MONOCYTOGENES: NOT DETECTED
LYMPHOCYTES NFR BLD: 90 % (ref 18–48)
MAGNESIUM SERPL-MCNC: 2.3 MG/DL (ref 1.6–2.6)
MCH RBC QN AUTO: 29.1 PG (ref 27–31)
MCHC RBC AUTO-ENTMCNC: 32.8 G/DL (ref 32–36)
MCR-1: NOT DETECTED
MCV RBC AUTO: 89 FL (ref 82–98)
MEC A/C AND MREJ (MRSA): NOT DETECTED
MEC A/C: NOT DETECTED
METAMYELOCYTES NFR BLD MANUAL: 1 %
MONOCYTES NFR BLD: 3 % (ref 4–15)
MYCOPLASMA PNEUMONIAE: NOT DETECTED
MYELOCYTES NFR BLD MANUAL: 1 %
NDM GENE: NOT DETECTED
NEISSERIA MENINGITIDIS: NOT DETECTED
NEUTROPHILS NFR BLD: 4 % (ref 38–73)
NITRITE UR QL STRIP: NEGATIVE
NRBC BLD-RTO: 0 /100 WBC
OXA-48-LIKE: NOT DETECTED
PH UR STRIP: 5 [PH] (ref 5–8)
PLATELET # BLD AUTO: 153 K/UL (ref 150–450)
PLATELET BLD QL SMEAR: ABNORMAL
PMV BLD AUTO: 9.9 FL (ref 9.2–12.9)
POTASSIUM SERPL-SCNC: 4.4 MMOL/L (ref 3.5–5.1)
PROCALCITONIN SERPL IA-MCNC: 0.84 NG/ML
PROT SERPL-MCNC: 5.3 G/DL (ref 6–8.4)
PROT UR QL STRIP: ABNORMAL
PROTEUS SPECIES: NOT DETECTED
PSEUDOMONAS AERUGINOSA: NOT DETECTED
RBC # BLD AUTO: 4.26 M/UL (ref 4–5.4)
RESPIRATORY INFECTION PANEL SOURCE: ABNORMAL
RSV RNA NPH QL NAA+NON-PROBE: NOT DETECTED
RV+EV RNA NPH QL NAA+NON-PROBE: NOT DETECTED
SALMONELLA SP: NOT DETECTED
SARS-COV-2 RNA RESP QL NAA+PROBE: NOT DETECTED
SERRATIA MARCESCENS: NOT DETECTED
SODIUM SERPL-SCNC: 139 MMOL/L (ref 136–145)
SP GR UR STRIP: >1.03 (ref 1–1.03)
STAPHYLOCOCCUS AUREUS: NOT DETECTED
STAPHYLOCOCCUS EPIDERMIDIS: NOT DETECTED
STAPHYLOCOCCUS LUGDUNESIS: NOT DETECTED
STAPHYLOCOCCUS SPECIES: NOT DETECTED
STENOTROPHOMONAS MALTOPHILIA: NOT DETECTED
STREPTOCOCCUS AGALACTIAE: NOT DETECTED
STREPTOCOCCUS PNEUMONIAE: NOT DETECTED
STREPTOCOCCUS PYOGENES: NOT DETECTED
STREPTOCOCCUS SPECIES: NOT DETECTED
URN SPEC COLLECT METH UR: ABNORMAL
UROBILINOGEN UR STRIP-ACNC: NEGATIVE EU/DL
VAN A/B: NOT DETECTED
VIM GENE: NOT DETECTED
WBC # BLD AUTO: 0.4 K/UL (ref 3.9–12.7)

## 2023-04-25 PROCEDURE — 87798 DETECT AGENT NOS DNA AMP: CPT | Mod: 59 | Performed by: HOSPITALIST

## 2023-04-25 PROCEDURE — A4216 STERILE WATER/SALINE, 10 ML: HCPCS | Performed by: NURSE PRACTITIONER

## 2023-04-25 PROCEDURE — 83735 ASSAY OF MAGNESIUM: CPT | Performed by: NURSE PRACTITIONER

## 2023-04-25 PROCEDURE — 99232 SBSQ HOSP IP/OBS MODERATE 35: CPT | Mod: ,,, | Performed by: HOSPITALIST

## 2023-04-25 PROCEDURE — 81003 URINALYSIS AUTO W/O SCOPE: CPT | Performed by: NURSE PRACTITIONER

## 2023-04-25 PROCEDURE — 84145 PROCALCITONIN (PCT): CPT | Performed by: NURSE PRACTITIONER

## 2023-04-25 PROCEDURE — 85651 RBC SED RATE NONAUTOMATED: CPT | Performed by: NURSE PRACTITIONER

## 2023-04-25 PROCEDURE — 80053 COMPREHEN METABOLIC PANEL: CPT | Performed by: NURSE PRACTITIONER

## 2023-04-25 PROCEDURE — 63600175 PHARM REV CODE 636 W HCPCS: Performed by: HOSPITALIST

## 2023-04-25 PROCEDURE — 99223 1ST HOSP IP/OBS HIGH 75: CPT | Mod: ,,, | Performed by: STUDENT IN AN ORGANIZED HEALTH CARE EDUCATION/TRAINING PROGRAM

## 2023-04-25 PROCEDURE — 11000001 HC ACUTE MED/SURG PRIVATE ROOM

## 2023-04-25 PROCEDURE — 94640 AIRWAY INHALATION TREATMENT: CPT

## 2023-04-25 PROCEDURE — 36415 COLL VENOUS BLD VENIPUNCTURE: CPT | Performed by: HOSPITALIST

## 2023-04-25 PROCEDURE — 85007 BL SMEAR W/DIFF WBC COUNT: CPT | Performed by: NURSE PRACTITIONER

## 2023-04-25 PROCEDURE — 25000003 PHARM REV CODE 250: Performed by: HOSPITALIST

## 2023-04-25 PROCEDURE — 27000207 HC ISOLATION

## 2023-04-25 PROCEDURE — 87633 RESP VIRUS 12-25 TARGETS: CPT | Performed by: HOSPITALIST

## 2023-04-25 PROCEDURE — 87040 BLOOD CULTURE FOR BACTERIA: CPT | Mod: 59 | Performed by: HOSPITALIST

## 2023-04-25 PROCEDURE — 99232 PR SUBSEQUENT HOSPITAL CARE,LEVL II: ICD-10-PCS | Mod: ,,, | Performed by: HOSPITALIST

## 2023-04-25 PROCEDURE — 63700000 PHARM REV CODE 250 ALT 637 W/O HCPCS: Performed by: HOSPITALIST

## 2023-04-25 PROCEDURE — 85027 COMPLETE CBC AUTOMATED: CPT | Performed by: NURSE PRACTITIONER

## 2023-04-25 PROCEDURE — 63600175 PHARM REV CODE 636 W HCPCS: Performed by: NURSE PRACTITIONER

## 2023-04-25 PROCEDURE — 25000003 PHARM REV CODE 250: Performed by: NURSE PRACTITIONER

## 2023-04-25 PROCEDURE — 94761 N-INVAS EAR/PLS OXIMETRY MLT: CPT

## 2023-04-25 PROCEDURE — 99223 PR INITIAL HOSPITAL CARE,LEVL III: ICD-10-PCS | Mod: ,,, | Performed by: STUDENT IN AN ORGANIZED HEALTH CARE EDUCATION/TRAINING PROGRAM

## 2023-04-25 PROCEDURE — 36415 COLL VENOUS BLD VENIPUNCTURE: CPT | Performed by: NURSE PRACTITIONER

## 2023-04-25 PROCEDURE — 25000242 PHARM REV CODE 250 ALT 637 W/ HCPCS: Performed by: NURSE PRACTITIONER

## 2023-04-25 PROCEDURE — 86140 C-REACTIVE PROTEIN: CPT | Performed by: NURSE PRACTITIONER

## 2023-04-25 RX ORDER — TRIAMCINOLONE ACETONIDE 1 MG/G
CREAM TOPICAL 2 TIMES DAILY
Status: DISCONTINUED | OUTPATIENT
Start: 2023-04-25 | End: 2023-04-29 | Stop reason: HOSPADM

## 2023-04-25 RX ORDER — FLUCONAZOLE 100 MG/1
200 TABLET ORAL DAILY
Status: DISCONTINUED | OUTPATIENT
Start: 2023-04-25 | End: 2023-04-29

## 2023-04-25 RX ADMIN — APIXABAN 5 MG: 2.5 TABLET, FILM COATED ORAL at 09:04

## 2023-04-25 RX ADMIN — VANCOMYCIN HYDROCHLORIDE 1000 MG: 1 INJECTION, POWDER, LYOPHILIZED, FOR SOLUTION INTRAVENOUS at 02:04

## 2023-04-25 RX ADMIN — MUPIROCIN: 20 OINTMENT TOPICAL at 08:04

## 2023-04-25 RX ADMIN — DIPHENHYDRAMINE HYDROCHLORIDE 25 MG: 25 CAPSULE ORAL at 08:04

## 2023-04-25 RX ADMIN — PIPERACILLIN AND TAZOBACTAM 4.5 G: 4; .5 INJECTION, POWDER, LYOPHILIZED, FOR SOLUTION INTRAVENOUS; PARENTERAL at 09:04

## 2023-04-25 RX ADMIN — Medication 1 TABLET: at 08:04

## 2023-04-25 RX ADMIN — SODIUM CHLORIDE: 9 INJECTION, SOLUTION INTRAVENOUS at 04:04

## 2023-04-25 RX ADMIN — TRAZODONE HYDROCHLORIDE 100 MG: 100 TABLET ORAL at 09:04

## 2023-04-25 RX ADMIN — APIXABAN 5 MG: 2.5 TABLET, FILM COATED ORAL at 08:04

## 2023-04-25 RX ADMIN — Medication 1 TABLET: at 09:04

## 2023-04-25 RX ADMIN — PIPERACILLIN AND TAZOBACTAM 4.5 G: 4; .5 INJECTION, POWDER, LYOPHILIZED, FOR SOLUTION INTRAVENOUS; PARENTERAL at 04:04

## 2023-04-25 RX ADMIN — PANTOPRAZOLE SODIUM 40 MG: 40 TABLET, DELAYED RELEASE ORAL at 08:04

## 2023-04-25 RX ADMIN — PREDNISONE 50 MG: 5 TABLET ORAL at 06:04

## 2023-04-25 RX ADMIN — MUPIROCIN: 20 OINTMENT TOPICAL at 09:04

## 2023-04-25 RX ADMIN — TRIAMCINOLONE ACETONIDE: 1 CREAM TOPICAL at 09:04

## 2023-04-25 RX ADMIN — THERA TABS 1 TABLET: TAB at 08:04

## 2023-04-25 RX ADMIN — Medication 10 ML: at 03:04

## 2023-04-25 RX ADMIN — PIPERACILLIN AND TAZOBACTAM 4.5 G: 4; .5 INJECTION, POWDER, LYOPHILIZED, FOR SOLUTION INTRAVENOUS; PARENTERAL at 12:04

## 2023-04-25 RX ADMIN — CYANOCOBALAMIN TAB 250 MCG 500 MCG: 250 TAB at 08:04

## 2023-04-25 RX ADMIN — DIAZEPAM 5 MG: 5 TABLET ORAL at 09:04

## 2023-04-25 RX ADMIN — ATORVASTATIN CALCIUM 20 MG: 20 TABLET, FILM COATED ORAL at 09:04

## 2023-04-25 RX ADMIN — VANCOMYCIN HYDROCHLORIDE 1000 MG: 1 INJECTION, POWDER, LYOPHILIZED, FOR SOLUTION INTRAVENOUS at 01:04

## 2023-04-25 RX ADMIN — ERTAPENEM 1 G: 1 INJECTION INTRAMUSCULAR; INTRAVENOUS at 06:04

## 2023-04-25 RX ADMIN — HYDROMORPHONE HYDROCHLORIDE 1 MG: 1 INJECTION, SOLUTION INTRAMUSCULAR; INTRAVENOUS; SUBCUTANEOUS at 03:04

## 2023-04-25 RX ADMIN — NEPHROCAP 1 CAPSULE: 1 CAP ORAL at 08:04

## 2023-04-25 RX ADMIN — ACETAMINOPHEN AND CODEINE PHOSPHATE 1 TABLET: 300; 30 TABLET ORAL at 09:04

## 2023-04-25 RX ADMIN — VENLAFAXINE HYDROCHLORIDE 75 MG: 37.5 CAPSULE, EXTENDED RELEASE ORAL at 08:04

## 2023-04-25 RX ADMIN — SENNOSIDES AND DOCUSATE SODIUM 1 TABLET: 50; 8.6 TABLET ORAL at 08:04

## 2023-04-25 RX ADMIN — FLUCONAZOLE 200 MG: 100 TABLET ORAL at 09:04

## 2023-04-25 RX ADMIN — FLUTICASONE FUROATE AND VILANTEROL TRIFENATATE 1 PUFF: 200; 25 POWDER RESPIRATORY (INHALATION) at 08:04

## 2023-04-25 RX ADMIN — TRIAMCINOLONE ACETONIDE: 1 CREAM TOPICAL at 11:04

## 2023-04-25 RX ADMIN — HYDROMORPHONE HYDROCHLORIDE 1 MG: 1 INJECTION, SOLUTION INTRAMUSCULAR; INTRAVENOUS; SUBCUTANEOUS at 07:04

## 2023-04-25 NOTE — ASSESSMENT & PLAN NOTE
Other long term (current) drug therapy  -history of Aflutter s/p ablation  -NSR at current  -continue home apixaban  -EKG PRN concerns   -monitor

## 2023-04-25 NOTE — ASSESSMENT & PLAN NOTE
-chronic, controlled at current  -no evidence of exacerbation  -continue home inhaler per pharmacy formulary alternative  -monitor

## 2023-04-25 NOTE — ASSESSMENT & PLAN NOTE
likely chemo related   Start small dose of oral steroids,   Continue topical steroids and Benadryl prn

## 2023-04-25 NOTE — HPI
"Ms. Matias is a 62 YOF with PMHx of HTN, HLD, depression, COPD, A-flutter s/p ablation on OAC (11/2022), h/o sleeve gastrectomy, recently diagnosed HR+ breast cancer s/p bilateral SIEA flap reconstruction (2/15/2023) and psoriasis (holding cosentyx while on chemotherapy).    Per  note "presented to [facility] ED with chief complaint for bilateral hip pain and rash on face, neck and scalp. She reports that the hip pain started around 1 AM the morning prior to ED arrival. She started her first round of chemotherapy on Wednesday, 4/19/23, and received the Udenyca injection the following day. She also mentions constipation, chills and low grade fever with temp 100.1 the morning prior to ED arrival. She also notes a rash on her neck, face and scalp that started 2 days ago." She endorses some nausea. She also reports that her  was seen in an OSH for COPD exacerbation just prior to her symptom development. She denies cough, light headiness, dizziness, syncope, chest pain, shortness of breath, palpitations, weight loss, vomiting or diarrhea.     In the ED she was HDS, TMax 99.2. CBC with WBC 0.7, H/H stable. Chemistry stable. Blood cultures obtained. She was treated with Vanc/zosyn and benadryl for rash. She was transferred to [facility] Medicine [facility] for further evaluation and management.    "

## 2023-04-25 NOTE — PLAN OF CARE
04/25/23 1735   Discharge Assessment   Assessment Type Discharge Planning Assessment   Confirmed/corrected address, phone number and insurance Yes   Confirmed Demographics Correct on Facesheet   Source of Information patient   People in Home spouse   Do you expect to return to your current living situation? Yes   Do you have help at home or someone to help you manage your care at home? Yes   Prior to hospitilization cognitive status: Alert/Oriented   Current cognitive status: Alert/Oriented   Walking or Climbing Stairs   (None)   Dressing/Bathing   (None)   Equipment Currently Used at Home none   Readmission within 30 days? Yes   Patient currently being followed by outpatient case management? No   Do you currently have service(s) that help you manage your care at home? No   Do you take prescription medications? Yes   Do you have prescription coverage? Yes   Do you have any problems affording any of your prescribed medications? No   Is the patient taking medications as prescribed? yes   Who is going to help you get home at discharge? One of pt's adult children.   How do you get to doctors appointments? car, drives self   Are you on dialysis? No   Do you take coumadin? No   Discharge Plan A Home with family   Discharge Plan B Home Health   DME Needed Upon Discharge  none   Discharge Plan discussed with: Patient   Discharge Barriers Identified None     Pt's son or daughter will pick her up at discharge. She lives with her . PCP is correct in Epic.

## 2023-04-25 NOTE — ASSESSMENT & PLAN NOTE
HDS, no indication for GCSF  Rapid Organism ID by PCR (from Blood culture)- ESBL   Continue abx for now. F/u on C/s  Continue supportive care   Rule out viral infections

## 2023-04-25 NOTE — HOSPITAL COURSE
"Ms. Matias is a 62 YOF with PMHx of HTN, HLD, depression, COPD, A-flutter s/p ablation on OAC (11/2022), h/o sleeve gastrectomy, recently diagnosed HR+ breast cancer s/p bilateral SIEA flap reconstruction (2/15/2023) and psoriasis (holding cosentyx while on chemotherapy).  Per  note "presented to [facility] ED with chief complaint for bilateral hip pain and rash on face, neck and scalp. She reports that the hip pain started around 1 AM the morning prior to ED arrival. She started her first round of chemotherapy on Wednesday, 4/19/23, and received the Udenyca injection the following day. She also mentions constipation, chills and low grade fever with temp 100.1 the morning prior to ED arrival. She also notes a rash on her neck, face and scalp that started 2 days ago." She endorses some nausea. She also reports that her  was seen in an OSH for COPD exacerbation just prior to her symptom development. She denies cough, light headiness, dizziness, syncope, chest pain, shortness of breath, palpitations, weight loss, vomiting or diarrhea.   Patient has been  stared on broad spectrum IV Abx for neutropenic fever.  Has leucopenia is setting of chemo. Therapy,oncology is consulted.today leucopenia is much improved.  Blood culture growing ESBL, EColi,likely duo to past Hx of UTI ESBL,started on Invanz,consulted ID, do CT abdomen,pelvic.need Urodynamic test by Urology as out patient to R/O urinary retention.  Still had fever,CT abdomen show no abscess,ID restarted back on vanc.  Rash is improving taper prednisone.  Has parainfluenza with cough,started already on muccinex.  Repeat blood culture is negative  Midline placed and the patient was discharged on ertapenem 1g daily last day 5/9/23.  "

## 2023-04-25 NOTE — SUBJECTIVE & OBJECTIVE
No fever since admission. HDS>   Rash not getting better.   She requests something stronger for hip pain       Oncology Treatment Plan:   OP BREAST TC - DOCETAXEL CYCLOPHOSPHAMIDE Q3W    Medications:  Continuous Infusions:   sodium chloride 0.9% 100 mL/hr at 04/25/23 1624     Scheduled Meds:   apixaban  5 mg Oral BID    atorvastatin  20 mg Oral QHS    calcium-vitamin D3  1 tablet Oral BID    cyanocobalamin  500 mcg Oral Daily    fluconazole  200 mg Oral Daily    fluticasone furoate-vilanteroL  1 puff Inhalation Daily    multivitamin  1 tablet Oral Daily    mupirocin   Nasal BID    pantoprazole  40 mg Oral Daily    piperacillin-tazobactam (ZOSYN) IVPB  4.5 g Intravenous Q8H    predniSONE  50 mg Oral Daily    senna-docusate 8.6-50 mg  1 tablet Oral BID    triamcinolone acetonide 0.1%   Topical (Top) BID    vancomycin (VANCOCIN) IVPB  1,000 mg Intravenous Q12H    venlafaxine  75 mg Oral Daily    vitamin renal formula (B-complex-vitamin c-folic acid)  1 capsule Oral Daily     PRN Meds:acetaminophen, acetaminophen-codeine 300-30mg, bisacodyL, diazePAM, diphenhydrAMINE, HYDROmorphone, ondansetron, ondansetron, polyethylene glycol, sodium chloride 0.9%, traZODone, Pharmacy to dose Vancomycin consult **AND** vancomycin - pharmacy to dose     Review of patient's allergies indicates:   Allergen Reactions    Succinimides Anaphylaxis     Son has MH        Past Medical History:   Diagnosis Date    Allergy     Anxiety     Arthritis     Atrial flutter     Colon polyps 2016    COPD (chronic obstructive pulmonary disease)     COPD exacerbation 10/31/2022    Depression     Diverticular disease of colon 06/06/2017    Diverticulitis     HLD (hyperlipidemia)     Malignant neoplasm of central portion of left breast in female, estrogen receptor positive 12/29/2022    Pancreatitis     Psoriasis     Rheumatoid arthritis     Severe obesity (BMI 35.0-39.9) with comorbidity      Past Surgical History:   Procedure Laterality Date    ABLATION   2022    Cardiac Ablation for A Flutter, Successful    ADENOIDECTOMY  1966    Tonsillitis and adnoids    BILATERAL MASTECTOMY Bilateral 2/15/2023    Procedure: MASTECTOMY, BILATERAL;  Surgeon: Marcela Meehan MD;  Location: [facility];  Service: General;  Laterality: Bilateral;  EMAIL SENT  @ 11:44 LK / 2.5 HOURS    BLADDER SUSPENSION      (x2)    BREAST REVISION SURGERY Bilateral 3/29/2023    Procedure: BREAST REVISION SURGERY / BILATERAL BREAST FLAP REVISION;  Surgeon: Ming Milian MD;  Location: [facility];  Service: Plastics;  Laterality: Bilateral;  90 MINUTES     SECTION      (x2)    DEBRIDEMENT, BREAST Bilateral 3/29/2023    Procedure: DEBRIDEMENT, BREAST;  Surgeon: Ming Milian MD;  Location: [facility];  Service: Plastics;  Laterality: Bilateral;    ESOPHAGOGASTRODUODENOSCOPY N/A 2021    Procedure: EGD (ESOPHAGOGASTRODUODENOSCOPY);  Surgeon: Vince Rodriguez MD;  Location: [address];  Service: General;  Laterality: N/A;    INJECTION FOR SENTINEL NODE IDENTIFICATION Left 2/15/2023    Procedure: INJECTION, FOR SENTINEL NODE IDENTIFICATION;  Surgeon: Marcela Meehan MD;  Location: [facility];  Service: General;  Laterality: Left;    LAPAROSCOPIC SLEEVE GASTRECTOMY N/A 2021    Procedure: GASTRECTOMY, SLEEVE, LAPAROSCOPIC, with intraop EGD;  Surgeon: Vince Rodriguez MD;  [facility];  Service: General;  Laterality: N/A;    LUNG BIOPSY  2020    RECONSTRUCTION OF BREAST WITH DEEP INFERIOR EPIGASTRIC ARTERY  (NEHA) FREE FLAP Bilateral 2/15/2023    Procedure: RECONSTRUCTION, BREAST, USING NEHA FREE FLAP;  Surgeon: Ming Milian MD;  Location: [facility];  Service: Plastics;  Laterality: Bilateral;    RHINOPLASTY      SENTINEL LYMPH NODE BIOPSY Left 2/15/2023    Procedure: BIOPSY, LYMPH NODE, SENTINEL;  Surgeon: Marcela Meehan MD;  Location: [facility];  Service: General;  Laterality: Left;    TONSILLECTOMY      TRANSESOPHAGEAL ECHOCARDIOGRAPHY N/A  2022    Procedure: ECHOCARDIOGRAM, TRANSESOPHAGEAL;  Surgeon: Kellie Grover MD;  Location: [facility] LAB;  Service: Cardiology;  Laterality: N/A;     Family History       Problem Relation (Age of Onset)    No Known Problems Daughter, Son, Daughter          Tobacco Use    Smoking status: Former     Packs/day: 0.00     Years: 20.00     Pack years: 0.00     Types: Cigarettes     Start date: 1979     Quit date: 2020     Years since quittin.3    Smokeless tobacco: Current   Substance and Sexual Activity    Alcohol use: Yes     Alcohol/week: 1.0 standard drink     Types: 1 Glasses of wine per week     Comment: social    Drug use: No    Sexual activity: Yes     Partners: Male     Birth control/protection: Post-menopausal       Review of Systems   Constitutional:  Positive for fatigue and fever.   HENT:  Negative for congestion, ear discharge, sore throat, trouble swallowing and voice change.    Respiratory:  Negative for shortness of breath.    Cardiovascular:  Negative for chest pain and leg swelling.   Gastrointestinal:  Positive for constipation and nausea. Negative for abdominal pain, diarrhea and vomiting.   Genitourinary:  Negative for frequency.   Musculoskeletal:  Positive for arthralgias.   Skin:  Positive for color change and rash.   Neurological: Negative.    Hematological: Negative.    Psychiatric/Behavioral: Negative.     Objective:     Vital Signs (Most Recent):  Temp: 99 °F (37.2 °C) (23 1540)  Pulse: 106 (23 1540)  Resp: 16 (23 1540)  BP: (!) 107/50 (23 1540)  SpO2: 98 % (23 1540)   Vital Signs (24h Range):  Temp:  [97.9 °F (36.6 °C)-99.3 °F (37.4 °C)] 99 °F (37.2 °C)  Pulse:  [] 106  Resp:  [15-21] 16  SpO2:  [95 %-99 %] 98 %  BP: ()/(50-66) 107/50     Weight: 78 kg (171 lb 15.3 oz)  Body mass index is 31.45 kg/m².  Body surface area is 1.85 meters squared.      Intake/Output Summary (Last 24 hours) at 2023 4546  Last data filed at  4/25/2023 1624  Gross per 24 hour   Intake 1803.05 ml   Output --   Net 1803.05 ml       Physical Exam  Vitals and nursing note reviewed.   Constitutional:       General: She is not in acute distress.     Appearance: Normal appearance. She is not ill-appearing.   HENT:      Head: Normocephalic and atraumatic.      Right Ear: External ear normal.      Left Ear: External ear normal.      Mouth/Throat:      Pharynx: No oropharyngeal exudate.   Eyes:      General: No scleral icterus.        Right eye: No discharge.         Left eye: No discharge.   Cardiovascular:      Rate and Rhythm: Normal rate.   Pulmonary:      Effort: Pulmonary effort is normal. No respiratory distress.   Abdominal:      General: There is no distension.   Musculoskeletal:         General: No swelling or deformity.      Cervical back: Normal range of motion and neck supple.      Right lower leg: No edema.      Left lower leg: No edema.   Skin:     Coloration: Skin is not jaundiced.      Findings: No bruising, erythema, lesion or rash.   Neurological:      General: No focal deficit present.      Mental Status: She is alert and oriented to person, place, and time. Mental status is at baseline.      Coordination: Coordination normal.      Gait: Gait normal.   Psychiatric:         Mood and Affect: Mood normal.         Behavior: Behavior normal.       Significant Labs:   CBC:   Recent Labs   Lab 04/24/23  1144 04/25/23  0637   WBC 0.70* 0.40*   HGB 13.1 12.4   HCT 38.4 37.8    153   , CMP:   Recent Labs   Lab 04/24/23  1144 04/25/23  0637    139   K 3.8 4.4    107   CO2 24 27   GLU 94 101   BUN 17 14   CREATININE 0.73 0.9   CALCIUM 8.8 8.3*   PROT  --  5.3*   ALBUMIN  --  2.8*   BILITOT  --  1.4*   ALKPHOS  --  72   AST  --  16   ALT  --  24   ANIONGAP 7* 5*   ,   Recent Lab Results         04/25/23  1437   04/25/23  0637   04/25/23  0122   04/24/23  2016        Respiratory Infection Panel Source NP Swab             Procalcitonin    0.84  Comment: A concentration < 0.25 ng/mL represents a low risk of bacterial   infection.  Procalcitonin may not be accurate among patients with localized   infection, recent trauma or major surgery, immunosuppressed state,   invasive fungal infection, renal dysfunction. Decisions regarding   initiation or continuation of antibiotic therapy should not be based   solely on procalcitonin levels.       0.05  Comment: A concentration < 0.25 ng/mL represents a low risk of bacterial   infection.  Procalcitonin may not be accurate among patients with localized   infection, recent trauma or major surgery, immunosuppressed state,   invasive fungal infection, renal dysfunction. Decisions regarding   initiation or continuation of antibiotic therapy should not be based   solely on procalcitonin levels.         Albumin   2.8           Alkaline Phosphatase   72           ALT   24           Anion Gap   5           Appearance, UA     Hazy         AST   16           Basophil %   1.0           Bilirubin (UA)     Negative         BILIRUBIN TOTAL   1.4  Comment: For infants and newborns, interpretation of results should be based  on gestational age, weight and in agreement with clinical  observations.    Premature Infant recommended reference ranges:  Up to 24 hours.............<8.0 mg/dL  Up to 48 hours............<12.0 mg/dL  3-5 days..................<15.0 mg/dL  6-29 days.................<15.0 mg/dL             BUN   14           Calcium   8.3           Chloride   107           CO2   27           Color, UA     Yellow         Creatinine   0.9           CRP   51.0           Differential Method   Manual  Comment: CORRECTED RESULT; previously reported as Automated on 04/25/2023 at   07:46.    [C]           eGFR   >60           Eosinophil %   0.0           Glucose   101           Glucose, UA     Negative         Gran %   4.0           Hematocrit   37.8           Hemoglobin   12.4           Immature Grans (Abs)   CANCELED  Comment:  Mild elevation in immature granulocytes is non specific and   can be seen in a variety of conditions including stress response,   acute inflammation, trauma and pregnancy. Correlation with other   laboratory and clinical findings is essential.    Result canceled by the ancillary.             Immature Granulocytes   CANCELED  Comment: Result canceled by the ancillary.           Ketones, UA     Negative         Leukocytes, UA     Negative         Lymph %   90.0           Magnesium   2.3     1.5       MCH   29.1           MCHC   32.8           MCV   89           Metamyelocytes   1.0           Mono %   3.0           MPV   9.9           Myelocytes   1.0           NITRITE UA     Negative         nRBC   0           Occult Blood UA     Negative         pH, UA     5.0         Platelet Estimate   Appears normal           Platelets   153           Potassium   4.4           PROTEIN TOTAL   5.3           Protein, UA     Trace  Comment: Recommend a 24 hour urine protein or a urine   protein/creatinine ratio if globulin induced proteinuria is  clinically suspected.           RBC   4.26           RDW   13.0           Sed Rate   10           Sodium   139           Specific Gravity, UA     >1.030         Specimen UA     Urine, Clean Catch         UROBILINOGEN UA     Negative         WBC   0.40  Comment: WBC critical result called and read back from Razia Cedeno RN  WBC critical result(s) called and verbal readback obtained from   Kimberli Simmons RN by [facility] 04/25/2023 07:14    [C]                    [C] - Corrected Result           , and All pertinent labs from the last 24 hours have been reviewed.    Diagnostic Results:  Xrays reviewed  I have reviewed all pertinent imaging results/findings within the past 24 hours.  None

## 2023-04-25 NOTE — PROGRESS NOTES
Active pharmacy to dose vancomycin consult:    Patient reviewed, renal function reviewed (no I/Os charted, ordered SCr for tomorrow), cultures reviewed, no new levels, continue current therapy; Next levels due: 4/26/23 @ 0045    Please contact inpatient pharmacy with any questions: [phone]  Thanks, Mikla Montgomery PharmD

## 2023-04-25 NOTE — SUBJECTIVE & OBJECTIVE
Past Medical History:   Diagnosis Date    Allergy     Anxiety     Arthritis     Atrial flutter     Colon polyps     COPD (chronic obstructive pulmonary disease)     COPD exacerbation 10/31/2022    Depression     Diverticular disease of colon 2017    Diverticulitis     HLD (hyperlipidemia)     Malignant neoplasm of central portion of left breast in female, estrogen receptor positive 2022    Pancreatitis     Psoriasis     Rheumatoid arthritis     Severe obesity (BMI 35.0-39.9) with comorbidity        Past Surgical History:   Procedure Laterality Date    ABLATION  2022    Cardiac Ablation for A Flutter, Successful    ADENOIDECTOMY  1966    Tonsillitis and adnoids    BILATERAL MASTECTOMY Bilateral 2/15/2023    Procedure: MASTECTOMY, BILATERAL;  Surgeon: Marcela Meehan MD;  Location: [facility];  Service: General;  Laterality: Bilateral;  EMAIL SENT  @ 11:44 LK / 2.5 HOURS    BLADDER SUSPENSION      (x2)    BREAST REVISION SURGERY Bilateral 3/29/2023    Procedure: BREAST REVISION SURGERY / BILATERAL BREAST FLAP REVISION;  Surgeon: Ming Milian MD;  Location: [facility];  Service: Plastics;  Laterality: Bilateral;  90 MINUTES     SECTION      (x2)    DEBRIDEMENT, BREAST Bilateral 3/29/2023    Procedure: DEBRIDEMENT, BREAST;  Surgeon: Ming Milian MD;  Location: [facility];  Service: Plastics;  Laterality: Bilateral;    ESOPHAGOGASTRODUODENOSCOPY N/A 2021    Procedure: EGD (ESOPHAGOGASTRODUODENOSCOPY);  Surgeon: Vince Rodriguez MD;  [facility];  Service: General;  Laterality: N/A;    INJECTION FOR SENTINEL NODE IDENTIFICATION Left 2/15/2023    Procedure: INJECTION, FOR SENTINEL NODE IDENTIFICATION;  Surgeon: Marcela Meehan MD;  Location: [facility];  Service: General;  Laterality: Left;    LAPAROSCOPIC SLEEVE GASTRECTOMY N/A 2021    Procedure: GASTRECTOMY, SLEEVE, LAPAROSCOPIC, with intraop EGD;  Surgeon: Vince Rodriguez MD;  Location: [facility] OR [facility];   Service: General;  Laterality: N/A;    LUNG BIOPSY  09/2020    RECONSTRUCTION OF BREAST WITH DEEP INFERIOR EPIGASTRIC ARTERY  (ENHA) FREE FLAP Bilateral 2/15/2023    Procedure: RECONSTRUCTION, BREAST, USING NEHA FREE FLAP;  Surgeon: Ming Milian MD;  Location: [facility];  Service: Plastics;  Laterality: Bilateral;    RHINOPLASTY      SENTINEL LYMPH NODE BIOPSY Left 2/15/2023    Procedure: BIOPSY, LYMPH NODE, SENTINEL;  Surgeon: Marcela Meehan MD;  Location: [facility] OR;  Service: General;  Laterality: Left;    TONSILLECTOMY      TRANSESOPHAGEAL ECHOCARDIOGRAPHY N/A 11/02/2022    Procedure: ECHOCARDIOGRAM, TRANSESOPHAGEAL;  Surgeon: Kellie Grover MD;  Location: [facility];  Service: Cardiology;  Laterality: N/A;       Review of patient's allergies indicates:   Allergen Reactions    Succinimides Anaphylaxis     Son has MH       Current Facility-Administered Medications on File Prior to Encounter   Medication    [COMPLETED] acetaminophen tablet 650 mg    [COMPLETED] diphenhydrAMINE injection 12.5 mg    [COMPLETED] diphenhydrAMINE injection 25 mg    [COMPLETED] ketorolac injection 15 mg    lactated ringers infusion    LIDOcaine (PF) 10 mg/ml (1%) injection 5 mg    LIDOcaine HCL 10 mg/ml (1%) 50 mL, EPINEPHrine 1 mg in lactated Ringers 1,000 mL irrigation    [COMPLETED] methocarbamoL tablet 1,500 mg    [COMPLETED] morphine injection 2 mg    [COMPLETED] piperacillin-tazobactam (ZOSYN) 4.5 g in dextrose 5 % in water (D5W) 5 % 100 mL IVPB (MB+)    [COMPLETED] sodium chloride 0.9% bolus 1,000 mL 1,000 mL    [COMPLETED] sodium chloride 0.9% bolus 1,000 mL 1,000 mL    [COMPLETED] vancomycin (VANCOCIN) 2,000 mg in dextrose 5 % (D5W) 500 mL IVPB    [DISCONTINUED] diphenhydrAMINE injection 12.5 mg    [DISCONTINUED] diphenhydrAMINE injection 25 mg    [DISCONTINUED] piperacillin-tazobactam (ZOSYN) 4.5 g in dextrose 5 % in water (D5W) 5 % 100 mL IVPB (MB+)     Current Outpatient Medications on File Prior to  Encounter   Medication Sig    acetaminophen-codeine 300-30mg (TYLENOL #3) 300-30 mg Tab Take 1 tablet by mouth every 4 (four) hours as needed.    apixaban (ELIQUIS) 5 mg Tab Take 1 tablet (5 mg total) by mouth 2 (two) times daily. Will hold 2/13 & 2/14    atorvastatin (LIPITOR) 20 MG tablet Take 1 tablet (20 mg total) by mouth every evening.    B-complex with vitamin C (VITAMIN B COMPLEX-C ORAL) Take by mouth Daily.    calcium-vitamin D 250-100 mg-unit per tablet Take 1 tablet by mouth 2 (two) times daily.    COSENTYX PEN, 2 PENS, 150 mg/mL PnIj Inject 300mg (2 pens) into the skin every 4 weeks    cyanocobalamin 500 MCG tablet Take 500 mcg by mouth once daily.    diazePAM (VALIUM) 5 MG tablet Take 1 tablet (5 mg total) by mouth daily as needed for Anxiety.    fluticasone-umeclidin-vilanter (TRELEGY ELLIPTA) 100-62.5-25 mcg DsDv Inhale 1 puff into the lungs once daily.    multivitamin (THERAGRAN) per tablet Take 1 tablet by mouth once daily.    multivitamin with minerals (HAIR,SKIN AND NAILS ORAL) Take by mouth.    mv-mn/iron/folic acid/herb 190 (VITAMIN D3 COMPLETE ORAL) Take by mouth.    omeprazole (PRILOSEC) 40 MG capsule Take 1 capsule (40 mg total) by mouth every morning.    ondansetron (ZOFRAN) 8 MG tablet Take 1 tablet (8 mg total) by mouth every 12 (twelve) hours as needed for Nausea.    traZODone (DESYREL) 100 MG tablet Take 1 tablet (100 mg total) by mouth nightly as needed for Insomnia.    venlafaxine (EFFEXOR-XR) 75 MG 24 hr capsule Take 1 capsule (75 mg total) by mouth once daily. Start on Week 2    [DISCONTINUED] budesonide-formoterol 160-4.5 mcg (SYMBICORT) 160-4.5 mcg/actuation HFAA Inhale 2 puffs into the lungs every 12 (twelve) hours. Controller     Family History       Problem Relation (Age of Onset)    No Known Problems Daughter, Son, Daughter          Tobacco Use    Smoking status: Former     Packs/day: 0.00     Years: 20.00     Pack years: 0.00     Types: Cigarettes     Start date: 1/1/1979      Quit date: 2020     Years since quittin.3    Smokeless tobacco: Current   Substance and Sexual Activity    Alcohol use: Yes     Alcohol/week: 1.0 standard drink     Types: 1 Glasses of wine per week     Comment: social    Drug use: No    Sexual activity: Yes     Partners: Male     Birth control/protection: Post-menopausal     Review of Systems   Constitutional:  Positive for chills, fatigue and fever. Negative for activity change, appetite change, diaphoresis and unexpected weight change.   HENT:  Negative for congestion, sore throat and trouble swallowing.    Eyes:  Negative for visual disturbance.   Respiratory:  Negative for cough, chest tightness, shortness of breath and wheezing.    Cardiovascular:  Negative for chest pain, palpitations and leg swelling.   Gastrointestinal:  Positive for nausea. Negative for abdominal distention, abdominal pain, constipation, diarrhea and vomiting.   Genitourinary:  Negative for decreased urine volume, difficulty urinating, dysuria, frequency and urgency.   Musculoskeletal:  Positive for arthralgias (bilateral hip pain). Negative for back pain, myalgias and neck pain.   Skin:  Positive for rash.   Neurological:  Negative for dizziness, syncope, weakness, light-headedness and headaches.   Objective:     Vital Signs (Most Recent):  Temp: 98.4 °F (36.9 °C) (23)  Pulse: 78 (23)  Resp: (!) 21 (23)  BP: (!) 118/58 (23)  SpO2: 98 % (23)   Vital Signs (24h Range):  Temp:  [98.4 °F (36.9 °C)-99.3 °F (37.4 °C)] 98.4 °F (36.9 °C)  Pulse:  [] 78  Resp:  [18-21] 21  SpO2:  [96 %-100 %] 98 %  BP: ()/(48-67) 118/58     Weight: 78 kg (171 lb 15.3 oz)  Body mass index is 31.45 kg/m².    Physical Exam  Vitals and nursing note reviewed.   Constitutional:       General: She is not in acute distress.     Appearance: Normal appearance. She is well-developed. She is obese. She is not toxic-appearing.   HENT:      Head:  Normocephalic and atraumatic.      Mouth/Throat:      Dentition: Normal dentition.   Eyes:      General: Lids are normal.      Extraocular Movements: Extraocular movements intact.      Conjunctiva/sclera: Conjunctivae normal.   Cardiovascular:      Rate and Rhythm: Normal rate and regular rhythm.      Heart sounds: Normal heart sounds. No murmur heard.  Pulmonary:      Effort: Pulmonary effort is normal.      Breath sounds: Normal breath sounds.   Abdominal:      General: Bowel sounds are normal. There is no distension.      Palpations: Abdomen is soft.      Tenderness: There is no abdominal tenderness.   Musculoskeletal:      Cervical back: Neck supple.      Right lower leg: No edema.      Left lower leg: No edema.   Skin:     General: Skin is warm and dry.      Findings: Rash (see media) present. No erythema.   Neurological:      Mental Status: She is alert and oriented to person, place, and time.         Significant Labs: All pertinent labs within the past 24 hours have been reviewed.  CBC:   Recent Labs   Lab 04/24/23  1144   WBC 0.70*   HGB 13.1   HCT 38.4        CMP:   Recent Labs   Lab 04/24/23  1144      K 3.8      CO2 24   GLU 94   BUN 17   CREATININE 0.73   CALCIUM 8.8   ANIONGAP 7*       Significant Imaging: I have reviewed all pertinent imaging results/findings within the past 24 hours.

## 2023-04-25 NOTE — SUBJECTIVE & OBJECTIVE
Past Medical History:   Diagnosis Date    Allergy     Anxiety     Arthritis     Atrial flutter     Colon polyps     COPD (chronic obstructive pulmonary disease)     COPD exacerbation 10/31/2022    Depression     Diverticular disease of colon 2017    Diverticulitis     HLD (hyperlipidemia)     Malignant neoplasm of central portion of left breast in female, estrogen receptor positive 2022    Pancreatitis     Psoriasis     Rheumatoid arthritis     Severe obesity (BMI 35.0-39.9) with comorbidity        Past Surgical History:   Procedure Laterality Date    ABLATION  2022    Cardiac Ablation for A Flutter, Successful    ADENOIDECTOMY  1966    Tonsillitis and adnoids    BILATERAL MASTECTOMY Bilateral 2/15/2023    Procedure: MASTECTOMY, BILATERAL;  Surgeon: Marcela Meehan MD;  Location: [facility];  Service: General;  Laterality: Bilateral;  EMAIL SENT  @ 11:44 LK / 2.5 HOURS    BLADDER SUSPENSION      (x2)    BREAST REVISION SURGERY Bilateral 3/29/2023    Procedure: BREAST REVISION SURGERY / BILATERAL BREAST FLAP REVISION;  Surgeon: Ming Milian MD;  Location: [facility];  Service: Plastics;  Laterality: Bilateral;  90 MINUTES     SECTION      (x2)    DEBRIDEMENT, BREAST Bilateral 3/29/2023    Procedure: DEBRIDEMENT, BREAST;  Surgeon: Ming Milian MD;  Location: [facility];  Service: Plastics;  Laterality: Bilateral;    ESOPHAGOGASTRODUODENOSCOPY N/A 2021    Procedure: EGD (ESOPHAGOGASTRODUODENOSCOPY);  Surgeon: Vince Rodriguez MD;  [facility];  Service: General;  Laterality: N/A;    INJECTION FOR SENTINEL NODE IDENTIFICATION Left 2/15/2023    Procedure: INJECTION, FOR SENTINEL NODE IDENTIFICATION;  Surgeon: Marcela Meehan MD;  Location: [facility];  Service: General;  Laterality: Left;    LAPAROSCOPIC SLEEVE GASTRECTOMY N/A 2021    Procedure: GASTRECTOMY, SLEEVE, LAPAROSCOPIC, with intraop EGD;  Surgeon: Vince Rodriguez MD;  Location: [facility] OR 2ND FLR;   Service: General;  Laterality: N/A;    LUNG BIOPSY  09/2020    RECONSTRUCTION OF BREAST WITH DEEP INFERIOR EPIGASTRIC ARTERY  (NEHA) FREE FLAP Bilateral 2/15/2023    Procedure: RECONSTRUCTION, BREAST, USING NEHA FREE FLAP;  Surgeon: Ming Milian MD;  Location: [facility];  Service: Plastics;  Laterality: Bilateral;    RHINOPLASTY      SENTINEL LYMPH NODE BIOPSY Left 2/15/2023    Procedure: BIOPSY, LYMPH NODE, SENTINEL;  Surgeon: Marcela Meehan MD;  Location: [facility] OR;  Service: General;  Laterality: Left;    TONSILLECTOMY      TRANSESOPHAGEAL ECHOCARDIOGRAPHY N/A 11/02/2022    Procedure: ECHOCARDIOGRAM, TRANSESOPHAGEAL;  Surgeon: Kellie Grover MD;  Location: [facility] EP [facility];  Service: Cardiology;  Laterality: N/A;       Review of patient's allergies indicates:   Allergen Reactions    Succinimides Anaphylaxis     Son has MH       Current Facility-Administered Medications on File Prior to Encounter   Medication    [COMPLETED] acetaminophen tablet 650 mg    [COMPLETED] diphenhydrAMINE injection 12.5 mg    [COMPLETED] diphenhydrAMINE injection 25 mg    [COMPLETED] ketorolac injection 15 mg    lactated ringers infusion    LIDOcaine (PF) 10 mg/ml (1%) injection 5 mg    LIDOcaine HCL 10 mg/ml (1%) 50 mL, EPINEPHrine 1 mg in lactated Ringers 1,000 mL irrigation    [COMPLETED] methocarbamoL tablet 1,500 mg    [COMPLETED] morphine injection 2 mg    [COMPLETED] piperacillin-tazobactam (ZOSYN) 4.5 g in dextrose 5 % in water (D5W) 5 % 100 mL IVPB (MB+)    [COMPLETED] sodium chloride 0.9% bolus 1,000 mL 1,000 mL    [COMPLETED] sodium chloride 0.9% bolus 1,000 mL 1,000 mL    [COMPLETED] vancomycin (VANCOCIN) 2,000 mg in dextrose 5 % (D5W) 500 mL IVPB    [DISCONTINUED] diphenhydrAMINE injection 12.5 mg    [DISCONTINUED] diphenhydrAMINE injection 25 mg    [DISCONTINUED] piperacillin-tazobactam (ZOSYN) 4.5 g in dextrose 5 % in water (D5W) 5 % 100 mL IVPB (MB+)     Current Outpatient Medications on File Prior to  Encounter   Medication Sig    acetaminophen-codeine 300-30mg (TYLENOL #3) 300-30 mg Tab Take 1 tablet by mouth every 4 (four) hours as needed.    apixaban (ELIQUIS) 5 mg Tab Take 1 tablet (5 mg total) by mouth 2 (two) times daily. Will hold 2/13 & 2/14    atorvastatin (LIPITOR) 20 MG tablet Take 1 tablet (20 mg total) by mouth every evening.    B-complex with vitamin C (VITAMIN B COMPLEX-C ORAL) Take by mouth Daily.    calcium-vitamin D 250-100 mg-unit per tablet Take 1 tablet by mouth 2 (two) times daily.    COSENTYX PEN, 2 PENS, 150 mg/mL PnIj Inject 300mg (2 pens) into the skin every 4 weeks    cyanocobalamin 500 MCG tablet Take 500 mcg by mouth once daily.    diazePAM (VALIUM) 5 MG tablet Take 1 tablet (5 mg total) by mouth daily as needed for Anxiety.    fluticasone-umeclidin-vilanter (TRELEGY ELLIPTA) 100-62.5-25 mcg DsDv Inhale 1 puff into the lungs once daily.    multivitamin (THERAGRAN) per tablet Take 1 tablet by mouth once daily.    multivitamin with minerals (HAIR,SKIN AND NAILS ORAL) Take by mouth.    mv-mn/iron/folic acid/herb 190 (VITAMIN D3 COMPLETE ORAL) Take by mouth.    omeprazole (PRILOSEC) 40 MG capsule Take 1 capsule (40 mg total) by mouth every morning.    ondansetron (ZOFRAN) 8 MG tablet Take 1 tablet (8 mg total) by mouth every 12 (twelve) hours as needed for Nausea.    traZODone (DESYREL) 100 MG tablet Take 1 tablet (100 mg total) by mouth nightly as needed for Insomnia.    venlafaxine (EFFEXOR-XR) 75 MG 24 hr capsule Take 1 capsule (75 mg total) by mouth once daily. Start on Week 2    [DISCONTINUED] budesonide-formoterol 160-4.5 mcg (SYMBICORT) 160-4.5 mcg/actuation HFAA Inhale 2 puffs into the lungs every 12 (twelve) hours. Controller     Family History       Problem Relation (Age of Onset)    No Known Problems Daughter, Son, Daughter          Tobacco Use    Smoking status: Former     Packs/day: 0.00     Years: 20.00     Pack years: 0.00     Types: Cigarettes     Start date: 1/1/1979      Quit date: 2020     Years since quittin.3    Smokeless tobacco: Current   Substance and Sexual Activity    Alcohol use: Yes     Alcohol/week: 1.0 standard drink     Types: 1 Glasses of wine per week     Comment: social    Drug use: No    Sexual activity: Yes     Partners: Male     Birth control/protection: Post-menopausal     Review of Systems   Constitutional:  Positive for chills, fatigue and fever. Negative for activity change, appetite change, diaphoresis and unexpected weight change.   HENT:  Negative for congestion, sore throat and trouble swallowing.    Eyes:  Negative for visual disturbance.   Respiratory:  Negative for cough, chest tightness, shortness of breath and wheezing.    Cardiovascular:  Negative for chest pain, palpitations and leg swelling.   Gastrointestinal:  Positive for nausea. Negative for abdominal distention, abdominal pain, constipation, diarrhea and vomiting.   Genitourinary:  Negative for decreased urine volume, difficulty urinating, dysuria, frequency and urgency.   Musculoskeletal:  Positive for arthralgias (bilateral hip pain). Negative for back pain, myalgias and neck pain.   Skin:  Positive for rash.   Neurological:  Negative for dizziness, syncope, weakness, light-headedness and headaches.   Objective:     Vital Signs (Most Recent):  Temp: 97.9 °F (36.6 °C) (23 0726)  Pulse: 107 (23 0825)  Resp: 18 (23 0951)  BP: (!) 103/58 (23 0726)  SpO2: 96 % (23 0825)   Vital Signs (24h Range):  Temp:  [97.9 °F (36.6 °C)-99.3 °F (37.4 °C)] 97.9 °F (36.6 °C)  Pulse:  [] 107  Resp:  [16-21] 18  SpO2:  [95 %-100 %] 96 %  BP: ()/(48-67) 103/58     Weight: 78 kg (171 lb 15.3 oz)  Body mass index is 31.45 kg/m².    Physical Exam  Vitals and nursing note reviewed.   Constitutional:       General: She is not in acute distress.     Appearance: Normal appearance. She is well-developed. She is obese. She is not toxic-appearing.   HENT:      Head:  Normocephalic and atraumatic.      Mouth/Throat:      Dentition: Normal dentition.   Eyes:      General: Lids are normal.      Extraocular Movements: Extraocular movements intact.      Conjunctiva/sclera: Conjunctivae normal.   Cardiovascular:      Rate and Rhythm: Normal rate and regular rhythm.      Heart sounds: Normal heart sounds. No murmur heard.  Pulmonary:      Effort: Pulmonary effort is normal.      Breath sounds: Normal breath sounds.   Abdominal:      General: Bowel sounds are normal. There is no distension.      Palpations: Abdomen is soft.      Tenderness: There is no abdominal tenderness.   Musculoskeletal:      Cervical back: Neck supple.      Right lower leg: No edema.      Left lower leg: No edema.   Skin:     General: Skin is warm and dry.      Findings: Rash (see media) present. No erythema.   Neurological:      Mental Status: She is alert and oriented to person, place, and time.         Significant Labs: All pertinent labs within the past 24 hours have been reviewed.  CBC:   Recent Labs   Lab 04/24/23  1144 04/25/23  0637   WBC 0.70* 0.40*   HGB 13.1 12.4   HCT 38.4 37.8    153       CMP:   Recent Labs   Lab 04/24/23  1144 04/25/23  0637    139   K 3.8 4.4    107   CO2 24 27   GLU 94 101   BUN 17 14   CREATININE 0.73 0.9   CALCIUM 8.8 8.3*   PROT  --  5.3*   ALBUMIN  --  2.8*   BILITOT  --  1.4*   ALKPHOS  --  72   AST  --  16   ALT  --  24   ANIONGAP 7* 5*         Significant Imaging: I have reviewed all pertinent imaging results/findings within the past 24 hours.

## 2023-04-25 NOTE — ASSESSMENT & PLAN NOTE
"Malignant neoplasm of central portion of left breast in female, estrogen receptor positive  Rash in adult  -history of  recently diagnosed HR+ breast cancer   -s/p bilateral SIEA flap reconstruction (2/15/2023)   -post op path showed Invasive lobular carcinoma in left breast grade 2 measuring 18 mm with LCIS Grade 2 ER SD positive and Her2 negative. pT1c pN0. Oncotype 35    -follows with Dr. Roya Madrid for her oncological care and chemo plan includes "given her Oncotype of 35, we have decided to proceed with adjuvant docetaxel 75mg/m2 and cyclophosphamide 600mg/m2 iv every 21 days for a total of 4 cycles"  -developed rash and fever s/p first chemo cycle  -at admission HDS with TMax 99.2; WBC 0.7, procalcitonin WNL  -blood cultures obtained >>> please follow   -UA pending >>> please follow  -bilateral hip X-rays ordered due to pain >>> please follow   -continue IV ABX with zosyn and vancomycin  -continue supportive care of rash with benadryl  -Oncology consulted  -neutropenic precautions   -PRN supportive care as indicated  -monitor     "

## 2023-04-25 NOTE — ASSESSMENT & PLAN NOTE
"Malignant neoplasm of central portion of left breast in female, estrogen receptor positive  Rash in adult  -history of  recently diagnosed HR+ breast cancer   -s/p bilateral SIEA flap reconstruction (2/15/2023)   -post op path showed Invasive lobular carcinoma in left breast grade 2 measuring 18 mm with LCIS Grade 2 ER DE positive and Her2 negative. pT1c pN0. Oncotype 35    -follows with Dr. Roya Madrid for her oncological care and chemo plan includes "given her Oncotype of 35, we have decided to proceed with adjuvant docetaxel 75mg/m2 and cyclophosphamide 600mg/m2 iv every 21 days for a total of 4 cycles"  -developed rash and fever s/p first chemo cycle  -at admission HDS with TMax 99.2; WBC 0.7, procalcitonin WNL  -blood cultures obtained >>> please follow   -UA pending >>> please follow  -bilateral hip X-rays ordered due to pain >>> please follow   -continue IV ABX with zosyn and vancomycin  -continue supportive care of rash with benadryl  -Oncology consulted  -neutropenic precautions   -PRN supportive care as indicated  -monitor .  Patient has been  stared on broad spectrum IV Abx for neutropenic fever,blood culture is pending.  Has leucopenia is setting of chemo. Therapy,oncology is consulted.    "

## 2023-04-25 NOTE — NURSING
Nurses Note -- 4 Eyes      4/25/2023   6:36 AM      Skin assessed during: Admit from [facility]    [x] No Altered Skin Integrity Present    [x]Prevention Measures Documented      [] Yes- Altered Skin Integrity Present or Discovered   [] LDA Added if Not in Epic (Describe Wound)   [] New Altered Skin Integrity was Present on Admit and Documented in LDA   [] Wound Image Taken    Wound Care Consulted? No    Attending Nurse:  Razia Garcia RN     Second RN/Staff Member:  NORMA Paula

## 2023-04-25 NOTE — PROGRESS NOTES
VSS. IVF inf R hand. Shahida diet. Voiding well. Red itchy rash better on face & neck. Triamcinolone cream available. Benadryl given X1 this morning. Tylenol #3 no help HA. Resp panel swab sent-- confirmed to have parainfluenza virus 3. Blood culture came back gram neg rods--MD aware. Neutropenic prec maintained. Pleasant.

## 2023-04-25 NOTE — HPI
62 y.o. F with hx Stage IA HR+ breast cancer s/p bilateral mastectomy with reconstruction underwent C1 od adjuvant TC with GCSF support on 4/19/23.     She presented to the ER with fever and rash .   Rash in on her face and neck- erythematous and pruritic, started 2-3 days after chemotherapy.   Also complains of hip pain.   Mild nausea. No mouth sores.     Her  is admitted to hospital with parainfluenza . But she doesn't have any URI symptoms as he did.

## 2023-04-25 NOTE — PROGRESS NOTES
"[facility] Surg [address] Medicine  Progress Note    Patient Name: Lucia Matias  MRN: [id number]  Patient Class: IP- Inpatient   Admission Date: 4/24/2023  Length of Stay: 1 days  Attending Physician: Juan Miguel Rubio MD  Primary Care Provider: Hetal Banks MD        Subjective:     Principal Problem:Neutropenic fever        HPI:  Ms. Matias is a 62 YOF with PMHx of HTN, HLD, depression, COPD, A-flutter s/p ablation on OAC (11/2022), h/o sleeve gastrectomy, recently diagnosed HR+ breast cancer s/p bilateral SIEA flap reconstruction (2/15/2023) and psoriasis (holding cosentyx while on chemotherapy).    Per  note "presented to [facility] ED with chief complaint for bilateral hip pain and rash on face, neck and scalp. She reports that the hip pain started around 1 AM the morning prior to ED arrival. She started her first round of chemotherapy on Wednesday, 4/19/23, and received the Udenyca injection the following day. She also mentions constipation, chills and low grade fever with temp 100.1 the morning prior to ED arrival. She also notes a rash on her neck, face and scalp that started 2 days ago." She endorses some nausea. She also reports that her  was seen in an OSH for COPD exacerbation just prior to her symptom development. She denies cough, light headiness, dizziness, syncope, chest pain, shortness of breath, palpitations, weight loss, vomiting or diarrhea.     In the ED she was HDS, TMax 99.2. CBC with WBC 0.7, H/H stable. Chemistry stable. Blood cultures obtained. She was treated with Vanc/zosyn and benadryl for rash. She was transferred to [facility], [place], operated by [facility] for further evaluation and management.        Overview/Hospital Course:  Ms. Matias is a 62 YOF with PMHx of HTN, HLD, depression, COPD, A-flutter s/p ablation on OAC (11/2022), h/o sleeve gastrectomy, recently diagnosed HR+ breast cancer s/p bilateral SIEA flap reconstruction " "(2/15/2023) and psoriasis (holding cosentyx while on chemotherapy).    Per  note "presented to [facility] ED with chief complaint for bilateral hip pain and rash on face, neck and scalp. She reports that the hip pain started around 1 AM the morning prior to ED arrival. She started her first round of chemotherapy on Wednesday, 4/19/23, and received the Udenyca injection the following day. She also mentions constipation, chills and low grade fever with temp 100.1 the morning prior to ED arrival. She also notes a rash on her neck, face and scalp that started 2 days ago." She endorses some nausea. She also reports that her  was seen in an OSH for COPD exacerbation just prior to her symptom development. She denies cough, light headiness, dizziness, syncope, chest pain, shortness of breath, palpitations, weight loss, vomiting or diarrhea.   Patient has been  stared on broad spectrum IV Abx for neutropenic fever,blood culture is pending.  Has leucopenia is setting of chemo. Therapy,oncology is consulted.        Past Medical History:   Diagnosis Date    Allergy     Anxiety     Arthritis     Atrial flutter     Colon polyps 2016    COPD (chronic obstructive pulmonary disease)     COPD exacerbation 10/31/2022    Depression     Diverticular disease of colon 06/06/2017    Diverticulitis     HLD (hyperlipidemia)     Malignant neoplasm of central portion of left breast in female, estrogen receptor positive 12/29/2022    Pancreatitis     Psoriasis     Rheumatoid arthritis     Severe obesity (BMI 35.0-39.9) with comorbidity        Past Surgical History:   Procedure Laterality Date    ABLATION  11/2022    Cardiac Ablation for A Flutter, Successful    ADENOIDECTOMY  1966    Tonsillitis and adnoids    BILATERAL MASTECTOMY Bilateral 2/15/2023    Procedure: MASTECTOMY, BILATERAL;  Surgeon: Marcela Meehan MD;  Location: [facility];  Service: General;  Laterality: Bilateral;  EMAIL SENT 1-12 @ 11:44 LK / 2.5 " HOURS    BLADDER SUSPENSION      (x2)    BREAST REVISION SURGERY Bilateral 3/29/2023    Procedure: BREAST REVISION SURGERY / BILATERAL BREAST FLAP REVISION;  Surgeon: Ming Milian MD;  Location: [facility];  Service: Plastics;  Laterality: Bilateral;  90 MINUTES     SECTION      (x2)    DEBRIDEMENT, BREAST Bilateral 3/29/2023    Procedure: DEBRIDEMENT, BREAST;  Surgeon: Ming Milian MD;  Location: [facility];  Service: Plastics;  Laterality: Bilateral;    ESOPHAGOGASTRODUODENOSCOPY N/A 2021    Procedure: EGD (ESOPHAGOGASTRODUODENOSCOPY);  Surgeon: Vince Rodriguez MD;  [address];  Service: General;  Laterality: N/A;    INJECTION FOR SENTINEL NODE IDENTIFICATION Left 2/15/2023    Procedure: INJECTION, FOR SENTINEL NODE IDENTIFICATION;  Surgeon: Marcela Meehan MD;  Location: [facility];  Service: General;  Laterality: Left;    LAPAROSCOPIC SLEEVE GASTRECTOMY N/A 2021    Procedure: GASTRECTOMY, SLEEVE, LAPAROSCOPIC, with intraop EGD;  Surgeon: Vince Rodriguez MD;  [address];  Service: General;  Laterality: N/A;    LUNG BIOPSY  2020    RECONSTRUCTION OF BREAST WITH DEEP INFERIOR EPIGASTRIC ARTERY  (NEHA) FREE FLAP Bilateral 2/15/2023    Procedure: RECONSTRUCTION, BREAST, USING NEHA FREE FLAP;  Surgeon: Ming Milian MD;  Location: [facility];  Service: Plastics;  Laterality: Bilateral;    RHINOPLASTY      SENTINEL LYMPH NODE BIOPSY Left 2/15/2023    Procedure: BIOPSY, LYMPH NODE, SENTINEL;  Surgeon: Marcela Meehan MD;  Location: [facility];  Service: General;  Laterality: Left;    TONSILLECTOMY      TRANSESOPHAGEAL ECHOCARDIOGRAPHY N/A 2022    Procedure: ECHOCARDIOGRAM, TRANSESOPHAGEAL;  Surgeon: Kellie Grover MD;  Location: [facility];  Service: Cardiology;  Laterality: N/A;       Review of patient's allergies indicates:   Allergen Reactions    Succinimides Anaphylaxis     Son has MH       Current  Facility-Administered Medications on File Prior to Encounter   Medication    [COMPLETED] acetaminophen tablet 650 mg    [COMPLETED] diphenhydrAMINE injection 12.5 mg    [COMPLETED] diphenhydrAMINE injection 25 mg    [COMPLETED] ketorolac injection 15 mg    lactated ringers infusion    LIDOcaine (PF) 10 mg/ml (1%) injection 5 mg    LIDOcaine HCL 10 mg/ml (1%) 50 mL, EPINEPHrine 1 mg in lactated Ringers 1,000 mL irrigation    [COMPLETED] methocarbamoL tablet 1,500 mg    [COMPLETED] morphine injection 2 mg    [COMPLETED] piperacillin-tazobactam (ZOSYN) 4.5 g in dextrose 5 % in water (D5W) 5 % 100 mL IVPB (MB+)    [COMPLETED] sodium chloride 0.9% bolus 1,000 mL 1,000 mL    [COMPLETED] sodium chloride 0.9% bolus 1,000 mL 1,000 mL    [COMPLETED] vancomycin (VANCOCIN) 2,000 mg in dextrose 5 % (D5W) 500 mL IVPB    [DISCONTINUED] diphenhydrAMINE injection 12.5 mg    [DISCONTINUED] diphenhydrAMINE injection 25 mg    [DISCONTINUED] piperacillin-tazobactam (ZOSYN) 4.5 g in dextrose 5 % in water (D5W) 5 % 100 mL IVPB (MB+)     Current Outpatient Medications on File Prior to Encounter   Medication Sig    acetaminophen-codeine 300-30mg (TYLENOL #3) 300-30 mg Tab Take 1 tablet by mouth every 4 (four) hours as needed.    apixaban (ELIQUIS) 5 mg Tab Take 1 tablet (5 mg total) by mouth 2 (two) times daily. Will hold 2/13 & 2/14    atorvastatin (LIPITOR) 20 MG tablet Take 1 tablet (20 mg total) by mouth every evening.    B-complex with vitamin C (VITAMIN B COMPLEX-C ORAL) Take by mouth Daily.    calcium-vitamin D 250-100 mg-unit per tablet Take 1 tablet by mouth 2 (two) times daily.    COSENTYX PEN, 2 PENS, 150 mg/mL PnIj Inject 300mg (2 pens) into the skin every 4 weeks    cyanocobalamin 500 MCG tablet Take 500 mcg by mouth once daily.    diazePAM (VALIUM) 5 MG tablet Take 1 tablet (5 mg total) by mouth daily as needed for Anxiety.    fluticasone-umeclidin-vilanter (TRELEGY ELLIPTA) 100-62.5-25 mcg DsDv Inhale  1 puff into the lungs once daily.    multivitamin (THERAGRAN) per tablet Take 1 tablet by mouth once daily.    multivitamin with minerals (HAIR,SKIN AND NAILS ORAL) Take by mouth.    mv-mn/iron/folic acid/herb 190 (VITAMIN D3 COMPLETE ORAL) Take by mouth.    omeprazole (PRILOSEC) 40 MG capsule Take 1 capsule (40 mg total) by mouth every morning.    ondansetron (ZOFRAN) 8 MG tablet Take 1 tablet (8 mg total) by mouth every 12 (twelve) hours as needed for Nausea.    traZODone (DESYREL) 100 MG tablet Take 1 tablet (100 mg total) by mouth nightly as needed for Insomnia.    venlafaxine (EFFEXOR-XR) 75 MG 24 hr capsule Take 1 capsule (75 mg total) by mouth once daily. Start on Week 2    [DISCONTINUED] budesonide-formoterol 160-4.5 mcg (SYMBICORT) 160-4.5 mcg/actuation HFAA Inhale 2 puffs into the lungs every 12 (twelve) hours. Controller     Family History       Problem Relation (Age of Onset)    No Known Problems Daughter, Son, Daughter          Tobacco Use    Smoking status: Former     Packs/day: 0.00     Years: 20.00     Pack years: 0.00     Types: Cigarettes     Start date: 1979     Quit date: 2020     Years since quittin.3    Smokeless tobacco: Current   Substance and Sexual Activity    Alcohol use: Yes     Alcohol/week: 1.0 standard drink     Types: 1 Glasses of wine per week     Comment: social    Drug use: No    Sexual activity: Yes     Partners: Male     Birth control/protection: Post-menopausal     Review of Systems   Constitutional:  Positive for chills, fatigue and fever. Negative for activity change, appetite change, diaphoresis and unexpected weight change.   HENT:  Negative for congestion, sore throat and trouble swallowing.    Eyes:  Negative for visual disturbance.   Respiratory:  Negative for cough, chest tightness, shortness of breath and wheezing.    Cardiovascular:  Negative for chest pain, palpitations and leg swelling.   Gastrointestinal:  Positive for nausea. Negative for  abdominal distention, abdominal pain, constipation, diarrhea and vomiting.   Genitourinary:  Negative for decreased urine volume, difficulty urinating, dysuria, frequency and urgency.   Musculoskeletal:  Positive for arthralgias (bilateral hip pain). Negative for back pain, myalgias and neck pain.   Skin:  Positive for rash.   Neurological:  Negative for dizziness, syncope, weakness, light-headedness and headaches.   Objective:     Vital Signs (Most Recent):  Temp: 97.9 °F (36.6 °C) (04/25/23 0726)  Pulse: 107 (04/25/23 0825)  Resp: 18 (04/25/23 0951)  BP: (!) 103/58 (04/25/23 0726)  SpO2: 96 % (04/25/23 0825)   Vital Signs (24h Range):  Temp:  [97.9 °F (36.6 °C)-99.3 °F (37.4 °C)] 97.9 °F (36.6 °C)  Pulse:  [] 107  Resp:  [16-21] 18  SpO2:  [95 %-100 %] 96 %  BP: ()/(48-67) 103/58     Weight: 78 kg (171 lb 15.3 oz)  Body mass index is 31.45 kg/m².    Physical Exam  Vitals and nursing note reviewed.   Constitutional:       General: She is not in acute distress.     Appearance: Normal appearance. She is well-developed. She is obese. She is not toxic-appearing.   HENT:      Head: Normocephalic and atraumatic.      Mouth/Throat:      Dentition: Normal dentition.   Eyes:      General: Lids are normal.      Extraocular Movements: Extraocular movements intact.      Conjunctiva/sclera: Conjunctivae normal.   Cardiovascular:      Rate and Rhythm: Normal rate and regular rhythm.      Heart sounds: Normal heart sounds. No murmur heard.  Pulmonary:      Effort: Pulmonary effort is normal.      Breath sounds: Normal breath sounds.   Abdominal:      General: Bowel sounds are normal. There is no distension.      Palpations: Abdomen is soft.      Tenderness: There is no abdominal tenderness.   Musculoskeletal:      Cervical back: Neck supple.      Right lower leg: No edema.      Left lower leg: No edema.   Skin:     General: Skin is warm and dry.      Findings: Rash (see media) present. No erythema.   Neurological:      " Mental Status: She is alert and oriented to person, place, and time.         Significant Labs: All pertinent labs within the past 24 hours have been reviewed.  CBC:   Recent Labs   Lab 04/24/23  1144 04/25/23  0637   WBC 0.70* 0.40*   HGB 13.1 12.4   HCT 38.4 37.8    153       CMP:   Recent Labs   Lab 04/24/23  1144 04/25/23  0637    139   K 3.8 4.4    107   CO2 24 27   GLU 94 101   BUN 17 14   CREATININE 0.73 0.9   CALCIUM 8.8 8.3*   PROT  --  5.3*   ALBUMIN  --  2.8*   BILITOT  --  1.4*   ALKPHOS  --  72   AST  --  16   ALT  --  24   ANIONGAP 7* 5*         Significant Imaging: I have reviewed all pertinent imaging results/findings within the past 24 hours.      Assessment/Plan:      * Neutropenic fever  Malignant neoplasm of central portion of left breast in female, estrogen receptor positive  Rash in adult  -history of  recently diagnosed HR+ breast cancer   -s/p bilateral SIEA flap reconstruction (2/15/2023)   -post op path showed Invasive lobular carcinoma in left breast grade 2 measuring 18 mm with LCIS Grade 2 ER PA positive and Her2 negative. pT1c pN0. Oncotype 35    -follows with Dr. Roya Madrid for her oncological care and chemo plan includes "given her Oncotype of 35, we have decided to proceed with adjuvant docetaxel 75mg/m2 and cyclophosphamide 600mg/m2 iv every 21 days for a total of 4 cycles"  -developed rash and fever s/p first chemo cycle  -at admission HDS with TMax 99.2; WBC 0.7, procalcitonin WNL  -blood cultures obtained >>> please follow   -UA pending >>> please follow  -bilateral hip X-rays ordered due to pain >>> please follow   -continue IV ABX with zosyn and vancomycin  -continue supportive care of rash with benadryl  -Oncology consulted  -neutropenic precautions   -PRN supportive care as indicated  -monitor .  Patient has been  stared on broad spectrum IV Abx for neutropenic fever,blood culture is pending.  Has leucopenia is setting of chemo. Therapy,oncology is " consulted.      Psoriatic arthritis  On cosentex at home.      Atrial flutter  Other long term (current) drug therapy  -history of Aflutter s/p ablation  -NSR at current  -continue home apixaban  -EKG PRN concerns   -monitor     Essential hypertension  -chronic, controlled  -no home antihypertensive therapies  -dose/medication adjustment as appropriate   -monitor     Depression with anxiety  -chronic  -controlled  -continue home PRN diazepam, PRN trazodone, and daily effexor  -monitor     COPD (chronic obstructive pulmonary disease)  -chronic, controlled at current  -no evidence of exacerbation  -continue home inhaler per pharmacy formulary alternative  -monitor       VTE Risk Mitigation (From admission, onward)         Ordered     apixaban tablet 5 mg  2 times daily         04/24/23 1952     IP VTE HIGH RISK PATIENT  Once         04/24/23 1952     Place sequential compression device  Until discontinued         04/24/23 1952     Reason for No Pharmacological VTE Prophylaxis  Once        Question:  Reasons:  Answer:  Already adequately anticoagulated on oral Anticoagulants    04/24/23 1952                Discharge Planning   LESTER:      Code Status: Full Code   Is the patient medically ready for discharge?:     Reason for patient still in hospital (select all that apply): Patient trending condition                     Juan Miguel Rubio MD  Department of Hospital Medicine   [facility] - Med Surg ([address])

## 2023-04-25 NOTE — H&P
"[facility] [address] Medicine  History & Physical    Patient Name: Lucia Matias  MRN: [id number]  Patient Class: IP- Inpatient  Admission Date: 4/24/2023  Attending Physician: Clare Lundy MD   Primary Care Provider: Hetal Banks MD    Patient information was obtained from patient, past medical records and ER records.     Subjective:     Principal Problem:Neutropenic fever    Chief Complaint: Rash     HPI: Ms. Matias is a 62 YOF with PMHx of HTN, HLD, depression, COPD, A-flutter s/p ablation on OAC (11/2022), h/o sleeve gastrectomy, recently diagnosed HR+ breast cancer s/p bilateral SIEA flap reconstruction (2/15/2023) and psoriasis (holding cosentyx while on chemotherapy).    Per  note "presented to [facility] ED with chief complaint for bilateral hip pain and rash on face, neck and scalp. She reports that the hip pain started around 1 AM the morning prior to ED arrival. She started her first round of chemotherapy on Wednesday, 4/19/23, and received the Udenyca injection the following day. She also mentions constipation, chills and low grade fever with temp 100.1 the morning prior to ED arrival. She also notes a rash on her neck, face and scalp that started 2 days ago." She endorses some nausea. She also reports that her  was seen in an OSH for COPD exacerbation just prior to her symptom development. She denies cough, light headiness, dizziness, syncope, chest pain, shortness of breath, palpitations, weight loss, vomiting or diarrhea.     In the ED she was HDS, TMax 99.2. CBC with WBC 0.7, H/H stable. Chemistry stable. Blood cultures obtained. She was treated with Vanc/zosyn and benadryl for rash. She was transferred to [facility] Medicine Service for further evaluation and management.        Past Medical History:   Diagnosis Date    Allergy     Anxiety     Arthritis     Atrial flutter     Colon polyps 2016    COPD (chronic obstructive " pulmonary disease)     COPD exacerbation 10/31/2022    Depression     Diverticular disease of colon 2017    Diverticulitis     HLD (hyperlipidemia)     Malignant neoplasm of central portion of left breast in female, estrogen receptor positive 2022    Pancreatitis     Psoriasis     Rheumatoid arthritis     Severe obesity (BMI 35.0-39.9) with comorbidity        Past Surgical History:   Procedure Laterality Date    ABLATION  2022    Cardiac Ablation for A Flutter, Successful    ADENOIDECTOMY  1966    Tonsillitis and adnoids    BILATERAL MASTECTOMY Bilateral 2/15/2023    Procedure: MASTECTOMY, BILATERAL;  Surgeon: Marcela Meehan MD;  Location: [facility];  Service: General;  Laterality: Bilateral;  EMAIL SENT  @ 11:44 LK / 2.5 HOURS    BLADDER SUSPENSION      (x2)    BREAST REVISION SURGERY Bilateral 3/29/2023    Procedure: BREAST REVISION SURGERY / BILATERAL BREAST FLAP REVISION;  Surgeon: Ming Milian MD;  Location: [facility];  Service: Plastics;  Laterality: Bilateral;  90 MINUTES     SECTION      (x2)    DEBRIDEMENT, BREAST Bilateral 3/29/2023    Procedure: DEBRIDEMENT, BREAST;  Surgeon: Ming Milian MD;  Location: [facility];  Service: Plastics;  Laterality: Bilateral;    ESOPHAGOGASTRODUODENOSCOPY N/A 2021    Procedure: EGD (ESOPHAGOGASTRODUODENOSCOPY);  Surgeon: Vince Rodriguez MD;  Location: [facility] OR 2ND FLR;  Service: General;  Laterality: N/A;    INJECTION FOR SENTINEL NODE IDENTIFICATION Left 2/15/2023    Procedure: INJECTION, FOR SENTINEL NODE IDENTIFICATION;  Surgeon: Marcela Meehan MD;  Location: [facility] OR;  Service: General;  Laterality: Left;    LAPAROSCOPIC SLEEVE GASTRECTOMY N/A 2021    Procedure: GASTRECTOMY, SLEEVE, LAPAROSCOPIC, with intraop EGD;  Surgeon: Vince Rodriguez MD;  Location: [facility] OR 2ND FLR;  Service: General;  Laterality: N/A;    LUNG BIOPSY  2020    RECONSTRUCTION OF BREAST WITH DEEP INFERIOR EPIGASTRIC ARTERY   (NEHA) FREE FLAP Bilateral 2/15/2023    Procedure: RECONSTRUCTION, BREAST, USING NEHA FREE FLAP;  Surgeon: Ming Miilan MD;  Location: [facility] OR;  Service: Plastics;  Laterality: Bilateral;    RHINOPLASTY      SENTINEL LYMPH NODE BIOPSY Left 2/15/2023    Procedure: BIOPSY, LYMPH NODE, SENTINEL;  Surgeon: Marcela Meehan MD;  Location: [facility] OR;  Service: General;  Laterality: Left;    TONSILLECTOMY      TRANSESOPHAGEAL ECHOCARDIOGRAPHY N/A 11/02/2022    Procedure: ECHOCARDIOGRAM, TRANSESOPHAGEAL;  Surgeon: Kellie Grover MD;  Location: [facility] EP LAB;  Service: Cardiology;  Laterality: N/A;       Review of patient's allergies indicates:   Allergen Reactions    Succinimides Anaphylaxis     Son has MH       Current Facility-Administered Medications on File Prior to Encounter   Medication    [COMPLETED] acetaminophen tablet 650 mg    [COMPLETED] diphenhydrAMINE injection 12.5 mg    [COMPLETED] diphenhydrAMINE injection 25 mg    [COMPLETED] ketorolac injection 15 mg    lactated ringers infusion    LIDOcaine (PF) 10 mg/ml (1%) injection 5 mg    LIDOcaine HCL 10 mg/ml (1%) 50 mL, EPINEPHrine 1 mg in lactated Ringers 1,000 mL irrigation    [COMPLETED] methocarbamoL tablet 1,500 mg    [COMPLETED] morphine injection 2 mg    [COMPLETED] piperacillin-tazobactam (ZOSYN) 4.5 g in dextrose 5 % in water (D5W) 5 % 100 mL IVPB (MB+)    [COMPLETED] sodium chloride 0.9% bolus 1,000 mL 1,000 mL    [COMPLETED] sodium chloride 0.9% bolus 1,000 mL 1,000 mL    [COMPLETED] vancomycin (VANCOCIN) 2,000 mg in dextrose 5 % (D5W) 500 mL IVPB    [DISCONTINUED] diphenhydrAMINE injection 12.5 mg    [DISCONTINUED] diphenhydrAMINE injection 25 mg    [DISCONTINUED] piperacillin-tazobactam (ZOSYN) 4.5 g in dextrose 5 % in water (D5W) 5 % 100 mL IVPB (MB+)     Current Outpatient Medications on File Prior to Encounter   Medication Sig    acetaminophen-codeine 300-30mg (TYLENOL #3) 300-30 mg Tab Take 1 tablet by mouth  every 4 (four) hours as needed.    apixaban (ELIQUIS) 5 mg Tab Take 1 tablet (5 mg total) by mouth 2 (two) times daily. Will hold  &     atorvastatin (LIPITOR) 20 MG tablet Take 1 tablet (20 mg total) by mouth every evening.    B-complex with vitamin C (VITAMIN B COMPLEX-C ORAL) Take by mouth Daily.    calcium-vitamin D 250-100 mg-unit per tablet Take 1 tablet by mouth 2 (two) times daily.    COSENTYX PEN, 2 PENS, 150 mg/mL PnIj Inject 300mg (2 pens) into the skin every 4 weeks    cyanocobalamin 500 MCG tablet Take 500 mcg by mouth once daily.    diazePAM (VALIUM) 5 MG tablet Take 1 tablet (5 mg total) by mouth daily as needed for Anxiety.    fluticasone-umeclidin-vilanter (TRELEGY ELLIPTA) 100-62.5-25 mcg DsDv Inhale 1 puff into the lungs once daily.    multivitamin (THERAGRAN) per tablet Take 1 tablet by mouth once daily.    multivitamin with minerals (HAIR,SKIN AND NAILS ORAL) Take by mouth.    mv-mn/iron/folic acid/herb 190 (VITAMIN D3 COMPLETE ORAL) Take by mouth.    omeprazole (PRILOSEC) 40 MG capsule Take 1 capsule (40 mg total) by mouth every morning.    ondansetron (ZOFRAN) 8 MG tablet Take 1 tablet (8 mg total) by mouth every 12 (twelve) hours as needed for Nausea.    traZODone (DESYREL) 100 MG tablet Take 1 tablet (100 mg total) by mouth nightly as needed for Insomnia.    venlafaxine (EFFEXOR-XR) 75 MG 24 hr capsule Take 1 capsule (75 mg total) by mouth once daily. Start on Week 2    [DISCONTINUED] budesonide-formoterol 160-4.5 mcg (SYMBICORT) 160-4.5 mcg/actuation HFAA Inhale 2 puffs into the lungs every 12 (twelve) hours. Controller     Family History       Problem Relation (Age of Onset)    No Known Problems Daughter, Son, Daughter          Tobacco Use    Smoking status: Former     Packs/day: 0.00     Years: 20.00     Pack years: 0.00     Types: Cigarettes     Start date: 1979     Quit date: 2020     Years since quittin.3    Smokeless tobacco: Current   Substance  and Sexual Activity    Alcohol use: Yes     Alcohol/week: 1.0 standard drink     Types: 1 Glasses of wine per week     Comment: social    Drug use: No    Sexual activity: Yes     Partners: Male     Birth control/protection: Post-menopausal     Review of Systems   Constitutional:  Positive for chills, fatigue and fever. Negative for activity change, appetite change, diaphoresis and unexpected weight change.   HENT:  Negative for congestion, sore throat and trouble swallowing.    Eyes:  Negative for visual disturbance.   Respiratory:  Negative for cough, chest tightness, shortness of breath and wheezing.    Cardiovascular:  Negative for chest pain, palpitations and leg swelling.   Gastrointestinal:  Positive for nausea. Negative for abdominal distention, abdominal pain, constipation, diarrhea and vomiting.   Genitourinary:  Negative for decreased urine volume, difficulty urinating, dysuria, frequency and urgency.   Musculoskeletal:  Positive for arthralgias (bilateral hip pain). Negative for back pain, myalgias and neck pain.   Skin:  Positive for rash.   Neurological:  Negative for dizziness, syncope, weakness, light-headedness and headaches.   Objective:     Vital Signs (Most Recent):  Temp: 98.4 °F (36.9 °C) (04/24/23 2034)  Pulse: 78 (04/24/23 2034)  Resp: (!) 21 (04/24/23 2034)  BP: (!) 118/58 (04/24/23 2034)  SpO2: 98 % (04/24/23 2034)   Vital Signs (24h Range):  Temp:  [98.4 °F (36.9 °C)-99.3 °F (37.4 °C)] 98.4 °F (36.9 °C)  Pulse:  [] 78  Resp:  [18-21] 21  SpO2:  [96 %-100 %] 98 %  BP: ()/(48-67) 118/58     Weight: 78 kg (171 lb 15.3 oz)  Body mass index is 31.45 kg/m².    Physical Exam  Vitals and nursing note reviewed.   Constitutional:       General: She is not in acute distress.     Appearance: Normal appearance. She is well-developed. She is obese. She is not toxic-appearing.   HENT:      Head: Normocephalic and atraumatic.      Mouth/Throat:      Dentition: Normal dentition.   Eyes:       "General: Lids are normal.      Extraocular Movements: Extraocular movements intact.      Conjunctiva/sclera: Conjunctivae normal.   Cardiovascular:      Rate and Rhythm: Normal rate and regular rhythm.      Heart sounds: Normal heart sounds. No murmur heard.  Pulmonary:      Effort: Pulmonary effort is normal.      Breath sounds: Normal breath sounds.   Abdominal:      General: Bowel sounds are normal. There is no distension.      Palpations: Abdomen is soft.      Tenderness: There is no abdominal tenderness.   Musculoskeletal:      Cervical back: Neck supple.      Right lower leg: No edema.      Left lower leg: No edema.   Skin:     General: Skin is warm and dry.      Findings: Rash (see media) present. No erythema.   Neurological:      Mental Status: She is alert and oriented to person, place, and time.         Significant Labs: All pertinent labs within the past 24 hours have been reviewed.  CBC:   Recent Labs   Lab 04/24/23  1144   WBC 0.70*   HGB 13.1   HCT 38.4        CMP:   Recent Labs   Lab 04/24/23  1144      K 3.8      CO2 24   GLU 94   BUN 17   CREATININE 0.73   CALCIUM 8.8   ANIONGAP 7*       Significant Imaging: I have reviewed all pertinent imaging results/findings within the past 24 hours.    Assessment/Plan:     * Neutropenic fever  Malignant neoplasm of central portion of left breast in female, estrogen receptor positive  Rash in adult  -history of  recently diagnosed HR+ breast cancer   -s/p bilateral SIEA flap reconstruction (2/15/2023)   -post op path showed Invasive lobular carcinoma in left breast grade 2 measuring 18 mm with LCIS Grade 2 ER NJ positive and Her2 negative. pT1c pN0. Oncotype 35    -follows with Dr. Roya Madrid for her oncological care and chemo plan includes "given her Oncotype of 35, we have decided to proceed with adjuvant docetaxel 75mg/m2 and cyclophosphamide 600mg/m2 iv every 21 days for a total of 4 cycles"  -developed rash and fever s/p first chemo " cycle  -at admission HDS with TMax 99.2; WBC 0.7, procalcitonin WNL  -blood cultures obtained >>> please follow   -UA pending >>> please follow  -bilateral hip X-rays ordered due to pain >>> please follow   -continue IV ABX with zosyn and vancomycin  -continue supportive care of rash with benadryl  -Oncology consulted  -neutropenic precautions   -PRN supportive care as indicated  -monitor       Atrial flutter  Other long term (current) drug therapy  -history of Aflutter s/p ablation  -NSR at current  -continue home apixaban  -EKG PRN concerns   -monitor     Essential hypertension  -chronic, controlled  -no home antihypertensive therapies  -dose/medication adjustment as appropriate   -monitor     COPD (chronic obstructive pulmonary disease)  -chronic, controlled at current  -no evidence of exacerbation  -continue home inhaler per pharmacy formulary alternative  -monitor     Depression with anxiety  -chronic  -controlled  -continue home PRN diazepam, PRN trazodone, and daily effexor  -monitor       VTE Risk Mitigation (From admission, onward)         Ordered     apixaban tablet 5 mg  2 times daily         04/24/23 1952     IP VTE HIGH RISK PATIENT  Once         04/24/23 1952     Place sequential compression device  Until discontinued         04/24/23 1952     Reason for No Pharmacological VTE Prophylaxis  Once        Question:  Reasons:  Answer:  Already adequately anticoagulated on oral Anticoagulants    04/24/23 1952                Chaparrita Desouza, GENESIS, AG-ACNP, BC  Department of Hospital Medicine  [facility]

## 2023-04-25 NOTE — PROGRESS NOTES
Pharmacokinetic Initial Assessment: IV Vancomycin    Assessment/Plan:    Initiate intravenous vancomycin with loading dose of 2000 mg once followed by a maintenance dose of vancomycin 1000mg IV every 12 hours  Desired empiric serum trough concentration is 10 to 20 mcg/mL  Draw vancomycin trough level 60 min prior to fourth dose on 4/26 at approximately 0045  Pharmacy will continue to follow and monitor vancomycin.      Please contact pharmacy at extension [phone] with any questions regarding this assessment.     Thank you for the consult,   Glenna Banks       Patient brief summary:  Lucia Matias is a 62 y.o. female initiated on antimicrobial therapy with IV Vancomycin for treatment of suspected neutropenic fever    Drug Allergies:   Review of patient's allergies indicates:   Allergen Reactions    Succinimides Anaphylaxis     Son has MH       Actual Body Weight:   78 KG    Renal Function:   Estimated Creatinine Clearance: 79.3 mL/min (based on SCr of 0.73 mg/dL).,     Dialysis Method (if applicable):  N/A    CBC (last 72 hours):  Recent Labs   Lab Result Units 04/24/23  1144   WBC K/uL 0.70*   Hemoglobin g/dL 13.1   Hematocrit % 38.4   Platelets K/uL 170   Gran % % 20.0*   Lymph % % 68.0*   Mono % % 5.0   Eosinophil % % 7.0   Basophil % % 0.0   Differential Method  Manual       Metabolic Panel (last 72 hours):  Recent Labs   Lab Result Units 04/24/23  1144   Sodium mmol/L 138   Potassium mmol/L 3.8   Chloride mmol/L 107   CO2 mmol/L 24   Glucose mg/dL 94   BUN mg/dL 17   Creatinine mg/dL 0.73       Drug levels (last 3 results):  No results for input(s): VANCOMYCINRA, VANCORANDOM, VANCOMYCINPE, VANCOPEAK, VANCOMYCINTR, VANCOTROUGH in the last 72 hours.    Microbiologic Results:  Microbiology Results (last 7 days)       ** No results found for the last 168 hours. **

## 2023-04-25 NOTE — CONSULTS
[facility] ([address])  Hematology/Oncology  Consult Note    Patient Name: Lucia Matias  MRN: [id number]  Admission Date: 4/24/2023  Hospital Length of Stay: 1 days  Code Status: Full Code   Attending Provider: Juan Miguel Rubio MD  Consulting Provider: Nora Hinojosa MD  Primary Care Physician: Hetal Banks MD  Principal Problem:Neutropenic fever    Inpatient consult to Hematology/Oncology  Consult performed by: Nora Hinojosa MD  Consult ordered by: Juan Miguel Rubio MD        Subjective:     HPI:  62 y.o. F with hx Stage IA HR+ breast cancer s/p bilateral mastectomy with reconstruction underwent C1 od adjuvant TC with GCSF support on 4/19/23.     She presented to the ER with fever and rash .   Rash in on her face and neck- erythematous and pruritic, started 2-3 days after chemotherapy.   Also complains of hip pain.   Mild nausea. No mouth sores.     Her  is admitted to hospital with parainfluenza . But she doesn't have any URI symptoms as he did.         No fever since admission. HDS>   Rash not getting better.   She requests something stronger for hip pain       Oncology Treatment Plan:   OP BREAST TC - DOCETAXEL CYCLOPHOSPHAMIDE Q3W    Medications:  Continuous Infusions:   sodium chloride 0.9% 100 mL/hr at 04/25/23 1624     Scheduled Meds:   apixaban  5 mg Oral BID    atorvastatin  20 mg Oral QHS    calcium-vitamin D3  1 tablet Oral BID    cyanocobalamin  500 mcg Oral Daily    fluconazole  200 mg Oral Daily    fluticasone furoate-vilanteroL  1 puff Inhalation Daily    multivitamin  1 tablet Oral Daily    mupirocin   Nasal BID    pantoprazole  40 mg Oral Daily    piperacillin-tazobactam (ZOSYN) IVPB  4.5 g Intravenous Q8H    predniSONE  50 mg Oral Daily    senna-docusate 8.6-50 mg  1 tablet Oral BID    triamcinolone acetonide 0.1%   Topical (Top) BID    vancomycin (VANCOCIN) IVPB  1,000 mg Intravenous Q12H    venlafaxine  75 mg Oral Daily    vitamin renal  formula (B-complex-vitamin c-folic acid)  1 capsule Oral Daily     PRN Meds:acetaminophen, acetaminophen-codeine 300-30mg, bisacodyL, diazePAM, diphenhydrAMINE, HYDROmorphone, ondansetron, ondansetron, polyethylene glycol, sodium chloride 0.9%, traZODone, Pharmacy to dose Vancomycin consult **AND** vancomycin - pharmacy to dose     Review of patient's allergies indicates:   Allergen Reactions    Succinimides Anaphylaxis     Son has MH        Past Medical History:   Diagnosis Date    Allergy     Anxiety     Arthritis     Atrial flutter     Colon polyps     COPD (chronic obstructive pulmonary disease)     COPD exacerbation 10/31/2022    Depression     Diverticular disease of colon 2017    Diverticulitis     HLD (hyperlipidemia)     Malignant neoplasm of central portion of left breast in female, estrogen receptor positive 2022    Pancreatitis     Psoriasis     Rheumatoid arthritis     Severe obesity (BMI 35.0-39.9) with comorbidity      Past Surgical History:   Procedure Laterality Date    ABLATION  2022    Cardiac Ablation for A Flutter, Successful    ADENOIDECTOMY  1966    Tonsillitis and adnoids    BILATERAL MASTECTOMY Bilateral 2/15/2023    Procedure: MASTECTOMY, BILATERAL;  Surgeon: Marcela Meehan MD;  Location: [facility];  Service: General;  Laterality: Bilateral;  EMAIL SENT  @ 11:44 LK / 2.5 HOURS    BLADDER SUSPENSION      (x2)    BREAST REVISION SURGERY Bilateral 3/29/2023    Procedure: BREAST REVISION SURGERY / BILATERAL BREAST FLAP REVISION;  Surgeon: Ming Milian MD;  Location: [facility] OR;  Service: Plastics;  Laterality: Bilateral;  90 MINUTES     SECTION      (x2)    DEBRIDEMENT, BREAST Bilateral 3/29/2023    Procedure: DEBRIDEMENT, BREAST;  Surgeon: Ming Milian MD;  Location: [facility];  Service: Plastics;  Laterality: Bilateral;    ESOPHAGOGASTRODUODENOSCOPY N/A 2021    Procedure: EGD (ESOPHAGOGASTRODUODENOSCOPY);  Surgeon: Vince GUADALUPE  MD Michael;  Location: [facility] 2ND FLR;  Service: General;  Laterality: N/A;    INJECTION FOR SENTINEL NODE IDENTIFICATION Left 2/15/2023    Procedure: INJECTION, FOR SENTINEL NODE IDENTIFICATION;  Surgeon: Marcela Meehan MD;  Location: [facility];  Service: General;  Laterality: Left;    LAPAROSCOPIC SLEEVE GASTRECTOMY N/A 2021    Procedure: GASTRECTOMY, SLEEVE, LAPAROSCOPIC, with intraop EGD;  Surgeon: Vince Rodriguez MD;  Location: [facility] 2ND FLR;  Service: General;  Laterality: N/A;    LUNG BIOPSY  2020    RECONSTRUCTION OF BREAST WITH DEEP INFERIOR EPIGASTRIC ARTERY  (NEHA) FREE FLAP Bilateral 2/15/2023    Procedure: RECONSTRUCTION, BREAST, USING NEHA FREE FLAP;  Surgeon: Ming Milian MD;  Location: [facility];  Service: Plastics;  Laterality: Bilateral;    RHINOPLASTY      SENTINEL LYMPH NODE BIOPSY Left 2/15/2023    Procedure: BIOPSY, LYMPH NODE, SENTINEL;  Surgeon: Marcela Meehan MD;  Location: [facility];  Service: General;  Laterality: Left;    TONSILLECTOMY      TRANSESOPHAGEAL ECHOCARDIOGRAPHY N/A 2022    Procedure: ECHOCARDIOGRAM, TRANSESOPHAGEAL;  Surgeon: Kellie Grover MD;  Location: [facility] EP LAB;  Service: Cardiology;  Laterality: N/A;     Family History       Problem Relation (Age of Onset)    No Known Problems Daughter, Son, Daughter          Tobacco Use    Smoking status: Former     Packs/day: 0.00     Years: 20.00     Pack years: 0.00     Types: Cigarettes     Start date: 1979     Quit date: 2020     Years since quittin.3    Smokeless tobacco: Current   Substance and Sexual Activity    Alcohol use: Yes     Alcohol/week: 1.0 standard drink     Types: 1 Glasses of wine per week     Comment: social    Drug use: No    Sexual activity: Yes     Partners: Male     Birth control/protection: Post-menopausal       Review of Systems   Constitutional:  Positive for fatigue and fever.   HENT:  Negative for congestion, ear discharge, sore throat,  trouble swallowing and voice change.    Respiratory:  Negative for shortness of breath.    Cardiovascular:  Negative for chest pain and leg swelling.   Gastrointestinal:  Positive for constipation and nausea. Negative for abdominal pain, diarrhea and vomiting.   Genitourinary:  Negative for frequency.   Musculoskeletal:  Positive for arthralgias.   Skin:  Positive for color change and rash.   Neurological: Negative.    Hematological: Negative.    Psychiatric/Behavioral: Negative.     Objective:     Vital Signs (Most Recent):  Temp: 99 °F (37.2 °C) (04/25/23 1540)  Pulse: 106 (04/25/23 1540)  Resp: 16 (04/25/23 1540)  BP: (!) 107/50 (04/25/23 1540)  SpO2: 98 % (04/25/23 1540)   Vital Signs (24h Range):  Temp:  [97.9 °F (36.6 °C)-99.3 °F (37.4 °C)] 99 °F (37.2 °C)  Pulse:  [] 106  Resp:  [15-21] 16  SpO2:  [95 %-99 %] 98 %  BP: ()/(50-66) 107/50     Weight: 78 kg (171 lb 15.3 oz)  Body mass index is 31.45 kg/m².  Body surface area is 1.85 meters squared.      Intake/Output Summary (Last 24 hours) at 4/25/2023 1756  Last data filed at 4/25/2023 1624  Gross per 24 hour   Intake 1803.05 ml   Output --   Net 1803.05 ml       Physical Exam  Vitals and nursing note reviewed.   Constitutional:       General: She is not in acute distress.     Appearance: Normal appearance. She is not ill-appearing.   HENT:      Head: Normocephalic and atraumatic.      Right Ear: External ear normal.      Left Ear: External ear normal.      Mouth/Throat:      Pharynx: No oropharyngeal exudate.   Eyes:      General: No scleral icterus.        Right eye: No discharge.         Left eye: No discharge.   Cardiovascular:      Rate and Rhythm: Normal rate.   Pulmonary:      Effort: Pulmonary effort is normal. No respiratory distress.   Abdominal:      General: There is no distension.   Musculoskeletal:         General: No swelling or deformity.      Cervical back: Normal range of motion and neck supple.      Right lower leg: No edema.       Left lower leg: No edema.   Skin:     Coloration: Skin is not jaundiced.      Findings: No bruising, erythema, lesion or rash.   Neurological:      General: No focal deficit present.      Mental Status: She is alert and oriented to person, place, and time. Mental status is at baseline.      Coordination: Coordination normal.      Gait: Gait normal.   Psychiatric:         Mood and Affect: Mood normal.         Behavior: Behavior normal.       Significant Labs:   CBC:   Recent Labs   Lab 04/24/23  1144 04/25/23  0637   WBC 0.70* 0.40*   HGB 13.1 12.4   HCT 38.4 37.8    153   , CMP:   Recent Labs   Lab 04/24/23  1144 04/25/23  0637    139   K 3.8 4.4    107   CO2 24 27   GLU 94 101   BUN 17 14   CREATININE 0.73 0.9   CALCIUM 8.8 8.3*   PROT  --  5.3*   ALBUMIN  --  2.8*   BILITOT  --  1.4*   ALKPHOS  --  72   AST  --  16   ALT  --  24   ANIONGAP 7* 5*   ,   Recent Lab Results         04/25/23  1437   04/25/23  0637   04/25/23  0122   04/24/23  2016        Respiratory Infection Panel Source NP Swab             Procalcitonin   0.84  Comment: A concentration < 0.25 ng/mL represents a low risk of bacterial   infection.  Procalcitonin may not be accurate among patients with localized   infection, recent trauma or major surgery, immunosuppressed state,   invasive fungal infection, renal dysfunction. Decisions regarding   initiation or continuation of antibiotic therapy should not be based   solely on procalcitonin levels.       0.05  Comment: A concentration < 0.25 ng/mL represents a low risk of bacterial   infection.  Procalcitonin may not be accurate among patients with localized   infection, recent trauma or major surgery, immunosuppressed state,   invasive fungal infection, renal dysfunction. Decisions regarding   initiation or continuation of antibiotic therapy should not be based   solely on procalcitonin levels.         Albumin   2.8           Alkaline Phosphatase   72           ALT   24            Anion Gap   5           Appearance, UA     Hazy         AST   16           Basophil %   1.0           Bilirubin (UA)     Negative         BILIRUBIN TOTAL   1.4  Comment: For infants and newborns, interpretation of results should be based  on gestational age, weight and in agreement with clinical  observations.    Premature Infant recommended reference ranges:  Up to 24 hours.............<8.0 mg/dL  Up to 48 hours............<12.0 mg/dL  3-5 days..................<15.0 mg/dL  6-29 days.................<15.0 mg/dL             BUN   14           Calcium   8.3           Chloride   107           CO2   27           Color, UA     Yellow         Creatinine   0.9           CRP   51.0           Differential Method   Manual  Comment: CORRECTED RESULT; previously reported as Automated on 04/25/2023 at   07:46.    [C]           eGFR   >60           Eosinophil %   0.0           Glucose   101           Glucose, UA     Negative         Gran %   4.0           Hematocrit   37.8           Hemoglobin   12.4           Immature Grans (Abs)   CANCELED  Comment: Mild elevation in immature granulocytes is non specific and   can be seen in a variety of conditions including stress response,   acute inflammation, trauma and pregnancy. Correlation with other   laboratory and clinical findings is essential.    Result canceled by the ancillary.             Immature Granulocytes   CANCELED  Comment: Result canceled by the ancillary.           Ketones, UA     Negative         Leukocytes, UA     Negative         Lymph %   90.0           Magnesium   2.3     1.5       MCH   29.1           MCHC   32.8           MCV   89           Metamyelocytes   1.0           Mono %   3.0           MPV   9.9           Myelocytes   1.0           NITRITE UA     Negative         nRBC   0           Occult Blood UA     Negative         pH, UA     5.0         Platelet Estimate   Appears normal           Platelets   153           Potassium   4.4           PROTEIN TOTAL    5.3           Protein, UA     Trace  Comment: Recommend a 24 hour urine protein or a urine   protein/creatinine ratio if globulin induced proteinuria is  clinically suspected.           RBC   4.26           RDW   13.0           Sed Rate   10           Sodium   139           Specific Gravity, UA     >1.030         Specimen UA     Urine, Clean Catch         UROBILINOGEN UA     Negative         WBC   0.40  Comment: WBC critical result called and read back from Razia Cedeno RN  WBC critical result(s) called and verbal readback obtained from   Kimberli Simmons RN by [facility] 04/25/2023 07:14    [C]                    [C] - Corrected Result           , and All pertinent labs from the last 24 hours have been reviewed.    Diagnostic Results:  Xrays reviewed  I have reviewed all pertinent imaging results/findings within the past 24 hours.  None    Assessment/Plan:     * Neutropenic fever  HDS, no indication for GCSF  Rapid Organism ID by PCR (from Blood culture)- ESBL   Continue abx for now. F/u on C/s  Continue supportive care   Rule out viral infections        Rash in adult  likely chemo related   Start small dose of oral steroids,   Continue topical steroids and Benadryl prn       Malignant neoplasm of central portion of left breast in female, estrogen receptor positive  F/u with Dr osorio OP    History of sleeve gastrectomy  Continue multivitamin daily        Thank you for your consult. I will follow-up with patient. Please contact us if you have any additional questions.    Nora Hinojosa MD  Hematology/Oncology  [facility] ([address])

## 2023-04-25 NOTE — ASSESSMENT & PLAN NOTE
-chronic, controlled  -no home antihypertensive therapies  -dose/medication adjustment as appropriate   -monitor

## 2023-04-26 ENCOUNTER — PATIENT MESSAGE (OUTPATIENT)
Dept: HEMATOLOGY/ONCOLOGY | Facility: CLINIC | Age: 63
End: 2023-04-26
Payer: COMMERCIAL

## 2023-04-26 PROBLEM — B96.20 BACTEREMIA, ESCHERICHIA COLI: Status: ACTIVE | Noted: 2023-04-26

## 2023-04-26 PROBLEM — Z16.12 INFECTION DUE TO ESBL-PRODUCING ESCHERICHIA COLI: Status: ACTIVE | Noted: 2023-04-26

## 2023-04-26 PROBLEM — A49.8 INFECTION DUE TO ESBL-PRODUCING ESCHERICHIA COLI: Status: ACTIVE | Noted: 2023-04-26

## 2023-04-26 PROBLEM — R78.81 BACTEREMIA, ESCHERICHIA COLI: Status: ACTIVE | Noted: 2023-04-26

## 2023-04-26 LAB
ANION GAP SERPL CALC-SCNC: 8 MMOL/L (ref 8–16)
BASOPHILS # BLD AUTO: 0.01 K/UL (ref 0–0.2)
BASOPHILS NFR BLD: 3.1 % (ref 0–1.9)
BUN SERPL-MCNC: 12 MG/DL (ref 6–20)
CALCIUM SERPL-MCNC: 8.2 MG/DL (ref 8.7–10.5)
CHLORIDE SERPL-SCNC: 111 MMOL/L (ref 95–110)
CO2 SERPL-SCNC: 18 MMOL/L (ref 23–29)
CREAT SERPL-MCNC: 0.7 MG/DL (ref 0.5–1.4)
CREAT SERPL-MCNC: 0.9 MG/DL (ref 0.5–1.4)
DIFFERENTIAL METHOD: ABNORMAL
EOSINOPHIL # BLD AUTO: 0 K/UL (ref 0–0.5)
EOSINOPHIL NFR BLD: 3.1 % (ref 0–8)
ERYTHROCYTE [DISTWIDTH] IN BLOOD BY AUTOMATED COUNT: 12.9 % (ref 11.5–14.5)
EST. GFR  (NO RACE VARIABLE): >60 ML/MIN/1.73 M^2
EST. GFR  (NO RACE VARIABLE): >60 ML/MIN/1.73 M^2
FOLATE SERPL-MCNC: 14.5 NG/ML (ref 4–24)
GLUCOSE SERPL-MCNC: 159 MG/DL (ref 70–110)
HCT VFR BLD AUTO: 36.4 % (ref 37–48.5)
HGB BLD-MCNC: 11.8 G/DL (ref 12–16)
IMM GRANULOCYTES # BLD AUTO: 0 K/UL (ref 0–0.04)
IMM GRANULOCYTES NFR BLD AUTO: 0 % (ref 0–0.5)
LYMPHOCYTES # BLD AUTO: 0.2 K/UL (ref 1–4.8)
LYMPHOCYTES NFR BLD: 65.6 % (ref 18–48)
MCH RBC QN AUTO: 29.3 PG (ref 27–31)
MCHC RBC AUTO-ENTMCNC: 32.4 G/DL (ref 32–36)
MCV RBC AUTO: 90 FL (ref 82–98)
MONOCYTES # BLD AUTO: 0.1 K/UL (ref 0.3–1)
MONOCYTES NFR BLD: 25 % (ref 4–15)
NEUTROPHILS # BLD AUTO: 0 K/UL (ref 1.8–7.7)
NEUTROPHILS NFR BLD: 3.2 % (ref 38–73)
NRBC BLD-RTO: 0 /100 WBC
PLATELET # BLD AUTO: 141 K/UL (ref 150–450)
PLATELET BLD QL SMEAR: ABNORMAL
PMV BLD AUTO: 10.6 FL (ref 9.2–12.9)
POTASSIUM SERPL-SCNC: 5.2 MMOL/L (ref 3.5–5.1)
RBC # BLD AUTO: 4.03 M/UL (ref 4–5.4)
SODIUM SERPL-SCNC: 137 MMOL/L (ref 136–145)
VIT B12 SERPL-MCNC: 724 PG/ML (ref 210–950)
WBC # BLD AUTO: 0.32 K/UL (ref 3.9–12.7)

## 2023-04-26 PROCEDURE — 25000242 PHARM REV CODE 250 ALT 637 W/ HCPCS: Performed by: NURSE PRACTITIONER

## 2023-04-26 PROCEDURE — 25000003 PHARM REV CODE 250: Performed by: NURSE PRACTITIONER

## 2023-04-26 PROCEDURE — 63600175 PHARM REV CODE 636 W HCPCS: Performed by: HOSPITALIST

## 2023-04-26 PROCEDURE — 36415 COLL VENOUS BLD VENIPUNCTURE: CPT | Performed by: STUDENT IN AN ORGANIZED HEALTH CARE EDUCATION/TRAINING PROGRAM

## 2023-04-26 PROCEDURE — 87070 CULTURE OTHR SPECIMN AEROBIC: CPT | Performed by: INTERNAL MEDICINE

## 2023-04-26 PROCEDURE — 82746 ASSAY OF FOLIC ACID SERUM: CPT | Performed by: STUDENT IN AN ORGANIZED HEALTH CARE EDUCATION/TRAINING PROGRAM

## 2023-04-26 PROCEDURE — 63700000 PHARM REV CODE 250 ALT 637 W/O HCPCS: Performed by: HOSPITALIST

## 2023-04-26 PROCEDURE — 82607 VITAMIN B-12: CPT | Performed by: STUDENT IN AN ORGANIZED HEALTH CARE EDUCATION/TRAINING PROGRAM

## 2023-04-26 PROCEDURE — 87205 SMEAR GRAM STAIN: CPT | Performed by: INTERNAL MEDICINE

## 2023-04-26 PROCEDURE — 94761 N-INVAS EAR/PLS OXIMETRY MLT: CPT

## 2023-04-26 PROCEDURE — 99223 1ST HOSP IP/OBS HIGH 75: CPT | Mod: ,,, | Performed by: INTERNAL MEDICINE

## 2023-04-26 PROCEDURE — 11000001 HC ACUTE MED/SURG PRIVATE ROOM

## 2023-04-26 PROCEDURE — 63600175 PHARM REV CODE 636 W HCPCS: Performed by: NURSE PRACTITIONER

## 2023-04-26 PROCEDURE — 85025 COMPLETE CBC W/AUTO DIFF WBC: CPT | Performed by: HOSPITALIST

## 2023-04-26 PROCEDURE — 82565 ASSAY OF CREATININE: CPT | Performed by: HOSPITALIST

## 2023-04-26 PROCEDURE — 99232 PR SUBSEQUENT HOSPITAL CARE,LEVL II: ICD-10-PCS | Mod: ,,, | Performed by: HOSPITALIST

## 2023-04-26 PROCEDURE — 99223 PR INITIAL HOSPITAL CARE,LEVL III: ICD-10-PCS | Mod: ,,, | Performed by: INTERNAL MEDICINE

## 2023-04-26 PROCEDURE — 94640 AIRWAY INHALATION TREATMENT: CPT

## 2023-04-26 PROCEDURE — 25000003 PHARM REV CODE 250: Performed by: HOSPITALIST

## 2023-04-26 PROCEDURE — 36415 COLL VENOUS BLD VENIPUNCTURE: CPT | Performed by: HOSPITALIST

## 2023-04-26 PROCEDURE — 80048 BASIC METABOLIC PNL TOTAL CA: CPT | Performed by: HOSPITALIST

## 2023-04-26 PROCEDURE — 99232 SBSQ HOSP IP/OBS MODERATE 35: CPT | Mod: ,,, | Performed by: HOSPITALIST

## 2023-04-26 PROCEDURE — 27000207 HC ISOLATION

## 2023-04-26 RX ORDER — OXYCODONE AND ACETAMINOPHEN 5; 325 MG/1; MG/1
1 TABLET ORAL EVERY 4 HOURS PRN
Status: DISCONTINUED | OUTPATIENT
Start: 2023-04-26 | End: 2023-04-29 | Stop reason: HOSPADM

## 2023-04-26 RX ADMIN — NEPHROCAP 1 CAPSULE: 1 CAP ORAL at 08:04

## 2023-04-26 RX ADMIN — TRIAMCINOLONE ACETONIDE: 1 CREAM TOPICAL at 08:04

## 2023-04-26 RX ADMIN — HYDROMORPHONE HYDROCHLORIDE 1 MG: 1 INJECTION, SOLUTION INTRAMUSCULAR; INTRAVENOUS; SUBCUTANEOUS at 08:04

## 2023-04-26 RX ADMIN — GUAIFENESIN AND DEXTROMETHORPHAN HYDROBROMIDE 1 TABLET: 600; 30 TABLET, EXTENDED RELEASE ORAL at 08:04

## 2023-04-26 RX ADMIN — VENLAFAXINE HYDROCHLORIDE 75 MG: 37.5 CAPSULE, EXTENDED RELEASE ORAL at 08:04

## 2023-04-26 RX ADMIN — PANTOPRAZOLE SODIUM 40 MG: 40 TABLET, DELAYED RELEASE ORAL at 08:04

## 2023-04-26 RX ADMIN — PREDNISONE 50 MG: 5 TABLET ORAL at 08:04

## 2023-04-26 RX ADMIN — MUPIROCIN: 20 OINTMENT TOPICAL at 08:04

## 2023-04-26 RX ADMIN — OXYCODONE HYDROCHLORIDE AND ACETAMINOPHEN 1 TABLET: 5; 325 TABLET ORAL at 12:04

## 2023-04-26 RX ADMIN — Medication 1 TABLET: at 08:04

## 2023-04-26 RX ADMIN — ERTAPENEM 1 G: 1 INJECTION INTRAMUSCULAR; INTRAVENOUS at 06:04

## 2023-04-26 RX ADMIN — ATORVASTATIN CALCIUM 20 MG: 20 TABLET, FILM COATED ORAL at 08:04

## 2023-04-26 RX ADMIN — APIXABAN 5 MG: 2.5 TABLET, FILM COATED ORAL at 08:04

## 2023-04-26 RX ADMIN — TRAZODONE HYDROCHLORIDE 100 MG: 100 TABLET ORAL at 08:04

## 2023-04-26 RX ADMIN — SENNOSIDES AND DOCUSATE SODIUM 1 TABLET: 50; 8.6 TABLET ORAL at 08:04

## 2023-04-26 RX ADMIN — FLUTICASONE FUROATE AND VILANTEROL TRIFENATATE 1 PUFF: 200; 25 POWDER RESPIRATORY (INHALATION) at 07:04

## 2023-04-26 RX ADMIN — CYANOCOBALAMIN TAB 250 MCG 500 MCG: 250 TAB at 08:04

## 2023-04-26 RX ADMIN — THERA TABS 1 TABLET: TAB at 08:04

## 2023-04-26 RX ADMIN — FLUCONAZOLE 200 MG: 100 TABLET ORAL at 08:04

## 2023-04-26 RX ADMIN — GUAIFENESIN AND DEXTROMETHORPHAN HYDROBROMIDE 1 TABLET: 600; 30 TABLET, EXTENDED RELEASE ORAL at 12:04

## 2023-04-26 NOTE — CARE UPDATE
04/26/23 0800   Patient Assessment/Suction   Rhythm/Pattern, Respiratory no shortness of breath reported   Cough Frequency infrequent   Cough Type dry;nonproductive;good   PRE-TX-O2   Device (Oxygen Therapy) room air   Pulse Oximetry Type Intermittent   $ Pulse Oximetry - Multiple Charge Pulse Oximetry - Multiple   Inhaler   $ Inhaler Charges MDI (Metered Dose Inahler) Treatment   Daily Review of Necessity (Inhaler) completed   Respiratory Treatment Status (Inhaler) given;mouth rinsed post treatment   Treatment Route (Inhaler) mouthpiece   Patient Position (Inhaler) semi-Gipson's   Post Treatment Assessment (Inhaler) breath sounds unchanged   Signs of Intolerance (Inhaler) none

## 2023-04-26 NOTE — SUBJECTIVE & OBJECTIVE
Past Medical History:   Diagnosis Date    Allergy     Anxiety     Arthritis     Atrial flutter     Colon polyps     COPD (chronic obstructive pulmonary disease)     COPD exacerbation 10/31/2022    Depression     Diverticular disease of colon 2017    Diverticulitis     HLD (hyperlipidemia)     Malignant neoplasm of central portion of left breast in female, estrogen receptor positive 2022    Pancreatitis     Psoriasis     Rheumatoid arthritis     Severe obesity (BMI 35.0-39.9) with comorbidity        Past Surgical History:   Procedure Laterality Date    ABLATION  2022    Cardiac Ablation for A Flutter, Successful    ADENOIDECTOMY  1966    Tonsillitis and adnoids    BILATERAL MASTECTOMY Bilateral 2/15/2023    Procedure: MASTECTOMY, BILATERAL;  Surgeon: Marcela Meehan MD;  Location: [facility];  Service: General;  Laterality: Bilateral;  EMAIL SENT  @ 11:44 LK / 2.5 HOURS    BLADDER SUSPENSION      (x2)    BREAST REVISION SURGERY Bilateral 3/29/2023    Procedure: BREAST REVISION SURGERY / BILATERAL BREAST FLAP REVISION;  Surgeon: Ming Milian MD;  Location: [facility];  Service: Plastics;  Laterality: Bilateral;  90 MINUTES     SECTION      (x2)    DEBRIDEMENT, BREAST Bilateral 3/29/2023    Procedure: DEBRIDEMENT, BREAST;  Surgeon: Ming Milian MD;  Location: [facility];  Service: Plastics;  Laterality: Bilateral;    ESOPHAGOGASTRODUODENOSCOPY N/A 2021    Procedure: EGD (ESOPHAGOGASTRODUODENOSCOPY);  Surgeon: Vince Rodriguez MD;  [facility];  Service: General;  Laterality: N/A;    INJECTION FOR SENTINEL NODE IDENTIFICATION Left 2/15/2023    Procedure: INJECTION, FOR SENTINEL NODE IDENTIFICATION;  Surgeon: Marcela Meehan MD;  Location: [facility];  Service: General;  Laterality: Left;    LAPAROSCOPIC SLEEVE GASTRECTOMY N/A 2021    Procedure: GASTRECTOMY, SLEEVE, LAPAROSCOPIC, with intraop EGD;  Surgeon: Vince Rodriguez MD;  Location: [facility] OR 2ND FLR;   Service: General;  Laterality: N/A;    LUNG BIOPSY  09/2020    RECONSTRUCTION OF BREAST WITH DEEP INFERIOR EPIGASTRIC ARTERY  (NEHA) FREE FLAP Bilateral 2/15/2023    Procedure: RECONSTRUCTION, BREAST, USING NEHA FREE FLAP;  Surgeon: Ming Milian MD;  Location: [facility];  Service: Plastics;  Laterality: Bilateral;    RHINOPLASTY      SENTINEL LYMPH NODE BIOPSY Left 2/15/2023    Procedure: BIOPSY, LYMPH NODE, SENTINEL;  Surgeon: Marcela Meehan MD;  Location: [facility] OR;  Service: General;  Laterality: Left;    TONSILLECTOMY      TRANSESOPHAGEAL ECHOCARDIOGRAPHY N/A 11/02/2022    Procedure: ECHOCARDIOGRAM, TRANSESOPHAGEAL;  Surgeon: Kellie Grover MD;  Location: [facility] EP LAB;  Service: Cardiology;  Laterality: N/A;       Review of patient's allergies indicates:   Allergen Reactions    Succinimides Anaphylaxis     Son has MH       Medications:  Medications Prior to Admission   Medication Sig    acetaminophen-codeine 300-30mg (TYLENOL #3) 300-30 mg Tab Take 1 tablet by mouth every 4 (four) hours as needed.    apixaban (ELIQUIS) 5 mg Tab Take 1 tablet (5 mg total) by mouth 2 (two) times daily. Will hold 2/13 & 2/14    atorvastatin (LIPITOR) 20 MG tablet Take 1 tablet (20 mg total) by mouth every evening.    B-complex with vitamin C (VITAMIN B COMPLEX-C ORAL) Take by mouth Daily.    calcium-vitamin D 250-100 mg-unit per tablet Take 1 tablet by mouth 2 (two) times daily.    COSENTYX PEN, 2 PENS, 150 mg/mL PnIj Inject 300mg (2 pens) into the skin every 4 weeks    cyanocobalamin 500 MCG tablet Take 500 mcg by mouth once daily.    diazePAM (VALIUM) 5 MG tablet Take 1 tablet (5 mg total) by mouth daily as needed for Anxiety.    fluticasone-umeclidin-vilanter (TRELEGY ELLIPTA) 100-62.5-25 mcg DsDv Inhale 1 puff into the lungs once daily.    multivitamin (THERAGRAN) per tablet Take 1 tablet by mouth once daily.    multivitamin with minerals (HAIR,SKIN AND NAILS ORAL) Take by mouth.    mv-mn/iron/folic acid/herb  190 (VITAMIN D3 COMPLETE ORAL) Take by mouth.    omeprazole (PRILOSEC) 40 MG capsule Take 1 capsule (40 mg total) by mouth every morning.    ondansetron (ZOFRAN) 8 MG tablet Take 1 tablet (8 mg total) by mouth every 12 (twelve) hours as needed for Nausea.    traZODone (DESYREL) 100 MG tablet Take 1 tablet (100 mg total) by mouth nightly as needed for Insomnia.    venlafaxine (EFFEXOR-XR) 75 MG 24 hr capsule Take 1 capsule (75 mg total) by mouth once daily. Start on Week 2     Antibiotics (From admission, onward)      Start     Stop Route Frequency Ordered    04/25/23 1915  ertapenem (INVANZ) 1 g in sodium chloride 0.9 % 100 mL IVPB (MB+)         -- IV Every 24 hours (non-standard times) 04/25/23 1801 04/24/23 2100  mupirocin 2 % ointment         04/29 2059 Nasl 2 times daily 04/24/23 1957          Antifungals (From admission, onward)      Start     Stop Route Frequency Ordered    04/25/23 0930  fluconazole tablet 200 mg         -- Oral Daily 04/25/23 0923          Antivirals (From admission, onward)      None             Immunization History   Administered Date(s) Administered    COVID-19, MRNA, LN-S, PF (Pfizer) (Purple Cap) 02/17/2021, 03/10/2021, 09/28/2021    COVID-19, mRNA, LNP-S, bivalent booster, PF (PFIZER OMICRON) 09/27/2022    Influenza 10/07/2019    Influenza - Quadrivalent - PF *Preferred* (6 months and older) 10/02/2019, 09/11/2020, 11/15/2021, 10/11/2022    Influenza - Trivalent (ADULT) 10/12/2017    Influenza - Trivalent - MDCK - PF 12/24/2018    Influenza Split 10/12/2017, 12/05/2018    Pneumococcal Conjugate - 20 Valent 01/12/2023    Pneumococcal Polysaccharide - 23 Valent 04/26/2021    Tdap 12/05/2018       Family History       Problem Relation (Age of Onset)    No Known Problems Daughter, Son, Daughter          Social History     Socioeconomic History    Marital status:    Occupational History    Occupation: clinical research coordinator    Tobacco Use    Smoking status: Former      Packs/day: 0.00     Years: 20.00     Pack years: 0.00     Types: Cigarettes     Start date: 1979     Quit date: 2020     Years since quittin.3    Smokeless tobacco: Current   Substance and Sexual Activity    Alcohol use: Yes     Alcohol/week: 1.0 standard drink     Types: 1 Glasses of wine per week     Comment: social    Drug use: No    Sexual activity: Yes     Partners: Male     Birth control/protection: Post-menopausal   Other Topics Concern    Are you pregnant or think you may be? No    Breast-feeding No     Social Determinants of Health     Financial Resource Strain: Low Risk     Difficulty of Paying Living Expenses: Not very hard   Food Insecurity: No Food Insecurity    Worried About Running Out of Food in the Last Year: Never true    Ran Out of Food in the Last Year: Never true   Transportation Needs: No Transportation Needs    Lack of Transportation (Medical): No    Lack of Transportation (Non-Medical): No   Physical Activity: Insufficiently Active    Days of Exercise per Week: 1 day    Minutes of Exercise per Session: 30 min   Stress: Stress Concern Present    Feeling of Stress : Very much   Social Connections: Unknown    Frequency of Communication with Friends and Family: More than three times a week    Frequency of Social Gatherings with Friends and Family: Once a week    Active Member of Clubs or Organizations: Yes    Attends Club or Organization Meetings: 1 to 4 times per year    Marital Status:    Housing Stability: Low Risk     Unable to Pay for Housing in the Last Year: No    Number of Places Lived in the Last Year: 1    Unstable Housing in the Last Year: No     Review of Systems   Constitutional:  Positive for fever.   Respiratory:  Positive for cough. Negative for shortness of breath.    Gastrointestinal:  Negative for abdominal pain and diarrhea.   Genitourinary:  Negative for difficulty urinating and dysuria.   Objective:     Vital Signs (Most Recent):  Temp: 98.2 °F (36.8 °C)  (04/26/23 1555)  Pulse: 79 (04/26/23 1555)  Resp: 18 (04/26/23 1555)  BP: (!) 163/69 (04/26/23 1555)  SpO2: 96 % (04/26/23 1555)   Vital Signs (24h Range):  Temp:  [97.8 °F (36.6 °C)-99.9 °F (37.7 °C)] 98.2 °F (36.8 °C)  Pulse:  [75-98] 79  Resp:  [15-18] 18  SpO2:  [95 %-99 %] 96 %  BP: (112-163)/(56-69) 163/69     Weight: 78 kg (171 lb 15.3 oz)  Body mass index is 31.45 kg/m².    Estimated Creatinine Clearance: 82.7 mL/min (based on SCr of 0.7 mg/dL).    Physical Exam  Vitals and nursing note reviewed.   Constitutional:       Appearance: Normal appearance.   HENT:      Head: Normocephalic and atraumatic.   Eyes:      Extraocular Movements: Extraocular movements intact.      Conjunctiva/sclera: Conjunctivae normal.      Pupils: Pupils are equal, round, and reactive to light.   Cardiovascular:      Rate and Rhythm: Normal rate and regular rhythm.   Pulmonary:      Effort: Pulmonary effort is normal.      Breath sounds: Normal breath sounds. No rhonchi or rales.   Abdominal:      General: Abdomen is flat.      Palpations: Abdomen is soft.   Musculoskeletal:         General: Normal range of motion.      Right lower leg: No edema.      Left lower leg: No edema.   Skin:     General: Skin is warm and dry.   Neurological:      General: No focal deficit present.      Mental Status: She is alert and oriented to person, place, and time.       Significant Labs: Blood Culture:   Recent Labs   Lab 04/24/23  1432 04/24/23  1500 04/25/23  1813 04/25/23  2101   LABBLOO Gram stain gianfranco bottle: Gram negative rods  Results called to and read back by: Razia Garcia RN 04/25/2023  10:54  Gram stain aer bottle: Gram negative rods  Positive results previously called 04/25/2023  14:10  ESCHERICHIA COLI  Susceptibility pending  * No Growth to date  No Growth to date No Growth to date No Growth to date     CBC:   Recent Labs   Lab 04/25/23  0637 04/26/23  0048   WBC 0.40* 0.32*   HGB 12.4 11.8*   HCT 37.8 36.4*    141*     CMP:    Recent Labs   Lab 04/25/23  0637 04/26/23  0048 04/26/23  0505    137  --    K 4.4 5.2*  --     111*  --    CO2 27 18*  --     159*  --    BUN 14 12  --    CREATININE 0.9 0.9 0.7   CALCIUM 8.3* 8.2*  --    PROT 5.3*  --   --    ALBUMIN 2.8*  --   --    BILITOT 1.4*  --   --    ALKPHOS 72  --   --    AST 16  --   --    ALT 24  --   --    ANIONGAP 5* 8  --      Urine Studies:   Recent Labs   Lab 04/25/23  0122   COLORU Yellow   APPEARANCEUA Hazy*   PHUR 5.0   SPECGRAV >1.030*   PROTEINUA Trace*   GLUCUA Negative   KETONESU Negative   BILIRUBINUA Negative   OCCULTUA Negative   NITRITE Negative   UROBILINOGEN Negative   LEUKOCYTESUR Negative       Significant Imaging: CT: I have reviewed all pertinent results/findings within the past 24 hours:    Collections within the subcutaneous fat along the left greater than right lower quadrants.  These are likely postoperative in nature and most likely represent seromas or evolving hematomas.  Infected collections would be considered less likely.     Colonic diverticulosis.  At the junction of descending and sigmoid colon fat planes are somewhat indistinct some mild fat stranding.  Mild early changes of diverticulitis are not excluded, to be correlated clinically.     Gallbladder stones or sludge.

## 2023-04-26 NOTE — HPI
61 yo female with history of ESBL UTI, urinary retention, s/p bladder lifts x2, recent treatement for breast cancer, who presents with neutropenia, cough, and ESBL E.coli bacteremia.     She presented with fever, hip pain, and cough on 4/24. She was found to have neutropenia, and placed on IV antibiotics. Her respiratory panel showed parainfluenza virus 3, and blood cultures grew ESBL E.coli. I am consulted for antibiotic management. She has started Udenyca for her neutropenia.    Urine cultures in August and May 2022 grew ESBL E.coli. She states that she has incomplete voiding, but no dysuria, no abdominal pain. She had two bladder lifts in the past.

## 2023-04-26 NOTE — ASSESSMENT & PLAN NOTE
63 yo female with neutropenic fever, bacteremia with ESBL E.coli, parainfluenza virus infection. CT suggests diverticulosis and cholelithiasis, but source of bacteremia is unclear. Some evidence to suggest urinary retention may be present. Urinalysis normal, but results may be confounded by neutropenia.  Continue broad spectrum therapy with vancomycin and ertapenem and fluconazole.  Await cultures results. Continue treatment of neutropenia.

## 2023-04-26 NOTE — ASSESSMENT & PLAN NOTE
Blood culture growing ESBL, EColi,likely duo to past Hx of UTI ESBL,started on Invanz,consulted ID,will do CT abdomen,pelvic.need Urodynamic test by Urology as out patient to R/O urinary retention.

## 2023-04-26 NOTE — PLAN OF CARE
Problem: Adult Inpatient Plan of Care  Goal: Plan of Care Review  Outcome: Ongoing, Progressing  Goal: Patient-Specific Goal (Individualized)  Outcome: Ongoing, Progressing  Goal: Absence of Hospital-Acquired Illness or Injury  Outcome: Ongoing, Progressing  Goal: Optimal Comfort and Wellbeing  Outcome: Ongoing, Progressing  Goal: Readiness for Transition of Care  Outcome: Ongoing, Progressing     Problem: Fall Injury Risk  Goal: Absence of Fall and Fall-Related Injury  Outcome: Ongoing, Progressing     Problem: Fever (Fever with Neutropenia)  Goal: Baseline Body Temperature  Outcome: Ongoing, Progressing     Problem: Infection Risk (Fever with Neutropenia)  Goal: Absence of Infection  Outcome: Ongoing, Progressing

## 2023-04-26 NOTE — PLAN OF CARE
VSS and afebrile, Oriented x4. Neutropenic precautions maintained. Patient tolerating diet, no complaints of nausea. Pain to bilateral hips controlled with PRN medications. Continuous fluids infusing. Patient repositioning independently. Plan of care reviewed with patient. Purposeful rounding done, call light at bed side, bed at lowest position, brakes on, non-skid socks on, will continue to monitor.      Problem: Adult Inpatient Plan of Care  Goal: Plan of Care Review  Outcome: Ongoing, Progressing  Goal: Patient-Specific Goal (Individualized)  Outcome: Ongoing, Progressing  Goal: Optimal Comfort and Wellbeing  4/26/2023 0330 by Jose Servin RN  Outcome: Ongoing, Progressing  4/26/2023 0330 by Jose Servin RN  Outcome: Ongoing, Progressing     Problem: Fall Injury Risk  Goal: Absence of Fall and Fall-Related Injury  4/26/2023 0330 by Jose Servin RN  Outcome: Ongoing, Progressing  4/26/2023 0330 by Jose Serivn RN  Outcome: Ongoing, Progressing     Problem: Fever (Fever with Neutropenia)  Goal: Baseline Body Temperature  Outcome: Ongoing, Progressing     Problem: Infection Risk (Fever with Neutropenia)  Goal: Absence of Infection  Outcome: Ongoing, Progressing

## 2023-04-26 NOTE — PROGRESS NOTES
VSS. Shahida diet. Face & neck red rash much better. New R hand SL intact. Sputum sent. Good productive cough-- think white, yellow tinged sputum noted. Shahida Percocet 5mg for back pain. Up ad heath. CT abd/pelvis complete. Neutropenic precautions maintained. Pleasant.

## 2023-04-26 NOTE — SUBJECTIVE & OBJECTIVE
Past Medical History:   Diagnosis Date    Allergy     Anxiety     Arthritis     Atrial flutter     Colon polyps     COPD (chronic obstructive pulmonary disease)     COPD exacerbation 10/31/2022    Depression     Diverticular disease of colon 2017    Diverticulitis     HLD (hyperlipidemia)     Malignant neoplasm of central portion of left breast in female, estrogen receptor positive 2022    Pancreatitis     Psoriasis     Rheumatoid arthritis     Severe obesity (BMI 35.0-39.9) with comorbidity        Past Surgical History:   Procedure Laterality Date    ABLATION  2022    Cardiac Ablation for A Flutter, Successful    ADENOIDECTOMY  1966    Tonsillitis and adnoids    BILATERAL MASTECTOMY Bilateral 2/15/2023    Procedure: MASTECTOMY, BILATERAL;  Surgeon: Marcela Meehan MD;  Location: [facility];  Service: General;  Laterality: Bilateral;  EMAIL SENT  @ 11:44 LK / 2.5 HOURS    BLADDER SUSPENSION      (x2)    BREAST REVISION SURGERY Bilateral 3/29/2023    Procedure: BREAST REVISION SURGERY / BILATERAL BREAST FLAP REVISION;  Surgeon: Ming Milian MD;  Location: [facility];  Service: Plastics;  Laterality: Bilateral;  90 MINUTES     SECTION      (x2)    DEBRIDEMENT, BREAST Bilateral 3/29/2023    Procedure: DEBRIDEMENT, BREAST;  Surgeon: Ming Milian MD;  Location: [facility];  Service: Plastics;  Laterality: Bilateral;    ESOPHAGOGASTRODUODENOSCOPY N/A 2021    Procedure: EGD (ESOPHAGOGASTRODUODENOSCOPY);  Surgeon: Vince Rodriguez MD;  [facility];  Service: General;  Laterality: N/A;    INJECTION FOR SENTINEL NODE IDENTIFICATION Left 2/15/2023    Procedure: INJECTION, FOR SENTINEL NODE IDENTIFICATION;  Surgeon: Marcela Meehan MD;  Location: [facility];  Service: General;  Laterality: Left;    LAPAROSCOPIC SLEEVE GASTRECTOMY N/A 2021    Procedure: GASTRECTOMY, SLEEVE, LAPAROSCOPIC, with intraop EGD;  Surgeon: Vince Rodriguez MD;  Location: [facility] OR 2ND FLR;   Service: General;  Laterality: N/A;    LUNG BIOPSY  09/2020    RECONSTRUCTION OF BREAST WITH DEEP INFERIOR EPIGASTRIC ARTERY  (NEHA) FREE FLAP Bilateral 2/15/2023    Procedure: RECONSTRUCTION, BREAST, USING NEHA FREE FLAP;  Surgeon: Ming Milian MD;  Location: [facility];  Service: Plastics;  Laterality: Bilateral;    RHINOPLASTY      SENTINEL LYMPH NODE BIOPSY Left 2/15/2023    Procedure: BIOPSY, LYMPH NODE, SENTINEL;  Surgeon: Marcela Meehan MD;  Location: [facility] OR;  Service: General;  Laterality: Left;    TONSILLECTOMY      TRANSESOPHAGEAL ECHOCARDIOGRAPHY N/A 11/02/2022    Procedure: ECHOCARDIOGRAM, TRANSESOPHAGEAL;  Surgeon: Kellie Grover MD;  Location: [facility] EP LAB;  Service: Cardiology;  Laterality: N/A;       Review of patient's allergies indicates:   Allergen Reactions    Succinimides Anaphylaxis     Son has MH       Current Facility-Administered Medications on File Prior to Encounter   Medication    lactated ringers infusion    LIDOcaine (PF) 10 mg/ml (1%) injection 5 mg    LIDOcaine HCL 10 mg/ml (1%) 50 mL, EPINEPHrine 1 mg in lactated Ringers 1,000 mL irrigation     Current Outpatient Medications on File Prior to Encounter   Medication Sig    acetaminophen-codeine 300-30mg (TYLENOL #3) 300-30 mg Tab Take 1 tablet by mouth every 4 (four) hours as needed.    apixaban (ELIQUIS) 5 mg Tab Take 1 tablet (5 mg total) by mouth 2 (two) times daily. Will hold 2/13 & 2/14    atorvastatin (LIPITOR) 20 MG tablet Take 1 tablet (20 mg total) by mouth every evening.    B-complex with vitamin C (VITAMIN B COMPLEX-C ORAL) Take by mouth Daily.    calcium-vitamin D 250-100 mg-unit per tablet Take 1 tablet by mouth 2 (two) times daily.    COSENTYX PEN, 2 PENS, 150 mg/mL PnIj Inject 300mg (2 pens) into the skin every 4 weeks    cyanocobalamin 500 MCG tablet Take 500 mcg by mouth once daily.    diazePAM (VALIUM) 5 MG tablet Take 1 tablet (5 mg total) by mouth daily as needed for Anxiety.     fluticasone-umeclidin-vilanter (TRELEGY ELLIPTA) 100-62.5-25 mcg DsDv Inhale 1 puff into the lungs once daily.    multivitamin (THERAGRAN) per tablet Take 1 tablet by mouth once daily.    multivitamin with minerals (HAIR,SKIN AND NAILS ORAL) Take by mouth.    mv-mn/iron/folic acid/herb 190 (VITAMIN D3 COMPLETE ORAL) Take by mouth.    omeprazole (PRILOSEC) 40 MG capsule Take 1 capsule (40 mg total) by mouth every morning.    ondansetron (ZOFRAN) 8 MG tablet Take 1 tablet (8 mg total) by mouth every 12 (twelve) hours as needed for Nausea.    traZODone (DESYREL) 100 MG tablet Take 1 tablet (100 mg total) by mouth nightly as needed for Insomnia.    venlafaxine (EFFEXOR-XR) 75 MG 24 hr capsule Take 1 capsule (75 mg total) by mouth once daily. Start on Week 2    [DISCONTINUED] budesonide-formoterol 160-4.5 mcg (SYMBICORT) 160-4.5 mcg/actuation HFAA Inhale 2 puffs into the lungs every 12 (twelve) hours. Controller     Family History       Problem Relation (Age of Onset)    No Known Problems Daughter, Son, Daughter          Tobacco Use    Smoking status: Former     Packs/day: 0.00     Years: 20.00     Pack years: 0.00     Types: Cigarettes     Start date: 1979     Quit date: 2020     Years since quittin.3    Smokeless tobacco: Current   Substance and Sexual Activity    Alcohol use: Yes     Alcohol/week: 1.0 standard drink     Types: 1 Glasses of wine per week     Comment: social    Drug use: No    Sexual activity: Yes     Partners: Male     Birth control/protection: Post-menopausal     Review of Systems   Constitutional:  Positive for chills, fatigue and fever. Negative for activity change, appetite change, diaphoresis and unexpected weight change.   HENT:  Negative for congestion, sore throat and trouble swallowing.    Eyes:  Negative for visual disturbance.   Respiratory:  Negative for cough, chest tightness, shortness of breath and wheezing.    Cardiovascular:  Negative for chest pain, palpitations and leg  swelling.   Gastrointestinal:  Positive for nausea. Negative for abdominal distention, abdominal pain, constipation, diarrhea and vomiting.   Genitourinary:  Negative for decreased urine volume, difficulty urinating, dysuria, frequency and urgency.   Musculoskeletal:  Positive for arthralgias (bilateral hip pain). Negative for back pain, myalgias and neck pain.   Skin:  Positive for rash.   Neurological:  Negative for dizziness, syncope, weakness, light-headedness and headaches.   Objective:     Vital Signs (Most Recent):  Temp: 98 °F (36.7 °C) (04/26/23 1100)  Pulse: 82 (04/26/23 1100)  Resp: 15 (04/26/23 1100)  BP: (!) 124/58 (04/26/23 1100)  SpO2: 95 % (04/26/23 1100)   Vital Signs (24h Range):  Temp:  [97.8 °F (36.6 °C)-99.9 °F (37.7 °C)] 98 °F (36.7 °C)  Pulse:  [] 82  Resp:  [15-18] 15  SpO2:  [95 %-99 %] 95 %  BP: (107-129)/(50-59) 124/58     Weight: 78 kg (171 lb 15.3 oz)  Body mass index is 31.45 kg/m².    Physical Exam  Vitals and nursing note reviewed.   Constitutional:       General: She is not in acute distress.     Appearance: Normal appearance. She is well-developed. She is obese. She is not toxic-appearing.   HENT:      Head: Normocephalic and atraumatic.      Mouth/Throat:      Dentition: Normal dentition.   Eyes:      General: Lids are normal.      Extraocular Movements: Extraocular movements intact.      Conjunctiva/sclera: Conjunctivae normal.   Cardiovascular:      Rate and Rhythm: Normal rate and regular rhythm.      Heart sounds: Normal heart sounds. No murmur heard.  Pulmonary:      Effort: Pulmonary effort is normal.      Breath sounds: Normal breath sounds.   Abdominal:      General: Bowel sounds are normal. There is no distension.      Palpations: Abdomen is soft.      Tenderness: There is no abdominal tenderness.   Musculoskeletal:      Cervical back: Neck supple.      Right lower leg: No edema.      Left lower leg: No edema.   Skin:     General: Skin is warm and dry.      Findings:  Rash (see media) present. No erythema.   Neurological:      Mental Status: She is alert and oriented to person, place, and time.         Significant Labs: All pertinent labs within the past 24 hours have been reviewed.  CBC:   Recent Labs   Lab 04/25/23 0637 04/26/23 0048   WBC 0.40* 0.32*   HGB 12.4 11.8*   HCT 37.8 36.4*    141*       CMP:   Recent Labs   Lab 04/25/23 0637 04/26/23 0048 04/26/23  0505    137  --    K 4.4 5.2*  --     111*  --    CO2 27 18*  --     159*  --    BUN 14 12  --    CREATININE 0.9 0.9 0.7   CALCIUM 8.3* 8.2*  --    PROT 5.3*  --   --    ALBUMIN 2.8*  --   --    BILITOT 1.4*  --   --    ALKPHOS 72  --   --    AST 16  --   --    ALT 24  --   --    ANIONGAP 5* 8  --          Significant Imaging: I have reviewed all pertinent imaging results/findings within the past 24 hours.

## 2023-04-26 NOTE — PROGRESS NOTES
"[facility] Surg [address] Medicine  Progress Note    Patient Name: Lucia Matias  MRN: [id number]  Patient Class: IP- Inpatient   Admission Date: 4/24/2023  Length of Stay: 2 days  Attending Physician: Juan Miguel Rubio MD  Primary Care Provider: Hetal Banks MD        Subjective:     Principal Problem:Neutropenic fever        HPI:  Ms. Matias is a 62 YOF with PMHx of HTN, HLD, depression, COPD, A-flutter s/p ablation on OAC (11/2022), h/o sleeve gastrectomy, recently diagnosed HR+ breast cancer s/p bilateral SIEA flap reconstruction (2/15/2023) and psoriasis (holding cosentyx while on chemotherapy).    Per  note "presented to [facility] ED with chief complaint for bilateral hip pain and rash on face, neck and scalp. She reports that the hip pain started around 1 AM the morning prior to ED arrival. She started her first round of chemotherapy on Wednesday, 4/19/23, and received the Udenyca injection the following day. She also mentions constipation, chills and low grade fever with temp 100.1 the morning prior to ED arrival. She also notes a rash on her neck, face and scalp that started 2 days ago." She endorses some nausea. She also reports that her  was seen in an OSH for COPD exacerbation just prior to her symptom development. She denies cough, light headiness, dizziness, syncope, chest pain, shortness of breath, palpitations, weight loss, vomiting or diarrhea.     In the ED she was HDS, TMax 99.2. CBC with WBC 0.7, H/H stable. Chemistry stable. Blood cultures obtained. She was treated with Vanc/zosyn and benadryl for rash. She was transferred to [facility] Medicine Service for further evaluation and management.        Overview/Hospital Course:  Ms. Matias is a 62 YOF with PMHx of HTN, HLD, depression, COPD, A-flutter s/p ablation on OAC (11/2022), h/o sleeve gastrectomy, recently diagnosed HR+ breast cancer s/p bilateral SIEA flap reconstruction " "(2/15/2023) and psoriasis (holding cosentyx while on chemotherapy).    Per  note "presented to [facility] ED with chief complaint for bilateral hip pain and rash on face, neck and scalp. She reports that the hip pain started around 1 AM the morning prior to ED arrival. She started her first round of chemotherapy on Wednesday, 4/19/23, and received the Udenyca injection the following day. She also mentions constipation, chills and low grade fever with temp 100.1 the morning prior to ED arrival. She also notes a rash on her neck, face and scalp that started 2 days ago." She endorses some nausea. She also reports that her  was seen in an OSH for COPD exacerbation just prior to her symptom development. She denies cough, light headiness, dizziness, syncope, chest pain, shortness of breath, palpitations, weight loss, vomiting or diarrhea.   Patient has been  stared on broad spectrum IV Abx for neutropenic fever,blood culture is pending.  Has leucopenia is setting of chemo. Therapy,oncology is consulted.  Blood culture growing ESBL, EColi,likely duo to past Hx of UTI ESBL,started on Invanz,consulted ID,will do CT abdomen,pelvic.need Urodynamic test by Urology as out patient to R/O urinary retention.        Past Medical History:   Diagnosis Date    Allergy     Anxiety     Arthritis     Atrial flutter     Colon polyps 2016    COPD (chronic obstructive pulmonary disease)     COPD exacerbation 10/31/2022    Depression     Diverticular disease of colon 06/06/2017    Diverticulitis     HLD (hyperlipidemia)     Malignant neoplasm of central portion of left breast in female, estrogen receptor positive 12/29/2022    Pancreatitis     Psoriasis     Rheumatoid arthritis     Severe obesity (BMI 35.0-39.9) with comorbidity        Past Surgical History:   Procedure Laterality Date    ABLATION  11/2022    Cardiac Ablation for A Flutter, Successful    ADENOIDECTOMY  1966    Tonsillitis and adnoids    " BILATERAL MASTECTOMY Bilateral 2/15/2023    Procedure: MASTECTOMY, BILATERAL;  Surgeon: Marcela Meehan MD;  Location: [facility];  Service: General;  Laterality: Bilateral;  EMAIL SENT  @ 11:44 LK / 2.5 HOURS    BLADDER SUSPENSION      (x2)    BREAST REVISION SURGERY Bilateral 3/29/2023    Procedure: BREAST REVISION SURGERY / BILATERAL BREAST FLAP REVISION;  Surgeon: Ming Milian MD;  Location: [facility];  Service: Plastics;  Laterality: Bilateral;  90 MINUTES     SECTION      (x2)    DEBRIDEMENT, BREAST Bilateral 3/29/2023    Procedure: DEBRIDEMENT, BREAST;  Surgeon: Ming Milian MD;  Location: [facility];  Service: Plastics;  Laterality: Bilateral;    ESOPHAGOGASTRODUODENOSCOPY N/A 2021    Procedure: EGD (ESOPHAGOGASTRODUODENOSCOPY);  Surgeon: Vince Rodriguez MD;  Location: [address];  Service: General;  Laterality: N/A;    INJECTION FOR SENTINEL NODE IDENTIFICATION Left 2/15/2023    Procedure: INJECTION, FOR SENTINEL NODE IDENTIFICATION;  Surgeon: Marcela Meehan MD;  Location: [facility];  Service: General;  Laterality: Left;    LAPAROSCOPIC SLEEVE GASTRECTOMY N/A 2021    Procedure: GASTRECTOMY, SLEEVE, LAPAROSCOPIC, with intraop EGD;  Surgeon: Vince Rodriguez MD;  Location: [address];  Service: General;  Laterality: N/A;    LUNG BIOPSY  2020    RECONSTRUCTION OF BREAST WITH DEEP INFERIOR EPIGASTRIC ARTERY  (NEHA) FREE FLAP Bilateral 2/15/2023    Procedure: RECONSTRUCTION, BREAST, USING NEHA FREE FLAP;  Surgeon: Ming Milian MD;  Location: [facility];  Service: Plastics;  Laterality: Bilateral;    RHINOPLASTY      SENTINEL LYMPH NODE BIOPSY Left 2/15/2023    Procedure: BIOPSY, LYMPH NODE, SENTINEL;  Surgeon: Marcela Meehan MD;  Location: [facility];  Service: General;  Laterality: Left;    TONSILLECTOMY      TRANSESOPHAGEAL ECHOCARDIOGRAPHY N/A 2022    Procedure: ECHOCARDIOGRAM, TRANSESOPHAGEAL;  Surgeon: Kellie Grover MD;   Location: [facility] EP LAB;  Service: Cardiology;  Laterality: N/A;       Review of patient's allergies indicates:   Allergen Reactions    Succinimides Anaphylaxis     Son has        Current Facility-Administered Medications on File Prior to Encounter   Medication    lactated ringers infusion    LIDOcaine (PF) 10 mg/ml (1%) injection 5 mg    LIDOcaine HCL 10 mg/ml (1%) 50 mL, EPINEPHrine 1 mg in lactated Ringers 1,000 mL irrigation     Current Outpatient Medications on File Prior to Encounter   Medication Sig    acetaminophen-codeine 300-30mg (TYLENOL #3) 300-30 mg Tab Take 1 tablet by mouth every 4 (four) hours as needed.    apixaban (ELIQUIS) 5 mg Tab Take 1 tablet (5 mg total) by mouth 2 (two) times daily. Will hold 2/13 & 2/14    atorvastatin (LIPITOR) 20 MG tablet Take 1 tablet (20 mg total) by mouth every evening.    B-complex with vitamin C (VITAMIN B COMPLEX-C ORAL) Take by mouth Daily.    calcium-vitamin D 250-100 mg-unit per tablet Take 1 tablet by mouth 2 (two) times daily.    COSENTYX PEN, 2 PENS, 150 mg/mL PnIj Inject 300mg (2 pens) into the skin every 4 weeks    cyanocobalamin 500 MCG tablet Take 500 mcg by mouth once daily.    diazePAM (VALIUM) 5 MG tablet Take 1 tablet (5 mg total) by mouth daily as needed for Anxiety.    fluticasone-umeclidin-vilanter (TRELEGY ELLIPTA) 100-62.5-25 mcg DsDv Inhale 1 puff into the lungs once daily.    multivitamin (THERAGRAN) per tablet Take 1 tablet by mouth once daily.    multivitamin with minerals (HAIR,SKIN AND NAILS ORAL) Take by mouth.    mv-mn/iron/folic acid/herb 190 (VITAMIN D3 COMPLETE ORAL) Take by mouth.    omeprazole (PRILOSEC) 40 MG capsule Take 1 capsule (40 mg total) by mouth every morning.    ondansetron (ZOFRAN) 8 MG tablet Take 1 tablet (8 mg total) by mouth every 12 (twelve) hours as needed for Nausea.    traZODone (DESYREL) 100 MG tablet Take 1 tablet (100 mg total) by mouth nightly as needed for Insomnia.    venlafaxine  (EFFEXOR-XR) 75 MG 24 hr capsule Take 1 capsule (75 mg total) by mouth once daily. Start on Week 2    [DISCONTINUED] budesonide-formoterol 160-4.5 mcg (SYMBICORT) 160-4.5 mcg/actuation HFAA Inhale 2 puffs into the lungs every 12 (twelve) hours. Controller     Family History       Problem Relation (Age of Onset)    No Known Problems Daughter, Son, Daughter          Tobacco Use    Smoking status: Former     Packs/day: 0.00     Years: 20.00     Pack years: 0.00     Types: Cigarettes     Start date: 1979     Quit date: 2020     Years since quittin.3    Smokeless tobacco: Current   Substance and Sexual Activity    Alcohol use: Yes     Alcohol/week: 1.0 standard drink     Types: 1 Glasses of wine per week     Comment: social    Drug use: No    Sexual activity: Yes     Partners: Male     Birth control/protection: Post-menopausal     Review of Systems   Constitutional:  Positive for chills, fatigue and fever. Negative for activity change, appetite change, diaphoresis and unexpected weight change.   HENT:  Negative for congestion, sore throat and trouble swallowing.    Eyes:  Negative for visual disturbance.   Respiratory:  Negative for cough, chest tightness, shortness of breath and wheezing.    Cardiovascular:  Negative for chest pain, palpitations and leg swelling.   Gastrointestinal:  Positive for nausea. Negative for abdominal distention, abdominal pain, constipation, diarrhea and vomiting.   Genitourinary:  Negative for decreased urine volume, difficulty urinating, dysuria, frequency and urgency.   Musculoskeletal:  Positive for arthralgias (bilateral hip pain). Negative for back pain, myalgias and neck pain.   Skin:  Positive for rash.   Neurological:  Negative for dizziness, syncope, weakness, light-headedness and headaches.   Objective:     Vital Signs (Most Recent):  Temp: 98 °F (36.7 °C) (23 1100)  Pulse: 82 (23)  Resp: 15 (23)  BP: (!) 124/58 (23)  SpO2:  95 % (04/26/23 1100)   Vital Signs (24h Range):  Temp:  [97.8 °F (36.6 °C)-99.9 °F (37.7 °C)] 98 °F (36.7 °C)  Pulse:  [] 82  Resp:  [15-18] 15  SpO2:  [95 %-99 %] 95 %  BP: (107-129)/(50-59) 124/58     Weight: 78 kg (171 lb 15.3 oz)  Body mass index is 31.45 kg/m².    Physical Exam  Vitals and nursing note reviewed.   Constitutional:       General: She is not in acute distress.     Appearance: Normal appearance. She is well-developed. She is obese. She is not toxic-appearing.   HENT:      Head: Normocephalic and atraumatic.      Mouth/Throat:      Dentition: Normal dentition.   Eyes:      General: Lids are normal.      Extraocular Movements: Extraocular movements intact.      Conjunctiva/sclera: Conjunctivae normal.   Cardiovascular:      Rate and Rhythm: Normal rate and regular rhythm.      Heart sounds: Normal heart sounds. No murmur heard.  Pulmonary:      Effort: Pulmonary effort is normal.      Breath sounds: Normal breath sounds.   Abdominal:      General: Bowel sounds are normal. There is no distension.      Palpations: Abdomen is soft.      Tenderness: There is no abdominal tenderness.   Musculoskeletal:      Cervical back: Neck supple.      Right lower leg: No edema.      Left lower leg: No edema.   Skin:     General: Skin is warm and dry.      Findings: Rash (see media) present. No erythema.   Neurological:      Mental Status: She is alert and oriented to person, place, and time.         Significant Labs: All pertinent labs within the past 24 hours have been reviewed.  CBC:   Recent Labs   Lab 04/25/23 0637 04/26/23 0048   WBC 0.40* 0.32*   HGB 12.4 11.8*   HCT 37.8 36.4*    141*       CMP:   Recent Labs   Lab 04/25/23  0637 04/26/23 0048 04/26/23  0505    137  --    K 4.4 5.2*  --     111*  --    CO2 27 18*  --     159*  --    BUN 14 12  --    CREATININE 0.9 0.9 0.7   CALCIUM 8.3* 8.2*  --    PROT 5.3*  --   --    ALBUMIN 2.8*  --   --    BILITOT 1.4*  --   --   "  ALKPHOS 72  --   --    AST 16  --   --    ALT 24  --   --    ANIONGAP 5* 8  --          Significant Imaging: I have reviewed all pertinent imaging results/findings within the past 24 hours.      Assessment/Plan:      * Neutropenic fever  Malignant neoplasm of central portion of left breast in female, estrogen receptor positive  Rash in adult  -history of  recently diagnosed HR+ breast cancer   -s/p bilateral SIEA flap reconstruction (2/15/2023)   -post op path showed Invasive lobular carcinoma in left breast grade 2 measuring 18 mm with LCIS Grade 2 ER NE positive and Her2 negative. pT1c pN0. Oncotype 35    -follows with Dr. Roya Madrid for her oncological care and chemo plan includes "given her Oncotype of 35, we have decided to proceed with adjuvant docetaxel 75mg/m2 and cyclophosphamide 600mg/m2 iv every 21 days for a total of 4 cycles"  -developed rash and fever s/p first chemo cycle  -at admission HDS with TMax 99.2; WBC 0.7, procalcitonin WNL  -blood cultures obtained >>> please follow   -UA pending >>> please follow  -bilateral hip X-rays ordered due to pain >>> please follow   -continue IV ABX with zosyn and vancomycin  -continue supportive care of rash with benadryl  -Oncology consulted  -neutropenic precautions   -PRN supportive care as indicated  -monitor .  Patient has been  stared on broad spectrum IV Abx for neutropenic fever,blood culture is pending.  Has leucopenia is setting of chemo. Therapy,oncology is consulted.      Bacteremia, escherichia coli  Blood culture growing ESBL, EColi,likely duo to past Hx of UTI ESBL,started on Invanz,consulted ID,will do CT abdomen,pelvic.need Urodynamic test by Urology as out patient to R/O urinary retention.      Rash in adult  Started on kenalog and prednisone.      Psoriatic arthritis  On cosentex at home.      Atrial flutter  Other long term (current) drug therapy  -history of Aflutter s/p ablation  -NSR at current  -continue home apixaban  -EKG PRN " concerns   -monitor     Essential hypertension  -chronic, controlled  -no home antihypertensive therapies  -dose/medication adjustment as appropriate   -monitor     Depression with anxiety  -chronic  -controlled  -continue home PRN diazepam, PRN trazodone, and daily effexor  -monitor     COPD (chronic obstructive pulmonary disease)  -chronic, controlled at current  -no evidence of exacerbation  -continue home inhaler per pharmacy formulary alternative  -monitor       VTE Risk Mitigation (From admission, onward)         Ordered     apixaban tablet 5 mg  2 times daily         04/24/23 1952     IP VTE HIGH RISK PATIENT  Once         04/24/23 1952     Place sequential compression device  Until discontinued         04/24/23 1952     Reason for No Pharmacological VTE Prophylaxis  Once        Question:  Reasons:  Answer:  Already adequately anticoagulated on oral Anticoagulants    04/24/23 1952                Discharge Planning   LESTER:      Code Status: Full Code   Is the patient medically ready for discharge?:     Reason for patient still in hospital (select all that apply): Patient trending condition  Discharge Plan A: Home with family                  Juan Miguel Rubio MD  Department of Hospital Medicine   [facility] - Med Surg ([address])

## 2023-04-26 NOTE — PLAN OF CARE
Chart reviewed. Spoke with pt. Doesn't feel too good. Cough+   ID consulted. HDS. Afebrile. Rash better. Oral steroids can be tapered quickly after tomorrow if rash continues to improve.

## 2023-04-26 NOTE — CONSULTS
[facility] Surg ([address])  Infectious Disease  Consult Note    Patient Name: Lucia Matias  MRN: [id number]  Admission Date: 4/24/2023  Hospital Length of Stay: 2 days  Attending Physician: Juan Miguel Rubio MD  Primary Care Provider: Hetal Banks MD     Isolation Status: Contact and Neutropenic    Patient information was obtained from patient, past medical records and ER records.      Inpatient consult to Infectious Diseases  Consult performed by: Joo Harden MD  Consult ordered by: Juan Miguel Rubio MD        Assessment/Plan:     ID  Infection due to ESBL-producing Escherichia coli  Bacteremia with ESBL E.coli - will continue ertapenem for now - no confirmed source at this time.     Oncology  * Neutropenic fever  61 yo female with neutropenic fever, bacteremia with ESBL E.coli, parainfluenza virus infection. CT suggests diverticulosis and cholelithiasis, but source of bacteremia is unclear. Some evidence to suggest urinary retention may be present. Urinalysis normal, but results may be confounded by neutropenia.  Continue broad spectrum therapy with vancomycin and ertapenem and fluconazole.  Await cultures results. Continue treatment of neutropenia.        Thank you for your consult. I will follow-up with patient. Please contact us if you have any additional questions.    Joo Harden MD  Infectious Disease  [facility] ([address])    Subjective:     Principal Problem: Neutropenic fever    HPI: 61 yo female with history of ESBL UTI, urinary retention, s/p bladder lifts x2, recent treatement for breast cancer, who presents with neutropenia, cough, and ESBL E.coli bacteremia.     She presented with fever, hip pain, and cough on 4/24. She was found to have neutropenia, and placed on IV antibiotics. Her respiratory panel showed parainfluenza virus 3, and blood cultures grew ESBL E.coli. I am consulted for antibiotic management. She has started Udenyca for her  neutropenia.    Urine cultures in August and May 2022 grew ESBL E.coli. She states that she has incomplete voiding, but no dysuria, no abdominal pain. She had two bladder lifts in the past.           Past Medical History:   Diagnosis Date    Allergy     Anxiety     Arthritis     Atrial flutter     Colon polyps     COPD (chronic obstructive pulmonary disease)     COPD exacerbation 10/31/2022    Depression     Diverticular disease of colon 2017    Diverticulitis     HLD (hyperlipidemia)     Malignant neoplasm of central portion of left breast in female, estrogen receptor positive 2022    Pancreatitis     Psoriasis     Rheumatoid arthritis     Severe obesity (BMI 35.0-39.9) with comorbidity        Past Surgical History:   Procedure Laterality Date    ABLATION  2022    Cardiac Ablation for A Flutter, Successful    ADENOIDECTOMY  1966    Tonsillitis and adnoids    BILATERAL MASTECTOMY Bilateral 2/15/2023    Procedure: MASTECTOMY, BILATERAL;  Surgeon: Marcela Meehan MD;  Location: [facility];  Service: General;  Laterality: Bilateral;  EMAIL SENT  @ 11:44 LK / 2.5 HOURS    BLADDER SUSPENSION      (x2)    BREAST REVISION SURGERY Bilateral 3/29/2023    Procedure: BREAST REVISION SURGERY / BILATERAL BREAST FLAP REVISION;  Surgeon: Ming Milian MD;  Location: [facility];  Service: Plastics;  Laterality: Bilateral;  90 MINUTES     SECTION      (x2)    DEBRIDEMENT, BREAST Bilateral 3/29/2023    Procedure: DEBRIDEMENT, BREAST;  Surgeon: Ming Milian MD;  Location: [facility];  Service: Plastics;  Laterality: Bilateral;    ESOPHAGOGASTRODUODENOSCOPY N/A 2021    Procedure: EGD (ESOPHAGOGASTRODUODENOSCOPY);  Surgeon: Vince Rodriguez MD;  Location: [address];  Service: General;  Laterality: N/A;    INJECTION FOR SENTINEL NODE IDENTIFICATION Left 2/15/2023    Procedure: INJECTION, FOR SENTINEL NODE IDENTIFICATION;  Surgeon: Marcela Meehan MD;  Location: [facility]  OR;  Service: General;  Laterality: Left;    LAPAROSCOPIC SLEEVE GASTRECTOMY N/A 03/31/2021    Procedure: GASTRECTOMY, SLEEVE, LAPAROSCOPIC, with intraop EGD;  Surgeon: Vince Rodriguez MD;  Location: [facility] OR 2ND FLR;  Service: General;  Laterality: N/A;    LUNG BIOPSY  09/2020    RECONSTRUCTION OF BREAST WITH DEEP INFERIOR EPIGASTRIC ARTERY  (NEAH) FREE FLAP Bilateral 2/15/2023    Procedure: RECONSTRUCTION, BREAST, USING NEHA FREE FLAP;  Surgeon: Ming Milian MD;  Location: [facility] OR;  Service: Plastics;  Laterality: Bilateral;    RHINOPLASTY      SENTINEL LYMPH NODE BIOPSY Left 2/15/2023    Procedure: BIOPSY, LYMPH NODE, SENTINEL;  Surgeon: Marcela Meehan MD;  Location: [facility] OR;  Service: General;  Laterality: Left;    TONSILLECTOMY      TRANSESOPHAGEAL ECHOCARDIOGRAPHY N/A 11/02/2022    Procedure: ECHOCARDIOGRAM, TRANSESOPHAGEAL;  Surgeon: Kellie Grover MD;  Location: [facility] EP LAB;  Service: Cardiology;  Laterality: N/A;       Review of patient's allergies indicates:   Allergen Reactions    Succinimides Anaphylaxis     Son has        Medications:  Medications Prior to Admission   Medication Sig    acetaminophen-codeine 300-30mg (TYLENOL #3) 300-30 mg Tab Take 1 tablet by mouth every 4 (four) hours as needed.    apixaban (ELIQUIS) 5 mg Tab Take 1 tablet (5 mg total) by mouth 2 (two) times daily. Will hold 2/13 & 2/14    atorvastatin (LIPITOR) 20 MG tablet Take 1 tablet (20 mg total) by mouth every evening.    B-complex with vitamin C (VITAMIN B COMPLEX-C ORAL) Take by mouth Daily.    calcium-vitamin D 250-100 mg-unit per tablet Take 1 tablet by mouth 2 (two) times daily.    COSENTYX PEN, 2 PENS, 150 mg/mL PnIj Inject 300mg (2 pens) into the skin every 4 weeks    cyanocobalamin 500 MCG tablet Take 500 mcg by mouth once daily.    diazePAM (VALIUM) 5 MG tablet Take 1 tablet (5 mg total) by mouth daily as needed for Anxiety.    fluticasone-umeclidin-vilanter (TRELEGY  ELLIPTA) 100-62.5-25 mcg DsDv Inhale 1 puff into the lungs once daily.    multivitamin (THERAGRAN) per tablet Take 1 tablet by mouth once daily.    multivitamin with minerals (HAIR,SKIN AND NAILS ORAL) Take by mouth.    mv-mn/iron/folic acid/herb 190 (VITAMIN D3 COMPLETE ORAL) Take by mouth.    omeprazole (PRILOSEC) 40 MG capsule Take 1 capsule (40 mg total) by mouth every morning.    ondansetron (ZOFRAN) 8 MG tablet Take 1 tablet (8 mg total) by mouth every 12 (twelve) hours as needed for Nausea.    traZODone (DESYREL) 100 MG tablet Take 1 tablet (100 mg total) by mouth nightly as needed for Insomnia.    venlafaxine (EFFEXOR-XR) 75 MG 24 hr capsule Take 1 capsule (75 mg total) by mouth once daily. Start on Week 2     Antibiotics (From admission, onward)      Start     Stop Route Frequency Ordered    04/25/23 1915  ertapenem (INVANZ) 1 g in sodium chloride 0.9 % 100 mL IVPB (MB+)         -- IV Every 24 hours (non-standard times) 04/25/23 1801    04/24/23 2100  mupirocin 2 % ointment         04/29 2059 Nasl 2 times daily 04/24/23 1957          Antifungals (From admission, onward)      Start     Stop Route Frequency Ordered    04/25/23 0930  fluconazole tablet 200 mg         -- Oral Daily 04/25/23 0923          Antivirals (From admission, onward)      None             Immunization History   Administered Date(s) Administered    COVID-19, MRNA, LN-S, PF (Pfizer) (Purple Cap) 02/17/2021, 03/10/2021, 09/28/2021    COVID-19, mRNA, LNP-S, bivalent booster, PF (PFIZER OMICRON) 09/27/2022    Influenza 10/07/2019    Influenza - Quadrivalent - PF *Preferred* (6 months and older) 10/02/2019, 09/11/2020, 11/15/2021, 10/11/2022    Influenza - Trivalent (ADULT) 10/12/2017    Influenza - Trivalent - MDCK - PF 12/24/2018    Influenza Split 10/12/2017, 12/05/2018    Pneumococcal Conjugate - 20 Valent 01/12/2023    Pneumococcal Polysaccharide - 23 Valent 04/26/2021    Tdap 12/05/2018       Family History       Problem  Relation (Age of Onset)    No Known Problems Daughter, Son, Daughter          Social History     Socioeconomic History    Marital status:    Occupational History    Occupation: clinical research coordinator    Tobacco Use    Smoking status: Former     Packs/day: 0.00     Years: 20.00     Pack years: 0.00     Types: Cigarettes     Start date: 1979     Quit date: 2020     Years since quittin.3    Smokeless tobacco: Current   Substance and Sexual Activity    Alcohol use: Yes     Alcohol/week: 1.0 standard drink     Types: 1 Glasses of wine per week     Comment: social    Drug use: No    Sexual activity: Yes     Partners: Male     Birth control/protection: Post-menopausal   Other Topics Concern    Are you pregnant or think you may be? No    Breast-feeding No     Social Determinants of Health     Financial Resource Strain: Low Risk     Difficulty of Paying Living Expenses: Not very hard   Food Insecurity: No Food Insecurity    Worried About Running Out of Food in the Last Year: Never true    Ran Out of Food in the Last Year: Never true   Transportation Needs: No Transportation Needs    Lack of Transportation (Medical): No    Lack of Transportation (Non-Medical): No   Physical Activity: Insufficiently Active    Days of Exercise per Week: 1 day    Minutes of Exercise per Session: 30 min   Stress: Stress Concern Present    Feeling of Stress : Very much   Social Connections: Unknown    Frequency of Communication with Friends and Family: More than three times a week    Frequency of Social Gatherings with Friends and Family: Once a week    Active Member of Clubs or Organizations: Yes    Attends Club or Organization Meetings: 1 to 4 times per year    Marital Status:    Housing Stability: Low Risk     Unable to Pay for Housing in the Last Year: No    Number of Places Lived in the Last Year: 1    Unstable Housing in the Last Year: No     Review of Systems   Constitutional:   Positive for fever.   Respiratory:  Positive for cough. Negative for shortness of breath.    Gastrointestinal:  Negative for abdominal pain and diarrhea.   Genitourinary:  Negative for difficulty urinating and dysuria.   Objective:     Vital Signs (Most Recent):  Temp: 98.2 °F (36.8 °C) (04/26/23 1555)  Pulse: 79 (04/26/23 1555)  Resp: 18 (04/26/23 1555)  BP: (!) 163/69 (04/26/23 1555)  SpO2: 96 % (04/26/23 1555)   Vital Signs (24h Range):  Temp:  [97.8 °F (36.6 °C)-99.9 °F (37.7 °C)] 98.2 °F (36.8 °C)  Pulse:  [75-98] 79  Resp:  [15-18] 18  SpO2:  [95 %-99 %] 96 %  BP: (112-163)/(56-69) 163/69     Weight: 78 kg (171 lb 15.3 oz)  Body mass index is 31.45 kg/m².    Estimated Creatinine Clearance: 82.7 mL/min (based on SCr of 0.7 mg/dL).    Physical Exam  Vitals and nursing note reviewed.   Constitutional:       Appearance: Normal appearance.   HENT:      Head: Normocephalic and atraumatic.   Eyes:      Extraocular Movements: Extraocular movements intact.      Conjunctiva/sclera: Conjunctivae normal.      Pupils: Pupils are equal, round, and reactive to light.   Cardiovascular:      Rate and Rhythm: Normal rate and regular rhythm.   Pulmonary:      Effort: Pulmonary effort is normal.      Breath sounds: Normal breath sounds. No rhonchi or rales.   Abdominal:      General: Abdomen is flat.      Palpations: Abdomen is soft.   Musculoskeletal:         General: Normal range of motion.      Right lower leg: No edema.      Left lower leg: No edema.   Skin:     General: Skin is warm and dry.   Neurological:      General: No focal deficit present.      Mental Status: She is alert and oriented to person, place, and time.       Significant Labs: Blood Culture:   Recent Labs   Lab 04/24/23  1432 04/24/23  1500 04/25/23  1813 04/25/23  2101   LABBLOO Gram stain gianfranco bottle: Gram negative rods  Results called to and read back by: Razia Garcia RN 04/25/2023  10:54  Gram stain aer bottle: Gram negative rods  Positive results  previously called 04/25/2023  14:10  ESCHERICHIA COLI  Susceptibility pending  * No Growth to date  No Growth to date No Growth to date No Growth to date     CBC:   Recent Labs   Lab 04/25/23  0637 04/26/23  0048   WBC 0.40* 0.32*   HGB 12.4 11.8*   HCT 37.8 36.4*    141*     CMP:   Recent Labs   Lab 04/25/23  0637 04/26/23  0048 04/26/23  0505    137  --    K 4.4 5.2*  --     111*  --    CO2 27 18*  --     159*  --    BUN 14 12  --    CREATININE 0.9 0.9 0.7   CALCIUM 8.3* 8.2*  --    PROT 5.3*  --   --    ALBUMIN 2.8*  --   --    BILITOT 1.4*  --   --    ALKPHOS 72  --   --    AST 16  --   --    ALT 24  --   --    ANIONGAP 5* 8  --      Urine Studies:   Recent Labs   Lab 04/25/23  0122   COLORU Yellow   APPEARANCEUA Hazy*   PHUR 5.0   SPECGRAV >1.030*   PROTEINUA Trace*   GLUCUA Negative   KETONESU Negative   BILIRUBINUA Negative   OCCULTUA Negative   NITRITE Negative   UROBILINOGEN Negative   LEUKOCYTESUR Negative       Significant Imaging: CT: I have reviewed all pertinent results/findings within the past 24 hours:    Collections within the subcutaneous fat along the left greater than right lower quadrants.  These are likely postoperative in nature and most likely represent seromas or evolving hematomas.  Infected collections would be considered less likely.     Colonic diverticulosis.  At the junction of descending and sigmoid colon fat planes are somewhat indistinct some mild fat stranding.  Mild early changes of diverticulitis are not excluded, to be correlated clinically.     Gallbladder stones or sludge.

## 2023-04-26 NOTE — ASSESSMENT & PLAN NOTE
Bacteremia with ESBL E.coli - will continue ertapenem for now - no confirmed source at this time.

## 2023-04-27 PROBLEM — B34.8 INFECTION DUE TO PARAINFLUENZA VIRUS 3: Status: ACTIVE | Noted: 2023-04-27

## 2023-04-27 LAB
ANION GAP SERPL CALC-SCNC: 8 MMOL/L (ref 8–16)
BACTERIA BLD CULT: ABNORMAL
BASOPHILS # BLD AUTO: ABNORMAL K/UL (ref 0–0.2)
BASOPHILS NFR BLD: 0 % (ref 0–1.9)
BUN SERPL-MCNC: 11 MG/DL (ref 6–20)
CALCIUM SERPL-MCNC: 8.6 MG/DL (ref 8.7–10.5)
CHLORIDE SERPL-SCNC: 109 MMOL/L (ref 95–110)
CO2 SERPL-SCNC: 24 MMOL/L (ref 23–29)
CREAT SERPL-MCNC: 0.7 MG/DL (ref 0.5–1.4)
DIFFERENTIAL METHOD: ABNORMAL
EOSINOPHIL # BLD AUTO: ABNORMAL K/UL (ref 0–0.5)
EOSINOPHIL NFR BLD: 0 % (ref 0–8)
ERYTHROCYTE [DISTWIDTH] IN BLOOD BY AUTOMATED COUNT: 12.7 % (ref 11.5–14.5)
EST. GFR  (NO RACE VARIABLE): >60 ML/MIN/1.73 M^2
GLUCOSE SERPL-MCNC: 122 MG/DL (ref 70–110)
HCT VFR BLD AUTO: 37.1 % (ref 37–48.5)
HGB BLD-MCNC: 11.9 G/DL (ref 12–16)
IMM GRANULOCYTES # BLD AUTO: ABNORMAL K/UL (ref 0–0.04)
IMM GRANULOCYTES NFR BLD AUTO: ABNORMAL % (ref 0–0.5)
LYMPHOCYTES # BLD AUTO: ABNORMAL K/UL (ref 1–4.8)
LYMPHOCYTES NFR BLD: 48 % (ref 18–48)
MCH RBC QN AUTO: 28.8 PG (ref 27–31)
MCHC RBC AUTO-ENTMCNC: 32.1 G/DL (ref 32–36)
MCV RBC AUTO: 90 FL (ref 82–98)
MONOCYTES # BLD AUTO: ABNORMAL K/UL (ref 0.3–1)
MONOCYTES NFR BLD: 28 % (ref 4–15)
NEUTROPHILS NFR BLD: 20 % (ref 38–73)
NEUTS BAND NFR BLD MANUAL: 4 %
NRBC BLD-RTO: 1 /100 WBC
PLATELET # BLD AUTO: 143 K/UL (ref 150–450)
PLATELET BLD QL SMEAR: ABNORMAL
PMV BLD AUTO: 9.4 FL (ref 9.2–12.9)
POTASSIUM SERPL-SCNC: 4.1 MMOL/L (ref 3.5–5.1)
RBC # BLD AUTO: 4.13 M/UL (ref 4–5.4)
SODIUM SERPL-SCNC: 141 MMOL/L (ref 136–145)
WBC # BLD AUTO: 1.77 K/UL (ref 3.9–12.7)

## 2023-04-27 PROCEDURE — 85007 BL SMEAR W/DIFF WBC COUNT: CPT | Performed by: HOSPITALIST

## 2023-04-27 PROCEDURE — 63600175 PHARM REV CODE 636 W HCPCS: Performed by: HOSPITALIST

## 2023-04-27 PROCEDURE — 97110 THERAPEUTIC EXERCISES: CPT

## 2023-04-27 PROCEDURE — 94640 AIRWAY INHALATION TREATMENT: CPT

## 2023-04-27 PROCEDURE — 99233 PR SUBSEQUENT HOSPITAL CARE,LEVL III: ICD-10-PCS | Mod: ,,, | Performed by: INTERNAL MEDICINE

## 2023-04-27 PROCEDURE — 63700000 PHARM REV CODE 250 ALT 637 W/O HCPCS: Performed by: HOSPITALIST

## 2023-04-27 PROCEDURE — 99233 SBSQ HOSP IP/OBS HIGH 50: CPT | Mod: ,,, | Performed by: INTERNAL MEDICINE

## 2023-04-27 PROCEDURE — 85027 COMPLETE CBC AUTOMATED: CPT | Performed by: HOSPITALIST

## 2023-04-27 PROCEDURE — 97161 PT EVAL LOW COMPLEX 20 MIN: CPT

## 2023-04-27 PROCEDURE — 99232 PR SUBSEQUENT HOSPITAL CARE,LEVL II: ICD-10-PCS | Mod: ,,, | Performed by: HOSPITALIST

## 2023-04-27 PROCEDURE — 63600175 PHARM REV CODE 636 W HCPCS: Performed by: NURSE PRACTITIONER

## 2023-04-27 PROCEDURE — 25000003 PHARM REV CODE 250: Performed by: HOSPITALIST

## 2023-04-27 PROCEDURE — 27000207 HC ISOLATION

## 2023-04-27 PROCEDURE — 80048 BASIC METABOLIC PNL TOTAL CA: CPT | Performed by: HOSPITALIST

## 2023-04-27 PROCEDURE — 97165 OT EVAL LOW COMPLEX 30 MIN: CPT

## 2023-04-27 PROCEDURE — 94761 N-INVAS EAR/PLS OXIMETRY MLT: CPT

## 2023-04-27 PROCEDURE — 25000003 PHARM REV CODE 250: Performed by: NURSE PRACTITIONER

## 2023-04-27 PROCEDURE — 11000001 HC ACUTE MED/SURG PRIVATE ROOM

## 2023-04-27 PROCEDURE — 99232 SBSQ HOSP IP/OBS MODERATE 35: CPT | Mod: ,,, | Performed by: HOSPITALIST

## 2023-04-27 PROCEDURE — 36415 COLL VENOUS BLD VENIPUNCTURE: CPT | Performed by: HOSPITALIST

## 2023-04-27 PROCEDURE — 25000242 PHARM REV CODE 250 ALT 637 W/ HCPCS: Performed by: NURSE PRACTITIONER

## 2023-04-27 RX ADMIN — APIXABAN 5 MG: 2.5 TABLET, FILM COATED ORAL at 08:04

## 2023-04-27 RX ADMIN — DIPHENHYDRAMINE HYDROCHLORIDE 25 MG: 25 CAPSULE ORAL at 09:04

## 2023-04-27 RX ADMIN — THERA TABS 1 TABLET: TAB at 09:04

## 2023-04-27 RX ADMIN — OXYCODONE HYDROCHLORIDE AND ACETAMINOPHEN 1 TABLET: 5; 325 TABLET ORAL at 11:04

## 2023-04-27 RX ADMIN — ERTAPENEM 1 G: 1 INJECTION INTRAMUSCULAR; INTRAVENOUS at 09:04

## 2023-04-27 RX ADMIN — GUAIFENESIN AND DEXTROMETHORPHAN HYDROBROMIDE 1 TABLET: 600; 30 TABLET, EXTENDED RELEASE ORAL at 09:04

## 2023-04-27 RX ADMIN — HYDROMORPHONE HYDROCHLORIDE 1 MG: 1 INJECTION, SOLUTION INTRAMUSCULAR; INTRAVENOUS; SUBCUTANEOUS at 06:04

## 2023-04-27 RX ADMIN — GUAIFENESIN AND DEXTROMETHORPHAN HYDROBROMIDE 1 TABLET: 600; 30 TABLET, EXTENDED RELEASE ORAL at 08:04

## 2023-04-27 RX ADMIN — VANCOMYCIN HYDROCHLORIDE 1000 MG: 1 INJECTION, POWDER, LYOPHILIZED, FOR SOLUTION INTRAVENOUS at 03:04

## 2023-04-27 RX ADMIN — PREDNISONE 50 MG: 5 TABLET ORAL at 09:04

## 2023-04-27 RX ADMIN — PANTOPRAZOLE SODIUM 40 MG: 40 TABLET, DELAYED RELEASE ORAL at 09:04

## 2023-04-27 RX ADMIN — APIXABAN 5 MG: 2.5 TABLET, FILM COATED ORAL at 09:04

## 2023-04-27 RX ADMIN — TRIAMCINOLONE ACETONIDE: 1 CREAM TOPICAL at 08:04

## 2023-04-27 RX ADMIN — NEPHROCAP 1 CAPSULE: 1 CAP ORAL at 09:04

## 2023-04-27 RX ADMIN — VENLAFAXINE HYDROCHLORIDE 75 MG: 37.5 CAPSULE, EXTENDED RELEASE ORAL at 09:04

## 2023-04-27 RX ADMIN — TRAZODONE HYDROCHLORIDE 100 MG: 100 TABLET ORAL at 09:04

## 2023-04-27 RX ADMIN — TRIAMCINOLONE ACETONIDE: 1 CREAM TOPICAL at 10:04

## 2023-04-27 RX ADMIN — SENNOSIDES AND DOCUSATE SODIUM 1 TABLET: 50; 8.6 TABLET ORAL at 08:04

## 2023-04-27 RX ADMIN — TRIAMCINOLONE ACETONIDE: 1 CREAM TOPICAL at 09:04

## 2023-04-27 RX ADMIN — HYDROMORPHONE HYDROCHLORIDE 1 MG: 1 INJECTION, SOLUTION INTRAMUSCULAR; INTRAVENOUS; SUBCUTANEOUS at 09:04

## 2023-04-27 RX ADMIN — HYDROMORPHONE HYDROCHLORIDE 1 MG: 1 INJECTION, SOLUTION INTRAMUSCULAR; INTRAVENOUS; SUBCUTANEOUS at 12:04

## 2023-04-27 RX ADMIN — CYANOCOBALAMIN TAB 250 MCG 500 MCG: 250 TAB at 09:04

## 2023-04-27 RX ADMIN — ATORVASTATIN CALCIUM 20 MG: 20 TABLET, FILM COATED ORAL at 08:04

## 2023-04-27 RX ADMIN — MUPIROCIN: 20 OINTMENT TOPICAL at 10:04

## 2023-04-27 RX ADMIN — MUPIROCIN: 20 OINTMENT TOPICAL at 08:04

## 2023-04-27 RX ADMIN — DIAZEPAM 5 MG: 5 TABLET ORAL at 09:04

## 2023-04-27 RX ADMIN — Medication 1 TABLET: at 09:04

## 2023-04-27 RX ADMIN — DIPHENHYDRAMINE HYDROCHLORIDE 25 MG: 25 CAPSULE ORAL at 07:04

## 2023-04-27 RX ADMIN — Medication 1 TABLET: at 08:04

## 2023-04-27 RX ADMIN — FLUTICASONE FUROATE AND VILANTEROL TRIFENATATE 1 PUFF: 200; 25 POWDER RESPIRATORY (INHALATION) at 08:04

## 2023-04-27 RX ADMIN — FLUCONAZOLE 200 MG: 100 TABLET ORAL at 09:04

## 2023-04-27 NOTE — PROGRESS NOTES
"[facility] Surg [address] Medicine  Progress Note    Patient Name: Lucia Matias  MRN: [id number]  Patient Class: IP- Inpatient   Admission Date: 4/24/2023  Length of Stay: 3 days  Attending Physician: Juan Miguel Rubio MD  Primary Care Provider: Hetal Banks MD        Subjective:     Principal Problem:Neutropenic fever        HPI:  Ms. Matias is a 62 YOF with PMHx of HTN, HLD, depression, COPD, A-flutter s/p ablation on OAC (11/2022), h/o sleeve gastrectomy, recently diagnosed HR+ breast cancer s/p bilateral SIEA flap reconstruction (2/15/2023) and psoriasis (holding cosentyx while on chemotherapy).    Per  note "presented to [facility] ED with chief complaint for bilateral hip pain and rash on face, neck and scalp. She reports that the hip pain started around 1 AM the morning prior to ED arrival. She started her first round of chemotherapy on Wednesday, 4/19/23, and received the Udenyca injection the following day. She also mentions constipation, chills and low grade fever with temp 100.1 the morning prior to ED arrival. She also notes a rash on her neck, face and scalp that started 2 days ago." She endorses some nausea. She also reports that her  was seen in an OSH for COPD exacerbation just prior to her symptom development. She denies cough, light headiness, dizziness, syncope, chest pain, shortness of breath, palpitations, weight loss, vomiting or diarrhea.     In the ED she was HDS, TMax 99.2. CBC with WBC 0.7, H/H stable. Chemistry stable. Blood cultures obtained. She was treated with Vanc/zosyn and benadryl for rash. She was transferred to [facility] for further evaluation and management.        Overview/Hospital Course:  Ms. Matias is a 62 YOF with PMHx of HTN, HLD, depression, COPD, A-flutter s/p ablation on OAC (11/2022), h/o sleeve gastrectomy, recently diagnosed HR+ breast cancer s/p bilateral SIEA flap reconstruction " "(2/15/2023) and psoriasis (holding cosentyx while on chemotherapy).    Per  note "presented to [facility] ED with chief complaint for bilateral hip pain and rash on face, neck and scalp. She reports that the hip pain started around 1 AM the morning prior to ED arrival. She started her first round of chemotherapy on Wednesday, 4/19/23, and received the Udenyca injection the following day. She also mentions constipation, chills and low grade fever with temp 100.1 the morning prior to ED arrival. She also notes a rash on her neck, face and scalp that started 2 days ago." She endorses some nausea. She also reports that her  was seen in an OSH for COPD exacerbation just prior to her symptom development. She denies cough, light headiness, dizziness, syncope, chest pain, shortness of breath, palpitations, weight loss, vomiting or diarrhea.   Patient has been  stared on broad spectrum IV Abx for neutropenic fever,blood culture is pending.  Has leucopenia is setting of chemo. Therapy,oncology is consulted.  Blood culture growing ESBL, EColi,likely duo to past Hx of UTI ESBL,started on Invanz,consulted ID,will do CT abdomen,pelvic.need Urodynamic test by Urology as out patient to R/O urinary retention.  Still has fever,CT abdomen show no abscess,ID restarted vanc.  Rash is improving taper prednisone.        Interval History: still has fever,    Review of Systems   Constitutional:  Positive for fever.   Gastrointestinal:  Negative for abdominal pain.   Genitourinary:  Negative for difficulty urinating and dysuria.   Musculoskeletal:  Positive for arthralgias and myalgias.   All other systems reviewed and are negative.  Objective:     Vital Signs (Most Recent):  Temp: (!) 100.9 °F (38.3 °C) (04/27/23 0759)  Pulse: (!) 112 (04/27/23 0800)  Resp: 18 (04/27/23 0800)  BP: 113/72 (04/27/23 0759)  SpO2: 97 % (04/27/23 0800)   Vital Signs (24h Range):  Temp:  [97.6 °F (36.4 °C)-100.9 °F (38.3 °C)] 100.9 °F (38.3 " °C)  Pulse:  [] 112  Resp:  [13-20] 18  SpO2:  [95 %-97 %] 97 %  BP: (113-163)/(58-74) 113/72     Weight: 78 kg (171 lb 15.3 oz)  Body mass index is 31.45 kg/m².    Estimated Creatinine Clearance: 82.7 mL/min (based on SCr of 0.7 mg/dL).    Physical Exam  Vitals and nursing note reviewed.   Constitutional:       Appearance: Normal appearance.   HENT:      Head: Normocephalic and atraumatic.   Eyes:      Extraocular Movements: Extraocular movements intact.      Conjunctiva/sclera: Conjunctivae normal.      Pupils: Pupils are equal, round, and reactive to light.   Cardiovascular:      Rate and Rhythm: Normal rate and regular rhythm.   Pulmonary:      Effort: Pulmonary effort is normal.      Breath sounds: Normal breath sounds. No rhonchi or rales.   Abdominal:      General: Abdomen is flat.      Palpations: Abdomen is soft.   Musculoskeletal:         General: Normal range of motion.      Right lower leg: No edema.      Left lower leg: No edema.   Skin:     General: Skin is warm and dry.   Neurological:      General: No focal deficit present.      Mental Status: She is alert and oriented to person, place, and time.       Significant Labs: Blood Culture:   Recent Labs   Lab 04/24/23  1432 04/24/23  1500 04/25/23  1813 04/25/23  2101   LABBLOO Gram stain gianfranco bottle: Gram negative rods  Results called to and read back by: Razia Garcia RN 04/25/2023  10:54  Gram stain aer bottle: Gram negative rods  Positive results previously called 04/25/2023  14:10  ESCHERICHIA COLI  Susceptibility pending  * No Growth to date  No Growth to date  No Growth to date No Growth to date  No Growth to date No Growth to date       CBC:   Recent Labs   Lab 04/26/23  0048 04/27/23  0025   WBC 0.32* 1.77*   HGB 11.8* 11.9*   HCT 36.4* 37.1   * 143*       CMP:   Recent Labs   Lab 04/26/23  0048 04/26/23  0505 04/27/23  0025     --  141   K 5.2*  --  4.1   *  --  109   CO2 18*  --  24   *  --  122*   BUN 12   "--  11   CREATININE 0.9 0.7 0.7   CALCIUM 8.2*  --  8.6*   ANIONGAP 8  --  8         Significant Imaging:  CT: Collections within the subcutaneous fat along the left greater than right lower quadrants.  These are likely postoperative in nature and most likely represent seromas or evolving hematomas.  Infected collections would be considered less likely.     Colonic diverticulosis.  At the junction of descending and sigmoid colon fat planes are somewhat indistinct some mild fat stranding.  Mild early changes of diverticulitis are not excluded, to be correlated clinically.     Gallbladder stones or sludge.      Assessment/Plan:      * Neutropenic fever  Malignant neoplasm of central portion of left breast in female, estrogen receptor positive  Rash in adult  -history of  recently diagnosed HR+ breast cancer   -s/p bilateral SIEA flap reconstruction (2/15/2023)   -post op path showed Invasive lobular carcinoma in left breast grade 2 measuring 18 mm with LCIS Grade 2 ER [place] positive and Her2 negative. pT1c pN0. Oncotype 35    -follows with Dr. Roya Madrid for her oncological care and chemo plan includes "given her Oncotype of 35, we have decided to proceed with adjuvant docetaxel 75mg/m2 and cyclophosphamide 600mg/m2 iv every 21 days for a total of 4 cycles"  -developed rash and fever s/p first chemo cycle  -at admission HDS with TMax 99.2; WBC 0.7, procalcitonin WNL  -blood cultures obtained >>> please follow   -UA pending >>> please follow  -bilateral hip X-rays ordered due to pain >>> please follow   -continue IV ABX with zosyn and vancomycin  -continue supportive care of rash with benadryl  -Oncology consulted  -neutropenic precautions   -PRN supportive care as indicated  -monitor .  Patient has been  stared on broad spectrum IV Abx for neutropenic fever,blood culture is pending.  Has leucopenia is setting of chemo. Therapy,oncology is consulted.      Infection due to parainfluenza virus 3  supportive care,prn " Tylenol.      Infection due to ESBL-producing Escherichia coli  On IV Ertanepem.      Bacteremia, escherichia coli  Blood culture growing ESBL, EColi,likely duo to past Hx of UTI ESBL,started on Invanz,consulted ID,will do CT abdomen,pelvic.need Urodynamic test by Urology as out patient to R/O urinary retention.  CT show no abscess,  Still has fever,CT abdomen show no abscess,ID restarted vanc.also has parainfluenza,        Rash in adult  Started on kenalog and prednisone.improving,taper steroids.      Psoriatic arthritis  On cosentex at home.      Atrial flutter  Other long term (current) drug therapy  -history of Aflutter s/p ablation  -NSR at current  -continue home apixaban  -EKG PRN concerns   -monitor     Essential hypertension  -chronic, controlled  -no home antihypertensive therapies  -dose/medication adjustment as appropriate   -monitor     Depression with anxiety  -chronic  -controlled  -continue home PRN diazepam, PRN trazodone, and daily effexor  -monitor     COPD (chronic obstructive pulmonary disease)  -chronic, controlled at current  -no evidence of exacerbation  -continue home inhaler per pharmacy formulary alternative  -monitor       VTE Risk Mitigation (From admission, onward)         Ordered     apixaban tablet 5 mg  2 times daily         04/24/23 1952     IP VTE HIGH RISK PATIENT  Once         04/24/23 1952     Place sequential compression device  Until discontinued         04/24/23 1952     Reason for No Pharmacological VTE Prophylaxis  Once        Question:  Reasons:  Answer:  Already adequately anticoagulated on oral Anticoagulants    04/24/23 1952                Discharge Planning   LESTER:      Code Status: Full Code   Is the patient medically ready for discharge?:     Reason for patient still in hospital (select all that apply): Patient trending condition  Discharge Plan A: Home with family                  Juan Miguel Rubio MD  Department of Hospital Medicine   [facility] ([place]  [address]

## 2023-04-27 NOTE — PLAN OF CARE
Problem: Occupational Therapy  Goal: Occupational Therapy Goal  Description: Orders received and chart reviewed.  Evaluation complete with pt Indep with ADL and ADL mobility.  No use of AD for mobility and no LOB.  Recommend discharge to home when medically cleared.  Outcome: Met      25-Jul-2018

## 2023-04-27 NOTE — ASSESSMENT & PLAN NOTE
Blood culture growing ESBL, EColi,likely duo to past Hx of UTI ESBL,started on Invanz,consulted ID,will do CT abdomen,pelvic.need Urodynamic test by Urology as out patient to R/O urinary retention.  CT show no abscess,  Still has fever,CT abdomen show no abscess,ID restarted vanc.also has parainfluenza,

## 2023-04-27 NOTE — PROGRESS NOTES
Pharmacokinetic Initial Assessment: IV Vancomycin    Assessment/Plan:    Patient previously on vancomycin therapy, 4/24/23 - 4/25/23. Pharmacy reconsulted today. Initiate intravenous vancomycin with a maintenance dose of vancomycin 1000 mg IV every 12 hours  Desired empiric serum trough concentration is 10 to 20 mcg/mL  Draw vancomycin trough level 60 min prior to fifth dose on 4/29/23 at approximately 1400  Pharmacy will continue to follow and monitor vancomycin.      Please contact pharmacy at extension [phone] with any questions regarding this assessment.     Thank you for the consult,   Milka Montgomery       Patient brief summary:  Lucia Matias is a 62 y.o. female initiated on antimicrobial therapy with IV Vancomycin for treatment of suspected sepsis    Drug Allergies:   Review of patient's allergies indicates:   Allergen Reactions    Succinimides Anaphylaxis     Son has MH       Actual Body Weight:   78 kg    Renal Function:   Estimated Creatinine Clearance: 82.7 mL/min (based on SCr of 0.7 mg/dL).,     Dialysis Method (if applicable):  N/A    CBC (last 72 hours):  Recent Labs   Lab Result Units 04/25/23  0637 04/26/23  0048 04/27/23  0025   WBC K/uL 0.40* 0.32* 1.77*   Hemoglobin g/dL 12.4 11.8* 11.9*   Hematocrit % 37.8 36.4* 37.1   Platelets K/uL 153 141* 143*   Gran % % 4.0* 3.2* 20.0*   Lymph % % 90.0* 65.6* 48.0   Mono % % 3.0* 25.0* 28.0*   Eosinophil % % 0.0 3.1 0.0   Basophil % % 1.0 3.1* 0.0   Differential Method  Manual Automated Manual       Metabolic Panel (last 72 hours):  Recent Labs   Lab Result Units 04/24/23 2016 04/25/23  0122 04/25/23  0637 04/26/23  0048 04/26/23  0505 04/27/23  0025   Sodium mmol/L  --   --  139 137  --  141   Potassium mmol/L  --   --  4.4 5.2*  --  4.1   Chloride mmol/L  --   --  107 111*  --  109   CO2 mmol/L  --   --  27 18*  --  24   Glucose mg/dL  --   --  101 159*  --  122*   Glucose, UA   --  Negative  --   --   --   --    BUN mg/dL  --   --  14 12  --   11   Creatinine mg/dL  --   --  0.9 0.9 0.7 0.7   Albumin g/dL  --   --  2.8*  --   --   --    Total Bilirubin mg/dL  --   --  1.4*  --   --   --    Alkaline Phosphatase U/L  --   --  72  --   --   --    AST U/L  --   --  16  --   --   --    ALT U/L  --   --  24  --   --   --    Magnesium mg/dL 1.5*  --  2.3  --   --   --        Drug levels (last 3 results):  No results for input(s): VANCOMYCINRA, VANCORANDOM, VANCOMYCINPE, VANCOPEAK, VANCOMYCINTR, VANCOTROUGH in the last 72 hours.    Microbiologic Results:  Microbiology Results (last 7 days)       Procedure Component Value Units Date/Time    Blood culture [[id number]] Collected: 04/25/23 2101    Order Status: Completed Specimen: Blood Updated: 04/27/23 1212     Blood Culture, Routine No Growth to date      No Growth to date    Narrative:      Collection has been rescheduled by EKD at 04/25/2023 19:19 Reason:   Patient unavailable pt in shower collect on set nurse anita  Collection has been rescheduled by EKD at 04/25/2023 19:19 Reason:   Patient unavailable pt in shower collect on set nurse anita    Culture, Respiratory with Gram Stain [[id number]] Collected: 04/26/23 1253    Order Status: Completed Specimen: Respiratory from Sputum Updated: 04/27/23 0909     Respiratory Culture No Growth     Gram Stain (Respiratory) <10 epithelial cells per low power field.     Gram Stain (Respiratory) Rare WBC's     Gram Stain (Respiratory) No organisms seen    Blood culture [[id number]] Collected: 04/25/23 1813    Order Status: Completed Specimen: Blood Updated: 04/27/23 0612     Blood Culture, Routine No Growth to date      No Growth to date    Respiratory Infection Panel (PCR), Nasopharyngeal [[id number]]  (Abnormal) Collected: 04/25/23 1437    Order Status: Completed Specimen: Nasopharyngeal Swab Updated: 04/25/23 2157     Respiratory Infection Panel Source NP Swab     Adenovirus Not Detected     Coronavirus 229E, Common Cold Virus Not Detected     Coronavirus HKU1, Common Cold  Virus Not Detected     Coronavirus NL63, Common Cold Virus Not Detected     Coronavirus OC43, Common Cold Virus Not Detected     Comment: The Coronavirus strains detected in this test cause the common cold.  These strains are not the COVID-19 (novel Coronavirus)strain   associated with the respiratory disease outbreak.          SARS-CoV2 (COVID-19) Qualitative PCR Not Detected     Human Metapneumovirus Not Detected     Human Rhinovirus/Enterovirus Not Detected     Influenza A (subtypes H1, H1-2009,H3) Not Detected     Influenza B Not Detected     Parainfluenza Virus 1 Not Detected     Parainfluenza Virus 2 Not Detected     Parainfluenza Virus 3 Detected     Parainfluenza Virus 4 Not Detected     Respiratory Syncytial Virus Not Detected     Bordetella Parapertussis (XB2402) Not Detected     Bordetella pertussis (ptxP) Not Detected     Chlamydia pneumoniae Not Detected     Mycoplasma pneumoniae Not Detected    Narrative:      For all other respiratory sources, order [id number] -  Respiratory Viral Panel by PCR

## 2023-04-27 NOTE — SUBJECTIVE & OBJECTIVE
Interval History: still has fever,    Review of Systems   Constitutional:  Positive for fever.   Gastrointestinal:  Negative for abdominal pain.   Genitourinary:  Negative for difficulty urinating and dysuria.   Musculoskeletal:  Positive for arthralgias and myalgias.   All other systems reviewed and are negative.  Objective:     Vital Signs (Most Recent):  Temp: (!) 100.9 °F (38.3 °C) (04/27/23 0759)  Pulse: (!) 112 (04/27/23 0800)  Resp: 18 (04/27/23 0800)  BP: 113/72 (04/27/23 0759)  SpO2: 97 % (04/27/23 0800)   Vital Signs (24h Range):  Temp:  [97.6 °F (36.4 °C)-100.9 °F (38.3 °C)] 100.9 °F (38.3 °C)  Pulse:  [] 112  Resp:  [13-20] 18  SpO2:  [95 %-97 %] 97 %  BP: (113-163)/(58-74) 113/72     Weight: 78 kg (171 lb 15.3 oz)  Body mass index is 31.45 kg/m².    Estimated Creatinine Clearance: 82.7 mL/min (based on SCr of 0.7 mg/dL).    Physical Exam  Vitals and nursing note reviewed.   Constitutional:       Appearance: Normal appearance.   HENT:      Head: Normocephalic and atraumatic.   Eyes:      Extraocular Movements: Extraocular movements intact.      Conjunctiva/sclera: Conjunctivae normal.      Pupils: Pupils are equal, round, and reactive to light.   Cardiovascular:      Rate and Rhythm: Normal rate and regular rhythm.   Pulmonary:      Effort: Pulmonary effort is normal.      Breath sounds: Normal breath sounds. No rhonchi or rales.   Abdominal:      General: Abdomen is flat.      Palpations: Abdomen is soft.   Musculoskeletal:         General: Normal range of motion.      Right lower leg: No edema.      Left lower leg: No edema.   Skin:     General: Skin is warm and dry.   Neurological:      General: No focal deficit present.      Mental Status: She is alert and oriented to person, place, and time.       Significant Labs: Blood Culture:   Recent Labs   Lab 04/24/23  1432 04/24/23  1500 04/25/23  1813 04/25/23  2101   LABBLOO Gram stain gianfranco bottle: Gram negative rods  Results called to and read back  by: Razia Garcia RN 04/25/2023  10:54  Gram stain aer bottle: Gram negative rods  Positive results previously called 04/25/2023  14:10  ESCHERICHIA COLI  Susceptibility pending  * No Growth to date  No Growth to date  No Growth to date No Growth to date  No Growth to date No Growth to date       CBC:   Recent Labs   Lab 04/26/23  0048 04/27/23  0025   WBC 0.32* 1.77*   HGB 11.8* 11.9*   HCT 36.4* 37.1   * 143*       CMP:   Recent Labs   Lab 04/26/23  0048 04/26/23  0505 04/27/23  0025     --  141   K 5.2*  --  4.1   *  --  109   CO2 18*  --  24   *  --  122*   BUN 12  --  11   CREATININE 0.9 0.7 0.7   CALCIUM 8.2*  --  8.6*   ANIONGAP 8  --  8         Significant Imaging:  CT: Collections within the subcutaneous fat along the left greater than right lower quadrants.  These are likely postoperative in nature and most likely represent seromas or evolving hematomas.  Infected collections would be considered less likely.     Colonic diverticulosis.  At the junction of descending and sigmoid colon fat planes are somewhat indistinct some mild fat stranding.  Mild early changes of diverticulitis are not excluded, to be correlated clinically.     Gallbladder stones or sludge.

## 2023-04-27 NOTE — PROGRESS NOTES
[facility] ([address])  Infectious Disease  Progress Note    Patient Name: Lucia Matias  MRN: [id number]  Admission Date: 4/24/2023  Length of Stay: 3 days  Attending Physician: Juan Miguel Rubio MD  Primary Care Provider: Hetal Banks MD    Isolation Status: Contact and Neutropenic  Assessment/Plan:      ID  Infection due to ESBL-producing Escherichia coli  Bacteremia with ESBL E.coli - will continue ertapenem for now - no confirmed source at this time.     Obtain blood cultures, add vancomycin for continued fevers.    Oncology  * Neutropenic fever  63 yo female with neutropenic fever, bacteremia with ESBL E.coli, parainfluenza virus infection. Fevers continue, source of bacteremia is unclear. On 50 mg of prednisone for rash - rash is resolved.    Restart vancomycin, continue ertapenem and fluconazole.  Await cultures results. Continue treatment of neutropenia.  Taper steroids rapidly.        Thank you for your consult. I will follow-up with patient. Please contact us if you have any additional questions.    Joo Harden MD  Infectious Disease  [facility] ([address])    Subjective:     Principal Problem:Neutropenic fever    HPI: 63 yo female with history of ESBL UTI, urinary retention, s/p bladder lifts x2, recent treatement for breast cancer, who presents with neutropenia, cough, and ESBL E.coli bacteremia.     She presented with fever, hip pain, and cough on 4/24. She was found to have neutropenia, and placed on IV antibiotics. Her respiratory panel showed parainfluenza virus 3, and blood cultures grew ESBL E.coli. I am consulted for antibiotic management. She has started Udenyca for her neutropenia.    Urine cultures in August and May 2022 grew ESBL E.coli. She states that she has incomplete voiding, but no dysuria, no abdominal pain. She had two bladder lifts in the past.     Interval History: Fever last night. Bone and joint pain, especially in hips and pelvis.      Review of Systems   Constitutional:  Positive for fever.   Gastrointestinal:  Negative for abdominal pain.   Genitourinary:  Negative for difficulty urinating and dysuria.   Musculoskeletal:  Positive for arthralgias and myalgias.   All other systems reviewed and are negative.  Objective:     Vital Signs (Most Recent):  Temp: (!) 100.9 °F (38.3 °C) (04/27/23 0759)  Pulse: (!) 112 (04/27/23 0800)  Resp: 18 (04/27/23 0800)  BP: 113/72 (04/27/23 0759)  SpO2: 97 % (04/27/23 0800)   Vital Signs (24h Range):  Temp:  [97.6 °F (36.4 °C)-100.9 °F (38.3 °C)] 100.9 °F (38.3 °C)  Pulse:  [] 112  Resp:  [13-20] 18  SpO2:  [95 %-97 %] 97 %  BP: (113-163)/(58-74) 113/72     Weight: 78 kg (171 lb 15.3 oz)  Body mass index is 31.45 kg/m².    Estimated Creatinine Clearance: 82.7 mL/min (based on SCr of 0.7 mg/dL).    Physical Exam  Vitals and nursing note reviewed.   Constitutional:       Appearance: Normal appearance.   HENT:      Head: Normocephalic and atraumatic.   Eyes:      Extraocular Movements: Extraocular movements intact.      Conjunctiva/sclera: Conjunctivae normal.      Pupils: Pupils are equal, round, and reactive to light.   Cardiovascular:      Rate and Rhythm: Normal rate and regular rhythm.   Pulmonary:      Effort: Pulmonary effort is normal.      Breath sounds: Normal breath sounds. No rhonchi or rales.   Abdominal:      General: Abdomen is flat.      Palpations: Abdomen is soft.   Musculoskeletal:         General: Normal range of motion.      Right lower leg: No edema.      Left lower leg: No edema.   Skin:     General: Skin is warm and dry.   Neurological:      General: No focal deficit present.      Mental Status: She is alert and oriented to person, place, and time.       Significant Labs: Blood Culture:   Recent Labs   Lab 04/24/23  1432 04/24/23  1500 04/25/23  1813 04/25/23  2101   LABBLOO Gram stain gianfranco bottle: Gram negative rods  Results called to and read back by: Razia Garcia RN 04/25/2023   10:54  Gram stain aer bottle: Gram negative rods  Positive results previously called 04/25/2023  14:10  ESCHERICHIA COLI  Susceptibility pending  * No Growth to date  No Growth to date  No Growth to date No Growth to date  No Growth to date No Growth to date     CBC:   Recent Labs   Lab 04/26/23  0048 04/27/23  0025   WBC 0.32* 1.77*   HGB 11.8* 11.9*   HCT 36.4* 37.1   * 143*     CMP:   Recent Labs   Lab 04/26/23  0048 04/26/23  0505 04/27/23  0025     --  141   K 5.2*  --  4.1   *  --  109   CO2 18*  --  24   *  --  122*   BUN 12  --  11   CREATININE 0.9 0.7 0.7   CALCIUM 8.2*  --  8.6*   ANIONGAP 8  --  8       Significant Imaging:  CT: Collections within the subcutaneous fat along the left greater than right lower quadrants.  These are likely postoperative in nature and most likely represent seromas or evolving hematomas.  Infected collections would be considered less likely.     Colonic diverticulosis.  At the junction of descending and sigmoid colon fat planes are somewhat indistinct some mild fat stranding.  Mild early changes of diverticulitis are not excluded, to be correlated clinically.     Gallbladder stones or sludge.

## 2023-04-27 NOTE — PT/OT/SLP EVAL
Physical Therapy Evaluation and Discharge Note    Patient Name:  Lucia Matias   MRN:  [id number]    Recommendations:     Discharge Recommendations: outpatient PT (oncology PT)  Discharge Equipment Recommendations: none   Barriers to discharge: None    Assessment:     Lucia Matias is a 62 y.o. female admitted with a medical diagnosis of Neutropenic fever. She presents with the following impairments/functional limitations: pain.    Patient evaluated and no acute care PT goals identified. Patient with hip/low back pain that is mostly constant with no significant changes with variable movements and (+) slump test bilaterally. Instructed in importance of gentle physical activity and stretches to muscles for potential low back pain with radicular symptoms including piriformis stretch and trunk trunk extension - pt reports she has been performing hamstring/gluteal stretches already with positive effect.      During this hospitalization, patient does not require further acute PT services.  Please re-consult if situation changes.  Recommendation for d/c to home with OP PT (at [facility] or [facility] for oncology PT).    Recent Surgery: * No surgery found *      Plan:     During this hospitalization, patient does not require further acute PT services.  Please re-consult if situation changes.      Subjective     Chief Complaint: Pain in hips and back  Patient/Family Comments/goals: Goal to have less pain, get her WBC count up; Patient agreeable to evaluation.  Pain/Comfort:  Pain Rating 1:  (reports increased B hip pain, previously radiating down legs but now R sided radiation up to buttocks)  Pain Rating Post-Intervention 1:  (unchanged with mobility, increased with resisted hip flexion and slump test)    Patients cultural, spiritual, Rastafarian conflicts given the current situation: no    Living Environment:  Pt lives with her  in a single story home  Upon discharge,  patient will have assistance from family ( per EMR currently hospitalized for URI).  Prior level of function:  Ambulation: Indep  ADL's: Indep  IADLs: Indep  Previously enjoyed working out/riding her [place] prior to breast ca dx  Falls: Denies  Equipment used at home: none..    Objective:     Communicated with NORMA Valladares prior to session.  Patient found left sidelying with peripheral IV upon PT entry to room.    General Precautions: Standard, contact, neutropenic    Orthopedic Precautions:N/A   Braces: N/A  Respiratory Status: Room air    Exams:  Cognition:   Patient is oriented x4.  Pt follows approximately 100% of one and two step commands.    Mood: Pleasant and cooperative.   Safety Awareness: Good  Musculoskeletal:  Posture:  No gross deviations noted  Special tests:  Slump test: (+) B  LE ROM/Strength:   R ROM: WFL  L ROM: WFL  R Strength:   Hip flexion: 4/5  Knee extension: 4/5, p!  Dorsiflexion: 5/5   L Strength:   Hip flexion: 4/5, p!  Knee extension: 5/5  Dorsiflexion: 5/5   Neuromuscular:  Coordination/Tone/Reflexes: No impairments identified with functional mobility. No formal testing performed.   Balance:   Static sitting: Normal  Static standing: Normal  Dynamic standing: Normal  Visual-vestibular: No impairments identified with functional mobility. No formal testing performed.  Integument:  clearing of rash on face    Functional Mobility:  Bed Mobility:     Supine to Sit: independence  Sit to Supine: independence  Transfers:     Sit to Stand:  independence with no AD  Gait: x40 ft with no AD and (I). No gross gait deviations noted, no antalgia noted.     AM-PAC 6 CLICK MOBILITY  Total Score:23       Treatment and Education:  TE:  Instructed in LE and back stretches including piriformis stretch, glute stretches, trunk extension, trunk rotation with caution to avoid stretch to nerve (no slump stretch, avoiding neuropathic like pain with stretches)  Encouraged frequent OOB mobility and gradual  return to previous physical activity to address low back pain  Instructed if side lying, maintain pillow between legs and to change positions during day frequently    AM-PAC 6 CLICK MOBILITY  Total Score:23     Patient left HOB elevated with all lines intact and call button in reach.    GOALS:   Multidisciplinary Problems       Physical Therapy Goals       Not on file              Multidisciplinary Problems (Resolved)          Problem: Physical Therapy    Goal Priority Disciplines Outcome Goal Variances Interventions   Physical Therapy Goal   (Resolved)     PT, PT/OT Met                         History:     Past Medical History:   Diagnosis Date    Allergy     Anxiety     Arthritis     Atrial flutter     Colon polyps     COPD (chronic obstructive pulmonary disease)     COPD exacerbation 10/31/2022    Depression     Diverticular disease of colon 2017    Diverticulitis     HLD (hyperlipidemia)     Malignant neoplasm of central portion of left breast in female, estrogen receptor positive 2022    Pancreatitis     Psoriasis     Rheumatoid arthritis     Severe obesity (BMI 35.0-39.9) with comorbidity        Past Surgical History:   Procedure Laterality Date    ABLATION  2022    Cardiac Ablation for A Flutter, Successful    ADENOIDECTOMY  1966    Tonsillitis and adnoids    BILATERAL MASTECTOMY Bilateral 2/15/2023    Procedure: MASTECTOMY, BILATERAL;  Surgeon: Marcela Meehan MD;  Location: [facility];  Service: General;  Laterality: Bilateral;  EMAIL SENT  @ 11:44 LK / 2.5 HOURS    BLADDER SUSPENSION      (x2)    BREAST REVISION SURGERY Bilateral 3/29/2023    Procedure: BREAST REVISION SURGERY / BILATERAL BREAST FLAP REVISION;  Surgeon: Ming Milian MD;  Location: [facility];  Service: Plastics;  Laterality: Bilateral;  90 MINUTES     SECTION      (x2)    DEBRIDEMENT, BREAST Bilateral 3/29/2023    Procedure: DEBRIDEMENT, BREAST;  Surgeon: Ming Milian MD;  Location: [facility];  Service:  Plastics;  Laterality: Bilateral;    ESOPHAGOGASTRODUODENOSCOPY N/A 03/31/2021    Procedure: EGD (ESOPHAGOGASTRODUODENOSCOPY);  Surgeon: Vince Rodriguez MD;  Location: [address] FLR;  Service: General;  Laterality: N/A;    INJECTION FOR SENTINEL NODE IDENTIFICATION Left 2/15/2023    Procedure: INJECTION, FOR SENTINEL NODE IDENTIFICATION;  Surgeon: Marcela Meehan MD;  Location: [facility];  Service: General;  Laterality: Left;    LAPAROSCOPIC SLEEVE GASTRECTOMY N/A 03/31/2021    Procedure: GASTRECTOMY, SLEEVE, LAPAROSCOPIC, with intraop EGD;  Surgeon: Vince Rodriguez MD;  Location: [facility] OR 2ND FLR;  Service: General;  Laterality: N/A;    LUNG BIOPSY  09/2020    RECONSTRUCTION OF BREAST WITH DEEP INFERIOR EPIGASTRIC ARTERY  (NEHA) FREE FLAP Bilateral 2/15/2023    Procedure: RECONSTRUCTION, BREAST, USING NEHA FREE FLAP;  Surgeon: Ming Milian MD;  Location: [facility];  Service: Plastics;  Laterality: Bilateral;    RHINOPLASTY      SENTINEL LYMPH NODE BIOPSY Left 2/15/2023    Procedure: BIOPSY, LYMPH NODE, SENTINEL;  Surgeon: Marcela Meehan MD;  Location: [facility];  Service: General;  Laterality: Left;    TONSILLECTOMY      TRANSESOPHAGEAL ECHOCARDIOGRAPHY N/A 11/02/2022    Procedure: ECHOCARDIOGRAM, TRANSESOPHAGEAL;  Surgeon: Kellie Grover MD;  Location: [facility] EP LAB;  Service: Cardiology;  Laterality: N/A;       Time Tracking:     PT Received On: 04/27/23  PT Start Time: 1139     PT Stop Time: 1155  PT Total Time (min): 16 min     Billable Minutes: Evaluation 8 and Therapeutic Exercise 8      04/27/2023

## 2023-04-27 NOTE — ASSESSMENT & PLAN NOTE
Bacteremia with ESBL E.coli - will continue ertapenem for now - no confirmed source at this time.     Obtain blood cultures, add vancomycin for continued fevers.

## 2023-04-27 NOTE — PT/OT/SLP EVAL
Occupational Therapy   Evaluation and Discharge Note    Name: Lucia Matias  MRN: [id number]  Admitting Diagnosis: Neutropenic fever  Recent Surgery: * No surgery found *      Recommendations:     Discharge Recommendations:  (Defer to PT)  Discharge Equipment Recommendations: none  Barriers to discharge:  None    Assessment:     Lucia Matias is a 62 y.o. female with a medical diagnosis of Neutropenic fever. At this time, patient is functioning at their prior level of function and does not require further acute OT services.     Plan:     During this hospitalization, patient does not require further acute OT services.  Please re-consult if situation changes.    Plan of Care Reviewed with: patient    Subjective     Chief Complaint: back pain  Patient/Family Comments/goals: To get better and return home.    Occupational Profile:  Living Environment: Lives with  in [facility], walk-in shower with seat and hand held shower head  Previous level of function: Indep  Roles and Routines: Wife, mother, currently on disability but worked for [employer] at [facility] location  Equipment Used at home:  (Has built-in shower seat and hand held shower head in walk-in shower.)  Assistance upon Discharge:     Pain/Comfort:  Pain Rating 1: 3/10  Location - Orientation 1: lower  Location 1: back  Pain Addressed 1: Reposition (Pt reporting she had taken pain medciation prior to OT session.)  Pain Rating Post-Intervention 1: 3/10    Patients cultural, spiritual, Anglican conflicts given the current situation: no    Objective:     Communicated with: LIANNA Geronimo prior to session.  Patient found HOB elevated with peripheral IV upon OT entry to room.    General Precautions: Standard, contact, neutropenic  Orthopedic Precautions: N/A  Braces: N/A  Respiratory Status: Room air     Occupational Performance:    Bed Mobility:    Patient completed Supine to Sit with independence  Patient completed Sit to Supine with  independence    Functional Mobility/Transfers:  Sit to stand: Indep, no AD  Transfer: Indep, no AD  Functional Mobility: No LOB during turns, no sway, no AD    Activities of Daily Living:  Indep for toileting, dressing, grooming, and feeding. No DME needed for ADL    Cognitive/Visual Perceptual:  Cognitive/Psychosocial Skills:     -Intact; no deficits noted    Physical Exam:  UE Function: AROM is WFL for self care; Strength is 5/5; Coordination is Good  Edema: None noted  Sensation: Light touch intact  Skin: Visible skin intact    AMPAC 6 Click ADL:  AMPAC Total Score: 24    Treatment & Education:  Role of OT, no further skilled OT services needed.    Patient left HOB elevated with all lines intact and call button in reach    GOALS:        Multidisciplinary Problems (Resolved)          Problem: Occupational Therapy    Goal Priority Disciplines Outcome Interventions   Occupational Therapy Goal   (Resolved)     OT, PT/OT Met    Description: Orders received and chart reviewed.  Evaluation complete with pt Indep with ADL and ADL mobility.  No use of AD for mobility and no LOB.  Recommend discharge to home when medically cleared.                       History:     Past Medical History:   Diagnosis Date    Allergy     Anxiety     Arthritis     Atrial flutter     Colon polyps 2016    COPD (chronic obstructive pulmonary disease)     COPD exacerbation 10/31/2022    Depression     Diverticular disease of colon 06/06/2017    Diverticulitis     HLD (hyperlipidemia)     Malignant neoplasm of central portion of left breast in female, estrogen receptor positive 12/29/2022    Pancreatitis     Psoriasis     Rheumatoid arthritis     Severe obesity (BMI 35.0-39.9) with comorbidity          Past Surgical History:   Procedure Laterality Date    ABLATION  11/2022    Cardiac Ablation for A Flutter, Successful    ADENOIDECTOMY  1966    Tonsillitis and adnoids    BILATERAL MASTECTOMY Bilateral 2/15/2023    Procedure: MASTECTOMY, BILATERAL;   Surgeon: Marcela Meehan MD;  Location: [facility];  Service: General;  Laterality: Bilateral;  EMAIL SENT  @ 11:44 LK / 2.5 HOURS    BLADDER SUSPENSION      (x2)    BREAST REVISION SURGERY Bilateral 3/29/2023    Procedure: BREAST REVISION SURGERY / BILATERAL BREAST FLAP REVISION;  Surgeon: Ming Milian MD;  Location: [facility];  Service: Plastics;  Laterality: Bilateral;  90 MINUTES     SECTION      (x2)    DEBRIDEMENT, BREAST Bilateral 3/29/2023    Procedure: DEBRIDEMENT, BREAST;  Surgeon: Ming Milian MD;  Location: [facility];  Service: Plastics;  Laterality: Bilateral;    ESOPHAGOGASTRODUODENOSCOPY N/A 2021    Procedure: EGD (ESOPHAGOGASTRODUODENOSCOPY);  Surgeon: Vince Rodriguez MD;  Location: [address];  Service: General;  Laterality: N/A;    INJECTION FOR SENTINEL NODE IDENTIFICATION Left 2/15/2023    Procedure: INJECTION, FOR SENTINEL NODE IDENTIFICATION;  Surgeon: Marcela Meehan MD;  Location: [facility];  Service: General;  Laterality: Left;    LAPAROSCOPIC SLEEVE GASTRECTOMY N/A 2021    Procedure: GASTRECTOMY, SLEEVE, LAPAROSCOPIC, with intraop EGD;  Surgeon: Vince Rodriguez MD;  Location: [address];  Service: General;  Laterality: N/A;    LUNG BIOPSY  2020    RECONSTRUCTION OF BREAST WITH DEEP INFERIOR EPIGASTRIC ARTERY  (NEHA) FREE FLAP Bilateral 2/15/2023    Procedure: RECONSTRUCTION, BREAST, USING NEHA FREE FLAP;  Surgeon: Ming Milian MD;  Location: [facility];  Service: Plastics;  Laterality: Bilateral;    RHINOPLASTY      SENTINEL LYMPH NODE BIOPSY Left 2/15/2023    Procedure: BIOPSY, LYMPH NODE, SENTINEL;  Surgeon: Marcela Meehan MD;  Location: [facility];  Service: General;  Laterality: Left;    TONSILLECTOMY      TRANSESOPHAGEAL ECHOCARDIOGRAPHY N/A 2022    Procedure: ECHOCARDIOGRAM, TRANSESOPHAGEAL;  Surgeon: Kellie Grover MD;  Location: [facility] EP LAB;  Service: Cardiology;  Laterality: N/A;       Time Tracking:     OT Date  of Treatment: 04/27/23  OT Start Time: 1410  OT Stop Time: 1420  OT Total Time (min): 10 min    Billable Minutes:Evaluation 10    4/27/2023

## 2023-04-27 NOTE — SUBJECTIVE & OBJECTIVE
Interval History: Fever last night. Bone and joint pain, especially in hips and pelvis.     Review of Systems   Constitutional:  Positive for fever.   Gastrointestinal:  Negative for abdominal pain.   Genitourinary:  Negative for difficulty urinating and dysuria.   Musculoskeletal:  Positive for arthralgias and myalgias.   All other systems reviewed and are negative.  Objective:     Vital Signs (Most Recent):  Temp: (!) 100.9 °F (38.3 °C) (04/27/23 0759)  Pulse: (!) 112 (04/27/23 0800)  Resp: 18 (04/27/23 0800)  BP: 113/72 (04/27/23 0759)  SpO2: 97 % (04/27/23 0800)   Vital Signs (24h Range):  Temp:  [97.6 °F (36.4 °C)-100.9 °F (38.3 °C)] 100.9 °F (38.3 °C)  Pulse:  [] 112  Resp:  [13-20] 18  SpO2:  [95 %-97 %] 97 %  BP: (113-163)/(58-74) 113/72     Weight: 78 kg (171 lb 15.3 oz)  Body mass index is 31.45 kg/m².    Estimated Creatinine Clearance: 82.7 mL/min (based on SCr of 0.7 mg/dL).    Physical Exam  Vitals and nursing note reviewed.   Constitutional:       Appearance: Normal appearance.   HENT:      Head: Normocephalic and atraumatic.   Eyes:      Extraocular Movements: Extraocular movements intact.      Conjunctiva/sclera: Conjunctivae normal.      Pupils: Pupils are equal, round, and reactive to light.   Cardiovascular:      Rate and Rhythm: Normal rate and regular rhythm.   Pulmonary:      Effort: Pulmonary effort is normal.      Breath sounds: Normal breath sounds. No rhonchi or rales.   Abdominal:      General: Abdomen is flat.      Palpations: Abdomen is soft.   Musculoskeletal:         General: Normal range of motion.      Right lower leg: No edema.      Left lower leg: No edema.   Skin:     General: Skin is warm and dry.   Neurological:      General: No focal deficit present.      Mental Status: She is alert and oriented to person, place, and time.       Significant Labs: Blood Culture:   Recent Labs   Lab 04/24/23  1432 04/24/23  1500 04/25/23  1813 04/25/23  2101   LABBLOO Gram stain gianfranco bottle:  Gram negative rods  Results called to and read back by: Razia Garcia RN 04/25/2023  10:54  Gram stain aer bottle: Gram negative rods  Positive results previously called 04/25/2023  14:10  ESCHERICHIA COLI  Susceptibility pending  * No Growth to date  No Growth to date  No Growth to date No Growth to date  No Growth to date No Growth to date     CBC:   Recent Labs   Lab 04/26/23  0048 04/27/23  0025   WBC 0.32* 1.77*   HGB 11.8* 11.9*   HCT 36.4* 37.1   * 143*     CMP:   Recent Labs   Lab 04/26/23  0048 04/26/23  0505 04/27/23  0025     --  141   K 5.2*  --  4.1   *  --  109   CO2 18*  --  24   *  --  122*   BUN 12  --  11   CREATININE 0.9 0.7 0.7   CALCIUM 8.2*  --  8.6*   ANIONGAP 8  --  8       Significant Imaging:  CT: Collections within the subcutaneous fat along the left greater than right lower quadrants.  These are likely postoperative in nature and most likely represent seromas or evolving hematomas.  Infected collections would be considered less likely.     Colonic diverticulosis.  At the junction of descending and sigmoid colon fat planes are somewhat indistinct some mild fat stranding.  Mild early changes of diverticulitis are not excluded, to be correlated clinically.     Gallbladder stones or sludge.

## 2023-04-27 NOTE — ASSESSMENT & PLAN NOTE
63 yo female with neutropenic fever, bacteremia with ESBL E.coli, parainfluenza virus infection. Fevers continue, source of bacteremia is unclear. On 50 mg of prednisone for rash - rash is resolved.    Restart vancomycin, continue ertapenem and fluconazole.  Await cultures results. Continue treatment of neutropenia.  Taper steroids rapidly.

## 2023-04-27 NOTE — PLAN OF CARE
Problem: Physical Therapy  Goal: Physical Therapy Goal  Outcome: Met     Patient evaluated and no acute care PT goals identified. Patient with hip/low back pain that is mostly constant with no significant changes with variable movements and (+) slump test bilaterally. Instructed in importance of gentle physical activity and stretches to muscles for potential low back pain with radicular symptoms including piriformis stretch and trunk trunk extension - pt reports she has been performing hamstring/gluteal stretches already with positive effect.     During this hospitalization, patient does not require further acute PT services.  Please re-consult if situation changes.  Recommendation for d/c to home with OP PT (at [facility] or [facility] if possible.

## 2023-04-28 LAB
ANION GAP SERPL CALC-SCNC: 9 MMOL/L (ref 8–16)
BACTERIA SPEC AEROBE CULT: NO GROWTH
BASOPHILS # BLD AUTO: ABNORMAL K/UL (ref 0–0.2)
BASOPHILS NFR BLD: 1 % (ref 0–1.9)
BUN SERPL-MCNC: 13 MG/DL (ref 6–20)
CALCIUM SERPL-MCNC: 8.5 MG/DL (ref 8.7–10.5)
CHLORIDE SERPL-SCNC: 106 MMOL/L (ref 95–110)
CO2 SERPL-SCNC: 24 MMOL/L (ref 23–29)
CREAT SERPL-MCNC: 0.7 MG/DL (ref 0.5–1.4)
DIFFERENTIAL METHOD: ABNORMAL
EOSINOPHIL # BLD AUTO: ABNORMAL K/UL (ref 0–0.5)
EOSINOPHIL NFR BLD: 0 % (ref 0–8)
ERYTHROCYTE [DISTWIDTH] IN BLOOD BY AUTOMATED COUNT: 13.2 % (ref 11.5–14.5)
EST. GFR  (NO RACE VARIABLE): >60 ML/MIN/1.73 M^2
GLUCOSE SERPL-MCNC: 113 MG/DL (ref 70–110)
GRAM STN SPEC: NORMAL
HCT VFR BLD AUTO: 34.1 % (ref 37–48.5)
HGB BLD-MCNC: 11.4 G/DL (ref 12–16)
IMM GRANULOCYTES # BLD AUTO: ABNORMAL K/UL (ref 0–0.04)
IMM GRANULOCYTES NFR BLD AUTO: ABNORMAL % (ref 0–0.5)
LYMPHOCYTES # BLD AUTO: ABNORMAL K/UL (ref 1–4.8)
LYMPHOCYTES NFR BLD: 9 % (ref 18–48)
MCH RBC QN AUTO: 29.2 PG (ref 27–31)
MCHC RBC AUTO-ENTMCNC: 33.4 G/DL (ref 32–36)
MCV RBC AUTO: 87 FL (ref 82–98)
METAMYELOCYTES NFR BLD MANUAL: 5 %
MONOCYTES # BLD AUTO: ABNORMAL K/UL (ref 0.3–1)
MONOCYTES NFR BLD: 12 % (ref 4–15)
MYELOCYTES NFR BLD MANUAL: 4 %
NEUTROPHILS NFR BLD: 62 % (ref 38–73)
NEUTS BAND NFR BLD MANUAL: 7 %
NRBC BLD-RTO: 1 /100 WBC
PLATELET # BLD AUTO: 146 K/UL (ref 150–450)
PLATELET BLD QL SMEAR: ABNORMAL
PMV BLD AUTO: 10.1 FL (ref 9.2–12.9)
POTASSIUM SERPL-SCNC: 4 MMOL/L (ref 3.5–5.1)
RBC # BLD AUTO: 3.91 M/UL (ref 4–5.4)
SODIUM SERPL-SCNC: 139 MMOL/L (ref 136–145)
WBC # BLD AUTO: 8.26 K/UL (ref 3.9–12.7)

## 2023-04-28 PROCEDURE — 25000242 PHARM REV CODE 250 ALT 637 W/ HCPCS: Performed by: NURSE PRACTITIONER

## 2023-04-28 PROCEDURE — 80048 BASIC METABOLIC PNL TOTAL CA: CPT | Performed by: HOSPITALIST

## 2023-04-28 PROCEDURE — 99232 PR SUBSEQUENT HOSPITAL CARE,LEVL II: ICD-10-PCS | Mod: ,,, | Performed by: HOSPITALIST

## 2023-04-28 PROCEDURE — 11000001 HC ACUTE MED/SURG PRIVATE ROOM

## 2023-04-28 PROCEDURE — 63600175 PHARM REV CODE 636 W HCPCS: Performed by: HOSPITALIST

## 2023-04-28 PROCEDURE — 99232 SBSQ HOSP IP/OBS MODERATE 35: CPT | Mod: ,,, | Performed by: HOSPITALIST

## 2023-04-28 PROCEDURE — 25000003 PHARM REV CODE 250: Performed by: HOSPITALIST

## 2023-04-28 PROCEDURE — 85027 COMPLETE CBC AUTOMATED: CPT | Performed by: HOSPITALIST

## 2023-04-28 PROCEDURE — 94640 AIRWAY INHALATION TREATMENT: CPT

## 2023-04-28 PROCEDURE — C1751 CATH, INF, PER/CENT/MIDLINE: HCPCS

## 2023-04-28 PROCEDURE — 63700000 PHARM REV CODE 250 ALT 637 W/O HCPCS: Performed by: HOSPITALIST

## 2023-04-28 PROCEDURE — 25000003 PHARM REV CODE 250: Performed by: NURSE PRACTITIONER

## 2023-04-28 PROCEDURE — 93010 EKG 12-LEAD: ICD-10-PCS | Mod: ,,, | Performed by: INTERNAL MEDICINE

## 2023-04-28 PROCEDURE — 93010 ELECTROCARDIOGRAM REPORT: CPT | Mod: ,,, | Performed by: INTERNAL MEDICINE

## 2023-04-28 PROCEDURE — 76937 US GUIDE VASCULAR ACCESS: CPT

## 2023-04-28 PROCEDURE — 85007 BL SMEAR W/DIFF WBC COUNT: CPT | Performed by: HOSPITALIST

## 2023-04-28 PROCEDURE — 99233 PR SUBSEQUENT HOSPITAL CARE,LEVL III: ICD-10-PCS | Mod: ,,, | Performed by: INTERNAL MEDICINE

## 2023-04-28 PROCEDURE — 93005 ELECTROCARDIOGRAM TRACING: CPT

## 2023-04-28 PROCEDURE — 36415 COLL VENOUS BLD VENIPUNCTURE: CPT | Performed by: HOSPITALIST

## 2023-04-28 PROCEDURE — 94761 N-INVAS EAR/PLS OXIMETRY MLT: CPT

## 2023-04-28 PROCEDURE — 99233 SBSQ HOSP IP/OBS HIGH 50: CPT | Mod: ,,, | Performed by: INTERNAL MEDICINE

## 2023-04-28 PROCEDURE — 36410 VNPNXR 3YR/> PHY/QHP DX/THER: CPT

## 2023-04-28 PROCEDURE — 63600175 PHARM REV CODE 636 W HCPCS: Performed by: NURSE PRACTITIONER

## 2023-04-28 RX ORDER — PREDNISONE 10 MG/1
10 TABLET ORAL DAILY
Status: DISCONTINUED | OUTPATIENT
Start: 2023-04-29 | End: 2023-04-29 | Stop reason: HOSPADM

## 2023-04-28 RX ORDER — METOPROLOL TARTRATE 25 MG/1
25 TABLET, FILM COATED ORAL 2 TIMES DAILY
Status: DISCONTINUED | OUTPATIENT
Start: 2023-04-28 | End: 2023-04-29 | Stop reason: HOSPADM

## 2023-04-28 RX ADMIN — TRIAMCINOLONE ACETONIDE: 1 CREAM TOPICAL at 08:04

## 2023-04-28 RX ADMIN — NEPHROCAP 1 CAPSULE: 1 CAP ORAL at 08:04

## 2023-04-28 RX ADMIN — VANCOMYCIN HYDROCHLORIDE 1000 MG: 1 INJECTION, POWDER, LYOPHILIZED, FOR SOLUTION INTRAVENOUS at 02:04

## 2023-04-28 RX ADMIN — TRAZODONE HYDROCHLORIDE 100 MG: 100 TABLET ORAL at 08:04

## 2023-04-28 RX ADMIN — HYDROMORPHONE HYDROCHLORIDE 1 MG: 1 INJECTION, SOLUTION INTRAMUSCULAR; INTRAVENOUS; SUBCUTANEOUS at 08:04

## 2023-04-28 RX ADMIN — FLUCONAZOLE 200 MG: 100 TABLET ORAL at 08:04

## 2023-04-28 RX ADMIN — VENLAFAXINE HYDROCHLORIDE 75 MG: 37.5 CAPSULE, EXTENDED RELEASE ORAL at 08:04

## 2023-04-28 RX ADMIN — GUAIFENESIN AND DEXTROMETHORPHAN HYDROBROMIDE 1 TABLET: 600; 30 TABLET, EXTENDED RELEASE ORAL at 08:04

## 2023-04-28 RX ADMIN — METOPROLOL TARTRATE 25 MG: 25 TABLET, FILM COATED ORAL at 11:04

## 2023-04-28 RX ADMIN — MUPIROCIN: 20 OINTMENT TOPICAL at 08:04

## 2023-04-28 RX ADMIN — Medication 1 TABLET: at 08:04

## 2023-04-28 RX ADMIN — THERA TABS 1 TABLET: TAB at 08:04

## 2023-04-28 RX ADMIN — PREDNISONE 25 MG: 5 TABLET ORAL at 08:04

## 2023-04-28 RX ADMIN — DIPHENHYDRAMINE HYDROCHLORIDE 25 MG: 25 CAPSULE ORAL at 08:04

## 2023-04-28 RX ADMIN — METOPROLOL TARTRATE 25 MG: 25 TABLET, FILM COATED ORAL at 08:04

## 2023-04-28 RX ADMIN — FLUTICASONE FUROATE AND VILANTEROL TRIFENATATE 1 PUFF: 200; 25 POWDER RESPIRATORY (INHALATION) at 08:04

## 2023-04-28 RX ADMIN — CYANOCOBALAMIN TAB 250 MCG 500 MCG: 250 TAB at 08:04

## 2023-04-28 RX ADMIN — APIXABAN 5 MG: 2.5 TABLET, FILM COATED ORAL at 08:04

## 2023-04-28 RX ADMIN — ERTAPENEM 1 G: 1 INJECTION INTRAMUSCULAR; INTRAVENOUS at 08:04

## 2023-04-28 RX ADMIN — ATORVASTATIN CALCIUM 20 MG: 20 TABLET, FILM COATED ORAL at 08:04

## 2023-04-28 RX ADMIN — HYDROMORPHONE HYDROCHLORIDE 1 MG: 1 INJECTION, SOLUTION INTRAMUSCULAR; INTRAVENOUS; SUBCUTANEOUS at 02:04

## 2023-04-28 RX ADMIN — DIAZEPAM 5 MG: 5 TABLET ORAL at 08:04

## 2023-04-28 RX ADMIN — PANTOPRAZOLE SODIUM 40 MG: 40 TABLET, DELAYED RELEASE ORAL at 08:04

## 2023-04-28 NOTE — ASSESSMENT & PLAN NOTE
61 yo female with neutropenic fever, bacteremia with ESBL E.coli, parainfluenza virus infection. Fevers continue, source of bacteremia is unclear. On 25 mg of prednisone for rash - rash is resolved.    Fevers resolved, WBC normal on 4/28  Blood cultures growing ESBL E.coli.    Continue rapid steroid taper.  Place midline  Stop vancomycin and fluconazole tomorrow if blood cultures negative today  Discharge on ertapenem 1gm IV Q24 for 2 weeks. EOC = 5/9/23

## 2023-04-28 NOTE — ASSESSMENT & PLAN NOTE
"Malignant neoplasm of central portion of left breast in female, estrogen receptor positive  Rash in adult  -history of  recently diagnosed HR+ breast cancer   -s/p bilateral SIEA flap reconstruction (2/15/2023)   -post op path showed Invasive lobular carcinoma in left breast grade 2 measuring 18 mm with LCIS Grade 2 ER TN positive and Her2 negative. pT1c pN0. Oncotype 35    -follows with Dr. Roya Madrid for her oncological care and chemo plan includes "given her Oncotype of 35, we have decided to proceed with adjuvant docetaxel 75mg/m2 and cyclophosphamide 600mg/m2 iv every 21 days for a total of 4 cycles"  -developed rash and fever s/p first chemo cycle  -at admission HDS with TMax 99.2; WBC 0.7, procalcitonin WNL  -blood cultures obtained >>> please follow   -UA pending >>> please follow  -bilateral hip X-rays ordered due to pain >>> please follow   -continue IV ABX with zosyn and vancomycin  -continue supportive care of rash with benadryl  -Oncology consulted  -neutropenic precautions   -PRN supportive care as indicated  -monitor .  Patient has been  stared on broad spectrum IV Abx for neutropenic fever,changed zosyn to ertapenem for bactremaia and resumed back vanc. Fever is improving.  Has leucopenia is setting of chemo. Therapy,oncology is consulted.much improved today.    "

## 2023-04-28 NOTE — ASSESSMENT & PLAN NOTE
Other long term (current) drug therapy  -history of Aflutter s/p ablation  -NSR at current  -continue home apixaban  -EKG PRN concerns   -monitor ,on OAC.

## 2023-04-28 NOTE — PROGRESS NOTES
[facility] ([address])  Infectious Disease  Progress Note    Patient Name: Lucia Matias  MRN: [id number]  Admission Date: 4/24/2023  Length of Stay: 4 days  Attending Physician: Juan Miguel Rubio MD  Primary Care Provider: Hetal Banks MD    Isolation Status: No active isolations  Assessment/Plan:      ID  Infection due to ESBL-producing Escherichia coli  Bacteremia with ESBL E.coli - no confirmed source at this time. Fevers resolved, neutropenia resolved.     Place midline for IV access at home.  Continue ertapenem 1 gm IV Q24 for 2 weeks total - EOC 5/9/23    Oncology  * Neutropenic fever  63 yo female with neutropenic fever, bacteremia with ESBL E.coli, parainfluenza virus infection. Fevers continue, source of bacteremia is unclear. On 25 mg of prednisone for rash - rash is resolved.    Fevers resolved, WBC normal on 4/28  Blood cultures growing ESBL E.coli.    Continue rapid steroid taper.  Place midline  Stop vancomycin and fluconazole tomorrow if blood cultures negative today  Discharge on ertapenem 1gm IV Q24 for 2 weeks. EOC = 5/9/23              Thank you for your consult. I will sign off. Please contact us if you have any additional questions.    Joo Harden MD  Infectious Disease  [facility] ([address])    Subjective:     Principal Problem:Neutropenic fever    HPI: 63 yo female with history of ESBL UTI, urinary retention, s/p bladder lifts x2, recent treatement for breast cancer, who presents with neutropenia, cough, and ESBL E.coli bacteremia.     She presented with fever, hip pain, and cough on 4/24. She was found to have neutropenia, and placed on IV antibiotics. Her respiratory panel showed parainfluenza virus 3, and blood cultures grew ESBL E.coli. I am consulted for antibiotic management. She has started Udenyca for her neutropenia.    Urine cultures in August and May 2022 grew ESBL E.coli. She states that she has incomplete voiding, but no dysuria, no  abdominal pain. She had two bladder lifts in the past.     Interval History: Afebrile. No change in cultures. WBC improved.    Review of Systems   Constitutional:  Negative for fever.   Respiratory:  Positive for cough.    Gastrointestinal:  Negative for abdominal pain.   Genitourinary:  Negative for difficulty urinating and dysuria.   Musculoskeletal:  Negative for arthralgias and myalgias.   All other systems reviewed and are negative.  Objective:     Vital Signs (Most Recent):  Temp: 98.6 °F (37 °C) (04/28/23 0806)  Pulse: (!) 172 (04/28/23 0806)  Resp: 18 (04/28/23 0825)  BP: 134/60 (04/28/23 0806)  SpO2: (!) 94 % (04/28/23 0806)   Vital Signs (24h Range):  Temp:  [98.1 °F (36.7 °C)-100.1 °F (37.8 °C)] 98.6 °F (37 °C)  Pulse:  [] 172  Resp:  [16-18] 18  SpO2:  [92 %-96 %] 94 %  BP: (116-141)/(57-65) 134/60     Weight: 78 kg (171 lb 15.3 oz)  Body mass index is 31.45 kg/m².    Estimated Creatinine Clearance: 82.7 mL/min (based on SCr of 0.7 mg/dL).    Physical Exam  Vitals and nursing note reviewed.   Constitutional:       Appearance: Normal appearance.   HENT:      Head: Normocephalic and atraumatic.   Eyes:      Extraocular Movements: Extraocular movements intact.      Conjunctiva/sclera: Conjunctivae normal.      Pupils: Pupils are equal, round, and reactive to light.   Cardiovascular:      Rate and Rhythm: Normal rate and regular rhythm.   Pulmonary:      Effort: Pulmonary effort is normal.      Breath sounds: Normal breath sounds. No rhonchi or rales.   Abdominal:      General: Abdomen is flat.      Palpations: Abdomen is soft.   Musculoskeletal:         General: Normal range of motion.      Right lower leg: No edema.      Left lower leg: No edema.   Skin:     General: Skin is warm and dry.   Neurological:      General: No focal deficit present.      Mental Status: She is alert and oriented to person, place, and time.       Significant Labs: Blood Culture:   Recent Labs   Lab 04/24/23  1432  04/24/23  1500 04/25/23  1813 04/25/23  2101   LABBLOO Gram stain gianfranco bottle: Gram negative rods  Results called to and read back by: Razia Garcia RN 04/25/2023  10:54  Gram stain aer bottle: Gram negative rods  Positive results previously called 04/25/2023  14:10  ESCHERICHIA COLI ESBL* No Growth to date  No Growth to date  No Growth to date  No Growth to date No Growth to date  No Growth to date  No Growth to date No Growth to date  No Growth to date     CBC:   Recent Labs   Lab 04/27/23  0025 04/28/23  0036   WBC 1.77* 8.26   HGB 11.9* 11.4*   HCT 37.1 34.1*   * 146*     CMP:   Recent Labs   Lab 04/27/23 0025 04/28/23 0036    139   K 4.1 4.0    106   CO2 24 24   * 113*   BUN 11 13   CREATININE 0.7 0.7   CALCIUM 8.6* 8.5*   ANIONGAP 8 9       Significant Imaging: I have reviewed all pertinent imaging results/findings within the past 24 hours.

## 2023-04-28 NOTE — PROGRESS NOTES
Active pharmacy to dose vancomycin consult:    Patient reviewed, renal function reviewed, cultures reviewed, no new levels, continue current therapy; Next levels due: 4/29/23 @ 2212    Please contact inpatient pharmacy with any questions: [phone]  Thanks, Milka Montgomery PharmD

## 2023-04-28 NOTE — CARE UPDATE
04/28/23 0859   Patient Assessment/Suction   Level of Consciousness (AVPU) alert   Respiratory Effort Normal;Unlabored   All Lung Fields Breath Sounds clear   PRE-TX-O2   Device (Oxygen Therapy) room air   SpO2 98 %   Pulse Oximetry Type Intermittent   $ Pulse Oximetry - Multiple Charge Pulse Oximetry - Multiple   Pulse (!) 177   Resp 16   Inhaler   $ Inhaler Charges MDI (Metered Dose Inahler) Treatment;Mouth rinsed post treatment   Daily Review of Necessity (Inhaler) completed   Respiratory Treatment Status (Inhaler) given   Treatment Route (Inhaler) mouthpiece   Patient Position (Inhaler) HOB elevated   Post Treatment Assessment (Inhaler) breath sounds unchanged   Signs of Intolerance (Inhaler) none

## 2023-04-28 NOTE — SUBJECTIVE & OBJECTIVE
Interval History: still had fever,    Review of Systems   Constitutional:  Positive for fever.   Gastrointestinal:  Negative for abdominal pain.   Genitourinary:  Negative for difficulty urinating and dysuria.   Musculoskeletal:  Positive for arthralgias and myalgias.   All other systems reviewed and are negative.  Objective:     Vital Signs (Most Recent):  Temp: 98.6 °F (37 °C) (04/28/23 0806)  Pulse: (!) 177 (04/28/23 0859)  Resp: 16 (04/28/23 0859)  BP: 134/60 (04/28/23 0806)  SpO2: 98 % (04/28/23 0859)   Vital Signs (24h Range):  Temp:  [98.1 °F (36.7 °C)-100.1 °F (37.8 °C)] 98.6 °F (37 °C)  Pulse:  [] 177  Resp:  [16-18] 16  SpO2:  [92 %-98 %] 98 %  BP: (116-141)/(57-65) 134/60     Weight: 78 kg (171 lb 15.3 oz)  Body mass index is 31.45 kg/m².    Estimated Creatinine Clearance: 82.7 mL/min (based on SCr of 0.7 mg/dL).    Physical Exam  Vitals and nursing note reviewed.   Constitutional:       Appearance: Normal appearance.   HENT:      Head: Normocephalic and atraumatic.   Eyes:      Extraocular Movements: Extraocular movements intact.      Conjunctiva/sclera: Conjunctivae normal.      Pupils: Pupils are equal, round, and reactive to light.   Cardiovascular:      Rate and Rhythm: Normal rate and regular rhythm.   Pulmonary:      Effort: Pulmonary effort is normal.      Breath sounds: Normal breath sounds. No rhonchi or rales.   Abdominal:      General: Abdomen is flat.      Palpations: Abdomen is soft.   Musculoskeletal:         General: Normal range of motion.      Right lower leg: No edema.      Left lower leg: No edema.   Skin:     General: Skin is warm and dry.   Neurological:      General: No focal deficit present.      Mental Status: She is alert and oriented to person, place, and time.       Significant Labs: Blood Culture:   Recent Labs   Lab 04/24/23  1432 04/24/23  1500 04/25/23  1813 04/25/23  2101   LABBLOO Gram stain gianfranco bottle: Gram negative rods  Results called to and read back by: Razia  Radha GREEN 04/25/2023  10:54  Gram stain aer bottle: Gram negative rods  Positive results previously called 04/25/2023  14:10  ESCHERICHIA COLI ESBL* No Growth to date  No Growth to date  No Growth to date  No Growth to date No Growth to date  No Growth to date  No Growth to date No Growth to date  No Growth to date       CBC:   Recent Labs   Lab 04/27/23 0025 04/28/23  0036   WBC 1.77* 8.26   HGB 11.9* 11.4*   HCT 37.1 34.1*   * 146*       CMP:   Recent Labs   Lab 04/27/23 0025 04/28/23 0036    139   K 4.1 4.0    106   CO2 24 24   * 113*   BUN 11 13   CREATININE 0.7 0.7   CALCIUM 8.6* 8.5*   ANIONGAP 8 9         Significant Imaging:  CT: Collections within the subcutaneous fat along the left greater than right lower quadrants.  These are likely postoperative in nature and most likely represent seromas or evolving hematomas.  Infected collections would be considered less likely.     Colonic diverticulosis.  At the junction of descending and sigmoid colon fat planes are somewhat indistinct some mild fat stranding.  Mild early changes of diverticulitis are not excluded, to be correlated clinically.     Gallbladder stones or sludge.

## 2023-04-28 NOTE — PROGRESS NOTES
Notified Dr Rubio about the pt's elevated [id number]. STAT EKG  and Metoprolol 25mg BID ordered. Metoprolol 25mg given.

## 2023-04-28 NOTE — ASSESSMENT & PLAN NOTE
Blood culture growing ESBL, EColi,likely duo to past Hx of UTI ESBL,started on Invanz,consulted ID,did CT abdomen,pelvic.need Urodynamic test by Urology as out patient to R/O urinary retention.  CT show no abscess,  Still has fever,CT abdomen show no abscess,ID restarted vanc.also has parainfluenza,  placing mid line today.

## 2023-04-28 NOTE — PROGRESS NOTES
"[facility] Surg [address] Medicine  Progress Note    Patient Name: Lucia Matias  MRN: [id number]  Patient Class: IP- Inpatient   Admission Date: 4/24/2023  Length of Stay: 4 days  Attending Physician: Juan Miguel Rubio MD  Primary Care Provider: Hetal Banks MD        Subjective:     Principal Problem:Neutropenic fever        HPI:  Ms. Matias is a 62 YOF with PMHx of HTN, HLD, depression, COPD, A-flutter s/p ablation on OAC (11/2022), h/o sleeve gastrectomy, recently diagnosed HR+ breast cancer s/p bilateral SIEA flap reconstruction (2/15/2023) and psoriasis (holding cosentyx while on chemotherapy).    Per  note "presented to [facility] ED with chief complaint for bilateral hip pain and rash on face, neck and scalp. She reports that the hip pain started around 1 AM the morning prior to ED arrival. She started her first round of chemotherapy on Wednesday, 4/19/23, and received the Udenyca injection the following day. She also mentions constipation, chills and low grade fever with temp 100.1 the morning prior to ED arrival. She also notes a rash on her neck, face and scalp that started 2 days ago." She endorses some nausea. She also reports that her  was seen in an OSH for COPD exacerbation just prior to her symptom development. She denies cough, light headiness, dizziness, syncope, chest pain, shortness of breath, palpitations, weight loss, vomiting or diarrhea.     In the ED she was HDS, TMax 99.2. CBC with WBC 0.7, H/H stable. Chemistry stable. Blood cultures obtained. She was treated with Vanc/zosyn and benadryl for rash. She was transferred to [facility] Medicine Service for further evaluation and management.        Overview/Hospital Course:  Ms. Matias is a 62 YOF with PMHx of HTN, HLD, depression, COPD, A-flutter s/p ablation on OAC (11/2022), h/o sleeve gastrectomy, recently diagnosed HR+ breast cancer s/p bilateral SIEA flap reconstruction " "(2/15/2023) and psoriasis (holding cosentyx while on chemotherapy).  Per  note "presented to [facility] ED with chief complaint for bilateral hip pain and rash on face, neck and scalp. She reports that the hip pain started around 1 AM the morning prior to ED arrival. She started her first round of chemotherapy on Wednesday, 4/19/23, and received the Udenyca injection the following day. She also mentions constipation, chills and low grade fever with temp 100.1 the morning prior to ED arrival. She also notes a rash on her neck, face and scalp that started 2 days ago." She endorses some nausea. She also reports that her  was seen in an OSH for COPD exacerbation just prior to her symptom development. She denies cough, light headiness, dizziness, syncope, chest pain, shortness of breath, palpitations, weight loss, vomiting or diarrhea.   Patient has been  stared on broad spectrum IV Abx for neutropenic fever.  Has leucopenia is setting of chemo. Therapy,oncology is consulted.today leucopenia is much improved.  Blood culture growing ESBL, EColi,likely duo to past Hx of UTI ESBL,started on Invanz,consulted ID, do CT abdomen,pelvic.need Urodynamic test by Urology as out patient to R/O urinary retention.  Still had fever,CT abdomen show no abscess,ID restarted back on vanc.  Rash is improving taper prednisone.  Has parainfluenza with cough,started already on muccinex.  Repeat blood culture is negative,placing mid line today.        Interval History: still had fever,    Review of Systems   Constitutional:  Positive for fever.   Gastrointestinal:  Negative for abdominal pain.   Genitourinary:  Negative for difficulty urinating and dysuria.   Musculoskeletal:  Positive for arthralgias and myalgias.   All other systems reviewed and are negative.  Objective:     Vital Signs (Most Recent):  Temp: 98.6 °F (37 °C) (04/28/23 0806)  Pulse: (!) 177 (04/28/23 0859)  Resp: 16 (04/28/23 0859)  BP: 134/60 (04/28/23 " 0806)  SpO2: 98 % (04/28/23 0859)   Vital Signs (24h Range):  Temp:  [98.1 °F (36.7 °C)-100.1 °F (37.8 °C)] 98.6 °F (37 °C)  Pulse:  [] 177  Resp:  [16-18] 16  SpO2:  [92 %-98 %] 98 %  BP: (116-141)/(57-65) 134/60     Weight: 78 kg (171 lb 15.3 oz)  Body mass index is 31.45 kg/m².    Estimated Creatinine Clearance: 82.7 mL/min (based on SCr of 0.7 mg/dL).    Physical Exam  Vitals and nursing note reviewed.   Constitutional:       Appearance: Normal appearance.   HENT:      Head: Normocephalic and atraumatic.   Eyes:      Extraocular Movements: Extraocular movements intact.      Conjunctiva/sclera: Conjunctivae normal.      Pupils: Pupils are equal, round, and reactive to light.   Cardiovascular:      Rate and Rhythm: Normal rate and regular rhythm.   Pulmonary:      Effort: Pulmonary effort is normal.      Breath sounds: Normal breath sounds. No rhonchi or rales.   Abdominal:      General: Abdomen is flat.      Palpations: Abdomen is soft.   Musculoskeletal:         General: Normal range of motion.      Right lower leg: No edema.      Left lower leg: No edema.   Skin:     General: Skin is warm and dry.   Neurological:      General: No focal deficit present.      Mental Status: She is alert and oriented to person, place, and time.       Significant Labs: Blood Culture:   Recent Labs   Lab 04/24/23  1432 04/24/23  1500 04/25/23  1813 04/25/23  2101   LABBLOO Gram stain gianfranco bottle: Gram negative rods  Results called to and read back by: Razia Garcia RN 04/25/2023  10:54  Gram stain aer bottle: Gram negative rods  Positive results previously called 04/25/2023  14:10  ESCHERICHIA COLI ESBL* No Growth to date  No Growth to date  No Growth to date  No Growth to date No Growth to date  No Growth to date  No Growth to date No Growth to date  No Growth to date       CBC:   Recent Labs   Lab 04/27/23  0025 04/28/23  0036   WBC 1.77* 8.26   HGB 11.9* 11.4*   HCT 37.1 34.1*   * 146*       CMP:   Recent  "Labs   Lab 04/27/23  0025 04/28/23  0036    139   K 4.1 4.0    106   CO2 24 24   * 113*   BUN 11 13   CREATININE 0.7 0.7   CALCIUM 8.6* 8.5*   ANIONGAP 8 9         Significant Imaging:  CT: Collections within the subcutaneous fat along the left greater than right lower quadrants.  These are likely postoperative in nature and most likely represent seromas or evolving hematomas.  Infected collections would be considered less likely.     Colonic diverticulosis.  At the junction of descending and sigmoid colon fat planes are somewhat indistinct some mild fat stranding.  Mild early changes of diverticulitis are not excluded, to be correlated clinically.     Gallbladder stones or sludge.      Assessment/Plan:      * Neutropenic fever  Malignant neoplasm of central portion of left breast in female, estrogen receptor positive  Rash in adult  -history of  recently diagnosed HR+ breast cancer   -s/p bilateral SIEA flap reconstruction (2/15/2023)   -post op path showed Invasive lobular carcinoma in left breast grade 2 measuring 18 mm with LCIS Grade 2 ER CT positive and Her2 negative. pT1c pN0. Oncotype 35    -follows with Dr. Roya Madrid for her oncological care and chemo plan includes "given her Oncotype of 35, we have decided to proceed with adjuvant docetaxel 75mg/m2 and cyclophosphamide 600mg/m2 iv every 21 days for a total of 4 cycles"  -developed rash and fever s/p first chemo cycle  -at admission HDS with TMax 99.2; WBC 0.7, procalcitonin WNL  -blood cultures obtained >>> please follow   -UA pending >>> please follow  -bilateral hip X-rays ordered due to pain >>> please follow   -continue IV ABX with zosyn and vancomycin  -continue supportive care of rash with benadryl  -Oncology consulted  -neutropenic precautions   -PRN supportive care as indicated  -monitor .  Patient has been  stared on broad spectrum IV Abx for neutropenic fever,changed zosyn to ertapenem for bactremaia and resumed back vanc. " Fever is improving.  Has leucopenia is setting of chemo. Therapy,oncology is consulted.much improved today.      Infection due to parainfluenza virus 3  supportive care,prn Tylenol.      Infection due to ESBL-producing Escherichia coli  On IV Ertanepem.      Bacteremia, escherichia coli  Blood culture growing ESBL, EColi,likely duo to past Hx of UTI ESBL,started on Invanz,consulted ID,did CT abdomen,pelvic.need Urodynamic test by Urology as out patient to R/O urinary retention.  CT show no abscess,  Still has fever,CT abdomen show no abscess,ID restarted vanc.also has parainfluenza,  placing mid line today.        Rash in adult  Started on kenalog and prednisone.improving,taper steroids.      Psoriatic arthritis  On cosentex at home.      Atrial flutter  Other long term (current) drug therapy  -history of Aflutter s/p ablation  -NSR at current  -continue home apixaban  -EKG PRN concerns   -monitor ,on OAC.    Essential hypertension  -chronic, controlled  -no home antihypertensive therapies  -dose/medication adjustment as appropriate   -monitor     Other long term (current) drug therapy  On chemo.       Depression with anxiety  -chronic  -controlled  -continue home PRN diazepam, PRN trazodone, and daily effexor  -monitor     COPD (chronic obstructive pulmonary disease)  -chronic, controlled at current  -no evidence of exacerbation  -continue home inhaler per pharmacy formulary alternative  -monitor       VTE Risk Mitigation (From admission, onward)         Ordered     apixaban tablet 5 mg  2 times daily         04/24/23 1952     IP VTE HIGH RISK PATIENT  Once         04/24/23 1952     Place sequential compression device  Until discontinued         04/24/23 1952     Reason for No Pharmacological VTE Prophylaxis  Once        Question:  Reasons:  Answer:  Already adequately anticoagulated on oral Anticoagulants    04/24/23 1952                Discharge Planning   LESTER:      Code Status: Full Code   Is the patient  medically ready for discharge?:     Reason for patient still in hospital (select all that apply): Patient trending condition  Discharge Plan A: Home with family                  Juan Miguel Rubio MD  Department of Hospital Medicine   [facility] Surg ([address])

## 2023-04-28 NOTE — ASSESSMENT & PLAN NOTE
Bacteremia with ESBL E.coli - no confirmed source at this time. Fevers resolved, neutropenia resolved.     Place midline for IV access at home.  Continue ertapenem 1 gm IV Q24 for 2 weeks total - EOC 5/9/23

## 2023-04-28 NOTE — SUBJECTIVE & OBJECTIVE
Interval History: Afebrile. No change in cultures. WBC improved.    Review of Systems   Constitutional:  Negative for fever.   Respiratory:  Positive for cough.    Gastrointestinal:  Negative for abdominal pain.   Genitourinary:  Negative for difficulty urinating and dysuria.   Musculoskeletal:  Negative for arthralgias and myalgias.   All other systems reviewed and are negative.  Objective:     Vital Signs (Most Recent):  Temp: 98.6 °F (37 °C) (04/28/23 0806)  Pulse: (!) 172 (04/28/23 0806)  Resp: 18 (04/28/23 0825)  BP: 134/60 (04/28/23 0806)  SpO2: (!) 94 % (04/28/23 0806)   Vital Signs (24h Range):  Temp:  [98.1 °F (36.7 °C)-100.1 °F (37.8 °C)] 98.6 °F (37 °C)  Pulse:  [] 172  Resp:  [16-18] 18  SpO2:  [92 %-96 %] 94 %  BP: (116-141)/(57-65) 134/60     Weight: 78 kg (171 lb 15.3 oz)  Body mass index is 31.45 kg/m².    Estimated Creatinine Clearance: 82.7 mL/min (based on SCr of 0.7 mg/dL).    Physical Exam  Vitals and nursing note reviewed.   Constitutional:       Appearance: Normal appearance.   HENT:      Head: Normocephalic and atraumatic.   Eyes:      Extraocular Movements: Extraocular movements intact.      Conjunctiva/sclera: Conjunctivae normal.      Pupils: Pupils are equal, round, and reactive to light.   Cardiovascular:      Rate and Rhythm: Normal rate and regular rhythm.   Pulmonary:      Effort: Pulmonary effort is normal.      Breath sounds: Normal breath sounds. No rhonchi or rales.   Abdominal:      General: Abdomen is flat.      Palpations: Abdomen is soft.   Musculoskeletal:         General: Normal range of motion.      Right lower leg: No edema.      Left lower leg: No edema.   Skin:     General: Skin is warm and dry.   Neurological:      General: No focal deficit present.      Mental Status: She is alert and oriented to person, place, and time.       Significant Labs: Blood Culture:   Recent Labs   Lab 04/24/23  1432 04/24/23  1500 04/25/23  1813 04/25/23  2101   LABBLOO Gram stain gianfranco  bottle: Gram negative rods  Results called to and read back by: Razia Garcia RN 04/25/2023  10:54  Gram stain aer bottle: Gram negative rods  Positive results previously called 04/25/2023  14:10  ESCHERICHIA COLI ESBL* No Growth to date  No Growth to date  No Growth to date  No Growth to date No Growth to date  No Growth to date  No Growth to date No Growth to date  No Growth to date     CBC:   Recent Labs   Lab 04/27/23 0025 04/28/23 0036   WBC 1.77* 8.26   HGB 11.9* 11.4*   HCT 37.1 34.1*   * 146*     CMP:   Recent Labs   Lab 04/27/23 0025 04/28/23 0036    139   K 4.1 4.0    106   CO2 24 24   * 113*   BUN 11 13   CREATININE 0.7 0.7   CALCIUM 8.6* 8.5*   ANIONGAP 8 9       Significant Imaging: I have reviewed all pertinent imaging results/findings within the past 24 hours.

## 2023-04-29 VITALS
HEIGHT: 62 IN | RESPIRATION RATE: 18 BRPM | OXYGEN SATURATION: 91 % | HEART RATE: 93 BPM | BODY MASS INDEX: 31.64 KG/M2 | DIASTOLIC BLOOD PRESSURE: 69 MMHG | SYSTOLIC BLOOD PRESSURE: 145 MMHG | TEMPERATURE: 98 F | WEIGHT: 171.94 LBS

## 2023-04-29 PROBLEM — B34.8 INFECTION DUE TO PARAINFLUENZA VIRUS 3: Status: RESOLVED | Noted: 2023-04-27 | Resolved: 2023-04-29

## 2023-04-29 PROBLEM — D70.9 NEUTROPENIC FEVER: Status: RESOLVED | Noted: 2023-04-24 | Resolved: 2023-04-29

## 2023-04-29 PROBLEM — R21 RASH IN ADULT: Status: RESOLVED | Noted: 2023-04-24 | Resolved: 2023-04-29

## 2023-04-29 PROBLEM — R50.81 NEUTROPENIC FEVER: Status: RESOLVED | Noted: 2023-04-24 | Resolved: 2023-04-29

## 2023-04-29 LAB
ANION GAP SERPL CALC-SCNC: 11 MMOL/L (ref 8–16)
BACTERIA BLD CULT: NORMAL
BASOPHILS # BLD AUTO: ABNORMAL K/UL (ref 0–0.2)
BASOPHILS NFR BLD: 0 % (ref 0–1.9)
BUN SERPL-MCNC: 13 MG/DL (ref 6–20)
CALCIUM SERPL-MCNC: 8.4 MG/DL (ref 8.7–10.5)
CHLORIDE SERPL-SCNC: 104 MMOL/L (ref 95–110)
CO2 SERPL-SCNC: 25 MMOL/L (ref 23–29)
CREAT SERPL-MCNC: 0.7 MG/DL (ref 0.5–1.4)
DIFFERENTIAL METHOD: ABNORMAL
EOSINOPHIL # BLD AUTO: ABNORMAL K/UL (ref 0–0.5)
EOSINOPHIL NFR BLD: 0 % (ref 0–8)
ERYTHROCYTE [DISTWIDTH] IN BLOOD BY AUTOMATED COUNT: 13.3 % (ref 11.5–14.5)
EST. GFR  (NO RACE VARIABLE): >60 ML/MIN/1.73 M^2
GLUCOSE SERPL-MCNC: 97 MG/DL (ref 70–110)
HCT VFR BLD AUTO: 35 % (ref 37–48.5)
HGB BLD-MCNC: 11.7 G/DL (ref 12–16)
IMM GRANULOCYTES # BLD AUTO: ABNORMAL K/UL (ref 0–0.04)
IMM GRANULOCYTES NFR BLD AUTO: ABNORMAL % (ref 0–0.5)
LYMPHOCYTES # BLD AUTO: ABNORMAL K/UL (ref 1–4.8)
LYMPHOCYTES NFR BLD: 4 % (ref 18–48)
MCH RBC QN AUTO: 29.5 PG (ref 27–31)
MCHC RBC AUTO-ENTMCNC: 33.4 G/DL (ref 32–36)
MCV RBC AUTO: 88 FL (ref 82–98)
METAMYELOCYTES NFR BLD MANUAL: 5 %
MONOCYTES # BLD AUTO: ABNORMAL K/UL (ref 0.3–1)
MONOCYTES NFR BLD: 7 % (ref 4–15)
MYELOCYTES NFR BLD MANUAL: 4 %
NEUTROPHILS NFR BLD: 72 % (ref 38–73)
NEUTS BAND NFR BLD MANUAL: 8 %
NRBC BLD-RTO: 1 /100 WBC
PLATELET # BLD AUTO: 186 K/UL (ref 150–450)
PLATELET BLD QL SMEAR: ABNORMAL
PMV BLD AUTO: 10.3 FL (ref 9.2–12.9)
POTASSIUM SERPL-SCNC: 3.6 MMOL/L (ref 3.5–5.1)
RBC # BLD AUTO: 3.97 M/UL (ref 4–5.4)
SODIUM SERPL-SCNC: 140 MMOL/L (ref 136–145)
WBC # BLD AUTO: 17.15 K/UL (ref 3.9–12.7)

## 2023-04-29 PROCEDURE — 25000003 PHARM REV CODE 250: Performed by: HOSPITALIST

## 2023-04-29 PROCEDURE — 99239 HOSP IP/OBS DSCHRG MGMT >30: CPT | Mod: ,,, | Performed by: INTERNAL MEDICINE

## 2023-04-29 PROCEDURE — 85027 COMPLETE CBC AUTOMATED: CPT | Performed by: HOSPITALIST

## 2023-04-29 PROCEDURE — 63600175 PHARM REV CODE 636 W HCPCS: Performed by: HOSPITALIST

## 2023-04-29 PROCEDURE — 25000003 PHARM REV CODE 250: Performed by: NURSE PRACTITIONER

## 2023-04-29 PROCEDURE — 85007 BL SMEAR W/DIFF WBC COUNT: CPT | Performed by: HOSPITALIST

## 2023-04-29 PROCEDURE — 36415 COLL VENOUS BLD VENIPUNCTURE: CPT | Performed by: HOSPITALIST

## 2023-04-29 PROCEDURE — 25000003 PHARM REV CODE 250: Performed by: INTERNAL MEDICINE

## 2023-04-29 PROCEDURE — 63700000 PHARM REV CODE 250 ALT 637 W/O HCPCS: Performed by: HOSPITALIST

## 2023-04-29 PROCEDURE — 63600175 PHARM REV CODE 636 W HCPCS: Performed by: INTERNAL MEDICINE

## 2023-04-29 PROCEDURE — 99239 PR HOSPITAL DISCHARGE DAY,>30 MIN: ICD-10-PCS | Mod: ,,, | Performed by: INTERNAL MEDICINE

## 2023-04-29 PROCEDURE — 80048 BASIC METABOLIC PNL TOTAL CA: CPT | Performed by: HOSPITALIST

## 2023-04-29 PROCEDURE — 94640 AIRWAY INHALATION TREATMENT: CPT

## 2023-04-29 PROCEDURE — 94761 N-INVAS EAR/PLS OXIMETRY MLT: CPT

## 2023-04-29 RX ORDER — METOPROLOL TARTRATE 25 MG/1
25 TABLET, FILM COATED ORAL 2 TIMES DAILY
Qty: 60 TABLET | Refills: 11 | Status: ACTIVE | OUTPATIENT
Start: 2023-04-29 | End: 2023-06-21

## 2023-04-29 RX ORDER — TRAMADOL HYDROCHLORIDE 50 MG/1
50 TABLET ORAL EVERY 6 HOURS
Qty: 28 TABLET | Refills: 0 | Status: SHIPPED | OUTPATIENT
Start: 2023-04-29 | End: 2023-05-06

## 2023-04-29 RX ORDER — TRIAMCINOLONE ACETONIDE 1 MG/G
CREAM TOPICAL 2 TIMES DAILY
Qty: 80 G | Refills: 0 | Status: ACTIVE | OUTPATIENT
Start: 2023-04-29 | End: 2023-06-30

## 2023-04-29 RX ADMIN — FLUCONAZOLE 200 MG: 100 TABLET ORAL at 08:04

## 2023-04-29 RX ADMIN — OXYCODONE HYDROCHLORIDE AND ACETAMINOPHEN 1 TABLET: 5; 325 TABLET ORAL at 08:04

## 2023-04-29 RX ADMIN — METOPROLOL TARTRATE 25 MG: 25 TABLET, FILM COATED ORAL at 08:04

## 2023-04-29 RX ADMIN — GUAIFENESIN AND DEXTROMETHORPHAN HYDROBROMIDE 1 TABLET: 600; 30 TABLET, EXTENDED RELEASE ORAL at 08:04

## 2023-04-29 RX ADMIN — THERA TABS 1 TABLET: TAB at 08:04

## 2023-04-29 RX ADMIN — VANCOMYCIN HYDROCHLORIDE 1000 MG: 1 INJECTION, POWDER, LYOPHILIZED, FOR SOLUTION INTRAVENOUS at 02:04

## 2023-04-29 RX ADMIN — FLUTICASONE FUROATE AND VILANTEROL TRIFENATATE 1 PUFF: 200; 25 POWDER RESPIRATORY (INHALATION) at 08:04

## 2023-04-29 RX ADMIN — APIXABAN 5 MG: 2.5 TABLET, FILM COATED ORAL at 08:04

## 2023-04-29 RX ADMIN — PREDNISONE 10 MG: 10 TABLET ORAL at 08:04

## 2023-04-29 RX ADMIN — MUPIROCIN: 20 OINTMENT TOPICAL at 08:04

## 2023-04-29 RX ADMIN — ERTAPENEM 1 G: 1 INJECTION INTRAMUSCULAR; INTRAVENOUS at 01:04

## 2023-04-29 RX ADMIN — Medication 1 TABLET: at 08:04

## 2023-04-29 RX ADMIN — NEPHROCAP 1 CAPSULE: 1 CAP ORAL at 08:04

## 2023-04-29 RX ADMIN — CYANOCOBALAMIN TAB 250 MCG 500 MCG: 250 TAB at 08:04

## 2023-04-29 RX ADMIN — VENLAFAXINE HYDROCHLORIDE 75 MG: 37.5 CAPSULE, EXTENDED RELEASE ORAL at 08:04

## 2023-04-29 RX ADMIN — TRIAMCINOLONE ACETONIDE: 1 CREAM TOPICAL at 08:04

## 2023-04-29 RX ADMIN — PANTOPRAZOLE SODIUM 40 MG: 40 TABLET, DELAYED RELEASE ORAL at 08:04

## 2023-04-29 NOTE — PLAN OF CARE
Free from falls, injury, or skin breakdown this hospital admission.  Pt eager & in agreement w/ DC. VU of DC instructions and the need to attend follow-up appointment--paperwork & pain prescription passed & explained. IV removed w/ cath tip intact, WNL. R midline WNL.Voiding, ambulating, & tolerating PO well.  To be DCd home w/ family--will be escorted downstairs via  transport team once dressed, ready & ride arrives. Pt discharged in no distress.

## 2023-04-29 NOTE — DISCHARGE SUMMARY
"[facility] Surg ([address] Medicine  Discharge Summary      Patient Name: Lucia Matias  MRN: [id number]  UMBERTO: [id number]  Patient Class: IP- Inpatient  Admission Date: 4/24/2023  Hospital Length of Stay: 5 days  Discharge Date and Time:  04/29/2023 1:46 PM  Attending Physician: Alok Fabian MD   Discharging Provider: Alok Fabian MD  Primary Care Provider: Hetal Banks MD    Primary Care Team: Networked reference to record PCT     HPI:   Ms. Matias is a 62 YOF with PMHx of HTN, HLD, depression, COPD, A-flutter s/p ablation on OAC (11/2022), h/o sleeve gastrectomy, recently diagnosed HR+ breast cancer s/p bilateral SIEA flap reconstruction (2/15/2023) and psoriasis (holding cosentyx while on chemotherapy).    Per  note "presented to [facility] ED with chief complaint for bilateral hip pain and rash on face, neck and scalp. She reports that the hip pain started around 1 AM the morning prior to ED arrival. She started her first round of chemotherapy on Wednesday, 4/19/23, and received the Udenyca injection the following day. She also mentions constipation, chills and low grade fever with temp 100.1 the morning prior to ED arrival. She also notes a rash on her neck, face and scalp that started 2 days ago." She endorses some nausea. She also reports that her  was seen in an OSH for COPD exacerbation just prior to her symptom development. She denies cough, light headiness, dizziness, syncope, chest pain, shortness of breath, palpitations, weight loss, vomiting or diarrhea.     In the ED she was HDS, TMax 99.2. CBC with WBC 0.7, H/H stable. Chemistry stable. Blood cultures obtained. She was treated with Vanc/zosyn and benadryl for rash. She was transferred to [facility] Medicine Service for further evaluation and management.        * No surgery found *      Hospital Course:   Ms. Matias is a 62 YOF with PMHx of HTN, HLD, depression, COPD, A-flutter s/p ablation on OAC " "(11/2022), h/o sleeve gastrectomy, recently diagnosed HR+ breast cancer s/p bilateral SIEA flap reconstruction (2/15/2023) and psoriasis (holding cosentyx while on chemotherapy).  Per  note "presented to [facility] ED with chief complaint for bilateral hip pain and rash on face, neck and scalp. She reports that the hip pain started around 1 AM the morning prior to ED arrival. She started her first round of chemotherapy on Wednesday, 4/19/23, and received the Udenyca injection the following day. She also mentions constipation, chills and low grade fever with temp 100.1 the morning prior to ED arrival. She also notes a rash on her neck, face and scalp that started 2 days ago." She endorses some nausea. She also reports that her  was seen in an OSH for COPD exacerbation just prior to her symptom development. She denies cough, light headiness, dizziness, syncope, chest pain, shortness of breath, palpitations, weight loss, vomiting or diarrhea.   Patient has been  stared on broad spectrum IV Abx for neutropenic fever.  Has leucopenia is setting of chemo. Therapy,oncology is consulted.today leucopenia is much improved.  Blood culture growing ESBL, EColi,likely duo to past Hx of UTI ESBL,started on Invanz,consulted ID, do CT abdomen,pelvic.need Urodynamic test by Urology as out patient to R/O urinary retention.  Still had fever,CT abdomen show no abscess,ID restarted back on vanc.  Rash is improving taper prednisone.  Has parainfluenza with cough,started already on muccinex.  Repeat blood culture is negative  Midline placed and the patient was discharged on ertapenem 1g daily last day 5/9/23.       Goals of Care Treatment Preferences:  Code Status: Full Code      Consults:   Consults (From admission, onward)        Status Ordering Provider     Inpatient consult to Midline team  Once        Provider:  (Not yet assigned)    Acknowledged BURKE PEREZ     Inpatient consult to Infectious Diseases  Once   "      Provider:  Joo Harden MD    Completed BURKE PEREZ     Inpatient consult to Hematology/Oncology  Once        Provider:  Nora Hinojosa MD    Completed BURKE PEREZ          No new Assessment & Plan notes have been filed under this hospital service since the last note was generated.  Service: Hospital Medicine    Final Active Diagnoses:    Diagnosis Date Noted POA    Bacteremia, escherichia coli [R78.81, B96.20] 04/26/2023 Yes    Infection due to ESBL-producing Escherichia coli [A49.8, Z16.12] 04/26/2023 Yes    Malignant neoplasm of central portion of left breast in female, estrogen receptor positive [C50.112, Z17.0] 12/29/2022 Not Applicable    Atrial flutter [I48.92] 10/31/2022 Yes    Psoriatic arthritis [L40.50] 10/31/2022 Yes    History of sleeve gastrectomy [Z90.3] 04/26/2021 Not Applicable    Class 1 obesity due to excess calories without serious comorbidity with body mass index (BMI) of 33.0 to 33.9 in adult [E66.09, Z68.33] 03/16/2021 Not Applicable     Chronic    Essential hypertension [I10] 12/20/2019 Yes     Chronic    Other long term (current) drug therapy [Z79.899] 10/31/2019 Not Applicable    Depression with anxiety [F41.8] 08/06/2019 Yes     Chronic    COPD (chronic obstructive pulmonary disease) [J44.9]  Yes     Chronic      Problems Resolved During this Admission:    Diagnosis Date Noted Date Resolved POA    PRINCIPAL PROBLEM:  Neutropenic fever [D70.9, R50.81] 04/24/2023 04/29/2023 Yes    Infection due to parainfluenza virus 3 [B34.8] 04/27/2023 04/29/2023 Yes    Rash in adult [R21] 04/24/2023 04/29/2023 Yes       Discharged Condition: good    Disposition: Home or Self Care    Follow Up:    Patient Instructions:      Ambulatory referral/consult to Outpatient Case Management   Referral Priority: Routine Referral Type: Consultation   Referral Reason: Specialty Services Required   Number of Visits Requested: 1     Diet Cardiac     Notify your health care  provider if you experience any of the following:  temperature >100.4     Notify your health care provider if you experience any of the following:  worsening rash     Activity as tolerated       Significant Diagnostic Studies: Blood cultures E. Coli, repeat cultures negative    Pending Diagnostic Studies:     None         Medications:  Reconciled Home Medications:      Medication List      START taking these medications    metoprolol tartrate 25 MG tablet  Commonly known as: LOPRESSOR  Take 1 tablet (25 mg total) by mouth 2 (two) times daily.     traMADoL 50 mg tablet  Commonly known as: ULTRAM  Take 1 tablet (50 mg total) by mouth every 6 (six) hours. for 7 days     triamcinolone acetonide 0.1% 0.1 % cream  Commonly known as: KENALOG  Apply topically 2 (two) times daily. for 7 days        CONTINUE taking these medications    acetaminophen-codeine 300-30mg 300-30 mg Tab  Commonly known as: TYLENOL #3  Take 1 tablet by mouth every 4 (four) hours as needed.     atorvastatin 20 MG tablet  Commonly known as: LIPITOR  Take 1 tablet (20 mg total) by mouth every evening.     calcium-vitamin D 250 mg-2.5 mcg (100 unit) per tablet  Take 1 tablet by mouth 2 (two) times daily.     COSENTYX PEN (2 PENS) 150 mg/mL Pnij  Generic drug: secukinumab  Inject 300mg (2 pens) into the skin every 4 weeks     cyanocobalamin 500 MCG tablet  Take 500 mcg by mouth once daily.     diazePAM 5 MG tablet  Commonly known as: VALIUM  Take 1 tablet (5 mg total) by mouth daily as needed for Anxiety.     ELIQUIS 5 mg Tab  Generic drug: apixaban  Take 1 tablet (5 mg total) by mouth 2 (two) times daily. Will hold 2/13 & 2/14     HAIR,SKIN AND NAILS ORAL  Take by mouth.     multivitamin per tablet  Commonly known as: THERAGRAN  Take 1 tablet by mouth once daily.     omeprazole 40 MG capsule  Commonly known as: PRILOSEC  Take 1 capsule (40 mg total) by mouth every morning.     ondansetron 8 MG tablet  Commonly known as: ZOFRAN  Take 1 tablet (8 mg total)  by mouth every 12 (twelve) hours as needed for Nausea.     traZODone 100 MG tablet  Commonly known as: DESYREL  Take 1 tablet (100 mg total) by mouth nightly as needed for Insomnia.     TRELEGY ELLIPTA 100-62.5-25 mcg Dsdv  Generic drug: fluticasone-umeclidin-vilanter  Inhale 1 puff into the lungs once daily.     venlafaxine 75 MG 24 hr capsule  Commonly known as: EFFEXOR-XR  Take 1 capsule (75 mg total) by mouth once daily. Start on Week 2     VITAMIN B COMPLEX-C ORAL  Take by mouth Daily.     VITAMIN D3 COMPLETE ORAL  Take by mouth.            Indwelling Lines/Drains at time of discharge:   Lines/Drains/Airways     None                 Time spent on the discharge of patient: 55 minutes         Alok Fabian MD  Department of Hospital Medicine  [facility]urg ([address])

## 2023-04-29 NOTE — PLAN OF CARE
Accepted by [facility] - Nunu RN sent patient an educational video to review & spoke with her via phone - Nunu will complete home visit tomorrow to educate through initial home dose     Accepted by [facility] - they will contact patient to scheduled initial home visit Monday 5/1/23    Friend will provide transportation home once today's Ertapenem dose infused     Patient in agreement with plan & eager to discharge - RN aware     [facility] ([address])  Discharge Final Note    Primary Care Provider: Hetal Banks MD    Expected Discharge Date: 4/29/2023    Final Discharge Note (most recent)       Final Note - 04/29/23 1415          Final Note    Assessment Type Final Discharge Note     Anticipated Discharge Disposition [place] [facility] [place]     What phone number can be called within the next 1-3 days to see how you are doing after discharge? [phone]     Hospital Resources/Appts/Education Provided Provided patient/caregiver with written discharge plan information;Appointments scheduled and added to AVS        Post-Acute Status    Post-Acute Authorization Home Health;IV Infusion     Home Health Status Set-up Complete/Auth obtained     IV Infusion Status Set-up Complete/Auth obtained     Patient choice form signed by patient/caregiver List from System Post-Acute Care     Discharge Delays None known at this time                   Contact Info       [facility]    [address] [facility] [id number]   Phone: [phone]       Next Steps: Follow up    Instructions: contact for any issues with IV antibiotics    Branden Robert [facility]    [address] [place] [id number]   Phone: [phone]       Next Steps: Follow up    Instructions: they will contact you to schedule home visits

## 2023-04-29 NOTE — PLAN OF CARE
I certify I provided patient choice and a list to the patient of CMS rated Home Health agencies.  Patient signed Patient's Choice Disclosure Form choosing the following  [facility]   Referrals forwarded via [facility] to [facility]     04/29/23 1212   Post-Acute Status   Post-Acute Authorization Home Health;IV Infusion   Home Health Status Referrals Sent   IV Infusion Status Referral(s) sent   Patient choice form signed by patient/caregiver List from System Post-Acute Care   Discharge Plan   Discharge Plan A [facility]

## 2023-04-29 NOTE — PLAN OF CARE
[facility] ([address])      HOME HEALTH ORDERS  FACE TO FACE ENCOUNTER    Patient Name: Lucia Matias  YOB: 1960    PCP: Hetal Banks MD   PCP Address: [address] [place] [address] [address] / [place] [address]  PCP Phone Number: [phone]  PCP Fax: [phone]    Encounter Date: 4/24/23    Admit to Home Health    Diagnoses:  Active Hospital Problems    Diagnosis  POA    Bacteremia, escherichia coli [R78.81, B96.20]  Yes    Infection due to ESBL-producing Escherichia coli [A49.8, Z16.12]  Yes    Malignant neoplasm of central portion of left breast in female, estrogen receptor positive [C50.112, Z17.0]  Not Applicable    Atrial flutter [I48.92]  Yes     S/p ablation Nov 2022.  EF 45% on SHAYLEE.        Psoriatic arthritis [L40.50]  Yes    History of sleeve gastrectomy [Z90.3]  Not Applicable    Class 1 obesity due to excess calories without serious comorbidity with body mass index (BMI) of 33.0 to 33.9 in adult [E66.09, Z68.33]  Not Applicable     Chronic    Essential hypertension [I10]  Yes     Chronic    Other long term (current) drug therapy [Z79.899]  Not Applicable    Depression with anxiety [F41.8]  Yes     Chronic    COPD (chronic obstructive pulmonary disease) [J44.9]  Yes     Chronic      Resolved Hospital Problems    Diagnosis Date Resolved POA    *Neutropenic fever [D70.9, R50.81] 04/29/2023 Yes    Infection due to parainfluenza virus 3 [B34.8] 04/29/2023 Yes    Rash in adult [R21] 04/29/2023 Yes       Follow Up Appointments:  Future Appointments   Date Time Provider Department Center   5/10/2023  9:10 AM LAB, HEMONC CANCER BLDG [facility] HO [facility]   5/10/2023 10:00 AM [facility]   5/10/2023 11:00 AM CHAIR 8, [facility] B[facility] INF [facility]   5/11/2023 12:30 PM INJECTION, [facility] INFUSION [facility]   5/17/2023  4:00 PM Ming Milian MD [facility]   5/31/2023  9:30 AM LAB, HEMON CANCER [facility] LAB HO  [facility]   5/31/2023 10:30 AM [facility]   5/31/2023 11:00 AM NURSE 10, [facility] CHEMO [facility]   6/1/2023 12:45 PM INJECTION, [facility] INFUSION [facility]   6/21/2023  9:30 AM LAB, HEMONC CANCER BLDG [facility] HO [facility]   6/21/2023 10:30 AM Roya Madrid MD [facility]   6/21/2023 11:00 AM NURSE 10, [facility] CHEMO [facility]   6/22/2023 12:45 PM INJECTION, [facility] INFUSION [facility] CHE[facility]   7/6/2023 11:00 AM Pool Dorado MD [facility] [id number]   7/17/2023 10:30 AM PRE-ADMIT, ENDO -[facility] ENDO4 [facility]   9/5/2023  3:00 PM Marcela Meehan MD [facility]       Allergies:  Review of patient's allergies indicates:   Allergen Reactions    Succinimides Anaphylaxis     Son has        Medications: Review discharge medications with patient and family and provide education.    Current Facility-Administered Medications   Medication Dose Route Frequency Provider Last Rate Last Admin    acetaminophen tablet 650 mg  650 mg Oral Q4H PRN Chaparrita Desouza NP   650 mg at 04/24/23 2123    apixaban tablet 5 mg  5 mg Oral BID Chaparrita Desouza NP   5 mg at 04/29/23 0846    atorvastatin tablet 20 mg  20 mg Oral QHS Chaparrita Desouza NP   20 mg at 04/28/23 2030    bisacodyL suppository 10 mg  10 mg Rectal Daily PRN Chaparrita Desouza NP        calcium-vitamin D3 500 mg-5 mcg (200 unit) per tablet 1 tablet  1 tablet Oral BID Chaparrita Desouza NP   1 tablet at 04/29/23 0846    cyanocobalamin tablet 500 mcg  500 mcg Oral Daily Chaparrita Desouza NP   500 mcg at 04/29/23 0846    dextromethorphan-guaiFENesin  mg per 12 hr tablet 1 tablet  1 tablet Oral BID Juan Miguel Rubio MD   1 tablet at 04/29/23 0846    diazePAM tablet 5 mg  5 mg Oral Daily PRN Chaparrita Desouza NP   5 mg at 04/28/23 2030    diphenhydrAMINE capsule 25 mg  25 mg Oral Q6H PRN Chaparrita Desouza NP   25 mg at 04/28/23  2041    ertapenem (INVANZ) 1 g in sodium chloride 0.9 % 100 mL IVPB (MB+)  1 g Intravenous Q24H Juan Miguel Rubio MD   Stopped at 04/28/23 2114    fluticasone furoate-vilanteroL 200-25 mcg/dose diskus inhaler 1 puff  1 puff Inhalation Daily Chaparrita Desouza NP   1 puff at 04/29/23 0834    HYDROmorphone injection 1 mg  1 mg Intravenous Q4H PRN Chaparrita Desouza NP   1 mg at 04/28/23 2030    metoprolol tartrate (LOPRESSOR) tablet 25 mg  25 mg Oral BID Juan Miguel Rubio MD   25 mg at 04/29/23 0846    multivitamin tablet  1 tablet Oral Daily Chaparrita eDsouza NP   1 tablet at 04/29/23 0846    mupirocin 2 % ointment   Nasal BID Clare Lundy MD   Given at 04/29/23 0847    ondansetron disintegrating tablet 8 mg  8 mg Oral Q8H PRN Chaparrita Desouza NP   8 mg at 04/24/23 2123    ondansetron injection 4 mg  4 mg Intravenous Q8H PRN Chaparrita Desouza NP        oxyCODONE-acetaminophen 5-325 mg per tablet 1 tablet  1 tablet Oral Q4H PRN Juan Miguel Rubio MD   1 tablet at 04/29/23 0850    pantoprazole EC tablet 40 mg  40 mg Oral Daily Chaparrita Desouza NP   40 mg at 04/29/23 0846    polyethylene glycol packet 17 g  17 g Oral BID PRN Chaparrita Desouza NP        predniSONE tablet 10 mg  10 mg Oral Daily Juan Miguel Rubio MD   10 mg at 04/29/23 0846    senna-docusate 8.6-50 mg per tablet 1 tablet  1 tablet Oral BID Chaparrita Desouza NP   1 tablet at 04/27/23 2043    sodium chloride 0.9% flush 10 mL  10 mL Intravenous PRN Chaparrita Desouza NP   10 mL at 04/25/23 0318    traZODone tablet 100 mg  100 mg Oral Nightly PRN Chaparrita Desouza NP   100 mg at 04/28/23 2030    triamcinolone acetonide 0.1% cream   Topical (Top) BID Juan Miguel Rubio MD   Given at 04/29/23 0847    venlafaxine 24 hr capsule 75 mg  75 mg Oral Daily Chaparrita Desouza NP   75 mg at 04/29/23 0846    vitamin renal formula (B-complex-vitamin c-folic acid) 1 mg per capsule 1 capsule  1 capsule Oral Daily Chaparrita CASTANEDA  Deo, NP   1 capsule at 04/29/23 0846     Facility-Administered Medications Ordered in Other Encounters   Medication Dose Route Frequency Provider Last Rate Last Admin    lactated ringers infusion   Intravenous Continuous Yahaira Barnard MD   New Bag at 03/29/23 0638    LIDOcaine (PF) 10 mg/ml (1%) injection 5 mg  0.5 mL Intradermal Once Yahaira Barnard MD        LIDOcaine HCL 10 mg/ml (1%) 50 mL, EPINEPHrine 1 mg in lactated Ringers 1,000 mL irrigation   Irrigation On Call Procedure Ming Milian MD         Current Discharge Medication List        START taking these medications    Details   metoprolol tartrate (LOPRESSOR) 25 MG tablet Take 1 tablet (25 mg total) by mouth 2 (two) times daily.  Qty: 60 tablet, Refills: 11    Comments: .      traMADoL (ULTRAM) 50 mg tablet Take 1 tablet (50 mg total) by mouth every 6 (six) hours. for 7 days  Qty: 28 tablet, Refills: 0    Comments: Quantity prescribed more than 7 day supply? No      triamcinolone acetonide 0.1% (KENALOG) 0.1 % cream Apply topically 2 (two) times daily. for 7 days  Qty: 80 g, Refills: 0           CONTINUE these medications which have NOT CHANGED    Details   acetaminophen-codeine 300-30mg (TYLENOL #3) 300-30 mg Tab Take 1 tablet by mouth every 4 (four) hours as needed.      apixaban (ELIQUIS) 5 mg Tab Take 1 tablet (5 mg total) by mouth 2 (two) times daily. Will hold 2/13 & 2/14  Qty: 60 tablet, Refills: 5      atorvastatin (LIPITOR) 20 MG tablet Take 1 tablet (20 mg total) by mouth every evening.  Qty: 90 tablet, Refills: 3    Associated Diagnoses: Mixed hyperlipidemia      B-complex with vitamin C (VITAMIN B COMPLEX-C ORAL) Take by mouth Daily.      calcium-vitamin D 250-100 mg-unit per tablet Take 1 tablet by mouth 2 (two) times daily.      COSENTYX PEN, 2 PENS, 150 mg/mL PnIj Inject 300mg (2 pens) into the skin every 4 weeks  Qty: 2 mL, Refills: 11    Associated Diagnoses: Psoriasis vulgaris; Psoriatic arthritis; Long-term use of high-risk  medication      cyanocobalamin 500 MCG tablet Take 500 mcg by mouth once daily.      diazePAM (VALIUM) 5 MG tablet Take 1 tablet (5 mg total) by mouth daily as needed for Anxiety.  Qty: 30 tablet, Refills: 2    Associated Diagnoses: RANDI (generalized anxiety disorder); Panic attack      fluticasone-umeclidin-vilanter (TRELEGY ELLIPTA) 100-62.5-25 mcg DsDv Inhale 1 puff into the lungs once daily.  Qty: 180 each, Refills: 3    Associated Diagnoses: Chronic obstructive pulmonary disease, unspecified COPD type      multivitamin (THERAGRAN) per tablet Take 1 tablet by mouth once daily.      multivitamin with minerals (HAIR,SKIN AND NAILS ORAL) Take by mouth.      mv-mn/iron/folic acid/herb 190 (VITAMIN D3 COMPLETE ORAL) Take by mouth.      omeprazole (PRILOSEC) 40 MG capsule Take 1 capsule (40 mg total) by mouth every morning.  Qty: 90 capsule, Refills: 1    Associated Diagnoses: S/P laparoscopic sleeve gastrectomy; Weight loss      ondansetron (ZOFRAN) 8 MG tablet Take 1 tablet (8 mg total) by mouth every 12 (twelve) hours as needed for Nausea.  Qty: 30 tablet, Refills: 2    Associated Diagnoses: Malignant neoplasm of central portion of left breast in female, estrogen receptor positive      traZODone (DESYREL) 100 MG tablet Take 1 tablet (100 mg total) by mouth nightly as needed for Insomnia.  Qty: 90 tablet, Refills: 3    Associated Diagnoses: Insomnia, unspecified type      venlafaxine (EFFEXOR-XR) 75 MG 24 hr capsule Take 1 capsule (75 mg total) by mouth once daily. Start on Week 2  Qty: 90 capsule, Refills: 3    Associated Diagnoses: RANDI (generalized anxiety disorder); MDD (recurrent major depressive disorder) in remission               I have seen and examined this patient within the last 30 days. My clinical findings that support the need for the home health skilled services and home bound status are the following:no   Patient with medication mismanagement issues requiring home bound status as evidenced by  Poor  adherence to medication regimen/dosage.     Diet:   regular diet    Labs:  SN to perform labs:  CBC: Weekly; 2 week(s) and BMP: Weekly; 2 week(s) and Report Lab results to PCP.    Referrals/ Consults  Aide to provide assistance with personal care, ADLs, and vital signs.    Activities:   activity as tolerated    Nursing:   Agency to admit patient within 24 hours of hospital discharge unless specified on physician order or at patient request    SN to complete comprehensive assessment including routine vital signs. Instruct on disease process and s/s of complications to report to MD. Review/verify medication list sent home with the patient at time of discharge  and instruct patient/caregiver as needed. Frequency may be adjusted depending on start of care date.     Skilled nurse to perform up to 3 visits PRN for symptoms related to diagnosis    Notify MD if SBP > 160 or < 90; DBP > 90 or < 50; HR > 120 or < 50; Temp > 101; O2 < 88%;     Ok to schedule additional visits based on staff availability and patient request on consecutive days within the home health episode.    When multiple disciplines ordered:    Start of Care occurs on Sunday - Wednesday schedule remaining discipline evaluations as ordered on separate consecutive days following the start of care.    Thursday SOC -schedule subsequent evaluations Friday and Monday the following week.     Friday - Saturday SOC - schedule subsequent discipline evaluations on consecutive days starting Monday of the following week.    For all post-discharge communication and subsequent orders please contact patient's primary care physician. If unable to reach primary care physician or do not receive response within 30 minutes, please contact [facility] for clinical staff order clarification    Miscellaneous   Home Infusion Therapy:   SN to perform Infusion Therapy/Central Line Care.  Review Central Line Care & Central Line Flush with patient.    Administer (drug and dose):  Ertapenem 1g q24H, last dose will be 5/9/23  Last dose given: 4/29/23                         Home dose due: 4/30/23    Scrub the Hub: Prior to accessing the line, always perform a 30 second alcohol scrub  Each lumen of the central line is to be flushed at least daily with 10 mL Normal Saline and 3 mL Heparin flush (10 units/mL)  Skilled Nurse (SN) may draw blood from IV access  Blood Draw Procedure:   - Aspirate at least 5 mL of blood   - Discard   - Obtain specimen   - Change injection cap   - Flush with 20 mL Normal Saline followed by a                 3-5 mL Heparin flush (10 units/mL)  Central :   - Sterile dressing changes are done weekly and as needed.   - Use chlor-hexadine scrub to cleanse site, apply Biopatch to insertion site,       apply securement device dressing   - Injection caps are changed weekly and after EVERY lab draw.   - If sterile gauze is under dressing to control oozing,                 dressing change must be performed every 24 hours until gauze is not needed.    Remove midline catheter following last IV antibiotic dose    Wound Care Orders  no    I certify that this patient is confined to her home and needs intermittent skilled nursing care.

## 2023-04-29 NOTE — CARE UPDATE
04/29/23 0834   Patient Assessment/Suction   Level of Consciousness (AVPU) alert   Respiratory Effort Normal;Unlabored   Expansion/Accessory Muscles/Retractions no retractions;no use of accessory muscles   All Lung Fields Breath Sounds clear   PRE-TX-O2   Device (Oxygen Therapy) room air   SpO2 (!) 91 %   Pulse Oximetry Type Intermittent   $ Pulse Oximetry - Multiple Charge Pulse Oximetry - Multiple   Pulse 93   Resp 18   Inhaler   $ Inhaler Charges MDI (Metered Dose Inahler) Treatment   Daily Review of Necessity (Inhaler) completed   Respiratory Treatment Status (Inhaler) given   Treatment Route (Inhaler) mouthpiece   Patient Position (Inhaler) Gipson's   Post Treatment Assessment (Inhaler) breath sounds unchanged   Signs of Intolerance (Inhaler) none

## 2023-04-30 ENCOUNTER — PATIENT MESSAGE (OUTPATIENT)
Dept: ADMINISTRATIVE | Facility: OTHER | Age: 63
End: 2023-04-30
Payer: COMMERCIAL

## 2023-05-01 ENCOUNTER — PATIENT MESSAGE (OUTPATIENT)
Dept: ADMINISTRATIVE | Facility: OTHER | Age: 63
End: 2023-05-01
Payer: COMMERCIAL

## 2023-05-01 ENCOUNTER — LAB VISIT (OUTPATIENT)
Dept: LAB | Facility: HOSPITAL | Age: 63
End: 2023-05-01
Attending: INTERNAL MEDICINE
Payer: COMMERCIAL

## 2023-05-01 ENCOUNTER — PATIENT OUTREACH (OUTPATIENT)
Dept: ADMINISTRATIVE | Facility: CLINIC | Age: 63
End: 2023-05-01
Payer: COMMERCIAL

## 2023-05-01 DIAGNOSIS — D70.9 NEUTROPENIA, UNSPECIFIED: Primary | ICD-10-CM

## 2023-05-01 LAB
ANION GAP SERPL CALC-SCNC: 3 MMOL/L (ref 8–16)
ANISOCYTOSIS BLD QL SMEAR: SLIGHT
BACTERIA BLD CULT: NORMAL
BACTERIA BLD CULT: NORMAL
BASOPHILS NFR BLD: 0 % (ref 0–1.9)
CALCIUM SERPL-MCNC: 8.3 MG/DL (ref 8.7–10.5)
CHLORIDE SERPL-SCNC: 104 MMOL/L (ref 95–110)
CO2 SERPL-SCNC: 30 MMOL/L (ref 23–29)
CREAT SERPL-MCNC: 0.78 MG/DL (ref 0.5–1.4)
DIFFERENTIAL METHOD: ABNORMAL
EOSINOPHIL NFR BLD: 0 % (ref 0–8)
ERYTHROCYTE [DISTWIDTH] IN BLOOD BY AUTOMATED COUNT: 13.8 % (ref 11.5–14.5)
EST. GFR  (NO RACE VARIABLE): >60 ML/MIN/1.73 M^2
GLUCOSE SERPL-MCNC: 88 MG/DL (ref 70–110)
HCT VFR BLD AUTO: 37.5 % (ref 37–48.5)
HGB BLD-MCNC: 12.3 G/DL (ref 12–16)
IMM GRANULOCYTES # BLD AUTO: ABNORMAL K/UL (ref 0–0.04)
IMM GRANULOCYTES NFR BLD AUTO: ABNORMAL % (ref 0–0.5)
LYMPHOCYTES NFR BLD: 7 % (ref 18–48)
MCH RBC QN AUTO: 29.6 PG (ref 27–31)
MCHC RBC AUTO-ENTMCNC: 32.8 G/DL (ref 32–36)
MCV RBC AUTO: 90 FL (ref 82–98)
METAMYELOCYTES NFR BLD MANUAL: 1 %
MONOCYTES NFR BLD: 6 % (ref 4–15)
MYELOCYTES NFR BLD MANUAL: 3 %
NEUTROPHILS NFR BLD: 83 % (ref 38–73)
NRBC BLD-RTO: 0 /100 WBC
PLATELET # BLD AUTO: 214 K/UL (ref 150–450)
PLATELET BLD QL SMEAR: ABNORMAL
PMV BLD AUTO: 10.3 FL (ref 9.2–12.9)
POTASSIUM SERPL-SCNC: 3.6 MMOL/L (ref 3.5–5.1)
RBC # BLD AUTO: 4.15 M/UL (ref 4–5.4)
SODIUM SERPL-SCNC: 137 MMOL/L (ref 136–145)
UUN UR-MCNC: 14 MG/DL (ref 7–17)
WBC # BLD AUTO: 11.74 K/UL (ref 3.9–12.7)

## 2023-05-01 PROCEDURE — 36415 COLL VENOUS BLD VENIPUNCTURE: CPT | Mod: PO | Performed by: INTERNAL MEDICINE

## 2023-05-01 PROCEDURE — 85007 BL SMEAR W/DIFF WBC COUNT: CPT | Mod: PO | Performed by: INTERNAL MEDICINE

## 2023-05-01 PROCEDURE — G0180 PR HOME HEALTH MD CERTIFICATION: ICD-10-PCS | Mod: ,,, | Performed by: INTERNAL MEDICINE

## 2023-05-01 PROCEDURE — G0180 MD CERTIFICATION HHA PATIENT: HCPCS | Mod: ,,, | Performed by: INTERNAL MEDICINE

## 2023-05-01 PROCEDURE — 85027 COMPLETE CBC AUTOMATED: CPT | Mod: PO | Performed by: INTERNAL MEDICINE

## 2023-05-01 PROCEDURE — 80048 BASIC METABOLIC PNL TOTAL CA: CPT | Mod: PO | Performed by: INTERNAL MEDICINE

## 2023-05-02 ENCOUNTER — PATIENT MESSAGE (OUTPATIENT)
Dept: ADMINISTRATIVE | Facility: OTHER | Age: 63
End: 2023-05-02
Payer: COMMERCIAL

## 2023-05-02 NOTE — PROGRESS NOTES
4/30/23: [facility] nurse educator met with patient and spouse in their home to discuss IVATB administration. Spoke to patient on the phone prior to d/c on 4/29/23 and discussed with her meeting at her home for education. Sent her educational video on home infusion. She viewed the video and states she can do IVATB at home easily. Scheduled to meet her at her house on 4/30 at 10:30am. Patient will infuse medication via Elastomeric Pump. Patient educated on S.A.S.H procedure. Written instruction on S.A.S.H mat provided. Patient education checklist reviewed and acknowledged by above person(s) and are agreeable to discharge with home infusion plan of care. IV administration process using aspetic technique was reviewed. Nurse educator monitored patient while she successfully administered her 1st home dose of Ertapenem. Patient feels comfortable with infusion. Patient will dc home with Ertapenem 1 gram once per day with a home dosing time of 11am. Estimated EOC 5/9/23. Patient has a right upper arm SL midline. Extension tubing placed for self infusion. Branden CHAPIN RP will follow patient for weekly dressing changes and lab draws. Time allotted for questions.      Medication delivery will be made to home    Mercy Arriaga RN, BSN  Clinical Liaison   [facility]  Cell [phone]  Available M-F 8:30-5pm  Office [phone]  Available 24/7

## 2023-05-03 ENCOUNTER — PATIENT MESSAGE (OUTPATIENT)
Dept: BARIATRICS | Facility: CLINIC | Age: 63
End: 2023-05-03
Payer: COMMERCIAL

## 2023-05-03 ENCOUNTER — PATIENT MESSAGE (OUTPATIENT)
Dept: ADMINISTRATIVE | Facility: OTHER | Age: 63
End: 2023-05-03
Payer: COMMERCIAL

## 2023-05-05 ENCOUNTER — TELEPHONE (OUTPATIENT)
Dept: HEMATOLOGY/ONCOLOGY | Facility: CLINIC | Age: 63
End: 2023-05-05
Payer: COMMERCIAL

## 2023-05-05 ENCOUNTER — PATIENT MESSAGE (OUTPATIENT)
Dept: ADMINISTRATIVE | Facility: OTHER | Age: 63
End: 2023-05-05
Payer: COMMERCIAL

## 2023-05-05 DIAGNOSIS — G89.3 CANCER RELATED PAIN: ICD-10-CM

## 2023-05-05 DIAGNOSIS — M25.50 JOINT PAIN FOLLOWING CHEMOTHERAPY: Primary | ICD-10-CM

## 2023-05-05 DIAGNOSIS — G89.18 JOINT PAIN FOLLOWING CHEMOTHERAPY: Primary | ICD-10-CM

## 2023-05-05 RX ORDER — HYDROCODONE BITARTRATE AND ACETAMINOPHEN 5; 325 MG/1; MG/1
1 TABLET ORAL EVERY 6 HOURS PRN
Qty: 28 TABLET | Refills: 0 | Status: SHIPPED | OUTPATIENT
Start: 2023-05-05 | End: 2023-05-17

## 2023-05-05 NOTE — TELEPHONE ENCOUNTER
Care Companion Intervention    Reason for intervention: Questionnaire response  Comment:  patient having intermittent low back pain since leaving the hospital.  Last night it woke her up from sleep. Has tried two tramadols at a time with no relief, also blood pressure readings have not been accurate d/t picc line    Intervention: Medication change  Comment:  will given 7 day supply of norco.   reviewed.  Discussed not taking norco with the valium.  Side effects discussed.  Discussed also use of lidocaine patches and voltaren gel as needed for back pain.  Pt has a follow up with Dr. Madrid next week in clinic.    Mercy Keys NP

## 2023-05-06 ENCOUNTER — PATIENT MESSAGE (OUTPATIENT)
Dept: ADMINISTRATIVE | Facility: OTHER | Age: 63
End: 2023-05-06
Payer: COMMERCIAL

## 2023-05-07 ENCOUNTER — PATIENT MESSAGE (OUTPATIENT)
Dept: ADMINISTRATIVE | Facility: OTHER | Age: 63
End: 2023-05-07
Payer: COMMERCIAL

## 2023-05-08 ENCOUNTER — PATIENT MESSAGE (OUTPATIENT)
Dept: HEMATOLOGY/ONCOLOGY | Facility: CLINIC | Age: 63
End: 2023-05-08
Payer: COMMERCIAL

## 2023-05-08 ENCOUNTER — LAB VISIT (OUTPATIENT)
Dept: LAB | Facility: HOSPITAL | Age: 63
End: 2023-05-08
Attending: INTERNAL MEDICINE
Payer: COMMERCIAL

## 2023-05-08 ENCOUNTER — PATIENT MESSAGE (OUTPATIENT)
Dept: ADMINISTRATIVE | Facility: OTHER | Age: 63
End: 2023-05-08
Payer: COMMERCIAL

## 2023-05-08 DIAGNOSIS — D70.9 NEUTROPENIA, UNSPECIFIED: Primary | ICD-10-CM

## 2023-05-08 LAB
ANION GAP SERPL CALC-SCNC: 7 MMOL/L (ref 8–16)
BASOPHILS # BLD AUTO: 0.09 K/UL (ref 0–0.2)
BASOPHILS NFR BLD: 1.2 % (ref 0–1.9)
CALCIUM SERPL-MCNC: 8.5 MG/DL (ref 8.7–10.5)
CHLORIDE SERPL-SCNC: 107 MMOL/L (ref 95–110)
CO2 SERPL-SCNC: 29 MMOL/L (ref 23–29)
CREAT SERPL-MCNC: 0.71 MG/DL (ref 0.5–1.4)
DIFFERENTIAL METHOD: ABNORMAL
EOSINOPHIL # BLD AUTO: 0 K/UL (ref 0–0.5)
EOSINOPHIL NFR BLD: 0.5 % (ref 0–8)
ERYTHROCYTE [DISTWIDTH] IN BLOOD BY AUTOMATED COUNT: 13.1 % (ref 11.5–14.5)
EST. GFR  (NO RACE VARIABLE): >60 ML/MIN/1.73 M^2
GLUCOSE SERPL-MCNC: 114 MG/DL (ref 70–110)
HCT VFR BLD AUTO: 35 % (ref 37–48.5)
HGB BLD-MCNC: 11.3 G/DL (ref 12–16)
IMM GRANULOCYTES # BLD AUTO: 0.04 K/UL (ref 0–0.04)
IMM GRANULOCYTES NFR BLD AUTO: 0.5 % (ref 0–0.5)
LYMPHOCYTES # BLD AUTO: 0.9 K/UL (ref 1–4.8)
LYMPHOCYTES NFR BLD: 11.4 % (ref 18–48)
MCH RBC QN AUTO: 29 PG (ref 27–31)
MCHC RBC AUTO-ENTMCNC: 32.3 G/DL (ref 32–36)
MCV RBC AUTO: 90 FL (ref 82–98)
MONOCYTES # BLD AUTO: 0.7 K/UL (ref 0.3–1)
MONOCYTES NFR BLD: 8.5 % (ref 4–15)
NEUTROPHILS # BLD AUTO: 6 K/UL (ref 1.8–7.7)
NEUTROPHILS NFR BLD: 77.9 % (ref 38–73)
NRBC BLD-RTO: 0 /100 WBC
PLATELET # BLD AUTO: 356 K/UL (ref 150–450)
PMV BLD AUTO: 9.9 FL (ref 9.2–12.9)
POTASSIUM SERPL-SCNC: 3.7 MMOL/L (ref 3.5–5.1)
RBC # BLD AUTO: 3.9 M/UL (ref 4–5.4)
SODIUM SERPL-SCNC: 143 MMOL/L (ref 136–145)
UUN UR-MCNC: 11 MG/DL (ref 7–17)
WBC # BLD AUTO: 7.66 K/UL (ref 3.9–12.7)

## 2023-05-08 PROCEDURE — 80048 BASIC METABOLIC PNL TOTAL CA: CPT | Mod: PO | Performed by: INTERNAL MEDICINE

## 2023-05-08 PROCEDURE — 85025 COMPLETE CBC W/AUTO DIFF WBC: CPT | Mod: PO | Performed by: INTERNAL MEDICINE

## 2023-05-08 PROCEDURE — 36415 COLL VENOUS BLD VENIPUNCTURE: CPT | Mod: PO | Performed by: INTERNAL MEDICINE

## 2023-05-09 ENCOUNTER — PATIENT MESSAGE (OUTPATIENT)
Dept: ADMINISTRATIVE | Facility: OTHER | Age: 63
End: 2023-05-09
Payer: COMMERCIAL

## 2023-05-09 ENCOUNTER — PATIENT MESSAGE (OUTPATIENT)
Dept: BARIATRICS | Facility: CLINIC | Age: 63
End: 2023-05-09
Payer: COMMERCIAL

## 2023-05-10 ENCOUNTER — OFFICE VISIT (OUTPATIENT)
Dept: HEMATOLOGY/ONCOLOGY | Facility: CLINIC | Age: 63
End: 2023-05-10
Payer: COMMERCIAL

## 2023-05-10 ENCOUNTER — PATIENT MESSAGE (OUTPATIENT)
Dept: ADMINISTRATIVE | Facility: OTHER | Age: 63
End: 2023-05-10
Payer: COMMERCIAL

## 2023-05-10 ENCOUNTER — LAB VISIT (OUTPATIENT)
Dept: LAB | Facility: HOSPITAL | Age: 63
End: 2023-05-10
Attending: STUDENT IN AN ORGANIZED HEALTH CARE EDUCATION/TRAINING PROGRAM
Payer: COMMERCIAL

## 2023-05-10 ENCOUNTER — INFUSION (OUTPATIENT)
Dept: INFUSION THERAPY | Facility: HOSPITAL | Age: 63
End: 2023-05-10
Attending: STUDENT IN AN ORGANIZED HEALTH CARE EDUCATION/TRAINING PROGRAM
Payer: COMMERCIAL

## 2023-05-10 VITALS
OXYGEN SATURATION: 97 % | HEART RATE: 62 BPM | DIASTOLIC BLOOD PRESSURE: 72 MMHG | HEIGHT: 62 IN | RESPIRATION RATE: 18 BRPM | WEIGHT: 175.25 LBS | BODY MASS INDEX: 32.25 KG/M2 | SYSTOLIC BLOOD PRESSURE: 140 MMHG

## 2023-05-10 VITALS
HEART RATE: 63 BPM | SYSTOLIC BLOOD PRESSURE: 122 MMHG | RESPIRATION RATE: 18 BRPM | TEMPERATURE: 97 F | DIASTOLIC BLOOD PRESSURE: 61 MMHG

## 2023-05-10 DIAGNOSIS — Z98.890 STATUS POST ABLATION OF ATRIAL FLUTTER: ICD-10-CM

## 2023-05-10 DIAGNOSIS — R78.81 E COLI BACTEREMIA: Primary | ICD-10-CM

## 2023-05-10 DIAGNOSIS — C50.112 MALIGNANT NEOPLASM OF CENTRAL PORTION OF LEFT BREAST IN FEMALE, ESTROGEN RECEPTOR POSITIVE: Primary | ICD-10-CM

## 2023-05-10 DIAGNOSIS — L40.50 PSORIATIC ARTHRITIS: ICD-10-CM

## 2023-05-10 DIAGNOSIS — B96.20 E COLI BACTEREMIA: Primary | ICD-10-CM

## 2023-05-10 DIAGNOSIS — Z17.0 MALIGNANT NEOPLASM OF CENTRAL PORTION OF LEFT BREAST IN FEMALE, ESTROGEN RECEPTOR POSITIVE: Primary | ICD-10-CM

## 2023-05-10 DIAGNOSIS — Z86.79 STATUS POST ABLATION OF ATRIAL FLUTTER: ICD-10-CM

## 2023-05-10 DIAGNOSIS — I10 ESSENTIAL HYPERTENSION: ICD-10-CM

## 2023-05-10 DIAGNOSIS — C50.112 MALIGNANT NEOPLASM OF CENTRAL PORTION OF LEFT BREAST IN FEMALE, ESTROGEN RECEPTOR POSITIVE: ICD-10-CM

## 2023-05-10 DIAGNOSIS — Z17.0 MALIGNANT NEOPLASM OF CENTRAL PORTION OF LEFT BREAST IN FEMALE, ESTROGEN RECEPTOR POSITIVE: ICD-10-CM

## 2023-05-10 LAB
ALBUMIN SERPL BCP-MCNC: 2.7 G/DL (ref 3.5–5.2)
ALP SERPL-CCNC: 105 U/L (ref 55–135)
ALT SERPL W/O P-5'-P-CCNC: 11 U/L (ref 10–44)
ANION GAP SERPL CALC-SCNC: 7 MMOL/L (ref 8–16)
AST SERPL-CCNC: 14 U/L (ref 10–40)
BASOPHILS # BLD AUTO: 0.12 K/UL (ref 0–0.2)
BASOPHILS NFR BLD: 1.9 % (ref 0–1.9)
BILIRUB SERPL-MCNC: 0.3 MG/DL (ref 0.1–1)
BUN SERPL-MCNC: 9 MG/DL (ref 6–20)
CALCIUM SERPL-MCNC: 9.3 MG/DL (ref 8.7–10.5)
CHLORIDE SERPL-SCNC: 109 MMOL/L (ref 95–110)
CO2 SERPL-SCNC: 26 MMOL/L (ref 23–29)
CREAT SERPL-MCNC: 0.7 MG/DL (ref 0.5–1.4)
DIFFERENTIAL METHOD: ABNORMAL
EOSINOPHIL # BLD AUTO: 0.1 K/UL (ref 0–0.5)
EOSINOPHIL NFR BLD: 1.7 % (ref 0–8)
ERYTHROCYTE [DISTWIDTH] IN BLOOD BY AUTOMATED COUNT: 13.4 % (ref 11.5–14.5)
EST. GFR  (NO RACE VARIABLE): >60 ML/MIN/1.73 M^2
GLUCOSE SERPL-MCNC: 136 MG/DL (ref 70–110)
HCT VFR BLD AUTO: 38.4 % (ref 37–48.5)
HGB BLD-MCNC: 11.8 G/DL (ref 12–16)
IMM GRANULOCYTES # BLD AUTO: 0.03 K/UL (ref 0–0.04)
IMM GRANULOCYTES NFR BLD AUTO: 0.5 % (ref 0–0.5)
LYMPHOCYTES # BLD AUTO: 1 K/UL (ref 1–4.8)
LYMPHOCYTES NFR BLD: 15.6 % (ref 18–48)
MCH RBC QN AUTO: 28.2 PG (ref 27–31)
MCHC RBC AUTO-ENTMCNC: 30.7 G/DL (ref 32–36)
MCV RBC AUTO: 92 FL (ref 82–98)
MONOCYTES # BLD AUTO: 0.4 K/UL (ref 0.3–1)
MONOCYTES NFR BLD: 6.9 % (ref 4–15)
NEUTROPHILS # BLD AUTO: 4.7 K/UL (ref 1.8–7.7)
NEUTROPHILS NFR BLD: 73.4 % (ref 38–73)
NRBC BLD-RTO: 0 /100 WBC
PLATELET # BLD AUTO: 410 K/UL (ref 150–450)
PMV BLD AUTO: 9.8 FL (ref 9.2–12.9)
POTASSIUM SERPL-SCNC: 3.7 MMOL/L (ref 3.5–5.1)
PROT SERPL-MCNC: 6.4 G/DL (ref 6–8.4)
RBC # BLD AUTO: 4.18 M/UL (ref 4–5.4)
SODIUM SERPL-SCNC: 142 MMOL/L (ref 136–145)
WBC # BLD AUTO: 6.34 K/UL (ref 3.9–12.7)

## 2023-05-10 PROCEDURE — 85025 COMPLETE CBC W/AUTO DIFF WBC: CPT | Performed by: STUDENT IN AN ORGANIZED HEALTH CARE EDUCATION/TRAINING PROGRAM

## 2023-05-10 PROCEDURE — 96375 TX/PRO/DX INJ NEW DRUG ADDON: CPT

## 2023-05-10 PROCEDURE — 1159F PR MEDICATION LIST DOCUMENTED IN MEDICAL RECORD: ICD-10-PCS | Mod: CPTII,S$GLB,, | Performed by: STUDENT IN AN ORGANIZED HEALTH CARE EDUCATION/TRAINING PROGRAM

## 2023-05-10 PROCEDURE — 96367 TX/PROPH/DG ADDL SEQ IV INF: CPT

## 2023-05-10 PROCEDURE — 3008F BODY MASS INDEX DOCD: CPT | Mod: CPTII,S$GLB,, | Performed by: STUDENT IN AN ORGANIZED HEALTH CARE EDUCATION/TRAINING PROGRAM

## 2023-05-10 PROCEDURE — 1111F PR DISCHARGE MEDS RECONCILED W/ CURRENT OUTPATIENT MED LIST: ICD-10-PCS | Mod: CPTII,S$GLB,, | Performed by: STUDENT IN AN ORGANIZED HEALTH CARE EDUCATION/TRAINING PROGRAM

## 2023-05-10 PROCEDURE — 80053 COMPREHEN METABOLIC PANEL: CPT | Performed by: STUDENT IN AN ORGANIZED HEALTH CARE EDUCATION/TRAINING PROGRAM

## 2023-05-10 PROCEDURE — 99215 OFFICE O/P EST HI 40 MIN: CPT | Mod: S$GLB,,, | Performed by: STUDENT IN AN ORGANIZED HEALTH CARE EDUCATION/TRAINING PROGRAM

## 2023-05-10 PROCEDURE — 3078F DIAST BP <80 MM HG: CPT | Mod: CPTII,S$GLB,, | Performed by: STUDENT IN AN ORGANIZED HEALTH CARE EDUCATION/TRAINING PROGRAM

## 2023-05-10 PROCEDURE — 1159F MED LIST DOCD IN RCRD: CPT | Mod: CPTII,S$GLB,, | Performed by: STUDENT IN AN ORGANIZED HEALTH CARE EDUCATION/TRAINING PROGRAM

## 2023-05-10 PROCEDURE — 99999 PR PBB SHADOW E&M-EST. PATIENT-LVL IV: CPT | Mod: PBBFAC,,, | Performed by: STUDENT IN AN ORGANIZED HEALTH CARE EDUCATION/TRAINING PROGRAM

## 2023-05-10 PROCEDURE — 3008F PR BODY MASS INDEX (BMI) DOCUMENTED: ICD-10-PCS | Mod: CPTII,S$GLB,, | Performed by: STUDENT IN AN ORGANIZED HEALTH CARE EDUCATION/TRAINING PROGRAM

## 2023-05-10 PROCEDURE — 99215 PR OFFICE/OUTPT VISIT, EST, LEVL V, 40-54 MIN: ICD-10-PCS | Mod: S$GLB,,, | Performed by: STUDENT IN AN ORGANIZED HEALTH CARE EDUCATION/TRAINING PROGRAM

## 2023-05-10 PROCEDURE — 63600175 PHARM REV CODE 636 W HCPCS: Mod: JG | Performed by: STUDENT IN AN ORGANIZED HEALTH CARE EDUCATION/TRAINING PROGRAM

## 2023-05-10 PROCEDURE — 96413 CHEMO IV INFUSION 1 HR: CPT

## 2023-05-10 PROCEDURE — 1111F DSCHRG MED/CURRENT MED MERGE: CPT | Mod: CPTII,S$GLB,, | Performed by: STUDENT IN AN ORGANIZED HEALTH CARE EDUCATION/TRAINING PROGRAM

## 2023-05-10 PROCEDURE — 25000003 PHARM REV CODE 250: Performed by: STUDENT IN AN ORGANIZED HEALTH CARE EDUCATION/TRAINING PROGRAM

## 2023-05-10 PROCEDURE — 96417 CHEMO IV INFUS EACH ADDL SEQ: CPT

## 2023-05-10 PROCEDURE — 99999 PR PBB SHADOW E&M-EST. PATIENT-LVL IV: ICD-10-PCS | Mod: PBBFAC,,, | Performed by: STUDENT IN AN ORGANIZED HEALTH CARE EDUCATION/TRAINING PROGRAM

## 2023-05-10 PROCEDURE — 3077F SYST BP >= 140 MM HG: CPT | Mod: CPTII,S$GLB,, | Performed by: STUDENT IN AN ORGANIZED HEALTH CARE EDUCATION/TRAINING PROGRAM

## 2023-05-10 PROCEDURE — 3077F PR MOST RECENT SYSTOLIC BLOOD PRESSURE >= 140 MM HG: ICD-10-PCS | Mod: CPTII,S$GLB,, | Performed by: STUDENT IN AN ORGANIZED HEALTH CARE EDUCATION/TRAINING PROGRAM

## 2023-05-10 PROCEDURE — 3078F PR MOST RECENT DIASTOLIC BLOOD PRESSURE < 80 MM HG: ICD-10-PCS | Mod: CPTII,S$GLB,, | Performed by: STUDENT IN AN ORGANIZED HEALTH CARE EDUCATION/TRAINING PROGRAM

## 2023-05-10 PROCEDURE — 36415 COLL VENOUS BLD VENIPUNCTURE: CPT | Performed by: STUDENT IN AN ORGANIZED HEALTH CARE EDUCATION/TRAINING PROGRAM

## 2023-05-10 RX ORDER — LEVOFLOXACIN 500 MG/1
500 TABLET, FILM COATED ORAL DAILY
Qty: 7 TABLET | Refills: 2 | Status: SHIPPED | OUTPATIENT
Start: 2023-05-10 | End: 2023-05-31 | Stop reason: SDUPTHER

## 2023-05-10 RX ORDER — HEPARIN 100 UNIT/ML
500 SYRINGE INTRAVENOUS
Status: CANCELLED | OUTPATIENT
Start: 2023-05-10

## 2023-05-10 RX ORDER — FLUCONAZOLE 150 MG/1
150 TABLET ORAL ONCE
Qty: 1 TABLET | Refills: 0 | Status: SHIPPED | OUTPATIENT
Start: 2023-05-10 | End: 2023-05-10

## 2023-05-10 RX ORDER — SODIUM CHLORIDE 0.9 % (FLUSH) 0.9 %
10 SYRINGE (ML) INJECTION
Status: CANCELLED | OUTPATIENT
Start: 2023-05-10

## 2023-05-10 RX ORDER — HEPARIN 100 UNIT/ML
500 SYRINGE INTRAVENOUS
Status: DISCONTINUED | OUTPATIENT
Start: 2023-05-10 | End: 2023-05-10 | Stop reason: HOSPADM

## 2023-05-10 RX ORDER — SODIUM CHLORIDE 0.9 % (FLUSH) 0.9 %
10 SYRINGE (ML) INJECTION
Status: DISCONTINUED | OUTPATIENT
Start: 2023-05-10 | End: 2023-05-10 | Stop reason: HOSPADM

## 2023-05-10 RX ADMIN — DEXAMETHASONE SODIUM PHOSPHATE 0.25 MG: 4 INJECTION, SOLUTION INTRA-ARTICULAR; INTRALESIONAL; INTRAMUSCULAR; INTRAVENOUS; SOFT TISSUE at 11:05

## 2023-05-10 RX ADMIN — CYCLOPHOSPHAMIDE 1120 MG: 200 INJECTION, SOLUTION INTRAVENOUS at 12:05

## 2023-05-10 RX ADMIN — DOCETAXEL ANHYDROUS 115 MG: 10 INJECTION, SOLUTION INTRAVENOUS at 11:05

## 2023-05-10 RX ADMIN — SODIUM CHLORIDE: 9 INJECTION, SOLUTION INTRAVENOUS at 11:05

## 2023-05-10 RX ADMIN — APREPITANT 130 MG: 130 INJECTION, EMULSION INTRAVENOUS at 11:05

## 2023-05-10 NOTE — PLAN OF CARE
1410-Patient tolerated treatment well. Midline was flushed clamped and capped after infusion, to be removed tomorrow by home health. Discharged without complaints or S/S of adverse event.  Instructed to call provider for any questions or concerns. Patient will return to clinic tomorrow for injection.

## 2023-05-10 NOTE — PROGRESS NOTES
Oncology Clinic   Progress Note    Patient: Lucia Matias  MRN: [id number]  Date: 5/10/2023    Chief Complaint: HR+ breast cancer    Ms. Matias is a domo 63yo woman with recently diagnosed HR+ breast cancer who presents today for evaluation. Her oncologic history is as follows:    Oncologic History:   22: annual mammogram indentified left focal asymmetry at the upper outer position.   Follow-up mammogram and ultrasound on 22 showed a worrisome spiculated mass, 1.4 x 0.7 x 0.6 cm, 12 o'clock left breast 7 CMFN. An ultrasound guided biopsy was performed on 12/15/22 with pathology revealing infiltrating ductal carcinoma of the breast. Grade 2, ER >95%, ID 85-90%, Her2 1+, Ki67 25-30%  2023: MRI breast shwoed Left breast 12 mm x 11 mm x 7 mm mass at the middle 12 o'clock position. Several pulmonary nodules are noted incidentally in the left hemithorax.  The patient has a history of bilateral pulmonary nodule seen on previous thoracic CT exams most recently in .  One of the nodules underwent biopsy in  with benign results.    Underwent b/l mastectomies with SLN bx 2/15/2023 with bilateral breast reconstruction with abdominally based free flaps. He postoperative course was complicated by L pneumothorax.  Post op path showed Invasive lobular carcinoma in left breast grade 2 measuring 18 mm with LCIS Grade 2 ER ID positive and Her2 negative. pT1c pN0. Oncotype 35  C1D1 adjuvanct TC on     GYN History:  Age of menarche was 13. Age of menopause was 46.  Patient denies hormonal therapy but took OCP for approximately 15 years in the past. Patient is . Age of first live birth was 25. Patient did breast feed for 6 months. S/p uterine ablation for fibroids in early 40s, no menstrual cycle since. Had menopausal symptoms in mid-late 40s.       Interval History:  Ms. Matias returns today for follow up accompanied by her . She reports feeling fatigued, but overall doing fairly  well. She had a difficult time following cycle 1 and was hospitalized with neutropenic fever, found to have E coli bacteremia and parainfluenza. Completed two week course of iv ertapenem yesterday. She also reported some difficulty with rash and lower back/hip pain in the days following her last infusion.           Medications:  Current Outpatient Medications   Medication Sig Dispense Refill    apixaban (ELIQUIS) 5 mg Tab Take 1 tablet (5 mg total) by mouth 2 (two) times daily. Will hold 2/13 & 2/14 60 tablet 5    atorvastatin (LIPITOR) 20 MG tablet Take 1 tablet (20 mg total) by mouth every evening. 90 tablet 3    B-complex with vitamin C (VITAMIN B COMPLEX-C ORAL) Take by mouth Daily.      calcium-vitamin D 250-100 mg-unit per tablet Take 1 tablet by mouth 2 (two) times daily.      COSENTYX PEN, 2 PENS, 150 mg/mL PnIj Inject 300mg (2 pens) into the skin every 4 weeks 2 mL 11    cyanocobalamin 500 MCG tablet Take 500 mcg by mouth once daily.      diazePAM (VALIUM) 5 MG tablet Take 1 tablet (5 mg total) by mouth daily as needed for Anxiety. 30 tablet 2    fluticasone-umeclidin-vilanter (TRELEGY ELLIPTA) 100-62.5-25 mcg DsDv Inhale 1 puff into the lungs once daily. 180 each 3    HYDROcodone-acetaminophen (NORCO) 5-325 mg per tablet Take 1 tablet by mouth every 6 (six) hours as needed for Pain. 28 tablet 0    metoprolol tartrate (LOPRESSOR) 25 MG tablet Take 1 tablet (25 mg total) by mouth 2 (two) times daily. 60 tablet 11    multivitamin (THERAGRAN) per tablet Take 1 tablet by mouth once daily.      multivitamin with minerals (HAIR,SKIN AND NAILS ORAL) Take by mouth.      mv-mn/iron/folic acid/herb 190 (VITAMIN D3 COMPLETE ORAL) Take by mouth.      omeprazole (PRILOSEC) 40 MG capsule Take 1 capsule (40 mg total) by mouth every morning. 90 capsule 1    ondansetron (ZOFRAN) 8 MG tablet Take 1 tablet (8 mg total) by mouth every 12 (twelve) hours as needed for Nausea. 30 tablet 2    traZODone (DESYREL) 100 MG tablet Take  1 tablet (100 mg total) by mouth nightly as needed for Insomnia. 90 tablet 3    triamcinolone acetonide 0.1% (KENALOG) 0.1 % cream Apply topically 2 (two) times daily. for 7 days 80 g 0    venlafaxine (EFFEXOR-XR) 75 MG 24 hr capsule Take 1 capsule (75 mg total) by mouth once daily. Start on Week 2 90 capsule 3    fluconazole (DIFLUCAN) 150 MG Tab Take 1 tablet (150 mg total) by mouth once. for 1 dose 1 tablet 0    levoFLOXacin (LEVAQUIN) 500 MG tablet Take 1 tablet (500 mg total) by mouth once daily. for 21 days 7 tablet 2     No current facility-administered medications for this visit.     Facility-Administered Medications Ordered in Other Visits   Medication Dose Route Frequency Provider Last Rate Last Admin    alteplase injection 2 mg  2 mg Intra-Catheter PRN Roya Madrid MD        cycloPHOSphamide 600 mg/m2 = 1,120 mg in sodium chloride 0.9% 290.6 mL chemo infusion  600 mg/m2 (Treatment Plan Recorded) Intravenous 1 time in Clinic/HOD Roya Madrid MD        DOCEtaxel (TAXOTERE) 60 mg/m2 = 115 mg in sodium chloride 0.9% 296.5 mL chemo infusion  60 mg/m2 (Treatment Plan Recorded) Intravenous 1 time in Clinic/HOD Roya Madrid .5 mL/hr at 05/10/23 1158 115 mg at 05/10/23 1158    heparin, porcine (PF) 100 unit/mL injection flush 500 Units  500 Units Intravenous PRN Roya Madrid MD        lactated ringers infusion   Intravenous Continuous Yahaira Barnard MD   New Bag at 03/29/23 0638    LIDOcaine (PF) 10 mg/ml (1%) injection 5 mg  0.5 mL Intradermal Once Yahaira Barnard MD        LIDOcaine HCL 10 mg/ml (1%) 50 mL, EPINEPHrine 1 mg in lactated Ringers 1,000 mL irrigation   Irrigation On Call Procedure Ming Milian MD        sodium chloride 0.9% flush 10 mL  10 mL Intravenous PRN Roya Madrid MD         Review of Systems:  Answers submitted by the patient for this visit:  Review of Systems Questionnaire (Submitted on 5/9/2023)  appetite change : Yes  unexpected weight change:  "No  mouth sores: No  visual disturbance: No  cough: No  shortness of breath: No  chest pain: No  abdominal pain: No  diarrhea: No  frequency: No  back pain: Yes  rash: No  headaches: No  adenopathy: No  nervous/ anxious: Yes        Objective:     Vitals:    05/10/23 1009   BP: (!) 140/72   BP Location: Right leg   Patient Position: Sitting   BP Method: Small (Automatic)   Pulse: 62   Resp: 18   SpO2: 97%   Weight: 79.5 kg (175 lb 4.3 oz)   Height: 5' 2" (1.575 m)       BMI: Body mass index is 32.06 kg/m².     Physical Exam:  ECOG 0   General: well appearing, in no apparent distress  HEENT: Normocephalic, EOMI, anicteric sclerae, MMM  Neck: supple, without cervical or supraclavicular lymphadenopathy.  Heart: regular rate and rhythm, normal S1 and S2, no murmurs, gallops or rubs.  Lungs: Clear to auscultation bilaterally, no increased wob  Breast: deferred today (s/p bilateral mastectomy with flap reconstruction)  Abdomen: Soft, nontender, nondistended with normal bowel sounds. Abdominal incision c/d/I. No hepatosplenomegaly.  Extremities: No LE edema or joint effusion. Picc in place in RUE  Skin: warm, well-perfused, no rash. Mild erythema and bruising near site of previous chemo infsuion iv site  Neurologic: Alert and oriented x 4, normal speech and gait   Psychiatric: Conversing appropriately with providers throughout today's encounter.    Laboratory Data:  Lab Visit on 05/10/2023   Component Date Value    WBC 05/10/2023 6.34     RBC 05/10/2023 4.18     Hemoglobin 05/10/2023 11.8 (L)     Hematocrit 05/10/2023 38.4     MCV 05/10/2023 92     MCH 05/10/2023 28.2     MCHC 05/10/2023 30.7 (L)     RDW 05/10/2023 13.4     Platelets 05/10/2023 410     MPV 05/10/2023 9.8     Immature Granulocytes 05/10/2023 0.5     Gran # (ANC) 05/10/2023 4.7     Immature Grans (Abs) 05/10/2023 0.03     Lymph # 05/10/2023 1.0     Mono # 05/10/2023 0.4     Eos # 05/10/2023 0.1     Baso # 05/10/2023 0.12     nRBC 05/10/2023 0     Gran % " 05/10/2023 73.4 (H)     Lymph % 05/10/2023 15.6 (L)     Mono % 05/10/2023 6.9     Eosinophil % 05/10/2023 1.7     Basophil % 05/10/2023 1.9     Differential Method 05/10/2023 Automated     Sodium 05/10/2023 142     Potassium 05/10/2023 3.7     Chloride 05/10/2023 109     CO2 05/10/2023 26     Glucose 05/10/2023 136 (H)     BUN 05/10/2023 9     Creatinine 05/10/2023 0.7     Calcium 05/10/2023 9.3     Total Protein 05/10/2023 6.4     Albumin 05/10/2023 2.7 (L)     Total Bilirubin 05/10/2023 0.3     Alkaline Phosphatase 05/10/2023 105     AST 05/10/2023 14     ALT 05/10/2023 11     Anion Gap 05/10/2023 7 (L)     eGFR 05/10/2023 >60.0    Lab Visit on 05/08/2023   Component Date Value    Sodium 05/08/2023 143     Potassium 05/08/2023 3.7     Chloride 05/08/2023 107     CO2 05/08/2023 29     Glucose 05/08/2023 114 (H)     BUN 05/08/2023 11     Creatinine 05/08/2023 0.71     Calcium 05/08/2023 8.5 (L)     Anion Gap 05/08/2023 7 (L)     eGFR 05/08/2023 >60.0     WBC 05/08/2023 7.66     RBC 05/08/2023 3.90 (L)     Hemoglobin 05/08/2023 11.3 (L)     Hematocrit 05/08/2023 35.0 (L)     MCV 05/08/2023 90     MCH 05/08/2023 29.0     MCHC 05/08/2023 32.3     RDW 05/08/2023 13.1     Platelets 05/08/2023 356     MPV 05/08/2023 9.9     Immature Granulocytes 05/08/2023 0.5     Gran # (ANC) 05/08/2023 6.0     Immature Grans (Abs) 05/08/2023 0.04     Lymph # 05/08/2023 0.9 (L)     Mono # 05/08/2023 0.7     Eos # 05/08/2023 0.0     Baso # 05/08/2023 0.09     nRBC 05/08/2023 0     Gran % 05/08/2023 77.9 (H)     Lymph % 05/08/2023 11.4 (L)     Mono % 05/08/2023 8.5     Eosinophil % 05/08/2023 0.5     Basophil % 05/08/2023 1.2     Differential Method 05/08/2023 Automated    I personally reviewed all recent labs, imaging and pathology.    Assessment and Plan:   Ms. Matias is a domo 61yo woman with hx of Aflutter s/p ablation, COPD, RA and recently diagnosed Stage IA HR+ breast cancer s/p bilateral mastectomy with reconstruction who  presents today for evaluation.     Given her Oncotype of 35, we proceeded with adjuvant docetaxel 75mg/m2 and cyclophosphamide 600mg/m2 iv every 21 days for a total of 4 cycles. Cycle 1 was complicated by neutropenic fever in the setting of E coli bacteremia and parainfluenza.    #Breast cancer:  --labs today reviewed and ok to proceed with C2D1 TC as scheduled  --will DR docetaxel by 20% given neutropenia with cycle 1 and recent infections  --discussed with ID, plan to add 7 day course of ppx levaquin given recent infection and some ongoing concern for colonization given recent UTIs  --encouraged to continue steroids and claritin following infusion/neulasta as discussed  --following completion of chemotherapy will plan to start ET with anastrozole  --of note, osteopenia noted on DEXA 12/2018- pt on Ca/VitD. Will plan to repeat prior to ET start and discuss role of bisphosphonate if needed  --enrolled in [facility]  --RTC in 3 weeks      #E coli bacteremia: now resolved. S/p 2 weeks of iv ertapenem  --discussed with ID, plan for ppx levaquin with future cycles as discussed above  --will order diflucan x1 in case she develops yeast infection symptoms as this has happened with prior antibiotic use      #Psoriasis/psoriatic arthritis:  --holding cosentyx while on chemotherapy; will reach out to dermatology if symptoms worsening      #Htn: stable  --cont to follow with PCP      #Alyssa s/p RFA: following with cardiology  --remains of eliquis  --follow up with cards as scheduled      All questions were answered to her apparent satisfaction. Will plan to see her back in 3 weeks or sooner should the need arise.     Roya Madrid MD      Med Onc Chart Routing      Follow up with physician 3 weeks.   Follow up with LINDY    Infusion scheduling note q3 weeks as scheduled   Injection scheduling note    Labs CBC and CMP   Scheduling: Labs same day as infusion  Preferred lab:  Lab interval: every 3 weeks     Imaging None       Pharmacy appointment No pharmacy appointment needed      Other referrals no Refer to Oncology [facility] - No additional referrals needed

## 2023-05-10 NOTE — PLAN OF CARE
1134-Labs , hx, and medications reviewed, patient was seen by MD prior to arrival.. Assessment completed, discussed plan of care with patient. Patient in agreement and she verbalized she takes po steroid at home as instructed. Chair reclined and warm blanket and snack offered.

## 2023-05-11 ENCOUNTER — PATIENT MESSAGE (OUTPATIENT)
Dept: ADMINISTRATIVE | Facility: OTHER | Age: 63
End: 2023-05-11
Payer: COMMERCIAL

## 2023-05-11 ENCOUNTER — INFUSION (OUTPATIENT)
Dept: INFUSION THERAPY | Facility: HOSPITAL | Age: 63
End: 2023-05-11
Payer: COMMERCIAL

## 2023-05-11 DIAGNOSIS — Z17.0 MALIGNANT NEOPLASM OF CENTRAL PORTION OF LEFT BREAST IN FEMALE, ESTROGEN RECEPTOR POSITIVE: Primary | ICD-10-CM

## 2023-05-11 DIAGNOSIS — C50.112 MALIGNANT NEOPLASM OF CENTRAL PORTION OF LEFT BREAST IN FEMALE, ESTROGEN RECEPTOR POSITIVE: Primary | ICD-10-CM

## 2023-05-11 PROCEDURE — 63600175 PHARM REV CODE 636 W HCPCS: Mod: JZ,JG | Performed by: STUDENT IN AN ORGANIZED HEALTH CARE EDUCATION/TRAINING PROGRAM

## 2023-05-11 PROCEDURE — 96372 THER/PROPH/DIAG INJ SC/IM: CPT

## 2023-05-11 RX ADMIN — PEGFILGRASTIM-CBQV 6 MG: 6 INJECTION, SOLUTION SUBCUTANEOUS at 12:05

## 2023-05-12 ENCOUNTER — PATIENT MESSAGE (OUTPATIENT)
Dept: ADMINISTRATIVE | Facility: OTHER | Age: 63
End: 2023-05-12
Payer: COMMERCIAL

## 2023-05-13 ENCOUNTER — PATIENT MESSAGE (OUTPATIENT)
Dept: ADMINISTRATIVE | Facility: OTHER | Age: 63
End: 2023-05-13
Payer: COMMERCIAL

## 2023-05-14 ENCOUNTER — PATIENT MESSAGE (OUTPATIENT)
Dept: ADMINISTRATIVE | Facility: OTHER | Age: 63
End: 2023-05-14
Payer: COMMERCIAL

## 2023-05-15 ENCOUNTER — TELEPHONE (OUTPATIENT)
Dept: HEMATOLOGY/ONCOLOGY | Facility: CLINIC | Age: 63
End: 2023-05-15
Payer: COMMERCIAL

## 2023-05-15 ENCOUNTER — PATIENT MESSAGE (OUTPATIENT)
Dept: ADMINISTRATIVE | Facility: OTHER | Age: 63
End: 2023-05-15
Payer: COMMERCIAL

## 2023-05-15 NOTE — TELEPHONE ENCOUNTER
Care Companion Intervention    Reason for intervention: Hypotension and Questionnaire response  Comment:  Temperature of 100.0 on Saturday. She was weak, low pressure, decreased appetite, nausea on Saturday, Zofran did not help. Saturday was a bad day. Cramping in her stomach on Saturday, still present today Yesterday, she felt better, denies fever, yesterday, appetite is better.     Intervention: Education provided to patient  Comment:  Educated to call us if anything changes or the fever returns.

## 2023-05-16 ENCOUNTER — PATIENT MESSAGE (OUTPATIENT)
Dept: ADMINISTRATIVE | Facility: OTHER | Age: 63
End: 2023-05-16
Payer: COMMERCIAL

## 2023-05-16 ENCOUNTER — PATIENT MESSAGE (OUTPATIENT)
Dept: HEMATOLOGY/ONCOLOGY | Facility: CLINIC | Age: 63
End: 2023-05-16
Payer: COMMERCIAL

## 2023-05-17 ENCOUNTER — PATIENT MESSAGE (OUTPATIENT)
Dept: ADMINISTRATIVE | Facility: OTHER | Age: 63
End: 2023-05-17
Payer: COMMERCIAL

## 2023-05-17 DIAGNOSIS — Z17.0 MALIGNANT NEOPLASM OF CENTRAL PORTION OF LEFT BREAST IN FEMALE, ESTROGEN RECEPTOR POSITIVE: Primary | ICD-10-CM

## 2023-05-17 DIAGNOSIS — C50.112 MALIGNANT NEOPLASM OF CENTRAL PORTION OF LEFT BREAST IN FEMALE, ESTROGEN RECEPTOR POSITIVE: Primary | ICD-10-CM

## 2023-05-17 RX ORDER — OXYCODONE HYDROCHLORIDE 5 MG/1
5 TABLET ORAL EVERY 4 HOURS PRN
Qty: 30 TABLET | Refills: 0 | Status: SHIPPED | OUTPATIENT
Start: 2023-05-17 | End: 2023-06-05 | Stop reason: SDUPTHER

## 2023-05-17 NOTE — PROGRESS NOTES
Care Companion Intervention    Reason for intervention: Hypotension and Questionnaire response  Comment:  Pain from neulasta     Intervention: Education provided to patient  Comment:  Patient send in oxycodone for pain from the neulasta. She states she feels achy and fluish , but no fevers. Her BP has been low on CCC. She will hydrate with water and Gatorade. She is holding her metoprolol as we are monitoring her pressure regularly.

## 2023-05-18 ENCOUNTER — PATIENT MESSAGE (OUTPATIENT)
Dept: ADMINISTRATIVE | Facility: OTHER | Age: 63
End: 2023-05-18
Payer: COMMERCIAL

## 2023-05-20 ENCOUNTER — PATIENT MESSAGE (OUTPATIENT)
Dept: ADMINISTRATIVE | Facility: OTHER | Age: 63
End: 2023-05-20
Payer: COMMERCIAL

## 2023-05-21 ENCOUNTER — PATIENT MESSAGE (OUTPATIENT)
Dept: ADMINISTRATIVE | Facility: OTHER | Age: 63
End: 2023-05-21
Payer: COMMERCIAL

## 2023-05-22 ENCOUNTER — PATIENT MESSAGE (OUTPATIENT)
Dept: ADMINISTRATIVE | Facility: OTHER | Age: 63
End: 2023-05-22
Payer: COMMERCIAL

## 2023-05-23 ENCOUNTER — PATIENT MESSAGE (OUTPATIENT)
Dept: HEMATOLOGY/ONCOLOGY | Facility: CLINIC | Age: 63
End: 2023-05-23
Payer: COMMERCIAL

## 2023-05-23 ENCOUNTER — PATIENT MESSAGE (OUTPATIENT)
Dept: ADMINISTRATIVE | Facility: OTHER | Age: 63
End: 2023-05-23
Payer: COMMERCIAL

## 2023-05-24 ENCOUNTER — TELEPHONE (OUTPATIENT)
Dept: CARDIOLOGY | Facility: OTHER | Age: 63
End: 2023-05-24
Payer: COMMERCIAL

## 2023-05-24 NOTE — TELEPHONE ENCOUNTER
Received a message from nurse on-call regarding her low blood pressure.  Spoke to the patient this morning.  Patient stated yesterday, her digital medicine blood pressure cuff read 98/36 and she was concerned.  She was asymptomatic.  She also had a low-grade fever 100.6.  Per her oncologist instruction to come in if temp is greater than 100.4, she called the ambulance.  EMS checked her BP 6 times and SBP were 110-120 range.  Highest HR was 110.  She feels fine this morning.  She will bring in her BP cuff to [facility] to change to new cuff.  Her metoprolol 12.5mg has been on hold.

## 2023-05-25 ENCOUNTER — OFFICE VISIT (OUTPATIENT)
Dept: PLASTIC SURGERY | Facility: CLINIC | Age: 63
End: 2023-05-25
Attending: PLASTIC SURGERY
Payer: COMMERCIAL

## 2023-05-25 VITALS — DIASTOLIC BLOOD PRESSURE: 55 MMHG | SYSTOLIC BLOOD PRESSURE: 116 MMHG | HEART RATE: 124 BPM

## 2023-05-25 DIAGNOSIS — C50.112 MALIGNANT NEOPLASM OF CENTRAL PORTION OF LEFT BREAST IN FEMALE, ESTROGEN RECEPTOR POSITIVE: Primary | ICD-10-CM

## 2023-05-25 DIAGNOSIS — Z17.0 MALIGNANT NEOPLASM OF CENTRAL PORTION OF LEFT BREAST IN FEMALE, ESTROGEN RECEPTOR POSITIVE: Primary | ICD-10-CM

## 2023-05-25 PROCEDURE — 3074F SYST BP LT 130 MM HG: CPT | Mod: CPTII,S$GLB,, | Performed by: PLASTIC SURGERY

## 2023-05-25 PROCEDURE — 99211 PR OFFICE/OUTPT VISIT, EST, LEVL I: ICD-10-PCS | Mod: S$GLB,,, | Performed by: PLASTIC SURGERY

## 2023-05-25 PROCEDURE — 3074F PR MOST RECENT SYSTOLIC BLOOD PRESSURE < 130 MM HG: ICD-10-PCS | Mod: CPTII,S$GLB,, | Performed by: PLASTIC SURGERY

## 2023-05-25 PROCEDURE — 3078F PR MOST RECENT DIASTOLIC BLOOD PRESSURE < 80 MM HG: ICD-10-PCS | Mod: CPTII,S$GLB,, | Performed by: PLASTIC SURGERY

## 2023-05-25 PROCEDURE — 1159F PR MEDICATION LIST DOCUMENTED IN MEDICAL RECORD: ICD-10-PCS | Mod: CPTII,S$GLB,, | Performed by: PLASTIC SURGERY

## 2023-05-25 PROCEDURE — 99211 OFF/OP EST MAY X REQ PHY/QHP: CPT | Mod: S$GLB,,, | Performed by: PLASTIC SURGERY

## 2023-05-25 PROCEDURE — 1159F MED LIST DOCD IN RCRD: CPT | Mod: CPTII,S$GLB,, | Performed by: PLASTIC SURGERY

## 2023-05-25 PROCEDURE — 3078F DIAST BP <80 MM HG: CPT | Mod: CPTII,S$GLB,, | Performed by: PLASTIC SURGERY

## 2023-05-25 NOTE — PROGRESS NOTES
Patient presents for follow-up.  She is currently undergoing chemotherapy.    On exam: Right reconstructed breast is soft     Skin paddle was well perfused     The left reconstructed breast is mostly soft but it does have some medial firmness     The skin paddle was well perfused     The abdominal donor site is healing well     Assessment:  Stable     Plan will plan for second-stage about 6 weeks after the end of her chemotherapy which will involve fat grafting excision of medial firmness on the left and mastopexy

## 2023-05-29 ENCOUNTER — PATIENT MESSAGE (OUTPATIENT)
Dept: PSYCHIATRY | Facility: CLINIC | Age: 63
End: 2023-05-29
Payer: COMMERCIAL

## 2023-05-30 ENCOUNTER — PATIENT MESSAGE (OUTPATIENT)
Dept: ADMINISTRATIVE | Facility: OTHER | Age: 63
End: 2023-05-30
Payer: COMMERCIAL

## 2023-05-30 NOTE — PROGRESS NOTES
Oncology Clinic   Progress Note    Patient: Lucia Matias  MRN: [id number]  Date: 2023    Chief Complaint: HR+ breast cancer    Ms. Matias is a domo 61yo woman with recently diagnosed HR+ breast cancer who presents today for evaluation. Her oncologic history is as follows:    Oncologic History:   22: annual mammogram indentified left focal asymmetry at the upper outer position.   Follow-up mammogram and ultrasound on 22 showed a worrisome spiculated mass, 1.4 x 0.7 x 0.6 cm, 12 o'clock left breast 7 CMFN. An ultrasound guided biopsy was performed on 12/15/22 with pathology revealing infiltrating ductal carcinoma of the breast. Grade 2, ER >95%, MD 85-90%, Her2 1+, Ki67 25-30%  2023: MRI breast shwoed Left breast 12 mm x 11 mm x 7 mm mass at the middle 12 o'clock position. Several pulmonary nodules are noted incidentally in the left hemithorax.  The patient has a history of bilateral pulmonary nodule seen on previous thoracic CT exams most recently in .  One of the nodules underwent biopsy in  with benign results.    Underwent b/l mastectomies with SLN bx 2/15/2023 with bilateral breast reconstruction with abdominally based free flaps. He postoperative course was complicated by L pneumothorax.  Post op path showed Invasive lobular carcinoma in left breast grade 2 measuring 18 mm with LCIS Grade 2 ER MD positive and Her2 negative. pT1c pN0. Oncotype 35  C1D1 adjuvanct TC on     GYN History:  Age of menarche was 13. Age of menopause was 46.  Patient denies hormonal therapy but took OCP for approximately 15 years in the past. Patient is . Age of first live birth was 25. Patient did breast feed for 6 months. S/p uterine ablation for fibroids in early 40s, no menstrual cycle since. Had menopausal symptoms in mid-late 40s.       Interval History:  Ms. Matias returns today for follow up accompanied by her . Notes that she tolerated her last cycle much better  than the first. She reported bone pain following neulasta requiring a short course of tramadol; has been taking claritin as well. No significant difficulty with nausea, vomiting, diarrhea, mouth sores or neuropathy. Previously was concerned about low bp readings, but states that she has been asymptomatic and when checked on her 's bp cuff her readings have been normal. Was holding metoprolol for hypotension, but plans to resume. No new complaints today otherwise.         Medications:  Current Outpatient Medications   Medication Sig Dispense Refill    apixaban (ELIQUIS) 5 mg Tab Take 1 tablet (5 mg total) by mouth 2 (two) times daily. Will hold 2/13 & 2/14 60 tablet 5    atorvastatin (LIPITOR) 20 MG tablet Take 1 tablet (20 mg total) by mouth every evening. 90 tablet 3    B-complex with vitamin C (VITAMIN B COMPLEX-C ORAL) Take by mouth Daily.      calcium-vitamin D 250-100 mg-unit per tablet Take 1 tablet by mouth 2 (two) times daily.      COSENTYX PEN, 2 PENS, 150 mg/mL PnIj Inject 300mg (2 pens) into the skin every 4 weeks 2 mL 11    cyanocobalamin 500 MCG tablet Take 500 mcg by mouth once daily.      diazePAM (VALIUM) 5 MG tablet Take 1 tablet (5 mg total) by mouth daily as needed for Anxiety. 30 tablet 2    fluticasone-umeclidin-vilanter (TRELEGY ELLIPTA) 100-62.5-25 mcg DsDv Inhale 1 puff into the lungs once daily. 180 each 3    levoFLOXacin (LEVAQUIN) 500 MG tablet Take 1 tablet (500 mg total) by mouth once daily. for 21 days 7 tablet 2    metoprolol tartrate (LOPRESSOR) 25 MG tablet Take 1 tablet (25 mg total) by mouth 2 (two) times daily. 60 tablet 11    multivitamin (THERAGRAN) per tablet Take 1 tablet by mouth once daily.      multivitamin with minerals (HAIR,SKIN AND NAILS ORAL) Take by mouth.      mv-mn/iron/folic acid/herb 190 (VITAMIN D3 COMPLETE ORAL) Take by mouth.      omeprazole (PRILOSEC) 40 MG capsule Take 1 capsule (40 mg total) by mouth every morning. 90 capsule 1    ondansetron (ZOFRAN)  "8 MG tablet Take 1 tablet (8 mg total) by mouth every 12 (twelve) hours as needed for Nausea. 30 tablet 2    oxyCODONE (ROXICODONE) 5 MG immediate release tablet Take 1 tablet (5 mg total) by mouth every 4 (four) hours as needed for Pain. 30 tablet 0    triamcinolone acetonide 0.1% (KENALOG) 0.1 % cream Apply topically 2 (two) times daily. for 7 days 80 g 0    venlafaxine (EFFEXOR-XR) 75 MG 24 hr capsule Take 1 capsule (75 mg total) by mouth once daily. Start on Week 2 90 capsule 3     No current facility-administered medications for this visit.     Facility-Administered Medications Ordered in Other Visits   Medication Dose Route Frequency Provider Last Rate Last Admin    lactated ringers infusion   Intravenous Continuous Yahaira Barnard MD   New Bag at 03/29/23 0638    LIDOcaine (PF) 10 mg/ml (1%) injection 5 mg  0.5 mL Intradermal Once Yahaira Barnard MD        LIDOcaine HCL 10 mg/ml (1%) 50 mL, EPINEPHrine 1 mg in lactated Ringers 1,000 mL irrigation   Irrigation On Call Procedure Ming Milian MD         Review of Systems:  Answers submitted by the patient for this visit:  Review of Systems Questionnaire (Submitted on 5/30/2023)  appetite change : No  unexpected weight change: No  mouth sores: No  visual disturbance: Yes  cough: No  shortness of breath: No  chest pain: No  abdominal pain: No  diarrhea: No  frequency: No  back pain: Yes  rash: No  headaches: Yes  adenopathy: No  nervous/ anxious: Yes        Objective:     Vitals:    05/31/23 1045   BP: (!) 112/55   Pulse: (!) 114   Resp: 16   SpO2: 98%   Weight: 77.9 kg (171 lb 10.1 oz)   Height: 5' 2" (1.575 m)     BMI: Body mass index is 31.39 kg/m².     Physical Exam:  ECOG 0   General: well appearing, in no apparent distress  HEENT: Normocephalic, EOMI, anicteric sclerae, MMM  Neck: supple, without cervical or supraclavicular lymphadenopathy.  Heart: regular rate and rhythm, normal S1 and S2, no murmurs, gallops or rubs.  Lungs: Clear to auscultation " bilaterally, no increased wob  Breast: s/p bilateral mastectomy with flap reconstruction, incisions well-healed  Abdomen: Soft, nontender, nondistended with normal bowel sounds. Abdominal incision c/d/I. No hepatosplenomegaly.  Extremities: No LE edema or joint effusion. Picc in place in RUE  Skin: warm, well-perfused, no rash. Mild erythema and bruising near site of previous chemo infsuion iv site. Erythematous plaques noted on back of hands and Rt elbow  Neurologic: Alert and oriented x 4, normal speech and gait   Psychiatric: Conversing appropriately with providers throughout today's encounter.    Laboratory Data:  Lab Visit on 05/31/2023   Component Date Value    WBC 05/31/2023 7.41     RBC 05/31/2023 4.09     Hemoglobin 05/31/2023 11.2 (L)     Hematocrit 05/31/2023 36.3 (L)     MCV 05/31/2023 89     MCH 05/31/2023 27.4     MCHC 05/31/2023 30.9 (L)     RDW 05/31/2023 14.6 (H)     Platelets 05/31/2023 464 (H)     MPV 05/31/2023 8.8 (L)     Immature Granulocytes 05/31/2023 0.4     Gran # (ANC) 05/31/2023 6.1     Immature Grans (Abs) 05/31/2023 0.03     Lymph # 05/31/2023 0.7 (L)     Mono # 05/31/2023 0.5     Eos # 05/31/2023 0.0     Baso # 05/31/2023 0.07     nRBC 05/31/2023 0     Gran % 05/31/2023 82.8 (H)     Lymph % 05/31/2023 9.3 (L)     Mono % 05/31/2023 6.2     Eosinophil % 05/31/2023 0.4     Basophil % 05/31/2023 0.9     Differential Method 05/31/2023 Automated     Sodium 05/31/2023 144     Potassium 05/31/2023 4.0     Chloride 05/31/2023 107     CO2 05/31/2023 25     Glucose 05/31/2023 114 (H)     BUN 05/31/2023 11     Creatinine 05/31/2023 0.8     Calcium 05/31/2023 9.9     Total Protein 05/31/2023 7.2     Albumin 05/31/2023 3.0 (L)     Total Bilirubin 05/31/2023 0.4     Alkaline Phosphatase 05/31/2023 101     AST 05/31/2023 18     ALT 05/31/2023 19     Anion Gap 05/31/2023 12     eGFR 05/31/2023 >60.0    I personally reviewed all recent labs, imaging and pathology.    Assessment and Plan:   Ms. Matias is  a domo 63yo woman with hx of Aflutter s/p ablation, COPD, RA and recently diagnosed Stage IA HR+ breast cancer s/p bilateral mastectomy with reconstruction who presents today for evaluation.     Given her Oncotype of 35, we proceeded with adjuvant docetaxel 75mg/m2 and cyclophosphamide 600mg/m2 iv every 21 days for a total of 4 cycles. Cycle 1 was complicated by neutropenic fever in the setting of E coli bacteremia and parainfluenza.    #Breast cancer:  --labs today reviewed and ok to proceed with C3D1 TC as scheduled  --will cont with 20% DR docetaxel given neutropenia with cycle 1 and recent infections  --following completion of chemotherapy will plan to start ET with anastrozole  --of note, osteopenia noted on DEXA 12/2018- pt on Ca/VitD. Will plan to repeat prior to ET start and discuss role of bisphosphonate if needed  --enrolled in [facility]  --RTC in 3 weeks    #Bone pain related to chemotherapy/growth factor:  --encouraged to continue steroids and claritin following infusion/neulasta as discussed; she will reach out if she needs additional pain medication    #E coli bacteremia: now resolved. S/p 2 weeks of iv ertapenem following cycle 1  --previously discussed with ID, plan to add 7 day course of ppx levaquin following remaining cycles given recent bacteremia and some ongoing concern for colonization given recent UTIs      #Psoriasis/psoriatic arthritis:  --holding cosentyx while on chemotherapy; notes that symptoms beginning to return  --has topical steroid she will try in the meantime; encouraged her to reach out to dermatology to see if they have any suggestions to use while she remains on chemo      #Hypotension: suspect this may be related to incorrect cuff size; she remains asymptomatic and bp readings on alternative cuff (and here today) have been wnl  --ok to resume metoprolol; encouraged her to cont checking bp on different cuff and if low to hold       #Aflutter s/p RFA: following with  cardiology  --remains of eliquis  --follow up with cards as scheduled      All questions were answered to her apparent satisfaction. Will plan to see her back in 3 weeks or sooner should the need arise.     Roya Madrid MD      Med Onc Chart Routing      Follow up with physician 3 weeks. scheduled 6/21   Follow up with LINDY    Infusion scheduling note scheduled 6/21   Injection scheduling note scheduled   Labs CBC and CMP   Scheduling: Labs same day as infusion  Preferred lab:  Lab interval: every 3 weeks     Imaging None      Pharmacy appointment No pharmacy appointment needed      Other referrals no Refer to Oncology Primary Care - No additional referrals needed

## 2023-05-31 ENCOUNTER — INFUSION (OUTPATIENT)
Dept: INFUSION THERAPY | Facility: HOSPITAL | Age: 63
End: 2023-05-31
Payer: COMMERCIAL

## 2023-05-31 ENCOUNTER — LAB VISIT (OUTPATIENT)
Dept: LAB | Facility: HOSPITAL | Age: 63
End: 2023-05-31
Attending: STUDENT IN AN ORGANIZED HEALTH CARE EDUCATION/TRAINING PROGRAM
Payer: COMMERCIAL

## 2023-05-31 ENCOUNTER — OFFICE VISIT (OUTPATIENT)
Dept: HEMATOLOGY/ONCOLOGY | Facility: CLINIC | Age: 63
End: 2023-05-31
Payer: COMMERCIAL

## 2023-05-31 VITALS
HEIGHT: 62 IN | BODY MASS INDEX: 31.58 KG/M2 | HEART RATE: 114 BPM | OXYGEN SATURATION: 98 % | DIASTOLIC BLOOD PRESSURE: 55 MMHG | WEIGHT: 171.63 LBS | SYSTOLIC BLOOD PRESSURE: 112 MMHG | RESPIRATION RATE: 16 BRPM

## 2023-05-31 VITALS
DIASTOLIC BLOOD PRESSURE: 67 MMHG | SYSTOLIC BLOOD PRESSURE: 149 MMHG | BODY MASS INDEX: 31.58 KG/M2 | RESPIRATION RATE: 18 BRPM | OXYGEN SATURATION: 97 % | TEMPERATURE: 98 F | HEART RATE: 77 BPM | HEIGHT: 62 IN | WEIGHT: 171.63 LBS

## 2023-05-31 DIAGNOSIS — Z98.890 STATUS POST ABLATION OF ATRIAL FLUTTER: ICD-10-CM

## 2023-05-31 DIAGNOSIS — G89.18 JOINT PAIN FOLLOWING CHEMOTHERAPY: ICD-10-CM

## 2023-05-31 DIAGNOSIS — I10 ESSENTIAL HYPERTENSION: ICD-10-CM

## 2023-05-31 DIAGNOSIS — C50.112 MALIGNANT NEOPLASM OF CENTRAL PORTION OF LEFT BREAST IN FEMALE, ESTROGEN RECEPTOR POSITIVE: Primary | ICD-10-CM

## 2023-05-31 DIAGNOSIS — Z86.79 STATUS POST ABLATION OF ATRIAL FLUTTER: ICD-10-CM

## 2023-05-31 DIAGNOSIS — Z17.0 MALIGNANT NEOPLASM OF CENTRAL PORTION OF LEFT BREAST IN FEMALE, ESTROGEN RECEPTOR POSITIVE: Primary | ICD-10-CM

## 2023-05-31 DIAGNOSIS — M25.50 JOINT PAIN FOLLOWING CHEMOTHERAPY: ICD-10-CM

## 2023-05-31 DIAGNOSIS — B96.20 E COLI BACTEREMIA: ICD-10-CM

## 2023-05-31 DIAGNOSIS — Z17.0 MALIGNANT NEOPLASM OF CENTRAL PORTION OF LEFT BREAST IN FEMALE, ESTROGEN RECEPTOR POSITIVE: ICD-10-CM

## 2023-05-31 DIAGNOSIS — L40.50 PSORIATIC ARTHRITIS: ICD-10-CM

## 2023-05-31 DIAGNOSIS — C50.112 MALIGNANT NEOPLASM OF CENTRAL PORTION OF LEFT BREAST IN FEMALE, ESTROGEN RECEPTOR POSITIVE: ICD-10-CM

## 2023-05-31 DIAGNOSIS — R78.81 E COLI BACTEREMIA: ICD-10-CM

## 2023-05-31 LAB
ALBUMIN SERPL BCP-MCNC: 3 G/DL (ref 3.5–5.2)
ALP SERPL-CCNC: 101 U/L (ref 55–135)
ALT SERPL W/O P-5'-P-CCNC: 19 U/L (ref 10–44)
ANION GAP SERPL CALC-SCNC: 12 MMOL/L (ref 8–16)
AST SERPL-CCNC: 18 U/L (ref 10–40)
BASOPHILS # BLD AUTO: 0.07 K/UL (ref 0–0.2)
BASOPHILS NFR BLD: 0.9 % (ref 0–1.9)
BILIRUB SERPL-MCNC: 0.4 MG/DL (ref 0.1–1)
BUN SERPL-MCNC: 11 MG/DL (ref 6–20)
CALCIUM SERPL-MCNC: 9.9 MG/DL (ref 8.7–10.5)
CHLORIDE SERPL-SCNC: 107 MMOL/L (ref 95–110)
CO2 SERPL-SCNC: 25 MMOL/L (ref 23–29)
CREAT SERPL-MCNC: 0.8 MG/DL (ref 0.5–1.4)
DIFFERENTIAL METHOD: ABNORMAL
EOSINOPHIL # BLD AUTO: 0 K/UL (ref 0–0.5)
EOSINOPHIL NFR BLD: 0.4 % (ref 0–8)
ERYTHROCYTE [DISTWIDTH] IN BLOOD BY AUTOMATED COUNT: 14.6 % (ref 11.5–14.5)
EST. GFR  (NO RACE VARIABLE): >60 ML/MIN/1.73 M^2
GLUCOSE SERPL-MCNC: 114 MG/DL (ref 70–110)
HCT VFR BLD AUTO: 36.3 % (ref 37–48.5)
HGB BLD-MCNC: 11.2 G/DL (ref 12–16)
IMM GRANULOCYTES # BLD AUTO: 0.03 K/UL (ref 0–0.04)
IMM GRANULOCYTES NFR BLD AUTO: 0.4 % (ref 0–0.5)
LYMPHOCYTES # BLD AUTO: 0.7 K/UL (ref 1–4.8)
LYMPHOCYTES NFR BLD: 9.3 % (ref 18–48)
MCH RBC QN AUTO: 27.4 PG (ref 27–31)
MCHC RBC AUTO-ENTMCNC: 30.9 G/DL (ref 32–36)
MCV RBC AUTO: 89 FL (ref 82–98)
MONOCYTES # BLD AUTO: 0.5 K/UL (ref 0.3–1)
MONOCYTES NFR BLD: 6.2 % (ref 4–15)
NEUTROPHILS # BLD AUTO: 6.1 K/UL (ref 1.8–7.7)
NEUTROPHILS NFR BLD: 82.8 % (ref 38–73)
NRBC BLD-RTO: 0 /100 WBC
PLATELET # BLD AUTO: 464 K/UL (ref 150–450)
PMV BLD AUTO: 8.8 FL (ref 9.2–12.9)
POTASSIUM SERPL-SCNC: 4 MMOL/L (ref 3.5–5.1)
PROT SERPL-MCNC: 7.2 G/DL (ref 6–8.4)
RBC # BLD AUTO: 4.09 M/UL (ref 4–5.4)
SODIUM SERPL-SCNC: 144 MMOL/L (ref 136–145)
WBC # BLD AUTO: 7.41 K/UL (ref 3.9–12.7)

## 2023-05-31 PROCEDURE — A4216 STERILE WATER/SALINE, 10 ML: HCPCS | Performed by: STUDENT IN AN ORGANIZED HEALTH CARE EDUCATION/TRAINING PROGRAM

## 2023-05-31 PROCEDURE — 99999 PR PBB SHADOW E&M-EST. PATIENT-LVL IV: ICD-10-PCS | Mod: PBBFAC,,, | Performed by: STUDENT IN AN ORGANIZED HEALTH CARE EDUCATION/TRAINING PROGRAM

## 2023-05-31 PROCEDURE — 63600175 PHARM REV CODE 636 W HCPCS: Performed by: STUDENT IN AN ORGANIZED HEALTH CARE EDUCATION/TRAINING PROGRAM

## 2023-05-31 PROCEDURE — 96367 TX/PROPH/DG ADDL SEQ IV INF: CPT

## 2023-05-31 PROCEDURE — 99215 OFFICE O/P EST HI 40 MIN: CPT | Mod: S$GLB,,, | Performed by: STUDENT IN AN ORGANIZED HEALTH CARE EDUCATION/TRAINING PROGRAM

## 2023-05-31 PROCEDURE — 96375 TX/PRO/DX INJ NEW DRUG ADDON: CPT

## 2023-05-31 PROCEDURE — 80053 COMPREHEN METABOLIC PANEL: CPT | Performed by: STUDENT IN AN ORGANIZED HEALTH CARE EDUCATION/TRAINING PROGRAM

## 2023-05-31 PROCEDURE — 3074F SYST BP LT 130 MM HG: CPT | Mod: CPTII,S$GLB,, | Performed by: STUDENT IN AN ORGANIZED HEALTH CARE EDUCATION/TRAINING PROGRAM

## 2023-05-31 PROCEDURE — 96417 CHEMO IV INFUS EACH ADDL SEQ: CPT

## 2023-05-31 PROCEDURE — 3078F PR MOST RECENT DIASTOLIC BLOOD PRESSURE < 80 MM HG: ICD-10-PCS | Mod: CPTII,S$GLB,, | Performed by: STUDENT IN AN ORGANIZED HEALTH CARE EDUCATION/TRAINING PROGRAM

## 2023-05-31 PROCEDURE — 3078F DIAST BP <80 MM HG: CPT | Mod: CPTII,S$GLB,, | Performed by: STUDENT IN AN ORGANIZED HEALTH CARE EDUCATION/TRAINING PROGRAM

## 2023-05-31 PROCEDURE — 3008F BODY MASS INDEX DOCD: CPT | Mod: CPTII,S$GLB,, | Performed by: STUDENT IN AN ORGANIZED HEALTH CARE EDUCATION/TRAINING PROGRAM

## 2023-05-31 PROCEDURE — 3008F PR BODY MASS INDEX (BMI) DOCUMENTED: ICD-10-PCS | Mod: CPTII,S$GLB,, | Performed by: STUDENT IN AN ORGANIZED HEALTH CARE EDUCATION/TRAINING PROGRAM

## 2023-05-31 PROCEDURE — 99999 PR PBB SHADOW E&M-EST. PATIENT-LVL IV: CPT | Mod: PBBFAC,,, | Performed by: STUDENT IN AN ORGANIZED HEALTH CARE EDUCATION/TRAINING PROGRAM

## 2023-05-31 PROCEDURE — 96413 CHEMO IV INFUSION 1 HR: CPT

## 2023-05-31 PROCEDURE — 3074F PR MOST RECENT SYSTOLIC BLOOD PRESSURE < 130 MM HG: ICD-10-PCS | Mod: CPTII,S$GLB,, | Performed by: STUDENT IN AN ORGANIZED HEALTH CARE EDUCATION/TRAINING PROGRAM

## 2023-05-31 PROCEDURE — 85025 COMPLETE CBC W/AUTO DIFF WBC: CPT | Performed by: STUDENT IN AN ORGANIZED HEALTH CARE EDUCATION/TRAINING PROGRAM

## 2023-05-31 PROCEDURE — 36415 COLL VENOUS BLD VENIPUNCTURE: CPT | Performed by: STUDENT IN AN ORGANIZED HEALTH CARE EDUCATION/TRAINING PROGRAM

## 2023-05-31 PROCEDURE — 99215 PR OFFICE/OUTPT VISIT, EST, LEVL V, 40-54 MIN: ICD-10-PCS | Mod: S$GLB,,, | Performed by: STUDENT IN AN ORGANIZED HEALTH CARE EDUCATION/TRAINING PROGRAM

## 2023-05-31 PROCEDURE — 25000003 PHARM REV CODE 250: Performed by: STUDENT IN AN ORGANIZED HEALTH CARE EDUCATION/TRAINING PROGRAM

## 2023-05-31 RX ORDER — HEPARIN 100 UNIT/ML
500 SYRINGE INTRAVENOUS
Status: CANCELLED | OUTPATIENT
Start: 2023-05-31

## 2023-05-31 RX ORDER — SODIUM CHLORIDE 0.9 % (FLUSH) 0.9 %
10 SYRINGE (ML) INJECTION
Status: CANCELLED | OUTPATIENT
Start: 2023-05-31

## 2023-05-31 RX ORDER — SODIUM CHLORIDE 0.9 % (FLUSH) 0.9 %
10 SYRINGE (ML) INJECTION
Status: DISCONTINUED | OUTPATIENT
Start: 2023-05-31 | End: 2023-05-31 | Stop reason: HOSPADM

## 2023-05-31 RX ORDER — LEVOFLOXACIN 500 MG/1
500 TABLET, FILM COATED ORAL DAILY
Qty: 7 TABLET | Refills: 1 | Status: SHIPPED | OUTPATIENT
Start: 2023-05-31 | End: 2023-06-21 | Stop reason: SDUPTHER

## 2023-05-31 RX ADMIN — SODIUM CHLORIDE: 9 INJECTION, SOLUTION INTRAVENOUS at 12:05

## 2023-05-31 RX ADMIN — DOCETAXEL ANHYDROUS 115 MG: 10 INJECTION, SOLUTION INTRAVENOUS at 01:05

## 2023-05-31 RX ADMIN — CYCLOPHOSPHAMIDE 1120 MG: 200 INJECTION, SOLUTION INTRAVENOUS at 02:05

## 2023-05-31 RX ADMIN — APREPITANT 130 MG: 130 INJECTION, EMULSION INTRAVENOUS at 12:05

## 2023-05-31 RX ADMIN — Medication 10 ML: at 03:05

## 2023-05-31 RX ADMIN — PALONOSETRON HYDROCHLORIDE 0.25 MG: 0.25 INJECTION, SOLUTION INTRAVENOUS at 12:05

## 2023-06-01 ENCOUNTER — PATIENT MESSAGE (OUTPATIENT)
Dept: ADMINISTRATIVE | Facility: OTHER | Age: 63
End: 2023-06-01
Payer: COMMERCIAL

## 2023-06-01 ENCOUNTER — OFFICE VISIT (OUTPATIENT)
Dept: PSYCHIATRY | Facility: CLINIC | Age: 63
End: 2023-06-01
Payer: COMMERCIAL

## 2023-06-01 ENCOUNTER — INFUSION (OUTPATIENT)
Dept: INFUSION THERAPY | Facility: HOSPITAL | Age: 63
End: 2023-06-01
Attending: INTERNAL MEDICINE
Payer: COMMERCIAL

## 2023-06-01 DIAGNOSIS — Z17.0 MALIGNANT NEOPLASM OF CENTRAL PORTION OF LEFT BREAST IN FEMALE, ESTROGEN RECEPTOR POSITIVE: Primary | ICD-10-CM

## 2023-06-01 DIAGNOSIS — C50.112 MALIGNANT NEOPLASM OF CENTRAL PORTION OF LEFT BREAST IN FEMALE, ESTROGEN RECEPTOR POSITIVE: Primary | ICD-10-CM

## 2023-06-01 DIAGNOSIS — F41.1 GAD (GENERALIZED ANXIETY DISORDER): ICD-10-CM

## 2023-06-01 DIAGNOSIS — F41.0 PANIC ATTACK: ICD-10-CM

## 2023-06-01 DIAGNOSIS — G47.00 INSOMNIA, UNSPECIFIED TYPE: ICD-10-CM

## 2023-06-01 DIAGNOSIS — F33.40 MDD (RECURRENT MAJOR DEPRESSIVE DISORDER) IN REMISSION: Primary | ICD-10-CM

## 2023-06-01 PROCEDURE — 63600175 PHARM REV CODE 636 W HCPCS: Mod: JZ,JG | Performed by: STUDENT IN AN ORGANIZED HEALTH CARE EDUCATION/TRAINING PROGRAM

## 2023-06-01 PROCEDURE — 99214 PR OFFICE/OUTPT VISIT, EST, LEVL IV, 30-39 MIN: ICD-10-PCS | Mod: 95,,, | Performed by: NURSE PRACTITIONER

## 2023-06-01 PROCEDURE — 1159F PR MEDICATION LIST DOCUMENTED IN MEDICAL RECORD: ICD-10-PCS | Mod: CPTII,95,, | Performed by: NURSE PRACTITIONER

## 2023-06-01 PROCEDURE — 96372 THER/PROPH/DIAG INJ SC/IM: CPT

## 2023-06-01 PROCEDURE — 1159F MED LIST DOCD IN RCRD: CPT | Mod: CPTII,95,, | Performed by: NURSE PRACTITIONER

## 2023-06-01 PROCEDURE — 1160F RVW MEDS BY RX/DR IN RCRD: CPT | Mod: CPTII,95,, | Performed by: NURSE PRACTITIONER

## 2023-06-01 PROCEDURE — 1160F PR REVIEW ALL MEDS BY PRESCRIBER/CLIN PHARMACIST DOCUMENTED: ICD-10-PCS | Mod: CPTII,95,, | Performed by: NURSE PRACTITIONER

## 2023-06-01 PROCEDURE — 99214 OFFICE O/P EST MOD 30 MIN: CPT | Mod: 95,,, | Performed by: NURSE PRACTITIONER

## 2023-06-01 RX ORDER — DIAZEPAM 5 MG/1
5 TABLET ORAL DAILY PRN
Qty: 30 TABLET | Refills: 2 | Status: SHIPPED | OUTPATIENT
Start: 2023-06-01 | End: 2023-08-01 | Stop reason: SDUPTHER

## 2023-06-01 RX ORDER — VENLAFAXINE HYDROCHLORIDE 75 MG/1
75 CAPSULE, EXTENDED RELEASE ORAL DAILY
Qty: 90 CAPSULE | Refills: 3 | Status: ON HOLD | OUTPATIENT
Start: 2023-06-01

## 2023-06-01 RX ORDER — MIRTAZAPINE 15 MG/1
15 TABLET, FILM COATED ORAL NIGHTLY
Qty: 90 TABLET | Refills: 3 | Status: SHIPPED | OUTPATIENT
Start: 2023-06-01 | End: 2023-07-12

## 2023-06-01 RX ADMIN — PEGFILGRASTIM-CBQV 6 MG: 6 INJECTION, SOLUTION SUBCUTANEOUS at 12:06

## 2023-06-01 NOTE — PLAN OF CARE
Pt tolerated injection well. Pt reeducated on sign and symptoms of reaction and instructed to seek medical care if reaction noted. Pt denied AVS, will use MyChart. Pt ambulated out of clinic.

## 2023-06-01 NOTE — PROGRESS NOTES
Outpatient Psychiatry Follow-Up Visit (MD/NP)    6/1/2023    Clinical Status of Patient:  Outpatient (Ambulatory)    Chief Complaint:  Lucia Matias is a 62 y.o. female who presents today for follow-up of anxiety.  Met with patient.      Last visit was: 2/13/23  Chart and  reviewed.   The patient location is: home   The chief complaint leading to consultation is: anxiety    Visit type: audiovisual    Face to Face time with patient: 30 minutes  35 minutes of total time spent on the encounter, which includes face to face time and non-face to face time preparing to see the patient (eg, review of tests), Obtaining and/or reviewing separately obtained history, Documenting clinical information in the electronic or other health record, Independently interpreting results (not separately reported) and communicating results to the patient/family/caregiver, or Care coordination (not separately reported).     Each patient to whom he or she provides medical services by telemedicine is:  (1) informed of the relationship between the physician and patient and the respective role of any other health care provider with respect to management of the patient; and (2) notified that he or she may decline to receive medical services by telemedicine and may withdraw from such care at any time.    Interval History and Content of Current Session:  Current Psychiatric Medications/changes  Continue Effexor XR 75 mg daily  Continue Valium 5 mg daily as needed for severe anxiety  Trazodone 100 mg before bed as needed for insomnia      Virtual Visit:  Pt is in final round of chemotherapy. Reports trouble sleeping. Takes 2 trazodone with no effect. Will try Remeron. Reports  is supportive but feels he has not been physically affectionate. Reports moderate use of Valium for anxiety; no misuse; instructed to not take with pain meds. Depression managed with Effexor. Thought processes appear clear and organized. Denies  SI/HI/AVH.    Previous medication trial: Prozac, Paxil, Wellbutrin - notes she felt worse, more depressed, SI - admits it could have also been the situation she was in at the time.      Psychotherapy:  Target symptoms: anxiety   Why chosen therapy is appropriate versus another modality: relevant to diagnosis  Outcome monitoring methods: self-report  Therapeutic intervention type: insight oriented psychotherapy  Topics discussed/themes: building skills sets for symptom management, symptom recognition  The patient's response to the intervention is accepting. The patient's progress toward treatment goals is good.   Duration of intervention: 15 minutes.    Review of Systems   PSYCHIATRIC: Pertinant items are noted in the narrative.  CONSTITUTIONAL: No weight gain or loss.   MUSCULOSKELETAL: No pain or stiffness of the joints.  NEUROLOGIC: No weakness, sensory changes, seizures, confusion, memory loss, tremor or other abnormal movements.  ENDOCRINE: No polydipsia or polyuria.  INTEGUMENTARY: No rashes or lacerations.  EYES: No exophthalmos, jaundice or blindness.  ENT: No dizziness, tinnitus or hearing loss.  RESPIRATORY: No shortness of breath.  CARDIOVASCULAR: No tachycardia or chest pain.  GASTROINTESTINAL: No nausea, vomiting, pain, constipation or diarrhea.  GENITOURINARY: No frequency, dysuria or sexual dysfunction.  HEMATOLOGIC/LYMPHATIC: No excessive bleeding, prolonged or excessive bleeding after dental extraction/injury.  ALLERGIC/IMMUNOLOGIC: No allergic response to materials, foods or animals at this time.    Past Medical, Family and Social History: The patient's past medical, family and social history have been reviewed and updated as appropriate within the electronic medical record - see encounter notes.    Compliance: yes    Side effects: see above    Risk Parameters:  Patient reports no suicidal ideation  Patient reports no homicidal ideation  Patient reports no self-injurious behavior  Patient reports no  violent behavior    Exam (detailed: at least 9 elements; comprehensive: all 15 elements)   Constitutional  Vitals:  Most recent vital signs, dated greater than 90 days prior to this appointment, were reviewed.   There were no vitals filed for this visit.       General:  unremarkable, age appropriate     Musculoskeletal  Muscle Strength/Tone:  not examined   Gait & Station:  non-ataxic     Psychiatric  Speech:  no latency; no press   Mood & Affect:  steady  congruent and appropriate   Thought Process:  normal and logical   Associations:  intact   Thought Content:  normal, no suicidality, no homicidality, delusions, or paranoia   Insight:  intact   Judgement: behavior is adequate to circumstances   Orientation:  grossly intact   Memory: intact for content of interview   Language: grossly intact   Attention Span & Concentration:  able to focus   Fund of Knowledge:  intact and appropriate to age and level of education     Assessment and Diagnosis   Status/Progress: Based on the examination today, the patient's problem(s) is/are adequately but not ideally controlled.  New problems have not been presented today.   Co-morbidities and Lack of compliance are not complicating management of the primary condition.  There are no active rule-out diagnoses for this patient at this time.     General Impression:       ICD-10-CM ICD-9-CM   1. MDD (recurrent major depressive disorder) in remission  F33.40 296.35   2. RANDI (generalized anxiety disorder)  F41.1 300.02   3. Panic attack  F41.0 300.01   4. Insomnia, unspecified type  G47.00 780.52       Intervention/Counseling/Treatment Plan   Medication Management: The risks and benefits of medication were discussed with the patient.  Continue Effexor XR 75 mg daily  Continue Valium 5 mg daily as needed for severe anxiety  DC Trazodone (ineffecitve)  Start Remeron 15 mg before bed as needed for insomnia    Return to Clinic: 6 months, as needed    Risks, benefits, side effects and  alternative treatments discussed with patient. Patient agrees with the current plan as documented.  Encouraged Patient to keep future appointments.  Take medications as prescribed and abstain from substance abuse.  Pt to present to ED for thoughts to harm herself or others

## 2023-06-02 ENCOUNTER — PATIENT MESSAGE (OUTPATIENT)
Dept: ADMINISTRATIVE | Facility: OTHER | Age: 63
End: 2023-06-02
Payer: COMMERCIAL

## 2023-06-03 ENCOUNTER — PATIENT MESSAGE (OUTPATIENT)
Dept: ADMINISTRATIVE | Facility: OTHER | Age: 63
End: 2023-06-03
Payer: COMMERCIAL

## 2023-06-04 ENCOUNTER — PATIENT MESSAGE (OUTPATIENT)
Dept: ADMINISTRATIVE | Facility: OTHER | Age: 63
End: 2023-06-04
Payer: COMMERCIAL

## 2023-06-05 ENCOUNTER — PATIENT MESSAGE (OUTPATIENT)
Dept: HEMATOLOGY/ONCOLOGY | Facility: CLINIC | Age: 63
End: 2023-06-05
Payer: COMMERCIAL

## 2023-06-05 ENCOUNTER — PATIENT MESSAGE (OUTPATIENT)
Dept: ADMINISTRATIVE | Facility: OTHER | Age: 63
End: 2023-06-05
Payer: COMMERCIAL

## 2023-06-05 DIAGNOSIS — Z17.0 MALIGNANT NEOPLASM OF CENTRAL PORTION OF LEFT BREAST IN FEMALE, ESTROGEN RECEPTOR POSITIVE: ICD-10-CM

## 2023-06-05 DIAGNOSIS — C50.112 MALIGNANT NEOPLASM OF CENTRAL PORTION OF LEFT BREAST IN FEMALE, ESTROGEN RECEPTOR POSITIVE: ICD-10-CM

## 2023-06-05 RX ORDER — OXYCODONE HYDROCHLORIDE 5 MG/1
5 TABLET ORAL EVERY 4 HOURS PRN
Qty: 30 TABLET | Refills: 0 | Status: SHIPPED | OUTPATIENT
Start: 2023-06-05 | End: 2023-07-12

## 2023-06-05 RX ORDER — OXYCODONE HYDROCHLORIDE 5 MG/1
5 TABLET ORAL EVERY 4 HOURS PRN
Qty: 30 TABLET | Refills: 0 | OUTPATIENT
Start: 2023-06-05

## 2023-06-06 ENCOUNTER — PATIENT MESSAGE (OUTPATIENT)
Dept: ADMINISTRATIVE | Facility: OTHER | Age: 63
End: 2023-06-06
Payer: COMMERCIAL

## 2023-06-07 ENCOUNTER — PATIENT MESSAGE (OUTPATIENT)
Dept: BARIATRICS | Facility: CLINIC | Age: 63
End: 2023-06-07
Payer: COMMERCIAL

## 2023-06-07 ENCOUNTER — PATIENT MESSAGE (OUTPATIENT)
Dept: ADMINISTRATIVE | Facility: OTHER | Age: 63
End: 2023-06-07
Payer: COMMERCIAL

## 2023-06-08 ENCOUNTER — PATIENT MESSAGE (OUTPATIENT)
Dept: ADMINISTRATIVE | Facility: OTHER | Age: 63
End: 2023-06-08
Payer: COMMERCIAL

## 2023-06-09 ENCOUNTER — OUTPATIENT CASE MANAGEMENT (OUTPATIENT)
Dept: ADMINISTRATIVE | Facility: OTHER | Age: 63
End: 2023-06-09
Payer: COMMERCIAL

## 2023-06-09 ENCOUNTER — PATIENT MESSAGE (OUTPATIENT)
Dept: ADMINISTRATIVE | Facility: OTHER | Age: 63
End: 2023-06-09
Payer: COMMERCIAL

## 2023-06-09 NOTE — PROGRESS NOTES
Outpatient Care Management  Patient Does Not Consent    Patient: Lucia Matias  MRN:  [id number]  Date of Service:  6/9/2023  Completed by:  Senia Hamilton RN    Chief Complaint   Patient presents with    OPCM Chart Review     6/9/23    Case Closure     6/9/23       Patient Summary           Consent Received:  Decline            6/9/23 Spoke with patient and explained OPCM services. Patient states that she has a great family and support system and that her needs are being met. She did [facility] Says she spoke with someone regarding this but has not heard back yet. Gave patient contact number for Financial assistance and e-mailed Financial assistance application. Gave patient OPCM RN contact information and encouraged her to call should any need arise. Patient verbalized understanding. Closing case.

## 2023-06-10 ENCOUNTER — PATIENT MESSAGE (OUTPATIENT)
Dept: ADMINISTRATIVE | Facility: OTHER | Age: 63
End: 2023-06-10
Payer: COMMERCIAL

## 2023-06-12 ENCOUNTER — PATIENT MESSAGE (OUTPATIENT)
Dept: ADMINISTRATIVE | Facility: OTHER | Age: 63
End: 2023-06-12
Payer: COMMERCIAL

## 2023-06-13 ENCOUNTER — PATIENT MESSAGE (OUTPATIENT)
Dept: BARIATRICS | Facility: CLINIC | Age: 63
End: 2023-06-13
Payer: COMMERCIAL

## 2023-06-14 ENCOUNTER — PATIENT MESSAGE (OUTPATIENT)
Dept: ADMINISTRATIVE | Facility: OTHER | Age: 63
End: 2023-06-14
Payer: COMMERCIAL

## 2023-06-20 ENCOUNTER — PATIENT MESSAGE (OUTPATIENT)
Dept: ADMINISTRATIVE | Facility: OTHER | Age: 63
End: 2023-06-20
Payer: COMMERCIAL

## 2023-06-21 ENCOUNTER — LAB VISIT (OUTPATIENT)
Dept: LAB | Facility: HOSPITAL | Age: 63
End: 2023-06-21
Attending: STUDENT IN AN ORGANIZED HEALTH CARE EDUCATION/TRAINING PROGRAM
Payer: COMMERCIAL

## 2023-06-21 ENCOUNTER — EXTERNAL HOME HEALTH (OUTPATIENT)
Dept: HOME HEALTH SERVICES | Facility: HOSPITAL | Age: 63
End: 2023-06-21
Payer: COMMERCIAL

## 2023-06-21 ENCOUNTER — INFUSION (OUTPATIENT)
Dept: INFUSION THERAPY | Facility: HOSPITAL | Age: 63
End: 2023-06-21
Payer: COMMERCIAL

## 2023-06-21 ENCOUNTER — OFFICE VISIT (OUTPATIENT)
Dept: HEMATOLOGY/ONCOLOGY | Facility: CLINIC | Age: 63
End: 2023-06-21
Payer: COMMERCIAL

## 2023-06-21 ENCOUNTER — PATIENT MESSAGE (OUTPATIENT)
Dept: DERMATOLOGY | Facility: CLINIC | Age: 63
End: 2023-06-21
Payer: COMMERCIAL

## 2023-06-21 VITALS
RESPIRATION RATE: 18 BRPM | WEIGHT: 174.38 LBS | DIASTOLIC BLOOD PRESSURE: 59 MMHG | TEMPERATURE: 98 F | HEART RATE: 73 BPM | BODY MASS INDEX: 32.09 KG/M2 | SYSTOLIC BLOOD PRESSURE: 123 MMHG | HEIGHT: 62 IN

## 2023-06-21 VITALS
RESPIRATION RATE: 20 BRPM | DIASTOLIC BLOOD PRESSURE: 59 MMHG | SYSTOLIC BLOOD PRESSURE: 127 MMHG | OXYGEN SATURATION: 98 % | BODY MASS INDEX: 32.09 KG/M2 | HEIGHT: 62 IN | HEART RATE: 78 BPM | WEIGHT: 174.38 LBS

## 2023-06-21 DIAGNOSIS — R78.81 E COLI BACTEREMIA: ICD-10-CM

## 2023-06-21 DIAGNOSIS — Z17.0 MALIGNANT NEOPLASM OF CENTRAL PORTION OF LEFT BREAST IN FEMALE, ESTROGEN RECEPTOR POSITIVE: Primary | ICD-10-CM

## 2023-06-21 DIAGNOSIS — G89.18 JOINT PAIN FOLLOWING CHEMOTHERAPY: ICD-10-CM

## 2023-06-21 DIAGNOSIS — Z17.0 MALIGNANT NEOPLASM OF CENTRAL PORTION OF LEFT BREAST IN FEMALE, ESTROGEN RECEPTOR POSITIVE: ICD-10-CM

## 2023-06-21 DIAGNOSIS — B96.20 E COLI BACTEREMIA: ICD-10-CM

## 2023-06-21 DIAGNOSIS — Z98.890 STATUS POST ABLATION OF ATRIAL FLUTTER: ICD-10-CM

## 2023-06-21 DIAGNOSIS — C50.112 MALIGNANT NEOPLASM OF CENTRAL PORTION OF LEFT BREAST IN FEMALE, ESTROGEN RECEPTOR POSITIVE: Primary | ICD-10-CM

## 2023-06-21 DIAGNOSIS — Z86.79 STATUS POST ABLATION OF ATRIAL FLUTTER: ICD-10-CM

## 2023-06-21 DIAGNOSIS — M25.50 JOINT PAIN FOLLOWING CHEMOTHERAPY: ICD-10-CM

## 2023-06-21 DIAGNOSIS — I10 ESSENTIAL HYPERTENSION: ICD-10-CM

## 2023-06-21 DIAGNOSIS — C50.112 MALIGNANT NEOPLASM OF CENTRAL PORTION OF LEFT BREAST IN FEMALE, ESTROGEN RECEPTOR POSITIVE: ICD-10-CM

## 2023-06-21 DIAGNOSIS — L40.50 PSORIATIC ARTHRITIS: ICD-10-CM

## 2023-06-21 LAB
ALBUMIN SERPL BCP-MCNC: 3 G/DL (ref 3.5–5.2)
ALP SERPL-CCNC: 88 U/L (ref 55–135)
ALT SERPL W/O P-5'-P-CCNC: 14 U/L (ref 10–44)
ANION GAP SERPL CALC-SCNC: 9 MMOL/L (ref 8–16)
AST SERPL-CCNC: 17 U/L (ref 10–40)
BASOPHILS # BLD AUTO: 0.08 K/UL (ref 0–0.2)
BASOPHILS NFR BLD: 1.1 % (ref 0–1.9)
BILIRUB SERPL-MCNC: 0.5 MG/DL (ref 0.1–1)
BUN SERPL-MCNC: 8 MG/DL (ref 6–20)
CALCIUM SERPL-MCNC: 9 MG/DL (ref 8.7–10.5)
CHLORIDE SERPL-SCNC: 108 MMOL/L (ref 95–110)
CO2 SERPL-SCNC: 27 MMOL/L (ref 23–29)
CREAT SERPL-MCNC: 0.8 MG/DL (ref 0.5–1.4)
DIFFERENTIAL METHOD: ABNORMAL
EOSINOPHIL # BLD AUTO: 0 K/UL (ref 0–0.5)
EOSINOPHIL NFR BLD: 0.5 % (ref 0–8)
ERYTHROCYTE [DISTWIDTH] IN BLOOD BY AUTOMATED COUNT: 15.4 % (ref 11.5–14.5)
EST. GFR  (NO RACE VARIABLE): >60 ML/MIN/1.73 M^2
GLUCOSE SERPL-MCNC: 108 MG/DL (ref 70–110)
HCT VFR BLD AUTO: 35.6 % (ref 37–48.5)
HGB BLD-MCNC: 11.3 G/DL (ref 12–16)
IMM GRANULOCYTES # BLD AUTO: 0.03 K/UL (ref 0–0.04)
IMM GRANULOCYTES NFR BLD AUTO: 0.4 % (ref 0–0.5)
LYMPHOCYTES # BLD AUTO: 0.8 K/UL (ref 1–4.8)
LYMPHOCYTES NFR BLD: 10.6 % (ref 18–48)
MCH RBC QN AUTO: 28.3 PG (ref 27–31)
MCHC RBC AUTO-ENTMCNC: 31.7 G/DL (ref 32–36)
MCV RBC AUTO: 89 FL (ref 82–98)
MONOCYTES # BLD AUTO: 0.4 K/UL (ref 0.3–1)
MONOCYTES NFR BLD: 5.8 % (ref 4–15)
NEUTROPHILS # BLD AUTO: 6.2 K/UL (ref 1.8–7.7)
NEUTROPHILS NFR BLD: 81.6 % (ref 38–73)
NRBC BLD-RTO: 0 /100 WBC
PLATELET # BLD AUTO: 314 K/UL (ref 150–450)
PMV BLD AUTO: 8.9 FL (ref 9.2–12.9)
POTASSIUM SERPL-SCNC: 3.9 MMOL/L (ref 3.5–5.1)
PROT SERPL-MCNC: 6.4 G/DL (ref 6–8.4)
RBC # BLD AUTO: 3.99 M/UL (ref 4–5.4)
SODIUM SERPL-SCNC: 144 MMOL/L (ref 136–145)
WBC # BLD AUTO: 7.56 K/UL (ref 3.9–12.7)

## 2023-06-21 PROCEDURE — 3008F BODY MASS INDEX DOCD: CPT | Mod: CPTII,S$GLB,, | Performed by: STUDENT IN AN ORGANIZED HEALTH CARE EDUCATION/TRAINING PROGRAM

## 2023-06-21 PROCEDURE — 80053 COMPREHEN METABOLIC PANEL: CPT | Performed by: STUDENT IN AN ORGANIZED HEALTH CARE EDUCATION/TRAINING PROGRAM

## 2023-06-21 PROCEDURE — 99215 OFFICE O/P EST HI 40 MIN: CPT | Mod: S$GLB,,, | Performed by: STUDENT IN AN ORGANIZED HEALTH CARE EDUCATION/TRAINING PROGRAM

## 2023-06-21 PROCEDURE — 96367 TX/PROPH/DG ADDL SEQ IV INF: CPT

## 2023-06-21 PROCEDURE — 96413 CHEMO IV INFUSION 1 HR: CPT

## 2023-06-21 PROCEDURE — 25000003 PHARM REV CODE 250: Performed by: STUDENT IN AN ORGANIZED HEALTH CARE EDUCATION/TRAINING PROGRAM

## 2023-06-21 PROCEDURE — 3078F PR MOST RECENT DIASTOLIC BLOOD PRESSURE < 80 MM HG: ICD-10-PCS | Mod: CPTII,S$GLB,, | Performed by: STUDENT IN AN ORGANIZED HEALTH CARE EDUCATION/TRAINING PROGRAM

## 2023-06-21 PROCEDURE — A4216 STERILE WATER/SALINE, 10 ML: HCPCS | Performed by: STUDENT IN AN ORGANIZED HEALTH CARE EDUCATION/TRAINING PROGRAM

## 2023-06-21 PROCEDURE — 96417 CHEMO IV INFUS EACH ADDL SEQ: CPT

## 2023-06-21 PROCEDURE — 96375 TX/PRO/DX INJ NEW DRUG ADDON: CPT

## 2023-06-21 PROCEDURE — 3074F SYST BP LT 130 MM HG: CPT | Mod: CPTII,S$GLB,, | Performed by: STUDENT IN AN ORGANIZED HEALTH CARE EDUCATION/TRAINING PROGRAM

## 2023-06-21 PROCEDURE — 99999 PR PBB SHADOW E&M-EST. PATIENT-LVL III: ICD-10-PCS | Mod: PBBFAC,,, | Performed by: STUDENT IN AN ORGANIZED HEALTH CARE EDUCATION/TRAINING PROGRAM

## 2023-06-21 PROCEDURE — 36415 COLL VENOUS BLD VENIPUNCTURE: CPT | Performed by: STUDENT IN AN ORGANIZED HEALTH CARE EDUCATION/TRAINING PROGRAM

## 2023-06-21 PROCEDURE — 85025 COMPLETE CBC W/AUTO DIFF WBC: CPT | Performed by: STUDENT IN AN ORGANIZED HEALTH CARE EDUCATION/TRAINING PROGRAM

## 2023-06-21 PROCEDURE — 63600175 PHARM REV CODE 636 W HCPCS: Mod: JG | Performed by: STUDENT IN AN ORGANIZED HEALTH CARE EDUCATION/TRAINING PROGRAM

## 2023-06-21 PROCEDURE — 99999 PR PBB SHADOW E&M-EST. PATIENT-LVL III: CPT | Mod: PBBFAC,,, | Performed by: STUDENT IN AN ORGANIZED HEALTH CARE EDUCATION/TRAINING PROGRAM

## 2023-06-21 PROCEDURE — 3008F PR BODY MASS INDEX (BMI) DOCUMENTED: ICD-10-PCS | Mod: CPTII,S$GLB,, | Performed by: STUDENT IN AN ORGANIZED HEALTH CARE EDUCATION/TRAINING PROGRAM

## 2023-06-21 PROCEDURE — 3074F PR MOST RECENT SYSTOLIC BLOOD PRESSURE < 130 MM HG: ICD-10-PCS | Mod: CPTII,S$GLB,, | Performed by: STUDENT IN AN ORGANIZED HEALTH CARE EDUCATION/TRAINING PROGRAM

## 2023-06-21 PROCEDURE — 99215 PR OFFICE/OUTPT VISIT, EST, LEVL V, 40-54 MIN: ICD-10-PCS | Mod: S$GLB,,, | Performed by: STUDENT IN AN ORGANIZED HEALTH CARE EDUCATION/TRAINING PROGRAM

## 2023-06-21 PROCEDURE — 3078F DIAST BP <80 MM HG: CPT | Mod: CPTII,S$GLB,, | Performed by: STUDENT IN AN ORGANIZED HEALTH CARE EDUCATION/TRAINING PROGRAM

## 2023-06-21 RX ORDER — LEVOFLOXACIN 500 MG/1
500 TABLET, FILM COATED ORAL DAILY
Qty: 7 TABLET | Refills: 0 | Status: SHIPPED | OUTPATIENT
Start: 2023-06-21 | End: 2023-06-28

## 2023-06-21 RX ORDER — HEPARIN 100 UNIT/ML
500 SYRINGE INTRAVENOUS
Status: DISCONTINUED | OUTPATIENT
Start: 2023-06-21 | End: 2023-06-21 | Stop reason: HOSPADM

## 2023-06-21 RX ORDER — SODIUM CHLORIDE 0.9 % (FLUSH) 0.9 %
10 SYRINGE (ML) INJECTION
Status: CANCELLED | OUTPATIENT
Start: 2023-06-21

## 2023-06-21 RX ORDER — SODIUM CHLORIDE 0.9 % (FLUSH) 0.9 %
10 SYRINGE (ML) INJECTION
Status: DISCONTINUED | OUTPATIENT
Start: 2023-06-21 | End: 2023-06-21 | Stop reason: HOSPADM

## 2023-06-21 RX ORDER — ONDANSETRON 2 MG/ML
8 INJECTION INTRAMUSCULAR; INTRAVENOUS ONCE
Status: COMPLETED | OUTPATIENT
Start: 2023-06-21 | End: 2023-06-21

## 2023-06-21 RX ORDER — FLUCONAZOLE 150 MG/1
150 TABLET ORAL DAILY
Qty: 2 TABLET | Refills: 0 | Status: SHIPPED | OUTPATIENT
Start: 2023-06-21 | End: 2023-06-23

## 2023-06-21 RX ORDER — HEPARIN 100 UNIT/ML
500 SYRINGE INTRAVENOUS
Status: CANCELLED | OUTPATIENT
Start: 2023-06-21

## 2023-06-21 RX ADMIN — APREPITANT 130 MG: 130 INJECTION, EMULSION INTRAVENOUS at 11:06

## 2023-06-21 RX ADMIN — DEXAMETHASONE SODIUM PHOSPHATE 0.25 MG: 4 INJECTION, SOLUTION INTRA-ARTICULAR; INTRALESIONAL; INTRAMUSCULAR; INTRAVENOUS; SOFT TISSUE at 11:06

## 2023-06-21 RX ADMIN — CYCLOPHOSPHAMIDE 1120 MG: 200 INJECTION, SOLUTION INTRAVENOUS at 01:06

## 2023-06-21 RX ADMIN — DOCETAXEL ANHYDROUS 115 MG: 10 INJECTION, SOLUTION INTRAVENOUS at 12:06

## 2023-06-21 RX ADMIN — ONDANSETRON 8 MG: 2 INJECTION INTRAMUSCULAR; INTRAVENOUS at 01:06

## 2023-06-21 RX ADMIN — SODIUM CHLORIDE: 9 INJECTION, SOLUTION INTRAVENOUS at 11:06

## 2023-06-21 RX ADMIN — Medication 10 ML: at 02:06

## 2023-06-21 NOTE — PROGRESS NOTES
Oncology Clinic   Progress Note    Patient: Lucia Matias  MRN: [id number]  Date: 2023    Chief Complaint: HR+ breast cancer    Ms. Matias is a domo 63yo woman with recently diagnosed HR+ breast cancer who presents today for evaluation. Her oncologic history is as follows:    Oncologic History:   22: annual mammogram indentified left focal asymmetry at the upper outer position.   Follow-up mammogram and ultrasound on 22 showed a worrisome spiculated mass, 1.4 x 0.7 x 0.6 cm, 12 o'clock left breast 7 CMFN. An ultrasound guided biopsy was performed on 12/15/22 with pathology revealing infiltrating ductal carcinoma of the breast. Grade 2, ER >95%, IL 85-90%, Her2 1+, Ki67 25-30%  2023: MRI breast shwoed Left breast 12 mm x 11 mm x 7 mm mass at the middle 12 o'clock position. Several pulmonary nodules are noted incidentally in the left hemithorax.  The patient has a history of bilateral pulmonary nodule seen on previous thoracic CT exams most recently in .  One of the nodules underwent biopsy in  with benign results.    Underwent b/l mastectomies with SLN bx 2/15/2023 with bilateral breast reconstruction with abdominally based free flaps. He postoperative course was complicated by L pneumothorax.  Post op path showed Invasive lobular carcinoma in left breast grade 2 measuring 18 mm with LCIS Grade 2 ER IL positive and Her2 negative. pT1c pN0. Oncotype 35  C1D1 adjuvanct TC on     GYN History:  Age of menarche was 13. Age of menopause was 46.  Patient denies hormonal therapy but took OCP for approximately 15 years in the past. Patient is . Age of first live birth was 25. Patient did breast feed for 6 months. S/p uterine ablation for fibroids in early 40s, no menstrual cycle since. Had menopausal symptoms in mid-late 40s.       Interval History:  Ms. Matias returns today for follow up prior to C4 of adjuvant TC. Reports that she tolerated her last cycle fairly well.  Continues to have difficulty with bone pain following neulasta. Notes that psoriasis is getting worse with other medications on hold. No significant difficulty with nausea, vomiting, diarrhea, mouth sores or neuropathy.        Medications:  Current Outpatient Medications   Medication Sig Dispense Refill    apixaban (ELIQUIS) 5 mg Tab Take 1 tablet (5 mg total) by mouth 2 (two) times daily. Will hold 2/13 & 2/14 60 tablet 5    atorvastatin (LIPITOR) 20 MG tablet Take 1 tablet (20 mg total) by mouth every evening. 90 tablet 3    B-complex with vitamin C (VITAMIN B COMPLEX-C ORAL) Take by mouth Daily.      calcium-vitamin D 250-100 mg-unit per tablet Take 1 tablet by mouth 2 (two) times daily.      COSENTYX PEN, 2 PENS, 150 mg/mL PnIj Inject 300mg (2 pens) into the skin every 4 weeks 2 mL 11    cyanocobalamin 500 MCG tablet Take 500 mcg by mouth once daily.      diazePAM (VALIUM) 5 MG tablet Take 1 tablet (5 mg total) by mouth daily as needed for Anxiety. 30 tablet 2    fluticasone-umeclidin-vilanter (TRELEGY ELLIPTA) 100-62.5-25 mcg DsDv Inhale 1 puff into the lungs once daily. 180 each 3    mirtazapine (REMERON) 15 MG tablet Take 1 tablet (15 mg total) by mouth every evening. 90 tablet 3    multivitamin (THERAGRAN) per tablet Take 1 tablet by mouth once daily.      multivitamin with minerals (HAIR,SKIN AND NAILS ORAL) Take by mouth.      mv-mn/iron/folic acid/herb 190 (VITAMIN D3 COMPLETE ORAL) Take by mouth.      omeprazole (PRILOSEC) 40 MG capsule Take 1 capsule (40 mg total) by mouth every morning. 90 capsule 1    ondansetron (ZOFRAN) 8 MG tablet Take 1 tablet (8 mg total) by mouth every 12 (twelve) hours as needed for Nausea. 30 tablet 2    oxyCODONE (ROXICODONE) 5 MG immediate release tablet Take 1 tablet (5 mg total) by mouth every 4 (four) hours as needed for Pain. 30 tablet 0    venlafaxine (EFFEXOR-XR) 75 MG 24 hr capsule Take 1 capsule (75 mg total) by mouth once daily. Start on Week 2 90 capsule 3     "metoprolol tartrate (LOPRESSOR) 25 MG tablet Take 1 tablet (25 mg total) by mouth 2 (two) times daily. 60 tablet 11    triamcinolone acetonide 0.1% (KENALOG) 0.1 % cream Apply topically 2 (two) times daily. for 7 days 80 g 0     No current facility-administered medications for this visit.     Facility-Administered Medications Ordered in Other Visits   Medication Dose Route Frequency Provider Last Rate Last Admin    lactated ringers infusion   Intravenous Continuous Yahaira Barnard MD   New Bag at 03/29/23 0638    LIDOcaine (PF) 10 mg/ml (1%) injection 5 mg  0.5 mL Intradermal Once Yahaira Barnard MD        LIDOcaine HCL 10 mg/ml (1%) 50 mL, EPINEPHrine 1 mg in lactated Ringers 1,000 mL irrigation   Irrigation On Call Procedure Ming Milian MD         Review of Systems:  +bone pain  +fatigue  12pt ROS negative except as noted above      Objective:     Vitals:    06/21/23 0942   BP: (!) 127/59   Pulse: 78   Resp: 20   SpO2: 98%   Weight: 79.1 kg (174 lb 6.1 oz)   Height: 5' 2" (1.575 m)     BMI: Body mass index is 31.9 kg/m².     Physical Exam:  ECOG 0   General: well appearing, in no apparent distress  HEENT: Normocephalic, EOMI, anicteric sclerae, MMM  Neck: supple, without cervical or supraclavicular lymphadenopathy.  Heart: regular rate and rhythm, normal S1 and S2, no murmurs, gallops or rubs.  Lungs: Clear to auscultation bilaterally, no increased wob  Breast: s/p bilateral mastectomy with flap reconstruction, incisions well-healed  Abdomen: Soft, nontender, nondistended with normal bowel sounds. Abdominal incision c/d/I. No hepatosplenomegaly.  Extremities: No LE edema or joint effusion. Picc in place in RUE  Skin: warm, well-perfused, no rash. Erythematous plaques noted on back of hands and Rt elbow  Neurologic: Alert and oriented x 4, normal speech and gait   Psychiatric: Conversing appropriately with providers throughout today's encounter.    Laboratory Data:  Lab Visit on 06/21/2023   Component Date " Value    WBC 06/21/2023 7.56     RBC 06/21/2023 3.99 (L)     Hemoglobin 06/21/2023 11.3 (L)     Hematocrit 06/21/2023 35.6 (L)     MCV 06/21/2023 89     MCH 06/21/2023 28.3     MCHC 06/21/2023 31.7 (L)     RDW 06/21/2023 15.4 (H)     Platelets 06/21/2023 314     MPV 06/21/2023 8.9 (L)     Immature Granulocytes 06/21/2023 0.4     Gran # (ANC) 06/21/2023 6.2     Immature Grans (Abs) 06/21/2023 0.03     Lymph # 06/21/2023 0.8 (L)     Mono # 06/21/2023 0.4     Eos # 06/21/2023 0.0     Baso # 06/21/2023 0.08     nRBC 06/21/2023 0     Gran % 06/21/2023 81.6 (H)     Lymph % 06/21/2023 10.6 (L)     Mono % 06/21/2023 5.8     Eosinophil % 06/21/2023 0.5     Basophil % 06/21/2023 1.1     Differential Method 06/21/2023 Automated    I personally reviewed all recent labs, imaging and pathology.    Assessment and Plan:   Ms. Matias is a domo 63yo woman with hx of Aflutter s/p ablation, COPD, RA and recently diagnosed Stage IA HR+ breast cancer s/p bilateral mastectomy with reconstruction who presents today for evaluation.     Given her Oncotype of 35, we proceeded with adjuvant docetaxel 75mg/m2 and cyclophosphamide 600mg/m2 iv every 21 days for a total of 4 cycles. Cycle 1 was complicated by neutropenic fever in the setting of E coli bacteremia and parainfluenza.    #Breast cancer:  --labs today reviewed and ok to proceed with C4D1 TC as scheduled  --will cont with 20% DR docetaxel given neutropenia with cycle 1 and recent infections  --following completion of chemotherapy will plan to start ET with anastrozole  --of note, osteopenia noted on DEXA 12/2018- pt on Ca/VitD. Will plan to repeat prior to ET start and discuss role of bisphosphonate if needed  --enrolled in [facility]  --RTC in 3 weeks; will order DEXA and AI at that time    #Bone pain related to chemotherapy/growth factor:  --encouraged to continue claritin following infusion/neulasta as discussed    #E coli bacteremia: resolved. S/p 2 weeks of iv ertapenem following cycle  1  --previously discussed with ID, plan to add 7 day course of ppx levaquin following remaining cycles given recent bacteremia and some ongoing concern for colonization given recent UTIs. Refilled today.  --has had complication of yeast infection following previous antibiotic use, so diflucan ordered as well      #Psoriasis/psoriatic arthritis:  --holding cosentyx while on chemotherapy; notes that symptoms beginning to return  --has topical steroid she will try in the meantime; encouraged her to reach out to dermatology to see if they have any suggestions to use while she remains on chemo      #Hypotension: resolved. suspect this may be related to incorrect cuff size; she remains asymptomatic and bp readings on alternative cuff have been wnl  --has resumed metoprolol; encouraged her to cont checking bp on different cuff and if low to hold       #Aflutter s/p RFA: following with cardiology  --remains of eliquis  --follow up with cards as scheduled      All questions were answered to her apparent satisfaction. Will plan to see her back in 3 weeks or sooner should the need arise.     Roya Madrid MD      Med Onc Chart Routing      Follow up with physician 3 weeks. RTC 7/12 please   Follow up with LINDY    Infusion scheduling note    Injection scheduling note    Labs CBC and CMP   Scheduling:  Preferred lab:  Lab interval:  at time of follow up   Imaging None      Pharmacy appointment No pharmacy appointment needed      Other referrals no referral to Oncology Primary Care needed -    No additional referrals needed

## 2023-06-21 NOTE — PLAN OF CARE
1459-Pt tolerated Taxotere and Cytoxan well today, no complaints or complications. VSS. Pt aware to call provider with any questions or concerns and is aware of upcoming appts. Pt ambulatory from clinic with steady gait, no distress noted.

## 2023-06-21 NOTE — PLAN OF CARE
1052-Labs, hx, and medications reviewed, pt meets parameters for treatment today. Assessment completed and plan of care reviewed. Pt verbalized understanding. Pt voices no new complaints or concerns, will continue to monitor for safety.

## 2023-06-22 ENCOUNTER — INFUSION (OUTPATIENT)
Dept: INFUSION THERAPY | Facility: HOSPITAL | Age: 63
End: 2023-06-22
Payer: COMMERCIAL

## 2023-06-22 DIAGNOSIS — C50.112 MALIGNANT NEOPLASM OF CENTRAL PORTION OF LEFT BREAST IN FEMALE, ESTROGEN RECEPTOR POSITIVE: Primary | ICD-10-CM

## 2023-06-22 DIAGNOSIS — Z17.0 MALIGNANT NEOPLASM OF CENTRAL PORTION OF LEFT BREAST IN FEMALE, ESTROGEN RECEPTOR POSITIVE: Primary | ICD-10-CM

## 2023-06-22 PROCEDURE — 63600175 PHARM REV CODE 636 W HCPCS: Mod: JZ,JG | Performed by: STUDENT IN AN ORGANIZED HEALTH CARE EDUCATION/TRAINING PROGRAM

## 2023-06-22 PROCEDURE — 96372 THER/PROPH/DIAG INJ SC/IM: CPT

## 2023-06-22 RX ADMIN — PEGFILGRASTIM-CBQV 6 MG: 6 INJECTION, SOLUTION SUBCUTANEOUS at 12:06

## 2023-06-27 ENCOUNTER — PATIENT MESSAGE (OUTPATIENT)
Dept: ADMINISTRATIVE | Facility: OTHER | Age: 63
End: 2023-06-27
Payer: COMMERCIAL

## 2023-06-29 ENCOUNTER — PATIENT MESSAGE (OUTPATIENT)
Dept: ADMINISTRATIVE | Facility: OTHER | Age: 63
End: 2023-06-29
Payer: COMMERCIAL

## 2023-06-29 ENCOUNTER — PATIENT MESSAGE (OUTPATIENT)
Dept: PLASTIC SURGERY | Facility: CLINIC | Age: 63
End: 2023-06-29
Payer: COMMERCIAL

## 2023-06-30 ENCOUNTER — PATIENT MESSAGE (OUTPATIENT)
Dept: HEMATOLOGY/ONCOLOGY | Facility: CLINIC | Age: 63
End: 2023-06-30
Payer: COMMERCIAL

## 2023-06-30 ENCOUNTER — PATIENT MESSAGE (OUTPATIENT)
Dept: DERMATOLOGY | Facility: CLINIC | Age: 63
End: 2023-06-30

## 2023-06-30 ENCOUNTER — PATIENT MESSAGE (OUTPATIENT)
Dept: PSYCHIATRY | Facility: CLINIC | Age: 63
End: 2023-06-30
Payer: COMMERCIAL

## 2023-06-30 ENCOUNTER — PATIENT MESSAGE (OUTPATIENT)
Dept: ADMINISTRATIVE | Facility: OTHER | Age: 63
End: 2023-06-30
Payer: COMMERCIAL

## 2023-06-30 ENCOUNTER — OFFICE VISIT (OUTPATIENT)
Dept: DERMATOLOGY | Facility: CLINIC | Age: 63
End: 2023-06-30
Payer: COMMERCIAL

## 2023-06-30 DIAGNOSIS — C50.112 MALIGNANT NEOPLASM OF CENTRAL PORTION OF LEFT BREAST IN FEMALE, ESTROGEN RECEPTOR POSITIVE: ICD-10-CM

## 2023-06-30 DIAGNOSIS — L40.0 PSORIASIS VULGARIS: Primary | ICD-10-CM

## 2023-06-30 DIAGNOSIS — Z79.899 LONG-TERM USE OF HIGH-RISK MEDICATION: ICD-10-CM

## 2023-06-30 DIAGNOSIS — Z17.0 MALIGNANT NEOPLASM OF CENTRAL PORTION OF LEFT BREAST IN FEMALE, ESTROGEN RECEPTOR POSITIVE: ICD-10-CM

## 2023-06-30 DIAGNOSIS — L40.50 PSORIATIC ARTHRITIS: ICD-10-CM

## 2023-06-30 PROCEDURE — 1159F MED LIST DOCD IN RCRD: CPT | Mod: CPTII,S$GLB,, | Performed by: DERMATOLOGY

## 2023-06-30 PROCEDURE — 99214 OFFICE O/P EST MOD 30 MIN: CPT | Mod: S$GLB,,, | Performed by: DERMATOLOGY

## 2023-06-30 PROCEDURE — 1160F PR REVIEW ALL MEDS BY PRESCRIBER/CLIN PHARMACIST DOCUMENTED: ICD-10-PCS | Mod: CPTII,S$GLB,, | Performed by: DERMATOLOGY

## 2023-06-30 PROCEDURE — 99214 PR OFFICE/OUTPT VISIT, EST, LEVL IV, 30-39 MIN: ICD-10-PCS | Mod: S$GLB,,, | Performed by: DERMATOLOGY

## 2023-06-30 PROCEDURE — 1160F RVW MEDS BY RX/DR IN RCRD: CPT | Mod: CPTII,S$GLB,, | Performed by: DERMATOLOGY

## 2023-06-30 PROCEDURE — 1159F PR MEDICATION LIST DOCUMENTED IN MEDICAL RECORD: ICD-10-PCS | Mod: CPTII,S$GLB,, | Performed by: DERMATOLOGY

## 2023-06-30 RX ORDER — FLUOCINONIDE CREAM (EMULSIFIED BASE) 0.5 MG/G
CREAM TOPICAL
Qty: 60 G | Refills: 3 | Status: ON HOLD | OUTPATIENT
Start: 2023-06-30

## 2023-06-30 RX ORDER — METOPROLOL TARTRATE 25 MG/1
1 TABLET, FILM COATED ORAL 2 TIMES DAILY
COMMUNITY
Start: 2023-05-01 | End: 2023-07-06 | Stop reason: SDUPTHER

## 2023-06-30 RX ORDER — TRIAMCINOLONE ACETONIDE 0.25 MG/G
CREAM TOPICAL
Qty: 80 G | Refills: 3 | Status: ON HOLD | OUTPATIENT
Start: 2023-06-30

## 2023-06-30 RX ORDER — SECUKINUMAB 150 MG/ML
INJECTION SUBCUTANEOUS
Qty: 2 ML | Refills: 2 | Status: ACTIVE | OUTPATIENT
Start: 2023-06-30 | End: 2023-08-10

## 2023-06-30 RX ORDER — SECUKINUMAB 150 MG/ML
INJECTION SUBCUTANEOUS
Qty: 10 ML | Refills: 0 | Status: ACTIVE | OUTPATIENT
Start: 2023-06-30 | End: 2023-08-10

## 2023-06-30 RX ORDER — SECUKINUMAB 150 MG/ML
INJECTION SUBCUTANEOUS
Qty: 2 ML | Refills: 2 | Status: SHIPPED | OUTPATIENT
Start: 2023-06-30 | End: 2023-06-30

## 2023-06-30 RX ORDER — TRIAMCINOLONE ACETONIDE 1 MG/G
CREAM TOPICAL
Qty: 454 G | Refills: 1 | Status: ON HOLD | OUTPATIENT
Start: 2023-06-30

## 2023-06-30 NOTE — PROGRESS NOTES
Patient Information  Name: Lucia Matias  : 1960  MRN: [id number]     Referring Physician:  No ref. provider found   Primary Care Physician:  Hetal Banks MD   Date of Visit: 2023      Subjective:     History of Present lllness:    Lucia Matias is a 62 y.o. female with history of recent stage 2 breast cancer who presents with a chief complaint of psoriasis and rash in folds of skin.  Pt had a double mastectomy and chemo. She states that chemo was optional for her, but she did chemo to reduce risk of recurrence to 5% for the next ten years.    Diagnosis: Psoriasis vulgaris and psoriatic arthritis  Location: flaring all over at this time  Signs/Symptoms: itching is driving her insane, pain in joints was so severe that she had to take a pain pill, and sores on skin from scratching, feet are bleeding when she walks  Symptom course: worsening  Current treatment: none, would like to start back on Cosentyx now that chemotherapy has finished    Location: abdominal, breast and groin folds  Duration: couple months  Signs/Symptoms: red, inflamed, she feels like it smells bad; rash needs to be clear so that her surgeon can perform her next breast reconstruction surgery  Relieving factors/Prior treatments: none    Patient was last seen: 2022.  Prior notes by myself reviewed.   Clinical documentation obtained by nursing staff reviewed.    Review of Systems   Constitutional:  Negative for fever.   HENT:  Negative for sore throat.    Gastrointestinal:  Negative for diarrhea.   Musculoskeletal:  Negative for arthralgias.   Skin:  Positive for itching and rash.     Objective:   Physical Exam   Constitutional: She appears well-developed and well-nourished. No distress.   Neurological: She is alert and oriented to person, place, and time. She is not disoriented.   Psychiatric: She has a normal mood and affect.   Skin:   Areas Examined (abnormalities noted in diagram):   Head / Face  Inspection Performed  Neck Inspection Performed  Chest / Axilla Inspection Performed  Abdomen Inspection Performed  Back Inspection Performed  RUE Inspected  LUE Inspection Performed  RLE Inspected  LLE Inspection Performed  Nails and Digits Inspection Performed              Diagram Legend     Erythematous scaling macule/papule c/w actinic keratosis       Vascular papule c/w angioma      Pigmented verrucoid papule/plaque c/w seborrheic keratosis      Yellow umbilicated papule c/w sebaceous hyperplasia      Irregularly shaped tan macule c/w lentigo     1-2 mm smooth white papules consistent with Milia      Movable subcutaneous cyst with punctum c/w epidermal inclusion cyst      Subcutaneous movable cyst c/w pilar cyst      Firm pink to brown papule c/w dermatofibroma      Pedunculated fleshy papule(s) c/w skin tag(s)      Evenly pigmented macule c/w junctional nevus     Mildly variegated pigmented, slightly irregular-bordered macule c/w mildly atypical nevus      Flesh colored to evenly pigmented papule c/w intradermal nevus       Pink pearly papule/plaque c/w basal cell carcinoma      Erythematous hyperkeratotic cursted plaque c/w SCC      Surgical scar with no sign of skin cancer recurrence      Open and closed comedones      Inflammatory papules and pustules      Verrucoid papule consistent consistent with wart     Erythematous eczematous patches and plaques     Dystrophic onycholytic nail with subungual debris c/w onychomycosis     Umbilicated papule    Erythematous-base heme-crusted tan verrucoid plaque consistent with inflamed seborrheic keratosis     Erythematous Silvery Scaling Plaque c/w Psoriasis     See annotation    No images are attached to the encounter or orders placed in the encounter.      [x] Data reviewed  [] Prior external notes reviewed  [] Independent review of test  [x] Management discussed with another provider  [] Independent historian    Assessment / Plan:        Psoriasis  vulgaris  Psoriatic arthritis  Long-term use of high-risk medication  Malignant neoplasm of central portion of left breast in female, estrogen receptor positive  - chronic problem with exacerbation/progression    Discussed benefits and risks of secukinumab (Cosentyx) today, including but not limited to injection site reactions, increased risk of infections especially candidiasis/tinea, reactivation of tuberculosis, and new onset inflammatory bowel disease.  Discussed increased risk of malignancy recurrence while on immunosuppressant medication. Pt's oncologist Roya Madrid MD, has approved her to restart Cosentyx.  Given the extensive and refractory nature of her psoriasis, will proceed with anti-IL17 therapy.   Patient is to call with any side effects. Patient should discontinue Cosentyx and notify our office if an infection develops. Live vaccines should be avoided while on this medication.    CBC, CMP were reviewed and are stable. Check Quant gold prior to initiating. Will need CBC and LFTs q3mo x 2, then q3-6mo. Will obtain yearly quantiferon gold.   Start secukinumab 300 mg SC qwk x 5wk, then 300 mg SC q4wk.    -     Quantiferon Gold TB; Future; Expected date: 06/30/2023  -     triamcinolone acetonide 0.025% (KENALOG) 0.025 % cream; AAA of skin folds bid prn psoriasis.  Dispense: 80 g; Refill: 3  -     triamcinolone acetonide 0.1% (KENALOG) 0.1 % cream; Apply to affected areas of body BID prn psoriasis. Do not use on face or skin folds.  Dispense: 454 g; Refill: 1  -     fluocinonide-emollient (FLUOCINONIDE-E) 0.05 % Crea; AAA of feet daily prn psoriasis.  Dispense: 60 g; Refill: 3  -     COSENTYX PEN, 2 PENS, 150 mg/mL PnIj; Inject 300mg SQ qweek x 5 weeks then inject 300mg SQ q 4 weeks  Dispense: 10 mL; Refill: 0  -     COSENTYX PEN, 2 PENS, 150 mg/mL PnIj; Inject 300mg (2 pens) into the skin every 4 weeks  Dispense: 2 mL; Refill: 2      Follow up in about 3 months (around 9/30/2023).      Minerva SIDHU  MD Marco, FAAD  [facility]

## 2023-07-05 RX ORDER — QUETIAPINE FUMARATE 50 MG/1
50 TABLET, FILM COATED ORAL NIGHTLY
Qty: 30 TABLET | Refills: 11 | Status: ON HOLD | OUTPATIENT
Start: 2023-07-05 | End: 2024-07-04

## 2023-07-06 ENCOUNTER — OFFICE VISIT (OUTPATIENT)
Dept: CARDIOLOGY | Facility: CLINIC | Age: 63
End: 2023-07-06
Payer: COMMERCIAL

## 2023-07-06 ENCOUNTER — TELEPHONE (OUTPATIENT)
Dept: PHARMACY | Facility: CLINIC | Age: 63
End: 2023-07-06
Payer: COMMERCIAL

## 2023-07-06 ENCOUNTER — PATIENT MESSAGE (OUTPATIENT)
Dept: DERMATOLOGY | Facility: CLINIC | Age: 63
End: 2023-07-06
Payer: COMMERCIAL

## 2023-07-06 VITALS
BODY MASS INDEX: 31.51 KG/M2 | SYSTOLIC BLOOD PRESSURE: 112 MMHG | DIASTOLIC BLOOD PRESSURE: 74 MMHG | HEART RATE: 79 BPM | WEIGHT: 172.31 LBS | OXYGEN SATURATION: 98 %

## 2023-07-06 DIAGNOSIS — I10 ESSENTIAL HYPERTENSION: Chronic | ICD-10-CM

## 2023-07-06 DIAGNOSIS — E78.2 MIXED HYPERLIPIDEMIA: Chronic | ICD-10-CM

## 2023-07-06 DIAGNOSIS — I31.39 PERICARDIAL EFFUSION: ICD-10-CM

## 2023-07-06 DIAGNOSIS — I48.92 ATRIAL FLUTTER, UNSPECIFIED TYPE: Primary | ICD-10-CM

## 2023-07-06 PROCEDURE — 3074F SYST BP LT 130 MM HG: CPT | Mod: CPTII,S$GLB,, | Performed by: INTERNAL MEDICINE

## 2023-07-06 PROCEDURE — 3078F DIAST BP <80 MM HG: CPT | Mod: CPTII,S$GLB,, | Performed by: INTERNAL MEDICINE

## 2023-07-06 PROCEDURE — 99214 OFFICE O/P EST MOD 30 MIN: CPT | Mod: S$GLB,,, | Performed by: INTERNAL MEDICINE

## 2023-07-06 PROCEDURE — 99999 PR PBB SHADOW E&M-EST. PATIENT-LVL IV: ICD-10-PCS | Mod: PBBFAC,,, | Performed by: INTERNAL MEDICINE

## 2023-07-06 PROCEDURE — 99999 PR PBB SHADOW E&M-EST. PATIENT-LVL IV: CPT | Mod: PBBFAC,,, | Performed by: INTERNAL MEDICINE

## 2023-07-06 PROCEDURE — 3008F PR BODY MASS INDEX (BMI) DOCUMENTED: ICD-10-PCS | Mod: CPTII,S$GLB,, | Performed by: INTERNAL MEDICINE

## 2023-07-06 PROCEDURE — 1159F MED LIST DOCD IN RCRD: CPT | Mod: CPTII,S$GLB,, | Performed by: INTERNAL MEDICINE

## 2023-07-06 PROCEDURE — 3074F PR MOST RECENT SYSTOLIC BLOOD PRESSURE < 130 MM HG: ICD-10-PCS | Mod: CPTII,S$GLB,, | Performed by: INTERNAL MEDICINE

## 2023-07-06 PROCEDURE — 3078F PR MOST RECENT DIASTOLIC BLOOD PRESSURE < 80 MM HG: ICD-10-PCS | Mod: CPTII,S$GLB,, | Performed by: INTERNAL MEDICINE

## 2023-07-06 PROCEDURE — 99214 PR OFFICE/OUTPT VISIT, EST, LEVL IV, 30-39 MIN: ICD-10-PCS | Mod: S$GLB,,, | Performed by: INTERNAL MEDICINE

## 2023-07-06 PROCEDURE — 1159F PR MEDICATION LIST DOCUMENTED IN MEDICAL RECORD: ICD-10-PCS | Mod: CPTII,S$GLB,, | Performed by: INTERNAL MEDICINE

## 2023-07-06 PROCEDURE — 3008F BODY MASS INDEX DOCD: CPT | Mod: CPTII,S$GLB,, | Performed by: INTERNAL MEDICINE

## 2023-07-06 RX ORDER — METOPROLOL TARTRATE 25 MG/1
25 TABLET, FILM COATED ORAL 2 TIMES DAILY
Qty: 180 TABLET | Refills: 3 | Status: ON HOLD | OUTPATIENT
Start: 2023-07-06

## 2023-07-06 NOTE — PROGRESS NOTES
Cardiology    7/6/2023  11:28 AM    Problem list  Patient Active Problem List   Diagnosis    Diverticular disease of colon    Psoriasis vulgaris    COPD (chronic obstructive pulmonary disease)    HLD (hyperlipidemia)    Depression with anxiety    Insomnia    Other long term (current) drug therapy    History of tobacco abuse    Essential hypertension    Multiple lung nodules on CT    Panic attack    Class 1 obesity due to excess calories without serious comorbidity with body mass index (BMI) of 33.0 to 33.9 in adult    History of sleeve gastrectomy    Wears contact lenses    Rheumatoid arthritis    Atrial flutter    Psoriatic arthritis    Malignant neoplasm of central portion of left breast in female, estrogen receptor positive    Pneumothorax on left    Severe obesity (BMI >= 40)    Bacteremia, escherichia coli    Infection due to ESBL-producing Escherichia coli    RANDI (generalized anxiety disorder)    MDD (recurrent major depressive disorder) in remission       CC:  F/u    HPI:  She is getting better.  She has completed her 4 rounds of chemotherapy.  She did not require radiation therapy.  She will be getting reconstructive breast surgery.  Her last echocardiogram in April which was pre chemotherapy showed normal left ventricular function and no evidence of pericardial effusion.    Medications  Current Outpatient Medications   Medication Sig Dispense Refill    apixaban (ELIQUIS) 5 mg Tab Take 1 tablet (5 mg total) by mouth 2 (two) times daily. Will hold 2/13 & 2/14 60 tablet 5    atorvastatin (LIPITOR) 20 MG tablet Take 1 tablet (20 mg total) by mouth every evening. 90 tablet 3    B-complex with vitamin C (VITAMIN B COMPLEX-C ORAL) Take by mouth Daily.      calcium-vitamin D 250-100 mg-unit per tablet Take 1 tablet by mouth 2 (two) times daily.      cyanocobalamin 500 MCG tablet Take 500 mcg by mouth once daily.      diazePAM (VALIUM) 5 MG tablet Take 1 tablet (5 mg total) by mouth daily as needed for  Anxiety. 30 tablet 2    fluticasone-umeclidin-vilanter (TRELEGY ELLIPTA) 100-62.5-25 mcg DsDv Inhale 1 puff into the lungs once daily. 180 each 3    multivitamin (THERAGRAN) per tablet Take 1 tablet by mouth once daily.      multivitamin with minerals (HAIR,SKIN AND NAILS ORAL) Take by mouth.      mv-mn/iron/folic acid/herb 190 (VITAMIN D3 COMPLETE ORAL) Take by mouth.      omeprazole (PRILOSEC) 40 MG capsule Take 1 capsule (40 mg total) by mouth every morning. 90 capsule 1    ondansetron (ZOFRAN) 8 MG tablet Take 1 tablet (8 mg total) by mouth every 12 (twelve) hours as needed for Nausea. 30 tablet 2    triamcinolone acetonide 0.025% (KENALOG) 0.025 % cream AAA of skin folds bid prn psoriasis. 80 g 3    triamcinolone acetonide 0.1% (KENALOG) 0.1 % cream Apply to affected areas of body BID prn psoriasis. Do not use on face or skin folds. 454 g 1    venlafaxine (EFFEXOR-XR) 75 MG 24 hr capsule Take 1 capsule (75 mg total) by mouth once daily. Start on Week 2 90 capsule 3    COSENTYX PEN, 2 PENS, 150 mg/mL PnIj Inject 300mg SQ qweek x 5 weeks then inject 300mg SQ q 4 weeks (Patient not taking: Reported on 7/6/2023) 10 mL 0    COSENTYX PEN, 2 PENS, 150 mg/mL PnIj Inject 300mg (2 pens) into the skin every 4 weeks (Patient not taking: Reported on 7/6/2023) 2 mL 2    fluocinonide-emollient (FLUOCINONIDE-E) 0.05 % Crea AAA of feet daily prn psoriasis. 60 g 3    metoprolol tartrate (LOPRESSOR) 25 MG tablet Take 1 tablet (25 mg total) by mouth 2 (two) times daily. 180 tablet 3    mirtazapine (REMERON) 15 MG tablet Take 1 tablet (15 mg total) by mouth every evening. (Patient not taking: Reported on 7/6/2023) 90 tablet 3    oxyCODONE (ROXICODONE) 5 MG immediate release tablet Take 1 tablet (5 mg total) by mouth every 4 (four) hours as needed for Pain. (Patient not taking: Reported on 7/6/2023) 30 tablet 0    QUEtiapine (SEROQUEL) 50 MG tablet Take 1 tablet (50 mg total) by mouth every evening. (Patient not taking: Reported on  7/6/2023) 30 tablet 11     No current facility-administered medications for this visit.     Facility-Administered Medications Ordered in Other Visits   Medication Dose Route Frequency Provider Last Rate Last Admin    lactated ringers infusion   Intravenous Continuous Yahaira Barnard MD   New Bag at 03/29/23 0638    LIDOcaine (PF) 10 mg/ml (1%) injection 5 mg  0.5 mL Intradermal Once Yahaira Barnard MD        LIDOcaine HCL 10 mg/ml (1%) 50 mL, EPINEPHrine 1 mg in lactated Ringers 1,000 mL irrigation   Irrigation On Call Procedure Ming Milian MD          Prior to Admission medications    Medication Sig Start Date End Date Taking? Authorizing Provider   apixaban (ELIQUIS) 5 mg Tab Take 1 tablet (5 mg total) by mouth 2 (two) times daily. Will hold 2/13 & 2/14 2/17/23 8/16/23 Yes Fahad Coles MD   atorvastatin (LIPITOR) 20 MG tablet Take 1 tablet (20 mg total) by mouth every evening. 10/17/22  Yes Hetal Banks MD   B-complex with vitamin C (VITAMIN B COMPLEX-C ORAL) Take by mouth Daily.   Yes Historical Provider   calcium-vitamin D 250-100 mg-unit per tablet Take 1 tablet by mouth 2 (two) times daily.   Yes Historical Provider   cyanocobalamin 500 MCG tablet Take 500 mcg by mouth once daily.   Yes Historical Provider   diazePAM (VALIUM) 5 MG tablet Take 1 tablet (5 mg total) by mouth daily as needed for Anxiety. 6/1/23  Yes Jose Morales III, NP   fluticasone-umeclidin-vilanter (TRELEGY ELLIPTA) 100-62.5-25 mcg DsDv Inhale 1 puff into the lungs once daily. 2/6/23  Yes Hetal Banks MD   multivitamin (THERAGRAN) per tablet Take 1 tablet by mouth once daily.   Yes Historical Provider   multivitamin with minerals (HAIR,SKIN AND NAILS ORAL) Take by mouth.   Yes Historical Provider   mv-mn/iron/folic acid/herb 190 (VITAMIN D3 COMPLETE ORAL) Take by mouth.   Yes Historical Provider   omeprazole (PRILOSEC) 40 MG capsule Take 1 capsule (40 mg total) by mouth every morning. 3/22/23  Yes Elisabet Rascon NP    ondansetron (ZOFRAN) 8 MG tablet Take 1 tablet (8 mg total) by mouth every 12 (twelve) hours as needed for Nausea. 3/23/23 3/22/24 Yes Roya Madrid MD   triamcinolone acetonide 0.025% (KENALOG) 0.025 % cream AAA of skin folds bid prn psoriasis. 6/30/23  Yes Minerva Lara MD   triamcinolone acetonide 0.1% (KENALOG) 0.1 % cream Apply to affected areas of body BID prn psoriasis. Do not use on face or skin folds. 6/30/23  Yes Minerva Lara MD   venlafaxine (EFFEXOR-XR) 75 MG 24 hr capsule Take 1 capsule (75 mg total) by mouth once daily. Start on Week 2 6/1/23  Yes Jose Morales III, NP   metoprolol tartrate (LOPRESSOR) 25 MG tablet Take 1 tablet by mouth 2 (two) times daily. 5/1/23 7/6/23 Yes Historical Provider   COSENTYX PEN, 2 PENS, 150 mg/mL PnIj Inject 300mg SQ qweek x 5 weeks then inject 300mg SQ q 4 weeks  Patient not taking: Reported on 7/6/2023 6/30/23   Minerva Lara MD   COSENTYX PEN, 2 PENS, 150 mg/mL PnIj Inject 300mg (2 pens) into the skin every 4 weeks  Patient not taking: Reported on 7/6/2023 6/30/23   Minerva Lara MD   fluocinonide-emollient (FLUOCINONIDE-E) 0.05 % Crea AAA of feet daily prn psoriasis. 6/30/23   Minerva Lara MD   metoprolol tartrate (LOPRESSOR) 25 MG tablet Take 1 tablet (25 mg total) by mouth 2 (two) times daily. 7/6/23   Pool Dorado MD   mirtazapine (REMERON) 15 MG tablet Take 1 tablet (15 mg total) by mouth every evening.  Patient not taking: Reported on 7/6/2023 6/1/23   Jose Morales III, NP   oxyCODONE (ROXICODONE) 5 MG immediate release tablet Take 1 tablet (5 mg total) by mouth every 4 (four) hours as needed for Pain.  Patient not taking: Reported on 7/6/2023 6/5/23   Helena Bailey NP   QUEtiapine (SEROQUEL) 50 MG tablet Take 1 tablet (50 mg total) by mouth every evening.  Patient not taking: Reported on 7/6/2023 7/5/23 7/4/24  Jose Morales III NP   budesonide-formoterol 160-4.5 mcg (SYMBICORT) 160-4.5 mcg/actuation  HFAA Inhale 2 puffs into the lungs every 12 (twelve) hours. Controller 19  Saige Bee MD         History  Past Medical History:   Diagnosis Date    Allergy     Anxiety     Arthritis     Atrial flutter     Colon polyps     COPD (chronic obstructive pulmonary disease)     COPD exacerbation 10/31/2022    Depression     Diverticular disease of colon 2017    Diverticulitis     HLD (hyperlipidemia)     Malignant neoplasm of central portion of left breast in female, estrogen receptor positive 2022    Pancreatitis     Psoriasis     Rheumatoid arthritis     Severe obesity (BMI 35.0-39.9) with comorbidity      Past Surgical History:   Procedure Laterality Date    ABLATION  2022    Cardiac Ablation for A Flutter, Successful    ADENOIDECTOMY  1966    Tonsillitis and adnoids    BILATERAL MASTECTOMY Bilateral 2/15/2023    Procedure: MASTECTOMY, BILATERAL;  Surgeon: Marcela Meehan MD;  Location: [facility];  Service: General;  Laterality: Bilateral;  EMAIL SENT  @ 11:44 LK / 2.5 HOURS    BLADDER SUSPENSION      (x2)    BREAST REVISION SURGERY Bilateral 3/29/2023    Procedure: BREAST REVISION SURGERY / BILATERAL BREAST FLAP REVISION;  Surgeon: Ming Milian MD;  Location: [facility];  Service: Plastics;  Laterality: Bilateral;  90 MINUTES     SECTION      (x2)    DEBRIDEMENT, BREAST Bilateral 3/29/2023    Procedure: DEBRIDEMENT, BREAST;  Surgeon: Ming Milian MD;  Location: [facility];  Service: Plastics;  Laterality: Bilateral;    ESOPHAGOGASTRODUODENOSCOPY N/A 2021    Procedure: EGD (ESOPHAGOGASTRODUODENOSCOPY);  Surgeon: Vince Rodriguez MD;  Location: [address];  Service: General;  Laterality: N/A;    INJECTION FOR SENTINEL NODE IDENTIFICATION Left 2/15/2023    Procedure: INJECTION, FOR SENTINEL NODE IDENTIFICATION;  Surgeon: Marcela Meehan MD;  Location: [facility];  Service: General;  Laterality: Left;    LAPAROSCOPIC SLEEVE GASTRECTOMY N/A 2021     Procedure: GASTRECTOMY, SLEEVE, LAPAROSCOPIC, with intraop EGD;  Surgeon: Vince Rodriguez MD;  Location: [facility] 2ND FLR;  Service: General;  Laterality: N/A;    LUNG BIOPSY  2020    RECONSTRUCTION OF BREAST WITH DEEP INFERIOR EPIGASTRIC ARTERY  (NEHA) FREE FLAP Bilateral 2/15/2023    Procedure: RECONSTRUCTION, BREAST, USING NEHA FREE FLAP;  Surgeon: Ming Milian MD;  Location: [facility] OR;  Service: Plastics;  Laterality: Bilateral;    RHINOPLASTY      SENTINEL LYMPH NODE BIOPSY Left 2/15/2023    Procedure: BIOPSY, LYMPH NODE, SENTINEL;  Surgeon: Marcela Meehan MD;  Location: [facility] OR;  Service: General;  Laterality: Left;    TONSILLECTOMY      TRANSESOPHAGEAL ECHOCARDIOGRAPHY N/A 2022    Procedure: ECHOCARDIOGRAM, TRANSESOPHAGEAL;  Surgeon: Kellie Grover MD;  Location: [facility] EP LAB;  Service: Cardiology;  Laterality: N/A;     Social History     Socioeconomic History    Marital status:    Occupational History    Occupation: clinical research coordinator    Tobacco Use    Smoking status: Former     Packs/day: 0.00     Years: 20.00     Pack years: 0.00     Types: Cigarettes     Start date: 1979     Quit date: 2020     Years since quittin.5    Smokeless tobacco: Current   Substance and Sexual Activity    Alcohol use: Yes     Alcohol/week: 1.0 standard drink     Types: 1 Glasses of wine per week     Comment: social    Drug use: No    Sexual activity: Yes     Partners: Male     Birth control/protection: Post-menopausal   Other Topics Concern    Are you pregnant or think you may be? No    Breast-feeding No     Social Determinants of Health     Financial Resource Strain: Low Risk     Difficulty of Paying Living Expenses: Not very hard   Food Insecurity: No Food Insecurity    Worried About Running Out of Food in the Last Year: Never true    Ran Out of Food in the Last Year: Never true   Transportation Needs: No Transportation Needs    Lack of Transportation (Medical): No     Lack of Transportation (Non-Medical): No   Physical Activity: Inactive    Days of Exercise per Week: 0 days    Minutes of Exercise per Session: 0 min   Stress: Stress Concern Present    Feeling of Stress : Very much   Social Connections: Unknown    Frequency of Communication with Friends and Family: More than three times a week    Frequency of Social Gatherings with Friends and Family: Once a week    Active Member of Clubs or Organizations: Yes    Attends Club or Organization Meetings: More than 4 times per year    Marital Status:    Housing Stability: Low Risk     Unable to Pay for Housing in the Last Year: No    Number of Places Lived in the Last Year: 1    Unstable Housing in the Last Year: No         Allergies  Review of patient's allergies indicates:   Allergen Reactions    Succinimides Anaphylaxis     Son has          Review of Systems   Review of Systems   Constitutional: Negative for decreased appetite, fever and weight loss.   HENT:  Negative for congestion and nosebleeds.    Eyes:  Negative for double vision, vision loss in left eye, vision loss in right eye and visual disturbance.   Cardiovascular:  Negative for chest pain, claudication, cyanosis, dyspnea on exertion, irregular heartbeat, leg swelling, near-syncope, orthopnea, palpitations, paroxysmal nocturnal dyspnea and syncope.   Respiratory:  Negative for cough, hemoptysis, shortness of breath, sleep disturbances due to breathing, snoring, sputum production and wheezing.    Endocrine: Negative for cold intolerance and heat intolerance.   Skin:  Negative for nail changes and rash.   Musculoskeletal:  Negative for joint pain, muscle cramps, muscle weakness and myalgias.   Gastrointestinal:  Negative for change in bowel habit, heartburn, hematemesis, hematochezia, hemorrhoids and melena.   Neurological:  Negative for dizziness, focal weakness and headaches.       Physical Exam  Wt Readings from Last 1 Encounters:   07/06/23 78.2 kg (172 lb  4.8 oz)     BP Readings from Last 3 Encounters:   07/06/23 112/74   06/21/23 (!) 123/59   06/21/23 (!) 127/59     Pulse Readings from Last 1 Encounters:   07/06/23 79     Body mass index is 31.51 kg/m².    Physical Exam  Constitutional:       Appearance: She is well-developed.   HENT:      Head: Atraumatic.   Eyes:      General: No scleral icterus.  Neck:      Vascular: Normal carotid pulses. No carotid bruit, hepatojugular reflux or JVD.   Cardiovascular:      Rate and Rhythm: Normal rate and regular rhythm.      Chest Wall: PMI is not displaced.      Pulses: Intact distal pulses.           Carotid pulses are 2+ on the right side and 2+ on the left side.       Radial pulses are 2+ on the right side and 2+ on the left side.        Dorsalis pedis pulses are 2+ on the right side and 2+ on the left side.      Heart sounds: Normal heart sounds, S1 normal and S2 normal. No murmur heard.    No friction rub.   Pulmonary:      Effort: Pulmonary effort is normal. No respiratory distress.      Breath sounds: Normal breath sounds. No stridor. No wheezing or rales.   Chest:      Chest wall: No tenderness.   Abdominal:      General: Bowel sounds are normal.      Palpations: Abdomen is soft.   Musculoskeletal:      Cervical back: Neck supple. No edema.   Skin:     General: Skin is warm and dry.      Nails: There is no clubbing.   Neurological:      Mental Status: She is alert and oriented to person, place, and time.   Psychiatric:         Behavior: Behavior normal.         Thought Content: Thought content normal.           Assessment  1. Atrial flutter, unspecified type  Stable    2. Essential hypertension  Well controlled on current medications, continue to monitor    3. Mixed hyperlipidemia  Stable    4. Pericardial effusion  Being evaluated  - Echo; Future        Plan and Discussion  Continue current medications.  Review her last echocardiogram (pre-chemo) in April which showed normal left ventricular function.  Now that she  has completed chemotherapy, will get an echocardiogram to assess her left ventricular function and also reassess for pericardial effusion.    Follow Up  3 months      Pool Dorado MD, F.A.C.C, F.S.C.A.I.        30 minutes were spent in chart review, documentation and review of results, and evaluation, treatment, and counseling of patient on the same day of service.    Disclaimer: This document was created using voice recognition software ([employer]). Although it may be edited, this document may contain errors related to incorrect recognition of the spoken word. Please call the physician if clarification is needed.

## 2023-07-06 NOTE — TELEPHONE ENCOUNTER
Shellie, this is Mykel Mauro, clinical pharmacist with [facility] that is part of your care team.  We have begun working on your prescription that your doctor has sent us. Our next steps include:     Working with your insurance company to obtain approval for your medication  Working with you to ensure your medication is affordable     We will be calling you along the way with updates on your medication but if you have any concerns or receive information that you would like to discuss please reach us at [phone].    Welcome call outcome: Patient/caregiver reached    Patient gave verbal consent to obtain a [employer] co pay card on her behalf.

## 2023-07-10 ENCOUNTER — OFFICE VISIT (OUTPATIENT)
Dept: PLASTIC SURGERY | Facility: CLINIC | Age: 63
End: 2023-07-10
Attending: PLASTIC SURGERY
Payer: COMMERCIAL

## 2023-07-10 ENCOUNTER — SPECIALTY PHARMACY (OUTPATIENT)
Dept: PHARMACY | Facility: CLINIC | Age: 63
End: 2023-07-10
Payer: COMMERCIAL

## 2023-07-10 VITALS — SYSTOLIC BLOOD PRESSURE: 137 MMHG | DIASTOLIC BLOOD PRESSURE: 69 MMHG

## 2023-07-10 DIAGNOSIS — L40.0 PSORIASIS VULGARIS: Primary | ICD-10-CM

## 2023-07-10 DIAGNOSIS — C50.112 MALIGNANT NEOPLASM OF CENTRAL PORTION OF LEFT BREAST IN FEMALE, ESTROGEN RECEPTOR POSITIVE: Primary | ICD-10-CM

## 2023-07-10 DIAGNOSIS — Z17.0 MALIGNANT NEOPLASM OF CENTRAL PORTION OF LEFT BREAST IN FEMALE, ESTROGEN RECEPTOR POSITIVE: Primary | ICD-10-CM

## 2023-07-10 PROCEDURE — 99211 OFF/OP EST MAY X REQ PHY/QHP: CPT | Mod: S$GLB,,, | Performed by: PLASTIC SURGERY

## 2023-07-10 PROCEDURE — 99211 PR OFFICE/OUTPT VISIT, EST, LEVL I: ICD-10-PCS | Mod: S$GLB,,, | Performed by: PLASTIC SURGERY

## 2023-07-10 PROCEDURE — 3078F PR MOST RECENT DIASTOLIC BLOOD PRESSURE < 80 MM HG: ICD-10-PCS | Mod: CPTII,S$GLB,, | Performed by: PLASTIC SURGERY

## 2023-07-10 PROCEDURE — 1159F MED LIST DOCD IN RCRD: CPT | Mod: CPTII,S$GLB,, | Performed by: PLASTIC SURGERY

## 2023-07-10 PROCEDURE — 1159F PR MEDICATION LIST DOCUMENTED IN MEDICAL RECORD: ICD-10-PCS | Mod: CPTII,S$GLB,, | Performed by: PLASTIC SURGERY

## 2023-07-10 PROCEDURE — 3075F SYST BP GE 130 - 139MM HG: CPT | Mod: CPTII,S$GLB,, | Performed by: PLASTIC SURGERY

## 2023-07-10 PROCEDURE — 3075F PR MOST RECENT SYSTOLIC BLOOD PRESS GE 130-139MM HG: ICD-10-PCS | Mod: CPTII,S$GLB,, | Performed by: PLASTIC SURGERY

## 2023-07-10 PROCEDURE — 3078F DIAST BP <80 MM HG: CPT | Mod: CPTII,S$GLB,, | Performed by: PLASTIC SURGERY

## 2023-07-10 NOTE — TELEPHONE ENCOUNTER
Specialty Pharmacy - Initial Clinical Assessment    Specialty Medication Orders Linked to Encounter      Flowsheet Row Most Recent Value   Medication #1 COSENTYX PEN, 2 PENS, 150 mg/mL PnIj (Order#[id number], Rx#[id number])          Patient Diagnosis   L40.0 - Psoriasis vulgaris    Subjective    Lucia Concepcion Matias is a 62 y.o. female, who is followed by the specialty pharmacy service for management and education.    Recent Encounters       Date Type Provider Description    07/10/2023 Specialty Pharmacy Mykel Mauro, Sarah Initial Clinical Assessment    07/10/2023 Specialty Pharmacy Mykel Mauro PharmD Referral Authorization    04/17/2023 Specialty Pharmacy Karlene Godinez Refill Coordination; Referral Authorization    03/16/2023 Specialty Pharmacy Mykel Mauro PharmD Refill Coordination; Follow-up Clinical Reassessment    02/13/2023 Specialty Pharmacy Ling Garcia Refill Coordination            Current Outpatient Medications   Medication Sig    apixaban (ELIQUIS) 5 mg Tab Take 1 tablet (5 mg total) by mouth 2 (two) times daily. Will hold 2/13 & 2/14    atorvastatin (LIPITOR) 20 MG tablet Take 1 tablet (20 mg total) by mouth every evening.    B-complex with vitamin C (VITAMIN B COMPLEX-C ORAL) Take by mouth Daily.    calcium-vitamin D 250-100 mg-unit per tablet Take 1 tablet by mouth 2 (two) times daily.    COSENTYX PEN, 2 PENS, 150 mg/mL PnIj Inject 300mg SQ qweek x 5 weeks then inject 300mg SQ q 4 weeks (Patient not taking: Reported on 7/6/2023)    COSENTYX PEN, 2 PENS, 150 mg/mL PnIj Inject 300mg (2 pens) into the skin every 4 weeks (Patient not taking: Reported on 7/6/2023)    cyanocobalamin 500 MCG tablet Take 500 mcg by mouth once daily.    diazePAM (VALIUM) 5 MG tablet Take 1 tablet (5 mg total) by mouth daily as needed for Anxiety.    fluocinonide-emollient (FLUOCINONIDE-E) 0.05 % Crea AAA of feet daily prn psoriasis.    fluticasone-umeclidin-vilanter (TRELEGY ELLIPTA) 100-62.5-25 mcg DsDv  Inhale 1 puff into the lungs once daily.    metoprolol tartrate (LOPRESSOR) 25 MG tablet Take 1 tablet (25 mg total) by mouth 2 (two) times daily.    mirtazapine (REMERON) 15 MG tablet Take 1 tablet (15 mg total) by mouth every evening. (Patient not taking: Reported on 7/6/2023)    multivitamin (THERAGRAN) per tablet Take 1 tablet by mouth once daily.    multivitamin with minerals (HAIR,SKIN AND NAILS ORAL) Take by mouth.    mv-mn/iron/folic acid/herb 190 (VITAMIN D3 COMPLETE ORAL) Take by mouth.    omeprazole (PRILOSEC) 40 MG capsule Take 1 capsule (40 mg total) by mouth every morning.    ondansetron (ZOFRAN) 8 MG tablet Take 1 tablet (8 mg total) by mouth every 12 (twelve) hours as needed for Nausea.    oxyCODONE (ROXICODONE) 5 MG immediate release tablet Take 1 tablet (5 mg total) by mouth every 4 (four) hours as needed for Pain. (Patient not taking: Reported on 7/6/2023)    QUEtiapine (SEROQUEL) 50 MG tablet Take 1 tablet (50 mg total) by mouth every evening. (Patient not taking: Reported on 7/6/2023)    triamcinolone acetonide 0.025% (KENALOG) 0.025 % cream AAA of skin folds bid prn psoriasis.    triamcinolone acetonide 0.1% (KENALOG) 0.1 % cream Apply to affected areas of body BID prn psoriasis. Do not use on face or skin folds.    venlafaxine (EFFEXOR-XR) 75 MG 24 hr capsule Take 1 capsule (75 mg total) by mouth once daily. Start on Week 2   Last reviewed on 7/10/2023  1:05 PM by Mykel Mauro, PharmD    Review of patient's allergies indicates:   Allergen Reactions    Succinimides Anaphylaxis     Son has    Last reviewed on  7/10/2023 1:05 PM by Mykel Mauro          Assessment Questions - Documented Responses      Flowsheet Row Most Recent Value   Assessment    Medication Reconciliation completed for patient Yes   During the past 4 weeks, has patient missed any activities due to condition or medication? No   During the past 4 weeks, did patient have any of the following urgent care visits? None   Goals of  Therapy Status Partially achieving   Status of the patients ability to self-administer: Is Able   All education points have been covered with patient? No, patient declined- printed education provided   Welcome packet contents reviewed and discussed with patient? No   Assesment completed? Yes   Plan Therapy continued   Do you need to open a clinical intervention (i-vent)? No   Do you want to schedule first shipment? Yes   Medication #1 Assessment Info    Patient status Existing medication, Exisiting to [facility]   Is this medication appropriate for the patient? Yes   Is this medication effective? Yes          Refill Questions - Documented Responses      Flowsheet Row Most Recent Value   Refill Screening Questions    When does the patient need to receive the medication? 07/12/23   Refill Delivery Questions    How will the patient receive the medication? MedRx   When does the patient need to receive the medication? 07/12/23   Shipping Address Home   Address in [facility] confirmed and updated if neccessary? Yes   Expected Copay ($) 0   Is the patient able to afford the medication copay? Yes   Payment Method zero copay   Days supply of Refill 28   Supplies needed? No supplies needed   Refill activity completed? Yes   Refill activity plan Refill scheduled   Shipment/Pickup Date: 07/11/23            Objective    She has a past medical history of Allergy, Anxiety, Arthritis, Atrial flutter, Colon polyps (2016), COPD (chronic obstructive pulmonary disease), COPD exacerbation (10/31/2022), Depression, Diverticular disease of colon (06/06/2017), Diverticulitis, HLD (hyperlipidemia), Malignant neoplasm of central portion of left breast in female, estrogen receptor positive (12/29/2022), Pancreatitis, Psoriasis, Rheumatoid arthritis, and Severe obesity (BMI 35.0-39.9) with comorbidity.    Tried/failed medications: fluocinonide, triamcinolone    BP Readings from Last 4 Encounters:   07/06/23 112/74   06/21/23 (!) 123/59  "  06/21/23 (!) 127/59   05/31/23 (!) 149/67     Ht Readings from Last 4 Encounters:   06/21/23 5' 2" (1.575 m)   06/21/23 5' 2" (1.575 m)   05/31/23 5' 2" (1.575 m)   05/31/23 5' 2" (1.575 m)     Wt Readings from Last 4 Encounters:   07/06/23 78.2 kg (172 lb 4.8 oz)   06/21/23 79.1 kg (174 lb 6.1 oz)   06/21/23 79.1 kg (174 lb 6.1 oz)   05/31/23 77.9 kg (171 lb 10.1 oz)       The goals of Psoriasis treatment include:  Reducing the size, thickness and extent of lesions and erythema  Relieving the symptoms of psoriasis  Improving and maintaining physical function  Preventing infection and other complications of treatment  Reducing long term complications of psoriasis  Minimizing psychological impact on overall well-being  Improving or maintaining quality of life  Maintaining optimal therapy adherence  Minimizing and managing side effects    Goals of Therapy Status: Partially achieving    Assessment/Plan  Patient plans to continue therapy without changes      Indication, dosage, appropriateness, effectiveness, safety and convenience of her specialty medication(s) were reviewed today.     Patient Education   Pharmacist offer to  patient was declined. Printed educational materials will be provided with medication.      Pt declined initial consult and clinical follow ups. Pt was previously taking Cosentyx. She stopped while being treated for breast cancer, and she is not cleared to restart. Pt will repeat the loading dose.    Tasks added this encounter   No tasks added.   Tasks due within next 3 months   7/10/2023 - Initial Clinical Assessment/Patient Education (Annual Reassessment)  7/10/2023 - Set up Initial Fill     Mykel Mauro, PharmD  [facility]  [address] [facility] [address]  Phone: [phone]  Fax: [phone]  "

## 2023-07-10 NOTE — PROGRESS NOTES
Patient presents for follow-up.  She is status post bilateral breast reconstruction with abdominal based flap     She finished her chemotherapy approximately 4 weeks ago     She did develop some pretty significant psoriatic lesions throughout her body     On exam:  Right reconstructed breast is soft     There is a little bit of firmness of the left reconstructed breast     The abdominal incision is well healed but there is significant psoriatic changes throughout     Assessment:  Stable     Plan we discussed the 2nd stage which would like to have done after August 3rd    I did explain to her that the psoriatic changes needs to be under control prior to her next surgery     Next surgery would involve bilateral revision of reconstructed breasts with lift, excision of firmness of the left breast, excision of lateral chest wall tissue in lateral decubitus bilaterally    Also total revision of the abdominal incision    Fat grafting to both breasts     Likely 4 hours of surgery

## 2023-07-10 NOTE — TELEPHONE ENCOUNTER
Cosentyx approved to repeat the loading dose 7/7/23 - 8/6/24. Maintenance dose approval is still valid until 4/18/24.

## 2023-07-12 ENCOUNTER — LAB VISIT (OUTPATIENT)
Dept: LAB | Facility: HOSPITAL | Age: 63
End: 2023-07-12
Attending: STUDENT IN AN ORGANIZED HEALTH CARE EDUCATION/TRAINING PROGRAM
Payer: COMMERCIAL

## 2023-07-12 ENCOUNTER — OFFICE VISIT (OUTPATIENT)
Dept: HEMATOLOGY/ONCOLOGY | Facility: CLINIC | Age: 63
End: 2023-07-12
Payer: COMMERCIAL

## 2023-07-12 VITALS
DIASTOLIC BLOOD PRESSURE: 56 MMHG | BODY MASS INDEX: 32.48 KG/M2 | RESPIRATION RATE: 20 BRPM | HEIGHT: 62 IN | HEART RATE: 75 BPM | SYSTOLIC BLOOD PRESSURE: 119 MMHG | OXYGEN SATURATION: 97 % | WEIGHT: 176.5 LBS

## 2023-07-12 DIAGNOSIS — Z86.79 STATUS POST ABLATION OF ATRIAL FLUTTER: ICD-10-CM

## 2023-07-12 DIAGNOSIS — Z17.0 MALIGNANT NEOPLASM OF CENTRAL PORTION OF LEFT BREAST IN FEMALE, ESTROGEN RECEPTOR POSITIVE: Primary | ICD-10-CM

## 2023-07-12 DIAGNOSIS — Z79.899 LONG-TERM USE OF HIGH-RISK MEDICATION: ICD-10-CM

## 2023-07-12 DIAGNOSIS — L40.50 PSORIATIC ARTHRITIS: ICD-10-CM

## 2023-07-12 DIAGNOSIS — Z98.890 STATUS POST ABLATION OF ATRIAL FLUTTER: ICD-10-CM

## 2023-07-12 DIAGNOSIS — R78.81 E COLI BACTEREMIA: ICD-10-CM

## 2023-07-12 DIAGNOSIS — C50.112 MALIGNANT NEOPLASM OF CENTRAL PORTION OF LEFT BREAST IN FEMALE, ESTROGEN RECEPTOR POSITIVE: Primary | ICD-10-CM

## 2023-07-12 DIAGNOSIS — B96.20 E COLI BACTEREMIA: ICD-10-CM

## 2023-07-12 DIAGNOSIS — L40.0 PSORIASIS VULGARIS: ICD-10-CM

## 2023-07-12 LAB
ALBUMIN SERPL BCP-MCNC: 3 G/DL (ref 3.5–5.2)
ALP SERPL-CCNC: 88 U/L (ref 55–135)
ALT SERPL W/O P-5'-P-CCNC: 16 U/L (ref 10–44)
ANION GAP SERPL CALC-SCNC: 8 MMOL/L (ref 8–16)
AST SERPL-CCNC: 21 U/L (ref 10–40)
BASOPHILS # BLD AUTO: 0.11 K/UL (ref 0–0.2)
BASOPHILS NFR BLD: 1.7 % (ref 0–1.9)
BILIRUB SERPL-MCNC: 0.4 MG/DL (ref 0.1–1)
BUN SERPL-MCNC: 9 MG/DL (ref 6–20)
CALCIUM SERPL-MCNC: 8.9 MG/DL (ref 8.7–10.5)
CHLORIDE SERPL-SCNC: 110 MMOL/L (ref 95–110)
CO2 SERPL-SCNC: 27 MMOL/L (ref 23–29)
CREAT SERPL-MCNC: 0.8 MG/DL (ref 0.5–1.4)
DIFFERENTIAL METHOD: ABNORMAL
EOSINOPHIL # BLD AUTO: 0.1 K/UL (ref 0–0.5)
EOSINOPHIL NFR BLD: 1.5 % (ref 0–8)
ERYTHROCYTE [DISTWIDTH] IN BLOOD BY AUTOMATED COUNT: 17.7 % (ref 11.5–14.5)
EST. GFR  (NO RACE VARIABLE): >60 ML/MIN/1.73 M^2
GLUCOSE SERPL-MCNC: 85 MG/DL (ref 70–110)
HCT VFR BLD AUTO: 34.1 % (ref 37–48.5)
HGB BLD-MCNC: 10.4 G/DL (ref 12–16)
IMM GRANULOCYTES # BLD AUTO: 0.02 K/UL (ref 0–0.04)
IMM GRANULOCYTES NFR BLD AUTO: 0.3 % (ref 0–0.5)
LYMPHOCYTES # BLD AUTO: 1 K/UL (ref 1–4.8)
LYMPHOCYTES NFR BLD: 15.3 % (ref 18–48)
MCH RBC QN AUTO: 29.1 PG (ref 27–31)
MCHC RBC AUTO-ENTMCNC: 30.5 G/DL (ref 32–36)
MCV RBC AUTO: 95 FL (ref 82–98)
MONOCYTES # BLD AUTO: 0.7 K/UL (ref 0.3–1)
MONOCYTES NFR BLD: 10.7 % (ref 4–15)
NEUTROPHILS # BLD AUTO: 4.7 K/UL (ref 1.8–7.7)
NEUTROPHILS NFR BLD: 70.5 % (ref 38–73)
NRBC BLD-RTO: 0 /100 WBC
PLATELET # BLD AUTO: 413 K/UL (ref 150–450)
PMV BLD AUTO: 9.2 FL (ref 9.2–12.9)
POTASSIUM SERPL-SCNC: 4.4 MMOL/L (ref 3.5–5.1)
PROT SERPL-MCNC: 6.2 G/DL (ref 6–8.4)
RBC # BLD AUTO: 3.58 M/UL (ref 4–5.4)
SODIUM SERPL-SCNC: 145 MMOL/L (ref 136–145)
WBC # BLD AUTO: 6.62 K/UL (ref 3.9–12.7)

## 2023-07-12 PROCEDURE — 99999 PR PBB SHADOW E&M-EST. PATIENT-LVL III: CPT | Mod: PBBFAC,,, | Performed by: STUDENT IN AN ORGANIZED HEALTH CARE EDUCATION/TRAINING PROGRAM

## 2023-07-12 PROCEDURE — 3008F PR BODY MASS INDEX (BMI) DOCUMENTED: ICD-10-PCS | Mod: CPTII,S$GLB,, | Performed by: STUDENT IN AN ORGANIZED HEALTH CARE EDUCATION/TRAINING PROGRAM

## 2023-07-12 PROCEDURE — 80053 COMPREHEN METABOLIC PANEL: CPT | Performed by: STUDENT IN AN ORGANIZED HEALTH CARE EDUCATION/TRAINING PROGRAM

## 2023-07-12 PROCEDURE — 36415 COLL VENOUS BLD VENIPUNCTURE: CPT | Performed by: STUDENT IN AN ORGANIZED HEALTH CARE EDUCATION/TRAINING PROGRAM

## 2023-07-12 PROCEDURE — 85025 COMPLETE CBC W/AUTO DIFF WBC: CPT | Performed by: STUDENT IN AN ORGANIZED HEALTH CARE EDUCATION/TRAINING PROGRAM

## 2023-07-12 PROCEDURE — 3074F PR MOST RECENT SYSTOLIC BLOOD PRESSURE < 130 MM HG: ICD-10-PCS | Mod: CPTII,S$GLB,, | Performed by: STUDENT IN AN ORGANIZED HEALTH CARE EDUCATION/TRAINING PROGRAM

## 2023-07-12 PROCEDURE — 3008F BODY MASS INDEX DOCD: CPT | Mod: CPTII,S$GLB,, | Performed by: STUDENT IN AN ORGANIZED HEALTH CARE EDUCATION/TRAINING PROGRAM

## 2023-07-12 PROCEDURE — 3078F DIAST BP <80 MM HG: CPT | Mod: CPTII,S$GLB,, | Performed by: STUDENT IN AN ORGANIZED HEALTH CARE EDUCATION/TRAINING PROGRAM

## 2023-07-12 PROCEDURE — 99215 OFFICE O/P EST HI 40 MIN: CPT | Mod: S$GLB,,, | Performed by: STUDENT IN AN ORGANIZED HEALTH CARE EDUCATION/TRAINING PROGRAM

## 2023-07-12 PROCEDURE — 99215 PR OFFICE/OUTPT VISIT, EST, LEVL V, 40-54 MIN: ICD-10-PCS | Mod: S$GLB,,, | Performed by: STUDENT IN AN ORGANIZED HEALTH CARE EDUCATION/TRAINING PROGRAM

## 2023-07-12 PROCEDURE — 3074F SYST BP LT 130 MM HG: CPT | Mod: CPTII,S$GLB,, | Performed by: STUDENT IN AN ORGANIZED HEALTH CARE EDUCATION/TRAINING PROGRAM

## 2023-07-12 PROCEDURE — 86480 TB TEST CELL IMMUN MEASURE: CPT | Performed by: DERMATOLOGY

## 2023-07-12 PROCEDURE — 99999 PR PBB SHADOW E&M-EST. PATIENT-LVL III: ICD-10-PCS | Mod: PBBFAC,,, | Performed by: STUDENT IN AN ORGANIZED HEALTH CARE EDUCATION/TRAINING PROGRAM

## 2023-07-12 PROCEDURE — 3078F PR MOST RECENT DIASTOLIC BLOOD PRESSURE < 80 MM HG: ICD-10-PCS | Mod: CPTII,S$GLB,, | Performed by: STUDENT IN AN ORGANIZED HEALTH CARE EDUCATION/TRAINING PROGRAM

## 2023-07-12 RX ORDER — ANASTROZOLE 1 MG/1
1 TABLET ORAL DAILY
Qty: 90 TABLET | Refills: 3 | Status: SHIPPED | OUTPATIENT
Start: 2023-07-12 | End: 2023-09-29 | Stop reason: SDUPTHER

## 2023-07-12 NOTE — PROGRESS NOTES
Oncology Clinic   Progress Note    Patient: Lucia Matias  MRN: [id number]  Date: 2023    Chief Complaint: HR+ breast cancer    Ms. Matias is a domo 63yo woman with recently diagnosed HR+ breast cancer who presents today for evaluation. Her oncologic history is as follows:    Oncologic History:   22: annual mammogram indentified left focal asymmetry at the upper outer position.   Follow-up mammogram and ultrasound on 22 showed a worrisome spiculated mass, 1.4 x 0.7 x 0.6 cm, 12 o'clock left breast 7 CMFN. An ultrasound guided biopsy was performed on 12/15/22 with pathology revealing infiltrating ductal carcinoma of the breast. Grade 2, ER >95%, IN 85-90%, Her2 1+, Ki67 25-30%  2023: MRI breast shwoed Left breast 12 mm x 11 mm x 7 mm mass at the middle 12 o'clock position. Several pulmonary nodules are noted incidentally in the left hemithorax.  The patient has a history of bilateral pulmonary nodule seen on previous thoracic CT exams most recently in .  One of the nodules underwent biopsy in  with benign results.    Underwent b/l mastectomies with SLN bx 2/15/2023 with bilateral breast reconstruction with abdominally based free flaps. He postoperative course was complicated by L pneumothorax.  Post op path showed Invasive lobular carcinoma in left breast grade 2 measuring 18 mm with LCIS Grade 2 ER IN positive and Her2 negative. pT1c pN0. Oncotype 35  C1D1 adjuvanct TC on ; completed 4 cycles on     GYN History:  Age of menarche was 13. Age of menopause was 46.  Patient denies hormonal therapy but took OCP for approximately 15 years in the past. Patient is . Age of first live birth was 25. Patient did breast feed for 6 months. S/p uterine ablation for fibroids in early 40s, no menstrual cycle since. Had menopausal symptoms in mid-late 40s.       Interval History:  Ms. Matias returns today for follow up. She is doing well and pleased to have completed  chemotherapy. Recently resumed cosentyx for psoriasis which is improving. Continues to struggle with fatigue post chemotherapy. No new complaints otherwise. Scheduled for reconstruction 8/29.           Medications:  Current Outpatient Medications   Medication Sig Dispense Refill    apixaban (ELIQUIS) 5 mg Tab Take 1 tablet (5 mg total) by mouth 2 (two) times daily. Will hold 2/13 & 2/14 60 tablet 5    atorvastatin (LIPITOR) 20 MG tablet Take 1 tablet (20 mg total) by mouth every evening. 90 tablet 3    B-complex with vitamin C (VITAMIN B COMPLEX-C ORAL) Take by mouth Daily.      calcium-vitamin D 250-100 mg-unit per tablet Take 1 tablet by mouth 2 (two) times daily.      COSENTYX PEN, 2 PENS, 150 mg/mL PnIj Inject 300mg into the skin every week x 5 weeks then inject 300mg into the skin every 4 weeks 10 mL 0    COSENTYX PEN, 2 PENS, 150 mg/mL PnIj Inject 300mg (2 pens) into the skin every 4 weeks (Patient not taking: Reported on 7/6/2023) 2 mL 2    cyanocobalamin 500 MCG tablet Take 500 mcg by mouth once daily.      diazePAM (VALIUM) 5 MG tablet Take 1 tablet (5 mg total) by mouth daily as needed for Anxiety. 30 tablet 2    fluocinonide-emollient (FLUOCINONIDE-E) 0.05 % Crea AAA of feet daily prn psoriasis. 60 g 3    fluticasone-umeclidin-vilanter (TRELEGY ELLIPTA) 100-62.5-25 mcg DsDv Inhale 1 puff into the lungs once daily. 180 each 3    metoprolol tartrate (LOPRESSOR) 25 MG tablet Take 1 tablet (25 mg total) by mouth 2 (two) times daily. 180 tablet 3    mirtazapine (REMERON) 15 MG tablet Take 1 tablet (15 mg total) by mouth every evening. (Patient not taking: Reported on 7/6/2023) 90 tablet 3    multivitamin (THERAGRAN) per tablet Take 1 tablet by mouth once daily.      multivitamin with minerals (HAIR,SKIN AND NAILS ORAL) Take by mouth.      mv-mn/iron/folic acid/herb 190 (VITAMIN D3 COMPLETE ORAL) Take by mouth.      omeprazole (PRILOSEC) 40 MG capsule Take 1 capsule (40 mg total) by mouth every morning. 90  "capsule 1    ondansetron (ZOFRAN) 8 MG tablet Take 1 tablet (8 mg total) by mouth every 12 (twelve) hours as needed for Nausea. 30 tablet 2    oxyCODONE (ROXICODONE) 5 MG immediate release tablet Take 1 tablet (5 mg total) by mouth every 4 (four) hours as needed for Pain. (Patient not taking: Reported on 7/6/2023) 30 tablet 0    QUEtiapine (SEROQUEL) 50 MG tablet Take 1 tablet (50 mg total) by mouth every evening. (Patient not taking: Reported on 7/6/2023) 30 tablet 11    triamcinolone acetonide 0.025% (KENALOG) 0.025 % cream AAA of skin folds bid prn psoriasis. 80 g 3    triamcinolone acetonide 0.1% (KENALOG) 0.1 % cream Apply to affected areas of body BID prn psoriasis. Do not use on face or skin folds. 454 g 1    venlafaxine (EFFEXOR-XR) 75 MG 24 hr capsule Take 1 capsule (75 mg total) by mouth once daily. Start on Week 2 90 capsule 3     No current facility-administered medications for this visit.     Facility-Administered Medications Ordered in Other Visits   Medication Dose Route Frequency Provider Last Rate Last Admin    lactated ringers infusion   Intravenous Continuous Yahaira Barnard MD   New Bag at 03/29/23 0638    LIDOcaine (PF) 10 mg/ml (1%) injection 5 mg  0.5 mL Intradermal Once Yahaira Barnard MD        LIDOcaine HCL 10 mg/ml (1%) 50 mL, EPINEPHrine 1 mg in lactated Ringers 1,000 mL irrigation   Irrigation On Call Procedure Ming Milian MD         Review of Systems:  Answers submitted by the patient for this visit:  Review of Systems Questionnaire (Submitted on 7/12/2023)  appetite change : No  unexpected weight change: No  mouth sores: No  visual disturbance: No  cough: No  shortness of breath: No  chest pain: No  abdominal pain: No  diarrhea: No  frequency: No  back pain: No  rash: No  headaches: No  adenopathy: No  nervous/ anxious: Yes      Objective:     Vitals:    07/12/23 1357   BP: (!) 119/56   Pulse: 75   Resp: 20   SpO2: 97%   Weight: 80 kg (176 lb 7.7 oz)   Height: 5' 2" (1.575 m) "       BMI: Body mass index is 32.28 kg/m².     Physical Exam:  ECOG 0   General: well appearing, in no apparent distress  HEENT: Normocephalic, EOMI, anicteric sclerae, MMM  Neck: supple, without cervical or supraclavicular lymphadenopathy.  Heart: regular rate and rhythm, normal S1 and S2, no murmurs, gallops or rubs.  Lungs: Clear to auscultation bilaterally, no increased wob  Breast: s/p bilateral mastectomy with flap reconstruction, incisions well-healed  Abdomen: Soft, nontender, nondistended with normal bowel sounds. Abdominal incision c/d/I. No hepatosplenomegaly.  Extremities: No LE edema or joint effusion. Picc in place in RUE  Skin: warm, well-perfused, no rash. Erythematous plaques noted on back of hands and Rt elbow  Neurologic: Alert and oriented x 4, normal speech and gait   Psychiatric: Conversing appropriately with providers throughout today's encounter.    Laboratory Data:  Lab Visit on 07/12/2023   Component Date Value    WBC 07/12/2023 6.62     RBC 07/12/2023 3.58 (L)     Hemoglobin 07/12/2023 10.4 (L)     Hematocrit 07/12/2023 34.1 (L)     MCV 07/12/2023 95     MCH 07/12/2023 29.1     MCHC 07/12/2023 30.5 (L)     RDW 07/12/2023 17.7 (H)     Platelets 07/12/2023 413     MPV 07/12/2023 9.2     Immature Granulocytes 07/12/2023 0.3     Gran # (ANC) 07/12/2023 4.7     Immature Grans (Abs) 07/12/2023 0.02     Lymph # 07/12/2023 1.0     Mono # 07/12/2023 0.7     Eos # 07/12/2023 0.1     Baso # 07/12/2023 0.11     nRBC 07/12/2023 0     Gran % 07/12/2023 70.5     Lymph % 07/12/2023 15.3 (L)     Mono % 07/12/2023 10.7     Eosinophil % 07/12/2023 1.5     Basophil % 07/12/2023 1.7     Differential Method 07/12/2023 Automated     Sodium 07/12/2023 145     Potassium 07/12/2023 4.4     Chloride 07/12/2023 110     CO2 07/12/2023 27     Glucose 07/12/2023 85     BUN 07/12/2023 9     Creatinine 07/12/2023 0.8     Calcium 07/12/2023 8.9     Total Protein 07/12/2023 6.2     Albumin 07/12/2023 3.0 (L)     Total  Bilirubin 07/12/2023 0.4     Alkaline Phosphatase 07/12/2023 88     AST 07/12/2023 21     ALT 07/12/2023 16     eGFR 07/12/2023 >60.0     Anion Gap 07/12/2023 8    I personally reviewed all recent labs, imaging and pathology.    Assessment and Plan:   Ms. Matias is a domo 63yo woman with hx of Aflutter s/p ablation, COPD, RA and recently diagnosed Stage IA HR+ breast cancer s/p bilateral mastectomy with reconstruction who presents today for evaluation.     Given her Oncotype of 35, we proceeded with adjuvant docetaxel 75mg/m2 and cyclophosphamide 600mg/m2 iv every 21 days for a total of 4 cycles. Cycle 1 was complicated by neutropenic fever in the setting of E coli bacteremia and parainfluenza. She has now completed 4 cycles of TC without difficulty.    Today we discussed the plan to initiate adjuvant ET. We discussed that endocrine therapy with aromatase inhibitors are the optimal treatment for estrogen positive breast cancer in postmenopausal women and in some premenopausal women with the addition of ovarian suppression. I reviewed common side effects of AI including arthralgias, hot flashes and vaginal dryness. AI-associated arthralgias can range from mild discomfort that goes away by itself, to severe achiness that may require medication with over-the-counter, non-steroidal anti-inflammatory medications like Ibuprofen.  I also discussed the possible side effect of bone loss or osteoporosis. To help prevent this side effect, we advised that she take daily supplementation with Calcium 1200mg daily and Vitamin D 1000IU daily. She can buy these supplements, such as Oscal-D® or Caltrate®, at most local stores. If she has osteopenia or osteoporosis on bone density, we will discuss Prolia in the near future.     #Breast cancer:  --will initiate ET with anastrozole 1mg daily   --cont Ca/VitD supplementation as above  --osteopenia noted on DEXA 12/2018- pt on Ca/VitD. Will order repeat DEXA  --RTC in 9  weeks      #Psoriasis/psoriatic arthritis:  --has now resumed cosentyx with improvement in symptoms  --cont to follow with derm      #Aflutter s/p RFA: following with cardiology  --remains of eliquis  --follow up with cards as scheduled      All questions were answered to her apparent satisfaction. Will plan to see her back in 9 weeks or sooner should the need arise.     Roya Madrid MD      Med Onc Chart Routing      Follow up with physician 2 months.   Follow up with LINDY    Infusion scheduling note    Injection scheduling note    Labs None   Scheduling:  Preferred lab:  Lab interval:     Imaging None      Pharmacy appointment No pharmacy appointment needed      Other referrals no referral to Oncology Primary Care needed -    No additional referrals needed

## 2023-07-13 ENCOUNTER — PATIENT MESSAGE (OUTPATIENT)
Dept: PLASTIC SURGERY | Facility: CLINIC | Age: 63
End: 2023-07-13
Payer: COMMERCIAL

## 2023-07-13 LAB
GAMMA INTERFERON BACKGROUND BLD IA-ACNC: 0.04 IU/ML
M TB IFN-G CD4+ BCKGRND COR BLD-ACNC: 0.01 IU/ML
M TB IFN-G CD4+ BCKGRND COR BLD-ACNC: 0.01 IU/ML
MITOGEN IGNF BCKGRD COR BLD-ACNC: 4.44 IU/ML
TB GOLD PLUS: NEGATIVE

## 2023-07-25 ENCOUNTER — HOSPITAL ENCOUNTER (OUTPATIENT)
Dept: RADIOLOGY | Facility: OTHER | Age: 63
Discharge: HOME | End: 2023-07-25
Attending: STUDENT IN AN ORGANIZED HEALTH CARE EDUCATION/TRAINING PROGRAM
Payer: COMMERCIAL

## 2023-07-25 DIAGNOSIS — Z17.0 MALIGNANT NEOPLASM OF CENTRAL PORTION OF LEFT BREAST IN FEMALE, ESTROGEN RECEPTOR POSITIVE: ICD-10-CM

## 2023-07-25 DIAGNOSIS — C50.112 MALIGNANT NEOPLASM OF CENTRAL PORTION OF LEFT BREAST IN FEMALE, ESTROGEN RECEPTOR POSITIVE: ICD-10-CM

## 2023-07-25 PROCEDURE — 77080 DXA BONE DENSITY AXIAL SKELETON 1 OR MORE SITES: ICD-10-PCS | Mod: 26,,, | Performed by: RADIOLOGY

## 2023-07-25 PROCEDURE — 77080 DXA BONE DENSITY AXIAL: CPT | Mod: TC

## 2023-07-25 PROCEDURE — 77080 DXA BONE DENSITY AXIAL: CPT | Mod: 26,,, | Performed by: RADIOLOGY

## 2023-07-26 ENCOUNTER — PATIENT MESSAGE (OUTPATIENT)
Dept: ADMINISTRATIVE | Facility: OTHER | Age: 63
End: 2023-07-26
Payer: COMMERCIAL

## 2023-08-01 DIAGNOSIS — F41.0 PANIC ATTACK: ICD-10-CM

## 2023-08-01 DIAGNOSIS — F41.1 GAD (GENERALIZED ANXIETY DISORDER): ICD-10-CM

## 2023-08-01 RX ORDER — DIAZEPAM 5 MG/1
5 TABLET ORAL DAILY PRN
Qty: 30 TABLET | Refills: 2 | Status: SHIPPED | OUTPATIENT
Start: 2023-08-01 | End: 2023-11-03 | Stop reason: SDUPTHER

## 2023-08-02 ENCOUNTER — PATIENT MESSAGE (OUTPATIENT)
Dept: BARIATRICS | Facility: CLINIC | Age: 63
End: 2023-08-02
Payer: COMMERCIAL

## 2023-08-04 ENCOUNTER — HOSPITAL ENCOUNTER (OUTPATIENT)
Dept: CARDIOLOGY | Facility: OTHER | Age: 63
Discharge: HOME | End: 2023-08-04
Attending: INTERNAL MEDICINE
Payer: COMMERCIAL

## 2023-08-04 VITALS
WEIGHT: 176 LBS | SYSTOLIC BLOOD PRESSURE: 119 MMHG | BODY MASS INDEX: 32.39 KG/M2 | HEIGHT: 62 IN | DIASTOLIC BLOOD PRESSURE: 56 MMHG

## 2023-08-04 DIAGNOSIS — I31.39 PERICARDIAL EFFUSION: ICD-10-CM

## 2023-08-04 LAB
AORTIC ROOT ANNULUS: 2.36 CM
ASCENDING AORTA: 2.36 CM
AV INDEX (PROSTH): 0.74
AV MEAN GRADIENT: 2 MMHG
AV PEAK GRADIENT: 5 MMHG
AV VELOCITY RATIO: 0.78
BSA FOR ECHO PROCEDURE: 1.87 M2
CV ECHO LV RWT: 0.33 CM
DOP CALC AO PEAK VEL: 1.12 M/S
DOP CALC AO VTI: 23.9 CM
DOP CALC LVOT PEAK VEL: 0.87 M/S
DOP CALCLVOT PEAK VEL VTI: 17.7 CM
ECHO LV POSTERIOR WALL: 0.7 CM (ref 0.6–1.1)
FRACTIONAL SHORTENING: 30 % (ref 28–44)
INTERVENTRICULAR SEPTUM: 0.82 CM (ref 0.6–1.1)
IVC DIAMETER: 1.85 CM
LA MAJOR: 4.48 CM
LA MINOR: 4.4 CM
LA WIDTH: 3.6 CM
LEFT ATRIUM AREA SYSTOLIC (APICAL 2 CHAMBER): 14 CM2
LEFT ATRIUM AREA SYSTOLIC (APICAL 4 CHAMBER): 10 CM2
LEFT ATRIUM SIZE: 3 CM
LEFT ATRIUM VOLUME INDEX MOD: 15.2 ML/M2
LEFT ATRIUM VOLUME INDEX: 22.5 ML/M2
LEFT ATRIUM VOLUME MOD: 27.51 CM3
LEFT ATRIUM VOLUME: 40.76 CM3
LEFT INTERNAL DIMENSION IN SYSTOLE: 2.93 CM (ref 2.1–4)
LEFT VENTRICLE DIASTOLIC VOLUME INDEX: 43.17 ML/M2
LEFT VENTRICLE DIASTOLIC VOLUME: 78.13 ML
LEFT VENTRICLE MASS INDEX: 52 G/M2
LEFT VENTRICLE SYSTOLIC VOLUME INDEX: 18.3 ML/M2
LEFT VENTRICLE SYSTOLIC VOLUME: 33.15 ML
LEFT VENTRICULAR INTERNAL DIMENSION IN DIASTOLE: 4.19 CM (ref 3.5–6)
LEFT VENTRICULAR MASS: 94.29 G
LVOT MG: 1.32 MMHG
LVOT MV: 0.53 CM/S
PISA TR MAX VEL: 2.89 M/S
PULM VEIN S/D RATIO: 1.18
PV MV: 0.64 M/S
PV PEAK D VEL: 0.57 M/S
PV PEAK GRADIENT: 3 MMHG
PV PEAK S VEL: 0.67 M/S
PV PEAK VELOCITY: 0.87 M/S
RA MAJOR: 3.93 CM
RA WIDTH: 3.1 CM
RIGHT VENTRICULAR END-DIASTOLIC DIMENSION: 2.3 CM
SINUS: 2.3 CM
STJ: 2.24 CM
TDI LATERAL: 0.1 M/S
TDI SEPTAL: 0.1 M/S
TDI: 0.1 M/S
TR MAX PG: 33 MMHG
TRICUSPID ANNULAR PLANE SYSTOLIC EXCURSION: 1.9 CM
Z-SCORE OF LEFT VENTRICULAR DIMENSION IN END DIASTOLE: -1.78
Z-SCORE OF LEFT VENTRICULAR DIMENSION IN END SYSTOLE: -0.43

## 2023-08-04 PROCEDURE — 93306 TTE W/DOPPLER COMPLETE: CPT

## 2023-08-04 PROCEDURE — 93306 TTE W/DOPPLER COMPLETE: CPT | Mod: 26,,, | Performed by: INTERNAL MEDICINE

## 2023-08-04 PROCEDURE — 93306 ECHO (CUPID ONLY): ICD-10-PCS | Mod: 26,,, | Performed by: INTERNAL MEDICINE

## 2023-08-10 ENCOUNTER — TELEPHONE (OUTPATIENT)
Dept: DERMATOLOGY | Facility: CLINIC | Age: 63
End: 2023-08-10
Payer: COMMERCIAL

## 2023-08-10 DIAGNOSIS — L40.50 PSORIATIC ARTHRITIS: ICD-10-CM

## 2023-08-10 DIAGNOSIS — Z79.899 LONG-TERM USE OF HIGH-RISK MEDICATION: ICD-10-CM

## 2023-08-10 DIAGNOSIS — L40.0 PSORIASIS VULGARIS: Primary | ICD-10-CM

## 2023-08-10 RX ORDER — IXEKIZUMAB 80 MG/ML
INJECTION, SOLUTION SUBCUTANEOUS
Qty: 3 ML | Refills: 0 | Status: ACTIVE | OUTPATIENT
Start: 2023-08-10 | End: 2024-02-02

## 2023-08-10 RX ORDER — IXEKIZUMAB 80 MG/ML
80 INJECTION, SOLUTION SUBCUTANEOUS
Qty: 1 ML | Refills: 6 | Status: ON HOLD | OUTPATIENT
Start: 2023-08-10

## 2023-08-10 RX ORDER — IXEKIZUMAB 80 MG/ML
INJECTION, SOLUTION SUBCUTANEOUS
Qty: 2 ML | Refills: 1 | Status: ACTIVE | OUTPATIENT
Start: 2023-08-10 | End: 2024-02-02

## 2023-08-10 NOTE — TELEPHONE ENCOUNTER
----- Message from Mykel Mauro PharmD sent at 8/9/2023 10:54 AM CDT -----  Regarding: Cosentyx / Taltz  Good morning,    I wanted to inform you about a recent [employer] insurance formulary change that has impacted the coverage of Cosentyx (secukinumab) for Ms. Matias.  Effective August 1, 2023, Taltz is the preferred [place]-17[place] Antagonist on the insurance formulary.  In order to ensure continued affordability and accessibility to this drug class, I recommend a change in therapy to Taltz.     Taltz (3-pack) 80mg/mL -- Inject 160mg SQ at week 0 then inject 80mg SQ at week 2; Qty: 3mL; refills: 0 [week 0 & 2 of load]  Taltz (2-pack) 80mg/mL -- Inject 80mg SQ every other week (weeks 4, 6, 8, 10); Qty: 2mL; refills: 1 [week 4, 6, 8, 10 of load]  Taltz (1-pack) 80mg/mL -- Inject 80mg SQ every 4 weeks start week 12); Qty: 1mL; refills: # [maintenance dose]     I spoke to the patient to inform her of the formulary change. Ms. Matias states that she is willing to make the change if you believe that Taltz is appropriate for her. [facility] will assist with the prior authorization process and patient education to facilitate a smooth transition to Taltz.  If appropriate, please send a new order to [facility] so that we may begin work on this process.      Best regards,    Mykel Mauro, Pharm D  Clinical Pharmacist  [facility]  [phone]

## 2023-08-10 NOTE — TELEPHONE ENCOUNTER
Psoriasis vulgaris  Psoriatic arthritis  Long-term use of high-risk medication  -     TALTZ AUTOINJECTOR, 3 PACK, 80 mg/mL AtIn; Inject 160mg SQ at week 0 then inject 80mg SQ at week 2.  Dispense: 3 mL; Refill: 0  -     TALTZ AUTOINJECTOR, 2 PACK, 80 mg/mL AtIn; Inject 80mg SQ every other week (weeks 4, 6, 8, 10).  Dispense: 2 mL; Refill: 1  -     TALTZ AUTOINJECTOR 80 mg/mL AtIn; Inject 80 mg into the skin every 28 days.  Dispense: 1 mL; Refill: 6

## 2023-08-11 ENCOUNTER — TELEPHONE (OUTPATIENT)
Dept: PHARMACY | Facility: CLINIC | Age: 63
End: 2023-08-11
Payer: COMMERCIAL

## 2023-08-11 NOTE — TELEPHONE ENCOUNTER
Shellie, this is Mykel Mauro, clinical pharmacist with [facility] that is part of your care team.  We have begun working on your prescription that your doctor has sent us. Our next steps include:     Working with your insurance company to obtain approval for your medication  Working with you to ensure your medication is affordable     We will be calling you along the way with updates on your medication but if you have any concerns or receive information that you would like to discuss please reach us at [phone].    Welcome call outcome: Patient/caregiver reached    Jayda SOLOMON submitted via Count includes the [facility]. Key: [id number]    Patient gave verbal consent to obtain a Taltz copay card on her behalf.

## 2023-08-14 ENCOUNTER — PATIENT MESSAGE (OUTPATIENT)
Dept: ADMINISTRATIVE | Facility: HOSPITAL | Age: 63
End: 2023-08-14
Payer: COMMERCIAL

## 2023-08-14 ENCOUNTER — SPECIALTY PHARMACY (OUTPATIENT)
Dept: PHARMACY | Facility: CLINIC | Age: 63
End: 2023-08-14
Payer: COMMERCIAL

## 2023-08-14 DIAGNOSIS — L40.0 PSORIASIS VULGARIS: Primary | ICD-10-CM

## 2023-08-14 NOTE — TELEPHONE ENCOUNTER
Specialty Pharmacy - Medication/Referral Authorization  Specialty Pharmacy - Initial Clinical Assessment    Specialty Medication Orders Linked to Encounter      Flowsheet Row Most Recent Value   Medication #1 TALTZ AUTOINJECTOR, 3 PACK, 80 mg/mL AtIn (Order#[id number], Rx#[id number])          Patient Diagnosis   L40.0 - Psoriasis vulgaris  L40.50 - Psoriatic arthritis    Subjective    Lucia Matias is a 63 y.o. female, who is followed by the specialty pharmacy service for management and education.    Recent Encounters       Date Type Provider Description    08/14/2023 Specialty Pharmacy Mykel Mauro, Sarah Referral Authorization; Initial Clinical Assessment    08/14/2023 Specialty Pharmacy Mykel Mauro, PharmNIHARIKA Referral Authorization    08/01/2023 Specialty Pharmacy Iqra Osorio Refill Coordination    07/10/2023 Specialty Pharmacy Mykel Mauro, Sarah Initial Clinical Assessment    07/10/2023 Specialty Pharmacy Mykel Mauro, Sarah Referral Authorization            Current Outpatient Medications   Medication Sig    anastrozole (ARIMIDEX) 1 mg Tab Take 1 tablet (1 mg total) by mouth once daily.    apixaban (ELIQUIS) 5 mg Tab Take 1 tablet (5 mg total) by mouth 2 (two) times daily. Will hold 2/13 & 2/14    atorvastatin (LIPITOR) 20 MG tablet Take 1 tablet (20 mg total) by mouth every evening.    B-complex with vitamin C (VITAMIN B COMPLEX-C ORAL) Take by mouth Daily.    calcium-vitamin D 250-100 mg-unit per tablet Take 1 tablet by mouth 2 (two) times daily.    cyanocobalamin 500 MCG tablet Take 500 mcg by mouth once daily.    diazePAM (VALIUM) 5 MG tablet Take 1 tablet (5 mg total) by mouth daily as needed for Anxiety.    fluocinonide-emollient (FLUOCINONIDE-E) 0.05 % Crea AAA of feet daily prn psoriasis.    fluticasone-umeclidin-vilanter (TRELEGY ELLIPTA) 100-62.5-25 mcg DsDv Inhale 1 puff into the lungs once daily.    metoprolol tartrate (LOPRESSOR) 25 MG tablet Take 1 tablet (25 mg total) by mouth  2 (two) times daily.    multivitamin (THERAGRAN) per tablet Take 1 tablet by mouth once daily.    multivitamin with minerals (HAIR,SKIN AND NAILS ORAL) Take by mouth.    mv-mn/iron/folic acid/herb 190 (VITAMIN D3 COMPLETE ORAL) Take by mouth.    omeprazole (PRILOSEC) 40 MG capsule Take 1 capsule (40 mg total) by mouth every morning.    ondansetron (ZOFRAN) 8 MG tablet Take 1 tablet (8 mg total) by mouth every 12 (twelve) hours as needed for Nausea.    QUEtiapine (SEROQUEL) 50 MG tablet Take 1 tablet (50 mg total) by mouth every evening.    TALTZ AUTOINJECTOR 80 mg/mL AtIn Inject 80 mg into the skin every 28 days.    TALTZ AUTOINJECTOR, 2 PACK, 80 mg/mL AtIn Inject 80mg SQ every other week (weeks 4, 6, 8, 10).    TALTZ AUTOINJECTOR, 3 PACK, 80 mg/mL AtIn Inject 160mg (2 pens) into the skin at week 0 , then inject 80mg into the skin at week 2.    triamcinolone acetonide 0.025% (KENALOG) 0.025 % cream AAA of skin folds bid prn psoriasis.    triamcinolone acetonide 0.1% (KENALOG) 0.1 % cream Apply to affected areas of body BID prn psoriasis. Do not use on face or skin folds.    venlafaxine (EFFEXOR-XR) 75 MG 24 hr capsule Take 1 capsule (75 mg total) by mouth once daily. Start on Week 2   Last reviewed on 8/14/2023 11:41 AM by Mykel Mauro, PharmD    Review of patient's allergies indicates:   Allergen Reactions    Succinimides Anaphylaxis     Son has    Last reviewed on  8/14/2023 11:41 AM by Mykel Mauro          Assessment Questions - Documented Responses      Flowsheet Row Most Recent Value   Assessment    Medication Reconciliation completed for patient Yes   During the past 4 weeks, has patient missed any activities due to condition or medication? No   During the past 4 weeks, did patient have any of the following urgent care visits? None   Goals of Therapy Status Discussed (new start)   Status of the patients ability to self-administer: Is Able   All education points have been covered with patient? No, patient  declined- printed education provided   Welcome packet contents reviewed and discussed with patient? No   Assesment completed? Yes   Plan Therapy being initiated   Do you need to open a clinical intervention (i-vent)? No   Do you want to schedule first shipment? Yes   Medication #1 Assessment Info    Patient status New medication, Exisiting to [facility]   Is this medication appropriate for the patient? Yes   Is this medication effective? Not yet started          Refill Questions - Documented Responses      Flowsheet Row Most Recent Value   Patient Availability and HIPAA Verification    Does patient want to proceed with activity? Yes   HIPAA/medical authority confirmed? Yes   Relationship to patient of person spoken to? Self   Refill Screening Questions    When does the patient need to receive the medication? 08/22/23   Refill Delivery Questions    How will the patient receive the medication? MEDRx   When does the patient need to receive the medication? 08/22/23   Shipping Address Home   Address in [facility] confirmed and updated if neccessary? Yes   Expected Copay ($) 0   Is the patient able to afford the medication copay? Yes   Payment Method zero copay   Days supply of Refill 28   Supplies needed? No supplies needed   Refill activity completed? Yes   Refill activity plan Refill scheduled   Shipment/Pickup Date: 08/21/23            Objective    She has a past medical history of Allergy, Anxiety, Arthritis, Atrial flutter, Colon polyps (2016), COPD (chronic obstructive pulmonary disease), COPD exacerbation (10/31/2022), Depression, Diverticular disease of colon (06/06/2017), Diverticulitis, HLD (hyperlipidemia), Malignant neoplasm of central portion of left breast in female, estrogen receptor positive (12/29/2022), Pancreatitis, Psoriasis, Rheumatoid arthritis, and Severe obesity (BMI 35.0-39.9) with comorbidity.    Tried/failed medications: Triamcinolone and Fluocinonide. Cosentyx worked well, but switched to  "Taltz due to insurance formulary change.     BP Readings from Last 4 Encounters:   08/04/23 (!) 119/56   07/12/23 (!) 119/56   07/10/23 137/69   07/06/23 112/74     Ht Readings from Last 4 Encounters:   08/04/23 5' 2" (1.575 m)   07/12/23 5' 2" (1.575 m)   06/21/23 5' 2" (1.575 m)   06/21/23 5' 2" (1.575 m)     Wt Readings from Last 4 Encounters:   08/04/23 79.8 kg (176 lb)   07/12/23 80 kg (176 lb 7.7 oz)   07/06/23 78.2 kg (172 lb 4.8 oz)   06/21/23 79.1 kg (174 lb 6.1 oz)       The goals of Psoriasis treatment include:  Reducing the size, thickness and extent of lesions and erythema  Relieving the symptoms of psoriasis  Improving and maintaining physical function  Preventing infection and other complications of treatment  Reducing long term complications of psoriasis  Minimizing psychological impact on overall well-being  Improving or maintaining quality of life  Maintaining optimal therapy adherence  Minimizing and managing side effects    Goals of Therapy Status: Discussed (new start)    Assessment/Plan  Patient plans to start therapy on 08/22/23      Indication, dosage, appropriateness, effectiveness, safety and convenience of her specialty medication(s) were reviewed today.     Patient Education   Pharmacist offer to  patient was declined. Printed educational materials will be provided with medication.      Pt declined initial consult and injection training. 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 Pt opted out of clinical services and follow up assessments.     Tasks added this encounter   No tasks added.   Tasks due within next 3 months   8/14/2023 - Initial Clinical Assessment/Patient Education (Annual Reassessment)  8/14/2023 - Set up Initial Fill     Mykel Mauro, PharmD  [facility]  [address] [place] [id number]  Phone: [phone]  Fax: [phone]  "

## 2023-08-15 ENCOUNTER — TELEPHONE (OUTPATIENT)
Dept: ENDOSCOPY | Facility: HOSPITAL | Age: 63
End: 2023-08-15
Payer: COMMERCIAL

## 2023-08-15 NOTE — TELEPHONE ENCOUNTER
Called to schedule colonoscopy. Pt will call back to schedule after she heals from her surgery. Cancel current request.        Procedure: Ambulatory referral/consult to Endo Procedure  Status: Needs Scheduling (Sent to Patient)     Requested appt date: 3/11/2023 Authorizing: Hetal Banks MD in [facility] ENDO 4TH FLR PRE/POST-OP     Referral: [id number] (Authorized)         Expires: 9/10/2023 Priority: Routine     Assign to: ZEN HATCH       Diagnosis: Screening for malignant neoplasm of colon [Z12.11]     Scheduling Instructions     Recent mastectomy. Requesting future call back     Order Specific Questions     What procedure is to be performed?     Screening Colonoscopy          CPT Code:     COLON CA SCRN NOT HI RSK IND []

## 2023-08-22 ENCOUNTER — PATIENT MESSAGE (OUTPATIENT)
Dept: CARDIOLOGY | Facility: CLINIC | Age: 63
End: 2023-08-22
Payer: COMMERCIAL

## 2023-08-22 ENCOUNTER — HOSPITAL ENCOUNTER (OUTPATIENT)
Dept: PREADMISSION TESTING | Facility: OTHER | Age: 63
Discharge: HOME | End: 2023-08-22
Attending: PLASTIC SURGERY | Admitting: PLASTIC SURGERY
Payer: COMMERCIAL

## 2023-08-22 ENCOUNTER — ANESTHESIA EVENT (OUTPATIENT)
Dept: SURGERY | Facility: OTHER | Age: 63
End: 2023-08-22
Payer: COMMERCIAL

## 2023-08-22 ENCOUNTER — PATIENT MESSAGE (OUTPATIENT)
Dept: SURGERY | Facility: OTHER | Age: 63
End: 2023-08-22
Payer: COMMERCIAL

## 2023-08-22 VITALS
HEIGHT: 62 IN | DIASTOLIC BLOOD PRESSURE: 68 MMHG | BODY MASS INDEX: 31.65 KG/M2 | SYSTOLIC BLOOD PRESSURE: 151 MMHG | RESPIRATION RATE: 16 BRPM | OXYGEN SATURATION: 99 % | WEIGHT: 172 LBS | HEART RATE: 82 BPM

## 2023-08-22 RX ORDER — SODIUM CHLORIDE, SODIUM LACTATE, POTASSIUM CHLORIDE, CALCIUM CHLORIDE 600; 310; 30; 20 MG/100ML; MG/100ML; MG/100ML; MG/100ML
INJECTION, SOLUTION INTRAVENOUS CONTINUOUS
Status: CANCELLED | OUTPATIENT
Start: 2023-08-22

## 2023-08-22 NOTE — DISCHARGE INSTRUCTIONS
Information to Prepare you for your Surgery    PRE-ADMIT TESTING -  [phone]    [address] [facility]          Your surgery has been scheduled at [facility]. We are pleased to have the opportunity to serve you. For Further Information please call [phone].    On the day of surgery please report to the Information Desk on the 1st floor.    CONTACT YOUR PHYSICIAN'S OFFICE THE DAY PRIOR TO YOUR SURGERY TO OBTAIN YOUR ARRIVAL TIME.     The evening before surgery do not eat anything after 9 p.m. ( this includes hard candy, chewing gum and mints).  You may only have GATORADE, POWERADE AND WATER  from 9 p.m. until you leave your home.   DO NOT DRINK ANY LIQUIDS ON THE WAY TO THE HOSPITAL.      Why does your anesthesiologist allow you to drink Gatorade/Powerade before surgery?  Gatorade/Powerade helps to increase your comfort before surgery and to decrease your nausea after surgery. The carbohydrates in Gatorade/Powerade help reduce your body's stress response to surgery.  If you are a diabetic-drink only water prior to surgery.       Patients may have 2 visitors pre and post procedure. Only 2 visitors will be allowed in the Surgical building with the patient. No one under the age of 12 will be allowed into the facility.    SPECIAL MEDICATION INSTRUCTIONS: TAKE medications checked off by the Anesthesiologist on your Medication List.    Angiogram Patients: Take medications as instructed by your physician, including aspirin.     Surgery Patients:    If you take ASPIRIN - Your PHYSICIAN/SURGEON will need to inform you IF/OR when you need to stop taking aspirin prior to your surgery.     The week prior to surgery do not ot take any medications containing IBUPROFEN or NSAIDS ( Advil, Motrin, Goodys, BC, Aleve, Naproxen etc) If you are not sure if you should take a medicine please call your surgeon's office.  Ok to take Tylenol    Do Not Wear any make-up  (especially eye make-up) to surgery. Please remove any false eyelashes or eyelash extensions. If you arrive the day of surgery with makeup/eyelashes on you will be required to remove prior to surgery. (There is a risk of corneal abrasions if eye makeup/eyelash extensions are not removed)      Leave all valuables at home.   Do Not wear any jewelry or watches, including any metal in body piercings. Jewelry must be removed prior to coming to the hospital.  There is a possibility that rings that are unable to be removed may be cut off if they are on the surgical extremity.    Please remove all hair extensions, wigs, clips and any other metal accessories/ ornaments from your hair.  These items may pose a flammable/fire risk in Surgery and must be removed.    Do not shave your surgical area at least 5 days prior to your surgery. The surgical prep will be performed at the hospital according to Infection Control regulations.    Contact Lens must be removed before surgery. Either do not wear the contact lens or bring a case and solution for storage.  Please bring a container for eyeglasses or dentures as required.  Bring any paperwork your physician has provided, such as consent forms,  history and physicals, doctor's orders, etc.   Bring comfortable clothes that are loose fitting to wear upon discharge. Take into consideration the type of surgery being performed.  Maintain your diet as advised per your physician the day prior to surgery.      Adequate rest the night before surgery is advised.   Park in the Parking lot behind the hospital or in the [facility] across the street from the parking lot. Parking is complimentary.  If you will be discharged the same day as your procedure, please arrange for a responsible adult to drive you home or to accompany you if traveling by taxi.   YOU WILL NOT BE PERMITTED TO DRIVE OR TO LEAVE THE HOSPITAL ALONE AFTER SURGERY.   If you are being discharged the same day, it is  strongly recommended that you arrange for someone to remain with you for the first 24 hrs following your surgery.    The Surgeon will speak to your family/visitor after your surgery regarding the outcome of your surgery and post op care.  The Surgeon may speak to you after your surgery, but there is a possibility you may not remember the details.  Please check with your family members regarding the conversation with the Surgeon.    We strongly recommend whoever is bringing you home be present for discharge instructions.  This will ensure a thorough understanding for your post op home care.    ALL CHILDREN MUST ALWAYS BE ACCOMPANIED BY AN ADULT.    Visitors-Refer to current Visitor policy handouts.    Thank you for your cooperation.  The Staff of [facility].            Bathing Instructions with Hibiclens    Shower the evening before and morning of your procedure with Chlorhexidine (Hibiclens)  do not use Chlorhexidine on your face or genitals. Do not get in your eyes.  Wash your face with water and your regular face wash/soap  Use your regular shampoo  Apply Chlorhexidine (Hibiclens) directly on your skin or on a wet washcloth and wash gently. When showering: Move away from the shower stream when applying Chlorhexidine (Hibiclens) to avoid rinsing off too soon.  Rinse thoroughly with warm water  Do not dilute Chlorhexidine (Hibiclens)   Dry off as usual, do not use any deodorant, powder, body lotions, perfume, after shave or cologne.

## 2023-08-22 NOTE — ANESTHESIA PREPROCEDURE EVALUATION
08/22/2023  Lucia Matias is a 63 y.o., female.      Pre-op Assessment    I have reviewed the Patient Summary Reports.     I have reviewed the Nursing Notes. I have reviewed the NPO Status.   I have reviewed the Medications.     Review of Systems  Anesthesia Hx:  History of prior surgery of interest to airway management or planning: Previous anesthesia: General 2/15/23 alberto mast/flap with general anesthesia.  Airway issues documented on chart review include mask, easy, GETA, videolaryngoscope used  Denies Family Hx of Anesthesia complications.   Denies Personal Hx of Anesthesia complications.   Social:  Former Smoker Quit 12/22, 1/2 ppd previou  s   Hematology/Oncology:  Hematology Normal      Current/Recent Cancer. Breast left no lymphedema   EENT/Dental:EENT/Dental Normal   Cardiovascular:   Hypertension Dysrhythmias (hx atrial flutter, s/p successful ablation 11/22) hyperlipidemia ECG has been reviewed.    Pulmonary:   COPD (maintained on trilogy, does not use rescue inhaler), moderate Denies Asthma. Recent URI Nodules, bx neg  Serial CTs        Interim hx spontaneous pneumothorax   Renal/:  Renal/ Normal     Hepatic/GI:  Hepatic/GI Normal Gastric sleeve   Musculoskeletal:   Arthritis (RA, psoriatic)     Neurological:  Neurology Normal    Endocrine:  Endocrine Normal  Obesity / BMI > 30  Dermatological:  Skin Normal Psoriasis   Psych:  Psychiatric Normal           Physical Exam  General: Cooperative, Alert and Oriented    Airway:  Mallampati: I   Mouth Opening: Normal  TM Distance: Normal  Neck ROM: Normal ROM    Dental:  Intact        Anesthesia Plan  Type of Anesthesia, risks & benefits discussed:    Anesthesia Type: Gen ETT  Intra-op Monitoring Plan: Standard ASA Monitors  Post Op Pain Control Plan: multimodal analgesia and IV/PO Opioids PRN  Induction:  IV  Airway Plan: Video  Informed  Consent: Informed consent signed with the Patient and all parties understand the risks and agree with anesthesia plan.  All questions answered.   ASA Score: 3  Anesthesia Plan Notes: R side devices    NOTE: FAMILY HX MH--son    EKG NSR 69, Repeat echo EF up to 70%  Card clearance, Dr. Dorado, in epic  Ok'd to be off eliquis      Surgery here in feb and march of 2023          PCP also cleared patient  PFTs on paper chart, show mod-severe COPD, no improvement with bronchodilator    Ready For Surgery From Anesthesia Perspective.     .

## 2023-08-23 ENCOUNTER — PATIENT MESSAGE (OUTPATIENT)
Dept: ADMINISTRATIVE | Facility: OTHER | Age: 63
End: 2023-08-23
Payer: COMMERCIAL

## 2023-08-24 DIAGNOSIS — Z85.3 HISTORY OF BREAST CANCER: Primary | ICD-10-CM

## 2023-08-24 RX ORDER — MUPIROCIN 20 MG/G
OINTMENT TOPICAL
Status: CANCELLED | OUTPATIENT
Start: 2023-08-24

## 2023-08-24 RX ORDER — SODIUM CHLORIDE 9 MG/ML
INJECTION, SOLUTION INTRAVENOUS CONTINUOUS
Status: CANCELLED | OUTPATIENT
Start: 2023-08-24

## 2023-08-28 ENCOUNTER — PATIENT MESSAGE (OUTPATIENT)
Dept: SURGERY | Facility: OTHER | Age: 63
End: 2023-08-28
Payer: COMMERCIAL

## 2023-08-28 ENCOUNTER — PATIENT MESSAGE (OUTPATIENT)
Dept: HEMATOLOGY/ONCOLOGY | Facility: CLINIC | Age: 63
End: 2023-08-28
Payer: COMMERCIAL

## 2023-08-29 ENCOUNTER — HOSPITAL ENCOUNTER (OUTPATIENT)
Facility: OTHER | Age: 63
Discharge: HOME | End: 2023-08-29
Attending: PLASTIC SURGERY | Admitting: PLASTIC SURGERY
Payer: COMMERCIAL

## 2023-08-29 ENCOUNTER — ANESTHESIA (OUTPATIENT)
Dept: SURGERY | Facility: OTHER | Age: 63
End: 2023-08-29
Payer: COMMERCIAL

## 2023-08-29 VITALS
TEMPERATURE: 98 F | SYSTOLIC BLOOD PRESSURE: 127 MMHG | OXYGEN SATURATION: 96 % | HEART RATE: 80 BPM | DIASTOLIC BLOOD PRESSURE: 60 MMHG | RESPIRATION RATE: 18 BRPM

## 2023-08-29 DIAGNOSIS — Z85.3 HISTORY OF BREAST CANCER: ICD-10-CM

## 2023-08-29 PROCEDURE — 37000008 HC ANESTHESIA 1ST 15 MINUTES: Performed by: PLASTIC SURGERY

## 2023-08-29 PROCEDURE — 71000033 HC RECOVERY, INTIAL HOUR: Performed by: PLASTIC SURGERY

## 2023-08-29 PROCEDURE — 36000707: Performed by: PLASTIC SURGERY

## 2023-08-29 PROCEDURE — 25000003 PHARM REV CODE 250: Performed by: ANESTHESIOLOGY

## 2023-08-29 PROCEDURE — 25000003 PHARM REV CODE 250: Performed by: PLASTIC SURGERY

## 2023-08-29 PROCEDURE — D9220A PRA ANESTHESIA: ICD-10-PCS | Mod: ANES,,, | Performed by: ANESTHESIOLOGY

## 2023-08-29 PROCEDURE — C1729 CATH, DRAINAGE: HCPCS | Performed by: PLASTIC SURGERY

## 2023-08-29 PROCEDURE — 88342 IMHCHEM/IMCYTCHM 1ST ANTB: CPT | Mod: 26,,, | Performed by: PATHOLOGY

## 2023-08-29 PROCEDURE — 25000003 PHARM REV CODE 250: Performed by: STUDENT IN AN ORGANIZED HEALTH CARE EDUCATION/TRAINING PROGRAM

## 2023-08-29 PROCEDURE — 88342 IMHCHEM/IMCYTCHM 1ST ANTB: CPT | Mod: 59 | Performed by: PATHOLOGY

## 2023-08-29 PROCEDURE — 94761 N-INVAS EAR/PLS OXIMETRY MLT: CPT

## 2023-08-29 PROCEDURE — 94640 AIRWAY INHALATION TREATMENT: CPT

## 2023-08-29 PROCEDURE — 88304 PR  SURG PATH,LEVEL III: ICD-10-PCS | Mod: 26,,, | Performed by: PATHOLOGY

## 2023-08-29 PROCEDURE — 27201423 OPTIME MED/SURG SUP & DEVICES STERILE SUPPLY: Performed by: PLASTIC SURGERY

## 2023-08-29 PROCEDURE — 36000706: Performed by: PLASTIC SURGERY

## 2023-08-29 PROCEDURE — 63600175 PHARM REV CODE 636 W HCPCS: Performed by: PLASTIC SURGERY

## 2023-08-29 PROCEDURE — 25000003 PHARM REV CODE 250: Performed by: NURSE ANESTHETIST, CERTIFIED REGISTERED

## 2023-08-29 PROCEDURE — D9220A PRA ANESTHESIA: ICD-10-PCS | Mod: CRNA,,, | Performed by: NURSE ANESTHETIST, CERTIFIED REGISTERED

## 2023-08-29 PROCEDURE — D9220A PRA ANESTHESIA: Mod: ANES,,, | Performed by: ANESTHESIOLOGY

## 2023-08-29 PROCEDURE — 71000015 HC POSTOP RECOV 1ST HR: Performed by: PLASTIC SURGERY

## 2023-08-29 PROCEDURE — 63600175 PHARM REV CODE 636 W HCPCS: Performed by: ANESTHESIOLOGY

## 2023-08-29 PROCEDURE — 88304 TISSUE EXAM BY PATHOLOGIST: CPT | Performed by: PATHOLOGY

## 2023-08-29 PROCEDURE — 88342 CHG IMMUNOCYTOCHEMISTRY: ICD-10-PCS | Mod: 26,,, | Performed by: PATHOLOGY

## 2023-08-29 PROCEDURE — 63600175 PHARM REV CODE 636 W HCPCS: Performed by: STUDENT IN AN ORGANIZED HEALTH CARE EDUCATION/TRAINING PROGRAM

## 2023-08-29 PROCEDURE — 71000016 HC POSTOP RECOV ADDL HR: Performed by: PLASTIC SURGERY

## 2023-08-29 PROCEDURE — 25000242 PHARM REV CODE 250 ALT 637 W/ HCPCS: Performed by: ANESTHESIOLOGY

## 2023-08-29 PROCEDURE — 37000009 HC ANESTHESIA EA ADD 15 MINS: Performed by: PLASTIC SURGERY

## 2023-08-29 PROCEDURE — D9220A PRA ANESTHESIA: Mod: CRNA,,, | Performed by: NURSE ANESTHETIST, CERTIFIED REGISTERED

## 2023-08-29 PROCEDURE — 71000039 HC RECOVERY, EACH ADD'L HOUR: Performed by: PLASTIC SURGERY

## 2023-08-29 PROCEDURE — 88304 TISSUE EXAM BY PATHOLOGIST: CPT | Mod: 26,,, | Performed by: PATHOLOGY

## 2023-08-29 RX ORDER — ALBUTEROL SULFATE 2.5 MG/.5ML
2.5 SOLUTION RESPIRATORY (INHALATION) ONCE
Status: COMPLETED | OUTPATIENT
Start: 2023-08-29 | End: 2023-08-29

## 2023-08-29 RX ORDER — HYDROMORPHONE HYDROCHLORIDE 2 MG/ML
0.4 INJECTION, SOLUTION INTRAMUSCULAR; INTRAVENOUS; SUBCUTANEOUS EVERY 5 MIN PRN
Status: DISCONTINUED | OUTPATIENT
Start: 2023-08-29 | End: 2023-08-29 | Stop reason: HOSPADM

## 2023-08-29 RX ORDER — CYCLOBENZAPRINE HCL 5 MG
10 TABLET ORAL ONCE
Status: COMPLETED | OUTPATIENT
Start: 2023-08-29 | End: 2023-08-29

## 2023-08-29 RX ORDER — PROCHLORPERAZINE EDISYLATE 5 MG/ML
5 INJECTION INTRAMUSCULAR; INTRAVENOUS EVERY 30 MIN PRN
Status: DISCONTINUED | OUTPATIENT
Start: 2023-08-29 | End: 2023-08-29 | Stop reason: HOSPADM

## 2023-08-29 RX ORDER — DEXMEDETOMIDINE HYDROCHLORIDE 100 UG/ML
INJECTION, SOLUTION INTRAVENOUS
Status: DISCONTINUED | OUTPATIENT
Start: 2023-08-29 | End: 2023-08-29

## 2023-08-29 RX ORDER — LIDOCAINE HYDROCHLORIDE 10 MG/ML
INJECTION, SOLUTION INTRAVENOUS
Status: DISCONTINUED | OUTPATIENT
Start: 2023-08-29 | End: 2023-08-29

## 2023-08-29 RX ORDER — LIDOCAINE HYDROCHLORIDE AND EPINEPHRINE 15; 5 MG/ML; UG/ML
INJECTION, SOLUTION EPIDURAL
Status: DISCONTINUED | OUTPATIENT
Start: 2023-08-29 | End: 2023-08-29 | Stop reason: HOSPADM

## 2023-08-29 RX ORDER — KETAMINE HCL IN 0.9 % NACL 50 MG/5 ML
SYRINGE (ML) INTRAVENOUS
Status: DISCONTINUED | OUTPATIENT
Start: 2023-08-29 | End: 2023-08-29

## 2023-08-29 RX ORDER — OXYCODONE HYDROCHLORIDE 5 MG/1
5 TABLET ORAL ONCE
Status: COMPLETED | OUTPATIENT
Start: 2023-08-29 | End: 2023-08-29

## 2023-08-29 RX ORDER — SODIUM CHLORIDE 9 MG/ML
INJECTION, SOLUTION INTRAVENOUS CONTINUOUS
Status: DISCONTINUED | OUTPATIENT
Start: 2023-08-29 | End: 2023-08-29 | Stop reason: HOSPADM

## 2023-08-29 RX ORDER — MUPIROCIN 20 MG/G
OINTMENT TOPICAL
Status: DISCONTINUED | OUTPATIENT
Start: 2023-08-29 | End: 2023-08-29 | Stop reason: HOSPADM

## 2023-08-29 RX ORDER — SODIUM CHLORIDE, SODIUM LACTATE, POTASSIUM CHLORIDE, CALCIUM CHLORIDE 600; 310; 30; 20 MG/100ML; MG/100ML; MG/100ML; MG/100ML
INJECTION, SOLUTION INTRAVENOUS CONTINUOUS
Status: DISCONTINUED | OUTPATIENT
Start: 2023-08-29 | End: 2023-08-29 | Stop reason: HOSPADM

## 2023-08-29 RX ORDER — MIDAZOLAM HYDROCHLORIDE 1 MG/ML
INJECTION INTRAMUSCULAR; INTRAVENOUS
Status: DISCONTINUED | OUTPATIENT
Start: 2023-08-29 | End: 2023-08-29

## 2023-08-29 RX ORDER — DEXAMETHASONE SODIUM PHOSPHATE 4 MG/ML
INJECTION, SOLUTION INTRA-ARTICULAR; INTRALESIONAL; INTRAMUSCULAR; INTRAVENOUS; SOFT TISSUE
Status: DISCONTINUED | OUTPATIENT
Start: 2023-08-29 | End: 2023-08-29

## 2023-08-29 RX ORDER — SODIUM CHLORIDE 0.9 % (FLUSH) 0.9 %
3 SYRINGE (ML) INJECTION
Status: DISCONTINUED | OUTPATIENT
Start: 2023-08-29 | End: 2023-08-29 | Stop reason: HOSPADM

## 2023-08-29 RX ORDER — ONDANSETRON 2 MG/ML
INJECTION INTRAMUSCULAR; INTRAVENOUS
Status: DISCONTINUED | OUTPATIENT
Start: 2023-08-29 | End: 2023-08-29

## 2023-08-29 RX ORDER — PROPOFOL 10 MG/ML
VIAL (ML) INTRAVENOUS
Status: DISCONTINUED | OUTPATIENT
Start: 2023-08-29 | End: 2023-08-29

## 2023-08-29 RX ORDER — OXYCODONE HYDROCHLORIDE 5 MG/1
5 TABLET ORAL
Status: DISCONTINUED | OUTPATIENT
Start: 2023-08-29 | End: 2023-08-29 | Stop reason: HOSPADM

## 2023-08-29 RX ORDER — HYDROCODONE BITARTRATE AND ACETAMINOPHEN 5; 325 MG/1; MG/1
1 TABLET ORAL EVERY 8 HOURS PRN
Qty: 21 TABLET | Refills: 0 | Status: SHIPPED | OUTPATIENT
Start: 2023-08-29 | End: 2023-09-05

## 2023-08-29 RX ORDER — ROCURONIUM BROMIDE 10 MG/ML
INJECTION, SOLUTION INTRAVENOUS
Status: DISCONTINUED | OUTPATIENT
Start: 2023-08-29 | End: 2023-08-29

## 2023-08-29 RX ORDER — MEPERIDINE HYDROCHLORIDE 25 MG/ML
12.5 INJECTION INTRAMUSCULAR; INTRAVENOUS; SUBCUTANEOUS ONCE AS NEEDED
Status: DISCONTINUED | OUTPATIENT
Start: 2023-08-29 | End: 2023-08-29 | Stop reason: HOSPADM

## 2023-08-29 RX ORDER — FENTANYL CITRATE 50 UG/ML
INJECTION, SOLUTION INTRAMUSCULAR; INTRAVENOUS
Status: DISCONTINUED | OUTPATIENT
Start: 2023-08-29 | End: 2023-08-29

## 2023-08-29 RX ORDER — CLINDAMYCIN HYDROCHLORIDE 300 MG/1
300 CAPSULE ORAL EVERY 8 HOURS
Qty: 21 CAPSULE | Refills: 0 | Status: SHIPPED | OUTPATIENT
Start: 2023-08-29 | End: 2023-09-05

## 2023-08-29 RX ORDER — PROPOFOL 10 MG/ML
VIAL (ML) INTRAVENOUS CONTINUOUS PRN
Status: DISCONTINUED | OUTPATIENT
Start: 2023-08-29 | End: 2023-08-29

## 2023-08-29 RX ADMIN — DEXAMETHASONE SODIUM PHOSPHATE 8 MG: 4 INJECTION, SOLUTION INTRAMUSCULAR; INTRAVENOUS at 12:08

## 2023-08-29 RX ADMIN — FENTANYL CITRATE 50 MCG: 50 INJECTION, SOLUTION INTRAMUSCULAR; INTRAVENOUS at 04:08

## 2023-08-29 RX ADMIN — FENTANYL CITRATE 25 MCG: 50 INJECTION, SOLUTION INTRAMUSCULAR; INTRAVENOUS at 04:08

## 2023-08-29 RX ADMIN — PROPOFOL 200 MG: 10 INJECTION, EMULSION INTRAVENOUS at 12:08

## 2023-08-29 RX ADMIN — DEXMEDETOMIDINE HYDROCHLORIDE 8 MCG: 100 INJECTION, SOLUTION, CONCENTRATE INTRAVENOUS at 02:08

## 2023-08-29 RX ADMIN — ROCURONIUM BROMIDE 20 MG: 10 SOLUTION INTRAVENOUS at 02:08

## 2023-08-29 RX ADMIN — OXYCODONE HYDROCHLORIDE 5 MG: 5 TABLET ORAL at 06:08

## 2023-08-29 RX ADMIN — ROCURONIUM BROMIDE 50 MG: 10 SOLUTION INTRAVENOUS at 12:08

## 2023-08-29 RX ADMIN — PROPOFOL 150 MCG/KG/MIN: 10 INJECTION, EMULSION INTRAVENOUS at 12:08

## 2023-08-29 RX ADMIN — MIDAZOLAM HYDROCHLORIDE 2 MG: 1 INJECTION, SOLUTION INTRAMUSCULAR; INTRAVENOUS at 12:08

## 2023-08-29 RX ADMIN — SODIUM CHLORIDE, SODIUM LACTATE, POTASSIUM CHLORIDE, AND CALCIUM CHLORIDE: 600; 310; 30; 20 INJECTION, SOLUTION INTRAVENOUS at 02:08

## 2023-08-29 RX ADMIN — SUGAMMADEX 200 MG: 100 INJECTION, SOLUTION INTRAVENOUS at 04:08

## 2023-08-29 RX ADMIN — Medication 50 MG: at 12:08

## 2023-08-29 RX ADMIN — LIDOCAINE HYDROCHLORIDE 100 MG: 10 INJECTION, SOLUTION INTRAVENOUS at 12:08

## 2023-08-29 RX ADMIN — OXYCODONE HYDROCHLORIDE 5 MG: 5 TABLET ORAL at 04:08

## 2023-08-29 RX ADMIN — FENTANYL CITRATE 100 MCG: 50 INJECTION, SOLUTION INTRAMUSCULAR; INTRAVENOUS at 12:08

## 2023-08-29 RX ADMIN — CYCLOBENZAPRINE HYDROCHLORIDE 10 MG: 5 TABLET, FILM COATED ORAL at 05:08

## 2023-08-29 RX ADMIN — ONDANSETRON HYDROCHLORIDE 4 MG: 2 INJECTION INTRAMUSCULAR; INTRAVENOUS at 03:08

## 2023-08-29 RX ADMIN — PROPOFOL 150 MCG/KG/MIN: 10 INJECTION, EMULSION INTRAVENOUS at 01:08

## 2023-08-29 RX ADMIN — ALBUTEROL SULFATE 2.5 MG: 2.5 SOLUTION RESPIRATORY (INHALATION) at 10:08

## 2023-08-29 RX ADMIN — FENTANYL CITRATE 25 MCG: 50 INJECTION, SOLUTION INTRAMUSCULAR; INTRAVENOUS at 03:08

## 2023-08-29 RX ADMIN — CARBOXYMETHYLCELLULOSE SODIUM 2 DROP: 2.5 SOLUTION/ DROPS OPHTHALMIC at 12:08

## 2023-08-29 RX ADMIN — HYDROMORPHONE HYDROCHLORIDE 0.4 MG: 2 INJECTION INTRAMUSCULAR; INTRAVENOUS; SUBCUTANEOUS at 05:08

## 2023-08-29 RX ADMIN — SODIUM CHLORIDE, SODIUM LACTATE, POTASSIUM CHLORIDE, AND CALCIUM CHLORIDE: 600; 310; 30; 20 INJECTION, SOLUTION INTRAVENOUS at 11:08

## 2023-08-29 RX ADMIN — ROCURONIUM BROMIDE 20 MG: 10 SOLUTION INTRAVENOUS at 01:08

## 2023-08-29 RX ADMIN — CEFAZOLIN 2 G: 2 INJECTION, POWDER, FOR SOLUTION INTRAMUSCULAR; INTRAVENOUS at 12:08

## 2023-08-29 RX ADMIN — HYDROMORPHONE HYDROCHLORIDE 0.4 MG: 2 INJECTION INTRAMUSCULAR; INTRAVENOUS; SUBCUTANEOUS at 04:08

## 2023-08-29 RX ADMIN — MUPIROCIN: 20 OINTMENT TOPICAL at 10:08

## 2023-08-29 NOTE — H&P
Patient presents today for planned 2nd stage.  She is status post bilateral mastectomy and reconstruction with NEHA flap    Past Medical History:   Diagnosis Date    Allergy     Anxiety     Arthritis     Atrial flutter     Colon polyps     COPD (chronic obstructive pulmonary disease)     COPD exacerbation 10/31/2022    Depression     Diverticular disease of colon 2017    Diverticulitis     HLD (hyperlipidemia)     Malignant neoplasm of central portion of left breast in female, estrogen receptor positive 2022    Pancreatitis     Pneumothorax, unspecified     following mastectomy sx     Psoriasis     Rheumatoid arthritis     Severe obesity (BMI 35.0-39.9) with comorbidity        Scheduled Meds:  Continuous Infusions:   sodium chloride 0.9%      lactated ringers       PRN Meds:.ceFAZolin (ANCEF) IVPB, LIDOcaine HCL 10 mg/ml (1%) 50 mL, EPINEPHrine 1 mg in lactated Ringers 1,000 mL irrigation, LIDOcaine HCL 10 mg/ml (1%) 50 mL, EPINEPHrine 1 mg in lactated Ringers 1,000 mL irrigation, mupirocin    Review of patient's allergies indicates:   Allergen Reactions    Succinimides Anaphylaxis     Son has MH       Past Surgical History:   Procedure Laterality Date    ABLATION  2022    Cardiac Ablation for A Flutter, Successful    ADENOIDECTOMY  1966    Tonsillitis and adnoids    BILATERAL MASTECTOMY Bilateral 2/15/2023    Procedure: MASTECTOMY, BILATERAL;  Surgeon: Marcela Meehan MD;  Location: [facility];  Service: General;  Laterality: Bilateral;  EMAIL SENT  @ 11:44 LK / 2.5 HOURS    BLADDER SUSPENSION      (x2)    BREAST REVISION SURGERY Bilateral 3/29/2023    Procedure: BREAST REVISION SURGERY / BILATERAL BREAST FLAP REVISION;  Surgeon: Ming Milian MD;  Location: [facility] [place], operated by [facility] OR;  Service: Plastics;  Laterality: Bilateral;  90 MINUTES     SECTION      (x2)    DEBRIDEMENT, BREAST Bilateral 3/29/2023    Procedure: DEBRIDEMENT, BREAST;  Surgeon: Ming Milian MD;  Location: [facility] [place], operated by [facility] OR;  Service:  Plastics;  Laterality: Bilateral;    ESOPHAGOGASTRODUODENOSCOPY N/A 03/31/2021    Procedure: EGD (ESOPHAGOGASTRODUODENOSCOPY);  Surgeon: Vince Rodriguez MD;  Location: [address] FLR;  Service: General;  Laterality: N/A;    INJECTION FOR SENTINEL NODE IDENTIFICATION Left 2/15/2023    Procedure: INJECTION, FOR SENTINEL NODE IDENTIFICATION;  Surgeon: Marcela Meehan MD;  Location: [facility];  Service: General;  Laterality: Left;    LAPAROSCOPIC SLEEVE GASTRECTOMY N/A 03/31/2021    Procedure: GASTRECTOMY, SLEEVE, LAPAROSCOPIC, with intraop EGD;  Surgeon: Vince Rodriguez MD;  Location: [facility] OR 2ND FLR;  Service: General;  Laterality: N/A;    LUNG BIOPSY  09/2020    RECONSTRUCTION OF BREAST WITH DEEP INFERIOR EPIGASTRIC ARTERY  (NEHA) FREE FLAP Bilateral 2/15/2023    Procedure: RECONSTRUCTION, BREAST, USING NEHA FREE FLAP;  Surgeon: Ming Milian MD;  Location: [facility];  Service: Plastics;  Laterality: Bilateral;    RHINOPLASTY      SENTINEL LYMPH NODE BIOPSY Left 2/15/2023    Procedure: BIOPSY, LYMPH NODE, SENTINEL;  Surgeon: Marcela Meehan MD;  Location: [facility] OR;  Service: General;  Laterality: Left;    TONSILLECTOMY      TRANSESOPHAGEAL ECHOCARDIOGRAPHY N/A 11/02/2022    Procedure: ECHOCARDIOGRAM, TRANSESOPHAGEAL;  Surgeon: Kellie Grover MD;  Location: [facility] EP LAB;  Service: Cardiology;  Laterality: N/A;       On exam:  Skin paddle was well perfused on both sides     This fair amount of firmness of the left reconstructed breast medially along with some swelling in that area     Abdominal incisions well healed    There is excess tissue of the lateral chest wall on both sides     Assessment: History of breast cancer     Plan to OR for bilateral revision of reconstructed breast, abdominal wall revision and fat grafting to both breasts

## 2023-08-29 NOTE — DISCHARGE SUMMARY
[facility] ([place])  Short Stay  Discharge Summary    Admit Date: 8/29/2023    Discharge Date and Time:  08/29/2023 4:37 PM      Discharge Attending Physician: Ming Milian MD     Hospital Course (synopsis of major diagnoses, care, treatment, and services provided during the course of the hospital stay): patient tolerated scheduled surgery well without issue    Final Diagnoses:    Principal Problem: History of breast cancer   Secondary Diagnoses:  acquired absence of breasts    Discharged Condition: stable    Disposition: Home or Self Care    Follow up/Patient Instructions:    Medications:  Reconciled Home Medications:      Medication List        START taking these medications      clindamycin 300 MG capsule  Commonly known as: CLEOCIN  Take 1 capsule (300 mg total) by mouth every 8 (eight) hours. for 7 days     HYDROcodone-acetaminophen 5-325 mg per tablet  Commonly known as: NORCO  Take 1 tablet by mouth every 8 (eight) hours as needed for Pain.            CONTINUE taking these medications      anastrozole 1 mg Tab  Commonly known as: ARIMIDEX  Take 1 tablet (1 mg total) by mouth once daily.     atorvastatin 20 MG tablet  Commonly known as: LIPITOR  Take 1 tablet (20 mg total) by mouth every evening.     calcium-vitamin D 250 mg-2.5 mcg (100 unit) per tablet  Take 1 tablet by mouth 2 (two) times daily.     cyanocobalamin 500 MCG tablet  Take 500 mcg by mouth once daily.     diazePAM 5 MG tablet  Commonly known as: VALIUM  Take 1 tablet (5 mg total) by mouth daily as needed for Anxiety.     ELIQUIS 5 mg Tab  Generic drug: apixaban  Take 1 tablet (5 mg total) by mouth 2 (two) times daily. Will hold 2/13 & 2/14     fluocinonide-emollient 0.05 % Crea  Commonly known as: FLUOCINONIDE-E  AAA of feet daily prn psoriasis.     HAIR,SKIN AND NAILS ORAL  Take by mouth.     metoprolol tartrate 25 MG tablet  Commonly known as: LOPRESSOR  Take 1 tablet (25 mg total) by mouth 2 (two) times daily.     multivitamin  per tablet  Commonly known as: THERAGRAN  Take 1 tablet by mouth once daily.     omeprazole 40 MG capsule  Commonly known as: PRILOSEC  Take 1 capsule (40 mg total) by mouth every morning.     ondansetron 8 MG tablet  Commonly known as: ZOFRAN  Take 1 tablet (8 mg total) by mouth every 12 (twelve) hours as needed for Nausea.     QUEtiapine 50 MG tablet  Commonly known as: SEROQUEL  Take 1 tablet (50 mg total) by mouth every evening.     * TALTZ AUTOINJECTOR (3 PACK) 80 mg/mL Atin  Generic drug: ixekizumab  Inject 160mg (2 pens) into the skin at week 0 , then inject 80mg into the skin at week 2.     * TALTZ AUTOINJECTOR (2 PACK) 80 mg/mL Atin  Generic drug: ixekizumab  Inject 80mg SQ every other week (weeks 4, 6, 8, 10).     * TALTZ AUTOINJECTOR 80 mg/mL Atin  Generic drug: ixekizumab  Inject 80 mg into the skin every 28 days.     TRELEGY ELLIPTA 100-62.5-25 mcg Dsdv  Generic drug: fluticasone-umeclidin-vilanter  Inhale 1 puff into the lungs once daily.     * triamcinolone acetonide 0.025% 0.025 % cream  Commonly known as: KENALOG  AAA of skin folds bid prn psoriasis.     * triamcinolone acetonide 0.1% 0.1 % cream  Commonly known as: KENALOG  Apply to affected areas of body BID prn psoriasis. Do not use on face or skin folds.     venlafaxine 75 MG 24 hr capsule  Commonly known as: EFFEXOR-XR  Take 1 capsule (75 mg total) by mouth once daily. Start on Week 2     VITAMIN B COMPLEX-C ORAL  Take by mouth Daily.     VITAMIN D3 COMPLETE ORAL  Take by mouth.           * This list has 5 medication(s) that are the same as other medications prescribed for you. Read the directions carefully, and ask your doctor or other care provider to review them with you.                Discharge Procedure Orders   Diet Adult Regular     Lifting restrictions   Order Comments: No heavy lifting, nothing heavier than 5 lbs. No overhead movements or movements that will pull or place tension on the breast or abdominal incisions.     Notify your  health care provider if you experience any of the following:  temperature >100.4     Notify your health care provider if you experience any of the following:  severe uncontrolled pain     Notify your health care provider if you experience any of the following:  redness, tenderness, or signs of infection (pain, swelling, redness, odor or green/yellow discharge around incision site)     Notify your health care provider if you experience any of the following:  difficulty breathing or increased cough     No dressing needed   Order Comments: Wear abdominal binder and surgical bra at all times. Pad with abdominal pads for comfort as needed. Showering is okay, but no soaking under water or baths.     Activity as tolerated      Follow-up Information       Ming Milian MD. Call in 1 week(s).    Specialties: Plastic Surgery, Oral and Maxillofacial Surgery, Oral Surgery  Why: [address] [facility] [address]  [phone]  Contact information:  4TH CONTACT  [facility]  [phone]               Ming Milian MD .    Specialties: Plastic Surgery, Oral and Maxillofacial Surgery, Oral Surgery  Contact information:  2ND & 3RD CONTACT  [facility]  [phone]

## 2023-08-29 NOTE — PLAN OF CARE
Per patient request, post op prescriptions and personal prescriptions to be delivered to bedside for her and her .  Called outpatient pharmacy and spoke to Jessenia.

## 2023-08-29 NOTE — ANESTHESIA POSTPROCEDURE EVALUATION
Anesthesia Post Evaluation    Patient: Lucia Matias    Procedure(s) Performed: Procedure(s) (LRB):  REVISION, FLAP, PREVIOUSLY CREATED FOR BREAST RECONSTRUCTION (Bilateral)  REVISION, SCAR, ABDOMINAL DONOR SITE (N/A)  LIPOSUCTION, WITH FAT TRANSFER / FAT GRAFTING TO BOTH BREAST (Bilateral)    Final Anesthesia Type: general      Patient location during evaluation: PACU  Patient participation: Yes- Able to Participate  Level of consciousness: awake and alert  Post-procedure vital signs: reviewed and stable  Pain management: adequate  Airway patency: patent  KIMBERLY mitigation strategies: Extubation while patient is awake  PONV status at discharge: No PONV  Anesthetic complications: no      Cardiovascular status: hemodynamically stable  Respiratory status: unassisted  Hydration status: euvolemic  Follow-up not needed.          Vitals Value Taken Time   /60 08/29/23 1706   Temp 36.2 °C (97.1 °F) 08/29/23 1636   Pulse 79 08/29/23 1708   Resp 16 08/29/23 1705   SpO2 89 % 08/29/23 1708   Vitals shown include unvalidated device data.      No case tracking events are documented in the log.      Pain/Fred Score: Pain Rating Prior to Med Admin: 8 (8/29/2023  5:00 PM)  Fred Score: 8 (8/29/2023  4:36 PM)

## 2023-08-29 NOTE — TRANSFER OF CARE
Anesthesia Transfer of Care Note    Patient: Lucia Matias    Procedure(s) Performed: Procedure(s) (LRB):  REVISION, FLAP, PREVIOUSLY CREATED FOR BREAST RECONSTRUCTION (Bilateral)  REVISION, SCAR, ABDOMINAL DONOR SITE (N/A)  LIPOSUCTION, WITH FAT TRANSFER / FAT GRAFTING TO BOTH BREAST (Bilateral)    Patient location: PACU    Anesthesia Type: general    Transport from OR: Transported from OR on 6-10 L/min O2 by face mask with adequate spontaneous ventilation    Post pain: adequate analgesia    Post assessment: no apparent anesthetic complications and tolerated procedure well    Post vital signs: stable    Level of consciousness: awake    Nausea/Vomiting: no nausea/vomiting    Complications: none    Transfer of care protocol was followed      Last vitals:   Visit Vitals  BP (!) 197/91 (BP Location: Left leg, Patient Position: Lying)   Pulse 86   Temp 36.2 °C (97.1 °F) (Skin)   Resp 16   SpO2 100%   Breastfeeding No

## 2023-08-29 NOTE — DISCHARGE INSTRUCTIONS

## 2023-08-29 NOTE — ANESTHESIA PROCEDURE NOTES
Intubation    Date/Time: 8/29/2023 12:40 PM    Performed by: Dejeay Diaz CRNA  Authorized by: Vlad Flores MD    Intubation:     Induction:  Intravenous    Intubated:  Postinduction    Mask Ventilation:  Easy mask    Attempts:  1    Attempted By:  CRNA and staff anesthesiologist    Method of Intubation:  Video laryngoscopy    Blade:  Dean 3    Laryngeal View Grade: Grade I - full view of cords      Difficult Airway Encountered?: No      Complications:  None    Airway Device:  Oral endotracheal tube    Airway Device Size:  7.0    Style/Cuff Inflation:  Cuffed (inflated to minimal occlusive pressure)    Tube secured:  22    Secured at:  The lips    Placement Verified By:  Capnometry    Complicating Factors:  None    Findings Post-Intubation:  BS equal bilateral and atraumatic/condition of teeth unchanged

## 2023-08-29 NOTE — BRIEF OP NOTE
[facility] ([place])  Brief Operative Note    SUMMARY     Surgery Date: 8/29/2023     Surgeon(s) and Role:     * Ming Milian MD - Primary    Assisting Surgeon: None    Pre-op Diagnosis:  History of breast cancer [Z85.3]    Post-op Diagnosis:  Post-Op Diagnosis Codes:     * History of breast cancer [Z85.3]    Procedures:  Revision Right Reconstructed Breast  Revision Left Reconstructed Breast, Excision Fat Necrosis  Complex Closure, Abdominal Scar 35cm  Fat Grafting to Breasts, 100cc to Right, 70cc to Left    Anesthesia: General, tumescent    Operative Findings:   Right breast flap healthy and well incorporated  Left breast with fat necrosis to medial breast flap, otherwise healthy  Multiple chronic seroma cavities to abdomen, no purulence or signs of infection    Estimated Blood Loss: 200cc         Specimens:   Specimen (24h ago, onward)       Start     Ordered    08/29/23 1524  Specimen to Pathology, Surgery General Surgery  Once        Comments: Pre-op Diagnosis: History of breast cancer [Z85.3]Procedure(s):REVISION, FLAP, PREVIOUSLY CREATED FOR BREAST RECONSTRUCTIONREVISION, SCAR, ABDOMINAL DONOR SITELIPOSUCTION, WITH FAT TRANSFER / FAT GRAFTING TO BOTH BREAST Number of specimens: 2Name of specimens: 1. Right breast cut necrosis history breast cancer;2. Fat necrosis- left breast     References:    Click here for ordering Quick Tip   Question Answer Comment   Procedure Type: General Surgery    Specimen Class: Known or suspected malignancy    Which provider would you like to cc? MING MILIAN    Release to patient Immediate        08/29/23 1523    08/29/23 1430  Specimen to Pathology, Surgery General Surgery  Once,   Status:  Canceled        Comments: Pre-op Diagnosis: History of breast cancer [Z85.3]Procedure(s):REVISION, FLAP, PREVIOUSLY CREATED FOR BREAST RECONSTRUCTIONREVISION, SCAR, ABDOMINAL DONOR SITELIPOSUCTION, WITH FAT TRANSFER / FAT GRAFTING TO BOTH BREAST Number of specimens: 1Name of  specimens: 1. Right breast cut necrosis history breast cancer;     References:    Click here for ordering Quick Tip   Question Answer Comment   Procedure Type: General Surgery    Specimen Class: Known or suspected malignancy    Which provider would you like to cc? FABIEN MARROQUIN    Release to patient Immediate        08/29/23 1430                    [id number]    Drains:   10 Danish shelbie to left breast  15 Danish shelbie to abdomen    Complications: none    Disposition: awakened from anesthesia, extubated, and taken to PACU in stable condition

## 2023-08-30 NOTE — OP NOTE
[facility]  Plastic Surgery  Operative Note    SUMMARY     Date of Procedure: 8/29/2023     Procedure:   Revision Right Reconstructed Breast  Revision Left Reconstructed Breast, Excision Fat Necrosis  Complex Closure, Abdominal Scar 35cm  Fat Grafting to Breasts, 100cc to Right, 70cc to Left    Surgeon(s) and Role:     * Ming Milian MD - Primary    Assisting Surgeon: None    Pre-Operative Diagnosis: History of breast cancer [Z85.3]    Post-Operative Diagnosis: same    Anesthesia: General, tumescent    Operative Findings (including complications, if any):   Right breast flap healthy and well incorporated  Left breast with fat necrosis to medial breast flap, otherwise healthy  Multiple chronic seroma cavities to abdomen, no purulence or signs of infection    Description of Technical Procedures: The patient was identified in the preoperative holding area, and the planned surgery was reviewed and discussed. Written consent was confirmed, and surgical markings were then placed. After receiving prophylactic antibiotics and heparin if indicated, the patient was transported to the operating room and placed in supine position on the operating table, padding all pressure points. After the induction of general anesthesia and endotracheal intubation, the patient was placed in right lateral decubitus position in typical fashion using bean bag, axillary roll, padded appropriately; she was then prepped and draped in usual sterile fashion.     Following a formal timeout with all parties in agreement, the procedure was begun by infiltrating the planned elliptical incision with local anesthetic with epinephrine. Incision was then made around the excess tissue at the left lateral chest wall using scalpel. Dissection was continued down through subcutaneous tissues with monopolar electrocautery to the subscarpal plane. The skin and subcutaneous fat was then excised in entirety; hemostasis was achieved with  electrocautery, and layered closure was then performed using 2-0 vicryl suture for barb's fascia followed by a running 2-0 barbed quill suture for deep dermis and subcuticular layers. The incision was then cleaned and dressed with dermabond surgical adhesive and prineo tape. The drapes were then taken down, and the patient was re-positioned in left lateral decubitus position. The excess skin and subcutaneous tissues to the right lateral chest wall was then excised and closed in identical fashion.    The drapes were once again taken down, and the patient was re-positioned in supine position, taking care to pad all pressure points. The patient's arms were placed laterally on arm boards and secured; she was then prepped and draped again in sterile fashion. Stab incisions were next made to the lateral abdominal scar bilaterally, and the central abdomen and lower flanks were infiltrated with tumescent solution. A 45mm cookie cutter was then used to outline the skin paddle reduction on the right reconstructed breast; incision was then made along the outline as well as the planned wise pattern . The intervening skin was then de-epithelialized using face lift scissor, and dissection was continued through subcutaneous tissues with monopolar electrocautery to create skin flaps. The incisions were then tailor tacked, and a purse string suture was placed just superior to the vertical limb to account for redundant skin. The 45mm cookie cutter was then used to outline the skin paddle/NAC inset with marking pen at approximately 6 cm from the IMF. Incision was then made with scalpel, and the skin and subcutaneous fat was excised. Another purse string suture was then placed in the deep dermis using the cookie cutter as a template. The skin paddle was then inset and secured using 3-0 monocryl suture for deep dermis and 4-0 monocryl suture for subcuticular layers. This was repeated in identical fashion on the left side;  additionally, on the left side, a foci of fat necrosis was also excised, and a 10 Slovenian shelbie drain was also placed in the left breast pocket, exiting the incision, and secured using 3-0 nylon suture.     Liposuction was then performed to the central abdomen and lower flanks bilaterally in typical fashion; the harvested fat was then processed in preparation for grafting. Incision was next made just inferior to the previous scar with scalpel, and dissection was continued through subcutaneous tissues with monopolar electrocautery down to abdominal wall/fascia. Extensive scar tissue and small foci of fat necrosis were noted. The abdominal tissue was then undermined superiorly off of the abdominal fascia; two chronic seroma capsules were encountered as the tissue was undermined. The capsules were partially excised, and the remaining capsule was scored/cauterized. The abdominal tissue was then advanced inferiorly, and the excess tissue was marked. The superior incision was then made with scalpel, and the excess tissue was excised with electrocautery. The inferior incision was then secured to the lower abdominal fascia using 2-0 PDS suture. The abdominal incision was then tailor tacked using staple. A 15 Slovenian shelbie drain was then placed into the abdominal deadspace, exiting the left lateral incision, and secured using 3-0 nylon suture. Layered closure was then performed using 2-0 vicryl suture for barb's fascia followed by running 2-0 barbed quill suture for deep dermis and subcuticular layers. The abdominal closure was measured to be approximately 35cm.     Next, the harvest fat was then grafted into the reconstructed breasts using a blunt tip tulip injection cannula; 100c was injected into the superior/medial right breast, and 70cc into the superior left breast. All stab incisions were closed with 5-0 monocryl suture. All incisions were then cleaned and dressed with dermabond surgical adhesive and prineo tape.  Drains were placed on bulb suction and dressed with biopatch and mepilex foam dressing.    The patient tolerated the procedure well without issue having suffered no immediate complication. All counts were correct on two occasions, and Dr. Milian was present for all critical portions of the case.     Estimated Blood Loss (EBL): 200cc           Implants: * No implants in log *    Specimens:   Specimen (24h ago, onward)       Start     Ordered    08/29/23 1524  Specimen to Pathology, Surgery General Surgery  Once        Comments: Pre-op Diagnosis: History of breast cancer [Z85.3]Procedure(s):REVISION, FLAP, PREVIOUSLY CREATED FOR BREAST RECONSTRUCTIONREVISION, SCAR, ABDOMINAL DONOR SITELIPOSUCTION, WITH FAT TRANSFER / FAT GRAFTING TO BOTH BREAST Number of specimens: 2Name of specimens: 1. Right breast cut necrosis history breast cancer;2. Fat necrosis- left breast     References:    Click here for ordering Quick Tip   Question Answer Comment   Procedure Type: General Surgery    Specimen Class: Known or suspected malignancy    Which provider would you like to cc? FABIEN MILIAN    Release to patient Immediate        08/29/23 1523    08/29/23 1430  Specimen to Pathology, Surgery General Surgery  Once,   Status:  Canceled        Comments: Pre-op Diagnosis: History of breast cancer [Z85.3]Procedure(s):REVISION, FLAP, PREVIOUSLY CREATED FOR BREAST RECONSTRUCTIONREVISION, SCAR, ABDOMINAL DONOR SITELIPOSUCTION, WITH FAT TRANSFER / FAT GRAFTING TO BOTH BREAST Number of specimens: 1Name of specimens: 1. Right breast cut necrosis history breast cancer;     References:    Click here for ordering Quick Tip   Question Answer Comment   Procedure Type: General Surgery    Specimen Class: Known or suspected malignancy    Which provider would you like to cc? FABIEN MILIAN    Release to patient Immediate        08/29/23 1430                            Condition: Good    Disposition: PACU - hemodynamically stable.

## 2023-08-30 NOTE — PLAN OF CARE
Lucia Matias has met all discharge criteria from Phase II. Vital Signs are stable, ambulating  without difficulty.Pain is now under control and tolerable for the pt. Pain score 5 at this time.  Discharge instructions given, patient verbalized understanding. Discharged from facility via wheelchair in stable condition.

## 2023-09-04 ENCOUNTER — PATIENT MESSAGE (OUTPATIENT)
Dept: INFECTIOUS DISEASES | Facility: CLINIC | Age: 63
End: 2023-09-04
Payer: COMMERCIAL

## 2023-09-04 ENCOUNTER — PATIENT MESSAGE (OUTPATIENT)
Dept: PLASTIC SURGERY | Facility: CLINIC | Age: 63
End: 2023-09-04
Payer: COMMERCIAL

## 2023-09-05 ENCOUNTER — TELEPHONE (OUTPATIENT)
Dept: SURGERY | Facility: CLINIC | Age: 63
End: 2023-09-05
Payer: COMMERCIAL

## 2023-09-05 LAB
FINAL PATHOLOGIC DIAGNOSIS: NORMAL
GROSS: NORMAL
Lab: NORMAL

## 2023-09-05 NOTE — TELEPHONE ENCOUNTER
Spoke to patient and rescheduled her appointment for today to next week.  Patient agreeable to date and time of new appointment.

## 2023-09-05 NOTE — TELEPHONE ENCOUNTER
----- Message from Edmar Cain sent at 9/5/2023 12:08 PM CDT -----  Contact: [phone]  Luciadoroteo Bricell calling regarding Appointment Access  (message) Pt states she can make it to her appt schedule for today at 3:00 pt states she cant drive due to her surgery she had last week and is a lil worry about driving by her self

## 2023-09-06 ENCOUNTER — PATIENT MESSAGE (OUTPATIENT)
Dept: BARIATRICS | Facility: CLINIC | Age: 63
End: 2023-09-06
Payer: COMMERCIAL

## 2023-09-06 ENCOUNTER — OFFICE VISIT (OUTPATIENT)
Dept: PLASTIC SURGERY | Facility: CLINIC | Age: 63
End: 2023-09-06
Attending: PLASTIC SURGERY
Payer: COMMERCIAL

## 2023-09-06 VITALS
TEMPERATURE: 98 F | BODY MASS INDEX: 31.46 KG/M2 | HEIGHT: 62 IN | HEART RATE: 74 BPM | DIASTOLIC BLOOD PRESSURE: 60 MMHG | SYSTOLIC BLOOD PRESSURE: 125 MMHG

## 2023-09-06 DIAGNOSIS — Z85.3 HISTORY OF BREAST CANCER: Primary | ICD-10-CM

## 2023-09-06 PROCEDURE — 99024 PR POST-OP FOLLOW-UP VISIT: ICD-10-PCS | Mod: S$GLB,,, | Performed by: PLASTIC SURGERY

## 2023-09-06 PROCEDURE — 1159F PR MEDICATION LIST DOCUMENTED IN MEDICAL RECORD: ICD-10-PCS | Mod: CPTII,S$GLB,, | Performed by: PLASTIC SURGERY

## 2023-09-06 PROCEDURE — 3074F SYST BP LT 130 MM HG: CPT | Mod: CPTII,S$GLB,, | Performed by: PLASTIC SURGERY

## 2023-09-06 PROCEDURE — 3008F BODY MASS INDEX DOCD: CPT | Mod: CPTII,S$GLB,, | Performed by: PLASTIC SURGERY

## 2023-09-06 PROCEDURE — 3074F PR MOST RECENT SYSTOLIC BLOOD PRESSURE < 130 MM HG: ICD-10-PCS | Mod: CPTII,S$GLB,, | Performed by: PLASTIC SURGERY

## 2023-09-06 PROCEDURE — 99024 POSTOP FOLLOW-UP VISIT: CPT | Mod: S$GLB,,, | Performed by: PLASTIC SURGERY

## 2023-09-06 PROCEDURE — 1159F MED LIST DOCD IN RCRD: CPT | Mod: CPTII,S$GLB,, | Performed by: PLASTIC SURGERY

## 2023-09-06 PROCEDURE — 3008F PR BODY MASS INDEX (BMI) DOCUMENTED: ICD-10-PCS | Mod: CPTII,S$GLB,, | Performed by: PLASTIC SURGERY

## 2023-09-06 PROCEDURE — 3078F DIAST BP <80 MM HG: CPT | Mod: CPTII,S$GLB,, | Performed by: PLASTIC SURGERY

## 2023-09-06 PROCEDURE — 3078F PR MOST RECENT DIASTOLIC BLOOD PRESSURE < 80 MM HG: ICD-10-PCS | Mod: CPTII,S$GLB,, | Performed by: PLASTIC SURGERY

## 2023-09-06 NOTE — PROGRESS NOTES
Patient presents for follow-up.  She is status post revision reconstructed breasts of the abdomen     No concerns     On exam:  Both reconstructed breasts are well-perfused symmetric     The abdominal incisions well healed     The drain of the left breast produce minimal serosanguineous material was removed     The abdominal drain also produce minimal serosanguineous material was removed     Assessment:  Stable     Plan will see her again in 2 weeks from now at which time we will remove the Dermabond tape

## 2023-09-10 DIAGNOSIS — R63.4 WEIGHT LOSS: ICD-10-CM

## 2023-09-10 DIAGNOSIS — Z98.84 S/P LAPAROSCOPIC SLEEVE GASTRECTOMY: ICD-10-CM

## 2023-09-11 ENCOUNTER — PATIENT MESSAGE (OUTPATIENT)
Dept: HEMATOLOGY/ONCOLOGY | Facility: CLINIC | Age: 63
End: 2023-09-11
Payer: COMMERCIAL

## 2023-09-11 RX ORDER — OMEPRAZOLE 40 MG/1
40 CAPSULE, DELAYED RELEASE ORAL EVERY MORNING
Qty: 90 CAPSULE | Refills: 1 | Status: SHIPPED | OUTPATIENT
Start: 2023-09-11 | End: 2024-03-18 | Stop reason: SDUPTHER

## 2023-09-12 ENCOUNTER — PATIENT MESSAGE (OUTPATIENT)
Dept: BARIATRICS | Facility: CLINIC | Age: 63
End: 2023-09-12
Payer: COMMERCIAL

## 2023-09-12 NOTE — PROGRESS NOTES
Oncology Clinic   Progress Note    Patient: Lucia Matias  MRN: [id number]  Date: 2023    Chief Complaint: HR+ breast cancer    Ms. Matias is a domo 61yo woman with recently diagnosed HR+ breast cancer who presents today for evaluation. Her oncologic history is as follows:    Oncologic History:   22: annual mammogram indentified left focal asymmetry at the upper outer position.   Follow-up mammogram and ultrasound on 22 showed a worrisome spiculated mass, 1.4 x 0.7 x 0.6 cm, 12 o'clock left breast 7 CMFN. An ultrasound guided biopsy was performed on 12/15/22 with pathology revealing infiltrating ductal carcinoma of the breast. Grade 2, ER >95%, AR 85-90%, Her2 1+, Ki67 25-30%  2023: MRI breast shwoed Left breast 12 mm x 11 mm x 7 mm mass at the middle 12 o'clock position. Several pulmonary nodules are noted incidentally in the left hemithorax.  The patient has a history of bilateral pulmonary nodule seen on previous thoracic CT exams most recently in .  One of the nodules underwent biopsy in  with benign results.    Underwent b/l mastectomies with SLN bx 2/15/2023 with bilateral breast reconstruction with abdominally based free flaps. He postoperative course was complicated by L pneumothorax.  Post op path showed Invasive lobular carcinoma in left breast grade 2 measuring 18 mm with LCIS Grade 2 ER AR positive and Her2 negative. pT1c pN0. Oncotype 35  C1D1 adjuvanct TC on ; completed 4 cycles on   Started anastrozole 2023    GYN History:  Age of menarche was 13. Age of menopause was 46.  Patient denies hormonal therapy but took OCP for approximately 15 years in the past. Patient is . Age of first live birth was 25. Patient did breast feed for 6 months. S/p uterine ablation for fibroids in early 40s, no menstrual cycle since. Had menopausal symptoms in mid-late 40s.       Interval History:  Ms. Matias returns today for follow up. Continues to tolerate  anastrozole fairly well. Notes hot flashes that have made it difficult to sleep. Has had difficulty with her disability paperwork. Had recent reconstruction on 8/29 and is healing very well thus far. No new complaints today otherwise.         Medications:  Current Outpatient Medications   Medication Sig Dispense Refill    anastrozole (ARIMIDEX) 1 mg Tab Take 1 tablet (1 mg total) by mouth once daily. 90 tablet 3    apixaban (ELIQUIS) 5 mg Tab Take 1 tablet (5 mg total) by mouth 2 (two) times daily. Will hold 2/13 & 2/14 60 tablet 5    atorvastatin (LIPITOR) 20 MG tablet Take 1 tablet (20 mg total) by mouth every evening. 90 tablet 3    B-complex with vitamin C (VITAMIN B COMPLEX-C ORAL) Take by mouth Daily.      calcium-vitamin D 250-100 mg-unit per tablet Take 1 tablet by mouth 2 (two) times daily.      cyanocobalamin 500 MCG tablet Take 500 mcg by mouth once daily.      diazePAM (VALIUM) 5 MG tablet Take 1 tablet (5 mg total) by mouth daily as needed for Anxiety. 30 tablet 2    fluocinonide-emollient (FLUOCINONIDE-E) 0.05 % Crea AAA of feet daily prn psoriasis. 60 g 3    fluticasone-umeclidin-vilanter (TRELEGY ELLIPTA) 100-62.5-25 mcg DsDv Inhale 1 puff into the lungs once daily. 180 each 3    metoprolol tartrate (LOPRESSOR) 25 MG tablet Take 1 tablet (25 mg total) by mouth 2 (two) times daily. 180 tablet 3    multivitamin (THERAGRAN) per tablet Take 1 tablet by mouth once daily.      multivitamin with minerals (HAIR,SKIN AND NAILS ORAL) Take by mouth.      mv-mn/iron/folic acid/herb 190 (VITAMIN D3 COMPLETE ORAL) Take by mouth.      omeprazole (PRILOSEC) 40 MG capsule Take 1 capsule (40 mg total) by mouth every morning. 90 capsule 1    ondansetron (ZOFRAN) 8 MG tablet Take 1 tablet (8 mg total) by mouth every 12 (twelve) hours as needed for Nausea. 30 tablet 2    QUEtiapine (SEROQUEL) 50 MG tablet Take 1 tablet (50 mg total) by mouth every evening. 30 tablet 11    TALTZ AUTOINJECTOR 80 mg/mL AtIn Inject 80 mg  "into the skin every 28 days. 1 mL 6    TALTZ AUTOINJECTOR, 2 PACK, 80 mg/mL AtIn Inject 80mg SQ every other week (weeks 4, 6, 8, 10). 2 mL 1    TALTZ AUTOINJECTOR, 3 PACK, 80 mg/mL AtIn Inject 160mg (2 pens) into the skin at week 0 , then inject 80mg into the skin at week 2. 3 mL 0    triamcinolone acetonide 0.025% (KENALOG) 0.025 % cream AAA of skin folds bid prn psoriasis. 80 g 3    triamcinolone acetonide 0.1% (KENALOG) 0.1 % cream Apply to affected areas of body BID prn psoriasis. Do not use on face or skin folds. 454 g 1    venlafaxine (EFFEXOR-XR) 75 MG 24 hr capsule Take 1 capsule (75 mg total) by mouth once daily. Start on Week 2 90 capsule 3     No current facility-administered medications for this visit.     Facility-Administered Medications Ordered in Other Visits   Medication Dose Route Frequency Provider Last Rate Last Admin    lactated ringers infusion   Intravenous Continuous Yahaira Barnard MD   New Bag at 03/29/23 0638    LIDOcaine (PF) 10 mg/ml (1%) injection 5 mg  0.5 mL Intradermal Once Yahaira Barnard MD        LIDOcaine HCL 10 mg/ml (1%) 50 mL, EPINEPHrine 1 mg in lactated Ringers 1,000 mL irrigation   Irrigation On Call Procedure Ming Milian MD        LIDOcaine HCL 10 mg/ml (1%) 50 mL, EPINEPHrine 1 mg in lactated Ringers 1,000 mL irrigation   Irrigation On Call Procedure Ming Milian MD        LIDOcaine HCL 10 mg/ml (1%) 50 mL, EPINEPHrine 1 mg in lactated Ringers 1,000 mL irrigation   Irrigation On Call Procedure Ming Milian MD         Review of Systems:  +hot flashes  12pt ROS negative except as noted above    Objective:     Vitals:    09/13/23 1403   BP: (!) 144/77   Pulse: 76   Resp: 18   SpO2: 99%   Weight: 77 kg (169 lb 13.8 oz)   Height: 5' 2" (1.575 m)         BMI: Body mass index is 31.07 kg/m².     Physical Exam:  ECOG 0   General: well appearing, in no apparent distress  HEENT: Normocephalic, EOMI, anicteric sclerae, MMM  Neck: supple, without cervical or " supraclavicular lymphadenopathy.  Heart: regular rate and rhythm, normal S1 and S2, no murmurs, gallops or rubs.  Lungs: Clear to auscultation bilaterally, no increased wob  Breast: s/p bilateral mastectomy with flap reconstruction, incisions well-healed  Abdomen: Soft, nontender, nondistended with normal bowel sounds. Abdominal incision c/d/I. No hepatosplenomegaly.  Extremities: No LE edema or joint effusion. Picc in place in RUE  Skin: warm, well-perfused, no rash. Erythematous plaques noted on back of hands and Rt elbow  Neurologic: Alert and oriented x 4, normal speech and gait   Psychiatric: Conversing appropriately with providers throughout today's encounter.    Laboratory Data:  No visits with results within 1 Week(s) from this visit.   Latest known visit with results is:   Admission on 2023, Discharged on 2023   Component Date Value    Final Pathologic Diagnos* 2023                      Value:1. RIGHT BREAST TISSUE, DEBRIDEMENT:  Benign breast tissue with exuberant fat necrosis and foreign body giant cell reaction.    Negative for atypia or malignancy.      2. LEFT BREAST TISSUE, DEBRIDEMENT:  Benign breast tissue with exuberant fat necrosis and focal foreign body giant cell reaction.    Negative for atypia or malignancy.      Comment:  AE1/AE3 cytokeratin immunostains performed on parts 1 and 2 reveal no evidence of occult carcinoma.      Gross 2023                      Value:Pathology ID: [id number]     :  1960    SURGERY MRN:  [id number]/PATHOLOGY MRN:  [id number]     PART 1:  Received fresh and subsequently placed in formalin labeled as &quot;right breast cut necrosis history of breast cancer&quot;  (presumed  &quot;fat necrosis&quot;) are 2 fragments of tan-yellow lobulated fibroadipose tissue (3.7 x 2.1 x 1.5 cm and 3.8 x   3.3 x 1.4 cm, collectively 10.7 g).  Both fragments are serially sectioned to reveal diffuse areas of pink-tan firm fibrous tissue involving approximately  20% of the total cut surface area, and diffuse areas of pale yellow fat necrosis present involving   approximately 10-15% of the total cut surface area.  No definitive masses are grossly identified.  Representative sections are submitted in CUW--1-A.    SURGERY MRN:  [id number]/PATHOLOGY MRN:  [id number]     PART 2:  Received fresh and subsequently placed in formalin labeled as &quot;fat necrosis, left breast&quot;  is a single fragment of tan-yellow lobulated fibroadipose tissue                           (5.4 x 3.2 x 1.3 cm, 8.4 g).  It is serially sectioned to reveal pink-tan firm fibrous   tissue for proximally 20-30% of the total cut surface area with sparse areas of possible fat necrosis identified involving less than 5% of the total cut surface area.  No definitive masses are grossly identified.  Representative sections are submitted in   XSD--2-A.    -Mini Ferro MS, PA([facility])       Disclaimer 08/29/2023 Unless the case is a 'gross only' or additional testing only, the final diagnosis for each specimen is based on a microscopic examination of appropriate tissue sections.    I personally reviewed all recent labs, imaging and pathology.    Assessment and Plan:   Ms. Matias is a domo 63yo woman with hx of Aflutter s/p ablation, COPD, RA and recently diagnosed Stage IA HR+ breast cancer s/p bilateral mastectomy with reconstruction who presents today for evaluation.     Given her Oncotype of 35, we proceeded with adjuvant docetaxel 75mg/m2 and cyclophosphamide 600mg/m2 iv every 21 days for a total of 4 cycles. Cycle 1 was complicated by neutropenic fever in the setting of E coli bacteremia and parainfluenza. She has now completed 4 cycles of TC without difficulty.    She has since initiated ET with anastrozole and is tolerating well thus far.       #Breast cancer:  --cont anastrozole 1mg daily (SOT 7/2023)  --cont Ca/VitD supplementation as above  --RTC in 3mo  --no role for screening MMG s/p bilateral  mastectomy    #Osteopenia: noted on DEXA 7/25/23  --encouraged Ca/VitD supplementation as above    --will repeat DEXA 7/2025    #Hot flashes:  --trial of magnesium glycinate  --referral placed to integrative onc  --pt already taking effexor  --discussed veoazah; will hold off on ordering for now but she will reach out if symptoms do not improve and she would like to pursue     #Psoriasis/psoriatic arthritis:  --has now resumed cosentyx with improvement in symptoms  --cont to follow with derm      #Aflutter s/p RFA: following with cardiology  --remains of eliquis  --follow up with cards as scheduled      All questions were answered to her apparent satisfaction. Will plan to see her back in 3mo or sooner should the need arise.     Roya Madrid MD      Med Onc Chart Routing      Follow up with physician 3 months.   Follow up with LINDY    Infusion scheduling note    Injection scheduling note    Labs None   Scheduling:  Preferred lab:  Lab interval:     Imaging None      Pharmacy appointment No pharmacy appointment needed      Other referrals no referral to Oncology Primary Care needed -    Additional referrals needed  integrative onc

## 2023-09-13 ENCOUNTER — OFFICE VISIT (OUTPATIENT)
Dept: HEMATOLOGY/ONCOLOGY | Facility: CLINIC | Age: 63
End: 2023-09-13
Payer: COMMERCIAL

## 2023-09-13 ENCOUNTER — OFFICE VISIT (OUTPATIENT)
Dept: SURGERY | Facility: CLINIC | Age: 63
End: 2023-09-13
Payer: COMMERCIAL

## 2023-09-13 VITALS
WEIGHT: 169 LBS | DIASTOLIC BLOOD PRESSURE: 77 MMHG | SYSTOLIC BLOOD PRESSURE: 144 MMHG | HEIGHT: 62 IN | SYSTOLIC BLOOD PRESSURE: 144 MMHG | HEIGHT: 62 IN | WEIGHT: 169.88 LBS | HEART RATE: 76 BPM | BODY MASS INDEX: 31.1 KG/M2 | BODY MASS INDEX: 31.26 KG/M2 | OXYGEN SATURATION: 99 % | DIASTOLIC BLOOD PRESSURE: 77 MMHG | RESPIRATION RATE: 18 BRPM

## 2023-09-13 DIAGNOSIS — R23.2 HOT FLASHES: ICD-10-CM

## 2023-09-13 DIAGNOSIS — C50.112 MALIGNANT NEOPLASM OF CENTRAL PORTION OF LEFT BREAST IN FEMALE, ESTROGEN RECEPTOR POSITIVE: Primary | ICD-10-CM

## 2023-09-13 DIAGNOSIS — Z98.890 STATUS POST ABLATION OF ATRIAL FLUTTER: ICD-10-CM

## 2023-09-13 DIAGNOSIS — L40.50 PSORIATIC ARTHRITIS: ICD-10-CM

## 2023-09-13 DIAGNOSIS — I10 ESSENTIAL HYPERTENSION: ICD-10-CM

## 2023-09-13 DIAGNOSIS — Z86.79 STATUS POST ABLATION OF ATRIAL FLUTTER: ICD-10-CM

## 2023-09-13 DIAGNOSIS — Z85.3 PERSONAL HISTORY OF BREAST CANCER: Primary | ICD-10-CM

## 2023-09-13 DIAGNOSIS — Z17.0 MALIGNANT NEOPLASM OF CENTRAL PORTION OF LEFT BREAST IN FEMALE, ESTROGEN RECEPTOR POSITIVE: Primary | ICD-10-CM

## 2023-09-13 DIAGNOSIS — Z90.13 S/P MASTECTOMY, BILATERAL: ICD-10-CM

## 2023-09-13 DIAGNOSIS — Z12.39 SCREENING BREAST EXAMINATION: ICD-10-CM

## 2023-09-13 PROCEDURE — 99215 OFFICE O/P EST HI 40 MIN: CPT | Mod: S$GLB,,, | Performed by: STUDENT IN AN ORGANIZED HEALTH CARE EDUCATION/TRAINING PROGRAM

## 2023-09-13 PROCEDURE — 99214 PR OFFICE/OUTPT VISIT, EST, LEVL IV, 30-39 MIN: ICD-10-PCS | Mod: S$GLB,,, | Performed by: PHYSICIAN ASSISTANT

## 2023-09-13 PROCEDURE — 3008F BODY MASS INDEX DOCD: CPT | Mod: CPTII,S$GLB,, | Performed by: STUDENT IN AN ORGANIZED HEALTH CARE EDUCATION/TRAINING PROGRAM

## 2023-09-13 PROCEDURE — 1159F PR MEDICATION LIST DOCUMENTED IN MEDICAL RECORD: ICD-10-PCS | Mod: CPTII,S$GLB,, | Performed by: PHYSICIAN ASSISTANT

## 2023-09-13 PROCEDURE — 3077F PR MOST RECENT SYSTOLIC BLOOD PRESSURE >= 140 MM HG: ICD-10-PCS | Mod: CPTII,S$GLB,, | Performed by: PHYSICIAN ASSISTANT

## 2023-09-13 PROCEDURE — 99214 OFFICE O/P EST MOD 30 MIN: CPT | Mod: S$GLB,,, | Performed by: PHYSICIAN ASSISTANT

## 2023-09-13 PROCEDURE — 3008F BODY MASS INDEX DOCD: CPT | Mod: CPTII,S$GLB,, | Performed by: PHYSICIAN ASSISTANT

## 2023-09-13 PROCEDURE — 3077F PR MOST RECENT SYSTOLIC BLOOD PRESSURE >= 140 MM HG: ICD-10-PCS | Mod: CPTII,S$GLB,, | Performed by: STUDENT IN AN ORGANIZED HEALTH CARE EDUCATION/TRAINING PROGRAM

## 2023-09-13 PROCEDURE — 99999 PR PBB SHADOW E&M-EST. PATIENT-LVL V: CPT | Mod: PBBFAC,,, | Performed by: STUDENT IN AN ORGANIZED HEALTH CARE EDUCATION/TRAINING PROGRAM

## 2023-09-13 PROCEDURE — 3008F PR BODY MASS INDEX (BMI) DOCUMENTED: ICD-10-PCS | Mod: CPTII,S$GLB,, | Performed by: STUDENT IN AN ORGANIZED HEALTH CARE EDUCATION/TRAINING PROGRAM

## 2023-09-13 PROCEDURE — 3078F PR MOST RECENT DIASTOLIC BLOOD PRESSURE < 80 MM HG: ICD-10-PCS | Mod: CPTII,S$GLB,, | Performed by: PHYSICIAN ASSISTANT

## 2023-09-13 PROCEDURE — 99999 PR PBB SHADOW E&M-EST. PATIENT-LVL V: ICD-10-PCS | Mod: PBBFAC,,, | Performed by: STUDENT IN AN ORGANIZED HEALTH CARE EDUCATION/TRAINING PROGRAM

## 2023-09-13 PROCEDURE — 3078F PR MOST RECENT DIASTOLIC BLOOD PRESSURE < 80 MM HG: ICD-10-PCS | Mod: CPTII,S$GLB,, | Performed by: STUDENT IN AN ORGANIZED HEALTH CARE EDUCATION/TRAINING PROGRAM

## 2023-09-13 PROCEDURE — 3078F DIAST BP <80 MM HG: CPT | Mod: CPTII,S$GLB,, | Performed by: STUDENT IN AN ORGANIZED HEALTH CARE EDUCATION/TRAINING PROGRAM

## 2023-09-13 PROCEDURE — 99215 PR OFFICE/OUTPT VISIT, EST, LEVL V, 40-54 MIN: ICD-10-PCS | Mod: S$GLB,,, | Performed by: STUDENT IN AN ORGANIZED HEALTH CARE EDUCATION/TRAINING PROGRAM

## 2023-09-13 PROCEDURE — 1159F MED LIST DOCD IN RCRD: CPT | Mod: CPTII,S$GLB,, | Performed by: PHYSICIAN ASSISTANT

## 2023-09-13 PROCEDURE — 99999 PR PBB SHADOW E&M-EST. PATIENT-LVL IV: CPT | Mod: PBBFAC,,, | Performed by: PHYSICIAN ASSISTANT

## 2023-09-13 PROCEDURE — 1160F PR REVIEW ALL MEDS BY PRESCRIBER/CLIN PHARMACIST DOCUMENTED: ICD-10-PCS | Mod: CPTII,S$GLB,, | Performed by: PHYSICIAN ASSISTANT

## 2023-09-13 PROCEDURE — 1160F RVW MEDS BY RX/DR IN RCRD: CPT | Mod: CPTII,S$GLB,, | Performed by: PHYSICIAN ASSISTANT

## 2023-09-13 PROCEDURE — 99999 PR PBB SHADOW E&M-EST. PATIENT-LVL IV: ICD-10-PCS | Mod: PBBFAC,,, | Performed by: PHYSICIAN ASSISTANT

## 2023-09-13 PROCEDURE — 3078F DIAST BP <80 MM HG: CPT | Mod: CPTII,S$GLB,, | Performed by: PHYSICIAN ASSISTANT

## 2023-09-13 PROCEDURE — 3008F PR BODY MASS INDEX (BMI) DOCUMENTED: ICD-10-PCS | Mod: CPTII,S$GLB,, | Performed by: PHYSICIAN ASSISTANT

## 2023-09-13 PROCEDURE — 3077F SYST BP >= 140 MM HG: CPT | Mod: CPTII,S$GLB,, | Performed by: STUDENT IN AN ORGANIZED HEALTH CARE EDUCATION/TRAINING PROGRAM

## 2023-09-13 PROCEDURE — 3077F SYST BP >= 140 MM HG: CPT | Mod: CPTII,S$GLB,, | Performed by: PHYSICIAN ASSISTANT

## 2023-09-14 NOTE — PROGRESS NOTES
[facility]  Department of Surgery    REFERRING PROVIDER:  Hetal Banks MD  CHIEF COMPLAINT:   Chief Complaint   Patient presents with    Follow-up     6 mo f/u       DIAGNOSIS:   This is a 63 y.o. female with a history of stage [id number], grade 2, ER +, ID +, HER2 - IDC of the left breast.    TREATMENT:   1. bilateral mastectomy with sentinel node biopsy on 2/15/2023. Dr. Zoran M.D. Surgical Oncology.   2. Final Path: 18 mm Grade 2 ILC with pleomorphic LCIS. Margins negative. Nodes negative.   3. C1D1 adjuvanct TC on 4/19; completed 4 cycles on 6/21/23 with Dr. Madrid.   4. Started anastrozole 7/2023.   5. NEHA flap revision on 8/29/23 with Dr. Felipe.     HISTORY OF PRESENT ILLNESS:   Lucia Matias is a 63 y.o. female comes in for oncological follow up.  She denies change in her breast self-exam specifically denying new masses, skin or nipple changes, or nipple discharge. Past medical and surgical history is updated without new changes. There have been no changes to family history. The patient denies constitutional symptoms of night sweats, chills, weight loss, new headaches, visual changes, new back or bony pain, chest pain, or shortness of breath.        Review of Systems: See HPI/Interval History for other systems reviewed.     IMAGING:     None      MEDICATIONS/ALLERGIES:     Current Outpatient Medications   Medication Sig    anastrozole (ARIMIDEX) 1 mg Tab Take 1 tablet (1 mg total) by mouth once daily.    apixaban (ELIQUIS) 5 mg Tab Take 1 tablet (5 mg total) by mouth 2 (two) times daily. Will hold 2/13 & 2/14    atorvastatin (LIPITOR) 20 MG tablet Take 1 tablet (20 mg total) by mouth every evening.    B-complex with vitamin C (VITAMIN B COMPLEX-C ORAL) Take by mouth Daily.    calcium-vitamin D 250-100 mg-unit per tablet Take 1 tablet by mouth 2 (two) times daily.    cyanocobalamin 500 MCG tablet Take 500 mcg by mouth once daily.    diazePAM (VALIUM) 5 MG tablet Take 1 tablet (5 mg total)  by mouth daily as needed for Anxiety.    fluocinonide-emollient (FLUOCINONIDE-E) 0.05 % Crea AAA of feet daily prn psoriasis.    fluticasone-umeclidin-vilanter (TRELEGY ELLIPTA) 100-62.5-25 mcg DsDv Inhale 1 puff into the lungs once daily.    metoprolol tartrate (LOPRESSOR) 25 MG tablet Take 1 tablet (25 mg total) by mouth 2 (two) times daily.    multivitamin (THERAGRAN) per tablet Take 1 tablet by mouth once daily.    multivitamin with minerals (HAIR,SKIN AND NAILS ORAL) Take by mouth.    mv-mn/iron/folic acid/herb 190 (VITAMIN D3 COMPLETE ORAL) Take by mouth.    omeprazole (PRILOSEC) 40 MG capsule Take 1 capsule (40 mg total) by mouth every morning.    ondansetron (ZOFRAN) 8 MG tablet Take 1 tablet (8 mg total) by mouth every 12 (twelve) hours as needed for Nausea.    QUEtiapine (SEROQUEL) 50 MG tablet Take 1 tablet (50 mg total) by mouth every evening.    TALTZ AUTOINJECTOR 80 mg/mL AtIn Inject 80 mg into the skin every 28 days.    TALTZ AUTOINJECTOR, 2 PACK, 80 mg/mL AtIn Inject 80mg SQ every other week (weeks 4, 6, 8, 10).    TALTZ AUTOINJECTOR, 3 PACK, 80 mg/mL AtIn Inject 160mg (2 pens) into the skin at week 0 , then inject 80mg into the skin at week 2.    triamcinolone acetonide 0.025% (KENALOG) 0.025 % cream AAA of skin folds bid prn psoriasis.    triamcinolone acetonide 0.1% (KENALOG) 0.1 % cream Apply to affected areas of body BID prn psoriasis. Do not use on face or skin folds.    venlafaxine (EFFEXOR-XR) 75 MG 24 hr capsule Take 1 capsule (75 mg total) by mouth once daily. Start on Week 2     No current facility-administered medications for this visit.     Facility-Administered Medications Ordered in Other Visits   Medication    lactated ringers infusion    LIDOcaine (PF) 10 mg/ml (1%) injection 5 mg    LIDOcaine HCL 10 mg/ml (1%) 50 mL, EPINEPHrine 1 mg in lactated Ringers 1,000 mL irrigation    LIDOcaine HCL 10 mg/ml (1%) 50 mL, EPINEPHrine 1 mg in lactated Ringers 1,000 mL irrigation    LIDOcaine HCL  "10 mg/ml (1%) 50 mL, EPINEPHrine 1 mg in lactated Ringers 1,000 mL irrigation      Review of patient's allergies indicates:   Allergen Reactions    Succinimides Anaphylaxis     Son has MH       PHYSICAL EXAM:   BP (!) 144/77 (BP Location: Right arm, Patient Position: Sitting, BP Method: Medium (Automatic))   Ht 5' 2" (1.575 m)   Wt 76.7 kg (169 lb)   BMI 30.91 kg/m²     Physical Exam   Vitals reviewed.  Constitutional: She is oriented to person, place, and time.   HENT:   Head: Normocephalic and atraumatic.   Nose: Nose normal.   Eyes: Pupils are equal, round, and reactive to light. Right eye exhibits no discharge. Left eye exhibits no discharge.   Pulmonary/Chest: Effort normal and breath sounds normal. No stridor. No respiratory distress. She exhibits no mass, no tenderness and no edema. Right breast exhibits no mass, no skin change and no tenderness. Left breast exhibits no mass, no skin change and no tenderness. No breast swelling or bleeding. Breasts are symmetrical.       Abdominal: Normal appearance.   Genitourinary: No breast swelling or bleeding.   Neurological: She is alert and oriented to person, place, and time.   Skin: Skin is warm and dry.     Psychiatric: Her behavior is normal. Mood, judgment and thought content normal.       ASSESSMENT:   This is a 63 y.o. female without evidence of recurrence by exam, history or imaging.       PLAN:   1. Continue to follow up with Dr. Madrid in Medical Oncology.   2. Continue monthly self breast exams and call the clinic with any changes or problems.  3. No further screening mammogram given bilateral mastectomies.   4. Patient is interested in 3D areola tattoo, but understands she needs to heal from her recent surgery before proceeding, preferably 3-4 months.   5. Patient sees Dr. Felipe in 2 weeks and will be in touch if further revisions are needed.     The patient is in agreement with the plan. Questions were encouraged and answered to patient's " satisfaction. Lucia will call our office with any questions or concerns.

## 2023-09-20 ENCOUNTER — OFFICE VISIT (OUTPATIENT)
Dept: PLASTIC SURGERY | Facility: CLINIC | Age: 63
End: 2023-09-20
Attending: PLASTIC SURGERY
Payer: COMMERCIAL

## 2023-09-20 VITALS — DIASTOLIC BLOOD PRESSURE: 68 MMHG | SYSTOLIC BLOOD PRESSURE: 124 MMHG | HEART RATE: 86 BPM

## 2023-09-20 DIAGNOSIS — Z85.3 HISTORY OF BREAST CANCER: Primary | ICD-10-CM

## 2023-09-20 PROCEDURE — 3074F PR MOST RECENT SYSTOLIC BLOOD PRESSURE < 130 MM HG: ICD-10-PCS | Mod: CPTII,S$GLB,, | Performed by: PLASTIC SURGERY

## 2023-09-20 PROCEDURE — 99024 POSTOP FOLLOW-UP VISIT: CPT | Mod: S$GLB,,, | Performed by: PLASTIC SURGERY

## 2023-09-20 PROCEDURE — 3078F DIAST BP <80 MM HG: CPT | Mod: CPTII,S$GLB,, | Performed by: PLASTIC SURGERY

## 2023-09-20 PROCEDURE — 3078F PR MOST RECENT DIASTOLIC BLOOD PRESSURE < 80 MM HG: ICD-10-PCS | Mod: CPTII,S$GLB,, | Performed by: PLASTIC SURGERY

## 2023-09-20 PROCEDURE — 99024 PR POST-OP FOLLOW-UP VISIT: ICD-10-PCS | Mod: S$GLB,,, | Performed by: PLASTIC SURGERY

## 2023-09-20 PROCEDURE — 3074F SYST BP LT 130 MM HG: CPT | Mod: CPTII,S$GLB,, | Performed by: PLASTIC SURGERY

## 2023-09-20 RX ORDER — SULFAMETHOXAZOLE AND TRIMETHOPRIM 800; 160 MG/1; MG/1
1 TABLET ORAL 2 TIMES DAILY
Qty: 20 TABLET | Refills: 0 | Status: CANCELLED | OUTPATIENT
Start: 2023-09-20 | End: 2023-09-30

## 2023-09-20 RX ORDER — SULFAMETHOXAZOLE AND TRIMETHOPRIM 800; 160 MG/1; MG/1
1 TABLET ORAL 2 TIMES DAILY
Qty: 20 TABLET | Refills: 0 | Status: SHIPPED | OUTPATIENT
Start: 2023-09-20 | End: 2023-09-30

## 2023-09-20 NOTE — PROGRESS NOTES
Patient presents follow-up.  She is status post second-stage breast reconstruction 3 weeks ago     No concerns     On exam:  Abdominal donor site is healing well     Dermabond tape was removed     The right breast contour is much improved Dermabond tape was removed this good healing of all incision     On the left side is a little bit of medial erythema of this native breast skin which is blanching    There is no fluctuance     Minimal tenderness     This concerns me from early cellulitis     Assessment:  Stable     Plan Bactrim DS for 10 days     Follow-up in 1 week to re-evaluate the light left medial breast erythema

## 2023-09-21 ENCOUNTER — OFFICE VISIT (OUTPATIENT)
Dept: CARDIOLOGY | Facility: CLINIC | Age: 63
End: 2023-09-21
Payer: COMMERCIAL

## 2023-09-21 ENCOUNTER — PATIENT MESSAGE (OUTPATIENT)
Dept: PLASTIC SURGERY | Facility: CLINIC | Age: 63
End: 2023-09-21
Payer: COMMERCIAL

## 2023-09-21 VITALS
WEIGHT: 172.31 LBS | OXYGEN SATURATION: 98 % | SYSTOLIC BLOOD PRESSURE: 120 MMHG | BODY MASS INDEX: 31.51 KG/M2 | DIASTOLIC BLOOD PRESSURE: 76 MMHG | HEART RATE: 88 BPM

## 2023-09-21 DIAGNOSIS — I48.92 ATRIAL FLUTTER, UNSPECIFIED TYPE: Primary | ICD-10-CM

## 2023-09-21 DIAGNOSIS — I10 ESSENTIAL HYPERTENSION: Chronic | ICD-10-CM

## 2023-09-21 DIAGNOSIS — E78.2 MIXED HYPERLIPIDEMIA: Chronic | ICD-10-CM

## 2023-09-21 PROCEDURE — 99999 PR PBB SHADOW E&M-EST. PATIENT-LVL IV: CPT | Mod: PBBFAC,,, | Performed by: INTERNAL MEDICINE

## 2023-09-21 PROCEDURE — 3074F SYST BP LT 130 MM HG: CPT | Mod: CPTII,S$GLB,, | Performed by: INTERNAL MEDICINE

## 2023-09-21 PROCEDURE — 3008F PR BODY MASS INDEX (BMI) DOCUMENTED: ICD-10-PCS | Mod: CPTII,S$GLB,, | Performed by: INTERNAL MEDICINE

## 2023-09-21 PROCEDURE — 99214 OFFICE O/P EST MOD 30 MIN: CPT | Mod: S$GLB,,, | Performed by: INTERNAL MEDICINE

## 2023-09-21 PROCEDURE — 99214 PR OFFICE/OUTPT VISIT, EST, LEVL IV, 30-39 MIN: ICD-10-PCS | Mod: S$GLB,,, | Performed by: INTERNAL MEDICINE

## 2023-09-21 PROCEDURE — 1159F PR MEDICATION LIST DOCUMENTED IN MEDICAL RECORD: ICD-10-PCS | Mod: CPTII,S$GLB,, | Performed by: INTERNAL MEDICINE

## 2023-09-21 PROCEDURE — 3074F PR MOST RECENT SYSTOLIC BLOOD PRESSURE < 130 MM HG: ICD-10-PCS | Mod: CPTII,S$GLB,, | Performed by: INTERNAL MEDICINE

## 2023-09-21 PROCEDURE — 99999 PR PBB SHADOW E&M-EST. PATIENT-LVL IV: ICD-10-PCS | Mod: PBBFAC,,, | Performed by: INTERNAL MEDICINE

## 2023-09-21 PROCEDURE — 1159F MED LIST DOCD IN RCRD: CPT | Mod: CPTII,S$GLB,, | Performed by: INTERNAL MEDICINE

## 2023-09-21 PROCEDURE — 3008F BODY MASS INDEX DOCD: CPT | Mod: CPTII,S$GLB,, | Performed by: INTERNAL MEDICINE

## 2023-09-21 PROCEDURE — 3078F DIAST BP <80 MM HG: CPT | Mod: CPTII,S$GLB,, | Performed by: INTERNAL MEDICINE

## 2023-09-21 PROCEDURE — 3078F PR MOST RECENT DIASTOLIC BLOOD PRESSURE < 80 MM HG: ICD-10-PCS | Mod: CPTII,S$GLB,, | Performed by: INTERNAL MEDICINE

## 2023-09-21 NOTE — PROGRESS NOTES
Cardiology    9/21/2023  10:41 AM    Problem list  Patient Active Problem List   Diagnosis    Diverticular disease of colon    Psoriasis vulgaris    COPD (chronic obstructive pulmonary disease)    HLD (hyperlipidemia)    Depression with anxiety    Insomnia    Other long term (current) drug therapy    History of tobacco abuse    Essential hypertension    Multiple lung nodules on CT    Panic attack    Class 1 obesity due to excess calories without serious comorbidity with body mass index (BMI) of 33.0 to 33.9 in adult    History of sleeve gastrectomy    Wears contact lenses    Rheumatoid arthritis    Atrial flutter    Psoriatic arthritis    Malignant neoplasm of central portion of left breast in female, estrogen receptor positive    Pneumothorax on left    Severe obesity (BMI >= 40)    Bacteremia, escherichia coli    Infection due to ESBL-producing Escherichia coli    RANDI (generalized anxiety disorder)    MDD (recurrent major depressive disorder) in remission    History of breast cancer       CC:  Follow-up    HPI:  She is here for follow-up.  She has been doing well.  She complete her chemotherapy and breast reconstruction.  Her echocardiogram showed normal LV function.    Medications  Current Outpatient Medications   Medication Sig Dispense Refill    anastrozole (ARIMIDEX) 1 mg Tab Take 1 tablet (1 mg total) by mouth once daily. 90 tablet 3    apixaban (ELIQUIS) 5 mg Tab Take 1 tablet (5 mg total) by mouth 2 (two) times daily. Will hold 2/13 & 2/14 60 tablet 5    atorvastatin (LIPITOR) 20 MG tablet Take 1 tablet (20 mg total) by mouth every evening. 90 tablet 3    B-complex with vitamin C (VITAMIN B COMPLEX-C ORAL) Take by mouth Daily.      calcium-vitamin D 250-100 mg-unit per tablet Take 1 tablet by mouth 2 (two) times daily.      cyanocobalamin 500 MCG tablet Take 500 mcg by mouth once daily.      diazePAM (VALIUM) 5 MG tablet Take 1 tablet (5 mg total) by mouth daily as needed for Anxiety. 30 tablet 2     fluocinonide-emollient (FLUOCINONIDE-E) 0.05 % Crea AAA of feet daily prn psoriasis. 60 g 3    fluticasone-umeclidin-vilanter (TRELEGY ELLIPTA) 100-62.5-25 mcg DsDv Inhale 1 puff into the lungs once daily. 180 each 3    metoprolol tartrate (LOPRESSOR) 25 MG tablet Take 1 tablet (25 mg total) by mouth 2 (two) times daily. 180 tablet 3    multivitamin (THERAGRAN) per tablet Take 1 tablet by mouth once daily.      multivitamin with minerals (HAIR,SKIN AND NAILS ORAL) Take by mouth.      mv-mn/iron/folic acid/herb 190 (VITAMIN D3 COMPLETE ORAL) Take by mouth.      omeprazole (PRILOSEC) 40 MG capsule Take 1 capsule (40 mg total) by mouth every morning. 90 capsule 1    QUEtiapine (SEROQUEL) 50 MG tablet Take 1 tablet (50 mg total) by mouth every evening. 30 tablet 11    sulfamethoxazole-trimethoprim 800-160mg (BACTRIM DS) 800-160 mg Tab Take 1 tablet by mouth 2 (two) times daily. for 10 days 20 tablet 0    TALTZ AUTOINJECTOR 80 mg/mL AtIn Inject 80 mg into the skin every 28 days. 1 mL 6    TALTZ AUTOINJECTOR, 2 PACK, 80 mg/mL AtIn Inject 80mg SQ every other week (weeks 4, 6, 8, 10). 2 mL 1    TALTZ AUTOINJECTOR, 3 PACK, 80 mg/mL AtIn Inject 160mg (2 pens) into the skin at week 0 , then inject 80mg into the skin at week 2. 3 mL 0    triamcinolone acetonide 0.025% (KENALOG) 0.025 % cream AAA of skin folds bid prn psoriasis. 80 g 3    triamcinolone acetonide 0.1% (KENALOG) 0.1 % cream Apply to affected areas of body BID prn psoriasis. Do not use on face or skin folds. 454 g 1    venlafaxine (EFFEXOR-XR) 75 MG 24 hr capsule Take 1 capsule (75 mg total) by mouth once daily. Start on Week 2 90 capsule 3    ondansetron (ZOFRAN) 8 MG tablet Take 1 tablet (8 mg total) by mouth every 12 (twelve) hours as needed for Nausea. (Patient not taking: Reported on 9/21/2023) 30 tablet 2     No current facility-administered medications for this visit.     Facility-Administered Medications Ordered in Other Visits   Medication Dose Route  Frequency Provider Last Rate Last Admin    lactated ringers infusion   Intravenous Continuous Yahaira Barnard MD   New Bag at 03/29/23 0638    LIDOcaine (PF) 10 mg/ml (1%) injection 5 mg  0.5 mL Intradermal Once Yahaira Barnard MD        LIDOcaine HCL 10 mg/ml (1%) 50 mL, EPINEPHrine 1 mg in lactated Ringers 1,000 mL irrigation   Irrigation On Call Procedure Ming Milian MD        LIDOcaine HCL 10 mg/ml (1%) 50 mL, EPINEPHrine 1 mg in lactated Ringers 1,000 mL irrigation   Irrigation On Call Procedure Ming Milian MD        LIDOcaine HCL 10 mg/ml (1%) 50 mL, EPINEPHrine 1 mg in lactated Ringers 1,000 mL irrigation   Irrigation On Call Procedure Ming Milian MD          Prior to Admission medications    Medication Sig Start Date End Date Taking? Authorizing Provider   anastrozole (ARIMIDEX) 1 mg Tab Take 1 tablet (1 mg total) by mouth once daily. 7/12/23 7/11/24 Yes Roya Madrid MD   apixaban (ELIQUIS) 5 mg Tab Take 1 tablet (5 mg total) by mouth 2 (two) times daily. Will hold 2/13 & 2/14 2/17/23 10/6/23 Yes Fahad Coles MD   atorvastatin (LIPITOR) 20 MG tablet Take 1 tablet (20 mg total) by mouth every evening. 10/17/22  Yes Hetal Banks MD   B-complex with vitamin C (VITAMIN B COMPLEX-C ORAL) Take by mouth Daily.   Yes Provider, Historical   calcium-vitamin D 250-100 mg-unit per tablet Take 1 tablet by mouth 2 (two) times daily.   Yes Provider, Historical   cyanocobalamin 500 MCG tablet Take 500 mcg by mouth once daily.   Yes Provider, Historical   diazePAM (VALIUM) 5 MG tablet Take 1 tablet (5 mg total) by mouth daily as needed for Anxiety. 8/1/23  Yes Jose Morales III, NP   fluocinonide-emollient (FLUOCINONIDE-E) 0.05 % Crea AAA of feet daily prn psoriasis. 6/30/23  Yes Minerva Lara MD   fluticasone-umeclidin-vilanter (TRELEGY ELLIPTA) 100-62.5-25 mcg DsDv Inhale 1 puff into the lungs once daily. 2/6/23  Yes Hetal Banks MD   metoprolol tartrate (LOPRESSOR) 25 MG  tablet Take 1 tablet (25 mg total) by mouth 2 (two) times daily. 7/6/23  Yes Pool Dorado MD   multivitamin (THERAGRAN) per tablet Take 1 tablet by mouth once daily.   Yes Provider, Historical   multivitamin with minerals (HAIR,SKIN AND NAILS ORAL) Take by mouth.   Yes Provider, Historical   mv-mn/iron/folic acid/herb 190 (VITAMIN D3 COMPLETE ORAL) Take by mouth.   Yes Provider, Historical   omeprazole (PRILOSEC) 40 MG capsule Take 1 capsule (40 mg total) by mouth every morning. 9/11/23  Yes Elisabet Rascon NP   QUEtiapine (SEROQUEL) 50 MG tablet Take 1 tablet (50 mg total) by mouth every evening. 7/5/23 7/4/24 Yes Jose Morales III, NP   sulfamethoxazole-trimethoprim 800-160mg (BACTRIM DS) 800-160 mg Tab Take 1 tablet by mouth 2 (two) times daily. for 10 days 9/20/23 9/30/23 Yes Ming Milian MD TALTZ AUTOINJECTOR 80 mg/mL AtIn Inject 80 mg into the skin every 28 days. 8/10/23  Yes Minerva Lara MD TALTZ AUTOINJECTOR, 2 PACK, 80 mg/mL AtIn Inject 80mg SQ every other week (weeks 4, 6, 8, 10). 8/10/23  Yes Minerva Lara MD TALTZ AUTOINJECTOR, 3 PACK, 80 mg/mL AtIn Inject 160mg (2 pens) into the skin at week 0 , then inject 80mg into the skin at week 2. 8/10/23  Yes Minerva Lara MD   triamcinolone acetonide 0.025% (KENALOG) 0.025 % cream AAA of skin folds bid prn psoriasis. 6/30/23  Yes Minerva Lara MD   triamcinolone acetonide 0.1% (KENALOG) 0.1 % cream Apply to affected areas of body BID prn psoriasis. Do not use on face or skin folds. 6/30/23  Yes Minerva Lara MD   venlafaxine (EFFEXOR-XR) 75 MG 24 hr capsule Take 1 capsule (75 mg total) by mouth once daily. Start on Week 2 6/1/23  Yes Jose Morales III, NP   ondansetron (ZOFRAN) 8 MG tablet Take 1 tablet (8 mg total) by mouth every 12 (twelve) hours as needed for Nausea.  Patient not taking: Reported on 9/21/2023 3/23/23 3/22/24  Roya Madrid MD   budesonide-formoterol 160-4.5 mcg (SYMBICORT)  160-4.5 mcg/actuation HFAA Inhale 2 puffs into the lungs every 12 (twelve) hours. Controller 19  Saige Bee MD         History  Past Medical History:   Diagnosis Date    Allergy     Anxiety     Arthritis     Atrial flutter     Colon polyps     COPD (chronic obstructive pulmonary disease)     COPD exacerbation 10/31/2022    Depression     Diverticular disease of colon 2017    Diverticulitis     HLD (hyperlipidemia)     Malignant neoplasm of central portion of left breast in female, estrogen receptor positive 2022    Pancreatitis     Pneumothorax, unspecified     following mastectomy sx     Psoriasis     Rheumatoid arthritis     Severe obesity (BMI 35.0-39.9) with comorbidity      Past Surgical History:   Procedure Laterality Date    ABLATION  2022    Cardiac Ablation for A Flutter, Successful    ADENOIDECTOMY  1966    Tonsillitis and adnoids    BILATERAL MASTECTOMY Bilateral 2/15/2023    Procedure: MASTECTOMY, BILATERAL;  Surgeon: Marcela Meehan MD;  Location: [facility];  Service: General;  Laterality: Bilateral;  EMAIL SENT  @ 11:44 LK / 2.5 HOURS    BLADDER SUSPENSION      (x2)    BREAST REVISION SURGERY Bilateral 3/29/2023    Procedure: BREAST REVISION SURGERY / BILATERAL BREAST FLAP REVISION;  Surgeon: Ming Milian MD;  Location: [facility];  Service: Plastics;  Laterality: Bilateral;  90 MINUTES     SECTION      (x2)    DEBRIDEMENT, BREAST Bilateral 3/29/2023    Procedure: DEBRIDEMENT, BREAST;  Surgeon: Ming Milian MD;  Location: [facility];  Service: Plastics;  Laterality: Bilateral;    ESOPHAGOGASTRODUODENOSCOPY N/A 2021    Procedure: EGD (ESOPHAGOGASTRODUODENOSCOPY);  Surgeon: Vince Rodriguez MD;  Location: [address];  Service: General;  Laterality: N/A;    INJECTION FOR SENTINEL NODE IDENTIFICATION Left 2/15/2023    Procedure: INJECTION, FOR SENTINEL NODE IDENTIFICATION;  Surgeon: Marcela Meehan MD;  Location: [facility];   Service: General;  Laterality: Left;    LAPAROSCOPIC SLEEVE GASTRECTOMY N/A 03/31/2021    Procedure: GASTRECTOMY, SLEEVE, LAPAROSCOPIC, with intraop EGD;  Surgeon: Vince Rodriguez MD;  Location: [address];  Service: General;  Laterality: N/A;    LIPOSUCTION W/ FAT INJECTION Bilateral 8/29/2023    Procedure: LIPOSUCTION, WITH FAT TRANSFER;  Surgeon: Ming Milian MD;  Location: [facility];  Service: Plastics;  Laterality: Bilateral;  / FAT GRAFTING TO BOTH BREAST    LUNG BIOPSY  09/2020    RECONSTRUCTION OF BREAST WITH DEEP INFERIOR EPIGASTRIC ARTERY  (NEHA) FREE FLAP Bilateral 2/15/2023    Procedure: RECONSTRUCTION, BREAST, USING NEHA FREE FLAP;  Surgeon: Ming Milian MD;  Location: [facility];  Service: Plastics;  Laterality: Bilateral;    REVISION, FLAP, PREVIOUSLY CREATED FOR BREAST RECONSTRUCTION Bilateral 8/29/2023    Procedure: REVISION, FLAP, PREVIOUSLY CREATED FOR BREAST RECONSTRUCTION;  Surgeon: Ming Milian MD;  Location: [facility];  Service: Plastics;  Laterality: Bilateral;  4 HOURS    REVISION, SCAR, ABDOMINAL DONOR SITE N/A 8/29/2023    Procedure: REVISION, SCAR, ABDOMINAL DONOR SITE;  Surgeon: Ming Milian MD;  Location: [facility];  Service: Plastics;  Laterality: N/A;    RHINOPLASTY      SENTINEL LYMPH NODE BIOPSY Left 2/15/2023    Procedure: BIOPSY, LYMPH NODE, SENTINEL;  Surgeon: Marcela Meehan MD;  Location: [facility];  Service: General;  Laterality: Left;    TONSILLECTOMY      TRANSESOPHAGEAL ECHOCARDIOGRAPHY N/A 11/02/2022    Procedure: ECHOCARDIOGRAM, TRANSESOPHAGEAL;  Surgeon: Kellie Grover MD;  Location: [facility];  Service: Cardiology;  Laterality: N/A;     Social History     Socioeconomic History    Marital status:    Occupational History    Occupation: clinical research coordinator    Tobacco Use    Smoking status: Former     Current packs/day: 0.00     Types: Cigarettes     Start date: 1/1/1979     Quit date: 12/7/2020     Years since  quittin.7    Smokeless tobacco: Current   Substance and Sexual Activity    Alcohol use: Yes     Alcohol/week: 1.0 standard drink of alcohol     Types: 1 Glasses of wine per week     Comment: social-rarely    Drug use: No    Sexual activity: Yes     Partners: Male     Birth control/protection: Post-menopausal   Other Topics Concern    Are you pregnant or think you may be? No    Breast-feeding No     Social Determinants of Health     Financial Resource Strain: Low Risk  (2023)    Overall Financial Resource Strain (CARDIA)     Difficulty of Paying Living Expenses: Not very hard   Food Insecurity: No Food Insecurity (2023)    Hunger Vital Sign     Worried About Running Out of Food in the Last Year: Never true     Ran Out of Food in the Last Year: Never true   Transportation Needs: No Transportation Needs (2023)    PRAPARE - Transportation     Lack of Transportation (Medical): No     Lack of Transportation (Non-Medical): No   Physical Activity: Insufficiently Active (2023)    Exercise Vital Sign     Days of Exercise per Week: 1 day     Minutes of Exercise per Session: 10 min   Stress: Stress Concern Present (2023)    [employer] - Occupational Stress Questionnaire     Feeling of Stress : Very much   Social Connections: Unknown (2023)    Social Connection and Isolation Panel [NHANES]     Frequency of Communication with Friends and Family: More than three times a week     Frequency of Social Gatherings with Friends and Family: Once a week     Active Member of Clubs or Organizations: Yes     Attends Club or Organization Meetings: 1 to 4 times per year     Marital Status:    Housing Stability: Low Risk  (2023)    Housing Stability Vital Sign     Unable to Pay for Housing in the Last Year: No     Number of Places Lived in the Last Year: 1     Unstable Housing in the Last Year: No         Allergies  Review of patient's allergies indicates:   Allergen  Reactions    Succinimides Anaphylaxis     Son has          Review of Systems   Review of Systems   Constitutional: Negative for decreased appetite, fever and weight loss.   HENT:  Negative for congestion and nosebleeds.    Eyes:  Negative for double vision, vision loss in left eye, vision loss in right eye and visual disturbance.   Cardiovascular:  Negative for chest pain, claudication, cyanosis, dyspnea on exertion, irregular heartbeat, leg swelling, near-syncope, orthopnea, palpitations, paroxysmal nocturnal dyspnea and syncope.   Respiratory:  Negative for cough, hemoptysis, shortness of breath, sleep disturbances due to breathing, snoring, sputum production and wheezing.    Endocrine: Negative for cold intolerance and heat intolerance.   Skin:  Negative for nail changes and rash.   Musculoskeletal:  Negative for joint pain, muscle cramps, muscle weakness and myalgias.   Gastrointestinal:  Negative for change in bowel habit, heartburn, hematemesis, hematochezia, hemorrhoids and melena.   Neurological:  Negative for dizziness, focal weakness and headaches.         Physical Exam  Wt Readings from Last 1 Encounters:   09/21/23 78.2 kg (172 lb 4.8 oz)     BP Readings from Last 3 Encounters:   09/21/23 120/76   09/20/23 124/68   09/13/23 (!) 144/77     Pulse Readings from Last 1 Encounters:   09/21/23 88     Body mass index is 31.51 kg/m².    Physical Exam  Constitutional:       Appearance: She is well-developed.   HENT:      Head: Atraumatic.   Eyes:      General: No scleral icterus.  Neck:      Vascular: Normal carotid pulses. No carotid bruit, hepatojugular reflux or JVD.   Cardiovascular:      Rate and Rhythm: Normal rate and regular rhythm.      Chest Wall: PMI is not displaced.      Pulses: Intact distal pulses.           Carotid pulses are 2+ on the right side and 2+ on the left side.       Radial pulses are 2+ on the right side and 2+ on the left side.        Dorsalis pedis pulses are 2+ on the right side  and 2+ on the left side.      Heart sounds: Normal heart sounds, S1 normal and S2 normal. No murmur heard.     No friction rub.   Pulmonary:      Effort: Pulmonary effort is normal. No respiratory distress.      Breath sounds: Normal breath sounds. No stridor. No wheezing or rales.   Chest:      Chest wall: No tenderness.   Abdominal:      General: Bowel sounds are normal.      Palpations: Abdomen is soft.   Musculoskeletal:      Cervical back: Neck supple. No edema.   Skin:     General: Skin is warm and dry.      Nails: There is no clubbing.   Neurological:      Mental Status: She is alert and oriented to person, place, and time.   Psychiatric:         Behavior: Behavior normal.         Thought Content: Thought content normal.             Assessment  1. Atrial flutter, unspecified type  In sinus rhythm    2. Essential hypertension  Well controlled on current medications    3. Mixed hyperlipidemia  Stable        Plan and Discussion  Continue her current medications.  Discussed her echocardiogram results.    Follow Up  Six months      Pool Dorado MD, F.A.C.C, F.S.C.A.I.        30 minutes were spent in chart review, documentation and review of results, and evaluation, treatment, and counseling of patient on the same day of service.    Disclaimer: This document was created using voice recognition software ([employer]). Although it may be edited, this document may contain errors related to incorrect recognition of the spoken word. Please call the physician if clarification is needed.

## 2023-09-26 ENCOUNTER — PATIENT MESSAGE (OUTPATIENT)
Dept: SURGERY | Facility: CLINIC | Age: 63
End: 2023-09-26
Payer: COMMERCIAL

## 2023-09-29 DIAGNOSIS — Z17.0 MALIGNANT NEOPLASM OF CENTRAL PORTION OF LEFT BREAST IN FEMALE, ESTROGEN RECEPTOR POSITIVE: ICD-10-CM

## 2023-09-29 DIAGNOSIS — C50.112 MALIGNANT NEOPLASM OF CENTRAL PORTION OF LEFT BREAST IN FEMALE, ESTROGEN RECEPTOR POSITIVE: ICD-10-CM

## 2023-10-02 RX ORDER — ANASTROZOLE 1 MG/1
1 TABLET ORAL DAILY
Qty: 90 TABLET | Refills: 3 | Status: ON HOLD | OUTPATIENT
Start: 2023-10-02 | End: 2024-10-01

## 2023-10-04 ENCOUNTER — OFFICE VISIT (OUTPATIENT)
Dept: PLASTIC SURGERY | Facility: CLINIC | Age: 63
End: 2023-10-04
Attending: PLASTIC SURGERY
Payer: COMMERCIAL

## 2023-10-04 VITALS — SYSTOLIC BLOOD PRESSURE: 131 MMHG | HEART RATE: 71 BPM | DIASTOLIC BLOOD PRESSURE: 82 MMHG

## 2023-10-04 DIAGNOSIS — Z85.3 HISTORY OF BREAST CANCER: Primary | ICD-10-CM

## 2023-10-04 PROCEDURE — 3079F PR MOST RECENT DIASTOLIC BLOOD PRESSURE 80-89 MM HG: ICD-10-PCS | Mod: CPTII,S$GLB,, | Performed by: PLASTIC SURGERY

## 2023-10-04 PROCEDURE — 3079F DIAST BP 80-89 MM HG: CPT | Mod: CPTII,S$GLB,, | Performed by: PLASTIC SURGERY

## 2023-10-04 PROCEDURE — 3075F SYST BP GE 130 - 139MM HG: CPT | Mod: CPTII,S$GLB,, | Performed by: PLASTIC SURGERY

## 2023-10-04 PROCEDURE — 99024 POSTOP FOLLOW-UP VISIT: CPT | Mod: S$GLB,,, | Performed by: PLASTIC SURGERY

## 2023-10-04 PROCEDURE — 3075F PR MOST RECENT SYSTOLIC BLOOD PRESS GE 130-139MM HG: ICD-10-PCS | Mod: CPTII,S$GLB,, | Performed by: PLASTIC SURGERY

## 2023-10-04 PROCEDURE — 99024 PR POST-OP FOLLOW-UP VISIT: ICD-10-PCS | Mod: S$GLB,,, | Performed by: PLASTIC SURGERY

## 2023-10-04 NOTE — PROGRESS NOTES
Patient presents in follow-up from second-stage breast reconstruction 5 weeks ago.     Completed 10 day course of bactrim DS after left breast erythema noted at previous visit. This has resolved.    On exam:  Abdominal donor site is healing well     Right breast good contour, all incisions healing well, breast soft    On the left side the medial erythema has resolved, there is some nontender indurated tissue, no fluctuance, no drainage    Assessment:  Stable     Pt plans to get NAC tattoo on 12/8, she is cleared to do so    Follow-up in 1 month for possible kenalog injection left reconstructed breast

## 2023-10-12 ENCOUNTER — PATIENT MESSAGE (OUTPATIENT)
Dept: PRIMARY CARE CLINIC | Facility: CLINIC | Age: 63
End: 2023-10-12
Payer: COMMERCIAL

## 2023-10-12 ENCOUNTER — PATIENT MESSAGE (OUTPATIENT)
Dept: HEMATOLOGY/ONCOLOGY | Facility: CLINIC | Age: 63
End: 2023-10-12
Payer: COMMERCIAL

## 2023-10-12 ENCOUNTER — OFFICE VISIT (OUTPATIENT)
Dept: PLASTIC SURGERY | Facility: CLINIC | Age: 63
End: 2023-10-12
Attending: PLASTIC SURGERY
Payer: COMMERCIAL

## 2023-10-12 ENCOUNTER — PATIENT MESSAGE (OUTPATIENT)
Dept: PLASTIC SURGERY | Facility: CLINIC | Age: 63
End: 2023-10-12

## 2023-10-12 VITALS — HEART RATE: 71 BPM | SYSTOLIC BLOOD PRESSURE: 128 MMHG | DIASTOLIC BLOOD PRESSURE: 58 MMHG

## 2023-10-12 DIAGNOSIS — Z85.3 HISTORY OF BREAST CANCER: Primary | ICD-10-CM

## 2023-10-12 PROCEDURE — 3074F SYST BP LT 130 MM HG: CPT | Mod: CPTII,S$GLB,, | Performed by: PLASTIC SURGERY

## 2023-10-12 PROCEDURE — 3078F DIAST BP <80 MM HG: CPT | Mod: CPTII,S$GLB,, | Performed by: PLASTIC SURGERY

## 2023-10-12 PROCEDURE — 99024 PR POST-OP FOLLOW-UP VISIT: ICD-10-PCS | Mod: S$GLB,,, | Performed by: PLASTIC SURGERY

## 2023-10-12 PROCEDURE — 3074F PR MOST RECENT SYSTOLIC BLOOD PRESSURE < 130 MM HG: ICD-10-PCS | Mod: CPTII,S$GLB,, | Performed by: PLASTIC SURGERY

## 2023-10-12 PROCEDURE — 3078F PR MOST RECENT DIASTOLIC BLOOD PRESSURE < 80 MM HG: ICD-10-PCS | Mod: CPTII,S$GLB,, | Performed by: PLASTIC SURGERY

## 2023-10-12 PROCEDURE — 99024 POSTOP FOLLOW-UP VISIT: CPT | Mod: S$GLB,,, | Performed by: PLASTIC SURGERY

## 2023-10-12 RX ORDER — LEVOFLOXACIN 500 MG/1
500 TABLET, FILM COATED ORAL DAILY
Qty: 14 TABLET | Refills: 0 | Status: ON HOLD | OUTPATIENT
Start: 2023-10-12 | End: 2023-10-27 | Stop reason: SDUPTHER

## 2023-10-12 RX ORDER — SULFAMETHOXAZOLE AND TRIMETHOPRIM 800; 160 MG/1; MG/1
1 TABLET ORAL 2 TIMES DAILY
Qty: 28 TABLET | Refills: 0 | Status: ON HOLD | OUTPATIENT
Start: 2023-10-12 | End: 2023-10-27 | Stop reason: SDUPTHER

## 2023-10-12 NOTE — PROGRESS NOTES
Patient presents for follow-up.  She is status post second-stage breast reconstruction     She reports some new onset discomfort that both breasts left more than right     On exam:  There is a small wound dehiscence at the trifurcation on the right side that should respond well with topical antibiotics     The left breast as some new onset erythema medially that is blanching    There is no evidence of fluctuance     It is consistent with early cellulitis     Assessment:  Early cellulitis of the left breast status post second-stage     Plan Bactrim and Levaquin 2 weeks     Follow-up then     Antibiotic ointment to the right trifurcation area

## 2023-10-16 ENCOUNTER — PATIENT MESSAGE (OUTPATIENT)
Dept: CARDIOLOGY | Facility: CLINIC | Age: 63
End: 2023-10-16
Payer: COMMERCIAL

## 2023-10-17 DIAGNOSIS — C50.112 MALIGNANT NEOPLASM OF CENTRAL PORTION OF LEFT BREAST IN FEMALE, ESTROGEN RECEPTOR POSITIVE: Primary | ICD-10-CM

## 2023-10-17 DIAGNOSIS — Z17.0 MALIGNANT NEOPLASM OF CENTRAL PORTION OF LEFT BREAST IN FEMALE, ESTROGEN RECEPTOR POSITIVE: Primary | ICD-10-CM

## 2023-10-24 ENCOUNTER — HOSPITAL ENCOUNTER (INPATIENT)
Facility: OTHER | Age: 63
LOS: 3 days | Discharge: HOME | DRG: 600 | End: 2023-10-27
Attending: EMERGENCY MEDICINE | Admitting: PLASTIC SURGERY
Payer: COMMERCIAL

## 2023-10-24 DIAGNOSIS — T81.40XA POSTOPERATIVE INFECTION, UNSPECIFIED TYPE, INITIAL ENCOUNTER: ICD-10-CM

## 2023-10-24 DIAGNOSIS — N61.1 LEFT BREAST ABSCESS: ICD-10-CM

## 2023-10-24 DIAGNOSIS — N61.0 CELLULITIS OF LEFT BREAST: Primary | ICD-10-CM

## 2023-10-24 DIAGNOSIS — S21.002A BREAST WOUND, LEFT, INITIAL ENCOUNTER: ICD-10-CM

## 2023-10-24 DIAGNOSIS — T81.42XD INFECTION OF DEEP INCISIONAL SURGICAL SITE AFTER PROCEDURE, SUBSEQUENT ENCOUNTER: ICD-10-CM

## 2023-10-24 LAB
ALBUMIN SERPL BCP-MCNC: 3.7 G/DL (ref 3.5–5.2)
ALP SERPL-CCNC: 99 U/L (ref 55–135)
ALT SERPL W/O P-5'-P-CCNC: 18 U/L (ref 10–44)
ANION GAP SERPL CALC-SCNC: 12 MMOL/L (ref 8–16)
AST SERPL-CCNC: 19 U/L (ref 10–40)
BASOPHILS # BLD AUTO: 0.05 K/UL (ref 0–0.2)
BASOPHILS NFR BLD: 0.7 % (ref 0–1.9)
BILIRUB SERPL-MCNC: 0.5 MG/DL (ref 0.1–1)
BUN SERPL-MCNC: 14 MG/DL (ref 8–23)
CALCIUM SERPL-MCNC: 9.9 MG/DL (ref 8.7–10.5)
CHLORIDE SERPL-SCNC: 102 MMOL/L (ref 95–110)
CO2 SERPL-SCNC: 24 MMOL/L (ref 23–29)
CREAT SERPL-MCNC: 1.1 MG/DL (ref 0.5–1.4)
CRP SERPL-MCNC: 14.7 MG/L (ref 0–8.2)
DIFFERENTIAL METHOD: ABNORMAL
EOSINOPHIL # BLD AUTO: 0.2 K/UL (ref 0–0.5)
EOSINOPHIL NFR BLD: 2.7 % (ref 0–8)
ERYTHROCYTE [DISTWIDTH] IN BLOOD BY AUTOMATED COUNT: 13.3 % (ref 11.5–14.5)
EST. GFR  (NO RACE VARIABLE): 57 ML/MIN/1.73 M^2
GLUCOSE SERPL-MCNC: 100 MG/DL (ref 70–110)
HCT VFR BLD AUTO: 41 % (ref 37–48.5)
HGB BLD-MCNC: 12.9 G/DL (ref 12–16)
IMM GRANULOCYTES # BLD AUTO: 0.02 K/UL (ref 0–0.04)
IMM GRANULOCYTES NFR BLD AUTO: 0.3 % (ref 0–0.5)
LACTATE SERPL-SCNC: 3.8 MMOL/L (ref 0.5–2.2)
LYMPHOCYTES # BLD AUTO: 1.2 K/UL (ref 1–4.8)
LYMPHOCYTES NFR BLD: 16.2 % (ref 18–48)
MCH RBC QN AUTO: 27.9 PG (ref 27–31)
MCHC RBC AUTO-ENTMCNC: 31.5 G/DL (ref 32–36)
MCV RBC AUTO: 89 FL (ref 82–98)
MONOCYTES # BLD AUTO: 0.6 K/UL (ref 0.3–1)
MONOCYTES NFR BLD: 7.8 % (ref 4–15)
NEUTROPHILS # BLD AUTO: 5.3 K/UL (ref 1.8–7.7)
NEUTROPHILS NFR BLD: 72.3 % (ref 38–73)
NRBC BLD-RTO: 0 /100 WBC
PLATELET # BLD AUTO: 288 K/UL (ref 150–450)
PMV BLD AUTO: 8.5 FL (ref 9.2–12.9)
POTASSIUM SERPL-SCNC: 3.9 MMOL/L (ref 3.5–5.1)
PROT SERPL-MCNC: 7.5 G/DL (ref 6–8.4)
RBC # BLD AUTO: 4.63 M/UL (ref 4–5.4)
SODIUM SERPL-SCNC: 138 MMOL/L (ref 136–145)
WBC # BLD AUTO: 7.33 K/UL (ref 3.9–12.7)

## 2023-10-24 PROCEDURE — 11000001 HC ACUTE MED/SURG PRIVATE ROOM

## 2023-10-24 PROCEDURE — 87040 BLOOD CULTURE FOR BACTERIA: CPT | Performed by: NURSE PRACTITIONER

## 2023-10-24 PROCEDURE — 63600175 PHARM REV CODE 636 W HCPCS: Performed by: EMERGENCY MEDICINE

## 2023-10-24 PROCEDURE — 86140 C-REACTIVE PROTEIN: CPT | Performed by: NURSE PRACTITIONER

## 2023-10-24 PROCEDURE — 85025 COMPLETE CBC W/AUTO DIFF WBC: CPT | Performed by: NURSE PRACTITIONER

## 2023-10-24 PROCEDURE — 63600175 PHARM REV CODE 636 W HCPCS: Performed by: STUDENT IN AN ORGANIZED HEALTH CARE EDUCATION/TRAINING PROGRAM

## 2023-10-24 PROCEDURE — 25000003 PHARM REV CODE 250: Performed by: EMERGENCY MEDICINE

## 2023-10-24 PROCEDURE — 25000003 PHARM REV CODE 250: Performed by: STUDENT IN AN ORGANIZED HEALTH CARE EDUCATION/TRAINING PROGRAM

## 2023-10-24 PROCEDURE — 99285 EMERGENCY DEPT VISIT HI MDM: CPT | Mod: 25

## 2023-10-24 PROCEDURE — 94761 N-INVAS EAR/PLS OXIMETRY MLT: CPT

## 2023-10-24 PROCEDURE — 83605 ASSAY OF LACTIC ACID: CPT | Performed by: NURSE PRACTITIONER

## 2023-10-24 PROCEDURE — 96365 THER/PROPH/DIAG IV INF INIT: CPT

## 2023-10-24 PROCEDURE — 80053 COMPREHEN METABOLIC PANEL: CPT | Performed by: NURSE PRACTITIONER

## 2023-10-24 PROCEDURE — 96375 TX/PRO/DX INJ NEW DRUG ADDON: CPT

## 2023-10-24 RX ORDER — METOPROLOL TARTRATE 25 MG/1
25 TABLET, FILM COATED ORAL 2 TIMES DAILY
Status: DISCONTINUED | OUTPATIENT
Start: 2023-10-24 | End: 2023-10-27 | Stop reason: HOSPADM

## 2023-10-24 RX ORDER — OXYCODONE HYDROCHLORIDE 5 MG/1
5 TABLET ORAL EVERY 6 HOURS PRN
Status: DISCONTINUED | OUTPATIENT
Start: 2023-10-24 | End: 2023-10-24

## 2023-10-24 RX ORDER — OXYCODONE HYDROCHLORIDE 10 MG/1
10 TABLET ORAL EVERY 4 HOURS PRN
Status: DISCONTINUED | OUTPATIENT
Start: 2023-10-24 | End: 2023-10-27 | Stop reason: HOSPADM

## 2023-10-24 RX ORDER — DOXYCYCLINE HYCLATE 100 MG
100 TABLET ORAL EVERY 12 HOURS
Status: COMPLETED | OUTPATIENT
Start: 2023-10-24 | End: 2023-10-25

## 2023-10-24 RX ORDER — QUETIAPINE FUMARATE 25 MG/1
50 TABLET, FILM COATED ORAL NIGHTLY
Status: DISCONTINUED | OUTPATIENT
Start: 2023-10-24 | End: 2023-10-27 | Stop reason: HOSPADM

## 2023-10-24 RX ORDER — VENLAFAXINE HYDROCHLORIDE 37.5 MG/1
75 CAPSULE, EXTENDED RELEASE ORAL DAILY
Status: DISCONTINUED | OUTPATIENT
Start: 2023-10-25 | End: 2023-10-27 | Stop reason: HOSPADM

## 2023-10-24 RX ORDER — SODIUM CHLORIDE 0.9 % (FLUSH) 0.9 %
10 SYRINGE (ML) INJECTION EVERY 12 HOURS PRN
Status: DISCONTINUED | OUTPATIENT
Start: 2023-10-24 | End: 2023-10-27 | Stop reason: HOSPADM

## 2023-10-24 RX ORDER — DOXYCYCLINE HYCLATE 100 MG
100 TABLET ORAL EVERY 12 HOURS
Status: DISCONTINUED | OUTPATIENT
Start: 2023-10-24 | End: 2023-10-24

## 2023-10-24 RX ORDER — HYDROMORPHONE HYDROCHLORIDE 1 MG/ML
0.5 INJECTION, SOLUTION INTRAMUSCULAR; INTRAVENOUS; SUBCUTANEOUS
Status: COMPLETED | OUTPATIENT
Start: 2023-10-24 | End: 2023-10-24

## 2023-10-24 RX ORDER — OXYCODONE HYDROCHLORIDE 5 MG/1
5 TABLET ORAL ONCE
Status: COMPLETED | OUTPATIENT
Start: 2023-10-24 | End: 2023-10-24

## 2023-10-24 RX ORDER — SODIUM CHLORIDE, SODIUM LACTATE, POTASSIUM CHLORIDE, CALCIUM CHLORIDE 600; 310; 30; 20 MG/100ML; MG/100ML; MG/100ML; MG/100ML
INJECTION, SOLUTION INTRAVENOUS CONTINUOUS
Status: DISCONTINUED | OUTPATIENT
Start: 2023-10-25 | End: 2023-10-25

## 2023-10-24 RX ORDER — PANTOPRAZOLE SODIUM 40 MG/1
40 TABLET, DELAYED RELEASE ORAL DAILY
Status: DISCONTINUED | OUTPATIENT
Start: 2023-10-24 | End: 2023-10-27 | Stop reason: HOSPADM

## 2023-10-24 RX ORDER — OXYCODONE HYDROCHLORIDE 5 MG/1
5 TABLET ORAL EVERY 4 HOURS PRN
Status: DISCONTINUED | OUTPATIENT
Start: 2023-10-24 | End: 2023-10-27 | Stop reason: HOSPADM

## 2023-10-24 RX ORDER — DIPHENHYDRAMINE HYDROCHLORIDE 50 MG/ML
25 INJECTION INTRAMUSCULAR; INTRAVENOUS
Status: COMPLETED | OUTPATIENT
Start: 2023-10-24 | End: 2023-10-24

## 2023-10-24 RX ORDER — ACETAMINOPHEN 325 MG/1
650 TABLET ORAL EVERY 6 HOURS SCHEDULED
Status: DISCONTINUED | OUTPATIENT
Start: 2023-10-24 | End: 2023-10-27 | Stop reason: HOSPADM

## 2023-10-24 RX ORDER — OXYCODONE HYDROCHLORIDE 10 MG/1
10 TABLET ORAL EVERY 6 HOURS PRN
Status: DISCONTINUED | OUTPATIENT
Start: 2023-10-24 | End: 2023-10-24

## 2023-10-24 RX ORDER — DOXYCYCLINE HYCLATE 100 MG
100 TABLET ORAL DAILY
Status: DISCONTINUED | OUTPATIENT
Start: 2023-10-26 | End: 2023-10-27 | Stop reason: HOSPADM

## 2023-10-24 RX ORDER — ONDANSETRON 2 MG/ML
4 INJECTION INTRAMUSCULAR; INTRAVENOUS EVERY 8 HOURS PRN
Status: DISCONTINUED | OUTPATIENT
Start: 2023-10-24 | End: 2023-10-27 | Stop reason: HOSPADM

## 2023-10-24 RX ADMIN — SODIUM CHLORIDE 2286 ML: 9 INJECTION, SOLUTION INTRAVENOUS at 01:10

## 2023-10-24 RX ADMIN — OXYCODONE HYDROCHLORIDE 5 MG: 5 TABLET ORAL at 06:10

## 2023-10-24 RX ADMIN — ACETAMINOPHEN 650 MG: 325 TABLET, FILM COATED ORAL at 11:10

## 2023-10-24 RX ADMIN — PANTOPRAZOLE SODIUM 40 MG: 40 TABLET, DELAYED RELEASE ORAL at 03:10

## 2023-10-24 RX ADMIN — QUETIAPINE FUMARATE 50 MG: 25 TABLET ORAL at 09:10

## 2023-10-24 RX ADMIN — DIPHENHYDRAMINE HYDROCHLORIDE 25 MG: 50 INJECTION, SOLUTION INTRAMUSCULAR; INTRAVENOUS at 03:10

## 2023-10-24 RX ADMIN — HYDROMORPHONE HYDROCHLORIDE 0.5 MG: 1 INJECTION, SOLUTION INTRAMUSCULAR; INTRAVENOUS; SUBCUTANEOUS at 01:10

## 2023-10-24 RX ADMIN — OXYCODONE HYDROCHLORIDE 5 MG: 5 TABLET ORAL at 09:10

## 2023-10-24 RX ADMIN — ACETAMINOPHEN 650 MG: 325 TABLET, FILM COATED ORAL at 06:10

## 2023-10-24 RX ADMIN — OXYCODONE HYDROCHLORIDE 10 MG: 10 TABLET ORAL at 03:10

## 2023-10-24 RX ADMIN — METOPROLOL TARTRATE 25 MG: 25 TABLET, FILM COATED ORAL at 09:10

## 2023-10-24 RX ADMIN — DOXYCYCLINE HYCLATE 100 MG: 100 TABLET, COATED ORAL at 09:10

## 2023-10-24 RX ADMIN — SODIUM CHLORIDE, POTASSIUM CHLORIDE, SODIUM LACTATE AND CALCIUM CHLORIDE: 600; 310; 30; 20 INJECTION, SOLUTION INTRAVENOUS at 11:10

## 2023-10-24 RX ADMIN — OXYCODONE HYDROCHLORIDE 10 MG: 10 TABLET ORAL at 11:10

## 2023-10-24 RX ADMIN — VANCOMYCIN HYDROCHLORIDE 2000 MG: 500 INJECTION, POWDER, LYOPHILIZED, FOR SOLUTION INTRAVENOUS at 01:10

## 2023-10-24 RX ADMIN — CEFEPIME 2 G: 2 INJECTION, POWDER, FOR SOLUTION INTRAVENOUS at 04:10

## 2023-10-24 RX ADMIN — SODIUM CHLORIDE, POTASSIUM CHLORIDE, SODIUM LACTATE AND CALCIUM CHLORIDE: 600; 310; 30; 20 INJECTION, SOLUTION INTRAVENOUS at 04:10

## 2023-10-24 NOTE — ED PROVIDER NOTES
Encounter Date: 10/24/2023       History     Chief Complaint   Patient presents with    Post-op Problem     Pt c/o L breast pain, swelling and infection after a flap revision on 8/29. Pt has been levaquin and bactrim x 9 days for this infection but it is not resolving, sent here by surgeon. Last chemo 6/21, on estrogen blockers currently. No fevers at home to her knowledge, some chills last night. In no acute distress.     63-year-old female presents with complaint breast infection.  She reports that she had reconstruction with flap from Dr. Felipe, and had been doing well.  Approximately 2 weeks ago she saw him at the office with concern for early cellulitis and was started on Levaquin and Bactrim.  She states pain has been increasing, but due to hospitalization and surgery of her  she had not been paying much attention.  Yesterday, there was bloody drainage from the inferior aspect of the breast and an increase in erythema and pain.  She also reports subjective fevers and chills over the last 24 hours.      Review of patient's allergies indicates:   Allergen Reactions    Succinimides Anaphylaxis     Son has MH     Past Medical History:   Diagnosis Date    Allergy     Anxiety     Arthritis     Atrial flutter     Colon polyps 2016    COPD (chronic obstructive pulmonary disease)     COPD exacerbation 10/31/2022    Depression     Diverticular disease of colon 06/06/2017    Diverticulitis     HLD (hyperlipidemia)     Malignant neoplasm of central portion of left breast in female, estrogen receptor positive 12/29/2022    Pancreatitis     Pneumothorax, unspecified     following mastectomy sx 2023    Psoriasis     Rheumatoid arthritis     Severe obesity (BMI 35.0-39.9) with comorbidity      Past Surgical History:   Procedure Laterality Date    ABLATION  11/2022    Cardiac Ablation for A Flutter, Successful    ADENOIDECTOMY  1966    Tonsillitis and adnoids    BILATERAL MASTECTOMY Bilateral 2/15/2023     Procedure: MASTECTOMY, BILATERAL;  Surgeon: Marcela Meehan MD;  Location: [facility];  Service: General;  Laterality: Bilateral;  EMAIL SENT  @ 11:44 LK / 2.5 HOURS    BLADDER SUSPENSION      (x2)    BREAST REVISION SURGERY Bilateral 3/29/2023    Procedure: BREAST REVISION SURGERY / BILATERAL BREAST FLAP REVISION;  Surgeon: Ming Milian MD;  Location: [facility];  Service: Plastics;  Laterality: Bilateral;  90 MINUTES     SECTION      (x2)    DEBRIDEMENT, BREAST Bilateral 3/29/2023    Procedure: DEBRIDEMENT, BREAST;  Surgeon: Ming Milian MD;  Location: [facility];  Service: Plastics;  Laterality: Bilateral;    ESOPHAGOGASTRODUODENOSCOPY N/A 2021    Procedure: EGD (ESOPHAGOGASTRODUODENOSCOPY);  Surgeon: Vince Rodriguez MD;  Location: [facility] OR [address] [place] [address];  Service: General;  Laterality: N/A;    INJECTION FOR SENTINEL NODE IDENTIFICATION Left 2/15/2023    Procedure: INJECTION, FOR SENTINEL NODE IDENTIFICATION;  Surgeon: Marcela Meehan MD;  Location: [facility];  Service: General;  Laterality: Left;    LAPAROSCOPIC SLEEVE GASTRECTOMY N/A 2021    Procedure: GASTRECTOMY, SLEEVE, LAPAROSCOPIC, with intraop EGD;  Surgeon: Vince Rodriguez MD;  Location: [facility] OR [address] [place] [address];  Service: General;  Laterality: N/A;    LIPOSUCTION W/ FAT INJECTION Bilateral 2023    Procedure: LIPOSUCTION, WITH FAT TRANSFER;  Surgeon: Ming Milian MD;  Location: [facility];  Service: Plastics;  Laterality: Bilateral;  / FAT GRAFTING TO BOTH BREAST    LUNG BIOPSY  2020    RECONSTRUCTION OF BREAST WITH DEEP INFERIOR EPIGASTRIC ARTERY  (NEHA) FREE FLAP Bilateral 2/15/2023    Procedure: RECONSTRUCTION, BREAST, USING NEHA FREE FLAP;  Surgeon: Ming Milian MD;  Location: [facility];  Service: Plastics;  Laterality: Bilateral;    REVISION, FLAP, PREVIOUSLY CREATED FOR BREAST RECONSTRUCTION Bilateral 2023    Procedure: REVISION, FLAP, PREVIOUSLY CREATED FOR BREAST RECONSTRUCTION;  Surgeon:   Ming Johnson MD;  Location: [facility];  Service: Plastics;  Laterality: Bilateral;  4 HOURS    REVISION, SCAR, ABDOMINAL DONOR SITE N/A 2023    Procedure: REVISION, SCAR, ABDOMINAL DONOR SITE;  Surgeon: Ming Milian MD;  Location: [facility];  Service: Plastics;  Laterality: N/A;    RHINOPLASTY      SENTINEL LYMPH NODE BIOPSY Left 2/15/2023    Procedure: BIOPSY, LYMPH NODE, SENTINEL;  Surgeon: Marcela Meehan MD;  Location: [facility];  Service: General;  Laterality: Left;    TONSILLECTOMY      TRANSESOPHAGEAL ECHOCARDIOGRAPHY N/A 2022    Procedure: ECHOCARDIOGRAM, TRANSESOPHAGEAL;  Surgeon: Kellie Grover MD;  Location: [facility];  Service: Cardiology;  Laterality: N/A;     Family History   Adopted: Yes   Problem Relation Age of Onset    No Known Problems Daughter     No Known Problems Daughter     Malignant hyperthermia Son      Social History     Tobacco Use    Smoking status: Former     Current packs/day: 0.00     Types: Cigarettes     Start date: 1979     Quit date: 2020     Years since quittin.8    Smokeless tobacco: Current   Substance Use Topics    Alcohol use: Yes     Alcohol/week: 1.0 standard drink of alcohol     Types: 1 Glasses of wine per week     Comment: social-rarely    Drug use: No     Review of Systems   Constitutional:  Positive for chills, fatigue and fever.   HENT:  Negative for congestion and sore throat.    Eyes:  Negative for visual disturbance.   Respiratory:  Negative for cough and shortness of breath.    Cardiovascular:  Negative for chest pain and palpitations.   Gastrointestinal:  Negative for abdominal pain, diarrhea and vomiting.   Genitourinary:  Negative for decreased urine volume, dysuria and vaginal discharge.   Musculoskeletal:  Negative for joint swelling, neck pain and neck stiffness.   Skin:  Positive for color change (Left breast) and wound (Left breast). Negative for rash.   Neurological:  Negative for weakness, numbness and headaches.    Psychiatric/Behavioral:  Negative for confusion.        Physical Exam     Initial Vitals [10/24/23 1220]   BP Pulse Resp Temp SpO2   (!) 127/58 76 19 98.1 °F (36.7 °C) 97 %      MAP       --         Physical Exam    Nursing note and vitals reviewed.  Constitutional: She appears well-developed and well-nourished. No distress.   HENT:   Head: Normocephalic and atraumatic.   Mouth/Throat: Oropharynx is clear and moist. No oropharyngeal exudate.   Eyes: Conjunctivae and EOM are normal. Pupils are equal, round, and reactive to light.   Neck: Neck supple.   Cardiovascular:  Normal rate and normal heart sounds.           No murmur heard.  Pulmonary/Chest: Breath sounds normal. No respiratory distress. She has no wheezes. She has no rhonchi. She has no rales.   Abdominal: Abdomen is soft. There is no abdominal tenderness. There is no rebound and no guarding.   Musculoskeletal:         General: No tenderness or edema.      Cervical back: Neck supple.     Neurological: She is alert and oriented to person, place, and time. She has normal strength. GCS score is 15. GCS eye subscore is 4. GCS verbal subscore is 5. GCS motor subscore is 6.   Skin: Skin is warm and dry. No rash noted. There is erythema (Large area of erythema, induration, and tenderness to the inferior left breast measuring approximately 10 cm in diameter.  There is a punctate opening draining serosanguineous fluid.).   Psychiatric: She has a normal mood and affect. Thought content normal.         ED Course   Procedures  Labs Reviewed   CBC W/ AUTO DIFFERENTIAL - Abnormal; Notable for the following components:       Result Value    MCHC 31.5 (*)     MPV 8.5 (*)     Lymph % 16.2 (*)     All other components within normal limits   COMPREHENSIVE METABOLIC PANEL - Abnormal; Notable for the following components:    eGFR 57 (*)     All other components within normal limits   C-REACTIVE PROTEIN - Abnormal; Notable for the following components:    CRP 14.7 (*)     All  other components within normal limits   LACTIC ACID, PLASMA - Abnormal; Notable for the following components:    Lactate (Lactic Acid) 3.8 (*)     All other components within normal limits   CULTURE, BLOOD   CULTURE, BLOOD          Imaging Results    None          Medications   sodium chloride 0.9% bolus 2,286 mL 2,286 mL (2,286 mLs Intravenous New Bag 10/24/23 1355)   vancomycin 2 g in dextrose 5 % 500 mL IVPB (2,000 mg Intravenous New Bag 10/24/23 1356)   HYDROmorphone injection 0.5 mg (0.5 mg Intravenous Given 10/24/23 1354)     Medical Decision Making  Emergent evaluation of 63-year-old female status post breast reconstruction and failed outpatient antibiotics who presents with worsening breast cellulitis.  Vital signs are benign, afebrile.  On exam there is obvious cellulitis with possible infected seroma or abscess.  I discussed the case with Plastic surgery.  He will admit the patient to the hospital.  She is treated with IV fluids at sepsis dose after lactic acid returned elevated, vancomycin is given.  Labs are reviewed there is no leukocytosis or anemia, no major electrolyte disturbance.  There is no azotemia, but creatinine is slightly elevated from her baseline.  Lactic acid and CRP are significantly elevated.  I reviewed medical record, no wound cultures available, but patient does have history ESBL.  Given recent outpatient antibiotics will defer further antibiotics beyond vancomycin to admitting team.  She is admitted in serious condition for further care.    Risk  Prescription drug management.  Decision regarding hospitalization.               ED Course as of 10/24/23 1420   Tue Oct 24, 2023   1335 Left voicemail for Dr. Felipe. Sent secure chat. [AK]   1402 Plastic surgery fellow is at the bedside. [AK]      ED Course User Index  [AK] Jocelyne Saleh MD                    Clinical Impression:   Final diagnoses:  [N61.0] Cellulitis of left breast (Primary)  [T81.40XA] Postoperative infection,  unspecified type, initial encounter        ED Disposition Condition    Admit Stable                Jocelyne Saleh MD  10/24/23 [id number]

## 2023-10-24 NOTE — FIRST PROVIDER EVALUATION
Emergency Department TeleTriage Encounter Note      CHIEF COMPLAINT    Chief Complaint   Patient presents with    Post-op Problem     Pt c/o L breast pain, swelling and infection after a flap revision on 8/29. Pt has been levaquin and bactrim x 9 days for this infection but it is not resolving, sent here by surgeon. Last chemo 6/21, on estrogen blockers currently. No fevers at home to her knowledge, some chills last night. In no acute distress.       VITAL SIGNS   Initial Vitals [10/24/23 1220]   BP Pulse Resp Temp SpO2   (!) 127/58 76 19 98.1 °F (36.7 °C) 97 %      MAP       --            ALLERGIES    Review of patient's allergies indicates:   Allergen Reactions    Succinimides Anaphylaxis     Son has        PROVIDER TRIAGE NOTE  This is a teletriage evaluation of a 63 y.o. female presenting to the ED complaining of left breast pain. Pt had a left breast flap revision on 8/29. Reports infection for which she is on levaquin and Bactrim, but area is only worsening. Denies fever but has had chills.     Initial orders will be placed and care will be transferred to an alternate provider when patient is roomed for a full evaluation. Any additional orders and the final disposition will be determined by that provider.         ORDERS  Labs Reviewed   CULTURE, BLOOD   CULTURE, BLOOD   CBC W/ AUTO DIFFERENTIAL   COMPREHENSIVE METABOLIC PANEL   C-REACTIVE PROTEIN   LACTIC ACID, PLASMA       ED Orders (720h ago, onward)      Start Ordered     Status Ordering Provider    10/24/23 1226 10/24/23 1225  CBC auto differential  STAT         Ordered JAIMIE FLOR NJohanna    10/24/23 1226 10/24/23 1225  Comprehensive metabolic panel  STAT         Ordered JAIMIE FLOR NJohanna    10/24/23 1226 10/24/23 1225  Insert Saline lock IV  Once         Ordered JAIMIE FLOR NJohanna    10/24/23 1226 10/24/23 1225  C-reactive protein  STAT         Ordered JAIMIE FLOR    10/24/23 1226 10/24/23 1225  Blood culture #1  **CANNOT BE ORDERED STAT**  Once         Ordered JAIMIE FLOR N.    10/24/23 1226 10/24/23 1225  Blood culture #2 **CANNOT BE ORDERED STAT**  Once         Ordered AILEENGILBERT JAIMIE N.    10/24/23 1226 10/24/23 1225  Lactic acid, plasma  STAT         Ordered AILEENGILBERTJAIMIE N.    10/24/23 1226 10/24/23 1225  Possible Hospitalization  Once        Comments: For further elaboration on reason for hospitalization.    Ordered BASIA JAIMIE N.              Virtual Visit Note: The provider triage portion of this emergency department evaluation and documentation was performed via [employer], a HIPAA-compliant telemedicine application, in concert with a tele-presenter in the room. A face to face patient evaluation with one of my colleagues will occur once the patient is placed in an emergency department room.      DISCLAIMER: This note was prepared with [employer] voice recognition transcription software. Garbled syntax, mangled pronouns, and other bizarre constructions may be attributed to that software system.

## 2023-10-24 NOTE — PROGRESS NOTES
Therapy with vancomycin complete and/or consult discontinued by provider.  Pharmacy will sign off, please re-consult as needed.    Thank you for the consult,  Fany Vargas  10/24/2023

## 2023-10-24 NOTE — ED NOTES
Pt c/o itching and red, raised hives noted to medial R forearm. Pt denies SOB. Vancomycin stopped and MD Delfino notified.

## 2023-10-24 NOTE — PROGRESS NOTES
"Pharmacokinetic Initial Assessment: IV Vancomycin    Assessment/Plan:    Initiate intravenous vancomycin with loading dose of 2000 mg once followed by a maintenance dose of vancomycin 1500mg IV every 24 hours  Desired empiric serum trough concentration is 10 to 20 mcg/mL  Draw vancomycin trough level 60 min prior to third dose on 10/26/23 at approximately 1300.  Pharmacy will continue to follow and monitor vancomycin.      Please contact pharmacy at extension [phone] with any questions regarding this assessment.     Thank you for the consult,   Andreea Sanderson       Patient brief summary:  Lucia Matias is a 63 y.o. female initiated on antimicrobial therapy with IV Vancomycin for treatment of suspected skin & soft tissue infection    Drug Allergies:   Review of patient's allergies indicates:   Allergen Reactions    Succinimides Anaphylaxis     Son has MH       Actual Body Weight:   76.2 kg    Renal Function:   Estimated Creatinine Clearance: 51.3 mL/min (based on SCr of 1.1 mg/dL).,     Dialysis Method (if applicable):  N/A    CBC (last 72 hours):  Recent Labs   Lab Result Units 10/24/23  1242   WBC K/uL 7.33   Hemoglobin g/dL 12.9   Hematocrit % 41.0   Platelets K/uL 288   Gran % % 72.3   Lymph % % 16.2*   Mono % % 7.8   Eosinophil % % 2.7   Basophil % % 0.7   Differential Method  Automated       Metabolic Panel (last 72 hours):  Recent Labs   Lab Result Units 10/24/23  1242   Sodium mmol/L 138   Potassium mmol/L 3.9   Chloride mmol/L 102   CO2 mmol/L 24   Glucose mg/dL 100   BUN mg/dL 14   Creatinine mg/dL 1.1   Albumin g/dL 3.7   Total Bilirubin mg/dL 0.5   Alkaline Phosphatase U/L 99   AST U/L 19   ALT U/L 18       Drug levels (last 3 results):  No results for input(s): "VANCOMYCINRA", "VANCORANDOM", "VANCOMYCINPE", "VANCOPEAK", "VANCOMYCINTR", "VANCOTROUGH" in the last 72 hours.    Microbiologic Results:  Microbiology Results (last 7 days)       Procedure Component Value Units Date/Time    Blood " culture #2 **CANNOT BE ORDERED STAT** [[id number]] Collected: 10/24/23 1348    Order Status: Sent Specimen: Blood from Peripheral, Wrist, Right Updated: 10/24/23 1349    Blood culture #1 **CANNOT BE ORDERED STAT** [[id number]] Collected: 10/24/23 1242    Order Status: Sent Specimen: Blood from Peripheral, Antecubital, Right Updated: 10/24/23 1242

## 2023-10-24 NOTE — H&P
[facility] Emergency Dept  History & Physical     Subjective:     Chief Complaint/Reason for Admission: left reconstructed breast cellulitis    History of Present Illness:   This 63 y.o. female presents with erythema located on her left reconstructed breast . Onset of symptoms was 2 weeks ago with gradually worsening course since that time. Outpatient therapy with bactrim & levaquin has been attempted and infection has not improved. Symptoms are currently rated moderate. She is afebrile and non-septic appearing.     Patient c/o chills at home.  Otherwise review of systems negative.  Denies fever, chest pain, shortness of breath, nausea, vomiting, diarrhea, dysuria.    Patient Active Problem List    Diagnosis Date Noted    History of breast cancer 08/29/2023    RANDI (generalized anxiety disorder) 06/01/2023    MDD (recurrent major depressive disorder) in remission 06/01/2023    Bacteremia, escherichia coli 04/26/2023    Infection due to ESBL-producing Escherichia coli 04/26/2023    Severe obesity (BMI >= 40) 03/10/2023    Pneumothorax on left 03/01/2023    Malignant neoplasm of central portion of left breast in female, estrogen receptor positive 12/29/2022    Rheumatoid arthritis 10/31/2022    Atrial flutter 10/31/2022    Psoriatic arthritis 10/31/2022    Wears contact lenses 01/27/2022    History of sleeve gastrectomy 04/26/2021    Class 1 obesity due to excess calories without serious comorbidity with body mass index (BMI) of 33.0 to 33.9 in adult 03/16/2021    Panic attack 02/24/2020    Multiple lung nodules on CT 01/12/2020    Essential hypertension 12/20/2019    Other long term (current) drug therapy 10/31/2019    History of tobacco abuse 10/31/2019    Depression with anxiety 08/06/2019    Insomnia 08/06/2019    Psoriasis vulgaris     COPD (chronic obstructive pulmonary disease)     HLD (hyperlipidemia)     Diverticular disease of colon 06/06/2019        (Not in a hospital admission)    Review of patient's  allergies indicates:   Allergen Reactions    Succinimides Anaphylaxis     Son has MH        Past Medical History:   Diagnosis Date    Allergy     Anxiety     Arthritis     Atrial flutter     Colon polyps     COPD (chronic obstructive pulmonary disease)     COPD exacerbation 10/31/2022    Depression     Diverticular disease of colon 2017    Diverticulitis     HLD (hyperlipidemia)     Malignant neoplasm of central portion of left breast in female, estrogen receptor positive 2022    Pancreatitis     Pneumothorax, unspecified     following mastectomy sx     Psoriasis     Rheumatoid arthritis     Severe obesity (BMI 35.0-39.9) with comorbidity       Past Surgical History:   Procedure Laterality Date    ABLATION  2022    Cardiac Ablation for A Flutter, Successful    ADENOIDECTOMY  1966    Tonsillitis and adnoids    BILATERAL MASTECTOMY Bilateral 2/15/2023    Procedure: MASTECTOMY, BILATERAL;  Surgeon: Marcela Meehan MD;  Location: [facility];  Service: General;  Laterality: Bilateral;  EMAIL SENT  @ 11:44 LK / 2.5 HOURS    BLADDER SUSPENSION      (x2)    BREAST REVISION SURGERY Bilateral 3/29/2023    Procedure: BREAST REVISION SURGERY / BILATERAL BREAST FLAP REVISION;  Surgeon: Ming Milian MD;  Location: [facility];  Service: Plastics;  Laterality: Bilateral;  90 MINUTES     SECTION      (x2)    DEBRIDEMENT, BREAST Bilateral 3/29/2023    Procedure: DEBRIDEMENT, BREAST;  Surgeon: Ming Milian MD;  Location: [facility];  Service: Plastics;  Laterality: Bilateral;    ESOPHAGOGASTRODUODENOSCOPY N/A 2021    Procedure: EGD (ESOPHAGOGASTRODUODENOSCOPY);  Surgeon: Vince Rodriguez MD;  Location: [address];  Service: General;  Laterality: N/A;    INJECTION FOR SENTINEL NODE IDENTIFICATION Left 2/15/2023    Procedure: INJECTION, FOR SENTINEL NODE IDENTIFICATION;  Surgeon: Marcela Meehan MD;  Location: [facility];  Service: General;  Laterality: Left;    LAPAROSCOPIC SLEEVE  GASTRECTOMY N/A 2021    Procedure: GASTRECTOMY, SLEEVE, LAPAROSCOPIC, with intraop EGD;  Surgeon: Vince Rodriguez MD;  Location: [address];  Service: General;  Laterality: N/A;    LIPOSUCTION W/ FAT INJECTION Bilateral 2023    Procedure: LIPOSUCTION, WITH FAT TRANSFER;  Surgeon: Ming Milian MD;  Location: [facility] OR;  Service: Plastics;  Laterality: Bilateral;  / FAT GRAFTING TO BOTH BREAST    LUNG BIOPSY  2020    RECONSTRUCTION OF BREAST WITH DEEP INFERIOR EPIGASTRIC ARTERY  (NEHA) FREE FLAP Bilateral 2/15/2023    Procedure: RECONSTRUCTION, BREAST, USING NEHA FREE FLAP;  Surgeon: Ming Milian MD;  Location: [facility] OR;  Service: Plastics;  Laterality: Bilateral;    REVISION, FLAP, PREVIOUSLY CREATED FOR BREAST RECONSTRUCTION Bilateral 2023    Procedure: REVISION, FLAP, PREVIOUSLY CREATED FOR BREAST RECONSTRUCTION;  Surgeon: Ming Milian MD;  Location: [facility];  Service: Plastics;  Laterality: Bilateral;  4 HOURS    REVISION, SCAR, ABDOMINAL DONOR SITE N/A 2023    Procedure: REVISION, SCAR, ABDOMINAL DONOR SITE;  Surgeon: Ming Milian MD;  Location: [facility];  Service: Plastics;  Laterality: N/A;    RHINOPLASTY      SENTINEL LYMPH NODE BIOPSY Left 2/15/2023    Procedure: BIOPSY, LYMPH NODE, SENTINEL;  Surgeon: Marcela Meehan MD;  Location: [facility];  Service: General;  Laterality: Left;    TONSILLECTOMY      TRANSESOPHAGEAL ECHOCARDIOGRAPHY N/A 2022    Procedure: ECHOCARDIOGRAM, TRANSESOPHAGEAL;  Surgeon: Kellie Grover MD;  Location: [facility] EP LAB;  Service: Cardiology;  Laterality: N/A;      Social History     Tobacco Use    Smoking status: Former     Current packs/day: 0.00     Types: Cigarettes     Start date: 1979     Quit date: 2020     Years since quittin.8    Smokeless tobacco: Current   Substance Use Topics    Alcohol use: Yes     Alcohol/week: 1.0 standard drink of alcohol     Types: 1 Glasses of wine per week      "Comment: social-rarely        Review of Systems:  As noted in HPI    OBJECTIVE:     Patient Vitals for the past 8 hrs:   BP Temp Temp src Pulse Resp SpO2 Height Weight   10/24/23 1354 -- -- -- -- 17 -- -- --   10/24/23 1220 (!) 127/58 98.1 °F (36.7 °C) Oral 76 19 97 % 5' 2" (1.575 m) 76.2 kg (168 lb)     Physical Exam  Constitutional:       General: She is not in acute distress.     Appearance: Normal appearance. She is obese. She is not toxic-appearing.   HENT:      Head: Normocephalic and atraumatic.      Right Ear: External ear normal.      Left Ear: External ear normal.      Mouth/Throat:      Mouth: Mucous membranes are moist.      Pharynx: Oropharynx is clear.   Eyes:      Extraocular Movements: Extraocular movements intact.      Conjunctiva/sclera: Conjunctivae normal.   Cardiovascular:      Rate and Rhythm: Normal rate and regular rhythm.   Pulmonary:      Effort: Pulmonary effort is normal. No respiratory distress.      Breath sounds: No wheezing.   Chest:      Comments: Left breast with edema and erythema, small pustule errupted with seropurulent drainage, moderately TTP, palpable firmness w/out fluctuance, right breast with subcentimeter wound to T-point w/out erythema or drainage  Abdominal:      General: Abdomen is flat. There is no distension.      Palpations: Abdomen is soft.      Tenderness: There is no abdominal tenderness.   Neurological:      General: No focal deficit present.      Mental Status: She is alert and oriented to person, place, and time. Mental status is at baseline.   Psychiatric:         Mood and Affect: Mood normal.         Behavior: Behavior normal.         Thought Content: Thought content normal.         Judgment: Judgment normal.        Latest Reference Range & Units 10/24/23 12:42   WBC 3.90 - 12.70 K/uL 7.33   RBC 4.00 - 5.40 M/uL 4.63   Hemoglobin 12.0 - 16.0 g/dL 12.9   Hematocrit 37.0 - 48.5 % 41.0   MCV 82 - 98 fL 89   MCH 27.0 - 31.0 pg 27.9   MCHC 32.0 - 36.0 g/dL 31.5 " (L)   RDW 11.5 - 14.5 % 13.3   Platelet Count 150 - 450 K/uL 288   MPV 9.2 - 12.9 fL 8.5 (L)   (L): Data is abnormally low     Latest Reference Range & Units 10/24/23 12:42   Sodium 136 - 145 mmol/L 138   Potassium 3.5 - 5.1 mmol/L 3.9   Chloride 95 - 110 mmol/L 102   CO2 23 - 29 mmol/L 24   Anion Gap 8 - 16 mmol/L 12   BUN 8 - 23 mg/dL 14   Creatinine 0.5 - 1.4 mg/dL 1.1   eGFR >60 mL/min/1.73 m^2 57 !   Glucose 70 - 110 mg/dL 100   Calcium 8.7 - 10.5 mg/dL 9.9   ALP 55 - 135 U/L 99   PROTEIN TOTAL 6.0 - 8.4 g/dL 7.5   Albumin 3.5 - 5.2 g/dL 3.7   BILIRUBIN TOTAL 0.1 - 1.0 mg/dL 0.5   AST 10 - 40 U/L 19   ALT 10 - 44 U/L 18   CRP 0.0 - 8.2 mg/L 14.7 (H)   !: Data is abnormal  (H): Data is abnormally high     Latest Reference Range & Units 10/24/23 12:42   Lactate, Jesus 0.5 - 2.2 mmol/L 3.8 (HH)   (HH): Data is critically high    ASSESSMENT/PLAN:     Cellulitis,  left reconstructed breast    Outpatient therapy has been attempted without regression of disease.  Admit for IV antibiotic therapy w/ empiric vanc & cefepime. Follow blood cultures.    Area of erythema was traced in pen. Warm compresses to affected area.   See Orders.    Concern for STEPAN likely pre-renal but cannot r/out abx related. Will fluid resuscitate. Trend Cr.    Continue home meds.    Regular diet, NPO midnight.    D/c Eliquis (last taken this morning).

## 2023-10-24 NOTE — ED NOTES
Notified MARIA LUISA Spivey via phone of pt allergic reaction and stopping of first dose of vancomycin and holding next dose vancomycin .

## 2023-10-24 NOTE — ED NOTES
Redness and hives to RUE and itchiness to face and scalp has resolved since receiving Benadryl and Protonix.

## 2023-10-24 NOTE — ED TRIAGE NOTES
C/O post-op infection and pain to L breast; swelling, redness, and warmth noted. Was put on levaquin and Bactrim by Dr. Felipe 2 weeks ago wo improvement. Denies fevers, but endorses chills.

## 2023-10-25 LAB
ANION GAP SERPL CALC-SCNC: 6 MMOL/L (ref 8–16)
BUN SERPL-MCNC: 11 MG/DL (ref 8–23)
CALCIUM SERPL-MCNC: 8.7 MG/DL (ref 8.7–10.5)
CHLORIDE SERPL-SCNC: 110 MMOL/L (ref 95–110)
CO2 SERPL-SCNC: 23 MMOL/L (ref 23–29)
CREAT SERPL-MCNC: 1 MG/DL (ref 0.5–1.4)
EST. GFR  (NO RACE VARIABLE): >60 ML/MIN/1.73 M^2
GLUCOSE SERPL-MCNC: 114 MG/DL (ref 70–110)
POTASSIUM SERPL-SCNC: 4 MMOL/L (ref 3.5–5.1)
SODIUM SERPL-SCNC: 139 MMOL/L (ref 136–145)

## 2023-10-25 PROCEDURE — 25000003 PHARM REV CODE 250: Performed by: STUDENT IN AN ORGANIZED HEALTH CARE EDUCATION/TRAINING PROGRAM

## 2023-10-25 PROCEDURE — 11000001 HC ACUTE MED/SURG PRIVATE ROOM

## 2023-10-25 PROCEDURE — 80048 BASIC METABOLIC PNL TOTAL CA: CPT | Performed by: STUDENT IN AN ORGANIZED HEALTH CARE EDUCATION/TRAINING PROGRAM

## 2023-10-25 PROCEDURE — 94761 N-INVAS EAR/PLS OXIMETRY MLT: CPT

## 2023-10-25 PROCEDURE — 63600175 PHARM REV CODE 636 W HCPCS: Performed by: STUDENT IN AN ORGANIZED HEALTH CARE EDUCATION/TRAINING PROGRAM

## 2023-10-25 PROCEDURE — 36415 COLL VENOUS BLD VENIPUNCTURE: CPT | Performed by: STUDENT IN AN ORGANIZED HEALTH CARE EDUCATION/TRAINING PROGRAM

## 2023-10-25 RX ORDER — FLUCONAZOLE 150 MG/1
150 TABLET ORAL ONCE
Status: COMPLETED | OUTPATIENT
Start: 2023-10-25 | End: 2023-10-25

## 2023-10-25 RX ORDER — MORPHINE SULFATE 2 MG/ML
2 INJECTION, SOLUTION INTRAMUSCULAR; INTRAVENOUS EVERY 6 HOURS PRN
Status: DISCONTINUED | OUTPATIENT
Start: 2023-10-25 | End: 2023-10-27 | Stop reason: HOSPADM

## 2023-10-25 RX ADMIN — CEFEPIME 2 G: 2 INJECTION, POWDER, FOR SOLUTION INTRAVENOUS at 04:10

## 2023-10-25 RX ADMIN — METOPROLOL TARTRATE 25 MG: 25 TABLET, FILM COATED ORAL at 09:10

## 2023-10-25 RX ADMIN — OXYCODONE HYDROCHLORIDE 10 MG: 10 TABLET ORAL at 12:10

## 2023-10-25 RX ADMIN — DOXYCYCLINE HYCLATE 100 MG: 100 TABLET, COATED ORAL at 09:10

## 2023-10-25 RX ADMIN — FLUCONAZOLE 150 MG: 150 TABLET ORAL at 08:10

## 2023-10-25 RX ADMIN — OXYCODONE HYDROCHLORIDE 10 MG: 10 TABLET ORAL at 04:10

## 2023-10-25 RX ADMIN — MORPHINE SULFATE 2 MG: 2 INJECTION, SOLUTION INTRAMUSCULAR; INTRAVENOUS at 09:10

## 2023-10-25 RX ADMIN — PANTOPRAZOLE SODIUM 40 MG: 40 TABLET, DELAYED RELEASE ORAL at 09:10

## 2023-10-25 RX ADMIN — METOPROLOL TARTRATE 25 MG: 25 TABLET, FILM COATED ORAL at 08:10

## 2023-10-25 RX ADMIN — VENLAFAXINE HYDROCHLORIDE 75 MG: 37.5 CAPSULE, EXTENDED RELEASE ORAL at 09:10

## 2023-10-25 RX ADMIN — ACETAMINOPHEN 650 MG: 325 TABLET, FILM COATED ORAL at 04:10

## 2023-10-25 RX ADMIN — ACETAMINOPHEN 650 MG: 325 TABLET, FILM COATED ORAL at 05:10

## 2023-10-25 RX ADMIN — QUETIAPINE FUMARATE 50 MG: 25 TABLET ORAL at 08:10

## 2023-10-25 RX ADMIN — ACETAMINOPHEN 650 MG: 325 TABLET, FILM COATED ORAL at 01:10

## 2023-10-25 RX ADMIN — MORPHINE SULFATE 2 MG: 2 INJECTION, SOLUTION INTRAMUSCULAR; INTRAVENOUS at 04:10

## 2023-10-25 RX ADMIN — OXYCODONE HYDROCHLORIDE 10 MG: 10 TABLET ORAL at 07:10

## 2023-10-25 NOTE — ED NOTES
Report to aung Morales, Informed him of pt c/o pain and that pt will need more pain meds than what is ordered, through out the night, pt states PO pian meds are not working a this time.

## 2023-10-25 NOTE — CARE UPDATE
10/25/23 0913   PRE-TX-O2   Device (Oxygen Therapy) room air   SpO2 97 %   Pulse Oximetry Type Intermittent   $ Pulse Oximetry - Multiple Charge Pulse Oximetry - Multiple

## 2023-10-25 NOTE — PROGRESS NOTES
Plastic and Reconstructive Surgery Progress Note    HPI:  Lucia Matias is a 63-year-old female presenting now with cellulitis of her left reconstructed breast 2 months after undergoing second-stage revision of bilateral reconstructed breasts.    S:  No acute events overnight  Afebrile, vital signs within normal limits  Complaining of left breast pain only partially relieved by oxycodone  Tolerating diet, NPO since midnight  No constitutional symptoms    O:  Vitals:    10/24/23 2321 10/24/23 2351 10/25/23 0408 10/25/23 0423   BP: (!) 115/53   (!) 90/44   BP Location: Left arm   Right arm   Patient Position: Lying   Lying   Pulse: 60   66   Resp: 19 20 20 20   Temp: 98.8 °F (37.1 °C)   97.7 °F (36.5 °C)   TempSrc: Oral   Oral   SpO2: 98%   97%   Weight:       Height:         Exam:  Well-appearing, well-developed  Alert and oriented x3  Breathing comfortably on room air, normal effort and expansion  Receding erythema of the left reconstructed breast, no ongoing drainage, hyperemic, moderately tender to palpation, no overt fluctuance  Right reconstructed breast with stable, subcentimeter wound of T-point without gross evidence of infection    Labs:   Latest Reference Range & Units 10/24/23 12:42 10/25/23 04:42   Creatinine 0.5 - 1.4 mg/dL 1.1 1.0       A/P:  Patient is a 63 y.o.female s/p bilateral mastectomy, NEHA flap reconstruction, second-stage revisions of bilateral reconstructed breasts, presenting now with cellulitis of the left reconstructed breast.  Clinical improvement after 12 hours IV antibiotics.    Creatinine trending down towards baseline.     Left breast U/S r/out abscess. No indication for surgery at this time.  Resume regular diet. Stop mIVF.    MM pain control.  Ambulate BID.  SCD.  Continue doxy cefepime.    Raúl Spivey   Plastic and Reconstructive Surgery [facility]

## 2023-10-25 NOTE — PLAN OF CARE
Patient AAOx3, independent at baseline. PCP correct on facesheet. Patient denies owning any DME. Family to provide transportation home.   10/25/23 0929   Discharge Assessment   Assessment Type Discharge Planning Assessment   Confirmed/corrected address, phone number and insurance Yes   Confirmed Demographics Correct on Facesheet   Source of Information patient   Communicated LESTER with patient/caregiver Date not available/Unable to determine   People in Home spouse   Do you expect to return to your current living situation? Yes   Do you have help at home or someone to help you manage your care at home? Yes   Who are your caregiver(s) and their phone number(s)? Luan Matias (Spouse)   [phone] (Mobile)   Prior to hospitilization cognitive status: Alert/Oriented   Current cognitive status: Alert/Oriented   Equipment Currently Used at Home none   Readmission within 30 days? No   Do you currently have service(s) that help you manage your care at home? No   Do you take prescription medications? Yes   Do you have prescription coverage? Yes   Do you have any problems affording any of your prescribed medications? No   Is the patient taking medications as prescribed? yes   Who is going to help you get home at discharge? KnappLuan (Spouse)   [phone] (Mobile)   How do you get to doctors appointments? car, drives self   Are you on dialysis? No   Do you take coumadin? No   DME Needed Upon Discharge  none   Discharge Plan discussed with: Patient   Transition of Care Barriers None   Discharge Plan A Home with family   Discharge Plan B Home     [facility] ([address])  Initial Discharge Assessment       Primary Care Provider: Hetal Banks MD    Admission Diagnosis: Cellulitis of left breast [N61.0]  Postoperative infection, unspecified type, initial encounter [T81.40XA]    Admission Date: 10/24/2023  Expected Discharge Date:     Transition of Care Barriers: (P) None    Payor: BLUE CROSS [facility] [employer] EMPLOYEE BENEFIT  / Plan: [employer] [place]UE [place]OSS [place] / Product Type: Self Funded /     Extended Emergency Contact Information  Primary Emergency Contact: Luan Matias  Address: [address] [place] [address] [facility]  Home Phone: [phone]  Mobile Phone: [phone]  Relation: Spouse  Secondary Emergency Contact: Kasandra Wallis  Address: [address] [place] [address] [place] [address] [facility]  Home Phone: [phone]  Mobile Phone: [phone]  Relation: Daughter    Discharge Plan A: (P) Home with family  Discharge Plan B: (P) Home      [facility]  [address] [facility] [address]  [facility] [address]  Phone: [phone] Fax: [phone]    [facility]  [address] [facility] [address]  [facility] [address]  Phone: [phone] Fax: [phone]    [id number] - [place] - [address] [facility]  [address] [facility] [facility] [address]  [facility] [address]  Phone: [phone] Fax: [phone]    [id number] - [facility] [address] [facility]  [address] [facility] [address]  Phone: [phone] Fax: [phone]    [id number] - [address] AT [address] OF [address] [address]  Phone: [phone] Fax: [phone]      Initial Assessment (most recent)       Adult Discharge Assessment - 10/25/23 0929          Discharge Assessment    Assessment Type Discharge Planning Assessment (P)      Confirmed/corrected address, phone number and insurance Yes (P)      Confirmed Demographics Correct on Facesheet (P)      Source of Information patient (P)      Communicated LESTER with patient/caregiver Date not available/Unable to determine (P)      People in Home spouse (P)      Do you expect to return to your current living situation? Yes (P)      Do you have help at home or someone to help you manage your care at home? Yes (P)      Who are your caregiver(s) and their phone  number(s)? Luan Matias (Spouse)   [phone] (Mobile) (P)      Prior to hospitilization cognitive status: Alert/Oriented (P)      Current cognitive status: Alert/Oriented (P)      Equipment Currently Used at Home none (P)      Readmission within 30 days? No (P)      Do you currently have service(s) that help you manage your care at home? No (P)      Do you take prescription medications? Yes (P)      Do you have prescription coverage? Yes (P)      Do you have any problems affording any of your prescribed medications? No (P)      Is the patient taking medications as prescribed? yes (P)      Who is going to help you get home at discharge? Luan Matias (Spouse)   [phone] (Mobile) (P)      How do you get to doctors appointments? car, drives self (P)      Are you on dialysis? No (P)      Do you take coumadin? No (P)      DME Needed Upon Discharge  none (P)      Discharge Plan discussed with: Patient (P)      Transition of Care Barriers None (P)      Discharge Plan A Home with family (P)      Discharge Plan B Home (P)

## 2023-10-26 ENCOUNTER — ANESTHESIA (OUTPATIENT)
Dept: SURGERY | Facility: OTHER | Age: 63
DRG: 600 | End: 2023-10-26
Payer: COMMERCIAL

## 2023-10-26 ENCOUNTER — ANESTHESIA EVENT (OUTPATIENT)
Dept: SURGERY | Facility: OTHER | Age: 63
DRG: 600 | End: 2023-10-26
Payer: COMMERCIAL

## 2023-10-26 LAB
ANION GAP SERPL CALC-SCNC: 8 MMOL/L (ref 8–16)
BUN SERPL-MCNC: 11 MG/DL (ref 8–23)
CALCIUM SERPL-MCNC: 8.7 MG/DL (ref 8.7–10.5)
CHLORIDE SERPL-SCNC: 105 MMOL/L (ref 95–110)
CO2 SERPL-SCNC: 23 MMOL/L (ref 23–29)
CREAT SERPL-MCNC: 0.9 MG/DL (ref 0.5–1.4)
EST. GFR  (NO RACE VARIABLE): >60 ML/MIN/1.73 M^2
GLUCOSE SERPL-MCNC: 116 MG/DL (ref 70–110)
GRAM STN SPEC: NORMAL
GRAM STN SPEC: NORMAL
POCT GLUCOSE: 148 MG/DL (ref 70–110)
POTASSIUM SERPL-SCNC: 3.6 MMOL/L (ref 3.5–5.1)
SODIUM SERPL-SCNC: 136 MMOL/L (ref 136–145)

## 2023-10-26 PROCEDURE — D9220A PRA ANESTHESIA: Mod: ANES,,, | Performed by: ANESTHESIOLOGY

## 2023-10-26 PROCEDURE — 36415 COLL VENOUS BLD VENIPUNCTURE: CPT | Performed by: STUDENT IN AN ORGANIZED HEALTH CARE EDUCATION/TRAINING PROGRAM

## 2023-10-26 PROCEDURE — 88304 TISSUE EXAM BY PATHOLOGIST: CPT | Mod: 26,,, | Performed by: STUDENT IN AN ORGANIZED HEALTH CARE EDUCATION/TRAINING PROGRAM

## 2023-10-26 PROCEDURE — 76937 US GUIDE VASCULAR ACCESS: CPT

## 2023-10-26 PROCEDURE — 88342 IMHCHEM/IMCYTCHM 1ST ANTB: CPT | Mod: 26,,, | Performed by: STUDENT IN AN ORGANIZED HEALTH CARE EDUCATION/TRAINING PROGRAM

## 2023-10-26 PROCEDURE — 87186 SC STD MICRODIL/AGAR DIL: CPT | Performed by: PLASTIC SURGERY

## 2023-10-26 PROCEDURE — D9220A PRA ANESTHESIA: ICD-10-PCS | Mod: ANES,,, | Performed by: ANESTHESIOLOGY

## 2023-10-26 PROCEDURE — 36000704 HC OR TIME LEV I 1ST 15 MIN: Performed by: PLASTIC SURGERY

## 2023-10-26 PROCEDURE — 80048 BASIC METABOLIC PNL TOTAL CA: CPT | Performed by: STUDENT IN AN ORGANIZED HEALTH CARE EDUCATION/TRAINING PROGRAM

## 2023-10-26 PROCEDURE — 87205 SMEAR GRAM STAIN: CPT | Performed by: PLASTIC SURGERY

## 2023-10-26 PROCEDURE — 87075 CULTR BACTERIA EXCEPT BLOOD: CPT | Performed by: PLASTIC SURGERY

## 2023-10-26 PROCEDURE — 94761 N-INVAS EAR/PLS OXIMETRY MLT: CPT

## 2023-10-26 PROCEDURE — 63600175 PHARM REV CODE 636 W HCPCS: Performed by: STUDENT IN AN ORGANIZED HEALTH CARE EDUCATION/TRAINING PROGRAM

## 2023-10-26 PROCEDURE — 71000039 HC RECOVERY, EACH ADD'L HOUR: Performed by: PLASTIC SURGERY

## 2023-10-26 PROCEDURE — 88304 PR  SURG PATH,LEVEL III: ICD-10-PCS | Mod: 26,,, | Performed by: STUDENT IN AN ORGANIZED HEALTH CARE EDUCATION/TRAINING PROGRAM

## 2023-10-26 PROCEDURE — 87070 CULTURE OTHR SPECIMN AEROBIC: CPT | Performed by: PLASTIC SURGERY

## 2023-10-26 PROCEDURE — 87077 CULTURE AEROBIC IDENTIFY: CPT | Performed by: PLASTIC SURGERY

## 2023-10-26 PROCEDURE — 87116 MYCOBACTERIA CULTURE: CPT | Performed by: PLASTIC SURGERY

## 2023-10-26 PROCEDURE — 88342 IMHCHEM/IMCYTCHM 1ST ANTB: CPT | Performed by: STUDENT IN AN ORGANIZED HEALTH CARE EDUCATION/TRAINING PROGRAM

## 2023-10-26 PROCEDURE — 63600175 PHARM REV CODE 636 W HCPCS: Performed by: NURSE ANESTHETIST, CERTIFIED REGISTERED

## 2023-10-26 PROCEDURE — 25000003 PHARM REV CODE 250: Performed by: NURSE ANESTHETIST, CERTIFIED REGISTERED

## 2023-10-26 PROCEDURE — 25000003 PHARM REV CODE 250: Performed by: PLASTIC SURGERY

## 2023-10-26 PROCEDURE — 37000008 HC ANESTHESIA 1ST 15 MINUTES: Performed by: PLASTIC SURGERY

## 2023-10-26 PROCEDURE — 87206 SMEAR FLUORESCENT/ACID STAI: CPT | Performed by: PLASTIC SURGERY

## 2023-10-26 PROCEDURE — 37000009 HC ANESTHESIA EA ADD 15 MINS: Performed by: PLASTIC SURGERY

## 2023-10-26 PROCEDURE — D9220A PRA ANESTHESIA: Mod: CRNA,,, | Performed by: NURSE ANESTHETIST, CERTIFIED REGISTERED

## 2023-10-26 PROCEDURE — 63600175 PHARM REV CODE 636 W HCPCS: Performed by: PLASTIC SURGERY

## 2023-10-26 PROCEDURE — 88304 TISSUE EXAM BY PATHOLOGIST: CPT | Performed by: STUDENT IN AN ORGANIZED HEALTH CARE EDUCATION/TRAINING PROGRAM

## 2023-10-26 PROCEDURE — 88342 CHG IMMUNOCYTOCHEMISTRY: ICD-10-PCS | Mod: 26,,, | Performed by: STUDENT IN AN ORGANIZED HEALTH CARE EDUCATION/TRAINING PROGRAM

## 2023-10-26 PROCEDURE — 36000705 HC OR TIME LEV I EA ADD 15 MIN: Performed by: PLASTIC SURGERY

## 2023-10-26 PROCEDURE — 25000003 PHARM REV CODE 250: Performed by: STUDENT IN AN ORGANIZED HEALTH CARE EDUCATION/TRAINING PROGRAM

## 2023-10-26 PROCEDURE — 27000221 HC OXYGEN, UP TO 24 HOURS

## 2023-10-26 PROCEDURE — 87102 FUNGUS ISOLATION CULTURE: CPT | Performed by: PLASTIC SURGERY

## 2023-10-26 PROCEDURE — 71000033 HC RECOVERY, INTIAL HOUR: Performed by: PLASTIC SURGERY

## 2023-10-26 PROCEDURE — 11000001 HC ACUTE MED/SURG PRIVATE ROOM

## 2023-10-26 PROCEDURE — 99900035 HC TECH TIME PER 15 MIN (STAT)

## 2023-10-26 PROCEDURE — D9220A PRA ANESTHESIA: ICD-10-PCS | Mod: CRNA,,, | Performed by: NURSE ANESTHETIST, CERTIFIED REGISTERED

## 2023-10-26 RX ORDER — HYDROMORPHONE HYDROCHLORIDE 2 MG/ML
0.4 INJECTION, SOLUTION INTRAMUSCULAR; INTRAVENOUS; SUBCUTANEOUS EVERY 5 MIN PRN
Status: DISCONTINUED | OUTPATIENT
Start: 2023-10-26 | End: 2023-10-26

## 2023-10-26 RX ORDER — PHENYLEPHRINE HYDROCHLORIDE 10 MG/ML
INJECTION INTRAVENOUS
Status: DISCONTINUED | OUTPATIENT
Start: 2023-10-26 | End: 2023-10-26

## 2023-10-26 RX ORDER — PROPOFOL 10 MG/ML
VIAL (ML) INTRAVENOUS CONTINUOUS PRN
Status: DISCONTINUED | OUTPATIENT
Start: 2023-10-26 | End: 2023-10-26

## 2023-10-26 RX ORDER — PROCHLORPERAZINE EDISYLATE 5 MG/ML
5 INJECTION INTRAMUSCULAR; INTRAVENOUS EVERY 30 MIN PRN
Status: DISCONTINUED | OUTPATIENT
Start: 2023-10-26 | End: 2023-10-27 | Stop reason: HOSPADM

## 2023-10-26 RX ORDER — ONDANSETRON 2 MG/ML
INJECTION INTRAMUSCULAR; INTRAVENOUS
Status: DISCONTINUED | OUTPATIENT
Start: 2023-10-26 | End: 2023-10-26

## 2023-10-26 RX ORDER — FENTANYL CITRATE 50 UG/ML
INJECTION, SOLUTION INTRAMUSCULAR; INTRAVENOUS
Status: DISCONTINUED | OUTPATIENT
Start: 2023-10-26 | End: 2023-10-26

## 2023-10-26 RX ORDER — ROCURONIUM BROMIDE 10 MG/ML
INJECTION, SOLUTION INTRAVENOUS
Status: DISCONTINUED | OUTPATIENT
Start: 2023-10-26 | End: 2023-10-26

## 2023-10-26 RX ORDER — LIDOCAINE HYDROCHLORIDE 20 MG/ML
INJECTION INTRAVENOUS
Status: DISCONTINUED | OUTPATIENT
Start: 2023-10-26 | End: 2023-10-26

## 2023-10-26 RX ORDER — OXYCODONE HYDROCHLORIDE 5 MG/1
5 TABLET ORAL
Status: DISCONTINUED | OUTPATIENT
Start: 2023-10-26 | End: 2023-10-26

## 2023-10-26 RX ORDER — MEPERIDINE HYDROCHLORIDE 25 MG/ML
12.5 INJECTION INTRAMUSCULAR; INTRAVENOUS; SUBCUTANEOUS ONCE AS NEEDED
Status: DISCONTINUED | OUTPATIENT
Start: 2023-10-26 | End: 2023-10-26

## 2023-10-26 RX ORDER — SODIUM CHLORIDE 0.9 % (FLUSH) 0.9 %
3 SYRINGE (ML) INJECTION
Status: DISCONTINUED | OUTPATIENT
Start: 2023-10-26 | End: 2023-10-26

## 2023-10-26 RX ORDER — LIDOCAINE HYDROCHLORIDE AND EPINEPHRINE 10; 10 MG/ML; UG/ML
INJECTION, SOLUTION INFILTRATION; PERINEURAL
Status: DISCONTINUED | OUTPATIENT
Start: 2023-10-26 | End: 2023-10-26 | Stop reason: HOSPADM

## 2023-10-26 RX ORDER — PROPOFOL 10 MG/ML
VIAL (ML) INTRAVENOUS
Status: DISCONTINUED | OUTPATIENT
Start: 2023-10-26 | End: 2023-10-26

## 2023-10-26 RX ORDER — DEXAMETHASONE SODIUM PHOSPHATE 4 MG/ML
INJECTION, SOLUTION INTRA-ARTICULAR; INTRALESIONAL; INTRAMUSCULAR; INTRAVENOUS; SOFT TISSUE
Status: DISCONTINUED | OUTPATIENT
Start: 2023-10-26 | End: 2023-10-26

## 2023-10-26 RX ADMIN — QUETIAPINE FUMARATE 50 MG: 25 TABLET ORAL at 10:10

## 2023-10-26 RX ADMIN — PHENYLEPHRINE HYDROCHLORIDE 200 MCG: 10 INJECTION INTRAVENOUS at 07:10

## 2023-10-26 RX ADMIN — SUGAMMADEX 200 MG: 100 INJECTION, SOLUTION INTRAVENOUS at 07:10

## 2023-10-26 RX ADMIN — OXYCODONE HYDROCHLORIDE 10 MG: 10 TABLET ORAL at 04:10

## 2023-10-26 RX ADMIN — FENTANYL CITRATE 100 MCG: 50 INJECTION, SOLUTION INTRAMUSCULAR; INTRAVENOUS at 07:10

## 2023-10-26 RX ADMIN — DOXYCYCLINE HYCLATE 100 MG: 100 TABLET, COATED ORAL at 09:10

## 2023-10-26 RX ADMIN — VENLAFAXINE HYDROCHLORIDE 75 MG: 37.5 CAPSULE, EXTENDED RELEASE ORAL at 09:10

## 2023-10-26 RX ADMIN — CEFEPIME 2 G: 2 INJECTION, POWDER, FOR SOLUTION INTRAVENOUS at 04:10

## 2023-10-26 RX ADMIN — ACETAMINOPHEN 650 MG: 325 TABLET, FILM COATED ORAL at 05:10

## 2023-10-26 RX ADMIN — DEXAMETHASONE SODIUM PHOSPHATE 4 MG: 4 INJECTION, SOLUTION INTRAMUSCULAR; INTRAVENOUS at 07:10

## 2023-10-26 RX ADMIN — SODIUM CHLORIDE, SODIUM LACTATE, POTASSIUM CHLORIDE, AND CALCIUM CHLORIDE: .6; .31; .03; .02 INJECTION, SOLUTION INTRAVENOUS at 06:10

## 2023-10-26 RX ADMIN — PANTOPRAZOLE SODIUM 40 MG: 40 TABLET, DELAYED RELEASE ORAL at 09:10

## 2023-10-26 RX ADMIN — ACETAMINOPHEN 650 MG: 325 TABLET, FILM COATED ORAL at 12:10

## 2023-10-26 RX ADMIN — GLYCOPYRROLATE 0.2 MG: 0.2 INJECTION, SOLUTION INTRAMUSCULAR; INTRAVITREAL at 07:10

## 2023-10-26 RX ADMIN — ACETAMINOPHEN 650 MG: 325 TABLET, FILM COATED ORAL at 04:10

## 2023-10-26 RX ADMIN — LIDOCAINE HYDROCHLORIDE 50 MG: 20 INJECTION, SOLUTION INTRAVENOUS at 07:10

## 2023-10-26 RX ADMIN — ROCURONIUM BROMIDE 40 MG: 10 INJECTION, SOLUTION INTRAVENOUS at 07:10

## 2023-10-26 RX ADMIN — ONDANSETRON HYDROCHLORIDE 4 MG: 2 INJECTION INTRAMUSCULAR; INTRAVENOUS at 07:10

## 2023-10-26 RX ADMIN — PROPOFOL 175 MCG/KG/MIN: 10 INJECTION, EMULSION INTRAVENOUS at 07:10

## 2023-10-26 RX ADMIN — OXYCODONE HYDROCHLORIDE 10 MG: 10 TABLET ORAL at 10:10

## 2023-10-26 RX ADMIN — PROPOFOL 150 MG: 10 INJECTION, EMULSION INTRAVENOUS at 07:10

## 2023-10-26 RX ADMIN — OXYCODONE HYDROCHLORIDE 10 MG: 10 TABLET ORAL at 05:10

## 2023-10-26 RX ADMIN — CARBOXYMETHYLCELLULOSE SODIUM 2 DROP: 2.5 SOLUTION/ DROPS OPHTHALMIC at 07:10

## 2023-10-26 RX ADMIN — CEFEPIME 2 G: 2 INJECTION, POWDER, FOR SOLUTION INTRAVENOUS at 03:10

## 2023-10-26 RX ADMIN — OXYCODONE HYDROCHLORIDE 10 MG: 10 TABLET ORAL at 12:10

## 2023-10-26 NOTE — ANESTHESIA PROCEDURE NOTES
Intubation    Date/Time: 10/26/2023 7:11 AM    Performed by: Yomaira Ochoa CRNA  Authorized by: Yahaira Barnard MD    Intubation:     Induction:  Intravenous    Intubated:  Postinduction    Mask Ventilation:  Easy with oral airway    Attempts:  1    Attempted By:  CRNA    Method of Intubation:  Video laryngoscopy    Blade:  Dean 3    Laryngeal View Grade: Grade I - full view of cords      Difficult Airway Encountered?: No      Complications:  None    Airway Device:  Oral endotracheal tube    Airway Device Size:  7.5    Style/Cuff Inflation:  Cuffed (inflated to minimal occlusive pressure)    Inflation Amount (mL):  5    Complicating Factors:  Obesity    Findings Post-Intubation:  BS equal bilateral and atraumatic/condition of teeth unchanged

## 2023-10-26 NOTE — ANESTHESIA PREPROCEDURE EVALUATION
10/26/2023  Lucia Matias is a 63 y.o., female.      Pre-op Assessment    I have reviewed the Patient Summary Reports.     I have reviewed the Nursing Notes. I have reviewed the NPO Status.   I have reviewed the Medications.     Review of Systems  Anesthesia Hx:  History of prior surgery of interest to airway management or planning: Previous anesthesia: General 2/15/23 alberto mast/flap with general anesthesia.  Airway issues documented on chart review include mask, easy, GETA, videolaryngoscope used  Denies Family Hx of Anesthesia complications.   Denies Personal Hx of Anesthesia complications.   Social:  Former Smoker Quit 12/22, 1/2 ppd previou  s   Hematology/Oncology:  Hematology Normal      Current/Recent Cancer. Breast left no lymphedema   EENT/Dental:EENT/Dental Normal   Cardiovascular:   Hypertension Dysrhythmias (hx atrial flutter, s/p successful ablation 11/22) hyperlipidemia ECG has been reviewed.    Pulmonary:   COPD (maintained on trilogy, does not use rescue inhaler), moderate Denies Asthma. Recent URI Nodules, bx neg  Serial CTs        Interim hx spontaneous pneumothorax   Renal/:  Renal/ Normal     Hepatic/GI:  Hepatic/GI Normal Gastric sleeve   Musculoskeletal:   Arthritis (RA, psoriatic)     Neurological:  Neurology Normal    Endocrine:  Endocrine Normal  Obesity / BMI > 30  Dermatological:  Skin Normal Psoriasis   Psych:  Psychiatric Normal           Physical Exam  General: Cooperative, Alert and Oriented    Airway:  Mallampati: I   Mouth Opening: Normal  TM Distance: Normal  Neck ROM: Normal ROM    Dental:  Intact        Anesthesia Plan  Type of Anesthesia, risks & benefits discussed:    Anesthesia Type: Gen ETT  Intra-op Monitoring Plan: Standard ASA Monitors  Post Op Pain Control Plan: multimodal analgesia and IV/PO Opioids PRN  Induction:  IV  Airway Plan: Video  Informed  Consent: Informed consent signed with the Patient and all parties understand the risks and agree with anesthesia plan.  All questions answered.   ASA Score: 3  Anesthesia Plan Notes: R side devices    NOTE: FAMILY HX OF mH, SON.              Ready For Surgery From Anesthesia Perspective.     .

## 2023-10-26 NOTE — H&P
Interval H&P    Lucia Matias is a 63-year-old female presenting now with cellulitis of her left reconstructed breast 2 months after undergoing second-stage revision of bilateral reconstructed breasts.    Vitals:    10/26/23 0452   BP: (!) 105/53   Pulse: 78   Resp: 18   Temp: 98.9 °F (37.2 °C)     Exam:  Well-appearing, well-developed  Alert and oriented x3  Breathing comfortably on room air, normal effort and expansion  Receding erythema of the left reconstructed breast, no ongoing drainage, hyperemic, moderately tender to palpation, no overt fluctuance  Right reconstructed breast with stable, subcentimeter wound of T-point without gross evidence of infection    To OR now for I&D, revision of left reconstructed breast.

## 2023-10-26 NOTE — BRIEF OP NOTE
[facility] ([address])  Brief Operative Note    SUMMARY     Surgery Date: 10/26/2023     Surgeon(s) and Role:     * Ming Milian MD - Primary     * Raúl Spivey MD - Resident    Assisting Surgeon: None    Pre-op Diagnosis:  Left breast abscess [N61.1]    Post-op Diagnosis:  Post-Op Diagnosis Codes:     * Left breast abscess [N61.1]    Procedure(s) (LRB):  REVISION OF RECONSTRUCTED LEFT BREAST (Left)    Anesthesia: General    Implants:  * No implants in log *    Operative Findings: Left breast abscess cavity.    Estimated Blood Loss: Minimal         Specimens:   Specimen (24h ago, onward)      None            [id number]

## 2023-10-26 NOTE — ANESTHESIA POSTPROCEDURE EVALUATION
Anesthesia Post Evaluation    Patient: Lucia Matias    Procedure(s) Performed: Procedure(s) (LRB):  INCISION AND DRAINAGE, BREAST (Left)    Final Anesthesia Type: general      Patient location during evaluation: PACU  Patient participation: Yes- Able to Participate  Level of consciousness: awake and alert  Post-procedure vital signs: reviewed and stable  Pain management: adequate  Airway patency: patent    PONV status at discharge: No PONV  Anesthetic complications: no      Cardiovascular status: blood pressure returned to baseline and stable  Respiratory status: unassisted, spontaneous ventilation and room air  Hydration status: euvolemic  Follow-up not needed.          Vitals Value Taken Time   BP 85/45 10/26/23 0923   Temp 36.9 °C (98.5 °F) 10/26/23 0923   Pulse 80 10/26/23 0923   Resp 15 10/26/23 0923   SpO2 96 % 10/26/23 0923         Event Time   Out of Recovery 10/26/2023 09:10:54         Pain/Fred Score: Pain Rating Prior to Med Admin: 8 (10/26/2023  4:31 AM)  Pain Rating Post Med Admin: 4 (10/26/2023  5:17 AM)  Fred Score: 8 (10/26/2023  8:23 AM)

## 2023-10-26 NOTE — TRANSFER OF CARE
"Anesthesia Transfer of Care Note    Patient: Lucia Matias    Procedure(s) Performed: Procedure(s) (LRB):  REVISION OF RECONSTRUCTED LEFT BREAST (Left)    Patient location: PACU    Anesthesia Type: general    Transport from OR: Transported from OR on 2-3 L/min O2 by NC with adequate spontaneous ventilation    Post pain: adequate analgesia    Post assessment: no apparent anesthetic complications    Post vital signs: stable    Level of consciousness: awake    Nausea/Vomiting: no nausea/vomiting    Complications: none    Transfer of care protocol was followed      Last vitals:   Visit Vitals  BP (!) 105/53 (BP Location: Left leg, Patient Position: Lying)   Pulse 78   Temp 37.2 °C (98.9 °F) (Oral)   Resp 18   Ht 5' 2" (1.575 m)   Wt 76.2 kg (168 lb)   SpO2 (!) 94%   BMI 30.73 kg/m²     "

## 2023-10-26 NOTE — PLAN OF CARE
I certify I provided patient choice and a list to the patient/family of CMS rated Home Health, SNF, IRF, LTACH, jail Nursing Homes  Patient/Family signed Patient's Choice Disclosure Form choosing the following    1.[facility]        10/26/23 1435   Post-Acute Status   Post-Acute Authorization Home Health   Home Health Status Set-up Complete/Auth obtained   Patient choice form signed by patient/caregiver List with quality metrics by geographic area provided;List from CMS Compare;List from System Post-Acute Care   Discharge Delays (!) Post-Acute Set-up   Discharge Plan   Discharge Plan A Home Health

## 2023-10-26 NOTE — PLAN OF CARE
Problem: Infection  Goal: Absence of Infection Signs and Symptoms  Outcome: Ongoing, Progressing     Problem: Pain Acute  Goal: Acceptable Pain Control and Functional Ability  Outcome: Ongoing, Progressing

## 2023-10-26 NOTE — OR NURSING
Pt continues without c/o pain at this time. Pt blood pressure reported to Dr Barnard anesthesia. No new orders rec'd. Pt without change from previous assessment. Prepared for transfer to inpt room 363. Report called to Natacha GREEN. Pt placed on transport.

## 2023-10-27 VITALS
RESPIRATION RATE: 14 BRPM | SYSTOLIC BLOOD PRESSURE: 121 MMHG | WEIGHT: 168 LBS | TEMPERATURE: 98 F | OXYGEN SATURATION: 100 % | BODY MASS INDEX: 30.91 KG/M2 | HEIGHT: 62 IN | HEART RATE: 60 BPM | DIASTOLIC BLOOD PRESSURE: 58 MMHG

## 2023-10-27 PROBLEM — T81.40XA POSTOPERATIVE INFECTION: Status: ACTIVE | Noted: 2023-10-27

## 2023-10-27 LAB
ANION GAP SERPL CALC-SCNC: 7 MMOL/L (ref 8–16)
BUN SERPL-MCNC: 16 MG/DL (ref 8–23)
CALCIUM SERPL-MCNC: 8.9 MG/DL (ref 8.7–10.5)
CHLORIDE SERPL-SCNC: 107 MMOL/L (ref 95–110)
CO2 SERPL-SCNC: 24 MMOL/L (ref 23–29)
CREAT SERPL-MCNC: 0.9 MG/DL (ref 0.5–1.4)
EST. GFR  (NO RACE VARIABLE): >60 ML/MIN/1.73 M^2
GLUCOSE SERPL-MCNC: 152 MG/DL (ref 70–110)
POTASSIUM SERPL-SCNC: 4.4 MMOL/L (ref 3.5–5.1)
SODIUM SERPL-SCNC: 138 MMOL/L (ref 136–145)

## 2023-10-27 PROCEDURE — 80048 BASIC METABOLIC PNL TOTAL CA: CPT | Performed by: STUDENT IN AN ORGANIZED HEALTH CARE EDUCATION/TRAINING PROGRAM

## 2023-10-27 PROCEDURE — 36415 COLL VENOUS BLD VENIPUNCTURE: CPT | Performed by: STUDENT IN AN ORGANIZED HEALTH CARE EDUCATION/TRAINING PROGRAM

## 2023-10-27 PROCEDURE — 94761 N-INVAS EAR/PLS OXIMETRY MLT: CPT

## 2023-10-27 PROCEDURE — 25000003 PHARM REV CODE 250: Performed by: STUDENT IN AN ORGANIZED HEALTH CARE EDUCATION/TRAINING PROGRAM

## 2023-10-27 PROCEDURE — 63600175 PHARM REV CODE 636 W HCPCS: Performed by: STUDENT IN AN ORGANIZED HEALTH CARE EDUCATION/TRAINING PROGRAM

## 2023-10-27 RX ORDER — LEVOFLOXACIN 500 MG/1
500 TABLET, FILM COATED ORAL DAILY
Qty: 7 TABLET | Refills: 0 | Status: SHIPPED | OUTPATIENT
Start: 2023-10-27 | End: 2023-11-03

## 2023-10-27 RX ORDER — FLUCONAZOLE 150 MG/1
150 TABLET ORAL DAILY
Qty: 7 TABLET | Refills: 0 | Status: SHIPPED | OUTPATIENT
Start: 2023-10-27 | End: 2023-11-03

## 2023-10-27 RX ORDER — LEVOFLOXACIN 500 MG/1
500 TABLET, FILM COATED ORAL DAILY
Qty: 7 TABLET | Refills: 0 | Status: SHIPPED | OUTPATIENT
Start: 2023-10-27 | End: 2023-10-27 | Stop reason: SDUPTHER

## 2023-10-27 RX ORDER — SULFAMETHOXAZOLE AND TRIMETHOPRIM 800; 160 MG/1; MG/1
1 TABLET ORAL 2 TIMES DAILY
Qty: 14 TABLET | Refills: 0 | Status: SHIPPED | OUTPATIENT
Start: 2023-10-27 | End: 2023-11-03

## 2023-10-27 RX ORDER — SULFAMETHOXAZOLE AND TRIMETHOPRIM 800; 160 MG/1; MG/1
1 TABLET ORAL 2 TIMES DAILY
Qty: 14 TABLET | Refills: 0 | Status: SHIPPED | OUTPATIENT
Start: 2023-10-27 | End: 2023-10-27 | Stop reason: SDUPTHER

## 2023-10-27 RX ORDER — OXYCODONE AND ACETAMINOPHEN 5; 325 MG/1; MG/1
1 TABLET ORAL EVERY 4 HOURS PRN
Qty: 20 TABLET | Refills: 0 | Status: SHIPPED | OUTPATIENT
Start: 2023-10-27 | End: 2023-11-07 | Stop reason: SDUPTHER

## 2023-10-27 RX ADMIN — OXYCODONE HYDROCHLORIDE 10 MG: 10 TABLET ORAL at 11:10

## 2023-10-27 RX ADMIN — ACETAMINOPHEN 650 MG: 325 TABLET, FILM COATED ORAL at 05:10

## 2023-10-27 RX ADMIN — METOPROLOL TARTRATE 25 MG: 25 TABLET, FILM COATED ORAL at 08:10

## 2023-10-27 RX ADMIN — VENLAFAXINE HYDROCHLORIDE 75 MG: 37.5 CAPSULE, EXTENDED RELEASE ORAL at 08:10

## 2023-10-27 RX ADMIN — MORPHINE SULFATE 2 MG: 2 INJECTION, SOLUTION INTRAMUSCULAR; INTRAVENOUS at 12:10

## 2023-10-27 RX ADMIN — PANTOPRAZOLE SODIUM 40 MG: 40 TABLET, DELAYED RELEASE ORAL at 08:10

## 2023-10-27 RX ADMIN — MORPHINE SULFATE 2 MG: 2 INJECTION, SOLUTION INTRAMUSCULAR; INTRAVENOUS at 06:10

## 2023-10-27 RX ADMIN — ACETAMINOPHEN 650 MG: 325 TABLET, FILM COATED ORAL at 11:10

## 2023-10-27 RX ADMIN — CEFEPIME 2 G: 2 INJECTION, POWDER, FOR SOLUTION INTRAVENOUS at 03:10

## 2023-10-27 RX ADMIN — DOXYCYCLINE HYCLATE 100 MG: 100 TABLET, COATED ORAL at 08:10

## 2023-10-27 RX ADMIN — ACETAMINOPHEN 650 MG: 325 TABLET, FILM COATED ORAL at 12:10

## 2023-10-27 NOTE — DISCHARGE SUMMARY
[facility] Surg ([address])  Discharge Note  Short Stay    Procedure(s) (LRB):  INCISION AND DRAINAGE, BREAST (Left)      OUTCOME: Patient tolerated treatment/procedure well without complication and is now ready for discharge.    DISPOSITION: Home or Self Care    FINAL DIAGNOSIS:  Postoperative infection    FOLLOWUP: In clinic    DISCHARGE INSTRUCTIONS:    Discharge Procedure Orders   Diet Adult Regular     HOME HEALTH ORDERS     Order Specific Question Answer Comments   What Home Health Agency is the patient currently using? [facility]      SUBSEQUENT HOME HEALTH ORDERS   Order Comments: Twice daily dressing changes to left breast  Clean the area with soap and water, afterwards pack with Dakin solution moistened Kerlix gauze  Cover with dry gauze or absorbent pad     Order Specific Question Answer Comments   What Home Health Agency is the patient currently using? [facility]      Notify your health care provider if you experience any of the following:  temperature >100.4     Notify your health care provider if you experience any of the following:  persistent nausea and vomiting or diarrhea     Notify your health care provider if you experience any of the following:  severe uncontrolled pain     Notify your health care provider if you experience any of the following:  redness, tenderness, or signs of infection (pain, swelling, redness, odor or green/yellow discharge around incision site)     Change dressing (specify)   Order Comments: Dressing change: 2 times per day using Dakins moistened gauze.     Activity as tolerated        TIME SPENT ON DISCHARGE: 30 minutes

## 2023-10-27 NOTE — PLAN OF CARE
Free from falls, injury, or skin breakdown this hospital admission.  Pt eager & in agreement w/ DC. VU of DC instructions and the need to attend follow-up appointment--paperwork  passed & explained. Scripts filled and delivered to bedside. IV removed w/ cath tip intact, WNL. Voiding, ambulating, & tolerating PO well. Incision WNL. To be DCd home w/ family--will be escorted downstairs via  transport team once dressed, ready & ride arrives. Pt discharged in no distress w/ extra abd pads, gauze, rolled gauze and Vashe.

## 2023-10-27 NOTE — PLAN OF CARE
Problem: Pain Acute  Goal: Acceptable Pain Control and Functional Ability  Outcome: Ongoing, Progressing     Problem: Infection  Goal: Absence of Infection Signs and Symptoms  Outcome: Ongoing, Progressing     Problem: Adult Inpatient Plan of Care  Goal: Readiness for Transition of Care  Outcome: Ongoing, Progressing

## 2023-10-27 NOTE — PLAN OF CARE
[facility] - Med Surg ([address])      HOME HEALTH ORDERS  FACE TO FACE ENCOUNTER    Patient Name: Lucia Matias  YOB: 1960    PCP: Hetal Banks MD   PCP Address: [address] [place] [address] [address] / [place] [address]  PCP Phone Number: [phone]  PCP Fax: [phone]    Encounter Date: 10/24/23    Admit to Home Health    Diagnoses:  Left breast wound    Follow Up Appointments:  Future Appointments   Date Time Provider Department Center   12/8/2023  1:00 PM Leila Arevalo PA-C [facility] [place]   12/12/2023  1:00 PM Roya Madrid MD [facility] T[facility] Adrien Araujoy   1/3/2024  3:45 PM Hetal Banks MD [facility]   3/21/2024 10:40 AM Pool Dorado MD [facility] [id number]       Allergies:  Review of patient's allergies indicates:   Allergen Reactions    Succinimides Anaphylaxis     Son has MH    Vancomycin analogues Hives, Itching and Rash       Medications: Review discharge medications with patient and family and provide education.    Current Facility-Administered Medications   Medication Dose Route Frequency Provider Last Rate Last Admin    acetaminophen tablet 650 mg  650 mg Oral 4 times per day Raúl Spivey MD   650 mg at 10/27/23 0517    ceFEPIme (MAXIPIME) 2 g in dextrose 5 % in water (D5W) 100 mL IVPB (MB+)  2 g Intravenous Q12H Raúl Spivey MD   Stopped at 10/27/23 0408    doxycycline tablet 100 mg  100 mg Oral Daily Raúl Spivey MD   100 mg at 10/26/23 0951    metoprolol tartrate (LOPRESSOR) tablet 25 mg  25 mg Oral BID Raúl Spivey MD   25 mg at 10/25/23 2039    morphine injection 2 mg  2 mg Intravenous Q6H PRN Raúl Spivey MD   2 mg at 10/27/23 0647    ondansetron injection 4 mg  4 mg Intravenous Q8H PRN Raúl Spivey MD        oxyCODONE immediate release tablet 5 mg  5 mg Oral Q4H PRN Raúl Spivey MD        oxyCODONE immediate release tablet Tab 10 mg   10 mg Oral Q4H PRN Raúl Spivey MD   10 mg at 10/26/23 2220    pantoprazole EC tablet 40 mg  40 mg Oral Daily Raúl Spivey MD   40 mg at 10/26/23 0951    prochlorperazine injection Soln 5 mg  5 mg Intravenous Q30 Min PRN Nicho Nguyen MD        QUEtiapine tablet 50 mg  50 mg Oral QHS Raúl Spivey MD   50 mg at 10/26/23 2219    sodium chloride 0.9% flush 10 mL  10 mL Intravenous Q12H PRN Raúl Spivey MD        venlafaxine 24 hr capsule 75 mg  75 mg Oral Daily Raúl Spivey MD   75 mg at 10/26/23 0951     Facility-Administered Medications Ordered in Other Encounters   Medication Dose Route Frequency Provider Last Rate Last Admin    lactated ringers infusion   Intravenous Continuous Yahaira Barnard MD   New Bag at 03/29/23 0638    LIDOcaine (PF) 10 mg/ml (1%) injection 5 mg  0.5 mL Intradermal Once Yahaira Barnard MD        LIDOcaine HCL 10 mg/ml (1%) 50 mL, EPINEPHrine 1 mg in lactated Ringers 1,000 mL irrigation   Irrigation On Call Procedure Ming Milian MD        LIDOcaine HCL 10 mg/ml (1%) 50 mL, EPINEPHrine 1 mg in lactated Ringers 1,000 mL irrigation   Irrigation On Call Procedure Ming Milian MD        LIDOcaine HCL 10 mg/ml (1%) 50 mL, EPINEPHrine 1 mg in lactated Ringers 1,000 mL irrigation   Irrigation On Call Procedure Ming Milian MD         Current Discharge Medication List        CONTINUE these medications which have NOT CHANGED    Details   anastrozole (ARIMIDEX) 1 mg Tab Take 1 tablet (1 mg total) by mouth once daily.  Qty: 90 tablet, Refills: 3    Associated Diagnoses: Malignant neoplasm of central portion of left breast in female, estrogen receptor positive      apixaban (ELIQUIS) 5 mg Tab Take 1 tablet (5 mg total) by mouth 2 (two) times daily.  Qty: 180 tablet, Refills: 3      atorvastatin (LIPITOR) 20 MG tablet Take 1 tablet (20 mg total) by mouth every evening.  Qty: 90 tablet, Refills: 3    Associated  Diagnoses: Mixed hyperlipidemia      B-complex with vitamin C (VITAMIN B COMPLEX-C ORAL) Take by mouth Daily.      calcium-vitamin D 250-100 mg-unit per tablet Take 1 tablet by mouth 2 (two) times daily.      cyanocobalamin 500 MCG tablet Take 500 mcg by mouth once daily.      diazePAM (VALIUM) 5 MG tablet Take 1 tablet (5 mg total) by mouth daily as needed for Anxiety.  Qty: 30 tablet, Refills: 2    Associated Diagnoses: RANDI (generalized anxiety disorder); Panic attack      fluocinonide-emollient (FLUOCINONIDE-E) 0.05 % Crea AAA of feet daily prn psoriasis.  Qty: 60 g, Refills: 3    Associated Diagnoses: Psoriasis vulgaris      fluticasone-umeclidin-vilanter (TRELEGY ELLIPTA) 100-62.5-25 mcg DsDv Inhale 1 puff into the lungs once daily.  Qty: 180 each, Refills: 3    Associated Diagnoses: Chronic obstructive pulmonary disease, unspecified COPD type      levoFLOXacin (LEVAQUIN) 500 MG tablet Take 1 tablet (500 mg total) by mouth once daily.  Qty: 14 tablet, Refills: 0      metoprolol tartrate (LOPRESSOR) 25 MG tablet Take 1 tablet (25 mg total) by mouth 2 (two) times daily.  Qty: 180 tablet, Refills: 3    Comments: .      multivitamin (THERAGRAN) per tablet Take 1 tablet by mouth once daily.      multivitamin with minerals (HAIR,SKIN AND NAILS ORAL) Take by mouth.      mv-mn/iron/folic acid/herb 190 (VITAMIN D3 COMPLETE ORAL) Take by mouth.      omeprazole (PRILOSEC) 40 MG capsule Take 1 capsule (40 mg total) by mouth every morning.  Qty: 90 capsule, Refills: 1    Associated Diagnoses: S/P laparoscopic sleeve gastrectomy; Weight loss      ondansetron (ZOFRAN) 8 MG tablet Take 1 tablet (8 mg total) by mouth every 12 (twelve) hours as needed for Nausea.  Qty: 30 tablet, Refills: 2    Associated Diagnoses: Malignant neoplasm of central portion of left breast in female, estrogen receptor positive      QUEtiapine (SEROQUEL) 50 MG tablet Take 1 tablet (50 mg total) by mouth every evening.  Qty: 30 tablet, Refills: 11       sulfamethoxazole-trimethoprim 800-160mg (BACTRIM DS) 800-160 mg Tab Take 1 tablet by mouth 2 (two) times daily.  Qty: 28 tablet, Refills: 0      !! TALTZ AUTOINJECTOR 80 mg/mL AtIn Inject 80 mg into the skin every 28 days.  Qty: 1 mL, Refills: 6    Associated Diagnoses: Psoriasis vulgaris; Psoriatic arthritis; Long-term use of high-risk medication      !! TALTZ AUTOINJECTOR, 2 PACK, 80 mg/mL AtIn Inject 80mg into the skin every other week (weeks 4, 6, 8, 10).  Qty: 2 mL, Refills: 1    Associated Diagnoses: Psoriasis vulgaris; Psoriatic arthritis; Long-term use of high-risk medication      !! TALTZ AUTOINJECTOR, 3 PACK, 80 mg/mL AtIn Inject 160mg (2 pens) into the skin at week 0 , then inject 80mg into the skin at week 2.  Qty: 3 mL, Refills: 0    Associated Diagnoses: Psoriasis vulgaris; Psoriatic arthritis; Long-term use of high-risk medication      triamcinolone acetonide 0.025% (KENALOG) 0.025 % cream AAA of skin folds bid prn psoriasis.  Qty: 80 g, Refills: 3    Associated Diagnoses: Psoriasis vulgaris      triamcinolone acetonide 0.1% (KENALOG) 0.1 % cream Apply to affected areas of body BID prn psoriasis. Do not use on face or skin folds.  Qty: 454 g, Refills: 1    Associated Diagnoses: Psoriasis vulgaris      venlafaxine (EFFEXOR-XR) 75 MG 24 hr capsule Take 1 capsule (75 mg total) by mouth once daily. Start on Week 2  Qty: 90 capsule, Refills: 3    Associated Diagnoses: RANDI (generalized anxiety disorder); MDD (recurrent major depressive disorder) in remission       !! - Potential duplicate medications found. Please discuss with provider.            I have seen and examined this patient within the last 30 days. My clinical findings that support the need for the home health skilled services and home bound status are the following:no   Medical restrictions requiring assistance of another human to leave home due to  Wound care needs.     Diet:   regular diet    Labs:  No    Referrals/ Consults  Wound care /  assessment    Activities:   activity as tolerated    Nursing:   Agency to admit patient within 3 days of hospital discharge unless specified on physician order or at patient request    SN to complete comprehensive assessment including routine vital signs. Instruct on disease process and s/s of complications to report to MD. Review/verify medication list sent home with the patient at time of discharge  and instruct patient/caregiver as needed. Frequency may be adjusted depending on start of care date.     Skilled nurse to perform up to 3 visits PRN for symptoms related to diagnosis    Notify MD if SBP > 160 or < 90; DBP > 90 or < 50; HR > 120 or < 50; Temp > 101; O2 < 88%;     Ok to schedule additional visits based on staff availability and patient request on consecutive days within the home health episode.    When multiple disciplines ordered:    Start of Care occurs on Sunday - Wednesday schedule remaining discipline evaluations as ordered on separate consecutive days following the start of care.    Thursday SOC -schedule subsequent evaluations Friday and Monday the following week.     Friday - Saturday SOC - schedule subsequent discipline evaluations on consecutive days starting Monday of the following week.    For all post-discharge communication and subsequent orders please contact patient's primary care physician. If unable to reach primary care physician or do not receive response within 30 minutes, please contact plastic surgery clinic for clinical staff order clarification    Miscellaneous   N/A    Home Health Aide:  Nursing Twice weekly    Wound Care Orders  yes:  Surgical Wound:  Location: left breast    Consult ET nurse        Apply the following to wound:   Wet to Damp dressing: BID (frequency)  Dakins or saline moistened kerlix packing    I certify that this patient is confined to her home and needs intermittent skilled nursing care.

## 2023-10-27 NOTE — PLAN OF CARE
Follow up PCP appointments noted on AVS. [facility] to assume care on discharge. Family to provide transportation home.   10/27/23 0852   Final Note   Assessment Type Final Discharge Note   Anticipated Discharge Disposition Home-Health   Hospital Resources/Appts/Education Provided Provided patient/caregiver with written discharge plan information;Appointments scheduled and added to AVS   Post-Acute Status   Post-Acute Authorization Home Health   Home Health Status Set-up Complete/Auth obtained   Discharge Delays None known at this time     [facility] - Med Surg ([address])  Discharge Final Note    Primary Care Provider: Hetal Banks MD    Expected Discharge Date: 10/27/2023    Final Discharge Note (most recent)       Final Note - 10/27/23 0852          Final Note    Assessment Type Final Discharge Note (P)      Anticipated Discharge Disposition Home-Health Care Svc (P)      Hospital Resources/Appts/Education Provided Provided patient/caregiver with written discharge plan information;Appointments scheduled and added to AVS (P)         Post-Acute Status    Post-Acute Authorization Home Health (P)      Home Health Status Set-up Complete/Auth obtained (P)      Discharge Delays None known at this time (P)                      Important Message from Medicare             Contact Info       [place], [facility] -   Specialty: Home Health Services    [address]  [address]  [place] [address]   Phone: [phone]       Next Steps: Follow up

## 2023-10-29 LAB
BACTERIA BLD CULT: NORMAL
BACTERIA BLD CULT: NORMAL
BACTERIA SPEC AEROBE CULT: ABNORMAL

## 2023-10-30 ENCOUNTER — PATIENT OUTREACH (OUTPATIENT)
Dept: ADMINISTRATIVE | Facility: CLINIC | Age: 63
End: 2023-10-30
Payer: COMMERCIAL

## 2023-10-30 ENCOUNTER — TELEPHONE (OUTPATIENT)
Dept: HEMATOLOGY/ONCOLOGY | Facility: CLINIC | Age: 63
End: 2023-10-30
Payer: COMMERCIAL

## 2023-10-30 ENCOUNTER — PATIENT MESSAGE (OUTPATIENT)
Dept: HEMATOLOGY/ONCOLOGY | Facility: CLINIC | Age: 63
End: 2023-10-30
Payer: COMMERCIAL

## 2023-10-30 DIAGNOSIS — R53.83 FATIGUE: ICD-10-CM

## 2023-10-30 DIAGNOSIS — F41.1 GAD (GENERALIZED ANXIETY DISORDER): ICD-10-CM

## 2023-10-30 DIAGNOSIS — Z17.0 MALIGNANT NEOPLASM OF CENTRAL PORTION OF LEFT BREAST IN FEMALE, ESTROGEN RECEPTOR POSITIVE: Primary | ICD-10-CM

## 2023-10-30 DIAGNOSIS — R23.2 HOT FLASHES: ICD-10-CM

## 2023-10-30 DIAGNOSIS — C50.112 MALIGNANT NEOPLASM OF CENTRAL PORTION OF LEFT BREAST IN FEMALE, ESTROGEN RECEPTOR POSITIVE: Primary | ICD-10-CM

## 2023-10-30 DIAGNOSIS — E66.09 CLASS 1 OBESITY DUE TO EXCESS CALORIES WITHOUT SERIOUS COMORBIDITY WITH BODY MASS INDEX (BMI) OF 33.0 TO 33.9 IN ADULT: ICD-10-CM

## 2023-10-30 DIAGNOSIS — Z79.811 AROMATASE INHIBITOR USE: ICD-10-CM

## 2023-10-30 NOTE — NURSING
Discussed survivorship and integrative therapies, desires to pursue acupuncture for chronic low back pain, hot flashes and arthralgias on AI. Wishes to pursue OT Yoga following post-op recovery. Recommended [employer] and scheduled this for January 2024 following established visit with Dr. Bowman in December. Discussed integrative dietitian, scheduled this for risk reduction and anti-inflammatory. Romain GREEN.

## 2023-10-30 NOTE — PROGRESS NOTES
C3 nurse spoke with Lucia Matias for a TCC post hospital discharge follow up call. The patient has a scheduled [facility] appointment with lEiel Granados NP on 10/31/23 @ 0800.

## 2023-10-30 NOTE — OP NOTE
[facility] Surg ([address])  General Surgery  Operative Note    SUMMARY     Date of Procedure: 10/26/2023     Procedure: Procedure(s) (LRB):  INCISION AND DRAINAGE, BREAST (Left)       Surgeon(s) and Role:     * Ming Milian MD - Primary    Assisting Surgeon: None    Pre-Operative Diagnosis: Left breast abscess [N61.1]    Post-Operative Diagnosis: Post-Op Diagnosis Codes:     * Left breast abscess [N61.1]    Anesthesia: General    Operative Findings (including complications, if any): Left breast abscess.    Description of Technical Procedures:     In the preoperative area, proper consent was acquired and the patient was marked in the upright and standing position. The patient was then brought to the OR, placed in supine position, and underwent general anesthesia. A time out was performed. DVT chemical prophylaxis and IV antibiotics were given. The left reconstructed breast was prepped and draped in the standard fashion.  Using a 15 blade a 2 cm incision was made within the inframammary fold of the left breast near the breast meridian maximal fluctuance.  Dissection was then taken bluntly with a Shayla clamp through the subcutaneous tissues and into an abscess cavity with immediate return of purulent fluid.  This was sampled with multiple culture swabs which were passed off the field for microbiology analysis.  The wound was then irrigated with copious sterile saline solution.  It was thoroughly examined and found to be hemostatic.  The wound was and loosely packed with saline moistened Kerlix. A surgical bra and abd pads were placed.    All counts were correct x2. The patient tolerated the procedure well with no untoward events or complications. She was transferred to the PACU in a stable condition.    Estimated Blood Loss (EBL): * No values recorded between 10/26/2023  7:25 AM and 10/26/2023  7:51 AM *           Implants: * No implants in log *    Specimens:   Specimen (24h ago, onward)      None                     Condition: Stable    Disposition: PACU - hemodynamically stable.    Attestation: I was present and scrubbed for the entire procedure.

## 2023-10-31 DIAGNOSIS — E78.2 MIXED HYPERLIPIDEMIA: ICD-10-CM

## 2023-11-01 ENCOUNTER — OFFICE VISIT (OUTPATIENT)
Dept: PLASTIC SURGERY | Facility: CLINIC | Age: 63
End: 2023-11-01
Attending: PLASTIC SURGERY
Payer: COMMERCIAL

## 2023-11-01 ENCOUNTER — PATIENT MESSAGE (OUTPATIENT)
Dept: DERMATOLOGY | Facility: CLINIC | Age: 63
End: 2023-11-01
Payer: COMMERCIAL

## 2023-11-01 VITALS — DIASTOLIC BLOOD PRESSURE: 74 MMHG | HEART RATE: 87 BPM | SYSTOLIC BLOOD PRESSURE: 187 MMHG

## 2023-11-01 DIAGNOSIS — Z85.3 HISTORY OF BREAST CANCER: Primary | ICD-10-CM

## 2023-11-01 DIAGNOSIS — T81.42XD INFECTION OF DEEP INCISIONAL SURGICAL SITE AFTER PROCEDURE, SUBSEQUENT ENCOUNTER: ICD-10-CM

## 2023-11-01 PROCEDURE — 99024 POSTOP FOLLOW-UP VISIT: CPT | Mod: S$GLB,,, | Performed by: PLASTIC SURGERY

## 2023-11-01 PROCEDURE — 3044F PR MOST RECENT HEMOGLOBIN A1C LEVEL <7.0%: ICD-10-PCS | Mod: CPTII,S$GLB,, | Performed by: PLASTIC SURGERY

## 2023-11-01 PROCEDURE — 3077F SYST BP >= 140 MM HG: CPT | Mod: CPTII,S$GLB,, | Performed by: PLASTIC SURGERY

## 2023-11-01 PROCEDURE — 3078F PR MOST RECENT DIASTOLIC BLOOD PRESSURE < 80 MM HG: ICD-10-PCS | Mod: CPTII,S$GLB,, | Performed by: PLASTIC SURGERY

## 2023-11-01 PROCEDURE — 3044F HG A1C LEVEL LT 7.0%: CPT | Mod: CPTII,S$GLB,, | Performed by: PLASTIC SURGERY

## 2023-11-01 PROCEDURE — 3077F PR MOST RECENT SYSTOLIC BLOOD PRESSURE >= 140 MM HG: ICD-10-PCS | Mod: CPTII,S$GLB,, | Performed by: PLASTIC SURGERY

## 2023-11-01 PROCEDURE — 99024 PR POST-OP FOLLOW-UP VISIT: ICD-10-PCS | Mod: S$GLB,,, | Performed by: PLASTIC SURGERY

## 2023-11-01 PROCEDURE — 3078F DIAST BP <80 MM HG: CPT | Mod: CPTII,S$GLB,, | Performed by: PLASTIC SURGERY

## 2023-11-01 RX ORDER — ATORVASTATIN CALCIUM 20 MG/1
20 TABLET, FILM COATED ORAL NIGHTLY
Qty: 90 TABLET | Refills: 3 | Status: ON HOLD | OUTPATIENT
Start: 2023-11-01

## 2023-11-01 NOTE — TELEPHONE ENCOUNTER
Care Due:                  Date            Visit Type   Department     Provider  --------------------------------------------------------------------------------                                EP -                              PRIMARY      [facility] PRIMARY  Last Visit: 01-      CARE ([facility])   CARE           Hetal Banks                              EP -                              PRIMARY      [facility] PRIMARY  Next Visit: 01-      CARE ([facility])   CARE           Hetal Banks                                                            Last  Test          Frequency    Reason                     Performed    Due Date  --------------------------------------------------------------------------------    Lipid Panel.  12 months..  atorvastatin.............  10-   10-    [facility] Embedded Care Due Messages. Reference number: [id number].   10/31/2023 9:24:02 PM CDT

## 2023-11-01 NOTE — PROGRESS NOTES
Lucia Matias is s/p bilateral mastectomy, NEHA breast reconstruction w/ 2nd and 3rd stages most recently 8/29/23 w/ L breast abscess/fat necrosis now 5 days s/p L breast I&D here for follow up. Pt has been doing well with no major complaints; she's been taking her antibiotics and performing wound care with vashe to dry gauze packings. Denies fever/chills.    Medial L breast wound clean/healthy with no purulence/drainage, no surrounding cellulitis or signs of acute infection.     Cultures with +pseudomonas, susceptible to levofloxacin.    Damp gauze packing placed into L breast wound. Continue twice daily packings. Continue antibiotics. Follow up 1-2 weeks.    Linden Coles MD  Plastic & Reconstructive Surgery  [phone]

## 2023-11-02 LAB — BACTERIA SPEC ANAEROBE CULT: NORMAL

## 2023-11-03 ENCOUNTER — OFFICE VISIT (OUTPATIENT)
Dept: HOME HEALTH SERVICES | Facility: CLINIC | Age: 63
End: 2023-11-03
Payer: COMMERCIAL

## 2023-11-03 DIAGNOSIS — F41.0 PANIC ATTACK: ICD-10-CM

## 2023-11-03 DIAGNOSIS — T81.42XD INFECTION OF DEEP INCISIONAL SURGICAL SITE AFTER PROCEDURE, SUBSEQUENT ENCOUNTER: Primary | ICD-10-CM

## 2023-11-03 DIAGNOSIS — F41.1 GAD (GENERALIZED ANXIETY DISORDER): ICD-10-CM

## 2023-11-03 PROCEDURE — 99213 OFFICE O/P EST LOW 20 MIN: CPT | Mod: S$GLB,,, | Performed by: NURSE PRACTITIONER

## 2023-11-03 PROCEDURE — 99213 PR OFFICE/OUTPT VISIT, EST, LEVL III, 20-29 MIN: ICD-10-PCS | Mod: S$GLB,,, | Performed by: NURSE PRACTITIONER

## 2023-11-03 PROCEDURE — 1159F MED LIST DOCD IN RCRD: CPT | Mod: CPTII,S$GLB,, | Performed by: NURSE PRACTITIONER

## 2023-11-03 PROCEDURE — 1111F DSCHRG MED/CURRENT MED MERGE: CPT | Mod: CPTII,S$GLB,, | Performed by: NURSE PRACTITIONER

## 2023-11-03 PROCEDURE — 1160F PR REVIEW ALL MEDS BY PRESCRIBER/CLIN PHARMACIST DOCUMENTED: ICD-10-PCS | Mod: CPTII,S$GLB,, | Performed by: NURSE PRACTITIONER

## 2023-11-03 PROCEDURE — 1160F RVW MEDS BY RX/DR IN RCRD: CPT | Mod: CPTII,S$GLB,, | Performed by: NURSE PRACTITIONER

## 2023-11-03 PROCEDURE — 1159F PR MEDICATION LIST DOCUMENTED IN MEDICAL RECORD: ICD-10-PCS | Mod: CPTII,S$GLB,, | Performed by: NURSE PRACTITIONER

## 2023-11-03 PROCEDURE — 1111F PR DISCHARGE MEDS RECONCILED W/ CURRENT OUTPATIENT MED LIST: ICD-10-PCS | Mod: CPTII,S$GLB,, | Performed by: NURSE PRACTITIONER

## 2023-11-03 RX ORDER — DIAZEPAM 5 MG/1
5 TABLET ORAL DAILY PRN
Qty: 30 TABLET | Refills: 2 | Status: SHIPPED | OUTPATIENT
Start: 2023-11-03 | End: 2024-02-15

## 2023-11-03 NOTE — PROGRESS NOTES
[facility]  Transitional Care Management (TCM) Home Visit    Encounter Provider: Mayuri Quiroga   PCP: Hetal Banks MD  Consult Requested By: No ref. provider found  Admit Date: 10/24/23   IP Discharge Date: 10/27/23  Hospital Length of Stay: 3 days  Days since discharge (from IP or SNF): 7  [facility] Contact Note:   Hospital Diagnosis: No admission diagnoses are documented for this encounter.     HISTORY OF PRESENT ILLNESS      Patient ID: Lucia Matias is a 63 y.o. female was recently admitted to the hospital, this is their TCM encounter.    Hospital Course Synopsis:    s/p bilateral mastectomy, NEHA breast reconstruction w/ 2nd and 3rd stages most recently 8/29/23 w/ L breast abscess/fat necrosis admitted for antibiotics and wound care    DECISION MAKING TODAY       Assessment & Plan:  1. Infection of deep incisional surgical site after procedure, subsequent encounter  Assessment & Plan:  Improving  Pt has followed up with surgery and has upcoming appts scheduled  Keep all upcoming appts  Home health is assisting with packing and wound care  Levaquin in place- complete course  Continue current plan       Pt visit was concise and short as her  was having a health issue and [facility] arrived to transport to ER. Pt reports she has all meds in the home, is compliant, performing wound care with assistance of home health. She has all needed FU appts    Medication List on Discharge:     Medication List            Accurate as of November 3, 2023  1:19 PM. If you have any questions, ask your nurse or doctor.                CONTINUE taking these medications      anastrozole 1 mg Tab  Commonly known as: ARIMIDEX  Take 1 tablet (1 mg total) by mouth once daily.     atorvastatin 20 MG tablet  Commonly known as: LIPITOR  Take 1 tablet (20 mg total) by mouth every evening.     calcium-vitamin D 250 mg-2.5 mcg (100 unit) per tablet  Take 1 tablet by mouth 2 (two) times daily.     cyanocobalamin  500 MCG tablet  Take 500 mcg by mouth once daily.     diazePAM 5 MG tablet  Commonly known as: VALIUM  Take 1 tablet (5 mg total) by mouth daily as needed for Anxiety.     ELIQUIS 5 mg Tab  Generic drug: apixaban  Take 1 tablet (5 mg total) by mouth 2 (two) times daily.     fluconazole 150 MG Tab  Commonly known as: DIFLUCAN  Take 1 tablet (150 mg total) by mouth once daily. for 7 days     fluocinonide-emollient 0.05 % Crea  Commonly known as: FLUOCINONIDE-E  AAA of feet daily prn psoriasis.     HAIR,SKIN AND NAILS ORAL  Take by mouth.     levoFLOXacin 500 MG tablet  Commonly known as: LEVAQUIN  Take 1 tablet (500 mg total) by mouth once daily. for 7 days     metoprolol tartrate 25 MG tablet  Commonly known as: LOPRESSOR  Take 1 tablet (25 mg total) by mouth 2 (two) times daily.     multivitamin per tablet  Commonly known as: THERAGRAN  Take 1 tablet by mouth once daily.     omeprazole 40 MG capsule  Commonly known as: PRILOSEC  Take 1 capsule (40 mg total) by mouth every morning.     ondansetron 8 MG tablet  Commonly known as: ZOFRAN  Take 1 tablet (8 mg total) by mouth every 12 (twelve) hours as needed for Nausea.     oxyCODONE-acetaminophen 5-325 mg per tablet  Commonly known as: PERCOCET  Take 1 tablet by mouth every 4 (four) hours as needed for Pain.     QUEtiapine 50 MG tablet  Commonly known as: SEROQUEL  Take 1 tablet (50 mg total) by mouth every evening.     sulfamethoxazole-trimethoprim 800-160mg 800-160 mg Tab  Commonly known as: BACTRIM DS  Take 1 tablet by mouth 2 (two) times daily. for 7 days     * TALTZ AUTOINJECTOR (3 PACK) 80 mg/mL Atin  Generic drug: ixekizumab  Inject 160mg (2 pens) into the skin at week 0 , then inject 80mg into the skin at week 2.     * TALTZ AUTOINJECTOR (2 PACK) 80 mg/mL Atin  Generic drug: ixekizumab  Inject 80mg into the skin every other week (weeks 4, 6, 8, 10).     * TALTZ AUTOINJECTOR 80 mg/mL Atin  Generic drug: ixekizumab  Inject 80 mg into the skin every 28 days.      ARCHANA HERNÁNDEZTA 100-62.5-25 mcg Dsdv  Generic drug: fluticasone-umeclidin-vilanter  Inhale 1 puff into the lungs once daily.     * triamcinolone acetonide 0.025% 0.025 % cream  Commonly known as: KENALOG  AAA of skin folds bid prn psoriasis.     * triamcinolone acetonide 0.1% 0.1 % cream  Commonly known as: KENALOG  Apply to affected areas of body BID prn psoriasis. Do not use on face or skin folds.     venlafaxine 75 MG 24 hr capsule  Commonly known as: EFFEXOR-XR  Take 1 capsule (75 mg total) by mouth once daily. Start on Week 2     VITAMIN B COMPLEX-C ORAL  Take by mouth Daily.     VITAMIN D3 COMPLETE ORAL  Take by mouth.           * This list has 5 medication(s) that are the same as other medications prescribed for you. Read the directions carefully, and ask your doctor or other care provider to review them with you.                  Medication Reconciliation:  Were medications changed on discharge? Yes  Were medications in the home? Yes  Is the patient taking the medications as directed? Yes  Does the patient understand the medications and changes? Yes  Does updated med list accurately reflects meds patient is currently taking? Yes    ENVIRONMENT OF CARE            Impression upon entering the home:  Physical Dwelling: single family home   Appearance of home environment: cleaniness: clean  Functional Status: independent  Mobility: ambulatory  Nutritional access: adequate intake and access  Home Health: Yes,  Agency [facility]    DME/Supplies: none     Diagnostic tests reviewed/disposition: No diagnosic tests pending after this hospitalization.  Disease/illness education:   Establishment or re-establishment of referral orders for community resources: No other necessary community resources.   Discussion with other health care providers: No discussion with other health care providers necessary.   Does patient have a PCP at [facility]? Yes   Repatriation plan with PCP? follow-up with PCP within 90d   Does patient have an  ostomy (ileostomy, colostomy, suprapubic catheter, nephrostomy tube, tracheostomy, PEG tube, pleurex catheter, cholecystostomy, etc)? No  Were BPAs reviewed? Yes    Social History     Socioeconomic History    Marital status:    Occupational History    Occupation: clinical research coordinator    Tobacco Use    Smoking status: Former     Current packs/day: 0.00     Types: Cigarettes     Start date: 1979     Quit date: 2020     Years since quittin.9    Smokeless tobacco: Current   Substance and Sexual Activity    Alcohol use: Yes     Alcohol/week: 1.0 standard drink of alcohol     Types: 1 Glasses of wine per week     Comment: social-rarely    Drug use: No    Sexual activity: Yes     Partners: Male     Birth control/protection: Post-menopausal   Other Topics Concern    Are you pregnant or think you may be? No    Breast-feeding No     Social Determinants of Health     Financial Resource Strain: Low Risk  (2023)    Overall Financial Resource Strain (CARDIA)     Difficulty of Paying Living Expenses: Not very hard   Food Insecurity: No Food Insecurity (2023)    Hunger Vital Sign     Worried About Running Out of Food in the Last Year: Never true     Ran Out of Food in the Last Year: Never true   Transportation Needs: No Transportation Needs (2023)    PRAPARE - Transportation     Lack of Transportation (Medical): No     Lack of Transportation (Non-Medical): No   Physical Activity: Insufficiently Active (2023)    Exercise Vital Sign     Days of Exercise per Week: 1 day     Minutes of Exercise per Session: 10 min   Stress: Stress Concern Present (2023)    [employer] - Occupational Stress Questionnaire     Feeling of Stress : Very much   Social Connections: Unknown (2023)    Social Connection and Isolation Panel [NHANES]     Frequency of Communication with Friends and Family: More than three times a week     Frequency of Social Gatherings with  Friends and Family: Once a week     Active Member of Clubs or Organizations: Yes     Attends Club or Organization Meetings: 1 to 4 times per year     Marital Status:    Housing Stability: Low Risk  (7/12/2023)    Housing Stability Vital Sign     Unable to Pay for Housing in the Last Year: No     Number of Places Lived in the Last Year: 1     Unstable Housing in the Last Year: No         OBJECTIVE:     Vital Signs:  There were no vitals filed for this visit.    Review of Systems   Constitutional:  Negative for activity change, fatigue and fever.   HENT: Negative.     Eyes: Negative.    Respiratory:  Negative for chest tightness.    Cardiovascular: Negative.  Negative for leg swelling.   Gastrointestinal: Negative.    Endocrine: Negative.    Genitourinary: Negative.    Musculoskeletal: Negative.    Skin:  Positive for wound (breast incision).   Allergic/Immunologic: Negative.    Neurological: Negative.    Hematological: Negative.    Psychiatric/Behavioral: Negative.  Negative for agitation.    All other systems reviewed and are negative.      Physical Exam:  Physical Exam    INSTRUCTIONS FOR PATIENT:     Scheduled Follow-up, Appts Reviewed with Modifications if Needed: Yes  Future Appointments   Date Time Provider Department Center   11/15/2023  2:45 PM Ming Milian MD [facility] PLAS [facility]   12/6/2023 12:00 PM Susana Gillespie RD [facility] INONCTF [facility]   12/8/2023  1:00 PM Leila Arevalo PA-C [facility] BRSTSUR [facility]   12/12/2023  1:00 PM Roya Madrid MD [facility] TNSYHO [facility]   12/21/2023  8:30 AM Clare Bowman MD [facility] DON [facility]   1/3/2024  3:45 PM Hetal Banks MD [facility] PRICRE [place]   1/18/2024  8:30 AM Clare Bowman MD [facility]   3/21/2024 10:40 AM Pool Dorado MD [facility] [id number] [facility]       Signature: Mayuri Quiroga NP    Transition of Care Visit:  I have reviewed and updated the history and problem list.  I have reconciled  the medication list.  I have discussed the hospitalization and current medical issues, prognosis and plans with the patient/family.

## 2023-11-03 NOTE — ASSESSMENT & PLAN NOTE
Improving  Pt has followed up with surgery and has upcoming appts scheduled  Keep all upcoming appts  Home health is assisting with packing and wound care  Levaquin in place- complete course  Continue current plan

## 2023-11-07 LAB
FINAL PATHOLOGIC DIAGNOSIS: NORMAL
GROSS: NORMAL
Lab: NORMAL
MICROSCOPIC EXAM: NORMAL

## 2023-11-07 RX ORDER — OXYCODONE AND ACETAMINOPHEN 5; 325 MG/1; MG/1
1 TABLET ORAL EVERY 8 HOURS PRN
Qty: 21 TABLET | Refills: 0 | Status: SHIPPED | OUTPATIENT
Start: 2023-11-07 | End: 2023-11-15 | Stop reason: SDUPTHER

## 2023-11-15 ENCOUNTER — OFFICE VISIT (OUTPATIENT)
Dept: PLASTIC SURGERY | Facility: CLINIC | Age: 63
End: 2023-11-15
Attending: PLASTIC SURGERY
Payer: COMMERCIAL

## 2023-11-15 VITALS
DIASTOLIC BLOOD PRESSURE: 63 MMHG | WEIGHT: 168 LBS | HEART RATE: 65 BPM | BODY MASS INDEX: 30.91 KG/M2 | SYSTOLIC BLOOD PRESSURE: 135 MMHG | HEIGHT: 62 IN

## 2023-11-15 DIAGNOSIS — Z85.3 HISTORY OF BREAST CANCER: Primary | ICD-10-CM

## 2023-11-15 PROCEDURE — 99024 PR POST-OP FOLLOW-UP VISIT: ICD-10-PCS | Mod: S$GLB,,, | Performed by: PLASTIC SURGERY

## 2023-11-15 PROCEDURE — 3075F PR MOST RECENT SYSTOLIC BLOOD PRESS GE 130-139MM HG: ICD-10-PCS | Mod: CPTII,S$GLB,, | Performed by: PLASTIC SURGERY

## 2023-11-15 PROCEDURE — 1159F PR MEDICATION LIST DOCUMENTED IN MEDICAL RECORD: ICD-10-PCS | Mod: CPTII,S$GLB,, | Performed by: PLASTIC SURGERY

## 2023-11-15 PROCEDURE — 3075F SYST BP GE 130 - 139MM HG: CPT | Mod: CPTII,S$GLB,, | Performed by: PLASTIC SURGERY

## 2023-11-15 PROCEDURE — 1159F MED LIST DOCD IN RCRD: CPT | Mod: CPTII,S$GLB,, | Performed by: PLASTIC SURGERY

## 2023-11-15 PROCEDURE — 3078F PR MOST RECENT DIASTOLIC BLOOD PRESSURE < 80 MM HG: ICD-10-PCS | Mod: CPTII,S$GLB,, | Performed by: PLASTIC SURGERY

## 2023-11-15 PROCEDURE — 99024 POSTOP FOLLOW-UP VISIT: CPT | Mod: S$GLB,,, | Performed by: PLASTIC SURGERY

## 2023-11-15 PROCEDURE — 3078F DIAST BP <80 MM HG: CPT | Mod: CPTII,S$GLB,, | Performed by: PLASTIC SURGERY

## 2023-11-15 RX ORDER — LEVOFLOXACIN 750 MG/1
750 TABLET ORAL DAILY
Qty: 10 TABLET | Refills: 0 | Status: SHIPPED | OUTPATIENT
Start: 2023-11-15 | End: 2023-11-25

## 2023-11-15 RX ORDER — OXYCODONE AND ACETAMINOPHEN 5; 325 MG/1; MG/1
1 TABLET ORAL EVERY 8 HOURS PRN
Qty: 15 TABLET | Refills: 0 | Status: SHIPPED | OUTPATIENT
Start: 2023-11-15 | End: 2023-11-20
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
## 2023-11-20 ENCOUNTER — OFFICE VISIT (OUTPATIENT)
Dept: PLASTIC SURGERY | Facility: CLINIC | Age: 63
End: 2023-11-20
Attending: PLASTIC SURGERY
Payer: COMMERCIAL

## 2023-11-20 VITALS
DIASTOLIC BLOOD PRESSURE: 55 MMHG | WEIGHT: 168 LBS | BODY MASS INDEX: 30.91 KG/M2 | HEART RATE: 63 BPM | SYSTOLIC BLOOD PRESSURE: 116 MMHG | HEIGHT: 62 IN

## 2023-11-20 DIAGNOSIS — Z85.3 HISTORY OF BREAST CANCER: Primary | ICD-10-CM

## 2023-11-20 PROCEDURE — 99024 PR POST-OP FOLLOW-UP VISIT: ICD-10-PCS | Mod: S$GLB,,, | Performed by: PLASTIC SURGERY

## 2023-11-20 PROCEDURE — 99024 POSTOP FOLLOW-UP VISIT: CPT | Mod: S$GLB,,, | Performed by: PLASTIC SURGERY

## 2023-11-20 PROCEDURE — 3074F PR MOST RECENT SYSTOLIC BLOOD PRESSURE < 130 MM HG: ICD-10-PCS | Mod: CPTII,S$GLB,, | Performed by: PLASTIC SURGERY

## 2023-11-20 PROCEDURE — 3074F SYST BP LT 130 MM HG: CPT | Mod: CPTII,S$GLB,, | Performed by: PLASTIC SURGERY

## 2023-11-20 PROCEDURE — 3078F DIAST BP <80 MM HG: CPT | Mod: CPTII,S$GLB,, | Performed by: PLASTIC SURGERY

## 2023-11-20 PROCEDURE — 3078F PR MOST RECENT DIASTOLIC BLOOD PRESSURE < 80 MM HG: ICD-10-PCS | Mod: CPTII,S$GLB,, | Performed by: PLASTIC SURGERY

## 2023-11-20 NOTE — PROGRESS NOTES
Lucia Matias is s/p bilateral mastectomy, NEHA breast reconstruction w/ 2nd and 3rd stages most recently 8/29/23 w/ L breast abscess/fat necrosis now s/p L breast I&D here for follow up. Pt has been taking antibiotics and performing daily vashe gauze packings; pain has improved vs last week, although she still notes malodorous drainage although it has also improved.     Medial L breast wound appears clean with minimal drainage and no purulence, improved erythema and induration/firmness the medial/superior L breast. to the  no fluctuance/collections or signs of abscess. Incisions otherwise well healed.     Micro:  Cultures with +pseudomonas, susceptible to levofloxacin.    Wound repacked with damp to gauze dressing, will change to 0.25% dakins solution for dressing changes; prescription sent, continue levofloxacin. Follow up 2 weeks.    Linden Coles MD  Plastic & Reconstructive Surgery  [phone]

## 2023-11-27 LAB — FUNGUS SPEC CULT: NORMAL

## 2023-11-28 ENCOUNTER — OFFICE VISIT (OUTPATIENT)
Dept: PLASTIC SURGERY | Facility: CLINIC | Age: 63
End: 2023-11-28
Attending: PLASTIC SURGERY
Payer: COMMERCIAL

## 2023-11-28 ENCOUNTER — EXTERNAL HOME HEALTH (OUTPATIENT)
Dept: HOME HEALTH SERVICES | Facility: HOSPITAL | Age: 63
End: 2023-11-28
Payer: COMMERCIAL

## 2023-11-28 VITALS — SYSTOLIC BLOOD PRESSURE: 139 MMHG | DIASTOLIC BLOOD PRESSURE: 83 MMHG | HEART RATE: 66 BPM

## 2023-11-28 DIAGNOSIS — Z85.3 HISTORY OF BREAST CANCER: Primary | ICD-10-CM

## 2023-11-28 PROCEDURE — 3079F DIAST BP 80-89 MM HG: CPT | Mod: CPTII,S$GLB,, | Performed by: PLASTIC SURGERY

## 2023-11-28 PROCEDURE — 1159F PR MEDICATION LIST DOCUMENTED IN MEDICAL RECORD: ICD-10-PCS | Mod: CPTII,S$GLB,, | Performed by: PLASTIC SURGERY

## 2023-11-28 PROCEDURE — 1159F MED LIST DOCD IN RCRD: CPT | Mod: CPTII,S$GLB,, | Performed by: PLASTIC SURGERY

## 2023-11-28 PROCEDURE — 99024 PR POST-OP FOLLOW-UP VISIT: ICD-10-PCS | Mod: S$GLB,,, | Performed by: PLASTIC SURGERY

## 2023-11-28 PROCEDURE — 3075F PR MOST RECENT SYSTOLIC BLOOD PRESS GE 130-139MM HG: ICD-10-PCS | Mod: CPTII,S$GLB,, | Performed by: PLASTIC SURGERY

## 2023-11-28 PROCEDURE — 99024 POSTOP FOLLOW-UP VISIT: CPT | Mod: S$GLB,,, | Performed by: PLASTIC SURGERY

## 2023-11-28 PROCEDURE — 3075F SYST BP GE 130 - 139MM HG: CPT | Mod: CPTII,S$GLB,, | Performed by: PLASTIC SURGERY

## 2023-11-28 PROCEDURE — 3079F PR MOST RECENT DIASTOLIC BLOOD PRESSURE 80-89 MM HG: ICD-10-PCS | Mod: CPTII,S$GLB,, | Performed by: PLASTIC SURGERY

## 2023-11-28 RX ORDER — OXYCODONE AND ACETAMINOPHEN 5; 325 MG/1; MG/1
1 TABLET ORAL EVERY 4 HOURS PRN
Qty: 20 TABLET | Refills: 0 | Status: SHIPPED | OUTPATIENT
Start: 2023-11-28 | End: 2023-12-19 | Stop reason: SDUPTHER

## 2023-11-28 NOTE — PROGRESS NOTES
Patient presents follow-up.  She is status post second-stage breast reconstruction unfortunately was complicated by delayed infection    Finally got a hold of Dakin's which she had been packing of the open wound of the left breast     She just completed yesterday her Levaquin treatment     On exam:  The right reconstructed breast is soft with no erythema edema of the skin paddle well-perfused     On the left side no erythema     The area packing has decreased incised     The still some slight tenderness     Assessment:  Stable     Plan continue b.i.d. wet-to-dry with Dakin's    I renewed the Percocet prescription    We will see her again in 1 week from now

## 2023-12-05 ENCOUNTER — OFFICE VISIT (OUTPATIENT)
Dept: PLASTIC SURGERY | Facility: CLINIC | Age: 63
End: 2023-12-05
Attending: PLASTIC SURGERY
Payer: COMMERCIAL

## 2023-12-05 DIAGNOSIS — T81.42XD INFECTION OF DEEP INCISIONAL SURGICAL SITE AFTER PROCEDURE, SUBSEQUENT ENCOUNTER: Primary | ICD-10-CM

## 2023-12-05 PROCEDURE — 99024 POSTOP FOLLOW-UP VISIT: CPT | Mod: S$GLB,,, | Performed by: PLASTIC SURGERY

## 2023-12-05 PROCEDURE — 99024 PR POST-OP FOLLOW-UP VISIT: ICD-10-PCS | Mod: S$GLB,,, | Performed by: PLASTIC SURGERY
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
## 2023-12-05 NOTE — PROGRESS NOTES
Patient presents follow-up.  She is been packing the left breast wound using diluted Dakin's     She does report that the smell is significantly improved     There was intermittent drainage of what appears to be fat necrosis     On exam:  Erythema is basically resolved     There is still a small area of firmness medially and laterally     Upon probing of the wound there appears to be less tunneling     This appears to be good granulation tissue bed as well     Assessment:  Stable     Plan continue b.i.d. packing     We will see her again in 2 weeks from now

## 2023-12-06 ENCOUNTER — CLINICAL SUPPORT (OUTPATIENT)
Dept: HEMATOLOGY/ONCOLOGY | Facility: CLINIC | Age: 63
End: 2023-12-06
Payer: COMMERCIAL

## 2023-12-06 VITALS — HEIGHT: 62 IN | BODY MASS INDEX: 30.91 KG/M2 | WEIGHT: 168 LBS

## 2023-12-06 DIAGNOSIS — C50.112 MALIGNANT NEOPLASM OF CENTRAL PORTION OF LEFT BREAST IN FEMALE, ESTROGEN RECEPTOR POSITIVE: ICD-10-CM

## 2023-12-06 DIAGNOSIS — E66.09 CLASS 1 OBESITY DUE TO EXCESS CALORIES WITHOUT SERIOUS COMORBIDITY WITH BODY MASS INDEX (BMI) OF 33.0 TO 33.9 IN ADULT: ICD-10-CM

## 2023-12-06 DIAGNOSIS — E66.9 CLASS 1 OBESITY WITHOUT SERIOUS COMORBIDITY WITH BODY MASS INDEX (BMI) OF 30.0 TO 30.9 IN ADULT, UNSPECIFIED OBESITY TYPE: ICD-10-CM

## 2023-12-06 DIAGNOSIS — Z17.0 MALIGNANT NEOPLASM OF CENTRAL PORTION OF LEFT BREAST IN FEMALE, ESTROGEN RECEPTOR POSITIVE: ICD-10-CM

## 2023-12-06 DIAGNOSIS — Z71.3 NUTRITIONAL COUNSELING: Primary | ICD-10-CM

## 2023-12-06 DIAGNOSIS — Z79.811 AROMATASE INHIBITOR USE: ICD-10-CM

## 2023-12-06 PROCEDURE — 97802 MEDICAL NUTRITION INDIV IN: CPT | Mod: 95,,, | Performed by: DIETITIAN, REGISTERED

## 2023-12-06 PROCEDURE — 97802 PR MED NUTR THER, 1ST, INDIV, EA 15 MIN: ICD-10-PCS | Mod: 95,,, | Performed by: DIETITIAN, REGISTERED

## 2023-12-07 ENCOUNTER — PATIENT MESSAGE (OUTPATIENT)
Dept: ADMINISTRATIVE | Facility: HOSPITAL | Age: 63
End: 2023-12-07
Payer: COMMERCIAL

## 2023-12-11 NOTE — PROGRESS NOTES
Oncology Clinic   Progress Note    Patient: Lucia Matias  MRN: [id number]  Date: 2023    Chief Complaint: HR+ breast cancer    Ms. Matias is a domo 63yo woman with recently diagnosed HR+ breast cancer who presents today for evaluation. Her oncologic history is as follows:    Oncologic History:   22: annual mammogram indentified left focal asymmetry at the upper outer position.   Follow-up mammogram and ultrasound on 22 showed a worrisome spiculated mass, 1.4 x 0.7 x 0.6 cm, 12 o'clock left breast 7 CMFN. An ultrasound guided biopsy was performed on 12/15/22 with pathology revealing infiltrating ductal carcinoma of the breast. Grade 2, ER >95%, CT 85-90%, Her2 1+, Ki67 25-30%  2023: MRI breast shwoed Left breast 12 mm x 11 mm x 7 mm mass at the middle 12 o'clock position. Several pulmonary nodules are noted incidentally in the left hemithorax.  The patient has a history of bilateral pulmonary nodule seen on previous thoracic CT exams most recently in .  One of the nodules underwent biopsy in  with benign results.    Underwent b/l mastectomies with SLN bx 2/15/2023 with bilateral breast reconstruction with abdominally based free flaps. He postoperative course was complicated by L pneumothorax.  Post op path showed Invasive lobular carcinoma in left breast grade 2 measuring 18 mm with LCIS Grade 2 ER CT positive and Her2 negative. pT1c pN0. Oncotype 35  C1D1 adjuvanct TC on ; completed 4 cycles on   Started anastrozole 2023    GYN History:  Age of menarche was 13. Age of menopause was 46.  Patient denies hormonal therapy but took OCP for approximately 15 years in the past. Patient is . Age of first live birth was 25. Patient did breast feed for 6 months. S/p uterine ablation for fibroids in early 40s, no menstrual cycle since. Had menopausal symptoms in mid-late 40s.       Interval History:  Ms. Matias returns today for follow up. She has been doing fairly  well since her last visit, although with some difficulty with left-sided breast infection requiring abscess drainage. She is currently working from home and packing her wound as needed; following with wound care.  Continues to tolerate anastrozole without difficulty. Notes that her hot flashes have improved. Taking magnesium at night. Otherwise she is doing well and denies complaints.        Medications:  Current Outpatient Medications   Medication Sig Dispense Refill    anastrozole (ARIMIDEX) 1 mg Tab Take 1 tablet (1 mg total) by mouth once daily. 90 tablet 3    apixaban (ELIQUIS) 5 mg Tab Take 1 tablet (5 mg total) by mouth 2 (two) times daily. 180 tablet 3    atorvastatin (LIPITOR) 20 MG tablet Take 1 tablet (20 mg total) by mouth every evening. 90 tablet 3    B-complex with vitamin C (VITAMIN B COMPLEX-C ORAL) Take by mouth Daily.      calcium-vitamin D 250-100 mg-unit per tablet Take 1 tablet by mouth 2 (two) times daily.      cyanocobalamin 500 MCG tablet Take 500 mcg by mouth once daily.      diazePAM (VALIUM) 5 MG tablet Take 1 tablet (5 mg total) by mouth daily as needed for Anxiety. 30 tablet 2    fluocinonide-emollient (FLUOCINONIDE-E) 0.05 % Crea AAA of feet daily prn psoriasis. 60 g 3    fluticasone-umeclidin-vilanter (TRELEGY ELLIPTA) 100-62.5-25 mcg DsDv Inhale 1 puff into the lungs once daily. 180 each 3    metoprolol tartrate (LOPRESSOR) 25 MG tablet Take 1 tablet (25 mg total) by mouth 2 (two) times daily. 180 tablet 3    multivitamin (THERAGRAN) per tablet Take 1 tablet by mouth once daily.      multivitamin with minerals (HAIR,SKIN AND NAILS ORAL) Take by mouth.      mv-mn/iron/folic acid/herb 190 (VITAMIN D3 COMPLETE ORAL) Take by mouth.      omeprazole (PRILOSEC) 40 MG capsule Take 1 capsule (40 mg total) by mouth every morning. 90 capsule 1    ondansetron (ZOFRAN) 8 MG tablet Take 1 tablet (8 mg total) by mouth every 12 (twelve) hours as needed for Nausea. 30 tablet 2     oxyCODONE-acetaminophen (PERCOCET) 5-325 mg per tablet Take 1 tablet by mouth every 4 (four) hours as needed for Pain. 20 tablet 0    QUEtiapine (SEROQUEL) 50 MG tablet Take 1 tablet (50 mg total) by mouth every evening. 30 tablet 11    TALTZ AUTOINJECTOR 80 mg/mL AtIn Inject 80 mg into the skin every 28 days. 1 mL 6    TALTZ AUTOINJECTOR, 2 PACK, 80 mg/mL AtIn Inject 80mg into the skin every other week (weeks 4, 6, 8, 10). 2 mL 1    TALTZ AUTOINJECTOR, 3 PACK, 80 mg/mL AtIn Inject 160mg (2 pens) into the skin at week 0 , then inject 80mg into the skin at week 2. 3 mL 0    triamcinolone acetonide 0.025% (KENALOG) 0.025 % cream AAA of skin folds bid prn psoriasis. 80 g 3    triamcinolone acetonide 0.1% (KENALOG) 0.1 % cream Apply to affected areas of body BID prn psoriasis. Do not use on face or skin folds. 454 g 1    venlafaxine (EFFEXOR-XR) 75 MG 24 hr capsule Take 1 capsule (75 mg total) by mouth once daily. Start on Week 2 90 capsule 3     No current facility-administered medications for this visit.     Facility-Administered Medications Ordered in Other Visits   Medication Dose Route Frequency Provider Last Rate Last Admin    lactated ringers infusion   Intravenous Continuous Yahaira Barnard MD   New Bag at 03/29/23 0638    LIDOcaine (PF) 10 mg/ml (1%) injection 5 mg  0.5 mL Intradermal Once Yahaira Barnard MD        LIDOcaine HCL 10 mg/ml (1%) 50 mL, EPINEPHrine 1 mg in lactated Ringers 1,000 mL irrigation   Irrigation On Call Procedure Ming Milian MD        LIDOcaine HCL 10 mg/ml (1%) 50 mL, EPINEPHrine 1 mg in lactated Ringers 1,000 mL irrigation   Irrigation On Call Procedure Ming Milian MD        LIDOcaine HCL 10 mg/ml (1%) 50 mL, EPINEPHrine 1 mg in lactated Ringers 1,000 mL irrigation   Irrigation On Call Procedure Ming Milian MD         Review of Systems:  +hot flashes  +L breast pain  12pt ROS negative except as noted above    Objective:     Vitals:    12/12/23 1329   BP: (!) 175/72  "  Pulse: 68   Resp: 18   SpO2: 97%   Weight: 76.5 kg (168 lb 8.7 oz)   Height: 5' 2" (1.575 m)       BMI: Body mass index is 30.83 kg/m².     Physical Exam:  ECOG 0   General: well appearing, in no apparent distress  HEENT: Normocephalic, EOMI, anicteric sclerae, MMM  Neck: supple, without cervical or supraclavicular lymphadenopathy.  Heart: regular rate and rhythm, normal S1 and S2, no murmurs, gallops or rubs.  Lungs: Clear to auscultation bilaterally, no increased wob  Breast: s/p bilateral mastectomy with flap reconstruction, Rt breast incisions well-healed. L breast with small opening at 6oclock beneath breast with visible packing.  Abdomen: Soft, nontender, nondistended with normal bowel sounds. Abdominal incision c/d/I. No hepatosplenomegaly.  Extremities: No LE edema or joint effusion. Picc in place in RUE  Skin: warm, well-perfused, no rash. Erythematous plaques noted on back of hands and Rt elbow  Neurologic: Alert and oriented x 4, normal speech and gait   Psychiatric: Conversing appropriately with providers throughout today's encounter.    Laboratory Data:  I personally reviewed all recent labs, imaging and pathology.    Assessment and Plan:   Ms. Matias is a domo 62yo woman with hx of Aflutter s/p ablation, COPD, RA and recently diagnosed Stage IA HR+ breast cancer s/p bilateral mastectomy with reconstruction who presents today for evaluation.     Given her Oncotype of 35, we proceeded with adjuvant docetaxel 75mg/m2 and cyclophosphamide 600mg/m2 iv every 21 days for a total of 4 cycles. Cycle 1 was complicated by neutropenic fever in the setting of E coli bacteremia and parainfluenza. She completed the remainder of her 4 cycles of TC without difficulty.    She has since initiated ET with anastrozole and continues to tolerate well thus far.       #Breast cancer:  --cont anastrozole 1mg daily (SOT 7/2023--); goal treatment of 5 years. Given high oncotype and consider BCI near 5 year saige  --cont " Ca/VitD supplementation as above  --no role for screening MMG s/p bilateral mastectomy  --RTC 4 mo    #Osteopenia: noted on DEXA 7/25/23  --encouraged Ca/VitD supplementation as above    --will repeat DEXA 7/2025    #Hot flashes: improved  --cont magnesium glycinate  --referral placed to integrative onc; has upcoming appt  --pt already taking effexor  --discussed veoazah; will hold off on ordering for now but she will reach out if symptoms do not improve and she would like to pursue     #Psoriasis/psoriatic arthritis:  --has now resumed cosentyx with improvement in symptoms  --cont to follow with derm      #Aflutter s/p RFA: following with cardiology  --remains on eliquis  --follow up with cards as scheduled      All questions were answered to her apparent satisfaction. Will plan to see her back in 4mo or sooner should the need arise.     Roya Madrid MD      Med Onc Chart Routing      Follow up with physician 4 months.   Follow up with LINDY    Infusion scheduling note    Injection scheduling note    Labs None   Scheduling:  Preferred lab:  Lab interval:     Imaging None      Pharmacy appointment No pharmacy appointment needed      Other referrals no referral to Oncology Primary Care needed -  no Massage appointment needed    No additional referrals needed                      Total time of this visit, including time spent face to face with patient and/or via video/audio, and also in preparing for today's visit for MDM and documentation. (Medical Decision Making, including consideration of possible diagnoses, management options, complex medical record review, review of diagnostic tests and information, consideration and discussion of significant complications based on comorbidities, and discussion with providers involved with the care of the patient) 40 minutes. Greater than 50% was spent face to face with the patient counseling and coordinating care.

## 2023-12-12 ENCOUNTER — OFFICE VISIT (OUTPATIENT)
Dept: HEMATOLOGY/ONCOLOGY | Facility: CLINIC | Age: 63
End: 2023-12-12
Payer: COMMERCIAL

## 2023-12-12 VITALS
HEIGHT: 62 IN | DIASTOLIC BLOOD PRESSURE: 72 MMHG | WEIGHT: 168.56 LBS | HEART RATE: 68 BPM | SYSTOLIC BLOOD PRESSURE: 175 MMHG | RESPIRATION RATE: 18 BRPM | OXYGEN SATURATION: 97 % | BODY MASS INDEX: 31.02 KG/M2

## 2023-12-12 DIAGNOSIS — Z79.811 AROMATASE INHIBITOR USE: ICD-10-CM

## 2023-12-12 DIAGNOSIS — L40.50 PSORIATIC ARTHRITIS: ICD-10-CM

## 2023-12-12 DIAGNOSIS — C50.112 MALIGNANT NEOPLASM OF CENTRAL PORTION OF LEFT BREAST IN FEMALE, ESTROGEN RECEPTOR POSITIVE: Primary | ICD-10-CM

## 2023-12-12 DIAGNOSIS — R23.2 HOT FLASHES: ICD-10-CM

## 2023-12-12 DIAGNOSIS — Z98.890 STATUS POST ABLATION OF ATRIAL FLUTTER: ICD-10-CM

## 2023-12-12 DIAGNOSIS — Z17.0 MALIGNANT NEOPLASM OF CENTRAL PORTION OF LEFT BREAST IN FEMALE, ESTROGEN RECEPTOR POSITIVE: Primary | ICD-10-CM

## 2023-12-12 DIAGNOSIS — R53.83 FATIGUE, UNSPECIFIED TYPE: ICD-10-CM

## 2023-12-12 DIAGNOSIS — I10 ESSENTIAL HYPERTENSION: ICD-10-CM

## 2023-12-12 DIAGNOSIS — Z86.79 STATUS POST ABLATION OF ATRIAL FLUTTER: ICD-10-CM

## 2023-12-12 DIAGNOSIS — F41.1 GAD (GENERALIZED ANXIETY DISORDER): ICD-10-CM

## 2023-12-12 PROCEDURE — 99999 PR PBB SHADOW E&M-EST. PATIENT-LVL III: ICD-10-PCS | Mod: PBBFAC,,, | Performed by: STUDENT IN AN ORGANIZED HEALTH CARE EDUCATION/TRAINING PROGRAM

## 2023-12-12 PROCEDURE — 3078F PR MOST RECENT DIASTOLIC BLOOD PRESSURE < 80 MM HG: ICD-10-PCS | Mod: CPTII,S$GLB,, | Performed by: STUDENT IN AN ORGANIZED HEALTH CARE EDUCATION/TRAINING PROGRAM

## 2023-12-12 PROCEDURE — 99999 PR PBB SHADOW E&M-EST. PATIENT-LVL III: CPT | Mod: PBBFAC,,, | Performed by: STUDENT IN AN ORGANIZED HEALTH CARE EDUCATION/TRAINING PROGRAM

## 2023-12-12 PROCEDURE — 3078F DIAST BP <80 MM HG: CPT | Mod: CPTII,S$GLB,, | Performed by: STUDENT IN AN ORGANIZED HEALTH CARE EDUCATION/TRAINING PROGRAM

## 2023-12-12 PROCEDURE — 99215 OFFICE O/P EST HI 40 MIN: CPT | Mod: S$GLB,,, | Performed by: STUDENT IN AN ORGANIZED HEALTH CARE EDUCATION/TRAINING PROGRAM

## 2023-12-12 PROCEDURE — 3008F BODY MASS INDEX DOCD: CPT | Mod: CPTII,S$GLB,, | Performed by: STUDENT IN AN ORGANIZED HEALTH CARE EDUCATION/TRAINING PROGRAM

## 2023-12-12 PROCEDURE — 3077F PR MOST RECENT SYSTOLIC BLOOD PRESSURE >= 140 MM HG: ICD-10-PCS | Mod: CPTII,S$GLB,, | Performed by: STUDENT IN AN ORGANIZED HEALTH CARE EDUCATION/TRAINING PROGRAM

## 2023-12-12 PROCEDURE — 3008F PR BODY MASS INDEX (BMI) DOCUMENTED: ICD-10-PCS | Mod: CPTII,S$GLB,, | Performed by: STUDENT IN AN ORGANIZED HEALTH CARE EDUCATION/TRAINING PROGRAM

## 2023-12-12 PROCEDURE — 1159F MED LIST DOCD IN RCRD: CPT | Mod: CPTII,S$GLB,, | Performed by: STUDENT IN AN ORGANIZED HEALTH CARE EDUCATION/TRAINING PROGRAM

## 2023-12-12 PROCEDURE — 3077F SYST BP >= 140 MM HG: CPT | Mod: CPTII,S$GLB,, | Performed by: STUDENT IN AN ORGANIZED HEALTH CARE EDUCATION/TRAINING PROGRAM

## 2023-12-12 PROCEDURE — 1159F PR MEDICATION LIST DOCUMENTED IN MEDICAL RECORD: ICD-10-PCS | Mod: CPTII,S$GLB,, | Performed by: STUDENT IN AN ORGANIZED HEALTH CARE EDUCATION/TRAINING PROGRAM

## 2023-12-12 PROCEDURE — 99215 PR OFFICE/OUTPT VISIT, EST, LEVL V, 40-54 MIN: ICD-10-PCS | Mod: S$GLB,,, | Performed by: STUDENT IN AN ORGANIZED HEALTH CARE EDUCATION/TRAINING PROGRAM

## 2023-12-14 LAB
ACID FAST MOD KINY STN SPEC: NORMAL
MYCOBACTERIUM SPEC QL CULT: NORMAL

## 2023-12-18 ENCOUNTER — PATIENT MESSAGE (OUTPATIENT)
Dept: ADMINISTRATIVE | Facility: HOSPITAL | Age: 63
End: 2023-12-18
Payer: COMMERCIAL

## 2023-12-18 ENCOUNTER — PATIENT OUTREACH (OUTPATIENT)
Dept: ADMINISTRATIVE | Facility: HOSPITAL | Age: 63
End: 2023-12-18
Payer: COMMERCIAL

## 2023-12-19 RX ORDER — OXYCODONE AND ACETAMINOPHEN 5; 325 MG/1; MG/1
1 TABLET ORAL EVERY 8 HOURS PRN
Qty: 21 TABLET | Refills: 0 | Status: SHIPPED | OUTPATIENT
Start: 2023-12-19 | End: 2024-01-03

## 2023-12-20 ENCOUNTER — OFFICE VISIT (OUTPATIENT)
Dept: PLASTIC SURGERY | Facility: CLINIC | Age: 63
End: 2023-12-20
Attending: PLASTIC SURGERY
Payer: COMMERCIAL

## 2023-12-20 ENCOUNTER — TELEPHONE (OUTPATIENT)
Dept: OBSTETRICS AND GYNECOLOGY | Facility: CLINIC | Age: 63
End: 2023-12-20
Payer: COMMERCIAL

## 2023-12-20 VITALS — HEART RATE: 95 BPM | SYSTOLIC BLOOD PRESSURE: 139 MMHG | DIASTOLIC BLOOD PRESSURE: 65 MMHG

## 2023-12-20 DIAGNOSIS — Z17.0 MALIGNANT NEOPLASM OF CENTRAL PORTION OF LEFT BREAST IN FEMALE, ESTROGEN RECEPTOR POSITIVE: Primary | ICD-10-CM

## 2023-12-20 DIAGNOSIS — S21.002D WOUND OF LEFT BREAST, SUBSEQUENT ENCOUNTER: ICD-10-CM

## 2023-12-20 DIAGNOSIS — C50.112 MALIGNANT NEOPLASM OF CENTRAL PORTION OF LEFT BREAST IN FEMALE, ESTROGEN RECEPTOR POSITIVE: Primary | ICD-10-CM

## 2023-12-20 PROCEDURE — 99024 POSTOP FOLLOW-UP VISIT: CPT | Mod: S$GLB,,, | Performed by: PLASTIC SURGERY

## 2023-12-20 PROCEDURE — 3075F PR MOST RECENT SYSTOLIC BLOOD PRESS GE 130-139MM HG: ICD-10-PCS | Mod: CPTII,S$GLB,, | Performed by: PLASTIC SURGERY

## 2023-12-20 PROCEDURE — 3075F SYST BP GE 130 - 139MM HG: CPT | Mod: CPTII,S$GLB,, | Performed by: PLASTIC SURGERY

## 2023-12-20 PROCEDURE — 3078F DIAST BP <80 MM HG: CPT | Mod: CPTII,S$GLB,, | Performed by: PLASTIC SURGERY

## 2023-12-20 PROCEDURE — 99024 PR POST-OP FOLLOW-UP VISIT: ICD-10-PCS | Mod: S$GLB,,, | Performed by: PLASTIC SURGERY

## 2023-12-20 PROCEDURE — 3078F PR MOST RECENT DIASTOLIC BLOOD PRESSURE < 80 MM HG: ICD-10-PCS | Mod: CPTII,S$GLB,, | Performed by: PLASTIC SURGERY

## 2023-12-20 NOTE — PROGRESS NOTES
Plastic Surgery Clinic Note    Patient is s/p bilateral SSM with NEHA flap reconstruction, second stage with left flap vs fat graft necrosis with chronic draining wound.    She complains of firmness in the left breast, no fevers, chills or odor.  Cleaning with BID dressing changes.  Has home health nurse assistance.  Not on antibiotics.    On exam she is well-appearing, well-developed, Aox3    Right breast soft, all incisions well healed    Left breast smaller and there is firmness along the IMF and medial breast.  No eryhema, warmth or drainage.  Packing removed with 2cm skin hole that undermines 50% less than previous exam.  No evidence of infection.    A/P:  Patient is s/p bilateral SSM with NEHA flap reconstruction, second stage with left flap vs fat graft necrosis with chronic draining wound.    No evidence of infection on exam, the wound is granulating in from the inside with reduced undermining.      Continue BID dressing changes.  No antibiotics given at this time.    RTC 3 weeks.  Ok to return to work 1/1/24    David Fang MD  Plastic and Reconstructive Surgery, Fellow

## 2023-12-20 NOTE — NURSING
Patient phoned to confirm details and appt time/location for tomorrow morning as well to check in on any concerns and current symptoms.   Appt has been confirmed as well as [facility] 1/18/24 with Dr. Bowman.     NORMA Ko conducted chart review for clinic preparations including intake, barriers to care and opportunities for greater evaluation/recommendations by provider, Romain Ramirez, NORMA.

## 2023-12-21 ENCOUNTER — OFFICE VISIT (OUTPATIENT)
Dept: OBSTETRICS AND GYNECOLOGY | Facility: CLINIC | Age: 63
End: 2023-12-21
Attending: OBSTETRICS & GYNECOLOGY
Payer: COMMERCIAL

## 2023-12-21 VITALS
DIASTOLIC BLOOD PRESSURE: 81 MMHG | BODY MASS INDEX: 30.91 KG/M2 | WEIGHT: 168 LBS | SYSTOLIC BLOOD PRESSURE: 127 MMHG | HEART RATE: 62 BPM | HEIGHT: 62 IN

## 2023-12-21 DIAGNOSIS — N95.1 MENOPAUSAL SYMPTOM: ICD-10-CM

## 2023-12-21 DIAGNOSIS — Z85.3 HISTORY OF BREAST CANCER: ICD-10-CM

## 2023-12-21 DIAGNOSIS — C50.112 MALIGNANT NEOPLASM OF CENTRAL PORTION OF LEFT BREAST IN FEMALE, ESTROGEN RECEPTOR POSITIVE: ICD-10-CM

## 2023-12-21 DIAGNOSIS — Z17.0 MALIGNANT NEOPLASM OF CENTRAL PORTION OF LEFT BREAST IN FEMALE, ESTROGEN RECEPTOR POSITIVE: ICD-10-CM

## 2023-12-21 DIAGNOSIS — Z15.89 MONOALLELIC MUTATION OF MUTYH GENE: Primary | ICD-10-CM

## 2023-12-21 DIAGNOSIS — Z79.811 USE OF AROMATASE INHIBITORS: ICD-10-CM

## 2023-12-21 PROCEDURE — 99999 PR PBB SHADOW E&M-EST. PATIENT-LVL V: CPT | Mod: PBBFAC,,, | Performed by: OBSTETRICS & GYNECOLOGY

## 2023-12-21 PROCEDURE — 1159F MED LIST DOCD IN RCRD: CPT | Mod: CPTII,S$GLB,, | Performed by: OBSTETRICS & GYNECOLOGY

## 2023-12-21 PROCEDURE — 3079F PR MOST RECENT DIASTOLIC BLOOD PRESSURE 80-89 MM HG: ICD-10-PCS | Mod: CPTII,S$GLB,, | Performed by: OBSTETRICS & GYNECOLOGY

## 2023-12-21 PROCEDURE — 3008F PR BODY MASS INDEX (BMI) DOCUMENTED: ICD-10-PCS | Mod: CPTII,S$GLB,, | Performed by: OBSTETRICS & GYNECOLOGY

## 2023-12-21 PROCEDURE — 99999 PR PBB SHADOW E&M-EST. PATIENT-LVL V: ICD-10-PCS | Mod: PBBFAC,,, | Performed by: OBSTETRICS & GYNECOLOGY

## 2023-12-21 PROCEDURE — 99204 PR OFFICE/OUTPT VISIT, NEW, LEVL IV, 45-59 MIN: ICD-10-PCS | Mod: S$GLB,,, | Performed by: OBSTETRICS & GYNECOLOGY

## 2023-12-21 PROCEDURE — 99204 OFFICE O/P NEW MOD 45 MIN: CPT | Mod: S$GLB,,, | Performed by: OBSTETRICS & GYNECOLOGY

## 2023-12-21 PROCEDURE — 3008F BODY MASS INDEX DOCD: CPT | Mod: CPTII,S$GLB,, | Performed by: OBSTETRICS & GYNECOLOGY

## 2023-12-21 PROCEDURE — 3074F PR MOST RECENT SYSTOLIC BLOOD PRESSURE < 130 MM HG: ICD-10-PCS | Mod: CPTII,S$GLB,, | Performed by: OBSTETRICS & GYNECOLOGY

## 2023-12-21 PROCEDURE — 1159F PR MEDICATION LIST DOCUMENTED IN MEDICAL RECORD: ICD-10-PCS | Mod: CPTII,S$GLB,, | Performed by: OBSTETRICS & GYNECOLOGY

## 2023-12-21 PROCEDURE — 3079F DIAST BP 80-89 MM HG: CPT | Mod: CPTII,S$GLB,, | Performed by: OBSTETRICS & GYNECOLOGY

## 2023-12-21 PROCEDURE — 3074F SYST BP LT 130 MM HG: CPT | Mod: CPTII,S$GLB,, | Performed by: OBSTETRICS & GYNECOLOGY

## 2023-12-21 NOTE — PROGRESS NOTES
Subjective:      Lucia Matias is a 63 y.o. post-menopausal female who presents to establish Survivorship. She is referred by Dr. Bridgett Madrid of Hematology Oncology for hot flashes and arthralgias on Arimidex therapy. 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 Oncotype was high at 35. Adjuvant TC x 4 cycles completed 6/21/23. Anastrazole was initiated in July of 2023.     Patient has a pertinent history of MUTYH mutation, Aflutter s/p RFA, COPD, RA, HLD, Pancreatitis, Gastric sleeve, osteopenia, pulmonary nodules and prior pneumothorax. She maintains Eliquis therapy.    She reports that she is still having mainly night sweats and occasional hot flashes during the day she reports that sleep is an issue for her.  She has tried different medications with her psychiatrist but nothing has helped.  She does have joint pain related both to her aromatase inhibitor and her psoriatic arthritis.  She is interested in acupuncture.  She does have a Peloton at home which she acknowledges she should be using for her exercise.  She had a gastric sleeve in March 2021 and has lost 75 lb.  She did meet with a nutritionist which she found helpful  She has had a series of stressful events over the last 6-7 years    PCP: Hetal Banks MD    Routine labs: 10/27/23  WWE: 1/18/24  Pap smear: 12/5/2018  Mammogram: bilateral mastectomy  DEXA: 7/25/23  Colonoscopy: overdue  Psych: MUKUND Morales., 6/1/23  Echo: 7/6/23    Admission on 10/24/2023, Discharged on 10/27/2023   Component Date Value Ref Range Status    WBC 10/24/2023 7.33  3.90 - 12.70 K/uL Final    RBC 10/24/2023 4.63  4.00 - 5.40 M/uL Final    Hemoglobin 10/24/2023 12.9  12.0 - 16.0 g/dL Final    Hematocrit 10/24/2023 41.0  37.0 - 48.5 % Final    MCV 10/24/2023 89  82 - 98 fL Final    MCH 10/24/2023 27.9  27.0 - 31.0 pg Final    MCHC 10/24/2023 31.5 (L)  32.0 - 36.0 g/dL Final    RDW  10/24/2023 13.3  11.5 - 14.5 % Final    Platelets 10/24/2023 288  150 - 450 K/uL Final    MPV 10/24/2023 8.5 (L)  9.2 - 12.9 fL Final    Immature Granulocytes 10/24/2023 0.3  0.0 - 0.5 % Final    Gran # (ANC) 10/24/2023 5.3  1.8 - 7.7 K/uL Final    Immature Grans (Abs) 10/24/2023 0.02  0.00 - 0.04 K/uL Final    Lymph # 10/24/2023 1.2  1.0 - 4.8 K/uL Final    Mono # 10/24/2023 0.6  0.3 - 1.0 K/uL Final    Eos # 10/24/2023 0.2  0.0 - 0.5 K/uL Final    Baso # 10/24/2023 0.05  0.00 - 0.20 K/uL Final    nRBC 10/24/2023 0  0 /100 WBC Final    Gran % 10/24/2023 72.3  38.0 - 73.0 % Final    Lymph % 10/24/2023 16.2 (L)  18.0 - 48.0 % Final    Mono % 10/24/2023 7.8  4.0 - 15.0 % Final    Eosinophil % 10/24/2023 2.7  0.0 - 8.0 % Final    Basophil % 10/24/2023 0.7  0.0 - 1.9 % Final    Differential Method 10/24/2023 Automated   Final    Sodium 10/24/2023 138  136 - 145 mmol/L Final    Potassium 10/24/2023 3.9  3.5 - 5.1 mmol/L Final    Chloride 10/24/2023 102  95 - 110 mmol/L Final    CO2 10/24/2023 24  23 - 29 mmol/L Final    Glucose 10/24/2023 100  70 - 110 mg/dL Final    BUN 10/24/2023 14  8 - 23 mg/dL Final    Creatinine 10/24/2023 1.1  0.5 - 1.4 mg/dL Final    Calcium 10/24/2023 9.9  8.7 - 10.5 mg/dL Final    Total Protein 10/24/2023 7.5  6.0 - 8.4 g/dL Final    Albumin 10/24/2023 3.7  3.5 - 5.2 g/dL Final    Total Bilirubin 10/24/2023 0.5  0.1 - 1.0 mg/dL Final    Alkaline Phosphatase 10/24/2023 99  55 - 135 U/L Final    AST 10/24/2023 19  10 - 40 U/L Final    ALT 10/24/2023 18  10 - 44 U/L Final    eGFR 10/24/2023 57 (A)  >60 mL/min/1.73 m^2 Final    Anion Gap 10/24/2023 12  8 - 16 mmol/L Final    CRP 10/24/2023 14.7 (H)  0.0 - 8.2 mg/L Final    Blood Culture, Routine 10/24/2023 No growth after 5 days.   Final    Blood Culture, Routine 10/24/2023 No growth after 5 days.   Final    Lactate (Lactic Acid) 10/24/2023 3.8 (HH)  0.5 - 2.2 mmol/L Final    Sodium 10/25/2023 139  136 - 145 mmol/L Final    Potassium 10/25/2023 4.0   3.5 - 5.1 mmol/L Final    Chloride 10/25/2023 110  95 - 110 mmol/L Final    CO2 10/25/2023 23  23 - 29 mmol/L Final    Glucose 10/25/2023 114 (H)  70 - 110 mg/dL Final    BUN 10/25/2023 11  8 - 23 mg/dL Final    Creatinine 10/25/2023 1.0  0.5 - 1.4 mg/dL Final    Calcium 10/25/2023 8.7  8.7 - 10.5 mg/dL Final    Anion Gap 10/25/2023 6 (L)  8 - 16 mmol/L Final    eGFR 10/25/2023 >60  >60 mL/min/1.73 m^2 Final    Sodium 10/26/2023 136  136 - 145 mmol/L Final    Potassium 10/26/2023 3.6  3.5 - 5.1 mmol/L Final    Chloride 10/26/2023 105  95 - 110 mmol/L Final    CO2 10/26/2023 23  23 - 29 mmol/L Final    Glucose 10/26/2023 116 (H)  70 - 110 mg/dL Final    BUN 10/26/2023 11  8 - 23 mg/dL Final    Creatinine 10/26/2023 0.9  0.5 - 1.4 mg/dL Final    Calcium 10/26/2023 8.7  8.7 - 10.5 mg/dL Final    Anion Gap 10/26/2023 8  8 - 16 mmol/L Final    eGFR 10/26/2023 >60  >60 mL/min/1.73 m^2 Final    Aerobic Bacterial Culture 10/26/2023  (A)   Final                    Value:PSEUDOMONAS AERUGINOSA  Rare      AFB Culture & Smear 10/26/2023 No growth after 6 weeks.   Final    AFB CULTURE STAIN 10/26/2023 No acid fast bacilli seen.   Final    Anaerobic Culture 10/26/2023 No anaerobes isolated   Final    Fungus (Mycology) Culture 10/26/2023 No fungus isolated after 4 weeks   Final    Gram Stain Result 10/26/2023 Rare WBC's   Final    Gram Stain Result 10/26/2023 No organisms seen   Final    Final Pathologic Diagnosis 10/26/2023    Final                    Value:Skin, left breast, excision:  - Breast skin and parenchyma with fat necrosis, inflammatory debris and focal necrosis  - No infectious organisms identified  - Please correlate these findings with results of concurrent culture studies (EP[facility], 10/26/2023)  - Negative for malignancy  - Additional levels were performed and examined      Microscopic Exam 10/26/2023    Final                    Value:Microscopic examination performed.    Immunostain for AE1/AE3 was performed and did  not highlight an invasive malignant neoplastic process.  Positive control and internal negative control for immunostain were examined and were appropriate.      Gross 10/26/2023    Final                    Value:Pathology ID: [id number]     :  1960    SURGERY MRN:  [id number]/PATHOLOGY MRN:  [id number]     PART 1:  Received fresh and subsequently placed in formalin labeled as &quot;skin excision left breast&quot; is a single unoriented elliptical excision of tan-gray skin (2.8 x 1.3 x 0.7 cm).  The epidermal surface is remarkable for a slightly ulcerative area (0.5   cm in greatest dimension).  The underlying soft tissue is tan-white to tan-gray, and slightly edematous.  Representative cross-sections of the epidermal area of interest are submitted in MSG--1-A.    -Mini Ferro MS, PA([facility])          Disclaimer 10/26/2023 Unless the case is a 'gross only' or additional testing only, the final diagnosis for each specimen is based on a microscopic examination of appropriate tissue sections.   Final    POCT Glucose 10/26/2023 148 (H)  70 - 110 mg/dL Final    Sodium 10/27/2023 138  136 - 145 mmol/L Final    Potassium 10/27/2023 4.4  3.5 - 5.1 mmol/L Final    Chloride 10/27/2023 107  95 - 110 mmol/L Final    CO2 10/27/2023 24  23 - 29 mmol/L Final    Glucose 10/27/2023 152 (H)  70 - 110 mg/dL Final    BUN 10/27/2023 16  8 - 23 mg/dL Final    Creatinine 10/27/2023 0.9  0.5 - 1.4 mg/dL Final    Calcium 10/27/2023 8.9  8.7 - 10.5 mg/dL Final    Anion Gap 10/27/2023 7 (L)  8 - 16 mmol/L Final    eGFR 10/27/2023 >60  >60 mL/min/1.73 m^2 Final       Past Medical History:   Diagnosis Date    Allergy     Anxiety     Arthritis     Atrial flutter     Colon polyps     COPD (chronic obstructive pulmonary disease)     COPD exacerbation 10/31/2022    Depression     Diverticular disease of colon 2017    Diverticulitis     H/O gastric sleeve     HLD (hyperlipidemia)     Malignant neoplasm of central portion of left  breast in female, estrogen receptor positive 2022    Monoallelic mutation of MUTYH gene 2022    Osteopenia     Pancreatitis     Pneumothorax, unspecified     following mastectomy sx     Psoriasis     Rheumatoid arthritis     Severe obesity (BMI 35.0-39.9) with comorbidity      Past Surgical History:   Procedure Laterality Date    ABLATION  2022    Cardiac Ablation for A Flutter, Successful    ADENOIDECTOMY  1966    Tonsillitis and adnoids    BILATERAL MASTECTOMY Bilateral 2/15/2023    Procedure: MASTECTOMY, BILATERAL;  Surgeon: Marcela Meehan MD;  Location: [facility];  Service: General;  Laterality: Bilateral;  EMAIL SENT  @ 11:44 LK / 2.5 HOURS    BLADDER SUSPENSION      (x2)    BREAST REVISION SURGERY Bilateral 3/29/2023    Procedure: BREAST REVISION SURGERY / BILATERAL BREAST FLAP REVISION;  Surgeon: Ming Milian MD;  Location: [facility];  Service: Plastics;  Laterality: Bilateral;  90 MINUTES     SECTION      (x2)    DEBRIDEMENT, BREAST Bilateral 3/29/2023    Procedure: DEBRIDEMENT, BREAST;  Surgeon: Ming Milian MD;  Location: [facility];  Service: Plastics;  Laterality: Bilateral;    ESOPHAGOGASTRODUODENOSCOPY N/A 2021    Procedure: EGD (ESOPHAGOGASTRODUODENOSCOPY);  Surgeon: Vince Rodriguez MD;  Location: [facility] OR [address] [place] [address];  Service: General;  Laterality: N/A;    INCISION AND DRAINAGE OF BREAST Left 10/26/2023    Procedure: INCISION AND DRAINAGE, BREAST;  Surgeon: Ming Milian MD;  Location: [facility];  Service: Plastics;  Laterality: Left;    INJECTION FOR SENTINEL NODE IDENTIFICATION Left 2/15/2023    Procedure: INJECTION, FOR SENTINEL NODE IDENTIFICATION;  Surgeon: Marcela Meehan MD;  Location: [facility] OR;  Service: General;  Laterality: Left;    LAPAROSCOPIC SLEEVE GASTRECTOMY N/A 2021    Procedure: GASTRECTOMY, SLEEVE, LAPAROSCOPIC, with intraop EGD;  Surgeon: Vince Rodriguez MD;  Location: [facility] OR 2ND FLR;  Service: General;  Laterality: N/A;     LIPOSUCTION W/ FAT INJECTION Bilateral 8/29/2023    Procedure: LIPOSUCTION, WITH FAT TRANSFER;  Surgeon: Ming Milian MD;  Location: [facility];  Service: Plastics;  Laterality: Bilateral;  / FAT GRAFTING TO BOTH BREAST    LUNG BIOPSY  09/2020    RECONSTRUCTION OF BREAST WITH DEEP INFERIOR EPIGASTRIC ARTERY  (NEHA) FREE FLAP Bilateral 2/15/2023    Procedure: RECONSTRUCTION, BREAST, USING NEHA FREE FLAP;  Surgeon: Ming Milian MD;  Location: [facility];  Service: Plastics;  Laterality: Bilateral;    REVISION, FLAP, PREVIOUSLY CREATED FOR BREAST RECONSTRUCTION Bilateral 8/29/2023    Procedure: REVISION, FLAP, PREVIOUSLY CREATED FOR BREAST RECONSTRUCTION;  Surgeon: Ming Milian MD;  Location: [facility];  Service: Plastics;  Laterality: Bilateral;  4 HOURS    REVISION, SCAR, ABDOMINAL DONOR SITE N/A 8/29/2023    Procedure: REVISION, SCAR, ABDOMINAL DONOR SITE;  Surgeon: Ming Milian MD;  Location: [facility];  Service: Plastics;  Laterality: N/A;    RHINOPLASTY      SENTINEL LYMPH NODE BIOPSY Left 2/15/2023    Procedure: BIOPSY, LYMPH NODE, SENTINEL;  Surgeon: Marcela Meehan MD;  Location: [facility];  Service: General;  Laterality: Left;    TONSILLECTOMY      TRANSESOPHAGEAL ECHOCARDIOGRAPHY N/A 11/02/2022    Procedure: ECHOCARDIOGRAM, TRANSESOPHAGEAL;  Surgeon: Kellie Grover MD;  Location: [facility] EP LAB;  Service: Cardiology;  Laterality: N/A;     Social History     Tobacco Use    Smoking status: Former     Current packs/day: 0.00     Types: Cigarettes     Start date: 1/1/1979     Quit date: 12/7/2020     Years since quitting: 3.0    Smokeless tobacco: Current   Substance Use Topics    Alcohol use: Yes     Alcohol/week: 1.0 standard drink of alcohol     Types: 1 Glasses of wine per week     Comment: social-rarely    Drug use: No     Family History   Adopted: Yes   Problem Relation Age of Onset    No Known Problems Daughter     No Known Problems Daughter     Malignant hyperthermia Son      OB  History    Para Term  AB Living   5 3 3         SAB IAB Ectopic Multiple Live Births                  # Outcome Date GA Lbr Isaac/2nd Weight Sex Delivery Anes PTL Lv   5             4             3 Term            2 Term            1 Term                Current Outpatient Medications:     anastrozole (ARIMIDEX) 1 mg Tab, Take 1 tablet (1 mg total) by mouth once daily., Disp: 90 tablet, Rfl: 3    apixaban (ELIQUIS) 5 mg Tab, Take 1 tablet (5 mg total) by mouth 2 (two) times daily., Disp: 180 tablet, Rfl: 3    atorvastatin (LIPITOR) 20 MG tablet, Take 1 tablet (20 mg total) by mouth every evening., Disp: 90 tablet, Rfl: 3    B-complex with vitamin C (VITAMIN B COMPLEX-C ORAL), Take by mouth Daily., Disp: , Rfl:     calcium-vitamin D 250-100 mg-unit per tablet, Take 1 tablet by mouth 2 (two) times daily., Disp: , Rfl:     cyanocobalamin 500 MCG tablet, Take 500 mcg by mouth once daily., Disp: , Rfl:     diazePAM (VALIUM) 5 MG tablet, Take 1 tablet (5 mg total) by mouth daily as needed for Anxiety., Disp: 30 tablet, Rfl: 2    fluocinonide-emollient (FLUOCINONIDE-E) 0.05 % Crea, AAA of feet daily prn psoriasis., Disp: 60 g, Rfl: 3    fluticasone-umeclidin-vilanter (TRELEGY ELLIPTA) 100-62.5-25 mcg DsDv, Inhale 1 puff into the lungs once daily., Disp: 180 each, Rfl: 3    metoprolol tartrate (LOPRESSOR) 25 MG tablet, Take 1 tablet (25 mg total) by mouth 2 (two) times daily., Disp: 180 tablet, Rfl: 3    multivitamin (THERAGRAN) per tablet, Take 1 tablet by mouth once daily., Disp: , Rfl:     multivitamin with minerals (HAIR,SKIN AND NAILS ORAL), Take by mouth., Disp: , Rfl:     mv-mn/iron/folic acid/herb 190 (VITAMIN D3 COMPLETE ORAL), Take by mouth., Disp: , Rfl:     omeprazole (PRILOSEC) 40 MG capsule, Take 1 capsule (40 mg total) by mouth every morning., Disp: 90 capsule, Rfl: 1    ondansetron (ZOFRAN) 8 MG tablet, Take 1 tablet (8 mg total) by mouth every 12 (twelve) hours as needed for  Nausea., Disp: 30 tablet, Rfl: 2    oxyCODONE-acetaminophen (PERCOCET) 5-325 mg per tablet, Take 1 tablet by mouth every 8 (eight) hours as needed for Pain., Disp: 21 tablet, Rfl: 0    QUEtiapine (SEROQUEL) 50 MG tablet, Take 1 tablet (50 mg total) by mouth every evening., Disp: 30 tablet, Rfl: 11    TALTZ AUTOINJECTOR 80 mg/mL AtIn, Inject 80 mg into the skin every 28 days., Disp: 1 mL, Rfl: 6    TALTZ AUTOINJECTOR, 2 PACK, 80 mg/mL AtIn, Inject 80mg into the skin every other week (weeks 4, 6, 8, 10)., Disp: 2 mL, Rfl: 1    TALTZ AUTOINJECTOR, 3 PACK, 80 mg/mL AtIn, Inject 160mg (2 pens) into the skin at week 0 , then inject 80mg into the skin at week 2., Disp: 3 mL, Rfl: 0    triamcinolone acetonide 0.025% (KENALOG) 0.025 % cream, AAA of skin folds bid prn psoriasis., Disp: 80 g, Rfl: 3    triamcinolone acetonide 0.1% (KENALOG) 0.1 % cream, Apply to affected areas of body BID prn psoriasis. Do not use on face or skin folds., Disp: 454 g, Rfl: 1    venlafaxine (EFFEXOR-XR) 75 MG 24 hr capsule, Take 1 capsule (75 mg total) by mouth once daily. Start on Week 2, Disp: 90 capsule, Rfl: 3  No current facility-administered medications for this visit.    Facility-Administered Medications Ordered in Other Visits:     lactated ringers infusion, , Intravenous, Continuous, Yahaira Barnard MD, New Bag at 03/29/23 0638    LIDOcaine (PF) 10 mg/ml (1%) injection 5 mg, 0.5 mL, Intradermal, Once, Yahaira Barnard MD    LIDOcaine HCL 10 mg/ml (1%) 50 mL, EPINEPHrine 1 mg in lactated Ringers 1,000 mL irrigation, , Irrigation, On Call Procedure, Ming Milian MD    LIDOcaine HCL 10 mg/ml (1%) 50 mL, EPINEPHrine 1 mg in lactated Ringers 1,000 mL irrigation, , Irrigation, On Call Procedure, Ming Milian MD    LIDOcaine HCL 10 mg/ml (1%) 50 mL, EPINEPHrine 1 mg in lactated Ringers 1,000 mL irrigation, , Irrigation, On Call Procedure, Ming Milian MD    The ASCVD Risk score (Caren BRAR, et al., 2019) failed to calculate for  "the following reasons:    The valid total cholesterol range is 130 to 320 mg/dL    Review of Systems:  General: No fever, chills, or weight loss.  Chest: No chest pain, shortness of breath, or palpitations.  Breast: No pain, masses, or nipple discharge.  Vulva: No pain, lesions, or itching.  Vagina: No relaxation, itching, discharge, or lesions.  Abdomen: No pain, nausea, vomiting, diarrhea, or constipation.  Urinary: No incontinence, nocturia, frequency, or dysuria.  Extremities:  No leg cramps, edema, or calf pain.  Neurologic: No headaches, dizziness, or visual changes.    Objective:   /81   Pulse 62   Ht 5' 2" (1.575 m)   Wt 76.2 kg (168 lb)   BMI 30.73 kg/m²     BMI is 30.83    PHYSICAL EXAM:  deferred  Assessment:   Breast Cancer  Long-Term Use of Aromitase Inhibitor  Hot Flashes related to aromitase-inhibitor use  Aromitase-inhibitor induced arthralgias     Plan:     Counseled on possible benefits of acupuncture for joint pain, arthralgias vasomotor symptoms and sleep and anxiety  Patient was due for colonoscopy and encouraged her to follow up with primary care to discuss this.  She is concerned about the prep given her history of gastric sleeve.  Counseled on importance of calcium and vitamin D3 and weight-bearing exercises given her osteopenia and use some Arimidex  Follow-up for well-woman exam  She declines occupational therapy/OT yoga at this time but will consider in the future    I spent a total of 45 minutes on the day of the visit.This includes face to face time and non-face to face time preparing to see the patient (eg, review of tests), obtaining and/or reviewing separately obtained history, documenting clinical information in the electronic or other health record, independently interpreting results and communicating results to the patient/family/caregiver, or care coordinator.    "

## 2023-12-27 ENCOUNTER — TELEPHONE (OUTPATIENT)
Dept: CARDIOLOGY | Facility: CLINIC | Age: 63
End: 2023-12-27
Payer: COMMERCIAL

## 2023-12-27 NOTE — TELEPHONE ENCOUNTER
----- Message from Cara De Jesus MA sent at 12/27/2023  1:32 PM CST -----  Name of Who is Calling: Sherrill Rebolledo from [email or web address] calling on behalf of PANDA POLK [[id number]]                What is the request in detail: Pt is taking elequis and has Petechia on her stomach and back. Please assist.                Can the clinic reply by [facility]: No                What [facility] Back [facility]: [phone]

## 2023-12-27 NOTE — TELEPHONE ENCOUNTER
Pt states petechiae on abdomen and back are not new. She has been having it even prior to starting Eliquis. She has seen dermatology in the past regarding petechiae. Pt denies bruising or other signs of bleeding. Pt is scheduled to see PCP on January 3 and anticipates getting labs. Discussed with Dr. Dorado and instructed pt to continue current medication.

## 2024-01-03 ENCOUNTER — OFFICE VISIT (OUTPATIENT)
Dept: PRIMARY CARE CLINIC | Facility: CLINIC | Age: 64
End: 2024-01-03
Payer: COMMERCIAL

## 2024-01-03 VITALS
HEART RATE: 68 BPM | DIASTOLIC BLOOD PRESSURE: 78 MMHG | HEIGHT: 62 IN | BODY MASS INDEX: 31.68 KG/M2 | WEIGHT: 172.19 LBS | SYSTOLIC BLOOD PRESSURE: 122 MMHG | OXYGEN SATURATION: 95 %

## 2024-01-03 DIAGNOSIS — Z12.11 ENCOUNTER FOR COLORECTAL CANCER SCREENING: ICD-10-CM

## 2024-01-03 DIAGNOSIS — L40.50 PSORIATIC ARTHRITIS: ICD-10-CM

## 2024-01-03 DIAGNOSIS — I10 ESSENTIAL HYPERTENSION: Chronic | ICD-10-CM

## 2024-01-03 DIAGNOSIS — F51.01 PRIMARY INSOMNIA: ICD-10-CM

## 2024-01-03 DIAGNOSIS — C50.112 MALIGNANT NEOPLASM OF CENTRAL PORTION OF LEFT BREAST IN FEMALE, ESTROGEN RECEPTOR POSITIVE: ICD-10-CM

## 2024-01-03 DIAGNOSIS — Z79.899 OTHER LONG TERM (CURRENT) DRUG THERAPY: ICD-10-CM

## 2024-01-03 DIAGNOSIS — E78.2 MIXED HYPERLIPIDEMIA: Chronic | ICD-10-CM

## 2024-01-03 DIAGNOSIS — L40.0 PSORIASIS VULGARIS: ICD-10-CM

## 2024-01-03 DIAGNOSIS — Z17.0 MALIGNANT NEOPLASM OF CENTRAL PORTION OF LEFT BREAST IN FEMALE, ESTROGEN RECEPTOR POSITIVE: ICD-10-CM

## 2024-01-03 DIAGNOSIS — I48.0 PAROXYSMAL A-FIB: ICD-10-CM

## 2024-01-03 DIAGNOSIS — Z00.00 ANNUAL PHYSICAL EXAM: ICD-10-CM

## 2024-01-03 DIAGNOSIS — Z12.12 ENCOUNTER FOR COLORECTAL CANCER SCREENING: ICD-10-CM

## 2024-01-03 DIAGNOSIS — J41.8 MIXED SIMPLE AND MUCOPURULENT CHRONIC BRONCHITIS: Chronic | ICD-10-CM

## 2024-01-03 DIAGNOSIS — F41.8 DEPRESSION WITH ANXIETY: Primary | Chronic | ICD-10-CM

## 2024-01-03 DIAGNOSIS — M06.9 RHEUMATOID ARTHRITIS, INVOLVING UNSPECIFIED SITE, UNSPECIFIED WHETHER RHEUMATOID FACTOR PRESENT: ICD-10-CM

## 2024-01-03 PROBLEM — B96.20 BACTEREMIA, ESCHERICHIA COLI: Status: RESOLVED | Noted: 2023-04-26 | Resolved: 2024-01-03

## 2024-01-03 PROBLEM — F41.0 PANIC ATTACK: Status: RESOLVED | Noted: 2020-02-24 | Resolved: 2024-01-03

## 2024-01-03 PROBLEM — E66.01 SEVERE OBESITY (BMI >= 40): Status: RESOLVED | Noted: 2023-03-10 | Resolved: 2024-01-03

## 2024-01-03 PROBLEM — R78.81 BACTEREMIA, ESCHERICHIA COLI: Status: RESOLVED | Noted: 2023-04-26 | Resolved: 2024-01-03

## 2024-01-03 PROBLEM — Z16.12 INFECTION DUE TO ESBL-PRODUCING ESCHERICHIA COLI: Status: RESOLVED | Noted: 2023-04-26 | Resolved: 2024-01-03

## 2024-01-03 PROBLEM — F33.40 MDD (RECURRENT MAJOR DEPRESSIVE DISORDER) IN REMISSION: Status: RESOLVED | Noted: 2023-06-01 | Resolved: 2024-01-03

## 2024-01-03 PROBLEM — Z85.3 HISTORY OF BREAST CANCER: Status: RESOLVED | Noted: 2023-08-29 | Resolved: 2024-01-03

## 2024-01-03 PROBLEM — Z97.3 WEARS CONTACT LENSES: Status: RESOLVED | Noted: 2022-01-27 | Resolved: 2024-01-03

## 2024-01-03 PROBLEM — F41.1 GAD (GENERALIZED ANXIETY DISORDER): Status: RESOLVED | Noted: 2023-06-01 | Resolved: 2024-01-03

## 2024-01-03 PROBLEM — I48.92 ATRIAL FLUTTER: Status: RESOLVED | Noted: 2022-10-31 | Resolved: 2024-01-03

## 2024-01-03 PROBLEM — T81.40XA POSTOPERATIVE INFECTION: Status: RESOLVED | Noted: 2023-10-27 | Resolved: 2024-01-03

## 2024-01-03 PROBLEM — J93.9 PNEUMOTHORAX ON LEFT: Status: RESOLVED | Noted: 2023-03-01 | Resolved: 2024-01-03

## 2024-01-03 PROBLEM — A49.8 INFECTION DUE TO ESBL-PRODUCING ESCHERICHIA COLI: Status: RESOLVED | Noted: 2023-04-26 | Resolved: 2024-01-03

## 2024-01-03 PROCEDURE — 1159F MED LIST DOCD IN RCRD: CPT | Mod: CPTII,S$GLB,, | Performed by: INTERNAL MEDICINE

## 2024-01-03 PROCEDURE — 3008F BODY MASS INDEX DOCD: CPT | Mod: CPTII,S$GLB,, | Performed by: INTERNAL MEDICINE

## 2024-01-03 PROCEDURE — 3074F SYST BP LT 130 MM HG: CPT | Mod: CPTII,S$GLB,, | Performed by: INTERNAL MEDICINE

## 2024-01-03 PROCEDURE — 99999 PR PBB SHADOW E&M-EST. PATIENT-LVL IV: CPT | Mod: PBBFAC,,, | Performed by: INTERNAL MEDICINE

## 2024-01-03 PROCEDURE — 3078F DIAST BP <80 MM HG: CPT | Mod: CPTII,S$GLB,, | Performed by: INTERNAL MEDICINE

## 2024-01-03 PROCEDURE — 99396 PREV VISIT EST AGE 40-64: CPT | Mod: S$GLB,,, | Performed by: INTERNAL MEDICINE

## 2024-01-03 NOTE — ASSESSMENT & PLAN NOTE
Sees Dr. Madrid.  On Arimidex.  s/p bilateral SSM with NEHA flap reconstruction, second stage with left flap vs fat graft necrosis with chronic draining wound. Sees plastics Dr. Castillo.

## 2024-01-03 NOTE — PROGRESS NOTES
[facility] Note    Chief Complaint      Chief Complaint   Patient presents with    Annual Exam     History of Present Illness      Lucia Mtaias is a 63 y.o. female who presents today for Annual preventative visit  Patient comes to appointment alone.  EP: Leilani, Cards: Ave, Psych: NP Flynn, Breast Surg: Zoran    Problem List Items Addressed This Visit       COPD (chronic obstructive pulmonary disease) (Chronic)    Current Assessment & Plan     Stable on Trelegy with PRN Ventolin/Duoneb.  Breathing has been better since quitting smoking and losing weight.         HLD (hyperlipidemia) (Chronic)    Current Assessment & Plan     Stable on lipitor 20 mg daily, no myalgias.  The ASCVD Risk score (Caren DK, et al., 2019) failed to calculate for the following reasons:    The valid total cholesterol range is 130 to 320 mg/dL           Depression with anxiety - Primary (Chronic)    Current Assessment & Plan     Sees Psych NP shiv. Stable on seroquel 50 mg QHS and effexor 75 mg,.  Has been on prozac, celexa, buspar, wellbutrin before.  No SI/HI/panic attack.         Essential hypertension (Chronic)    Current Assessment & Plan     BP controlled on no meds. On lisinopril prior to weight loss. No CP/HA.         Psoriasis vulgaris    Overview     Tried and failed Enbrel x 1 yr  Tried and failed Humira x 1-2 yrs  Cimzia [id number]  Cosentyx 2020 - 2023  Taltz 2023 -    Lab Results   Component Value Date    TBGOLDPLUS Negative 03/09/2021            Current Assessment & Plan     Sees Dr. Lara, stable on Taltz.         Insomnia    Current Assessment & Plan     Stable on seroquel, has always had issues with sleep.  Took trazodone and ambien in past.         Other long term (current) drug therapy    Rheumatoid arthritis    Current Assessment & Plan     On Taltz.   Previously on Cosentyx but insurance no longer covers.         Psoriatic arthritis    Current Assessment & Plan     On taltz.          Malignant neoplasm of central portion of left breast in female, estrogen receptor positive    Current Assessment & Plan     Sees Dr. Madrid.  On Arimidex.  s/p bilateral SSM with NEHA flap reconstruction, second stage with left flap vs fat graft necrosis with chronic draining wound. Sees plastics Dr. Castillo.         Paroxysmal A-fib    Current Assessment & Plan     On BB for rate control Eliquis.         Relevant Orders    TSH     Other Visit Diagnoses       Encounter for colorectal cancer screening        Relevant Orders    Ambulatory referral/consult to Endo Procedure     Annual physical exam        Relevant Orders    CBC Auto Differential    Lipid Panel    Comprehensive Metabolic Panel    Hemoglobin A1C            Health Maintenance   Topic Date Due    Shingles Vaccine (1 of 2) Never done    Colorectal Cancer Screening  01/04/2020    Lipid Panel  10/19/2027    TETANUS VACCINE  12/05/2028    Hepatitis C Screening  Completed       Past Medical History:   Diagnosis Date    Allergy     Anxiety     Arthritis     Atrial flutter     Colon polyps 2016    COPD (chronic obstructive pulmonary disease)     COPD exacerbation 10/31/2022    Depression     Diverticular disease of colon 06/06/2017    Diverticulitis     H/O gastric sleeve     HLD (hyperlipidemia)     Malignant neoplasm of central portion of left breast in female, estrogen receptor positive 12/29/2022    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    Osteopenia     Pancreatitis     Paroxysmal A-fib 1/3/2024    Pneumothorax, unspecified     following mastectomy sx 2023    Psoriasis     Rheumatoid arthritis     Severe obesity (BMI 35.0-39.9) with comorbidity        Past Surgical History:   Procedure Laterality Date    ABLATION  11/2022    Cardiac Ablation for A Flutter, Successful    ADENOIDECTOMY  1966    Tonsillitis and adnoids    BILATERAL MASTECTOMY Bilateral 2/15/2023    Procedure: MASTECTOMY, BILATERAL;  Surgeon: Marcela Meehan MD;  Location: [facility]  OR;  Service: General;  Laterality: Bilateral;  EMAIL SENT  @ 11:44 LK / 2.5 HOURS    BLADDER SUSPENSION      (x2)    BREAST REVISION SURGERY Bilateral 3/29/2023    Procedure: BREAST REVISION SURGERY / BILATERAL BREAST FLAP REVISION;  Surgeon: Ming Milian MD;  Location: [facility];  Service: Plastics;  Laterality: Bilateral;  90 MINUTES     SECTION      (x2)    DEBRIDEMENT, BREAST Bilateral 3/29/2023    Procedure: DEBRIDEMENT, BREAST;  Surgeon: Ming Milian MD;  Location: [facility];  Service: Plastics;  Laterality: Bilateral;    ESOPHAGOGASTRODUODENOSCOPY N/A 2021    Procedure: EGD (ESOPHAGOGASTRODUODENOSCOPY);  Surgeon: Vince Rodriguez MD;  Location: [facility] OR [address] [place] [address];  Service: General;  Laterality: N/A;    INCISION AND DRAINAGE OF BREAST Left 10/26/2023    Procedure: INCISION AND DRAINAGE, BREAST;  Surgeon: Ming Milian MD;  Location: [facility];  Service: Plastics;  Laterality: Left;    INJECTION FOR SENTINEL NODE IDENTIFICATION Left 2/15/2023    Procedure: INJECTION, FOR SENTINEL NODE IDENTIFICATION;  Surgeon: Marcela Meehan MD;  Location: [facility];  Service: General;  Laterality: Left;    LAPAROSCOPIC SLEEVE GASTRECTOMY N/A 2021    Procedure: GASTRECTOMY, SLEEVE, LAPAROSCOPIC, with intraop EGD;  Surgeon: Vince Rodriguez MD;  [facility];  Service: General;  Laterality: N/A;    LIPOSUCTION W/ FAT INJECTION Bilateral 2023    Procedure: LIPOSUCTION, WITH FAT TRANSFER;  Surgeon: Ming Milian MD;  Location: [facility];  Service: Plastics;  Laterality: Bilateral;  / FAT GRAFTING TO BOTH BREAST    LUNG BIOPSY  2020    RECONSTRUCTION OF BREAST WITH DEEP INFERIOR EPIGASTRIC ARTERY  (NEHA) FREE FLAP Bilateral 2/15/2023    Procedure: RECONSTRUCTION, BREAST, USING NEHA FREE FLAP;  Surgeon: Ming Milian MD;  Location: [facility];  Service: Plastics;  Laterality: Bilateral;    REVISION, FLAP, PREVIOUSLY CREATED FOR BREAST RECONSTRUCTION  Bilateral 8/29/2023    Procedure: REVISION, FLAP, PREVIOUSLY CREATED FOR BREAST RECONSTRUCTION;  Surgeon: Ming Milian MD;  Location: [facility] OR;  Service: Plastics;  Laterality: Bilateral;  4 HOURS    REVISION, SCAR, ABDOMINAL DONOR SITE N/A 8/29/2023    Procedure: REVISION, SCAR, ABDOMINAL DONOR SITE;  Surgeon: Ming Milian MD;  Location: [facility] OR;  Service: Plastics;  Laterality: N/A;    RHINOPLASTY      SENTINEL LYMPH NODE BIOPSY Left 2/15/2023    Procedure: BIOPSY, LYMPH NODE, SENTINEL;  Surgeon: Marcela Meehan MD;  Location: [facility] OR;  Service: General;  Laterality: Left;    TONSILLECTOMY      TRANSESOPHAGEAL ECHOCARDIOGRAPHY N/A 11/02/2022    Procedure: ECHOCARDIOGRAM, TRANSESOPHAGEAL;  Surgeon: Kellie Grvoer MD;  Location: [facility] EP LAB;  Service: Cardiology;  Laterality: N/A;       family history includes Malignant hyperthermia in her son; No Known Problems in her daughter and daughter. She was adopted.    Social History     Tobacco Use    Smoking status: Former     Current packs/day: 0.00     Types: Cigarettes     Start date: 1/1/1979     Quit date: 12/7/2020     Years since quitting: 3.0    Smokeless tobacco: Former     Quit date: 12/28/2022   Substance Use Topics    Alcohol use: Yes     Alcohol/week: 1.0 standard drink of alcohol     Types: 1 Glasses of wine per week     Comment: social-rarely    Drug use: No       Review of Systems   Constitutional:  Negative for chills and fever.   HENT:  Negative for sore throat.    Respiratory:  Negative for cough and shortness of breath.    Cardiovascular:  Negative for chest pain and palpitations.   Gastrointestinal:  Negative for constipation, diarrhea, nausea and vomiting.   Genitourinary:  Negative for dysuria and hematuria.   Musculoskeletal:  Negative for falls.   Neurological:  Negative for headaches.        Outpatient Encounter Medications as of 1/3/2024   Medication Sig Dispense Refill    anastrozole (ARIMIDEX) 1 mg Tab Take 1 tablet (1 mg  total) by mouth once daily. 90 tablet 3    apixaban (ELIQUIS) 5 mg Tab Take 1 tablet (5 mg total) by mouth 2 (two) times daily. 180 tablet 3    atorvastatin (LIPITOR) 20 MG tablet Take 1 tablet (20 mg total) by mouth every evening. 90 tablet 3    B-complex with vitamin C (VITAMIN B COMPLEX-C ORAL) Take by mouth Daily.      calcium-vitamin D 250-100 mg-unit per tablet Take 1 tablet by mouth 2 (two) times daily.      cyanocobalamin 500 MCG tablet Take 500 mcg by mouth once daily.      diazePAM (VALIUM) 5 MG tablet Take 1 tablet (5 mg total) by mouth daily as needed for Anxiety. 30 tablet 2    fluocinonide-emollient (FLUOCINONIDE-E) 0.05 % Crea AAA of feet daily prn psoriasis. 60 g 3    fluticasone-umeclidin-vilanter (TRELEGY ELLIPTA) 100-62.5-25 mcg DsDv Inhale 1 puff into the lungs once daily. 180 each 3    metoprolol tartrate (LOPRESSOR) 25 MG tablet Take 1 tablet (25 mg total) by mouth 2 (two) times daily. 180 tablet 3    multivitamin (THERAGRAN) per tablet Take 1 tablet by mouth once daily.      multivitamin with minerals (HAIR,SKIN AND NAILS ORAL) Take by mouth.      mv-mn/iron/folic acid/herb 190 (VITAMIN D3 COMPLETE ORAL) Take by mouth.      omeprazole (PRILOSEC) 40 MG capsule Take 1 capsule (40 mg total) by mouth every morning. 90 capsule 1    ondansetron (ZOFRAN) 8 MG tablet Take 1 tablet (8 mg total) by mouth every 12 (twelve) hours as needed for Nausea. 30 tablet 2    QUEtiapine (SEROQUEL) 50 MG tablet Take 1 tablet (50 mg total) by mouth every evening. 30 tablet 11    TALTZ AUTOINJECTOR 80 mg/mL AtIn Inject 80 mg into the skin every 28 days. 1 mL 6    TALTZ AUTOINJECTOR, 2 PACK, 80 mg/mL AtIn Inject 80mg into the skin every other week (weeks 4, 6, 8, 10). 2 mL 1    TALTZ AUTOINJECTOR, 3 PACK, 80 mg/mL AtIn Inject 160mg (2 pens) into the skin at week 0 , then inject 80mg into the skin at week 2. 3 mL 0    triamcinolone acetonide 0.025% (KENALOG) 0.025 % cream AAA of skin folds bid prn psoriasis. 80 g 3     "triamcinolone acetonide 0.1% (KENALOG) 0.1 % cream Apply to affected areas of body BID prn psoriasis. Do not use on face or skin folds. 454 g 1    venlafaxine (EFFEXOR-XR) 75 MG 24 hr capsule Take 1 capsule (75 mg total) by mouth once daily. Start on Week 2 90 capsule 3    [DISCONTINUED] budesonide-formoterol 160-4.5 mcg (SYMBICORT) 160-4.5 mcg/actuation HFAA Inhale 2 puffs into the lungs every 12 (twelve) hours. Controller 30.6 g 2    [DISCONTINUED] oxyCODONE-acetaminophen (PERCOCET) 5-325 mg per tablet Take 1 tablet by mouth every 4 (four) hours as needed for Pain. 20 tablet 0    [DISCONTINUED] oxyCODONE-acetaminophen (PERCOCET) 5-325 mg per tablet Take 1 tablet by mouth every 8 (eight) hours as needed for Pain. 21 tablet 0     Facility-Administered Encounter Medications as of 1/3/2024   Medication Dose Route Frequency Provider Last Rate Last Admin    [DISCONTINUED] lactated ringers infusion   Intravenous Continuous Yahaira Barnard MD   New Bag at 03/29/23 0638    [DISCONTINUED] LIDOcaine (PF) 10 mg/ml (1%) injection 5 mg  0.5 mL Intradermal Once Yahaira Barnard MD        [DISCONTINUED] LIDOcaine HCL 10 mg/ml (1%) 50 mL, EPINEPHrine 1 mg in lactated Ringers 1,000 mL irrigation   Irrigation On Call Procedure Ming Milian MD        [DISCONTINUED] LIDOcaine HCL 10 mg/ml (1%) 50 mL, EPINEPHrine 1 mg in lactated Ringers 1,000 mL irrigation   Irrigation On Call Procedure Ming Milian MD        [DISCONTINUED] LIDOcaine HCL 10 mg/ml (1%) 50 mL, EPINEPHrine 1 mg in lactated Ringers 1,000 mL irrigation   Irrigation On Call Procedure Ming Milian MD            Review of patient's allergies indicates:   Allergen Reactions    Succinimides Anaphylaxis     Son has MH    Vancomycin analogues Hives, Itching and Rash       Physical Exam      Vital Signs  Pulse: 68  SpO2: 95 %  BP: 122/78  Pain Score:   6  Pain Loc: Breast  Height and Weight  Height: 5' 2" (157.5 cm)  Weight: 78.1 kg (172 lb 2.9 oz)  BSA " "(Calculated - sq m): 1.85 sq meters  BMI (Calculated): 31.5  Weight in (lb) to have BMI = 25: 136.4]    Physical Exam  Constitutional:       Appearance: She is well-developed.   HENT:      Head: Normocephalic and atraumatic.      Right Ear: External ear normal.      Left Ear: External ear normal.   Eyes:      General:         Right eye: No discharge.         Left eye: No discharge.   Cardiovascular:      Rate and Rhythm: Normal rate and regular rhythm.      Heart sounds: Normal heart sounds. No murmur heard.  Pulmonary:      Effort: Pulmonary effort is normal. No respiratory distress.      Breath sounds: Normal breath sounds.   Abdominal:      General: There is no distension.      Palpations: Abdomen is soft.      Tenderness: There is no abdominal tenderness. There is no guarding.   Musculoskeletal:         General: Normal range of motion.      Cervical back: Normal range of motion.   Skin:     General: Skin is warm and dry.   Neurological:      Mental Status: She is alert and oriented to person, place, and time.   Psychiatric:         Behavior: Behavior normal.          Laboratory:  CBC:  Recent Labs   Lab Result Units 10/24/23  1242   WBC K/uL 7.33   RBC M/uL 4.63   Hemoglobin g/dL 12.9   Hematocrit % 41.0   Platelets K/uL 288   MCV fL 89   MCH pg 27.9   MCHC g/dL 31.5*     CMP:  Recent Labs   Lab Result Units 10/24/23  1242 10/25/23  0442 10/27/23  0508   Glucose mg/dL 100   < > 152*   Calcium mg/dL 9.9   < > 8.9   Albumin g/dL 3.7  --   --    Total Protein g/dL 7.5  --   --    Sodium mmol/L 138   < > 138   Potassium mmol/L 3.9   < > 4.4   CO2 mmol/L 24   < > 24   Chloride mmol/L 102   < > 107   BUN mg/dL 14   < > 16   Alkaline Phosphatase U/L 99  --   --    ALT U/L 18  --   --    AST U/L 19  --   --    Total Bilirubin mg/dL 0.5  --   --     < > = values in this interval not displayed.     URINALYSIS:  No results for input(s): "COLORU", "CLARITYU", "SPECGRAV", "PHUR", "PROTEINUA", "GLUCOSEU", "BILIRUBINCON", " ""BLOODU", "WBCU", "RBCU", "BACTERIA", "MUCUS", "NITRITE", "LEUKOCYTESUR", "UROBILINOGEN", "HYALINECASTS" in the last 2160 hours.     LIPIDS:  No results for input(s): "TSH", "HDL", "CHOL", "TRIG", "LDLCALC", "CHOLHDL", "NONHDLCHOL", "TOTALCHOLEST" in the last 2160 hours.    TSH:  No results for input(s): "TSH" in the last 2160 hours.    A1C:  No results for input(s): "HGBA1C" in the last 2160 hours.      Radiology:  No results found in the last 30 days.     Assessment/Plan     Lucia Matias is a 63 y.o.female with:    1. Depression with anxiety    2. Mixed simple and mucopurulent chronic bronchitis    3. Mixed hyperlipidemia    4. Essential hypertension    5. Psoriasis vulgaris    6. Rheumatoid arthritis, involving unspecified site, unspecified whether rheumatoid factor present    7. Malignant neoplasm of central portion of left breast in female, estrogen receptor positive    8. Other long term (current) drug therapy    9. Paroxysmal A-fib  - TSH; Future    10. Primary insomnia    11. Psoriatic arthritis    12. Encounter for colorectal cancer screening  - Ambulatory referral/consult to Endo Procedure ; Future    13. Annual physical exam  - CBC Auto Differential; Future  - Lipid Panel; Future  - Comprehensive Metabolic Panel; Future  - Hemoglobin A1C; Future    -pap smear 1/18/24 with Dr. Bowman  -schedule colonoscopy  -Continue current medications and maintain follow up with specialists.    -Follow up in about 6 months (around 7/3/2024) for follow up of medical problems.       Hetal Banks MD  [facility]                      "

## 2024-01-03 NOTE — ASSESSMENT & PLAN NOTE
Sees Psych NP shiv. Stable on seroquel 50 mg QHS and effexor 75 mg,.  Has been on prozac, celexa, buspar, wellbutrin before.  No SI/HI/panic attack.

## 2024-01-05 ENCOUNTER — PATIENT MESSAGE (OUTPATIENT)
Dept: PLASTIC SURGERY | Facility: CLINIC | Age: 64
End: 2024-01-05
Payer: COMMERCIAL

## 2024-01-05 ENCOUNTER — LAB VISIT (OUTPATIENT)
Dept: LAB | Facility: OTHER | Age: 64
End: 2024-01-05
Attending: INTERNAL MEDICINE
Payer: COMMERCIAL

## 2024-01-05 DIAGNOSIS — Z00.00 ANNUAL PHYSICAL EXAM: ICD-10-CM

## 2024-01-05 DIAGNOSIS — I48.0 PAROXYSMAL A-FIB: ICD-10-CM

## 2024-01-05 LAB
ALBUMIN SERPL BCP-MCNC: 3.7 G/DL (ref 3.5–5.2)
ALP SERPL-CCNC: 97 U/L (ref 55–135)
ALT SERPL W/O P-5'-P-CCNC: 22 U/L (ref 10–44)
ANION GAP SERPL CALC-SCNC: 11 MMOL/L (ref 8–16)
AST SERPL-CCNC: 22 U/L (ref 10–40)
BASOPHILS # BLD AUTO: 0.04 K/UL (ref 0–0.2)
BASOPHILS NFR BLD: 0.9 % (ref 0–1.9)
BILIRUB SERPL-MCNC: 1 MG/DL (ref 0.1–1)
BUN SERPL-MCNC: 14 MG/DL (ref 8–23)
CALCIUM SERPL-MCNC: 9.3 MG/DL (ref 8.7–10.5)
CHLORIDE SERPL-SCNC: 105 MMOL/L (ref 95–110)
CHOLEST SERPL-MCNC: 152 MG/DL (ref 120–199)
CHOLEST/HDLC SERPL: 3.5 {RATIO} (ref 2–5)
CO2 SERPL-SCNC: 27 MMOL/L (ref 23–29)
CREAT SERPL-MCNC: 0.9 MG/DL (ref 0.5–1.4)
DIFFERENTIAL METHOD BLD: ABNORMAL
EOSINOPHIL # BLD AUTO: 0.3 K/UL (ref 0–0.5)
EOSINOPHIL NFR BLD: 6.3 % (ref 0–8)
ERYTHROCYTE [DISTWIDTH] IN BLOOD BY AUTOMATED COUNT: 14.6 % (ref 11.5–14.5)
EST. GFR  (NO RACE VARIABLE): >60 ML/MIN/1.73 M^2
ESTIMATED AVG GLUCOSE: 100 MG/DL (ref 68–131)
GLUCOSE SERPL-MCNC: 87 MG/DL (ref 70–110)
HBA1C MFR BLD: 5.1 % (ref 4–5.6)
HCT VFR BLD AUTO: 42.3 % (ref 37–48.5)
HDLC SERPL-MCNC: 44 MG/DL (ref 40–75)
HDLC SERPL: 28.9 % (ref 20–50)
HGB BLD-MCNC: 13.8 G/DL (ref 12–16)
IMM GRANULOCYTES # BLD AUTO: 0.02 K/UL (ref 0–0.04)
IMM GRANULOCYTES NFR BLD AUTO: 0.5 % (ref 0–0.5)
LDLC SERPL CALC-MCNC: 69.2 MG/DL (ref 63–159)
LYMPHOCYTES # BLD AUTO: 1 K/UL (ref 1–4.8)
LYMPHOCYTES NFR BLD: 23.8 % (ref 18–48)
MCH RBC QN AUTO: 28.3 PG (ref 27–31)
MCHC RBC AUTO-ENTMCNC: 32.6 G/DL (ref 32–36)
MCV RBC AUTO: 87 FL (ref 82–98)
MONOCYTES # BLD AUTO: 0.4 K/UL (ref 0.3–1)
MONOCYTES NFR BLD: 8.4 % (ref 4–15)
NEUTROPHILS # BLD AUTO: 2.6 K/UL (ref 1.8–7.7)
NEUTROPHILS NFR BLD: 60.1 % (ref 38–73)
NONHDLC SERPL-MCNC: 108 MG/DL
NRBC BLD-RTO: 0 /100 WBC
PLATELET # BLD AUTO: 252 K/UL (ref 150–450)
PMV BLD AUTO: 9.2 FL (ref 9.2–12.9)
POTASSIUM SERPL-SCNC: 3.7 MMOL/L (ref 3.5–5.1)
PROT SERPL-MCNC: 6.9 G/DL (ref 6–8.4)
RBC # BLD AUTO: 4.87 M/UL (ref 4–5.4)
SODIUM SERPL-SCNC: 143 MMOL/L (ref 136–145)
TRIGL SERPL-MCNC: 194 MG/DL (ref 30–150)
TSH SERPL DL<=0.005 MIU/L-ACNC: 2.44 UIU/ML (ref 0.4–4)
WBC # BLD AUTO: 4.28 K/UL (ref 3.9–12.7)

## 2024-01-05 PROCEDURE — 80053 COMPREHEN METABOLIC PANEL: CPT | Performed by: INTERNAL MEDICINE

## 2024-01-05 PROCEDURE — 36415 COLL VENOUS BLD VENIPUNCTURE: CPT | Performed by: INTERNAL MEDICINE

## 2024-01-05 PROCEDURE — 85025 COMPLETE CBC W/AUTO DIFF WBC: CPT | Performed by: INTERNAL MEDICINE

## 2024-01-05 PROCEDURE — 83036 HEMOGLOBIN GLYCOSYLATED A1C: CPT | Performed by: INTERNAL MEDICINE

## 2024-01-05 PROCEDURE — 84443 ASSAY THYROID STIM HORMONE: CPT | Performed by: INTERNAL MEDICINE

## 2024-01-05 PROCEDURE — 80061 LIPID PANEL: CPT | Performed by: INTERNAL MEDICINE

## 2024-01-08 ENCOUNTER — PATIENT MESSAGE (OUTPATIENT)
Dept: ADMINISTRATIVE | Facility: OTHER | Age: 64
End: 2024-01-08
Payer: COMMERCIAL

## 2024-01-09 ENCOUNTER — OFFICE VISIT (OUTPATIENT)
Dept: PLASTIC SURGERY | Facility: CLINIC | Age: 64
End: 2024-01-09
Attending: PLASTIC SURGERY
Payer: COMMERCIAL

## 2024-01-09 VITALS — SYSTOLIC BLOOD PRESSURE: 180 MMHG | DIASTOLIC BLOOD PRESSURE: 78 MMHG

## 2024-01-09 DIAGNOSIS — Z17.0 MALIGNANT NEOPLASM OF CENTRAL PORTION OF LEFT BREAST IN FEMALE, ESTROGEN RECEPTOR POSITIVE: Primary | ICD-10-CM

## 2024-01-09 DIAGNOSIS — C50.112 MALIGNANT NEOPLASM OF CENTRAL PORTION OF LEFT BREAST IN FEMALE, ESTROGEN RECEPTOR POSITIVE: Primary | ICD-10-CM

## 2024-01-09 PROCEDURE — 3078F DIAST BP <80 MM HG: CPT | Mod: CPTII,S$GLB,, | Performed by: PLASTIC SURGERY

## 2024-01-09 PROCEDURE — 3077F SYST BP >= 140 MM HG: CPT | Mod: CPTII,S$GLB,, | Performed by: PLASTIC SURGERY

## 2024-01-09 PROCEDURE — 99024 POSTOP FOLLOW-UP VISIT: CPT | Mod: S$GLB,,, | Performed by: PLASTIC SURGERY

## 2024-01-09 PROCEDURE — 3044F HG A1C LEVEL LT 7.0%: CPT | Mod: CPTII,S$GLB,, | Performed by: PLASTIC SURGERY

## 2024-01-09 RX ORDER — OXYCODONE AND ACETAMINOPHEN 5; 325 MG/1; MG/1
1 TABLET ORAL EVERY 4 HOURS PRN
Qty: 30 TABLET | Refills: 0 | Status: SHIPPED | OUTPATIENT
Start: 2024-01-09

## 2024-01-09 NOTE — PROGRESS NOTES
[facility] Progress Note    HPI: Lucia Matias is a 63 y.o. 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     She continues to complain of firmness in the left breast, but no infectious symptoms. Has been performing daily packing.     O  Physical exam    General: No acute distress. Non-toxic appearing.   HEENT: Atraumatic. Normocephalic. Extraocular muscles intact.  CV: Regular rate and rhythm by radial pulse  Pulm: Normal respiratory effort. Symmetric chest rise and fall.   Extremities: No cyanosis. Peripheral pulses intact. Cap refill <2 sec  Neuro: Alert and Oriented  Breasts:   Right breast soft, all incisions well healed  Left breast smaller and there is firmness along the IMF and medial breast.  No eryhema, warmth or drainage.  Packing removed with approximately 1 cm skin hole that is smaller than previous exam.  No evidence of infection.      A/P  Lucia Matias is a 63 y.o. 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     - Continue BID dressing changes. Start NS for wet to dry.   - Pain medication refilled  - RTC 3 weeks    Axel Garza MD  [facility] [id number]-IV  01/09/2024  3:02 PM

## 2024-01-10 ENCOUNTER — ON-DEMAND VIRTUAL (OUTPATIENT)
Dept: URGENT CARE | Facility: CLINIC | Age: 64
End: 2024-01-10
Payer: COMMERCIAL

## 2024-01-10 DIAGNOSIS — J06.9 UPPER RESPIRATORY TRACT INFECTION, UNSPECIFIED TYPE: Primary | ICD-10-CM

## 2024-01-10 PROCEDURE — 99212 OFFICE O/P EST SF 10 MIN: CPT | Mod: CC,95,, | Performed by: NURSE PRACTITIONER

## 2024-01-10 RX ORDER — BENZONATATE 100 MG/1
100 CAPSULE ORAL 3 TIMES DAILY PRN
Qty: 60 CAPSULE | Refills: 0 | Status: SHIPPED | OUTPATIENT
Start: 2024-01-10 | End: 2024-01-30

## 2024-01-10 RX ORDER — PREDNISONE 10 MG/1
10 TABLET ORAL 2 TIMES DAILY
Qty: 6 TABLET | Refills: 0 | Status: SHIPPED | OUTPATIENT
Start: 2024-01-10 | End: 2024-01-13

## 2024-01-10 NOTE — PROGRESS NOTES
Subjective:      Patient ID: Lucia Matias is a 63 y.o. female.    Vitals:  vitals were not taken for this visit.     Chief Complaint: URI      Visit Type: TELE AUDIOVISUAL    Present with the patient at the time of consultation: TELEMED PRESENT WITH PATIENT: None    Past Medical History:   Diagnosis Date    Allergy     Anxiety     Arthritis     Atrial flutter     Colon polyps     COPD (chronic obstructive pulmonary disease)     COPD exacerbation 10/31/2022    Depression     Diverticular disease of colon 2017    Diverticulitis     H/O gastric sleeve     HLD (hyperlipidemia)     Malignant neoplasm of central portion of left breast in female, estrogen receptor positive 2022    Monoallelic mutation of MUTYH gene 2022    Osteopenia     Pancreatitis     Paroxysmal A-fib 1/3/2024    Pneumothorax, unspecified     following mastectomy sx     Psoriasis     Rheumatoid arthritis     Severe obesity (BMI 35.0-39.9) with comorbidity      Past Surgical History:   Procedure Laterality Date    ABLATION  2022    Cardiac Ablation for A Flutter, Successful    ADENOIDECTOMY  1966    Tonsillitis and adnoids    BILATERAL MASTECTOMY Bilateral 2/15/2023    Procedure: MASTECTOMY, BILATERAL;  Surgeon: Marcela Meehan MD;  Location: [facility];  Service: General;  Laterality: Bilateral;  EMAIL SENT  @ 11:44 LK / 2.5 HOURS    BLADDER SUSPENSION      (x2)    BREAST REVISION SURGERY Bilateral 3/29/2023    Procedure: BREAST REVISION SURGERY / BILATERAL BREAST FLAP REVISION;  Surgeon: Ming Milian MD;  Location: [facility] OR;  Service: Plastics;  Laterality: Bilateral;  90 MINUTES     SECTION      (x2)    DEBRIDEMENT, BREAST Bilateral 3/29/2023    Procedure: DEBRIDEMENT, BREAST;  Surgeon: Ming Milian MD;  Location: [facility];  Service: Plastics;  Laterality: Bilateral;    ESOPHAGOGASTRODUODENOSCOPY N/A 2021    Procedure: EGD (ESOPHAGOGASTRODUODENOSCOPY);  Surgeon: Vince Rodriguez  MD;  Location: [facility] OR 2ND FLR;  Service: General;  Laterality: N/A;    INCISION AND DRAINAGE OF BREAST Left 10/26/2023    Procedure: INCISION AND DRAINAGE, BREAST;  Surgeon: Ming Milian MD;  Location: [facility];  Service: Plastics;  Laterality: Left;    INJECTION FOR SENTINEL NODE IDENTIFICATION Left 2/15/2023    Procedure: INJECTION, FOR SENTINEL NODE IDENTIFICATION;  Surgeon: Marcela Meehan MD;  Location: [facility] [place] OR;  Service: General;  Laterality: Left;    LAPAROSCOPIC SLEEVE GASTRECTOMY N/A 03/31/2021    Procedure: GASTRECTOMY, SLEEVE, LAPAROSCOPIC, with intraop EGD;  Surgeon: Vince Rodriguez MD;  Location: [facility] OR 2ND FLR;  Service: General;  Laterality: N/A;    LIPOSUCTION W/ FAT INJECTION Bilateral 8/29/2023    Procedure: LIPOSUCTION, WITH FAT TRANSFER;  Surgeon: Ming Milian MD;  Location: [facility];  Service: Plastics;  Laterality: Bilateral;  / FAT GRAFTING TO BOTH BREAST    LUNG BIOPSY  09/2020    RECONSTRUCTION OF BREAST WITH DEEP INFERIOR EPIGASTRIC ARTERY  (NEHA) FREE FLAP Bilateral 2/15/2023    Procedure: RECONSTRUCTION, BREAST, USING NEHA FREE FLAP;  Surgeon: Ming Milian MD;  Location: [facility];  Service: Plastics;  Laterality: Bilateral;    REVISION, FLAP, PREVIOUSLY CREATED FOR BREAST RECONSTRUCTION Bilateral 8/29/2023    Procedure: REVISION, FLAP, PREVIOUSLY CREATED FOR BREAST RECONSTRUCTION;  Surgeon: Ming Milian MD;  Location: [facility];  Service: Plastics;  Laterality: Bilateral;  4 HOURS    REVISION, SCAR, ABDOMINAL DONOR SITE N/A 8/29/2023    Procedure: REVISION, SCAR, ABDOMINAL DONOR SITE;  Surgeon: Ming Milian MD;  Location: [facility];  Service: Plastics;  Laterality: N/A;    RHINOPLASTY      SENTINEL LYMPH NODE BIOPSY Left 2/15/2023    Procedure: BIOPSY, LYMPH NODE, SENTINEL;  Surgeon: Marcela Meehan MD;  Location: [facility];  Service: General;  Laterality: Left;    TONSILLECTOMY      TRANSESOPHAGEAL ECHOCARDIOGRAPHY N/A 11/02/2022    Procedure:  ECHOCARDIOGRAM, TRANSESOPHAGEAL;  Surgeon: Kellie Grover MD;  [facility];  Service: Cardiology;  Laterality: N/A;     Review of patient's allergies indicates:   Allergen Reactions    Succinimides Anaphylaxis     Son has MH    Vancomycin analogues Hives, Itching and Rash     Current Outpatient Medications on File Prior to Visit   Medication Sig Dispense Refill    anastrozole (ARIMIDEX) 1 mg Tab Take 1 tablet (1 mg total) by mouth once daily. 90 tablet 3    apixaban (ELIQUIS) 5 mg Tab Take 1 tablet (5 mg total) by mouth 2 (two) times daily. 180 tablet 3    atorvastatin (LIPITOR) 20 MG tablet Take 1 tablet (20 mg total) by mouth every evening. 90 tablet 3    B-complex with vitamin C (VITAMIN B COMPLEX-C ORAL) Take by mouth Daily.      calcium-vitamin D 250-100 mg-unit per tablet Take 1 tablet by mouth 2 (two) times daily.      cyanocobalamin 500 MCG tablet Take 500 mcg by mouth once daily.      diazePAM (VALIUM) 5 MG tablet Take 1 tablet (5 mg total) by mouth daily as needed for Anxiety. 30 tablet 2    fluocinonide-emollient (FLUOCINONIDE-E) 0.05 % Crea AAA of feet daily prn psoriasis. 60 g 3    fluticasone-umeclidin-vilanter (TRELEGY ELLIPTA) 100-62.5-25 mcg DsDv Inhale 1 puff into the lungs once daily. 180 each 3    metoprolol tartrate (LOPRESSOR) 25 MG tablet Take 1 tablet (25 mg total) by mouth 2 (two) times daily. 180 tablet 3    multivitamin (THERAGRAN) per tablet Take 1 tablet by mouth once daily.      multivitamin with minerals (HAIR,SKIN AND NAILS ORAL) Take by mouth.      mv-mn/iron/folic acid/herb 190 (VITAMIN D3 COMPLETE ORAL) Take by mouth.      omeprazole (PRILOSEC) 40 MG capsule Take 1 capsule (40 mg total) by mouth every morning. 90 capsule 1    ondansetron (ZOFRAN) 8 MG tablet Take 1 tablet (8 mg total) by mouth every 12 (twelve) hours as needed for Nausea. 30 tablet 2    oxyCODONE-acetaminophen (PERCOCET) 5-325 mg per tablet Take 1 tablet by mouth every 4 (four) hours as needed for  Pain. 30 tablet 0    QUEtiapine (SEROQUEL) 50 MG tablet Take 1 tablet (50 mg total) by mouth every evening. 30 tablet 11    TALTZ AUTOINJECTOR 80 mg/mL AtIn Inject 80 mg into the skin every 28 days. 1 mL 6    TALTZ AUTOINJECTOR, 2 PACK, 80 mg/mL AtIn Inject 80mg into the skin every other week (weeks 4, 6, 8, 10). 2 mL 1    TALTZ AUTOINJECTOR, 3 PACK, 80 mg/mL AtIn Inject 160mg (2 pens) into the skin at week 0 , then inject 80mg into the skin at week 2. 3 mL 0    triamcinolone acetonide 0.025% (KENALOG) 0.025 % cream AAA of skin folds bid prn psoriasis. 80 g 3    triamcinolone acetonide 0.1% (KENALOG) 0.1 % cream Apply to affected areas of body BID prn psoriasis. Do not use on face or skin folds. 454 g 1    venlafaxine (EFFEXOR-XR) 75 MG 24 hr capsule Take 1 capsule (75 mg total) by mouth once daily. Start on Week 2 90 capsule 3    [DISCONTINUED] budesonide-formoterol 160-4.5 mcg (SYMBICORT) 160-4.5 mcg/actuation HFAA Inhale 2 puffs into the lungs every 12 (twelve) hours. Controller 30.6 g 2     No current facility-administered medications on file prior to visit.     Family History   Adopted: Yes   Problem Relation Age of Onset    No Known Problems Daughter     No Known Problems Daughter     Malignant hyperthermia Son        Medications Ordered                [facility] [place] [address]    Telephone: [phone]   Fax: [phone]   Hours: Mon-Fri, 8a-5:30p                         Internal Pharmacy (2 of 2)              benzonatate (TESSALON) 100 MG capsule    Sig: Take 1 capsule (100 mg total) by mouth 3 (three) times daily as needed for Cough. May take 1-2 caps 3 times daily as needed.       Start: 1/10/24     Quantity: 60 capsule Refills: 0                         predniSONE (DELTASONE) 10 MG tablet    Sig: Take 1 tablet (10 mg total) by mouth 2 (two) times daily. for 3 days       Start: 1/10/24     Quantity: 6 tablet Refills: 0                           Ohs Peq  Odvv Intake    1/10/2024 10:09 AM CST - Filed by Patient   Describe your reason for todays visit Cough, upper respiratory infection   What is your current physical address in the event of a medical emergency? [facility]   Are you able to take your vital signs? No   Please attach any relevant images or files          Cough, sinus pain and pressure, sore throat for 3 days. No known sick contacts. No relief with OTC meds.    URI   Associated symptoms include congestion, coughing and a sore throat. Pertinent negatives include no ear pain, sinus pain or wheezing.       Constitution: Negative for chills and fever.   HENT:  Positive for congestion, sinus pressure and sore throat. Negative for ear pain, sinus pain, trouble swallowing and voice change.    Respiratory:  Positive for cough and sputum production. Negative for shortness of breath and wheezing.    Musculoskeletal:  Negative for muscle ache.        Objective:   The physical exam was conducted virtually.  Physical Exam   Constitutional: She is oriented to person, place, and time. She does not appear ill. No distress.   HENT:   Head: Normocephalic and atraumatic.   Nose: Nose normal.   Eyes: Extraocular movement intact   Pulmonary/Chest: Effort normal.   Abdominal: Normal appearance.   Musculoskeletal: Normal range of motion.         General: Normal range of motion.   Neurological: no focal deficit. She is alert and oriented to person, place, and time.   Psychiatric: Her behavior is normal. Mood normal.   Vitals reviewed.      Assessment:     1. Upper respiratory tract infection, unspecified type        Plan:   Patient encouraged to monitor symptoms closely and instructed to follow-up for new or worsening symptoms. Further, in-person, evaluation may be necessary for continued treatment. Please follow up with your primary care doctor or specialist as needed. Verbally discussed plan. Patient confirms understanding and is in agreement with treatment and plan.      You must understand that you've received a [facility] Care evaluation only and that you may be released before all your medical problems are known or treated. You, the patient, will arrange for follow up care as instructed.      Upper respiratory tract infection, unspecified type  -     predniSONE (DELTASONE) 10 MG tablet; Take 1 tablet (10 mg total) by mouth 2 (two) times daily. for 3 days  Dispense: 6 tablet; Refill: 0  -     benzonatate (TESSALON) 100 MG capsule; Take 1 capsule (100 mg total) by mouth 3 (three) times daily as needed for Cough. May take 1-2 caps 3 times daily as needed.  Dispense: 60 capsule; Refill: 0             Patient Instructions   OVER THE COUNTER RECOMMENDATIONS/SUGGESTIONS (IF NO CONTRAINDICATIONS).     ·         Make sure to stay well hydrated.     ·         Use Nasal Saline to mechanically move any post nasal drip from your eustachian tube or from the back of your throat.     ·         Use warm saltwater gargles to ease your throat pain. Warm saltwater gargles as needed for sore throat-  1/2 tsp salt to 1 cup warm water, gargle as desired. Warm fluids tend to relieve a sore throat.     .         Throat lozenges, Chloraseptic spray or other over the counter treatments are ok to use as well. Use as directed.     ·         Use an antihistamine such as Claritin, Zyrtec or Allegra to dry you out.     ·         Use pseudoephedrine (behind the counter) to decongest. Pseudoephedrine  30 mg up to 240 mg /day. It can raise your blood pressure and give you palpitations.     ·         Use Mucinex (guaifenesin) to break up mucous up to 2400mg/day to loosen any mucous.     ·         The Mucinex DM pill has a cough suppressant that can be sedating. It can be used at night to stop the tickle at the back of your throat.     ·         You can use Mucinex D (it has guaifenesin and a high dose of pseudoephedrine) in the mornings to help decongest.     ·         Use Afrin (oxymetazoline) in each nare for no  longer than 3 days, as it is addictive. It can also dry out your mucous membranes and cause elevated blood pressure. This is especially useful if you are flying.     ·         Use Flonase 1-2 sprays/nostril per day. It is a local acting steroid nasal spray, if you develop a bloody nose, stop using the medication immediately.     ·         Sometimes Nyquil at night is beneficial to help you get some rest, however it is sedating, and it does have an antihistamine, and Tylenol.     ·         Honey is a natural cough suppressant that can be used.     ·         Tylenol up to 4,000 mg a day is safe for short periods and can be used for body aches, pain, and fever. However, in high doses and prolonged use it can cause liver irritation.     ·         Ibuprofen is a non-steroidal anti-inflammatory that can be used for body aches, pain, and fever. However, it can also cause stomach irritation if overused.

## 2024-01-11 ENCOUNTER — PATIENT MESSAGE (OUTPATIENT)
Dept: PRIMARY CARE CLINIC | Facility: CLINIC | Age: 64
End: 2024-01-11
Payer: COMMERCIAL

## 2024-01-11 NOTE — TELEPHONE ENCOUNTER
5 - risk score.   
Agree with OTC meds, cannot take Paxlovid because she is on Eliquis  
contact guard

## 2024-01-12 ENCOUNTER — PATIENT MESSAGE (OUTPATIENT)
Dept: ADMINISTRATIVE | Facility: OTHER | Age: 64
End: 2024-01-12
Payer: COMMERCIAL

## 2024-01-13 ENCOUNTER — NURSE TRIAGE (OUTPATIENT)
Dept: ADMINISTRATIVE | Facility: CLINIC | Age: 64
End: 2024-01-13
Payer: COMMERCIAL

## 2024-01-13 NOTE — TELEPHONE ENCOUNTER
Pt states that sons visited from [place] and is now Covid positive. Pt states that she tested positive for Covid on Thursday. Now c/o worsening sinus pains, cough, throat pain and SOB when walking around. Pt states that she is not a candidate for Paxlovid.  Advised to be seen per protocol. VU. ODVV offered and accepted. Encounter routed to provider.         Reason for Disposition   MILD difficulty breathing (e.g., minimal/no SOB at rest, SOB with walking, pulse <100)    Additional Information   Negative: SEVERE difficulty breathing (e.g., struggling for each breath, speaks in single words)   Negative: Difficult to awaken or acting confused (e.g., disoriented, slurred speech)   Negative: Bluish (or gray) lips or face now   Negative: Shock suspected (e.g., cold/pale/clammy skin, too weak to stand, low BP, rapid pulse)   Negative: Sounds like a life-threatening emergency to the triager   Negative: SEVERE or constant chest pain or pressure  (Exception: Mild central chest pain, present only when coughing.)   Negative: MODERATE difficulty breathing (e.g., speaks in phrases, SOB even at rest, pulse 100-120)   Negative: [1] Headache AND [2] stiff neck (can't touch chin to chest)   Negative: Oxygen level (e.g., pulse oximetry) 90 percent or lower   Negative: Chest pain or pressure  (Exception: MILD central chest pain, present only when coughing.)   Negative: [1] Drinking very little AND [2] dehydration suspected (e.g., no urine > 12 hours, very dry mouth, very lightheaded)   Negative: Patient sounds very sick or weak to the triager    Protocols used: Coronavirus (COVID-19) Diagnosed or [id number]

## 2024-01-18 ENCOUNTER — OFFICE VISIT (OUTPATIENT)
Dept: OBSTETRICS AND GYNECOLOGY | Facility: CLINIC | Age: 64
End: 2024-01-18
Attending: OBSTETRICS & GYNECOLOGY
Payer: COMMERCIAL

## 2024-01-18 VITALS
WEIGHT: 171 LBS | HEART RATE: 70 BPM | DIASTOLIC BLOOD PRESSURE: 64 MMHG | BODY MASS INDEX: 31.47 KG/M2 | SYSTOLIC BLOOD PRESSURE: 124 MMHG | HEIGHT: 62 IN

## 2024-01-18 DIAGNOSIS — G89.29 CHRONIC MIDLINE LOW BACK PAIN, UNSPECIFIED WHETHER SCIATICA PRESENT: Primary | ICD-10-CM

## 2024-01-18 DIAGNOSIS — Z01.419 ENCOUNTER FOR GYNECOLOGICAL EXAMINATION WITHOUT ABNORMAL FINDING: ICD-10-CM

## 2024-01-18 DIAGNOSIS — C50.112 MALIGNANT NEOPLASM OF CENTRAL PORTION OF LEFT BREAST IN FEMALE, ESTROGEN RECEPTOR POSITIVE: ICD-10-CM

## 2024-01-18 DIAGNOSIS — Z12.4 SCREENING FOR MALIGNANT NEOPLASM OF THE CERVIX: ICD-10-CM

## 2024-01-18 DIAGNOSIS — M54.50 CHRONIC MIDLINE LOW BACK PAIN, UNSPECIFIED WHETHER SCIATICA PRESENT: Primary | ICD-10-CM

## 2024-01-18 DIAGNOSIS — Z17.0 MALIGNANT NEOPLASM OF CENTRAL PORTION OF LEFT BREAST IN FEMALE, ESTROGEN RECEPTOR POSITIVE: ICD-10-CM

## 2024-01-18 PROCEDURE — 3008F BODY MASS INDEX DOCD: CPT | Mod: CPTII,S$GLB,, | Performed by: OBSTETRICS & GYNECOLOGY

## 2024-01-18 PROCEDURE — 87624 HPV HI-RISK TYP POOLED RSLT: CPT | Performed by: OBSTETRICS & GYNECOLOGY

## 2024-01-18 PROCEDURE — 3044F HG A1C LEVEL LT 7.0%: CPT | Mod: CPTII,S$GLB,, | Performed by: OBSTETRICS & GYNECOLOGY

## 2024-01-18 PROCEDURE — 3078F DIAST BP <80 MM HG: CPT | Mod: CPTII,S$GLB,, | Performed by: OBSTETRICS & GYNECOLOGY

## 2024-01-18 PROCEDURE — 99999 PR PBB SHADOW E&M-EST. PATIENT-LVL IV: CPT | Mod: PBBFAC,,, | Performed by: OBSTETRICS & GYNECOLOGY

## 2024-01-18 PROCEDURE — 3074F SYST BP LT 130 MM HG: CPT | Mod: CPTII,S$GLB,, | Performed by: OBSTETRICS & GYNECOLOGY

## 2024-01-18 PROCEDURE — 88175 CYTOPATH C/V AUTO FLUID REDO: CPT | Performed by: OBSTETRICS & GYNECOLOGY

## 2024-01-18 PROCEDURE — 99396 PREV VISIT EST AGE 40-64: CPT | Mod: S$GLB,,, | Performed by: OBSTETRICS & GYNECOLOGY

## 2024-01-18 PROCEDURE — 1159F MED LIST DOCD IN RCRD: CPT | Mod: CPTII,S$GLB,, | Performed by: OBSTETRICS & GYNECOLOGY

## 2024-01-18 NOTE — PROGRESS NOTES
SUBJECTIVE:   63 y.o. female   for annual routine Pap and checkup. No LMP recorded. Patient is postmenopausal..  She reports that she is recovering from breast abscess. She would like to try acupuncture to help with pain. .    She tested + for Covid 9 days ago    Past Medical History:   Diagnosis Date    Allergy     Anxiety     Arthritis     Atrial flutter     Colon polyps     COPD (chronic obstructive pulmonary disease)     COPD exacerbation 10/31/2022    Depression     Diverticular disease of colon 2017    Diverticulitis     H/O gastric sleeve     HLD (hyperlipidemia)     Malignant neoplasm of central portion of left breast in female, estrogen receptor positive 2022    Monoallelic mutation of MUTYH gene 2022    Osteopenia     Pancreatitis     Paroxysmal A-fib 1/3/2024    Pneumothorax, unspecified     following mastectomy sx     Psoriasis     Rheumatoid arthritis     Severe obesity (BMI 35.0-39.9) with comorbidity      Past Surgical History:   Procedure Laterality Date    ABLATION  2022    Cardiac Ablation for A Flutter, Successful    ADENOIDECTOMY  1966    Tonsillitis and adnoids    BILATERAL MASTECTOMY Bilateral 2/15/2023    Procedure: MASTECTOMY, BILATERAL;  Surgeon: Marcela Meehan MD;  Location: [facility];  Service: General;  Laterality: Bilateral;  EMAIL SENT  @ 11:44 LK / 2.5 HOURS    BLADDER SUSPENSION      (x2)    BREAST REVISION SURGERY Bilateral 3/29/2023    Procedure: BREAST REVISION SURGERY / BILATERAL BREAST FLAP REVISION;  Surgeon: Ming Milian MD;  Location: [facility] OR;  Service: Plastics;  Laterality: Bilateral;  90 MINUTES     SECTION      (x2)    DEBRIDEMENT, BREAST Bilateral 3/29/2023    Procedure: DEBRIDEMENT, BREAST;  Surgeon: Ming Milian MD;  Location: [facility];  Service: Plastics;  Laterality: Bilateral;    ESOPHAGOGASTRODUODENOSCOPY N/A 2021    Procedure: EGD (ESOPHAGOGASTRODUODENOSCOPY);  Surgeon: Vince Rodriguez MD;   Location: [facility] OR 2ND FLR;  Service: General;  Laterality: N/A;    INCISION AND DRAINAGE OF BREAST Left 10/26/2023    Procedure: INCISION AND DRAINAGE, BREAST;  Surgeon: Ming Milian MD;  Location: [facility] OR;  Service: Plastics;  Laterality: Left;    INJECTION FOR SENTINEL NODE IDENTIFICATION Left 2/15/2023    Procedure: INJECTION, FOR SENTINEL NODE IDENTIFICATION;  Surgeon: Marcela Meehan MD;  Location: [facility] OR;  Service: General;  Laterality: Left;    LAPAROSCOPIC SLEEVE GASTRECTOMY N/A 03/31/2021    Procedure: GASTRECTOMY, SLEEVE, LAPAROSCOPIC, with intraop EGD;  Surgeon: Vince Rodriguez MD;  Location: [facility] OR 2ND FLR;  Service: General;  Laterality: N/A;    LIPOSUCTION W/ FAT INJECTION Bilateral 8/29/2023    Procedure: LIPOSUCTION, WITH FAT TRANSFER;  Surgeon: Ming Milian MD;  Location: [facility];  Service: Plastics;  Laterality: Bilateral;  / FAT GRAFTING TO BOTH BREAST    LUNG BIOPSY  09/2020    RECONSTRUCTION OF BREAST WITH DEEP INFERIOR EPIGASTRIC ARTERY  (NEHA) FREE FLAP Bilateral 2/15/2023    Procedure: RECONSTRUCTION, BREAST, USING NEHA FREE FLAP;  Surgeon: Ming Milian MD;  Location: [facility];  Service: Plastics;  Laterality: Bilateral;    REVISION, FLAP, PREVIOUSLY CREATED FOR BREAST RECONSTRUCTION Bilateral 8/29/2023    Procedure: REVISION, FLAP, PREVIOUSLY CREATED FOR BREAST RECONSTRUCTION;  Surgeon: Ming Milian MD;  Location: [facility];  Service: Plastics;  Laterality: Bilateral;  4 HOURS    REVISION, SCAR, ABDOMINAL DONOR SITE N/A 8/29/2023    Procedure: REVISION, SCAR, ABDOMINAL DONOR SITE;  Surgeon: Ming Milian MD;  Location: [facility];  Service: Plastics;  Laterality: N/A;    RHINOPLASTY      SENTINEL LYMPH NODE BIOPSY Left 2/15/2023    Procedure: BIOPSY, LYMPH NODE, SENTINEL;  Surgeon: Marcela Meehan MD;  Location: [facility];  Service: General;  Laterality: Left;    TONSILLECTOMY      TRANSESOPHAGEAL ECHOCARDIOGRAPHY N/A 11/02/2022    Procedure:  ECHOCARDIOGRAM, TRANSESOPHAGEAL;  Surgeon: Kellie Grover MD;  Location: [facility] LAB;  Service: Cardiology;  Laterality: N/A;     Social History     Socioeconomic History    Marital status:    Occupational History    Occupation: clinical research coordinator    Tobacco Use    Smoking status: Former     Current packs/day: 0.00     Types: Cigarettes     Start date: 1/1/1979     Quit date: 12/7/2020     Years since quitting: 3.1    Smokeless tobacco: Former     Quit date: 12/28/2022   Substance and Sexual Activity    Alcohol use: Yes     Alcohol/week: 1.0 standard drink of alcohol     Types: 1 Glasses of wine per week     Comment: social-rarely    Drug use: No    Sexual activity: Yes     Partners: Male     Birth control/protection: Post-menopausal   Other Topics Concern    Are you pregnant or think you may be? No    Breast-feeding No     Social Determinants of Health     Financial Resource Strain: Medium Risk (12/6/2023)    Overall Financial Resource Strain (CARDIA)     Difficulty of Paying Living Expenses: Somewhat hard   Food Insecurity: No Food Insecurity (12/6/2023)    Hunger Vital Sign     Worried About Running Out of Food in the Last Year: Never true     Ran Out of Food in the Last Year: Never true   Transportation Needs: No Transportation Needs (12/6/2023)    PRAPARE - Transportation     Lack of Transportation (Medical): No     Lack of Transportation (Non-Medical): No   Physical Activity: Unknown (12/6/2023)    Exercise Vital Sign     Days of Exercise per Week: Patient declined     Minutes of Exercise per Session: 10 min   Stress: Stress Concern Present (12/6/2023)    [place] [employer] - Occupational Stress Questionnaire     Feeling of Stress : Very much   Social Connections: Unknown (12/6/2023)    Social Connection and Isolation Panel [NHANES]     Frequency of Communication with Friends and Family: More than three times a week     Frequency of Social Gatherings with Friends  and Family: Once a week     Active Member of Clubs or Organizations: No     Attends Club or Organization Meetings: Patient declined     Marital Status:    Housing Stability: Unknown (2023)    Housing Stability Vital Sign     Unable to Pay for Housing in the Last Year: Patient refused     Number of Places Lived in the Last Year: 1     Unstable Housing in the Last Year: No     Family History   Adopted: Yes   Problem Relation Age of Onset    No Known Problems Daughter     No Known Problems Daughter     Malignant hyperthermia Son      OB History    Para Term  AB Living   5 3 3   2 3   SAB IAB Ectopic Multiple Live Births   2              # Outcome Date GA Lbr Isaac/2nd Weight Sex Delivery Anes PTL Lv   5 SAB            4 SAB            3 Term      CS-LTranv      2 Term            1 Term      CS-LTranv              Current Outpatient Medications   Medication Sig Dispense Refill    anastrozole (ARIMIDEX) 1 mg Tab Take 1 tablet (1 mg total) by mouth once daily. 90 tablet 3    apixaban (ELIQUIS) 5 mg Tab Take 1 tablet (5 mg total) by mouth 2 (two) times daily. 180 tablet 3    atorvastatin (LIPITOR) 20 MG tablet Take 1 tablet (20 mg total) by mouth every evening. 90 tablet 3    B-complex with vitamin C (VITAMIN B COMPLEX-C ORAL) Take by mouth Daily.      benzonatate (TESSALON) 100 MG capsule Take 1 capsule (100 mg total) by mouth 3 (three) times daily as needed for Cough. May take 1-2 caps 3 times daily as needed. 60 capsule 0    calcium-vitamin D 250-100 mg-unit per tablet Take 1 tablet by mouth 2 (two) times daily.      cyanocobalamin 500 MCG tablet Take 500 mcg by mouth once daily.      diazePAM (VALIUM) 5 MG tablet Take 1 tablet (5 mg total) by mouth daily as needed for Anxiety. 30 tablet 2    fluocinonide-emollient (FLUOCINONIDE-E) 0.05 % Crea AAA of feet daily prn psoriasis. 60 g 3    fluticasone-umeclidin-vilanter (TRELEGY ELLIPTA) 100-62.5-25 mcg DsDv Inhale 1 puff into the lungs once  daily. 180 each 3    metoprolol tartrate (LOPRESSOR) 25 MG tablet Take 1 tablet (25 mg total) by mouth 2 (two) times daily. 180 tablet 3    multivitamin (THERAGRAN) per tablet Take 1 tablet by mouth once daily.      multivitamin with minerals (HAIR,SKIN AND NAILS ORAL) Take by mouth.      mv-mn/iron/folic acid/herb 190 (VITAMIN D3 COMPLETE ORAL) Take by mouth.      omeprazole (PRILOSEC) 40 MG capsule Take 1 capsule (40 mg total) by mouth every morning. 90 capsule 1    ondansetron (ZOFRAN) 8 MG tablet Take 1 tablet (8 mg total) by mouth every 12 (twelve) hours as needed for Nausea. 30 tablet 2    oxyCODONE-acetaminophen (PERCOCET) 5-325 mg per tablet Take 1 tablet by mouth every 4 (four) hours as needed for Pain. 30 tablet 0    QUEtiapine (SEROQUEL) 50 MG tablet Take 1 tablet (50 mg total) by mouth every evening. 30 tablet 11    TALTZ AUTOINJECTOR 80 mg/mL AtIn Inject 80 mg into the skin every 28 days. 1 mL 6    TALTZ AUTOINJECTOR, 2 PACK, 80 mg/mL AtIn Inject 80mg into the skin every other week (weeks 4, 6, 8, 10). 2 mL 1    TALTZ AUTOINJECTOR, 3 PACK, 80 mg/mL AtIn Inject 160mg (2 pens) into the skin at week 0 , then inject 80mg into the skin at week 2. 3 mL 0    triamcinolone acetonide 0.025% (KENALOG) 0.025 % cream AAA of skin folds bid prn psoriasis. 80 g 3    triamcinolone acetonide 0.1% (KENALOG) 0.1 % cream Apply to affected areas of body BID prn psoriasis. Do not use on face or skin folds. 454 g 1    venlafaxine (EFFEXOR-XR) 75 MG 24 hr capsule Take 1 capsule (75 mg total) by mouth once daily. Start on Week 2 90 capsule 3     No current facility-administered medications for this visit.     Allergies: Succinimides and Vancomycin analogues     The 10-year ASCVD risk score (Caren BRAR, et al., 2019) is: 4.3%    Values used to calculate the score:      Age: 63 years      Sex: Female      Is Non- : No      Diabetic: No      Tobacco smoker: No      Systolic Blood Pressure: 124 mmHg      Is  BP treated: Yes      HDL Cholesterol: 44 mg/dL      Total Cholesterol: 152 mg/dL      ROS:  Constitutional: no weight loss, weight gain, fever, fatigue  Eyes:  No vision changes, glasses/contacts  ENT/Mouth: No ulcers, sinus problems, ears ringing, headache  Cardiovascular: No inability to lie flat, chest pain, exercise intolerance, swelling, heart palpitations  Respiratory: No wheezing, coughing blood, shortness of breath, + cough  Gastrointestinal: No diarrhea, bloody stool, nausea/vomiting, constipation, gas, hemorrhoids  Genitourinary: No blood in urine, painful urination, urgency of urination, frequency of urination, incomplete emptying, incontinence, abnormal bleeding, painful periods, heavy periods, vaginal discharge, vaginal odor, painful intercourse, sexual problems, bleeding after intercourse.  Musculoskeletal: No muscle weakness, +back pain  Skin/Breast: No painful breasts, nipple discharge, masses, rash, ulcers  Neurological: No passing out, seizures, numbness, headache  Endocrine: No diabetes, hypothyroid, hyperthyroid, hot flashes, hair loss, abnormal hair growth, acne  Psychiatric: No depression, crying  Hematologic: No bruises, bleeding, swollen lymph nodes, anemia.      Physical Exam:   Constitutional: She is oriented to person, place, and time. She appears well-developed and well-nourished.      Neck: No tracheal deviation present. No thyromegaly present.    Cardiovascular:       Exam reveals no edema.        Pulmonary/Chest: Effort normal. She exhibits no mass, no tenderness, no deformity and no retraction. Right breast exhibits no inverted nipple, no mass, no nipple discharge, no skin change, no tenderness, presence, no bleeding and no swelling. Left breast exhibits no inverted nipple, no mass, no nipple discharge, no skin change, no tenderness, presence, no bleeding and no swelling. Breasts are symmetrical.        Abdominal: Soft. She exhibits no distension and no mass. There is no abdominal  tenderness. There is no rebound and no guarding. No hernia. Hernia confirmed negative in the left inguinal area.     Genitourinary:    Vagina and uterus normal.   Rectum:      No external hemorrhoid.   There is no rash, tenderness or lesion on the right labia. There is no rash, tenderness or lesion on the left labia. Cervix is normal. No no adexnal prolapse. Right adnexum displays no mass, no tenderness and no fullness. Left adnexum displays no mass, no tenderness and no fullness. No vaginal discharge, tenderness, bleeding, rectocele, cystocele or prolapse of vaginal walls in the vagina. Cervix exhibits no motion tenderness, no discharge and no friability. Uterus is not deviated.           Musculoskeletal: Normal range of motion and moves all extremeties. No edema.       Neurological: She is alert and oriented to person, place, and time.    Skin: No rash noted. No erythema. No pallor.    Psychiatric: She has a normal mood and affect. Her behavior is normal. Judgment and thought content normal.         ASSESSMENT:   well woman  Breast cancer  PLAN:   Follow up with PCP regarding cough and congestin  pap smear/HPV   Will order acupuncture  return annually or prn

## 2024-01-19 ENCOUNTER — E-VISIT (OUTPATIENT)
Dept: FAMILY MEDICINE | Facility: CLINIC | Age: 64
End: 2024-01-19
Payer: COMMERCIAL

## 2024-01-19 ENCOUNTER — PATIENT MESSAGE (OUTPATIENT)
Dept: PRIMARY CARE CLINIC | Facility: CLINIC | Age: 64
End: 2024-01-19
Payer: COMMERCIAL

## 2024-01-19 DIAGNOSIS — R05.9 COUGH, UNSPECIFIED TYPE: Primary | ICD-10-CM

## 2024-01-19 DIAGNOSIS — J01.00 SUBACUTE MAXILLARY SINUSITIS: Primary | ICD-10-CM

## 2024-01-19 DIAGNOSIS — R05.9 COUGH, UNSPECIFIED TYPE: ICD-10-CM

## 2024-01-19 PROCEDURE — 99499 UNLISTED E&M SERVICE: CPT | Mod: 95,,, | Performed by: STUDENT IN AN ORGANIZED HEALTH CARE EDUCATION/TRAINING PROGRAM

## 2024-01-19 PROCEDURE — 99422 OL DIG E/M SVC 11-20 MIN: CPT | Mod: ,,, | Performed by: STUDENT IN AN ORGANIZED HEALTH CARE EDUCATION/TRAINING PROGRAM

## 2024-01-19 RX ORDER — PROMETHAZINE HYDROCHLORIDE AND DEXTROMETHORPHAN HYDROBROMIDE 6.25; 15 MG/5ML; MG/5ML
5 SYRUP ORAL EVERY 6 HOURS PRN
Qty: 118 ML | Refills: 1 | Status: SHIPPED | OUTPATIENT
Start: 2024-01-19 | End: 2024-01-29

## 2024-01-19 RX ORDER — DOXYCYCLINE HYCLATE 100 MG
100 TABLET ORAL 2 TIMES DAILY
Qty: 14 TABLET | Refills: 0 | Status: SHIPPED | OUTPATIENT
Start: 2024-01-19 | End: 2024-01-26

## 2024-01-19 RX ORDER — OXYMETAZOLINE HCL 0.05 %
2 SPRAY, NON-AEROSOL (ML) NASAL 2 TIMES DAILY
Qty: 6 ML | Refills: 0 | Status: SHIPPED | OUTPATIENT
Start: 2024-01-19 | End: 2024-01-22

## 2024-01-19 NOTE — PROGRESS NOTES
Patient ID: Lucia Matias is a 63 y.o. female.    Chief Complaint: URI (Entered automatically based on patient selection in Patient Portal.)          274}  The patient initiated a request through [facility] on 1/19/2024 for evaluation and management with a chief complaint of URI (Entered automatically based on patient selection in Patient Portal.)     I evaluated the questionnaire submission on 01/19/2024 .    Ohs Peq Evisit Upper Respitatory/Cough Questionnaire    1/19/2024 10:50 AM CST - Filed by Patient   Do you agree to participate in an E-Visit? Yes   If you have any of the following symptoms, please present to your local ER or call 911:  I acknowledge   What is the main issue that you would like for your doctor to address today? Sinus infection   Are you able to take your vital signs? No   What symptoms do you currently have?  Cough;  Nasal Congestion   Describe your cough: Productive (containing mucus)   Describe the mucus: Green   Have you ever smoked? I have smoked in the past   Have you had a fever? No   When did your symptoms first appear? 1/11/2024   In the last two weeks, have you been in close contact with someone who has COVID-19 or the Flu? Yes , Covid-19   In the last two weeks, have you worked or volunteered in a healthcare facility or as a ? Healthcare facilities include a hospital, medical or dental clinic, long-term care facility, or nursing home Yes   Do you live in a long-term care facility, nursing home, group home, or homeless shelter? No   List what you have done or taken to help your symptoms. Tylenol,  mucinex, tesselon pearls   How severe are your symptoms? Moderate   Have your symptoms improved since they first appeared? Worse   Have you taken an at home Covid test? Yes   What were the results? Positive   Have you taken a Flu test? No   Have you been fully vaccinated for COVID? (2 Pfizer, 2 Moderna or 1 David & David vaccine injections) Yes   Have you  received a booster? No   Have you recieved a Flu shot? Yes   When did you recieve your Flu shot? 1/5/2024   Do you have transportation to get tested for COVID if it is indicated and ordered for you at an [facility] location? Yes   Provide any information you feel is important to your history not asked above    Please attach any relevant images or files           Active Problem List with Overview Notes    Diagnosis Date Noted    Paroxysmal A-fib 01/03/2024    Malignant neoplasm of central portion of left breast in female, estrogen receptor positive 12/29/2022    Rheumatoid arthritis 10/31/2022    Psoriatic arthritis 10/31/2022    History of sleeve gastrectomy 04/26/2021    Class 1 obesity due to excess calories without serious comorbidity with body mass index (BMI) of 31.0 to 31.9 in adult 03/16/2021    Multiple lung nodules on CT 01/12/2020     Nodules thought to be secondary to RA.  No malignancy detected on biopsy 8/2020  Recommend annual LDCT- sooner if symptomatic  Discussed LDCT at length,  shared decision making occurred.        Essential hypertension 12/20/2019    Other long term (current) drug therapy 10/31/2019    History of tobacco abuse 10/31/2019     Long time smoker, motivated to quit      Depression with anxiety 08/06/2019    Insomnia 08/06/2019    Psoriasis vulgaris      Tried and failed Enbrel x 1 yr  Tried and failed Humira x 1-2 yrs  Cimzia 3922-8451  Cosentyx 2020 - 2023  Taltz 2023 -    Lab Results   Component Value Date    TBGOLDPLUS Negative 03/09/2021         COPD (chronic obstructive pulmonary disease)     HLD (hyperlipidemia)     Diverticular disease of colon 06/06/2019      Recent Labs Obtained:  Lab Results   Component Value Date    WBC 4.28 01/05/2024    HGB 13.8 01/05/2024    HCT 42.3 01/05/2024    MCV 87 01/05/2024     01/05/2024     01/05/2024    K 3.7 01/05/2024    GLU 87 01/05/2024    CREATININE 0.9 01/05/2024    EGFRNORACEVR >60 01/05/2024    HGBA1C 5.1 01/05/2024       Review of patient's allergies indicates:   Allergen Reactions    Succinimides Anaphylaxis     Son has MH    Vancomycin analogues Hives, Itching and Rash       Encounter Diagnoses   Name Primary?    Subacute maxillary sinusitis Yes    Cough, unspecified type         No orders of the defined types were placed in this encounter.     Medications Ordered This Encounter   Medications    doxycycline (VIBRA-TABS) 100 MG tablet     Sig: Take 1 tablet (100 mg total) by mouth 2 (two) times daily. for 7 days     Dispense:  14 tablet     Refill:  0    oxymetazoline (AFRIN, OXYMETAZOLINE,) 0.05 % nasal spray     Si sprays by Nasal route 2 (two) times daily. Do not take over 3 days due to rebound congestion for 3 days     Dispense:  6 mL     Refill:  0    promethazine-dextromethorphan (PROMETHAZINE-DM) 6.25-15 mg/5 mL Syrp     Sig: Take 5 mLs by mouth every 6 (six) hours as needed (cough).     Dispense:  118 mL     Refill:  1        E-Visit Time Tracking:    Day 1 Time (in minutes): 13     Total Time (in minutes): 13    This is the extent of this pleasant patient's concerns at this present time. She did not feel chest pain upon exertion. No unilateral leg swelling, calf tenderness, or calf pain.    274}

## 2024-01-23 LAB
HPV HR 12 DNA SPEC QL NAA+PROBE: NEGATIVE
HPV16 AG SPEC QL: NEGATIVE
HPV18 DNA SPEC QL NAA+PROBE: NEGATIVE

## 2024-01-24 ENCOUNTER — TELEPHONE (OUTPATIENT)
Dept: ENDOSCOPY | Facility: HOSPITAL | Age: 64
End: 2024-01-24

## 2024-01-24 ENCOUNTER — CLINICAL SUPPORT (OUTPATIENT)
Dept: ENDOSCOPY | Facility: HOSPITAL | Age: 64
End: 2024-01-24
Attending: INTERNAL MEDICINE
Payer: COMMERCIAL

## 2024-01-24 DIAGNOSIS — Z12.12 ENCOUNTER FOR COLORECTAL CANCER SCREENING: ICD-10-CM

## 2024-01-24 DIAGNOSIS — Z12.11 ENCOUNTER FOR COLORECTAL CANCER SCREENING: ICD-10-CM

## 2024-01-24 DIAGNOSIS — Z86.010 HISTORY OF COLON POLYPS: Primary | ICD-10-CM

## 2024-01-24 LAB
FINAL PATHOLOGIC DIAGNOSIS: NORMAL
Lab: NORMAL

## 2024-01-24 NOTE — TELEPHONE ENCOUNTER
Contacted patient to schedule colonoscopy. Patient needs 2nd floor placement. Family h/o Malignant Hyperthermia. No availability at this time. New PAT appt scheduled. Patient verbalized understanding.

## 2024-01-24 NOTE — PROGRESS NOTES
The patient location is: [place]  The chief complaint leading to consultation is: weight gain, breast cancer     Visit type: audiovisual    Face to Face time with patient: 18 minutes   26 minutes of total time spent on the encounter, which includes face to face time and non-face to face time preparing to see the patient (eg, review of tests), Obtaining and/or reviewing separately obtained history, Documenting clinical information in the electronic or other health record, Independently interpreting results (not separately reported) and communicating results to the patient/family/caregiver, or Care coordination (not separately reported).     Each patient to whom he or she provides medical services by telemedicine is:  (1) informed of the relationship between the physician and patient and the respective role of any other health care provider with respect to management of the patient; and (2) notified that he or she may decline to receive medical services by telemedicine and may withdraw from such care at any time.    Oncology Nutrition Assessment for Medical Nutrition Therapy  Follow up Visit    Lucia Matias   1960    Referring Provider:  No ref. 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 Currently on anastrozole.   Follow up today due to weight concerns. Also recovery from recent surgery and infection.   She reports she continues wound care, but healing is much slower than initially anticipated. She is having pain from scar tissue as well. This is holding up her ability to get into an exercise routine.   She feels her eating has been pretty good since our last visit. Her  is gone 14 days at a time so she eats light (salads for example) while he is gone. She has not lost weight but with hybrid schedule and COVID she hasn't been very active.  Walking doesn't bother her and she is open to increasing this.  She doesn't have much of a lunch break, but she is finding some healthier options she can bring for lunch. Thinks she is hitting 1200 calories.       Weight:77.1 kg (170 lb)  Height:5' 2" (1.575 m)  BMI:Body mass index is 31.09 kg/m².   IBW: Ideal body weight: 50.1 kg (110 lb 7.2 oz)  Adjusted ideal body weight: 60.9 kg (134 lb 4.3 oz)    Usual BW: about 150lb 10 years   Weight Change: stable around 168lb     Allergies: Succinimides and Vancomycin analogues    Current Medications:    Current Outpatient Medications:     anastrozole (ARIMIDEX) 1 mg Tab, Take 1 tablet (1 mg total) by mouth once daily., Disp: 90 tablet, Rfl: 3    apixaban (ELIQUIS) 5 mg Tab, Take 1 tablet (5 mg total) by mouth 2 (two) times daily., Disp: 180 tablet, Rfl: 3    atorvastatin (LIPITOR) 20 MG tablet, Take 1 tablet (20 mg total) by mouth every evening., Disp: 90 tablet, Rfl: 3    B-complex with vitamin C (VITAMIN B COMPLEX-C ORAL), Take by mouth Daily., Disp: , Rfl:     benzonatate (TESSALON) 100 MG capsule, Take 1 capsule (100 mg total) by mouth 3 (three) times daily as needed for Cough. May take 1-2 caps 3 times daily as needed., Disp: 60 capsule, Rfl: 0    " calcium-vitamin D 250-100 mg-unit per tablet, Take 1 tablet by mouth 2 (two) times daily., Disp: , Rfl:     cyanocobalamin 500 MCG tablet, Take 500 mcg by mouth once daily., Disp: , Rfl:     diazePAM (VALIUM) 5 MG tablet, Take 1 tablet (5 mg total) by mouth daily as needed for Anxiety., Disp: 30 tablet, Rfl: 2    doxycycline (VIBRA-TABS) 100 MG tablet, Take 1 tablet (100 mg total) by mouth 2 (two) times daily. for 7 days, Disp: 14 tablet, Rfl: 0    fluocinonide-emollient (FLUOCINONIDE-E) 0.05 % Crea, AAA of feet daily prn psoriasis., Disp: 60 g, Rfl: 3    fluticasone-umeclidin-vilanter (TRELEGY ELLIPTA) 100-62.5-25 mcg DsDv, Inhale 1 puff into the lungs once daily., Disp: 180 each, Rfl: 3    metoprolol tartrate (LOPRESSOR) 25 MG tablet, Take 1 tablet (25 mg total) by mouth 2 (two) times daily., Disp: 180 tablet, Rfl: 3    multivitamin (THERAGRAN) per tablet, Take 1 tablet by mouth once daily., Disp: , Rfl:     multivitamin with minerals (HAIR,SKIN AND NAILS ORAL), Take by mouth., Disp: , Rfl:     mv-mn/iron/folic acid/herb 190 (VITAMIN D3 COMPLETE ORAL), Take by mouth., Disp: , Rfl:     omeprazole (PRILOSEC) 40 MG capsule, Take 1 capsule (40 mg total) by mouth every morning., Disp: 90 capsule, Rfl: 1    ondansetron (ZOFRAN) 8 MG tablet, Take 1 tablet (8 mg total) by mouth every 12 (twelve) hours as needed for Nausea., Disp: 30 tablet, Rfl: 2    oxyCODONE-acetaminophen (PERCOCET) 5-325 mg per tablet, Take 1 tablet by mouth every 4 (four) hours as needed for Pain., Disp: 30 tablet, Rfl: 0    promethazine-dextromethorphan (PROMETHAZINE-DM) 6.25-15 mg/5 mL Syrp, Take 5 mLs by mouth every 6 (six) hours as needed (cough)., Disp: 118 mL, Rfl: 1    QUEtiapine (SEROQUEL) 50 MG tablet, Take 1 tablet (50 mg total) by mouth every evening., Disp: 30 tablet, Rfl: 11    TALTZ AUTOINJECTOR 80 mg/mL AtIn, Inject 80 mg into the skin every 28 days., Disp: 1 mL, Rfl: 6    TALTZ AUTOINJECTOR, 2 PACK, 80 mg/mL AtIn, Inject 80mg into the  skin every other week (weeks 4, 6, 8, 10)., Disp: 2 mL, Rfl: 1    TALTZ AUTOINJECTOR, 3 PACK, 80 mg/mL AtIn, Inject 160mg (2 pens) into the skin at week 0 , then inject 80mg into the skin at week 2., Disp: 3 mL, Rfl: 0    triamcinolone acetonide 0.025% (KENALOG) 0.025 % cream, AAA of skin folds bid prn psoriasis., Disp: 80 g, Rfl: 3    triamcinolone acetonide 0.1% (KENALOG) 0.1 % cream, Apply to affected areas of body BID prn psoriasis. Do not use on face or skin folds., Disp: 454 g, Rfl: 1    venlafaxine (EFFEXOR-XR) 75 MG 24 hr capsule, Take 1 capsule (75 mg total) by mouth once daily. Start on Week 2, Disp: 90 capsule, Rfl: 3    Vitamins/Supplements: multi, hair/skin/nails, B12, Vitamin D, calcium     Labs: Reviewed from the past 6 months     Nutrition Diagnosis    Problem: nutrition related knowledge deficit   Etiology (related to): lack of prior need for nutrition education  Signs/Symptoms (as evidenced by): weight gain , new cancer diagnosis, and self reported diet questions/concerns     Nutrition Intervention    Nutrition Prescription   1200 Kcals (15kcal/kg)  76 g protein (1g/kg)   4895-2817 mL fluid (25-30mL/kg)    Recommendations:   Walking right after work   -resume more intense exercise when completely healed/cleared medically   Try Adin BID for wound healing   Aim for 300 calories 4 times per day  -reviewed protein bars and mireya box ideas     Nutrition Monitoring and Evaluation    Monitor: energy intake, weight, and diet education needs     Goals: weight loss 1-2lb per week, meet calorie goals, improve diet quality     Follow up PRN- she will follow up through portal with questions/concerns     Communication to referring provider/care team: note available in chart     Counseling time: 15 Minutes    Susana Gillespie, MPH, RD, , LDN, FAND   [phone]

## 2024-01-25 ENCOUNTER — CLINICAL SUPPORT (OUTPATIENT)
Dept: HEMATOLOGY/ONCOLOGY | Facility: CLINIC | Age: 64
End: 2024-01-25
Payer: COMMERCIAL

## 2024-01-25 ENCOUNTER — PATIENT MESSAGE (OUTPATIENT)
Dept: PLASTIC SURGERY | Facility: CLINIC | Age: 64
End: 2024-01-25
Payer: COMMERCIAL

## 2024-01-25 VITALS — BODY MASS INDEX: 31.28 KG/M2 | HEIGHT: 62 IN | WEIGHT: 170 LBS

## 2024-01-25 DIAGNOSIS — C50.112 MALIGNANT NEOPLASM OF CENTRAL PORTION OF LEFT BREAST IN FEMALE, ESTROGEN RECEPTOR POSITIVE: ICD-10-CM

## 2024-01-25 DIAGNOSIS — Z17.0 MALIGNANT NEOPLASM OF CENTRAL PORTION OF LEFT BREAST IN FEMALE, ESTROGEN RECEPTOR POSITIVE: ICD-10-CM

## 2024-01-25 DIAGNOSIS — E66.09 CLASS 1 OBESITY DUE TO EXCESS CALORIES WITHOUT SERIOUS COMORBIDITY WITH BODY MASS INDEX (BMI) OF 33.0 TO 33.9 IN ADULT: ICD-10-CM

## 2024-01-25 DIAGNOSIS — Z71.3 NUTRITIONAL COUNSELING: Primary | ICD-10-CM

## 2024-01-25 PROCEDURE — 97803 MED NUTRITION INDIV SUBSEQ: CPT | Mod: 95,,, | Performed by: DIETITIAN, REGISTERED

## 2024-01-25 RX ORDER — OXYCODONE AND ACETAMINOPHEN 5; 325 MG/1; MG/1
1 TABLET ORAL EVERY 4 HOURS PRN
Qty: 30 TABLET | Refills: 0 | OUTPATIENT
Start: 2024-01-25

## 2024-01-26 ENCOUNTER — PATIENT MESSAGE (OUTPATIENT)
Dept: PLASTIC SURGERY | Facility: CLINIC | Age: 64
End: 2024-01-26
Payer: COMMERCIAL

## 2024-01-26 RX ORDER — ACETAMINOPHEN AND CODEINE PHOSPHATE 300; 30 MG/1; MG/1
1 TABLET ORAL EVERY 4 HOURS PRN
Qty: 25 TABLET | Refills: 0 | Status: ACTIVE | OUTPATIENT
Start: 2024-01-26 | End: 2024-02-05

## 2024-01-29 DIAGNOSIS — S21.002D WOUND OF LEFT BREAST, SUBSEQUENT ENCOUNTER: Primary | ICD-10-CM

## 2024-01-29 DIAGNOSIS — C50.112 MALIGNANT NEOPLASM OF CENTRAL PORTION OF LEFT BREAST IN FEMALE, ESTROGEN RECEPTOR POSITIVE: ICD-10-CM

## 2024-01-29 DIAGNOSIS — Z17.0 MALIGNANT NEOPLASM OF CENTRAL PORTION OF LEFT BREAST IN FEMALE, ESTROGEN RECEPTOR POSITIVE: ICD-10-CM

## 2024-01-29 DIAGNOSIS — Z85.3 HISTORY OF BREAST CANCER: ICD-10-CM

## 2024-01-30 ENCOUNTER — TELEPHONE (OUTPATIENT)
Dept: ORTHOPEDICS | Facility: CLINIC | Age: 64
End: 2024-01-30
Payer: COMMERCIAL

## 2024-01-30 ENCOUNTER — OFFICE VISIT (OUTPATIENT)
Dept: PLASTIC SURGERY | Facility: CLINIC | Age: 64
End: 2024-01-30
Attending: PLASTIC SURGERY
Payer: COMMERCIAL

## 2024-01-30 ENCOUNTER — CLINICAL SUPPORT (OUTPATIENT)
Dept: REHABILITATION | Facility: HOSPITAL | Age: 64
End: 2024-01-30
Attending: OBSTETRICS & GYNECOLOGY
Payer: COMMERCIAL

## 2024-01-30 VITALS — SYSTOLIC BLOOD PRESSURE: 144 MMHG | HEART RATE: 63 BPM | DIASTOLIC BLOOD PRESSURE: 61 MMHG

## 2024-01-30 DIAGNOSIS — M54.50 CHRONIC MIDLINE LOW BACK PAIN, UNSPECIFIED WHETHER SCIATICA PRESENT: ICD-10-CM

## 2024-01-30 DIAGNOSIS — Z85.3 HISTORY OF BREAST CANCER: Primary | ICD-10-CM

## 2024-01-30 DIAGNOSIS — G89.29 CHRONIC MIDLINE LOW BACK PAIN, UNSPECIFIED WHETHER SCIATICA PRESENT: ICD-10-CM

## 2024-01-30 PROCEDURE — 97811 ACUP 1/> W/O ESTIM EA ADD 15: CPT | Mod: PN | Performed by: ACUPUNCTURIST

## 2024-01-30 PROCEDURE — 99024 POSTOP FOLLOW-UP VISIT: CPT | Mod: S$GLB,,, | Performed by: PLASTIC SURGERY

## 2024-01-30 PROCEDURE — 3044F HG A1C LEVEL LT 7.0%: CPT | Mod: CPTII,S$GLB,, | Performed by: PLASTIC SURGERY

## 2024-01-30 PROCEDURE — 3078F DIAST BP <80 MM HG: CPT | Mod: CPTII,S$GLB,, | Performed by: PLASTIC SURGERY

## 2024-01-30 PROCEDURE — 97810 ACUP 1/> WO ESTIM 1ST 15 MIN: CPT | Mod: PN | Performed by: ACUPUNCTURIST

## 2024-01-30 PROCEDURE — 3077F SYST BP >= 140 MM HG: CPT | Mod: CPTII,S$GLB,, | Performed by: PLASTIC SURGERY

## 2024-01-30 NOTE — PROGRESS NOTES
"  Acupuncture Evaluation Note     Name: Lucia Cueto [place]  Clinic Number: [id number]    Traditional Chinese Medicine (TCM) Diagnosis: Qi Stagnation, Blood Stasis, and Qi Deficiency  Medical Diagnosis:   Encounter Diagnosis   Name Primary?    Chronic midline low back pain, unspecified whether sciatica present         Evaluation Date: 1/30/2024    Visit #/Visits authorized: 1/12     Precautions: blood thinners    Subjective     Chief Concern: Chronic midline low back pain at 12 yo after falling off a perez. Piece of lumbar spine broke, floating around in the back. Back pain started again once she started having children 35 years ago. Breast cancer recovery - double mastectomy in 2023, currently still aching pain in both breasts and swelling and abscess at left breast.     Medical necessity is demonstrated by the following IMPAIRMENTS: Medical Necessity: Decreased mobility limits day to day activities, social, and emergent situations and Decreased quality of life              Aggravating Factors:  standing and changing positions   Relieving Factors:  rest    Symptom Description:     Quality:  Aching, Dull, and Tight  Severity:  7/10 with strenuous activity, could be every day   Frequency:  when active    Previous Treatments Tried:  Injection(s)    HEENT:  no complaints     Chest:  COPD, former smoker, SOB with strenuous activity. Atrial flutter in 2022, did ablation and resolved, takes rx for high heart rate.     Digestion:     Diet: in general, a "healthy" diet     Fluids: coffee 1 /day, is drinking plenty of fluids, social drinker  Taste/Appetite: appetite good, taste normal    Symptoms: none    Sleep: variable, sometimes trouble falling asleep, wakes up during the night     Energy Levels:  energy levels good, wanting to start exercise     Psychological Symptoms:  anxiety     Other Symptoms: upper back/flank pain right side, knee pain. RA and psoriatic arthritis     GYN Symptoms: post-menopause, on estrogen " blockers - hot flashes every few hours and night sweats through the night     Objective     Observation:     Tongue:  dry coat, sl scalloped, quiver, swollen center, stiff, dark red     Body:     Color:     Coating:      Pulse:        forceful and weak chi        New Findings:      Treatment     Treatment Principles:  Move qi and blood, nourish qi, stop pain     Acupuncture points used:    SSC, Yintang  Bilateral points:  Unilateral points: LU7, LU5, PC6, HT6, ST36, GB34, GB40, BL65, BL62, LV3, SP4, SP6, KD6, KD3, SP9, SI3, TW3, LI4, LI11  Auricular Treatment:  hua men    Needles In: 26  Needles Out: 26  Needles W/O STIM placed: 8:05  Needles W/O STIM removed: 8:25      Other Traditional Chinese Medicine Modalities - Acupressure   Bodywork    Assessment     After treatment, patient felt relaxed     Patient prognosis is Good.     Patient will continue to benefit from acupuncture treatment to address the deficits listed in the problem list box on initial evaluation, provide patient family education and to maximize pt's level of independence in the home and community environment.     Patient's spiritual, cultural and educational needs considered and pt agreeable to plan of care and goals.     Anticipated barriers to treatment: none    Plan     Recommend 1 /week for 5 sessions then re-assess.      Education:  Patient is aware of cumulative benefit of acupuncture

## 2024-01-30 NOTE — PROGRESS NOTES
[facility] Progress Note     HPI: Lucia Matias is a 63 y.o. 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      She continues to complain of firmness in the left breast, but no infectious symptoms. Has been performing daily packing and wound has been getting smaller.      O  Physical exam  General: No acute distress. Non-toxic appearing.   HEENT: Atraumatic. Normocephalic. Extraocular muscles intact.  CV: Regular rate and rhythm by radial pulse  Pulm: Normal respiratory effort. Symmetric chest rise and fall.   Extremities: No cyanosis. Peripheral pulses intact. Cap refill <2 sec  Neuro: Alert and Oriented  Breasts:   Right breast soft, all incisions well healed  Left breast smaller and there is firmness along the IMF and medial breast.  No eryhema, warmth or drainage.  Packing removed with approximately 1 cm skin hole that is smaller than previous exam, and depth much improved. No evidence of infection.        A/P  Lucia Matias is a 63 y.o. 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      - Continue BID WTD dressing changes  - RTC 4 weeks      Axel Garza MD  [facility]-IV  01/30/2024  4:16 PM

## 2024-02-01 ENCOUNTER — PATIENT MESSAGE (OUTPATIENT)
Dept: ADMINISTRATIVE | Facility: OTHER | Age: 64
End: 2024-02-01
Payer: COMMERCIAL

## 2024-02-01 ENCOUNTER — OFFICE VISIT (OUTPATIENT)
Dept: SPINE | Facility: CLINIC | Age: 64
End: 2024-02-01
Attending: PLASTIC SURGERY
Payer: COMMERCIAL

## 2024-02-01 VITALS
HEIGHT: 62 IN | TEMPERATURE: 98 F | HEART RATE: 65 BPM | WEIGHT: 172.38 LBS | RESPIRATION RATE: 18 BRPM | OXYGEN SATURATION: 100 % | DIASTOLIC BLOOD PRESSURE: 71 MMHG | BODY MASS INDEX: 31.72 KG/M2 | SYSTOLIC BLOOD PRESSURE: 135 MMHG

## 2024-02-01 DIAGNOSIS — Z17.0 MALIGNANT NEOPLASM OF CENTRAL PORTION OF LEFT BREAST IN FEMALE, ESTROGEN RECEPTOR POSITIVE: ICD-10-CM

## 2024-02-01 DIAGNOSIS — M79.18 MYOFASCIAL PAIN SYNDROME: Primary | ICD-10-CM

## 2024-02-01 DIAGNOSIS — Z85.3 HISTORY OF BREAST CANCER: ICD-10-CM

## 2024-02-01 DIAGNOSIS — C50.112 MALIGNANT NEOPLASM OF CENTRAL PORTION OF LEFT BREAST IN FEMALE, ESTROGEN RECEPTOR POSITIVE: ICD-10-CM

## 2024-02-01 DIAGNOSIS — S21.002D WOUND OF LEFT BREAST, SUBSEQUENT ENCOUNTER: ICD-10-CM

## 2024-02-01 PROCEDURE — 3078F DIAST BP <80 MM HG: CPT | Mod: CPTII,S$GLB,, | Performed by: STUDENT IN AN ORGANIZED HEALTH CARE EDUCATION/TRAINING PROGRAM

## 2024-02-01 PROCEDURE — 99999 PR PBB SHADOW E&M-EST. PATIENT-LVL V: CPT | Mod: PBBFAC,,, | Performed by: STUDENT IN AN ORGANIZED HEALTH CARE EDUCATION/TRAINING PROGRAM

## 2024-02-01 PROCEDURE — 3008F BODY MASS INDEX DOCD: CPT | Mod: CPTII,S$GLB,, | Performed by: STUDENT IN AN ORGANIZED HEALTH CARE EDUCATION/TRAINING PROGRAM

## 2024-02-01 PROCEDURE — 99204 OFFICE O/P NEW MOD 45 MIN: CPT | Mod: S$GLB,,, | Performed by: STUDENT IN AN ORGANIZED HEALTH CARE EDUCATION/TRAINING PROGRAM

## 2024-02-01 PROCEDURE — 1159F MED LIST DOCD IN RCRD: CPT | Mod: CPTII,S$GLB,, | Performed by: STUDENT IN AN ORGANIZED HEALTH CARE EDUCATION/TRAINING PROGRAM

## 2024-02-01 PROCEDURE — 3044F HG A1C LEVEL LT 7.0%: CPT | Mod: CPTII,S$GLB,, | Performed by: STUDENT IN AN ORGANIZED HEALTH CARE EDUCATION/TRAINING PROGRAM

## 2024-02-01 PROCEDURE — 3075F SYST BP GE 130 - 139MM HG: CPT | Mod: CPTII,S$GLB,, | Performed by: STUDENT IN AN ORGANIZED HEALTH CARE EDUCATION/TRAINING PROGRAM

## 2024-02-01 PROCEDURE — 1160F RVW MEDS BY RX/DR IN RCRD: CPT | Mod: CPTII,S$GLB,, | Performed by: STUDENT IN AN ORGANIZED HEALTH CARE EDUCATION/TRAINING PROGRAM

## 2024-02-01 RX ORDER — GABAPENTIN 300 MG/1
300 CAPSULE ORAL 3 TIMES DAILY
Qty: 90 CAPSULE | Refills: 2 | Status: SHIPPED | OUTPATIENT
Start: 2024-02-01 | End: 2024-05-30

## 2024-02-01 RX ORDER — DICLOFENAC SODIUM 10 MG/G
2 GEL TOPICAL 4 TIMES DAILY
Qty: 100 G | Refills: 1 | Status: SHIPPED | OUTPATIENT
Start: 2024-02-01

## 2024-02-01 RX ORDER — TIZANIDINE 4 MG/1
4 TABLET ORAL 3 TIMES DAILY PRN
Qty: 90 TABLET | Refills: 2 | Status: SHIPPED | OUTPATIENT
Start: 2024-02-01

## 2024-02-01 NOTE — PROGRESS NOTES
Chronic Pain - New Consult    Referring Physician: Ming Milian MD    Chief Complaint:   Chief Complaint   Patient presents with    Breast Pain     Left side          SUBJECTIVE:    Lucia Matias presents to the clinic for the evaluation of left breast pain. The pain started over a year ago following left breast surgery and symptoms have been unchanged.   She had a double mastectomy in 2023.  Her left breast had ongoing problems with lung collapse, sepsis, wound infections/abscess, with 5 surgeries and 8 hospitalizations.  The pain is located in the left breast area and does not radiate.  The pain is described as sharp and excruciating  and is rated as 7/10. The pain is rated with a score of  0/10 on the BEST day and a score of 10/10 on the WORST day.  Symptoms interfere with daily activity, sleeping, and work. The pain is exacerbated by lifting, pushing.  The pain is mitigated by tylenol #3 (gave her a headache), percocet 5-325mg (helpful, helped her sleep). The patient reports 5-6 hours of uninterrupted sleep per night.    Patient denies significant motor weakness. She reports arthritis in her back, knees, etc, but states the left breast pain is the worst.  She has tried acupuncture since Monday, but she hasn't had treatment for the breast yet.   She states that her plastic surgeon is planning on doing steroid shots once the wound closes (quoted as in about 6 weeks).    Physical Therapy/Home Exercise: no      Pain Disability Index Review:      2/1/2024     1:24 PM   Last 3 PDI Scores   Pain Disability Index (PDI) 48       Pain Medications:   - tylenol #3, percocet,    - extra strength tylenol (x2 every 4 hours)   - NSAIDs - can't take due to gastric sleeve   - cymbalta - reports bad reaction (sent over edge)     report:  Reviewed and consistent with medication use as prescribed.  01/29/2024 01/26/2024 2 Acetaminophen-Cod #3 Tablet 25.00 5       Pain Procedures:   none    Imaging:   US CHEST  MEDIASTINUM     CLINICAL HISTORY:  Left reconstructed breast cellulitis please investigate for deep soft tissue infection (abscess);     TECHNIQUE:  Transverse and longitudinal sonographic images of the left anterior chest wall obtained.     COMPARISON:  None     FINDINGS:  Diffuse subcutaneous edema.     Complex collection extends from the lower enters the lower outer quadrant estimated to measure 11 cm in length and 2.3 cm in AP dimension.  Considerations would include hematoma or possibly abscess.  Continued follow-up advised.     Impression:     Please see above.        Electronically signed by: Nya Benz  Date:                                            10/25/2023  Time:                                           13:08    XR LUMBAR SPINE AP AND LATERAL     CLINICAL HISTORY:  Back pain or radiculopathy, > 6 wks;  Dorsalgia, unspecified     FINDINGS:  There is grade 1 anterolisthesis of L4 on L5.  There is a limbus vertebra at L3.  There is mild DJD.  No fracture dislocation bone destruction seen.  No trauma seen.     Impression:     No acute process seen.        Electronically signed by: Stewart Garcia MD  Date:                                            09/04/2020  Time:                                           11:15        Past Medical History:   Diagnosis Date    Allergy     Anxiety     Arthritis     Atrial flutter     Colon polyps 2016    COPD (chronic obstructive pulmonary disease)     COPD exacerbation 10/31/2022    Depression     Diverticular disease of colon 06/06/2017    Diverticulitis     H/O gastric sleeve     HLD (hyperlipidemia)     Malignant neoplasm of central portion of left breast in female, estrogen receptor positive 12/29/2022    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    Osteopenia     Pancreatitis     Paroxysmal A-fib 1/3/2024    Pneumothorax, unspecified     following mastectomy sx 2023    Psoriasis     Rheumatoid arthritis     Severe obesity (BMI 35.0-39.9) with comorbidity       Past Surgical History:   Procedure Laterality Date    ABLATION  2022    Cardiac Ablation for A Flutter, Successful    ADENOIDECTOMY  1966    Tonsillitis and adnoids    BILATERAL MASTECTOMY Bilateral 2/15/2023    Procedure: MASTECTOMY, BILATERAL;  Surgeon: Marcela Meehan MD;  Location: [facility];  Service: General;  Laterality: Bilateral;  EMAIL SENT  @ 11:44 LK / 2.5 HOURS    BLADDER SUSPENSION      (x2)    BREAST REVISION SURGERY Bilateral 3/29/2023    Procedure: BREAST REVISION SURGERY / BILATERAL BREAST FLAP REVISION;  Surgeon: Ming Milian MD;  Location: [facility];  Service: Plastics;  Laterality: Bilateral;  90 MINUTES     SECTION      (x2)    DEBRIDEMENT, BREAST Bilateral 3/29/2023    Procedure: DEBRIDEMENT, BREAST;  Surgeon: Ming Milian MD;  Location: [facility];  Service: Plastics;  Laterality: Bilateral;    ESOPHAGOGASTRODUODENOSCOPY N/A 2021    Procedure: EGD (ESOPHAGOGASTRODUODENOSCOPY);  Surgeon: Vince Rodriguez MD;  Location: [address];  Service: General;  Laterality: N/A;    INCISION AND DRAINAGE OF BREAST Left 10/26/2023    Procedure: INCISION AND DRAINAGE, BREAST;  Surgeon: Ming Milian MD;  Location: [facility];  Service: Plastics;  Laterality: Left;    INJECTION FOR SENTINEL NODE IDENTIFICATION Left 2/15/2023    Procedure: INJECTION, FOR SENTINEL NODE IDENTIFICATION;  Surgeon: Marcela Meehan MD;  Location: [facility];  Service: General;  Laterality: Left;    LAPAROSCOPIC SLEEVE GASTRECTOMY N/A 2021    Procedure: GASTRECTOMY, SLEEVE, LAPAROSCOPIC, with intraop EGD;  Surgeon: Vince Rodriguez MD;  Location: [facility] 2ND FLR;  Service: General;  Laterality: N/A;    LIPOSUCTION W/ FAT INJECTION Bilateral 2023    Procedure: LIPOSUCTION, WITH FAT TRANSFER;  Surgeon: Ming Milian MD;  Location: [facility];  Service: Plastics;  Laterality: Bilateral;  / FAT GRAFTING TO BOTH BREAST    LUNG BIOPSY  2020    RECONSTRUCTION OF BREAST WITH DEEP  INFERIOR EPIGASTRIC ARTERY  (NEHA) FREE FLAP Bilateral 2/15/2023    Procedure: RECONSTRUCTION, BREAST, USING NEHA FREE FLAP;  Surgeon: Ming Milian MD;  Location: [facility];  Service: Plastics;  Laterality: Bilateral;    REVISION, FLAP, PREVIOUSLY CREATED FOR BREAST RECONSTRUCTION Bilateral 8/29/2023    Procedure: REVISION, FLAP, PREVIOUSLY CREATED FOR BREAST RECONSTRUCTION;  Surgeon: Ming Milian MD;  Location: [facility] OR;  Service: Plastics;  Laterality: Bilateral;  4 HOURS    REVISION, SCAR, ABDOMINAL DONOR SITE N/A 8/29/2023    Procedure: REVISION, SCAR, ABDOMINAL DONOR SITE;  Surgeon: Ming Milian MD;  Location: [facility];  Service: Plastics;  Laterality: N/A;    RHINOPLASTY      SENTINEL LYMPH NODE BIOPSY Left 2/15/2023    Procedure: BIOPSY, LYMPH NODE, SENTINEL;  Surgeon: Marcela Meehan MD;  Location: [facility];  Service: General;  Laterality: Left;    TONSILLECTOMY      TRANSESOPHAGEAL ECHOCARDIOGRAPHY N/A 11/02/2022    Procedure: ECHOCARDIOGRAM, TRANSESOPHAGEAL;  Surgeon: Kellie Grover MD;  Location: [facility] EP [facility];  Service: Cardiology;  Laterality: N/A;     Social History     Socioeconomic History    Marital status:    Occupational History    Occupation: clinical research coordinator    Tobacco Use    Smoking status: Former     Current packs/day: 0.00     Types: Cigarettes     Start date: 1/1/1979     Quit date: 12/7/2020     Years since quitting: 3.1    Smokeless tobacco: Former     Quit date: 12/28/2022   Substance and Sexual Activity    Alcohol use: Yes     Alcohol/week: 1.0 standard drink of alcohol     Types: 1 Glasses of wine per week     Comment: social-rarely    Drug use: No    Sexual activity: Yes     Partners: Male     Birth control/protection: Post-menopausal   Other Topics Concern    Are you pregnant or think you may be? No    Breast-feeding No     Social Determinants of Health     Financial Resource Strain: Medium Risk (12/6/2023)    Overall Financial Resource  Strain (CARDIA)     Difficulty of Paying Living Expenses: Somewhat hard   Food Insecurity: No Food Insecurity (12/6/2023)    Hunger Vital Sign     Worried About Running Out of Food in the Last Year: Never true     Ran Out of Food in the Last Year: Never true   Transportation Needs: No Transportation Needs (12/6/2023)    PRAPARE - Transportation     Lack of Transportation (Medical): No     Lack of Transportation (Non-Medical): No   Physical Activity: Unknown (12/6/2023)    Exercise Vital Sign     Days of Exercise per Week: Patient declined     Minutes of Exercise per Session: 10 min   Stress: Stress Concern Present (12/6/2023)    [place] [employer] - Occupational Stress Questionnaire     Feeling of Stress : Very much   Social Connections: Unknown (12/6/2023)    Social Connection and Isolation Panel [NHANES]     Frequency of Communication with Friends and Family: More than three times a week     Frequency of Social Gatherings with Friends and Family: Once a week     Active Member of Clubs or Organizations: No     Attends Club or Organization Meetings: Patient declined     Marital Status:    Housing Stability: Unknown (12/6/2023)    Housing Stability Vital Sign     Unable to Pay for Housing in the Last Year: Patient refused     Number of Places Lived in the Last Year: 1     Unstable Housing in the Last Year: No     Family History   Adopted: Yes   Problem Relation Age of Onset    No Known Problems Daughter     No Known Problems Daughter     Malignant hyperthermia Son        Review of patient's allergies indicates:   Allergen Reactions    Succinimides Anaphylaxis     Son has MH    Vancomycin analogues Hives, Itching and Rash       Current Outpatient Medications   Medication Sig    anastrozole (ARIMIDEX) 1 mg Tab Take 1 tablet (1 mg total) by mouth once daily.    apixaban (ELIQUIS) 5 mg Tab Take 1 tablet (5 mg total) by mouth 2 (two) times daily.    atorvastatin (LIPITOR) 20 MG tablet Take 1  tablet (20 mg total) by mouth every evening.    B-complex with vitamin C (VITAMIN B COMPLEX-C ORAL) Take by mouth Daily.    calcium-vitamin D 250-100 mg-unit per tablet Take 1 tablet by mouth 2 (two) times daily.    cyanocobalamin 500 MCG tablet Take 500 mcg by mouth once daily.    diazePAM (VALIUM) 5 MG tablet Take 1 tablet (5 mg total) by mouth daily as needed for Anxiety.    fluocinonide-emollient (FLUOCINONIDE-E) 0.05 % Crea AAA of feet daily prn psoriasis.    fluticasone-umeclidin-vilanter (TRELEGY ELLIPTA) 100-62.5-25 mcg DsDv Inhale 1 puff into the lungs once daily.    metoprolol tartrate (LOPRESSOR) 25 MG tablet Take 1 tablet (25 mg total) by mouth 2 (two) times daily.    multivitamin (THERAGRAN) per tablet Take 1 tablet by mouth once daily.    multivitamin with minerals (HAIR,SKIN AND NAILS ORAL) Take by mouth.    mv-mn/iron/folic acid/herb 190 (VITAMIN D3 COMPLETE ORAL) Take by mouth.    omeprazole (PRILOSEC) 40 MG capsule Take 1 capsule (40 mg total) by mouth every morning.    ondansetron (ZOFRAN) 8 MG tablet Take 1 tablet (8 mg total) by mouth every 12 (twelve) hours as needed for Nausea.    oxyCODONE-acetaminophen (PERCOCET) 5-325 mg per tablet Take 1 tablet by mouth every 4 (four) hours as needed for Pain.    QUEtiapine (SEROQUEL) 50 MG tablet Take 1 tablet (50 mg total) by mouth every evening.    TALTZ AUTOINJECTOR 80 mg/mL AtIn Inject 80 mg into the skin every 28 days.    TALTZ AUTOINJECTOR, 2 PACK, 80 mg/mL AtIn Inject 80mg into the skin every other week (weeks 4, 6, 8, 10).    TALTZ AUTOINJECTOR, 3 PACK, 80 mg/mL AtIn Inject 160mg (2 pens) into the skin at week 0 , then inject 80mg into the skin at week 2.    triamcinolone acetonide 0.025% (KENALOG) 0.025 % cream AAA of skin folds bid prn psoriasis.    triamcinolone acetonide 0.1% (KENALOG) 0.1 % cream Apply to affected areas of body BID prn psoriasis. Do not use on face or skin folds.    venlafaxine (EFFEXOR-XR) 75 MG 24 hr capsule Take 1 capsule  "(75 mg total) by mouth once daily. Start on Week 2    acetaminophen-codeine 300-30mg (TYLENOL #3) 300-30 mg Tab Take 1 tablet by mouth every 4 (four) hours as needed. (Patient not taking: Reported on 2/1/2024)    diclofenac sodium (VOLTAREN) 1 % Gel Apply 2 g topically 4 (four) times daily.    gabapentin (NEURONTIN) 300 MG capsule Take 1 capsule (300 mg total) by mouth 3 (three) times daily. 1 per day for week 1, 2 per day for week 2, 3 per day for week 3    tiZANidine (ZANAFLEX) 4 MG tablet Take 1 tablet (4 mg total) by mouth 3 (three) times daily as needed (muscular pain).     No current facility-administered medications for this visit.       REVIEW OF SYSTEMS:    GENERAL:  current fevers or chills, recent use of antibiotics denies.  HEENT:  History of migraines/headaches: denies, History of major throat surgery: denies  RESPIRATORY:  History of home oxygen or pulmonary hypertension/severe breathing dysfunction: denies  CARDIOVASCULAR:  Hx of palpitations/rhythm problems: reports atrial flutter (had ablation); Hx of Heart Attacks/Surgery: reports ablation  GI:  Recent abdominal discomfort or recent change in bowel habits denies, history of diverticulitis and gastric sleeve bypass  MUSCULOSKELETAL:  See HPI.  SKIN:  unhealed wounds or rashes: reports left breast wound  PSYCH: major psychiatric history or recent psychosocial stressors reports depression/anxiety  HEMATOLOGY/LYMPHOLOGY:  Hx of prolonged bleeding, Hx of Blood thinner usage: reports plavix ; reason: atrial flutter  NEURO:   history of seizures, strokes, chronic/old weakness (such as paralysis or paresis of any body part): denies  All other reviewed and negative other than HPI.    OBJECTIVE:    /71 (BP Location: Right arm, Patient Position: Sitting, BP Method: Medium (Automatic))   Pulse 65   Temp 98 °F (36.7 °C)   Resp 18   Ht 5' 2" (1.575 m)   Wt 78.2 kg (172 lb 6.4 oz)   SpO2 100%   BMI 31.53 kg/m²     PHYSICAL EXAMINATION:  General " appearance: Well appearing, in no acute distress, alert and appropriately communicative.  Psych:  Mood and affect appropriate.  Skin: Skin color, texture, turgor normal, no rashes or lesions, in both upper and lower body for exposed skin.  Head/face:  Atraumatic, normocephalic.  Cor: regular rate  Pulm: non-labored breathing  GI: Abdomen non-distended and non-tender.  Extremities: No deformities, significant lymphedema, or skin discoloration. No significant open wounds. No major amputations.  Musculoskeletal: left breast 0.5 cm opening, does not appear wet/infected, packed dressing in place.  Slightly tender to touch around the wound, pain to palpation of firm nodules lateral to the opening. Nodules do not appear erythematous or raised.  Bilateral upper and lower extremity strength is normal and symmetric.  No atrophy or tone abnormalities are noted.  Neuro: Bilateral upper and lower extremity coordination and muscle stretch reflexes abnormalities noted: none.  loss of sensation to light touch: none  Gait: normal    CMP  Sodium   Date Value Ref Range Status   01/05/2024 143 136 - 145 mmol/L Final     Potassium   Date Value Ref Range Status   01/05/2024 3.7 3.5 - 5.1 mmol/L Final     Chloride   Date Value Ref Range Status   01/05/2024 105 95 - 110 mmol/L Final     CO2   Date Value Ref Range Status   01/05/2024 27 23 - 29 mmol/L Final     Glucose   Date Value Ref Range Status   01/05/2024 87 70 - 110 mg/dL Final     BUN   Date Value Ref Range Status   01/05/2024 14 8 - 23 mg/dL Final     Creatinine   Date Value Ref Range Status   01/05/2024 0.9 0.5 - 1.4 mg/dL Final     Calcium   Date Value Ref Range Status   01/05/2024 9.3 8.7 - 10.5 mg/dL Final     Total Protein   Date Value Ref Range Status   01/05/2024 6.9 6.0 - 8.4 g/dL Final     Albumin   Date Value Ref Range Status   01/05/2024 3.7 3.5 - 5.2 g/dL Final     Total Bilirubin   Date Value Ref Range Status   01/05/2024 1.0 0.1 - 1.0 mg/dL Final     Comment:     For  infants and newborns, interpretation of results should be based  on gestational age, weight and in agreement with clinical  observations.    Premature Infant recommended reference ranges:  Up to 24 hours.............<8.0 mg/dL  Up to 48 hours............<12.0 mg/dL  3-5 days..................<15.0 mg/dL  6-29 days.................<15.0 mg/dL       Alkaline Phosphatase   Date Value Ref Range Status   01/05/2024 97 55 - 135 U/L Final     AST   Date Value Ref Range Status   01/05/2024 22 10 - 40 U/L Final     ALT   Date Value Ref Range Status   01/05/2024 22 10 - 44 U/L Final     Anion Gap   Date Value Ref Range Status   01/05/2024 11 8 - 16 mmol/L Final     eGFR   Date Value Ref Range Status   01/05/2024 >60 >60 mL/min/1.73 m^2 Final     ASSESSMENT: 63 y.o. year old female with left breast pain, consistent with:    1. Myofascial pain syndrome  diclofenac sodium (VOLTAREN) 1 % Gel    gabapentin (NEURONTIN) 300 MG capsule    tiZANidine (ZANAFLEX) 4 MG tablet      2. Wound of left breast, subsequent encounter  Ambulatory referral/consult to Pain Clinic    diclofenac sodium (VOLTAREN) 1 % Gel    gabapentin (NEURONTIN) 300 MG capsule    tiZANidine (ZANAFLEX) 4 MG tablet      3. Malignant neoplasm of central portion of left breast in female, estrogen receptor positive  Ambulatory referral/consult to Pain Clinic    diclofenac sodium (VOLTAREN) 1 % Gel    gabapentin (NEURONTIN) 300 MG capsule    tiZANidine (ZANAFLEX) 4 MG tablet      4. History of breast cancer  Ambulatory referral/consult to Pain Clinic        IMPRESSION: Lucia Matias presents today for left breast pain.  She has a history of breast cancer, status post bilateral mastectomy, which was complicated by multiple surgeries and revisions on the left breast related to infection/abscess.  She continues to have a small wound, which is packed with dressing, under her left breast.  She feels her pain is worst while she is doing her dressing changes.   History and physical exam are consistent with postop left breast wound and myofascial pain syndrome.  Imaging from October 2023 is consistent with left breast complex subcutaneous fluid collection.  From what she describes, she may have significant scarring in her left breast which may be contributing to her pain.  She states that she has follow up planned with her plastic surgeon, who is planning to do steroid injections into the scar tissue, once the wound has fully healed (approximately 6 weeks).  In the meantime, we will consider with conservative management with medications.    PLAN:   - I have stressed the importance of physical activity and a home exercise plan to help with pain and improve health.  - Patient can continue with medications for now since they are providing benefits, using them appropriately, and without side effects.  - we discussed the option of using narcotics to help with her acute pain as she is waiting for the wound to heal.  However, after much discussion of risks/benefits, we agreed to try non-opioid management first  - start gabapentin 300mg three times a day; 1 per day for week 1, 2 per day for week 2, 3 per day for week 3  - start tizandine 4mg three times a day as needed for muscular pain  - start voltaren 1% to left breast, adjacent to wound (not in the wound) and healed, but painful regions  - if her pain persists, we can consider increasing her medication dosage vs. putting her on an opiate contract for short term management of her pain  - we also discussed that if she still has pain that persists despite the steroid injection that were discussed with her plastic surgeon, we can consider intercostal nerve blocks to help with chest wall pain  - she would also benefit from physical therapy once the wound has healed  - RTC in 4 weeks to discuss medication management  - Counseled patient regarding the importance of activity modification and physical therapy.    The above plan and  management options were discussed at length with patient. Patient is in agreement with the above and verbalized understanding. It will be communicated with the referring physician via electronic record, fax, or mail.    Reyna Byrne  02/01/2024

## 2024-02-05 ENCOUNTER — CLINICAL SUPPORT (OUTPATIENT)
Dept: REHABILITATION | Facility: HOSPITAL | Age: 64
End: 2024-02-05
Payer: COMMERCIAL

## 2024-02-05 DIAGNOSIS — G89.29 CHRONIC MIDLINE LOW BACK PAIN, UNSPECIFIED WHETHER SCIATICA PRESENT: Primary | ICD-10-CM

## 2024-02-05 DIAGNOSIS — M54.50 CHRONIC MIDLINE LOW BACK PAIN, UNSPECIFIED WHETHER SCIATICA PRESENT: Primary | ICD-10-CM

## 2024-02-05 PROCEDURE — 97811 ACUP 1/> W/O ESTIM EA ADD 15: CPT | Mod: PN | Performed by: ACUPUNCTURIST

## 2024-02-05 PROCEDURE — 97810 ACUP 1/> WO ESTIM 1ST 15 MIN: CPT | Mod: PN | Performed by: ACUPUNCTURIST

## 2024-02-05 NOTE — PROGRESS NOTES
Acupuncture Evaluation Note     Name: Lucia Cueto [facility] Number: [id number]    Traditional Chinese Medicine (TCM) Diagnosis: Qi Stagnation, Blood Stasis, and Qi Deficiency  Medical Diagnosis:   No diagnosis found.       Evaluation Date: 2/5/2024    Visit #/Visits authorized: 2/12     Precautions: blood thinners    Subjective     Chief Concern: Chronic midline low back pain. Breast cancer recovery - double mastectomy in 2023, aching pain in both breasts and swelling and abscess at left breast.     Medical necessity is demonstrated by the following IMPAIRMENTS: Medical Necessity: Decreased mobility limits day to day activities, social, and emergent situations and Decreased quality of life              Aggravating Factors:  standing and changing positions   Relieving Factors:  rest    Symptom Description:     Quality:  Aching, Dull, and Tight  Severity:  7/10 with strenuous activity, could be every day   Frequency:  when active      Objective       New Findings:      Treatment     Treatment Principles:  Move qi and blood, nourish qi, stop pain     Acupuncture points used:      Bilateral points: SI11, SI10, BL13, BL14, BL43, BL15, BL23, BL25, BL26  Unilateral points: LU7, PC6, HT7, SI3, BL62, GB41, BL58, KD6, KD3, SP6  Auricular Treatment:      Needles In: 28  Needles Out: 28  Needles W/O STIM placed: 7:20  Needles W/O STIM removed: 7:50      Assessment     After treatment, patient felt good     Patient prognosis is Good.     Patient will continue to benefit from acupuncture treatment to address the deficits listed in the problem list box on initial evaluation, provide patient family education and to maximize pt's level of independence in the home and community environment.     Patient's spiritual, cultural and educational needs considered and pt agreeable to plan of care and goals.     Anticipated barriers to treatment: none    Plan     Recommend 1 /week for 4 sessions then re-assess.      Education:   Patient is aware of cumulative benefit of acupuncture

## 2024-02-15 ENCOUNTER — PATIENT MESSAGE (OUTPATIENT)
Dept: PSYCHIATRY | Facility: CLINIC | Age: 64
End: 2024-02-15
Payer: COMMERCIAL

## 2024-02-15 DIAGNOSIS — F41.1 GAD (GENERALIZED ANXIETY DISORDER): ICD-10-CM

## 2024-02-15 DIAGNOSIS — F41.0 PANIC ATTACK: ICD-10-CM

## 2024-02-15 RX ORDER — DIAZEPAM 5 MG/1
TABLET ORAL
Qty: 30 TABLET | Refills: 2 | Status: SHIPPED | OUTPATIENT
Start: 2024-02-15 | End: 2024-04-10 | Stop reason: SDUPTHER

## 2024-02-21 ENCOUNTER — OFFICE VISIT (OUTPATIENT)
Dept: OPTOMETRY | Facility: CLINIC | Age: 64
End: 2024-02-21
Payer: COMMERCIAL

## 2024-02-21 DIAGNOSIS — H52.4 BILATERAL PRESBYOPIA: Primary | ICD-10-CM

## 2024-02-21 DIAGNOSIS — H25.13 SENILE NUCLEAR SCLEROSIS, BILATERAL: ICD-10-CM

## 2024-02-21 DIAGNOSIS — Z46.0 FITTING AND ADJUSTMENT OF SPECTACLES AND CONTACT LENSES: Primary | ICD-10-CM

## 2024-02-21 PROCEDURE — 92015 DETERMINE REFRACTIVE STATE: CPT | Mod: S$GLB,,, | Performed by: OPTOMETRIST

## 2024-02-21 PROCEDURE — 92310 CONTACT LENS FITTING OU: CPT | Mod: CSM,S$GLB,, | Performed by: OPTOMETRIST

## 2024-02-21 PROCEDURE — 99999 PR PBB SHADOW E&M-EST. PATIENT-LVL II: CPT | Mod: PBBFAC,,, | Performed by: OPTOMETRIST

## 2024-02-21 PROCEDURE — 99999 PR PBB SHADOW E&M-EST. PATIENT-LVL IV: CPT | Mod: PBBFAC,,, | Performed by: OPTOMETRIST

## 2024-02-21 PROCEDURE — 92014 COMPRE OPH EXAM EST PT 1/>: CPT | Mod: S$GLB,,, | Performed by: OPTOMETRIST

## 2024-02-22 NOTE — PROGRESS NOTES
ESTHER    ALEXA: 02/22  Chief complaint (CC): Patient is here for annual eye exam today.  Patient   has noticed more trouble with near vision with glasses and contacts since   her last exam, especially with her contacts. Patient has allergies and   eyes sometimes water.  Glasses? +  Contacts? +  H/o eye surgery, injections or laser: -  H/o eye injury: -  Known eye conditions? See above  Family h/o eye conditions? adopted  Eye gtts? -      (-) Flashes (-)  Floaters (-) Mucous   (+)  Tearing (-) Itching (-) Burning   (-) Headaches (-) Eye Pain/discomfort (-) Irritation   (-)  Redness (-) Double vision (-) Blurry vision    Diabetic? -  A1c? -      Last edited by Magui Goodwin on 2/21/2024  1:10 PM.            Assessment /Plan     For exam results, see Encounter Report.      Bilateral presbyopia  SRx released to patient. Patient educated on lens options. Normal ocular health. RTC 1 year for routine exam.   Order CLRx trials. Pt needs CLFU at dispense. Will try MV with new Rx but ordered DVO since pt is interested.     Senile nuclear sclerosis, bilateral  Nuclear sclerotic cataract - not visually significant. Observe.

## 2024-02-24 ENCOUNTER — PATIENT MESSAGE (OUTPATIENT)
Dept: OPTOMETRY | Facility: CLINIC | Age: 64
End: 2024-02-24
Payer: COMMERCIAL

## 2024-02-26 ENCOUNTER — PATIENT MESSAGE (OUTPATIENT)
Dept: PLASTIC SURGERY | Facility: CLINIC | Age: 64
End: 2024-02-26
Payer: COMMERCIAL

## 2024-02-27 ENCOUNTER — TELEPHONE (OUTPATIENT)
Dept: ENDOSCOPY | Facility: HOSPITAL | Age: 64
End: 2024-02-27

## 2024-02-27 ENCOUNTER — CLINICAL SUPPORT (OUTPATIENT)
Dept: ENDOSCOPY | Facility: HOSPITAL | Age: 64
End: 2024-02-27
Attending: INTERNAL MEDICINE
Payer: COMMERCIAL

## 2024-02-27 ENCOUNTER — OFFICE VISIT (OUTPATIENT)
Dept: PLASTIC SURGERY | Facility: CLINIC | Age: 64
End: 2024-02-27
Attending: PLASTIC SURGERY
Payer: COMMERCIAL

## 2024-02-27 VITALS — DIASTOLIC BLOOD PRESSURE: 72 MMHG | HEART RATE: 65 BPM | SYSTOLIC BLOOD PRESSURE: 169 MMHG

## 2024-02-27 DIAGNOSIS — Z12.12 ENCOUNTER FOR COLORECTAL CANCER SCREENING: ICD-10-CM

## 2024-02-27 DIAGNOSIS — Z85.3 HISTORY OF BREAST CANCER: Primary | ICD-10-CM

## 2024-02-27 DIAGNOSIS — Z86.010 HISTORY OF COLON POLYPS: ICD-10-CM

## 2024-02-27 DIAGNOSIS — Z12.11 ENCOUNTER FOR COLORECTAL CANCER SCREENING: ICD-10-CM

## 2024-02-27 PROCEDURE — 3044F HG A1C LEVEL LT 7.0%: CPT | Mod: CPTII,S$GLB,, | Performed by: PLASTIC SURGERY

## 2024-02-27 PROCEDURE — 3077F SYST BP >= 140 MM HG: CPT | Mod: CPTII,S$GLB,, | Performed by: PLASTIC SURGERY

## 2024-02-27 PROCEDURE — 99024 POSTOP FOLLOW-UP VISIT: CPT | Mod: S$GLB,,, | Performed by: PLASTIC SURGERY

## 2024-02-27 PROCEDURE — 3078F DIAST BP <80 MM HG: CPT | Mod: CPTII,S$GLB,, | Performed by: PLASTIC SURGERY

## 2024-02-27 NOTE — PROGRESS NOTES
[facility] Progress Note     HPI: Lucia Matias is a 63 y.o. 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      She continues to complain of firmness in the left breast. Has been performing daily packing and wound has been getting smaller, but developing a rash around the wound.      O  Physical exam  General: No acute distress. Non-toxic appearing.   HEENT: Atraumatic. Normocephalic. Extraocular muscles intact.  CV: Regular rate and rhythm by radial pulse  Pulm: Normal respiratory effort. Symmetric chest rise and fall.   Extremities: No cyanosis. Peripheral pulses intact. Cap refill <2 sec  Neuro: Alert and Oriented  Breasts:   Right breast soft, all incisions well healed  Left breast smaller and there is firmness along the IMF and medial breast.  No eryhema, warmth or drainage.  Packing removed with approximately 0.5 cm skin hole that is smaller and more shallow than previous exam, and depth much improved. No evidence of infection.        A/P  Lucia Matias is a 63 y.o. 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      - Can trial discontinuation of packing the wound  - RTC 1 week for wound check       Axel Garza MD  [facility], [facility]  2/27/2024  2:03 PM

## 2024-02-28 ENCOUNTER — E-VISIT (OUTPATIENT)
Dept: PRIMARY CARE CLINIC | Facility: CLINIC | Age: 64
End: 2024-02-28
Payer: COMMERCIAL

## 2024-02-28 ENCOUNTER — PATIENT MESSAGE (OUTPATIENT)
Dept: PRIMARY CARE CLINIC | Facility: CLINIC | Age: 64
End: 2024-02-28

## 2024-02-28 ENCOUNTER — TELEPHONE (OUTPATIENT)
Dept: OPTOMETRY | Facility: CLINIC | Age: 64
End: 2024-02-28
Payer: COMMERCIAL

## 2024-02-28 DIAGNOSIS — J06.9 UPPER RESPIRATORY TRACT INFECTION, UNSPECIFIED TYPE: Primary | ICD-10-CM

## 2024-02-28 PROCEDURE — 99421 OL DIG E/M SVC 5-10 MIN: CPT | Mod: ,,, | Performed by: INTERNAL MEDICINE

## 2024-02-28 RX ORDER — AZITHROMYCIN 250 MG/1
TABLET, FILM COATED ORAL
Qty: 6 TABLET | Refills: 0 | Status: SHIPPED | OUTPATIENT
Start: 2024-02-28 | End: 2024-03-04

## 2024-02-28 NOTE — PROGRESS NOTES
Patient ID: Lucia Matias is a 63 y.o. female.    Chief Complaint: URI (Entered automatically based on patient selection in Patient Portal.)    The patient initiated a request through [employer] on 2/28/2024 for evaluation and management with a chief complaint of URI (Entered automatically based on patient selection in Patient Portal.)     I evaluated the questionnaire submission on 2/28/2024.    [facility] Peq Evisit Upper Respitatory/Cough Questionnaire    2/28/2024  1:12 PM CST - Filed by Patient   Do you agree to participate in an E-Visit? Yes   If you have any of the following symptoms, please present to your local ER or call 911:  I acknowledge   What is the main issue that you would like for your doctor to address today? URI   Are you able to take your vital signs? No   What symptoms do you currently have?  Chills;  Cough;  Fatigue;  Headache;  Nasal Congestion;  Sore throat   Describe your cough: Productive (containing mucus)   Describe the mucus: Yellow;  Thick   Have you had any of the following? None of the above   Have you ever smoked? I have smoked in the past   Have you had a fever? Yes   What has been the range of your fever? Less than 100.4   When did your symptoms first appear? 2/25/2024   In the last two weeks, have you been in close contact with someone who has COVID-19 or the Flu? No   In the last two weeks, have you worked or volunteered in a healthcare facility or as a ? Healthcare facilities include a hospital, medical or dental clinic, long-term care facility, or nursing home No   Do you live in a long-term care facility, nursing home, group home, or homeless shelter? No   List what you have done or taken to help your symptoms. Mucinex tylenol and cough medicine   How severe are your symptoms? Moderate   Have your symptoms improved since they first appeared? Worse   Have you taken an at home Covid test? Yes   What were the results? Negative   Have you taken a Flu test? No    Have you been fully vaccinated for COVID? (2 Pfizer, 2 Moderna or 1 David & David vaccine injections) Yes   Have you received a booster? Yes   Have you recieved a Flu shot? Yes   When did you recieve your Flu shot? 10/6/2023   Do you have transportation to get tested for COVID if it is indicated and ordered for you at an [facility] location? Yes   Provide any information you feel is important to your history not asked above Took covid test at home it is negative   Please attach any relevant images or files          Encounter Diagnosis   Name Primary?    Upper respiratory tract infection, unspecified type Yes        No orders of the defined types were placed in this encounter.     Medications Ordered This Encounter   Medications    azithromycin (Z-PEGGY) 250 MG tablet     Sig: Take 2 tablets by mouth on day 1; Take 1 tablet by mouth on days 2-5     Dispense:  6 tablet     Refill:  0        No follow-ups on file.      E-Visit Time Tracking:    Day 1 Time (in minutes): 5    Total Time (in minutes): 5

## 2024-02-29 RX ORDER — DOXYCYCLINE HYCLATE 100 MG
100 TABLET ORAL 2 TIMES DAILY
Qty: 14 TABLET | Refills: 0 | Status: SHIPPED | OUTPATIENT
Start: 2024-02-29 | End: 2024-04-03

## 2024-03-06 ENCOUNTER — OFFICE VISIT (OUTPATIENT)
Dept: OPTOMETRY | Facility: CLINIC | Age: 64
End: 2024-03-06
Payer: COMMERCIAL

## 2024-03-06 ENCOUNTER — PATIENT MESSAGE (OUTPATIENT)
Dept: OPTOMETRY | Facility: CLINIC | Age: 64
End: 2024-03-06
Payer: COMMERCIAL

## 2024-03-06 DIAGNOSIS — H52.4 BILATERAL PRESBYOPIA: Primary | ICD-10-CM

## 2024-03-06 PROCEDURE — 3044F HG A1C LEVEL LT 7.0%: CPT | Mod: CPTII,,, | Performed by: OPTOMETRIST

## 2024-03-06 PROCEDURE — 1160F RVW MEDS BY RX/DR IN RCRD: CPT | Mod: CPTII,,, | Performed by: OPTOMETRIST

## 2024-03-06 PROCEDURE — 1159F MED LIST DOCD IN RCRD: CPT | Mod: CPTII,,, | Performed by: OPTOMETRIST

## 2024-03-06 PROCEDURE — 92499 UNLISTED OPH SVC/PROCEDURE: CPT | Mod: CSM,S$GLB,, | Performed by: OPTOMETRIST

## 2024-03-07 NOTE — PROGRESS NOTES
HPI    DSL- 2/21/2024 Dr. Paez    62 y/o female present to clinic for C/L F/U.   Last edited by Moi Kim on 3/6/2024  1:37 PM.            Assessment /Plan     For exam results, see Encounter Report.    Bilateral presbyopia      CLRx updated. Pt didn't like [facility]. Good VA and fit. Wear and care reviewed. RTC 1 year.

## 2024-03-12 ENCOUNTER — OFFICE VISIT (OUTPATIENT)
Dept: PLASTIC SURGERY | Facility: CLINIC | Age: 64
End: 2024-03-12
Attending: PLASTIC SURGERY
Payer: COMMERCIAL

## 2024-03-12 VITALS — DIASTOLIC BLOOD PRESSURE: 77 MMHG | HEART RATE: 73 BPM | SYSTOLIC BLOOD PRESSURE: 139 MMHG

## 2024-03-12 DIAGNOSIS — C50.919 MALIGNANT NEOPLASM OF FEMALE BREAST, UNSPECIFIED ESTROGEN RECEPTOR STATUS, UNSPECIFIED LATERALITY, UNSPECIFIED SITE OF BREAST: Primary | ICD-10-CM

## 2024-03-12 PROCEDURE — 3075F SYST BP GE 130 - 139MM HG: CPT | Mod: CPTII,S$GLB,, | Performed by: PLASTIC SURGERY

## 2024-03-12 PROCEDURE — 3078F DIAST BP <80 MM HG: CPT | Mod: CPTII,S$GLB,, | Performed by: PLASTIC SURGERY

## 2024-03-12 PROCEDURE — 3044F HG A1C LEVEL LT 7.0%: CPT | Mod: CPTII,S$GLB,, | Performed by: PLASTIC SURGERY

## 2024-03-12 PROCEDURE — 99211 OFF/OP EST MAY X REQ PHY/QHP: CPT | Mod: S$GLB,,, | Performed by: PLASTIC SURGERY

## 2024-03-12 NOTE — PROGRESS NOTES
[facility]astic [facility] Progress Note     HPI: Lucia Matias is a 63 y.o. 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 Trial of discontinued packing at last visit one week ago.      Wound much smaller now with no packing of the wound. No issues.      O  Physical exam  General: No acute distress. Non-toxic appearing.   HEENT: Atraumatic. Normocephalic. Extraocular muscles intact.  CV: Regular rate and rhythm by radial pulse  Pulm: Normal respiratory effort. Symmetric chest rise and fall.   Extremities: No cyanosis. Peripheral pulses intact. Cap refill <2 sec  Neuro: Alert and Oriented  Breasts:   Right breast soft, all incisions well healed  Left breast smaller and there is firmness along the IMF and medial breast.  No eryhema, warmth or drainage.  Packing removed with approximately 2 mm skin hole that is smaller and more shallow than previous exam, and depth much improved, little more than half of the cotton tip applier head. No evidence of infection.        A/P  Lucia Matias is a 63 y.o. 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      - Continue no packing  - RTC 1 month. Once wound healed completely for 3 months, can proceed with any revisions    Axel Garza MD  [facility], [facility]  3/12/2024  3:20 PM

## 2024-03-18 DIAGNOSIS — Z98.84 S/P LAPAROSCOPIC SLEEVE GASTRECTOMY: ICD-10-CM

## 2024-03-18 DIAGNOSIS — R63.4 WEIGHT LOSS: ICD-10-CM

## 2024-03-18 RX ORDER — OMEPRAZOLE 40 MG/1
40 CAPSULE, DELAYED RELEASE ORAL EVERY MORNING
Qty: 90 CAPSULE | Refills: 1 | Status: SHIPPED | OUTPATIENT
Start: 2024-03-18

## 2024-03-21 ENCOUNTER — OFFICE VISIT (OUTPATIENT)
Dept: CARDIOLOGY | Facility: CLINIC | Age: 64
End: 2024-03-21
Payer: COMMERCIAL

## 2024-03-21 VITALS
OXYGEN SATURATION: 96 % | WEIGHT: 179.13 LBS | SYSTOLIC BLOOD PRESSURE: 116 MMHG | DIASTOLIC BLOOD PRESSURE: 74 MMHG | HEART RATE: 69 BPM | BODY MASS INDEX: 32.76 KG/M2

## 2024-03-21 DIAGNOSIS — I48.92 ATRIAL FLUTTER, UNSPECIFIED TYPE: Primary | ICD-10-CM

## 2024-03-21 DIAGNOSIS — I10 ESSENTIAL HYPERTENSION: Chronic | ICD-10-CM

## 2024-03-21 DIAGNOSIS — E78.2 MIXED HYPERLIPIDEMIA: Chronic | ICD-10-CM

## 2024-03-21 PROCEDURE — 3044F HG A1C LEVEL LT 7.0%: CPT | Mod: CPTII,S$GLB,, | Performed by: INTERNAL MEDICINE

## 2024-03-21 PROCEDURE — 3074F SYST BP LT 130 MM HG: CPT | Mod: CPTII,S$GLB,, | Performed by: INTERNAL MEDICINE

## 2024-03-21 PROCEDURE — 93005 ELECTROCARDIOGRAM TRACING: CPT

## 2024-03-21 PROCEDURE — 99214 OFFICE O/P EST MOD 30 MIN: CPT | Mod: 25,S$GLB,, | Performed by: INTERNAL MEDICINE

## 2024-03-21 PROCEDURE — 99999 PR PBB SHADOW E&M-EST. PATIENT-LVL IV: CPT | Mod: PBBFAC,,, | Performed by: INTERNAL MEDICINE

## 2024-03-21 PROCEDURE — 3078F DIAST BP <80 MM HG: CPT | Mod: CPTII,S$GLB,, | Performed by: INTERNAL MEDICINE

## 2024-03-21 PROCEDURE — 3008F BODY MASS INDEX DOCD: CPT | Mod: CPTII,S$GLB,, | Performed by: INTERNAL MEDICINE

## 2024-03-21 PROCEDURE — 93010 ELECTROCARDIOGRAM REPORT: CPT | Mod: S$GLB,,, | Performed by: INTERNAL MEDICINE

## 2024-03-21 PROCEDURE — 1159F MED LIST DOCD IN RCRD: CPT | Mod: CPTII,S$GLB,, | Performed by: INTERNAL MEDICINE

## 2024-03-21 NOTE — PROGRESS NOTES
Cardiology    3/21/2024  10:55 AM    Problem list  Patient Active Problem List   Diagnosis    Diverticular disease of colon    Psoriasis vulgaris    COPD (chronic obstructive pulmonary disease)    HLD (hyperlipidemia)    Depression with anxiety    Insomnia    Other long term (current) drug therapy    History of tobacco abuse    Essential hypertension    Multiple lung nodules on CT    Class 1 obesity due to excess calories without serious comorbidity with body mass index (BMI) of 31.0 to 31.9 in adult    History of sleeve gastrectomy    Rheumatoid arthritis    Psoriatic arthritis    Malignant neoplasm of central portion of left breast in female, estrogen receptor positive    Paroxysmal A-fib       CC:  Follow-up    HPI:  She is doing well from a cardiac standpoint.  She is still has some residual drainage from her left breast incision which is being closely followed by her plastic surgeon.  She denies any chest pain or shortness of breath.  She had labs in January with her PCP which were reviewed with her.  She tolerates her medication.  She denies any bleeding.    Medications  Current Outpatient Medications   Medication Sig Dispense Refill    anastrozole (ARIMIDEX) 1 mg Tab Take 1 tablet (1 mg total) by mouth once daily. 90 tablet 3    apixaban (ELIQUIS) 5 mg Tab Take 1 tablet (5 mg total) by mouth 2 (two) times daily. 180 tablet 3    atorvastatin (LIPITOR) 20 MG tablet Take 1 tablet (20 mg total) by mouth every evening. 90 tablet 3    B-complex with vitamin C (VITAMIN B COMPLEX-C ORAL) Take by mouth Daily.      calcium-vitamin D 250-100 mg-unit per tablet Take 1 tablet by mouth 2 (two) times daily.      cyanocobalamin 500 MCG tablet Take 500 mcg by mouth once daily.      diazePAM (VALIUM) 5 MG tablet TAKE 1 TABLET BY MOUTH DAILY AS NEEDED FOR ANXIETY. 30 tablet 2    diclofenac sodium (VOLTAREN) 1 % Gel Apply 2 g topically 4 (four) times daily. 100 g 1    fluocinonide-emollient (FLUOCINONIDE-E) 0.05 % Crea  AAA of feet daily prn psoriasis. 60 g 3    fluticasone-umeclidin-vilanter (TRELEGY ELLIPTA) 100-62.5-25 mcg DsDv Inhale 1 puff into the lungs once daily. 180 each 3    gabapentin (NEURONTIN) 300 MG capsule Take 1 capsule (300 mg total) by mouth 3 (three) times daily. 1 per day for week 1, 2 per day for week 2, 3 per day for week 3 90 capsule 2    metoprolol tartrate (LOPRESSOR) 25 MG tablet Take 1 tablet (25 mg total) by mouth 2 (two) times daily. 180 tablet 3    multivitamin (THERAGRAN) per tablet Take 1 tablet by mouth once daily.      multivitamin with minerals (HAIR,SKIN AND NAILS ORAL) Take by mouth.      mv-mn/iron/folic acid/herb 190 (VITAMIN D3 COMPLETE ORAL) Take by mouth.      omeprazole (PRILOSEC) 40 MG capsule Take 1 capsule (40 mg total) by mouth every morning. 90 capsule 1    ondansetron (ZOFRAN) 8 MG tablet Take 1 tablet (8 mg total) by mouth every 12 (twelve) hours as needed for Nausea. 30 tablet 2    QUEtiapine (SEROQUEL) 50 MG tablet Take 1 tablet (50 mg total) by mouth every evening. 30 tablet 11    TALTZ AUTOINJECTOR 80 mg/mL AtIn Inject 80 mg into the skin every 28 days. 1 mL 6    tiZANidine (ZANAFLEX) 4 MG tablet Take 1 tablet (4 mg total) by mouth 3 (three) times daily as needed (muscular pain). 90 tablet 2    triamcinolone acetonide 0.025% (KENALOG) 0.025 % cream AAA of skin folds bid prn psoriasis. 80 g 3    triamcinolone acetonide 0.1% (KENALOG) 0.1 % cream Apply to affected areas of body BID prn psoriasis. Do not use on face or skin folds. 454 g 1    venlafaxine (EFFEXOR-XR) 75 MG 24 hr capsule Take 1 capsule (75 mg total) by mouth once daily. Start on Week 2 90 capsule 3    doxycycline (VIBRA-TABS) 100 MG tablet Take 1 tablet (100 mg total) by mouth 2 (two) times daily. (Patient not taking: Reported on 3/21/2024) 14 tablet 0    oxyCODONE-acetaminophen (PERCOCET) 5-325 mg per tablet Take 1 tablet by mouth every 4 (four) hours as needed for Pain. (Patient not taking: Reported on 3/21/2024)  30 tablet 0     No current facility-administered medications for this visit.      Prior to Admission medications    Medication Sig Start Date End Date Taking? Authorizing Provider   anastrozole (ARIMIDEX) 1 mg Tab Take 1 tablet (1 mg total) by mouth once daily. 10/2/23 10/1/24 Yes Roya Madrid MD   apixaban (ELIQUIS) 5 mg Tab Take 1 tablet (5 mg total) by mouth 2 (two) times daily. 10/16/23  Yes Pool Dorado MD   atorvastatin (LIPITOR) 20 MG tablet Take 1 tablet (20 mg total) by mouth every evening. 11/1/23  Yes Hetal Banks MD   B-complex with vitamin C (VITAMIN B COMPLEX-C ORAL) Take by mouth Daily.   Yes Provider, Historical   calcium-vitamin D 250-100 mg-unit per tablet Take 1 tablet by mouth 2 (two) times daily.   Yes Provider, Historical   cyanocobalamin 500 MCG tablet Take 500 mcg by mouth once daily.   Yes Provider, Historical   diazePAM (VALIUM) 5 MG tablet TAKE 1 TABLET BY MOUTH DAILY AS NEEDED FOR ANXIETY. 2/15/24  Yes Jose Morales III, NP   diclofenac sodium (VOLTAREN) 1 % Gel Apply 2 g topically 4 (four) times daily. 2/1/24  Yes Reyna Byrne MD   fluocinonide-emollient (FLUOCINONIDE-E) 0.05 % Crea AAA of feet daily prn psoriasis. 6/30/23  Yes Minerva Lara MD   fluticasone-umeclidin-vilanter (TRELEGY ELLIPTA) 100-62.5-25 mcg DsDv Inhale 1 puff into the lungs once daily. 2/6/23  Yes Hetal Banks MD   gabapentin (NEURONTIN) 300 MG capsule Take 1 capsule (300 mg total) by mouth 3 (three) times daily. 1 per day for week 1, 2 per day for week 2, 3 per day for week 3 2/1/24  Yes Reyna Byrne MD   metoprolol tartrate (LOPRESSOR) 25 MG tablet Take 1 tablet (25 mg total) by mouth 2 (two) times daily. 7/6/23  Yes Pool Dorado MD   multivitamin (THERAGRAN) per tablet Take 1 tablet by mouth once daily.   Yes Provider, Historical   multivitamin with minerals (HAIR,SKIN AND NAILS ORAL) Take by mouth.   Yes Provider, Historical   mv-mn/iron/folic acid/herb 190 (VITAMIN D3  COMPLETE ORAL) Take by mouth.   Yes Provider, Historical   omeprazole (PRILOSEC) 40 MG capsule Take 1 capsule (40 mg total) by mouth every morning. 3/18/24  Yes Elisabet Rascon NP   ondansetron (ZOFRAN) 8 MG tablet Take 1 tablet (8 mg total) by mouth every 12 (twelve) hours as needed for Nausea. 3/23/23 3/22/24 Yes Roya Madrid MD   QUEtiapine (SEROQUEL) 50 MG tablet Take 1 tablet (50 mg total) by mouth every evening. 7/5/23 7/4/24 Yes Jose Morales III, NP   TALTZ AUTOINJECTOR 80 mg/mL AtIn Inject 80 mg into the skin every 28 days. 8/10/23  Yes Minerva Lara MD   tiZANidine (ZANAFLEX) 4 MG tablet Take 1 tablet (4 mg total) by mouth 3 (three) times daily as needed (muscular pain). 2/1/24  Yes Reyna Byrne MD   triamcinolone acetonide 0.025% (KENALOG) 0.025 % cream AAA of skin folds bid prn psoriasis. 6/30/23  Yes Minerva Lara MD   triamcinolone acetonide 0.1% (KENALOG) 0.1 % cream Apply to affected areas of body BID prn psoriasis. Do not use on face or skin folds. 6/30/23  Yes Minerva Lara MD   venlafaxine (EFFEXOR-XR) 75 MG 24 hr capsule Take 1 capsule (75 mg total) by mouth once daily. Start on Week 2 6/1/23  Yes Jose Morales III, NP   doxycycline (VIBRA-TABS) 100 MG tablet Take 1 tablet (100 mg total) by mouth 2 (two) times daily.  Patient not taking: Reported on 3/21/2024 2/29/24   Hetal Banks MD   oxyCODONE-acetaminophen (PERCOCET) 5-325 mg per tablet Take 1 tablet by mouth every 4 (four) hours as needed for Pain.  Patient not taking: Reported on 3/21/2024 1/9/24   Ming Milian MD   budesonide-formoterol 160-4.5 mcg (SYMBICORT) 160-4.5 mcg/actuation HFAA Inhale 2 puffs into the lungs every 12 (twelve) hours. Controller 12/13/19 5/26/21  Saige Bee MD         History  Past Medical History:   Diagnosis Date    Allergy     Anxiety     Arthritis     Atrial flutter     Colon polyps 2016    COPD (chronic obstructive pulmonary disease)     COPD  exacerbation 10/31/2022    Depression     Diverticular disease of colon 2017    Diverticulitis     H/O gastric sleeve     HLD (hyperlipidemia)     Malignant neoplasm of central portion of left breast in female, estrogen receptor positive 2022    Monoallelic mutation of MUTYH gene 2022    Osteopenia     Pancreatitis     Paroxysmal A-fib 1/3/2024    Pneumothorax, unspecified     following mastectomy sx     Psoriasis     Rheumatoid arthritis     Severe obesity (BMI 35.0-39.9) with comorbidity      Past Surgical History:   Procedure Laterality Date    ABLATION  2022    Cardiac Ablation for A Flutter, Successful    ADENOIDECTOMY  1966    Tonsillitis and adnoids    BILATERAL MASTECTOMY Bilateral 2/15/2023    Procedure: MASTECTOMY, BILATERAL;  Surgeon: Marcela Meehan MD;  Location: [facility];  Service: General;  Laterality: Bilateral;  EMAIL SENT  @ 11:44 LK / 2.5 HOURS    BLADDER SUSPENSION      (x2)    BREAST REVISION SURGERY Bilateral 3/29/2023    Procedure: BREAST REVISION SURGERY / BILATERAL BREAST FLAP REVISION;  Surgeon: Ming Milian MD;  Location: [facility];  Service: Plastics;  Laterality: Bilateral;  90 MINUTES     SECTION      (x2)    DEBRIDEMENT, BREAST Bilateral 3/29/2023    Procedure: DEBRIDEMENT, BREAST;  Surgeon: Ming Milian MD;  Location: [facility];  Service: Plastics;  Laterality: Bilateral;    ESOPHAGOGASTRODUODENOSCOPY N/A 2021    Procedure: EGD (ESOPHAGOGASTRODUODENOSCOPY);  Surgeon: Vince Rodriguez MD;  Location: [address];  Service: General;  Laterality: N/A;    INCISION AND DRAINAGE OF BREAST Left 10/26/2023    Procedure: INCISION AND DRAINAGE, BREAST;  Surgeon: Ming Milian MD;  Location: [facility];  Service: Plastics;  Laterality: Left;    INJECTION FOR SENTINEL NODE IDENTIFICATION Left 2/15/2023    Procedure: INJECTION, FOR SENTINEL NODE IDENTIFICATION;  Surgeon: Marcela Meehan MD;  Location: [facility];  Service: General;  Laterality:  Left;    LAPAROSCOPIC SLEEVE GASTRECTOMY N/A 03/31/2021    Procedure: GASTRECTOMY, SLEEVE, LAPAROSCOPIC, with intraop EGD;  Surgeon: Vince Rodriguez MD;  Location: [address];  Service: General;  Laterality: N/A;    LIPOSUCTION W/ FAT INJECTION Bilateral 8/29/2023    Procedure: LIPOSUCTION, WITH FAT TRANSFER;  Surgeon: Ming Milian MD;  Location: [facility];  Service: Plastics;  Laterality: Bilateral;  / FAT GRAFTING TO BOTH BREAST    LUNG BIOPSY  09/2020    RECONSTRUCTION OF BREAST WITH DEEP INFERIOR EPIGASTRIC ARTERY  (NEHA) FREE FLAP Bilateral 2/15/2023    Procedure: RECONSTRUCTION, BREAST, USING NEHA FREE FLAP;  Surgeon: Ming Milian MD;  Location: [facility];  Service: Plastics;  Laterality: Bilateral;    REVISION, FLAP, PREVIOUSLY CREATED FOR BREAST RECONSTRUCTION Bilateral 8/29/2023    Procedure: REVISION, FLAP, PREVIOUSLY CREATED FOR BREAST RECONSTRUCTION;  Surgeon: Ming Milian MD;  Location: [facility];  Service: Plastics;  Laterality: Bilateral;  4 HOURS    REVISION, SCAR, ABDOMINAL DONOR SITE N/A 8/29/2023    Procedure: REVISION, SCAR, ABDOMINAL DONOR SITE;  Surgeon: Ming Milian MD;  Location: [facility];  Service: Plastics;  Laterality: N/A;    RHINOPLASTY      SENTINEL LYMPH NODE BIOPSY Left 2/15/2023    Procedure: BIOPSY, LYMPH NODE, SENTINEL;  Surgeon: Marcela Meehan MD;  Location: [facility];  Service: General;  Laterality: Left;    TONSILLECTOMY      TRANSESOPHAGEAL ECHOCARDIOGRAPHY N/A 11/02/2022    Procedure: ECHOCARDIOGRAM, TRANSESOPHAGEAL;  Surgeon: Kellie Grover MD;  Location: [facility];  Service: Cardiology;  Laterality: N/A;     Social History     Socioeconomic History    Marital status:    Occupational History    Occupation: clinical research coordinator    Tobacco Use    Smoking status: Former     Current packs/day: 0.00     Types: Cigarettes     Start date: 1/1/1979     Quit date: 12/7/2020     Years since quitting: 3.2    Smokeless tobacco:  Former     Quit date: 12/28/2022   Substance and Sexual Activity    Alcohol use: Yes     Alcohol/week: 1.0 standard drink of alcohol     Types: 1 Glasses of wine per week     Comment: social-rarely    Drug use: No    Sexual activity: Yes     Partners: Male     Birth control/protection: Post-menopausal   Other Topics Concern    Are you pregnant or think you may be? No    Breast-feeding No     Social Determinants of Health     Financial Resource Strain: Low Risk  (2/21/2024)    Overall Financial Resource Strain (CARDIA)     Difficulty of Paying Living Expenses: Not very hard   Recent Concern: Financial Resource Strain - Medium Risk (12/6/2023)    Overall Financial Resource Strain (CARDIA)     Difficulty of Paying Living Expenses: Somewhat hard   Food Insecurity: No Food Insecurity (2/21/2024)    Hunger Vital Sign     Worried About Running Out of Food in the Last Year: Never true     Ran Out of Food in the Last Year: Never true   Transportation Needs: No Transportation Needs (2/21/2024)    PRAPARE - Transportation     Lack of Transportation (Medical): No     Lack of Transportation (Non-Medical): No   Physical Activity: Sufficiently Active (2/21/2024)    Exercise Vital Sign     Days of Exercise per Week: 5 days     Minutes of Exercise per Session: 30 min   Stress: Stress Concern Present (2/21/2024)    [employer] - Occupational Stress Questionnaire     Feeling of Stress : Rather much   Social Connections: Unknown (2/21/2024)    Social Connection and Isolation Panel [NHANES]     Frequency of Communication with Friends and Family: More than three times a week     Frequency of Social Gatherings with Friends and Family: Once a week     Active Member of Clubs or Organizations: Yes     Attends Club or Organization Meetings: More than 4 times per year     Marital Status:    Housing Stability: Low Risk  (2/21/2024)    Housing Stability Vital Sign     Unable to Pay for Housing in the Last  Year: No     Number of Places Lived in the Last Year: 1     Unstable Housing in the Last Year: No         Allergies  Review of patient's allergies indicates:   Allergen Reactions    Succinimides Anaphylaxis     Son has MH    Vancomycin analogues Hives, Itching and Rash         Review of Systems   Review of Systems   Constitutional: Negative for decreased appetite, fever and weight loss.   HENT:  Negative for congestion and nosebleeds.    Eyes:  Negative for double vision, vision loss in left eye, vision loss in right eye and visual disturbance.   Cardiovascular:  Negative for chest pain, claudication, cyanosis, dyspnea on exertion, irregular heartbeat, leg swelling, near-syncope, orthopnea, palpitations, paroxysmal nocturnal dyspnea and syncope.   Respiratory:  Negative for cough, hemoptysis, shortness of breath, sleep disturbances due to breathing, snoring, sputum production and wheezing.    Endocrine: Negative for cold intolerance and heat intolerance.   Skin:  Negative for nail changes and rash.   Musculoskeletal:  Negative for joint pain, muscle cramps, muscle weakness and myalgias.   Gastrointestinal:  Negative for change in bowel habit, heartburn, hematemesis, hematochezia, hemorrhoids and melena.   Neurological:  Negative for dizziness, focal weakness and headaches.         Physical Exam  Wt Readings from Last 1 Encounters:   03/21/24 81.2 kg (179 lb 1.6 oz)     BP Readings from Last 3 Encounters:   03/21/24 116/74   03/12/24 139/77   02/27/24 (!) 169/72     Pulse Readings from Last 1 Encounters:   03/21/24 69     Body mass index is 32.76 kg/m².    Physical Exam  Constitutional:       Appearance: She is well-developed.   HENT:      Head: Atraumatic.   Eyes:      General: No scleral icterus.  Neck:      Vascular: Normal carotid pulses. No carotid bruit, hepatojugular reflux or JVD.   Cardiovascular:      Rate and Rhythm: Normal rate and regular rhythm.      Chest Wall: PMI is not displaced.      Pulses: Intact  distal pulses.           Carotid pulses are 2+ on the right side and 2+ on the left side.       Radial pulses are 2+ on the right side and 2+ on the left side.        Dorsalis pedis pulses are 2+ on the right side and 2+ on the left side.      Heart sounds: Normal heart sounds, S1 normal and S2 normal. No murmur heard.     No friction rub.   Pulmonary:      Effort: Pulmonary effort is normal. No respiratory distress.      Breath sounds: Normal breath sounds. No stridor. No wheezing or rales.   Chest:      Chest wall: No tenderness.   Abdominal:      General: Bowel sounds are normal.      Palpations: Abdomen is soft.   Musculoskeletal:      Cervical back: Neck supple. No edema.   Skin:     General: Skin is warm and dry.      Nails: There is no clubbing.   Neurological:      Mental Status: She is alert and oriented to person, place, and time.   Psychiatric:         Behavior: Behavior normal.         Thought Content: Thought content normal.             Assessment  1. Atrial flutter, unspecified type  In sinus rhythm, on Eliquis    2. Essential hypertension  Well controlled on current medications    3. Mixed hyperlipidemia  Stable on medications        Plan and Discussion  Discussed her EKG today which shows sinus rhythm rate of 59.  Reviewed and discussed her labs with her from January.  Her blood pressure is well controlled.  Recommend to continue with current medications.      Follow Up  Six-month      Pool Dorado MD, F.A.C.C, F.S.C.A.I.      Total professional time spent for the encounter: 30 minutes  Time was spent preparing to see the patient, reviewing results of prior testing, obtaining and/or reviewing separately obtained history, performing a medically appropriate examination and interview, counseling and educating the patient/family, ordering medications/tests/procedures, referring and communicating with other health care professionals, documenting clinical information in the electronic health record,  and independently interpreting results.    Disclaimer: This document was created using voice recognition software ([employer]). Although it may be edited, this document may contain errors related to incorrect recognition of the spoken word. Please call the physician if clarification is needed.

## 2024-03-22 LAB
OHS QRS DURATION: 78 MS
OHS QTC CALCULATION: 378 MS

## 2024-03-26 ENCOUNTER — CLINICAL SUPPORT (OUTPATIENT)
Dept: OTHER | Facility: CLINIC | Age: 64
End: 2024-03-26

## 2024-03-26 DIAGNOSIS — Z00.8 ENCOUNTER FOR OTHER GENERAL EXAMINATION: ICD-10-CM

## 2024-03-27 VITALS
DIASTOLIC BLOOD PRESSURE: 69 MMHG | SYSTOLIC BLOOD PRESSURE: 137 MMHG | HEIGHT: 62 IN | BODY MASS INDEX: 31.65 KG/M2 | WEIGHT: 172 LBS

## 2024-03-27 LAB
GLUCOSE SERPL-MCNC: 109 MG/DL (ref 60–140)
HDLC SERPL-MCNC: 37 MG/DL
POC CHOLESTEROL, LDL (DOCK): 97 MG/DL
POC CHOLESTEROL, TOTAL: 182 MG/DL
TRIGL SERPL-MCNC: 281 MG/DL

## 2024-04-02 RX ORDER — SULFAMETHOXAZOLE AND TRIMETHOPRIM 800; 160 MG/1; MG/1
1 TABLET ORAL 2 TIMES DAILY
Qty: 14 TABLET | Refills: 0 | Status: ACTIVE | OUTPATIENT
Start: 2024-04-02 | End: 2024-04-03

## 2024-04-03 ENCOUNTER — OFFICE VISIT (OUTPATIENT)
Dept: PLASTIC SURGERY | Facility: CLINIC | Age: 64
End: 2024-04-03
Attending: PLASTIC SURGERY
Payer: COMMERCIAL

## 2024-04-03 ENCOUNTER — HOSPITAL ENCOUNTER (INPATIENT)
Facility: OTHER | Age: 64
LOS: 2 days | Discharge: HOME | DRG: 858 | End: 2024-04-05
Attending: PLASTIC SURGERY | Admitting: PLASTIC SURGERY
Payer: COMMERCIAL

## 2024-04-03 VITALS — DIASTOLIC BLOOD PRESSURE: 67 MMHG | HEART RATE: 71 BPM | SYSTOLIC BLOOD PRESSURE: 144 MMHG

## 2024-04-03 DIAGNOSIS — Z85.3 HISTORY OF BREAST CANCER: Primary | ICD-10-CM

## 2024-04-03 DIAGNOSIS — N61.0 CELLULITIS OF LEFT BREAST: Primary | ICD-10-CM

## 2024-04-03 LAB
ANION GAP SERPL CALC-SCNC: 9 MMOL/L (ref 8–16)
BASOPHILS # BLD AUTO: 0.04 K/UL (ref 0–0.2)
BASOPHILS NFR BLD: 0.6 % (ref 0–1.9)
BUN SERPL-MCNC: 14 MG/DL (ref 8–23)
CALCIUM SERPL-MCNC: 9.2 MG/DL (ref 8.7–10.5)
CHLORIDE SERPL-SCNC: 106 MMOL/L (ref 95–110)
CO2 SERPL-SCNC: 28 MMOL/L (ref 23–29)
CREAT SERPL-MCNC: 0.8 MG/DL (ref 0.5–1.4)
CRP SERPL-MCNC: 7.7 MG/L (ref 0–8.2)
DIFFERENTIAL METHOD BLD: ABNORMAL
EOSINOPHIL # BLD AUTO: 0.1 K/UL (ref 0–0.5)
EOSINOPHIL NFR BLD: 1.5 % (ref 0–8)
ERYTHROCYTE [DISTWIDTH] IN BLOOD BY AUTOMATED COUNT: 13.2 % (ref 11.5–14.5)
ERYTHROCYTE [SEDIMENTATION RATE] IN BLOOD BY PHOTOMETRIC METHOD: 11 MM/HR (ref 0–36)
EST. GFR  (NO RACE VARIABLE): >60 ML/MIN/1.73 M^2
GLUCOSE SERPL-MCNC: 95 MG/DL (ref 70–110)
HCT VFR BLD AUTO: 42.2 % (ref 37–48.5)
HGB BLD-MCNC: 13.2 G/DL (ref 12–16)
IMM GRANULOCYTES # BLD AUTO: 0.04 K/UL (ref 0–0.04)
IMM GRANULOCYTES NFR BLD AUTO: 0.6 % (ref 0–0.5)
LYMPHOCYTES # BLD AUTO: 1.2 K/UL (ref 1–4.8)
LYMPHOCYTES NFR BLD: 17.3 % (ref 18–48)
MCH RBC QN AUTO: 28.5 PG (ref 27–31)
MCHC RBC AUTO-ENTMCNC: 31.3 G/DL (ref 32–36)
MCV RBC AUTO: 91 FL (ref 82–98)
MONOCYTES # BLD AUTO: 0.7 K/UL (ref 0.3–1)
MONOCYTES NFR BLD: 10.3 % (ref 4–15)
NEUTROPHILS # BLD AUTO: 5 K/UL (ref 1.8–7.7)
NEUTROPHILS NFR BLD: 69.7 % (ref 38–73)
NRBC BLD-RTO: 0 /100 WBC
PLATELET # BLD AUTO: 293 K/UL (ref 150–450)
PMV BLD AUTO: 9 FL (ref 9.2–12.9)
POTASSIUM SERPL-SCNC: 4.3 MMOL/L (ref 3.5–5.1)
RBC # BLD AUTO: 4.63 M/UL (ref 4–5.4)
SODIUM SERPL-SCNC: 143 MMOL/L (ref 136–145)
WBC # BLD AUTO: 7.11 K/UL (ref 3.9–12.7)

## 2024-04-03 PROCEDURE — 3077F SYST BP >= 140 MM HG: CPT | Mod: CPTII,S$GLB,, | Performed by: PLASTIC SURGERY

## 2024-04-03 PROCEDURE — 94761 N-INVAS EAR/PLS OXIMETRY MLT: CPT

## 2024-04-03 PROCEDURE — 87040 BLOOD CULTURE FOR BACTERIA: CPT | Performed by: STUDENT IN AN ORGANIZED HEALTH CARE EDUCATION/TRAINING PROGRAM

## 2024-04-03 PROCEDURE — 80048 BASIC METABOLIC PNL TOTAL CA: CPT | Performed by: STUDENT IN AN ORGANIZED HEALTH CARE EDUCATION/TRAINING PROGRAM

## 2024-04-03 PROCEDURE — 85652 RBC SED RATE AUTOMATED: CPT | Performed by: STUDENT IN AN ORGANIZED HEALTH CARE EDUCATION/TRAINING PROGRAM

## 2024-04-03 PROCEDURE — 36415 COLL VENOUS BLD VENIPUNCTURE: CPT | Performed by: STUDENT IN AN ORGANIZED HEALTH CARE EDUCATION/TRAINING PROGRAM

## 2024-04-03 PROCEDURE — 3078F DIAST BP <80 MM HG: CPT | Mod: CPTII,S$GLB,, | Performed by: PLASTIC SURGERY

## 2024-04-03 PROCEDURE — 86140 C-REACTIVE PROTEIN: CPT | Performed by: STUDENT IN AN ORGANIZED HEALTH CARE EDUCATION/TRAINING PROGRAM

## 2024-04-03 PROCEDURE — 25000003 PHARM REV CODE 250: Performed by: STUDENT IN AN ORGANIZED HEALTH CARE EDUCATION/TRAINING PROGRAM

## 2024-04-03 PROCEDURE — 3044F HG A1C LEVEL LT 7.0%: CPT | Mod: CPTII,S$GLB,, | Performed by: PLASTIC SURGERY

## 2024-04-03 PROCEDURE — 11000001 HC ACUTE MED/SURG PRIVATE ROOM

## 2024-04-03 PROCEDURE — 63600175 PHARM REV CODE 636 W HCPCS: Performed by: STUDENT IN AN ORGANIZED HEALTH CARE EDUCATION/TRAINING PROGRAM

## 2024-04-03 PROCEDURE — 85025 COMPLETE CBC W/AUTO DIFF WBC: CPT | Performed by: STUDENT IN AN ORGANIZED HEALTH CARE EDUCATION/TRAINING PROGRAM

## 2024-04-03 PROCEDURE — 99211 OFF/OP EST MAY X REQ PHY/QHP: CPT | Mod: S$GLB,,, | Performed by: PLASTIC SURGERY

## 2024-04-03 RX ORDER — ONDANSETRON 8 MG/1
8 TABLET, ORALLY DISINTEGRATING ORAL EVERY 8 HOURS PRN
Status: DISCONTINUED | OUTPATIENT
Start: 2024-04-03 | End: 2024-05-30

## 2024-04-03 RX ORDER — METOPROLOL TARTRATE 25 MG/1
25 TABLET, FILM COATED ORAL 2 TIMES DAILY
Status: DISCONTINUED | OUTPATIENT
Start: 2024-04-03 | End: 2024-04-05 | Stop reason: HOSPADM

## 2024-04-03 RX ORDER — MUPIROCIN 20 MG/G
OINTMENT TOPICAL 2 TIMES DAILY
Status: DISCONTINUED | OUTPATIENT
Start: 2024-04-03 | End: 2024-04-05 | Stop reason: HOSPADM

## 2024-04-03 RX ORDER — SODIUM CHLORIDE 0.9 % (FLUSH) 0.9 %
10 SYRINGE (ML) INJECTION
Status: DISCONTINUED | OUTPATIENT
Start: 2024-04-03 | End: 2024-05-30

## 2024-04-03 RX ORDER — ACETAMINOPHEN 325 MG/1
650 TABLET ORAL EVERY 4 HOURS PRN
Status: DISCONTINUED | OUTPATIENT
Start: 2024-04-03 | End: 2024-05-30

## 2024-04-03 RX ORDER — PANTOPRAZOLE SODIUM 40 MG/1
40 TABLET, DELAYED RELEASE ORAL DAILY
Status: DISCONTINUED | OUTPATIENT
Start: 2024-04-04 | End: 2024-04-05 | Stop reason: HOSPADM

## 2024-04-03 RX ORDER — HYDROCODONE BITARTRATE AND ACETAMINOPHEN 10; 325 MG/1; MG/1
1 TABLET ORAL EVERY 4 HOURS PRN
Status: DISCONTINUED | OUTPATIENT
Start: 2024-04-03 | End: 2024-05-30

## 2024-04-03 RX ORDER — HYDROCODONE BITARTRATE AND ACETAMINOPHEN 5; 325 MG/1; MG/1
1 TABLET ORAL EVERY 4 HOURS PRN
Status: DISCONTINUED | OUTPATIENT
Start: 2024-04-03 | End: 2024-05-30

## 2024-04-03 RX ORDER — OXYCODONE AND ACETAMINOPHEN 10; 325 MG/1; MG/1
1 TABLET ORAL EVERY 4 HOURS PRN
Qty: 15 TABLET | Refills: 0 | Status: SHIPPED | OUTPATIENT
Start: 2024-04-03 | End: 2024-04-03

## 2024-04-03 RX ORDER — HYDROCODONE BITARTRATE AND ACETAMINOPHEN 10; 325 MG/1; MG/1
1 TABLET ORAL EVERY 4 HOURS PRN
Status: DISCONTINUED | OUTPATIENT
Start: 2024-04-03 | End: 2024-04-05 | Stop reason: HOSPADM

## 2024-04-03 RX ORDER — ENOXAPARIN SODIUM 100 MG/ML
40 INJECTION SUBCUTANEOUS EVERY 24 HOURS
Status: DISCONTINUED | OUTPATIENT
Start: 2024-04-03 | End: 2024-04-05 | Stop reason: HOSPADM

## 2024-04-03 RX ORDER — VENLAFAXINE HYDROCHLORIDE 75 MG/1
75 CAPSULE, EXTENDED RELEASE ORAL DAILY
Status: DISCONTINUED | OUTPATIENT
Start: 2024-04-04 | End: 2024-04-05 | Stop reason: HOSPADM

## 2024-04-03 RX ORDER — ACETAMINOPHEN 325 MG/1
650 TABLET ORAL EVERY 4 HOURS PRN
Status: DISCONTINUED | OUTPATIENT
Start: 2024-04-03 | End: 2024-04-05 | Stop reason: HOSPADM

## 2024-04-03 RX ORDER — HYDROCODONE BITARTRATE AND ACETAMINOPHEN 5; 325 MG/1; MG/1
1 TABLET ORAL EVERY 4 HOURS PRN
Status: DISCONTINUED | OUTPATIENT
Start: 2024-04-03 | End: 2024-04-05 | Stop reason: HOSPADM

## 2024-04-03 RX ORDER — GABAPENTIN 300 MG/1
300 CAPSULE ORAL 3 TIMES DAILY
Status: DISCONTINUED | OUTPATIENT
Start: 2024-04-03 | End: 2024-04-05 | Stop reason: HOSPADM

## 2024-04-03 RX ORDER — SODIUM CHLORIDE, SODIUM LACTATE, POTASSIUM CHLORIDE, CALCIUM CHLORIDE 600; 310; 30; 20 MG/100ML; MG/100ML; MG/100ML; MG/100ML
INJECTION, SOLUTION INTRAVENOUS CONTINUOUS
Status: DISCONTINUED | OUTPATIENT
Start: 2024-04-03 | End: 2024-04-05

## 2024-04-03 RX ORDER — ONDANSETRON 8 MG/1
8 TABLET, ORALLY DISINTEGRATING ORAL EVERY 8 HOURS PRN
Status: DISCONTINUED | OUTPATIENT
Start: 2024-04-03 | End: 2024-04-05 | Stop reason: HOSPADM

## 2024-04-03 RX ORDER — ALUMINUM HYDROXIDE, MAGNESIUM HYDROXIDE, AND SIMETHICONE 1200; 120; 1200 MG/30ML; MG/30ML; MG/30ML
30 SUSPENSION ORAL
Status: DISCONTINUED | OUTPATIENT
Start: 2024-04-03 | End: 2024-04-05 | Stop reason: HOSPADM

## 2024-04-03 RX ORDER — LEVOFLOXACIN 500 MG/1
500 TABLET, FILM COATED ORAL DAILY
Qty: 14 TABLET | Refills: 0 | Status: ACTIVE | OUTPATIENT
Start: 2024-04-03 | End: 2024-04-03

## 2024-04-03 RX ORDER — QUETIAPINE FUMARATE 25 MG/1
50 TABLET, FILM COATED ORAL NIGHTLY
Status: DISCONTINUED | OUTPATIENT
Start: 2024-04-03 | End: 2024-04-05 | Stop reason: HOSPADM

## 2024-04-03 RX ORDER — DIAZEPAM 5 MG/1
5 TABLET ORAL DAILY PRN
Status: DISCONTINUED | OUTPATIENT
Start: 2024-04-03 | End: 2024-04-05 | Stop reason: HOSPADM

## 2024-04-03 RX ADMIN — MUPIROCIN: 20 OINTMENT TOPICAL at 09:04

## 2024-04-03 RX ADMIN — QUETIAPINE FUMARATE 50 MG: 25 TABLET ORAL at 09:04

## 2024-04-03 RX ADMIN — CEFEPIME 1 G: 1 INJECTION, POWDER, FOR SOLUTION INTRAMUSCULAR; INTRAVENOUS at 06:04

## 2024-04-03 RX ADMIN — HYDROCODONE BITARTRATE AND ACETAMINOPHEN 1 TABLET: 10; 325 TABLET ORAL at 05:04

## 2024-04-03 RX ADMIN — HYDROCODONE BITARTRATE AND ACETAMINOPHEN 1 TABLET: 10; 325 TABLET ORAL at 09:04

## 2024-04-03 RX ADMIN — SODIUM CHLORIDE, POTASSIUM CHLORIDE, SODIUM LACTATE AND CALCIUM CHLORIDE: 600; 310; 30; 20 INJECTION, SOLUTION INTRAVENOUS at 06:04

## 2024-04-03 RX ADMIN — ENOXAPARIN SODIUM 40 MG: 40 INJECTION SUBCUTANEOUS at 06:04

## 2024-04-03 RX ADMIN — GABAPENTIN 300 MG: 300 CAPSULE ORAL at 09:04

## 2024-04-03 RX ADMIN — METOPROLOL TARTRATE 25 MG: 25 TABLET, FILM COATED ORAL at 09:04

## 2024-04-03 RX ADMIN — ALUMINUM HYDROXIDE, MAGNESIUM HYDROXIDE, AND DIMETHICONE 30 ML: 200; 20; 200 SUSPENSION ORAL at 06:04

## 2024-04-03 NOTE — NURSING
Received patient to room 384 as a direct admit. Pt AAO x 4, Resp. even and unlabored. Redness with small amount of sanguineous drainage noted to Lt breast, firm to touch. Pt c/o pain to Lt breast @ 8. Safety maintained. Call light in reach. VSS. Awaiting orders from MD.

## 2024-04-03 NOTE — PROGRESS NOTES
Patient presents follow-up.    She reports that over the past week she has been developing more pain of the left breast with erythema and drainage.      On exam: The right reconstructed breast is soft     The left breast has some erythema of the inferior border     The significantly tender     There is some discharge from a new opening of the inferior pole of the breast     Assessment: Recurrent infection of the left breast     Plan we discussed extensively the options at this point conservative measure has not produce the expected results     We will admit her for IV antibiotics and plan for debridement and washout tomorrow in the operating room

## 2024-04-03 NOTE — H&P
Plastic Surgery H&P    HPI: Lucia Matias is a 63 y.o. 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 She was packing wound. She reports that over the past week she has been developing more pain of the left breast with erythema and drainage.     PMH:   Past Medical History:   Diagnosis Date    Allergy     Anxiety     Arthritis     Atrial flutter     Colon polyps     COPD (chronic obstructive pulmonary disease)     COPD exacerbation 10/31/2022    Depression     Diverticular disease of colon 2017    Diverticulitis     H/O gastric sleeve     HLD (hyperlipidemia)     Malignant neoplasm of central portion of left breast in female, estrogen receptor positive 2022    Monoallelic mutation of MUTYH gene 2022    Osteopenia     Pancreatitis     Paroxysmal A-fib 1/3/2024    Pneumothorax, unspecified     following mastectomy sx     Psoriasis     Rheumatoid arthritis     Severe obesity (BMI 35.0-39.9) with comorbidity        Surg:   Past Surgical History:   Procedure Laterality Date    ABLATION  2022    Cardiac Ablation for A Flutter, Successful    ADENOIDECTOMY  1966    Tonsillitis and adnoids    BILATERAL MASTECTOMY Bilateral 2/15/2023    Procedure: MASTECTOMY, BILATERAL;  Surgeon: Marcela Meehan MD;  Location: [facility] OR;  Service: General;  Laterality: Bilateral;  EMAIL SENT  @ 11:44 LK / 2.5 HOURS    BLADDER SUSPENSION      (x2)    BREAST REVISION SURGERY Bilateral 3/29/2023    Procedure: BREAST REVISION SURGERY / BILATERAL BREAST FLAP REVISION;  Surgeon: Ming Milian MD;  Location: [facility] OR;  Service: Plastics;  Laterality: Bilateral;  90 MINUTES     SECTION      (x2)    DEBRIDEMENT, BREAST Bilateral 3/29/2023    Procedure: DEBRIDEMENT, BREAST;  Surgeon: Ming Milian MD;  Location: [facility] OR;  Service: Plastics;  Laterality: Bilateral;    ESOPHAGOGASTRODUODENOSCOPY N/A  03/31/2021    Procedure: EGD (ESOPHAGOGASTRODUODENOSCOPY);  Surgeon: Vince Rodriguez MD;  Location: [facility] OR 2ND FLR;  Service: General;  Laterality: N/A;    INCISION AND DRAINAGE OF BREAST Left 10/26/2023    Procedure: INCISION AND DRAINAGE, BREAST;  Surgeon: Ming Milian MD;  Location: [facility];  Service: Plastics;  Laterality: Left;    INJECTION FOR SENTINEL NODE IDENTIFICATION Left 2/15/2023    Procedure: INJECTION, FOR SENTINEL NODE IDENTIFICATION;  Surgeon: Marcela Meehan MD;  Location: [facility];  Service: General;  Laterality: Left;    LAPAROSCOPIC SLEEVE GASTRECTOMY N/A 03/31/2021    Procedure: GASTRECTOMY, SLEEVE, LAPAROSCOPIC, with intraop EGD;  Surgeon: Vince Rodriguez MD;  Location: [facility] OR 2ND FLR;  Service: General;  Laterality: N/A;    LIPOSUCTION W/ FAT INJECTION Bilateral 8/29/2023    Procedure: LIPOSUCTION, WITH FAT TRANSFER;  Surgeon: Ming Milian MD;  Location: [facility];  Service: Plastics;  Laterality: Bilateral;  / FAT GRAFTING TO BOTH BREAST    LUNG BIOPSY  09/2020    RECONSTRUCTION OF BREAST WITH DEEP INFERIOR EPIGASTRIC ARTERY  (NEHA) FREE FLAP Bilateral 2/15/2023    Procedure: RECONSTRUCTION, BREAST, USING NEHA FREE FLAP;  Surgeon: Ming Milian MD;  Location: [facility];  Service: Plastics;  Laterality: Bilateral;    REVISION, FLAP, PREVIOUSLY CREATED FOR BREAST RECONSTRUCTION Bilateral 8/29/2023    Procedure: REVISION, FLAP, PREVIOUSLY CREATED FOR BREAST RECONSTRUCTION;  Surgeon: Ming Milian MD;  Location: [facility];  Service: Plastics;  Laterality: Bilateral;  4 HOURS    REVISION, SCAR, ABDOMINAL DONOR SITE N/A 8/29/2023    Procedure: REVISION, SCAR, ABDOMINAL DONOR SITE;  Surgeon: Ming Milian MD;  Location: [facility];  Service: Plastics;  Laterality: N/A;    RHINOPLASTY      SENTINEL LYMPH NODE BIOPSY Left 2/15/2023    Procedure: BIOPSY, LYMPH NODE, SENTINEL;  Surgeon: Marcela Meehan MD;  Location: [facility];  Service: General;  Laterality: Left;     TONSILLECTOMY      TRANSESOPHAGEAL ECHOCARDIOGRAPHY N/A 11/02/2022    Procedure: ECHOCARDIOGRAM, TRANSESOPHAGEAL;  Surgeon: Kellie Grover MD;  Location: [facility];  Service: Cardiology;  Laterality: N/A;       Social:     Review of patient's allergies indicates:   Allergen Reactions    Succinimides Anaphylaxis     Son has MH    Vancomycin analogues Hives, Itching and Rash         Current Outpatient Medications   Medication Instructions    anastrozole (ARIMIDEX) 1 mg, Oral, Daily    atorvastatin (LIPITOR) 20 mg, Oral, Nightly    B-complex with vitamin C (VITAMIN B COMPLEX-C ORAL) Oral, Daily    calcium-vitamin D 250-100 mg-unit per tablet 1 tablet, Oral, 2 times daily    cyanocobalamin 500 mcg, Oral, Daily    diazePAM (VALIUM) 5 MG tablet TAKE 1 TABLET BY MOUTH DAILY AS NEEDED FOR ANXIETY.    diclofenac sodium (VOLTAREN) 2 g, Topical (Top), 4 times daily    ELIQUIS 5 mg, Oral, 2 times daily    fluocinonide-emollient (FLUOCINONIDE-E) 0.05 % Crea AAA of feet daily prn psoriasis.    fluticasone-umeclidin-vilanter (TRELEGY ELLIPTA) 100-62.5-25 mcg DsDv 1 puff, Inhalation, Daily    gabapentin (NEURONTIN) 300 mg, Oral, 3 times daily, 1 per day for week 1, 2 per day for week 2, 3 per day for week 3    metoprolol tartrate (LOPRESSOR) 25 mg, Oral, 2 times daily    multivitamin (THERAGRAN) per tablet 1 tablet, Oral, Daily    multivitamin with minerals (HAIR,SKIN AND NAILS ORAL) Oral    mv-mn/iron/folic acid/herb 190 (VITAMIN D3 COMPLETE ORAL) Oral    omeprazole (PRILOSEC) 40 mg, Oral, Every morning    QUEtiapine (SEROQUEL) 50 mg, Oral, Nightly    TALTZ AUTOINJECTOR 80 mg, Subcutaneous, Every 28 days    tiZANidine (ZANAFLEX) 4 mg, Oral, 3 times daily PRN    triamcinolone acetonide 0.025% (KENALOG) 0.025 % cream AAA of skin folds bid prn psoriasis.    triamcinolone acetonide 0.1% (KENALOG) 0.1 % cream Apply to affected areas of body BID prn psoriasis. Do not use on face or skin folds.    venlafaxine (EFFEXOR-XR) 75  mg, Oral, Daily, Start on Week 2         There were no vitals taken for this visit.  PE:  On exam: The right reconstructed breast is soft      The left breast has some erythema of the inferior border      The significantly tender      There is some discharge from a new opening of the inferior pole of the breast     A/P: Lucia Matias is a 63 y.o. female who is s/p bilateral SSM with NEHA flap reconstruction, second stage with recurrent infection of the left breast      Plan we discussed extensively the options at this point conservative measure has not produce the expected results      We will admit her for IV antibiotics and plan for debridement and washout tomorrow in the operating room     Hernan Carlos MD  [facility], PGY-IV  04/03/2024

## 2024-04-04 ENCOUNTER — ANESTHESIA (OUTPATIENT)
Dept: SURGERY | Facility: OTHER | Age: 64
DRG: 858 | End: 2024-04-04
Payer: COMMERCIAL

## 2024-04-04 ENCOUNTER — ANESTHESIA EVENT (OUTPATIENT)
Dept: SURGERY | Facility: OTHER | Age: 64
DRG: 858 | End: 2024-04-04
Payer: COMMERCIAL

## 2024-04-04 LAB
GRAM STN SPEC: NORMAL
GRAM STN SPEC: NORMAL

## 2024-04-04 PROCEDURE — 87116 MYCOBACTERIA CULTURE: CPT | Performed by: PLASTIC SURGERY

## 2024-04-04 PROCEDURE — 87102 FUNGUS ISOLATION CULTURE: CPT | Performed by: PLASTIC SURGERY

## 2024-04-04 PROCEDURE — 71000033 HC RECOVERY, INTIAL HOUR: Performed by: PLASTIC SURGERY

## 2024-04-04 PROCEDURE — 87205 SMEAR GRAM STAIN: CPT | Performed by: PLASTIC SURGERY

## 2024-04-04 PROCEDURE — 87075 CULTR BACTERIA EXCEPT BLOOD: CPT | Performed by: PLASTIC SURGERY

## 2024-04-04 PROCEDURE — 87206 SMEAR FLUORESCENT/ACID STAI: CPT | Performed by: PLASTIC SURGERY

## 2024-04-04 PROCEDURE — 36000704 HC OR TIME LEV I 1ST 15 MIN: Performed by: PLASTIC SURGERY

## 2024-04-04 PROCEDURE — 88304 TISSUE EXAM BY PATHOLOGIST: CPT | Performed by: PATHOLOGY

## 2024-04-04 PROCEDURE — C1729 CATH, DRAINAGE: HCPCS | Performed by: PLASTIC SURGERY

## 2024-04-04 PROCEDURE — 87070 CULTURE OTHR SPECIMN AEROBIC: CPT | Performed by: PLASTIC SURGERY

## 2024-04-04 PROCEDURE — 63600175 PHARM REV CODE 636 W HCPCS: Performed by: NURSE ANESTHETIST, CERTIFIED REGISTERED

## 2024-04-04 PROCEDURE — 71000039 HC RECOVERY, EACH ADD'L HOUR: Performed by: PLASTIC SURGERY

## 2024-04-04 PROCEDURE — 25000003 PHARM REV CODE 250: Performed by: ANESTHESIOLOGY

## 2024-04-04 PROCEDURE — 11000001 HC ACUTE MED/SURG PRIVATE ROOM

## 2024-04-04 PROCEDURE — 99900035 HC TECH TIME PER 15 MIN (STAT)

## 2024-04-04 PROCEDURE — 36000705 HC OR TIME LEV I EA ADD 15 MIN: Performed by: PLASTIC SURGERY

## 2024-04-04 PROCEDURE — 94761 N-INVAS EAR/PLS OXIMETRY MLT: CPT

## 2024-04-04 PROCEDURE — 63600175 PHARM REV CODE 636 W HCPCS: Performed by: ANESTHESIOLOGY

## 2024-04-04 PROCEDURE — 25000003 PHARM REV CODE 250: Performed by: STUDENT IN AN ORGANIZED HEALTH CARE EDUCATION/TRAINING PROGRAM

## 2024-04-04 PROCEDURE — D9220A PRA ANESTHESIA: Mod: CRNA,,, | Performed by: NURSE ANESTHETIST, CERTIFIED REGISTERED

## 2024-04-04 PROCEDURE — 25000003 PHARM REV CODE 250: Performed by: NURSE ANESTHETIST, CERTIFIED REGISTERED

## 2024-04-04 PROCEDURE — 63600175 PHARM REV CODE 636 W HCPCS: Performed by: STUDENT IN AN ORGANIZED HEALTH CARE EDUCATION/TRAINING PROGRAM

## 2024-04-04 PROCEDURE — 37000009 HC ANESTHESIA EA ADD 15 MINS: Performed by: PLASTIC SURGERY

## 2024-04-04 PROCEDURE — 88304 TISSUE EXAM BY PATHOLOGIST: CPT | Mod: 26,,, | Performed by: PATHOLOGY

## 2024-04-04 PROCEDURE — D9220A PRA ANESTHESIA: Mod: ANES,,, | Performed by: ANESTHESIOLOGY

## 2024-04-04 PROCEDURE — 37000008 HC ANESTHESIA 1ST 15 MINUTES: Performed by: PLASTIC SURGERY

## 2024-04-04 PROCEDURE — 87076 CULTURE ANAEROBE IDENT EACH: CPT | Performed by: PLASTIC SURGERY

## 2024-04-04 PROCEDURE — 25000003 PHARM REV CODE 250: Performed by: PLASTIC SURGERY

## 2024-04-04 RX ORDER — FENTANYL CITRATE 50 UG/ML
INJECTION, SOLUTION INTRAMUSCULAR; INTRAVENOUS
Status: DISCONTINUED | OUTPATIENT
Start: 2024-04-04 | End: 2024-04-04

## 2024-04-04 RX ORDER — KETAMINE HCL IN 0.9 % NACL 50 MG/5 ML
SYRINGE (ML) INTRAVENOUS
Status: DISCONTINUED | OUTPATIENT
Start: 2024-04-04 | End: 2024-04-04

## 2024-04-04 RX ORDER — OXYCODONE HYDROCHLORIDE 5 MG/1
5 TABLET ORAL
Status: DISCONTINUED | OUTPATIENT
Start: 2024-04-04 | End: 2024-04-05 | Stop reason: HOSPADM

## 2024-04-04 RX ORDER — SODIUM CHLORIDE 0.9 % (FLUSH) 0.9 %
3 SYRINGE (ML) INJECTION
Status: DISCONTINUED | OUTPATIENT
Start: 2024-04-04 | End: 2024-04-05 | Stop reason: HOSPADM

## 2024-04-04 RX ORDER — MEPERIDINE HYDROCHLORIDE 25 MG/ML
12.5 INJECTION INTRAMUSCULAR; INTRAVENOUS; SUBCUTANEOUS ONCE AS NEEDED
Status: ACTIVE | OUTPATIENT
Start: 2024-04-04 | End: 2024-04-05

## 2024-04-04 RX ORDER — ONDANSETRON HYDROCHLORIDE 2 MG/ML
INJECTION, SOLUTION INTRAVENOUS
Status: DISCONTINUED | OUTPATIENT
Start: 2024-04-04 | End: 2024-04-04

## 2024-04-04 RX ORDER — HYDROMORPHONE HYDROCHLORIDE 2 MG/ML
0.4 INJECTION, SOLUTION INTRAMUSCULAR; INTRAVENOUS; SUBCUTANEOUS EVERY 5 MIN PRN
Status: DISCONTINUED | OUTPATIENT
Start: 2024-04-04 | End: 2024-04-05 | Stop reason: HOSPADM

## 2024-04-04 RX ORDER — DEXAMETHASONE SODIUM PHOSPHATE 4 MG/ML
INJECTION, SOLUTION INTRA-ARTICULAR; INTRALESIONAL; INTRAMUSCULAR; INTRAVENOUS; SOFT TISSUE
Status: DISCONTINUED | OUTPATIENT
Start: 2024-04-04 | End: 2024-04-04

## 2024-04-04 RX ORDER — PROPOFOL 10 MG/ML
VIAL (ML) INTRAVENOUS
Status: DISCONTINUED | OUTPATIENT
Start: 2024-04-04 | End: 2024-04-04

## 2024-04-04 RX ORDER — PHENYLEPHRINE HYDROCHLORIDE 10 MG/ML
INJECTION INTRAVENOUS
Status: DISCONTINUED | OUTPATIENT
Start: 2024-04-04 | End: 2024-04-04

## 2024-04-04 RX ORDER — LIDOCAINE HYDROCHLORIDE 20 MG/ML
INJECTION INTRAVENOUS
Status: DISCONTINUED | OUTPATIENT
Start: 2024-04-04 | End: 2024-04-04

## 2024-04-04 RX ORDER — ONDANSETRON HYDROCHLORIDE 2 MG/ML
4 INJECTION, SOLUTION INTRAVENOUS DAILY PRN
Status: DISCONTINUED | OUTPATIENT
Start: 2024-04-04 | End: 2024-04-05 | Stop reason: HOSPADM

## 2024-04-04 RX ORDER — MIDAZOLAM HYDROCHLORIDE 1 MG/ML
INJECTION INTRAMUSCULAR; INTRAVENOUS
Status: DISCONTINUED | OUTPATIENT
Start: 2024-04-04 | End: 2024-04-04

## 2024-04-04 RX ORDER — LIDOCAINE HYDROCHLORIDE AND EPINEPHRINE 10; 10 MG/ML; UG/ML
INJECTION, SOLUTION INFILTRATION; PERINEURAL
Status: DISCONTINUED | OUTPATIENT
Start: 2024-04-04 | End: 2024-04-04 | Stop reason: HOSPADM

## 2024-04-04 RX ADMIN — GABAPENTIN 300 MG: 300 CAPSULE ORAL at 01:04

## 2024-04-04 RX ADMIN — MUPIROCIN: 20 OINTMENT TOPICAL at 08:04

## 2024-04-04 RX ADMIN — ENOXAPARIN SODIUM 40 MG: 40 INJECTION SUBCUTANEOUS at 04:04

## 2024-04-04 RX ADMIN — PHENYLEPHRINE HYDROCHLORIDE 200 MCG: 10 INJECTION INTRAVENOUS at 09:04

## 2024-04-04 RX ADMIN — DEXAMETHASONE SODIUM PHOSPHATE 4 MG: 4 INJECTION, SOLUTION INTRAMUSCULAR; INTRAVENOUS at 09:04

## 2024-04-04 RX ADMIN — FENTANYL CITRATE 100 MCG: 50 INJECTION, SOLUTION INTRAMUSCULAR; INTRAVENOUS at 08:04

## 2024-04-04 RX ADMIN — HYDROMORPHONE HYDROCHLORIDE 0.4 MG: 2 INJECTION INTRAMUSCULAR; INTRAVENOUS; SUBCUTANEOUS at 10:04

## 2024-04-04 RX ADMIN — HYDROCODONE BITARTRATE AND ACETAMINOPHEN 1 TABLET: 10; 325 TABLET ORAL at 05:04

## 2024-04-04 RX ADMIN — LIDOCAINE HYDROCHLORIDE 100 MG: 20 INJECTION, SOLUTION INTRAVENOUS at 08:04

## 2024-04-04 RX ADMIN — Medication 25 MG: at 09:04

## 2024-04-04 RX ADMIN — CEFEPIME 1 G: 1 INJECTION, POWDER, FOR SOLUTION INTRAMUSCULAR; INTRAVENOUS at 01:04

## 2024-04-04 RX ADMIN — OXYCODONE 5 MG: 5 TABLET ORAL at 10:04

## 2024-04-04 RX ADMIN — HYDROCODONE BITARTRATE AND ACETAMINOPHEN 1 TABLET: 10; 325 TABLET ORAL at 04:04

## 2024-04-04 RX ADMIN — MIDAZOLAM HYDROCHLORIDE 2 MG: 1 INJECTION INTRAMUSCULAR; INTRAVENOUS at 08:04

## 2024-04-04 RX ADMIN — ALUMINUM HYDROXIDE, MAGNESIUM HYDROXIDE, AND DIMETHICONE 30 ML: 200; 20; 200 SUSPENSION ORAL at 11:04

## 2024-04-04 RX ADMIN — ALUMINUM HYDROXIDE, MAGNESIUM HYDROXIDE, AND DIMETHICONE 30 ML: 200; 20; 200 SUSPENSION ORAL at 04:04

## 2024-04-04 RX ADMIN — PROPOFOL 180 MG: 10 INJECTION, EMULSION INTRAVENOUS at 08:04

## 2024-04-04 RX ADMIN — GABAPENTIN 300 MG: 300 CAPSULE ORAL at 08:04

## 2024-04-04 RX ADMIN — HYDROCODONE BITARTRATE AND ACETAMINOPHEN 1 TABLET: 10; 325 TABLET ORAL at 11:04

## 2024-04-04 RX ADMIN — ONDANSETRON HYDROCHLORIDE 4 MG: 2 INJECTION INTRAMUSCULAR; INTRAVENOUS at 09:04

## 2024-04-04 RX ADMIN — METOPROLOL TARTRATE 25 MG: 25 TABLET, FILM COATED ORAL at 08:04

## 2024-04-04 RX ADMIN — HYDROCODONE BITARTRATE AND ACETAMINOPHEN 1 TABLET: 10; 325 TABLET ORAL at 08:04

## 2024-04-04 RX ADMIN — SODIUM CHLORIDE, POTASSIUM CHLORIDE, SODIUM LACTATE AND CALCIUM CHLORIDE: 600; 310; 30; 20 INJECTION, SOLUTION INTRAVENOUS at 08:04

## 2024-04-04 RX ADMIN — CEFEPIME 1 G: 1 INJECTION, POWDER, FOR SOLUTION INTRAMUSCULAR; INTRAVENOUS at 04:04

## 2024-04-04 RX ADMIN — SODIUM CHLORIDE, POTASSIUM CHLORIDE, SODIUM LACTATE AND CALCIUM CHLORIDE: 600; 310; 30; 20 INJECTION, SOLUTION INTRAVENOUS at 01:04

## 2024-04-04 RX ADMIN — CEFEPIME 1 G: 1 INJECTION, POWDER, FOR SOLUTION INTRAMUSCULAR; INTRAVENOUS at 09:04

## 2024-04-04 RX ADMIN — QUETIAPINE FUMARATE 50 MG: 25 TABLET ORAL at 08:04

## 2024-04-04 RX ADMIN — SODIUM CHLORIDE, SODIUM LACTATE, POTASSIUM CHLORIDE, AND CALCIUM CHLORIDE: .6; .31; .03; .02 INJECTION, SOLUTION INTRAVENOUS at 08:04

## 2024-04-04 NOTE — ANESTHESIA POSTPROCEDURE EVALUATION
Anesthesia Post Evaluation    Patient: Lucia Matias    Procedure(s) Performed: Procedure(s) (LRB):  Incision and Drainage / LEFT BREAST (Left)    Final Anesthesia Type: general      Patient location during evaluation: PACU  Patient participation: Yes- Able to Participate  Level of consciousness: awake and alert  Post-procedure vital signs: reviewed and stable  Pain management: adequate  Airway patency: patent    PONV status at discharge: No PONV  Anesthetic complications: no      Cardiovascular status: blood pressure returned to baseline  Respiratory status: spontaneous ventilation  Hydration status: euvolemic  Follow-up not needed.          Vitals Value Taken Time   /62 04/04/24 1109   Temp 37.3 °C (99.2 °F) 04/04/24 1109   Pulse 60 04/04/24 1109   Resp 16 04/04/24 1114   SpO2 100 % 04/04/24 1109         Event Time   Out of Recovery 04/04/2024 10:50:18         Pain/Fred Score: Pain Rating Prior to Med Admin: 10 (4/4/2024 11:14 AM)  Pain Rating Post Med Admin: 2 (4/4/2024  6:02 AM)  Fred Score: 9 (4/4/2024 10:46 AM)

## 2024-04-04 NOTE — PLAN OF CARE
Problem: Adjustment to Illness COPD (Chronic Obstructive Pulmonary Disease)  Goal: Optimal Chronic Illness Coping  Outcome: Ongoing, Progressing     Problem: Functional Ability Impaired COPD (Chronic Obstructive Pulmonary Disease)  Goal: Optimal Level of Functional [place]  Outcome: Ongoing, Progressing     Problem: Infection COPD (Chronic Obstructive Pulmonary Disease)  Goal: Absence of Infection Signs and Symptoms  Outcome: Ongoing, Progressing     Problem: Oral Intake Inadequate COPD (Chronic Obstructive Pulmonary Disease)  Goal: Improved Nutrition Intake  Outcome: Ongoing, Progressing     Problem: Respiratory Compromise COPD (Chronic Obstructive Pulmonary Disease)  Goal: Effective Oxygenation and Ventilation  Outcome: Ongoing, Progressing

## 2024-04-04 NOTE — PLAN OF CARE
Patient lives with spouse, independent in ADLs, no HH, and no DME. Patient daughter will transport patient home at discharge.    04/04/24 1519   Discharge Assessment   Assessment Type Discharge Planning Assessment   Confirmed/corrected address, phone number and insurance Yes   Confirmed Demographics Correct on Facesheet   Source of Information patient   Communicated LESTER with patient/caregiver Date not available/Unable to determine   People in Home spouse   Do you expect to return to your current living situation? Yes   Prior to hospitilization cognitive status: Alert/Oriented   Current cognitive status: Alert/Oriented   Equipment Currently Used at Home none   Readmission within 30 days? No   Patient currently being followed by outpatient case management? No   Do you currently have service(s) that help you manage your care at home? No   Do you take prescription medications? Yes   Do you have any problems affording any of your prescribed medications? No   Is the patient taking medications as prescribed? yes   How do you get to doctors appointments? car, drives self   Are you on dialysis? No   Do you take coumadin? No   Discharge Plan A Home with family   DME Needed Upon Discharge  none   Discharge Plan discussed with: Patient   Transition of Care Barriers None   Physical Activity   On average, how many days per week do you engage in moderate to strenuous exercise (like a brisk walk)? 5 days   On average, how many minutes do you engage in exercise at this level? 30 min   Financial Resource Strain   How hard is it for you to pay for the very basics like food, housing, medical care, and heating? Not very   Housing Stability   In the last 12 months, was there a time when you were not able to pay the mortgage or rent on time? N   In the last 12 months, how many places have you lived? 1   In the last 12 months, was there a time when you did not have a steady place to sleep or slept in a shelter (including now)? N    Transportation Needs   In the past 12 months, has lack of transportation kept you from medical appointments or from getting medications? no   In the past 12 months, has lack of transportation kept you from meetings, work, or from getting things needed for daily living? No   Food Insecurity   Within the past 12 months, you worried that your food would run out before you got the money to buy more. Never true   Within the past 12 months, the food you bought just didn't last and you didn't have money to get more. Never true   Stress   Do you feel stress - tense, restless, nervous, or anxious, or unable to sleep at night because your mind is troubled all the time - these days? Rather much   Social Connections   In a typical week, how many times do you talk on the phone with family, friends, or neighbors? More than 3   How often do you get together with friends or relatives? Once   How often do you attend Uatsdin or Cheondoism services? 1 to 4   Do you belong to any clubs or organizations such as Uatsdin groups, unions, fraternal or athletic groups, or school groups? Yes   How often do you attend meetings of the clubs or organizations you belong to? More than 4   Are you , , , , never , or living with a partner?    Alcohol Use   Q1: How often do you have a drink containing alcohol? Monthly or l   Q2: How many drinks containing alcohol do you have on a typical day when you are drinking? 1 or 2   Q3: How often do you have six or more drinks on one occasion? Never   OTHER   Name(s) of People in Home Luan Matias (spouse)     [facility] ([address])  Initial Discharge Assessment       Primary Care Provider: Hetal Banks MD    Admission Diagnosis: Cellulitis of left breast [N61.0]    Admission Date: 4/3/2024  Expected Discharge Date:     Transition of Care Barriers: (P) None    Payor: [employer] B[employer]EFIT / Plan: [employer] EMPLOYEE [employer] [place] / Product  Type: Self Funded /     Extended Emergency Contact Information  Primary Emergency Contact: Luan Matias  Address: [address] [place] [address] [facility]  Home Phone: [phone]  Mobile Phone: [phone]  Relation: Spouse  Secondary Emergency Contact: Ksaandra Wallis  Address: [address] [place] [address] [address] [place] [address] [facility]  Home Phone: [phone]  Mobile Phone: [phone]  Relation: Daughter    Discharge Plan A: (P) Home with family         [facility]  [address]  [facility] [address]  Phone: [phone] Fax: [phone]    [facility]  [address]  [facility] [address]  Phone: [phone] Fax: [phone]    [id number] - [place], [place] - [address]  [address]  [place] [address]  Phone: [phone] Fax: [phone]    [id number] - [facility] [address] [facility]  [address]  [facility] [address]  Phone: [phone] Fax: [phone]    [id number] - [place], [place] - [address] [facility]  [address] [facility] [facility] [address]  Phone: [phone] Fax: [phone]    [facility] - [place], [place] - [address]  [address]  [place] [address]  Phone: [phone] Fax: [phone]      Initial Assessment (most recent)       Adult Discharge Assessment - 04/04/24 1519          Discharge Assessment    Assessment Type Discharge Planning Assessment (P)      Confirmed/corrected address, phone number and insurance Yes (P)      Confirmed Demographics Correct on Facesheet (P)      Source of Information patient (P)      Communicated LESTER with patient/caregiver Date not available/Unable to determine (P)      People in Home spouse (P)      Do you expect to return to your current living situation? Yes (P)      Prior to hospitilization cognitive status: Alert/Oriented (P)       Current cognitive status: Alert/Oriented (P)      Equipment Currently Used at Home none (P)      Readmission within 30 days? No (P)      Patient currently being followed by outpatient case management? No (P)      Do you currently have service(s) that help you manage your care at home? No (P)      Do you take prescription medications? Yes (P)      Do you have any problems affording any of your prescribed medications? No (P)      Is the patient taking medications as prescribed? yes (P)      How do you get to doctors appointments? car, drives self (P)      Are you on dialysis? No (P)      Do you take coumadin? No (P)      Discharge Plan A Home with family (P)      DME Needed Upon Discharge  none (P)      Discharge Plan discussed with: Patient (P)      Transition of Care Barriers None (P)         Physical Activity    On average, how many days per week do you engage in moderate to strenuous exercise (like a brisk walk)? 5 days (P)      On average, how many minutes do you engage in exercise at this level? 30 min (P)         Financial Resource Strain    How hard is it for you to pay for the very basics like food, housing, medical care, and heating? Not very hard (P)         Housing Stability    In the last 12 months, was there a time when you were not able to pay the mortgage or rent on time? No (P)      In the last 12 months, how many places have you lived? 1 (P)      In the last 12 months, was there a time when you did not have a steady place to sleep or slept in a shelter (including now)? No (P)         Transportation Needs    In the past 12 months, has lack of transportation kept you from medical appointments or from getting medications? No (P)      In the past 12 months, has lack of transportation kept you from meetings, work, or from getting things needed for daily living? No (P)         Food Insecurity    Within the past 12 months, you worried that your food would run out before you got the money to buy more.  Never true (P)      Within the past 12 months, the food you bought just didn't last and you didn't have money to get more. Never true (P)         Stress    Do you feel stress - tense, restless, nervous, or anxious, or unable to sleep at night because your mind is troubled all the time - these days? Rather much (P)         Social Connections    In a typical week, how many times do you talk on the phone with family, friends, or neighbors? More than three times a week (P)      How often do you get together with friends or relatives? Once a week (P)      How often do you attend Hoahaoism or Congregational services? 1 to 4 times per year (P)      Do you belong to any clubs or organizations such as Hoahaoism groups, unions, fraternal or athletic groups, or school groups? Yes (P)      How often do you attend meetings of the clubs or organizations you belong to? More than 4 times per year (P)      Are you , , , , never , or living with a partner?  (P)         Alcohol Use    Q1: How often do you have a drink containing alcohol? Monthly or less (P)      Q2: How many drinks containing alcohol do you have on a typical day when you are drinking? 1 or 2 (P)      Q3: How often do you have six or more drinks on one occasion? Never (P)         OTHER    Name(s) of People in Home Luan Matias (spouse) (P)

## 2024-04-04 NOTE — NURSING
Received patient back to room 384 from Recovery. Pt AAo x 4. Resp. even and unlabored. Dsg to Lt breast CDI. VIANEY drain with small amount of sanguineous drainage noted. Safety maintained. Pt c/o Lt breast pain/burning @ 10. Will administer pain medication as ordered. Call light in reach. VSS.

## 2024-04-04 NOTE — H&P
INTERVAL HISTORY & PHYSICAL    I have reviewed the Hospital Admission History & Physical. Electronic signature placed on this note will serve as a countersignature of the Hospital Admission History & Physical.    The patient was examined, and no interval changes of significance are noted since the H&P.    Changes to the patient's condition or the content of the Admission History & Physical: none.    I have also reviewed the informed consent with the patient. The procedure, indications, risks, benefits, alternatives, and expected outcomes, including potential complications were discussed. All of the patient's questions were answered.    Hernan Carlos MD

## 2024-04-04 NOTE — ANESTHESIA PROCEDURE NOTES
Intubation    Date/Time: 4/4/2024 8:56 AM    Performed by: Triny Denson CRNA  Authorized by: Lucio Clark MD    Intubation:     Induction:  Intravenous    Intubated:  Postinduction    Mask Ventilation:  Easy mask    Attempts:  1    Attempted By:  CRNA    Difficult Airway Encountered?: No      Complications:  None    Airway Device:  Supraglottic airway/LMA    Airway Device Size:  4.0    Placement Verified By:  Capnometry    Complicating Factors:  None    Findings Post-Intubation:  Atraumatic/condition of teeth unchanged and BS equal bilateral

## 2024-04-04 NOTE — PLAN OF CARE
Problem: Adult Inpatient Plan of Care  Goal: Plan of Care Review  Outcome: Ongoing, Progressing  Goal: Patient-Specific Goal (Individualized)  Outcome: Ongoing, Progressing  Goal: Absence of Hospital-Acquired Illness or Injury  Outcome: Ongoing, Progressing  Goal: Optimal Comfort and Wellbeing  Outcome: Ongoing, Progressing  Goal: Readiness for Transition of Care  Outcome: Ongoing, Progressing     Problem: Adjustment to Illness COPD (Chronic Obstructive Pulmonary Disease)  Goal: Optimal Chronic Illness Coping  Outcome: Ongoing, Progressing     Problem: Functional Ability Impaired COPD (Chronic Obstructive Pulmonary Disease)  Goal: Optimal Level of Functional [place]  Outcome: Ongoing, Progressing     Problem: Infection COPD (Chronic Obstructive Pulmonary Disease)  Goal: Absence of Infection Signs and Symptoms  Outcome: Ongoing, Progressing     Problem: Oral Intake Inadequate COPD (Chronic Obstructive Pulmonary Disease)  Goal: Improved Nutrition Intake  Outcome: Ongoing, Progressing     Problem: Respiratory Compromise COPD (Chronic Obstructive Pulmonary Disease)  Goal: Effective Oxygenation and Ventilation  Outcome: Ongoing, Progressing     Problem: Fall Injury Risk  Goal: Absence of Fall and Fall-Related Injury  Outcome: Ongoing, Progressing

## 2024-04-04 NOTE — ANESTHESIA PREPROCEDURE EVALUATION
04/04/2024  Lucia Matias is a 63 y.o., female.      Pre-op Assessment    I have reviewed the Patient Summary Reports.     I have reviewed the Nursing Notes. I have reviewed the NPO Status.   I have reviewed the Medications.     Review of Systems  Anesthesia Hx:             Family Hx of Anesthesia complications:  Malignant Hyperthermia   Denies Personal Hx of Anesthesia complications.                    Social:  Former Smoker       Hematology/Oncology:  Hematology Normal                       --  Cancer in past history:       Breast              Cardiovascular:     Hypertension    Dysrhythmias (a flutter, s/p ablation) atrial fibrillation      hyperlipidemia                             Pulmonary:   COPD Asthma                    Renal/:  Renal/ Normal                 Hepatic/GI:  Hepatic/GI Normal       H/o gastric sleeve          Musculoskeletal:  Arthritis (RA)               Neurological:  Neurology Normal                                      Endocrine:        Obesity / BMI > 30  Psych:  Psychiatric History anxiety                 Physical Exam  General: Well nourished, Oriented, Alert and Cooperative    Airway:  Mallampati: II   Mouth Opening: Normal  Tongue: Normal  Neck ROM: Normal ROM    Dental:  Caps / Implants        Anesthesia Plan  Type of Anesthesia, risks & benefits discussed:    Anesthesia Type: Gen ETT  Intra-op Monitoring Plan: Standard ASA Monitors  Post Op Pain Control Plan: multimodal analgesia  Induction:  IV  Airway Plan: Video, Post-Induction  Informed Consent: Informed consent signed with the Patient and all parties understand the risks and agree with anesthesia plan.  All questions answered.   ASA Score: 3  Anesthesia Plan Notes: FH of     Ready For Surgery From Anesthesia Perspective.     .

## 2024-04-04 NOTE — TRANSFER OF CARE
"Anesthesia Transfer of Care Note    Patient: Lucia Matias    Procedure(s) Performed: Procedure(s) (LRB):  Incision and Drainage / LEFT BREAST (Left)    Patient location: PACU    Anesthesia Type: general    Transport from OR: Transported from OR on 6-10 L/min O2 by face mask with adequate spontaneous ventilation    Post pain: adequate analgesia    Post assessment: no apparent anesthetic complications    Post vital signs: stable    Level of consciousness: awake and alert    Nausea/Vomiting: no nausea/vomiting    Complications: none    Transfer of care protocol was followed      Last vitals: Visit Vitals  /64   Pulse 62   Temp 36.6 °C (97.9 °F) (Oral)   Resp 18   Ht 5' 2" (1.575 m)   Wt 83.9 kg (184 lb 15.5 oz)   SpO2 96%   BMI 33.83 kg/m²     "

## 2024-04-04 NOTE — BRIEF OP NOTE
[facility] [place] - [facility] ([address])  Brief Operative Note    SUMMARY     Surgery Date: 4/4/2024     Surgeon(s) and Role:     * Ming Milian MD - Primary    Assisting Surgeon: None    Pre-op Diagnosis:  Cellulitis of left breast [N61.0]    Post-op Diagnosis:  Post-Op Diagnosis Codes:     * Cellulitis of left breast [N61.0]    Procedure(s) (LRB):  Incision and Drainage / LEFT BREAST (Left)    Anesthesia: General    Implants:  none    Operative Findings: left breast debridement with gross purulent drainage expressed within lower pole of breast. Lower pole fat necrosis excised. Primary closure with 10 Mauritian VIANEY drain placed    Estimated Blood Loss: <30 ccs         Specimens:   Specimen (24h ago, onward)       Start     Ordered    04/04/24 0928  Specimen to Pathology, Surgery Breast  Once        Comments: Pre-op Diagnosis: Cellulitis of left breast [N61.0]Procedure(s):Incision and Drainage / LEFT BREAST Number of specimens: 1Name of specimens: 1. LEFT BREAST FAT NECROSIS     References:    Click here for ordering Quick Tip   Question Answer Comment   Procedure Type: Breast    Specimen Class: Routine/Screening    Which provider would you like to cc? MING MILIAN    Release to patient Immediate        04/04/24 0986                    [id number]    Hernan Carlos MD

## 2024-04-05 VITALS
TEMPERATURE: 98 F | OXYGEN SATURATION: 94 % | HEART RATE: 72 BPM | WEIGHT: 184.94 LBS | SYSTOLIC BLOOD PRESSURE: 133 MMHG | DIASTOLIC BLOOD PRESSURE: 68 MMHG | BODY MASS INDEX: 34.03 KG/M2 | RESPIRATION RATE: 18 BRPM | HEIGHT: 62 IN

## 2024-04-05 PROCEDURE — 99900035 HC TECH TIME PER 15 MIN (STAT)

## 2024-04-05 PROCEDURE — 25000003 PHARM REV CODE 250: Performed by: STUDENT IN AN ORGANIZED HEALTH CARE EDUCATION/TRAINING PROGRAM

## 2024-04-05 PROCEDURE — 0H9U0ZZ DRAINAGE OF LEFT BREAST, OPEN APPROACH: ICD-10-PCS | Performed by: PLASTIC SURGERY

## 2024-04-05 PROCEDURE — 94761 N-INVAS EAR/PLS OXIMETRY MLT: CPT

## 2024-04-05 PROCEDURE — 63600175 PHARM REV CODE 636 W HCPCS: Performed by: STUDENT IN AN ORGANIZED HEALTH CARE EDUCATION/TRAINING PROGRAM

## 2024-04-05 RX ORDER — SULFAMETHOXAZOLE AND TRIMETHOPRIM 800; 160 MG/1; MG/1
1 TABLET ORAL 2 TIMES DAILY
Qty: 20 TABLET | Refills: 0 | Status: ACTIVE | OUTPATIENT
Start: 2024-04-05 | End: 2024-04-15

## 2024-04-05 RX ORDER — OXYCODONE AND ACETAMINOPHEN 10; 325 MG/1; MG/1
1 TABLET ORAL EVERY 4 HOURS PRN
Qty: 25 TABLET | Refills: 0 | Status: ACTIVE | OUTPATIENT
Start: 2024-04-05

## 2024-04-05 RX ORDER — CIPROFLOXACIN 500 MG/1
500 TABLET ORAL EVERY 12 HOURS
Qty: 20 TABLET | Refills: 0 | Status: ACTIVE | OUTPATIENT
Start: 2024-04-05 | End: 2024-04-15

## 2024-04-05 RX ADMIN — PANTOPRAZOLE SODIUM 40 MG: 40 TABLET, DELAYED RELEASE ORAL at 08:04

## 2024-04-05 RX ADMIN — METOPROLOL TARTRATE 25 MG: 25 TABLET, FILM COATED ORAL at 08:04

## 2024-04-05 RX ADMIN — VENLAFAXINE HYDROCHLORIDE 75 MG: 75 CAPSULE, EXTENDED RELEASE ORAL at 08:04

## 2024-04-05 RX ADMIN — CEFEPIME 1 G: 1 INJECTION, POWDER, FOR SOLUTION INTRAMUSCULAR; INTRAVENOUS at 12:04

## 2024-04-05 RX ADMIN — CEFEPIME 1 G: 1 INJECTION, POWDER, FOR SOLUTION INTRAMUSCULAR; INTRAVENOUS at 08:04

## 2024-04-05 RX ADMIN — HYDROCODONE BITARTRATE AND ACETAMINOPHEN 1 TABLET: 10; 325 TABLET ORAL at 04:04

## 2024-04-05 RX ADMIN — HYDROCODONE BITARTRATE AND ACETAMINOPHEN 1 TABLET: 10; 325 TABLET ORAL at 12:04

## 2024-04-05 RX ADMIN — GABAPENTIN 300 MG: 300 CAPSULE ORAL at 08:04

## 2024-04-05 RX ADMIN — THERA TABS 1 TABLET: TAB at 08:04

## 2024-04-05 RX ADMIN — MUPIROCIN: 20 OINTMENT TOPICAL at 08:04

## 2024-04-05 RX ADMIN — HYDROCODONE BITARTRATE AND ACETAMINOPHEN 1 TABLET: 10; 325 TABLET ORAL at 08:04

## 2024-04-05 RX ADMIN — SODIUM CHLORIDE, POTASSIUM CHLORIDE, SODIUM LACTATE AND CALCIUM CHLORIDE: 600; 310; 30; 20 INJECTION, SOLUTION INTRAVENOUS at 04:04

## 2024-04-05 NOTE — DISCHARGE SUMMARY
[facility] ([address])  General Surgery  Discharge Summary      Patient Name: Lucia Matias  MRN: [id number]  Admission Date: 4/3/2024  Hospital Length of Stay: 2 days  Discharge Date and Time: 4/5/24  Attending Physician: Ming Milian MD   Discharging Provider: Hernan Carlos MD  Primary Care Provider: Hetal Banks MD     HPI:  Lucia Matias is a 63 y.o. 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 She was packing wound. She reports that over the past week she has been developing more pain of the left breast with erythema and drainage. She was admitted for antibiotics and debridement.    Procedure(s) (LRB):  INCISION AND DRAINAGE, BREAST (Left)     Hospital Course: Patient was admitted for antibiotics and debridement of left breast. She is s/p I&D left breast on 4/4/24. Cultures taken intra-op which are currently NGTD. VIANEY drain in left breast. Will plan for discharge, will continue to monitor cultures, and fu in 1 week. She is to be discharged on pain medication and bactrim/ciprofloxacin for cultures.       Consults: None    Significant Diagnostic Studies: Blood cultures 4/3/24: NGTD; OR cultures 4/4/24: NGTD    Pending Diagnostic Studies:       Procedure Component Value Units Date/Time    Specimen to Pathology, Surgery Breast [[id number]] Collected: 04/04/24 0928    Order Status: Sent Lab Status: In process Updated: 04/04/24 0945    Specimen: Tissue           Final Active Diagnoses:    Diagnosis Date Noted POA    PRINCIPAL PROBLEM:  Malignant neoplasm of central portion of left breast in female, estrogen receptor positive [C50.112, Z17.0] 12/29/2022 Not Applicable      Problems Resolved During this Admission:      Discharged Condition: good    Disposition: Home or Self Care    Follow Up: 1 week    Patient Instructions:      Diet Adult Regular     Leave dressing on -  Keep it clean, dry, and intact until clinic visit     Notify your health care provider if you experience any of the following:  temperature >100.4     Notify your health care provider if you experience any of the following:  persistent nausea and vomiting or diarrhea     Notify your health care provider if you experience any of the following:  severe uncontrolled pain     Notify your health care provider if you experience any of the following:  redness, tenderness, or signs of infection (pain, swelling, redness, odor or green/yellow discharge around incision site)     Notify your health care provider if you experience any of the following:  difficulty breathing or increased cough     Notify your health care provider if you experience any of the following:  severe persistent headache     Notify your health care provider if you experience any of the following:  worsening rash     Notify your health care provider if you experience any of the following:  persistent dizziness, light-headedness, or visual disturbances     Notify your health care provider if you experience any of the following:  increased confusion or weakness     Activity as tolerated     Medications:  Reconciled Home Medications:      Medication List        START taking these medications      ciprofloxacin HCl 500 MG tablet  Commonly known as: CIPRO  Take 1 tablet (500 mg total) by mouth every 12 (twelve) hours. for 10 days     oxyCODONE-acetaminophen  mg per tablet  Commonly known as: PERCOCET  Take 1 tablet by mouth every 4 (four) hours as needed for Pain.     sulfamethoxazole-trimethoprim 800-160mg 800-160 mg Tab  Commonly known as: BACTRIM DS  Take 1 tablet by mouth 2 (two) times daily. for 10 days            CONTINUE taking these medications      anastrozole 1 mg Tab  Commonly known as: ARIMIDEX  Take 1 tablet (1 mg total) by mouth once daily.     atorvastatin 20 MG tablet  Commonly known as: LIPITOR  Take 1 tablet (20 mg total) by mouth every  evening.     calcium-vitamin D 250 mg-2.5 mcg (100 unit) per tablet  Take 1 tablet by mouth 2 (two) times daily.     cyanocobalamin 500 MCG tablet  Take 500 mcg by mouth once daily.     diazePAM 5 MG tablet  Commonly known as: VALIUM  TAKE 1 TABLET BY MOUTH DAILY AS NEEDED FOR ANXIETY.     diclofenac sodium 1 % Gel  Commonly known as: VOLTAREN  Apply 2 g topically 4 (four) times daily.     ELIQUIS 5 mg Tab  Generic drug: apixaban  Take 1 tablet (5 mg total) by mouth 2 (two) times daily.     fluocinonide-emollient 0.05 % Crea  Commonly known as: FLUOCINONIDE-E  AAA of feet daily prn psoriasis.     gabapentin 300 MG capsule  Commonly known as: NEURONTIN  Take 1 capsule (300 mg total) by mouth 3 (three) times daily. 1 per day for week 1, 2 per day for week 2, 3 per day for week 3     HAIR,SKIN AND NAILS ORAL  Take by mouth.     metoprolol tartrate 25 MG tablet  Commonly known as: LOPRESSOR  Take 1 tablet (25 mg total) by mouth 2 (two) times daily.     multivitamin per tablet  Commonly known as: THERAGRAN  Take 1 tablet by mouth once daily.     omeprazole 40 MG capsule  Commonly known as: PRILOSEC  Take 1 capsule (40 mg total) by mouth every morning.     QUEtiapine 50 MG tablet  Commonly known as: SEROQUEL  Take 1 tablet (50 mg total) by mouth every evening.     TALTZ AUTOINJECTOR 80 mg/mL Atin  Generic drug: ixekizumab  Inject 80 mg into the skin every 28 days.     tiZANidine 4 MG tablet  Commonly known as: ZANAFLEX  Take 1 tablet (4 mg total) by mouth 3 (three) times daily as needed (muscular pain).     TRELEGY ELLIPTA 100-62.5-25 mcg Dsdv  Generic drug: fluticasone-umeclidin-vilanter  Inhale 1 puff into the lungs once daily.     * triamcinolone acetonide 0.025% 0.025 % cream  Commonly known as: KENALOG  AAA of skin folds bid prn psoriasis.     * triamcinolone acetonide 0.1% 0.1 % cream  Commonly known as: KENALOG  Apply to affected areas of body BID prn psoriasis. Do not use on face or skin folds.     venlafaxine 75  MG 24 hr capsule  Commonly known as: EFFEXOR-XR  Take 1 capsule (75 mg total) by mouth once daily. Start on Week 2     VITAMIN B COMPLEX-C ORAL  Take by mouth Daily.     VITAMIN D3 COMPLETE ORAL  Take by mouth.           * This list has 2 medication(s) that are the same as other medications prescribed for you. Read the directions carefully, and ask your doctor or other care provider to review them with you.                  Hernan Carlos MD  General Surgery  [facility] ([address])

## 2024-04-05 NOTE — OP NOTE
OPERATIVE REPORT    Date of Service: 4/5/2024  PATIENT: Lucia Matias  MRN: [id number]    PREOPERATIVE DIAGNOSIS  1. HX OF BREAST CANCER  2. HX OF MASTECTOMY  3. HX OF BREAST RECONSTRUCTION  4. RECURRENT LEFT BREAST INFECTIONS S/P DEBRIDEMENTS    POSTOPERATIVE DIAGNOSIS  SAME AS ABOVE    PROCEDURE PERFORMED  1. INCISION AND DRAINAGE LEFT BREAST    SURGEON  FABIEN MARROQUIN MD    ASSISTANT  YOVANA CORTEZ MD    ANESTHESIA  General Anesthesia; LMA    FINDINGS  left breast debridement with gross purulent drainage expressed within lower pole of breast. Lower pole fat necrosis excised. Primary closure with 10 Italian VIANEY drain placed     INTAKE/OUTPUT  EBL- <30 mls    SPECIMENS:   CULTURES: aerobic, anaerobic, fungus, AFB  DRAINS AND TUBES: left breast 10 Italian VIANEY drain  DISPOSITION: Good, stable condition to PACU.  COMPLICATIONS: None.  COUNTS: Sponge and needle counts correct.    INDICATIONS  Lucia Matias is a 63 y.o. female s/p bilateral SSM with NEHA flap reconstruction, second stage with left flap vs fat graft necrosis with chronic draining wound. She was packing wound. She reports that over the past week she has been developing more pain of the left breast with erythema and drainage.  She presents for incision and drainage of left breast and excision of fat necrosis after thorough review of the associated risks, benefits, and alternatives.    OPERATIVE PROCEDURE  The patient was met in the preoperative holding area prior to sedation. An interval screen indicated no changes in her health and preoperative laboratory results were satisfactory. Markings were performed in the upright standing position. She was taken to the operating room where upon entry a surgical time-out verified her identity, diagnosis, nature and sites of procedures. In the supine position she was anesthetized and the airway controlled with LMA intubation. She is currently on prophylactic antibiotics. Sequential compressive  devices placed were deployed on each lower extremity for venous thromboembolic protection. Her arms were placed away from the body axis and secured to arm boards and her anterior chest was sterilely prepped and draped.    Using a 15 blade the left breast vertical and horizontal limbs were opened and gross purulence was encountered. At this time multiple cultures were taken for anaerobic, aerobic, fungal, and AFB. Using face lift scissors the left breast nonviable tissue and sinus tract was excised until all remaining tissue was soft and viable. A pulsavac was used to copiously irrigate the wound with sterile saline solution and meticulous hemostasis achieved with bovie electrocautery. A 10 Lithuanian VIANEY drain was placed in left breast and secured with a 3-0 nylon drain stitch. The wound was closed with 3-0 prolene horizontal mattress suture. A surgical bra and abd pads were placed.     The patient was awakened and when breathing spontaneously and able to protect her airway she was extubated and taken to recovery in good condition.      Hernan Carlos MD  [facility]  4/5/2024

## 2024-04-05 NOTE — NURSING
04/05/2024        Pt verbalized understanding D/C instructions and education provided pertinent to D/C needs. IV cath removed fully intact. No distress noted. Pt awaiting pharmacy to deliver bedside medications and family to pick her up and then will put in transport for discharge.          Sirena Morales RN

## 2024-04-05 NOTE — PLAN OF CARE
Problem: Adult Inpatient Plan of Care  Goal: Plan of Care Review  Outcome: Ongoing, Progressing  Goal: Patient-Specific Goal (Individualized)  Outcome: Ongoing, Progressing  Goal: Absence of Hospital-Acquired Illness or Injury  Outcome: Ongoing, Progressing  Goal: Optimal Comfort and Wellbeing  Outcome: Ongoing, Progressing  Goal: Readiness for Transition of Care  Outcome: Ongoing, Progressing     Problem: Adjustment to Illness COPD (Chronic Obstructive Pulmonary Disease)  Goal: Optimal Chronic Illness Coping  Outcome: Ongoing, Progressing     Problem: Functional Ability Impaired COPD (Chronic Obstructive Pulmonary Disease)  Goal: Optimal Level of Functional Rich  Outcome: Ongoing, Progressing     Problem: Infection COPD (Chronic Obstructive Pulmonary Disease)  Goal: Absence of Infection Signs and Symptoms  Outcome: Ongoing, Progressing     Problem: Oral Intake Inadequate COPD (Chronic Obstructive Pulmonary Disease)  Goal: Improved Nutrition Intake  Outcome: Ongoing, Progressing     Problem: Respiratory Compromise COPD (Chronic Obstructive Pulmonary Disease)  Goal: Effective Oxygenation and Ventilation  Outcome: Ongoing, Progressing     Problem: Fall Injury Risk  Goal: Absence of Fall and Fall-Related Injury  Outcome: Ongoing, Progressing

## 2024-04-05 NOTE — PLAN OF CARE
Patient will discharge home. Appointments have been scheduled and added to AVS. Patient daughter will transport patient home. All CM needs have been met.    04/05/24 1042   Final Note   Assessment Type Final Discharge Note   Anticipated Discharge Disposition Home   Hospital Resources/Appts/Education Provided Provided patient/caregiver with written discharge plan information;Appointments scheduled and added to AVS   Post-Acute Status   Discharge Delays None known at this time     [facility] ([address])  Discharge Final Note    Primary Care Provider: Hetal Banks MD    Expected Discharge Date: 4/5/2024    Final Discharge Note (most recent)       Final Note - 04/05/24 1042          Final Note    Assessment Type Final Discharge Note (P)      Anticipated Discharge Disposition Home or Self Care (P)      Hospital Resources/Appts/Education Provided Provided patient/caregiver with written discharge plan information;Appointments scheduled and added to AVS (P)         Post-Acute Status    Discharge Delays None known at this time (P)                      Important Message from Medicare

## 2024-04-08 LAB
BACTERIA BLD CULT: NORMAL
BACTERIA BLD CULT: NORMAL
BACTERIA SPEC AEROBE CULT: NO GROWTH
BACTERIA SPEC ANAEROBE CULT: ABNORMAL
FINAL PATHOLOGIC DIAGNOSIS: NORMAL
GROSS: NORMAL
Lab: NORMAL

## 2024-04-10 ENCOUNTER — PATIENT MESSAGE (OUTPATIENT)
Dept: PSYCHIATRY | Facility: CLINIC | Age: 64
End: 2024-04-10
Payer: COMMERCIAL

## 2024-04-10 DIAGNOSIS — F41.0 PANIC ATTACK: ICD-10-CM

## 2024-04-10 DIAGNOSIS — F41.1 GAD (GENERALIZED ANXIETY DISORDER): ICD-10-CM

## 2024-04-10 RX ORDER — DIAZEPAM 5 MG/1
TABLET ORAL
Qty: 30 TABLET | Refills: 2 | Status: SHIPPED | OUTPATIENT
Start: 2024-04-10 | End: 2024-04-18 | Stop reason: SDUPTHER

## 2024-04-11 ENCOUNTER — OFFICE VISIT (OUTPATIENT)
Dept: PLASTIC SURGERY | Facility: CLINIC | Age: 64
End: 2024-04-11
Attending: PLASTIC SURGERY
Payer: COMMERCIAL

## 2024-04-11 VITALS — DIASTOLIC BLOOD PRESSURE: 69 MMHG | HEART RATE: 63 BPM | SYSTOLIC BLOOD PRESSURE: 150 MMHG

## 2024-04-11 DIAGNOSIS — Z85.3 HISTORY OF BREAST CANCER: Primary | ICD-10-CM

## 2024-04-11 PROCEDURE — 99024 POSTOP FOLLOW-UP VISIT: CPT | Mod: S$GLB,,, | Performed by: PLASTIC SURGERY

## 2024-04-11 PROCEDURE — 3077F SYST BP >= 140 MM HG: CPT | Mod: CPTII,S$GLB,, | Performed by: PLASTIC SURGERY

## 2024-04-11 PROCEDURE — 3078F DIAST BP <80 MM HG: CPT | Mod: CPTII,S$GLB,, | Performed by: PLASTIC SURGERY

## 2024-04-11 PROCEDURE — 3044F HG A1C LEVEL LT 7.0%: CPT | Mod: CPTII,S$GLB,, | Performed by: PLASTIC SURGERY

## 2024-04-11 NOTE — PROGRESS NOTES
Patient presents for follow-up.  She is status post bilateral breast reconstruction with abdominal based flap     She most recently underwent a debridement of her left breast from a delayed infection     On exam:  The drain produce less than 10 cc per day was removed without problem     The skin of the left reconstructed breast is soft without erythema or edema     The incisions are well approximated with Prolene sutures in place     Assessment:  Stable     Plan will see her again in 2 weeks from now at which time we will remove the Prolene sutures

## 2024-04-17 ENCOUNTER — TELEPHONE (OUTPATIENT)
Dept: ENDOSCOPY | Facility: HOSPITAL | Age: 64
End: 2024-04-17

## 2024-04-17 ENCOUNTER — CLINICAL SUPPORT (OUTPATIENT)
Dept: ENDOSCOPY | Facility: HOSPITAL | Age: 64
End: 2024-04-17
Attending: INTERNAL MEDICINE
Payer: COMMERCIAL

## 2024-04-17 DIAGNOSIS — Z12.12 ENCOUNTER FOR COLORECTAL CANCER SCREENING: ICD-10-CM

## 2024-04-17 DIAGNOSIS — Z12.11 ENCOUNTER FOR COLORECTAL CANCER SCREENING: ICD-10-CM

## 2024-04-17 DIAGNOSIS — Z86.010 HISTORY OF COLON POLYPS: ICD-10-CM

## 2024-04-17 NOTE — PLAN OF CARE
Contacted patient to schedule colonoscopy. Patient needs 2nd floor placement. No slots available. Refendo placed. Patient verbalized understanding.

## 2024-04-18 ENCOUNTER — OFFICE VISIT (OUTPATIENT)
Dept: PSYCHIATRY | Facility: CLINIC | Age: 64
End: 2024-04-18
Payer: COMMERCIAL

## 2024-04-18 DIAGNOSIS — F41.1 GAD (GENERALIZED ANXIETY DISORDER): Primary | ICD-10-CM

## 2024-04-18 DIAGNOSIS — F41.0 PANIC ATTACK: ICD-10-CM

## 2024-04-18 DIAGNOSIS — G47.00 INSOMNIA, UNSPECIFIED TYPE: ICD-10-CM

## 2024-04-18 DIAGNOSIS — F33.40 MDD (RECURRENT MAJOR DEPRESSIVE DISORDER) IN REMISSION: ICD-10-CM

## 2024-04-18 PROCEDURE — 3044F HG A1C LEVEL LT 7.0%: CPT | Mod: CPTII,95,, | Performed by: NURSE PRACTITIONER

## 2024-04-18 PROCEDURE — 1160F RVW MEDS BY RX/DR IN RCRD: CPT | Mod: CPTII,95,, | Performed by: NURSE PRACTITIONER

## 2024-04-18 PROCEDURE — 1111F DSCHRG MED/CURRENT MED MERGE: CPT | Mod: CPTII,95,, | Performed by: NURSE PRACTITIONER

## 2024-04-18 PROCEDURE — 1159F MED LIST DOCD IN RCRD: CPT | Mod: CPTII,95,, | Performed by: NURSE PRACTITIONER

## 2024-04-18 PROCEDURE — 99214 OFFICE O/P EST MOD 30 MIN: CPT | Mod: 95,,, | Performed by: NURSE PRACTITIONER

## 2024-04-18 RX ORDER — VENLAFAXINE HYDROCHLORIDE 150 MG/1
150 CAPSULE, EXTENDED RELEASE ORAL DAILY
Qty: 90 CAPSULE | Refills: 3 | Status: SHIPPED | OUTPATIENT
Start: 2024-04-18

## 2024-04-18 RX ORDER — DIAZEPAM 5 MG/1
TABLET ORAL
Qty: 30 TABLET | Refills: 5 | Status: SHIPPED | OUTPATIENT
Start: 2024-04-18

## 2024-04-18 RX ORDER — ZOLPIDEM TARTRATE 10 MG/1
10 TABLET ORAL NIGHTLY PRN
Qty: 30 TABLET | Refills: 5 | Status: SHIPPED | OUTPATIENT
Start: 2024-04-18

## 2024-04-18 NOTE — PROGRESS NOTES
Outpatient Psychiatry Follow-Up Visit (MD/NP)    4/18/2024    Clinical Status of Patient:  Outpatient (Ambulatory)    Chief Complaint:  Lucia Matias is a 63 y.o. female who presents today for follow-up of anxiety.  Met with patient.      Last visit was: 2/13/23  Chart and  reviewed.   The patient location is: home   The chief complaint leading to consultation is: anxiety    Visit type: audiovisual    Face to Face time with patient: 30 minutes  35 minutes of total time spent on the encounter, which includes face to face time and non-face to face time preparing to see the patient (eg, review of tests), Obtaining and/or reviewing separately obtained history, Documenting clinical information in the electronic or other health record, Independently interpreting results (not separately reported) and communicating results to the patient/family/caregiver, or Care coordination (not separately reported).     Each patient to whom he or she provides medical services by telemedicine is:  (1) informed of the relationship between the physician and patient and the respective role of any other health care provider with respect to management of the patient; and (2) notified that he or she may decline to receive medical services by telemedicine and may withdraw from such care at any time.    Interval History and Content of Current Session:  Current Psychiatric Medications/changes  Continue Effexor XR 75 mg daily  Continue Valium 5 mg daily as needed for severe anxiety  DC Trazodone (ineffecitve)  Start Remeron 15 mg before bed as needed for insomnia    Virtual Visit: Discussed having self image issues after masectomy.  Failed on Remeron and Seroquel for sleep.  Will try Ambien. Educated on proper use.  Reports moderate use of Valium for anxiety; no misuse; instructed to not take with pain meds. Depression managed with Effexor. Thought processes appear clear and organized. Denies SI/HI/AVH.    Previous medication trial:  Prozac, Paxil, Wellbutrin - notes she felt worse, more depressed, SI - admits it could have also been the situation she was in at the time.    Psychotherapy:  Target symptoms: anxiety   Why chosen therapy is appropriate versus another modality: relevant to diagnosis  Outcome monitoring methods: self-report  Therapeutic intervention type: insight oriented psychotherapy  Topics discussed/themes: building skills sets for symptom management, symptom recognition  The patient's response to the intervention is accepting. The patient's progress toward treatment goals is good.   Duration of intervention: 15 minutes.    Review of Systems   PSYCHIATRIC: Pertinant items are noted in the narrative.  CONSTITUTIONAL: No weight gain or loss.   MUSCULOSKELETAL: No pain or stiffness of the joints.  NEUROLOGIC: No weakness, sensory changes, seizures, confusion, memory loss, tremor or other abnormal movements.  ENDOCRINE: No polydipsia or polyuria.  INTEGUMENTARY: No rashes or lacerations.  EYES: No exophthalmos, jaundice or blindness.  ENT: No dizziness, tinnitus or hearing loss.  RESPIRATORY: No shortness of breath.  CARDIOVASCULAR: No tachycardia or chest pain.  GASTROINTESTINAL: No nausea, vomiting, pain, constipation or diarrhea.  GENITOURINARY: No frequency, dysuria or sexual dysfunction.  HEMATOLOGIC/LYMPHATIC: No excessive bleeding, prolonged or excessive bleeding after dental extraction/injury.  ALLERGIC/IMMUNOLOGIC: No allergic response to materials, foods or animals at this time.    Past Medical, Family and Social History: The patient's past medical, family and social history have been reviewed and updated as appropriate within the electronic medical record - see encounter notes.    Compliance: yes    Side effects: see above    Risk Parameters:  Patient reports no suicidal ideation  Patient reports no homicidal ideation  Patient reports no self-injurious behavior  Patient reports no violent behavior    Exam (detailed: at  least 9 elements; comprehensive: all 15 elements)   Constitutional  Vitals:  Most recent vital signs, dated greater than 90 days prior to this appointment, were reviewed.   There were no vitals filed for this visit.   General:  unremarkable, age appropriate     Musculoskeletal  Muscle Strength/Tone:  not examined   Gait & Station:  non-ataxic     Psychiatric  Speech:  no latency; no press   Mood & Affect:  steady  congruent and appropriate   Thought Process:  normal and logical   Associations:  intact   Thought Content:  normal, no suicidality, no homicidality, delusions, or paranoia   Insight:  intact   Judgement: behavior is adequate to circumstances   Orientation:  grossly intact   Memory: intact for content of interview   Language: grossly intact   Attention Span & Concentration:  able to focus   Fund of Knowledge:  intact and appropriate to age and level of education     Assessment and Diagnosis   Status/Progress: Based on the examination today, the patient's problem(s) is/are adequately but not ideally controlled.  New problems have not been presented today.   Co-morbidities and Lack of compliance are not complicating management of the primary condition.  There are no active rule-out diagnoses for this patient at this time.     General Impression:       ICD-10-CM ICD-9-CM   1. RANDI (generalized anxiety disorder)  F41.1 300.02   2. MDD (recurrent major depressive disorder) in remission  F33.40 296.35   3. Panic attack  F41.0 300.01   4. Insomnia, unspecified type  G47.00 780.52     Intervention/Counseling/Treatment Plan   Medication Management: The risks and benefits of medication were discussed with the patient.  Increase to  Effexor  mg daily  Continue Valium 5 mg daily as needed for severe anxiety  Try Ambien 10 mg before bed as needed for sleep  DC Remeron and Seroquel    Return to Clinic: 6 months, as needed    Risks, benefits, side effects and alternative treatments discussed with patient. Patient  agrees with the current plan as documented.  Encouraged Patient to keep future appointments.  Take medications as prescribed and abstain from substance abuse.  Pt to present to ED for thoughts to harm herself or others         No

## 2024-04-19 ENCOUNTER — OFFICE VISIT (OUTPATIENT)
Dept: PLASTIC SURGERY | Facility: CLINIC | Age: 64
End: 2024-04-19
Attending: PLASTIC SURGERY
Payer: COMMERCIAL

## 2024-04-19 VITALS — HEART RATE: 71 BPM | SYSTOLIC BLOOD PRESSURE: 196 MMHG | DIASTOLIC BLOOD PRESSURE: 91 MMHG

## 2024-04-19 DIAGNOSIS — Z85.3 HISTORY OF BREAST CANCER: Primary | ICD-10-CM

## 2024-04-19 PROCEDURE — 3044F HG A1C LEVEL LT 7.0%: CPT | Mod: CPTII,S$GLB,, | Performed by: PLASTIC SURGERY

## 2024-04-19 PROCEDURE — 3080F DIAST BP >= 90 MM HG: CPT | Mod: CPTII,S$GLB,, | Performed by: PLASTIC SURGERY

## 2024-04-19 PROCEDURE — 99024 POSTOP FOLLOW-UP VISIT: CPT | Mod: S$GLB,,, | Performed by: PLASTIC SURGERY

## 2024-04-19 PROCEDURE — 3077F SYST BP >= 140 MM HG: CPT | Mod: CPTII,S$GLB,, | Performed by: PLASTIC SURGERY

## 2024-04-19 RX ORDER — AMOXICILLIN AND CLAVULANATE POTASSIUM 875; 125 MG/1; MG/1
1 TABLET, FILM COATED ORAL EVERY 12 HOURS
Qty: 20 TABLET | Refills: 0 | Status: SHIPPED | OUTPATIENT
Start: 2024-04-19 | End: 2024-05-30

## 2024-04-19 NOTE — PROGRESS NOTES
Plastic surgery clinic visit    S: Patient is status post bilateral breast reconstruction with abdominal based flap and most recently underwent a debridement of her left breast from a delayed infection. She has been doing well postop however started having some breakdown in inferior pole of breast with thin yellow drainage which she presents for evaluation.   Of note, on discharge her cultures were no growth to date however they have recently grown Dialister microaerophilus. She was discharged on bactrim/cipro for which cipro should cover and she completed this medication    On exam:  left breast soft with small area of breakdown at left inferior T point junction with fibrinous exudate. There is no surrounding erythema, warmth, or induration. Prolene sutures in place    A/P  Patient with some breakdown at t point. Will place gauze to wick moisture in this region and keep sutures in place at this time.   Prescribed Augmentin after discussion with ID due to previous cultures of dialister microaerophilus  Will plan to see her next week when we will remove prolene sutures.     Hernan Carlos MD  [facility]  4/19/2024

## 2024-04-24 ENCOUNTER — PATIENT MESSAGE (OUTPATIENT)
Dept: DERMATOLOGY | Facility: CLINIC | Age: 64
End: 2024-04-24
Payer: COMMERCIAL

## 2024-04-25 ENCOUNTER — OFFICE VISIT (OUTPATIENT)
Dept: PLASTIC SURGERY | Facility: CLINIC | Age: 64
End: 2024-04-25
Attending: PLASTIC SURGERY
Payer: COMMERCIAL

## 2024-04-25 VITALS — DIASTOLIC BLOOD PRESSURE: 77 MMHG | HEART RATE: 68 BPM | SYSTOLIC BLOOD PRESSURE: 168 MMHG

## 2024-04-25 DIAGNOSIS — Z85.3 HISTORY OF BREAST CANCER: Primary | ICD-10-CM

## 2024-04-25 PROCEDURE — 3078F DIAST BP <80 MM HG: CPT | Mod: CPTII,S$GLB,, | Performed by: PLASTIC SURGERY

## 2024-04-25 PROCEDURE — 99024 POSTOP FOLLOW-UP VISIT: CPT | Mod: S$GLB,,, | Performed by: PLASTIC SURGERY

## 2024-04-25 PROCEDURE — 3044F HG A1C LEVEL LT 7.0%: CPT | Mod: CPTII,S$GLB,, | Performed by: PLASTIC SURGERY

## 2024-04-25 PROCEDURE — 3077F SYST BP >= 140 MM HG: CPT | Mod: CPTII,S$GLB,, | Performed by: PLASTIC SURGERY

## 2024-04-25 NOTE — PROGRESS NOTES
Patient presents for follow-up.  She is status post debridement of left fat necrosis    Her postoperative course was uneventful up until she had a little bit of drainage that responded to Augmentin    She now feels much better     On exam: The left reconstructed breast is soft    The incisions well healed    There has no erythema edema or drainage     The Prolene sutures were removed     Assessment:  Improving    Plan will see her again in 2 weeks from now and she is to finish her current course of antibiotics

## 2024-05-06 LAB — FUNGUS SPEC CULT: NORMAL

## 2024-05-08 ENCOUNTER — OFFICE VISIT (OUTPATIENT)
Dept: PLASTIC SURGERY | Facility: CLINIC | Age: 64
End: 2024-05-08
Attending: PLASTIC SURGERY
Payer: COMMERCIAL

## 2024-05-08 VITALS — HEART RATE: 73 BPM | SYSTOLIC BLOOD PRESSURE: 180 MMHG | DIASTOLIC BLOOD PRESSURE: 74 MMHG

## 2024-05-08 DIAGNOSIS — Z85.3 HISTORY OF BREAST CANCER: Primary | ICD-10-CM

## 2024-05-08 PROCEDURE — 3077F SYST BP >= 140 MM HG: CPT | Mod: CPTII,S$GLB,, | Performed by: PLASTIC SURGERY

## 2024-05-08 PROCEDURE — 3044F HG A1C LEVEL LT 7.0%: CPT | Mod: CPTII,S$GLB,, | Performed by: PLASTIC SURGERY

## 2024-05-08 PROCEDURE — 1159F MED LIST DOCD IN RCRD: CPT | Mod: CPTII,S$GLB,, | Performed by: PLASTIC SURGERY

## 2024-05-08 PROCEDURE — 3078F DIAST BP <80 MM HG: CPT | Mod: CPTII,S$GLB,, | Performed by: PLASTIC SURGERY

## 2024-05-08 PROCEDURE — 99024 POSTOP FOLLOW-UP VISIT: CPT | Mod: S$GLB,,, | Performed by: PLASTIC SURGERY

## 2024-05-08 NOTE — PROGRESS NOTES
Patient presents for follow-up.  She is status post bilateral breast reconstruction and had delayed infection that required debridement on the left side     Today there has no concern     Minimal intermittent tenderness     No edema or erythema     On exam: Both reconstructed breasts are soft    The skin paddle was well perfused bilaterally     There is some asymmetry with loss of tissue of the inferior pole of the left side from previous debridement    The incision again that were reopened for debridement the well healed     Assessment:  Stable     Plan it is seems like she would now has resolve her infection     She will return in 3 months at which time we will discuss potential inferior pole reconstruction using PAP flap to the thoracodorsal system    She would like to get this completed before December

## 2024-05-16 ENCOUNTER — TELEPHONE (OUTPATIENT)
Dept: DERMATOLOGY | Facility: CLINIC | Age: 64
End: 2024-05-16
Payer: COMMERCIAL

## 2024-05-16 DIAGNOSIS — Z79.899 LONG-TERM USE OF HIGH-RISK MEDICATION: ICD-10-CM

## 2024-05-16 DIAGNOSIS — L40.50 PSORIATIC ARTHRITIS: ICD-10-CM

## 2024-05-16 DIAGNOSIS — L40.0 PSORIASIS VULGARIS: Primary | ICD-10-CM

## 2024-05-23 LAB
ACID FAST MOD KINY STN SPEC: NORMAL
MYCOBACTERIUM SPEC QL CULT: NORMAL

## 2024-05-30 ENCOUNTER — OFFICE VISIT (OUTPATIENT)
Dept: PRIMARY CARE CLINIC | Facility: CLINIC | Age: 64
End: 2024-05-30
Payer: COMMERCIAL

## 2024-05-30 VITALS
OXYGEN SATURATION: 98 % | HEIGHT: 62 IN | SYSTOLIC BLOOD PRESSURE: 128 MMHG | WEIGHT: 176.56 LBS | DIASTOLIC BLOOD PRESSURE: 76 MMHG | BODY MASS INDEX: 32.49 KG/M2 | HEART RATE: 66 BPM

## 2024-05-30 DIAGNOSIS — Z17.0 MALIGNANT NEOPLASM OF CENTRAL PORTION OF LEFT BREAST IN FEMALE, ESTROGEN RECEPTOR POSITIVE: ICD-10-CM

## 2024-05-30 DIAGNOSIS — E78.2 MIXED HYPERLIPIDEMIA: Chronic | ICD-10-CM

## 2024-05-30 DIAGNOSIS — L40.50 PSORIATIC ARTHRITIS: ICD-10-CM

## 2024-05-30 DIAGNOSIS — M06.9 RHEUMATOID ARTHRITIS, INVOLVING UNSPECIFIED SITE, UNSPECIFIED WHETHER RHEUMATOID FACTOR PRESENT: ICD-10-CM

## 2024-05-30 DIAGNOSIS — F41.8 DEPRESSION WITH ANXIETY: Primary | Chronic | ICD-10-CM

## 2024-05-30 DIAGNOSIS — Z79.60 LONG-TERM USE OF IMMUNOSUPPRESSANT MEDICATION: ICD-10-CM

## 2024-05-30 DIAGNOSIS — I48.3 TYPICAL ATRIAL FLUTTER: ICD-10-CM

## 2024-05-30 DIAGNOSIS — J41.8 MIXED SIMPLE AND MUCOPURULENT CHRONIC BRONCHITIS: Chronic | ICD-10-CM

## 2024-05-30 DIAGNOSIS — F51.01 PRIMARY INSOMNIA: ICD-10-CM

## 2024-05-30 DIAGNOSIS — C50.112 MALIGNANT NEOPLASM OF CENTRAL PORTION OF LEFT BREAST IN FEMALE, ESTROGEN RECEPTOR POSITIVE: ICD-10-CM

## 2024-05-30 DIAGNOSIS — I10 ESSENTIAL HYPERTENSION: Chronic | ICD-10-CM

## 2024-05-30 PROBLEM — I48.92 ATRIAL FLUTTER: Status: ACTIVE | Noted: 2024-01-03

## 2024-05-30 PROCEDURE — 99396 PREV VISIT EST AGE 40-64: CPT | Mod: S$GLB,,, | Performed by: INTERNAL MEDICINE

## 2024-05-30 PROCEDURE — 99999 PR PBB SHADOW E&M-EST. PATIENT-LVL IV: CPT | Mod: PBBFAC,,, | Performed by: INTERNAL MEDICINE

## 2024-05-30 PROCEDURE — 3008F BODY MASS INDEX DOCD: CPT | Mod: CPTII,S$GLB,, | Performed by: INTERNAL MEDICINE

## 2024-05-30 PROCEDURE — 3074F SYST BP LT 130 MM HG: CPT | Mod: CPTII,S$GLB,, | Performed by: INTERNAL MEDICINE

## 2024-05-30 PROCEDURE — 3044F HG A1C LEVEL LT 7.0%: CPT | Mod: CPTII,S$GLB,, | Performed by: INTERNAL MEDICINE

## 2024-05-30 PROCEDURE — 3078F DIAST BP <80 MM HG: CPT | Mod: CPTII,S$GLB,, | Performed by: INTERNAL MEDICINE

## 2024-05-30 PROCEDURE — 1159F MED LIST DOCD IN RCRD: CPT | Mod: CPTII,S$GLB,, | Performed by: INTERNAL MEDICINE

## 2024-05-30 NOTE — ASSESSMENT & PLAN NOTE
Sees Psych NP shiv. Stable on effexor 150 mg, just increased and has been feeling better.  Has been on prozac, celexa, buspar, seroquel, wellbutrin before.  No SI/HI/panic attacks.

## 2024-05-30 NOTE — ASSESSMENT & PLAN NOTE
Stable on lipitor 20 mg daily, no myalgias.  The 10-year ASCVD risk score (Caren BRAR, et al., 2019) is: 9%    Values used to calculate the score:      Age: 63 years      Sex: Female      Is Non- : No      Diabetic: No      Tobacco smoker: No      Systolic Blood Pressure: 180 mmHg      Is BP treated: Yes      HDL Cholesterol: 44 mg/dL      Total Cholesterol: 152 mg/dL

## 2024-05-30 NOTE — ASSESSMENT & PLAN NOTE
Sees Dr. Madrid.  On Arimidex.  s/p bilateral SSM with NEHA flap reconstruction, second stage with left flap vs fat graft necrosis with chronic draining wound (healed).  Had additional surgery in 4/2024 with unknown bacterial infection. Sees plastics Dr. Castillo again in 9/2024 to discuss further reconstruction.

## 2024-05-30 NOTE — PROGRESS NOTES
[facility] Note    Chief Complaint      Chief Complaint   Patient presents with    Follow-up     History of Present Illness      Lucia Matias is a 63 y.o. female who presents today for Annual preventative visit  Patient comes to appointment alone.  EP: Leilani, Cards: Ave, Psych: NP Flynn, Breast Surg: Zoran    Problem List Items Addressed This Visit       COPD (chronic obstructive pulmonary disease) (Chronic)    Current Assessment & Plan     Stable on Trelegy with PRN Ventolin/Duoneb.  Breathing has been better since quitting smoking and losing weight.         Depression with anxiety - Primary (Chronic)    Current Assessment & Plan     Sees Psych NP shiv. Stable on effexor 150 mg, just increased and has been feeling better.  Has been on prozac, celexa, buspar, seroquel, wellbutrin before.  No SI/HI/panic attacks.         Essential hypertension (Chronic)    Current Assessment & Plan     BP controlled on no meds. On lisinopril prior to weight loss. No CP/HA.         HLD (hyperlipidemia) (Chronic)    Current Assessment & Plan     Stable on lipitor 20 mg daily, no myalgias.  The 10-year ASCVD risk score (Caren BRAR, et al., 2019) is: 9%    Values used to calculate the score:      Age: 63 years      Sex: Female      Is Non- : No      Diabetic: No      Tobacco smoker: No      Systolic Blood Pressure: 180 mmHg      Is BP treated: Yes      HDL Cholesterol: 44 mg/dL      Total Cholesterol: 152 mg/dL           Atrial flutter    Current Assessment & Plan     On BB for rate control Eliquis.         Insomnia    Current Assessment & Plan     Gas always had issues with sleep.  Took trazodone and ambien in past.         Long-term use of immunosuppressant medication    Malignant neoplasm of central portion of left breast in female, estrogen receptor positive    Current Assessment & Plan     Sees Dr. Madrid.  On Arimidex.  s/p bilateral SSM with NEHA flap reconstruction,  second stage with left flap vs fat graft necrosis with chronic draining wound (healed).  Had additional surgery in 4/2024 with unknown bacterial infection. Sees plastics Dr. Castillo again in 9/2024 to discuss further reconstruction.         Psoriatic arthritis    Current Assessment & Plan     On taltz.         Rheumatoid arthritis    Current Assessment & Plan     On Taltz.   Previously on Cosentyx but insurance no longer covers.              Health Maintenance   Topic Date Due    Shingles Vaccine (1 of 2) Never done    Colorectal Cancer Screening  01/04/2020    TETANUS VACCINE  12/05/2028    Lipid Panel  03/26/2029    Hepatitis C Screening  Completed       Past Medical History:   Diagnosis Date    Allergy     Anxiety     Arthritis     Atrial flutter     Colon polyps 2016    COPD (chronic obstructive pulmonary disease)     COPD exacerbation 10/31/2022    Depression     Diverticular disease of colon 06/06/2017    Diverticulitis     H/O gastric sleeve     HLD (hyperlipidemia)     Malignant neoplasm of central portion of left breast in female, estrogen receptor positive 12/29/2022    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    Osteopenia     Pancreatitis     Paroxysmal A-fib 1/3/2024    Pneumothorax, unspecified     following mastectomy sx 2023    Psoriasis     Rheumatoid arthritis     Severe obesity (BMI 35.0-39.9) with comorbidity        Past Surgical History:   Procedure Laterality Date    ABLATION  11/2022    Cardiac Ablation for A Flutter, Successful    ADENOIDECTOMY  1966    Tonsillitis and adnoids    BILATERAL MASTECTOMY Bilateral 2/15/2023    Procedure: MASTECTOMY, BILATERAL;  Surgeon: Marcela Meehan MD;  Location: [facility];  Service: General;  Laterality: Bilateral;  EMAIL SENT 1-12 @ 11:44 LK / 2.5 HOURS    BLADDER SUSPENSION      (x2)    BREAST REVISION SURGERY Bilateral 3/29/2023    Procedure: BREAST REVISION SURGERY / BILATERAL BREAST FLAP REVISION;  Surgeon: Ming Milian MD;  Location: [facility] OR;   Service: Plastics;  Laterality: Bilateral;  90 MINUTES     SECTION      (x2)    DEBRIDEMENT, BREAST Bilateral 3/29/2023    Procedure: DEBRIDEMENT, BREAST;  Surgeon: Ming Milian MD;  Location: [facility];  Service: Plastics;  Laterality: Bilateral;    ESOPHAGOGASTRODUODENOSCOPY N/A 2021    Procedure: EGD (ESOPHAGOGASTRODUODENOSCOPY);  Surgeon: Vince Rodriguez MD;  Location: [facility] OR [address] [place] [address];  Service: General;  Laterality: N/A;    INCISION AND DRAINAGE OF BREAST Left 10/26/2023    Procedure: INCISION AND DRAINAGE, BREAST;  Surgeon: Ming Milian MD;  Location: [facility];  Service: Plastics;  Laterality: Left;    INCISION AND DRAINAGE OF BREAST Left 2024    Procedure: INCISION AND DRAINAGE, BREAST;  Surgeon: Ming Milian MD;  Location: [facility];  Service: Plastics;  Laterality: Left;    INJECTION FOR SENTINEL NODE IDENTIFICATION Left 2/15/2023    Procedure: INJECTION, FOR SENTINEL NODE IDENTIFICATION;  Surgeon: Marcela Meehan MD;  Location: [facility];  Service: General;  Laterality: Left;    LAPAROSCOPIC SLEEVE GASTRECTOMY N/A 2021    Procedure: GASTRECTOMY, SLEEVE, LAPAROSCOPIC, with intraop EGD;  Surgeon: Vince Rodriguez MD;  Location: [facility] OR [address] [place] [address];  Service: General;  Laterality: N/A;    LIPOSUCTION W/ FAT INJECTION Bilateral 2023    Procedure: LIPOSUCTION, WITH FAT TRANSFER;  Surgeon: Ming Milian MD;  Location: [facility];  Service: Plastics;  Laterality: Bilateral;  / FAT GRAFTING TO BOTH BREAST    LUNG BIOPSY  2020    RECONSTRUCTION OF BREAST WITH DEEP INFERIOR EPIGASTRIC ARTERY  (NEHA) FREE FLAP Bilateral 2/15/2023    Procedure: RECONSTRUCTION, BREAST, USING NEHA FREE FLAP;  Surgeon: Ming Milian MD;  Location: [facility];  Service: Plastics;  Laterality: Bilateral;    REVISION, FLAP, PREVIOUSLY CREATED FOR BREAST RECONSTRUCTION Bilateral 2023    Procedure: REVISION, FLAP, PREVIOUSLY CREATED FOR BREAST RECONSTRUCTION;  Surgeon:   Ming Johnson MD;  Location: [facility];  Service: Plastics;  Laterality: Bilateral;  4 HOURS    REVISION, SCAR, ABDOMINAL DONOR SITE N/A 8/29/2023    Procedure: REVISION, SCAR, ABDOMINAL DONOR SITE;  Surgeon: Ming Milian MD;  Location: [facility];  Service: Plastics;  Laterality: N/A;    RHINOPLASTY      SENTINEL LYMPH NODE BIOPSY Left 2/15/2023    Procedure: BIOPSY, LYMPH NODE, SENTINEL;  Surgeon: Marcela Meehan MD;  Location: [facility];  Service: General;  Laterality: Left;    TONSILLECTOMY      TRANSESOPHAGEAL ECHOCARDIOGRAPHY N/A 11/02/2022    Procedure: ECHOCARDIOGRAM, TRANSESOPHAGEAL;  Surgeon: Kellie Grover MD;  Location: [facility];  Service: Cardiology;  Laterality: N/A;       family history includes Malignant hyperthermia in her son; No Known Problems in her daughter and daughter. She was adopted.    Social History     Tobacco Use    Smoking status: Former     Current packs/day: 0.00     Types: Cigarettes     Start date: 1/1/1979     Quit date: 12/7/2020     Years since quitting: 3.4    Smokeless tobacco: Former     Quit date: 12/28/2022   Substance Use Topics    Alcohol use: Yes     Alcohol/week: 1.0 standard drink of alcohol     Types: 1 Glasses of wine per week     Comment: social-rarely    Drug use: No       Review of Systems   Constitutional:  Negative for chills and fever.   HENT:  Negative for sore throat.    Respiratory:  Negative for cough and shortness of breath.    Cardiovascular:  Negative for chest pain and palpitations.   Gastrointestinal:  Negative for constipation, diarrhea, nausea and vomiting.   Genitourinary:  Negative for dysuria and hematuria.   Musculoskeletal:  Negative for falls.   Neurological:  Negative for headaches.        Outpatient Encounter Medications as of 5/30/2024   Medication Sig Dispense Refill    amoxicillin (AMOXIL) 500 MG capsule Take one capsule by mouth every 8 hours until finished 28 capsule 0    anastrozole (ARIMIDEX) 1 mg Tab Take 1 tablet (1 mg total)  by mouth once daily. 90 tablet 3    apixaban (ELIQUIS) 5 mg Tab Take 1 tablet (5 mg total) by mouth 2 (two) times daily. 180 tablet 3    atorvastatin (LIPITOR) 20 MG tablet Take 1 tablet (20 mg total) by mouth every evening. 90 tablet 3    B-complex with vitamin C (VITAMIN B COMPLEX-C ORAL) Take by mouth Daily.      calcium-vitamin D 250-100 mg-unit per tablet Take 1 tablet by mouth 2 (two) times daily.      cyanocobalamin 500 MCG tablet Take 500 mcg by mouth once daily.      diazePAM (VALIUM) 5 MG tablet TAKE 1 TABLET BY MOUTH DAILY AS NEEDED FOR ANXIETY. 30 tablet 5    diclofenac sodium (VOLTAREN) 1 % Gel Apply 2 g topically 4 (four) times daily. 100 g 1    fluocinonide-emollient (FLUOCINONIDE-E) 0.05 % Crea AAA of feet daily prn psoriasis. 60 g 3    fluticasone-umeclidin-vilanter (TRELEGY ELLIPTA) 100-62.5-25 mcg DsDv Inhale 1 puff into the lungs once daily. 180 each 3    metoprolol tartrate (LOPRESSOR) 25 MG tablet Take 1 tablet (25 mg total) by mouth 2 (two) times daily. 180 tablet 3    multivitamin (THERAGRAN) per tablet Take 1 tablet by mouth once daily.      multivitamin with minerals (HAIR,SKIN AND NAILS ORAL) Take by mouth.      mv-mn/iron/folic acid/herb 190 (VITAMIN D3 COMPLETE ORAL) Take by mouth.      omeprazole (PRILOSEC) 40 MG capsule Take 1 capsule (40 mg total) by mouth every morning. 90 capsule 1    oxyCODONE-acetaminophen (PERCOCET)  mg per tablet Take 1 tablet by mouth every 4 (four) hours as needed for Pain. 25 tablet 0    TALTZ AUTOINJECTOR 80 mg/mL AtIn Inject 80 mg into the skin every 28 days. 1 mL 6    tiZANidine (ZANAFLEX) 4 MG tablet Take 1 tablet (4 mg total) by mouth 3 (three) times daily as needed (muscular pain). 90 tablet 2    triamcinolone acetonide 0.025% (KENALOG) 0.025 % cream AAA of skin folds bid prn psoriasis. 80 g 3    triamcinolone acetonide 0.1% (KENALOG) 0.1 % cream Apply to affected areas of body BID prn psoriasis. Do not use on face or skin folds. 454 g 1     "venlafaxine (EFFEXOR-XR) 150 MG Cp24 Take 1 capsule (150 mg total) by mouth once daily. 90 capsule 3    zolpidem (AMBIEN) 10 mg Tab Take 1 tablet (10 mg total) by mouth nightly as needed (insomnia). 30 tablet 5    [DISCONTINUED] gabapentin (NEURONTIN) 300 MG capsule Take 1 capsule (300 mg total) by mouth 3 (three) times daily. 1 per day for week 1, 2 per day for week 2, 3 per day for week 3 90 capsule 2    [DISCONTINUED] amoxicillin-clavulanate 875-125mg (AUGMENTIN) 875-125 mg per tablet Take 1 tablet by mouth every 12 (twelve) hours. 20 tablet 0    [DISCONTINUED] budesonide-formoterol 160-4.5 mcg (SYMBICORT) 160-4.5 mcg/actuation HFAA Inhale 2 puffs into the lungs every 12 (twelve) hours. Controller 30.6 g 2     Facility-Administered Encounter Medications as of 5/30/2024   Medication Dose Route Frequency Provider Last Rate Last Admin    [DISCONTINUED] acetaminophen tablet 650 mg  650 mg Oral Q4H PRN Ming Milian MD        [DISCONTINUED] HYDROcodone-acetaminophen  mg per tablet 1 tablet  1 tablet Oral Q4H PRN Ming Milian MD        [DISCONTINUED] HYDROcodone-acetaminophen 5-325 mg per tablet 1 tablet  1 tablet Oral Q4H PRN Ming Milian MD        [DISCONTINUED] ondansetron disintegrating tablet 8 mg  8 mg Oral Q8H PRN Ming Milian MD        [DISCONTINUED] sodium chloride 0.9% flush 10 mL  10 mL Intravenous PRN Ming Milian MD            Review of patient's allergies indicates:   Allergen Reactions    Succinimides Anaphylaxis     Son has MH    Vancomycin analogues Hives, Itching and Rash       Physical Exam      Vital Signs  Pulse: 66  SpO2: 98 %  BP: 128/76  Pain Score: 0-No pain  Height and Weight  Height: 5' 2" (157.5 cm)  Weight: 80.1 kg (176 lb 9.4 oz)  BSA (Calculated - sq m): 1.87 sq meters  BMI (Calculated): 32.3  Weight in (lb) to have BMI = 25: 136.4]    Physical Exam  Constitutional:       Appearance: She is well-developed.   HENT:      Head: Normocephalic and atraumatic. " "     Right Ear: External ear normal.      Left Ear: External ear normal.   Eyes:      General:         Right eye: No discharge.         Left eye: No discharge.   Cardiovascular:      Rate and Rhythm: Normal rate and regular rhythm.      Heart sounds: Normal heart sounds. No murmur heard.  Pulmonary:      Effort: Pulmonary effort is normal. No respiratory distress.      Breath sounds: Normal breath sounds.   Abdominal:      General: There is no distension.      Palpations: Abdomen is soft.      Tenderness: There is no abdominal tenderness. There is no guarding.   Musculoskeletal:         General: Normal range of motion.      Cervical back: Normal range of motion.   Skin:     General: Skin is warm and dry.   Neurological:      Mental Status: She is alert and oriented to person, place, and time.   Psychiatric:         Behavior: Behavior normal.          Laboratory:  CBC:  Recent Labs   Lab Result Units 04/03/24  1744   WBC K/uL 7.11   RBC M/uL 4.63   Hemoglobin g/dL 13.2   Hematocrit % 42.2   Platelets K/uL 293   MCV fL 91   MCH pg 28.5   MCHC g/dL 31.3*     CMP:  Recent Labs   Lab Result Units 04/03/24  1744   Glucose mg/dL 95   Calcium mg/dL 9.2   Sodium mmol/L 143   Potassium mmol/L 4.3   CO2 mmol/L 28   Chloride mmol/L 106   BUN mg/dL 14     URINALYSIS:  No results for input(s): "COLORU", "CLARITYU", "SPECGRAV", "PHUR", "PROTEINUA", "GLUCOSEU", "BILIRUBINCON", "BLOODU", "WBCU", "RBCU", "BACTERIA", "MUCUS", "NITRITE", "LEUKOCYTESUR", "UROBILINOGEN", "HYALINECASTS" in the last 2160 hours.     LIPIDS:  No results for input(s): "TSH", "HDL", "CHOL", "TRIG", "LDLCALC", "CHOLHDL", "NONHDLCHOL", "TOTALCHOLEST" in the last 2160 hours.    TSH:  No results for input(s): "TSH" in the last 2160 hours.    A1C:  No results for input(s): "HGBA1C" in the last 2160 hours.      Radiology:  No results found in the last 30 days.     Assessment/Plan     Lucia Matias is a 63 y.o.female with:    1. Depression with " anxiety    2. Mixed simple and mucopurulent chronic bronchitis    3. Mixed hyperlipidemia    4. Essential hypertension    5. Psoriatic arthritis    6. Long-term use of immunosuppressant medication    7. Primary insomnia    8. Malignant neoplasm of central portion of left breast in female, estrogen receptor positive    9. Rheumatoid arthritis, involving unspecified site, unspecified whether rheumatoid factor present    10. Typical atrial flutter      -cscope audio visit scheduled 7/9/24  -counseled on RSV vaccine  -Continue current medications and maintain follow up with specialists.    -Follow up in about 6 months (around 11/30/2024) for follow up of medical problems.       Hetal Banks MD  [facility]

## 2024-06-19 ENCOUNTER — PATIENT MESSAGE (OUTPATIENT)
Dept: ADMINISTRATIVE | Facility: OTHER | Age: 64
End: 2024-06-19
Payer: COMMERCIAL

## 2024-06-20 ENCOUNTER — TELEPHONE (OUTPATIENT)
Dept: HEMATOLOGY/ONCOLOGY | Facility: CLINIC | Age: 64
End: 2024-06-20
Payer: COMMERCIAL

## 2024-06-20 NOTE — TELEPHONE ENCOUNTER
----- Message from Hailey Parson sent at 6/20/2024 12:31 PM CDT -----  Regarding: Reschedule Existing Appointment  Contact: Lucia Matias  Reschedule Existing Appointment    Current appt date: 6/20/2024    Type of appt : EP f/u    Physician: [facility]    Reason for rescheduling: Patient unable to make appt today and would like to reschedule to early morning or afternoon. Requesting a call back.     Caller: Lucia Matias     Contact Preference: [phone] (home)

## 2024-06-21 ENCOUNTER — OFFICE VISIT (OUTPATIENT)
Dept: HEMATOLOGY/ONCOLOGY | Facility: CLINIC | Age: 64
End: 2024-06-21
Payer: COMMERCIAL

## 2024-06-21 VITALS
DIASTOLIC BLOOD PRESSURE: 61 MMHG | WEIGHT: 177.38 LBS | BODY MASS INDEX: 31.43 KG/M2 | HEIGHT: 63 IN | OXYGEN SATURATION: 98 % | HEART RATE: 68 BPM | SYSTOLIC BLOOD PRESSURE: 137 MMHG | RESPIRATION RATE: 20 BRPM

## 2024-06-21 DIAGNOSIS — R53.83 FATIGUE, UNSPECIFIED TYPE: ICD-10-CM

## 2024-06-21 DIAGNOSIS — Z79.811 AROMATASE INHIBITOR USE: ICD-10-CM

## 2024-06-21 DIAGNOSIS — C50.112 MALIGNANT NEOPLASM OF CENTRAL PORTION OF LEFT BREAST IN FEMALE, ESTROGEN RECEPTOR POSITIVE: Primary | ICD-10-CM

## 2024-06-21 DIAGNOSIS — Z17.0 MALIGNANT NEOPLASM OF CENTRAL PORTION OF LEFT BREAST IN FEMALE, ESTROGEN RECEPTOR POSITIVE: Primary | ICD-10-CM

## 2024-06-21 DIAGNOSIS — R23.2 HOT FLASHES: ICD-10-CM

## 2024-06-21 PROCEDURE — 99999 PR PBB SHADOW E&M-EST. PATIENT-LVL IV: CPT | Mod: PBBFAC,,, | Performed by: STUDENT IN AN ORGANIZED HEALTH CARE EDUCATION/TRAINING PROGRAM

## 2024-06-21 NOTE — PROGRESS NOTES
Oncology Clinic   Progress Note    Patient: Lucia Matias  MRN: [id number]  Date: 2024    Chief Complaint: HR+ breast cancer    Ms. Matias is a domo 62yo woman with recently diagnosed HR+ breast cancer who presents today for evaluation. Her oncologic history is as follows:      -22: annual mammogram indentified left focal asymmetry at the upper outer position.   -Follow-up mammogram and ultrasound on 22 showed a worrisome spiculated mass, 1.4 x 0.7 x 0.6 cm, 12 o'clock left breast 7 CMFN. An ultrasound guided biopsy was performed on 12/15/22 with pathology revealing infiltrating ductal carcinoma of the breast. Grade 2, ER >95%, MT 85-90%, Her2 1+, Ki67 25-30%  -2023: MRI breast shwoed Left breast 12 mm x 11 mm x 7 mm mass at the middle 12 o'clock position. Several pulmonary nodules are noted incidentally in the left hemithorax.  The patient has a history of bilateral pulmonary nodule seen on previous thoracic CT exams most recently in .  One of the nodules underwent biopsy in  with benign results.    -Underwent b/l mastectomies with SLN bx 2/15/2023 with bilateral breast reconstruction with abdominally based free flaps. He postoperative course was complicated by L pneumothorax.  -Post op path showed Invasive lobular carcinoma in left breast grade 2 measuring 18 mm with LCIS Grade 2 ER MT positive and Her2 negative. pT1c pN0. Oncotype 35  -C1D1 adjuvanct TC on ; completed 4 cycles on   -Started anastrozole 2023    GYN History:  Age of menarche was 13. Age of menopause was 46.  Patient denies hormonal therapy but took OCP for approximately 15 years in the past. Patient is . Age of first live birth was 25. Patient did breast feed for 6 months. S/p uterine ablation for fibroids in early 40s, no menstrual cycle since. Had menopausal symptoms in mid-late 40s.       Interval History:  Ms. Matias returns today for follow up. Since her last visit she has been  doing fairly well. Has enjoyed being back at work. She previously had difficulty with left-sided breast infection requiring abscess drainage and debridement of necrotic tissue. She plans to see plastics again in August to discuss a final revision on that side. She continues to struggle with hot flashes; is on Effexor which was recently increased to 150 mg. Also notes some fatigue which remained stable since diagnosis.        Medications:  Current Outpatient Medications   Medication Sig Dispense Refill    amoxicillin (AMOXIL) 500 MG capsule Take one capsule by mouth every 8 hours until finished 28 capsule 0    anastrozole (ARIMIDEX) 1 mg Tab Take 1 tablet (1 mg total) by mouth once daily. 90 tablet 3    apixaban (ELIQUIS) 5 mg Tab Take 1 tablet (5 mg total) by mouth 2 (two) times daily. 180 tablet 3    atorvastatin (LIPITOR) 20 MG tablet Take 1 tablet (20 mg total) by mouth every evening. 90 tablet 3    B-complex with vitamin C (VITAMIN B COMPLEX-C ORAL) Take by mouth Daily.      calcium-vitamin D 250-100 mg-unit per tablet Take 1 tablet by mouth 2 (two) times daily.      cyanocobalamin 500 MCG tablet Take 500 mcg by mouth once daily.      diazePAM (VALIUM) 5 MG tablet TAKE 1 TABLET BY MOUTH DAILY AS NEEDED FOR ANXIETY. 30 tablet 5    diclofenac sodium (VOLTAREN) 1 % Gel Apply 2 g topically 4 (four) times daily. 100 g 1    fluocinonide-emollient (FLUOCINONIDE-E) 0.05 % Crea AAA of feet daily prn psoriasis. 60 g 3    fluticasone-umeclidin-vilanter (TRELEGY ELLIPTA) 100-62.5-25 mcg DsDv Inhale 1 puff into the lungs once daily. 180 each 3    metoprolol tartrate (LOPRESSOR) 25 MG tablet Take 1 tablet (25 mg total) by mouth 2 (two) times daily. 180 tablet 3    multivitamin (THERAGRAN) per tablet Take 1 tablet by mouth once daily.      multivitamin with minerals (HAIR,SKIN AND NAILS ORAL) Take by mouth.      mv-mn/iron/folic acid/herb 190 (VITAMIN D3 COMPLETE ORAL) Take by mouth.      omeprazole (PRILOSEC) 40 MG capsule  "Take 1 capsule (40 mg total) by mouth every morning. 90 capsule 1    oxyCODONE-acetaminophen (PERCOCET)  mg per tablet Take 1 tablet by mouth every 4 (four) hours as needed for Pain. 25 tablet 0    TALTZ AUTOINJECTOR 80 mg/mL AtIn Inject 80 mg into the skin every 28 days. 1 mL 6    tiZANidine (ZANAFLEX) 4 MG tablet Take 1 tablet (4 mg total) by mouth 3 (three) times daily as needed (muscular pain). 90 tablet 2    triamcinolone acetonide 0.025% (KENALOG) 0.025 % cream AAA of skin folds bid prn psoriasis. 80 g 3    triamcinolone acetonide 0.1% (KENALOG) 0.1 % cream Apply to affected areas of body BID prn psoriasis. Do not use on face or skin folds. 454 g 1    venlafaxine (EFFEXOR-XR) 150 MG Cp24 Take 1 capsule (150 mg total) by mouth once daily. 90 capsule 3    zolpidem (AMBIEN) 10 mg Tab Take 1 tablet (10 mg total) by mouth nightly as needed (insomnia). 30 tablet 5     No current facility-administered medications for this visit.     Review of Systems:  +hot flashes  +fatigue  12pt ROS negative except as noted above    Objective:     Vitals:    06/21/24 0954   BP: 137/61   Pulse: 68   Resp: 20   SpO2: 98%   Weight: 80.4 kg (177 lb 5.8 oz)   Height: 5' 3" (1.6 m)       BMI: Body mass index is 31.42 kg/m².     Physical Exam:  ECOG 0   General: well appearing, in no apparent distress  HEENT: Normocephalic, EOMI, anicteric sclerae, MMM  Neck: supple, without cervical or supraclavicular lymphadenopathy.  Heart: regular rate and rhythm, normal S1 and S2, no murmurs, gallops or rubs.  Lungs: Clear to auscultation bilaterally, no increased wob  Breast: s/p bilateral mastectomy with flap reconstruction, incisions well-healed, more prominent scar tissue noted over inferior L breast  Abdomen: Soft, nontender, nondistended with normal bowel sounds.   Extremities: No LE edema or joint effusion.   Skin: warm, well-perfused, no rash.   Neurologic: Alert and oriented x 4, normal speech and gait   Psychiatric: Conversing " appropriately with providers throughout today's encounter.    Laboratory Data:  I personally reviewed all recent labs, imaging and pathology.    Assessment and Plan:   Ms. Matias is a domo 62yo woman with hx of Aflutter s/p ablation, COPD, RA and recently diagnosed Stage IA HR+ breast cancer s/p bilateral mastectomy with reconstruction who presents today for evaluation.     Given her Oncotype of 35, we proceeded with adjuvant docetaxel 75mg/m2 and cyclophosphamide 600mg/m2 iv every 21 days for a total of 4 cycles. Cycle 1 was complicated by neutropenic fever in the setting of E coli bacteremia and parainfluenza. She completed the remainder of her 4 cycles of TC without difficulty.    She has since initiated ET with anastrozole and continues to tolerate well thus far.       #Breast cancer:  --cont anastrozole 1mg daily (SOT 7/2023--); goal treatment of 5 years. Given high oncotype and consider BCI near 5 year saige  --cont Ca/VitD supplementation as above  --no role for screening MMG s/p bilateral mastectomy  --RTC 4 mo    #Osteopenia: noted on DEXA 7/25/23  --encouraged Ca/VitD supplementation as above    --will repeat DEXA 7/2025    #Hot flashes: stable   --cont magnesium glycinate  --cont effexor 150mg daily  --discussed veoazah; will hold off on ordering for now but she will reach out if symptoms do not improve and she would like to pursue   --following w integrative onc    #Psoriasis/psoriatic arthritis:  --has now resumed cosentyx with improvement in symptoms  --cont to follow with derm      #Aflutter s/p RFA: following with cardiology  --remains on eliquis  --follow up with cards as scheduled      All questions were answered to her apparent satisfaction. Will plan to see her back in 4mo or sooner should the need arise.     Roya Madrid MD      Med Onc Chart Routing      Follow up with physician 4 months. prefers late afternoon appt   Follow up with LINDY    Infusion scheduling note    Injection scheduling  note    Labs    Imaging None      Pharmacy appointment    Other referrals                       MDM includes  :    - Acute or chronic illness or injury that poses a threat to life or bodily function  - Review of prior external notes from unique source  - Independent review and explanation of 3+ results from unique tests  - Discussion of management and ordering 3+ unique tests  - Extensive discussion of treatment and management  - Prescription drug management  - Drug therapy requiring intensive monitoring for toxicity

## 2024-06-24 ENCOUNTER — E-VISIT (OUTPATIENT)
Dept: PRIMARY CARE CLINIC | Facility: CLINIC | Age: 64
End: 2024-06-24
Payer: COMMERCIAL

## 2024-06-24 ENCOUNTER — PATIENT MESSAGE (OUTPATIENT)
Dept: PRIMARY CARE CLINIC | Facility: CLINIC | Age: 64
End: 2024-06-24
Payer: COMMERCIAL

## 2024-06-24 DIAGNOSIS — R30.0 DYSURIA: Primary | ICD-10-CM

## 2024-06-25 ENCOUNTER — LAB VISIT (OUTPATIENT)
Dept: LAB | Facility: HOSPITAL | Age: 64
End: 2024-06-25
Attending: DERMATOLOGY
Payer: COMMERCIAL

## 2024-06-25 DIAGNOSIS — L40.0 PSORIASIS VULGARIS: ICD-10-CM

## 2024-06-25 DIAGNOSIS — L40.50 PSORIATIC ARTHRITIS: ICD-10-CM

## 2024-06-25 DIAGNOSIS — Z79.899 LONG-TERM USE OF HIGH-RISK MEDICATION: ICD-10-CM

## 2024-06-25 PROCEDURE — 86480 TB TEST CELL IMMUN MEASURE: CPT | Performed by: DERMATOLOGY

## 2024-06-25 RX ORDER — NITROFURANTOIN 25; 75 MG/1; MG/1
100 CAPSULE ORAL 2 TIMES DAILY
Qty: 10 CAPSULE | Refills: 0 | Status: SHIPPED | OUTPATIENT
Start: 2024-06-25

## 2024-06-25 NOTE — PROGRESS NOTES
Patient ID: Lucia Matias is a 63 y.o. female.    Chief Complaint: Urinary Tract Infection (Entered automatically based on patient selection in Patient Portal.)    The patient initiated a request through pinion-pins on 6/24/2024 for evaluation and management with a chief complaint of Urinary Tract Infection (Entered automatically based on patient selection in Patient Portal.)     I evaluated the questionnaire submission on 6/25/2024.    [facility] Evisit Uti Questionnaire    6/24/2024  3:36 PM CDT - Filed by Patient   Do you agree to participate in an E-Visit? Yes   If you have any of the following symptoms, please present to your local emergency room or call 911:  I acknowledge   What is the main issue you would like addressed today? Urinary tract infection   What symptoms do you currently have? Pain while passing urine   When did your symptoms first appear? 6/23/2024   List what you have done or taken to help your symptoms. Pyridum   Please indicate whether you have had the following symptoms during the past 24 hours     Urgent urination (a sudden and uncontrollable urge to urinate) Severe   Frequent urination of small amounts of urine (going to the toilet very often) Severe   Burning pain when urinating Severe   Incomplete bladder emptying (still feel like you need to urinate again after urination) Moderate   Pain not associated with urination in the lower abdomen below the belly button) None   What does your urine look like? Cloudy   Blood seen in the urine None   Flank pain (pain in one or both sides of the lower back) None   Abnormal Vaginal Discharge (abnormal amount, color and/or odor) None   Discharge from the urethra (urinary opening) without urination None   High body temperature/fever? None-<99.5   Please rate how much discomfort you have experience because of the symptoms in the past 24 hours: Severe   Please indicate how the symptoms have interfered with your every day activities/work in the past  24 hours: Severe   Please indicate how these symptoms have interfered with your social activities (visiting people, meeting with friends, etc.) in the past 24 hours? Moderate   Are you a diabetic? No   Please indicate whether you have the following at the time of completion of this questionnaire: None of the above   Provide any additional information you feel is important.    Please attach any relevant images or files (if you have performed a home test for UTI, please submit a photo of results)    Are you able to take your vital signs? No         Encounter Diagnosis   Name Primary?    Dysuria Yes        Orders Placed This Encounter   Procedures    Urine culture     Standing Status:   Future     Standing Expiration Date:   9/23/2025    Urinalysis     Standing Status:   Future     Standing Expiration Date:   8/24/2025     Order Specific Question:   Collection Type     Answer:   Urine, Clean Catch      Medications Ordered This Encounter   Medications    nitrofurantoin, macrocrystal-monohydrate, (MACROBID) 100 MG capsule     Sig: Take 1 capsule (100 mg total) by mouth 2 (two) times daily.     Dispense:  10 capsule     Refill:  0        No follow-ups on file.      E-Visit Time Tracking:    Day 1 Time (in minutes): 5    Total Time (in minutes): 5

## 2024-06-27 LAB
GAMMA INTERFERON BACKGROUND BLD IA-ACNC: 0 IU/ML
M TB IFN-G CD4+ BCKGRND COR BLD-ACNC: 0 IU/ML
M TB IFN-G CD4+ BCKGRND COR BLD-ACNC: 0.04 IU/ML
MITOGEN IGNF BCKGRD COR BLD-ACNC: 1.5 IU/ML
TB GOLD PLUS: NEGATIVE

## 2024-07-09 ENCOUNTER — CLINICAL SUPPORT (OUTPATIENT)
Dept: ENDOSCOPY | Facility: HOSPITAL | Age: 64
End: 2024-07-09
Attending: INTERNAL MEDICINE
Payer: COMMERCIAL

## 2024-07-09 DIAGNOSIS — Z12.12 ENCOUNTER FOR COLORECTAL CANCER SCREENING: ICD-10-CM

## 2024-07-09 DIAGNOSIS — Z12.11 ENCOUNTER FOR COLORECTAL CANCER SCREENING: ICD-10-CM

## 2024-07-09 DIAGNOSIS — Z86.010 HISTORY OF COLON POLYPS: ICD-10-CM

## 2024-07-09 NOTE — PLAN OF CARE
Pt. To call our Dept. This Friday 7/12 to schedule for 2nd floor openings. HOLD on 10/17/ Scheduling phone number provided and MY O message sent

## 2024-07-11 DIAGNOSIS — L40.50 PSORIATIC ARTHRITIS: ICD-10-CM

## 2024-07-11 DIAGNOSIS — Z79.899 LONG-TERM USE OF HIGH-RISK MEDICATION: ICD-10-CM

## 2024-07-11 DIAGNOSIS — L40.0 PSORIASIS VULGARIS: ICD-10-CM

## 2024-07-11 RX ORDER — IXEKIZUMAB 80 MG/ML
80 INJECTION, SOLUTION SUBCUTANEOUS
Qty: 1 ML | Refills: 6 | OUTPATIENT
Start: 2024-07-11

## 2024-07-17 ENCOUNTER — PATIENT MESSAGE (OUTPATIENT)
Dept: ADMINISTRATIVE | Facility: HOSPITAL | Age: 64
End: 2024-07-17
Payer: COMMERCIAL

## 2024-07-18 DIAGNOSIS — L40.50 PSORIATIC ARTHRITIS: ICD-10-CM

## 2024-07-18 DIAGNOSIS — Z79.899 LONG-TERM USE OF HIGH-RISK MEDICATION: ICD-10-CM

## 2024-07-18 DIAGNOSIS — L40.0 PSORIASIS VULGARIS: ICD-10-CM

## 2024-07-18 RX ORDER — IXEKIZUMAB 80 MG/ML
80 INJECTION, SOLUTION SUBCUTANEOUS
Qty: 1 ML | Refills: 6 | OUTPATIENT
Start: 2024-07-18

## 2024-07-24 ENCOUNTER — PATIENT MESSAGE (OUTPATIENT)
Dept: DERMATOLOGY | Facility: CLINIC | Age: 64
End: 2024-07-24
Payer: COMMERCIAL

## 2024-07-24 DIAGNOSIS — L40.0 PSORIASIS VULGARIS: ICD-10-CM

## 2024-07-24 DIAGNOSIS — Z79.899 LONG-TERM USE OF HIGH-RISK MEDICATION: ICD-10-CM

## 2024-07-24 DIAGNOSIS — L40.50 PSORIATIC ARTHRITIS: ICD-10-CM

## 2024-07-24 RX ORDER — IXEKIZUMAB 80 MG/ML
80 INJECTION, SOLUTION SUBCUTANEOUS
Qty: 1 ML | Refills: 6 | OUTPATIENT
Start: 2024-07-24

## 2024-07-25 RX ORDER — METOPROLOL TARTRATE 25 MG/1
25 TABLET, FILM COATED ORAL 2 TIMES DAILY
Qty: 180 TABLET | Refills: 3 | Status: SHIPPED | OUTPATIENT
Start: 2024-07-25

## 2024-08-01 ENCOUNTER — TELEPHONE (OUTPATIENT)
Dept: PRIMARY CARE CLINIC | Facility: CLINIC | Age: 64
End: 2024-08-01
Payer: COMMERCIAL

## 2024-08-01 NOTE — TELEPHONE ENCOUNTER
----- Message from Jacquelyn Dinero sent at 8/1/2024  3:34 PM CDT -----  Regarding: Digital Medicine Program  Dr. Hetal Banks     Unfortunately, Ms. uLcia Matias has failed to meet basic participation requirements for the [facility] Digital [facility] Program and will be discharged from the program. We have tried to contact her on multiple occasions without success. Unfortunately, she is not a good candidate for digital medicine monitoring.     Thank you for your support of Digital Medicine! Please contact me if you have any questions or concerns.    Sincerely,  The Digital Medicine Team

## 2024-08-01 NOTE — TELEPHONE ENCOUNTER
Provider is out on maternity leave, levels have been stable since end of May. Will save message in chart

## 2024-08-02 ENCOUNTER — OFFICE VISIT (OUTPATIENT)
Dept: DERMATOLOGY | Facility: CLINIC | Age: 64
End: 2024-08-02
Payer: COMMERCIAL

## 2024-08-02 DIAGNOSIS — Z79.899 LONG-TERM USE OF HIGH-RISK MEDICATION: ICD-10-CM

## 2024-08-02 DIAGNOSIS — L40.0 PSORIASIS VULGARIS: ICD-10-CM

## 2024-08-02 DIAGNOSIS — L40.50 PSORIATIC ARTHRITIS: ICD-10-CM

## 2024-08-02 RX ORDER — IXEKIZUMAB 80 MG/ML
80 INJECTION, SOLUTION SUBCUTANEOUS
Qty: 1 ML | Refills: 6 | Status: SHIPPED | OUTPATIENT
Start: 2024-08-02

## 2024-08-02 RX ORDER — FLUOCINONIDE CREAM (EMULSIFIED BASE) 0.5 MG/G
CREAM TOPICAL
Qty: 60 G | Refills: 3 | Status: SHIPPED | OUTPATIENT
Start: 2024-08-02

## 2024-08-02 RX ORDER — TRIAMCINOLONE ACETONIDE 1 MG/G
CREAM TOPICAL
Qty: 454 G | Refills: 1 | Status: SHIPPED | OUTPATIENT
Start: 2024-08-02

## 2024-08-02 RX ORDER — TRIAMCINOLONE ACETONIDE 0.25 MG/G
CREAM TOPICAL
Qty: 80 G | Refills: 3 | Status: SHIPPED | OUTPATIENT
Start: 2024-08-02

## 2024-08-02 NOTE — PROGRESS NOTES
The patient location is: home  The chief complaint leading to consultation is: psoriasis  Visit type: virtual visit with synchronous audio and video  Total time spent with patient: 13 minutes  Each patient to whom I provide medical services by telemedicine is:  (1) informed of the relationship between the physician and patient and the respective role of any other health care provider with respect to management of the patient; and (2) notified that he or she may decline to receive medical services by telemedicine and may withdraw from such care at any time.      Patient Information  Name: Lucia Matias  : 1960  MRN: [id number]     Referring Physician:  No ref. provider found   Primary Care Physician:  Hetal Banks MD   Date of Visit: 2024      Subjective:     History of Present lllness:    Lucia Matias is a 64 y.o. female who presents with a chief complaint of psoriasis.  Location: hands  Signs/Symptoms: skin is clear, but not helping as much with the joint pain (she had almost no joint pain while on Cosentyx)  Symptom course: improving  Current treatment: Taltz    Patient was last seen: 2023.  Prior notes by myself reviewed.   Clinical documentation obtained by nursing staff reviewed.    Review of Systems   Constitutional:  Negative for weight loss and weight gain.   HENT:  Negative for mouth sores (no tongue whiteness).    Respiratory:  Negative for shortness of breath.    Gastrointestinal:  Negative for nausea, vomiting, abdominal pain and diarrhea.   Musculoskeletal:  Positive for arthralgias (left hand is stiff in a.m., takes 5 min to loosen up).   Skin:  Negative for itching and rash.   Psychiatric/Behavioral:  Negative for depressed mood.        Objective:   Physical Exam     Diagram Legend     Erythematous scaling macule/papule c/w actinic keratosis       Vascular papule c/w angioma      Pigmented verrucoid papule/plaque c/w seborrheic keratosis      Yellow  umbilicated papule c/w sebaceous hyperplasia      Irregularly shaped tan macule c/w lentigo     1-2 mm smooth white papules consistent with Milia      Movable subcutaneous cyst with punctum c/w epidermal inclusion cyst      Subcutaneous movable cyst c/w pilar cyst      Firm pink to brown papule c/w dermatofibroma      Pedunculated fleshy papule(s) c/w skin tag(s)      Evenly pigmented macule c/w junctional nevus     Mildly variegated pigmented, slightly irregular-bordered macule c/w mildly atypical nevus      Flesh colored to evenly pigmented papule c/w intradermal nevus       Pink pearly papule/plaque c/w basal cell carcinoma      Erythematous hyperkeratotic cursted plaque c/w SCC      Surgical scar with no sign of skin cancer recurrence      Open and closed comedones      Inflammatory papules and pustules      Verrucoid papule consistent consistent with wart     Erythematous eczematous patches and plaques     Dystrophic onycholytic nail with subungual debris c/w onychomycosis     Umbilicated papule    Erythematous-base heme-crusted tan verrucoid plaque consistent with inflamed seborrheic keratosis     Erythematous Silvery Scaling Plaque c/w Psoriasis     See annotation          [x] Data reviewed  [] Prior external notes reviewed  [] Independent review of test  [] Management discussed with another provider  [] Independent historian    Assessment / Plan:        Psoriasis vulgaris   - stable and chronic  Psoriatic arthritis   - stable and chronic  Long-term use of high-risk medication  -     fluocinonide-emollient (FLUOCINONIDE-E) 0.05 % Crea; AAA of feet daily prn psoriasis.  Dispense: 60 g; Refill: 3  -     triamcinolone acetonide 0.1% (KENALOG) 0.1 % cream; Apply to affected areas of body BID prn psoriasis. Do not use on face or skin folds.  Dispense: 454 g; Refill: 1  -     triamcinolone acetonide 0.025% (KENALOG) 0.025 % cream; AAA of skin folds bid prn psoriasis.  Dispense: 80 g; Refill: 3  -     TALTZ  AUTOINJECTOR 80 mg/mL AtIn; Inject 80 mg into the skin every 28 days.  Dispense: 1 mL; Refill: 6      Follow up in about 1 year (around 8/2/2025) for follow up, or sooner if symptoms worsening or not improving.      Minerva Lara MD, FAAD  [facility]

## 2024-08-08 ENCOUNTER — OFFICE VISIT (OUTPATIENT)
Dept: PLASTIC SURGERY | Facility: CLINIC | Age: 64
End: 2024-08-08
Attending: PLASTIC SURGERY
Payer: COMMERCIAL

## 2024-08-08 VITALS — HEART RATE: 72 BPM | DIASTOLIC BLOOD PRESSURE: 64 MMHG | SYSTOLIC BLOOD PRESSURE: 143 MMHG

## 2024-08-08 DIAGNOSIS — Z85.3 HISTORY OF BREAST CANCER: Primary | ICD-10-CM

## 2024-08-23 ENCOUNTER — HOSPITAL ENCOUNTER (OUTPATIENT)
Dept: RADIOLOGY | Facility: OTHER | Age: 64
Discharge: HOME | End: 2024-08-23
Attending: PLASTIC SURGERY
Payer: COMMERCIAL

## 2024-08-23 DIAGNOSIS — Z85.3 HISTORY OF BREAST CANCER: ICD-10-CM

## 2024-08-23 PROCEDURE — 73706 CT ANGIO LWR EXTR W/O&W/DYE: CPT | Mod: 26,50,, | Performed by: RADIOLOGY

## 2024-08-23 PROCEDURE — 25500020 PHARM REV CODE 255: Performed by: PLASTIC SURGERY

## 2024-08-23 PROCEDURE — 73706 CT ANGIO LWR EXTR W/O&W/DYE: CPT | Mod: TC,50

## 2024-08-23 RX ADMIN — IOHEXOL 100 ML: 350 INJECTION, SOLUTION INTRAVENOUS at 11:08

## 2024-08-29 DIAGNOSIS — Z98.84 S/P LAPAROSCOPIC SLEEVE GASTRECTOMY: ICD-10-CM

## 2024-08-29 DIAGNOSIS — R63.4 WEIGHT LOSS: ICD-10-CM

## 2024-08-30 RX ORDER — OMEPRAZOLE 40 MG/1
40 CAPSULE, DELAYED RELEASE ORAL EVERY MORNING
Qty: 90 CAPSULE | Refills: 1 | Status: SHIPPED | OUTPATIENT
Start: 2024-08-30

## 2024-08-30 NOTE — ASSESSMENT & PLAN NOTE
Problem: Chronic Conditions and Co-morbidities  Goal: Patient's chronic conditions and co-morbidity symptoms are monitored and maintained or improved  8/29/2024 2146 by Karla Macias RN  Outcome: Progressing  8/29/2024 1655 by Evelio Mcconnell RN  Outcome: Progressing  Flowsheets (Taken 8/29/2024 1601)  Care Plan - Patient's Chronic Conditions and Co-Morbidity Symptoms are Monitored and Maintained or Improved: Monitor and assess patient's chronic conditions and comorbid symptoms for stability, deterioration, or improvement     Problem: Pain  Goal: Verbalizes/displays adequate comfort level or baseline comfort level  8/29/2024 2146 by Karla Macias RN  Outcome: Progressing  8/29/2024 1655 by Evelio Mcconnell RN  Outcome: Progressing     Problem: Safety - Adult  Goal: Free from fall injury  8/29/2024 2146 by Karla Macias RN  Outcome: Progressing  8/29/2024 1655 by Evelio Mcconnell RN  Outcome: Progressing     Problem: Discharge Planning  Goal: Discharge to home or other facility with appropriate resources  8/29/2024 2146 by Karla Macias RN  Outcome: Progressing  8/29/2024 1655 by Evelio Mcconnell RN  Outcome: Progressing  Flowsheets (Taken 8/29/2024 1601)  Discharge to home or other facility with appropriate resources: Identify barriers to discharge with patient and caregiver     Problem: ABCDS Injury Assessment  Goal: Absence of physical injury  8/29/2024 2146 by Karla Macias RN  Outcome: Progressing  8/29/2024 1655 by Evelio Mcconnell RN  Outcome: Progressing      - Ambien 5 mg qhs prn

## 2024-09-20 ENCOUNTER — OFFICE VISIT (OUTPATIENT)
Dept: CARDIOLOGY | Facility: CLINIC | Age: 64
End: 2024-09-20
Payer: COMMERCIAL

## 2024-09-20 VITALS
OXYGEN SATURATION: 98 % | SYSTOLIC BLOOD PRESSURE: 140 MMHG | HEART RATE: 85 BPM | BODY MASS INDEX: 30.96 KG/M2 | WEIGHT: 174.81 LBS | DIASTOLIC BLOOD PRESSURE: 80 MMHG

## 2024-09-20 DIAGNOSIS — I10 ESSENTIAL HYPERTENSION: Chronic | ICD-10-CM

## 2024-09-20 DIAGNOSIS — I48.92 ATRIAL FLUTTER, UNSPECIFIED TYPE: ICD-10-CM

## 2024-09-20 DIAGNOSIS — E78.5 HYPERLIPIDEMIA, UNSPECIFIED HYPERLIPIDEMIA TYPE: Primary | Chronic | ICD-10-CM

## 2024-09-20 LAB
OHS QRS DURATION: 78 MS
OHS QTC CALCULATION: 381 MS

## 2024-09-20 PROCEDURE — 93005 ELECTROCARDIOGRAM TRACING: CPT

## 2024-09-20 PROCEDURE — 99999 PR PBB SHADOW E&M-EST. PATIENT-LVL IV: CPT | Mod: PBBFAC,,, | Performed by: INTERNAL MEDICINE

## 2024-09-20 RX ORDER — MAGNESIUM 200 MG
TABLET ORAL ONCE
COMMUNITY

## 2024-09-20 RX ORDER — METOPROLOL SUCCINATE 50 MG/1
50 TABLET, EXTENDED RELEASE ORAL NIGHTLY
Qty: 90 TABLET | Refills: 3 | Status: SHIPPED | OUTPATIENT
Start: 2024-09-20 | End: 2025-09-20
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
## 2024-09-25 DIAGNOSIS — Z17.0 MALIGNANT NEOPLASM OF CENTRAL PORTION OF LEFT BREAST IN FEMALE, ESTROGEN RECEPTOR POSITIVE: ICD-10-CM

## 2024-09-25 DIAGNOSIS — C50.112 MALIGNANT NEOPLASM OF CENTRAL PORTION OF LEFT BREAST IN FEMALE, ESTROGEN RECEPTOR POSITIVE: ICD-10-CM

## 2024-09-25 RX ORDER — ANASTROZOLE 1 MG/1
1 TABLET ORAL DAILY
Qty: 90 TABLET | Refills: 3 | Status: SHIPPED | OUTPATIENT
Start: 2024-09-25 | End: 2025-09-25

## 2024-09-27 ENCOUNTER — OFFICE VISIT (OUTPATIENT)
Dept: INTERNAL MEDICINE | Facility: CLINIC | Age: 64
End: 2024-09-27
Payer: COMMERCIAL

## 2024-09-27 ENCOUNTER — PATIENT MESSAGE (OUTPATIENT)
Dept: INTERNAL MEDICINE | Facility: CLINIC | Age: 64
End: 2024-09-27

## 2024-09-27 DIAGNOSIS — E66.9 OBESITY, UNSPECIFIED CLASSIFICATION, UNSPECIFIED OBESITY TYPE, UNSPECIFIED WHETHER SERIOUS COMORBIDITY PRESENT: Primary | ICD-10-CM

## 2024-09-27 RX ORDER — SEMAGLUTIDE 0.5 MG/.5ML
0.5 INJECTION, SOLUTION SUBCUTANEOUS
Qty: 2 ML | Refills: 0 | Status: ACTIVE | OUTPATIENT
Start: 2024-10-25

## 2024-09-27 RX ORDER — SEMAGLUTIDE 1 MG/.5ML
1 INJECTION, SOLUTION SUBCUTANEOUS
Qty: 2 ML | Refills: 0 | Status: ACTIVE | OUTPATIENT
Start: 2024-11-22

## 2024-09-27 RX ORDER — SEMAGLUTIDE 0.25 MG/.5ML
0.25 INJECTION, SOLUTION SUBCUTANEOUS
Qty: 2 ML | Refills: 0 | Status: ACTIVE | OUTPATIENT
Start: 2024-09-27

## 2024-09-27 NOTE — PROGRESS NOTES
Patient ID: Lucia Matias is a 64 y.o. White female    Subjective  Chief Complaint: patient presents for medical weight loss management.    Contraindications to GLP-1 receptor agonist therapy:   Denies personal or family history of MTC, personal history of MEN2, history of allergic reaction while taking a GLP-1 receptor agonist, and history of pancreatitis while taking a GLP-1 receptor agonist     Co-morbidities: HTN, DLD    History of weight loss therapy:  S/p sleeve gastrectomy - 2021. Is interested in starting Wegovy today after discussing GLP-1 RA options.    Weight loss history:  Starting weight:    7/3/2024   Recent Readings    Weight (lbs) 176.8 lb    BMI 32.33 BMI      % weight loss since GLP-1 initiation: 0 %    Objective  Lab Results   Component Value Date     04/03/2024     01/05/2024     10/27/2023     Lab Results   Component Value Date    K 4.3 04/03/2024    K 3.7 01/05/2024    K 4.4 10/27/2023     Lab Results   Component Value Date     04/03/2024     01/05/2024     10/27/2023     Lab Results   Component Value Date    CO2 28 04/03/2024    CO2 27 01/05/2024    CO2 24 10/27/2023     Lab Results   Component Value Date    BUN 14 04/03/2024    BUN 14 01/05/2024    BUN 16 10/27/2023     Lab Results   Component Value Date    GLU 95 04/03/2024    GLU 87 01/05/2024     (H) 10/27/2023     Lab Results   Component Value Date    CALCIUM 9.2 04/03/2024    CALCIUM 9.3 01/05/2024    CALCIUM 8.9 10/27/2023     Lab Results   Component Value Date    PROT 6.9 01/05/2024    PROT 7.5 10/24/2023    PROT 6.2 07/12/2023     Lab Results   Component Value Date    ALBUMIN 3.7 01/05/2024    ALBUMIN 3.7 10/24/2023    ALBUMIN 3.0 (L) 07/12/2023     Lab Results   Component Value Date    BILITOT 1.0 01/05/2024    BILITOT 0.5 10/24/2023    BILITOT 0.4 07/12/2023     Lab Results   Component Value Date    AST 22 01/05/2024    AST 19 10/24/2023    AST 21 07/12/2023     Lab Results    Component Value Date    ALT 22 01/05/2024    ALT 18 10/24/2023    ALT 16 07/12/2023     Lab Results   Component Value Date    ANIONGAP 9 04/03/2024    ANIONGAP 11 01/05/2024    ANIONGAP 7 (L) 10/27/2023     Lab Results   Component Value Date    CREATININE 1.0 08/23/2024    CREATININE 0.8 04/03/2024    CREATININE 0.9 01/05/2024     Lab Results   Component Value Date    EGFRNORACEVR >60 08/23/2024    EGFRNORACEVR >60 04/03/2024    EGFRNORACEVR >60 01/05/2024     Assessment/Plan  -Pt qualifies for GLP-1 RA therapy based on BMI greater than or equal to 30 kg/m2  -Initiate Wegovy 0.25 mg once weekly for 1 month  -Then increase to Wegovy 0.5 mg once weekly for 1 month  -Then increase to Wegovy 1 mg once weekly  -RTC in 3 months      Patient consented to pharmacist management via collaborative practice.

## 2024-09-27 NOTE — PATIENT INSTRUCTIONS
Wegovy Patient Education  Savings Card  Follow this link to sign up for your Wegovy savings card. You will need this information when the [place] specialty pharmacy contacts you to help pay for your prescription.  Wegovy Savings Card    Dosing Schedule      Choosing an Injection Site and Using your Pen       - Pens are stored in the refrigerator and can be removed 20-30 minutes before the injection to allow the medication to come to room temperature to avoid irritation, burning, or bruising with injection.     What to Expect      Rare, but Serious Side Effects  - Wegovy may cause pancreatitis or gallstones. If you experience severe abdominal pain that may radiate to the back and may or may not be accompanied by vomiting, you are encouraged to seek emergency medical attention to assess your symptoms.     Reproduction, Pregnancy, and Lactation Considerations  - Wegovy is not recommended for use in patients who are currently pregnant or breastfeeding.   - If you plan to become pregnant, the use of this medication is not recommended at the time of conception. It is recommended that this medication should be discontinued at least 2 months prior to conception.     Patients using Oral Contraceptives  - Use of medications like Wegovy may decrease the effectiveness of your oral hormonal contraceptives.   - You are encouraged to add a barrier method of contraception for 4 weeks after initiation and for 4 weeks after each dose titration of Wegovy to minimize this potential risk.     Missed or Changing Your Dosing Schedule  - If you miss a dose of Wegovy, take it as soon as possible, within 5 days of your scheduled dose. If more than 5 days have passed, skip the missed dose and take your next dose on your regularly scheduled day.  - If you would like to change the day of the week you take your Wegovy dose, make sure there are at least 2 days between doses.

## 2024-10-08 ENCOUNTER — PATIENT MESSAGE (OUTPATIENT)
Dept: PLASTIC SURGERY | Facility: CLINIC | Age: 64
End: 2024-10-08
Payer: COMMERCIAL

## 2024-10-10 ENCOUNTER — PATIENT MESSAGE (OUTPATIENT)
Dept: PRIMARY CARE CLINIC | Facility: CLINIC | Age: 64
End: 2024-10-10

## 2024-10-10 ENCOUNTER — E-VISIT (OUTPATIENT)
Dept: PRIMARY CARE CLINIC | Facility: CLINIC | Age: 64
End: 2024-10-10
Payer: COMMERCIAL

## 2024-10-10 DIAGNOSIS — J44.1 COPD EXACERBATION: Primary | ICD-10-CM

## 2024-10-10 RX ORDER — DOXYCYCLINE 100 MG/1
100 CAPSULE ORAL 2 TIMES DAILY
Qty: 14 CAPSULE | Refills: 0 | Status: SHIPPED | OUTPATIENT
Start: 2024-10-10

## 2024-10-10 NOTE — PROGRESS NOTES
Patient ID: Lucia Matias is a 64 y.o. female.    Chief Complaint: General Illness (Entered automatically based on patient selection in LastRoom.)    The patient initiated a request through LastRoom on 10/10/2024 for evaluation and management with a chief complaint of General Illness (Entered automatically based on patient selection in LastRoom.)     I evaluated the questionnaire submission on 10/10/2024.    [facility] Evisit [facility]-Medication    10/10/2024 12:10 PM CDT - Filed by Patient   What do you need help with? Medication Request   Do you agree to participate in an E-Visit? Yes   If you have any of the following symptoms, please present to your local emergency room or call 911:  I acknowledge   Medication requests for narcotics will not be addressed via an E-Visit.  Please schedule an appointment. I acknowledge   Do you want to address a new or existing medication? I would like to start a new medication that I do not already take   What is the main issue you would like addressed today? Bronchitis   What is the name of the medication that you would like to start? Antibiotics   Have you taken a similar medication in the past? Yes   What was the name of the similar medication? Doxyclicine   Why are you no longer on that medication? No specific reason    What medical condition is the  medication intended to treat? Bronchitis   Provide any additional information you feel is important. No zpack   Please attach any relevant images or files    Are you able to take your vital signs? Yes   Systolic Blood Pressure:    Diastolic Blood Pressure:    Weight: 176   Height: 62   Pulse: 101   Temperature: 100   Respiration rate: 18   Pulse Oxygen:          Encounter Diagnosis   Name Primary?    COPD exacerbation Yes        No orders of the defined types were placed in this encounter.     Medications Ordered This Encounter   Medications    doxycycline (VIBRAMYCIN) 100 MG Cap     Sig: Take 1 capsule (100 mg total)  by mouth 2 (two) times daily.     Dispense:  14 capsule     Refill:  0        No follow-ups on file.      E-Visit Time Tracking:    Day 1 Time (in minutes): 5    Total Time (in minutes): 5

## 2024-10-15 DIAGNOSIS — J44.9 CHRONIC OBSTRUCTIVE PULMONARY DISEASE, UNSPECIFIED COPD TYPE: ICD-10-CM

## 2024-10-15 NOTE — TELEPHONE ENCOUNTER
Care Due:                  Date            Visit Type   Department     Provider  --------------------------------------------------------------------------------                                EP -                              PRIMARY      [facility] PRIMARY  Last Visit: 05-      CARE ([facility])   BARBARA Banks                              EP -                              PRIMARY      [facility] PRIMARY  Next Visit: 11-      CARE ([facility])   CARE           Hetal Banks                                                            Last  Test          Frequency    Reason                     Performed    Due Date  --------------------------------------------------------------------------------    CMP.........  12 months..  atorvastatin.............  01- 12-    [facility] Embedded Care Due Messages. Reference number: [id number].   10/15/2024 4:44:54 PM CDT

## 2024-10-16 RX ORDER — FLUTICASONE FUROATE, UMECLIDINIUM BROMIDE AND VILANTEROL TRIFENATATE 100; 62.5; 25 UG/1; UG/1; UG/1
1 POWDER RESPIRATORY (INHALATION) DAILY
Qty: 180 EACH | Refills: 3 | Status: SHIPPED | OUTPATIENT
Start: 2024-10-16

## 2024-10-16 NOTE — TELEPHONE ENCOUNTER
Refill Routing Note   Medication(s) are not appropriate for processing by [facility] for the following reason(s):        Due for refill >6 months ago    [facility] action(s):  Defer   Requires labs : Yes             Appointments  past 12m or future 3m with PCP    Date Provider   Last Visit   10/10/2024 Hetal Banks MD   Next Visit   11/20/2024 Hetal Banks MD   ED visits in past 90 days: 0        Note composed:9:47 PM 10/15/2024

## 2024-10-17 DIAGNOSIS — F41.1 GAD (GENERALIZED ANXIETY DISORDER): ICD-10-CM

## 2024-10-17 DIAGNOSIS — F41.0 PANIC ATTACK: ICD-10-CM

## 2024-10-17 DIAGNOSIS — G47.00 INSOMNIA, UNSPECIFIED TYPE: ICD-10-CM

## 2024-10-17 RX ORDER — DIAZEPAM 5 MG/1
TABLET ORAL
Qty: 7 TABLET | Refills: 0 | Status: SHIPPED | OUTPATIENT
Start: 2024-10-17 | End: 2024-10-18 | Stop reason: SDUPTHER

## 2024-10-17 RX ORDER — ZOLPIDEM TARTRATE 10 MG/1
10 TABLET ORAL NIGHTLY PRN
Qty: 7 TABLET | Refills: 0 | Status: SHIPPED | OUTPATIENT
Start: 2024-10-17 | End: 2024-10-18 | Stop reason: SDUPTHER

## 2024-10-18 ENCOUNTER — PATIENT MESSAGE (OUTPATIENT)
Dept: PSYCHIATRY | Facility: CLINIC | Age: 64
End: 2024-10-18
Payer: COMMERCIAL

## 2024-10-18 DIAGNOSIS — F41.0 PANIC ATTACK: ICD-10-CM

## 2024-10-18 DIAGNOSIS — F41.1 GAD (GENERALIZED ANXIETY DISORDER): ICD-10-CM

## 2024-10-18 DIAGNOSIS — G47.00 INSOMNIA, UNSPECIFIED TYPE: ICD-10-CM

## 2024-10-18 RX ORDER — DIAZEPAM 5 MG/1
TABLET ORAL
Qty: 7 TABLET | Refills: 0 | Status: SHIPPED | OUTPATIENT
Start: 2024-10-18

## 2024-10-18 RX ORDER — ZOLPIDEM TARTRATE 5 MG/1
5 TABLET ORAL NIGHTLY PRN
Qty: 30 TABLET | Refills: 1 | Status: CANCELLED | OUTPATIENT
Start: 2024-10-18

## 2024-10-18 RX ORDER — ZOLPIDEM TARTRATE 10 MG/1
10 TABLET ORAL NIGHTLY PRN
Qty: 7 TABLET | Refills: 0 | Status: SHIPPED | OUTPATIENT
Start: 2024-10-18

## 2024-10-18 RX ORDER — DIAZEPAM 5 MG/1
5 TABLET ORAL DAILY PRN
Qty: 30 TABLET | Refills: 2 | Status: CANCELLED | OUTPATIENT
Start: 2024-10-18

## 2024-10-21 DIAGNOSIS — G47.00 INSOMNIA, UNSPECIFIED TYPE: ICD-10-CM

## 2024-10-21 DIAGNOSIS — F41.0 PANIC ATTACK: ICD-10-CM

## 2024-10-21 DIAGNOSIS — F41.1 GAD (GENERALIZED ANXIETY DISORDER): ICD-10-CM

## 2024-10-21 RX ORDER — DIAZEPAM 5 MG/1
TABLET ORAL
Qty: 30 TABLET | Refills: 2 | Status: SHIPPED | OUTPATIENT
Start: 2024-10-21

## 2024-10-21 RX ORDER — ZOLPIDEM TARTRATE 10 MG/1
10 TABLET ORAL NIGHTLY PRN
Qty: 30 TABLET | Refills: 2 | Status: SHIPPED | OUTPATIENT
Start: 2024-10-21

## 2024-10-24 ENCOUNTER — OFFICE VISIT (OUTPATIENT)
Dept: HEMATOLOGY/ONCOLOGY | Facility: CLINIC | Age: 64
End: 2024-10-24
Payer: COMMERCIAL

## 2024-10-24 VITALS
HEART RATE: 90 BPM | HEIGHT: 63 IN | BODY MASS INDEX: 30.46 KG/M2 | OXYGEN SATURATION: 98 % | WEIGHT: 171.94 LBS | DIASTOLIC BLOOD PRESSURE: 80 MMHG | SYSTOLIC BLOOD PRESSURE: 133 MMHG | RESPIRATION RATE: 20 BRPM

## 2024-10-24 DIAGNOSIS — I10 ESSENTIAL HYPERTENSION: ICD-10-CM

## 2024-10-24 DIAGNOSIS — C50.112 MALIGNANT NEOPLASM OF CENTRAL PORTION OF LEFT BREAST IN FEMALE, ESTROGEN RECEPTOR POSITIVE: Primary | ICD-10-CM

## 2024-10-24 DIAGNOSIS — R23.2 HOT FLASHES: ICD-10-CM

## 2024-10-24 DIAGNOSIS — L40.50 PSORIATIC ARTHRITIS: ICD-10-CM

## 2024-10-24 DIAGNOSIS — Z17.0 MALIGNANT NEOPLASM OF CENTRAL PORTION OF LEFT BREAST IN FEMALE, ESTROGEN RECEPTOR POSITIVE: Primary | ICD-10-CM

## 2024-10-24 DIAGNOSIS — Z79.811 AROMATASE INHIBITOR USE: ICD-10-CM

## 2024-10-24 PROCEDURE — 3008F BODY MASS INDEX DOCD: CPT | Mod: CPTII,S$GLB,, | Performed by: STUDENT IN AN ORGANIZED HEALTH CARE EDUCATION/TRAINING PROGRAM

## 2024-10-24 PROCEDURE — 99215 OFFICE O/P EST HI 40 MIN: CPT | Mod: S$GLB,,, | Performed by: STUDENT IN AN ORGANIZED HEALTH CARE EDUCATION/TRAINING PROGRAM

## 2024-10-24 PROCEDURE — G2211 COMPLEX E/M VISIT ADD ON: HCPCS | Mod: S$GLB,,, | Performed by: STUDENT IN AN ORGANIZED HEALTH CARE EDUCATION/TRAINING PROGRAM

## 2024-10-24 PROCEDURE — 99999 PR PBB SHADOW E&M-EST. PATIENT-LVL V: CPT | Mod: PBBFAC,,, | Performed by: STUDENT IN AN ORGANIZED HEALTH CARE EDUCATION/TRAINING PROGRAM

## 2024-10-24 PROCEDURE — 3044F HG A1C LEVEL LT 7.0%: CPT | Mod: CPTII,S$GLB,, | Performed by: STUDENT IN AN ORGANIZED HEALTH CARE EDUCATION/TRAINING PROGRAM

## 2024-10-24 PROCEDURE — 1159F MED LIST DOCD IN RCRD: CPT | Mod: CPTII,S$GLB,, | Performed by: STUDENT IN AN ORGANIZED HEALTH CARE EDUCATION/TRAINING PROGRAM

## 2024-10-24 PROCEDURE — 3079F DIAST BP 80-89 MM HG: CPT | Mod: CPTII,S$GLB,, | Performed by: STUDENT IN AN ORGANIZED HEALTH CARE EDUCATION/TRAINING PROGRAM

## 2024-10-24 PROCEDURE — 3075F SYST BP GE 130 - 139MM HG: CPT | Mod: CPTII,S$GLB,, | Performed by: STUDENT IN AN ORGANIZED HEALTH CARE EDUCATION/TRAINING PROGRAM

## 2024-10-24 NOTE — PROGRESS NOTES
Oncology Clinic   Progress Note    Patient: Lucia Matias  MRN: [id number]  Date: 10/24/2024    Chief Complaint: HR+ breast cancer    Ms. Matias is a domo 62yo woman with recently diagnosed HR+ breast cancer who presents today for evaluation. Her oncologic history is as follows:      -22: annual mammogram indentified left focal asymmetry at the upper outer position.   -Follow-up mammogram and ultrasound on 22 showed a worrisome spiculated mass, 1.4 x 0.7 x 0.6 cm, 12 o'clock left breast 7 CMFN. An ultrasound guided biopsy was performed on 12/15/22 with pathology revealing infiltrating ductal carcinoma of the breast. Grade 2, ER >95%, AK 85-90%, Her2 1+, Ki67 25-30%  -2023: MRI breast shwoed Left breast 12 mm x 11 mm x 7 mm mass at the middle 12 o'clock position. Several pulmonary nodules are noted incidentally in the left hemithorax.  The patient has a history of bilateral pulmonary nodule seen on previous thoracic CT exams most recently in .  One of the nodules underwent biopsy in  with benign results.    -Underwent b/l mastectomies with SLN bx 2/15/2023 with bilateral breast reconstruction with abdominally based free flaps. He postoperative course was complicated by L pneumothorax.  -Post op path showed Invasive lobular carcinoma in left breast grade 2 measuring 18 mm with LCIS Grade 2 ER AK positive and Her2 negative. pT1c pN0. Oncotype 35  -C1D1 adjuvanct TC on ; completed 4 cycles on   -Started anastrozole 2023    GYN History:  Age of menarche was 13. Age of menopause was 46.  Patient denies hormonal therapy but took OCP for approximately 15 years in the past. Patient is . Age of first live birth was 25. Patient did breast feed for 6 months. S/p uterine ablation for fibroids in early 40s, no menstrual cycle since. Had menopausal symptoms in mid-late 40s.       Interval History:  Ms. Matias returns today for follow up. She has been doing fairly well since  her last visit. Continues to tolerate anastrozole without difficulty. Scheduled for her final reconstruction on November 13th. Looking forward to getting that behind her. She is most recently on amoxicillin for a tooth infection.  Feels like hot flashes have improved on Effexor. No new concerns today otherwise.          Medications:  Current Outpatient Medications   Medication Sig Dispense Refill    anastrozole (ARIMIDEX) 1 mg Tab Take 1 tablet (1 mg total) by mouth once daily. 90 tablet 3    apixaban (ELIQUIS) 5 mg Tab Take 1 tablet (5 mg total) by mouth 2 (two) times daily. 180 tablet 3    atorvastatin (LIPITOR) 20 MG tablet Take 1 tablet (20 mg total) by mouth every evening. 90 tablet 3    B-complex with vitamin C (VITAMIN B COMPLEX-C ORAL) Take by mouth Daily.      BIOTIN-COLLAGEN-VIT C-E-HERBAL ORAL Take by mouth.      calcium-vitamin D 250-100 mg-unit per tablet Take 1 tablet by mouth 2 (two) times daily.      cyanocobalamin 500 MCG tablet Take 500 mcg by mouth once daily.      diazePAM (VALIUM) 5 MG tablet TAKE 1 TABLET BY MOUTH DAILY AS NEEDED FOR ANXIETY. 30 tablet 2    diclofenac sodium (VOLTAREN) 1 % Gel Apply 2 g topically 4 (four) times daily. 100 g 1    doxycycline (VIBRAMYCIN) 100 MG Cap Take 1 capsule (100 mg total) by mouth 2 (two) times daily. 14 capsule 0    fluocinonide-emollient (FLUOCINONIDE-E) 0.05 % Crea Apply to affected area of feet daily as needed for psoriasis. 60 g 3    fluticasone-umeclidin-vilanter (TRELEGY ELLIPTA) 100-62.5-25 mcg DsDv Inhale 1 puff into the lungs once daily. 180 each 3    magnesium 200 mg Tab Take by mouth once.      metoprolol succinate (TOPROL-XL) 50 MG 24 hr tablet Take 1 tablet (50 mg total) by mouth nightly. 90 tablet 3    multivitamin (THERAGRAN) per tablet Take 1 tablet by mouth once daily.      multivitamin with minerals (HAIR,SKIN AND NAILS ORAL) Take by mouth.      mv-mn/iron/folic acid/herb 190 (VITAMIN D3 COMPLETE ORAL) Take by mouth.      omeprazole  "(PRILOSEC) 40 MG capsule Take 1 capsule (40 mg total) by mouth every morning. 90 capsule 1    semaglutide, weight loss, (WEGOVY) 0.25 mg/0.5 mL PnIj Inject 0.25 mg into the skin every 7 days. 2 mL 0    [START ON 10/25/2024] semaglutide, weight loss, (WEGOVY) 0.5 mg/0.5 mL PnIj Inject 0.5 mg into the skin every 7 days. 2 mL 0    [START ON 11/22/2024] semaglutide, weight loss, (WEGOVY) 1 mg/0.5 mL PnIj Inject 1 mg into the skin every 7 days. 2 mL 0    TALTZ AUTOINJECTOR 80 mg/mL AtIn Inject 80 mg into the skin every 28 days. 1 mL 6    triamcinolone acetonide 0.025% (KENALOG) 0.025 % cream Apply to affected area of skin folds twice a day as needed for psoriasis. 80 g 3    triamcinolone acetonide 0.1% (KENALOG) 0.1 % cream Apply to affected areas of body twice a day as needed for psoriasis. Do not use on face or skin folds. 454 g 1    venlafaxine (EFFEXOR-XR) 150 MG Cp24 Take 1 capsule (150 mg total) by mouth once daily. 90 capsule 3    zolpidem (AMBIEN) 10 mg Tab Take 1 tablet (10 mg total) by mouth nightly as needed (insomnia). 30 tablet 2     No current facility-administered medications for this visit.     Review of Systems:  +fatigue  12pt ROS negative except as noted above    Objective:     Vitals:    10/24/24 1624   Resp: 20   Weight: 78 kg (171 lb 15.3 oz)   Height: 5' 3" (1.6 m)       BMI: Body mass index is 30.46 kg/m².     Physical Exam:  ECOG 0   General: well appearing, in no apparent distress  HEENT: Normocephalic, EOMI, anicteric sclerae, MMM  Neck: supple, without cervical or supraclavicular lymphadenopathy.  Heart: well-perfused  Lungs: no increased wob  Breast: s/p bilateral mastectomy with flap reconstruction, incisions well-healed  Abdomen: Soft, nontender, nondistended with normal bowel sounds.   Extremities: No LE edema or joint effusion.   Skin: warm, well-perfused, no rash.   Neurologic: Alert and oriented x 4, normal speech and gait   Psychiatric: Conversing appropriately with providers " throughout today's encounter.    Laboratory Data:  I personally reviewed all recent labs, imaging and pathology.    Assessment and Plan:   Ms. Matias is a domo 65yo woman with hx of Aflutter s/p ablation, COPD, RA and recently diagnosed Stage IA HR+ breast cancer s/p bilateral mastectomy with reconstruction who presents today for evaluation.     Given her Oncotype of 35, we proceeded with adjuvant docetaxel 75mg/m2 and cyclophosphamide 600mg/m2 iv every 21 days for a total of 4 cycles.   She has since initiated ET with anastrozole and continues to tolerate well thus far.       #Breast cancer:  --cont anastrozole 1mg daily (SOT 7/2023--); goal treatment of 5 years. Given high oncotype and consider BCI near 5 year saige  --cont Ca/VitD supplementation as above  --no role for screening MMG s/p bilateral mastectomy  --RTC 4mo    #Osteopenia: noted on DEXA 7/25/23  --encouraged Ca/VitD supplementation as above    --will repeat DEXA 7/2025- ordered today     #Hot flashes: stable   --cont magnesium glycinate  --cont effexor 150mg daily  --discussed veoazah; will hold off on ordering for now but she will reach out if symptoms do not improve and she would like to pursue   --following w integrative onc    #Psoriasis/psoriatic arthritis: stable  --has now resumed cosentyx with improvement in symptoms  --cont to follow with derm      #Aflutter s/p RFA: following with cardiology  --remains on eliquis  --follow up with cards as scheduled      All questions were answered to her apparent satisfaction. Will plan to see her back in 4mo or sooner should the need arise.     Roya Madrid MD      Med Onc Chart Routing      Follow up with physician . RTC 8mo   Follow up with LINDY 4 months.   Infusion scheduling note    Injection scheduling note    Labs    Imaging    Pharmacy appointment    Other referrals                       MDM includes  :    - Acute or chronic illness or injury that poses a threat to life or bodily function  -  Review of prior external notes from unique source  - Independent review and explanation of 3+ results from unique tests  - Discussion of management and ordering 3+ unique tests  - Extensive discussion of treatment and management  - Prescription drug management  - Drug therapy requiring intensive monitoring for toxicity

## 2024-11-04 ENCOUNTER — E-VISIT (OUTPATIENT)
Dept: PRIMARY CARE CLINIC | Facility: CLINIC | Age: 64
End: 2024-11-04
Payer: COMMERCIAL

## 2024-11-04 ENCOUNTER — PATIENT MESSAGE (OUTPATIENT)
Dept: PRIMARY CARE CLINIC | Facility: CLINIC | Age: 64
End: 2024-11-04

## 2024-11-04 DIAGNOSIS — R30.0 DYSURIA: Primary | ICD-10-CM

## 2024-11-04 RX ORDER — NITROFURANTOIN 25; 75 MG/1; MG/1
100 CAPSULE ORAL 2 TIMES DAILY
Qty: 10 CAPSULE | Refills: 0 | Status: SHIPPED | OUTPATIENT
Start: 2024-11-04

## 2024-11-04 NOTE — PROGRESS NOTES
Patient ID: Lucia Matias is a 64 y.o. female.    Chief Complaint: General Illness (Entered automatically based on patient selection in [employer].)    The patient initiated a request through [employer] on 11/4/2024 for evaluation and management with a chief complaint of General Illness (Entered automatically based on patient selection in [employer].)     I evaluated the questionnaire submission on 11/4/2024.    [facility] P[facility] Evisit [facility]group-Common Problems    11/4/2024  8:56 AM CST - Filed by Patient   What do you need help with? Urinary Symptoms   Do you agree to participate in an E-Visit? Yes   If you have any of the following symptoms, please present to your local emergency room or call 911:  I acknowledge   What is the main issue you would like addressed today? UTI   What symptoms do you currently have? Pain while passing urine   When did your symptoms first appear? 11/3/2024   List what you have done or taken to help your symptoms. Pyridium   Please indicate whether you have had the following symptoms during the past 24 hours     Urgent urination (a sudden and uncontrollable urge to urinate) Severe   Frequent urination of small amounts of urine (going to the toilet very often) Mild   Burning pain when urinating Severe   Incomplete bladder emptying (still feel like you need to urinate again after urination) Mild   Pain not associated with urination in the lower abdomen below the belly button) Moderate   What does your urine look like? Cloudy   Blood seen in the urine None   Flank pain (pain in one or both sides of the lower back) None   Abnormal Vaginal Discharge (abnormal amount, color and/or odor) None   Discharge from the urethra (urinary opening) without urination None   High body temperature/fever? None-<99.5   Please rate how much discomfort you have experience because of the symptoms in the past 24 hours: Severe   Please indicate how the symptoms have interfered with your every day activities/work in the  past 24 hours: Moderate   Please indicate how these symptoms have interfered with your social activities (visiting people, meeting with friends, etc.) in the past 24 hours? Severe   Are you a diabetic? No   Please indicate whether you have the following at the time of completion of this questionnaire: None of the above   Provide any additional information you feel is important.    Please attach any relevant images or files (if you have performed a home test for UTI, please submit a photo of results)    Are you able to take your vital signs? No         Encounter Diagnosis   Name Primary?    Dysuria Yes        Orders Placed This Encounter   Procedures    Urine culture     Standing Status:   Future     Standing Expiration Date:   2/2/2026    Urinalysis     Standing Status:   Future     Standing Expiration Date:   1/3/2026     Order Specific Question:   Collection Type     Answer:   Urine, Clean Catch      Medications Ordered This Encounter   Medications    nitrofurantoin, macrocrystal-monohydrate, (MACROBID) 100 MG capsule     Sig: Take 1 capsule (100 mg total) by mouth 2 (two) times daily.     Dispense:  10 capsule     Refill:  0        No follow-ups on file.      E-Visit Time Tracking:    Day 1 Time (in minutes): 5    Total Time (in minutes): 5

## 2024-11-06 ENCOUNTER — ANESTHESIA EVENT (OUTPATIENT)
Dept: SURGERY | Facility: OTHER | Age: 64
End: 2024-11-06
Payer: COMMERCIAL

## 2024-11-06 RX ORDER — SODIUM CHLORIDE, SODIUM LACTATE, POTASSIUM CHLORIDE, CALCIUM CHLORIDE 600; 310; 30; 20 MG/100ML; MG/100ML; MG/100ML; MG/100ML
INJECTION, SOLUTION INTRAVENOUS CONTINUOUS
OUTPATIENT
Start: 2024-11-06

## 2024-11-06 RX ORDER — PREGABALIN 75 MG/1
75 CAPSULE ORAL ONCE
OUTPATIENT
Start: 2024-11-06 | End: 2024-11-06

## 2024-11-06 RX ORDER — LIDOCAINE HYDROCHLORIDE 10 MG/ML
0.5 INJECTION, SOLUTION EPIDURAL; INFILTRATION; INTRACAUDAL; PERINEURAL ONCE
OUTPATIENT
Start: 2024-11-06 | End: 2024-11-06

## 2024-11-06 RX ORDER — ACETAMINOPHEN 500 MG
1000 TABLET ORAL
OUTPATIENT
Start: 2024-11-06 | End: 2024-11-06

## 2024-11-07 ENCOUNTER — HOSPITAL ENCOUNTER (OUTPATIENT)
Dept: PREADMISSION TESTING | Facility: OTHER | Age: 64
Discharge: HOME | End: 2024-11-07
Attending: PLASTIC SURGERY
Payer: COMMERCIAL

## 2024-11-07 ENCOUNTER — OFFICE VISIT (OUTPATIENT)
Dept: PLASTIC SURGERY | Facility: CLINIC | Age: 64
End: 2024-11-07
Attending: PLASTIC SURGERY
Payer: COMMERCIAL

## 2024-11-07 VITALS
HEART RATE: 70 BPM | DIASTOLIC BLOOD PRESSURE: 60 MMHG | OXYGEN SATURATION: 97 % | BODY MASS INDEX: 31.47 KG/M2 | RESPIRATION RATE: 18 BRPM | HEIGHT: 62 IN | WEIGHT: 171 LBS | SYSTOLIC BLOOD PRESSURE: 139 MMHG | TEMPERATURE: 98 F

## 2024-11-07 VITALS — DIASTOLIC BLOOD PRESSURE: 78 MMHG | SYSTOLIC BLOOD PRESSURE: 183 MMHG | HEART RATE: 58 BPM

## 2024-11-07 DIAGNOSIS — Z85.3 HISTORY OF BREAST CANCER: Primary | ICD-10-CM

## 2024-11-07 DIAGNOSIS — Z01.818 PREOP TESTING: Primary | ICD-10-CM

## 2024-11-07 LAB
ABO + RH BLD: NORMAL
BASOPHILS # BLD AUTO: 0.06 K/UL (ref 0–0.2)
BASOPHILS NFR BLD: 0.9 % (ref 0–1.9)
BLD GP AB SCN CELLS X3 SERPL QL: NORMAL
DIFFERENTIAL METHOD BLD: NORMAL
EOSINOPHIL # BLD AUTO: 0.3 K/UL (ref 0–0.5)
EOSINOPHIL NFR BLD: 3.9 % (ref 0–8)
ERYTHROCYTE [DISTWIDTH] IN BLOOD BY AUTOMATED COUNT: 13.5 % (ref 11.5–14.5)
HCT VFR BLD AUTO: 48.3 % (ref 37–48.5)
HGB BLD-MCNC: 15.7 G/DL (ref 12–16)
IMM GRANULOCYTES # BLD AUTO: 0 K/UL (ref 0–0.04)
IMM GRANULOCYTES NFR BLD AUTO: 0 % (ref 0–0.5)
LYMPHOCYTES # BLD AUTO: 1.6 K/UL (ref 1–4.8)
LYMPHOCYTES NFR BLD: 25.3 % (ref 18–48)
MCH RBC QN AUTO: 29.2 PG (ref 27–31)
MCHC RBC AUTO-ENTMCNC: 32.5 G/DL (ref 32–36)
MCV RBC AUTO: 90 FL (ref 82–98)
MONOCYTES # BLD AUTO: 0.6 K/UL (ref 0.3–1)
MONOCYTES NFR BLD: 9.1 % (ref 4–15)
NEUTROPHILS # BLD AUTO: 3.9 K/UL (ref 1.8–7.7)
NEUTROPHILS NFR BLD: 60.8 % (ref 38–73)
NRBC BLD-RTO: 0 /100 WBC
PLATELET # BLD AUTO: 364 K/UL (ref 150–450)
PMV BLD AUTO: 9.3 FL (ref 9.2–12.9)
RBC # BLD AUTO: 5.37 M/UL (ref 4–5.4)
SPECIMEN OUTDATE: NORMAL
WBC # BLD AUTO: 6.47 K/UL (ref 3.9–12.7)

## 2024-11-07 PROCEDURE — 85025 COMPLETE CBC W/AUTO DIFF WBC: CPT | Performed by: ANESTHESIOLOGY

## 2024-11-07 PROCEDURE — 86900 BLOOD TYPING SEROLOGIC ABO: CPT | Performed by: ANESTHESIOLOGY

## 2024-11-07 PROCEDURE — 36415 COLL VENOUS BLD VENIPUNCTURE: CPT | Performed by: ANESTHESIOLOGY

## 2024-11-07 RX ORDER — HEPARIN SODIUM 5000 [USP'U]/ML
5000 INJECTION, SOLUTION INTRAVENOUS; SUBCUTANEOUS ONCE
OUTPATIENT
Start: 2024-11-13

## 2024-11-07 RX ORDER — CEFAZOLIN SODIUM 2 G/50ML
2 SOLUTION INTRAVENOUS
OUTPATIENT
Start: 2024-11-07

## 2024-11-07 RX ORDER — MUPIROCIN 20 MG/G
OINTMENT TOPICAL
OUTPATIENT
Start: 2024-11-07

## 2024-11-07 RX ORDER — SODIUM CHLORIDE 9 MG/ML
INJECTION, SOLUTION INTRAVENOUS CONTINUOUS
OUTPATIENT
Start: 2024-11-07

## 2024-11-07 NOTE — H&P (VIEW-ONLY)
Plastic and Reconstructive Surgery Clinic H&P     Lucia Matias is a 64 y.o. female s/p bilateral SSM with NEHA flap reconstruction and second stage with left flap fat graft necrosis with chronic draining wound which required surgical debridement. Now she has asymmetry between breasts. She presents for next stage reconstruction            Review of patient's allergies indicates:   Allergen Reactions    Succinimides Anaphylaxis       Son has MH    Vancomycin analogues Hives, Itching and Rash           Current Medications   No current facility-administered medications for this encounter.     Current Outpatient Medications:     anastrozole (ARIMIDEX) 1 mg Tab, Take 1 tablet (1 mg total) by mouth once daily., Disp: 90 tablet, Rfl: 3    apixaban (ELIQUIS) 5 mg Tab, Take 1 tablet (5 mg total) by mouth 2 (two) times daily., Disp: 180 tablet, Rfl: 3    atorvastatin (LIPITOR) 20 MG tablet, Take 1 tablet (20 mg total) by mouth every evening., Disp: 90 tablet, Rfl: 3    B-complex with vitamin C (VITAMIN B COMPLEX-C ORAL), Take by mouth Daily., Disp: , Rfl:     BIOTIN-COLLAGEN-VIT C-E-HERBAL ORAL, Take by mouth., Disp: , Rfl:     calcium-vitamin D 250-100 mg-unit per tablet, Take 1 tablet by mouth 2 (two) times daily., Disp: , Rfl:     cyanocobalamin 500 MCG tablet, Take 500 mcg by mouth once daily., Disp: , Rfl:     diazePAM (VALIUM) 5 MG tablet, TAKE 1 TABLET BY MOUTH DAILY AS NEEDED FOR ANXIETY. (Patient not taking: Reported on 10/24/2024), Disp: 30 tablet, Rfl: 2    diclofenac sodium (VOLTAREN) 1 % Gel, Apply 2 g topically 4 (four) times daily., Disp: 100 g, Rfl: 1    doxycycline (VIBRAMYCIN) 100 MG Cap, Take 1 capsule (100 mg total) by mouth 2 (two) times daily. (Patient not taking: Reported on 10/24/2024), Disp: 14 capsule, Rfl: 0    fluocinonide-emollient (FLUOCINONIDE-E) 0.05 % Crea, Apply to affected area of feet daily as needed for psoriasis., Disp: 60 g, Rfl: 3    fluticasone-umeclidin-vilanter (TRELEGY  ELLIPTA) 100-62.5-25 mcg DsDv, Inhale 1 puff into the lungs once daily., Disp: 180 each, Rfl: 3    magnesium 200 mg Tab, Take by mouth once., Disp: , Rfl:     metoprolol succinate (TOPROL-XL) 50 MG 24 hr tablet, Take 1 tablet (50 mg total) by mouth nightly., Disp: 90 tablet, Rfl: 3    multivitamin (THERAGRAN) per tablet, Take 1 tablet by mouth once daily., Disp: , Rfl:     multivitamin with minerals (HAIR,SKIN AND NAILS ORAL), Take by mouth., Disp: , Rfl:     mv-mn/iron/folic acid/herb 190 (VITAMIN D3 COMPLETE ORAL), Take by mouth., Disp: , Rfl:     nitrofurantoin, macrocrystal-monohydrate, (MACROBID) 100 MG capsule, Take 1 capsule (100 mg total) by mouth 2 (two) times daily., Disp: 10 capsule, Rfl: 0    omeprazole (PRILOSEC) 40 MG capsule, Take 1 capsule (40 mg total) by mouth every morning., Disp: 90 capsule, Rfl: 1    semaglutide, weight loss, (WEGOVY) 0.5 mg/0.5 mL PnIj, Inject 0.5 mg into the skin every 7 days., Disp: 2 mL, Rfl: 0    [START ON 11/22/2024] semaglutide, weight loss, (WEGOVY) 1 mg/0.5 mL PnIj, Inject 1 mg into the skin every 7 days., Disp: 2 mL, Rfl: 0    TALTZ AUTOINJECTOR 80 mg/mL AtIn, Inject 80 mg into the skin every 28 days., Disp: 1 mL, Rfl: 6    triamcinolone acetonide 0.025% (KENALOG) 0.025 % cream, Apply to affected area of skin folds twice a day as needed for psoriasis., Disp: 80 g, Rfl: 3    triamcinolone acetonide 0.1% (KENALOG) 0.1 % cream, Apply to affected areas of body twice a day as needed for psoriasis. Do not use on face or skin folds., Disp: 454 g, Rfl: 1    venlafaxine (EFFEXOR-XR) 150 MG Cp24, Take 1 capsule (150 mg total) by mouth once daily., Disp: 90 capsule, Rfl: 3    zolpidem (AMBIEN) 10 mg Tab, Take 1 tablet (10 mg total) by mouth nightly as needed (insomnia)., Disp: 30 tablet, Rfl: 2             Past Medical History:   Diagnosis Date    Allergy      Anxiety      Arthritis      Atrial flutter      Colon polyps 2016    COPD (chronic obstructive pulmonary disease)       COPD exacerbation 10/31/2022    Depression      Diverticular disease of colon 2017    Diverticulitis      H/O gastric sleeve      HLD (hyperlipidemia)      Malignant neoplasm of central portion of left breast in female, estrogen receptor positive 2022    Monoallelic mutation of MUTYH gene 2022    Osteopenia      Pancreatitis      Paroxysmal A-fib 1/3/2024    Pneumothorax, unspecified       following mastectomy sx     Psoriasis      Rheumatoid arthritis      Severe obesity (BMI 35.0-39.9) with comorbidity                 Past Surgical History:   Procedure Laterality Date    ABLATION   2022     Cardiac Ablation for A Flutter, Successful    ADENOIDECTOMY   1966     Tonsillitis and adnoids    BILATERAL MASTECTOMY Bilateral 2/15/2023     Procedure: MASTECTOMY, BILATERAL;  Surgeon: Marcela Meehan MD;  Location: [facility];  Service: General;  Laterality: Bilateral;  EMAIL SENT  @ 11:44 LK / 2.5 HOURS    BLADDER SUSPENSION         (x2)    BREAST REVISION SURGERY Bilateral 3/29/2023     Procedure: BREAST REVISION SURGERY / BILATERAL BREAST FLAP REVISION;  Surgeon: Ming Milian MD;  Location: [facility];  Service: Plastics;  Laterality: Bilateral;  90 MINUTES     SECTION         (x2)    DEBRIDEMENT, BREAST Bilateral 3/29/2023     Procedure: DEBRIDEMENT, BREAST;  Surgeon: Ming Milian MD;  Location: [facility];  Service: Plastics;  Laterality: Bilateral;    ESOPHAGOGASTRODUODENOSCOPY N/A 2021     Procedure: EGD (ESOPHAGOGASTRODUODENOSCOPY);  Surgeon: Vince Rodriguez MD;  Location: [address];  Service: General;  Laterality: N/A;    INCISION AND DRAINAGE OF BREAST Left 10/26/2023     Procedure: INCISION AND DRAINAGE, BREAST;  Surgeon: Ming Milian MD;  Location: [facility];  Service: Plastics;  Laterality: Left;    INCISION AND DRAINAGE OF BREAST Left 2024     Procedure: INCISION AND DRAINAGE, BREAST;  Surgeon: Ming Milian MD;  Location: [facility];  Service:  Plastics;  Laterality: Left;    INJECTION FOR SENTINEL NODE IDENTIFICATION Left 2/15/2023     Procedure: INJECTION, FOR SENTINEL NODE IDENTIFICATION;  Surgeon: Marcela Meehan MD;  Location: [facility];  Service: General;  Laterality: Left;    LAPAROSCOPIC SLEEVE GASTRECTOMY N/A 03/31/2021     Procedure: GASTRECTOMY, SLEEVE, LAPAROSCOPIC, with intraop EGD;  Surgeon: Vince Rodriguez MD;  Location: [address] [facility];  Service: General;  Laterality: N/A;    LIPOSUCTION W/ FAT INJECTION Bilateral 8/29/2023     Procedure: LIPOSUCTION, WITH FAT TRANSFER;  Surgeon: Ming Milian MD;  Location: [facility] OR;  Service: Plastics;  Laterality: Bilateral;  / FAT GRAFTING TO BOTH BREAST    LUNG BIOPSY   09/2020    RECONSTRUCTION OF BREAST WITH DEEP INFERIOR EPIGASTRIC ARTERY  (NEHA) FREE FLAP Bilateral 2/15/2023     Procedure: RECONSTRUCTION, BREAST, USING NEHA FREE FLAP;  Surgeon: Ming Milian MD;  Location: [facility];  Service: Plastics;  Laterality: Bilateral;    REVISION, FLAP, PREVIOUSLY CREATED FOR BREAST RECONSTRUCTION Bilateral 8/29/2023     Procedure: REVISION, FLAP, PREVIOUSLY CREATED FOR BREAST RECONSTRUCTION;  Surgeon: Ming Milian MD;  Location: [facility] OR;  Service: Plastics;  Laterality: Bilateral;  4 HOURS    REVISION, SCAR, ABDOMINAL DONOR SITE N/A 8/29/2023     Procedure: REVISION, SCAR, ABDOMINAL DONOR SITE;  Surgeon: Ming Milian MD;  Location: [facility];  Service: Plastics;  Laterality: N/A;    RHINOPLASTY        SENTINEL LYMPH NODE BIOPSY Left 2/15/2023     Procedure: BIOPSY, LYMPH NODE, SENTINEL;  Surgeon: Marcela Meehan MD;  Location: [facility];  Service: General;  Laterality: Left;    TONSILLECTOMY        TRANSESOPHAGEAL ECHOCARDIOGRAPHY N/A 11/02/2022     Procedure: ECHOCARDIOGRAM, TRANSESOPHAGEAL;  Surgeon: Kellie Grover MD;  Location: [facility] EP LAB;  Service: Cardiology;  Laterality: N/A;         Social History            Socioeconomic History    Marital status:     Occupational History    Occupation: clinical research coordinator    Tobacco Use    Smoking status: Former       Current packs/day: 0.00       Types: Cigarettes       Start date: 1/1/1979       Quit date: 12/7/2020       Years since quitting: 3.9    Smokeless tobacco: Former       Quit date: 12/28/2022   Substance and Sexual Activity    Alcohol use: Yes       Alcohol/week: 1.0 standard drink of alcohol       Types: 1 Glasses of wine per week       Comment: social-rarely    Drug use: No    Sexual activity: Yes       Partners: Male       Birth control/protection: Post-menopausal   Other Topics Concern    Are you pregnant or think you may be? No    Breast-feeding No      Social Drivers of Health           Financial Resource Strain: Low Risk  (4/4/2024)     Overall Financial Resource Strain (CARDIA)      Difficulty of Paying Living Expenses: Not very hard   Food Insecurity: No Food Insecurity (4/4/2024)     Hunger Vital Sign      Worried About Running Out of Food in the Last Year: Never true      Ran Out of Food in the Last Year: Never true   Transportation Needs: No Transportation Needs (4/4/2024)     PRAPARE - Transportation      Lack of Transportation (Medical): No      Lack of Transportation (Non-Medical): No   Physical Activity: Sufficiently Active (4/4/2024)     Exercise Vital Sign      Days of Exercise per Week: 5 days      Minutes of Exercise per Session: 30 min   Stress: Stress Concern Present (4/4/2024)     [employer] [place] [employer] - Occupational Stress Questionnaire      Feeling of Stress : Rather much   Housing Stability: Low Risk  (4/4/2024)     Housing Stability Vital Sign      Unable to Pay for Housing in the Last Year: No      Number of Places Lived in the Last Year: 1      Unstable Housing in the Last Year: No            O  Physical exam  The right reconstructed breast is soft. Left breast is smaller with asymmetry  No gross signs fo infection  Low transverse abdominal incision well  healed      CT BLE (PAP) protocol with adequate perforators for stacked pap flap reconstruction     A/P  Lucia Matias is a 64 y.o. female who is s/p bilateral SSM with NEHA flap reconstruction and second stage with recurrent infection of the left breast requiring debridement. She presents for next stage reconstruction     - On prior discussion for TDAP reconstruction, adequate  found however insufficient laxity to proceed with rotational TDAP. Will plan for stacked PAP flap reconstruction  - Plan for OR next Wednesday for left breast stacked PAP flap reconstruction. Of note prior reconstruction used left breast JUSTIN/IMV perforators for anastomosis.   - Photos today in clinic  - Informed consent obtained    Hernan Carlos MD  [facility], [facility]  11/7/2024

## 2024-11-07 NOTE — ANESTHESIA PREPROCEDURE EVALUATION
11/07/2024  Lucia Matias is a 64 y.o., female.      Pre-op Assessment    I have reviewed the Patient Summary Reports.     I have reviewed the Nursing Notes. I have reviewed the NPO Status.   I have reviewed the Medications.     Review of Systems  Anesthesia Hx:             Family Hx of Anesthesia complications:  Malignant Hyperthermia   Denies Personal Hx of Anesthesia complications.                    Social:  Former Smoker       Hematology/Oncology:  Hematology Normal                       --  Cancer in past history:       Breast              Cardiovascular:     Hypertension    Dysrhythmias (a flutter, s/p ablation) atrial fibrillation      hyperlipidemia                               Pulmonary:   COPD Asthma                    Renal/:  Renal/ Normal                 Hepatic/GI:  Hepatic/GI Normal       H/o gastric sleeve             Musculoskeletal:  Arthritis (RA)               Neurological:  Neurology Normal                                      Endocrine:        Obesity / BMI > 30  Psych:  Psychiatric History anxiety                 Physical Exam  General: Well nourished, Oriented, Alert and Cooperative    Airway:  Mallampati: II   Mouth Opening: Normal  Tongue: Normal  Neck ROM: Normal ROM    Dental:  Caps / Implants        Anesthesia Plan  Type of Anesthesia, risks & benefits discussed:    Anesthesia Type: Gen ETT  Intra-op Monitoring Plan: Standard ASA Monitors  Post Op Pain Control Plan: multimodal analgesia  Induction:  IV  Airway Plan: Video, Post-Induction  Informed Consent: Informed consent signed with the Patient and all parties understand the risks and agree with anesthesia plan.  All questions answered.   ASA Score: 3  Anesthesia Plan Notes: FH of MH- TIVA  CBC/Type and screen  Sixth breast surgery here  Will be stopping Eliquis-never started Wegovy    Ready For Surgery From  Anesthesia Perspective.     .

## 2024-11-07 NOTE — DISCHARGE INSTRUCTIONS
Information to Prepare you for your Surgery    PRE-ADMIT TESTING -  [phone]   -Charity  [address] [place] [address] [address]  [facility]      Your surgery has been scheduled at [facility]. We are pleased to have the opportunity to serve you. For Further Information please call [phone].    On the day of surgery please report to the Information Desk on the 1st floor.    CONTACT YOUR PHYSICIAN'S OFFICE THE DAY PRIOR TO YOUR SURGERY TO OBTAIN YOUR ARRIVAL TIME.     The evening before surgery do not eat anything after 9 p.m. ( this includes hard candy, chewing gum and mints).  You may only have GATORADE, POWERADE AND WATER  from 9 p.m. until you leave your home.    AT LEAST 12 OUNCES BEFORE YOU LEAVE YOU HOUSE IN THE MORNING TO COME TO SURGERY.    DO NOT DRINK ANY LIQUIDS ON THE WAY TO THE HOSPITAL.      Why does your anesthesiologist allow you to drink Gatorade/Powerade before surgery?  Gatorade/Powerade helps to increase your comfort before surgery and to decrease your nausea after surgery. The carbohydrates in Gatorade/Powerade help reduce your body's stress response to surgery.      Outpatient Surgery- May allow 2 adult (18 and older) Support Persons (1 being the designated ) for all surgical/procedural patients. A breastfeeding mother will be allowed her infant and 2 adult Support Persons. No one under the age of 18 will be allowed in the building. No swapping out of visitors in the [facility].      SPECIAL MEDICATION INSTRUCTIONS: TAKE medications checked off by the Anesthesiologist on your Medication List.        Surgery Patients:    If you take ASPIRIN - Your PHYSICIAN/SURGEON will need to inform you IF/OR when you need to stop taking aspirin prior to your surgery.     The week prior to surgery do not ot take any medications containing IBUPROFEN or NSAIDS ( Advil, Motrin, Goodys, BC, Aleve, Naproxen,   Ketorolac, Meloxicam, Mobic, Toradol,etc) If you are not sure if you should take a medicine please call your surgeon's office.  Ok to take Tylenol    Do Not Wear any make-up (especially eye make-up) to surgery. Please remove any false eyelashes or eyelash extensions. If you arrive the day of surgery with makeup/eyelashes on you will be required to remove prior to surgery. (There is a risk of corneal abrasions if eye makeup/eyelash extensions are not removed)      Leave all valuables at home.   Do Not wear any jewelry or watches, including any metal in body piercings. Jewelry must be removed prior to coming to the hospital.  There is a possibility that rings that are unable to be removed may be cut off if they are on the surgical extremity.    Please remove all hair extensions, wigs, clips and any other metal accessories/ ornaments from your hair.  These items may pose a flammable/fire risk in Surgery and must be removed.    Do not shave your surgical area at least 5 days prior to your surgery. The surgical prep will be performed at the hospital according to Infection Control regulations.    Contact Lens must be removed before surgery. Either do not wear the contact lens or bring a case and solution for storage.  Please bring a container for eyeglasses or dentures as required.  Bring any paperwork your physician has provided, such as consent forms,  history and physicals, doctor's orders, etc.   Bring comfortable clothes that are loose fitting to wear upon discharge. Take into consideration the type of surgery being performed.  Maintain your diet as advised per your physician the day prior to surgery.      Adequate rest the night before surgery is advised.   Park in the Parking lot behind the hospital or in the [employer] across the street from the parking lot. Parking is complimentary.  If you will be discharged the same day as your procedure, please arrange for a responsible adult to drive you home or  to accompany you if traveling by taxi.   YOU WILL NOT BE PERMITTED TO DRIVE OR TO LEAVE THE HOSPITAL ALONE AFTER SURGERY.   If you are being discharged the same day, it is strongly recommended that you arrange for someone to remain with you for the first 24 hrs following your surgery.    The Surgeon will speak to your family/visitor after your surgery regarding the outcome of your surgery and post op care.  The Surgeon may speak to you after your surgery, but there is a possibility you may not remember the details.  Please check with your family members regarding the conversation with the Surgeon.    We strongly recommend whoever is bringing you home be present for discharge instructions.  This will ensure a thorough understanding for your post op home care.    If the patient has fever, cough, or signs/symptoms of Flu or Covid please do not come in for your surgery. Contact your surgeon and your primary care physician for further instructions.           Thank you for your cooperation.  The Staff of [facility].            Bathing Instructions with Hibiclens or Dial Soap    Shower the evening before and morning of your procedure with Chlorhexidine (Hibiclens)  do not use Chlorhexidine on your face or genitals. Do not get in your eyes.  Wash your face with water and your regular face wash/soap  Use your regular shampoo  Apply Chlorhexidine (Hibiclens) directly on your skin or on a wet washcloth and wash gently. When showering: Move away from the shower stream when applying Chlorhexidine (Hibiclens) to avoid rinsing off too soon.  Rinse thoroughly with warm water  Do not dilute Chlorhexidine (Hibiclens)   Dry off as usual, do not use any deodorant, powder, body lotions, perfume, after shave or cologne.     Thanks ,   Charity

## 2024-11-07 NOTE — PROGRESS NOTES
Plastic and Reconstructive Surgery Clinic H&P     Lucia Matias is a 64 y.o. female s/p bilateral SSM with NEHA flap reconstruction and second stage with left flap fat graft necrosis with chronic draining wound which required surgical debridement. Now she has asymmetry between breasts. She presents for next stage reconstruction            Review of patient's allergies indicates:   Allergen Reactions    Succinimides Anaphylaxis       Son has MH    Vancomycin analogues Hives, Itching and Rash           Current Medications   No current facility-administered medications for this encounter.     Current Outpatient Medications:     anastrozole (ARIMIDEX) 1 mg Tab, Take 1 tablet (1 mg total) by mouth once daily., Disp: 90 tablet, Rfl: 3    apixaban (ELIQUIS) 5 mg Tab, Take 1 tablet (5 mg total) by mouth 2 (two) times daily., Disp: 180 tablet, Rfl: 3    atorvastatin (LIPITOR) 20 MG tablet, Take 1 tablet (20 mg total) by mouth every evening., Disp: 90 tablet, Rfl: 3    B-complex with vitamin C (VITAMIN B COMPLEX-C ORAL), Take by mouth Daily., Disp: , Rfl:     BIOTIN-COLLAGEN-VIT C-E-HERBAL ORAL, Take by mouth., Disp: , Rfl:     calcium-vitamin D 250-100 mg-unit per tablet, Take 1 tablet by mouth 2 (two) times daily., Disp: , Rfl:     cyanocobalamin 500 MCG tablet, Take 500 mcg by mouth once daily., Disp: , Rfl:     diazePAM (VALIUM) 5 MG tablet, TAKE 1 TABLET BY MOUTH DAILY AS NEEDED FOR ANXIETY. (Patient not taking: Reported on 10/24/2024), Disp: 30 tablet, Rfl: 2    diclofenac sodium (VOLTAREN) 1 % Gel, Apply 2 g topically 4 (four) times daily., Disp: 100 g, Rfl: 1    doxycycline (VIBRAMYCIN) 100 MG Cap, Take 1 capsule (100 mg total) by mouth 2 (two) times daily. (Patient not taking: Reported on 10/24/2024), Disp: 14 capsule, Rfl: 0    fluocinonide-emollient (FLUOCINONIDE-E) 0.05 % Crea, Apply to affected area of feet daily as needed for psoriasis., Disp: 60 g, Rfl: 3    fluticasone-umeclidin-vilanter (TRELEGY  ELLIPTA) 100-62.5-25 mcg DsDv, Inhale 1 puff into the lungs once daily., Disp: 180 each, Rfl: 3    magnesium 200 mg Tab, Take by mouth once., Disp: , Rfl:     metoprolol succinate (TOPROL-XL) 50 MG 24 hr tablet, Take 1 tablet (50 mg total) by mouth nightly., Disp: 90 tablet, Rfl: 3    multivitamin (THERAGRAN) per tablet, Take 1 tablet by mouth once daily., Disp: , Rfl:     multivitamin with minerals (HAIR,SKIN AND NAILS ORAL), Take by mouth., Disp: , Rfl:     mv-mn/iron/folic acid/herb 190 (VITAMIN D3 COMPLETE ORAL), Take by mouth., Disp: , Rfl:     nitrofurantoin, macrocrystal-monohydrate, (MACROBID) 100 MG capsule, Take 1 capsule (100 mg total) by mouth 2 (two) times daily., Disp: 10 capsule, Rfl: 0    omeprazole (PRILOSEC) 40 MG capsule, Take 1 capsule (40 mg total) by mouth every morning., Disp: 90 capsule, Rfl: 1    semaglutide, weight loss, (WEGOVY) 0.5 mg/0.5 mL PnIj, Inject 0.5 mg into the skin every 7 days., Disp: 2 mL, Rfl: 0    [START ON 11/22/2024] semaglutide, weight loss, (WEGOVY) 1 mg/0.5 mL PnIj, Inject 1 mg into the skin every 7 days., Disp: 2 mL, Rfl: 0    TALTZ AUTOINJECTOR 80 mg/mL AtIn, Inject 80 mg into the skin every 28 days., Disp: 1 mL, Rfl: 6    triamcinolone acetonide 0.025% (KENALOG) 0.025 % cream, Apply to affected area of skin folds twice a day as needed for psoriasis., Disp: 80 g, Rfl: 3    triamcinolone acetonide 0.1% (KENALOG) 0.1 % cream, Apply to affected areas of body twice a day as needed for psoriasis. Do not use on face or skin folds., Disp: 454 g, Rfl: 1    venlafaxine (EFFEXOR-XR) 150 MG Cp24, Take 1 capsule (150 mg total) by mouth once daily., Disp: 90 capsule, Rfl: 3    zolpidem (AMBIEN) 10 mg Tab, Take 1 tablet (10 mg total) by mouth nightly as needed (insomnia)., Disp: 30 tablet, Rfl: 2             Past Medical History:   Diagnosis Date    Allergy      Anxiety      Arthritis      Atrial flutter      Colon polyps 2016    COPD (chronic obstructive pulmonary disease)       COPD exacerbation 10/31/2022    Depression      Diverticular disease of colon 2017    Diverticulitis      H/O gastric sleeve      HLD (hyperlipidemia)      Malignant neoplasm of central portion of left breast in female, estrogen receptor positive 2022    Monoallelic mutation of MUTYH gene 2022    Osteopenia      Pancreatitis      Paroxysmal A-fib 1/3/2024    Pneumothorax, unspecified       following mastectomy sx     Psoriasis      Rheumatoid arthritis      Severe obesity (BMI 35.0-39.9) with comorbidity                 Past Surgical History:   Procedure Laterality Date    ABLATION   2022     Cardiac Ablation for A Flutter, Successful    ADENOIDECTOMY   1966     Tonsillitis and adnoids    BILATERAL MASTECTOMY Bilateral 2/15/2023     Procedure: MASTECTOMY, BILATERAL;  Surgeon: Marcela Meehan MD;  Location: [facility];  Service: General;  Laterality: Bilateral;  EMAIL SENT  @ 11:44 LK / 2.5 HOURS    BLADDER SUSPENSION         (x2)    BREAST REVISION SURGERY Bilateral 3/29/2023     Procedure: BREAST REVISION SURGERY / BILATERAL BREAST FLAP REVISION;  Surgeon: Ming Milian MD;  Location: [facility];  Service: Plastics;  Laterality: Bilateral;  90 MINUTES     SECTION         (x2)    DEBRIDEMENT, BREAST Bilateral 3/29/2023     Procedure: DEBRIDEMENT, BREAST;  Surgeon: Ming Milian MD;  Location: [facility];  Service: Plastics;  Laterality: Bilateral;    ESOPHAGOGASTRODUODENOSCOPY N/A 2021     Procedure: EGD (ESOPHAGOGASTRODUODENOSCOPY);  Surgeon: Vince Rodriguez MD;  Location: [address];  Service: General;  Laterality: N/A;    INCISION AND DRAINAGE OF BREAST Left 10/26/2023     Procedure: INCISION AND DRAINAGE, BREAST;  Surgeon: Ming Milian MD;  Location: [facility];  Service: Plastics;  Laterality: Left;    INCISION AND DRAINAGE OF BREAST Left 2024     Procedure: INCISION AND DRAINAGE, BREAST;  Surgeon: Ming Milian MD;  Location: [facility];  Service:  Plastics;  Laterality: Left;    INJECTION FOR SENTINEL NODE IDENTIFICATION Left 2/15/2023     Procedure: INJECTION, FOR SENTINEL NODE IDENTIFICATION;  Surgeon: Marcela Meehan MD;  Location: [facility];  Service: General;  Laterality: Left;    LAPAROSCOPIC SLEEVE GASTRECTOMY N/A 03/31/2021     Procedure: GASTRECTOMY, SLEEVE, LAPAROSCOPIC, with intraop EGD;  Surgeon: Vince Rodriguez MD;  Location: [address] [facility];  Service: General;  Laterality: N/A;    LIPOSUCTION W/ FAT INJECTION Bilateral 8/29/2023     Procedure: LIPOSUCTION, WITH FAT TRANSFER;  Surgeon: Ming Milian MD;  Location: [facility] OR;  Service: Plastics;  Laterality: Bilateral;  / FAT GRAFTING TO BOTH BREAST    LUNG BIOPSY   09/2020    RECONSTRUCTION OF BREAST WITH DEEP INFERIOR EPIGASTRIC ARTERY  (NEHA) FREE FLAP Bilateral 2/15/2023     Procedure: RECONSTRUCTION, BREAST, USING NEHA FREE FLAP;  Surgeon: Ming Milian MD;  Location: [facility];  Service: Plastics;  Laterality: Bilateral;    REVISION, FLAP, PREVIOUSLY CREATED FOR BREAST RECONSTRUCTION Bilateral 8/29/2023     Procedure: REVISION, FLAP, PREVIOUSLY CREATED FOR BREAST RECONSTRUCTION;  Surgeon: Ming Milian MD;  Location: [facility] OR;  Service: Plastics;  Laterality: Bilateral;  4 HOURS    REVISION, SCAR, ABDOMINAL DONOR SITE N/A 8/29/2023     Procedure: REVISION, SCAR, ABDOMINAL DONOR SITE;  Surgeon: Ming Milian MD;  Location: [facility];  Service: Plastics;  Laterality: N/A;    RHINOPLASTY        SENTINEL LYMPH NODE BIOPSY Left 2/15/2023     Procedure: BIOPSY, LYMPH NODE, SENTINEL;  Surgeon: Marcela Meehan MD;  Location: [facility];  Service: General;  Laterality: Left;    TONSILLECTOMY        TRANSESOPHAGEAL ECHOCARDIOGRAPHY N/A 11/02/2022     Procedure: ECHOCARDIOGRAM, TRANSESOPHAGEAL;  Surgeon: Kellie Grover MD;  Location: [facility] EP LAB;  Service: Cardiology;  Laterality: N/A;         Social History            Socioeconomic History    Marital status:     Occupational History    Occupation: clinical research coordinator    Tobacco Use    Smoking status: Former       Current packs/day: 0.00       Types: Cigarettes       Start date: 1/1/1979       Quit date: 12/7/2020       Years since quitting: 3.9    Smokeless tobacco: Former       Quit date: 12/28/2022   Substance and Sexual Activity    Alcohol use: Yes       Alcohol/week: 1.0 standard drink of alcohol       Types: 1 Glasses of wine per week       Comment: social-rarely    Drug use: No    Sexual activity: Yes       Partners: Male       Birth control/protection: Post-menopausal   Other Topics Concern    Are you pregnant or think you may be? No    Breast-feeding No      Social Drivers of Health           Financial Resource Strain: Low Risk  (4/4/2024)     Overall Financial Resource Strain (CARDIA)      Difficulty of Paying Living Expenses: Not very hard   Food Insecurity: No Food Insecurity (4/4/2024)     Hunger Vital Sign      Worried About Running Out of Food in the Last Year: Never true      Ran Out of Food in the Last Year: Never true   Transportation Needs: No Transportation Needs (4/4/2024)     PRAPARE - Transportation      Lack of Transportation (Medical): No      Lack of Transportation (Non-Medical): No   Physical Activity: Sufficiently Active (4/4/2024)     Exercise Vital Sign      Days of Exercise per Week: 5 days      Minutes of Exercise per Session: 30 min   Stress: Stress Concern Present (4/4/2024)     [place] [employer] - Occupational Stress Questionnaire      Feeling of Stress : Rather much   Housing Stability: Low Risk  (4/4/2024)     Housing Stability Vital Sign      Unable to Pay for Housing in the Last Year: No      Number of Places Lived in the Last Year: 1      Unstable Housing in the Last Year: No            O  Physical exam  The right reconstructed breast is soft. Left breast is smaller with asymmetry  No gross signs fo infection  Low transverse abdominal incision well  healed      CT BLE (PAP) protocol with adequate perforators for stacked pap flap reconstruction     A/P  Lucia Matias is a 64 y.o. female who is s/p bilateral SSM with NEHA flap reconstruction and second stage with recurrent infection of the left breast requiring debridement. She presents for next stage reconstruction     - On prior discussion for TDAP reconstruction, adequate  found however insufficient laxity to proceed with rotational TDAP. Will plan for stacked PAP flap reconstruction  - Plan for OR next Wednesday for left breast stacked PAP flap reconstruction. Of note prior reconstruction used left breast JUSTIN/IMV perforators for anastomosis.   - Photos today in clinic  - Informed consent obtained    Hernan Carlos MD  [facility]astic and Reconstructive Surgery, [facility]  11/7/2024

## 2024-11-13 ENCOUNTER — HOSPITAL ENCOUNTER (INPATIENT)
Facility: OTHER | Age: 64
LOS: 2 days | Discharge: HOME | DRG: 903 | End: 2024-11-15
Attending: PLASTIC SURGERY | Admitting: PLASTIC SURGERY
Payer: COMMERCIAL

## 2024-11-13 ENCOUNTER — PATIENT MESSAGE (OUTPATIENT)
Dept: PLASTIC SURGERY | Facility: CLINIC | Age: 64
End: 2024-11-13
Payer: COMMERCIAL

## 2024-11-13 ENCOUNTER — ANESTHESIA (OUTPATIENT)
Dept: SURGERY | Facility: OTHER | Age: 64
End: 2024-11-13
Payer: COMMERCIAL

## 2024-11-13 DIAGNOSIS — Z85.3 HISTORY OF BREAST CANCER: ICD-10-CM

## 2024-11-13 LAB
BACTERIA #/AREA URNS HPF: NORMAL /HPF
BILIRUB UR QL STRIP: NEGATIVE
CLARITY UR: CLEAR
COLOR UR: COLORLESS
GLUCOSE UR QL STRIP: NEGATIVE
HGB UR QL STRIP: NEGATIVE
KETONES UR QL STRIP: NEGATIVE
LEUKOCYTE ESTERASE UR QL STRIP: ABNORMAL
MICROSCOPIC COMMENT: NORMAL
NITRITE UR QL STRIP: NEGATIVE
PH UR STRIP: 7 [PH] (ref 5–8)
PROT UR QL STRIP: NEGATIVE
RBC #/AREA URNS HPF: 2 /HPF (ref 0–4)
SP GR UR STRIP: 1.01 (ref 1–1.03)
URN SPEC COLLECT METH UR: ABNORMAL
UROBILINOGEN UR STRIP-ACNC: NEGATIVE EU/DL
WBC #/AREA URNS HPF: 3 /HPF (ref 0–5)

## 2024-11-13 PROCEDURE — 25000003 PHARM REV CODE 250: Performed by: STUDENT IN AN ORGANIZED HEALTH CARE EDUCATION/TRAINING PROGRAM

## 2024-11-13 PROCEDURE — 25000003 PHARM REV CODE 250: Performed by: ANESTHESIOLOGY

## 2024-11-13 PROCEDURE — 37000009 HC ANESTHESIA EA ADD 15 MINS: Performed by: PLASTIC SURGERY

## 2024-11-13 PROCEDURE — 63600175 PHARM REV CODE 636 W HCPCS: Performed by: ANESTHESIOLOGY

## 2024-11-13 PROCEDURE — 37000008 HC ANESTHESIA 1ST 15 MINUTES: Performed by: PLASTIC SURGERY

## 2024-11-13 PROCEDURE — 63600175 PHARM REV CODE 636 W HCPCS: Performed by: STUDENT IN AN ORGANIZED HEALTH CARE EDUCATION/TRAINING PROGRAM

## 2024-11-13 PROCEDURE — 36000708 HC OR TIME LEV III 1ST 15 MIN: Performed by: PLASTIC SURGERY

## 2024-11-13 PROCEDURE — 36000709 HC OR TIME LEV III EA ADD 15 MIN: Performed by: PLASTIC SURGERY

## 2024-11-13 PROCEDURE — 25000003 PHARM REV CODE 250: Performed by: PLASTIC SURGERY

## 2024-11-13 PROCEDURE — 81000 URINALYSIS NONAUTO W/SCOPE: CPT | Performed by: STUDENT IN AN ORGANIZED HEALTH CARE EDUCATION/TRAINING PROGRAM

## 2024-11-13 PROCEDURE — 63600175 PHARM REV CODE 636 W HCPCS: Performed by: PLASTIC SURGERY

## 2024-11-13 PROCEDURE — 71000033 HC RECOVERY, INTIAL HOUR: Performed by: PLASTIC SURGERY

## 2024-11-13 PROCEDURE — 25000242 PHARM REV CODE 250 ALT 637 W/ HCPCS: Performed by: STUDENT IN AN ORGANIZED HEALTH CARE EDUCATION/TRAINING PROGRAM

## 2024-11-13 PROCEDURE — 63600175 PHARM REV CODE 636 W HCPCS: Mod: JZ,JG | Performed by: ANESTHESIOLOGY

## 2024-11-13 PROCEDURE — 27000221 HC OXYGEN, UP TO 24 HOURS

## 2024-11-13 PROCEDURE — C9290 INJ, BUPIVACAINE LIPOSOME: HCPCS | Performed by: PLASTIC SURGERY

## 2024-11-13 PROCEDURE — 27201423 OPTIME MED/SURG SUP & DEVICES STERILE SUPPLY: Performed by: PLASTIC SURGERY

## 2024-11-13 PROCEDURE — 0HRU07Z REPLACEMENT OF LEFT BREAST WITH AUTOLOGOUS TISSUE SUBSTITUTE, OPEN APPROACH: ICD-10-PCS | Performed by: PLASTIC SURGERY

## 2024-11-13 PROCEDURE — P9045 ALBUMIN (HUMAN), 5%, 250 ML: HCPCS | Mod: JZ,JG | Performed by: STUDENT IN AN ORGANIZED HEALTH CARE EDUCATION/TRAINING PROGRAM

## 2024-11-13 PROCEDURE — 0JBL0ZZ EXCISION OF RIGHT UPPER LEG SUBCUTANEOUS TISSUE AND FASCIA, OPEN APPROACH: ICD-10-PCS | Performed by: PLASTIC SURGERY

## 2024-11-13 PROCEDURE — 11000001 HC ACUTE MED/SURG PRIVATE ROOM

## 2024-11-13 PROCEDURE — 71000039 HC RECOVERY, EACH ADD'L HOUR: Performed by: PLASTIC SURGERY

## 2024-11-13 PROCEDURE — 94799 UNLISTED PULMONARY SVC/PX: CPT

## 2024-11-13 PROCEDURE — 25000003 PHARM REV CODE 250

## 2024-11-13 PROCEDURE — 94761 N-INVAS EAR/PLS OXIMETRY MLT: CPT

## 2024-11-13 PROCEDURE — C1729 CATH, DRAINAGE: HCPCS | Performed by: PLASTIC SURGERY

## 2024-11-13 DEVICE — THE GEM MICROVASCULAR ANASTOMOTIC COUPLER DEVICE RINGS ARE MADE OF POLYETHYLENE AND SURGICAL GRADE STAINLESS STEEL PINS. A PROTECTIVE COVER AND JAW ASSEMBLY PROTECT THE RINGS AND ALLOW FOR EASY LOADING ONTO THE ANASTOMOTIC INSTRUMENT. BOTH THE PROTECTIVE COVER AND JAW ASSEMBLY ARE DISPOSABLE. THE GEM MICROVASCULAR ANASTOMOTIC COUPLER DEVICE IS SINGLE-USE AND AVAILABLE IN VARIOUS SIZES
Type: IMPLANTABLE DEVICE | Site: BREAST | Status: FUNCTIONAL
Brand: GEM MICROVASCULAR ANASTOMOTIC COUPLER

## 2024-11-13 RX ORDER — MUPIROCIN 20 MG/G
OINTMENT TOPICAL
Status: DISCONTINUED | OUTPATIENT
Start: 2024-11-13 | End: 2024-11-13

## 2024-11-13 RX ORDER — HEPARIN SODIUM 10000 [USP'U]/ML
INJECTION, SOLUTION INTRAVENOUS; SUBCUTANEOUS
Status: DISCONTINUED | OUTPATIENT
Start: 2024-11-13 | End: 2024-11-13 | Stop reason: HOSPADM

## 2024-11-13 RX ORDER — SODIUM CHLORIDE, SODIUM LACTATE, POTASSIUM CHLORIDE, CALCIUM CHLORIDE 600; 310; 30; 20 MG/100ML; MG/100ML; MG/100ML; MG/100ML
INJECTION, SOLUTION INTRAVENOUS CONTINUOUS
Status: DISCONTINUED | OUTPATIENT
Start: 2024-11-13 | End: 2024-11-13

## 2024-11-13 RX ORDER — ALBUMIN HUMAN 50 G/1000ML
SOLUTION INTRAVENOUS
Status: DISCONTINUED | OUTPATIENT
Start: 2024-11-13 | End: 2024-11-13

## 2024-11-13 RX ORDER — DIPHENHYDRAMINE HYDROCHLORIDE 50 MG/ML
25 INJECTION INTRAMUSCULAR; INTRAVENOUS EVERY 6 HOURS PRN
Status: DISCONTINUED | OUTPATIENT
Start: 2024-11-13 | End: 2024-11-13

## 2024-11-13 RX ORDER — PROCHLORPERAZINE EDISYLATE 5 MG/ML
5 INJECTION INTRAMUSCULAR; INTRAVENOUS EVERY 6 HOURS PRN
Status: DISCONTINUED | OUTPATIENT
Start: 2024-11-13 | End: 2024-11-15 | Stop reason: HOSPADM

## 2024-11-13 RX ORDER — ONDANSETRON HYDROCHLORIDE 2 MG/ML
4 INJECTION, SOLUTION INTRAVENOUS EVERY 12 HOURS PRN
Status: DISCONTINUED | OUTPATIENT
Start: 2024-11-13 | End: 2024-11-15 | Stop reason: HOSPADM

## 2024-11-13 RX ORDER — METOPROLOL TARTRATE 1 MG/ML
INJECTION, SOLUTION INTRAVENOUS
Status: COMPLETED
Start: 2024-11-13 | End: 2024-11-13

## 2024-11-13 RX ORDER — ONDANSETRON HYDROCHLORIDE 2 MG/ML
INJECTION, SOLUTION INTRAMUSCULAR; INTRAVENOUS
Status: DISCONTINUED | OUTPATIENT
Start: 2024-11-13 | End: 2024-11-13

## 2024-11-13 RX ORDER — PHENYLEPHRINE HYDROCHLORIDE 10 MG/ML
INJECTION INTRAVENOUS
Status: DISCONTINUED | OUTPATIENT
Start: 2024-11-13 | End: 2024-11-13

## 2024-11-13 RX ORDER — MEPERIDINE HYDROCHLORIDE 25 MG/ML
12.5 INJECTION INTRAMUSCULAR; INTRAVENOUS; SUBCUTANEOUS ONCE AS NEEDED
Status: DISCONTINUED | OUTPATIENT
Start: 2024-11-13 | End: 2024-11-13

## 2024-11-13 RX ORDER — DEXAMETHASONE SODIUM PHOSPHATE 4 MG/ML
INJECTION, SOLUTION INTRA-ARTICULAR; INTRALESIONAL; INTRAMUSCULAR; INTRAVENOUS; SOFT TISSUE
Status: DISCONTINUED | OUTPATIENT
Start: 2024-11-13 | End: 2024-11-13

## 2024-11-13 RX ORDER — SODIUM CHLORIDE, SODIUM LACTATE, POTASSIUM CHLORIDE, CALCIUM CHLORIDE 600; 310; 30; 20 MG/100ML; MG/100ML; MG/100ML; MG/100ML
INJECTION, SOLUTION INTRAVENOUS CONTINUOUS
Status: DISCONTINUED | OUTPATIENT
Start: 2024-11-13 | End: 2024-11-14

## 2024-11-13 RX ORDER — PANTOPRAZOLE SODIUM 40 MG/1
40 TABLET, DELAYED RELEASE ORAL DAILY
Status: DISCONTINUED | OUTPATIENT
Start: 2024-11-14 | End: 2024-11-15 | Stop reason: HOSPADM

## 2024-11-13 RX ORDER — PROPOFOL 10 MG/ML
VIAL (ML) INTRAVENOUS CONTINUOUS PRN
Status: DISCONTINUED | OUTPATIENT
Start: 2024-11-13 | End: 2024-11-13

## 2024-11-13 RX ORDER — VIT C/E/ZN/COPPR/LUTEIN/ZEAXAN 250MG-90MG
500 CAPSULE ORAL DAILY
Status: DISCONTINUED | OUTPATIENT
Start: 2024-11-13 | End: 2024-11-15 | Stop reason: HOSPADM

## 2024-11-13 RX ORDER — PROPOFOL 10 MG/ML
VIAL (ML) INTRAVENOUS
Status: DISCONTINUED | OUTPATIENT
Start: 2024-11-13 | End: 2024-11-13

## 2024-11-13 RX ORDER — PAPAVERINE HYDROCHLORIDE 30 MG/ML
INJECTION INTRAMUSCULAR; INTRAVENOUS
Status: DISCONTINUED | OUTPATIENT
Start: 2024-11-13 | End: 2024-11-13 | Stop reason: HOSPADM

## 2024-11-13 RX ORDER — LIDOCAINE HYDROCHLORIDE AND EPINEPHRINE 10; 10 UG/ML; MG/ML
INJECTION, SOLUTION INFILTRATION; PERINEURAL
Status: DISCONTINUED | OUTPATIENT
Start: 2024-11-13 | End: 2024-11-13 | Stop reason: HOSPADM

## 2024-11-13 RX ORDER — DIPHENHYDRAMINE HCL 25 MG
25 CAPSULE ORAL EVERY 4 HOURS PRN
Status: DISCONTINUED | OUTPATIENT
Start: 2024-11-13 | End: 2024-11-15 | Stop reason: HOSPADM

## 2024-11-13 RX ORDER — VENLAFAXINE HYDROCHLORIDE 37.5 MG/1
150 CAPSULE, EXTENDED RELEASE ORAL DAILY
Status: DISCONTINUED | OUTPATIENT
Start: 2024-11-14 | End: 2024-11-15 | Stop reason: HOSPADM

## 2024-11-13 RX ORDER — HEPARIN SODIUM 1000 [USP'U]/ML
INJECTION, SOLUTION INTRAVENOUS; SUBCUTANEOUS
Status: DISCONTINUED | OUTPATIENT
Start: 2024-11-13 | End: 2024-11-13

## 2024-11-13 RX ORDER — ATORVASTATIN CALCIUM 20 MG/1
20 TABLET, FILM COATED ORAL NIGHTLY
Status: DISCONTINUED | OUTPATIENT
Start: 2024-11-13 | End: 2024-11-15 | Stop reason: HOSPADM

## 2024-11-13 RX ORDER — OXYCODONE HYDROCHLORIDE 10 MG/1
10 TABLET ORAL EVERY 4 HOURS PRN
Status: DISCONTINUED | OUTPATIENT
Start: 2024-11-13 | End: 2024-11-15 | Stop reason: HOSPADM

## 2024-11-13 RX ORDER — KETAMINE HCL IN 0.9 % NACL 50 MG/5 ML
SYRINGE (ML) INTRAVENOUS
Status: DISCONTINUED | OUTPATIENT
Start: 2024-11-13 | End: 2024-11-13

## 2024-11-13 RX ORDER — CYCLOBENZAPRINE HCL 10 MG
10 TABLET ORAL 3 TIMES DAILY
Status: DISCONTINUED | OUTPATIENT
Start: 2024-11-13 | End: 2024-11-15 | Stop reason: HOSPADM

## 2024-11-13 RX ORDER — SODIUM CHLORIDE 9 MG/ML
INJECTION, SOLUTION INTRAVENOUS CONTINUOUS
Status: DISCONTINUED | OUTPATIENT
Start: 2024-11-13 | End: 2024-11-13

## 2024-11-13 RX ORDER — HYDROMORPHONE HYDROCHLORIDE 2 MG/ML
0.4 INJECTION, SOLUTION INTRAMUSCULAR; INTRAVENOUS; SUBCUTANEOUS EVERY 5 MIN PRN
Status: DISCONTINUED | OUTPATIENT
Start: 2024-11-13 | End: 2024-11-13

## 2024-11-13 RX ORDER — METOPROLOL SUCCINATE 50 MG/1
50 TABLET, EXTENDED RELEASE ORAL NIGHTLY
Status: DISCONTINUED | OUTPATIENT
Start: 2024-11-13 | End: 2024-11-15 | Stop reason: HOSPADM

## 2024-11-13 RX ORDER — MIDAZOLAM HYDROCHLORIDE 1 MG/ML
INJECTION INTRAMUSCULAR; INTRAVENOUS
Status: DISCONTINUED | OUTPATIENT
Start: 2024-11-13 | End: 2024-11-13

## 2024-11-13 RX ORDER — OXYCODONE HYDROCHLORIDE 5 MG/1
5 TABLET ORAL
Status: DISCONTINUED | OUTPATIENT
Start: 2024-11-13 | End: 2024-11-13

## 2024-11-13 RX ORDER — ROCURONIUM BROMIDE 10 MG/ML
INJECTION, SOLUTION INTRAVENOUS
Status: DISCONTINUED | OUTPATIENT
Start: 2024-11-13 | End: 2024-11-13

## 2024-11-13 RX ORDER — SODIUM CHLORIDE 0.9 % (FLUSH) 0.9 %
3 SYRINGE (ML) INJECTION
Status: DISCONTINUED | OUTPATIENT
Start: 2024-11-13 | End: 2024-11-13

## 2024-11-13 RX ORDER — GLUCAGON 1 MG
1 KIT INJECTION
Status: DISCONTINUED | OUTPATIENT
Start: 2024-11-13 | End: 2024-11-13

## 2024-11-13 RX ORDER — OXYCODONE HYDROCHLORIDE 5 MG/1
5 TABLET ORAL EVERY 4 HOURS PRN
Status: DISCONTINUED | OUTPATIENT
Start: 2024-11-13 | End: 2024-11-15 | Stop reason: HOSPADM

## 2024-11-13 RX ORDER — ENOXAPARIN SODIUM 100 MG/ML
40 INJECTION SUBCUTANEOUS EVERY 24 HOURS
Status: DISCONTINUED | OUTPATIENT
Start: 2024-11-13 | End: 2024-11-15 | Stop reason: HOSPADM

## 2024-11-13 RX ORDER — ACETAMINOPHEN 500 MG
1000 TABLET ORAL EVERY 6 HOURS
Status: DISCONTINUED | OUTPATIENT
Start: 2024-11-13 | End: 2024-11-15 | Stop reason: HOSPADM

## 2024-11-13 RX ORDER — BUPIVACAINE 13.3 MG/ML
INJECTION, SUSPENSION, LIPOSOMAL INFILTRATION
Status: DISCONTINUED | OUTPATIENT
Start: 2024-11-13 | End: 2024-11-13 | Stop reason: HOSPADM

## 2024-11-13 RX ORDER — ANASTROZOLE 1 MG/1
1 TABLET ORAL DAILY
Status: DISCONTINUED | OUTPATIENT
Start: 2024-11-13 | End: 2024-11-14

## 2024-11-13 RX ORDER — PROCHLORPERAZINE EDISYLATE 5 MG/ML
5 INJECTION INTRAMUSCULAR; INTRAVENOUS EVERY 30 MIN PRN
Status: DISCONTINUED | OUTPATIENT
Start: 2024-11-13 | End: 2024-11-13

## 2024-11-13 RX ORDER — LIDOCAINE HYDROCHLORIDE 20 MG/ML
INJECTION, SOLUTION EPIDURAL; INFILTRATION; INTRACAUDAL; PERINEURAL
Status: DISCONTINUED | OUTPATIENT
Start: 2024-11-13 | End: 2024-11-13

## 2024-11-13 RX ORDER — LIDOCAINE HYDROCHLORIDE 10 MG/ML
0.5 INJECTION, SOLUTION EPIDURAL; INFILTRATION; INTRACAUDAL; PERINEURAL ONCE
Status: DISCONTINUED | OUTPATIENT
Start: 2024-11-13 | End: 2024-11-13

## 2024-11-13 RX ORDER — METOPROLOL TARTRATE 1 MG/ML
5 INJECTION, SOLUTION INTRAVENOUS ONCE
Status: COMPLETED | OUTPATIENT
Start: 2024-11-13 | End: 2024-11-13

## 2024-11-13 RX ORDER — KETOROLAC TROMETHAMINE 30 MG/ML
15 INJECTION, SOLUTION INTRAMUSCULAR; INTRAVENOUS EVERY 6 HOURS
Status: DISCONTINUED | OUTPATIENT
Start: 2024-11-13 | End: 2024-11-15 | Stop reason: HOSPADM

## 2024-11-13 RX ORDER — METOPROLOL TARTRATE 1 MG/ML
INJECTION, SOLUTION INTRAVENOUS
Status: DISPENSED
Start: 2024-11-13 | End: 2024-11-14

## 2024-11-13 RX ORDER — CEFAZOLIN SODIUM 1 G/3ML
1 INJECTION, POWDER, FOR SOLUTION INTRAMUSCULAR; INTRAVENOUS
Status: DISCONTINUED | OUTPATIENT
Start: 2024-11-13 | End: 2024-11-15 | Stop reason: HOSPADM

## 2024-11-13 RX ORDER — DOCUSATE SODIUM 100 MG/1
100 CAPSULE, LIQUID FILLED ORAL EVERY 12 HOURS
Status: DISCONTINUED | OUTPATIENT
Start: 2024-11-13 | End: 2024-11-15 | Stop reason: HOSPADM

## 2024-11-13 RX ORDER — DILTIAZEM HYDROCHLORIDE 5 MG/ML
10 INJECTION INTRAVENOUS ONCE
Status: COMPLETED | OUTPATIENT
Start: 2024-11-13 | End: 2024-11-13

## 2024-11-13 RX ORDER — DIAZEPAM 5 MG/1
5 TABLET ORAL EVERY 6 HOURS PRN
Status: DISCONTINUED | OUTPATIENT
Start: 2024-11-13 | End: 2024-11-15 | Stop reason: HOSPADM

## 2024-11-13 RX ORDER — ZOLPIDEM TARTRATE 5 MG/1
5 TABLET ORAL NIGHTLY PRN
Status: DISCONTINUED | OUTPATIENT
Start: 2024-11-13 | End: 2024-11-15 | Stop reason: HOSPADM

## 2024-11-13 RX ORDER — ALBUTEROL SULFATE 90 UG/1
INHALANT RESPIRATORY (INHALATION)
Status: DISCONTINUED | OUTPATIENT
Start: 2024-11-13 | End: 2024-11-13

## 2024-11-13 RX ORDER — HEPARIN SODIUM 5000 [USP'U]/ML
5000 INJECTION, SOLUTION INTRAVENOUS; SUBCUTANEOUS ONCE
Status: COMPLETED | OUTPATIENT
Start: 2024-11-13 | End: 2024-11-13

## 2024-11-13 RX ORDER — CEFAZOLIN 2 G/1
2 INJECTION, POWDER, FOR SOLUTION INTRAMUSCULAR; INTRAVENOUS
Status: COMPLETED | OUTPATIENT
Start: 2024-11-13 | End: 2024-11-13

## 2024-11-13 RX ORDER — PREGABALIN 75 MG/1
75 CAPSULE ORAL ONCE
Status: COMPLETED | OUTPATIENT
Start: 2024-11-13 | End: 2024-11-13

## 2024-11-13 RX ORDER — FENTANYL CITRATE 50 UG/ML
INJECTION, SOLUTION INTRAMUSCULAR; INTRAVENOUS
Status: DISCONTINUED | OUTPATIENT
Start: 2024-11-13 | End: 2024-11-13

## 2024-11-13 RX ORDER — ESMOLOL HYDROCHLORIDE 10 MG/ML
10 INJECTION INTRAVENOUS ONCE
Status: COMPLETED | OUTPATIENT
Start: 2024-11-13 | End: 2024-11-13

## 2024-11-13 RX ORDER — MUPIROCIN 20 MG/G
OINTMENT TOPICAL 2 TIMES DAILY
Status: DISCONTINUED | OUTPATIENT
Start: 2024-11-13 | End: 2024-11-15 | Stop reason: HOSPADM

## 2024-11-13 RX ORDER — VECURONIUM BROMIDE 1 MG/ML
INJECTION, POWDER, LYOPHILIZED, FOR SOLUTION INTRAVENOUS
Status: DISCONTINUED | OUTPATIENT
Start: 2024-11-13 | End: 2024-11-13

## 2024-11-13 RX ORDER — ACETAMINOPHEN 500 MG
1000 TABLET ORAL
Status: COMPLETED | OUTPATIENT
Start: 2024-11-13 | End: 2024-11-13

## 2024-11-13 RX ADMIN — CYANOCOBALAMIN TAB 500 MCG 500 MCG: 500 TAB at 06:11

## 2024-11-13 RX ADMIN — ANASTROZOLE 1 MG: 1 TABLET, COATED ORAL at 09:11

## 2024-11-13 RX ADMIN — ACETAMINOPHEN 1000 MG: 500 TABLET, FILM COATED ORAL at 11:11

## 2024-11-13 RX ADMIN — VECURONIUM BROMIDE FOR INJECTION 2 MG: 1 INJECTION, POWDER, LYOPHILIZED, FOR SOLUTION INTRAVENOUS at 11:11

## 2024-11-13 RX ADMIN — METOROPROLOL TARTRATE 5 MG: 5 INJECTION, SOLUTION INTRAVENOUS at 01:11

## 2024-11-13 RX ADMIN — Medication 30 MG: at 08:11

## 2024-11-13 RX ADMIN — MUPIROCIN: 20 OINTMENT TOPICAL at 06:11

## 2024-11-13 RX ADMIN — ALBUMIN (HUMAN) 250 ML: 12.5 SOLUTION INTRAVENOUS at 10:11

## 2024-11-13 RX ADMIN — DEXAMETHASONE SODIUM PHOSPHATE 8 MG: 4 INJECTION, SOLUTION INTRAMUSCULAR; INTRAVENOUS at 08:11

## 2024-11-13 RX ADMIN — PROPOFOL 100 MCG/KG/MIN: 10 INJECTION, EMULSION INTRAVENOUS at 08:11

## 2024-11-13 RX ADMIN — MIDAZOLAM HYDROCHLORIDE 2 MG: 1 INJECTION INTRAMUSCULAR; INTRAVENOUS at 07:11

## 2024-11-13 RX ADMIN — HYDROMORPHONE HYDROCHLORIDE 0.4 MG: 2 INJECTION, SOLUTION INTRAMUSCULAR; INTRAVENOUS; SUBCUTANEOUS at 02:11

## 2024-11-13 RX ADMIN — PROPOFOL 30 MG: 10 INJECTION, EMULSION INTRAVENOUS at 09:11

## 2024-11-13 RX ADMIN — HEPARIN SODIUM 1500 UNITS: 1000 INJECTION, SOLUTION INTRAVENOUS; SUBCUTANEOUS at 10:11

## 2024-11-13 RX ADMIN — VECURONIUM BROMIDE FOR INJECTION 2 MG: 1 INJECTION, POWDER, LYOPHILIZED, FOR SOLUTION INTRAVENOUS at 09:11

## 2024-11-13 RX ADMIN — HEPARIN SODIUM 5000 UNITS: 5000 INJECTION INTRAVENOUS; SUBCUTANEOUS at 06:11

## 2024-11-13 RX ADMIN — PHENYLEPHRINE HYDROCHLORIDE 200 MCG: 10 INJECTION INTRAVENOUS at 08:11

## 2024-11-13 RX ADMIN — PREGABALIN 75 MG: 75 CAPSULE ORAL at 06:11

## 2024-11-13 RX ADMIN — ROCURONIUM BROMIDE 30 MG: 10 INJECTION INTRAVENOUS at 08:11

## 2024-11-13 RX ADMIN — SUGAMMADEX 400 MG: 100 INJECTION, SOLUTION INTRAVENOUS at 11:11

## 2024-11-13 RX ADMIN — FENTANYL CITRATE 50 MCG: 50 INJECTION, SOLUTION INTRAMUSCULAR; INTRAVENOUS at 09:11

## 2024-11-13 RX ADMIN — SODIUM CHLORIDE, POTASSIUM CHLORIDE, SODIUM LACTATE AND CALCIUM CHLORIDE: 600; 310; 30; 20 INJECTION, SOLUTION INTRAVENOUS at 05:11

## 2024-11-13 RX ADMIN — PROPOFOL 150 MG: 10 INJECTION, EMULSION INTRAVENOUS at 08:11

## 2024-11-13 RX ADMIN — ROCURONIUM BROMIDE 20 MG: 10 INJECTION INTRAVENOUS at 09:11

## 2024-11-13 RX ADMIN — ACETAMINOPHEN 1000 MG: 500 TABLET, FILM COATED ORAL at 06:11

## 2024-11-13 RX ADMIN — KETOROLAC TROMETHAMINE 15 MG: 30 INJECTION, SOLUTION INTRAMUSCULAR; INTRAVENOUS at 11:11

## 2024-11-13 RX ADMIN — ACETAMINOPHEN 1000 MG: 500 TABLET, FILM COATED ORAL at 02:11

## 2024-11-13 RX ADMIN — FENTANYL CITRATE 100 MCG: 50 INJECTION, SOLUTION INTRAMUSCULAR; INTRAVENOUS at 08:11

## 2024-11-13 RX ADMIN — ONDANSETRON 4 MG: 2 INJECTION INTRAMUSCULAR; INTRAVENOUS at 11:11

## 2024-11-13 RX ADMIN — VECURONIUM BROMIDE FOR INJECTION 2 MG: 1 INJECTION, POWDER, LYOPHILIZED, FOR SOLUTION INTRAVENOUS at 10:11

## 2024-11-13 RX ADMIN — THERA TABS 1 TABLET: TAB at 06:11

## 2024-11-13 RX ADMIN — Medication 10 MG: at 10:11

## 2024-11-13 RX ADMIN — METOPROLOL TARTRATE 5 MG: 1 INJECTION, SOLUTION INTRAVENOUS at 01:11

## 2024-11-13 RX ADMIN — ALBUMIN (HUMAN) 500 ML: 12.5 SOLUTION INTRAVENOUS at 08:11

## 2024-11-13 RX ADMIN — Medication 10 MG: at 09:11

## 2024-11-13 RX ADMIN — METOPROLOL SUCCINATE 50 MG: 50 TABLET, EXTENDED RELEASE ORAL at 09:11

## 2024-11-13 RX ADMIN — LIDOCAINE HYDROCHLORIDE 60 MG: 20 INJECTION, SOLUTION EPIDURAL; INFILTRATION; INTRACAUDAL; PERINEURAL at 08:11

## 2024-11-13 RX ADMIN — DILTIAZEM HYDROCHLORIDE 10 MG: 5 INJECTION INTRAVENOUS at 02:11

## 2024-11-13 RX ADMIN — CEFAZOLIN 2 G: 2 INJECTION, POWDER, FOR SOLUTION INTRAMUSCULAR; INTRAVENOUS at 08:11

## 2024-11-13 RX ADMIN — SODIUM CHLORIDE, SODIUM LACTATE, POTASSIUM CHLORIDE, AND CALCIUM CHLORIDE: .6; .31; .03; .02 INJECTION, SOLUTION INTRAVENOUS at 07:11

## 2024-11-13 RX ADMIN — MUPIROCIN: 20 OINTMENT TOPICAL at 09:11

## 2024-11-13 RX ADMIN — OXYCODONE HYDROCHLORIDE 5 MG: 5 TABLET ORAL at 01:11

## 2024-11-13 RX ADMIN — METOROPROLOL TARTRATE 5 MG: 5 INJECTION, SOLUTION INTRAVENOUS at 02:11

## 2024-11-13 RX ADMIN — CEFAZOLIN 1 G: 330 INJECTION, POWDER, FOR SOLUTION INTRAMUSCULAR; INTRAVENOUS at 06:11

## 2024-11-13 RX ADMIN — ATORVASTATIN CALCIUM 20 MG: 20 TABLET, FILM COATED ORAL at 09:11

## 2024-11-13 RX ADMIN — ALBUTEROL SULFATE 4 PUFF: 90 AEROSOL, METERED RESPIRATORY (INHALATION) at 11:11

## 2024-11-13 RX ADMIN — ENOXAPARIN SODIUM 40 MG: 40 INJECTION SUBCUTANEOUS at 06:11

## 2024-11-13 RX ADMIN — KETOROLAC TROMETHAMINE 15 MG: 30 INJECTION, SOLUTION INTRAMUSCULAR; INTRAVENOUS at 06:11

## 2024-11-13 RX ADMIN — ESMOLOL HYDROCHLORIDE 10 MG: 10 INJECTION, SOLUTION INTRAVENOUS at 01:11

## 2024-11-13 RX ADMIN — METOPROLOL TARTRATE 5 MG: 1 INJECTION, SOLUTION INTRAVENOUS at 02:11

## 2024-11-13 RX ADMIN — FENTANYL CITRATE 50 MCG: 50 INJECTION, SOLUTION INTRAMUSCULAR; INTRAVENOUS at 11:11

## 2024-11-13 RX ADMIN — PHENYLEPHRINE HYDROCHLORIDE 0.5 MCG/KG/MIN: 10 INJECTION INTRAVENOUS at 08:11

## 2024-11-13 RX ADMIN — CYCLOBENZAPRINE HYDROCHLORIDE 10 MG: 10 TABLET, FILM COATED ORAL at 09:11

## 2024-11-13 RX ADMIN — CEFAZOLIN 1 G: 330 INJECTION, POWDER, FOR SOLUTION INTRAMUSCULAR; INTRAVENOUS at 11:11

## 2024-11-13 RX ADMIN — ROCURONIUM BROMIDE 50 MG: 10 INJECTION INTRAVENOUS at 08:11

## 2024-11-13 RX ADMIN — MUPIROCIN: 20 OINTMENT TOPICAL at 12:11

## 2024-11-13 NOTE — ANESTHESIA PROCEDURE NOTES
Intubation    Date/Time: 11/13/2024 8:11 AM    Performed by: Marcela Manuel CRNA  Authorized by: Joo Fonseca MD    Intubation:     Induction:  Intravenous    Mask Ventilation:  Easy with oral airway    Attempts:  1    Attempted By:  Student (KAYLA Alan)    Method of Intubation:  Video laryngoscopy    Blade:  Dean 3    Laryngeal View Grade: Grade I - full view of cords      Difficult Airway Encountered?: No      Complications:  None    Airway Device:  Oral endotracheal tube    Airway Device Size:  7.0    Style/Cuff Inflation:  Cuffed    Inflation Amount (mL):  5    Tube secured:  22    Secured at:  The lips    Placement Verified By:  Capnometry    Complicating Factors:  None    Findings Post-Intubation:  BS equal bilateral

## 2024-11-13 NOTE — BRIEF OP NOTE
Psychiatric ([place])  Brief Operative Note    Surgery Date: 11/13/2024     Surgeons and Role:     * Ming Milian MD - Primary     * Chalo Yoon MD - Assisting    Pre-op Diagnosis:  HX: breast cancer     Post-op Diagnosis:  same    Procedure:  Free right profunda artery  flap for left breast reconstruction    Anesthesia: General    Operative Findings:   Implants:   1. Right profunda artery-to-left IMV venous anastomic : 2.5 mm      Findings:   Micro Information  First flap  Donor site: right PAP  Recipient site: left breast  Weight: not weighed intraoperatively  Number of perforators: 1 PAP  Name arterial pedicle: right profunda artery  flap  Recipient artery: left JUSTIN  Estimate size of arterial anastomosis: 2.0 mm  Pedicle Vein  Donor: right profunda vein  Recipient: Left IMV  Size: 2.5 mm  Additional veins (0)  none    Estimated Blood Loss: 100 ccs         Specimens:   Specimen (24h ago, onward)      None          Hernan Carlos MD  [facility] Re[facility]ive [facility], [facility]  11/13/2024

## 2024-11-13 NOTE — TRANSFER OF CARE
"Anesthesia Transfer of Care Note    Patient: Lucia Matias    Procedure(s) Performed: Procedure(s) (LRB):  RECONSTRUCTION, BREAST, USING FREE FLAP / LEFT PAP FLAP (Left)    Patient location: PACU    Anesthesia Type: general    Transport from OR: Transported from OR on 6-10 L/min O2 by face mask with adequate spontaneous ventilation    Post pain: adequate analgesia    Post assessment: no apparent anesthetic complications and tolerated procedure well    Post vital signs: stable    Level of consciousness: awake and alert    Nausea/Vomiting: no nausea/vomiting    Complications: none    Transfer of care protocol was followed      Last vitals: Visit Vitals  /66 (BP Location: Right forearm, Patient Position: Sitting)   Pulse 71   Temp 36.8 °C (98.3 °F) (Oral)   Resp 18   Ht 5' 2" (1.575 m)   Wt 77.6 kg (171 lb)   SpO2 95%   Breastfeeding No   BMI 31.28 kg/m²     "

## 2024-11-13 NOTE — OR NURSING
VSS on 2L NC. Pt denies pain. Dressings C/D/I. Flap checks pink/warm/strong external doppler signal. VIANEY drains to bulb sxn. Olson removed prior to transfer per order. Family notified. Meets PACU discharge criteria, ready for transfer to . Report given to mendoza Astudillo RN.

## 2024-11-13 NOTE — PLAN OF CARE
Patient prefers their , Luan, at bedside receive updates. Daughter, Kasandra to receive text updates as well.

## 2024-11-13 NOTE — ANESTHESIA POSTPROCEDURE EVALUATION
Anesthesia Post Evaluation    Patient: Lucia Matias    Procedure(s) Performed: Procedure(s) (LRB):  RECONSTRUCTION, BREAST, USING FREE FLAP / LEFT PAP FLAP (Left)    Final Anesthesia Type: general      Patient location during evaluation: PACU  Patient participation: Yes- Able to Participate  Level of consciousness: awake and alert  Post-procedure vital signs: reviewed and stable  Pain management: adequate  Airway patency: patent  KIMBERLY mitigation strategies: Extubation while patient is awake  PONV status at discharge: No PONV  Anesthetic complications: no      Cardiovascular status: hemodynamically stable  Respiratory status: unassisted  Hydration status: euvolemic  Follow-up not needed.              Vitals Value Taken Time   /50 11/13/24 1447   Temp 36.4 °C (97.5 °F) 11/13/24 1400   Pulse 74 11/13/24 1451   Resp 16 11/13/24 1430   SpO2 94 % 11/13/24 1451   Vitals shown include unfiled device data.      No case tracking events are documented in the log.      Pain/Fred Score: Pain Rating Prior to Med Admin: 7 (11/13/2024  2:02 PM)  Fred Score: 8 (11/13/2024  1:00 PM)

## 2024-11-13 NOTE — OP NOTE
OPERATIVE REPORT    Date of Service 11/13/2024  MRN [id number]  Patient Name Lucia Matias    SURGEON: Ming Milian MD; Chalo Yoon Sr., MD    ASSISTANT: Hernan Carlos MD    PREOPERATIVE DIAGNOSIS   History of breast cancer  History of bilateral SIEA breast reconstruction  History of left breast fat necrosis s/p excision  Breast asymmetry    POSTOPERATIVE DIAGNOSIS  History of breast cancer  History of bilateral SIEA breast reconstruction  History of left breast fat necrosis s/p excision  Breast asymmetry    PROCEDURE  Free right profunda artery  flap for left breast reconstruction    ANESTHESIA: GENERAL, ENDOTRACHEAL.    FINDINGS:  Implant Name Type Inv. Item Serial No.  Lot No. LRB No. Used Action    MICROVAS ANSTMS 2.5MM - [id number]   MICROVAS ANSTMS 2.5MM  [employer] [id number] Left 1 Implanted     Implants:   1. Right profunda artery-to-left IMV venous anastomic : 2.5 mm      Findings:   Micro Information  First flap  Donor site: right PAP  Recipient site: left breast  Weight: not weighed intraoperatively  Number of perforators: 1 PAP  Name arterial pedicle: right profunda artery  flap  Recipient artery: left JUSTIN  Estimate size of arterial anastomosis: 2.0 mm  Pedicle Vein  Donor: right profunda vein  Recipient: Left IMV  Size: 2.5 mm  Additional veins (0)  none    INTAKE / OUTPUT  Estimated blood loss: 100 mL    SPECIMENS:   none    CULTURES: NONE    DRAINS:  - right thigh 15 Latvian VIANEY drain  - left breast 15 [place]vian VIANEY drain    COMPLICATIONS: None.    COUNTS: Sponge and needle counts correct. Radiofrequency negative    DISPOSITION: Extubated to postanesthesia care unit.    INDICATIONS  Lucia Matias is a 64 year old female with history of breast cancer s/p bilateral mastectomy with bilateral SIEA breast reconstruction. During last procedure her left breast SIEA was anastomosed to JUSTIN/IMV perforators preserving distal flow  to the JUSTIN/IMV. This left breast medial and lower pole developed fat necrosis and is s/p multiple debridements. The risks, benefits, alternatives, expected outcomes, recovery time, and potential morbidity were discussed and the patient wished to proceed. Informed consent was obtained.    OPERATIVE PROCEDURE  The patient was met in the preoperative holding area. The patient denied an interval change in her health and review of preoperative screening tests was normal. The operative sites were marked including sternal midline, IMF, and right thigh. Perforators were marked based on pre-operative mapping from the abdominal imaging study.    The patient was then taken to the operating room and a surgical time-out verified her identity, diagnosis, nature and sites of procedures. Necessary equipment was verified. The patient was given 2 grams cefazolin intravenously for antimicrobial prophylaxis. SCD boots were placed. After induction of anesthesia and orotracheal intubation an indwelling bladder catheter was placed.  She was placed in lithotomy position for PAP flap harvest.  The entire chest and both upper thighs were sterilely prepped and draped.    In the left chest, 1% lidocaine with epinephrine was injected into prior breast incision.  A scalpel incised the skin and dermis and Bovie electrocautery continued dissection underneath left breast flap in prepectoral plane cranially. The anterior breast skin was scarred and required scoring of prior scar in cross hatched fashion.  Due to prior SIEA anastomosis to JUSTIN/IMV perforators, superior dissection was limited with planned access to internal mammary vessels inferiorly. The pectoralis muscle overlying the fourth rib was then dissected/split using Bovie electrocautery to reveal 4th rib. We then identified the fourth costochondral cartilage. The cartilage was then scored with the electrocautery. The perichodrium was then elevated off the cartilage using a freer elevator.  The medial portion of the fourth costal cartilage was then removed piecemeal with a Ronjeur. The internal mammary vein (IMV) and internal mammary artery (JUSTIN) were then identified running under a thin layer of the perichondrium posteriorly. To assure sufficient length for the anastomosis, the intercostal muscle in the intercostal space between the 4th and 6th rib was dissected free and removed. The JUSTIN and IMV were then circumferentially dissected with Bipolar cautery. The vessels were bathed with Papaverine. 20 ccs Exparel was injected locally to the tissue of the chest wall for PEC 1 and PEC 2 blocks. A 15F Rafi drain was placed in the breast pocket and secured at a separate exit site with 3-0 nylon suture. Hemotasis was assured.    Attention was then turned to right transverse PAP flap harvest.  Markings were made in transverse fashion just below gluteal crease incorporating a proximal  coming through adductor feliciano. A transversely oriented PAP flap was then incised with dissection proceeding from medially to laterally. Dissection carried down past posterior border of gracilis muscle.  Perforators through the adductor feliciano muscle were then identified and intramuscular dissection performed.  There was 1 large  identified and dissected back to a common origin branching off the profunda artery which was hemoclipped. Posterior dissection was completed in a more superficial plane to prevent injury to the posterior femoral cutaneous nerve.     The right PAP flap was brought to the left breast, and the operating microscope was brought onto the field. The vessels of both the flap and recipient artery and vein were prepared by trimming the adventitia. A 2.5 mm venous anastomotic  was used to anastomose the right profunda vein with the left IMV in an anterograde fashion. The arterial anastomosis was performed with a 9-0 nylon suture in interrupted fashion in an anterograde fashion. At the  conclusion of the anastomosis, excellent blood flow was clinically observed in the artery and both veins. Bright red bleeding was observed on the flap. A skin signal on the flap was found and 5-0 prolene marking stitch was placed for postoperative monitoring. The flap was noted to be warm and well-perfused.     The left PAP flap was shaped to create aesthetically pleasing appearance of the left lower pole of the breast and flap secured within the breast pockets with 2-0 vicryl suture. The left breast was closed with 2-0 vicryl interrupted deep dermal and 2-0 Quill running subcuticular closure.     Hemostasis was then checked for the right thigh and 100 ccs of exparel was injected locally into right thigh. The skin and subcutaneous tissue of the posterior thigh was then closed over 15 Fr VIANEY drain secured with a  3-0 nylon drain stitch.  2-0 PDS was interrupted closure of fascia of the thigh followed by a 2 layer 2-0 quill in running deep dermal and running subcuticular fashion.     Prineo was applied to all surgical incisions. A right thigh compression wrap was applied with ABD, kerlix, and ace wrap. A surgical bra was applied padded with ABDs. At the end of the procedure, all needle, sponge, and instrument counts were correct on two occasions. Strong doppler signals were appreciated on left breast flap with pencil doppler. The patient was then awakened from general anesthesia without complication and returned to the post-anesthesia recovery unit in stable condition.      Dr. Ming Milian and Dr. Chalo Yoon Sr. were present and scrubbed throughout all critical portions of the procedure.     Electronically signed by:  Hernan Carlos  11/13/2024

## 2024-11-14 ENCOUNTER — PATIENT MESSAGE (OUTPATIENT)
Dept: PLASTIC SURGERY | Facility: CLINIC | Age: 64
End: 2024-11-14
Payer: COMMERCIAL

## 2024-11-14 PROCEDURE — 97530 THERAPEUTIC ACTIVITIES: CPT

## 2024-11-14 PROCEDURE — 25000003 PHARM REV CODE 250: Performed by: STUDENT IN AN ORGANIZED HEALTH CARE EDUCATION/TRAINING PROGRAM

## 2024-11-14 PROCEDURE — 97165 OT EVAL LOW COMPLEX 30 MIN: CPT

## 2024-11-14 PROCEDURE — 94799 UNLISTED PULMONARY SVC/PX: CPT

## 2024-11-14 PROCEDURE — 94761 N-INVAS EAR/PLS OXIMETRY MLT: CPT

## 2024-11-14 PROCEDURE — 63600175 PHARM REV CODE 636 W HCPCS: Performed by: STUDENT IN AN ORGANIZED HEALTH CARE EDUCATION/TRAINING PROGRAM

## 2024-11-14 PROCEDURE — 97535 SELF CARE MNGMENT TRAINING: CPT

## 2024-11-14 PROCEDURE — 97162 PT EVAL MOD COMPLEX 30 MIN: CPT

## 2024-11-14 PROCEDURE — 11000001 HC ACUTE MED/SURG PRIVATE ROOM

## 2024-11-14 RX ORDER — ANASTROZOLE 1 MG/1
1 TABLET ORAL NIGHTLY
Status: DISCONTINUED | OUTPATIENT
Start: 2024-11-14 | End: 2024-11-15 | Stop reason: HOSPADM

## 2024-11-14 RX ORDER — FLUTICASONE FUROATE AND VILANTEROL 200; 25 UG/1; UG/1
1 POWDER RESPIRATORY (INHALATION) DAILY
Status: DISCONTINUED | OUTPATIENT
Start: 2024-11-14 | End: 2024-11-15 | Stop reason: HOSPADM

## 2024-11-14 RX ADMIN — VENLAFAXINE HYDROCHLORIDE 150 MG: 37.5 CAPSULE, EXTENDED RELEASE ORAL at 09:11

## 2024-11-14 RX ADMIN — KETOROLAC TROMETHAMINE 15 MG: 30 INJECTION, SOLUTION INTRAMUSCULAR; INTRAVENOUS at 05:11

## 2024-11-14 RX ADMIN — CYANOCOBALAMIN TAB 500 MCG 500 MCG: 500 TAB at 09:11

## 2024-11-14 RX ADMIN — METOPROLOL SUCCINATE 50 MG: 50 TABLET, EXTENDED RELEASE ORAL at 08:11

## 2024-11-14 RX ADMIN — CEFAZOLIN 1 G: 330 INJECTION, POWDER, FOR SOLUTION INTRAMUSCULAR; INTRAVENOUS at 09:11

## 2024-11-14 RX ADMIN — CEFAZOLIN 1 G: 330 INJECTION, POWDER, FOR SOLUTION INTRAMUSCULAR; INTRAVENOUS at 04:11

## 2024-11-14 RX ADMIN — KETOROLAC TROMETHAMINE 15 MG: 30 INJECTION, SOLUTION INTRAMUSCULAR; INTRAVENOUS at 01:11

## 2024-11-14 RX ADMIN — PANTOPRAZOLE SODIUM 40 MG: 40 TABLET, DELAYED RELEASE ORAL at 09:11

## 2024-11-14 RX ADMIN — MUPIROCIN: 20 OINTMENT TOPICAL at 08:11

## 2024-11-14 RX ADMIN — ENOXAPARIN SODIUM 40 MG: 40 INJECTION SUBCUTANEOUS at 05:11

## 2024-11-14 RX ADMIN — ATORVASTATIN CALCIUM 20 MG: 20 TABLET, FILM COATED ORAL at 08:11

## 2024-11-14 RX ADMIN — ANASTROZOLE 1 MG: 1 TABLET, COATED ORAL at 08:11

## 2024-11-14 RX ADMIN — ACETAMINOPHEN 1000 MG: 500 TABLET, FILM COATED ORAL at 11:11

## 2024-11-14 RX ADMIN — OXYCODONE HYDROCHLORIDE 10 MG: 10 TABLET ORAL at 03:11

## 2024-11-14 RX ADMIN — CYCLOBENZAPRINE HYDROCHLORIDE 10 MG: 10 TABLET, FILM COATED ORAL at 08:11

## 2024-11-14 RX ADMIN — OXYCODONE HYDROCHLORIDE 10 MG: 10 TABLET ORAL at 05:11

## 2024-11-14 RX ADMIN — KETOROLAC TROMETHAMINE 15 MG: 30 INJECTION, SOLUTION INTRAMUSCULAR; INTRAVENOUS at 11:11

## 2024-11-14 RX ADMIN — MUPIROCIN: 20 OINTMENT TOPICAL at 09:11

## 2024-11-14 RX ADMIN — OXYCODONE HYDROCHLORIDE 10 MG: 10 TABLET ORAL at 10:11

## 2024-11-14 RX ADMIN — CEFAZOLIN 1 G: 330 INJECTION, POWDER, FOR SOLUTION INTRAMUSCULAR; INTRAVENOUS at 11:11

## 2024-11-14 RX ADMIN — OXYCODONE HYDROCHLORIDE 10 MG: 10 TABLET ORAL at 08:11

## 2024-11-14 RX ADMIN — CYCLOBENZAPRINE HYDROCHLORIDE 10 MG: 10 TABLET, FILM COATED ORAL at 03:11

## 2024-11-14 RX ADMIN — CYCLOBENZAPRINE HYDROCHLORIDE 10 MG: 10 TABLET, FILM COATED ORAL at 09:11

## 2024-11-14 RX ADMIN — THERA TABS 1 TABLET: TAB at 09:11

## 2024-11-14 RX ADMIN — ACETAMINOPHEN 1000 MG: 500 TABLET, FILM COATED ORAL at 05:11

## 2024-11-14 NOTE — NURSING
Pt arrived to floor from PACU. Spouse at bedside. LR infusing at 100 ml/hr. 2L NC. SCDs initiated. A&Ox4. VSS. No wayne - due to void. Headache reported. Flap check performed with NORMA Wade. Drains and dressings intact.

## 2024-11-14 NOTE — PROGRESS NOTES
Plastic Surgery Progress Note    Subjective:  NAEON  AFVSS   Pain tolerable  Tolerating PO intake without N/V.   Denies f/c, CP, SOB    Objective:  Vitals:    11/14/24 0506   BP:    Pulse:    Resp: 18   Temp:        PE:   Gen: NAD, Aox3  CV: RR  Pulm: NWOB  Left  breasts: flap soft warm with good cap refill for inferior pole skin paddle. No signs of infection, hematoma, seroma, dehiscence. Strong doppler pulses on left external skin signal just medial to marking stitch. VIANEY drains with s/s output    Right thigh: soft, appropriately tender, with incision c/d/I. Compression wrap in place. VIANEY drain with s/s output    Assessment:  64 y.o. female w/ hx of breast cancer s/p bilateral mastectomy with bilateral SIEA breast reconstruction who developed left breast fat necrosis s/p multiple excisions. She is most recently s/p right free PAP for left breast reconstruction    Plan:  - q4 flap checks  - PT/OT  - drain care  - Incentive spirometer  - pain control: Pal Tylenol, flexeril, and Toradol, PRN oxy   - Bowel regimen: Colace   - Diet: Advance to regular diet   - Abx: Ancef  - DVT ppx: Lovenox, SCDs, ambulation   - Activity/Restrictions: OK to ambulate  - Dispo: Likely discharge tomorrow vs POD3. Do not anticipate any post hospitalization needs      Hernan Carlos MD  [facility], PGY-V

## 2024-11-14 NOTE — CHAPLAIN
"   11/14/24 1537   Clinical Encounter Type   Visit Type Initial Visit   Visit Category General Rounding   Visited With Patient and family together   Number of Family Visited 1   Length of Visit 20 Minutes   Sabianist Encounters   Spiritual Resources Requested Prayer   Patient Spiritual Encounters   Care Provided Compassionate presence;Reflective listening;Life review/ reflection;Prayer support   Patient Coping Open/discussion;Accepting   Comments - Patient patient was grateful to receive my visit and prayer   Family Spiritual Encounters   Care Provided Prayer support;Compassionate presence   Family Coping Accepting   Comments - Family the patient's  declined to turn off his video game when the patient requested he do so for prayer. He did turn down the volume and echoed "amen" as I concluded       "

## 2024-11-14 NOTE — PLAN OF CARE
Problem: Occupational Therapy  Goal: Occupational Therapy Goal  Description: Goals to be met by: 11/25/2024     Patient will increase functional independence with ADLs by performing:    UE Dressing with Supervision.  LE Dressing with Supervision.  Grooming while standing at sink with Supervision.  Toileting from toilet with Supervision for hygiene and clothing management.   Toilet transfer to toilet with Supervision.    Outcome: Progressing     OT evaluation complete and POC established.  Low intensity therapy (OP PT with cancer specialist once cleared by MD) is recommended upon d/c from acute care to further address deficits and help pt improve overall functional independence.     PINEDA Pablo  11/14/2024

## 2024-11-14 NOTE — PT/OT/SLP EVAL
Physical Therapy Evaluation and Discharge Note    Patient Name:  Lucia Matias   MRN:  [id number]    Recommendations:     Discharge Recommendations: Low Intensity Therapy (f/u with OP cancer rehab PT when cleared by MD)  Discharge Equipment Recommendations: walker, rolling, other (see comments) (currently recommending RW; pt may not need it pending progress in the next few days)   Barriers to discharge: None    Assessment:     Lucia Matias is a 64 y.o. female admitted with a medical diagnosis of s/p L breast reconstruction using free flap/left PAP flap. At this time, patient is functioning at their prior level of function and does not require further acute PT services.     Patient evaluated and no acute care PT goals identified. Patient able to perform functional mobility without deficits. Given RW to assist with transfers and ambulation when pain levels are increased.     During this hospitalization, patient does not require further acute PT services.  Please re-consult if situation changes.  Recommendation for d/c to LIT with OP cancer rehab PT when cleared by MD.    Recent Surgery: Procedure(s) (LRB):  RECONSTRUCTION, BREAST, USING FREE FLAP / LEFT PAP FLAP (Left) 1 Day Post-Op    Plan:     During this hospitalization, patient does not require further acute PT services.  Please re-consult if situation changes.      Subjective     Chief Complaint: Patient reports incision site pain.  Patient/Family Comments/goals: Patient agreeable to evaluation. Reports feeling woozy when ambulating to bathroom early this AM but symptoms have resolved.   Pain/Comfort:  Pain Rating 1: other (see comments) (unrated, reports incision site pain at rest)  Location - Orientation 1: generalized  Location 1: thigh  Pain Addressed 1: Pre-medicate for activity, Reposition, Distraction, Cessation of Activity  Pain Rating Post-Intervention 1: other (see comments) (unrated, increased incision site pain with  transfers)    Patients cultural, spiritual, Latter day conflicts given the current situation: no    Living Environment:  Patient lives with  in single story house with 2 inch threshold step to enter.   Prior to admission, patients level of function was independent.  Equipment used at home: none. Upon discharge, patient will have assistance from her  and step daughter.    Objective:     Communicated with RN Nat prior to session.  Patient found HOB elevated with peripheral IV, SCD, VIANEY drain upon PT entry to room.    General Precautions: Standard, fall, other (see comments) (L post-reconstruction/flap precautions)    Orthopedic Precautions:N/A   Braces: N/A  Respiratory Status: Room air    Patient donned non slip socks for OOB mobility.    Exams:  Cognition:   Patient is oriented to person, place, , time.  Pt follows approximately 100% of one and two step commands.    Mood: Pleasant and cooperative.   Musculoskeletal:  LE ROM/Strength:   R ROM: WFL  L ROM: WFL  R Strength:   WFL, assessed during functional mobility   L Strength:   WFL, assessed during functional mobility   Neuromuscular:  Sensation: No impairments identified with functional mobility. No formal testing performed.  Tone/Reflexes/Coordination: No impairments identified with functional mobility. No formal testing performed.  Balance:   Static sitting: Intact  Static standing: Intact  Visual-vestibular: No impairments identified with functional mobility. No formal testing performed.  Integument:   Visible skin intact   Surgical bra donned  2 VIANEY drains intact  Cardiopulmonary:  Edema: No visible edema observed.     Functional Mobility:  Bed Mobility:     Supine to Sit: stand by assistance  Increased time required to complete due to pain  Transfers:     Sit to Stand x 2 trials:  stand by assistance with no AD  Increased time required due to pain  Gait: Pt able to amb x 70 ft with RW and SBA.  Pt used RW due to feelings of unsteadiness that  were resolved when using RW  No gait instability or LOB observed   Able to perform toileting and self-hygiene activities with no AD and SBA.     AM-PAC 6 CLICK MOBILITY  Total Score:19       Treatment and Education:  PT educated patient and spouse re:   PT plan of care/role of PT  Safety with OOB mobility  Bed mobility and positioning in chair  Mastectomy/reconstruction precautions (no UE flexion >90 deg, avoiding heavy lifting)  Discharge disposition    Pt and spouse verbalized understanding      AM-PAC 6 CLICK MOBILITY  Total Score:19     Patient left up in chair with all lines intact, call button in reach, and spouse present.    GOALS:   Multidisciplinary Problems       Physical Therapy Goals       Not on file              Multidisciplinary Problems (Resolved)          Problem: Physical Therapy    Goal Priority Disciplines Outcome Interventions   Physical Therapy Goal   (Resolved)     PT, PT/OT Met                        History:     Past Medical History:   Diagnosis Date    Adverse reaction to succinimides     son has malignant hyperthermia    Allergy     Anticoagulant long-term use     Anxiety     Arthritis     Atrial flutter     Colon polyps 2016    COPD (chronic obstructive pulmonary disease)     COPD exacerbation 10/31/2022    Depression     Diverticular disease of colon 06/06/2017    Diverticulitis     H/O gastric sleeve     HLD (hyperlipidemia)     Hypertension     resolved with gastric slleve    Malignant neoplasm of central portion of left breast in female, estrogen receptor positive 12/29/2022    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    Osteopenia     Pancreatitis     Paroxysmal A-fib 01/03/2024    Pneumothorax, unspecified     following mastectomy sx 2023    Psoriasis     Rheumatoid arthritis     Severe obesity (BMI 35.0-39.9) with comorbidity     Wears contact lenses     not often  wears glasses usually    Wears prescription eyeglasses        Past Surgical History:   Procedure Laterality Date     ABLATION  2022    Cardiac Ablation for A Flutter, Successful    ADENOIDECTOMY  1966    Tonsillitis and adnoids    BILATERAL MASTECTOMY Bilateral 02/15/2023    Procedure: MASTECTOMY, BILATERAL;  Surgeon: Marcela Meehan MD;  Location: [facility];  Service: General;  Laterality: Bilateral;  EMAIL SENT  @ 11:44 LK / 2.5 HOURS    BLADDER SUSPENSION      (x2)    BREAST REVISION SURGERY Bilateral 2023    Procedure: BREAST REVISION SURGERY / BILATERAL BREAST FLAP REVISION;  Surgeon: Ming Milian MD;  Location: [facility];  Service: Plastics;  Laterality: Bilateral;  90 MINUTES     SECTION      (x2)    COLONOSCOPY      DEBRIDEMENT, BREAST Bilateral 2023    Procedure: DEBRIDEMENT, BREAST;  Surgeon: Ming Milian MD;  Location: [facility];  Service: Plastics;  Laterality: Bilateral;    ESOPHAGOGASTRODUODENOSCOPY N/A 2021    Procedure: EGD (ESOPHAGOGASTRODUODENOSCOPY);  Surgeon: Vince Rodriguez MD;  Location: [address];  Service: General;  Laterality: N/A;    INCISION AND DRAINAGE OF BREAST Left 10/26/2023    Procedure: INCISION AND DRAINAGE, BREAST;  Surgeon: Ming Milian MD;  Location: [facility];  Service: Plastics;  Laterality: Left;    INCISION AND DRAINAGE OF BREAST Left 2024    Procedure: INCISION AND DRAINAGE, BREAST;  Surgeon: Ming Milian MD;  Location: [facility];  Service: Plastics;  Laterality: Left;    INJECTION FOR SENTINEL NODE IDENTIFICATION Left 02/15/2023    Procedure: INJECTION, FOR SENTINEL NODE IDENTIFICATION;  Surgeon: Marcela Meehan MD;  Location: [facility];  Service: General;  Laterality: Left;    LAPAROSCOPIC SLEEVE GASTRECTOMY N/A 2021    Procedure: GASTRECTOMY, SLEEVE, LAPAROSCOPIC, with intraop EGD;  Surgeon: Vince Rodriguez MD;  Location: [facility] OR 2ND FLR;  Service: General;  Laterality: N/A;    LIPOSUCTION W/ FAT INJECTION Bilateral 2023    Procedure: LIPOSUCTION, WITH FAT TRANSFER;  Surgeon: Ming Milian MD;   Location: [facility];  Service: Plastics;  Laterality: Bilateral;  / FAT GRAFTING TO BOTH BREAST    LUNG BIOPSY  09/2020    RECONSTRUCTION OF BREAST WITH DEEP INFERIOR EPIGASTRIC ARTERY  (NEHA) FREE FLAP Bilateral 02/15/2023    Procedure: RECONSTRUCTION, BREAST, USING NEHA FREE FLAP;  Surgeon: Ming Milian MD;  Location: [facility];  Service: Plastics;  Laterality: Bilateral;    REVISION, FLAP, PREVIOUSLY CREATED FOR BREAST RECONSTRUCTION Bilateral 08/29/2023    Procedure: REVISION, FLAP, PREVIOUSLY CREATED FOR BREAST RECONSTRUCTION;  Surgeon: Ming Milian MD;  Location: [facility];  Service: Plastics;  Laterality: Bilateral;  4 HOURS    REVISION, SCAR, ABDOMINAL DONOR SITE N/A 08/29/2023    Procedure: REVISION, SCAR, ABDOMINAL DONOR SITE;  Surgeon: Ming Milian MD;  Location: [facility];  Service: Plastics;  Laterality: N/A;    RHINOPLASTY      SENTINEL LYMPH NODE BIOPSY Left 02/15/2023    Procedure: BIOPSY, LYMPH NODE, SENTINEL;  Surgeon: Marcela Meehan MD;  Location: [facility];  Service: General;  Laterality: Left;    TONSILLECTOMY      TRANSESOPHAGEAL ECHOCARDIOGRAPHY N/A 11/02/2022    Procedure: ECHOCARDIOGRAM, TRANSESOPHAGEAL;  Surgeon: Kellie Grover MD;  Location: [facility] EP LAB;  Service: Cardiology;  Laterality: N/A;       Time Tracking:     PT Received On: 11/14/24  PT Start Time: 0925     PT Stop Time: 0946  PT Total Time (min): 21 min     Billable Minutes: Evaluation 11 and Therapeutic Activity 10      11/14/2024

## 2024-11-14 NOTE — PLAN OF CARE
Case Management Assessment     PCP: Dr Hetal Banks    Patient Arrived From: Home  Existing Help at Home: Self, Independent;     Luan Matias (Spouse)  [phone] (Mobile)       Barriers to Discharge: None    Discharge Plan:    A. Home w/family   B. Home    Patient AAOX3, independent at baseline. PCP correct on facesheet. Denies owning any DME. Spouse at bedside and will provide transportation home.   11/14/24 0917   Discharge Assessment   Assessment Type Discharge Planning Assessment   Confirmed/corrected address, phone number and insurance Yes   Confirmed Demographics Correct on Facesheet   Source of Information patient   Communicated LESTER with patient/caregiver Date not available/Unable to determine   People in Home spouse   Do you expect to return to your current living situation? Yes   Do you have help at home or someone to help you manage your care at home? Yes   Who are your caregiver(s) and their phone number(s)? [place],Luan (Spouse)  [phone] (Mobile)   Prior to hospitilization cognitive status: Alert/Oriented   Current cognitive status: Alert/Oriented   Walking or Climbing Stairs Difficulty no   Dressing/Bathing Difficulty no   Equipment Currently Used at Home none   Readmission within 30 days? No   Do you currently have service(s) that help you manage your care at home? No   Do you take prescription medications? Yes   Do you have prescription coverage? Yes   Do you have any problems affording any of your prescribed medications? No   Is the patient taking medications as prescribed? yes   Who is going to help you get home at discharge? Luan Matias (Spouse)  [phone] (Mobile)   How do you get to doctors appointments? car, drives self   Are you on dialysis? No   Do you take coumadin? No   Discharge Plan A Home with family   Discharge Plan B Home   DME Needed Upon Discharge  none   Discharge Plan discussed with: Patient;Spouse/sig other   Name(s) and Number(s) FlorencioLuan (Spouse)  [phone]  (Mobile)   Transition of Care Barriers None     [facility] ([address])  Initial Discharge Assessment       Primary Care Provider: Hetal Banks MD    Admission Diagnosis: HX: breast cancer [Z85.3]  History of breast cancer [Z85.3]    Admission Date: 11/13/2024  Expected Discharge Date:     Transition of Care Barriers: (P) None    Payor: BLUE [employer]OSS [employer]EFIT / Plan: [employer] [place] [place] [place] / Product Type: Self Funded /     Extended Emergency Contact Information  Primary Emergency Contact: Luan Matias  Address: [address] [place] [address] [facility]  Home Phone: [phone]  Mobile Phone: [phone]  Relation: Spouse  Secondary Emergency Contact: Kasandra Wallis  Address: [address] [place] [address] [place] [address] [facility]  Home Phone: [phone]  Mobile Phone: [phone]  Relation: Daughter    Discharge Plan A: (P) Home with family  Discharge Plan B: (P) Home      [facility]  [address] [facility] [address]  [facility] [address]  Phone: [phone] Fax: [phone]    [facility]  [address]  [facility] [address]  Phone: [phone] Fax: [phone]    [id number] - [place], [place] - [address] [facility]  [address] [facility]  [place] [address]  Phone: [phone] Fax: [phone]    [id number] - [facility] [address] [facility]  [address] [facility] [address]  Phone: [phone] Fax: [phone]    [id number] - [place], [place] - [address] [facility]  [address] [facility] [place] [address]  Phone: [phone] Fax: [phone]    [facility] - [place], [place] - [address]  [address] [facility] [place] [address]  Phone: [phone] Fax: [phone]    [facility] [address] [place] [address]  Phone: [phone] Fax: [phone] initial  Assessment (most recent)       Adult Discharge Assessment - 11/14/24 0917          Discharge Assessment    Assessment Type Discharge Planning Assessment (P)      Confirmed/corrected address, phone number and insurance Yes (P)      Confirmed Demographics Correct on Facesheet (P)      Source of Information patient (P)      Communicated LESTER with patient/caregiver Date not available/Unable to determine (P)      People in Home spouse (P)      Do you expect to return to your current living situation? Yes (P)      Do you have help at home or someone to help you manage your care at home? Yes (P)      Who are your caregiver(s) and their phone number(s)? [place],Luan (Spouse)  [phone] (Mobile) (P)      Prior to hospitilization cognitive status: Alert/Oriented (P)      Current cognitive status: Alert/Oriented (P)      Walking or Climbing Stairs Difficulty no (P)      Dressing/Bathing Difficulty no (P)      Equipment Currently Used at Home none (P)      Readmission within 30 days? No (P)      Do you currently have service(s) that help you manage your care at home? No (P)      Do you take prescription medications? Yes (P)      Do you have prescription coverage? Yes (P)      Do you have any problems affording any of your prescribed medications? No (P)      Is the patient taking medications as prescribed? yes (P)      Who is going to help you get home at discharge? [place],Luan (Spouse)  [phone] (Mobile) (P)      How do you get to doctors appointments? car, drives self (P)      Are you on dialysis? No (P)      Do you take coumadin? No (P)      Discharge Plan A Home with family (P)      Discharge Plan B Home (P)      DME Needed Upon Discharge  none (P)      Discharge Plan discussed with: Patient;Spouse/sig other (P)      Name(s) and Number(s) RockvilleLuan (Spouse)  [phone] (Mobile) (P)      Transition of Care Barriers None (P)

## 2024-11-14 NOTE — PT/OT/SLP EVAL
Occupational Therapy   Evaluation & Treatment    Name: Lucia Matias  MRN: [id number]  Admitting Diagnosis: <principal problem not specified>  Recent Surgery: Procedure(s) (LRB):  RECONSTRUCTION, BREAST, USING FREE FLAP (Left) 1 Day Post-Op    Recommendations:     Discharge Recommendations: Low Intensity Therapy (OP PT with cancer rehab once cleared by MD)  Discharge Equipment Recommendations:  none  Barriers to discharge:  None    Assessment:     Lucia Matias is a 64 y.o. female with a medical diagnosis of <principal problem not specified>.  She presents with pain near incision site. Performance deficits affecting function: pain, impaired functional mobility, gait instability, decreased upper extremity function, decreased ROM, impaired self care skills.  Pt agreeable to participating in therapy upon arrival to room.  Pt able to perform toileting from toilet with CGA-SBA.  While standing at sink pt able to perform grooming with SBA.      Overall, pt tolerated therapy well and was able to adhere to post surgical precautions.  Pt reports she feels fine while walking, but is experiencing increased pain when performing sit <> stand or stand <> sit transfer.  PTA pt reports being (I) with ADLs and functional mobility.  Pt has had multiple surgeries before and is familiar with precautions and recovery process.  Pt would benefit from skilled OT services to address problems listed above and increase independence with ADLs.  Low intensity therapy (OP PT with cancer specialist once cleared by MD) is recommended upon d/c from acute care to further address deficits and help pt improve overall functional independence.        Rehab Prognosis: Good; patient would benefit from acute skilled OT services to address these deficits and reach maximum level of function.       Plan:     Patient to be seen 3 x/week to address the above listed problems via self-care/home management, therapeutic activities, therapeutic  exercises  Plan of Care Expires:    Plan of Care Reviewed with: patient, spouse    Subjective     Chief Complaint: none stated   Patient/Family Comments/goals: return to work    Occupational Profile:  Living Environment: Pt lives , step daughter, and 7 year old grandson in [facility], [address].  Bathroom has walk-in shower.   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  Patient found up in chair with peripheral IV, SCD, VIANEY drain upon OT entry to room.  * present towards end of session    General Precautions: Standard, fall (L post-reconstruction/flap precautions)  Orthopedic Precautions: N/A  Braces: N/A  Respiratory Status: Room air    Occupational Performance:    Bed Mobility:    Not assessed 2* pt up in chair upon arrival    Functional Mobility/Transfers:  Sit <> stand:   SBA x 1 trial from EOB  CGA x 1 trial from toilet  Notes:  Cues for technique provided  Toilet: CGA taking steps to toilet  Cues for technique and positioning provided  Functional Mobility: Pt ambulated ~40 ft in room with CGA-SBA.  Pt declined use of RW  No instances of LOB noted  Pt took her time with activity     Activities of Daily Living:  Grooming: SBA for washing hands while standing at sink.  Cues provided for positioning and precautions  Toileting: CGA-SBA  from toilet.  OT initially provided CGA  Pt able to perform hygiene care using R hand in seated position  Cues for precautions and technique provided    Cognitive/Visual Perceptual:  Cognitive/Psychosocial Skills:    -       Oriented to: Person, Place, Time, and Situation   -       Follows Commands/attention: Follows 100% of one step commands  -       Communication: clear/fluent  -       Memory: No Deficits noted  -       Safety awareness/insight to disability: intact   -       Mood/Affect/Coping skills/emotional control: Cooperative and Pleasant    Physical Exam:  Postural examination/scapula alignment: No postural abnormalities identified  Skin integrity: Visible skin intact  Edema:  None noted  Sensation: Intact  Motor Planning: WNL  Dominant hand: Right  Upper Extremity Range of Motion: Adhering to post surgical precautions   -       Right Upper Extremity: WFL  -       Left Upper Extremity: WFL  Upper Extremity Strength: Adhering to post surgical precautions  -       Right Upper Extremity: WFL  -       Left Upper Extremity: WFL   Strength: WNL  Fine Motor Coordination: Intact  Gross motor coordination: WFL  Balance: Sitting- Independent; Standing- CGA-SBA    AMPAC 6 Click ADL:  AMPAC Total Score: 18    Treatment & Education:  *Pt educated on:  -role of OT in acute care setting  -post surgical precautions: avoid- excessive pushing/pulling, heavy lifting,  stretching abdomen, overhead movements  *Pt performed sit <> stand transfer; cues for precautions and technique provided  *Pt ambulated within room to address coordination, endurance, and balance needed for functional mobility  *Pt performed toilet transfer to toilet; cues for technique and precautions provided  *Pt performed grooming task standing at sink; cues for precautions provided  *POC and precautions reviewed with pt    Patient left up in chair with all lines intact, call button in reach, and  present    GOALS:   Multidisciplinary Problems        Occupational Therapy Goals          Problem: Occupational Therapy    Goal Priority Disciplines Outcome Interventions   Occupational Therapy Goal     OT, PT/OT Progressing    Description: Goals to be met by: 11/25/2024     Patient will increase functional independence with ADLs by performing:    UE Dressing with Supervision.  LE Dressing with Supervision.  Grooming while standing at sink with Supervision.  Toileting from toilet with Supervision for hygiene and clothing management.   Toilet transfer to toilet with Supervision.                         History:     Past Medical History:   Diagnosis Date    Adverse reaction to succinimides     son has malignant hyperthermia    Allergy     Anticoagulant long-term use     Anxiety     Arthritis     Atrial flutter     Colon polyps 2016    COPD (chronic obstructive pulmonary disease)     COPD exacerbation 10/31/2022    Depression     Diverticular disease of colon 06/06/2017    Diverticulitis     H/O gastric sleeve     HLD (hyperlipidemia)     Hypertension     resolved with gastric slleve    Malignant neoplasm of central portion of left breast in female, estrogen receptor positive 12/29/2022    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    Osteopenia     Pancreatitis     Paroxysmal A-fib 01/03/2024    Pneumothorax, unspecified     following mastectomy sx 2023    Psoriasis     Rheumatoid arthritis     Severe obesity (BMI 35.0-39.9) with comorbidity     Wears contact lenses     not often  wears glasses usually    Wears prescription eyeglasses          Past Surgical History:   Procedure Laterality Date    ABLATION  11/2022    Cardiac Ablation for A Flutter, Successful    ADENOIDECTOMY  1966    Tonsillitis and adnoids    BILATERAL MASTECTOMY Bilateral 02/15/2023    Procedure: MASTECTOMY, BILATERAL;  Surgeon: Marcela Meehan MD;  Location[facility];  Service: General;  Laterality: Bilateral;  EMAIL SENT 1-12 @ 11:44 LK / 2.5 HOURS    BLADDER SUSPENSION      (x2)    BREAST  REVISION SURGERY Bilateral 2023    Procedure: BREAST REVISION SURGERY / BILATERAL BREAST FLAP REVISION;  Surgeon: Ming Milian MD;  [facility] OR;  Service: Plastics;  Laterality: Bilateral;  90 MINUTES     SECTION      (x2)    COLONOSCOPY      DEBRIDEMENT, BREAST Bilateral 2023    Procedure: DEBRIDEMENT, BREAST;  Surgeon: Ming Milian MD;  Location: [facility];  Service: Plastics;  Laterality: Bilateral;    ESOPHAGOGASTRODUODENOSCOPY N/A 2021    Procedure: EGD (ESOPHAGOGASTRODUODENOSCOPY);  Surgeon: Vince Rodriguez MD;  [facility];  Service: General;  Laterality: N/A;    INCISION AND DRAINAGE OF BREAST Left 10/26/2023    Procedure: INCISION AND DRAINAGE, BREAST;  Surgeon: Ming Milian MD;  Location: [facility];  Service: Plastics;  Laterality: Left;    INCISION AND DRAINAGE OF BREAST Left 2024    Procedure: INCISION AND DRAINAGE, BREAST;  Surgeon: Ming Milian MD;  Location: [facility];  Service: Plastics;  Laterality: Left;    INJECTION FOR SENTINEL NODE IDENTIFICATION Left 02/15/2023    Procedure: INJECTION, FOR SENTINEL NODE IDENTIFICATION;  Surgeon: Marcela Meehan MD;  Location: [facility];  Service: General;  Laterality: Left;    LAPAROSCOPIC SLEEVE GASTRECTOMY N/A 2021    Procedure: GASTRECTOMY, SLEEVE, LAPAROSCOPIC, with intraop EGD;  Surgeon: Vince Rodriguez MD;  Location: [facility] OR 2ND FLR;  Service: General;  Laterality: N/A;    LIPOSUCTION W/ FAT INJECTION Bilateral 2023    Procedure: LIPOSUCTION, WITH FAT TRANSFER;  Surgeon: Ming Milian MD;  Location: [facility];  Service: Plastics;  Laterality: Bilateral;  / FAT GRAFTING TO BOTH BREAST    LUNG BIOPSY  2020    RECONSTRUCTION OF BREAST WITH DEEP INFERIOR EPIGASTRIC ARTERY  (NEHA) FREE FLAP Bilateral 02/15/2023    Procedure: RECONSTRUCTION, BREAST, USING NEHA FREE FLAP;  Surgeon: Ming Milian MD;  Location: [facility];  Service: Plastics;  Laterality:  Bilateral;    RECONSTRUCTION OF BREAST WITH DEEP INFERIOR EPIGASTRIC ARTERY  (NEHA) FREE FLAP Left 11/13/2024    Procedure: RECONSTRUCTION, BREAST, USING FREE FLAP;  Surgeon: Ming Milian MD;  Location: [facility];  Service: Plastics;  Laterality: Left;  / LEFT PAP FLAP  5 HOURS / HX  malignant hyperthermia  used PAP flap from Right thigh    REVISION, FLAP, PREVIOUSLY CREATED FOR BREAST RECONSTRUCTION Bilateral 08/29/2023    Procedure: REVISION, FLAP, PREVIOUSLY CREATED FOR BREAST RECONSTRUCTION;  Surgeon: Ming Milian MD;  Location: [facility];  Service: Plastics;  Laterality: Bilateral;  4 HOURS    REVISION, SCAR, ABDOMINAL DONOR SITE N/A 08/29/2023    Procedure: REVISION, SCAR, ABDOMINAL DONOR SITE;  Surgeon: Ming Milian MD;  Location: [facility];  Service: Plastics;  Laterality: N/A;    RHINOPLASTY      SENTINEL LYMPH NODE BIOPSY Left 02/15/2023    Procedure: BIOPSY, LYMPH NODE, SENTINEL;  Surgeon: Marcela Meehan MD;  Location: [facility];  Service: General;  Laterality: Left;    TONSILLECTOMY      TRANSESOPHAGEAL ECHOCARDIOGRAPHY N/A 11/02/2022    Procedure: ECHOCARDIOGRAM, TRANSESOPHAGEAL;  Surgeon: Kellie Grover MD;  Location: [facility];  Service: Cardiology;  Laterality: N/A;       Time Tracking:     OT Date of Treatment:    OT Start Time: 1104  OT Stop Time: 1126  OT Total Time (min): 22 min    Billable Minutes:Evaluation 10  Self Care/Home Management 12    PINEDA Pablo  11/14/2024

## 2024-11-15 VITALS
BODY MASS INDEX: 32.62 KG/M2 | RESPIRATION RATE: 18 BRPM | SYSTOLIC BLOOD PRESSURE: 163 MMHG | DIASTOLIC BLOOD PRESSURE: 73 MMHG | HEIGHT: 62 IN | HEART RATE: 68 BPM | TEMPERATURE: 98 F | WEIGHT: 177.25 LBS | OXYGEN SATURATION: 95 %

## 2024-11-15 PROCEDURE — 94799 UNLISTED PULMONARY SVC/PX: CPT

## 2024-11-15 PROCEDURE — 94640 AIRWAY INHALATION TREATMENT: CPT

## 2024-11-15 PROCEDURE — 63600175 PHARM REV CODE 636 W HCPCS: Performed by: STUDENT IN AN ORGANIZED HEALTH CARE EDUCATION/TRAINING PROGRAM

## 2024-11-15 PROCEDURE — 25000003 PHARM REV CODE 250: Performed by: STUDENT IN AN ORGANIZED HEALTH CARE EDUCATION/TRAINING PROGRAM

## 2024-11-15 PROCEDURE — 94761 N-INVAS EAR/PLS OXIMETRY MLT: CPT

## 2024-11-15 PROCEDURE — 25000242 PHARM REV CODE 250 ALT 637 W/ HCPCS: Performed by: STUDENT IN AN ORGANIZED HEALTH CARE EDUCATION/TRAINING PROGRAM

## 2024-11-15 RX ORDER — CEPHALEXIN 500 MG/1
500 CAPSULE ORAL EVERY 8 HOURS
Qty: 21 CAPSULE | Refills: 0 | Status: SHIPPED | OUTPATIENT
Start: 2024-11-15 | End: 2024-11-22

## 2024-11-15 RX ORDER — OXYCODONE AND ACETAMINOPHEN 10; 325 MG/1; MG/1
1 TABLET ORAL EVERY 4 HOURS PRN
Qty: 25 TABLET | Refills: 0 | Status: SHIPPED | OUTPATIENT
Start: 2024-11-15

## 2024-11-15 RX ORDER — ONDANSETRON 4 MG/1
8 TABLET, FILM COATED ORAL EVERY 12 HOURS PRN
Qty: 10 TABLET | Refills: 0 | Status: SHIPPED | OUTPATIENT
Start: 2024-11-15

## 2024-11-15 RX ORDER — CYCLOBENZAPRINE HCL 10 MG
10 TABLET ORAL 3 TIMES DAILY PRN
Qty: 30 TABLET | Refills: 0 | Status: SHIPPED | OUTPATIENT
Start: 2024-11-15 | End: 2024-11-25

## 2024-11-15 RX ADMIN — FLUTICASONE FUROATE AND VILANTEROL TRIFENATATE 1 PUFF: 200; 25 POWDER RESPIRATORY (INHALATION) at 07:11

## 2024-11-15 RX ADMIN — OXYCODONE HYDROCHLORIDE 10 MG: 10 TABLET ORAL at 04:11

## 2024-11-15 RX ADMIN — KETOROLAC TROMETHAMINE 15 MG: 30 INJECTION, SOLUTION INTRAMUSCULAR; INTRAVENOUS at 05:11

## 2024-11-15 RX ADMIN — PANTOPRAZOLE SODIUM 40 MG: 40 TABLET, DELAYED RELEASE ORAL at 09:11

## 2024-11-15 RX ADMIN — THERA TABS 1 TABLET: TAB at 09:11

## 2024-11-15 RX ADMIN — MUPIROCIN: 20 OINTMENT TOPICAL at 10:11

## 2024-11-15 RX ADMIN — CYCLOBENZAPRINE HYDROCHLORIDE 10 MG: 10 TABLET, FILM COATED ORAL at 09:11

## 2024-11-15 RX ADMIN — CYANOCOBALAMIN TAB 500 MCG 500 MCG: 500 TAB at 09:11

## 2024-11-15 RX ADMIN — ACETAMINOPHEN 1000 MG: 500 TABLET, FILM COATED ORAL at 05:11

## 2024-11-15 RX ADMIN — OXYCODONE HYDROCHLORIDE 10 MG: 10 TABLET ORAL at 09:11

## 2024-11-15 RX ADMIN — VENLAFAXINE HYDROCHLORIDE 150 MG: 37.5 CAPSULE, EXTENDED RELEASE ORAL at 09:11

## 2024-11-15 RX ADMIN — OXYCODONE HYDROCHLORIDE 10 MG: 10 TABLET ORAL at 12:11

## 2024-11-15 RX ADMIN — DOCUSATE SODIUM 100 MG: 100 CAPSULE, LIQUID FILLED ORAL at 10:11

## 2024-11-15 NOTE — DISCHARGE SUMMARY
[facility] ([address])  General Surgery  Discharge Summary      Patient Name: Lucia Matias  MRN: [id number]  Admission Date: 11/13/2024  Hospital Length of Stay: 2 days  Discharge Date and Time: 11/15/24  Attending Physician: Ming Milian MD   Discharging Provider: Hernan Carlos MD  Primary Care Provider: Hetal Banks MD     HPI: 64 y.o. female w/ hx of breast cancer s/p bilateral mastectomy with bilateral SIEA breast reconstruction who developed left breast fat necrosis s/p multiple excisions. She is most recently s/p right free PAP for left breast reconstruction     Procedure(s) (LRB):  RECONSTRUCTION, BREAST, USING FREE FLAP (Left)     Hospital Course: Pt to OR for left breast PAP flap reconstruction. Pt tolerated procedure well. Was given a CLD and wayne removed immediately post op, and allowed to get out of bed. She was admitted for flap monitoring. She did well overnight and advanced to regular diet. Flap checks went well, she was HDS, pain was controlled, and she was ambulating POD1 and POD2. POD2 she felt ready to go home and she was deemed appropriate for discharge.     Consults: Pt/OT    Significant Diagnostic Studies: N/A    Pending Diagnostic Studies:       None          Final Active Diagnoses:    Diagnosis Date Noted POA    PRINCIPAL PROBLEM:  Malignant neoplasm of central portion of left breast in female, estrogen receptor positive [C50.112, Z17.0] 12/29/2022 Not Applicable      Problems Resolved During this Admission:      Discharged Condition: good    Disposition: Home or Self Care    Follow Up:   Follow-up Information       Ming Milian MD Follow up in 1 week(s).    Specialties: Plastic Surgery, Oral and Maxillofacial Surgery, Oral Surgery  Contact information:  [address] [facility]  3RD FLOOR  [place] [address] [phone]                           Patient Instructions:      Diet Adult Regular     Notify your health care provider if you experience  any of the following:  temperature >100.4     Notify your health care provider if you experience any of the following:  persistent nausea and vomiting or diarrhea     Notify your health care provider if you experience any of the following:  severe uncontrolled pain     Notify your health care provider if you experience any of the following:  redness, tenderness, or signs of infection (pain, swelling, redness, odor or green/yellow discharge around incision site)     Change dressing (specify)   Order Comments: Dressing change: right thigh compression wrap, left breast surgical bra padded with ABDs. Drain care     Weight bearing restrictions (specify):   Order Comments: No heavy lifting pushing pulling     Medications:  Reconciled Home Medications:      Medication List        START taking these medications      cephALEXin 500 MG capsule  Commonly known as: KEFLEX  Take 1 capsule (500 mg total) by mouth every 8 (eight) hours. for 7 days     cyclobenzaprine 10 MG tablet  Commonly known as: FLEXERIL  Take 1 tablet (10 mg total) by mouth 3 (three) times daily as needed.     ondansetron 4 MG tablet  Commonly known as: ZOFRAN  Take 2 tablets (8 mg total) by mouth every 12 (twelve) hours as needed for Nausea.     oxyCODONE-acetaminophen  mg per tablet  Commonly known as: PERCOCET  Take 1 tablet by mouth every 4 (four) hours as needed.            CHANGE how you take these medications      * ELIQUIS 5 mg Tab  Generic drug: apixaban  Take 1 tablet (5 mg total) by mouth 2 (two) times daily.  What changed: Another medication with the same name was added. Make sure you understand how and when to take each.     * apixaban 5 mg Tab  Commonly known as: ELIQUIS  Take 1 tablet (5 mg total) by mouth 2 (two) times daily.  What changed: You were already taking a medication with the same name, and this prescription was added. Make sure you understand how and when to take each.           * This list has 2 medication(s) that are the  same as other medications prescribed for you. Read the directions carefully, and ask your doctor or other care provider to review them with you.                CONTINUE taking these medications      anastrozole 1 mg Tab  Commonly known as: ARIMIDEX  Take 1 tablet (1 mg total) by mouth once daily.     atorvastatin 20 MG tablet  Commonly known as: LIPITOR  Take 1 tablet (20 mg total) by mouth every evening.     BIOTIN-COLLAGEN-VIT C-E-HERBAL ORAL  Take by mouth.     calcium-vitamin D 250 mg-2.5 mcg (100 unit) per tablet  Take 1 tablet by mouth 2 (two) times daily.     cyanocobalamin 500 MCG tablet  Take 500 mcg by mouth once daily.     diazePAM 5 MG tablet  Commonly known as: VALIUM  TAKE 1 TABLET BY MOUTH DAILY AS NEEDED FOR ANXIETY.     diclofenac sodium 1 % Gel  Commonly known as: VOLTAREN  Apply 2 g topically 4 (four) times daily.     fluocinonide-emollient 0.05 % Crea  Commonly known as: FLUOCINONIDE-E  Apply to affected area of feet daily as needed for psoriasis.     HAIR,SKIN AND NAILS ORAL  Take by mouth.     magnesium 200 mg Tab  Take by mouth once.     metoprolol succinate 50 MG 24 hr tablet  Commonly known as: TOPROL-XL  Take 1 tablet (50 mg total) by mouth nightly.     multivitamin per tablet  Commonly known as: THERAGRAN  Take 1 tablet by mouth once daily.     nitrofurantoin (macrocrystal-monohydrate) 100 MG capsule  Commonly known as: MACROBID  Take 1 capsule (100 mg total) by mouth 2 (two) times daily.     omeprazole 40 MG capsule  Commonly known as: PRILOSEC  Take 1 capsule (40 mg total) by mouth every morning.     TALTZ AUTOINJECTOR 80 mg/mL Atin  Generic drug: ixekizumab  Inject 80 mg into the skin every 28 days.     TRELEGY ELLIPTA 100-62.5-25 mcg Dsdv  Generic drug: fluticasone-umeclidin-vilanter  Inhale 1 puff into the lungs once daily.     * triamcinolone acetonide 0.1% 0.1 % cream  Commonly known as: KENALOG  Apply to affected areas of body twice a day as needed for psoriasis. Do not use on face  or skin folds.     * triamcinolone acetonide 0.025% 0.025 % cream  Commonly known as: KENALOG  Apply to affected area of skin folds twice a day as needed for psoriasis.     venlafaxine 150 MG Cp24  Commonly known as: EFFEXOR-XR  Take 1 capsule (150 mg total) by mouth once daily.     VITAMIN B COMPLEX-C ORAL  Take by mouth Daily.     VITAMIN D3 COMPLETE ORAL  Take by mouth.     * WEGOVY 0.5 mg/0.5 mL Pnij  Generic drug: semaglutide (weight loss)  Inject 0.5 mg into the skin every 7 days.     * WEGOVY 1 mg/0.5 mL Pnij  Generic drug: semaglutide (weight loss)  Inject 1 mg into the skin every 7 days.  Start taking on: November 22, 2024     zolpidem 10 mg Tab  Commonly known as: AMBIEN  Take 1 tablet (10 mg total) by mouth nightly as needed (insomnia).           * This list has 4 medication(s) that are the same as other medications prescribed for you. Read the directions carefully, and ask your doctor or other care provider to review them with you.                  Hernan Carlos MD  General Surgery  [facility] ([address])

## 2024-11-15 NOTE — PLAN OF CARE
Case Management Final Discharge Note      Discharge Disposition: Home    New DME ordered / company name: None    Relevant SDOH / Transition of Care Barriers:  None    Primary Caretaker and contact information: Self, Independent    Scheduled followup appointment: Post op    Referrals placed: None    Transportation: Family    Patient and family educated on discharge services and updated on DC plan. Bedside RN notified, patient clear to discharge from Case Management Perspective.      11/15/24 0754   Final Note   Assessment Type Final Discharge Note   Anticipated Discharge Disposition Home   Hospital Resources/Appts/Education Provided Provided patient/caregiver with written discharge plan information;Appointments scheduled and added to AVS   Post-Acute Status   Discharge Delays None known at this time     [facility] ([address])  Discharge Final Note    Primary Care Provider: Hetal Banks MD    Expected Discharge Date: 11/15/2024    Final Discharge Note (most recent)       Final Note - 11/15/24 0754          Final Note    Assessment Type Final Discharge Note (P)      Anticipated Discharge Disposition Home or Self Care (P)      Hospital Resources/Appts/Education Provided Provided patient/caregiver with written discharge plan information;Appointments scheduled and added to AVS (P)         Post-Acute Status    Discharge Delays None known at this time (P)                      Important Message from Medicare             Contact Info       Ming Milian MD   Specialty: Plastic Surgery, Oral and Maxillofacial Surgery, Oral Surgery    [address] [facility]  3RD FLOOR  [place] [address]   Phone: [phone]       Next Steps: Follow up in 1 week(s)

## 2024-11-15 NOTE — PLAN OF CARE
AAOX4. VSS. Pain controlled. Cleared for DC. Medication delivered at bedside. Flap check and drain care provided. Iv removed. Discharge instructions explained and teach back method used for education. Help pt with shower instructions, have had experience prior to this admission. Nurse transported pt to car. No falls or injuries on shift. Safety maintained throughout shift.    Problem: Adult Inpatient Plan of Care  Goal: Plan of Care Review  Outcome: Met  Goal: Patient-Specific Goal (Individualized)  Outcome: Met  Goal: Absence of Hospital-Acquired Illness or Injury  Outcome: Met  Goal: Optimal Comfort and Wellbeing  Outcome: Met  Goal: Readiness for Transition of Care  Outcome: Met     Problem: Wound  Goal: Optimal Coping  Outcome: Met  Goal: Optimal Functional Ability  Outcome: Met  Goal: Absence of Infection Signs and Symptoms  Outcome: Met  Goal: Improved Oral Intake  Outcome: Met  Goal: Optimal Pain Control and Function  Outcome: Met  Goal: Skin Health and Integrity  Outcome: Met  Goal: Optimal Wound Healing  Outcome: Met     Problem: Infection  Goal: Absence of Infection Signs and Symptoms  Outcome: Met     Problem: Fall Injury Risk  Goal: Absence of Fall and Fall-Related Injury  Outcome: Met     Problem: Breast Surgery  Goal: Optimal Coping with Surgery  Outcome: Met  Goal: Absence of Bleeding  Outcome: Met  Goal: Effective Bowel Elimination  Outcome: Met  Goal: Fluid and Electrolyte Balance  Outcome: Met  Goal: Absence of Infection Signs and Symptoms  Outcome: Met  Goal: Optimal Pain Control and Function  Outcome: Met

## 2024-11-15 NOTE — PLAN OF CARE
Problem: Adult Inpatient Plan of Care  Goal: Plan of Care Review  Outcome: Progressing  Goal: Patient-Specific Goal (Individualized)  Outcome: Progressing  Goal: Absence of Hospital-Acquired Illness or Injury  Outcome: Progressing  Goal: Optimal Comfort and Wellbeing  Outcome: Progressing  Goal: Readiness for Transition of Care  Outcome: Progressing     Problem: Wound  Goal: Skin Health and Integrity  Outcome: Progressing  Goal: Optimal Wound Healing  Outcome: Progressing     Problem: Fall Injury Risk  Goal: Absence of Fall and Fall-Related Injury  Outcome: Progressing     Problem: Breast Surgery  Goal: Optimal Coping with Surgery  Outcome: Progressing  Goal: Absence of Bleeding  Outcome: Progressing  Goal: Effective Bowel Elimination  Outcome: Progressing  Goal: Fluid and Electrolyte Balance  Outcome: Progressing  Goal: Absence of Infection Signs and Symptoms  Outcome: Progressing  Goal: Optimal Pain Control and Function  Outcome: Progressing

## 2024-11-15 NOTE — PROGRESS NOTES
Plastic Surgery Progress Note    Subjective:  No issues overnight    Objective:  Vitals:    11/15/24 0427   BP:    Pulse:    Resp: 18   Temp:        PE:   Gen: NAD, Aox3  CV: RR  Pulm: NWOB  Left  breasts: flap soft warm with good cap refill for inferior pole skin paddle. No signs of infection, hematoma, seroma, dehiscence. Strong doppler pulses on left external skin signal just medial to marking stitch. VIANEY drains with s/s output    Right thigh: soft, appropriately tender, with incision c/d/I. Compression wrap in place. VIANEY drain with s/s output    Assessment:  64 y.o. female w/ hx of breast cancer s/p bilateral mastectomy with bilateral SIEA breast reconstruction who developed left breast fat necrosis s/p multiple excisions. She is most recently s/p right free PAP for left breast reconstruction    Plan:  - q4 flap checks  - PT/OT  - drain care  - Incentive spirometer  - pain control: Pal Tylenol, flexeril, and Toradol, PRN oxy   - Bowel regimen: Colace   - Diet: regular diet   - Abx: Ancef  - DVT ppx: Lovenox, SCDs, ambulation   - Activity/Restrictions: OK to ambulate  - Dispo: discharge today    Hernan Carlos MD  [facility], PGY-V

## 2024-11-15 NOTE — PLAN OF CARE
AAOX4. VSS. FLAP check Q4H and drain care. Pt up with therapy and family/staff. Pain controlled. IV antibiotics. No falls or injuries on shift.     Problem: Adult Inpatient Plan of Care  Goal: Plan of Care Review  Outcome: Progressing  Goal: Patient-Specific Goal (Individualized)  Outcome: Progressing  Goal: Absence of Hospital-Acquired Illness or Injury  Outcome: Progressing  Goal: Optimal Comfort and Wellbeing  Outcome: Progressing  Goal: Readiness for Transition of Care  Outcome: Progressing     Problem: Wound  Goal: Optimal Coping  Outcome: Progressing  Goal: Optimal Functional Ability  Outcome: Progressing  Goal: Absence of Infection Signs and Symptoms  Outcome: Progressing  Goal: Improved Oral Intake  Outcome: Progressing  Goal: Optimal Pain Control and Function  Outcome: Progressing  Goal: Skin Health and Integrity  Outcome: Progressing  Goal: Optimal Wound Healing  Outcome: Progressing     Problem: Infection  Goal: Absence of Infection Signs and Symptoms  Outcome: Progressing

## 2024-11-19 ENCOUNTER — PATIENT OUTREACH (OUTPATIENT)
Dept: ADMINISTRATIVE | Facility: CLINIC | Age: 64
End: 2024-11-19
Payer: COMMERCIAL

## 2024-11-19 ENCOUNTER — PATIENT MESSAGE (OUTPATIENT)
Dept: ADMINISTRATIVE | Facility: CLINIC | Age: 64
End: 2024-11-19
Payer: COMMERCIAL

## 2024-11-19 NOTE — TELEPHONE ENCOUNTER
Contacted patient to schedule colonoscopy. Patient needs 2nd floor placement. No slots available. Refendo placed. Patient verbalized understanding.  
How Severe Is Your Cyst?: moderate
Is This A New Presentation, Or A Follow-Up?: Cyst

## 2024-11-19 NOTE — PROGRESS NOTES
C3 nurse spoke with Lucia Matias  for a TCC post hospital discharge follow up call. The patient has a scheduled [facility] appointment with Hetal Banks MD on 11/20/2024 @ 3:45PM.

## 2024-11-19 NOTE — PROGRESS NOTES
C3 nurse attempted to contact Lucia Matias  for a TCC post hospital discharge follow up call. No answer. Patient does not have her voicemail box set up, unable to leave a message with callback information.  C3 nurse spoke to patient's spouse, Ike.  The patient is unavailable.  Provided patient's spouse with callback information and requested for a returned call.  The patient has a scheduled HOS appointment with Hetal Banks MD on 11/20/2024 @ 3:45PM.

## 2024-11-20 ENCOUNTER — OFFICE VISIT (OUTPATIENT)
Dept: PLASTIC SURGERY | Facility: CLINIC | Age: 64
End: 2024-11-20
Attending: PLASTIC SURGERY
Payer: COMMERCIAL

## 2024-11-20 VITALS — DIASTOLIC BLOOD PRESSURE: 75 MMHG | SYSTOLIC BLOOD PRESSURE: 175 MMHG | HEART RATE: 107 BPM

## 2024-11-20 DIAGNOSIS — Z85.3 HISTORY OF BREAST CANCER: Primary | ICD-10-CM

## 2024-11-20 NOTE — PROGRESS NOTES
One week status post left breast reconstruction with transverse PAP   Pain tolerable    On exam:  Skin paddle well-perfused  Minimal serosanguineous drainage from the breasts drain which was removed without problem  The thigh drain still produce 30/24 hours was left in place  Dermabond tape and placed    Assessment: Stable  Plan follow-up next Tuesday with Dr. Carlos for thigh drain removal and possibly Dermabond tape removal as well at that

## 2024-11-25 ENCOUNTER — PATIENT MESSAGE (OUTPATIENT)
Dept: PLASTIC SURGERY | Facility: CLINIC | Age: 64
End: 2024-11-25
Payer: COMMERCIAL

## 2024-11-25 DIAGNOSIS — Z85.3 HISTORY OF BREAST CANCER: Primary | ICD-10-CM

## 2024-11-25 RX ORDER — NALOXONE HYDROCHLORIDE 4 MG/.1ML
SPRAY NASAL
Qty: 1 EACH | Refills: 11 | Status: SHIPPED | OUTPATIENT
Start: 2024-11-25

## 2024-11-25 RX ORDER — OXYCODONE AND ACETAMINOPHEN 5; 325 MG/1; MG/1
1 TABLET ORAL EVERY 4 HOURS PRN
Qty: 20 TABLET | Refills: 0 | Status: SHIPPED | OUTPATIENT
Start: 2024-11-25

## 2024-11-26 ENCOUNTER — OFFICE VISIT (OUTPATIENT)
Dept: PLASTIC SURGERY | Facility: CLINIC | Age: 64
End: 2024-11-26
Attending: PLASTIC SURGERY
Payer: COMMERCIAL

## 2024-11-26 VITALS — SYSTOLIC BLOOD PRESSURE: 135 MMHG | DIASTOLIC BLOOD PRESSURE: 63 MMHG | HEART RATE: 92 BPM

## 2024-11-26 DIAGNOSIS — Z85.3 HISTORY OF BREAST CANCER: Primary | ICD-10-CM

## 2024-11-26 NOTE — PROGRESS NOTES
Plastic Surgery Clinic Visit    64 y.o. female w/ hx of breast cancer s/p bilateral mastectomy with bilateral SIEA breast reconstruction who developed left breast fat necrosis s/p multiple excisions. She is most recently s/p right free PAP for left breast reconstruction     Doing well postopertively still with pain at donor site. States drain output has been less than 30 ccs for remaining drain. Patient noted increased pain when bending over at her donor site and since has had scant drainage at donor site.     Left breast PAP flap soft without signs of infection, hematoma, seroma, dehiscence.     Right thigh donor site with delayed wound healing with superficial wound breakdown. No signs of infection. VIANEY s/s    A/P  Prineo removed from breast and thigh  Donor site drain removed in clinic  Continue local wound care with bacitracin, gauze, ABD to right thigh  Surgical bra padded with ABD  RTC in 3 weeks    Hernan Carlos MD  [facility], HO-V  11/26/2024

## 2024-12-02 ENCOUNTER — PATIENT MESSAGE (OUTPATIENT)
Dept: PSYCHIATRY | Facility: CLINIC | Age: 64
End: 2024-12-02
Payer: COMMERCIAL

## 2024-12-03 DIAGNOSIS — F41.0 PANIC ATTACK: ICD-10-CM

## 2024-12-03 DIAGNOSIS — F41.1 GAD (GENERALIZED ANXIETY DISORDER): ICD-10-CM

## 2024-12-03 DIAGNOSIS — G47.00 INSOMNIA, UNSPECIFIED TYPE: ICD-10-CM

## 2024-12-03 RX ORDER — ZOLPIDEM TARTRATE 10 MG/1
10 TABLET ORAL NIGHTLY PRN
Qty: 30 TABLET | Refills: 2 | Status: SHIPPED | OUTPATIENT
Start: 2024-12-03

## 2024-12-03 RX ORDER — DIAZEPAM 5 MG/1
TABLET ORAL
Qty: 30 TABLET | Refills: 2 | Status: SHIPPED | OUTPATIENT
Start: 2024-12-03

## 2024-12-05 ENCOUNTER — OFFICE VISIT (OUTPATIENT)
Dept: SPINE | Facility: CLINIC | Age: 64
End: 2024-12-05
Attending: PLASTIC SURGERY
Payer: COMMERCIAL

## 2024-12-05 VITALS
BODY MASS INDEX: 32.62 KG/M2 | RESPIRATION RATE: 19 BRPM | WEIGHT: 177.25 LBS | DIASTOLIC BLOOD PRESSURE: 63 MMHG | OXYGEN SATURATION: 100 % | HEIGHT: 62 IN | SYSTOLIC BLOOD PRESSURE: 140 MMHG | HEART RATE: 101 BPM

## 2024-12-05 DIAGNOSIS — Z85.3 HISTORY OF BREAST CANCER: ICD-10-CM

## 2024-12-05 DIAGNOSIS — S21.002D WOUND OF LEFT BREAST, SUBSEQUENT ENCOUNTER: ICD-10-CM

## 2024-12-05 DIAGNOSIS — Z17.0 MALIGNANT NEOPLASM OF CENTRAL PORTION OF LEFT BREAST IN FEMALE, ESTROGEN RECEPTOR POSITIVE: Primary | ICD-10-CM

## 2024-12-05 DIAGNOSIS — C50.112 MALIGNANT NEOPLASM OF CENTRAL PORTION OF LEFT BREAST IN FEMALE, ESTROGEN RECEPTOR POSITIVE: Primary | ICD-10-CM

## 2024-12-05 PROCEDURE — 99999 PR PBB SHADOW E&M-EST. PATIENT-LVL V: CPT | Mod: PBBFAC,,, | Performed by: ANESTHESIOLOGY

## 2024-12-05 RX ORDER — OXYCODONE AND ACETAMINOPHEN 5; 325 MG/1; MG/1
1 TABLET ORAL EVERY 12 HOURS PRN
Qty: 60 TABLET | Refills: 0 | Status: SHIPPED | OUTPATIENT
Start: 2024-12-05 | End: 2025-01-04

## 2024-12-05 NOTE — PROGRESS NOTES
Chronic Pain - New Consult    Referring Physician: Ming Milian MD    Chief Complaint:   Chief Complaint   Patient presents with    Rectal Pain          SUBJECTIVE:  Interval History 12/5/24  64 year old female presents in follow-up.  Patient was last seen in February 20, 2024 and at that time she was at a less left breast pain after a vasectomy.  In the interim patient has had 3 surgeries with most recent surgery being in early November.  Patient had right buttocks flat and fat graft to the left breast.  Breast pain is doing really well however the graft site is painful while it is healing.  Patient reports it is healing well but slowly. Patient has been in touch with surgeon who is okay with the appearance of the wound.  Patient reports that wound is in her buttocks and around her inner thigh. This sensitive location makes it very hard for her to wear underwear.  Patient has been using oxycodone 5 mg b.i.d. with good benefit.  Patient has used Tylenol and gabapentin but she has not had oxycodone however this does not cover her pain as well.  The patient understands that this is not a long-term solution and is only I will get benefit while the wound is continuing to heal.    Initial Consult 2/1/24:  Lucia Matias presents to the clinic for the evaluation of left breast pain. The pain started over a year ago following left breast surgery and symptoms have been unchanged.   She had a double mastectomy in 2023.  Her left breast had ongoing problems with lung collapse, sepsis, wound infections/abscess, with 5 surgeries and 8 hospitalizations.  The pain is located in the left breast area and does not radiate.  The pain is described as sharp and excruciating  and is rated as 7/10. The pain is rated with a score of  0/10 on the BEST day and a score of 10/10 on the WORST day.  Symptoms interfere with daily activity, sleeping, and work. The pain is exacerbated by lifting, pushing.  The pain is mitigated  by tylenol #3 (gave her a headache), percocet 5-325mg (helpful, helped her sleep). The patient reports 5-6 hours of uninterrupted sleep per night.    Patient denies significant motor weakness. She reports arthritis in her back, knees, etc, but states the left breast pain is the worst.  She has tried acupuncture since Monday, but she hasn't had treatment for the breast yet.   She states that her plastic surgeon is planning on doing steroid shots once the wound closes (quoted as in about 6 weeks).    Physical Therapy/Home Exercise: no      Pain Disability Index Review:      2/1/2024     1:24 PM   Last 3 PDI Scores   Pain Disability Index (PDI) 48       Pain Medications:   - tylenol #3, percocet,    - extra strength tylenol (x2 every 4 hours)   - NSAIDs - can't take due to gastric sleeve   - cymbalta - reports bad reaction (sent over edge)     report:  Reviewed and consistent with medication use as prescribed.  01/29/2024 01/26/2024 2 Acetaminophen-Cod #3 Tablet 25.00 5       Pain Procedures:   none    Imaging:   US CHEST MEDIASTINUM     CLINICAL HISTORY:  Left reconstructed breast cellulitis please investigate for deep soft tissue infection (abscess);     TECHNIQUE:  Transverse and longitudinal sonographic images of the left anterior chest wall obtained.     COMPARISON:  None     FINDINGS:  Diffuse subcutaneous edema.     Complex collection extends from the lower enters the lower outer quadrant estimated to measure 11 cm in length and 2.3 cm in AP dimension.  Considerations would include hematoma or possibly abscess.  Continued follow-up advised.     Impression:     Please see above.        Electronically signed by: Nya Benz  Date:                                            10/25/2023  Time:                                           13:08    XR LUMBAR SPINE AP AND LATERAL     CLINICAL HISTORY:  Back pain or radiculopathy, > 6 wks;  Dorsalgia, unspecified     FINDINGS:  There is grade 1 anterolisthesis of L4  on L5.  There is a limbus vertebra at L3.  There is mild DJD.  No fracture dislocation bone destruction seen.  No trauma seen.     Impression:     No acute process seen.        Electronically signed by: Stewart Garcia MD  Date:                                            2020  Time:                                           11:15        Past Medical History:   Diagnosis Date    Adverse reaction to succinimides     son has malignant hyperthermia    Allergy     Anticoagulant long-term use     Anxiety     Arthritis     Atrial flutter     Colon polyps     COPD (chronic obstructive pulmonary disease)     COPD exacerbation 10/31/2022    Depression     Diverticular disease of colon 2017    Diverticulitis     H/O gastric sleeve     HLD (hyperlipidemia)     Hypertension     resolved with gastric slleve    Malignant neoplasm of central portion of left breast in female, estrogen receptor positive 2022    Monoallelic mutation of MUTYH gene 2022    Osteopenia     Pancreatitis     Paroxysmal A-fib 2024    Pneumothorax, unspecified     following mastectomy sx     Psoriasis     Rheumatoid arthritis     Severe obesity (BMI 35.0-39.9) with comorbidity     Wears contact lenses     not often  wears glasses usually    Wears prescription eyeglasses      Past Surgical History:   Procedure Laterality Date    ABLATION  2022    Cardiac Ablation for A Flutter, Successful    ADENOIDECTOMY  1966    Tonsillitis and adnoids    BILATERAL MASTECTOMY Bilateral 02/15/2023    Procedure: MASTECTOMY, BILATERAL;  Surgeon: Marcela Meehan MD;  Location: [facility];  Service: General;  Laterality: Bilateral;  EMAIL SENT  @ 11:44 LK / 2.5 HOURS    BLADDER SUSPENSION      (x2)    BREAST REVISION SURGERY Bilateral 2023    Procedure: BREAST REVISION SURGERY / BILATERAL BREAST FLAP REVISION;  Surgeon: Ming Milian MD;  Location: [facility];  Service: Plastics;  Laterality: Bilateral;  90 MINUTES      SECTION      (x2)    COLONOSCOPY      DEBRIDEMENT, BREAST Bilateral 03/29/2023    Procedure: DEBRIDEMENT, BREAST;  Surgeon: Ming Milian MD;  Location: [facility];  Service: Plastics;  Laterality: Bilateral;    ESOPHAGOGASTRODUODENOSCOPY N/A 03/31/2021    Procedure: EGD (ESOPHAGOGASTRODUODENOSCOPY);  Surgeon: Vince Rodriguez MD;  Location: [facility] OR 2ND FLR;  Service: General;  Laterality: N/A;    INCISION AND DRAINAGE OF BREAST Left 10/26/2023    Procedure: INCISION AND DRAINAGE, BREAST;  Surgeon: Ming Milian MD;  Location: [facility] OR;  Service: Plastics;  Laterality: Left;    INCISION AND DRAINAGE OF BREAST Left 04/04/2024    Procedure: INCISION AND DRAINAGE, BREAST;  Surgeon: Ming Milian MD;  Location: [facility] OR;  Service: Plastics;  Laterality: Left;    INJECTION FOR SENTINEL NODE IDENTIFICATION Left 02/15/2023    Procedure: INJECTION, FOR SENTINEL NODE IDENTIFICATION;  Surgeon: Marcela Meehan MD;  Location: [facility] OR;  Service: General;  Laterality: Left;    LAPAROSCOPIC SLEEVE GASTRECTOMY N/A 03/31/2021    Procedure: GASTRECTOMY, SLEEVE, LAPAROSCOPIC, with intraop EGD;  Surgeon: Vince Rodriguez MD;  Location: [facility] OR 2ND FLR;  Service: General;  Laterality: N/A;    LIPOSUCTION W/ FAT INJECTION Bilateral 08/29/2023    Procedure: LIPOSUCTION, WITH FAT TRANSFER;  Surgeon: Ming Milian MD;  Location: [facility];  Service: Plastics;  Laterality: Bilateral;  / FAT GRAFTING TO BOTH BREAST    LUNG BIOPSY  09/2020    RECONSTRUCTION OF BREAST WITH DEEP INFERIOR EPIGASTRIC ARTERY  (NEHA) FREE FLAP Bilateral 02/15/2023    Procedure: RECONSTRUCTION, BREAST, USING NEHA FREE FLAP;  Surgeon: Ming Milian MD;  Location: [facility];  Service: Plastics;  Laterality: Bilateral;    RECONSTRUCTION OF BREAST WITH DEEP INFERIOR EPIGASTRIC ARTERY  (NEHA) FREE FLAP Left 11/13/2024    Procedure: RECONSTRUCTION, BREAST, USING FREE FLAP;  Surgeon: Ming Milian MD;  Location: [facility];   Service: Plastics;  Laterality: Left;  / LEFT PAP FLAP  5 HOURS / HX  malignant hyperthermia  used PAP flap from Right thigh    REVISION, FLAP, PREVIOUSLY CREATED FOR BREAST RECONSTRUCTION Bilateral 08/29/2023    Procedure: REVISION, FLAP, PREVIOUSLY CREATED FOR BREAST RECONSTRUCTION;  Surgeon: Ming Milian MD;  Location: [facility];  Service: Plastics;  Laterality: Bilateral;  4 HOURS    REVISION, SCAR, ABDOMINAL DONOR SITE N/A 08/29/2023    Procedure: REVISION, SCAR, ABDOMINAL DONOR SITE;  Surgeon: Ming Milian MD;  Location: [facility];  Service: Plastics;  Laterality: N/A;    RHINOPLASTY      SENTINEL LYMPH NODE BIOPSY Left 02/15/2023    Procedure: BIOPSY, LYMPH NODE, SENTINEL;  Surgeon: Marcela Meehan MD;  Location: [facility];  Service: General;  Laterality: Left;    TONSILLECTOMY      TRANSESOPHAGEAL ECHOCARDIOGRAPHY N/A 11/02/2022    Procedure: ECHOCARDIOGRAM, TRANSESOPHAGEAL;  Surgeon: Kellie Grover MD;  Location: [facility] EP LAB;  Service: Cardiology;  Laterality: N/A;     Social History     Socioeconomic History    Marital status:    Occupational History    Occupation: clinical research coordinator    Tobacco Use    Smoking status: Former     Current packs/day: 0.00     Average packs/day: 0.5 packs/day for 41.9 years (21.0 ttl pk-yrs)     Types: Cigarettes     Start date: 1/1/1979     Quit date: 12/7/2020     Years since quitting: 3.9     Passive exposure: Current    Smokeless tobacco: Never   Substance and Sexual Activity    Alcohol use: Yes     Alcohol/week: 1.0 standard drink of alcohol     Types: 1 Glasses of wine per week     Comment: social-rarely    Drug use: No    Sexual activity: Yes     Partners: Male     Birth control/protection: Post-menopausal   Other Topics Concern    Are you pregnant or think you may be? No    Breast-feeding No     Social Drivers of Health     Financial Resource Strain: Low Risk  (11/13/2024)    Overall Financial Resource Strain (CARDIA)     Difficulty of  Paying Living Expenses: Not hard at all   Food Insecurity: No Food Insecurity (11/13/2024)    Hunger Vital Sign     Worried About Running Out of Food in the Last Year: Never true     Ran Out of Food in the Last Year: Never true   Transportation Needs: No Transportation Needs (11/13/2024)    TRANSPORTATION NEEDS     Transportation : No   Physical Activity: Sufficiently Active (4/4/2024)    Exercise Vital Sign     Days of Exercise per Week: 5 days     Minutes of Exercise per Session: 30 min   Stress: Stress Concern Present (11/13/2024)    [employer] - Occupational Stress Questionnaire     Feeling of Stress : To some extent   Housing Stability: Low Risk  (11/13/2024)    Housing Stability Vital Sign     Unable to Pay for Housing in the Last Year: No     Homeless in the Last Year: No     Family History   Adopted: Yes   Problem Relation Name Age of Onset    No Known Problems Daughter      No Known Problems Daughter      Malignant hyperthermia Son         Review of patient's allergies indicates:   Allergen Reactions    Succinimides Anaphylaxis     Son has MH    Vancomycin analogues Hives, Itching and Rash       Current Outpatient Medications   Medication Sig    anastrozole (ARIMIDEX) 1 mg Tab Take 1 tablet (1 mg total) by mouth once daily.    apixaban (ELIQUIS) 5 mg Tab Take 1 tablet (5 mg total) by mouth 2 (two) times daily.    apixaban (ELIQUIS) 5 mg Tab Take 1 tablet (5 mg total) by mouth 2 (two) times daily.    atorvastatin (LIPITOR) 20 MG tablet Take 1 tablet (20 mg total) by mouth every evening.    B-complex with vitamin C (VITAMIN B COMPLEX-C ORAL) Take by mouth Daily.    BIOTIN-COLLAGEN-VIT C-E-HERBAL ORAL Take by mouth.    calcium-vitamin D 250-100 mg-unit per tablet Take 1 tablet by mouth 2 (two) times daily.    cyanocobalamin 500 MCG tablet Take 500 mcg by mouth once daily.    diazePAM (VALIUM) 5 MG tablet TAKE 1 TABLET BY MOUTH DAILY AS NEEDED FOR ANXIETY.    diclofenac sodium  (VOLTAREN) 1 % Gel Apply 2 g topically 4 (four) times daily.    fluocinonide-emollient (FLUOCINONIDE-E) 0.05 % Crea Apply to affected area of feet daily as needed for psoriasis.    fluticasone-umeclidin-vilanter (TRELEGY ELLIPTA) 100-62.5-25 mcg DsDv Inhale 1 puff into the lungs once daily.    magnesium 200 mg Tab Take by mouth once.    metoprolol succinate (TOPROL-XL) 50 MG 24 hr tablet Take 1 tablet (50 mg total) by mouth nightly.    multivitamin (THERAGRAN) per tablet Take 1 tablet by mouth once daily.    multivitamin with minerals (HAIR,SKIN AND NAILS ORAL) Take by mouth.    mv-mn/iron/folic acid/herb 190 (VITAMIN D3 COMPLETE ORAL) Take by mouth.    omeprazole (PRILOSEC) 40 MG capsule Take 1 capsule (40 mg total) by mouth every morning.    ondansetron (ZOFRAN) 4 MG tablet Take 2 tablets (8 mg total) by mouth every 12 (twelve) hours as needed for Nausea.    semaglutide, weight loss, (WEGOVY) 0.5 mg/0.5 mL PnIj Inject 0.5 mg into the skin every 7 days.    semaglutide, weight loss, (WEGOVY) 1 mg/0.5 mL PnIj Inject 1 mg into the skin every 7 days.    TALTZ AUTOINJECTOR 80 mg/mL AtIn Inject 80 mg into the skin every 28 days.    triamcinolone acetonide 0.025% (KENALOG) 0.025 % cream Apply to affected area of skin folds twice a day as needed for psoriasis.    triamcinolone acetonide 0.1% (KENALOG) 0.1 % cream Apply to affected areas of body twice a day as needed for psoriasis. Do not use on face or skin folds.    venlafaxine (EFFEXOR-XR) 150 MG Cp24 Take 1 capsule (150 mg total) by mouth once daily.    zolpidem (AMBIEN) 10 mg Tab Take 1 tablet (10 mg total) by mouth nightly as needed (insomnia).    naloxone (NARCAN) 4 mg/actuation Spry 4mg by nasal route as needed for opioid overdose; may repeat every 2-3 minutes in alternating nostrils until medical help arrives. Call 911    nitrofurantoin, macrocrystal-monohydrate, (MACROBID) 100 MG capsule Take 1 capsule (100 mg total) by mouth 2 (two) times daily. (Patient not  "taking: Reported on 11/19/2024)    oxyCODONE-acetaminophen (PERCOCET)  mg per tablet Take 1 tablet by mouth every 4 (four) hours as needed.    oxyCODONE-acetaminophen (PERCOCET) 5-325 mg per tablet Take 1 tablet by mouth every 4 (four) hours as needed for Pain.    oxyCODONE-acetaminophen (PERCOCET) 5-325 mg per tablet Take 1 tablet by mouth every 12 (twelve) hours as needed for Pain.     No current facility-administered medications for this visit.       REVIEW OF SYSTEMS:    GENERAL:  current fevers or chills, recent use of antibiotics denies.  HEENT:  History of migraines/headaches: denies, History of major throat surgery: denies  RESPIRATORY:  History of home oxygen or pulmonary hypertension/severe breathing dysfunction: denies  CARDIOVASCULAR:  Hx of palpitations/rhythm problems: reports atrial flutter (had ablation); Hx of Heart Attacks/Surgery: reports ablation  GI:  Recent abdominal discomfort or recent change in bowel habits denies, history of diverticulitis and gastric sleeve bypass  MUSCULOSKELETAL:  See HPI.  SKIN:  unhealed wounds or rashes: reports left breast wound  PSYCH: major psychiatric history or recent psychosocial stressors reports depression/anxiety  HEMATOLOGY/LYMPHOLOGY:  Hx of prolonged bleeding, Hx of Blood thinner usage: reports plavix ; reason: atrial flutter  NEURO:   history of seizures, strokes, chronic/old weakness (such as paralysis or paresis of any body part): denies  All other reviewed and negative other than HPI.    OBJECTIVE:    BP (!) 140/63 (BP Location: Right arm, Patient Position: Sitting)   Pulse 101   Resp 19   Ht 5' 2" (1.575 m)   Wt 80.4 kg (177 lb 4 oz)   SpO2 100%   BMI 32.42 kg/m²     PHYSICAL EXAMINATION:  General appearance: Well appearing, in no acute distress, alert and appropriately communicative.  Psych:  Mood and affect appropriate.  Skin: Skin color, texture, turgor normal, no rashes or lesions, in both upper and lower body for exposed " skin.  Head/face:  Atraumatic, normocephalic.  Cor: regular rate  Pulm: non-labored breathing  GI: Abdomen non-distended and non-tender.  Extremities: No deformities, significant lymphedema, or skin discoloration. No significant open wounds. No major amputations.  Musculoskeletal: Right buttocks and inner thigh incision healing. Skin not fully approximated. No drainage. Bilateral upper and lower extremity strength is normal and symmetric.  No atrophy or tone abnormalities are noted.  Neuro: Bilateral upper and lower extremity coordination and muscle stretch reflexes abnormalities noted: none.  loss of sensation to light touch: none  Gait: normal    CMP  Sodium   Date Value Ref Range Status   04/03/2024 143 136 - 145 mmol/L Final     Potassium   Date Value Ref Range Status   04/03/2024 4.3 3.5 - 5.1 mmol/L Final     Chloride   Date Value Ref Range Status   04/03/2024 106 95 - 110 mmol/L Final     CO2   Date Value Ref Range Status   04/03/2024 28 23 - 29 mmol/L Final     Glucose   Date Value Ref Range Status   04/03/2024 95 70 - 110 mg/dL Final     BUN   Date Value Ref Range Status   04/03/2024 14 8 - 23 mg/dL Final     Creatinine   Date Value Ref Range Status   08/23/2024 1.0 0.5 - 1.4 mg/dL Final     Calcium   Date Value Ref Range Status   04/03/2024 9.2 8.7 - 10.5 mg/dL Final     Total Protein   Date Value Ref Range Status   01/05/2024 6.9 6.0 - 8.4 g/dL Final     Albumin   Date Value Ref Range Status   01/05/2024 3.7 3.5 - 5.2 g/dL Final     Total Bilirubin   Date Value Ref Range Status   01/05/2024 1.0 0.1 - 1.0 mg/dL Final     Comment:     For infants and newborns, interpretation of results should be based  on gestational age, weight and in agreement with clinical  observations.    Premature Infant recommended reference ranges:  Up to 24 hours.............<8.0 mg/dL  Up to 48 hours............<12.0 mg/dL  3-5 days..................<15.0 mg/dL  6-29 days.................<15.0 mg/dL       Alkaline Phosphatase    Date Value Ref Range Status   01/05/2024 97 55 - 135 U/L Final     AST   Date Value Ref Range Status   01/05/2024 22 10 - 40 U/L Final     ALT   Date Value Ref Range Status   01/05/2024 22 10 - 44 U/L Final     Anion Gap   Date Value Ref Range Status   04/03/2024 9 8 - 16 mmol/L Final     eGFR   Date Value Ref Range Status   08/23/2024 >60 >60 mL/min/1.73 m^2 Final     ASSESSMENT: 64 y.o. year old female with left breast pain, consistent with:    1. Malignant neoplasm of central portion of left breast in female, estrogen receptor positive        2. History of breast cancer  Ambulatory referral/consult to Pain Clinic      3. Wound of left breast, subsequent encounter          IMPRESSION: Lucia Matias presents today for left breast pain.  She has a history of breast cancer, status post bilateral mastectomy, which was complicated by multiple surgeries and revisions on the left breast related to infection/abscess.  Left breat is now well healed. Graft site in the right buttocks/inner thigh area is most painful complaint today. Still healing.    PLAN:   - Prescribe Oxycodone 5mg BID prn for right buttocks/thigh graft site until wound heals.   - Continue close follow up with plastic surgery.  - Continue gabapentin  - Continue tizandine 4mg three times a day as needed for muscular pain  - RTC in 3 months if right buttocks and groin pain still present after wound heals.    The above plan and management options were discussed at length with patient. Patient is in agreement with the above and verbalized understanding. It will be communicated with the referring physician via electronic record, fax, or mail.    Rustam Campos  12/05/2024    I spent a total of 30 minutes on the day of the visit.  This includes face to face time and non-face to face time preparing to see the patient by reviewing previous labs/imaging, obtaining and/or reviewing separately obtained history, documenting clinical information in  the electronic or other health record, independently interpreting results and communicating results to the patient/family/caregiver.    Sonido De Los Santos

## 2024-12-09 NOTE — PROGRESS NOTES
"Patient ID: Lucia Matias is a 64 y.o. White female    Subjective  Chief Complaint: patient presents for medical weight loss management.    Co-morbidities: HTN, DLD    HPI: Patient started Wegovy with [facility] in September 2024 and has not taken any medication. Pt has Wegovy 0.25 mg at home and pt plans to initiate therapy in January 2025.    Weight loss history:  Starting weight:    12/5/2024   Vitals - 1 value per visit    Weight (lb) 177.25    Weight (kg) 80.4    Height 5' 2" (1.575 m)    BMI (Calculated) 32.4      Objective  Lab Results   Component Value Date     04/03/2024     01/05/2024     10/27/2023     Lab Results   Component Value Date    K 4.3 04/03/2024    K 3.7 01/05/2024    K 4.4 10/27/2023     Lab Results   Component Value Date     04/03/2024     01/05/2024     10/27/2023     Lab Results   Component Value Date    CO2 28 04/03/2024    CO2 27 01/05/2024    CO2 24 10/27/2023     Lab Results   Component Value Date    BUN 14 04/03/2024    BUN 14 01/05/2024    BUN 16 10/27/2023     Lab Results   Component Value Date    GLU 95 04/03/2024    GLU 87 01/05/2024     (H) 10/27/2023     Lab Results   Component Value Date    CALCIUM 9.2 04/03/2024    CALCIUM 9.3 01/05/2024    CALCIUM 8.9 10/27/2023     Lab Results   Component Value Date    PROT 6.9 01/05/2024    PROT 7.5 10/24/2023    PROT 6.2 07/12/2023     Lab Results   Component Value Date    ALBUMIN 3.7 01/05/2024    ALBUMIN 3.7 10/24/2023    ALBUMIN 3.0 (L) 07/12/2023     Lab Results   Component Value Date    BILITOT 1.0 01/05/2024    BILITOT 0.5 10/24/2023    BILITOT 0.4 07/12/2023     Lab Results   Component Value Date    AST 22 01/05/2024    AST 19 10/24/2023    AST 21 07/12/2023     Lab Results   Component Value Date    ALT 22 01/05/2024    ALT 18 10/24/2023    ALT 16 07/12/2023     Lab Results   Component Value Date    ANIONGAP 9 04/03/2024    ANIONGAP 11 01/05/2024    ANIONGAP 7 (L) " 10/27/2023     Lab Results   Component Value Date    CREATININE 1.0 08/23/2024    CREATININE 0.8 04/03/2024    CREATININE 0.9 01/05/2024     Lab Results   Component Value Date    EGFRNORACEVR >60 08/23/2024    EGFRNORACEVR >60 04/03/2024    EGFRNORACEVR >60 01/05/2024     Assessment/Plan  - Pt to continue with Wegovy initiation plan from last visit   - RTC in 3 months for follow-up evaluation    Patient consented to pharmacist management via collaborative practice.

## 2024-12-10 ENCOUNTER — PATIENT MESSAGE (OUTPATIENT)
Dept: INTERNAL MEDICINE | Facility: CLINIC | Age: 64
End: 2024-12-10

## 2024-12-10 ENCOUNTER — OFFICE VISIT (OUTPATIENT)
Dept: INTERNAL MEDICINE | Facility: CLINIC | Age: 64
End: 2024-12-10
Payer: COMMERCIAL

## 2024-12-10 DIAGNOSIS — E66.811 OBESITY, CLASS I, BMI 30-34.9: Primary | ICD-10-CM

## 2024-12-10 PROCEDURE — 99499 UNLISTED E&M SERVICE: CPT | Mod: 95,,,

## 2024-12-18 ENCOUNTER — OFFICE VISIT (OUTPATIENT)
Dept: PLASTIC SURGERY | Facility: CLINIC | Age: 64
End: 2024-12-18
Attending: PLASTIC SURGERY
Payer: COMMERCIAL

## 2024-12-18 VITALS — SYSTOLIC BLOOD PRESSURE: 132 MMHG | HEART RATE: 89 BPM | DIASTOLIC BLOOD PRESSURE: 82 MMHG

## 2024-12-18 DIAGNOSIS — Z85.3 HISTORY OF BREAST CANCER: Primary | ICD-10-CM

## 2024-12-18 RX ORDER — DOXYCYCLINE 100 MG/1
100 CAPSULE ORAL 2 TIMES DAILY
Qty: 14 CAPSULE | Refills: 0 | Status: SHIPPED | OUTPATIENT
Start: 2024-12-18 | End: 2024-12-25

## 2024-12-18 NOTE — PROGRESS NOTES
Plastic Surgery Clinic Visit     64 y.o. female w/ hx of breast cancer s/p bilateral mastectomy with bilateral SIEA breast reconstruction who developed left breast fat necrosis s/p multiple excisions. She is most recently s/p right free PAP for left breast reconstruction. Patient with delayed healing and superficial dehiscence at PAP donor site. She has been applying neosporin ointment to right thigh donor site.      On exam:  Left breast PAP flap soft without signs of infection, hematoma, seroma, dehiscence.     Right thigh donor site with delayed wound healing with superficial wound breakdown. No signs of infection     A/P  Continue antibiotic ointment to right PAP donor site  Doxycycline ordered  Surgical bra padded with ABD  RTC in 1 month     Hernan Carlos MD  [facility]  12/18/2024

## 2024-12-29 DIAGNOSIS — E78.2 MIXED HYPERLIPIDEMIA: ICD-10-CM

## 2024-12-29 NOTE — TELEPHONE ENCOUNTER
Care Due:                  Date            Visit Type   Department     Provider  --------------------------------------------------------------------------------                                EP -                              PRIMARY      [facility]  Last Visit: 05-      CARE ([facility])   CARE           Hetal BRISENO                              FOLLOWUP/OF  [facility]  Next Visit: 01-      [facility] VISIT   CARE           Emmanuel Urrutia                                                            Last  Test          Frequency    Reason                     Performed    Due Date  --------------------------------------------------------------------------------    CMP.........  12 months..  atorvastatin.............  01- 12-    Lipid Panel.  12 months..  atorvastatin.............  01- 03-    [facility] Embedded Care Due Messages. Reference number: [id number].   12/29/2024 10:09:53 AM CST

## 2024-12-30 RX ORDER — ATORVASTATIN CALCIUM 20 MG/1
20 TABLET, FILM COATED ORAL NIGHTLY
Qty: 90 TABLET | Refills: 3 | Status: SHIPPED | OUTPATIENT
Start: 2024-12-30

## 2025-01-02 NOTE — LETTER
AUTHORIZATION FOR RELEASE OF   CONFIDENTIAL INFORMATION    Dear Tyson Vu MD,    We are seeing Lucia Matias, date of birth 1960, in the clinic at [facility]. Hetal Banks MD is the patient's PCP. Lucia Matias has an outstanding lab/procedure at the time we reviewed her chart. In order to help keep her health information updated, she has authorized us to request the following medical record(s):        (  )  MAMMOGRAM                                      ( x )  COLONOSCOPY after       (  )  PAP SMEAR                                          (  )  OUTSIDE LAB RESULTS     (  )  DEXA SCAN                                          (  )  EYE EXAM            (  )  FOOT EXAM                                          (  )  ENTIRE RECORD     (  )  OUTSIDE IMMUNIZATIONS                 (  )  _______________         Please fax records to [facility], Jenna C Jordan, MD, [phone]     If you have any questions, please contact Susan Rivas at [phone].           Patient Name: Lucia Matias  : 1960  Patient Phone #: [phone]      Epidural

## 2025-01-06 ENCOUNTER — E-VISIT (OUTPATIENT)
Dept: PRIMARY CARE CLINIC | Facility: CLINIC | Age: 65
End: 2025-01-06
Payer: COMMERCIAL

## 2025-01-06 DIAGNOSIS — J40 BRONCHITIS: Primary | ICD-10-CM

## 2025-01-06 RX ORDER — LEVOFLOXACIN 500 MG/1
500 TABLET, FILM COATED ORAL DAILY
Qty: 7 TABLET | Refills: 0 | Status: SHIPPED | OUTPATIENT
Start: 2025-01-06

## 2025-01-06 NOTE — PROGRESS NOTES
Patient ID: Lucia Matias is a 64 y.o. female.    Chief Complaint: General Illness (Entered automatically based on patient selection in [facility].)    The patient initiated a request through [facility] on 1/6/2025 for evaluation and management with a chief complaint of General Illness (Entered automatically based on patient selection in [facility].)     I evaluated the questionnaire submission on 1/6/25.    [facility] Evisit Supergroup-Cough And Cold    1/6/2025  9:24 AM CST - Filed by Patient   What do you need help with? Cough, Cold, Sore Throat   Do you agree to participate in an E-Visit? Yes   If you have any of the following symptoms, go to your local emergency room or call 911: I acknowledge   What is the main issue you would like addressed today? Chronic bronchitis   Do you think you might have COVID or the Flu? No   Have you tested positive for COVID or Flu? No   What symptoms do you currently have?  Cough;  Fatigue;  Headache;  Nasal Congestion;  Runny nose;  Sore throat;  Pain around the nose and face;  Ear pain   Describe your cough: Productive (containing mucus);  Bothersome (interferes with daily activities)   Describe the mucus: Green;  Yellow;  Thick   Have you had any of the following? Trouble breathing   Please enter a few details about your swallowing, breathing, or visual problems. Get short of breath easily   Have you ever smoked? I smoked in the past   Have you had a fever? No   When did your symptoms first appear? 12/27/2024   In the last two weeks, have you been in close contact with someone who has COVID-19 or the Flu? Yes , Covid-19   List what you have done or taken to help your symptoms. Taking over the counter mucinex and sinus medications   How severe are your symptoms? Moderate   Have your symptoms gotten better or worse since they started?  Worse   Do you have transportation to get testing if it is needed and ordered for you at an [facility] location? Yes   Provide any additional  information you feel is important. My  had covid but I have tested negative since he got it. He is negative now too but still coughing.  This feels like bronchitis that was treated  before but never completely cleared up.   Please attach any relevant images or files    Are you able to take your vital signs? No         Encounter Diagnosis   Name Primary?    Bronchitis Yes        No orders of the defined types were placed in this encounter.     Medications Ordered This Encounter   Medications    levoFLOXacin (LEVAQUIN) 500 MG tablet     Sig: Take 1 tablet (500 mg total) by mouth once daily.     Dispense:  7 tablet     Refill:  0        No follow-ups on file.      E-Visit Time Tracking:

## 2025-01-08 ENCOUNTER — TELEPHONE (OUTPATIENT)
Dept: HEMATOLOGY/ONCOLOGY | Facility: CLINIC | Age: 65
End: 2025-01-08
Payer: COMMERCIAL

## 2025-01-08 DIAGNOSIS — F41.1 GAD (GENERALIZED ANXIETY DISORDER): Primary | ICD-10-CM

## 2025-01-28 ENCOUNTER — OFFICE VISIT (OUTPATIENT)
Dept: PSYCHIATRY | Facility: CLINIC | Age: 65
End: 2025-01-28
Payer: COMMERCIAL

## 2025-01-28 ENCOUNTER — PATIENT MESSAGE (OUTPATIENT)
Dept: PSYCHIATRY | Facility: CLINIC | Age: 65
End: 2025-01-28
Payer: COMMERCIAL

## 2025-01-28 DIAGNOSIS — F41.0 PANIC ATTACK: ICD-10-CM

## 2025-01-28 DIAGNOSIS — F41.1 GAD (GENERALIZED ANXIETY DISORDER): ICD-10-CM

## 2025-01-28 DIAGNOSIS — F33.40 MDD (RECURRENT MAJOR DEPRESSIVE DISORDER) IN REMISSION: Primary | ICD-10-CM

## 2025-01-28 DIAGNOSIS — G47.00 INSOMNIA, UNSPECIFIED TYPE: ICD-10-CM

## 2025-01-28 PROCEDURE — 98006 SYNCH AUDIO-VIDEO EST MOD 30: CPT | Mod: 95,,, | Performed by: NURSE PRACTITIONER

## 2025-01-28 RX ORDER — VENLAFAXINE HYDROCHLORIDE 150 MG/1
150 CAPSULE, EXTENDED RELEASE ORAL DAILY
Qty: 90 CAPSULE | Refills: 3 | Status: SHIPPED | OUTPATIENT
Start: 2025-01-28

## 2025-01-28 RX ORDER — DIAZEPAM 5 MG/1
TABLET ORAL
Qty: 30 TABLET | Refills: 5 | Status: SHIPPED | OUTPATIENT
Start: 2025-01-28

## 2025-01-28 RX ORDER — ZOLPIDEM TARTRATE 10 MG/1
10 TABLET ORAL NIGHTLY PRN
Qty: 30 TABLET | Refills: 5 | Status: SHIPPED | OUTPATIENT
Start: 2025-01-28

## 2025-01-28 NOTE — PROGRESS NOTES
"Outpatient Psychiatry Follow-Up Visit (MD/NP)    1/28/2025    Clinical Status of Patient:  Outpatient (Ambulatory)    Chief Complaint:  Lucia Matias is a 64 y.o. female who presents today for follow-up of anxiety.  Met with patient.      Last visit was: 2/13/23  Chart and  reviewed.   The patient location is: home   The chief complaint leading to consultation is: anxiety    Visit type: audiovisual    Face to Face time with patient: 30 minutes  35 minutes of total time spent on the encounter, which includes face to face time and non-face to face time preparing to see the patient (eg, review of tests), Obtaining and/or reviewing separately obtained history, Documenting clinical information in the electronic or other health record, Independently interpreting results (not separately reported) and communicating results to the patient/family/caregiver, or Care coordination (not separately reported).     Each patient to whom he or she provides medical services by telemedicine is:  (1) informed of the relationship between the physician and patient and the respective role of any other health care provider with respect to management of the patient; and (2) notified that he or she may decline to receive medical services by telemedicine and may withdraw from such care at any time.    Interval History and Content of Current Session:  Current Psychiatric Medications/changes  Increase to  Effexor  mg daily  Continue Valium 5 mg daily as needed for severe anxiety  Try Ambien 10 mg before bed as needed for sleep  DC Remeron and Seroquel      Virtual Visit: Pt presents bright affect and euthymic mood. Pt states, "I've been doing well" .Reports effective response to medications and denies side effects. Denies any unmanaged mood, anxiety, or insomnia symptoms.  Denies SI/HI/AVH. Will continue current medications    Previous medication trial: Prozac, Paxil, Wellbutrin - notes she felt worse, more depressed, SI - " admits it could have also been the situation she was in at the time.    Psychotherapy:  Target symptoms: anxiety   Why chosen therapy is appropriate versus another modality: relevant to diagnosis  Outcome monitoring methods: self-report  Therapeutic intervention type: insight oriented psychotherapy  Topics discussed/themes: building skills sets for symptom management, symptom recognition  The patient's response to the intervention is accepting. The patient's progress toward treatment goals is good.   Duration of intervention: 15 minutes.    Review of Systems   PSYCHIATRIC: Pertinant items are noted in the narrative.  CONSTITUTIONAL: No weight gain or loss.   MUSCULOSKELETAL: No pain or stiffness of the joints.  NEUROLOGIC: No weakness, sensory changes, seizures, confusion, memory loss, tremor or other abnormal movements.  ENDOCRINE: No polydipsia or polyuria.  INTEGUMENTARY: No rashes or lacerations.  EYES: No exophthalmos, jaundice or blindness.  ENT: No dizziness, tinnitus or hearing loss.  RESPIRATORY: No shortness of breath.  CARDIOVASCULAR: No tachycardia or chest pain.  GASTROINTESTINAL: No nausea, vomiting, pain, constipation or diarrhea.  GENITOURINARY: No frequency, dysuria or sexual dysfunction.  HEMATOLOGIC/LYMPHATIC: No excessive bleeding, prolonged or excessive bleeding after dental extraction/injury.  ALLERGIC/IMMUNOLOGIC: No allergic response to materials, foods or animals at this time.    Past Medical, Family and Social History: The patient's past medical, family and social history have been reviewed and updated as appropriate within the electronic medical record - see encounter notes.    Compliance: yes    Side effects: see above    Risk Parameters:  Patient reports no suicidal ideation  Patient reports no homicidal ideation  Patient reports no self-injurious behavior  Patient reports no violent behavior    Exam (detailed: at least 9 elements; comprehensive: all 15 elements)    Constitutional  Vitals:  Most recent vital signs, dated greater than 90 days prior to this appointment, were reviewed.   There were no vitals filed for this visit.   General:  unremarkable, age appropriate     Musculoskeletal  Muscle Strength/Tone:  not examined   Gait & Station:  non-ataxic     Psychiatric  Speech:  no latency; no press   Mood & Affect:  steady  congruent and appropriate   Thought Process:  normal and logical   Associations:  intact   Thought Content:  normal, no suicidality, no homicidality, delusions, or paranoia   Insight:  intact   Judgement: behavior is adequate to circumstances   Orientation:  grossly intact   Memory: intact for content of interview   Language: grossly intact   Attention Span & Concentration:  able to focus   Fund of Knowledge:  intact and appropriate to age and level of education     Assessment and Diagnosis   Status/Progress: Based on the examination today, the patient's problem(s) is/are adequately but not ideally controlled.  New problems have not been presented today.   Co-morbidities and Lack of compliance are not complicating management of the primary condition.  There are no active rule-out diagnoses for this patient at this time.     General Impression:       ICD-10-CM ICD-9-CM   1. MDD (recurrent major depressive disorder) in remission  F33.40 296.35   2. RANDI (generalized anxiety disorder)  F41.1 300.02   3. Panic attack  F41.0 300.01   4. Insomnia, unspecified type  G47.00 780.52       Intervention/Counseling/Treatment Plan   Medication Management: The risks and benefits of medication were discussed with the patient.  Continue  Effexor  mg daily  Continue Valium 5 mg daily as needed for severe anxiety  Continue Ambien 10 mg before bed as needed for sleep    Return to Clinic: 6 months, as needed    Risks, benefits, side effects and alternative treatments discussed with patient. Patient agrees with the current plan as documented.  Encouraged Patient to keep  future appointments.  Take medications as prescribed and abstain from substance abuse.  Pt to present to ED for thoughts to harm herself or others

## 2025-01-29 ENCOUNTER — HOSPITAL ENCOUNTER (EMERGENCY)
Facility: HOSPITAL | Age: 65
Discharge: HOME | End: 2025-01-29
Attending: EMERGENCY MEDICINE
Payer: COMMERCIAL

## 2025-01-29 VITALS
DIASTOLIC BLOOD PRESSURE: 66 MMHG | SYSTOLIC BLOOD PRESSURE: 129 MMHG | HEART RATE: 102 BPM | RESPIRATION RATE: 20 BRPM | HEIGHT: 62 IN | OXYGEN SATURATION: 96 % | WEIGHT: 170 LBS | TEMPERATURE: 99 F | BODY MASS INDEX: 31.28 KG/M2

## 2025-01-29 DIAGNOSIS — J44.1 COPD EXACERBATION: Primary | ICD-10-CM

## 2025-01-29 DIAGNOSIS — B34.9 ACUTE VIRAL SYNDROME: ICD-10-CM

## 2025-01-29 LAB
INFLUENZA A, MOLECULAR: NEGATIVE
INFLUENZA B, MOLECULAR: NEGATIVE
SARS-COV-2 RDRP RESP QL NAA+PROBE: NEGATIVE
SPECIMEN SOURCE: NORMAL

## 2025-01-29 PROCEDURE — 99284 EMERGENCY DEPT VISIT MOD MDM: CPT | Mod: 25,ER

## 2025-01-29 PROCEDURE — 25000242 PHARM REV CODE 250 ALT 637 W/ HCPCS: Mod: ER | Performed by: PHYSICIAN ASSISTANT

## 2025-01-29 PROCEDURE — 94640 AIRWAY INHALATION TREATMENT: CPT | Mod: ER

## 2025-01-29 PROCEDURE — 87635 SARS-COV-2 COVID-19 AMP PRB: CPT | Mod: ER | Performed by: PHYSICIAN ASSISTANT

## 2025-01-29 PROCEDURE — 87502 INFLUENZA DNA AMP PROBE: CPT | Mod: ER | Performed by: PHYSICIAN ASSISTANT

## 2025-01-29 PROCEDURE — 63600175 PHARM REV CODE 636 W HCPCS: Mod: ER | Performed by: PHYSICIAN ASSISTANT

## 2025-01-29 RX ORDER — IPRATROPIUM BROMIDE AND ALBUTEROL SULFATE 2.5; .5 MG/3ML; MG/3ML
9 SOLUTION RESPIRATORY (INHALATION)
Status: DISCONTINUED | OUTPATIENT
Start: 2025-01-29 | End: 2025-01-29

## 2025-01-29 RX ORDER — PREDNISONE 20 MG/1
40 TABLET ORAL DAILY
Qty: 8 TABLET | Refills: 0 | Status: SHIPPED | OUTPATIENT
Start: 2025-01-29 | End: 2025-01-29

## 2025-01-29 RX ORDER — IPRATROPIUM BROMIDE AND ALBUTEROL SULFATE 2.5; .5 MG/3ML; MG/3ML
9 SOLUTION RESPIRATORY (INHALATION)
Status: COMPLETED | OUTPATIENT
Start: 2025-01-29 | End: 2025-01-29

## 2025-01-29 RX ORDER — DOXYCYCLINE 100 MG/1
100 CAPSULE ORAL 2 TIMES DAILY
Qty: 14 CAPSULE | Refills: 0 | Status: SHIPPED | OUTPATIENT
Start: 2025-01-29 | End: 2025-01-29

## 2025-01-29 RX ORDER — PREDNISONE 20 MG/1
60 TABLET ORAL
Status: COMPLETED | OUTPATIENT
Start: 2025-01-29 | End: 2025-01-29

## 2025-01-29 RX ORDER — PREDNISONE 20 MG/1
40 TABLET ORAL DAILY
Qty: 8 TABLET | Refills: 0 | Status: SHIPPED | OUTPATIENT
Start: 2025-01-29 | End: 2025-02-02

## 2025-01-29 RX ORDER — DOXYCYCLINE 100 MG/1
100 CAPSULE ORAL 2 TIMES DAILY
Qty: 14 CAPSULE | Refills: 0 | Status: SHIPPED | OUTPATIENT
Start: 2025-01-29 | End: 2025-02-05

## 2025-01-29 RX ADMIN — PREDNISONE 60 MG: 20 TABLET ORAL at 11:01

## 2025-01-29 RX ADMIN — IPRATROPIUM BROMIDE AND ALBUTEROL SULFATE 9 ML: .5; 3 SOLUTION RESPIRATORY (INHALATION) at 11:01

## 2025-01-29 NOTE — ED PROVIDER NOTES
Encounter Date: 1/29/2025       History     Chief Complaint   Patient presents with    Cough     Cough for 1 month. Pt reports she has taken antibiotics x 2 but pt reports cough returns. PT reports SOB that started today     64-year-old female, PMH COPD, atrial flutter on Eliquis, presents to ED with concern of cough, wheezing and shortness of breath.  Patient reports she has had symptoms intermittently for over 1 month.  Initially treated with doxycycline with improvement but then symptoms gradually returned.  She was then treated with Levaquin with improvement, but then reports symptoms returned again roughly 1 week ago.  She does state multiple family members within home also have similar symptoms at this time.  Denying fevers but does have chills.  No vomiting or diarrhea, abdominal pain.  No chest pain.  No bloody sputum.  No leg swelling.  No other acute complaints at this time    The history is provided by the patient.     Review of patient's allergies indicates:   Allergen Reactions    Succinimides Anaphylaxis     Son has MH    Vancomycin analogues Hives, Itching and Rash     Past Medical History:   Diagnosis Date    Adverse reaction to succinimides     son has malignant hyperthermia    Allergy     Anticoagulant long-term use     Anxiety     Arthritis     Atrial flutter     Colon polyps 2016    COPD (chronic obstructive pulmonary disease)     COPD exacerbation 10/31/2022    Depression     Diverticular disease of colon 06/06/2017    Diverticulitis     H/O gastric sleeve     HLD (hyperlipidemia)     Hypertension     resolved with gastric slleve    Malignant neoplasm of central portion of left breast in female, estrogen receptor positive 12/29/2022    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    Osteopenia     Pancreatitis     Paroxysmal A-fib 01/03/2024    Pneumothorax, unspecified     following mastectomy sx 2023    Psoriasis     Rheumatoid arthritis     Severe obesity (BMI 35.0-39.9) with comorbidity      Wears contact lenses     not often  wears glasses usually    Wears prescription eyeglasses      Past Surgical History:   Procedure Laterality Date    ABLATION  2022    Cardiac Ablation for A Flutter, Successful    ADENOIDECTOMY  1966    Tonsillitis and adnoids    BILATERAL MASTECTOMY Bilateral 02/15/2023    Procedure: MASTECTOMY, BILATERAL;  Surgeon: Marcela Meehan MD;  Location: [facility];  Service: General;  Laterality: Bilateral;  EMAIL SENT  @ 11:44 LK / 2.5 HOURS    BLADDER SUSPENSION      (x2)    BREAST REVISION SURGERY Bilateral 2023    Procedure: BREAST REVISION SURGERY / BILATERAL BREAST FLAP REVISION;  Surgeon: Ming Milian MD;  Location: [facility];  Service: Plastics;  Laterality: Bilateral;  90 MINUTES     SECTION      (x2)    COLONOSCOPY      DEBRIDEMENT, BREAST Bilateral 2023    Procedure: DEBRIDEMENT, BREAST;  Surgeon: Ming Milian MD;  Location: [facility];  Service: Plastics;  Laterality: Bilateral;    ESOPHAGOGASTRODUODENOSCOPY N/A 2021    Procedure: EGD (ESOPHAGOGASTRODUODENOSCOPY);  Surgeon: Vince Rodriguez MD;  Location: [facility] OR 2ND FLR;  Service: General;  Laterality: N/A;    INCISION AND DRAINAGE OF BREAST Left 10/26/2023    Procedure: INCISION AND DRAINAGE, BREAST;  Surgeon: Ming Milian MD;  Location: [facility];  Service: Plastics;  Laterality: Left;    INCISION AND DRAINAGE OF BREAST Left 2024    Procedure: INCISION AND DRAINAGE, BREAST;  Surgeon: Ming Milian MD;  Location: [facility];  Service: Plastics;  Laterality: Left;    INJECTION FOR SENTINEL NODE IDENTIFICATION Left 02/15/2023    Procedure: INJECTION, FOR SENTINEL NODE IDENTIFICATION;  Surgeon: Marcela Meehan MD;  Location: [facility];  Service: General;  Laterality: Left;    LAPAROSCOPIC SLEEVE GASTRECTOMY N/A 2021    Procedure: GASTRECTOMY, SLEEVE, LAPAROSCOPIC, with intraop EGD;  Surgeon: Vince Rodriguez MD;  Location: [facility] OR 2ND FLR;  Service: General;   Laterality: N/A;    LIPOSUCTION W/ FAT INJECTION Bilateral 08/29/2023    Procedure: LIPOSUCTION, WITH FAT TRANSFER;  Surgeon: Ming Milian MD;  Location: [facility];  Service: Plastics;  Laterality: Bilateral;  / FAT GRAFTING TO BOTH BREAST    LUNG BIOPSY  09/2020    RECONSTRUCTION OF BREAST WITH DEEP INFERIOR EPIGASTRIC ARTERY  (NEHA) FREE FLAP Bilateral 02/15/2023    Procedure: RECONSTRUCTION, BREAST, USING NEHA FREE FLAP;  Surgeon: Ming Milian MD;  Location: [facility];  Service: Plastics;  Laterality: Bilateral;    RECONSTRUCTION OF BREAST WITH DEEP INFERIOR EPIGASTRIC ARTERY  (NEHA) FREE FLAP Left 11/13/2024    Procedure: RECONSTRUCTION, BREAST, USING FREE FLAP;  Surgeon: Ming Milian MD;  Location: [facility];  Service: Plastics;  Laterality: Left;  / LEFT PAP FLAP  5 HOURS / HX  malignant hyperthermia  used PAP flap from Right thigh    REVISION, FLAP, PREVIOUSLY CREATED FOR BREAST RECONSTRUCTION Bilateral 08/29/2023    Procedure: REVISION, FLAP, PREVIOUSLY CREATED FOR BREAST RECONSTRUCTION;  Surgeon: Ming Milian MD;  Location: [facility];  Service: Plastics;  Laterality: Bilateral;  4 HOURS    REVISION, SCAR, ABDOMINAL DONOR SITE N/A 08/29/2023    Procedure: REVISION, SCAR, ABDOMINAL DONOR SITE;  Surgeon: Ming Milian MD;  Location: [facility];  Service: Plastics;  Laterality: N/A;    RHINOPLASTY      SENTINEL LYMPH NODE BIOPSY Left 02/15/2023    Procedure: BIOPSY, LYMPH NODE, SENTINEL;  Surgeon: Marcela Meehan MD;  Location: [facility];  Service: General;  Laterality: Left;    TONSILLECTOMY      TRANSESOPHAGEAL ECHOCARDIOGRAPHY N/A 11/02/2022    Procedure: ECHOCARDIOGRAM, TRANSESOPHAGEAL;  Surgeon: Kellie Grover MD;  Location: [facility] EP LAB;  Service: Cardiology;  Laterality: N/A;     Family History   Adopted: Yes   Problem Relation Name Age of Onset    No Known Problems Daughter      No Known Problems Daughter      Malignant hyperthermia Son       Social History      Tobacco Use    Smoking status: Former     Current packs/day: 0.00     Average packs/day: 0.5 packs/day for 41.9 years (21.0 ttl pk-yrs)     Types: Cigarettes     Start date: 1979     Quit date: 2020     Years since quittin.1     Passive exposure: Current    Smokeless tobacco: Never   Substance Use Topics    Alcohol use: Yes     Alcohol/week: 1.0 standard drink of alcohol     Types: 1 Glasses of wine per week     Comment: social-rarely    Drug use: No     Review of Systems   Constitutional:  Positive for chills. Negative for fever.   HENT:  Positive for congestion. Negative for ear pain and sore throat.    Respiratory:  Positive for cough, shortness of breath and wheezing.    Cardiovascular:  Negative for chest pain, palpitations and leg swelling.   Gastrointestinal:  Negative for abdominal pain, diarrhea, nausea and vomiting.   Genitourinary:  Negative for dysuria.   Musculoskeletal:  Negative for myalgias, neck pain and neck stiffness.       Physical Exam     Initial Vitals [25 1100]   BP Pulse Resp Temp SpO2   (!) 150/107 104 (!) 22 98.7 °F (37.1 °C) (!) 94 %      MAP       --         Physical Exam    Vitals reviewed.  Constitutional: She appears well-developed and well-nourished. She is cooperative. She does not have a sickly appearance. She does not appear ill. No distress.   HENT:   Head: Normocephalic and atraumatic.   Eyes: EOM are normal.   Neck: Neck supple.   Normal range of motion.  Cardiovascular:  Normal rate, regular rhythm and normal heart sounds.           Pulmonary/Chest: Effort normal.   Speaking in full sentences.  Maintaining 95-96% O2 sat on room air.  Mild/moderate expiratory wheezing bilaterally   Abdominal: Abdomen is soft.   Musculoskeletal:         General: No tenderness.      Cervical back: Normal range of motion and neck supple.     Neurological: She is alert and oriented to person, place, and time. She is not disoriented.   Skin: Skin is warm and dry.    Psychiatric: She has a normal mood and affect. Her speech is normal and behavior is normal. Thought content normal.         ED Course   Procedures  Labs Reviewed   INFLUENZA A & B BY MOLECULAR       Result Value    Influenza A, Molecular Negative      Influenza B, Molecular Negative      Flu A & B Source Nasal swab     SARS-COV-2 RNA AMPLIFICATION, QUAL    SARS-CoV-2 RNA, Amplification, Qual Negative            Imaging Results              X-Ray Chest PA And Lateral (Final result)  Result time 01/29/25 11:58:03      Final result by Keshav ChristiansenManfred), MD (01/29/25 11:58:03)                   Impression:      Lungs appear clear.      Electronically signed by: Keshav Christiansen MD  Date:    01/29/2025  Time:    11:58               Narrative:    EXAMINATION:  XR CHEST PA AND LATERAL    CLINICAL HISTORY  , Cough;    COMPARISON:  Comparison chest 04/25/2023    FINDINGS:  The heart size is normal.  The lung fields are clear.  No acute cardiopulmonary infiltrative.                                       Medications   predniSONE tablet 60 mg (60 mg Oral Given 1/29/25 1135)   albuterol-ipratropium 2.5 mg-0.5 mg/3 mL nebulizer solution 9 mL (9 mLs Nebulization Given 1/29/25 1145)     Medical Decision Making  Patient presents with concern of intermittent cough for 1 month.  Symptoms did return 1 week ago, reporting exposure to multiple family members with similar symptoms.  She did began having short of breath today.  No chest pain.  Afebrile.  Patient in no distress on exam    DDx:  Including but not limited to asthma, COPD, pneumonia, COVID, influenza, viral, URI, allergy/irritant    Amount and/or Complexity of Data Reviewed  Labs:  Decision-making details documented in ED Course.  Radiology: ordered. Decision-making details documented in ED Course.    Risk  Prescription drug management.               ED Course as of 01/29/25 1249   Wed Jan 29, 2025   1200 COVID-19 Rapid Screening  Negative [KS]   1207 Influenza A & B  by Molecular  Negative [KS]   1207 X-Ray Chest PA And Lateral  No acute cardiopulmonary findings per Radiology review [KS]   1246 Patient was reassessed, reporting improvement of symptoms.  Maintaining appropriate O2 sat on room air.  No wheezing on repeat exam.  Mason decision making was performed with patient regarding her management.  I do have higher suspicion her presenting symptoms are viral with COPD exacerbation.  Given breathing treatment and prednisone in ED and will continue prednisone and breathing treatments at home.  Will give prescription for doxycycline for pneumonia coverage.  Encouraged hydration, rest and close PCP follow-up.  ED return precautions were discussed at length.  Patient states understanding and agrees with plan [KS]      ED Course User Index  [KS] Mario Miller PA-C                             Clinical Impression:  Final diagnoses:  [B34.9] Acute viral syndrome  [J44.1] COPD exacerbation (Primary)          ED Disposition Condition    Discharge Stable          ED Prescriptions       Medication Sig Dispense Start Date End Date Auth. Provider    predniSONE (DELTASONE) 20 MG tablet  (Status: Discontinued) Take 2 tablets (40 mg total) by mouth once daily. for 4 days 8 tablet 1/29/2025 1/29/2025 Mario Miller PA-C    doxycycline (VIBRAMYCIN) 100 MG Cap  (Status: Discontinued) Take 1 capsule (100 mg total) by mouth 2 (two) times daily. for 7 days 14 capsule 1/29/2025 1/29/2025 Mario Miller PA-C    doxycycline (VIBRAMYCIN) 100 MG Cap Take 1 capsule (100 mg total) by mouth 2 (two) times daily. for 7 days 14 capsule 1/29/2025 2/5/2025 Mario Miller PA-C    predniSONE (DELTASONE) 20 MG tablet Take 2 tablets (40 mg total) by mouth once daily. for 4 days 8 tablet 1/29/2025 2/2/2025 Mario Miller PA-C          Follow-up Information       Follow up With Specialties Details Why Contact Info    Hetal Banks MD Internal Medicine   [address] [facility]  [address]  [place]  [id number]  [phone]               Mario Miller PA-C  01/29/25 1249

## 2025-01-29 NOTE — DISCHARGE INSTRUCTIONS

## 2025-02-24 ENCOUNTER — OFFICE VISIT (OUTPATIENT)
Dept: HEMATOLOGY/ONCOLOGY | Facility: CLINIC | Age: 65
End: 2025-02-24
Payer: COMMERCIAL

## 2025-02-24 VITALS
TEMPERATURE: 98 F | RESPIRATION RATE: 18 BRPM | SYSTOLIC BLOOD PRESSURE: 144 MMHG | WEIGHT: 165.56 LBS | OXYGEN SATURATION: 96 % | HEIGHT: 62 IN | BODY MASS INDEX: 30.47 KG/M2 | DIASTOLIC BLOOD PRESSURE: 69 MMHG | HEART RATE: 75 BPM

## 2025-02-24 DIAGNOSIS — Z79.811 AROMATASE INHIBITOR USE: ICD-10-CM

## 2025-02-24 DIAGNOSIS — Z17.0 MALIGNANT NEOPLASM OF CENTRAL PORTION OF LEFT BREAST IN FEMALE, ESTROGEN RECEPTOR POSITIVE: Primary | ICD-10-CM

## 2025-02-24 DIAGNOSIS — C50.112 MALIGNANT NEOPLASM OF CENTRAL PORTION OF LEFT BREAST IN FEMALE, ESTROGEN RECEPTOR POSITIVE: Primary | ICD-10-CM

## 2025-02-24 DIAGNOSIS — M85.80 OSTEOPENIA, UNSPECIFIED LOCATION: ICD-10-CM

## 2025-02-24 DIAGNOSIS — R23.2 HOT FLASHES: ICD-10-CM

## 2025-02-24 DIAGNOSIS — L40.50 PSORIATIC ARTHRITIS: ICD-10-CM

## 2025-02-24 DIAGNOSIS — I48.92 ATRIAL FLUTTER, UNSPECIFIED TYPE: ICD-10-CM

## 2025-02-24 PROCEDURE — 99214 OFFICE O/P EST MOD 30 MIN: CPT | Mod: S$GLB,,, | Performed by: NURSE PRACTITIONER

## 2025-02-24 PROCEDURE — 99999 PR PBB SHADOW E&M-EST. PATIENT-LVL V: CPT | Mod: PBBFAC,,, | Performed by: NURSE PRACTITIONER

## 2025-02-24 PROCEDURE — G2211 COMPLEX E/M VISIT ADD ON: HCPCS | Mod: S$GLB,,, | Performed by: NURSE PRACTITIONER

## 2025-02-24 PROCEDURE — 1160F RVW MEDS BY RX/DR IN RCRD: CPT | Mod: CPTII,S$GLB,, | Performed by: NURSE PRACTITIONER

## 2025-02-24 PROCEDURE — 3078F DIAST BP <80 MM HG: CPT | Mod: CPTII,S$GLB,, | Performed by: NURSE PRACTITIONER

## 2025-02-24 PROCEDURE — 3077F SYST BP >= 140 MM HG: CPT | Mod: CPTII,S$GLB,, | Performed by: NURSE PRACTITIONER

## 2025-02-24 PROCEDURE — 1159F MED LIST DOCD IN RCRD: CPT | Mod: CPTII,S$GLB,, | Performed by: NURSE PRACTITIONER

## 2025-02-24 PROCEDURE — 3008F BODY MASS INDEX DOCD: CPT | Mod: CPTII,S$GLB,, | Performed by: NURSE PRACTITIONER

## 2025-02-24 NOTE — Clinical Note
This patient asked about getting on Larissa's list for nipple tattoos for May if possible.  Thank you.  [facility]

## 2025-02-24 NOTE — PROGRESS NOTES
Oncology Clinic   Progress Note    Patient: Lucia Matias  MRN: [id number]  Date: 2025    Chief Complaint: HR+ breast cancer    Ms. Matias is a domo 64yo woman with recently diagnosed HR+ breast cancer who presents today for evaluation. Her oncologic history is as follows:    Per Dr. Madrid's note:   -22: annual mammogram indentified left focal asymmetry at the upper outer position.   -Follow-up mammogram and ultrasound on 22 showed a worrisome spiculated mass, 1.4 x 0.7 x 0.6 cm, 12 o'clock left breast 7 CMFN. An ultrasound guided biopsy was performed on 12/15/22 with pathology revealing infiltrating ductal carcinoma of the breast. Grade 2, ER >95%, SD 85-90%, Her2 1+, Ki67 25-30%  -2023: MRI breast shwoed Left breast 12 mm x 11 mm x 7 mm mass at the middle 12 o'clock position. Several pulmonary nodules are noted incidentally in the left hemithorax.  The patient has a history of bilateral pulmonary nodule seen on previous thoracic CT exams most recently in .  One of the nodules underwent biopsy in  with benign results.    -Underwent b/l mastectomies with SLN bx 2/15/2023 with bilateral breast reconstruction with abdominally based free flaps. He postoperative course was complicated by L pneumothorax.  -Post op path showed Invasive lobular carcinoma in left breast grade 2 measuring 18 mm with LCIS Grade 2 ER SD positive and Her2 negative. pT1c pN0. Oncotype 35  -C1D1 adjuvanct TC on ; completed 4 cycles on   -Started anastrozole 2023    GYN History:  Age of menarche was 13. Age of menopause was 46.  Patient denies hormonal therapy but took OCP for approximately 15 years in the past. Patient is . Age of first live birth was 25. Patient did breast feed for 6 months. S/p uterine ablation for fibroids in early 40s, no menstrual cycle since. Had menopausal symptoms in mid-late 40s.     Interval History:  Ms. Matias returns today for follow up. She has been  doing fairly well since her last visit. Continues to tolerate anastrozole without difficulty. She had her final reconstruction November 13th.     Survivorship  Cardiac toxicity:  had a uri that went on for about one month recently, had steroids and an anitibiotic with improvement, typically no chest pain or sob    Bone Health:  taking ca and vit D, next bone density schduled    Anxiety/depression/ptsd: no major anxiety/depresion, some situational stress    Cognitive function: some forgetfulness, nothing major    Fatigue: energy level is stable    Lymphedema: none    Hormone Related Symptoms: vaginal dryness, hot flashes are stable, taking effexor    Pain: has ra and psoriatic arthritis, joint pain is manageable    Sleep Disorder: sleeping well at night    Healthy Lifestyle: actively trying to lose weight, healthier diet    Health Maintenance:  sees pcp    Medications:  Current Outpatient Medications   Medication Sig Dispense Refill    anastrozole (ARIMIDEX) 1 mg Tab Take 1 tablet (1 mg total) by mouth once daily. 90 tablet 3    apixaban (ELIQUIS) 5 mg Tab Take 1 tablet (5 mg total) by mouth 2 (two) times daily. 180 tablet 3    atorvastatin (LIPITOR) 20 MG tablet Take 1 tablet (20 mg total) by mouth every evening. 90 tablet 3    B-complex with vitamin C (VITAMIN B COMPLEX-C ORAL) Take by mouth Daily.      BIOTIN-COLLAGEN-VIT C-E-HERBAL ORAL Take by mouth.      calcium-vitamin D 250-100 mg-unit per tablet Take 1 tablet by mouth 2 (two) times daily.      cyanocobalamin 500 MCG tablet Take 500 mcg by mouth once daily.      diazePAM (VALIUM) 5 MG tablet TAKE 1 TABLET BY MOUTH DAILY AS NEEDED FOR ANXIETY. 30 tablet 5    diclofenac sodium (VOLTAREN) 1 % Gel Apply 2 g topically 4 (four) times daily. 100 g 1    fluocinonide-emollient (FLUOCINONIDE-E) 0.05 % Crea Apply to affected area of feet daily as needed for psoriasis. 60 g 3    fluticasone-umeclidin-vilanter (TRELEGY ELLIPTA) 100-62.5-25 mcg DsDv Inhale 1 puff into the  lungs once daily. 180 each 3    levoFLOXacin (LEVAQUIN) 500 MG tablet Take 1 tablet (500 mg total) by mouth once daily. 7 tablet 0    magnesium 200 mg Tab Take by mouth once.      metoprolol succinate (TOPROL-XL) 50 MG 24 hr tablet Take 1 tablet (50 mg total) by mouth nightly. 90 tablet 3    multivitamin (THERAGRAN) per tablet Take 1 tablet by mouth once daily.      multivitamin with minerals (HAIR,SKIN AND NAILS ORAL) Take by mouth.      mv-mn/iron/folic acid/herb 190 (VITAMIN D3 COMPLETE ORAL) Take by mouth.      omeprazole (PRILOSEC) 40 MG capsule Take 1 capsule (40 mg total) by mouth every morning. 90 capsule 1    ondansetron (ZOFRAN) 4 MG tablet Take 2 tablets (8 mg total) by mouth every 12 (twelve) hours as needed for Nausea. 10 tablet 0    TALTZ AUTOINJECTOR 80 mg/mL AtIn Inject 80 mg into the skin every 28 days. 1 mL 6    triamcinolone acetonide 0.025% (KENALOG) 0.025 % cream Apply to affected area of skin folds twice a day as needed for psoriasis. 80 g 3    triamcinolone acetonide 0.1% (KENALOG) 0.1 % cream Apply to affected areas of body twice a day as needed for psoriasis. Do not use on face or skin folds. 454 g 1    venlafaxine (EFFEXOR-XR) 150 MG Cp24 Take 1 capsule (150 mg total) by mouth once daily. 90 capsule 3    zolpidem (AMBIEN) 10 mg Tab Take 1 tablet (10 mg total) by mouth nightly as needed (insomnia). 30 tablet 5    naloxone (NARCAN) 4 mg/actuation Spry 4mg by nasal route as needed for opioid overdose; may repeat every 2-3 minutes in alternating nostrils until medical help arrives. Call 911 1 each 11    nitrofurantoin, macrocrystal-monohydrate, (MACROBID) 100 MG capsule Take 1 capsule (100 mg total) by mouth 2 (two) times daily. (Patient not taking: Reported on 11/19/2024) 10 capsule 0    oxyCODONE-acetaminophen (PERCOCET)  mg per tablet Take 1 tablet by mouth every 4 (four) hours as needed. 25 tablet 0    oxyCODONE-acetaminophen (PERCOCET) 5-325 mg per tablet Take 1 tablet by mouth every  "4 (four) hours as needed for Pain. 20 tablet 0     No current facility-administered medications for this visit.     Review of Systems:  +fatigue  + hot flashes  + vaginal dryness  12pt ROS negative except as noted above    Objective:     Vitals:    02/24/25 1402   BP: (!) 144/69   BP Location: Right arm   Patient Position: Sitting   Pulse: 75   Resp: 18   Temp: 97.9 °F (36.6 °C)   TempSrc: Oral   SpO2: 96%   Weight: 75.1 kg (165 lb 9.1 oz)   Height: 5' 2" (1.575 m)       BMI: Body mass index is 30.28 kg/m².     Physical Exam:  ECOG 0   General: well appearing, in no apparent distress  HEENT: Normocephalic, EOMI, anicteric sclerae, MMM  Neck: supple, without cervical or supraclavicular lymphadenopathy.  Heart: well-perfused  Lungs: no increased wob  Breast: s/p bilateral mastectomy with flap reconstruction, incisions well-healed, no new masses or adenopathy noted bilaterally  Abdomen: Soft, nontender, nondistended with normal bowel sounds.   Extremities: No LE edema or joint effusion.   Skin: warm, well-perfused, no rash.   Neurologic: Alert and oriented x 4, normal speech and gait   Psychiatric: Conversing appropriately with providers throughout today's encounter.    Laboratory Data:  I personally reviewed all recent labs, imaging and pathology.    Assessment and Plan:   Ms. Matias is a domo 65yo woman with hx of Aflutter s/p ablation, COPD, RA and recently diagnosed Stage IA HR+ breast cancer s/p bilateral mastectomy with reconstruction who presents today for evaluation.     Given her Oncotype of 35, we proceeded with adjuvant docetaxel 75mg/m2 and cyclophosphamide 600mg/m2 iv every 21 days for a total of 4 cycles.   She has since initiated ET with anastrozole and continues to tolerate well thus far.     #Breast cancer:  --cont anastrozole 1mg daily (SOT 7/2023--); goal treatment of 5 years. Given high oncotype and consider BCI near 5 year saige  --cont Ca/VitD supplementation as above  --no role for screening MMG " s/p bilateral mastectomy  --RTC 4mo    #Osteopenia: noted on DEXA 7/25/23  --encouraged Ca/VitD supplementation as above    --will repeat DEXA 7/2025 scheduled    #Hot flashes: stable   --cont magnesium glycinate  --cont effexor 150mg daily  --discussed veoazah; will hold off on ordering for now but she will reach out if symptoms do not improve and she would like to pursue   --following w integrative onc  -- discussed otc vaginal moisturizers for vaginal dryness    #Psoriasis/psoriatic arthritis: stable  --has now resumed cosentyx with improvement in symptoms  --cont to follow with derm    #Aflutter s/p RFA: following with cardiology  --remains on eliquis  --follow up with cards as scheduled    All questions were answered to her apparent satisfaction. Will plan to see her back in 4mo or sooner should the need arise.     Mercy Keys NP      Med Onc Chart Routing      Follow up with physician 4 months. with Dr. Madrid   Follow up with LINDY . 8 months   Infusion scheduling note    Injection scheduling note    Labs    Imaging    Pharmacy appointment    Other referrals                   Total time of this visit, including time spent face to face with patient and/or via video/audio, and also in preparing for today's visit for MDM and documentation. (Medical Decision Making, including consideration of possible diagnoses, management options, complex medical record review, review of diagnostic tests and information, consideration and discussion of significant complications based on comorbidities, and discussion with providers involved with the care of the patient) is 30 minutes. Greater than 50% was spent face to face with the patient counseling and coordinating care.     code applied: patient requires or will require a continuous, longitudinal, and active collaborative plan of care related to this patient's health condition, breast cancer -the management of which requires the direction of a practitioner with  specialized clinical knowledge, skill, and expertise.

## 2025-03-14 ENCOUNTER — PATIENT MESSAGE (OUTPATIENT)
Dept: SURGERY | Facility: CLINIC | Age: 65
End: 2025-03-14
Payer: COMMERCIAL

## 2025-03-19 ENCOUNTER — PATIENT MESSAGE (OUTPATIENT)
Dept: SURGERY | Facility: CLINIC | Age: 65
End: 2025-03-19
Payer: COMMERCIAL

## 2025-04-13 ENCOUNTER — PATIENT MESSAGE (OUTPATIENT)
Dept: PRIMARY CARE CLINIC | Facility: CLINIC | Age: 65
End: 2025-04-13
Payer: COMMERCIAL

## 2025-04-17 ENCOUNTER — OFFICE VISIT (OUTPATIENT)
Dept: OBSTETRICS AND GYNECOLOGY | Facility: CLINIC | Age: 65
End: 2025-04-17
Payer: COMMERCIAL

## 2025-04-17 VITALS
WEIGHT: 161.63 LBS | HEIGHT: 62 IN | BODY MASS INDEX: 29.74 KG/M2 | DIASTOLIC BLOOD PRESSURE: 70 MMHG | HEART RATE: 86 BPM | SYSTOLIC BLOOD PRESSURE: 124 MMHG

## 2025-04-17 DIAGNOSIS — N76.0 ACUTE VAGINITIS: Primary | ICD-10-CM

## 2025-04-17 DIAGNOSIS — N95.1 MENOPAUSAL VAGINAL DRYNESS: ICD-10-CM

## 2025-04-17 PROCEDURE — 99999 PR PBB SHADOW E&M-EST. PATIENT-LVL IV: CPT | Mod: PBBFAC,,,

## 2025-04-17 PROCEDURE — 3008F BODY MASS INDEX DOCD: CPT | Mod: CPTII,S$GLB,,

## 2025-04-17 PROCEDURE — 1160F RVW MEDS BY RX/DR IN RCRD: CPT | Mod: CPTII,S$GLB,,

## 2025-04-17 PROCEDURE — 1159F MED LIST DOCD IN RCRD: CPT | Mod: CPTII,S$GLB,,

## 2025-04-17 PROCEDURE — 3078F DIAST BP <80 MM HG: CPT | Mod: CPTII,S$GLB,,

## 2025-04-17 PROCEDURE — 3074F SYST BP LT 130 MM HG: CPT | Mod: CPTII,S$GLB,,

## 2025-04-17 PROCEDURE — 99214 OFFICE O/P EST MOD 30 MIN: CPT | Mod: S$GLB,,,

## 2025-04-17 RX ORDER — METRONIDAZOLE 500 MG/1
500 TABLET ORAL 2 TIMES DAILY
Qty: 14 TABLET | Refills: 0 | Status: SHIPPED | OUTPATIENT
Start: 2025-04-17 | End: 2025-04-24

## 2025-04-17 NOTE — PROGRESS NOTES
"CC: Vaginitis    Lucia Matias is a 64 y.o. female  presents for GYN problem visit.   Pt is postmenopausal, She complains of vaginal itching and yellow discharge. Noticed after intercourse. Reports itchyness and irritation internally and externally. Uses KY water based lubricants.  Has taken 1 diflucan last week w/o resolution. Does report vaginal dryness since on AI f/ BrCA hx    /70   Pulse 86   Ht 5' 2" (1.575 m)   Wt 73.3 kg (161 lb 9.6 oz)   BMI 29.56 kg/m²     ROS:  Constitutional: no weight loss, weight gain, fever, fatigue  Eyes:  No vision changes, glasses/contacts  ENT/Mouth: No ulcers, sinus problems, ears ringing, headache  Cardiovascular: No inability to lie flat, chest pain, exercise intolerance, swelling, heart palpitations  Respiratory: No wheezing, coughing blood, shortness of breath, or cough  Gastrointestinal: No diarrhea, bloody stool, nausea/vomiting, constipation, gas, hemorrhoids  Genitourinary: No blood in urine, painful urination, urgency of urination, frequency of urination, incomplete emptying, incontinence, abnormal bleeding, painful periods, heavy periods, sexual problems, bleeding after intercourse. Yellow vaginal discharge, burning + itching  Musculoskeletal: No muscle weakness  Breast: No painful breasts, nipple discharge, masses, rash, ulcers   Neurological: No passing out, seizures, numbness, headache  Endocrine: No diabetes, hypothyroid, hyperthyroid, hot flashes, hair loss, abnormal hair growth, acne  Psychiatric: No depression, crying  Hematologic: No bruises, bleeding, swollen lymph nodes, anemia.    OBJECTIVE:  Physical Exam:   Constitutional: Vital signs are normal. She appears well-developed and well-nourished.        Pulmonary/Chest: Effort normal.        Abdominal: Soft. There is no abdominal tenderness. Hernia confirmed negative in the right inguinal area and confirmed negative in the left inguinal area.     Genitourinary:    Inguinal canal, " uterus, right adnexa and left adnexa normal.   Rectum:      No external hemorrhoid.      Pelvic exam was performed with patient in the lithotomy position.   The external female genitalia was normal.   No external genitalia lesions identified,Genitalia hair distrobution normal .     Labial bartholins normal.There is rash and tenderness on the right labia. There is no lesion on the right labia. There is rash and tenderness on the left labia. There is no lesion on the left labia. Cervix is normal. Right adnexum displays no mass, no tenderness and no fullness. Left adnexum displays no mass, no tenderness and no fullness. Vagina exhibits no lesion. There is erythema and vaginal discharge (yellow, thin appearing discharge, coating vaginal mucosa) in the vagina. No tenderness or bleeding in the vagina.    No foreign body in the vagina.      No signs of injury in the vagina.   Vagina was moist.Cervix exhibits friability. Cervix exhibits no motion tenderness, no lesion, no discharge, no tenderness and no polyp. Uterus consistancy normal and Uerus contour normal  Uterus is not enlarged, not tender and not hosting fibroids. Normal urethral meatus.              Neurological: She is alert.     Psychiatric: She has a normal mood and affect. Her behavior is normal. Mood and judgment normal.         ASSESSMENT:   Acute vaginitis  -     metroNIDAZOLE (FLAGYL) 500 MG tablet; Take 1 tablet (500 mg total) by mouth 2 (two) times a day. for 7 days  Dispense: 14 tablet; Refill: 0    PLAN:   - Vaginosis + GC/CT to r/o infection  - Discussed intrarosa for vagnial dryness, given samples to try.   - Recommended good genital hygiene practices (I.e. using unscented body washes, avoiding douching, foods high in sugar, tight fitting clothing), drinking plenty of water, voiding after sex, & changing and showering immediately after exercise.  - Follow up PRN    Female chaperone present for entire procedure

## 2025-04-21 ENCOUNTER — RESULTS FOLLOW-UP (OUTPATIENT)
Dept: OBSTETRICS AND GYNECOLOGY | Facility: CLINIC | Age: 65
End: 2025-04-21
Payer: COMMERCIAL

## 2025-04-21 ENCOUNTER — CLINICAL SUPPORT (OUTPATIENT)
Dept: OBSTETRICS AND GYNECOLOGY | Facility: CLINIC | Age: 65
End: 2025-04-21
Payer: COMMERCIAL

## 2025-04-21 DIAGNOSIS — A54.9 NEISSERIA GONORRHEAE: Primary | ICD-10-CM

## 2025-04-21 PROCEDURE — 99999 PR PBB SHADOW E&M-EST. PATIENT-LVL I: CPT | Mod: PBBFAC,,,

## 2025-04-21 PROCEDURE — 96372 THER/PROPH/DIAG INJ SC/IM: CPT | Mod: S$GLB,,,

## 2025-04-21 RX ORDER — CEFTRIAXONE 500 MG/1
500 INJECTION, POWDER, FOR SOLUTION INTRAMUSCULAR; INTRAVENOUS
Status: COMPLETED | OUTPATIENT
Start: 2025-04-21 | End: 2025-04-21

## 2025-04-21 RX ADMIN — CEFTRIAXONE 500 MG: 500 INJECTION, POWDER, FOR SOLUTION INTRAMUSCULAR; INTRAVENOUS at 01:04

## 2025-04-21 NOTE — PROGRESS NOTES
HERE FOR ROCEPHIN INJECTION 500 MG/ML PATIENT WITHOUT COMPLAINT AT THIS TIME. ALLERGIES REVIEWED, INJECTION GIVEN ADVISED TO WAIT IN LOBBY FOR 20 MINUTES POST INJECTION AND TO REPORT ANY ADVERSE REACTION. NO PAN NOTED PRIOR TO OR POST INJECTION .        SITE:      [facility]

## 2025-04-23 DIAGNOSIS — R63.4 WEIGHT LOSS: ICD-10-CM

## 2025-04-23 DIAGNOSIS — Z98.84 S/P LAPAROSCOPIC SLEEVE GASTRECTOMY: ICD-10-CM

## 2025-04-23 RX ORDER — OMEPRAZOLE 40 MG/1
40 CAPSULE, DELAYED RELEASE ORAL EVERY MORNING
Qty: 90 CAPSULE | Refills: 1 | Status: CANCELLED | OUTPATIENT
Start: 2025-04-23

## 2025-04-23 RX ORDER — OMEPRAZOLE 40 MG/1
40 CAPSULE, DELAYED RELEASE ORAL EVERY MORNING
Qty: 90 CAPSULE | Refills: 1 | Status: SHIPPED | OUTPATIENT
Start: 2025-04-23

## 2025-04-24 ENCOUNTER — OFFICE VISIT (OUTPATIENT)
Dept: DERMATOLOGY | Facility: CLINIC | Age: 65
End: 2025-04-24
Payer: COMMERCIAL

## 2025-04-24 DIAGNOSIS — Z79.899 LONG-TERM USE OF HIGH-RISK MEDICATION: ICD-10-CM

## 2025-04-24 DIAGNOSIS — L40.0 PSORIASIS VULGARIS: ICD-10-CM

## 2025-04-24 DIAGNOSIS — L40.50 PSORIATIC ARTHRITIS: Primary | ICD-10-CM

## 2025-04-24 PROCEDURE — 1159F MED LIST DOCD IN RCRD: CPT | Mod: CPTII,S$GLB,, | Performed by: DERMATOLOGY

## 2025-04-24 PROCEDURE — G2211 COMPLEX E/M VISIT ADD ON: HCPCS | Mod: S$GLB,,, | Performed by: DERMATOLOGY

## 2025-04-24 PROCEDURE — 1160F RVW MEDS BY RX/DR IN RCRD: CPT | Mod: CPTII,S$GLB,, | Performed by: DERMATOLOGY

## 2025-04-24 PROCEDURE — 99214 OFFICE O/P EST MOD 30 MIN: CPT | Mod: S$GLB,,, | Performed by: DERMATOLOGY

## 2025-04-24 RX ORDER — TRIAMCINOLONE ACETONIDE 0.25 MG/G
CREAM TOPICAL
Qty: 80 G | Refills: 3 | Status: SHIPPED | OUTPATIENT
Start: 2025-04-24

## 2025-04-24 RX ORDER — IXEKIZUMAB 80 MG/ML
80 INJECTION, SOLUTION SUBCUTANEOUS
Qty: 1 ML | Refills: 6 | Status: ACTIVE | OUTPATIENT
Start: 2025-04-24

## 2025-04-24 RX ORDER — FLUOCINONIDE CREAM (EMULSIFIED BASE) 0.5 MG/G
CREAM TOPICAL
Qty: 60 G | Refills: 3 | Status: SHIPPED | OUTPATIENT
Start: 2025-04-24

## 2025-04-24 RX ORDER — TRIAMCINOLONE ACETONIDE 1 MG/G
CREAM TOPICAL
Qty: 454 G | Refills: 1 | Status: SHIPPED | OUTPATIENT
Start: 2025-04-24

## 2025-04-24 NOTE — PROGRESS NOTES
Patient Information  Name: Lucia Matias  : 1960  MRN: [id number]     Referring Physician:  No ref. provider found   Primary Care Physician:  Hetal Banks MD   Date of Visit: 25      Subjective:     History of Present lllness:    Lucia Matias is a 64 y.o. female who presents with a chief complaint of psoriasis.    Location: hands  Signs/Symptoms: skin is clear  Symptom course: improving  Current treatment: taltz and triamcinolone     Patient was last seen: 2024.  Prior notes by myself reviewed.   Clinical documentation obtained by nursing staff reviewed.    Review of Systems    Objective:   Physical Exam   Constitutional: She appears well-developed and well-nourished. No distress.   Neurological: She is alert and oriented to person, place, and time. She is not disoriented.   Psychiatric: She has a normal mood and affect.   Skin:               Diagram Legend     Erythematous scaling macule/papule c/w actinic keratosis       Vascular papule c/w angioma      Pigmented verrucoid papule/plaque c/w seborrheic keratosis      Yellow umbilicated papule c/w sebaceous hyperplasia      Irregularly shaped tan macule c/w lentigo     1-2 mm smooth white papules consistent with Milia      Movable subcutaneous cyst with punctum c/w epidermal inclusion cyst      Subcutaneous movable cyst c/w pilar cyst      Firm pink to brown papule c/w dermatofibroma      Pedunculated fleshy papule(s) c/w skin tag(s)      Evenly pigmented macule c/w junctional nevus     Mildly variegated pigmented, slightly irregular-bordered macule c/w mildly atypical nevus      Flesh colored to evenly pigmented papule c/w intradermal nevus       Pink pearly papule/plaque c/w basal cell carcinoma      Erythematous hyperkeratotic cursted plaque c/w SCC      Surgical scar with no sign of skin cancer recurrence      Open and closed comedones      Inflammatory papules and pustules      Verrucoid papule consistent  consistent with wart     Erythematous eczematous patches and plaques     Dystrophic onycholytic nail with subungual debris c/w onychomycosis     Umbilicated papule    Erythematous-base heme-crusted tan verrucoid plaque consistent with inflamed seborrheic keratosis     Erythematous Silvery Scaling Plaque c/w Psoriasis     See annotation    No images are attached to the encounter or orders placed in the encounter.      [] Data reviewed  [] Prior external notes reviewed  [] Independent review of test  [] Management discussed with another provider  [] Independent historian    Assessment / Plan:        Psoriatic arthritis  - stable and chronic  Psoriasis vulgaris  - stable and chronic  Long-term use of high-risk medication  -     fluocinonide-emollient (FLUOCINONIDE-E) 0.05 % Crea; Apply to affected area of feet daily as needed for psoriasis.  Dispense: 60 g; Refill: 3  -     triamcinolone acetonide 0.025% (KENALOG) 0.025 % cream; Apply to affected area of skin folds twice a day as needed for psoriasis.  Dispense: 80 g; Refill: 3  -     triamcinolone acetonide 0.1% (KENALOG) 0.1 % cream; Apply to affected areas of body twice a day as needed for psoriasis. Do not use on face or skin folds.  Dispense: 454 g; Refill: 1  -     CBC Auto Differential; Future; Expected date: 04/24/2025  -     Comprehensive Metabolic Panel; Future; Expected date: 04/24/2025  -     Quantiferon Gold TB; Future; Expected date: 04/24/2025  -     TALTZ AUTOINJECTOR 80 mg/mL AtIn; Inject 80 mg into the skin every 28 days.  Dispense: 1 mL; Refill: 6      Follow up in about 6 months (around 10/24/2025).      Minerva Lara MD, FAAD  [facility]

## 2025-04-29 ENCOUNTER — E-VISIT (OUTPATIENT)
Dept: PRIMARY CARE CLINIC | Facility: CLINIC | Age: 65
End: 2025-04-29
Payer: COMMERCIAL

## 2025-04-29 DIAGNOSIS — J32.9 SINUSITIS, UNSPECIFIED CHRONICITY, UNSPECIFIED LOCATION: Primary | ICD-10-CM

## 2025-04-29 RX ORDER — AMOXICILLIN AND CLAVULANATE POTASSIUM 875; 125 MG/1; MG/1
1 TABLET, FILM COATED ORAL EVERY 12 HOURS
Qty: 20 TABLET | Refills: 0 | Status: SHIPPED | OUTPATIENT
Start: 2025-04-29

## 2025-04-29 NOTE — PROGRESS NOTES
Patient ID: Lucia Matias is a 64 y.o. female.    Chief Complaint: General Illness (Entered automatically based on patient selection in [employer].)    The patient initiated a request through [employer] on 4/29/2025 for evaluation and management with a chief complaint of General Illness (Entered automatically based on patient selection in [employer].)     I evaluated the questionnaire submission on 4/29/2025.    [facility] Evisit [facility]-Cough And Cold    4/29/2025  9:04 AM CDT - Filed by Patient   What do you need help with? Sinus Infection   Do you agree to participate in an E-Visit? Yes   If you have any of the following symptoms, go to your local emergency room or call 911: I acknowledge   What is the main issue you would like addressed today? Upper respiratory infection   Do you think you might have COVID-19 or the Flu? No   What symptoms do you currently have?  Chills;  Cough;  Headache;  Runny nose   Describe your cough: Contains mucus;  Does not stop;  Interferes with daily activities   Describe your mucus: Green;  Thick   Have you ever smoked? Smoked in the past   Do you have a fever? Below 100.4°   When did your concern begin? 4/29/2025   In the last two weeks, have you been in close contact with someone who has COVID-19, the Flu, or strep throat? No   What have you tried to help your symptoms? Cold medication;  Cough syrup;  Drinking more fluids;  Rest and sleep   On a scale of 1-10, where 10 is the worst you can imagine, how severe are your symptoms? (range: 1 - 10) 7   How have your symptoms changed since they first started? Worsened   Do you have transportation to an [facility] location to get tested for COVID-19? Yes   Provide any additional information you feel is important. Tested negative for covid and had flu shot. Not flu like symptoms.  Chest congestion and productive cough very green and has that infection taste   Please attach any relevant images or files    Are you able to take your vital  signs? Yes   Systolic Blood Pressure:    Diastolic Blood Pressure:    Weight: 161   Height: 62   Pulse: 72   Temperature: 100   Respiration rate: 18   Pulse Oxygen: 0         Encounter Diagnosis   Name Primary?    Sinusitis, unspecified chronicity, unspecified location Yes        No orders of the defined types were placed in this encounter.     Medications Ordered This Encounter   Medications    amoxicillin-clavulanate 875-125mg (AUGMENTIN) 875-125 mg per tablet     Sig: Take 1 tablet by mouth every 12 (twelve) hours.     Dispense:  20 tablet     Refill:  0        No follow-ups on file.      E-Visit Time Tracking:    Day 1 Time (in minutes): 5    Total Time (in minutes): 5

## 2025-05-06 ENCOUNTER — HOSPITAL ENCOUNTER (INPATIENT)
Facility: HOSPITAL | Age: 65
LOS: 4 days | Discharge: HOME | DRG: 191 | End: 2025-05-10
Attending: EMERGENCY MEDICINE | Admitting: EMERGENCY MEDICINE
Payer: COMMERCIAL

## 2025-05-06 DIAGNOSIS — I50.9 CONGESTIVE HEART FAILURE, UNSPECIFIED HF CHRONICITY, UNSPECIFIED HEART FAILURE TYPE: ICD-10-CM

## 2025-05-06 DIAGNOSIS — J41.8 MIXED SIMPLE AND MUCOPURULENT CHRONIC BRONCHITIS: Primary | Chronic | ICD-10-CM

## 2025-05-06 DIAGNOSIS — R07.89 CHEST DISCOMFORT: ICD-10-CM

## 2025-05-06 DIAGNOSIS — R79.89 TROPONIN LEVEL ELEVATED: ICD-10-CM

## 2025-05-06 DIAGNOSIS — R06.02 SHORTNESS OF BREATH: ICD-10-CM

## 2025-05-06 DIAGNOSIS — R06.09 DOE (DYSPNEA ON EXERTION): ICD-10-CM

## 2025-05-06 DIAGNOSIS — R07.9 CHEST PAIN: ICD-10-CM

## 2025-05-06 DIAGNOSIS — I47.10 SVT (SUPRAVENTRICULAR TACHYCARDIA): ICD-10-CM

## 2025-05-06 PROBLEM — E87.6 HYPOKALEMIA: Status: ACTIVE | Noted: 2025-05-06

## 2025-05-06 PROBLEM — E83.39 HYPOPHOSPHATEMIA: Status: ACTIVE | Noted: 2025-05-06

## 2025-05-06 LAB
ABSOLUTE EOSINOPHIL (OHS): 0.09 K/UL
ABSOLUTE MONOCYTE (OHS): 0.38 K/UL (ref 0.3–1)
ABSOLUTE NEUTROPHIL COUNT (OHS): 4.38 K/UL (ref 1.8–7.7)
ALBUMIN SERPL BCP-MCNC: 3.1 G/DL (ref 3.5–5.2)
ALLENS TEST: ABNORMAL
ALP SERPL-CCNC: 93 UNIT/L (ref 40–150)
ALT SERPL W/O P-5'-P-CCNC: 17 UNIT/L (ref 10–44)
ANION GAP (OHS): 8 MMOL/L (ref 8–16)
AST SERPL-CCNC: 21 UNIT/L (ref 11–45)
BASOPHILS # BLD AUTO: 0.04 K/UL
BASOPHILS NFR BLD AUTO: 0.6 %
BILIRUB SERPL-MCNC: 0.8 MG/DL (ref 0.1–1)
BNP SERPL-MCNC: 246 PG/ML (ref 0–99)
BUN SERPL-MCNC: 11 MG/DL (ref 8–23)
CALCIUM SERPL-MCNC: 8.4 MG/DL (ref 8.7–10.5)
CHLORIDE SERPL-SCNC: 106 MMOL/L (ref 95–110)
CO2 SERPL-SCNC: 27 MMOL/L (ref 23–29)
CREAT SERPL-MCNC: 0.7 MG/DL (ref 0.5–1.4)
ERYTHROCYTE [DISTWIDTH] IN BLOOD BY AUTOMATED COUNT: 13.1 % (ref 11.5–14.5)
GFR SERPLBLD CREATININE-BSD FMLA CKD-EPI: >60 ML/MIN/1.73/M2
GLUCOSE SERPL-MCNC: 85 MG/DL (ref 70–110)
HCO3 UR-SCNC: 28.1 MMOL/L (ref 24–28)
HCT VFR BLD AUTO: 50.8 % (ref 37–48.5)
HCV AB SERPL QL IA: NORMAL
HGB BLD-MCNC: 16.6 GM/DL (ref 12–16)
HIV 1+2 AB+HIV1 P24 AG SERPL QL IA: NORMAL
HOLD SPECIMEN: NORMAL
IMM GRANULOCYTES # BLD AUTO: 0.03 K/UL (ref 0–0.04)
IMM GRANULOCYTES NFR BLD AUTO: 0.5 % (ref 0–0.5)
LYMPHOCYTES # BLD AUTO: 1.43 K/UL (ref 1–4.8)
MAGNESIUM SERPL-MCNC: 1.8 MG/DL (ref 1.6–2.6)
MCH RBC QN AUTO: 29.6 PG (ref 27–31)
MCHC RBC AUTO-ENTMCNC: 32.7 G/DL (ref 32–36)
MCV RBC AUTO: 91 FL (ref 82–98)
NUCLEATED RBC (/100WBC) (OHS): 0 /100 WBC
OHS QRS DURATION: 82 MS
OHS QTC CALCULATION: 466 MS
PCO2 BLDA: 39.1 MMHG (ref 35–45)
PH SMN: 7.46 [PH] (ref 7.35–7.45)
PHOSPHATE SERPL-MCNC: 1.7 MG/DL (ref 2.7–4.5)
PLATELET # BLD AUTO: 303 K/UL (ref 150–450)
PMV BLD AUTO: 10.5 FL (ref 9.2–12.9)
PO2 BLDA: 32 MMHG (ref 40–60)
POC BE: 4 MMOL/L (ref -2–2)
POC SATURATED O2: 66 % (ref 95–100)
POC TCO2: 29 MMOL/L (ref 24–29)
POTASSIUM SERPL-SCNC: 3.4 MMOL/L (ref 3.5–5.1)
PROT SERPL-MCNC: 6.5 GM/DL (ref 6–8.4)
RBC # BLD AUTO: 5.6 M/UL (ref 4–5.4)
RELATIVE EOSINOPHIL (OHS): 1.4 %
RELATIVE LYMPHOCYTE (OHS): 22.5 % (ref 18–48)
RELATIVE MONOCYTE (OHS): 6 % (ref 4–15)
RELATIVE NEUTROPHIL (OHS): 69 % (ref 38–73)
SAMPLE: ABNORMAL
SITE: ABNORMAL
SODIUM SERPL-SCNC: 141 MMOL/L (ref 136–145)
TROPONIN I SERPL HS-MCNC: 133 NG/L
TROPONIN I SERPL HS-MCNC: 94 NG/L
WBC # BLD AUTO: 6.35 K/UL (ref 3.9–12.7)

## 2025-05-06 PROCEDURE — 80053 COMPREHEN METABOLIC PANEL: CPT | Performed by: EMERGENCY MEDICINE

## 2025-05-06 PROCEDURE — 93005 ELECTROCARDIOGRAM TRACING: CPT

## 2025-05-06 PROCEDURE — 63600175 PHARM REV CODE 636 W HCPCS: Performed by: EMERGENCY MEDICINE

## 2025-05-06 PROCEDURE — 83735 ASSAY OF MAGNESIUM: CPT

## 2025-05-06 PROCEDURE — 86803 HEPATITIS C AB TEST: CPT | Performed by: PHYSICIAN ASSISTANT

## 2025-05-06 PROCEDURE — 84100 ASSAY OF PHOSPHORUS: CPT

## 2025-05-06 PROCEDURE — 83880 ASSAY OF NATRIURETIC PEPTIDE: CPT | Performed by: EMERGENCY MEDICINE

## 2025-05-06 PROCEDURE — 36415 COLL VENOUS BLD VENIPUNCTURE: CPT | Performed by: EMERGENCY MEDICINE

## 2025-05-06 PROCEDURE — 94640 AIRWAY INHALATION TREATMENT: CPT

## 2025-05-06 PROCEDURE — 84484 ASSAY OF TROPONIN QUANT: CPT | Performed by: EMERGENCY MEDICINE

## 2025-05-06 PROCEDURE — 11000001 HC ACUTE MED/SURG PRIVATE ROOM

## 2025-05-06 PROCEDURE — 25000242 PHARM REV CODE 250 ALT 637 W/ HCPCS: Performed by: EMERGENCY MEDICINE

## 2025-05-06 PROCEDURE — 93010 ELECTROCARDIOGRAM REPORT: CPT | Mod: ,,, | Performed by: INTERNAL MEDICINE

## 2025-05-06 PROCEDURE — 99900035 HC TECH TIME PER 15 MIN (STAT)

## 2025-05-06 PROCEDURE — 25000242 PHARM REV CODE 250 ALT 637 W/ HCPCS

## 2025-05-06 PROCEDURE — 25000003 PHARM REV CODE 250

## 2025-05-06 PROCEDURE — 82803 BLOOD GASES ANY COMBINATION: CPT

## 2025-05-06 PROCEDURE — 0241U SARS-COV2 (COVID) WITH FLU/RSV BY PCR: CPT | Performed by: STUDENT IN AN ORGANIZED HEALTH CARE EDUCATION/TRAINING PROGRAM

## 2025-05-06 PROCEDURE — 96375 TX/PRO/DX INJ NEW DRUG ADDON: CPT

## 2025-05-06 PROCEDURE — 21400001 HC TELEMETRY ROOM

## 2025-05-06 PROCEDURE — 25000003 PHARM REV CODE 250: Performed by: STUDENT IN AN ORGANIZED HEALTH CARE EDUCATION/TRAINING PROGRAM

## 2025-05-06 PROCEDURE — 96374 THER/PROPH/DIAG INJ IV PUSH: CPT

## 2025-05-06 PROCEDURE — 87389 HIV-1 AG W/HIV-1&-2 AB AG IA: CPT | Performed by: PHYSICIAN ASSISTANT

## 2025-05-06 PROCEDURE — 99285 EMERGENCY DEPT VISIT HI MDM: CPT | Mod: 25

## 2025-05-06 PROCEDURE — 85025 COMPLETE CBC W/AUTO DIFF WBC: CPT | Performed by: EMERGENCY MEDICINE

## 2025-05-06 PROCEDURE — 63700000 PHARM REV CODE 250 ALT 637 W/O HCPCS

## 2025-05-06 PROCEDURE — 84484 ASSAY OF TROPONIN QUANT: CPT

## 2025-05-06 RX ORDER — IPRATROPIUM BROMIDE AND ALBUTEROL SULFATE 2.5; .5 MG/3ML; MG/3ML
3 SOLUTION RESPIRATORY (INHALATION)
Status: COMPLETED | OUTPATIENT
Start: 2025-05-06 | End: 2025-05-06

## 2025-05-06 RX ORDER — AMOXICILLIN AND CLAVULANATE POTASSIUM 875; 125 MG/1; MG/1
1 TABLET, FILM COATED ORAL EVERY 12 HOURS
Status: COMPLETED | OUTPATIENT
Start: 2025-05-06 | End: 2025-05-10

## 2025-05-06 RX ORDER — METOPROLOL SUCCINATE 50 MG/1
50 TABLET, EXTENDED RELEASE ORAL NIGHTLY
Status: DISCONTINUED | OUTPATIENT
Start: 2025-05-06 | End: 2025-05-07

## 2025-05-06 RX ORDER — DIAZEPAM 5 MG/1
5 TABLET ORAL 2 TIMES DAILY PRN
Status: DISCONTINUED | OUTPATIENT
Start: 2025-05-06 | End: 2025-05-10 | Stop reason: HOSPADM

## 2025-05-06 RX ORDER — PREDNISONE 20 MG/1
60 TABLET ORAL
Status: COMPLETED | OUTPATIENT
Start: 2025-05-06 | End: 2025-05-06

## 2025-05-06 RX ORDER — SODIUM CHLORIDE 0.9 % (FLUSH) 0.9 %
10 SYRINGE (ML) INJECTION EVERY 12 HOURS PRN
Status: DISCONTINUED | OUTPATIENT
Start: 2025-05-06 | End: 2025-05-10 | Stop reason: HOSPADM

## 2025-05-06 RX ORDER — PREDNISONE 20 MG/1
40 TABLET ORAL DAILY
Status: DISCONTINUED | OUTPATIENT
Start: 2025-05-07 | End: 2025-05-08

## 2025-05-06 RX ORDER — ANASTROZOLE 1 MG/1
1 TABLET ORAL DAILY
Status: DISCONTINUED | OUTPATIENT
Start: 2025-05-07 | End: 2025-05-10 | Stop reason: HOSPADM

## 2025-05-06 RX ORDER — CYANOCOBALAMIN (VITAMIN B-12) 250 MCG
500 TABLET ORAL DAILY
Status: DISCONTINUED | OUTPATIENT
Start: 2025-05-07 | End: 2025-05-10 | Stop reason: HOSPADM

## 2025-05-06 RX ORDER — ZOLPIDEM TARTRATE 5 MG/1
10 TABLET ORAL NIGHTLY PRN
Status: DISCONTINUED | OUTPATIENT
Start: 2025-05-06 | End: 2025-05-10 | Stop reason: HOSPADM

## 2025-05-06 RX ORDER — IBUPROFEN 200 MG
24 TABLET ORAL
Status: DISCONTINUED | OUTPATIENT
Start: 2025-05-06 | End: 2025-05-10 | Stop reason: HOSPADM

## 2025-05-06 RX ORDER — GLUCAGON 1 MG
1 KIT INJECTION
Status: DISCONTINUED | OUTPATIENT
Start: 2025-05-06 | End: 2025-05-10 | Stop reason: HOSPADM

## 2025-05-06 RX ORDER — VENLAFAXINE HYDROCHLORIDE 150 MG/1
150 CAPSULE, EXTENDED RELEASE ORAL DAILY
Status: DISCONTINUED | OUTPATIENT
Start: 2025-05-07 | End: 2025-05-10 | Stop reason: HOSPADM

## 2025-05-06 RX ORDER — POTASSIUM CHLORIDE 20 MEQ/1
40 TABLET, EXTENDED RELEASE ORAL ONCE
Status: COMPLETED | OUTPATIENT
Start: 2025-05-06 | End: 2025-05-06

## 2025-05-06 RX ORDER — IPRATROPIUM BROMIDE 0.5 MG/2.5ML
0.5 SOLUTION RESPIRATORY (INHALATION) EVERY 8 HOURS
Status: DISCONTINUED | OUTPATIENT
Start: 2025-05-07 | End: 2025-05-07

## 2025-05-06 RX ORDER — AZITHROMYCIN 250 MG/1
500 TABLET, FILM COATED ORAL DAILY
Status: COMPLETED | OUTPATIENT
Start: 2025-05-06 | End: 2025-05-08

## 2025-05-06 RX ORDER — IBUPROFEN 200 MG
16 TABLET ORAL
Status: DISCONTINUED | OUTPATIENT
Start: 2025-05-06 | End: 2025-05-10 | Stop reason: HOSPADM

## 2025-05-06 RX ORDER — LORAZEPAM 2 MG/ML
1 INJECTION INTRAMUSCULAR
Status: COMPLETED | OUTPATIENT
Start: 2025-05-06 | End: 2025-05-06

## 2025-05-06 RX ORDER — ATORVASTATIN CALCIUM 20 MG/1
20 TABLET, FILM COATED ORAL NIGHTLY
Status: DISCONTINUED | OUTPATIENT
Start: 2025-05-06 | End: 2025-05-10 | Stop reason: HOSPADM

## 2025-05-06 RX ORDER — FLUTICASONE FUROATE AND VILANTEROL 100; 25 UG/1; UG/1
1 POWDER RESPIRATORY (INHALATION) DAILY
Status: DISCONTINUED | OUTPATIENT
Start: 2025-05-07 | End: 2025-05-10 | Stop reason: HOSPADM

## 2025-05-06 RX ORDER — PANTOPRAZOLE SODIUM 40 MG/1
40 TABLET, DELAYED RELEASE ORAL DAILY
Status: DISCONTINUED | OUTPATIENT
Start: 2025-05-07 | End: 2025-05-10 | Stop reason: HOSPADM

## 2025-05-06 RX ORDER — NALOXONE HCL 0.4 MG/ML
0.02 VIAL (ML) INJECTION
Status: DISCONTINUED | OUTPATIENT
Start: 2025-05-06 | End: 2025-05-10 | Stop reason: HOSPADM

## 2025-05-06 RX ORDER — LANOLIN ALCOHOL/MO/W.PET/CERES
400 CREAM (GRAM) TOPICAL DAILY
Status: DISCONTINUED | OUTPATIENT
Start: 2025-05-07 | End: 2025-05-10 | Stop reason: HOSPADM

## 2025-05-06 RX ORDER — LEVALBUTEROL INHALATION SOLUTION 0.63 MG/3ML
0.63 SOLUTION RESPIRATORY (INHALATION) EVERY 8 HOURS
Status: DISCONTINUED | OUTPATIENT
Start: 2025-05-06 | End: 2025-05-07

## 2025-05-06 RX ORDER — DIAZEPAM 5 MG/1
5 TABLET ORAL ONCE AS NEEDED
Status: DISCONTINUED | OUTPATIENT
Start: 2025-05-06 | End: 2025-05-06

## 2025-05-06 RX ORDER — FUROSEMIDE 10 MG/ML
20 INJECTION INTRAMUSCULAR; INTRAVENOUS
Status: COMPLETED | OUTPATIENT
Start: 2025-05-06 | End: 2025-05-06

## 2025-05-06 RX ADMIN — ATORVASTATIN CALCIUM 20 MG: 20 TABLET, FILM COATED ORAL at 08:05

## 2025-05-06 RX ADMIN — PREDNISONE 60 MG: 20 TABLET ORAL at 01:05

## 2025-05-06 RX ADMIN — AZITHROMYCIN DIHYDRATE 500 MG: 250 TABLET ORAL at 05:05

## 2025-05-06 RX ADMIN — LORAZEPAM 1 MG: 2 INJECTION INTRAMUSCULAR; INTRAVENOUS at 03:05

## 2025-05-06 RX ADMIN — AMOXICILLIN AND CLAVULANATE POTASSIUM 1 TABLET: 875; 125 TABLET, FILM COATED ORAL at 08:05

## 2025-05-06 RX ADMIN — IPRATROPIUM BROMIDE AND ALBUTEROL SULFATE 3 ML: 2.5; .5 SOLUTION RESPIRATORY (INHALATION) at 12:05

## 2025-05-06 RX ADMIN — METOPROLOL SUCCINATE 50 MG: 50 TABLET, EXTENDED RELEASE ORAL at 08:05

## 2025-05-06 RX ADMIN — APIXABAN 5 MG: 5 TABLET, FILM COATED ORAL at 08:05

## 2025-05-06 RX ADMIN — FUROSEMIDE 20 MG: 10 INJECTION, SOLUTION INTRAMUSCULAR; INTRAVENOUS at 03:05

## 2025-05-06 RX ADMIN — POTASSIUM CHLORIDE 40 MEQ: 1500 TABLET, EXTENDED RELEASE ORAL at 05:05

## 2025-05-06 RX ADMIN — DIBASIC SODIUM PHOSPHATE, MONOBASIC POTASSIUM PHOSPHATE AND MONOBASIC SODIUM PHOSPHATE 2 TABLET: 852; 155; 130 TABLET ORAL at 08:05

## 2025-05-06 RX ADMIN — LEVALBUTEROL HYDROCHLORIDE 0.63 MG: 0.63 SOLUTION RESPIRATORY (INHALATION) at 05:05

## 2025-05-06 RX ADMIN — DIAZEPAM 5 MG: 5 TABLET ORAL at 08:05

## 2025-05-06 NOTE — SUBJECTIVE & OBJECTIVE
Past Medical History:   Diagnosis Date    Adverse reaction to succinimides     son has malignant hyperthermia    Allergy     Anticoagulant long-term use     Anxiety     Arthritis     Atrial flutter     Colon polyps     COPD (chronic obstructive pulmonary disease)     COPD exacerbation 10/31/2022    Depression     Diverticular disease of colon 2017    Diverticulitis     H/O gastric sleeve     HLD (hyperlipidemia)     Hypertension     resolved with gastric slleve    Malignant neoplasm of central portion of left breast in female, estrogen receptor positive 2022    Monoallelic mutation of MUTYH gene 2022    Osteopenia     Pancreatitis     Paroxysmal A-fib 2024    Pneumothorax, unspecified     following mastectomy sx     Psoriasis     Rheumatoid arthritis     Severe obesity (BMI 35.0-39.9) with comorbidity     Wears contact lenses     not often  wears glasses usually    Wears prescription eyeglasses        Past Surgical History:   Procedure Laterality Date    ABLATION  2022    Cardiac Ablation for A Flutter, Successful    ADENOIDECTOMY  1966    Tonsillitis and adnoids    BILATERAL MASTECTOMY Bilateral 02/15/2023    Procedure: MASTECTOMY, BILATERAL;  Surgeon: Marcela Meehan MD;  Location: [facility];  Service: General;  Laterality: Bilateral;  EMAIL SENT  @ 11:44 LK / 2.5 HOURS    BLADDER SUSPENSION      (x2)    BREAST REVISION SURGERY Bilateral 2023    Procedure: BREAST REVISION SURGERY / BILATERAL BREAST FLAP REVISION;  Surgeon: Ming Milian MD;  Location: [facility] OR;  Service: Plastics;  Laterality: Bilateral;  90 MINUTES     SECTION      (x2)    COLONOSCOPY      DEBRIDEMENT, BREAST Bilateral 2023    Procedure: DEBRIDEMENT, BREAST;  Surgeon: Ming Milian MD;  Location: [facility];  Service: Plastics;  Laterality: Bilateral;    ESOPHAGOGASTRODUODENOSCOPY N/A 2021    Procedure: EGD (ESOPHAGOGASTRODUODENOSCOPY);  Surgeon: Vince Rodriguez MD;   Location: [facility] OR 2ND FLR;  Service: General;  Laterality: N/A;    INCISION AND DRAINAGE OF BREAST Left 10/26/2023    Procedure: INCISION AND DRAINAGE, BREAST;  Surgeon: Ming Milian MD;  Location: [facility];  Service: Plastics;  Laterality: Left;    INCISION AND DRAINAGE OF BREAST Left 04/04/2024    Procedure: INCISION AND DRAINAGE, BREAST;  Surgeon: Ming Milian MD;  Location: [facility] OR;  Service: Plastics;  Laterality: Left;    INJECTION FOR SENTINEL NODE IDENTIFICATION Left 02/15/2023    Procedure: INJECTION, FOR SENTINEL NODE IDENTIFICATION;  Surgeon: Marcela Meehan MD;  Location: [facility] OR;  Service: General;  Laterality: Left;    LAPAROSCOPIC SLEEVE GASTRECTOMY N/A 03/31/2021    Procedure: GASTRECTOMY, SLEEVE, LAPAROSCOPIC, with intraop EGD;  Surgeon: Vince Rodriguez MD;  Location: [facility] OR 2ND FLR;  Service: General;  Laterality: N/A;    LIPOSUCTION W/ FAT INJECTION Bilateral 08/29/2023    Procedure: LIPOSUCTION, WITH FAT TRANSFER;  Surgeon: Ming Mliian MD;  Location: [facility];  Service: Plastics;  Laterality: Bilateral;  / FAT GRAFTING TO BOTH BREAST    LUNG BIOPSY  09/2020    RECONSTRUCTION OF BREAST WITH DEEP INFERIOR EPIGASTRIC ARTERY  (NEHA) FREE FLAP Bilateral 02/15/2023    Procedure: RECONSTRUCTION, BREAST, USING NEHA FREE FLAP;  Surgeon: Ming Milian MD;  Location: [facility];  Service: Plastics;  Laterality: Bilateral;    RECONSTRUCTION OF BREAST WITH DEEP INFERIOR EPIGASTRIC ARTERY  (NEHA) FREE FLAP Left 11/13/2024    Procedure: RECONSTRUCTION, BREAST, USING FREE FLAP;  Surgeon: Ming Milian MD;  Location: [facility];  Service: Plastics;  Laterality: Left;  / LEFT PAP FLAP  5 HOURS / HX  malignant hyperthermia  used PAP flap from Right thigh    REVISION, FLAP, PREVIOUSLY CREATED FOR BREAST RECONSTRUCTION Bilateral 08/29/2023    Procedure: REVISION, FLAP, PREVIOUSLY CREATED FOR BREAST RECONSTRUCTION;  Surgeon: Ming Milian MD;  Location: [facility];   Service: Plastics;  Laterality: Bilateral;  4 HOURS    REVISION, SCAR, ABDOMINAL DONOR SITE N/A 08/29/2023    Procedure: REVISION, SCAR, ABDOMINAL DONOR SITE;  Surgeon: Ming Milian MD;  Location: [facility] OR;  Service: Plastics;  Laterality: N/A;    RHINOPLASTY      SENTINEL LYMPH NODE BIOPSY Left 02/15/2023    Procedure: BIOPSY, LYMPH NODE, SENTINEL;  Surgeon: Marcela Meehan MD;  Location: [facility] OR;  Service: General;  Laterality: Left;    TONSILLECTOMY      TRANSESOPHAGEAL ECHOCARDIOGRAPHY N/A 11/02/2022    Procedure: ECHOCARDIOGRAM, TRANSESOPHAGEAL;  Surgeon: Kellie Grover MD;  Location: [facility] EP LAB;  Service: Cardiology;  Laterality: N/A;       Review of patient's allergies indicates:   Allergen Reactions    Succinimides Anaphylaxis     Son has MH    Vancomycin analogues Hives, Itching and Rash       No current facility-administered medications on file prior to encounter.     Current Outpatient Medications on File Prior to Encounter   Medication Sig    amoxicillin-clavulanate 875-125mg (AUGMENTIN) 875-125 mg per tablet Take 1 tablet by mouth every 12 (twelve) hours.    anastrozole (ARIMIDEX) 1 mg Tab Take 1 tablet (1 mg total) by mouth once daily.    apixaban (ELIQUIS) 5 mg Tab Take 1 tablet (5 mg total) by mouth 2 (two) times daily.    atorvastatin (LIPITOR) 20 MG tablet Take 1 tablet (20 mg total) by mouth every evening.    B-complex with vitamin C (VITAMIN B COMPLEX-C ORAL) Take by mouth Daily.    BIOTIN-COLLAGEN-VIT C-E-HERBAL ORAL Take by mouth.    calcium-vitamin D 250-100 mg-unit per tablet Take 1 tablet by mouth 2 (two) times daily.    cyanocobalamin 500 MCG tablet Take 500 mcg by mouth once daily.    diazePAM (VALIUM) 5 MG tablet TAKE 1 TABLET BY MOUTH DAILY AS NEEDED FOR ANXIETY.    magnesium 200 mg Tab Take by mouth once.    metoprolol succinate (TOPROL-XL) 50 MG 24 hr tablet Take 1 tablet (50 mg total) by mouth nightly.    multivitamin (THERAGRAN) per tablet Take 1 tablet by mouth once  daily.    multivitamin with minerals (HAIR,SKIN AND NAILS ORAL) Take by mouth.    mv-mn/iron/folic acid/herb 190 (VITAMIN D3 COMPLETE ORAL) Take by mouth.    omeprazole (PRILOSEC) 40 MG capsule Take 1 capsule (40 mg total) by mouth every morning.    venlafaxine (EFFEXOR-XR) 150 MG Cp24 Take 1 capsule (150 mg total) by mouth once daily.    zolpidem (AMBIEN) 10 mg Tab Take 1 tablet (10 mg total) by mouth nightly as needed (insomnia).    diclofenac sodium (VOLTAREN) 1 % Gel Apply 2 g topically 4 (four) times daily.    fluocinonide-emollient (FLUOCINONIDE-E) 0.05 % Crea Apply to affected area of feet daily as needed for psoriasis.    fluticasone-umeclidin-vilanter (TRELEGY ELLIPTA) 100-62.5-25 mcg DsDv Inhale 1 puff into the lungs once daily.    ondansetron (ZOFRAN) 4 MG tablet Take 2 tablets (8 mg total) by mouth every 12 (twelve) hours as needed for Nausea.    TALTZ AUTOINJECTOR 80 mg/mL AtIn Inject 80 mg into the skin every 28 days.    triamcinolone acetonide 0.025% (KENALOG) 0.025 % cream Apply to affected area of skin folds twice a day as needed for psoriasis.    triamcinolone acetonide 0.1% (KENALOG) 0.1 % cream Apply to affected areas of body twice a day as needed for psoriasis. Do not use on face or skin folds.    [DISCONTINUED] budesonide-formoterol 160-4.5 mcg (SYMBICORT) 160-4.5 mcg/actuation HFAA Inhale 2 puffs into the lungs every 12 (twelve) hours. Controller     Family History       Problem Relation (Age of Onset)    Malignant hyperthermia Son    No Known Problems Daughter, Daughter          Tobacco Use    Smoking status: Former     Current packs/day: 0.00     Average packs/day: 0.5 packs/day for 41.9 years (21.0 ttl pk-yrs)     Types: Cigarettes     Start date: 1979     Quit date: 2020     Years since quittin.4     Passive exposure: Current    Smokeless tobacco: Never   Substance and Sexual Activity    Alcohol use: Yes     Alcohol/week: 1.0 standard drink of alcohol     Types: 1 Glasses of  wine per week     Comment: social-rarely    Drug use: No    Sexual activity: Yes     Partners: Male     Birth control/protection: Post-menopausal     Review of Systems   Respiratory:  Positive for cough, shortness of breath and wheezing.    Cardiovascular:  Positive for chest pain (tightness) and palpitations (fast heart rate). Negative for leg swelling.   Gastrointestinal:  Negative for abdominal pain, diarrhea, nausea and vomiting.   Genitourinary:  Negative for dysuria and hematuria.   Neurological:  Negative for headaches.     Objective:     Vital Signs (Most Recent):  Temp: 98.3 °F (36.8 °C) (05/06/25 1059)  Pulse: 100 (05/06/25 1600)  Resp: 18 (05/06/25 1257)  BP: 134/83 (05/06/25 1600)  SpO2: (!) 94 % (05/06/25 1600) Vital Signs (24h Range):  Temp:  [98.3 °F (36.8 °C)] 98.3 °F (36.8 °C)  Pulse:  [] 100  Resp:  [12-36] 18  SpO2:  [92 %-100 %] 94 %  BP: (116-147)/(56-90) 134/83     Weight: 72.6 kg (160 lb)  Body mass index is 29.26 kg/m².     Physical Exam  Constitutional:       General: She is not in acute distress.     Appearance: She is normal weight. She is not toxic-appearing.   HENT:      Head: Normocephalic and atraumatic.      Right Ear: External ear normal.      Left Ear: External ear normal.   Eyes:      General:         Right eye: No discharge.         Left eye: No discharge.   Cardiovascular:      Rate and Rhythm: Normal rate and regular rhythm.      Heart sounds:      No gallop.   Pulmonary:      Breath sounds: Wheezing (bilaterally, diffuse) present.   Abdominal:      General: There is no distension.      Tenderness: There is no abdominal tenderness. There is no guarding.   Musculoskeletal:      Right lower leg: No edema.      Left lower leg: No edema.   Skin:     General: Skin is warm and dry.   Neurological:      General: No focal deficit present.      Mental Status: She is oriented to person, place, and time.                Significant Labs: All pertinent labs within the past 24 hours  have been reviewed.  Recent Lab Results  (Last 5 results in the past 24 hours)        05/06/25  1559   05/06/25  1406   05/06/25  1342   05/06/25  1251   05/06/25  1107        Albumin   3.1             ALP   93             ALT   17             Anion Gap   8             AST   21             Baso #       0.04         Basophil %       0.6         BILIRUBIN TOTAL   0.8  Comment: For infants and newborns, interpretation of results should be based   on gestational age, weight and in agreement with clinical   observations.    Premature Infant recommended reference ranges:   0-24 hours:  <8.0 mg/dL   24-48 hours: <12.0 mg/dL   3-5 days:    <15.0 mg/dL   6-29 days:   <15.0 mg/dL             BNP       246  Comment: Values of less than 100 pg/ml are consistent with non-CHF populations.          BUN   11             Calcium   8.4             Chloride   106             CO2   27             Creatinine   0.7             eGFR   >60  Comment: Estimated GFR calculated using the CKD-EPI creatinine (2021) equation.             Eos #       0.09         Eos %       1.4         Glucose   85             Gran # (ANC)       4.38         Hematocrit       50.8         Hemoglobin       16.6         Hepatitis C Ab       Non-Reactive         HIV 1/2 Ag/Ab       Non-Reactive         Extra Tube     Hold for add-ons.  Comment: Auto resulted.              Immature Grans (Abs)       0.03  Comment: Mild elevation in immature granulocytes is non specific and can be seen in a variety of conditions including stress response, acute inflammation, trauma and pregnancy. Correlation with other laboratory and clinical findings is essential.         Immature Granulocytes       0.5         Lymph #       1.43         Lymph %       22.5         MCH       29.6         MCHC       32.7         MCV       91         Mono #       0.38         Mono %       6.0         MPV       10.5         Neut %       69.0         nRBC       0         QRS Duration         82       OHS  QTC Calculation         466       Platelet Count       303         Potassium   3.4             PROTEIN TOTAL   6.5             RBC       5.60         RDW       13.1         Sodium   141             Troponin I High Sensitivity 94       133         WBC       6.35                                Significant Imaging: I have reviewed all pertinent imaging results/findings within the past 24 hours.

## 2025-05-06 NOTE — H&P
[facility] - Emergency Dept  [facility]  History & Physical    Patient Name: Lucia aMtias  MRN: [id number]  Patient Class: IP- Inpatient  Admission Date: 5/6/2025  Attending Physician: Mercy Freeman MD   Primary Care Provider: Hetal Banks MD         Patient information was obtained from patient, past medical records, and ER records.     Subjective:     Principal Problem:KRUSE (dyspnea on exertion)    Chief Complaint:   Chief Complaint   Patient presents with    Chest Pain    Shortness of Breath        HPI: 61 y/o F with HTN, COPD, A flutter s/p RFA (on eliquis), breast cancer stage IA HR+ s/p bilateral mastectomy w/ reconstruction (on anastrozole) presented to the ED for evaluation of x4 days of shortness of breath. Within the past week patient began experiencing an URI (productive cough with green sputum, runny nose, headache) for which she had an e-consult with an outpatient provider and was prescribed a 10 day course of Augmentin. About 4 days ago she was walking [place] the sand [place] beach during which time she became dyspneic on exertion and experienced palpitations (fast heart rate). During this bout she also noted to experiencing chest tightness. Her KRUSE, palpitations, and chest tightness have all worsened since the onset, are worsened with exertion, and are relieved by rest. During this time she noted her heart rate to be in the 160's on her watch. Patient denies any leg swelling, nausea, vomiting, abdominal pain, dysuria, or any other sxs at this time. Note: A past electrophysiology note reports that the patient experienced typical atrial flutter during a COPD exacerbation.     Vitally in the ED: afebrile, -147/60-77 w/ HR 76-91, 94%O2 RA. Labs in the ED: WBC wnl, H/H 16.6/50.8, CMP relatively unremarkable apart from K 3.4, , hs troponin 133. The ED administered duonebs, lasix 20mg IV, and lorazepam 1mg, and prednisone 60mg.    Past Medical History:   Diagnosis Date     Adverse reaction to succinimides     son has malignant hyperthermia    Allergy     Anticoagulant long-term use     Anxiety     Arthritis     Atrial flutter     Colon polyps     COPD (chronic obstructive pulmonary disease)     COPD exacerbation 10/31/2022    Depression     Diverticular disease of colon 2017    Diverticulitis     H/O gastric sleeve     HLD (hyperlipidemia)     Hypertension     resolved with gastric slleve    Malignant neoplasm of central portion of left breast in female, estrogen receptor positive 2022    Monoallelic mutation of MUTYH gene 2022    Osteopenia     Pancreatitis     Paroxysmal A-fib 2024    Pneumothorax, unspecified     following mastectomy sx     Psoriasis     Rheumatoid arthritis     Severe obesity (BMI 35.0-39.9) with comorbidity     Wears contact lenses     not often  wears glasses usually    Wears prescription eyeglasses        Past Surgical History:   Procedure Laterality Date    ABLATION  2022    Cardiac Ablation for A Flutter, Successful    ADENOIDECTOMY  1966    Tonsillitis and adnoids    BILATERAL MASTECTOMY Bilateral 02/15/2023    Procedure: MASTECTOMY, BILATERAL;  Surgeon: Marcela Meehan MD;  Location: [facility];  Service: General;  Laterality: Bilateral;  EMAIL SENT  @ 11:44 LK / 2.5 HOURS    BLADDER SUSPENSION      (x2)    BREAST REVISION SURGERY Bilateral 2023    Procedure: BREAST REVISION SURGERY / BILATERAL BREAST FLAP REVISION;  Surgeon: Ming Milian MD;  Location: [facility];  Service: Plastics;  Laterality: Bilateral;  90 MINUTES     SECTION      (x2)    COLONOSCOPY      DEBRIDEMENT, BREAST Bilateral 2023    Procedure: DEBRIDEMENT, BREAST;  Surgeon: Ming Milian MD;  Location: [facility];  Service: Plastics;  Laterality: Bilateral;    ESOPHAGOGASTRODUODENOSCOPY N/A 2021    Procedure: EGD (ESOPHAGOGASTRODUODENOSCOPY);  Surgeon: Vince Rodriguez MD;  Location: [address];  Service: General;   Laterality: N/A;    INCISION AND DRAINAGE OF BREAST Left 10/26/2023    Procedure: INCISION AND DRAINAGE, BREAST;  Surgeon: Ming Milian MD;  Location: [facility];  Service: Plastics;  Laterality: Left;    INCISION AND DRAINAGE OF BREAST Left 04/04/2024    Procedure: INCISION AND DRAINAGE, BREAST;  Surgeon: Ming Milian MD;  Location: [facility];  Service: Plastics;  Laterality: Left;    INJECTION FOR SENTINEL NODE IDENTIFICATION Left 02/15/2023    Procedure: INJECTION, FOR SENTINEL NODE IDENTIFICATION;  Surgeon: Marcela Meehan MD;  Location: [facility];  Service: General;  Laterality: Left;    LAPAROSCOPIC SLEEVE GASTRECTOMY N/A 03/31/2021    Procedure: GASTRECTOMY, SLEEVE, LAPAROSCOPIC, with intraop EGD;  Surgeon: Vince Rodriguez MD;  Location: [address];  Service: General;  Laterality: N/A;    LIPOSUCTION W/ FAT INJECTION Bilateral 08/29/2023    Procedure: LIPOSUCTION, WITH FAT TRANSFER;  Surgeon: Ming Milian MD;  Location: [facility];  Service: Plastics;  Laterality: Bilateral;  / FAT GRAFTING TO BOTH BREAST    LUNG BIOPSY  09/2020    RECONSTRUCTION OF BREAST WITH DEEP INFERIOR EPIGASTRIC ARTERY  (NEHA) FREE FLAP Bilateral 02/15/2023    Procedure: RECONSTRUCTION, BREAST, USING NEHA FREE FLAP;  Surgeon: Ming Milian MD;  Location: [facility];  Service: Plastics;  Laterality: Bilateral;    RECONSTRUCTION OF BREAST WITH DEEP INFERIOR EPIGASTRIC ARTERY  (NEHA) FREE FLAP Left 11/13/2024    Procedure: RECONSTRUCTION, BREAST, USING FREE FLAP;  Surgeon: Ming Milian MD;  Location: [facility];  Service: Plastics;  Laterality: Left;  / LEFT PAP FLAP  5 HOURS / HX  malignant hyperthermia  used PAP flap from Right thigh    REVISION, FLAP, PREVIOUSLY CREATED FOR BREAST RECONSTRUCTION Bilateral 08/29/2023    Procedure: REVISION, FLAP, PREVIOUSLY CREATED FOR BREAST RECONSTRUCTION;  Surgeon: Ming Milian MD;  Location: [facility];  Service: Plastics;  Laterality: Bilateral;  4  HOURS    REVISION, SCAR, ABDOMINAL DONOR SITE N/A 08/29/2023    Procedure: REVISION, SCAR, ABDOMINAL DONOR SITE;  Surgeon: Ming Milian MD;  Location: [facility];  Service: Plastics;  Laterality: N/A;    RHINOPLASTY      SENTINEL LYMPH NODE BIOPSY Left 02/15/2023    Procedure: BIOPSY, LYMPH NODE, SENTINEL;  Surgeon: Marcela Meehan MD;  Location: [facility] OR;  Service: General;  Laterality: Left;    TONSILLECTOMY      TRANSESOPHAGEAL ECHOCARDIOGRAPHY N/A 11/02/2022    Procedure: ECHOCARDIOGRAM, TRANSESOPHAGEAL;  Surgeon: Kellie Grover MD;  Location: [facility];  Service: Cardiology;  Laterality: N/A;       Review of patient's allergies indicates:   Allergen Reactions    Succinimides Anaphylaxis     Son has MH    Vancomycin analogues Hives, Itching and Rash       No current facility-administered medications on file prior to encounter.     Current Outpatient Medications on File Prior to Encounter   Medication Sig    amoxicillin-clavulanate 875-125mg (AUGMENTIN) 875-125 mg per tablet Take 1 tablet by mouth every 12 (twelve) hours.    anastrozole (ARIMIDEX) 1 mg Tab Take 1 tablet (1 mg total) by mouth once daily.    apixaban (ELIQUIS) 5 mg Tab Take 1 tablet (5 mg total) by mouth 2 (two) times daily.    atorvastatin (LIPITOR) 20 MG tablet Take 1 tablet (20 mg total) by mouth every evening.    B-complex with vitamin C (VITAMIN B COMPLEX-C ORAL) Take by mouth Daily.    BIOTIN-COLLAGEN-VIT C-E-HERBAL ORAL Take by mouth.    calcium-vitamin D 250-100 mg-unit per tablet Take 1 tablet by mouth 2 (two) times daily.    cyanocobalamin 500 MCG tablet Take 500 mcg by mouth once daily.    diazePAM (VALIUM) 5 MG tablet TAKE 1 TABLET BY MOUTH DAILY AS NEEDED FOR ANXIETY.    magnesium 200 mg Tab Take by mouth once.    metoprolol succinate (TOPROL-XL) 50 MG 24 hr tablet Take 1 tablet (50 mg total) by mouth nightly.    multivitamin (THERAGRAN) per tablet Take 1 tablet by mouth once daily.    multivitamin with minerals (HAIR,SKIN AND  NAILS ORAL) Take by mouth.    mv-mn/iron/folic acid/herb 190 (VITAMIN D3 COMPLETE ORAL) Take by mouth.    omeprazole (PRILOSEC) 40 MG capsule Take 1 capsule (40 mg total) by mouth every morning.    venlafaxine (EFFEXOR-XR) 150 MG Cp24 Take 1 capsule (150 mg total) by mouth once daily.    zolpidem (AMBIEN) 10 mg Tab Take 1 tablet (10 mg total) by mouth nightly as needed (insomnia).    diclofenac sodium (VOLTAREN) 1 % Gel Apply 2 g topically 4 (four) times daily.    fluocinonide-emollient (FLUOCINONIDE-E) 0.05 % Crea Apply to affected area of feet daily as needed for psoriasis.    fluticasone-umeclidin-vilanter (TRELEGY ELLIPTA) 100-62.5-25 mcg DsDv Inhale 1 puff into the lungs once daily.    ondansetron (ZOFRAN) 4 MG tablet Take 2 tablets (8 mg total) by mouth every 12 (twelve) hours as needed for Nausea.    TALTZ AUTOINJECTOR 80 mg/mL AtIn Inject 80 mg into the skin every 28 days.    triamcinolone acetonide 0.025% (KENALOG) 0.025 % cream Apply to affected area of skin folds twice a day as needed for psoriasis.    triamcinolone acetonide 0.1% (KENALOG) 0.1 % cream Apply to affected areas of body twice a day as needed for psoriasis. Do not use on face or skin folds.    [DISCONTINUED] budesonide-formoterol 160-4.5 mcg (SYMBICORT) 160-4.5 mcg/actuation HFAA Inhale 2 puffs into the lungs every 12 (twelve) hours. Controller     Family History       Problem Relation (Age of Onset)    Malignant hyperthermia Son    No Known Problems Daughter, Daughter          Tobacco Use    Smoking status: Former     Current packs/day: 0.00     Average packs/day: 0.5 packs/day for 41.9 years (21.0 ttl pk-yrs)     Types: Cigarettes     Start date: 1979     Quit date: 2020     Years since quittin.4     Passive exposure: Current    Smokeless tobacco: Never   Substance and Sexual Activity    Alcohol use: Yes     Alcohol/week: 1.0 standard drink of alcohol     Types: 1 Glasses of wine per week     Comment: social-rarely    Drug use:  No    Sexual activity: Yes     Partners: Male     Birth control/protection: Post-menopausal     Review of Systems   Respiratory:  Positive for cough, shortness of breath and wheezing.    Cardiovascular:  Positive for chest pain (tightness) and palpitations (fast heart rate). Negative for leg swelling.   Gastrointestinal:  Negative for abdominal pain, diarrhea, nausea and vomiting.   Genitourinary:  Negative for dysuria and hematuria.   Neurological:  Negative for headaches.     Objective:     Vital Signs (Most Recent):  Temp: 98.3 °F (36.8 °C) (05/06/25 1059)  Pulse: 100 (05/06/25 1600)  Resp: 18 (05/06/25 1257)  BP: 134/83 (05/06/25 1600)  SpO2: (!) 94 % (05/06/25 1600) Vital Signs (24h Range):  Temp:  [98.3 °F (36.8 °C)] 98.3 °F (36.8 °C)  Pulse:  [] 100  Resp:  [12-36] 18  SpO2:  [92 %-100 %] 94 %  BP: (116-147)/(56-90) 134/83     Weight: 72.6 kg (160 lb)  Body mass index is 29.26 kg/m².     Physical Exam  Constitutional:       General: She is not in acute distress.     Appearance: She is normal weight. She is not toxic-appearing.   HENT:      Head: Normocephalic and atraumatic.      Right Ear: External ear normal.      Left Ear: External ear normal.   Eyes:      General:         Right eye: No discharge.         Left eye: No discharge.   Cardiovascular:      Rate and Rhythm: Normal rate and regular rhythm. No JVD.     Heart sounds:      No gallop.   Pulmonary:      Breath sounds: Wheezing (bilaterally, diffuse) present.   Abdominal:      General: There is no distension.      Tenderness: There is no abdominal tenderness. There is no guarding.   Musculoskeletal:      Right lower leg: No edema.      Left lower leg: No edema.   Skin:     General: Skin is warm and dry.   Neurological:      General: No focal deficit present.      Mental Status: She is oriented to person, place, and time.                Significant Labs: All pertinent labs within the past 24 hours have been reviewed.  Recent Lab Results  (Last 5  results in the past 24 hours)        05/06/25  1559   05/06/25  1406   05/06/25  1342   05/06/25  1251   05/06/25  1107        Albumin   3.1             ALP   93             ALT   17             Anion Gap   8             AST   21             Baso #       0.04         Basophil %       0.6         BILIRUBIN TOTAL   0.8  Comment: For infants and newborns, interpretation of results should be based   on gestational age, weight and in agreement with clinical   observations.    Premature Infant recommended reference ranges:   0-24 hours:  <8.0 mg/dL   24-48 hours: <12.0 mg/dL   3-5 days:    <15.0 mg/dL   6-29 days:   <15.0 mg/dL             BNP       246  Comment: Values of less than 100 pg/ml are consistent with non-CHF populations.          BUN   11             Calcium   8.4             Chloride   106             CO2   27             Creatinine   0.7             eGFR   >60  Comment: Estimated GFR calculated using the CKD-EPI creatinine (2021) equation.             Eos #       0.09         Eos %       1.4         Glucose   85             Gran # (ANC)       4.38         Hematocrit       50.8         Hemoglobin       16.6         Hepatitis C Ab       Non-Reactive         HIV 1/2 Ag/Ab       Non-Reactive         Extra Tube     Hold for add-ons.  Comment: Auto resulted.              Immature Grans (Abs)       0.03  Comment: Mild elevation in immature granulocytes is non specific and can be seen in a variety of conditions including stress response, acute inflammation, trauma and pregnancy. Correlation with other laboratory and clinical findings is essential.         Immature Granulocytes       0.5         Lymph #       1.43         Lymph %       22.5         MCH       29.6         MCHC       32.7         MCV       91         Mono #       0.38         Mono %       6.0         MPV       10.5         Neut %       69.0         nRBC       0         QRS Duration         82       OHS QTC Calculation         466       Platelet Count        303         Potassium   3.4             PROTEIN TOTAL   6.5             RBC       5.60         RDW       13.1         Sodium   141             Troponin I High Sensitivity 94       133         WBC       6.35                                Significant Imaging: I have reviewed all pertinent imaging results/findings within the past 24 hours.  Assessment/Plan:     Assessment & Plan  KRUSE (dyspnea on exertion)  Patient presenting 4 days of KRUSE that was preceded by an URI. Within the past year has restarted smoking and does have a history of COPD. URI w/ productive cough (green sputum). Experiencing palpitations with KRUSE with heart rate on watch being recorded in the 160's in addition to chest tightness. Her past EP providers note reports typical flutter occurring during a bout of COPD exacerbation. Here in the ED with elevated hs troponin 133 and . EKG noted to have T wave inversions in leads II, AVF and V4-6. Current differential diagnosis includes COPD exacerbation and/or reoccurrence of atrial flutter. Also considering NSTEMI though hs troponin elevation could be due to demand ischemia if atrial flutter w/ high HR occurring. Does not appear volume overloaded on exam though will obtain echo to assess further cardiac function and IVC and look for wall motion abnormalities.     Plan  - continuing augmentin to finish outpatient providers course  - started prednisone 40mg x4 days, received 1 dose in the ED  - started azithromycin 500mg x3 days  - scheduled levalbuterol nebulizer   - rechecking troponin  - rechecking EKG  - follow up TTE   - maintain cardiac telemetry   COPD (chronic obstructive pulmonary disease)  History of tobacco abuse   Continue scheduled inhalers Steroids, Antibiotics, and Supplemental oxygen and monitor respiratory status closely. Recently started smoking cigarettes again.     See KRUSE above  Atrial flutter  - history of A flutter with ablation  - on eliquis at home, continue   See KRUSE above    HLD (hyperlipidemia)  Continue home statin   Depression with anxiety  Insomnia  - continue home effexor  - continue home ambien and diazepam for insomnia   Essential hypertension  Patient's blood pressure range in the last 24 hours was: BP  Min: 116/56  Max: 147/77.The patient's inpatient anti-hypertensive regimen is listed below:  Current Antihypertensives  metoprolol succinate (TOPROL-XL) 24 hr tablet 50 mg, Nightly, Oral    Plan  - BP is controlled, no changes needed to their regimen  - titrate as needed   Malignant neoplasm of central portion of left breast in female, estrogen receptor positive  Long-term use of immunosuppressant medication  Continue anastrozole     VTE Risk Mitigation (From admission, onward)           Ordered     apixaban tablet 5 mg  2 times daily         05/06/25 1622                                    David Maher DO  Department of Hospital Medicine  [facility] - Emergency Dept

## 2025-05-06 NOTE — ASSESSMENT & PLAN NOTE
Patient presenting 4 days of KRUSE that was preceded by an URI. Within the past year has restarted smoking and does have a history of COPD. URI w/ productive cough (green sputum). Experiencing palpitations with KRUSE with heart rate on watch being recorded in the 160's in addition to chest tightness. Her past EP providers note reports typical flutter occurring during a bout of COPD exacerbation. Here in the ED with elevated hs troponin 133 and . EKG noted to have T wave inversions in leads II, AVF and V4-6. Current differential diagnosis includes COPD exacerbation and/or reoccurrence of atrial flutter. Also considering NSTEMI though hs troponin elevation could be due to demand ischemia if atrial flutter w/ high HR occurring. Does not appear volume overloaded on exam though will obtain echo to assess further cardiac function and IVC and look for wall motion abnormalities.     Plan  - continuing augmentin to finish outpatient providers course  - started prednisone 40mg x4 days, received 1 dose in the ED  - started azithromycin 500mg x3 days  - scheduled levalbuterol nebulizer   - rechecking troponin  - rechecking EKG  - follow up TTE   - maintain cardiac telemetry

## 2025-05-06 NOTE — HPI
63 y/o F with HTN, COPD, A flutter s/p RFA (on eliquis), breast cancer stage IA HR+ s/p bilateral mastectomy w/ reconstruction (on anastrozole) presented to the ED for evaluation of x4 days of shortness of breath. Within the past week patient began experiencing an URI (productive cough with green sputum, runny nose, headache) for which she had an e-consult with an outpatient provider and was prescribed a 10 day course of Augmentin. About 4 days ago she was walking in the sand at a beach during which time she became dyspneic on exertion and experienced palpitations (fast heart rate). During this bout she also noted to experiencing chest tightness. Her KRUSE, palpitations, and chest tightness have all worsened since the onset, are worsened with exertion, and are relieved by rest. During this time she noted her heart rate to be in the 160's on her watch. Patient denies any leg swelling, nausea, vomiting, abdominal pain, dysuria, or any other sxs at this time. Note: A past electrophysiology note reports that the patient experienced typical atrial flutter during a COPD exacerbation.     Vitally in the ED: afebrile, -147/60-77 w/ HR 76-91, 94%O2 RA. Labs in the ED: WBC wnl, H/H 16.6/50.8, CMP relatively unremarkable apart from K 3.4, , hs troponin 133. The ED administered duonebs, lasix 20mg IV, and lorazepam 1mg, and prednisone 60mg.

## 2025-05-06 NOTE — ASSESSMENT & PLAN NOTE
Patient's blood pressure range in the last 24 hours was: BP  Min: 116/56  Max: 147/77.The patient's inpatient anti-hypertensive regimen is listed below:  Current Antihypertensives  metoprolol succinate (TOPROL-XL) 24 hr tablet 50 mg, Nightly, Oral    Plan  - BP is controlled, no changes needed to their regimen  - titrate as needed

## 2025-05-06 NOTE — ASSESSMENT & PLAN NOTE
Continue scheduled inhalers Steroids, Antibiotics, and Supplemental oxygen and monitor respiratory status closely. Recently started smoking cigarettes again.     See KRUSE above

## 2025-05-06 NOTE — ED NOTES
"Pt presented to the ED from home w/ C/O "SOB and chest discomfort." Pt states she was recently treated for and "upper respiratory infection" and was prescribed "Augmentin." Pt states she felt she was getting better "until about 4 days ago." Pt states that she recently went on a vacation to the beach and was having "difficulty ambulating without becoming SOB." Pt states that she had an episode of "urinary incontinence" because she "got stuck at the beach and was unable to ambulate" because she was so SOB. Pt states there was another episode where she was using the restroom, "became SOB, diaphoretic, and started sweating and felt like she was going to pass out." On arrival, pt is SOB and wheezing. Pt also states she has "chest discomfort" and not "chest pain." She believes it is from "coughing."   Cough sounds congested and pt states productive -clear mucus.   Otherwise AOx4, VSS.     "

## 2025-05-06 NOTE — ED PROVIDER NOTES
"Encounter Date: 5/6/2025       History     Chief Complaint   Patient presents with    Chest Pain    Shortness of Breath     Pt presented to the ED from home w/ C/O "SOB and chest discomfort." Pt states she was recently treated for and "upper respiratory infection" and was prescribed "Augmentin." Pt states she felt she was getting better "until about 4 days ago." Pt states that she recently went on a vacation to the beach and was having "difficulty ambulating without becoming SOB." Pt states that she had an episode of "urinary incontinence" because she "got stuck at the beach and was unable to ambulate" because she was so SOB. Pt states there was another episode where she was using the restroom, "became SOB, diaphoretic, and started sweating and felt like she was going to pass out." On arrival, pt is SOB and wheezing. Pt also states she has "chest discomfort" and not "chest pain." She believes it is from "coughing."   Cough sounds congested and pt states productive -clear mucus.   Otherwise AOx4, VSS.         Review of patient's allergies indicates:   Allergen Reactions    Succinimides Anaphylaxis     Son has MH    Vancomycin analogues Hives, Itching and Rash     Past Medical History:   Diagnosis Date    Adverse reaction to succinimides     son has malignant hyperthermia    Allergy     Anticoagulant long-term use     Anxiety     Arthritis     Atrial flutter     Colon polyps 2016    COPD (chronic obstructive pulmonary disease)     COPD exacerbation 10/31/2022    Depression     Diverticular disease of colon 06/06/2017    Diverticulitis     H/O gastric sleeve     HLD (hyperlipidemia)     Hypertension     resolved with gastric slleve    Malignant neoplasm of central portion of left breast in female, estrogen receptor positive 12/29/2022    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    Osteopenia     Pancreatitis     Paroxysmal A-fib 01/03/2024    Pneumothorax, unspecified     following mastectomy sx 2023    Psoriasis  "    Rheumatoid arthritis     Severe obesity (BMI 35.0-39.9) with comorbidity     Wears contact lenses     not often  wears glasses usually    Wears prescription eyeglasses      Past Surgical History:   Procedure Laterality Date    ABLATION  2022    Cardiac Ablation for A Flutter, Successful    ADENOIDECTOMY  1966    Tonsillitis and adnoids    BILATERAL MASTECTOMY Bilateral 02/15/2023    Procedure: MASTECTOMY, BILATERAL;  Surgeon: Marcela Meehan MD;  Location: [facility];  Service: General;  Laterality: Bilateral;  EMAIL SENT  @ 11:44 LK / 2.5 HOURS    BLADDER SUSPENSION      (x2)    BREAST REVISION SURGERY Bilateral 2023    Procedure: BREAST REVISION SURGERY / BILATERAL BREAST FLAP REVISION;  Surgeon: Ming Milian MD;  Location: [facility] OR;  Service: Plastics;  Laterality: Bilateral;  90 MINUTES     SECTION      (x2)    COLONOSCOPY      DEBRIDEMENT, BREAST Bilateral 2023    Procedure: DEBRIDEMENT, BREAST;  Surgeon: Ming Milian MD;  Location: [facility];  Service: Plastics;  Laterality: Bilateral;    ESOPHAGOGASTRODUODENOSCOPY N/A 2021    Procedure: EGD (ESOPHAGOGASTRODUODENOSCOPY);  Surgeon: Vince Rodriguez MD;  Location: [address];  Service: General;  Laterality: N/A;    INCISION AND DRAINAGE OF BREAST Left 10/26/2023    Procedure: INCISION AND DRAINAGE, BREAST;  Surgeon: Ming Milian MD;  Location: [facility];  Service: Plastics;  Laterality: Left;    INCISION AND DRAINAGE OF BREAST Left 2024    Procedure: INCISION AND DRAINAGE, BREAST;  Surgeon: Ming Milian MD;  Location: [facility];  Service: Plastics;  Laterality: Left;    INJECTION FOR SENTINEL NODE IDENTIFICATION Left 02/15/2023    Procedure: INJECTION, FOR SENTINEL NODE IDENTIFICATION;  Surgeon: Marcela Meehan MD;  Location: [facility];  Service: General;  Laterality: Left;    LAPAROSCOPIC SLEEVE GASTRECTOMY N/A 2021    Procedure: GASTRECTOMY, SLEEVE, LAPAROSCOPIC, with intraop EGD;  Surgeon:  Vince Rodriguez MD;  Location: [address] [facility];  Service: General;  Laterality: N/A;    LIPOSUCTION W/ FAT INJECTION Bilateral 08/29/2023    Procedure: LIPOSUCTION, WITH FAT TRANSFER;  Surgeon: Ming Milian MD;  Location: [facility];  Service: Plastics;  Laterality: Bilateral;  / FAT GRAFTING TO BOTH BREAST    LUNG BIOPSY  09/2020    RECONSTRUCTION OF BREAST WITH DEEP INFERIOR EPIGASTRIC ARTERY  (NEHA) FREE FLAP Bilateral 02/15/2023    Procedure: RECONSTRUCTION, BREAST, USING NEHA FREE FLAP;  Surgeon: Ming Milian MD;  Location: [facility];  Service: Plastics;  Laterality: Bilateral;    RECONSTRUCTION OF BREAST WITH DEEP INFERIOR EPIGASTRIC ARTERY  (NEHA) FREE FLAP Left 11/13/2024    Procedure: RECONSTRUCTION, BREAST, USING FREE FLAP;  Surgeon: Ming Milian MD;  Location: [facility];  Service: Plastics;  Laterality: Left;  / LEFT PAP FLAP  5 HOURS / HX  malignant hyperthermia  used PAP flap from Right thigh    REVISION, FLAP, PREVIOUSLY CREATED FOR BREAST RECONSTRUCTION Bilateral 08/29/2023    Procedure: REVISION, FLAP, PREVIOUSLY CREATED FOR BREAST RECONSTRUCTION;  Surgeon: Ming Milian MD;  Location: [facility];  Service: Plastics;  Laterality: Bilateral;  4 HOURS    REVISION, SCAR, ABDOMINAL DONOR SITE N/A 08/29/2023    Procedure: REVISION, SCAR, ABDOMINAL DONOR SITE;  Surgeon: Ming Milian MD;  Location: [facility];  Service: Plastics;  Laterality: N/A;    RHINOPLASTY      SENTINEL LYMPH NODE BIOPSY Left 02/15/2023    Procedure: BIOPSY, LYMPH NODE, SENTINEL;  Surgeon: Marcela Meehan MD;  Location: [facility];  Service: General;  Laterality: Left;    TONSILLECTOMY      TRANSESOPHAGEAL ECHOCARDIOGRAPHY N/A 11/02/2022    Procedure: ECHOCARDIOGRAM, TRANSESOPHAGEAL;  Surgeon: Kellie Grover MD;  Location: [facility] EP LAB;  Service: Cardiology;  Laterality: N/A;     Family History   Adopted: Yes   Problem Relation Name Age of Onset    No Known Problems Daughter      No Known  Problems Daughter      Malignant hyperthermia Son       Social History[1]  Review of Systems    Physical Exam     Initial Vitals [05/06/25 1059]   BP Pulse Resp Temp SpO2   (!) 141/90 88 (!) 32 98.3 °F (36.8 °C) (!) 92 %      MAP       --         Physical Exam    ED Course   Procedures  Labs Reviewed   TROPONIN I HIGH SENSITIVITY - Abnormal       Result Value    Troponin High Sensitive 133 (*)    B-TYPE NATRIURETIC PEPTIDE - Abnormal     (*)    CBC WITH DIFFERENTIAL - Abnormal    WBC 6.35      RBC 5.60 (*)     HGB 16.6 (*)     HCT 50.8 (*)     MCV 91      MCH 29.6      MCHC 32.7      RDW 13.1      Platelet Count 303      MPV 10.5      Nucleated RBC 0      Neut % 69.0      Lymph % 22.5      Mono % 6.0      Eos % 1.4      Basophil % 0.6      Imm Grans % 0.5      Neut # 4.38      Lymph # 1.43      Mono # 0.38      Eos # 0.09      Baso # 0.04      Imm Grans # 0.03     COMPREHENSIVE METABOLIC PANEL - Abnormal    Sodium 141      Potassium 3.4 (*)     Chloride 106      CO2 27      Glucose 85      BUN 11      Creatinine 0.7      Calcium 8.4 (*)     Protein Total 6.5      Albumin 3.1 (*)     Bilirubin Total 0.8      ALP 93      AST 21      ALT 17      Anion Gap 8      eGFR >60     TROPONIN I HIGH SENSITIVITY - Abnormal    Troponin High Sensitive 94 (*)    PHOSPHORUS - Abnormal    Phosphorus Level 1.7 (*)    ISTAT PROCEDURE - Abnormal    POC PH 7.464 (*)     POC PCO2 39.1      POC PO2 32 (*)     POC HCO3 28.1 (*)     POC BE 4 (*)     POC SATURATED O2 66      POC TCO2 29      Sample VENOUS      Site Other      Allens Test N/A     HEPATITIS C ANTIBODY - Normal    Hep C Ab Interp Non-Reactive     HIV 1 / 2 ANTIBODY - Normal    HIV 1/2 Ag/Ab Non-Reactive     MAGNESIUM - Normal    Magnesium  1.8     CBC W/ AUTO DIFFERENTIAL    Narrative:     The following orders were created for panel order CBC auto differential.  Procedure                               Abnormality         Status                     ---------                                -----------         ------                     CBC with Differential[[id number]]       Abnormal            Final result                 Please view results for these tests on the individual orders.   EXTRA TUBES    Narrative:     The following orders were created for panel order EXTRA TUBES.  Procedure                               Abnormality         Status                     ---------                               -----------         ------                     Light Blue Top Hold[[id number]]                             Final result                 Please view results for these tests on the individual orders.   LIGHT BLUE TOP HOLD    Extra Tube Hold for add-ons.     HEP C VIRUS HOLD SPECIMEN        ECG Results              EKG 12-lead (Final result)        Collection Time Result Time QRS Duration [facility] QTC Calculation    05/06/25 11:07:35 05/06/25 12:53:35 82 466                     Final result by Interface, Lab In [facility] (05/06/25 12:53:40)                   Narrative:    Test Reason : R07.89,    Vent. Rate :  70 BPM     Atrial Rate :  70 BPM     P-R Int : 140 ms          QRS Dur :  82 ms      QT Int : 432 ms       P-R-T Axes :  62  -4 218 degrees    QTcB Int : 466 ms    Normal sinus rhythm  ST and/or T wave abnormalities suggesting myocardial ischemia  Abnormal ECG  When compared with ECG of 20-Sep-2024 08:36,  T wave inversion now evident in Inferior leads  T wave inversion now evident in Lateral leads    Confirmed by Brody Harmon ([id number]) on 5/6/2025 12:53:34 PM    Referred By:            Confirmed By: Brody Harmon                                  Imaging Results              X-Ray Chest AP Portable (Final result)  Result time 05/06/25 13:36:21      Final result by Stewart Garcia III, MD (05/06/25 13:36:21)                   Impression:      No acute process seen.      Electronically signed by: Stewart Garcia MD  Date:    05/06/2025  Time:    13:36               Narrative:     EXAMINATION:  XR CHEST AP PORTABLE    CLINICAL HISTORY:  Chest Pain;    FINDINGS:  Chest one view AP portable.    Heart size is normal.  Lungs are clear.  The bones showed DJD.                                       Medications   apixaban tablet 5 mg (5 mg Oral Given 5/6/25 2056)   atorvastatin tablet 20 mg (20 mg Oral Given 5/6/25 2056)   pantoprazole EC tablet 40 mg (has no administration in time range)   venlafaxine 24 hr capsule 150 mg (has no administration in time range)   zolpidem tablet 10 mg (has no administration in time range)   metoprolol succinate (TOPROL-XL) 24 hr tablet 50 mg (50 mg Oral Given 5/6/25 2056)   sodium chloride 0.9% flush 10 mL (has no administration in time range)   naloxone 0.4 mg/mL injection 0.02 mg (has no administration in time range)   glucose chewable tablet 16 g (has no administration in time range)   glucose chewable tablet 24 g (has no administration in time range)   dextrose 50% injection 12.5 g (has no administration in time range)   dextrose 50% injection 25 g (has no administration in time range)   glucagon (human recombinant) injection 1 mg (has no administration in time range)   amoxicillin-clavulanate 875-125mg per tablet 1 tablet (1 tablet Oral Given 5/6/25 2056)   levalbuterol nebulizer solution 0.63 mg (0.63 mg Nebulization Given by Other 5/6/25 1717)   predniSONE tablet 40 mg (has no administration in time range)   azithromycin tablet 500 mg (500 mg Oral Given 5/6/25 1731)   fluticasone furoate-vilanteroL 100-25 mcg/dose diskus inhaler 1 puff (has no administration in time range)   anastrozole tablet 1 mg (has no administration in time range)   cyanocobalamin tablet 500 mcg (has no administration in time range)   magnesium oxide tablet 400 mg (has no administration in time range)   ipratropium 0.02 % nebulizer solution 0.5 mg (has no administration in time range)   diazePAM tablet 5 mg (5 mg Oral Given 5/6/25 2056)   multivitamin tablet (has no administration in time  range)   albuterol-ipratropium 2.5 mg-0.5 mg/3 mL nebulizer solution 3 mL (3 mLs Nebulization Given by Other 5/6/25 1226)   predniSONE tablet 60 mg (60 mg Oral Given 5/6/25 1300)   LORazepam injection 1 mg (1 mg Intravenous Given 5/6/25 1520)   furosemide injection 20 mg (20 mg Intravenous Given 5/6/25 1519)   potassium chloride SA CR tablet 40 mEq (40 mEq Oral Given 5/6/25 1731)   k phos di & mono-sod phos mono 250 mg tablet 2 tablet (2 tablets Oral Given 5/6/25 2056)     Medical Decision Making  Amount and/or Complexity of Data Reviewed  Labs: ordered.  Radiology: ordered.    Risk  Prescription drug management.  Decision regarding hospitalization.                          Medical Decision Making:   Initial Assessment:    64-year-old female is presenting with acute severe dyspnea with exertion.  She reports symptoms started a few days ago.  She has been unable to walk more than a few feet without getting short of breath.  Notes wheezing, denies lower extremity edema.  She has an elevated BNP in mildly elevated troponin.  No history of heart failure, kidney failure.  Concern for new onset CHF versus ACS.  Wheezing bilaterally.  History of COPD/asthma.  Given Solu-Medrol and nebs.  I would like to place her in observation for further evaluation and treatment of likely new onset heart failure, ACS rule out. Unclear if SOB is due to CHF or COPD. She reports compliance with Eliquis for a history of a flutter, lower concern for PE.                Clinical Impression:  Final diagnoses:  [R07.89] Chest discomfort  [R07.9] Chest pain  [I50.9] Congestive heart failure, unspecified HF chronicity, unspecified heart failure type (Primary)          ED Disposition Condition    Admit                     [1]   Social History  Tobacco Use    Smoking status: Former     Current packs/day: 0.00     Average packs/day: 0.5 packs/day for 41.9 years (21.0 ttl pk-yrs)     Types: Cigarettes     Start date: 1/1/1979     Quit date: 12/7/2020      Years since quittin.4     Passive exposure: Current    Smokeless tobacco: Never   Substance Use Topics    Alcohol use: Yes     Alcohol/week: 1.0 standard drink of alcohol     Types: 1 Glasses of wine per week     Comment: social-rarely    Drug use: No        Alonso Maldonado MD  [phone]

## 2025-05-07 LAB
ABSOLUTE EOSINOPHIL (OHS): 0.01 K/UL
ABSOLUTE MONOCYTE (OHS): 0.49 K/UL (ref 0.3–1)
ABSOLUTE NEUTROPHIL COUNT (OHS): 6.88 K/UL (ref 1.8–7.7)
ALBUMIN SERPL BCP-MCNC: 3.3 G/DL (ref 3.5–5.2)
ALP SERPL-CCNC: 94 UNIT/L (ref 40–150)
ALT SERPL W/O P-5'-P-CCNC: 19 UNIT/L (ref 10–44)
ANION GAP (OHS): 10 MMOL/L (ref 8–16)
ASCENDING AORTA: 2.7 CM
AST SERPL-CCNC: 22 UNIT/L (ref 11–45)
AV AREA BY CONTINUOUS VTI: 2.2 CM2
AV INDEX (PROSTH): 0.74
AV LVOT MEAN GRADIENT: 1 MMHG
AV LVOT PEAK GRADIENT: 3 MMHG
AV MEAN GRADIENT: 3 MMHG
AV PEAK GRADIENT: 5 MMHG
AV VALVE AREA BY VELOCITY RATIO: 2.1 CM²
AV VALVE AREA: 2.1 CM2
AV VELOCITY RATIO: 0.73
BASOPHILS # BLD AUTO: 0.02 K/UL
BASOPHILS NFR BLD AUTO: 0.2 %
BILIRUB SERPL-MCNC: 0.6 MG/DL (ref 0.1–1)
BSA FOR ECHO PROCEDURE: 1.77 M2
BUN SERPL-MCNC: 15 MG/DL (ref 8–23)
CALCIUM SERPL-MCNC: 8.9 MG/DL (ref 8.7–10.5)
CHLORIDE SERPL-SCNC: 106 MMOL/L (ref 95–110)
CO2 SERPL-SCNC: 26 MMOL/L (ref 23–29)
CREAT SERPL-MCNC: 0.7 MG/DL (ref 0.5–1.4)
CV ECHO LV RWT: 0.43 CM
DOP CALC AO PEAK VEL: 1.1 M/S
DOP CALC AO VTI: 21.6 CM
DOP CALC LVOT AREA: 2.8 CM2
DOP CALC LVOT DIAMETER: 1.9 CM
DOP CALC LVOT PEAK VEL: 0.8 M/S
DOP CALC LVOT STROKE VOLUME: 45.1 CM3
DOP CALCLVOT PEAK VEL VTI: 15.9 CM
E WAVE DECELERATION TIME: 157 MS
E/A RATIO: 0.84
E/E' RATIO: 10 M/S
ECHO EF ESTIMATED: 60 %
ECHO LV POSTERIOR WALL: 0.8 CM (ref 0.6–1.1)
ERYTHROCYTE [DISTWIDTH] IN BLOOD BY AUTOMATED COUNT: 12.8 % (ref 11.5–14.5)
FLUAV AG UPPER RESP QL IA.RAPID: NEGATIVE
FLUBV AG UPPER RESP QL IA.RAPID: NEGATIVE
FRACTIONAL SHORTENING: 32.4 % (ref 28–44)
GFR SERPLBLD CREATININE-BSD FMLA CKD-EPI: >60 ML/MIN/1.73/M2
GLUCOSE SERPL-MCNC: 101 MG/DL (ref 70–110)
HCT VFR BLD AUTO: 45.4 % (ref 37–48.5)
HGB BLD-MCNC: 14.9 GM/DL (ref 12–16)
IMM GRANULOCYTES # BLD AUTO: 0.05 K/UL (ref 0–0.04)
IMM GRANULOCYTES NFR BLD AUTO: 0.6 % (ref 0–0.5)
INTERVENTRICULAR SEPTUM: 0.8 CM (ref 0.6–1.1)
LA MAJOR: 4.5 CM
LA MINOR: 5.1 CM
LA WIDTH: 3.8 CM
LEFT ATRIUM SIZE: 3.2 CM
LEFT ATRIUM VOLUME INDEX MOD: 32 ML/M2
LEFT ATRIUM VOLUME INDEX: 29 ML/M2
LEFT ATRIUM VOLUME MOD: 56 ML
LEFT ATRIUM VOLUME: 49 CM3
LEFT INTERNAL DIMENSION IN SYSTOLE: 2.5 CM (ref 2.1–4)
LEFT VENTRICLE DIASTOLIC VOLUME INDEX: 32.95 ML/M2
LEFT VENTRICLE DIASTOLIC VOLUME: 57 ML
LEFT VENTRICLE MASS INDEX: 47.6 G/M2
LEFT VENTRICLE SYSTOLIC VOLUME INDEX: 13.3 ML/M2
LEFT VENTRICLE SYSTOLIC VOLUME: 23 ML
LEFT VENTRICULAR INTERNAL DIMENSION IN DIASTOLE: 3.7 CM (ref 3.5–6)
LEFT VENTRICULAR MASS: 82.3 G
LV LATERAL E/E' RATIO: 10.3
LV SEPTAL E/E' RATIO: 10.3
LYMPHOCYTES # BLD AUTO: 1.25 K/UL (ref 1–4.8)
MAGNESIUM SERPL-MCNC: 2.1 MG/DL (ref 1.6–2.6)
MCH RBC QN AUTO: 29.2 PG (ref 27–31)
MCHC RBC AUTO-ENTMCNC: 32.8 G/DL (ref 32–36)
MCV RBC AUTO: 89 FL (ref 82–98)
MV PEAK A VEL: 0.74 M/S
MV PEAK E VEL: 0.62 M/S
NUCLEATED RBC (/100WBC) (OHS): 0 /100 WBC
OHS CV RV/LV RATIO: 0.92 CM
PHOSPHATE SERPL-MCNC: 4.2 MG/DL (ref 2.7–4.5)
PISA TR MAX VEL: 2.4 M/S
PLATELET # BLD AUTO: 264 K/UL (ref 150–450)
PMV BLD AUTO: 10.1 FL (ref 9.2–12.9)
POTASSIUM SERPL-SCNC: 3.7 MMOL/L (ref 3.5–5.1)
PROT SERPL-MCNC: 6.7 GM/DL (ref 6–8.4)
RA MAJOR: 4.11 CM
RA PRESSURE ESTIMATED: 3 MMHG
RA WIDTH: 4.05 CM
RBC # BLD AUTO: 5.11 M/UL (ref 4–5.4)
RELATIVE EOSINOPHIL (OHS): 0.1 %
RELATIVE LYMPHOCYTE (OHS): 14.4 % (ref 18–48)
RELATIVE MONOCYTE (OHS): 5.6 % (ref 4–15)
RELATIVE NEUTROPHIL (OHS): 79.1 % (ref 38–73)
RIGHT VENTRICLE DIASTOLIC BASEL DIMENSION: 3.4 CM
RSV A 5' UTR RNA NPH QL NAA+PROBE: NEGATIVE
RV TB RVSP: 5 MMHG
SARS-COV-2 RNA RESP QL NAA+PROBE: NEGATIVE
SINUS: 2.86 CM
SODIUM SERPL-SCNC: 142 MMOL/L (ref 136–145)
STJ: 2.4 CM
TDI LATERAL: 0.06 M/S
TDI SEPTAL: 0.06 M/S
TDI: 0.06 M/S
TRICUSPID ANNULAR PLANE SYSTOLIC EXCURSION: 1.6 CM
TV PEAK GRADIENT: 23 MMHG
TV REST PULMONARY ARTERY PRESSURE: 26 MMHG
WBC # BLD AUTO: 8.7 K/UL (ref 3.9–12.7)
Z-SCORE OF LEFT VENTRICULAR DIMENSION IN END DIASTOLE: -2.59
Z-SCORE OF LEFT VENTRICULAR DIMENSION IN END SYSTOLE: -1.35

## 2025-05-07 PROCEDURE — 25500020 PHARM REV CODE 255: Performed by: STUDENT IN AN ORGANIZED HEALTH CARE EDUCATION/TRAINING PROGRAM

## 2025-05-07 PROCEDURE — 25000242 PHARM REV CODE 250 ALT 637 W/ HCPCS: Performed by: STUDENT IN AN ORGANIZED HEALTH CARE EDUCATION/TRAINING PROGRAM

## 2025-05-07 PROCEDURE — 84100 ASSAY OF PHOSPHORUS: CPT

## 2025-05-07 PROCEDURE — 94761 N-INVAS EAR/PLS OXIMETRY MLT: CPT

## 2025-05-07 PROCEDURE — 63700000 PHARM REV CODE 250 ALT 637 W/O HCPCS

## 2025-05-07 PROCEDURE — 25000242 PHARM REV CODE 250 ALT 637 W/ HCPCS

## 2025-05-07 PROCEDURE — 25000003 PHARM REV CODE 250: Performed by: STUDENT IN AN ORGANIZED HEALTH CARE EDUCATION/TRAINING PROGRAM

## 2025-05-07 PROCEDURE — 21400001 HC TELEMETRY ROOM

## 2025-05-07 PROCEDURE — 85025 COMPLETE CBC W/AUTO DIFF WBC: CPT

## 2025-05-07 PROCEDURE — 80053 COMPREHEN METABOLIC PANEL: CPT

## 2025-05-07 PROCEDURE — 94640 AIRWAY INHALATION TREATMENT: CPT

## 2025-05-07 PROCEDURE — 25000003 PHARM REV CODE 250

## 2025-05-07 PROCEDURE — 63600175 PHARM REV CODE 636 W HCPCS

## 2025-05-07 PROCEDURE — 83735 ASSAY OF MAGNESIUM: CPT

## 2025-05-07 PROCEDURE — 36415 COLL VENOUS BLD VENIPUNCTURE: CPT

## 2025-05-07 RX ORDER — METOPROLOL SUCCINATE 50 MG/1
50 TABLET, EXTENDED RELEASE ORAL 2 TIMES DAILY
Status: DISCONTINUED | OUTPATIENT
Start: 2025-05-07 | End: 2025-05-10 | Stop reason: HOSPADM

## 2025-05-07 RX ORDER — IPRATROPIUM BROMIDE 0.5 MG/2.5ML
0.5 SOLUTION RESPIRATORY (INHALATION) EVERY 6 HOURS
Status: DISCONTINUED | OUTPATIENT
Start: 2025-05-07 | End: 2025-05-08

## 2025-05-07 RX ORDER — POTASSIUM CHLORIDE 750 MG/1
30 CAPSULE, EXTENDED RELEASE ORAL ONCE
Status: COMPLETED | OUTPATIENT
Start: 2025-05-07 | End: 2025-05-07

## 2025-05-07 RX ORDER — LEVALBUTEROL INHALATION SOLUTION 0.63 MG/3ML
0.63 SOLUTION RESPIRATORY (INHALATION) EVERY 6 HOURS
Status: DISCONTINUED | OUTPATIENT
Start: 2025-05-07 | End: 2025-05-09

## 2025-05-07 RX ORDER — LEVALBUTEROL INHALATION SOLUTION 0.63 MG/3ML
0.63 SOLUTION RESPIRATORY (INHALATION) EVERY 6 HOURS
Status: DISCONTINUED | OUTPATIENT
Start: 2025-05-07 | End: 2025-05-07

## 2025-05-07 RX ORDER — IPRATROPIUM BROMIDE 0.5 MG/2.5ML
0.5 SOLUTION RESPIRATORY (INHALATION) EVERY 4 HOURS
Status: DISCONTINUED | OUTPATIENT
Start: 2025-05-07 | End: 2025-05-07

## 2025-05-07 RX ORDER — LEVALBUTEROL INHALATION SOLUTION 0.63 MG/3ML
0.63 SOLUTION RESPIRATORY (INHALATION) EVERY 4 HOURS
Status: DISCONTINUED | OUTPATIENT
Start: 2025-05-07 | End: 2025-05-07

## 2025-05-07 RX ADMIN — ATORVASTATIN CALCIUM 20 MG: 20 TABLET, FILM COATED ORAL at 08:05

## 2025-05-07 RX ADMIN — APIXABAN 5 MG: 5 TABLET, FILM COATED ORAL at 08:05

## 2025-05-07 RX ADMIN — IPRATROPIUM BROMIDE 0.5 MG: 0.5 SOLUTION RESPIRATORY (INHALATION) at 07:05

## 2025-05-07 RX ADMIN — Medication 400 MG: at 09:05

## 2025-05-07 RX ADMIN — METOPROLOL SUCCINATE 50 MG: 50 TABLET, EXTENDED RELEASE ORAL at 08:05

## 2025-05-07 RX ADMIN — DIAZEPAM 5 MG: 5 TABLET ORAL at 12:05

## 2025-05-07 RX ADMIN — APIXABAN 5 MG: 5 TABLET, FILM COATED ORAL at 09:05

## 2025-05-07 RX ADMIN — LEVALBUTEROL HYDROCHLORIDE 0.63 MG: 0.63 SOLUTION RESPIRATORY (INHALATION) at 07:05

## 2025-05-07 RX ADMIN — LEVALBUTEROL HYDROCHLORIDE 0.63 MG: 0.63 SOLUTION RESPIRATORY (INHALATION) at 02:05

## 2025-05-07 RX ADMIN — LEVALBUTEROL HYDROCHLORIDE 0.63 MG: 0.63 SOLUTION RESPIRATORY (INHALATION) at 12:05

## 2025-05-07 RX ADMIN — Medication 1 TABLET: at 09:05

## 2025-05-07 RX ADMIN — ANASTROZOLE 1 MG: 1 TABLET, COATED ORAL at 09:05

## 2025-05-07 RX ADMIN — HUMAN ALBUMIN MICROSPHERES AND PERFLUTREN 0.11 MG: 10; .22 INJECTION, SOLUTION INTRAVENOUS at 02:05

## 2025-05-07 RX ADMIN — AMOXICILLIN AND CLAVULANATE POTASSIUM 1 TABLET: 875; 125 TABLET, FILM COATED ORAL at 09:05

## 2025-05-07 RX ADMIN — IPRATROPIUM BROMIDE 0.5 MG: 0.5 SOLUTION RESPIRATORY (INHALATION) at 02:05

## 2025-05-07 RX ADMIN — ZOLPIDEM TARTRATE 10 MG: 5 TABLET, COATED ORAL at 08:05

## 2025-05-07 RX ADMIN — IPRATROPIUM BROMIDE 0.5 MG: 0.5 SOLUTION RESPIRATORY (INHALATION) at 12:05

## 2025-05-07 RX ADMIN — PREDNISONE 40 MG: 20 TABLET ORAL at 09:05

## 2025-05-07 RX ADMIN — METOPROLOL SUCCINATE 50 MG: 50 TABLET, EXTENDED RELEASE ORAL at 03:05

## 2025-05-07 RX ADMIN — CYANOCOBALAMIN TAB 250 MCG 500 MCG: 250 TAB at 09:05

## 2025-05-07 RX ADMIN — POTASSIUM CHLORIDE 30 MEQ: 750 CAPSULE, EXTENDED RELEASE ORAL at 09:05

## 2025-05-07 RX ADMIN — VENLAFAXINE HYDROCHLORIDE 150 MG: 150 CAPSULE, EXTENDED RELEASE ORAL at 09:05

## 2025-05-07 RX ADMIN — AMOXICILLIN AND CLAVULANATE POTASSIUM 1 TABLET: 875; 125 TABLET, FILM COATED ORAL at 08:05

## 2025-05-07 RX ADMIN — DIAZEPAM 5 MG: 5 TABLET ORAL at 08:05

## 2025-05-07 RX ADMIN — FLUTICASONE FUROATE AND VILANTEROL TRIFENATATE 1 PUFF: 100; 25 POWDER RESPIRATORY (INHALATION) at 07:05

## 2025-05-07 RX ADMIN — PANTOPRAZOLE SODIUM 40 MG: 40 TABLET, DELAYED RELEASE ORAL at 09:05

## 2025-05-07 RX ADMIN — AZITHROMYCIN DIHYDRATE 500 MG: 250 TABLET ORAL at 09:05

## 2025-05-07 NOTE — NURSING
Home Oxygen Evaluation    Date Performed: 2025    1) Patient's Home O2 Sat on room air, while at rest: 96%        If O2 sats on room air at rest are 88% or below, patient qualifies. No additional testing needed. Document N/A in steps 2 and 3. If 89% or above, complete steps 2.      2) Patient's O2 Sat on room air while exercisin%        If O2 sats on room air while exercising remain 89% or above patient does not qualify, no further testing needed Document N/A in step 3. If O2 sats on room air while exercising are 88% or below, continue to step 3.      3) Patient's O2 Sat while exercising on O2: N/A         (Must show improvement from #2 for patients to qualify)    If O2 sats improve on oxygen, patient qualifies for portable oxygen. If not, the patient does not qualify.

## 2025-05-07 NOTE — ASSESSMENT & PLAN NOTE
Patient presenting 4 days of KRUSE that was preceded by an URI. Within the past year has restarted smoking and does have a history of COPD. URI w/ productive cough (green sputum). Experiencing palpitations with KRUSE with heart rate on watch being recorded in the 160's in addition to chest tightness. Her past EP providers note reports typical flutter occurring during a bout of COPD exacerbation. Here in the ED with elevated hs troponin 133 and . EKG noted to have T wave inversions in leads II, AVF and V4-6. Current differential diagnosis includes COPD exacerbation and/or reoccurrence of atrial flutter or multifocal atrial tachycardia. Also considering NSTEMI though hs troponin elevation could be due to demand ischemia if atrial flutter w/ high HR occurring. Does not appear volume overloaded on exam though will obtain echo to assess further cardiac function and IVC and look for wall motion abnormalities.     Plan  - continuing augmentin to finish outpatient providers course  - started prednisone 40mg x4 days, received 1 dose in the ED  - started azithromycin 500mg x3 days  - scheduled levalbuterol nebulizer   - rechecking troponin  - rechecking EKG  - follow up TTE   - maintain cardiac telemetry

## 2025-05-07 NOTE — PLAN OF CARE
[facility] - Internal Medicine Telemetry  Initial Discharge Assessment       Primary Care Provider: Hetal Banks MD    Admission Diagnosis: Chest discomfort [R07.89]  Troponin level elevated [R79.89]  Chest pain [R07.9]  Congestive heart failure, unspecified HF chronicity, unspecified heart failure type [I50.9]    Admission Date: 5/6/2025  Expected Discharge Date: 5/8/2025    Transition of Care Barriers: (P) None    Payor: BLUE CROSS [employer]EFIT / Plan: [employer] BLUE CROSS [place] / Product Type: Self Funded /     Extended Emergency Contact Information  Primary Emergency Contact: Wolcott,Luan  Address: [address] [place] [address] [facility]  Home Phone: [phone]  Mobile Phone: [phone]  Relation: Spouse   needed? No  Secondary Emergency Contact: Kasandra Wallis  Address: [address] [place] [address] [address] [place] [address] [facility]  Home Phone: [phone]  Mobile Phone: [phone]  Relation: Daughter    Discharge Plan A: (P) Home with family  Discharge Plan B: (P) Home with family      [facility]  [address]  [facility] [address]  Phone: [phone] Fax: [phone]    [facility]  [address]  [place] [address]  Phone: [phone] Fax: [phone]    [facility]/pharmacy #[id number] - [place], [place] - [address] [facility]  [address] [facility] [address]  Phone: [phone] Fax: [phone]    [facility]/pharmacy #[id number] - [place], [place] - [address] [facility]  [address] [place] [address]  Phone: [phone] Fax: [phone]    [facility] [place] - [address] [facility]  [address] [place] [address]  Phone: [phone] Fax: [phone]    [facility] [address] [address]  Phone: [phone] Fax: [phone]      Initial Assessment (most recent)       Adult  Discharge Assessment - 05/07/25 1600          Discharge Assessment    Assessment Type Discharge Planning Assessment     Confirmed/corrected address, phone number and insurance Yes     Confirmed Demographics Correct on Facesheet     Source of Information patient     When was your last doctors appointment? --   Patient confirmed PCP is correct on facesheet. Patient stated last PCP visit was last year    Communicated LESTER with patient/caregiver Yes     Reason For Admission Dyspnea on Exertion     People in Home spouse;child(chandrika), adult     Do you expect to return to your current living situation? Yes     Do you have help at home or someone to help you manage your care at home? Yes     Who are your caregiver(s) and their phone number(s)? Luan Matias (Spouse)  [phone] (P)      Prior to hospitilization cognitive status: Alert/Oriented (P)      Current cognitive status: Alert/Oriented (P)      Walking or Climbing Stairs Difficulty no (P)      Dressing/Bathing Difficulty no (P)      Equipment Currently Used at Home none (P)      Readmission within 30 days? No (P)      Patient currently being followed by outpatient case management? No (P)      Do you currently have service(s) that help you manage your care at home? No (P)      Do you take prescription medications? Yes (P)      Do you have prescription coverage? Yes (P)      Coverage Blue Cross Blue Shield (P)      Do you have any problems affording any of your prescribed medications? No (P)      Is the patient taking medications as prescribed? yes (P)      Who is going to help you get home at discharge? Family (P)      How do you get to doctors appointments? car, drives self;family or friend will provide (P)      Are you on dialysis? No (P)      Do you take coumadin? No (P)      Discharge Plan A Home with family (P)      Discharge Plan B Home with family (P)      DME Needed Upon Discharge  none (P)      Discharge Plan discussed with: Patient (P)      Transition of Care  Barriers None (P)         Physical Activity    On average, how many days per week do you engage in moderate to strenuous exercise (like a brisk walk)? 3 days (P)      On average, how many minutes do you engage in exercise at this level? 150+ min (P)         Financial Resource Strain    How hard is it for you to pay for the very basics like food, housing, medical care, and heating? Not hard at all (P)         Housing Stability    In the last 12 months, was there a time when you were not able to pay the mortgage or rent on time? No (P)      At any time in the past 12 months, were you homeless or living in a shelter (including now)? No (P)         Transportation Needs    In the past 12 months, has lack of transportation kept you from medical appointments or from getting medications? No (P)      In the past 12 months, has lack of transportation kept you from meetings, work, or from getting things needed for daily living? No (P)         Food Insecurity    Within the past 12 months, you worried that your food would run out before you got the money to buy more. Never true (P)      Within the past 12 months, the food you bought just didn't last and you didn't have money to get more. Never true (P)         Stress    Do you feel stress - tense, restless, nervous, or anxious, or unable to sleep at night because your mind is troubled all the time - these days? Rather much (P)         Social Isolation    How often do you feel lonely or isolated from those around you?  Never (P)         Alcohol Use    Q1: How often do you have a drink containing alcohol? Monthly or less (P)      Q2: How many drinks containing alcohol do you have on a typical day when you are drinking? 1 or 2 (P)      Q3: How often do you have six or more drinks on one occasion? Never (P)         Utilities    In the past 12 months has the electric, gas, oil, or water company threatened to shut off services in your home? No (P)         Health Literacy    How often  do you need to have someone help you when you read instructions, pamphlets, or other written material from your doctor or pharmacy? Never (P)         OTHER    Name(s) of People in Home Luan Matias (Spouse)  [phone] (P)                       Discharge Plan A and Plan B have been determined by review of patient's clinical status, future medical and therapeutic needs, and coverage/benefits for post-acute care in coordination with multidisciplinary team members.    Tre Shaw RN-CM  Case Management  [facility]  [phone]

## 2025-05-07 NOTE — PROGRESS NOTES
Adrien Florez - Internal Medicine [facility] Medicine  Progress Note    Patient Name: Lucia Matias  MRN: [id number]  Patient Class: IP- Inpatient   Admission Date: 5/6/2025  Length of Stay: 1 days  Attending Physician: Mercy Freeman MD  Primary Care Provider: Hetal Banks MD        Subjective     Principal Problem:KRUSE (dyspnea on exertion)        HPI:  61 y/o F with HTN, COPD, A flutter s/p RFA (on eliquis), breast cancer stage IA HR+ s/p bilateral mastectomy w/ reconstruction (on anastrozole) presented to the ED for evaluation of x4 days of shortness of breath. Within the past week patient began experiencing an URI (productive cough with green sputum, runny nose, headache) for which she had an e-consult with an outpatient provider and was prescribed a 10 day course of Augmentin. About 4 days ago she was [place] during which time she became dyspneic on exertion and experienced palpitations (fast heart rate). During this bout she also noted to experiencing chest tightness. Her KRUSE, palpitations, and chest tightness have all worsened since the onset, are worsened with exertion, and are relieved by rest. During this time she noted her heart rate to be in the 160's on her watch. Patient denies any leg swelling, nausea, vomiting, abdominal pain, dysuria, or any other sxs at this time. Note: A past electrophysiology note reports that the patient experienced typical atrial flutter during a COPD exacerbation.     Vitally in the ED: afebrile, -147/60-77 w/ HR 76-91, 94%O2 RA. Labs in the ED: WBC wnl, H/H 16.6/50.8, CMP relatively unremarkable apart from K 3.4, , hs troponin 133. The ED administered duonebs, lasix 20mg IV, and lorazepam 1mg, and prednisone 60mg.    Overview/Hospital Course:  No notes on file    Interval History: No acute events overnight. Pt still experiencing wheezing this AM though examined during her breathing treatment. Wheezing overall  improved. Changed neb to q6h. On telemetry rates in the ~150-160's overnight which could be due to multifocal atrial tachycardia 2/2 COPD exacerbation. Will continue breathing treatments.     Review of Systems  Objective:     Vital Signs (Most Recent):  Temp: 97.5 °F (36.4 °C) (05/07/25 0807)  Pulse: 83 (05/07/25 1144)  Resp: 18 (05/07/25 0807)  BP: 129/73 (05/07/25 0807)  SpO2: 96 % (05/07/25 0807) Vital Signs (24h Range):  Temp:  [97.5 °F (36.4 °C)-98 °F (36.7 °C)] 97.5 °F (36.4 °C)  Pulse:  [] 83  Resp:  [12-37] 18  SpO2:  [93 %-100 %] 96 %  BP: ()/(53-85) 129/73     Weight: 72.6 kg (160 lb)  Body mass index is 29.26 kg/m².    Intake/Output Summary (Last 24 hours) at 5/7/2025 1213  Last data filed at 5/6/2025 1602  Gross per 24 hour   Intake 840 ml   Output 300 ml   Net 540 ml         Physical Exam  Constitutional:       General: She is not in acute distress.     Appearance: She is normal weight. She is not toxic-appearing.   HENT:      Head: Normocephalic and atraumatic.      Right Ear: External ear normal.      Left Ear: External ear normal.   Eyes:      General:         Right eye: No discharge.         Left eye: No discharge.   Cardiovascular:      Rate and Rhythm: Normal rate and regular rhythm.      Heart sounds:      No gallop.   Pulmonary:      Breath sounds: Wheezing (bilaterally, diffuse) present.   Abdominal:      General: There is no distension.      Tenderness: There is no abdominal tenderness. There is no guarding.   Musculoskeletal:      Right lower leg: No edema.      Left lower leg: No edema.   Skin:     General: Skin is warm and dry.   Neurological:      General: No focal deficit present.      Mental Status: She is oriented to person, place, and time.               Significant Labs: All pertinent labs within the past 24 hours have been reviewed.  Recent Lab Results  (Last 5 results in the past 24 hours)        05/07/25  0625   05/06/25  2135   05/06/25  1856   05/06/25  4674    05/06/25  1559        RSV Ag by Molecular Method   Negative             Influenza A, Molecular   Negative             Influenza B, Molecular   Negative             Albumin 3.3               ALP 94               Allens Test     N/A           ALT 19               Anion Gap 10               AST 22               Baso # 0.02               Basophil % 0.2               BILIRUBIN TOTAL 0.6  Comment: For infants and newborns, interpretation of results should be based   on gestational age, weight and in agreement with clinical   observations.    Premature Infant recommended reference ranges:   0-24 hours:  <8.0 mg/dL   24-48 hours: <12.0 mg/dL   3-5 days:    <15.0 mg/dL   6-29 days:   <15.0 mg/dL               Site     Other           BUN 15               Calcium 8.9               Chloride 106               CO2 26               Creatinine 0.7               eGFR >60  Comment: Estimated GFR calculated using the CKD-EPI creatinine (2021) equation.               Eos # 0.01               Eos % 0.1               Glucose 101               Gran # (ANC) 6.88               Hematocrit 45.4               Hemoglobin 14.9               Immature Grans (Abs) 0.05  Comment: Mild elevation in immature granulocytes is non specific and can be seen in a variety of conditions including stress response, acute inflammation, trauma and pregnancy. Correlation with other laboratory and clinical findings is essential.               Immature Granulocytes 0.6               Lymph # 1.25               Lymph % 14.4               Magnesium  2.1       1.8         MCH 29.2               MCHC 32.8               MCV 89               Mono # 0.49               Mono % 5.6               MPV 10.1               Neut % 79.1               nRBC 0               Phosphorus Level 4.2       1.7         Platelet Count 264               POC BE     4           POC HCO3     28.1           POC PCO2     39.1           POC PH     7.464           POC PO2     32           POC  SATURATED O2     66           POC TCO2     29           Potassium 3.7               PROTEIN TOTAL 6.7               RBC 5.11               RDW 12.8               Sample     VENOUS           SARS-CoV2 (COVID-19) Qualitative PCR   Negative             Sodium 142               Troponin I High Sensitivity         94       WBC 8.70                                      Significant Imaging: I have reviewed all pertinent imaging results/findings within the past 24 hours.      Assessment & Plan  KRUSE (dyspnea on exertion)  Patient presenting 4 days of KRUSE that was preceded by an URI. Within the past year has restarted smoking and does have a history of COPD. URI w/ productive cough (green sputum). Experiencing palpitations with KRUSE with heart rate on watch being recorded in the 160's in addition to chest tightness. Her past EP providers note reports typical flutter occurring during a bout of COPD exacerbation. Here in the ED with elevated hs troponin 133 and . EKG noted to have T wave inversions in leads II, AVF and V4-6. Current differential diagnosis includes COPD exacerbation and/or reoccurrence of atrial flutter or multifocal atrial tachycardia. Also considering NSTEMI though hs troponin elevation could be due to demand ischemia if atrial flutter w/ high HR occurring. Does not appear volume overloaded on exam though will obtain echo to assess further cardiac function and IVC and look for wall motion abnormalities.     Plan  - continuing augmentin to finish outpatient providers course  - started prednisone 40mg x4 days, received 1 dose in the ED  - started azithromycin 500mg x3 days  - scheduled levalbuterol nebulizer   - rechecking troponin  - rechecking EKG  - follow up TTE   - maintain cardiac telemetry   COPD (chronic obstructive pulmonary disease)  History of tobacco abuse   Continue scheduled inhalers Steroids, Antibiotics, and Supplemental oxygen and monitor respiratory status closely. Recently started  smoking cigarettes again.     See KRUSE above  Atrial flutter  - history of A flutter with ablation  - on eliquis at home, continue   See KRUSE above   HLD (hyperlipidemia)  Continue home statin   Depression with anxiety  Insomnia  - continue home effexor  - continue home ambien and diazepam for insomnia   Essential hypertension  Patient's blood pressure range in the last 24 hours was: BP  Min: 97/53  Max: 147/77.The patient's inpatient anti-hypertensive regimen is listed below:  Current Antihypertensives  metoprolol succinate (TOPROL-XL) 24 hr tablet 50 mg, Nightly, Oral    Plan  - BP is controlled, no changes needed to their regimen  - titrate as needed   Malignant neoplasm of central portion of left breast in female, estrogen receptor positive  Long-term use of immunosuppressant medication  Continue anastrozole   Hypokalemia  Patient's most recent potassium results are listed below.   Recent Labs     05/06/25  1406 05/07/25  0625   K 3.4* 3.7     Plan  - Replete potassium per protocol. Replete po  - Monitor potassium Daily  Hypophosphatemia  Patient's most recent phosphorus results are listed below.   Recent Labs     05/06/25  1711 05/07/25  0625   PHOS 1.7* 4.2     Plan  - Will treat hypophosphatemia with repletion  - Continue to monitor  Elevated troponin level  Due to demand ischemia in setting of COPD exacerbation and tachycardia   Follow up TTE     VTE Risk Mitigation (From admission, onward)           Ordered     apixaban tablet 5 mg  2 times daily         05/06/25 1620                    Discharge Planning   LESTER: 5/8/2025     Code Status: Full Code   Medical Readiness for Discharge Date:                            David Maher DO  Department of Hospital Medicine   [facility] - Internal Medicine Telemetry

## 2025-05-07 NOTE — ASSESSMENT & PLAN NOTE
Patient's most recent potassium results are listed below.   Recent Labs     05/06/25  1406 05/07/25  0625   K 3.4* 3.7     Plan  - Replete potassium per protocol. Replete po  - Monitor potassium Daily

## 2025-05-07 NOTE — SUBJECTIVE & OBJECTIVE
Interval History: No acute events overnight. Pt still experiencing wheezing this AM though examined during her breathing treatment. Wheezing overall improved. Changed neb to q6h. On telemetry rates in the ~150-160's overnight which could be due to multifocal atrial tachycardia 2/2 COPD exacerbation. Will continue breathing treatments.     Review of Systems  Objective:     Vital Signs (Most Recent):  Temp: 97.5 °F (36.4 °C) (05/07/25 0807)  Pulse: 83 (05/07/25 1144)  Resp: 18 (05/07/25 0807)  BP: 129/73 (05/07/25 0807)  SpO2: 96 % (05/07/25 0807) Vital Signs (24h Range):  Temp:  [97.5 °F (36.4 °C)-98 °F (36.7 °C)] 97.5 °F (36.4 °C)  Pulse:  [] 83  Resp:  [12-37] 18  SpO2:  [93 %-100 %] 96 %  BP: ()/(53-85) 129/73     Weight: 72.6 kg (160 lb)  Body mass index is 29.26 kg/m².    Intake/Output Summary (Last 24 hours) at 5/7/2025 1213  Last data filed at 5/6/2025 1602  Gross per 24 hour   Intake 840 ml   Output 300 ml   Net 540 ml         Physical Exam  Constitutional:       General: She is not in acute distress.     Appearance: She is normal weight. She is not toxic-appearing.   HENT:      Head: Normocephalic and atraumatic.      Right Ear: External ear normal.      Left Ear: External ear normal.   Eyes:      General:         Right eye: No discharge.         Left eye: No discharge.   Cardiovascular:      Rate and Rhythm: Normal rate and regular rhythm.      Heart sounds:      No gallop.   Pulmonary:      Breath sounds: Wheezing (bilaterally, diffuse) present.   Abdominal:      General: There is no distension.      Tenderness: There is no abdominal tenderness. There is no guarding.   Musculoskeletal:      Right lower leg: No edema.      Left lower leg: No edema.   Skin:     General: Skin is warm and dry.   Neurological:      General: No focal deficit present.      Mental Status: She is oriented to person, place, and time.               Significant Labs: All pertinent labs within the past 24 hours have been  reviewed.  Recent Lab Results  (Last 5 results in the past 24 hours)        05/07/25  0625   05/06/25  2135   05/06/25  1856   05/06/25  1711   05/06/25  1559        RSV Ag by Molecular Method   Negative             Influenza A, Molecular   Negative             Influenza B, Molecular   Negative             Albumin 3.3               ALP 94               Allens Test     N/A           ALT 19               Anion Gap 10               AST 22               Baso # 0.02               Basophil % 0.2               BILIRUBIN TOTAL 0.6  Comment: For infants and newborns, interpretation of results should be based   on gestational age, weight and in agreement with clinical   observations.    Premature Infant recommended reference ranges:   0-24 hours:  <8.0 mg/dL   24-48 hours: <12.0 mg/dL   3-5 days:    <15.0 mg/dL   6-29 days:   <15.0 mg/dL               Site     Other           BUN 15               Calcium 8.9               Chloride 106               CO2 26               Creatinine 0.7               eGFR >60  Comment: Estimated GFR calculated using the CKD-EPI creatinine (2021) equation.               Eos # 0.01               Eos % 0.1               Glucose 101               Gran # (ANC) 6.88               Hematocrit 45.4               Hemoglobin 14.9               Immature Grans (Abs) 0.05  Comment: Mild elevation in immature granulocytes is non specific and can be seen in a variety of conditions including stress response, acute inflammation, trauma and pregnancy. Correlation with other laboratory and clinical findings is essential.               Immature Granulocytes 0.6               Lymph # 1.25               Lymph % 14.4               Magnesium  2.1       1.8         MCH 29.2               MCHC 32.8               MCV 89               Mono # 0.49               Mono % 5.6               MPV 10.1               Neut % 79.1               nRBC 0               Phosphorus Level 4.2       1.7         Platelet Count 264                POC BE     4           POC HCO3     28.1           POC PCO2     39.1           POC PH     7.464           POC PO2     32           POC SATURATED O2     66           POC TCO2     29           Potassium 3.7               PROTEIN TOTAL 6.7               RBC 5.11               RDW 12.8               Sample     VENOUS           SARS-CoV2 (COVID-19) Qualitative PCR   Negative             Sodium 142               Troponin I High Sensitivity         94       WBC 8.70                                      Significant Imaging: I have reviewed all pertinent imaging results/findings within the past 24 hours.

## 2025-05-07 NOTE — ASSESSMENT & PLAN NOTE
Patient's most recent phosphorus results are listed below.   Recent Labs     05/06/25  1711 05/07/25  0625   PHOS 1.7* 4.2     Plan  - Will treat hypophosphatemia with repletion  - Continue to monitor

## 2025-05-07 NOTE — ASSESSMENT & PLAN NOTE
Patient's blood pressure range in the last 24 hours was: BP  Min: 97/53  Max: 147/77.The patient's inpatient anti-hypertensive regimen is listed below:  Current Antihypertensives  metoprolol succinate (TOPROL-XL) 24 hr tablet 50 mg, Nightly, Oral    Plan  - BP is controlled, no changes needed to their regimen  - titrate as needed

## 2025-05-08 LAB
ABSOLUTE EOSINOPHIL (OHS): 0.05 K/UL
ABSOLUTE MONOCYTE (OHS): 0.52 K/UL (ref 0.3–1)
ABSOLUTE NEUTROPHIL COUNT (OHS): 6.94 K/UL (ref 1.8–7.7)
ANION GAP (OHS): 9 MMOL/L (ref 8–16)
BASOPHILS # BLD AUTO: 0.02 K/UL
BASOPHILS NFR BLD AUTO: 0.2 %
BUN SERPL-MCNC: 16 MG/DL (ref 8–23)
CALCIUM SERPL-MCNC: 8.8 MG/DL (ref 8.7–10.5)
CHLORIDE SERPL-SCNC: 107 MMOL/L (ref 95–110)
CO2 SERPL-SCNC: 24 MMOL/L (ref 23–29)
CREAT SERPL-MCNC: 0.7 MG/DL (ref 0.5–1.4)
ERYTHROCYTE [DISTWIDTH] IN BLOOD BY AUTOMATED COUNT: 13.1 % (ref 11.5–14.5)
GFR SERPLBLD CREATININE-BSD FMLA CKD-EPI: >60 ML/MIN/1.73/M2
GLUCOSE SERPL-MCNC: 86 MG/DL (ref 70–110)
HCT VFR BLD AUTO: 43.6 % (ref 37–48.5)
HGB BLD-MCNC: 14.3 GM/DL (ref 12–16)
IMM GRANULOCYTES # BLD AUTO: 0.05 K/UL (ref 0–0.04)
IMM GRANULOCYTES NFR BLD AUTO: 0.5 % (ref 0–0.5)
LYMPHOCYTES # BLD AUTO: 1.68 K/UL (ref 1–4.8)
MAGNESIUM SERPL-MCNC: 2.1 MG/DL (ref 1.6–2.6)
MCH RBC QN AUTO: 29.2 PG (ref 27–31)
MCHC RBC AUTO-ENTMCNC: 32.8 G/DL (ref 32–36)
MCV RBC AUTO: 89 FL (ref 82–98)
NUCLEATED RBC (/100WBC) (OHS): 0 /100 WBC
PHOSPHATE SERPL-MCNC: 3.4 MG/DL (ref 2.7–4.5)
PLATELET # BLD AUTO: 246 K/UL (ref 150–450)
PMV BLD AUTO: 10 FL (ref 9.2–12.9)
POTASSIUM SERPL-SCNC: 4 MMOL/L (ref 3.5–5.1)
RBC # BLD AUTO: 4.89 M/UL (ref 4–5.4)
RELATIVE EOSINOPHIL (OHS): 0.5 %
RELATIVE LYMPHOCYTE (OHS): 18.1 % (ref 18–48)
RELATIVE MONOCYTE (OHS): 5.6 % (ref 4–15)
RELATIVE NEUTROPHIL (OHS): 75.1 % (ref 38–73)
SODIUM SERPL-SCNC: 140 MMOL/L (ref 136–145)
WBC # BLD AUTO: 9.26 K/UL (ref 3.9–12.7)

## 2025-05-08 PROCEDURE — 63600175 PHARM REV CODE 636 W HCPCS

## 2025-05-08 PROCEDURE — 36415 COLL VENOUS BLD VENIPUNCTURE: CPT

## 2025-05-08 PROCEDURE — 25000242 PHARM REV CODE 250 ALT 637 W/ HCPCS: Performed by: STUDENT IN AN ORGANIZED HEALTH CARE EDUCATION/TRAINING PROGRAM

## 2025-05-08 PROCEDURE — 25000242 PHARM REV CODE 250 ALT 637 W/ HCPCS

## 2025-05-08 PROCEDURE — 25000003 PHARM REV CODE 250

## 2025-05-08 PROCEDURE — 94640 AIRWAY INHALATION TREATMENT: CPT

## 2025-05-08 PROCEDURE — 94761 N-INVAS EAR/PLS OXIMETRY MLT: CPT

## 2025-05-08 PROCEDURE — 84100 ASSAY OF PHOSPHORUS: CPT

## 2025-05-08 PROCEDURE — 80048 BASIC METABOLIC PNL TOTAL CA: CPT | Performed by: STUDENT IN AN ORGANIZED HEALTH CARE EDUCATION/TRAINING PROGRAM

## 2025-05-08 PROCEDURE — 85025 COMPLETE CBC W/AUTO DIFF WBC: CPT

## 2025-05-08 PROCEDURE — 93010 ELECTROCARDIOGRAM REPORT: CPT | Mod: ,,, | Performed by: INTERNAL MEDICINE

## 2025-05-08 PROCEDURE — 93005 ELECTROCARDIOGRAM TRACING: CPT

## 2025-05-08 PROCEDURE — 83735 ASSAY OF MAGNESIUM: CPT

## 2025-05-08 PROCEDURE — 21400001 HC TELEMETRY ROOM

## 2025-05-08 PROCEDURE — 25000003 PHARM REV CODE 250: Performed by: STUDENT IN AN ORGANIZED HEALTH CARE EDUCATION/TRAINING PROGRAM

## 2025-05-08 PROCEDURE — 63700000 PHARM REV CODE 250 ALT 637 W/O HCPCS

## 2025-05-08 RX ORDER — IPRATROPIUM BROMIDE 0.5 MG/2.5ML
0.5 SOLUTION RESPIRATORY (INHALATION) EVERY 4 HOURS
Status: DISCONTINUED | OUTPATIENT
Start: 2025-05-08 | End: 2025-05-09

## 2025-05-08 RX ORDER — ACETAMINOPHEN 500 MG
2 TABLET ORAL DAILY PRN
COMMUNITY

## 2025-05-08 RX ORDER — PREDNISONE 20 MG/1
40 TABLET ORAL ONCE
Status: COMPLETED | OUTPATIENT
Start: 2025-05-08 | End: 2025-05-08

## 2025-05-08 RX ORDER — IPRATROPIUM BROMIDE AND ALBUTEROL SULFATE 2.5; .5 MG/3ML; MG/3ML
3 SOLUTION RESPIRATORY (INHALATION) EVERY 6 HOURS PRN
Qty: 90 ML | Refills: 0 | Status: ACTIVE | OUTPATIENT
Start: 2025-05-08 | End: 2025-05-09 | Stop reason: HOSPADM

## 2025-05-08 RX ORDER — PREDNISONE 20 MG/1
40 TABLET ORAL DAILY
Status: DISCONTINUED | OUTPATIENT
Start: 2025-05-09 | End: 2025-05-10 | Stop reason: HOSPADM

## 2025-05-08 RX ORDER — METOPROLOL SUCCINATE 50 MG/1
50 TABLET, EXTENDED RELEASE ORAL 2 TIMES DAILY
Qty: 180 TABLET | Refills: 3 | Status: ACTIVE | OUTPATIENT
Start: 2025-05-08 | End: 2025-05-23 | Stop reason: SDUPTHER

## 2025-05-08 RX ADMIN — PREDNISONE 40 MG: 20 TABLET ORAL at 08:05

## 2025-05-08 RX ADMIN — IPRATROPIUM BROMIDE 0.5 MG: 0.5 SOLUTION RESPIRATORY (INHALATION) at 07:05

## 2025-05-08 RX ADMIN — IPRATROPIUM BROMIDE 0.5 MG: 0.5 SOLUTION RESPIRATORY (INHALATION) at 12:05

## 2025-05-08 RX ADMIN — LEVALBUTEROL HYDROCHLORIDE 0.63 MG: 0.63 SOLUTION RESPIRATORY (INHALATION) at 06:05

## 2025-05-08 RX ADMIN — ATORVASTATIN CALCIUM 20 MG: 20 TABLET, FILM COATED ORAL at 08:05

## 2025-05-08 RX ADMIN — IPRATROPIUM BROMIDE 0.5 MG: 0.5 SOLUTION RESPIRATORY (INHALATION) at 06:05

## 2025-05-08 RX ADMIN — Medication 1 TABLET: at 08:05

## 2025-05-08 RX ADMIN — VENLAFAXINE HYDROCHLORIDE 150 MG: 150 CAPSULE, EXTENDED RELEASE ORAL at 08:05

## 2025-05-08 RX ADMIN — FLUTICASONE FUROATE AND VILANTEROL TRIFENATATE 1 PUFF: 100; 25 POWDER RESPIRATORY (INHALATION) at 07:05

## 2025-05-08 RX ADMIN — AZITHROMYCIN DIHYDRATE 500 MG: 250 TABLET ORAL at 08:05

## 2025-05-08 RX ADMIN — APIXABAN 5 MG: 5 TABLET, FILM COATED ORAL at 08:05

## 2025-05-08 RX ADMIN — ANASTROZOLE 1 MG: 1 TABLET, COATED ORAL at 08:05

## 2025-05-08 RX ADMIN — LEVALBUTEROL HYDROCHLORIDE 0.63 MG: 0.63 SOLUTION RESPIRATORY (INHALATION) at 12:05

## 2025-05-08 RX ADMIN — ZOLPIDEM TARTRATE 10 MG: 5 TABLET, COATED ORAL at 08:05

## 2025-05-08 RX ADMIN — AMOXICILLIN AND CLAVULANATE POTASSIUM 1 TABLET: 875; 125 TABLET, FILM COATED ORAL at 08:05

## 2025-05-08 RX ADMIN — LEVALBUTEROL HYDROCHLORIDE 0.63 MG: 0.63 SOLUTION RESPIRATORY (INHALATION) at 07:05

## 2025-05-08 RX ADMIN — PANTOPRAZOLE SODIUM 40 MG: 40 TABLET, DELAYED RELEASE ORAL at 08:05

## 2025-05-08 RX ADMIN — CYANOCOBALAMIN TAB 250 MCG 500 MCG: 250 TAB at 08:05

## 2025-05-08 RX ADMIN — Medication 400 MG: at 08:05

## 2025-05-08 RX ADMIN — DIAZEPAM 5 MG: 5 TABLET ORAL at 08:05

## 2025-05-08 RX ADMIN — PREDNISONE 40 MG: 20 TABLET ORAL at 02:05

## 2025-05-08 RX ADMIN — METOPROLOL SUCCINATE 50 MG: 50 TABLET, EXTENDED RELEASE ORAL at 08:05

## 2025-05-08 NOTE — PLAN OF CARE
Problem: Fall Injury Risk  Goal: Absence of Fall and Fall-Related Injury  5/8/2025 1536 by Charity Lazcano RN  Outcome: Progressing  5/8/2025 1535 by Charity Lazcano RN  Outcome: Progressing  5/8/2025 1535 by Charity Lazcano RN  Outcome: Progressing  Intervention: Identify and Manage Contributors  Flowsheets (Taken 5/8/2025 1535)  Self-Care Promotion:   independence encouraged   BADL personal objects within reach  Medication Review/Management:   medications reviewed   high-risk medications identified     Problem: Pain Acute  Goal: Optimal Pain Control and Function  Outcome: Progressing  Intervention: Develop Pain Management Plan  Flowsheets (Taken 5/8/2025 1536)  Pain Management Interventions:   pain management plan reviewed with patient/caregiver   relaxation techniques promoted   quiet environment facilitated

## 2025-05-08 NOTE — PROGRESS NOTES
Adrien Florez - Internal Medicine [facility] Medicine  Progress Note    Patient Name: Lucia Matias  MRN: [id number]  Patient Class: IP- Inpatient   Admission Date: 5/6/2025  Length of Stay: 2 days  Attending Physician: Mercy Freeman MD  Primary Care Provider: Hetal Banks MD        Subjective     Principal Problem:KRUSE (dyspnea on exertion)        HPI:  61 y/o F with HTN, COPD, A flutter s/p RFA (on eliquis), breast cancer stage IA HR+ s/p bilateral mastectomy w/ reconstruction (on anastrozole) presented to the ED for evaluation of x4 days of shortness of breath. Within the past week patient began experiencing an URI (productive cough with green sputum, runny nose, headache) for which she had an e-consult with an outpatient provider and was prescribed a 10 day course of Augmentin. About 4 days ago she [place] during which time she became dyspneic on exertion and experienced palpitations (fast heart rate). During this bout she also noted to experiencing chest tightness. Her KRUSE, palpitations, and chest tightness have all worsened since the onset, are worsened with exertion, and are relieved by rest. During this time she noted her heart rate to be in the 160's on her watch. Patient denies any leg swelling, nausea, vomiting, abdominal pain, dysuria, or any other sxs at this time. Note: A past electrophysiology note reports that the patient experienced typical atrial flutter during a COPD exacerbation.     Vitally in the ED: afebrile, -147/60-77 w/ HR 76-91, 94%O2 RA. Labs in the ED: WBC wnl, H/H 16.6/50.8, CMP relatively unremarkable apart from K 3.4, , hs troponin 133. The ED administered duonebs, lasix 20mg IV, and lorazepam 1mg, and prednisone 60mg.    Overview/Hospital Course:  No notes on file    Interval History: NAEO, VSS, improving but still SOB with exertion. Still markedly wheezy on exam. Extra dose prednisone given today and increasing duonebs.  Will monitor overnight. Will consider consulting cardiology tomorrow if still with KRUSE tomorrow. Otherwise no new or worsening symptoms      Objective:     Vital Signs (Most Recent):  Temp: 97.9 °F (36.6 °C) (05/08/25 1152)  Pulse: 106 (05/08/25 1525)  Resp: 18 (05/08/25 1230)  BP: 120/63 (05/08/25 1152)  SpO2: (!) 93 % (05/08/25 1230) Vital Signs (24h Range):  Temp:  [97.6 °F (36.4 °C)-98.1 °F (36.7 °C)] 97.9 °F (36.6 °C)  Pulse:  [] 106  Resp:  [18] 18  SpO2:  [91 %-96 %] 93 %  BP: (109-138)/(63-77) 120/63     Weight: 72 kg (158 lb 11.7 oz)  Body mass index is 29.03 kg/m².    Intake/Output Summary (Last 24 hours) at 5/8/2025 1535  Last data filed at 5/8/2025 1200  Gross per 24 hour   Intake 480 ml   Output --   Net 480 ml         Physical Exam  Constitutional:       General: She is not in acute distress.     Appearance: She is normal weight. She is not toxic-appearing.   HENT:      Head: Normocephalic and atraumatic.      Right Ear: External ear normal.      Left Ear: External ear normal.   Eyes:      General:         Right eye: No discharge.         Left eye: No discharge.   Cardiovascular:      Rate and Rhythm: Normal rate and regular rhythm.      Heart sounds:      No gallop.   Pulmonary:      Breath sounds: Wheezing (bilaterally, diffuse) present.   Abdominal:      General: There is no distension.      Tenderness: There is no abdominal tenderness. There is no guarding.   Musculoskeletal:      Right lower leg: No edema.      Left lower leg: No edema.   Skin:     General: Skin is warm and dry.   Neurological:      General: No focal deficit present.      Mental Status: She is oriented to person, place, and time.               Significant Labs: All pertinent labs within the past 24 hours have been reviewed.    Significant Imaging: I have reviewed all pertinent imaging results/findings within the past 24 hours.      Assessment & Plan  KRUSE (dyspnea on exertion)  Atrial flutter  COPD (chronic obstructive  pulmonary disease)  History of tobacco abuse  Patient presenting 4 days of KRUSE that was preceded by an URI. Within the past year has restarted smoking and does have a history of COPD. URI w/ productive cough (green sputum). Experiencing palpitations with KRUSE with heart rate on watch being recorded in the 160's in addition to chest tightness. Her past EP providers note reports typical flutter occurring during a bout of COPD exacerbation. Here in the ED with elevated hs troponin 133 and . EKG noted to have T wave inversions in leads II, AVF and V4-6. Current differential diagnosis includes COPD exacerbation and/or reoccurrence of atrial flutter or multifocal atrial tachycardia. Also considering NSTEMI though hs troponin elevation could be due to demand ischemia if atrial flutter w/ high HR occurring. Does not appear volume overloaded on exam though will obtain echo to assess further cardiac function and IVC and look for wall motion abnormalities.     Echo normal      - continuing augmentin to finish outpatient providers course  - continue prednisone 40mg  - continue DuoNebs   - maintain cardiac telemetry   - continue eliquis  - will consider cardiology consult if remains KRUSE tomorrow  HLD (hyperlipidemia)  Continue home statin   Depression with anxiety  Insomnia  - continue home effexor  - continue home ambien and diazepam for insomnia   Essential hypertension  Patient's blood pressure range in the last 24 hours was: BP  Min: 109/71  Max: 138/75.The patient's inpatient anti-hypertensive regimen is listed below:  Current Antihypertensives  metoprolol succinate (TOPROL-XL) 24 hr tablet 50 mg, 2 times daily, Oral  metoprolol (TOPROL-XL) 24 hr tablet, 2 times daily, Oral    Plan  - BP is controlled, no changes needed to their regimen  - titrate as needed   Malignant neoplasm of central portion of left breast in female, estrogen receptor positive  Long-term use of immunosuppressant medication  Continue anastrozole    Hypokalemia  Patient's most recent potassium results are listed below.   Recent Labs     05/06/25  1406 05/07/25  0625 05/08/25  0323   K 3.4* 3.7 4.0     Plan  - Replete potassium per protocol. Replete po  - Monitor potassium Daily  Hypophosphatemia  Patient's most recent phosphorus results are listed below.   Recent Labs     05/06/25  1711 05/07/25  0625 05/08/25  0323   PHOS 1.7* 4.2 3.4     Plan  - Will treat hypophosphatemia with repletion  - Continue to monitor  Elevated troponin level  (Resolved)  Due to demand ischemia in setting of COPD exacerbation and tachycardia   Echo normal    VTE Risk Mitigation (From admission, onward)           Ordered     apixaban tablet 5 mg  2 times daily         05/06/25 1620                    Discharge Planning   LESTER: 5/8/2025     Code Status: Full Code   Medical Readiness for Discharge Date:   Discharge Plan A: Home with family          Héctor Palacios MD  Department of Hospital Medicine   [facility] - Internal Medicine Telemetry

## 2025-05-08 NOTE — ASSESSMENT & PLAN NOTE
(Resolved)  Due to demand ischemia in setting of COPD exacerbation and tachycardia   Echo normal

## 2025-05-08 NOTE — ASSESSMENT & PLAN NOTE
Patient's blood pressure range in the last 24 hours was: BP  Min: 109/71  Max: 138/75.The patient's inpatient anti-hypertensive regimen is listed below:  Current Antihypertensives  metoprolol succinate (TOPROL-XL) 24 hr tablet 50 mg, 2 times daily, Oral  metoprolol (TOPROL-XL) 24 hr tablet, 2 times daily, Oral    Plan  - BP is controlled, no changes needed to their regimen  - titrate as needed

## 2025-05-08 NOTE — SUBJECTIVE & OBJECTIVE
Interval History: NAEO, VSS, improving but still SOB with exertion. Still markedly wheezy on exam. Extra dose prednisone given today and increasing duonebs. Will monitor overnight. Will consider consulting cardiology tomorrow if still with KRUSE tomorrow. Otherwise no new or worsening symptoms      Objective:     Vital Signs (Most Recent):  Temp: 97.9 °F (36.6 °C) (05/08/25 1152)  Pulse: 106 (05/08/25 1525)  Resp: 18 (05/08/25 1230)  BP: 120/63 (05/08/25 1152)  SpO2: (!) 93 % (05/08/25 1230) Vital Signs (24h Range):  Temp:  [97.6 °F (36.4 °C)-98.1 °F (36.7 °C)] 97.9 °F (36.6 °C)  Pulse:  [] 106  Resp:  [18] 18  SpO2:  [91 %-96 %] 93 %  BP: (109-138)/(63-77) 120/63     Weight: 72 kg (158 lb 11.7 oz)  Body mass index is 29.03 kg/m².    Intake/Output Summary (Last 24 hours) at 5/8/2025 1535  Last data filed at 5/8/2025 1200  Gross per 24 hour   Intake 480 ml   Output --   Net 480 ml         Physical Exam  Constitutional:       General: She is not in acute distress.     Appearance: She is normal weight. She is not toxic-appearing.   HENT:      Head: Normocephalic and atraumatic.      Right Ear: External ear normal.      Left Ear: External ear normal.   Eyes:      General:         Right eye: No discharge.         Left eye: No discharge.   Cardiovascular:      Rate and Rhythm: Normal rate and regular rhythm.      Heart sounds:      No gallop.   Pulmonary:      Breath sounds: Wheezing (bilaterally, diffuse) present.   Abdominal:      General: There is no distension.      Tenderness: There is no abdominal tenderness. There is no guarding.   Musculoskeletal:      Right lower leg: No edema.      Left lower leg: No edema.   Skin:     General: Skin is warm and dry.   Neurological:      General: No focal deficit present.      Mental Status: She is oriented to person, place, and time.               Significant Labs: All pertinent labs within the past 24 hours have been reviewed.    Significant Imaging: I have reviewed all  pertinent imaging results/findings within the past 24 hours.

## 2025-05-08 NOTE — PHARMACY MED REC
"    Admission Medication History     The home medication history was taken by Marily Amezquita.    You may go to "Admission" then "Reconcile Home Medications" tabs to review and/or act upon these items.     The home medication list has been updated by the Pharmacy department.   Please read ALL comments highlighted in yellow.   Please address this information as you see fit.    Feel free to contact us if you have any questions or require assistance.      The medications listed below were removed from the home medication list. Please reorder if appropriate:  Patient reports no longer taking the following medication(s):  Viatmin D3 complete oral        Medications listed below were obtained from: Patient/family and Analytic software- [facility]   Medication Sig    acetaminophen (TYLENOL) 500 MG tablet Take 2 tablets by mouth daily as needed for Pain.    amoxicillin-clavulanate 875-125mg (AUGMENTIN) 875-125 mg per tablet Take 1 tablet by mouth every 12 (twelve) hours.    anastrozole (ARIMIDEX) 1 mg Tab Take 1 tablet (1 mg total) by mouth once daily.    apixaban (ELIQUIS) 5 mg Tab Take 1 tablet (5 mg total) by mouth 2 (two) times daily.      atorvastatin (LIPITOR) 20 MG tablet Take 1 tablet (20 mg total) by mouth every evening.    B-complex with vitamin C (VITAMIN B COMPLEX-C ORAL) Take 1 tablet by mouth Daily.    BIOTIN-COLLAGEN-VIT C-E-HERBAL ORAL Take 2 tablets by mouth every evening.    calcium-vitamin D 250-100 mg-unit per tablet Take 1 tablet by mouth every morning.    cyanocobalamin 500 MCG tablet Take 500 mcg by mouth once daily.    diazePAM (VALIUM) 5 MG tablet TAKE 1 TABLET BY MOUTH DAILY AS NEEDED FOR ANXIETY.      diclofenac sodium (VOLTAREN) 1 % Gel Apply 2 g topically 4 (four) times daily.    fluocinonide-emollient (FLUOCINONIDE-E) 0.05 % Crea Apply to affected area of feet daily as needed for psoriasis.    fluticasone-umeclidin-vilanter (TRELEGY ELLIPTA) 100-62.5-25 mcg DsDv Inhale 1 puff into " the lungs once daily.    magnesium 200 mg Tab Take 1 tablet by mouth nightly.      metoprolol succinate (TOPROL-XL) 50 MG 24 hr tablet Take 1 tablet (50 mg total) by mouth nightly.    multivitamin (THERAGRAN) per tablet Take 1 tablet by mouth once daily.    multivitamin with minerals (HAIR,SKIN AND NAILS ORAL) Take 1 tablet by mouth Daily.    omeprazole (PRILOSEC) 40 MG capsule Take 1 capsule (40 mg total) by mouth every morning.      ondansetron (ZOFRAN) 4 MG tablet Take 2 tablets (8 mg total) by mouth every 12 (twelve) hours as needed for Nausea.      TALTZ AUTOINJECTOR 80 mg/mL AtIn Inject 80 mg into the skin every 28 days.    triamcinolone acetonide 0.025% (KENALOG) 0.025 % cream Apply to affected area of skin folds twice a day as needed for psoriasis.    triamcinolone acetonide 0.1% (KENALOG) 0.1 % cream Apply to affected areas of body twice a day as needed for psoriasis. Do not use on face or skin folds.      venlafaxine (EFFEXOR-XR) 150 MG Cp24 Take 150 mg by mouth every morning.    zolpidem (AMBIEN) 10 mg Tab Take 1 tablet (10 mg total) by mouth nightly as needed (insomnia)           Marily Amezquita  [id number]              .

## 2025-05-08 NOTE — ASSESSMENT & PLAN NOTE
Patient's most recent potassium results are listed below.   Recent Labs     05/06/25  1406 05/07/25  0625 05/08/25  0323   K 3.4* 3.7 4.0     Plan  - Replete potassium per protocol. Replete po  - Monitor potassium Daily

## 2025-05-08 NOTE — ASSESSMENT & PLAN NOTE
Patient presenting 4 days of KRUSE that was preceded by an URI. Within the past year has restarted smoking and does have a history of COPD. URI w/ productive cough (green sputum). Experiencing palpitations with KRUSE with heart rate on watch being recorded in the 160's in addition to chest tightness. Her past EP providers note reports typical flutter occurring during a bout of COPD exacerbation. Here in the ED with elevated hs troponin 133 and . EKG noted to have T wave inversions in leads II, AVF and V4-6. Current differential diagnosis includes COPD exacerbation and/or reoccurrence of atrial flutter or multifocal atrial tachycardia. Also considering NSTEMI though hs troponin elevation could be due to demand ischemia if atrial flutter w/ high HR occurring. Does not appear volume overloaded on exam though will obtain echo to assess further cardiac function and IVC and look for wall motion abnormalities.     Echo normal      - continuing augmentin to finish outpatient providers course  - continue prednisone 40mg  - continue DuoNebs   - maintain cardiac telemetry   - continue eliquis  - will consider cardiology consult if remains KRUSE tomorrow

## 2025-05-08 NOTE — ASSESSMENT & PLAN NOTE
Patient's most recent phosphorus results are listed below.   Recent Labs     05/06/25  1711 05/07/25  0625 05/08/25  0323   PHOS 1.7* 4.2 3.4     Plan  - Will treat hypophosphatemia with repletion  - Continue to monitor

## 2025-05-08 NOTE — ASSESSMENT & PLAN NOTE
Patient presenting 4 days of KRSUE that was preceded by an URI. Within the past year has restarted smoking and does have a history of COPD. URI w/ productive cough (green sputum). Experiencing palpitations with KRUSE with heart rate on watch being recorded in the 160's in addition to chest tightness. Her past EP providers note reports typical flutter occurring during a bout of COPD exacerbation. Here in the ED with elevated hs troponin 133 and . EKG noted to have T wave inversions in leads II, AVF and V4-6. Current differential diagnosis includes COPD exacerbation and/or reoccurrence of atrial flutter or multifocal atrial tachycardia. Also considering NSTEMI though hs troponin elevation could be due to demand ischemia if atrial flutter w/ high HR occurring. Does not appear volume overloaded on exam though will obtain echo to assess further cardiac function and IVC and look for wall motion abnormalities.     Echo normal      - continuing augmentin to finish outpatient providers course  - continue prednisone 40mg  - continue DuoNebs   - maintain cardiac telemetry   - continue eliquis  - will consider cardiology consult if remains KRUSE tomorrow

## 2025-05-09 ENCOUNTER — PATIENT MESSAGE (OUTPATIENT)
Dept: SURGERY | Facility: CLINIC | Age: 65
End: 2025-05-09
Payer: COMMERCIAL

## 2025-05-09 PROBLEM — I47.10 SVT (SUPRAVENTRICULAR TACHYCARDIA): Status: ACTIVE | Noted: 2025-05-09

## 2025-05-09 LAB
OHS QRS DURATION: 88 MS
OHS QTC CALCULATION: 376 MS

## 2025-05-09 PROCEDURE — 94640 AIRWAY INHALATION TREATMENT: CPT

## 2025-05-09 PROCEDURE — 94667 MNPJ CHEST WALL 1ST: CPT

## 2025-05-09 PROCEDURE — 94799 UNLISTED PULMONARY SVC/PX: CPT

## 2025-05-09 PROCEDURE — 21400001 HC TELEMETRY ROOM

## 2025-05-09 PROCEDURE — 25000003 PHARM REV CODE 250

## 2025-05-09 PROCEDURE — 63600175 PHARM REV CODE 636 W HCPCS: Performed by: STUDENT IN AN ORGANIZED HEALTH CARE EDUCATION/TRAINING PROGRAM

## 2025-05-09 PROCEDURE — 63600175 PHARM REV CODE 636 W HCPCS

## 2025-05-09 PROCEDURE — 94664 DEMO&/EVAL PT USE INHALER: CPT

## 2025-05-09 PROCEDURE — 94761 N-INVAS EAR/PLS OXIMETRY MLT: CPT

## 2025-05-09 PROCEDURE — 99900035 HC TECH TIME PER 15 MIN (STAT)

## 2025-05-09 PROCEDURE — 25000003 PHARM REV CODE 250: Performed by: STUDENT IN AN ORGANIZED HEALTH CARE EDUCATION/TRAINING PROGRAM

## 2025-05-09 PROCEDURE — 25000242 PHARM REV CODE 250 ALT 637 W/ HCPCS: Performed by: STUDENT IN AN ORGANIZED HEALTH CARE EDUCATION/TRAINING PROGRAM

## 2025-05-09 PROCEDURE — 25000242 PHARM REV CODE 250 ALT 637 W/ HCPCS

## 2025-05-09 RX ORDER — PREDNISONE 20 MG/1
40 TABLET ORAL DAILY
Qty: 20 TABLET | Refills: 0 | Status: SHIPPED | OUTPATIENT
Start: 2025-05-10 | End: 2025-05-20

## 2025-05-09 RX ORDER — LEVALBUTEROL INHALATION SOLUTION 0.63 MG/3ML
0.63 SOLUTION RESPIRATORY (INHALATION) EVERY 4 HOURS
Status: DISCONTINUED | OUTPATIENT
Start: 2025-05-09 | End: 2025-05-10 | Stop reason: HOSPADM

## 2025-05-09 RX ORDER — LEVALBUTEROL INHALATION SOLUTION 0.63 MG/3ML
0.63 SOLUTION RESPIRATORY (INHALATION) EVERY 4 HOURS PRN
Qty: 525 ML | Refills: 3 | Status: SHIPPED | OUTPATIENT
Start: 2025-05-09 | End: 2026-05-09

## 2025-05-09 RX ORDER — IPRATROPIUM BROMIDE 0.5 MG/2.5ML
500 SOLUTION RESPIRATORY (INHALATION) 4 TIMES DAILY PRN
Qty: 125 ML | Refills: 1 | Status: SHIPPED | OUTPATIENT
Start: 2025-05-09 | End: 2026-05-09

## 2025-05-09 RX ORDER — PREDNISONE 20 MG/1
20 TABLET ORAL ONCE
Status: COMPLETED | OUTPATIENT
Start: 2025-05-09 | End: 2025-05-09

## 2025-05-09 RX ADMIN — METOPROLOL SUCCINATE 50 MG: 50 TABLET, EXTENDED RELEASE ORAL at 09:05

## 2025-05-09 RX ADMIN — DIAZEPAM 5 MG: 5 TABLET ORAL at 09:05

## 2025-05-09 RX ADMIN — IPRATROPIUM BROMIDE 0.5 MG: 0.5 SOLUTION RESPIRATORY (INHALATION) at 12:05

## 2025-05-09 RX ADMIN — LEVALBUTEROL HYDROCHLORIDE 0.63 MG: 0.63 SOLUTION RESPIRATORY (INHALATION) at 12:05

## 2025-05-09 RX ADMIN — PREDNISONE 20 MG: 20 TABLET ORAL at 05:05

## 2025-05-09 RX ADMIN — PANTOPRAZOLE SODIUM 40 MG: 40 TABLET, DELAYED RELEASE ORAL at 09:05

## 2025-05-09 RX ADMIN — AMOXICILLIN AND CLAVULANATE POTASSIUM 1 TABLET: 875; 125 TABLET, FILM COATED ORAL at 09:05

## 2025-05-09 RX ADMIN — FLUTICASONE FUROATE AND VILANTEROL TRIFENATATE 1 PUFF: 100; 25 POWDER RESPIRATORY (INHALATION) at 08:05

## 2025-05-09 RX ADMIN — Medication 400 MG: at 09:05

## 2025-05-09 RX ADMIN — LEVALBUTEROL HYDROCHLORIDE 0.63 MG: 0.63 SOLUTION RESPIRATORY (INHALATION) at 08:05

## 2025-05-09 RX ADMIN — PREDNISONE 40 MG: 20 TABLET ORAL at 09:05

## 2025-05-09 RX ADMIN — DIAZEPAM 5 MG: 5 TABLET ORAL at 02:05

## 2025-05-09 RX ADMIN — LEVALBUTEROL HYDROCHLORIDE 0.63 MG: 0.63 SOLUTION RESPIRATORY (INHALATION) at 05:05

## 2025-05-09 RX ADMIN — APIXABAN 5 MG: 5 TABLET, FILM COATED ORAL at 09:05

## 2025-05-09 RX ADMIN — LEVALBUTEROL HYDROCHLORIDE 0.63 MG: 0.63 SOLUTION RESPIRATORY (INHALATION) at 06:05

## 2025-05-09 RX ADMIN — LEVALBUTEROL HYDROCHLORIDE 0.63 MG: 0.63 SOLUTION RESPIRATORY (INHALATION) at 11:05

## 2025-05-09 RX ADMIN — ANASTROZOLE 1 MG: 1 TABLET, COATED ORAL at 09:05

## 2025-05-09 RX ADMIN — IPRATROPIUM BROMIDE 0.5 MG: 0.5 SOLUTION RESPIRATORY (INHALATION) at 08:05

## 2025-05-09 RX ADMIN — LEVALBUTEROL HYDROCHLORIDE 0.63 MG: 0.63 SOLUTION RESPIRATORY (INHALATION) at 01:05

## 2025-05-09 RX ADMIN — VENLAFAXINE HYDROCHLORIDE 150 MG: 150 CAPSULE, EXTENDED RELEASE ORAL at 09:05

## 2025-05-09 RX ADMIN — ATORVASTATIN CALCIUM 20 MG: 20 TABLET, FILM COATED ORAL at 09:05

## 2025-05-09 RX ADMIN — CYANOCOBALAMIN TAB 250 MCG 500 MCG: 250 TAB at 09:05

## 2025-05-09 RX ADMIN — Medication 1 TABLET: at 09:05

## 2025-05-09 RX ADMIN — ZOLPIDEM TARTRATE 10 MG: 5 TABLET, COATED ORAL at 09:05

## 2025-05-09 RX ADMIN — IPRATROPIUM BROMIDE 0.5 MG: 0.5 SOLUTION RESPIRATORY (INHALATION) at 03:05

## 2025-05-09 NOTE — ASSESSMENT & PLAN NOTE
Patient presenting 4 days of KRUSE that was preceded by an URI. Within the past year has restarted smoking and does have a history of COPD. URI w/ productive cough (green sputum). Experiencing palpitations with KRUSE with heart rate on watch being recorded in the 160's in addition to chest tightness. Her past EP providers note reports typical flutter occurring during a bout of COPD exacerbation. Here in the ED with elevated hs troponin 133 and . EKG noted to have T wave inversions in leads II, AVF and V4-6. Current differential diagnosis includes COPD exacerbation and/or reoccurrence of atrial flutter or multifocal atrial tachycardia. Also considering NSTEMI though hs troponin elevation could be due to demand ischemia if atrial flutter w/ high HR occurring. Does not appear volume overloaded on exam though will obtain echo to assess further cardiac function and IVC and look for wall motion abnormalities. Cardiology consulted and suggested outpatient follow up.     Echo normal      - continuing augmentin to finish outpatient providers course  - continue prednisone 40mg  - continue DuoNebs   - maintain cardiac telemetry   - continue eliquis

## 2025-05-09 NOTE — CARE UPDATE
Exertional dypsnea, actively wheezing this AM bilaterally that explains her symptoms, consider systemic steroids, do not think she is optimized yet from pulmonary standpoint    For paroxysms of arhythmia, continue to treat underling airway disease, please call us if her arhtymia becomes sustained, continue metoprolol as tolerated    Refer to today's consult note for full details

## 2025-05-09 NOTE — PLAN OF CARE
Problem: Adult Inpatient Plan of Care  Goal: Plan of Care Review  Outcome: Progressing  Goal: Patient-Specific Goal (Individualized)  Outcome: Progressing  Goal: Optimal Comfort and Wellbeing  Outcome: Progressing  Goal: Readiness for Transition of Care  Outcome: Progressing     Problem: Wound  Goal: Absence of Infection Signs and Symptoms  Outcome: Progressing  Goal: Optimal Pain Control and Function  Outcome: Progressing  Goal: Skin Health and Integrity  Outcome: Progressing     Problem: Fall Injury Risk  Goal: Absence of Fall and Fall-Related Injury  Outcome: Progressing     Problem: Pain Acute  Goal: Optimal Pain Control and Function  Outcome: Progressing     Pt AAOX4 in no apparent distress. No c/o pain or discomfort. Anxiety managed with PRN meds. No other concerns noted at this time. Care ongoing.  Bed locked, in low position, call light in reach.

## 2025-05-09 NOTE — DISCHARGE SUMMARY
[facility] - Internal Medicine [facility]  Discharge Summary      Patient Name: Lucia Matias  MRN: [id number]  UMBERTO: [id number]  Patient Class: IP- Inpatient  Admission Date: 5/6/2025  Hospital Length of Stay: 3 days  Discharge Date and Time: 05/09/2025 1:51 PM  Attending Physician: Mercy Freeman MD   Discharging Provider: David Maher DO  Primary Care Provider: Hetal Banks MD  Hospital Medicine Team: [facility] MED 1 David Maher DO  Primary Care Team: [facility] 1    HPI:   61 y/o F with HTN, COPD, A flutter s/p RFA (on eliquis), breast cancer stage IA HR+ s/p bilateral mastectomy w/ reconstruction (on anastrozole) presented to the ED for evaluation of x4 days of shortness of breath. Within the past week patient began experiencing an URI (productive cough with green sputum, runny nose, headache) for which she had an e-consult with an outpatient provider and was prescribed a 10 day course of Augmentin. About 4 days ago she [place] which [place] she became dyspneic on exertion and experienced palpitations (fast heart rate). During this bout she also noted to experiencing chest tightness. Her KRUSE, palpitations, and chest tightness have all worsened since the onset, are worsened with exertion, and are relieved by rest. During this time she noted her heart rate to be in the 160's on her watch. Patient denies any leg swelling, nausea, vomiting, abdominal pain, dysuria, or any other sxs at this time. Note: A past electrophysiology note reports that the patient experienced typical atrial flutter during a COPD exacerbation.     Vitally in the ED: afebrile, -147/60-77 w/ HR 76-91, 94%O2 RA. Labs in the ED: WBC wnl, H/H 16.6/50.8, CMP relatively unremarkable apart from K 3.4, , hs troponin 133. The ED administered duonebs, lasix 20mg IV, and lorazepam 1mg, and prednisone 60mg.    * No surgery found *      Hospital Course:   Patient was  "admitted to the hospital due to shortness of breath that was accompanied by chest tightness and palpitations (rapid heart rate). On admission her physical exam and history were consistent with COPD exacerbation accompanied by a supraventricular tachycardia. She has been under a lot of stress lately which prompted her to begin smoking cigarettes again. Her CBC was relatively unremarkable apart from a slightly elevated hemoglobin (16.6) and hypokalemia on CMP. Her BNP was 246 with a hs troponin of 94 which downtrended to 133. This troponin leak was attributed to demand ischemia in the setting of her tachyarrhythmias 2/2 COPD exacerbation. She was started on prednisone, levalbuterol, and the course of antibiotics she began prior to arrival to the hospital. A 6 minute walk test proved negative. On telemetry she was noted to be experiencing intermittently elevated heart rates to the 150'-160's which the team attributed to multifocal atrial tachycardia in the setting of her COPD exacerbation. This prompted us to increase her metoprolol dose. The echo obtained was unrevealing: IVC 3mHg, EF 60-65%, PASP 26mmHg with normal sized atria and no noted valve abnormalities. Cardiology was consulted and recommended continuing treatment for her COPD with outpatient general cardiology and electrophysiology follow ups. At this time they do not think her SOB is from a cardiac etiology. Prior to discharge she was deemed medically stable and ready.      Goals of Care Treatment Preferences:  Code Status: Full Code    /78   Pulse 79   Temp 97.7 °F (36.5 °C) (Oral)   Resp 16   Ht 5' 2" (1.575 m)   Wt 72 kg (158 lb 11.7 oz)   SpO2 95%   BMI 29.03 kg/m²   Physical Exam  Constitutional:       General: She is not in acute distress.     Appearance: She is not toxic-appearing or diaphoretic.   HENT:      Head: Normocephalic and atraumatic.   Eyes:      General:         Right eye: No discharge.         Left eye: No discharge. "   Cardiovascular:      Rate and Rhythm: Normal rate and regular rhythm.   Pulmonary:      Effort: No respiratory distress.      Breath sounds: Wheezing (mild) present.   Abdominal:      General: There is no distension.      Tenderness: There is no abdominal tenderness.   Musculoskeletal:      Right lower leg: No edema.      Left lower leg: No edema.   Skin:     General: Skin is warm and dry.   Neurological:      General: No focal deficit present.      Mental Status: She is alert and oriented to person, place, and time.           SDOH Screening:  The patient was screened for utility difficulties, food insecurity, transport difficulties, housing insecurity, and interpersonal safety and there were no concerns identified this admission.     Consults:   Consults (From admission, onward)          Status Ordering Provider     Inpatient consult to Cardiology  Once        Provider:  (Not yet assigned)    Completed LSIETH FULTON            Assessment & Plan  KRUSE (dyspnea on exertion)  Atrial flutter  COPD (chronic obstructive pulmonary disease)  History of tobacco abuse  Patient presenting 4 days of KRUSE that was preceded by an URI. Within the past year has restarted smoking and does have a history of COPD. URI w/ productive cough (green sputum). Experiencing palpitations with KRUSE with heart rate on watch being recorded in the 160's in addition to chest tightness. Her past EP providers note reports typical flutter occurring during a bout of COPD exacerbation. Here in the ED with elevated hs troponin 133 and . EKG noted to have T wave inversions in leads II, AVF and V4-6. Current differential diagnosis includes COPD exacerbation and/or reoccurrence of atrial flutter or multifocal atrial tachycardia. Also considering NSTEMI though hs troponin elevation could be due to demand ischemia if atrial flutter w/ high HR occurring. Does not appear volume overloaded on exam though will obtain echo to assess further cardiac function  and IVC and look for wall motion abnormalities.     Echo normal      - continuing augmentin to finish outpatient providers course  - continue prednisone 40mg  - continue DuoNebs   - maintain cardiac telemetry   - continue eliquis  - will consider cardiology consult if remains KRUSE tomorrow  HLD (hyperlipidemia)  Continue home statin   Depression with anxiety  Insomnia  - continue home effexor  - continue home ambien and diazepam for insomnia   Essential hypertension  Patient's blood pressure range in the last 24 hours was: BP  Min: 125/74  Max: 141/82.The patient's inpatient anti-hypertensive regimen is listed below:  Current Antihypertensives  metoprolol succinate (TOPROL-XL) 24 hr tablet 50 mg, 2 times daily, Oral  metoprolol succinate (TOPROL-XL) 50 MG 24 hr tablet, 2 times daily, Oral    Plan  - BP is controlled, no changes needed to their regimen  - titrate as needed   Malignant neoplasm of central portion of left breast in female, estrogen receptor positive  Long-term use of immunosuppressant medication  Continue anastrozole   Hypokalemia  Patient's most recent potassium results are listed below.   Recent Labs     05/06/25  1406 05/07/25  0625 05/08/25  0323   K 3.4* 3.7 4.0     Plan  - Replete potassium per protocol. Replete po  - Monitor potassium Daily  Hypophosphatemia  Patient's most recent phosphorus results are listed below.   Recent Labs     05/06/25  1711 05/07/25  0625 05/08/25  0323   PHOS 1.7* 4.2 3.4     Plan  - Will treat hypophosphatemia with repletion  - Continue to monitor  Elevated troponin level  (Resolved)  Due to demand ischemia in setting of COPD exacerbation and tachycardia   Echo normal    SVT (supraventricular tachycardia)      Final Active Diagnoses:    Diagnosis Date Noted POA    PRINCIPAL PROBLEM:  KRUSE (dyspnea on exertion) [R06.09] 05/06/2025 Yes    SVT (supraventricular tachycardia) [I47.10] 05/09/2025 Unknown    Hypokalemia [E87.6] 05/06/2025 Yes    Hypophosphatemia [E83.39]  "05/06/2025 Yes    Elevated troponin level [R79.89] 05/06/2025 Yes    Atrial flutter [I48.92] 01/03/2024 Yes    Malignant neoplasm of central portion of left breast in female, estrogen receptor positive [C50.112, Z17.0] 12/29/2022 Not Applicable    Essential hypertension [I10] 12/20/2019 Yes     Chronic    Long-term use of immunosuppressant medication [Z79.60] 10/31/2019 Not Applicable    History of tobacco abuse [Z87.891] 10/31/2019 Not Applicable    Depression with anxiety [F41.8] 08/06/2019 Yes     Chronic    Insomnia [G47.00] 08/06/2019 Yes    COPD (chronic obstructive pulmonary disease) [J44.9]  Yes     Chronic    HLD (hyperlipidemia) [E78.5]  Yes     Chronic      Problems Resolved During this Admission:       Discharged Condition: good    Disposition: Home or Self Care    Follow Up:    Patient Instructions:      NEBULIZER FOR HOME USE     Order Specific Question Answer Comments   Height: 5' 2" (1.575 m)    Weight: 72 kg (158 lb 11.7 oz)    Does patient have medical equipment at home? none    Length of need (1-99 months): 99      Ambulatory referral/consult to Cardiology   Standing Status: Future   Referral Priority: Routine Referral Type: Consultation   Referral Reason: Specialty Services Required   Requested Specialty: Cardiology   Number of Visits Requested: 1     Ambulatory referral/consult to Cardiac Electrophysiology   Standing Status: Future   Referral Priority: Routine Referral Type: Consultation   Referral Reason: Specialty Services Required   Requested Specialty: Cardiology   Number of Visits Requested: 1     Ambulatory referral/consult to Pulmonology   Standing Status: Future   Referral Priority: Routine Referral Type: Consultation   Referral Reason: Specialty Services Required   Requested Specialty: Pulmonary Disease   Number of Visits Requested: 1       Significant Diagnostic Studies: Labs: CMP   Recent Labs   Lab 05/08/25  0323      K 4.0      CO2 24   GLU 86   BUN 16   CREATININE 0.7 " "  CALCIUM 8.8   ANIONGAP 9   , CBC   Recent Labs   Lab 05/08/25  0323   WBC 9.26   HGB 14.3   HCT 43.6      , Troponin No results for input(s): "TROPONINI" in the last 168 hours., and All labs within the past 24 hours have been reviewed  Cardiac Graphics: Echocardiogram: Transthoracic echo (TTE) complete (Cupid Only):   Results for orders placed or performed during the hospital encounter of 05/06/25   Echo   Result Value Ref Range    LV Diastolic Volume 57 mL    Echo EF Estimated 60 %    LV Systolic Volume 23 mL    IVS 0.8 0.6 - 1.1 cm    LVIDd 3.7 3.5 - 6.0 cm    LVIDs 2.5 2.1 - 4.0 cm    LVOT diameter 1.9 cm    PW 0.8 0.6 - 1.1 cm    AV LVOT peak gradient 3 mmHg    LVOT mn grad 1 mmHg    LVOT peak titi 0.8 m/s    LVOT peak VTI 15.9 cm    RV- harvey basal diam 3.4 cm    LA size 3.2 cm    Left Atrium Major Axis 4.5 cm    Left Atrium Minor Axis 5.1 cm    LA Vol (MOD) 56 mL    RA Major Axis 4.11 cm    AV valve area 2.1 cm2    AV area by cont VTI 2.2 cm2    AV peak gradient 5 mmHg    AV mean gradient 3 mmHg    Ao peak titi 1.1 m/s    Ao VTI 21.6 cm    MV Peak A Titi 0.74 m/s    E wave deceleration time 157 ms    MV Peak E Titi 0.62 m/s    E/A ratio 0.84     LV LATERAL E/E' RATIO 10.3     LV SEPTAL E/E' RATIO 10.3     TDI LATERAL 0.06 m/s    TDI SEPTAL 0.06 m/s    TV peak gradient 23 mmHg    TR Max Titi 2.4 m/s    Ascending aorta 2.7 cm    STJ 2.4 cm    Sinus 2.86 cm    LA WIDTH 3.8 cm    RA Width 4.05 cm    TAPSE 1.6 cm    BSA 1.77 m2    LVOT stroke volume 45.1 cm3    LV Systolic Volume Index 13.3 mL/m2    LV Diastolic Volume Index 32.95 mL/m2    LVOT area 2.8 cm2    FS 32.4 28 - 44 %    Left Ventricle Relative Wall Thickness 0.43 cm    LV mass 82.3 g    LV Mass Index 47.6 g/m2    E/E' ratio 10 m/s    MARY 29 mL/m2    LA Vol 49 cm3    RV/LV Ratio 0.92 cm    MARY (MOD) 32 mL/m2    AV Velocity Ratio 0.73     AV index (prosthetic) 0.74     DAVID by Velocity Ratio 2.1 cm²    Mean e' 0.06 m/s    ZLVIDS -1.35     ZLVIDD -2.59  "    TV resting pulmonary artery pressure 26 mmHg    RV TB RVSP 5 mmHg    Est. RA pres 3 mmHg    Narrative      Left Ventricle: The left ventricle is normal in size. Normal wall   thickness. Normal wall motion. There is normal systolic function with a   visually estimated ejection fraction of 60 - 65%. There is normal   diastolic function. Normal left ventricular filling pressure.    Right Ventricle: The right ventricle is normal in size. Wall thickness   is normal. Systolic function is normal.    Left Atrium: Normal left atrial size.    Right Atrium: Normal right atrial size.    Aorta: The aortic root is normal in size measuring 2.86 cm. The   ascending aorta is normal in size measuring 2.7 cm.    Pulmonary Artery: The estimated pulmonary artery systolic pressure is   26 mmHg.    IVC/SVC: Normal venous pressure at 3 mmHg.    Pericardium: There is no pericardial effusion.         Pending Diagnostic Studies:       Procedure Component Value Units Date/Time    HCV Virus Hold Specimen [[id number]] Collected: 05/06/25 1251    Order Status: Sent Lab Status: In process Updated: 05/06/25 1301    Specimen: Blood            Medications:  Reconciled Home Medications:      Medication List        START taking these medications      ipratropium 0.02 % nebulizer solution  Commonly known as: ATROVENT  Take 2.5 mLs (500 mcg total) by nebulization 4 (four) times daily as needed for Wheezing. Rescue     levalbuterol 0.63 mg/3 mL nebulizer solution  Commonly known as: XOPENEX  Take 3 mLs (0.63 mg total) by nebulization every 4 (four) hours as needed for Wheezing. Rescue     predniSONE 20 MG tablet  Commonly known as: DELTASONE  Take 2 tablets (40 mg total) by mouth once daily. for 10 days  Start taking on: May 10, 2025            CHANGE how you take these medications      metoprolol succinate 50 MG 24 hr tablet  Commonly known as: TOPROL-XL  Take 1 tablet (50 mg total) by mouth 2 (two) times daily.  What changed: when to take this      venlafaxine 150 MG Cp24  Commonly known as: EFFEXOR-XR  Take 1 capsule (150 mg total) by mouth once daily.  What changed: when to take this            CONTINUE taking these medications      acetaminophen 500 MG tablet  Commonly known as: TYLENOL  Take 2 tablets by mouth daily as needed for Pain.     anastrozole 1 mg Tab  Commonly known as: ARIMIDEX  Take 1 tablet (1 mg total) by mouth once daily.     atorvastatin 20 MG tablet  Commonly known as: LIPITOR  Take 1 tablet (20 mg total) by mouth every evening.     BIOTIN-COLLAGEN-VIT C-E-HERBAL ORAL  Take 2 tablets by mouth every evening.     calcium-vitamin D 250 mg-2.5 mcg (100 unit) per tablet  Take 1 tablet by mouth every morning.     cyanocobalamin 500 MCG tablet  Take 500 mcg by mouth once daily.     diazePAM 5 MG tablet  Commonly known as: VALIUM  TAKE 1 TABLET BY MOUTH DAILY AS NEEDED FOR ANXIETY.     diclofenac sodium 1 % Gel  Commonly known as: VOLTAREN  Apply 2 g topically 4 (four) times daily.     ELIQUIS 5 mg Tab  Generic drug: apixaban  Take 1 tablet (5 mg total) by mouth 2 (two) times daily.     fluocinonide-emollient 0.05 % Crea  Commonly known as: FLUOCINONIDE-E  Apply to affected area of feet daily as needed for psoriasis.     HAIR,SKIN AND NAILS ORAL  Take 1 tablet by mouth Daily.     magnesium 200 mg Tab  Take 1 tablet by mouth nightly.     multivitamin per tablet  Commonly known as: THERAGRAN  Take 1 tablet by mouth once daily.     omeprazole 40 MG capsule  Commonly known as: PRILOSEC  Take 1 capsule (40 mg total) by mouth every morning.     ondansetron 4 MG tablet  Commonly known as: ZOFRAN  Take 2 tablets (8 mg total) by mouth every 12 (twelve) hours as needed for Nausea.     TALTZ AUTOINJECTOR 80 mg/mL Atin  Generic drug: ixekizumab  Inject 80 mg into the skin every 28 days.     TRELEGY ELLIPTA 100-62.5-25 mcg Dsdv  Generic drug: fluticasone-umeclidin-vilanter  Inhale 1 puff into the lungs once daily.     * triamcinolone acetonide 0.025% 0.025 %  cream  Commonly known as: KENALOG  Apply to affected area of skin folds twice a day as needed for psoriasis.     * triamcinolone acetonide 0.1% 0.1 % cream  Commonly known as: KENALOG  Apply to affected areas of body twice a day as needed for psoriasis. Do not use on face or skin folds.     VITAMIN B COMPLEX-C ORAL  Take 1 tablet by mouth Daily.     zolpidem 10 mg Tab  Commonly known as: AMBIEN  Take 1 tablet (10 mg total) by mouth nightly as needed (insomnia).           * This list has 2 medication(s) that are the same as other medications prescribed for you. Read the directions carefully, and ask your doctor or other care provider to review them with you.                STOP taking these medications      amoxicillin-clavulanate 875-125mg 875-125 mg per tablet  Commonly known as: AUGMENTIN              Indwelling Lines/Drains at time of discharge:   Lines/Drains/Airways       Drain  Duration                  Closed/Suction Drain 11/13/24 1025 Right Thigh Bulb 15 Fr. 177 days         Closed/Suction Drain 11/13/24 1026 Left Breast Bulb 15 Fr. 177 days                    Time spent on the discharge of patient: 40 minutes         David Maher DO  Department of Hospital Medicine  [facility] - Internal Medicine Telemetry

## 2025-05-09 NOTE — CONSULTS
[facility] Telemetry  Cardiology  Consult Note    Patient Name: Lucia Matias  MRN: [id number]  Admission Date: 5/6/2025  Hospital Length of Stay: 3 days  Code Status: Full Code   Attending Provider: Mercy Freeman MD   Consulting Provider: Tristan Gregory DO  Primary Care Physician: Hetal Banks MD  Principal Problem:KRUSE (dyspnea on exertion)    Patient information was obtained from patient and ER records.     Inpatient consult to Cardiology  Consult performed by: Tristan Gregory DO  Consult ordered by: Héctor Palacios MD  Reason for consult: SVT?        Subjective:     Chief Complaint:  SOB     HPI:   61 y/o F with PMHx of HTN, COPD, A flutter s/p RFA (on eliquis) 2022 by Dr Duke, breast cancer stage IA HR+ s/p bilateral mastectomy w/ reconstruction (on anastrozole) who presented to the ED with chief complaint of dyspnea on exertion.     Within the past week patient began experiencing productive cough with green sputum, runny nose, for which she had an e-consult and was prescribed a 10 day course of Augmentin. About 4 days ago she was [place] during which time she became dyspneic on exertion and experienced palpitations, her watch said her HR was 160s. Patient states that when she moves, she feels short of breath. Denies resting SOB, currently on RA.     Cardiology being consulted for ?SVT    On physical exam, patient has expiratory wheezing, other appears euvolemic, anxious but overall in no acute distress. NSR on ECG.  Telemetry revealing infrequent short episodes of SVT, currently in NSR at time of exam.     Hx of Flutter abaltion with Dr Duke in 2022, on Toprol and Eliquis      Past Medical History:   Diagnosis Date    Adverse reaction to succinimides     son has malignant hyperthermia    Allergy     Anticoagulant long-term use     Anxiety     Arthritis     Atrial flutter     Colon polyps 2016    COPD (chronic obstructive pulmonary disease)     COPD  exacerbation 10/31/2022    Depression     Diverticular disease of colon 2017    Diverticulitis     H/O gastric sleeve     HLD (hyperlipidemia)     Hypertension     resolved with gastric slleve    Malignant neoplasm of central portion of left breast in female, estrogen receptor positive 2022    Monoallelic mutation of MUTYH gene 2022    Osteopenia     Pancreatitis     Paroxysmal A-fib 2024    Pneumothorax, unspecified     following mastectomy sx     Psoriasis     Rheumatoid arthritis     Severe obesity (BMI 35.0-39.9) with comorbidity     Wears contact lenses     not often  wears glasses usually    Wears prescription eyeglasses        Past Surgical History:   Procedure Laterality Date    ABLATION  2022    Cardiac Ablation for A Flutter, Successful    ADENOIDECTOMY  1966    Tonsillitis and adnoids    BILATERAL MASTECTOMY Bilateral 02/15/2023    Procedure: MASTECTOMY, BILATERAL;  Surgeon: Marcela Meehan MD;  Location: [facility];  Service: General;  Laterality: Bilateral;  EMAIL SENT  @ 11:44 LK / 2.5 HOURS    BLADDER SUSPENSION      (x2)    BREAST REVISION SURGERY Bilateral 2023    Procedure: BREAST REVISION SURGERY / BILATERAL BREAST FLAP REVISION;  Surgeon: Ming Milian MD;  Location: [facility];  Service: Plastics;  Laterality: Bilateral;  90 MINUTES     SECTION      (x2)    COLONOSCOPY      DEBRIDEMENT, BREAST Bilateral 2023    Procedure: DEBRIDEMENT, BREAST;  Surgeon: Ming Milian MD;  Location: [facility];  Service: Plastics;  Laterality: Bilateral;    ESOPHAGOGASTRODUODENOSCOPY N/A 2021    Procedure: EGD (ESOPHAGOGASTRODUODENOSCOPY);  Surgeon: Vince Rodriguez MD;  Location: [address];  Service: General;  Laterality: N/A;    INCISION AND DRAINAGE OF BREAST Left 10/26/2023    Procedure: INCISION AND DRAINAGE, BREAST;  Surgeon: Ming Milian MD;  Location: [facility];  Service: Plastics;  Laterality: Left;    INCISION AND DRAINAGE OF  BREAST Left 04/04/2024    Procedure: INCISION AND DRAINAGE, BREAST;  Surgeon: Ming Milian MD;  Location: [facility];  Service: Plastics;  Laterality: Left;    INJECTION FOR SENTINEL NODE IDENTIFICATION Left 02/15/2023    Procedure: INJECTION, FOR SENTINEL NODE IDENTIFICATION;  Surgeon: Marcela Meehan MD;  Location: [facility];  Service: General;  Laterality: Left;    LAPAROSCOPIC SLEEVE GASTRECTOMY N/A 03/31/2021    Procedure: GASTRECTOMY, SLEEVE, LAPAROSCOPIC, with intraop EGD;  Surgeon: Vince Rodriguez MD;  Location: [address];  Service: General;  Laterality: N/A;    LIPOSUCTION W/ FAT INJECTION Bilateral 08/29/2023    Procedure: LIPOSUCTION, WITH FAT TRANSFER;  Surgeon: Ming Milian MD;  Location: [facility];  Service: Plastics;  Laterality: Bilateral;  / FAT GRAFTING TO BOTH BREAST    LUNG BIOPSY  09/2020    RECONSTRUCTION OF BREAST WITH DEEP INFERIOR EPIGASTRIC ARTERY  (NEHA) FREE FLAP Bilateral 02/15/2023    Procedure: RECONSTRUCTION, BREAST, USING NEHA FREE FLAP;  Surgeon: Ming Milian MD;  Location: [facility];  Service: Plastics;  Laterality: Bilateral;    RECONSTRUCTION OF BREAST WITH DEEP INFERIOR EPIGASTRIC ARTERY  (NEHA) FREE FLAP Left 11/13/2024    Procedure: RECONSTRUCTION, BREAST, USING FREE FLAP;  Surgeon: Ming Milian MD;  Location: [facility];  Service: Plastics;  Laterality: Left;  / LEFT PAP FLAP  5 HOURS / HX  malignant hyperthermia  used PAP flap from Right thigh    REVISION, FLAP, PREVIOUSLY CREATED FOR BREAST RECONSTRUCTION Bilateral 08/29/2023    Procedure: REVISION, FLAP, PREVIOUSLY CREATED FOR BREAST RECONSTRUCTION;  Surgeon: Ming Milian MD;  Location: [facility];  Service: Plastics;  Laterality: Bilateral;  4 HOURS    REVISION, SCAR, ABDOMINAL DONOR SITE N/A 08/29/2023    Procedure: REVISION, SCAR, ABDOMINAL DONOR SITE;  Surgeon: Ming Milian MD;  Location: [facility];  Service: Plastics;  Laterality: N/A;    RHINOPLASTY      SENTINEL  LYMPH NODE BIOPSY Left 02/15/2023    Procedure: BIOPSY, LYMPH NODE, SENTINEL;  Surgeon: Marcela Meehan MD;  Location: [facility];  Service: General;  Laterality: Left;    TONSILLECTOMY      TRANSESOPHAGEAL ECHOCARDIOGRAPHY N/A 11/02/2022    Procedure: ECHOCARDIOGRAM, TRANSESOPHAGEAL;  Surgeon: Kellie Grover MD;  Location: [facility] EP [facility];  Service: Cardiology;  Laterality: N/A;       Review of patient's allergies indicates:   Allergen Reactions    Succinimides Anaphylaxis     Son has MH    Vancomycin analogues Hives, Itching and Rash       No current facility-administered medications on file prior to encounter.     Current Outpatient Medications on File Prior to Encounter   Medication Sig    acetaminophen (TYLENOL) 500 MG tablet Take 2 tablets by mouth daily as needed for Pain.    anastrozole (ARIMIDEX) 1 mg Tab Take 1 tablet (1 mg total) by mouth once daily.    apixaban (ELIQUIS) 5 mg Tab Take 1 tablet (5 mg total) by mouth 2 (two) times daily.    atorvastatin (LIPITOR) 20 MG tablet Take 1 tablet (20 mg total) by mouth every evening.    B-complex with vitamin C (VITAMIN B COMPLEX-C ORAL) Take 1 tablet by mouth Daily.    BIOTIN-COLLAGEN-VIT C-E-HERBAL ORAL Take 2 tablets by mouth every evening.    calcium-vitamin D 250-100 mg-unit per tablet Take 1 tablet by mouth every morning.    cyanocobalamin 500 MCG tablet Take 500 mcg by mouth once daily.    diazePAM (VALIUM) 5 MG tablet TAKE 1 TABLET BY MOUTH DAILY AS NEEDED FOR ANXIETY.    diclofenac sodium (VOLTAREN) 1 % Gel Apply 2 g topically 4 (four) times daily.    fluocinonide-emollient (FLUOCINONIDE-E) 0.05 % Crea Apply to affected area of feet daily as needed for psoriasis.    fluticasone-umeclidin-vilanter (TRELEGY ELLIPTA) 100-62.5-25 mcg DsDv Inhale 1 puff into the lungs once daily.    magnesium 200 mg Tab Take 1 tablet by mouth nightly.    multivitamin (THERAGRAN) per tablet Take 1 tablet by mouth once daily.    multivitamin with minerals (HAIR,SKIN AND NAILS  ORAL) Take 1 tablet by mouth Daily.    omeprazole (PRILOSEC) 40 MG capsule Take 1 capsule (40 mg total) by mouth every morning.    ondansetron (ZOFRAN) 4 MG tablet Take 2 tablets (8 mg total) by mouth every 12 (twelve) hours as needed for Nausea.    TALTZ AUTOINJECTOR 80 mg/mL AtIn Inject 80 mg into the skin every 28 days.    triamcinolone acetonide 0.025% (KENALOG) 0.025 % cream Apply to affected area of skin folds twice a day as needed for psoriasis.    triamcinolone acetonide 0.1% (KENALOG) 0.1 % cream Apply to affected areas of body twice a day as needed for psoriasis. Do not use on face or skin folds.    venlafaxine (EFFEXOR-XR) 150 MG Cp24 Take 1 capsule (150 mg total) by mouth once daily. (Patient taking differently: Take 150 mg by mouth every morning.)    zolpidem (AMBIEN) 10 mg Tab Take 1 tablet (10 mg total) by mouth nightly as needed (insomnia).    [DISCONTINUED] budesonide-formoterol 160-4.5 mcg (SYMBICORT) 160-4.5 mcg/actuation HFAA Inhale 2 puffs into the lungs every 12 (twelve) hours. Controller     Family History       Problem Relation (Age of Onset)    Malignant hyperthermia Son    No Known Problems Daughter, Daughter          Tobacco Use    Smoking status: Former     Current packs/day: 0.00     Average packs/day: 0.5 packs/day for 41.9 years (21.0 ttl pk-yrs)     Types: Cigarettes     Start date: 1979     Quit date: 2020     Years since quittin.4     Passive exposure: Current    Smokeless tobacco: Never   Substance and Sexual Activity    Alcohol use: Yes     Alcohol/week: 1.0 standard drink of alcohol     Types: 1 Glasses of wine per week     Comment: social-rarely    Drug use: No    Sexual activity: Yes     Partners: Male     Birth control/protection: Post-menopausal       Objective:     Vital Signs (Most Recent):  Temp: 97.7 °F (36.5 °C) (25)  Pulse: 82 (25)  Resp: 20 (25)  BP: 130/76 (25)  SpO2: 95 % (25 0803) Vital Signs (24h  Range):  Temp:  [97.4 °F (36.3 °C)-98.4 °F (36.9 °C)] 97.7 °F (36.5 °C)  Pulse:  [] 82  Resp:  [16-20] 20  SpO2:  [92 %-96 %] 95 %  BP: (120-141)/(63-82) 130/76     Weight: 72 kg (158 lb 11.7 oz)  Body mass index is 29.03 kg/m².    SpO2: 95 %         Intake/Output Summary (Last 24 hours) at 5/9/2025 1031  Last data filed at 5/8/2025 1726  Gross per 24 hour   Intake 480 ml   Output --   Net 480 ml       Lines/Drains/Airways       Drain  Duration                  Closed/Suction Drain 11/13/24 1025 Right Thigh Bulb 15 Fr. 176 days         Closed/Suction Drain 11/13/24 1026 Left Breast Bulb 15 Fr. 176 days              Peripheral Intravenous Line  Duration                  Peripheral IV - Single Lumen 05/06/25 1215 20 G Right Antecubital 2 days                     Physical Exam  Vitals and nursing note reviewed.   Constitutional:       General: She is not in acute distress.     Appearance: Normal appearance. She is not ill-appearing, toxic-appearing or diaphoretic.   HENT:      Head: Normocephalic and atraumatic.      Right Ear: External ear normal.      Left Ear: External ear normal.   Eyes:      Extraocular Movements: Extraocular movements intact.   Cardiovascular:      Rate and Rhythm: Normal rate and regular rhythm.      Pulses: Normal pulses.      Heart sounds: Normal heart sounds.   Pulmonary:      Effort: Pulmonary effort is normal.      Breath sounds: Wheezing present.   Abdominal:      General: Abdomen is flat.      Palpations: Abdomen is soft.   Musculoskeletal:         General: No signs of injury. Normal range of motion.      Cervical back: Normal range of motion.      Right lower leg: No edema.      Left lower leg: No edema.   Skin:     General: Skin is warm and dry.   Neurological:      General: No focal deficit present.      Mental Status: She is alert and oriented to person, place, and time.      Sensory: No sensory deficit.      Motor: No weakness.   Psychiatric:         Mood and Affect: Mood  normal.         Behavior: Behavior normal.         Thought Content: Thought content normal.          Significant Labs: All pertinent lab results from the last 24 hours have been reviewed.    Significant Imaging: All pertinent images and reports from the last 24 hours have been reviewed.    Assessment and Plan:     SVT (supraventricular tachycardia)  61 y/o F with PMHx of HTN, COPD, A flutter s/p RFA (on eliquis) 2022 by Dr Duke, breast cancer stage IA HR+ s/p bilateral mastectomy w/ reconstruction (on anastrozole) who presented to the ED with chief complaint of dyspnea on exertion. Admitted to medicine with COPD exacerbation.    About 4 days ago she was [place] during which time she became dyspneic on exertion and experienced palpitations, her watch said her HR was 160s. Patient states that when she moves, she feels short of breath. Denies resting SOB, currently on RA.     On physical exam, patient has expiratory wheezing, other appears euvolemic, anxious but overall in no acute distress. NSR on ECG.  Telemetry revealing short episodes of SVT, currently in NSR at time of exam.     Hx of Flutter abaltion with Dr Duke in 2022, on Toprol and Eliquis. Echo while IP unremarkable    Rec  Concerns of SVT, possible recurrence of flutter?  Continue home Eliquis and Toprol-XL at current home dose   OP EP and Gen cards follow up  Ok to d/c from a cardiology perspective, unlikely cardiac cause of her dyspnea        VTE Risk Mitigation (From admission, onward)           Ordered     apixaban tablet 5 mg  2 times daily         05/06/25 5713                    Thank you for your consult. I will sign off. Please contact us if you have any additional questions.    Tristan Gregory, DO  Cardiology   [facility] Telemetry

## 2025-05-09 NOTE — PLAN OF CARE
05/09/25 1632   Post-Acute Status   Post-Acute Authorization HME   HME Status Set-up Complete/Auth obtained   Coverage [employer]   Discharge Delays None known at this time   Discharge Plan   Discharge Plan A Home with family   Discharge Plan B Home with family     Referral sent [facility] for patient's nebulizer. [facility] with HME notified CM that they will deliver patient's nebulizer.    Discharge Plan A and Plan B have been determined by review of patient's clinical status, future medical and therapeutic needs, and coverage/benefits for post-acute care in coordination with multidisciplinary team members.    Tre Shaw RN-CM  Case Management  [facility]  [phone]

## 2025-05-09 NOTE — ASSESSMENT & PLAN NOTE
Patient presenting 4 days of KRUSE that was preceded by an URI. Within the past year has restarted smoking and does have a history of COPD. URI w/ productive cough (green sputum). Experiencing palpitations with KURSE with heart rate on watch being recorded in the 160's in addition to chest tightness. Her past EP providers note reports typical flutter occurring during a bout of COPD exacerbation. Here in the ED with elevated hs troponin 133 and . EKG noted to have T wave inversions in leads II, AVF and V4-6. Current differential diagnosis includes COPD exacerbation and/or reoccurrence of atrial flutter or multifocal atrial tachycardia. Also considering NSTEMI though hs troponin elevation could be due to demand ischemia if atrial flutter w/ high HR occurring. Does not appear volume overloaded on exam though will obtain echo to assess further cardiac function and IVC and look for wall motion abnormalities.     Echo normal      - continuing augmentin to finish outpatient providers course  - continue prednisone 40mg  - continue DuoNebs   - maintain cardiac telemetry   - continue eliquis  - will consider cardiology consult if remains KRUSE tomorrow

## 2025-05-09 NOTE — SUBJECTIVE & OBJECTIVE
Interval History: No acute events overnight. Pt hemodynamically stable. Still experiencing some wheezes on exam. Cardiology consulted and suggested outpatient follow up. No new or worsening symptoms. Patient would like to stay until her levalbuterol is ready for her on discharge.     Review of Systems  Objective:     Vital Signs (Most Recent):  Temp: 97.7 °F (36.5 °C) (05/09/25 1125)  Pulse: 79 (05/09/25 1322)  Resp: 16 (05/09/25 1322)  BP: 136/78 (05/09/25 1125)  SpO2: 95 % (05/09/25 1310) Vital Signs (24h Range):  Temp:  [97.4 °F (36.3 °C)-98.4 °F (36.9 °C)] 97.7 °F (36.5 °C)  Pulse:  [] 79  Resp:  [16-20] 16  SpO2:  [92 %-96 %] 95 %  BP: (125-141)/(74-82) 136/78     Weight: 72 kg (158 lb 11.7 oz)  Body mass index is 29.03 kg/m².    Intake/Output Summary (Last 24 hours) at 5/9/2025 1442  Last data filed at 5/8/2025 1726  Gross per 24 hour   Intake 240 ml   Output --   Net 240 ml         Physical Exam  Constitutional:       General: She is not in acute distress.     Appearance: She is normal weight. She is not toxic-appearing.   HENT:      Head: Normocephalic and atraumatic.      Right Ear: External ear normal.      Left Ear: External ear normal.   Eyes:      General:         Right eye: No discharge.         Left eye: No discharge.   Cardiovascular:      Rate and Rhythm: Normal rate and regular rhythm.      Heart sounds:      No gallop.   Pulmonary:      Breath sounds: Wheezing (bilaterally, diffuse, improved compared to days prior) present.   Abdominal:      General: There is no distension.      Tenderness: There is no abdominal tenderness. There is no guarding.   Musculoskeletal:      Right lower leg: No edema.      Left lower leg: No edema.   Skin:     General: Skin is warm and dry.   Neurological:      General: No focal deficit present.      Mental Status: She is oriented to person, place, and time.               Significant Labs: All pertinent labs within the past 24 hours have been reviewed.  Recent Lab  Results         05/08/25  1603        QRS Duration 88       [facility] QTC Calculation 376               Significant Imaging: I have reviewed all pertinent imaging results/findings within the past 24 hours.

## 2025-05-09 NOTE — ASSESSMENT & PLAN NOTE
63 y/o F with PMHx of HTN, COPD, A flutter s/p RFA (on eliquis) 2022 by Dr Duke, breast cancer stage IA HR+ s/p bilateral mastectomy w/ reconstruction (on anastrozole) who presented to the ED with chief complaint of dyspnea on exertion. Admitted to medicine with COPD exacerbation.    About 4 days ago she was [place] during which time she became dyspneic on exertion and experienced palpitations, her watch said her HR was 160s. Patient states that when she moves, she feels short of breath. Denies resting SOB, currently on RA.     On physical exam, patient has expiratory wheezing, other appears euvolemic, anxious but overall in no acute distress. NSR on ECG.  Telemetry revealing short episodes of SVT, currently in NSR at time of exam.     Hx of Flutter abaltion with Dr Duke in 2022, on Toprol and Eliquis. Echo while IP unremarkable    Rec  Concerns of SVT, possible recurrence of flutter?  Continue home Eliquis and Toprol-XL at current home dose   OP EP and Gen cards follow up  Ok to d/c from a cardiology perspective, unlikely cardiac cause of her dyspnea

## 2025-05-09 NOTE — ASSESSMENT & PLAN NOTE
Patient's blood pressure range in the last 24 hours was: BP  Min: 125/74  Max: 141/82.The patient's inpatient anti-hypertensive regimen is listed below:  Current Antihypertensives  metoprolol succinate (TOPROL-XL) 24 hr tablet 50 mg, 2 times daily, Oral  metoprolol succinate (TOPROL-XL) 50 MG 24 hr tablet, 2 times daily, Oral    Plan  - BP is controlled, no changes needed to their regimen  - titrate as needed

## 2025-05-09 NOTE — HOSPITAL COURSE
Patient was admitted to the hospital due to shortness of breath that was accompanied by chest tightness and palpitations (rapid heart rate). On admission her physical exam and history were consistent with COPD exacerbation accompanied by a supraventricular tachycardia. She has been under a lot of stress lately which prompted her to begin smoking cigarettes again. Her CBC was relatively unremarkable apart from a slightly elevated hemoglobin (16.6) and hypokalemia on CMP. Her BNP was 246 with a hs troponin of 94 which downtrended to 133. This troponin leak was attributed to demand ischemia in the setting of her tachyarrhythmias 2/2 COPD exacerbation. She was started on prednisone, levalbuterol, and the course of antibiotics she began prior to arrival to the hospital. A 6 minute walk test proved negative. On telemetry she was noted to be experiencing intermittently elevated heart rates to the 140's to 160's which the team attributed to multifocal atrial tachycardia in the setting of her COPD exacerbation. This prompted us to increase her metoprolol dose. Telemetry in the days after revealed her intermittent bouts of this tachyarrhythmia were much shorter in duration, < 10 seconds if not shorter. Her echocardiogram was unrevealing: IVC 3mHg, EF 60-65%, PASP 26mmHg with normal sized atria and no noted valve abnormalities. Cardiology was consulted and recommended continuing treatment for her COPD with outpatient general cardiology and electrophysiology follow ups. At this time they do not think her SOB is from a cardiac etiology. Prior to discharge she was deemed medically stable and ready. Ambulatory referrals were sent to cardiology, EP, and pulmonology. Bedside nebulizer and supply of levalbuterol were delivered prior to discharge.

## 2025-05-09 NOTE — HPI
61 y/o F with PMHx of HTN, COPD, A flutter s/p RFA (on eliquis) 2022 by Dr Duke, breast cancer stage IA HR+ s/p bilateral mastectomy w/ reconstruction (on anastrozole) who presented to the ED with chief complaint of dyspnea on exertion.     Within the past week patient began experiencing productive cough with green sputum, runny nose, for which she had an e-consult and was prescribed a 10 day course of Augmentin. About 4 days ago she was walking [place] beach during which time she became dyspneic on exertion and experienced palpitations, her watch said her HR was 160s. Patient states that when she moves, she feels short of breath. Denies resting SOB, currently on RA.     Cardiology being consulted for ?SVT    On physical exam, patient has expiratory wheezing, other appears euvolemic, anxious but overall in no acute distress. NSR on ECG.  Telemetry revealing infrequent short episodes of SVT, currently in NSR at time of exam.     Hx of Flutter abaltion with Dr Duke in 2022, on Toprol and Eliquis

## 2025-05-09 NOTE — PROGRESS NOTES
Adrien Florez - Internal Medicine [facility] Medicine  Progress Note    Patient Name: Lucia Matias  MRN: [id number]  Patient Class: IP- Inpatient   Admission Date: 5/6/2025  Length of Stay: 3 days  Attending Physician: Mercy Freeman MD  Primary Care Provider: Hetal Banks MD        Subjective     Principal Problem:KRUSE (dyspnea on exertion)        HPI:  63 y/o F with HTN, COPD, A flutter s/p RFA (on eliquis), breast cancer stage IA HR+ s/p bilateral mastectomy w/ reconstruction (on anastrozole) presented to the ED for evaluation of x4 days of shortness of breath. Within the past week patient began experiencing an URI (productive cough with green sputum, runny nose, headache) for which she had an e-consult with an outpatient provider and was prescribed a 10 day course of Augmentin. About 4 days ago [place] during which [place] she became dyspneic on exertion and experienced palpitations (fast heart rate). During this bout she also noted to experiencing chest tightness. Her KRUSE, palpitations, and chest tightness have all worsened since the onset, are worsened with exertion, and are relieved by rest. During this time she noted her heart rate to be in the 160's on her watch. Patient denies any leg swelling, nausea, vomiting, abdominal pain, dysuria, or any other sxs at this time. Note: A past electrophysiology note reports that the patient experienced typical atrial flutter during a COPD exacerbation.     Vitally in the ED: afebrile, -147/60-77 w/ HR 76-91, 94%O2 RA. Labs in the ED: WBC wnl, H/H 16.6/50.8, CMP relatively unremarkable apart from K 3.4, , hs troponin 133. The ED administered duonebs, lasix 20mg IV, and lorazepam 1mg, and prednisone 60mg.    Overview/Hospital Course:  Patient was admitted to the hospital due to shortness of breath that was accompanied by chest tightness and palpitations (rapid heart rate). On admission her physical exam and  history were consistent with COPD exacerbation accompanied by a supraventricular tachycardia. She has been under a lot of stress lately which prompted her to begin smoking cigarettes again. Her CBC was relatively unremarkable apart from a slightly elevated hemoglobin (16.6) and hypokalemia on CMP. Her BNP was 246 with a hs troponin of 94 which downtrended to 133. This troponin leak was attributed to demand ischemia in the setting of her tachyarrhythmias 2/2 COPD exacerbation. She was started on prednisone, levalbuterol, and the course of antibiotics she began prior to arrival to the hospital. A 6 minute walk test proved negative. On telemetry she was noted to be experiencing intermittently elevated heart rates to the 150'-160's which the team attributed to multifocal atrial tachycardia in the setting of her COPD exacerbation. This prompted us to increase her metoprolol dose. The echo obtained was unrevealing: IVC 3mHg, EF 60-65%, PASP 26mmHg with normal sized atria and no noted valve abnormalities. Cardiology was consulted and recommended continuing treatment for her COPD with outpatient general cardiology and electrophysiology follow ups. At this time they do not think her SOB is from a cardiac etiology. Prior to discharge she was deemed medically stable and ready.     Interval History: No acute events overnight. Pt hemodynamically stable. Still experiencing some wheezes on exam. Cardiology consulted and suggested outpatient follow up. No new or worsening symptoms. Patient would like to stay until her levalbuterol is ready for her on discharge.     Review of Systems  Objective:     Vital Signs (Most Recent):  Temp: 97.7 °F (36.5 °C) (05/09/25 1125)  Pulse: 79 (05/09/25 1322)  Resp: 16 (05/09/25 1322)  BP: 136/78 (05/09/25 1125)  SpO2: 95 % (05/09/25 1310) Vital Signs (24h Range):  Temp:  [97.4 °F (36.3 °C)-98.4 °F (36.9 °C)] 97.7 °F (36.5 °C)  Pulse:  [] 79  Resp:  [16-20] 16  SpO2:  [92 %-96 %] 95 %  BP:  (125-141)/(74-82) 136/78     Weight: 72 kg (158 lb 11.7 oz)  Body mass index is 29.03 kg/m².    Intake/Output Summary (Last 24 hours) at 5/9/2025 1442  Last data filed at 5/8/2025 1726  Gross per 24 hour   Intake 240 ml   Output --   Net 240 ml         Physical Exam  Constitutional:       General: She is not in acute distress.     Appearance: She is normal weight. She is not toxic-appearing.   HENT:      Head: Normocephalic and atraumatic.      Right Ear: External ear normal.      Left Ear: External ear normal.   Eyes:      General:         Right eye: No discharge.         Left eye: No discharge.   Cardiovascular:      Rate and Rhythm: Normal rate and regular rhythm.      Heart sounds:      No gallop.   Pulmonary:      Breath sounds: Wheezing (bilaterally, diffuse, improved compared to days prior) present.   Abdominal:      General: There is no distension.      Tenderness: There is no abdominal tenderness. There is no guarding.   Musculoskeletal:      Right lower leg: No edema.      Left lower leg: No edema.   Skin:     General: Skin is warm and dry.   Neurological:      General: No focal deficit present.      Mental Status: She is oriented to person, place, and time.               Significant Labs: All pertinent labs within the past 24 hours have been reviewed.  Recent Lab Results         05/08/25  1603        QRS Duration 88       OHS QTC Calculation 376               Significant Imaging: I have reviewed all pertinent imaging results/findings within the past 24 hours.      Assessment & Plan  KRUSE (dyspnea on exertion)  Atrial flutter  COPD (chronic obstructive pulmonary disease)  History of tobacco abuse  Patient presenting 4 days of KRUSE that was preceded by an URI. Within the past year has restarted smoking and does have a history of COPD. URI w/ productive cough (green sputum). Experiencing palpitations with KRUSE with heart rate on watch being recorded in the 160's in addition to chest tightness. Her past EP  providers note reports typical flutter occurring during a bout of COPD exacerbation. Here in the ED with elevated hs troponin 133 and . EKG noted to have T wave inversions in leads II, AVF and V4-6. Current differential diagnosis includes COPD exacerbation and/or reoccurrence of atrial flutter or multifocal atrial tachycardia. Also considering NSTEMI though hs troponin elevation could be due to demand ischemia if atrial flutter w/ high HR occurring. Does not appear volume overloaded on exam though will obtain echo to assess further cardiac function and IVC and look for wall motion abnormalities. Cardiology consulted and suggested outpatient follow up.     Echo normal      - continuing augmentin to finish outpatient providers course  - continue prednisone 40mg  - continue DuoNebs   - maintain cardiac telemetry   - continue eliquis  HLD (hyperlipidemia)  Continue home statin   Depression with anxiety  Insomnia  - continue home effexor  - continue home ambien and diazepam for insomnia   Essential hypertension  Patient's blood pressure range in the last 24 hours was: BP  Min: 125/74  Max: 141/82.The patient's inpatient anti-hypertensive regimen is listed below:  Current Antihypertensives  metoprolol succinate (TOPROL-XL) 24 hr tablet 50 mg, 2 times daily, Oral  metoprolol succinate (TOPROL-XL) 50 MG 24 hr tablet, 2 times daily, Oral    Plan  - BP is controlled, no changes needed to their regimen  - titrate as needed   Malignant neoplasm of central portion of left breast in female, estrogen receptor positive  Long-term use of immunosuppressant medication  Continue anastrozole   Hypokalemia  Patient's most recent potassium results are listed below.   Recent Labs     05/07/25  0625 05/08/25  0323   K 3.7 4.0     Plan  - Replete potassium per protocol. Replete po  - Monitor potassium Daily  Hypophosphatemia  Patient's most recent phosphorus results are listed below.   Recent Labs     05/06/25  1711 05/07/25  0625  05/08/25  0323   PHOS 1.7* 4.2 3.4     Plan  - Will treat hypophosphatemia with repletion  - Continue to monitor  Elevated troponin level  (Resolved)  Due to demand ischemia in setting of COPD exacerbation and tachycardia   Echo normal    SVT (supraventricular tachycardia)  Most likely 2/2 COPD exacerbation, will need outpatient cardiology follow up     VTE Risk Mitigation (From admission, onward)           Ordered     apixaban tablet 5 mg  2 times daily         05/06/25 1620                    Discharge Planning   LESTER: 5/9/2025     Code Status: Full Code   Medical Readiness for Discharge Date: 5/9/2025  Discharge Plan A: Home with family                        David Maher DO  Department of Hospital Medicine   [facility] - Internal Medicine Telemetry

## 2025-05-09 NOTE — ASSESSMENT & PLAN NOTE
Patient's most recent potassium results are listed below.   Recent Labs     05/07/25  0625 05/08/25  0323   K 3.7 4.0     Plan  - Replete potassium per protocol. Replete po  - Monitor potassium Daily

## 2025-05-09 NOTE — SUBJECTIVE & OBJECTIVE
Past Medical History:   Diagnosis Date    Adverse reaction to succinimides     son has malignant hyperthermia    Allergy     Anticoagulant long-term use     Anxiety     Arthritis     Atrial flutter     Colon polyps     COPD (chronic obstructive pulmonary disease)     COPD exacerbation 10/31/2022    Depression     Diverticular disease of colon 2017    Diverticulitis     H/O gastric sleeve     HLD (hyperlipidemia)     Hypertension     resolved with gastric slleve    Malignant neoplasm of central portion of left breast in female, estrogen receptor positive 2022    Monoallelic mutation of MUTYH gene 2022    Osteopenia     Pancreatitis     Paroxysmal A-fib 2024    Pneumothorax, unspecified     following mastectomy sx     Psoriasis     Rheumatoid arthritis     Severe obesity (BMI 35.0-39.9) with comorbidity     Wears contact lenses     not often  wears glasses usually    Wears prescription eyeglasses        Past Surgical History:   Procedure Laterality Date    ABLATION  2022    Cardiac Ablation for A Flutter, Successful    ADENOIDECTOMY  1966    Tonsillitis and adnoids    BILATERAL MASTECTOMY Bilateral 02/15/2023    Procedure: MASTECTOMY, BILATERAL;  Surgeon: Marcela Meehan MD;  Location: [facility];  Service: General;  Laterality: Bilateral;  EMAIL SENT  @ 11:44 LK / 2.5 HOURS    BLADDER SUSPENSION      (x2)    BREAST REVISION SURGERY Bilateral 2023    Procedure: BREAST REVISION SURGERY / BILATERAL BREAST FLAP REVISION;  Surgeon: Ming Milian MD;  Location: [facility] OR;  Service: Plastics;  Laterality: Bilateral;  90 MINUTES     SECTION      (x2)    COLONOSCOPY      DEBRIDEMENT, BREAST Bilateral 2023    Procedure: DEBRIDEMENT, BREAST;  Surgeon: Ming Milian MD;  Location: [facility];  Service: Plastics;  Laterality: Bilateral;    ESOPHAGOGASTRODUODENOSCOPY N/A 2021    Procedure: EGD (ESOPHAGOGASTRODUODENOSCOPY);  Surgeon: Vince Rodriguez MD;   Location: [facility] OR 2ND FLR;  Service: General;  Laterality: N/A;    INCISION AND DRAINAGE OF BREAST Left 10/26/2023    Procedure: INCISION AND DRAINAGE, BREAST;  Surgeon: Ming Milian MD;  Location: [facility];  Service: Plastics;  Laterality: Left;    INCISION AND DRAINAGE OF BREAST Left 04/04/2024    Procedure: INCISION AND DRAINAGE, BREAST;  Surgeon: Ming Milian MD;  Location: [facility] OR;  Service: Plastics;  Laterality: Left;    INJECTION FOR SENTINEL NODE IDENTIFICATION Left 02/15/2023    Procedure: INJECTION, FOR SENTINEL NODE IDENTIFICATION;  Surgeon: Marcela Meehan MD;  Location: [facility] OR;  Service: General;  Laterality: Left;    LAPAROSCOPIC SLEEVE GASTRECTOMY N/A 03/31/2021    Procedure: GASTRECTOMY, SLEEVE, LAPAROSCOPIC, with intraop EGD;  Surgeon: Vince Rodriguez MD;  Location: [facility] OR 2ND FLR;  Service: General;  Laterality: N/A;    LIPOSUCTION W/ FAT INJECTION Bilateral 08/29/2023    Procedure: LIPOSUCTION, WITH FAT TRANSFER;  Surgeon: Ming Milian MD;  Location: [facility];  Service: Plastics;  Laterality: Bilateral;  / FAT GRAFTING TO BOTH BREAST    LUNG BIOPSY  09/2020    RECONSTRUCTION OF BREAST WITH DEEP INFERIOR EPIGASTRIC ARTERY  (NEHA) FREE FLAP Bilateral 02/15/2023    Procedure: RECONSTRUCTION, BREAST, USING NEHA FREE FLAP;  Surgeon: Ming Milian MD;  Location: [facility];  Service: Plastics;  Laterality: Bilateral;    RECONSTRUCTION OF BREAST WITH DEEP INFERIOR EPIGASTRIC ARTERY  (NEHA) FREE FLAP Left 11/13/2024    Procedure: RECONSTRUCTION, BREAST, USING FREE FLAP;  Surgeon: Ming Milian MD;  Location: [facility];  Service: Plastics;  Laterality: Left;  / LEFT PAP FLAP  5 HOURS / HX  malignant hyperthermia  used PAP flap from Right thigh    REVISION, FLAP, PREVIOUSLY CREATED FOR BREAST RECONSTRUCTION Bilateral 08/29/2023    Procedure: REVISION, FLAP, PREVIOUSLY CREATED FOR BREAST RECONSTRUCTION;  Surgeon: Ming Milian MD;  Location: [facility];   Service: Plastics;  Laterality: Bilateral;  4 HOURS    REVISION, SCAR, ABDOMINAL DONOR SITE N/A 08/29/2023    Procedure: REVISION, SCAR, ABDOMINAL DONOR SITE;  Surgeon: Ming Milian MD;  Location: [facility] [place] OR;  Service: Plastics;  Laterality: N/A;    RHINOPLASTY      SENTINEL LYMPH NODE BIOPSY Left 02/15/2023    Procedure: BIOPSY, LYMPH NODE, SENTINEL;  Surgeon: Macrela Meehan MD;  Location: [facility] [place] OR;  Service: General;  Laterality: Left;    TONSILLECTOMY      TRANSESOPHAGEAL ECHOCARDIOGRAPHY N/A 11/02/2022    Procedure: ECHOCARDIOGRAM, TRANSESOPHAGEAL;  Surgeon: Kellie Grover MD;  Location: [facility] EP LAB;  Service: Cardiology;  Laterality: N/A;       Review of patient's allergies indicates:   Allergen Reactions    Succinimides Anaphylaxis     Son has MH    Vancomycin analogues Hives, Itching and Rash       No current facility-administered medications on file prior to encounter.     Current Outpatient Medications on File Prior to Encounter   Medication Sig    acetaminophen (TYLENOL) 500 MG tablet Take 2 tablets by mouth daily as needed for Pain.    anastrozole (ARIMIDEX) 1 mg Tab Take 1 tablet (1 mg total) by mouth once daily.    apixaban (ELIQUIS) 5 mg Tab Take 1 tablet (5 mg total) by mouth 2 (two) times daily.    atorvastatin (LIPITOR) 20 MG tablet Take 1 tablet (20 mg total) by mouth every evening.    B-complex with vitamin C (VITAMIN B COMPLEX-C ORAL) Take 1 tablet by mouth Daily.    BIOTIN-COLLAGEN-VIT C-E-HERBAL ORAL Take 2 tablets by mouth every evening.    calcium-vitamin D 250-100 mg-unit per tablet Take 1 tablet by mouth every morning.    cyanocobalamin 500 MCG tablet Take 500 mcg by mouth once daily.    diazePAM (VALIUM) 5 MG tablet TAKE 1 TABLET BY MOUTH DAILY AS NEEDED FOR ANXIETY.    diclofenac sodium (VOLTAREN) 1 % Gel Apply 2 g topically 4 (four) times daily.    fluocinonide-emollient (FLUOCINONIDE-E) 0.05 % Crea Apply to affected area of feet daily as needed for psoriasis.     fluticasone-umeclidin-vilanter (TRELEGY ELLIPTA) 100-62.5-25 mcg DsDv Inhale 1 puff into the lungs once daily.    magnesium 200 mg Tab Take 1 tablet by mouth nightly.    multivitamin (THERAGRAN) per tablet Take 1 tablet by mouth once daily.    multivitamin with minerals (HAIR,SKIN AND NAILS ORAL) Take 1 tablet by mouth Daily.    omeprazole (PRILOSEC) 40 MG capsule Take 1 capsule (40 mg total) by mouth every morning.    ondansetron (ZOFRAN) 4 MG tablet Take 2 tablets (8 mg total) by mouth every 12 (twelve) hours as needed for Nausea.    TALTZ AUTOINJECTOR 80 mg/mL AtIn Inject 80 mg into the skin every 28 days.    triamcinolone acetonide 0.025% (KENALOG) 0.025 % cream Apply to affected area of skin folds twice a day as needed for psoriasis.    triamcinolone acetonide 0.1% (KENALOG) 0.1 % cream Apply to affected areas of body twice a day as needed for psoriasis. Do not use on face or skin folds.    venlafaxine (EFFEXOR-XR) 150 MG Cp24 Take 1 capsule (150 mg total) by mouth once daily. (Patient taking differently: Take 150 mg by mouth every morning.)    zolpidem (AMBIEN) 10 mg Tab Take 1 tablet (10 mg total) by mouth nightly as needed (insomnia).    [DISCONTINUED] budesonide-formoterol 160-4.5 mcg (SYMBICORT) 160-4.5 mcg/actuation HFAA Inhale 2 puffs into the lungs every 12 (twelve) hours. Controller     Family History       Problem Relation (Age of Onset)    Malignant hyperthermia Son    No Known Problems Daughter, Daughter          Tobacco Use    Smoking status: Former     Current packs/day: 0.00     Average packs/day: 0.5 packs/day for 41.9 years (21.0 ttl pk-yrs)     Types: Cigarettes     Start date: 1979     Quit date: 2020     Years since quittin.4     Passive exposure: Current    Smokeless tobacco: Never   Substance and Sexual Activity    Alcohol use: Yes     Alcohol/week: 1.0 standard drink of alcohol     Types: 1 Glasses of wine per week     Comment: social-rarely    Drug use: No    Sexual  activity: Yes     Partners: Male     Birth control/protection: Post-menopausal       Objective:     Vital Signs (Most Recent):  Temp: 97.7 °F (36.5 °C) (05/09/25 0803)  Pulse: 82 (05/09/25 0841)  Resp: 20 (05/09/25 0841)  BP: 130/76 (05/09/25 0803)  SpO2: 95 % (05/09/25 0803) Vital Signs (24h Range):  Temp:  [97.4 °F (36.3 °C)-98.4 °F (36.9 °C)] 97.7 °F (36.5 °C)  Pulse:  [] 82  Resp:  [16-20] 20  SpO2:  [92 %-96 %] 95 %  BP: (120-141)/(63-82) 130/76     Weight: 72 kg (158 lb 11.7 oz)  Body mass index is 29.03 kg/m².    SpO2: 95 %         Intake/Output Summary (Last 24 hours) at 5/9/2025 1031  Last data filed at 5/8/2025 1726  Gross per 24 hour   Intake 480 ml   Output --   Net 480 ml       Lines/Drains/Airways       Drain  Duration                  Closed/Suction Drain 11/13/24 1025 Right Thigh Bulb 15 Fr. 176 days         Closed/Suction Drain 11/13/24 1026 Left Breast Bulb 15 Fr. 176 days              Peripheral Intravenous Line  Duration                  Peripheral IV - Single Lumen 05/06/25 1215 20 G Right Antecubital 2 days                     Physical Exam  Vitals and nursing note reviewed.   Constitutional:       General: She is not in acute distress.     Appearance: Normal appearance. She is not ill-appearing, toxic-appearing or diaphoretic.   HENT:      Head: Normocephalic and atraumatic.      Right Ear: External ear normal.      Left Ear: External ear normal.   Eyes:      Extraocular Movements: Extraocular movements intact.   Cardiovascular:      Rate and Rhythm: Normal rate and regular rhythm.      Pulses: Normal pulses.      Heart sounds: Normal heart sounds.   Pulmonary:      Effort: Pulmonary effort is normal.      Breath sounds: Wheezing present.   Abdominal:      General: Abdomen is flat.      Palpations: Abdomen is soft.   Musculoskeletal:         General: No signs of injury. Normal range of motion.      Cervical back: Normal range of motion.      Right lower leg: No edema.      Left lower  leg: No edema.   Skin:     General: Skin is warm and dry.   Neurological:      General: No focal deficit present.      Mental Status: She is alert and oriented to person, place, and time.      Sensory: No sensory deficit.      Motor: No weakness.   Psychiatric:         Mood and Affect: Mood normal.         Behavior: Behavior normal.         Thought Content: Thought content normal.          Significant Labs: All pertinent lab results from the last 24 hours have been reviewed.    Significant Imaging: All pertinent images and reports from the last 24 hours have been reviewed.

## 2025-05-09 NOTE — ASSESSMENT & PLAN NOTE
(Resolved)  Due to demand ischemia in setting of COPD exacerbation and tachycardia   Echo normal     Render In Strict Bullet Format?: No Initiate Treatment: Triamcinalone acetonide 0.1% topical cream twice daily for two weeks Detail Level: Zone

## 2025-05-10 VITALS
OXYGEN SATURATION: 98 % | HEIGHT: 62 IN | WEIGHT: 158.75 LBS | DIASTOLIC BLOOD PRESSURE: 79 MMHG | BODY MASS INDEX: 29.21 KG/M2 | RESPIRATION RATE: 18 BRPM | HEART RATE: 65 BPM | TEMPERATURE: 98 F | SYSTOLIC BLOOD PRESSURE: 153 MMHG

## 2025-05-10 LAB
ANION GAP (OHS): 8 MMOL/L (ref 8–16)
BUN SERPL-MCNC: 16 MG/DL (ref 8–23)
CALCIUM SERPL-MCNC: 8.7 MG/DL (ref 8.7–10.5)
CHLORIDE SERPL-SCNC: 109 MMOL/L (ref 95–110)
CO2 SERPL-SCNC: 24 MMOL/L (ref 23–29)
CREAT SERPL-MCNC: 0.7 MG/DL (ref 0.5–1.4)
ERYTHROCYTE [DISTWIDTH] IN BLOOD BY AUTOMATED COUNT: 12.8 % (ref 11.5–14.5)
GFR SERPLBLD CREATININE-BSD FMLA CKD-EPI: >60 ML/MIN/1.73/M2
GLUCOSE SERPL-MCNC: 114 MG/DL (ref 70–110)
HCT VFR BLD AUTO: 43.5 % (ref 37–48.5)
HGB BLD-MCNC: 14.4 GM/DL (ref 12–16)
MAGNESIUM SERPL-MCNC: 2.2 MG/DL (ref 1.6–2.6)
MCH RBC QN AUTO: 29.1 PG (ref 27–31)
MCHC RBC AUTO-ENTMCNC: 33.1 G/DL (ref 32–36)
MCV RBC AUTO: 88 FL (ref 82–98)
PHOSPHATE SERPL-MCNC: 3.1 MG/DL (ref 2.7–4.5)
PLATELET # BLD AUTO: 260 K/UL (ref 150–450)
PMV BLD AUTO: 9.9 FL (ref 9.2–12.9)
POTASSIUM SERPL-SCNC: 4.1 MMOL/L (ref 3.5–5.1)
RBC # BLD AUTO: 4.94 M/UL (ref 4–5.4)
SODIUM SERPL-SCNC: 141 MMOL/L (ref 136–145)
WBC # BLD AUTO: 9.28 K/UL (ref 3.9–12.7)

## 2025-05-10 PROCEDURE — 25000003 PHARM REV CODE 250

## 2025-05-10 PROCEDURE — 36415 COLL VENOUS BLD VENIPUNCTURE: CPT | Performed by: STUDENT IN AN ORGANIZED HEALTH CARE EDUCATION/TRAINING PROGRAM

## 2025-05-10 PROCEDURE — 94668 MNPJ CHEST WALL SBSQ: CPT

## 2025-05-10 PROCEDURE — 63600175 PHARM REV CODE 636 W HCPCS

## 2025-05-10 PROCEDURE — 94761 N-INVAS EAR/PLS OXIMETRY MLT: CPT

## 2025-05-10 PROCEDURE — 94640 AIRWAY INHALATION TREATMENT: CPT

## 2025-05-10 PROCEDURE — 83735 ASSAY OF MAGNESIUM: CPT | Performed by: STUDENT IN AN ORGANIZED HEALTH CARE EDUCATION/TRAINING PROGRAM

## 2025-05-10 PROCEDURE — 85027 COMPLETE CBC AUTOMATED: CPT | Performed by: STUDENT IN AN ORGANIZED HEALTH CARE EDUCATION/TRAINING PROGRAM

## 2025-05-10 PROCEDURE — 25000003 PHARM REV CODE 250: Performed by: STUDENT IN AN ORGANIZED HEALTH CARE EDUCATION/TRAINING PROGRAM

## 2025-05-10 PROCEDURE — 25000242 PHARM REV CODE 250 ALT 637 W/ HCPCS: Performed by: STUDENT IN AN ORGANIZED HEALTH CARE EDUCATION/TRAINING PROGRAM

## 2025-05-10 PROCEDURE — 94664 DEMO&/EVAL PT USE INHALER: CPT

## 2025-05-10 PROCEDURE — 25000242 PHARM REV CODE 250 ALT 637 W/ HCPCS

## 2025-05-10 PROCEDURE — 80048 BASIC METABOLIC PNL TOTAL CA: CPT | Performed by: STUDENT IN AN ORGANIZED HEALTH CARE EDUCATION/TRAINING PROGRAM

## 2025-05-10 PROCEDURE — 84100 ASSAY OF PHOSPHORUS: CPT | Performed by: STUDENT IN AN ORGANIZED HEALTH CARE EDUCATION/TRAINING PROGRAM

## 2025-05-10 RX ADMIN — TIOTROPIUM BROMIDE INHALATION SPRAY 2 PUFF: 3.12 SPRAY, METERED RESPIRATORY (INHALATION) at 08:05

## 2025-05-10 RX ADMIN — LEVALBUTEROL HYDROCHLORIDE 0.63 MG: 0.63 SOLUTION RESPIRATORY (INHALATION) at 11:05

## 2025-05-10 RX ADMIN — ANASTROZOLE 1 MG: 1 TABLET, COATED ORAL at 09:05

## 2025-05-10 RX ADMIN — VENLAFAXINE HYDROCHLORIDE 150 MG: 150 CAPSULE, EXTENDED RELEASE ORAL at 09:05

## 2025-05-10 RX ADMIN — CYANOCOBALAMIN TAB 250 MCG 500 MCG: 250 TAB at 09:05

## 2025-05-10 RX ADMIN — AMOXICILLIN AND CLAVULANATE POTASSIUM 1 TABLET: 875; 125 TABLET, FILM COATED ORAL at 09:05

## 2025-05-10 RX ADMIN — LEVALBUTEROL HYDROCHLORIDE 0.63 MG: 0.63 SOLUTION RESPIRATORY (INHALATION) at 08:05

## 2025-05-10 RX ADMIN — METOPROLOL SUCCINATE 50 MG: 50 TABLET, EXTENDED RELEASE ORAL at 09:05

## 2025-05-10 RX ADMIN — Medication 400 MG: at 09:05

## 2025-05-10 RX ADMIN — Medication 1 TABLET: at 09:05

## 2025-05-10 RX ADMIN — APIXABAN 5 MG: 5 TABLET, FILM COATED ORAL at 09:05

## 2025-05-10 RX ADMIN — FLUTICASONE FUROATE AND VILANTEROL TRIFENATATE 1 PUFF: 100; 25 POWDER RESPIRATORY (INHALATION) at 08:05

## 2025-05-10 RX ADMIN — PREDNISONE 40 MG: 20 TABLET ORAL at 09:05

## 2025-05-10 RX ADMIN — LEVALBUTEROL HYDROCHLORIDE 0.63 MG: 0.63 SOLUTION RESPIRATORY (INHALATION) at 03:05

## 2025-05-10 RX ADMIN — PANTOPRAZOLE SODIUM 40 MG: 40 TABLET, DELAYED RELEASE ORAL at 09:05

## 2025-05-10 NOTE — PLAN OF CARE
Problem: Adult Inpatient Plan of Care  Goal: Plan of Care Review  Outcome: Progressing  Goal: Patient-Specific Goal (Individualized)  Outcome: Progressing  Goal: Absence of Hospital-Acquired Illness or Injury  Outcome: Progressing  Goal: Optimal Comfort and Wellbeing  Outcome: Progressing  Goal: Readiness for Transition of Care  Outcome: Progressing     Problem: Wound  Goal: Optimal Functional Ability  Outcome: Progressing  Goal: Optimal Pain Control and Function  Outcome: Progressing  Goal: Skin Health and Integrity  Outcome: Progressing

## 2025-05-10 NOTE — ASSESSMENT & PLAN NOTE
Patient's most recent phosphorus results are listed below.   Recent Labs     05/08/25  0323 05/10/25  0256   PHOS 3.4 3.1     Plan  - Will treat hypophosphatemia with repletion  - Continue to monitor

## 2025-05-10 NOTE — DISCHARGE SUMMARY
Adrien Florez - Internal Medicine [facility] Medicine  Discharge Summary      Patient Name: Lucia Matias  MRN: [id number]  [facility]: [id number]  Patient Class: IP- Inpatient  Admission Date: 5/6/2025  Hospital Length of Stay: 4 days  Discharge Date and Time: 05/10/2025 11:37 AM  Attending Physician: Mercy Freeman MD   Discharging Provider: David Maher DO  Primary Care Provider: Hetal Banks MD  Hospital Medicine Team: [facility] MED 1 David Maher DO  Primary Care Team: [facility] 1    HPI:   61 y/o F with HTN, COPD, A flutter s/p RFA (on eliquis), breast cancer stage IA HR+ s/p bilateral mastectomy w/ reconstruction (on anastrozole) presented to the ED for evaluation of x4 days of shortness of breath. Within the past week patient began experiencing an URI (productive cough with green sputum, runny nose, headache) for which she had an e-consult with an outpatient provider and was prescribed a 10 day course of Augmentin. About 4 days ago she [place] during which time she became dyspneic on exertion and experienced palpitations (fast heart rate). During this bout she also noted to experiencing chest tightness. Her KRUSE, palpitations, and chest tightness have all worsened since the onset, are worsened with exertion, and are relieved by rest. During this time she noted her heart rate to be in the 160's on her watch. Patient denies any leg swelling, nausea, vomiting, abdominal pain, dysuria, or any other sxs at this time. Note: A past electrophysiology note reports that the patient experienced typical atrial flutter during a COPD exacerbation.     Vitally in the ED: afebrile, -147/60-77 w/ HR 76-91, 94%O2 RA. Labs in the ED: WBC wnl, H/H 16.6/50.8, CMP relatively unremarkable apart from K 3.4, , hs troponin 133. The ED administered duonebs, lasix 20mg IV, and lorazepam 1mg, and prednisone 60mg.    * No surgery found *      Hospital Course:   Patient  "was admitted to the hospital due to shortness of breath that was accompanied by chest tightness and palpitations (rapid heart rate). On admission her physical exam and history were consistent with COPD exacerbation accompanied by a supraventricular tachycardia. She has been under a lot of stress lately which prompted her to begin smoking cigarettes again. Her CBC was relatively unremarkable apart from a slightly elevated hemoglobin (16.6) and hypokalemia on CMP. Her BNP was 246 with a hs troponin of 94 which downtrended to 133. This troponin leak was attributed to demand ischemia in the setting of her tachyarrhythmias 2/2 COPD exacerbation. She was started on prednisone, levalbuterol, and the course of antibiotics she began prior to arrival to the hospital. A 6 minute walk test proved negative. On telemetry she was noted to be experiencing intermittently elevated heart rates to the 140's to 160's which the team attributed to multifocal atrial tachycardia in the setting of her COPD exacerbation. This prompted us to increase her metoprolol dose. Telemetry in the days after revealed her intermittent bouts of this tachyarrhythmia were much shorter in duration, < 10 seconds if not shorter. Her echocardiogram was unrevealing: IVC 3mHg, EF 60-65%, PASP 26mmHg with normal sized atria and no noted valve abnormalities. Cardiology was consulted and recommended continuing treatment for her COPD with outpatient general cardiology and electrophysiology follow ups. At this time they do not think her SOB is from a cardiac etiology. Prior to discharge she was deemed medically stable and ready. Ambulatory referrals were sent to cardiology, EP, and pulmonology. Bedside nebulizer and supply of levalbuterol were delivered prior to discharge.      Goals of Care Treatment Preferences:  Code Status: Full Code  BP (!) 153/79   Pulse 77   Temp 98.2 °F (36.8 °C) (Oral)   Resp 18   Ht 5' 2" (1.575 m)   Wt 72 kg (158 lb 11.7 oz)   SpO2 " (!) 93%   BMI 29.03 kg/m²   Physical Exam  Constitutional:       General: She is not in acute distress.     Appearance: She is normal weight. She is not toxic-appearing.   HENT:      Head: Normocephalic and atraumatic.      Right Ear: External ear normal.      Left Ear: External ear normal.   Eyes:      General:         Right eye: No discharge.         Left eye: No discharge.   Cardiovascular:      Rate and Rhythm: Normal rate and regular rhythm.      Heart sounds:      No gallop.   Pulmonary:      Breath sounds: Wheezing (bilaterally, diffuse, mild, improved compared to days prior) present.   Abdominal:      General: There is no distension.      Tenderness: There is no abdominal tenderness. There is no guarding.   Musculoskeletal:      Right lower leg: No edema.      Left lower leg: No edema.   Skin:     General: Skin is warm and dry.   Neurological:      General: No focal deficit present.      Mental Status: She is oriented to person, place, and time.      SDOH Screening:  The patient was screened for utility difficulties, food insecurity, transport difficulties, housing insecurity, and interpersonal safety and there were no concerns identified this admission.     Consults:   Consults (From admission, onward)          Status Ordering Provider     Inpatient consult to Cardiology  Once        Provider:  (Not yet assigned)    Completed LISETH FULTON            Assessment & Plan  KRUSE (dyspnea on exertion)  Atrial flutter  COPD (chronic obstructive pulmonary disease)  History of tobacco abuse  Patient presenting 4 days of KRUSE that was preceded by an URI. Within the past year has restarted smoking and does have a history of COPD. URI w/ productive cough (green sputum). Experiencing palpitations with KRUSE with heart rate on watch being recorded in the 160's in addition to chest tightness. Her past EP providers note reports typical flutter occurring during a bout of COPD exacerbation. Here in the ED with elevated hs  troponin 133 and . EKG noted to have T wave inversions in leads II, AVF and V4-6. Current differential diagnosis includes COPD exacerbation and/or reoccurrence of atrial flutter or multifocal atrial tachycardia. Also considering NSTEMI though hs troponin elevation could be due to demand ischemia if atrial flutter w/ high HR occurring. Does not appear volume overloaded on exam though will obtain echo to assess further cardiac function and IVC and look for wall motion abnormalities. Cardiology consulted and suggested outpatient follow up.     Echo with EF 60 - 65%. There is normal diastolic function.  No wall motion abnormalities.    - continuing augmentin to finish outpatient providers course  - continue po prednisone 40mg on discharge  - continue DuoNebs   - continue eliquis  HLD (hyperlipidemia)  Continue home statin   Depression with anxiety  Insomnia  - continue home effexor  - continue home ambien and diazepam for insomnia   Essential hypertension  Patient's blood pressure range in the last 24 hours was: BP  Min: 110/46  Max: 153/79.The patient's inpatient anti-hypertensive regimen is listed below:  Current Antihypertensives  metoprolol succinate (TOPROL-XL) 24 hr tablet 50 mg, 2 times daily, Oral  metoprolol succinate (TOPROL-XL) 50 MG 24 hr tablet, 2 times daily, Oral    Plan  - BP is controlled, no changes needed to their regimen  - titrate as needed   Malignant neoplasm of central portion of left breast in female, estrogen receptor positive  Long-term use of immunosuppressant medication  Continue anastrozole   Hypokalemia  Patient's most recent potassium results are listed below.   Recent Labs     05/08/25  0323 05/10/25  0256   K 4.0 4.1     Plan  - Replete potassium per protocol. Replete po  - Monitor potassium Daily  Hypophosphatemia  Patient's most recent phosphorus results are listed below.   Recent Labs     05/08/25  0323 05/10/25  0256   PHOS 3.4 3.1     Plan  - Will treat hypophosphatemia with  "repletion  - Continue to monitor  Elevated troponin level  (Resolved)  Due to demand ischemia in setting of COPD exacerbation and tachycardia   Echo normal    SVT (supraventricular tachycardia)  Most likely 2/2 COPD exacerbation, will need outpatient EP follow up     Final Active Diagnoses:    Diagnosis Date Noted POA    PRINCIPAL PROBLEM:  KRUSE (dyspnea on exertion) [R06.09] 05/06/2025 Yes    SVT (supraventricular tachycardia) [I47.10] 05/09/2025 Unknown    Hypokalemia [E87.6] 05/06/2025 Yes    Hypophosphatemia [E83.39] 05/06/2025 Yes    Elevated troponin level [R79.89] 05/06/2025 Yes    Atrial flutter [I48.92] 01/03/2024 Yes    Malignant neoplasm of central portion of left breast in female, estrogen receptor positive [C50.112, Z17.0] 12/29/2022 Not Applicable    Essential hypertension [I10] 12/20/2019 Yes     Chronic    Long-term use of immunosuppressant medication [Z79.60] 10/31/2019 Not Applicable    History of tobacco abuse [Z87.891] 10/31/2019 Not Applicable    Depression with anxiety [F41.8] 08/06/2019 Yes     Chronic    Insomnia [G47.00] 08/06/2019 Yes    COPD (chronic obstructive pulmonary disease) [J44.9]  Yes     Chronic    HLD (hyperlipidemia) [E78.5]  Yes     Chronic      Problems Resolved During this Admission:       Discharged Condition: good    Disposition: Home or Self Care    Follow Up: PCP\  Cardiology/electrophysiology  Pulmonology referral    Patient Instructions:      NEBULIZER KIT (SUPPLIES) FOR HOME USE     Order Specific Question Answer Comments   Height: 5' 2" (1.575 m)    Weight: 72 kg (158 lb 11.7 oz)    Does patient have medical equipment at home? none    Length of need (1-99 months): 99    Mask or Mouthpiece? Mask      NEBULIZER FOR HOME USE     Order Specific Question Answer Comments   Height: 5' 2" (1.575 m)    Weight: 72 kg (158 lb 11.7 oz)    Does patient have medical equipment at home? none    Length of need (1-99 months): 99      Ambulatory referral/consult to Cardiology   Standing " Status: Future   Referral Priority: Routine Referral Type: Consultation   Referral Reason: Specialty Services Required   Requested Specialty: Cardiology   Number of Visits Requested: 1     Ambulatory referral/consult to Cardiac Electrophysiology   Standing Status: Future   Referral Priority: Routine Referral Type: Consultation   Referral Reason: Specialty Services Required   Requested Specialty: Cardiology   Number of Visits Requested: 1     Ambulatory referral/consult to Pulmonology   Standing Status: Future   Referral Priority: Routine Referral Type: Consultation   Referral Reason: Specialty Services Required   Requested Specialty: Pulmonary Disease   Number of Visits Requested: 1       Significant Diagnostic Studies: Labs: CMP   Recent Labs   Lab 05/10/25  0256      K 4.1      CO2 24   *   BUN 16   CREATININE 0.7   CALCIUM 8.7   ANIONGAP 8    and CBC   Recent Labs   Lab 05/10/25  0256   WBC 9.28   HGB 14.4   HCT 43.5        Cardiac Graphics: Echocardiogram: Transthoracic echo (TTE) complete (Cupid Only):   Results for orders placed or performed during the hospital encounter of 05/06/25   Echo   Result Value Ref Range    LV Diastolic Volume 57 mL    Echo EF Estimated 60 %    LV Systolic Volume 23 mL    IVS 0.8 0.6 - 1.1 cm    LVIDd 3.7 3.5 - 6.0 cm    LVIDs 2.5 2.1 - 4.0 cm    LVOT diameter 1.9 cm    PW 0.8 0.6 - 1.1 cm    AV LVOT peak gradient 3 mmHg    LVOT mn grad 1 mmHg    LVOT peak titi 0.8 m/s    LVOT peak VTI 15.9 cm    RV- havrey basal diam 3.4 cm    LA size 3.2 cm    Left Atrium Major Axis 4.5 cm    Left Atrium Minor Axis 5.1 cm    LA Vol (MOD) 56 mL    RA Major Axis 4.11 cm    AV valve area 2.1 cm2    AV area by cont VTI 2.2 cm2    AV peak gradient 5 mmHg    AV mean gradient 3 mmHg    Ao peak titi 1.1 m/s    Ao VTI 21.6 cm    MV Peak A Titi 0.74 m/s    E wave deceleration time 157 ms    MV Peak E Titi 0.62 m/s    E/A ratio 0.84     LV LATERAL E/E' RATIO 10.3     LV SEPTAL E/E' RATIO  10.3     TDI LATERAL 0.06 m/s    TDI SEPTAL 0.06 m/s    TV peak gradient 23 mmHg    TR Max Titi 2.4 m/s    Ascending aorta 2.7 cm    STJ 2.4 cm    Sinus 2.86 cm    LA WIDTH 3.8 cm    RA Width 4.05 cm    TAPSE 1.6 cm    BSA 1.77 m2    LVOT stroke volume 45.1 cm3    LV Systolic Volume Index 13.3 mL/m2    LV Diastolic Volume Index 32.95 mL/m2    LVOT area 2.8 cm2    FS 32.4 28 - 44 %    Left Ventricle Relative Wall Thickness 0.43 cm    LV mass 82.3 g    LV Mass Index 47.6 g/m2    E/E' ratio 10 m/s    MARY 29 mL/m2    LA Vol 49 cm3    RV/LV Ratio 0.92 cm    MARY (MOD) 32 mL/m2    AV Velocity Ratio 0.73     AV index (prosthetic) 0.74     DAVID by Velocity Ratio 2.1 cm²    Mean e' 0.06 m/s    ZLVIDS -1.35     ZLVIDD -2.59     TV resting pulmonary artery pressure 26 mmHg    RV TB RVSP 5 mmHg    Est. RA pres 3 mmHg    Narrative      Left Ventricle: The left ventricle is normal in size. Normal wall   thickness. Normal wall motion. There is normal systolic function with a   visually estimated ejection fraction of 60 - 65%. There is normal   diastolic function. Normal left ventricular filling pressure.    Right Ventricle: The right ventricle is normal in size. Wall thickness   is normal. Systolic function is normal.    Left Atrium: Normal left atrial size.    Right Atrium: Normal right atrial size.    Aorta: The aortic root is normal in size measuring 2.86 cm. The   ascending aorta is normal in size measuring 2.7 cm.    Pulmonary Artery: The estimated pulmonary artery systolic pressure is   26 mmHg.    IVC/SVC: Normal venous pressure at 3 mmHg.    Pericardium: There is no pericardial effusion.         Pending Diagnostic Studies:       Procedure Component Value Units Date/Time    HCV Virus Hold Specimen [[id number]] Collected: 05/06/25 1251    Order Status: Sent Lab Status: In process Updated: 05/06/25 1301    Specimen: Blood            Medications:  Reconciled Home Medications:      Medication List        START taking these  medications      ipratropium 0.02 % nebulizer solution  Commonly known as: ATROVENT  Take 2.5 mLs (500 mcg total) by nebulization 4 (four) times daily as needed for Wheezing. Rescue     levalbuterol 0.63 mg/3 mL nebulizer solution  Commonly known as: XOPENEX  Take 3 mLs (0.63 mg total) by nebulization every 4 (four) hours as needed for Wheezing. Rescue     predniSONE 20 MG tablet  Commonly known as: DELTASONE  Take 2 tablets (40 mg total) by mouth once daily. for 10 days            CHANGE how you take these medications      metoprolol succinate 50 MG 24 hr tablet  Commonly known as: TOPROL-XL  Take 1 tablet (50 mg total) by mouth 2 (two) times daily.  What changed: when to take this     venlafaxine 150 MG Cp24  Commonly known as: EFFEXOR-XR  Take 1 capsule (150 mg total) by mouth once daily.  What changed: when to take this            CONTINUE taking these medications      acetaminophen 500 MG tablet  Commonly known as: TYLENOL  Take 2 tablets by mouth daily as needed for Pain.     anastrozole 1 mg Tab  Commonly known as: ARIMIDEX  Take 1 tablet (1 mg total) by mouth once daily.     atorvastatin 20 MG tablet  Commonly known as: LIPITOR  Take 1 tablet (20 mg total) by mouth every evening.     BIOTIN-COLLAGEN-VIT C-E-HERBAL ORAL  Take 2 tablets by mouth every evening.     calcium-vitamin D 250 mg-2.5 mcg (100 unit) per tablet  Take 1 tablet by mouth every morning.     cyanocobalamin 500 MCG tablet  Take 500 mcg by mouth once daily.     diazePAM 5 MG tablet  Commonly known as: VALIUM  TAKE 1 TABLET BY MOUTH DAILY AS NEEDED FOR ANXIETY.     diclofenac sodium 1 % Gel  Commonly known as: VOLTAREN  Apply 2 g topically 4 (four) times daily.     ELIQUIS 5 mg Tab  Generic drug: apixaban  Take 1 tablet (5 mg total) by mouth 2 (two) times daily.     fluocinonide-emollient 0.05 % Crea  Commonly known as: FLUOCINONIDE-E  Apply to affected area of feet daily as needed for psoriasis.     HAIR,SKIN AND NAILS ORAL  Take 1 tablet by  mouth Daily.     magnesium 200 mg Tab  Take 1 tablet by mouth nightly.     multivitamin per tablet  Commonly known as: THERAGRAN  Take 1 tablet by mouth once daily.     omeprazole 40 MG capsule  Commonly known as: PRILOSEC  Take 1 capsule (40 mg total) by mouth every morning.     ondansetron 4 MG tablet  Commonly known as: ZOFRAN  Take 2 tablets (8 mg total) by mouth every 12 (twelve) hours as needed for Nausea.     TALTZ AUTOINJECTOR 80 mg/mL Atin  Generic drug: ixekizumab  Inject 80 mg into the skin every 28 days.     TRELEGY ELLIPTA 100-62.5-25 mcg Dsdv  Generic drug: fluticasone-umeclidin-vilanter  Inhale 1 puff into the lungs once daily.     * triamcinolone acetonide 0.025% 0.025 % cream  Commonly known as: KENALOG  Apply to affected area of skin folds twice a day as needed for psoriasis.     * triamcinolone acetonide 0.1% 0.1 % cream  Commonly known as: KENALOG  Apply to affected areas of body twice a day as needed for psoriasis. Do not use on face or skin folds.     VITAMIN B COMPLEX-C ORAL  Take 1 tablet by mouth Daily.     zolpidem 10 mg Tab  Commonly known as: AMBIEN  Take 1 tablet (10 mg total) by mouth nightly as needed (insomnia).           * This list has 2 medication(s) that are the same as other medications prescribed for you. Read the directions carefully, and ask your doctor or other care provider to review them with you.                STOP taking these medications      amoxicillin-clavulanate 875-125mg 875-125 mg per tablet  Commonly known as: AUGMENTIN              Indwelling Lines/Drains at time of discharge:   Lines/Drains/Airways       Drain  Duration                  Closed/Suction Drain 11/13/24 1025 Right Thigh Bulb 15 Fr. 178 days         Closed/Suction Drain 11/13/24 1026 Left Breast Bulb 15 Fr. 178 days               Time spent on the discharge of patient: 40 minutes         David Maher DO  Department of Hospital Medicine  [facility] - Internal Medicine Telemetry

## 2025-05-10 NOTE — ASSESSMENT & PLAN NOTE
Patient presenting 4 days of KRUSE that was preceded by an URI. Within the past year has restarted smoking and does have a history of COPD. URI w/ productive cough (green sputum). Experiencing palpitations with KRUSE with heart rate on watch being recorded in the 160's in addition to chest tightness. Her past EP providers note reports typical flutter occurring during a bout of COPD exacerbation. Here in the ED with elevated hs troponin 133 and . EKG noted to have T wave inversions in leads II, AVF and V4-6. Current differential diagnosis includes COPD exacerbation and/or reoccurrence of atrial flutter or multifocal atrial tachycardia. Also considering NSTEMI though hs troponin elevation could be due to demand ischemia if atrial flutter w/ high HR occurring. Does not appear volume overloaded on exam though will obtain echo to assess further cardiac function and IVC and look for wall motion abnormalities. Cardiology consulted and suggested outpatient follow up.     Echo with EF 60 - 65%. There is normal diastolic function.  No wall motion abnormalities.    - continuing augmentin to finish outpatient providers course  - continue po prednisone 40mg on discharge  - continue DuoNebs   - continue eliquis

## 2025-05-10 NOTE — PLAN OF CARE
Adrien Florez - Internal Medicine Telemetry  Discharge Final Note    Primary Care Provider: Hetal Banks MD    Expected Discharge Date: 5/10/2025    Patient to be discharged home. The patient has a nebulizer delivered bedside by [facility]. Family to provide transportation home.    Future Appointments   Date Time Provider Department Center   5/15/2025 10:15 AM Hetal Banks MD [facility] [place]   6/2/2025  3:30 PM Jose Morales III, NP [facility]   6/10/2025  8:15 AM Hetal Banks MD [facility] [place]   7/25/2025  2:45 PM [facility] DEXA1 LIMIT 350 LBS [facility]        Final Discharge Note (most recent)       Final Note - 05/10/25 1126          Final Note    Assessment Type Final Discharge Note     Anticipated Discharge Disposition Home or Self Care        Post-Acute Status    Post-Acute Authorization [facility] Status Set-up Complete/Auth obtained     Discharge Delays None known at this time                     Important Message from Medicare

## 2025-05-10 NOTE — ASSESSMENT & PLAN NOTE
Patient's blood pressure range in the last 24 hours was: BP  Min: 110/46  Max: 153/79.The patient's inpatient anti-hypertensive regimen is listed below:  Current Antihypertensives  metoprolol succinate (TOPROL-XL) 24 hr tablet 50 mg, 2 times daily, Oral  metoprolol succinate (TOPROL-XL) 50 MG 24 hr tablet, 2 times daily, Oral    Plan  - BP is controlled, no changes needed to their regimen  - titrate as needed

## 2025-05-10 NOTE — ASSESSMENT & PLAN NOTE
Patient's most recent potassium results are listed below.   Recent Labs     05/08/25  0323 05/10/25  0256   K 4.0 4.1     Plan  - Replete potassium per protocol. Replete po  - Monitor potassium Daily

## 2025-05-11 LAB
OHS QRS DURATION: 76 MS
OHS QRS DURATION: 76 MS
OHS QRS DURATION: 82 MS
OHS QTC CALCULATION: 386 MS
OHS QTC CALCULATION: 467 MS
OHS QTC CALCULATION: 577 MS

## 2025-05-12 ENCOUNTER — PATIENT OUTREACH (OUTPATIENT)
Dept: ADMINISTRATIVE | Facility: CLINIC | Age: 65
End: 2025-05-12
Payer: COMMERCIAL

## 2025-05-12 NOTE — PROGRESS NOTES
C3 nurse spoke with Lucia Matias for a TCC post hospital discharge follow up call. The patient has a scheduled appointment with Hetal Banks MD on 05/15/25 @ 8415. Will route message to PCP staff to add hospital follow up to this appt if possible.

## 2025-05-15 ENCOUNTER — TELEPHONE (OUTPATIENT)
Dept: ENDOSCOPY | Facility: HOSPITAL | Age: 65
End: 2025-05-15

## 2025-05-15 ENCOUNTER — OFFICE VISIT (OUTPATIENT)
Dept: PRIMARY CARE CLINIC | Facility: CLINIC | Age: 65
End: 2025-05-15
Payer: COMMERCIAL

## 2025-05-15 ENCOUNTER — CLINICAL SUPPORT (OUTPATIENT)
Dept: ENDOSCOPY | Facility: HOSPITAL | Age: 65
End: 2025-05-15
Attending: INTERNAL MEDICINE
Payer: COMMERCIAL

## 2025-05-15 VITALS
HEART RATE: 78 BPM | BODY MASS INDEX: 28.69 KG/M2 | SYSTOLIC BLOOD PRESSURE: 130 MMHG | DIASTOLIC BLOOD PRESSURE: 72 MMHG | WEIGHT: 155.88 LBS | HEIGHT: 62 IN | OXYGEN SATURATION: 94 %

## 2025-05-15 DIAGNOSIS — I48.92 ATRIAL FLUTTER, UNSPECIFIED TYPE: ICD-10-CM

## 2025-05-15 DIAGNOSIS — Z12.12 ENCOUNTER FOR COLORECTAL CANCER SCREENING: ICD-10-CM

## 2025-05-15 DIAGNOSIS — Z79.60 LONG-TERM USE OF IMMUNOSUPPRESSANT MEDICATION: ICD-10-CM

## 2025-05-15 DIAGNOSIS — I10 ESSENTIAL HYPERTENSION: Chronic | ICD-10-CM

## 2025-05-15 DIAGNOSIS — L40.0 PSORIASIS VULGARIS: ICD-10-CM

## 2025-05-15 DIAGNOSIS — F41.8 DEPRESSION WITH ANXIETY: Primary | Chronic | ICD-10-CM

## 2025-05-15 DIAGNOSIS — M06.9 RHEUMATOID ARTHRITIS, INVOLVING UNSPECIFIED SITE, UNSPECIFIED WHETHER RHEUMATOID FACTOR PRESENT: ICD-10-CM

## 2025-05-15 DIAGNOSIS — E66.09 CLASS 1 OBESITY DUE TO EXCESS CALORIES WITHOUT SERIOUS COMORBIDITY WITH BODY MASS INDEX (BMI) OF 31.0 TO 31.9 IN ADULT: ICD-10-CM

## 2025-05-15 DIAGNOSIS — E78.5 HYPERLIPIDEMIA, UNSPECIFIED HYPERLIPIDEMIA TYPE: Chronic | ICD-10-CM

## 2025-05-15 DIAGNOSIS — Z12.11 ENCOUNTER FOR COLORECTAL CANCER SCREENING: ICD-10-CM

## 2025-05-15 DIAGNOSIS — I47.10 SVT (SUPRAVENTRICULAR TACHYCARDIA): ICD-10-CM

## 2025-05-15 DIAGNOSIS — Z00.00 ANNUAL PHYSICAL EXAM: ICD-10-CM

## 2025-05-15 DIAGNOSIS — I48.0 PAROXYSMAL A-FIB: ICD-10-CM

## 2025-05-15 DIAGNOSIS — C50.112 MALIGNANT NEOPLASM OF CENTRAL PORTION OF LEFT BREAST IN FEMALE, ESTROGEN RECEPTOR POSITIVE: ICD-10-CM

## 2025-05-15 DIAGNOSIS — E66.811 CLASS 1 OBESITY DUE TO EXCESS CALORIES WITHOUT SERIOUS COMORBIDITY WITH BODY MASS INDEX (BMI) OF 31.0 TO 31.9 IN ADULT: ICD-10-CM

## 2025-05-15 DIAGNOSIS — L40.50 PSORIATIC ARTHRITIS: ICD-10-CM

## 2025-05-15 DIAGNOSIS — Z17.0 MALIGNANT NEOPLASM OF CENTRAL PORTION OF LEFT BREAST IN FEMALE, ESTROGEN RECEPTOR POSITIVE: ICD-10-CM

## 2025-05-15 DIAGNOSIS — F51.01 PRIMARY INSOMNIA: ICD-10-CM

## 2025-05-15 DIAGNOSIS — Z87.891 PERSONAL HISTORY OF NICOTINE DEPENDENCE: ICD-10-CM

## 2025-05-15 DIAGNOSIS — J41.8 MIXED SIMPLE AND MUCOPURULENT CHRONIC BRONCHITIS: Chronic | ICD-10-CM

## 2025-05-15 PROBLEM — R06.09 DOE (DYSPNEA ON EXERTION): Status: RESOLVED | Noted: 2025-05-06 | Resolved: 2025-05-15

## 2025-05-15 PROBLEM — E83.39 HYPOPHOSPHATEMIA: Status: RESOLVED | Noted: 2025-05-06 | Resolved: 2025-05-15

## 2025-05-15 PROBLEM — E87.6 HYPOKALEMIA: Status: RESOLVED | Noted: 2025-05-06 | Resolved: 2025-05-15

## 2025-05-15 PROBLEM — R79.89 ELEVATED TROPONIN LEVEL: Status: RESOLVED | Noted: 2025-05-06 | Resolved: 2025-05-15

## 2025-05-15 PROCEDURE — 99999 PR PBB SHADOW E&M-EST. PATIENT-LVL V: CPT | Mod: PBBFAC,,, | Performed by: INTERNAL MEDICINE

## 2025-05-15 NOTE — LETTER
May 15, 2025      [facility]  [address]  [place] [address]  Phone: [phone]       Patient: Lucia Matias   YOB: 1960  Date of Visit: 05/15/2025    To Whom It May Concern:    Beni Matias  was at [facility] on 05/15/2025. 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 If you have any questions or concerns, or if I can be of further assistance, please do not hesitate to contact me.    Sincerely,    Hetal Banks MD

## 2025-05-15 NOTE — ASSESSMENT & PLAN NOTE
Sees Dr. Madrid.  On Arimidex.  s/p bilateral SSM with NEHA flap reconstruction, second stage with left flap vs fat graft necrosis with chronic draining wound (healed).  Had additional surgery in 4/2024 with unknown bacterial infection. Had further reconstruction and flap 11/2024 with Dr. Castillo.

## 2025-05-15 NOTE — ASSESSMENT & PLAN NOTE
Under a lot of stress, has 2 adult children and their kids living with him.  Trying to sell her house.  Also stressed at work.

## 2025-05-15 NOTE — PROGRESS NOTES
[facility] Note    Chief Complaint      Chief Complaint   Patient presents with    Follow-up     6 month     History of Present Illness      Lucia Matias is a 64 y.o. female who presents today for Annual preventative visit  Patient comes to appointment alone.  EP: Leilani, Cards: Ave, Psych: NP Flynn, Breast Surg: Zoran    Admitted 5/6-5/10 at [facility] for SVT and COPD exac. Increased dose of metoprolol and completed course of abx and prednisone.    Problem List Items Addressed This Visit       Atrial flutter    Current Assessment & Plan   On BB for rate control, Eliquis for AC.  Seeing Dr. Cuba soon to discuss additional ablation.         Class 1 obesity due to excess calories without serious comorbidity with body mass index (BMI) of 31.0 to 31.9 in adult    Current Assessment & Plan   Hx of bariatric surgery         COPD (chronic obstructive pulmonary disease) (Chronic)    Current Assessment & Plan   Stable on Trelegy with PRN Ventolinxopenex.  Admitted this month for COPD exac.         Depression with anxiety - Primary (Chronic)    Current Assessment & Plan   Under a lot of stress, has 2 adult children and their kids living with him.  Trying to sell her house.  Also stressed at work.         Essential hypertension (Chronic)    Current Assessment & Plan   BP controlled, on BB for rate control. On lisinopril prior to weight loss. No CP/HA.         HLD (hyperlipidemia) (Chronic)    Current Assessment & Plan   Stable on lipitor 20 mg daily, no myalgias.  The 10-year ASCVD risk score (Caren BRAR, et al., 2019) is: 7.3%    Values used to calculate the score:      Age: 64 years      Sex: Female      Is Non- : No      Diabetic: No      Tobacco smoker: No      Systolic Blood Pressure: 153 mmHg      Is BP treated: Yes      HDL Cholesterol: 44 mg/dL      Total Cholesterol: 152 mg/dL           Insomnia    Current Assessment & Plan   Gas always had issues with  sleep.  Took trazodone and ambien in past.         Long-term use of immunosuppressant medication    Malignant neoplasm of central portion of left breast in female, estrogen receptor positive    Current Assessment & Plan   Sees Dr. Madrid.  On Arimidex.  s/p bilateral SSM with NEHA flap reconstruction, second stage with left flap vs fat graft necrosis with chronic draining wound (healed).  Had additional surgery in 4/2024 with unknown bacterial infection. Had further reconstruction and flap 11/2024 with Dr. Castillo.         Psoriasis vulgaris    Overview   Tried and failed Enbrel x 1 yr  Tried and failed Humira x 1-2 yrs  Cimzia [id number]  Cosentyx 2020 - 2023  Taltz 2023 -    Lab Results   Component Value Date    TBGOLDPLUS Negative 06/25/2024              Current Assessment & Plan   Sees Dr. Lara, stable on Taltz.         Psoriatic arthritis    Current Assessment & Plan   On taltz. Doing well.         Rheumatoid arthritis    Current Assessment & Plan   On Taltz.   Previously on Cosentyx but insurance no longer covers.         SVT (supraventricular tachycardia)    Current Assessment & Plan   On increased dose of metoprolol since hospital DC, still with some moments of tachycardia.          Other Visit Diagnoses         Paroxysmal A-fib        Relevant Orders    TSH      Annual physical exam        Relevant Orders    Hemoglobin A1C    Comprehensive Metabolic Panel    Lipid Panel    CBC Auto Differential      Personal history of nicotine dependence        Relevant Orders    CT Chest Lung Screening Low Dose      Encounter for colorectal cancer screening        Relevant Orders    Ambulatory referral/consult to Endo Procedure                 Health Maintenance   Topic Date Due    Shingles Vaccine (1 of 2) Never done    Colorectal Cancer Screening  01/04/2020    RSV Vaccine (Age 60+ and Pregnant patients) (1 - Risk 60-74 years 1-dose series) Never done    LDCT Lung Screen  03/01/2024    COVID-19 Vaccine (5 -  - season) 2024    Hemoglobin A1c (Diabetic Prevention Screening)  2027    TETANUS VACCINE  2028    Cervical Cancer Screening  2029    Lipid Panel  2029    Hepatitis C Screening  Completed    Influenza Vaccine  Completed    HIV Screening  Completed    Pneumococcal Vaccines (Age 50+)  Completed       Past Medical History:   Diagnosis Date    Adverse reaction to succinimides     son has malignant hyperthermia    Allergy     Anticoagulant long-term use     Anxiety     Arthritis     Atrial flutter     Colon polyps     COPD (chronic obstructive pulmonary disease)     COPD exacerbation 10/31/2022    Depression     Diverticular disease of colon 2017    Diverticulitis     H/O gastric sleeve     HLD (hyperlipidemia)     Hypertension     resolved with gastric slleve    Malignant neoplasm of central portion of left breast in female, estrogen receptor positive 2022    Monoallelic mutation of MUTYH gene 2022    Osteopenia     Pancreatitis     Paroxysmal A-fib 2024    Pneumothorax, unspecified     following mastectomy sx     Psoriasis     Rheumatoid arthritis     Severe obesity (BMI 35.0-39.9) with comorbidity     Wears contact lenses     not often  wears glasses usually    Wears prescription eyeglasses        Past Surgical History:   Procedure Laterality Date    ABLATION  2022    Cardiac Ablation for A Flutter, Successful    ADENOIDECTOMY  1966    Tonsillitis and adnoids    BILATERAL MASTECTOMY Bilateral 02/15/2023    Procedure: MASTECTOMY, BILATERAL;  Surgeon: Marcela Meehan MD;  Location: [facility];  Service: General;  Laterality: Bilateral;  EMAIL SENT  @ 11:44 LK / 2.5 HOURS    BLADDER SUSPENSION      (x2)    BREAST REVISION SURGERY Bilateral 2023    Procedure: BREAST REVISION SURGERY / BILATERAL BREAST FLAP REVISION;  Surgeon: Ming Milian MD;  Location: [facility] OR;  Service: Plastics;  Laterality: Bilateral;  90 MINUTES     SECTION       (x2)    COLONOSCOPY      DEBRIDEMENT, BREAST Bilateral 03/29/2023    Procedure: DEBRIDEMENT, BREAST;  Surgeon: Ming Milian MD;  Location: [facility] OR;  Service: Plastics;  Laterality: Bilateral;    ESOPHAGOGASTRODUODENOSCOPY N/A 03/31/2021    Procedure: EGD (ESOPHAGOGASTRODUODENOSCOPY);  Surgeon: Vince Rodriguez MD;  Location: [facility] OR 2ND FLR;  Service: General;  Laterality: N/A;    INCISION AND DRAINAGE OF BREAST Left 10/26/2023    Procedure: INCISION AND DRAINAGE, BREAST;  Surgeon: Ming Milian MD;  Location: [facility] OR;  Service: Plastics;  Laterality: Left;    INCISION AND DRAINAGE OF BREAST Left 04/04/2024    Procedure: INCISION AND DRAINAGE, BREAST;  Surgeon: Ming Milian MD;  Location: [facility] OR;  Service: Plastics;  Laterality: Left;    INJECTION FOR SENTINEL NODE IDENTIFICATION Left 02/15/2023    Procedure: INJECTION, FOR SENTINEL NODE IDENTIFICATION;  Surgeon: Marcela Meehan MD;  Location: [facility];  Service: General;  Laterality: Left;    LAPAROSCOPIC SLEEVE GASTRECTOMY N/A 03/31/2021    Procedure: GASTRECTOMY, SLEEVE, LAPAROSCOPIC, with intraop EGD;  Surgeon: Vince Rodriguez MD;  Location: [facility] OR 2ND FLR;  Service: General;  Laterality: N/A;    LIPOSUCTION W/ FAT INJECTION Bilateral 08/29/2023    Procedure: LIPOSUCTION, WITH FAT TRANSFER;  Surgeon: Ming Milian MD;  Location: [facility];  Service: Plastics;  Laterality: Bilateral;  / FAT GRAFTING TO BOTH BREAST    LUNG BIOPSY  09/2020    RECONSTRUCTION OF BREAST WITH DEEP INFERIOR EPIGASTRIC ARTERY  (NEHA) FREE FLAP Bilateral 02/15/2023    Procedure: RECONSTRUCTION, BREAST, USING NEHA FREE FLAP;  Surgeon: Ming Milian MD;  Location: [facility] OR;  Service: Plastics;  Laterality: Bilateral;    RECONSTRUCTION OF BREAST WITH DEEP INFERIOR EPIGASTRIC ARTERY  (NEHA) FREE FLAP Left 11/13/2024    Procedure: RECONSTRUCTION, BREAST, USING FREE FLAP;  Surgeon: Ming Milian MD;  Location: [facility];  Service:  Plastics;  Laterality: Left;  / LEFT PAP FLAP  5 HOURS / HX  malignant hyperthermia  used PAP flap from Right thigh    REVISION, FLAP, PREVIOUSLY CREATED FOR BREAST RECONSTRUCTION Bilateral 2023    Procedure: REVISION, FLAP, PREVIOUSLY CREATED FOR BREAST RECONSTRUCTION;  Surgeon: Ming Milian MD;  Location: [facility];  Service: Plastics;  Laterality: Bilateral;  4 HOURS    REVISION, SCAR, ABDOMINAL DONOR SITE N/A 2023    Procedure: REVISION, SCAR, ABDOMINAL DONOR SITE;  Surgeon: Ming Milian MD;  Location: [facility];  Service: Plastics;  Laterality: N/A;    RHINOPLASTY      SENTINEL LYMPH NODE BIOPSY Left 02/15/2023    Procedure: BIOPSY, LYMPH NODE, SENTINEL;  Surgeon: Marcela Meehan MD;  Location: [facility];  Service: General;  Laterality: Left;    TONSILLECTOMY      TRANSESOPHAGEAL ECHOCARDIOGRAPHY N/A 2022    Procedure: ECHOCARDIOGRAM, TRANSESOPHAGEAL;  Surgeon: Kellie Grover MD;  Location: [facility] EP LAB;  Service: Cardiology;  Laterality: N/A;       family history includes Malignant hyperthermia in her son; No Known Problems in her daughter and daughter. She was adopted.    Social History     Tobacco Use    Smoking status: Former     Current packs/day: 0.00     Average packs/day: 0.5 packs/day for 41.9 years (21.0 ttl pk-yrs)     Types: Cigarettes     Start date: 1979     Quit date: 2020     Years since quittin.4     Passive exposure: Current    Smokeless tobacco: Never   Substance Use Topics    Alcohol use: Yes     Alcohol/week: 1.0 standard drink of alcohol     Types: 1 Glasses of wine per week     Comment: social-rarely    Drug use: No       Review of Systems   Constitutional:  Negative for chills and fever.   HENT:  Negative for sore throat.    Respiratory:  Negative for cough and shortness of breath.    Cardiovascular:  Negative for chest pain and palpitations.   Gastrointestinal:  Negative for constipation, diarrhea, nausea and vomiting.   Genitourinary:  Negative  for dysuria and hematuria.   Musculoskeletal:  Negative for falls.   Neurological:  Negative for headaches.        Outpatient Encounter Medications as of 5/15/2025   Medication Sig Note Dispense Refill    acetaminophen (TYLENOL) 500 MG tablet Take 2 tablets by mouth daily as needed for Pain.       anastrozole (ARIMIDEX) 1 mg Tab Take 1 tablet (1 mg total) by mouth once daily.  90 tablet 3    apixaban (ELIQUIS) 5 mg Tab Take 1 tablet (5 mg total) by mouth 2 (two) times daily.  180 tablet 3    atorvastatin (LIPITOR) 20 MG tablet Take 1 tablet (20 mg total) by mouth every evening.  90 tablet 3    B-complex with vitamin C (VITAMIN B COMPLEX-C ORAL) Take 1 tablet by mouth Daily.       BIOTIN-COLLAGEN-VIT C-E-HERBAL ORAL Take 2 tablets by mouth every evening.       calcium-vitamin D 250-100 mg-unit per tablet Take 1 tablet by mouth every morning.       cyanocobalamin 500 MCG tablet Take 500 mcg by mouth once daily.       diazePAM (VALIUM) 5 MG tablet TAKE 1 TABLET BY MOUTH DAILY AS NEEDED FOR ANXIETY.  30 tablet 5    diclofenac sodium (VOLTAREN) 1 % Gel Apply 2 g topically 4 (four) times daily. 2024: prn 100 g 1    fluocinonide-emollient (FLUOCINONIDE-E) 0.05 % Crea Apply to affected area of feet daily as needed for psoriasis.  60 g 3    fluticasone-umeclidin-vilanter (TRELEGY ELLIPTA) 100-62.5-25 mcg DsDv Inhale 1 puff into the lungs once daily.  180 each 3    ipratropium (ATROVENT) 0.02 % nebulizer solution Take 2.5 mLs (500 mcg total) by nebulization 4 (four) times daily as needed for Wheezing. Rescue  125 mL 1    levalbuterol (XOPENEX) 0.63 mg/3 mL nebulizer solution Take 3 mLs (0.63 mg total) by nebulization every 4 (four) hours as needed for Wheezing. Rescue  525 mL 3    magnesium 200 mg Tab Take 1 tablet by mouth nightly.       metoprolol succinate (TOPROL-XL) 50 MG 24 hr tablet Take 1 tablet (50 mg total) by mouth 2 (two) times daily.  180 tablet 3    [] metroNIDAZOLE (FLAGYL) 500 MG tablet Take 1  tablet (500 mg total) by mouth 2 (two) times a day. for 7 days  14 tablet 0    multivitamin (THERAGRAN) per tablet Take 1 tablet by mouth once daily.       multivitamin with minerals (HAIR,SKIN AND NAILS ORAL) Take 1 tablet by mouth Daily.       omeprazole (PRILOSEC) 40 MG capsule Take 1 capsule (40 mg total) by mouth every morning.  90 capsule 1    ondansetron (ZOFRAN) 4 MG tablet Take 2 tablets (8 mg total) by mouth every 12 (twelve) hours as needed for Nausea.  10 tablet 0    predniSONE (DELTASONE) 20 MG tablet Take 2 tablets (40 mg total) by mouth once daily. for 10 days  20 tablet 0    TALTZ AUTOINJECTOR 80 mg/mL AtIn Inject 80 mg into the skin every 28 days.  1 mL 6    triamcinolone acetonide 0.025% (KENALOG) 0.025 % cream Apply to affected area of skin folds twice a day as needed for psoriasis.  80 g 3    triamcinolone acetonide 0.1% (KENALOG) 0.1 % cream Apply to affected areas of body twice a day as needed for psoriasis. Do not use on face or skin folds.  454 g 1    venlafaxine (EFFEXOR-XR) 150 MG Cp24 Take 1 capsule (150 mg total) by mouth once daily.  90 capsule 3    zolpidem (AMBIEN) 10 mg Tab Take 1 tablet (10 mg total) by mouth nightly as needed (insomnia).  30 tablet 5    [DISCONTINUED] albuterol-ipratropium (DUO-NEB) 2.5 mg-0.5 mg/3 mL nebulizer solution Take 3 mLs by nebulization every 6 (six) hours as needed for Wheezing. Rescue  90 mL 0    [DISCONTINUED] amoxicillin-clavulanate 875-125mg (AUGMENTIN) 875-125 mg per tablet Take 1 tablet by mouth every 12 (twelve) hours.  20 tablet 0    [DISCONTINUED] budesonide-formoterol 160-4.5 mcg (SYMBICORT) 160-4.5 mcg/actuation HFAA Inhale 2 puffs into the lungs every 12 (twelve) hours. Controller  30.6 g 2    [DISCONTINUED] fluocinonide-emollient (FLUOCINONIDE-E) 0.05 % Crea Apply to affected area of feet daily as needed for psoriasis.  60 g 3    [DISCONTINUED] levoFLOXacin (LEVAQUIN) 500 MG tablet Take 1 tablet (500 mg total) by mouth once daily. (Patient  not taking: Reported on 2025)  7 tablet 0    [DISCONTINUED] metoprolol succinate (TOPROL-XL) 50 MG 24 hr tablet Take 1 tablet (50 mg total) by mouth nightly.  90 tablet 3    [DISCONTINUED] mv-mn/iron/folic acid/herb 190 (VITAMIN D3 COMPLETE ORAL) Take by mouth.       [DISCONTINUED] naloxone (NARCAN) 4 mg/actuation Spry 4mg by nasal route as needed for opioid overdose; may repeat every 2-3 minutes in alternating nostrils until medical help arrives. Call 911  1 each 11    [DISCONTINUED] nitrofurantoin, macrocrystal-monohydrate, (MACROBID) 100 MG capsule Take 1 capsule (100 mg total) by mouth 2 (two) times daily. (Patient not taking: Reported on 2024) 2024: For uti last day 24 10 capsule 0    [DISCONTINUED] omeprazole (PRILOSEC) 40 MG capsule Take 1 capsule (40 mg total) by mouth every morning.  90 capsule 1    [DISCONTINUED] oxyCODONE-acetaminophen (PERCOCET)  mg per tablet Take 1 tablet by mouth every 4 (four) hours as needed.  25 tablet 0    [DISCONTINUED] oxyCODONE-acetaminophen (PERCOCET) 5-325 mg per tablet Take 1 tablet by mouth every 4 (four) hours as needed for Pain.  20 tablet 0    [DISCONTINUED] TALTZ AUTOINJECTOR 80 mg/mL AtIn Inject 80 mg into the skin every 28 days.  1 mL 6    [DISCONTINUED] triamcinolone acetonide 0.025% (KENALOG) 0.025 % cream Apply to affected area of skin folds twice a day as needed for psoriasis.  80 g 3    [DISCONTINUED] triamcinolone acetonide 0.1% (KENALOG) 0.1 % cream Apply to affected areas of body twice a day as needed for psoriasis. Do not use on face or skin folds.  454 g 1    [] amoxicillin-clavulanate 875-125mg per tablet 1 tablet        [] azithromycin tablet 500 mg        [DISCONTINUED] anastrozole tablet 1 mg        [DISCONTINUED] apixaban tablet 5 mg        [DISCONTINUED] atorvastatin tablet 20 mg        [DISCONTINUED] cyanocobalamin tablet 500 mcg        [DISCONTINUED] dextrose 50% injection 12.5 g        [DISCONTINUED]  "dextrose 50% injection 25 g        [DISCONTINUED] diazePAM tablet 5 mg        [DISCONTINUED] fluticasone furoate-vilanteroL 100-25 mcg/dose diskus inhaler 1 puff        [DISCONTINUED] glucagon (human recombinant) injection 1 mg        [DISCONTINUED] glucose chewable tablet 16 g        [DISCONTINUED] glucose chewable tablet 24 g        [DISCONTINUED] ipratropium 0.02 % nebulizer solution 0.5 mg        [DISCONTINUED] ipratropium 0.02 % nebulizer solution 0.5 mg        [DISCONTINUED] ipratropium 0.02 % nebulizer solution 0.5 mg        [DISCONTINUED] levalbuterol nebulizer solution 0.63 mg        [DISCONTINUED] levalbuterol nebulizer solution 0.63 mg        [DISCONTINUED] levalbuterol nebulizer solution 0.63 mg        [DISCONTINUED] magnesium oxide tablet 400 mg        [DISCONTINUED] metoprolol succinate (TOPROL-XL) 24 hr tablet 50 mg        [DISCONTINUED] metoprolol succinate (TOPROL-XL) 24 hr tablet 50 mg        [DISCONTINUED] multivitamin tablet        [DISCONTINUED] naloxone 0.4 mg/mL injection 0.02 mg        [DISCONTINUED] pantoprazole EC tablet 40 mg        [DISCONTINUED] predniSONE tablet 40 mg        [DISCONTINUED] predniSONE tablet 40 mg        [DISCONTINUED] sodium chloride 0.9% flush 10 mL        [DISCONTINUED] tiotropium bromide 2.5 mcg/actuation inhaler 2 puff        [DISCONTINUED] venlafaxine 24 hr capsule 150 mg        [DISCONTINUED] zolpidem tablet 10 mg         No facility-administered encounter medications on file as of 5/15/2025.        Review of patient's allergies indicates:   Allergen Reactions    Succinimides Anaphylaxis     Son has MH    Vancomycin analogues Hives, Itching and Rash       Physical Exam      Vital Signs  Pulse: 78  SpO2: (!) 94 %  BP: 130/72  BP Location: Right arm  Patient Position: Sitting  Pain Score: 0-No pain  Height and Weight  Height: 5' 2" (157.5 cm)  Weight: 70.7 kg (155 lb 13.8 oz)  BSA (Calculated - sq m): 1.76 sq meters  BMI (Calculated): 28.5  Weight in (lb) to have " "BMI = 25: 136.4]    Physical Exam  Constitutional:       Appearance: She is well-developed.   HENT:      Head: Normocephalic and atraumatic.      Right Ear: External ear normal.      Left Ear: External ear normal.   Eyes:      General:         Right eye: No discharge.         Left eye: No discharge.   Cardiovascular:      Rate and Rhythm: Normal rate and regular rhythm.      Heart sounds: Normal heart sounds. No murmur heard.  Pulmonary:      Effort: Pulmonary effort is normal. No respiratory distress.      Breath sounds: Normal breath sounds.   Abdominal:      General: There is no distension.      Palpations: Abdomen is soft.      Tenderness: There is no abdominal tenderness. There is no guarding.   Musculoskeletal:         General: Normal range of motion.      Cervical back: Normal range of motion.   Skin:     General: Skin is warm and dry.   Neurological:      Mental Status: She is alert and oriented to person, place, and time.   Psychiatric:         Behavior: Behavior normal.          Laboratory:  CBC:  Recent Labs   Lab Result Units 05/07/25  0625 05/08/25  0323 05/10/25  0256   WBC K/uL 8.70 9.26 9.28   RBC M/uL 5.11 4.89 4.94   HGB gm/dL 14.9 14.3 14.4   HCT % 45.4 43.6 43.5   Platelet Count K/uL 264 246 260   MCV fL 89 89 88   MCH pg 29.2 29.2 29.1   MCHC g/dL 32.8 32.8 33.1     CMP:  Recent Labs   Lab Result Units 05/06/25  1406 05/07/25  0625 05/08/25  0323 05/10/25  0256   Glucose mg/dL 85 101   < > 114*   Calcium mg/dL 8.4* 8.9   < > 8.7   Albumin g/dL 3.1* 3.3*  --   --    Protein Total gm/dL 6.5 6.7  --   --    Sodium mmol/L 141 142   < > 141   Potassium mmol/L 3.4* 3.7   < > 4.1   CO2 mmol/L 27 26   < > 24   Chloride mmol/L 106 106   < > 109   BUN mg/dL 11 15   < > 16   ALP unit/L 93 94  --   --    ALT unit/L 17 19  --   --    AST unit/L 21 22  --   --    Bilirubin Total mg/dL 0.8 0.6  --   --     < > = values in this interval not displayed.     URINALYSIS:  No results for input(s): "COLORU", " ""CLARITYU", "SPECGRAV", "PHUR", "PROTEINUA", "GLUCOSEU", "BILIRUBINCON", "BLOODU", "WBCU", "RBCU", "BACTERIA", "MUCUS", "NITRITE", "LEUKOCYTESUR", "UROBILINOGEN", "HYALINECASTS" in the last 2160 hours.     LIPIDS:  No results for input(s): "TSH", "HDL", "CHOL", "TRIG", "LDLCALC", "CHOLHDL", "NONHDLCHOL", "TOTALCHOLEST" in the last 2160 hours.    TSH:  No results for input(s): "TSH" in the last 2160 hours.    A1C:  No results for input(s): "HGBA1C" in the last 2160 hours.      Radiology:  No results found in the last 30 days.     Assessment/Plan     Lucia Matias is a 64 y.o.female with:    1. Paroxysmal A-fib  - TSH; Future    2. Annual physical exam  - Hemoglobin A1C; Future  - Comprehensive Metabolic Panel; Future  - Lipid Panel; Future  - CBC Auto Differential; Future    3. Depression with anxiety    4. Psoriasis vulgaris    5. Mixed simple and mucopurulent chronic bronchitis    6. Atrial flutter, unspecified type    7. Essential hypertension    8. Hyperlipidemia, unspecified hyperlipidemia type    9. Rheumatoid arthritis, involving unspecified site, unspecified whether rheumatoid factor present    10. Malignant neoplasm of central portion of left breast in female, estrogen receptor positive    11. Class 1 obesity due to excess calories without serious comorbidity with body mass index (BMI) of 31.0 to 31.9 in adult    12. Long-term use of immunosuppressant medication    13. Primary insomnia    14. SVT (supraventricular tachycardia)    15. Psoriatic arthritis    16. Personal history of nicotine dependence  - CT Chest Lung Screening Low Dose; Future    17. Encounter for colorectal cancer screening  - Ambulatory referral/consult to Endo Procedure ; Future        -check labs, check LDCT when feeling better.  -improving, agree with EP f/u  -counseled on RSV vaccine  -Continue current medications and maintain follow up with specialists.    -Follow up in about 6 months (around 11/15/2025) for " follow up of medical problems.       Hetal Banks MD  [facility]

## 2025-05-15 NOTE — ASSESSMENT & PLAN NOTE
Stable on lipitor 20 mg daily, no myalgias.  The 10-year ASCVD risk score (Caren BRAR, et al., 2019) is: 7.3%    Values used to calculate the score:      Age: 64 years      Sex: Female      Is Non- : No      Diabetic: No      Tobacco smoker: No      Systolic Blood Pressure: 153 mmHg      Is BP treated: Yes      HDL Cholesterol: 44 mg/dL      Total Cholesterol: 152 mg/dL

## 2025-05-22 DIAGNOSIS — I48.0 PAROXYSMAL ATRIAL FIBRILLATION: Primary | ICD-10-CM

## 2025-05-23 ENCOUNTER — OFFICE VISIT (OUTPATIENT)
Dept: ELECTROPHYSIOLOGY | Facility: CLINIC | Age: 65
End: 2025-05-23
Payer: COMMERCIAL

## 2025-05-23 ENCOUNTER — HOSPITAL ENCOUNTER (OUTPATIENT)
Dept: CARDIOLOGY | Facility: CLINIC | Age: 65
Discharge: HOME | End: 2025-05-23
Payer: COMMERCIAL

## 2025-05-23 VITALS
DIASTOLIC BLOOD PRESSURE: 61 MMHG | SYSTOLIC BLOOD PRESSURE: 129 MMHG | HEIGHT: 62 IN | WEIGHT: 156.94 LBS | HEART RATE: 69 BPM | BODY MASS INDEX: 28.88 KG/M2

## 2025-05-23 DIAGNOSIS — I48.3 TYPICAL ATRIAL FLUTTER: ICD-10-CM

## 2025-05-23 DIAGNOSIS — I48.0 PAROXYSMAL ATRIAL FIBRILLATION: ICD-10-CM

## 2025-05-23 DIAGNOSIS — E78.2 MIXED HYPERLIPIDEMIA: ICD-10-CM

## 2025-05-23 DIAGNOSIS — C50.112 MALIGNANT NEOPLASM OF CENTRAL PORTION OF LEFT BREAST IN FEMALE, ESTROGEN RECEPTOR POSITIVE: ICD-10-CM

## 2025-05-23 DIAGNOSIS — E66.3 OVERWEIGHT (BMI 25.0-29.9): ICD-10-CM

## 2025-05-23 DIAGNOSIS — Z98.890 H/O CARDIAC RADIOFREQUENCY ABLATION: ICD-10-CM

## 2025-05-23 DIAGNOSIS — I47.10 SVT (SUPRAVENTRICULAR TACHYCARDIA): Primary | ICD-10-CM

## 2025-05-23 DIAGNOSIS — J44.9 CHRONIC OBSTRUCTIVE PULMONARY DISEASE, UNSPECIFIED COPD TYPE: ICD-10-CM

## 2025-05-23 DIAGNOSIS — M06.9 RHEUMATOID ARTHRITIS, INVOLVING UNSPECIFIED SITE, UNSPECIFIED WHETHER RHEUMATOID FACTOR PRESENT: ICD-10-CM

## 2025-05-23 DIAGNOSIS — F41.8 DEPRESSION WITH ANXIETY: Chronic | ICD-10-CM

## 2025-05-23 DIAGNOSIS — I10 PRIMARY HYPERTENSION: ICD-10-CM

## 2025-05-23 DIAGNOSIS — Z17.0 MALIGNANT NEOPLASM OF CENTRAL PORTION OF LEFT BREAST IN FEMALE, ESTROGEN RECEPTOR POSITIVE: ICD-10-CM

## 2025-05-23 DIAGNOSIS — Z87.891 HISTORY OF TOBACCO ABUSE: ICD-10-CM

## 2025-05-23 DIAGNOSIS — M15.9 OSTEOARTHRITIS OF MULTIPLE JOINTS, UNSPECIFIED OSTEOARTHRITIS TYPE: ICD-10-CM

## 2025-05-23 DIAGNOSIS — Z90.3 HISTORY OF SLEEVE GASTRECTOMY: ICD-10-CM

## 2025-05-23 LAB
OHS QRS DURATION: 86 MS
OHS QTC CALCULATION: 372 MS

## 2025-05-23 PROCEDURE — 99999 PR PBB SHADOW E&M-EST. PATIENT-LVL V: CPT | Mod: PBBFAC,,, | Performed by: STUDENT IN AN ORGANIZED HEALTH CARE EDUCATION/TRAINING PROGRAM

## 2025-05-23 PROCEDURE — 3074F SYST BP LT 130 MM HG: CPT | Mod: CPTII,S$GLB,, | Performed by: STUDENT IN AN ORGANIZED HEALTH CARE EDUCATION/TRAINING PROGRAM

## 2025-05-23 PROCEDURE — 3078F DIAST BP <80 MM HG: CPT | Mod: CPTII,S$GLB,, | Performed by: STUDENT IN AN ORGANIZED HEALTH CARE EDUCATION/TRAINING PROGRAM

## 2025-05-23 PROCEDURE — 99215 OFFICE O/P EST HI 40 MIN: CPT | Mod: S$GLB,,, | Performed by: STUDENT IN AN ORGANIZED HEALTH CARE EDUCATION/TRAINING PROGRAM

## 2025-05-23 PROCEDURE — 1159F MED LIST DOCD IN RCRD: CPT | Mod: CPTII,S$GLB,, | Performed by: STUDENT IN AN ORGANIZED HEALTH CARE EDUCATION/TRAINING PROGRAM

## 2025-05-23 PROCEDURE — 93010 ELECTROCARDIOGRAM REPORT: CPT | Mod: S$GLB,,, | Performed by: INTERNAL MEDICINE

## 2025-05-23 PROCEDURE — 1111F DSCHRG MED/CURRENT MED MERGE: CPT | Mod: CPTII,S$GLB,, | Performed by: STUDENT IN AN ORGANIZED HEALTH CARE EDUCATION/TRAINING PROGRAM

## 2025-05-23 PROCEDURE — 93005 ELECTROCARDIOGRAM TRACING: CPT | Mod: S$GLB,,, | Performed by: STUDENT IN AN ORGANIZED HEALTH CARE EDUCATION/TRAINING PROGRAM

## 2025-05-23 PROCEDURE — 3008F BODY MASS INDEX DOCD: CPT | Mod: CPTII,S$GLB,, | Performed by: STUDENT IN AN ORGANIZED HEALTH CARE EDUCATION/TRAINING PROGRAM

## 2025-05-23 RX ORDER — METOPROLOL SUCCINATE 50 MG/1
50 TABLET, EXTENDED RELEASE ORAL 2 TIMES DAILY
Qty: 180 TABLET | Refills: 3 | Status: SHIPPED | OUTPATIENT
Start: 2025-05-23 | End: 2026-05-23

## 2025-05-23 NOTE — PROGRESS NOTES
Electrophysiology Clinic Note    Reason for new patient visit: Evaluation and recommendations regarding episodes of SVT.    PRESENTING HISTORY:     History of Present Illness:  Ms. Lucia Matias is a domo 64-year-old woman who presents today for evaluation and recommendations regarding episodes of SVT. She has a past medical history significant for periods of SVT, a history of typical atrial flutter s/p an EP study with radiofrequency catheter ablation of the cavotricuspid isthmus on 11/2/2022, COPD, hypertension, hyperlipidemia, left-sided breast cancer stage IA HR+ s/p bilateral mastectomy with reconstruction and maintained on anastrozole therapy, anxiety, depression, Rheumatoid arthritis, osteoarthritis, a history of tobacco use, a gastric sleeve surgery, and overweight BMI.     She was previously admitted to [facility] from 5/6-10/2025 with complaints of dyspnea with exertion, worsened shortness of breath, and a persistent cough. She tells me that prior to this presentation she and her  were on vacation in [place], where they both experienced an upper respiratory tract infection. She was initiated on a 10-day course of Augmentin; however, she reports that her shortness of breath worsened and she noted symptoms of racing heart rates. Her Apple watch reported a heart rate of 160bpm. Upon return to [place], she presented to the ED for evaluation. She was diagnosed with an exacerbation of her COPD and underwent nebulizer therapy in addition to a steroid taper. She evidenced periods of SVT with a long-RP, narrow complex tachycardia, with rates ranging from 140-160bpm. There was an ECG obtained 5/6/2025 at 17:26 capturing termination of one of these events with restoration of normal sinus rhythm. Her QRS complex appears unchanged in tachycardia, with clear difference in her atrial activity, possibly indicating that this is an atrial tachycardia versus an atypical AVNRT. After  "5/6/2025, she did not evidence any subsequent events of SVT over her admission, and remained in normal sinus rhythm on all subsequent ECGs. She has continued to use her Apple watch for ongoing monitoring and denies any recurrent events of tachycardia. She underwent a repeat resting 2D transthoracic echocardiogram evaluation revealing preserved systolic ejection fraction with an estimated LVEF of 60-65% with normal diastolic function. She remains on uninterrupted oral anticoagulation with apixaban 5mg po BID and denies any adverse bleeding events while on this agent. She remains on metoprolol succinate 50mg po BID, increased form her previous daily dosed regimen. She reports increased stress at home, as her adult children have moved in to live with her and her . 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    In discussion with Ms. Matias today, she tells me that she is feeling overall "much better" following her discharge home. She denies any episodes of dizziness, lightheadedness, syncope/presyncope, chest pain or chest discomfort, recurrent palpitations, nausea or vomiting, orthopnea, or PND. She reports baseline mild shortness of breath and dyspnea with exertion that she feels has remained stable. She can climb one flight of stairs prior to needing to take a break and continues to attempt to increase her level of physical activity.     Review of Systems:  Review of Systems   Constitutional:  Negative for activity change.   HENT:  Negative for nasal congestion, nosebleeds, postnasal drip, rhinorrhea, sinus pressure/congestion, sneezing and sore throat.    Respiratory:  Positive for shortness of breath. Negative for apnea, cough, chest tightness and wheezing.    Cardiovascular:  Negative for chest pain, palpitations and leg swelling.   Gastrointestinal:  Negative for abdominal distention, abdominal pain, blood in stool, change in bowel habit, constipation, diarrhea, nausea and vomiting. "   Genitourinary:  Negative for dysuria and hematuria.   Musculoskeletal:  Positive for arthralgias and back pain. Negative for gait problem.   Neurological:  Negative for dizziness, seizures, syncope, weakness, light-headedness, headaches and coordination difficulties.        PAST HISTORY:     Past Medical History:   Diagnosis Date    Adverse reaction to succinimides     son has malignant hyperthermia    Allergy     Anticoagulant long-term use     Anxiety     Arthritis     Atrial flutter     Colon polyps     COPD (chronic obstructive pulmonary disease)     COPD exacerbation 10/31/2022    Depression     Diverticular disease of colon 2017    Diverticulitis     H/O gastric sleeve     HLD (hyperlipidemia)     Hypertension     resolved with gastric slleve    Malignant neoplasm of central portion of left breast in female, estrogen receptor positive 2022    Monoallelic mutation of MUTYH gene 2022    Osteopenia     Pancreatitis     Paroxysmal A-fib 2024    Pneumothorax, unspecified     following mastectomy sx     Psoriasis     Rheumatoid arthritis     Severe obesity (BMI 35.0-39.9) with comorbidity     Wears contact lenses     not often  wears glasses usually    Wears prescription eyeglasses        Past Surgical History:   Procedure Laterality Date    ABLATION  2022    Cardiac Ablation for A Flutter, Successful    ADENOIDECTOMY  1966    Tonsillitis and adnoids    BILATERAL MASTECTOMY Bilateral 02/15/2023    Procedure: MASTECTOMY, BILATERAL;  Surgeon: Marcela Meehan MD;  Location: [facility];  Service: General;  Laterality: Bilateral;  EMAIL SENT  @ 11:44 LK / 2.5 HOURS    BLADDER SUSPENSION      (x2)    BREAST REVISION SURGERY Bilateral 2023    Procedure: BREAST REVISION SURGERY / BILATERAL BREAST FLAP REVISION;  Surgeon: Ming Milian MD;  Location: [facility];  Service: Plastics;  Laterality: Bilateral;  90 MINUTES     SECTION      (x2)    COLONOSCOPY      DEBRIDEMENT,  BREAST Bilateral 03/29/2023    Procedure: DEBRIDEMENT, BREAST;  Surgeon: Ming Milian MD;  Location: [facility] OR;  Service: Plastics;  Laterality: Bilateral;    ESOPHAGOGASTRODUODENOSCOPY N/A 03/31/2021    Procedure: EGD (ESOPHAGOGASTRODUODENOSCOPY);  Surgeon: Vince Rodriguez MD;  Location: [facility] OR 2ND FLR;  Service: General;  Laterality: N/A;    INCISION AND DRAINAGE OF BREAST Left 10/26/2023    Procedure: INCISION AND DRAINAGE, BREAST;  Surgeon: Ming Milian MD;  Location: [facility] OR;  Service: Plastics;  Laterality: Left;    INCISION AND DRAINAGE OF BREAST Left 04/04/2024    Procedure: INCISION AND DRAINAGE, BREAST;  Surgeon: Ming Milian MD;  Location: [facility] OR;  Service: Plastics;  Laterality: Left;    INJECTION FOR SENTINEL NODE IDENTIFICATION Left 02/15/2023    Procedure: INJECTION, FOR SENTINEL NODE IDENTIFICATION;  Surgeon: Marcela Meehan MD;  Location: [facility];  Service: General;  Laterality: Left;    LAPAROSCOPIC SLEEVE GASTRECTOMY N/A 03/31/2021    Procedure: GASTRECTOMY, SLEEVE, LAPAROSCOPIC, with intraop EGD;  Surgeon: Vince Rodriguez MD;  Location: [facility] OR 2ND FLR;  Service: General;  Laterality: N/A;    LIPOSUCTION W/ FAT INJECTION Bilateral 08/29/2023    Procedure: LIPOSUCTION, WITH FAT TRANSFER;  Surgeon: Ming Milian MD;  Location: [facility];  Service: Plastics;  Laterality: Bilateral;  / FAT GRAFTING TO BOTH BREAST    LUNG BIOPSY  09/2020    RECONSTRUCTION OF BREAST WITH DEEP INFERIOR EPIGASTRIC ARTERY  (NEHA) FREE FLAP Bilateral 02/15/2023    Procedure: RECONSTRUCTION, BREAST, USING NEHA FREE FLAP;  Surgeon: Ming Milian MD;  Location: [facility] OR;  Service: Plastics;  Laterality: Bilateral;    RECONSTRUCTION OF BREAST WITH DEEP INFERIOR EPIGASTRIC ARTERY  (NEHA) FREE FLAP Left 11/13/2024    Procedure: RECONSTRUCTION, BREAST, USING FREE FLAP;  Surgeon: Ming Milian MD;  Location: [facility] OR;  Service: Plastics;  Laterality: Left;  / LEFT PAP  "FLAP  5 HOURS / HX  malignant hyperthermia  used PAP flap from Right thigh    REVISION, FLAP, PREVIOUSLY CREATED FOR BREAST RECONSTRUCTION Bilateral 08/29/2023    Procedure: REVISION, FLAP, PREVIOUSLY CREATED FOR BREAST RECONSTRUCTION;  Surgeon: Ming Milian MD;  Location: [facility];  Service: Plastics;  Laterality: Bilateral;  4 HOURS    REVISION, SCAR, ABDOMINAL DONOR SITE N/A 08/29/2023    Procedure: REVISION, SCAR, ABDOMINAL DONOR SITE;  Surgeon: Ming Milian MD;  Location: [facility];  Service: Plastics;  Laterality: N/A;    RHINOPLASTY      SENTINEL LYMPH NODE BIOPSY Left 02/15/2023    Procedure: BIOPSY, LYMPH NODE, SENTINEL;  Surgeon: Marcela Meehan MD;  Location: [facility];  Service: General;  Laterality: Left;    TONSILLECTOMY      TRANSESOPHAGEAL ECHOCARDIOGRAPHY N/A 11/02/2022    Procedure: ECHOCARDIOGRAM, TRANSESOPHAGEAL;  Surgeon: Kellie Grover MD;  Location: [facility] EP LAB;  Service: Cardiology;  Laterality: N/A;       Family History:  Family History   Adopted: Yes   Problem Relation Name Age of Onset    No Known Problems Daughter      No Known Problems Daughter      Malignant hyperthermia Son         Social History:  She  reports that she quit smoking about 4 years ago. Her smoking use included cigarettes. She started smoking about 46 years ago. She has a 21 pack-year smoking history. She has been exposed to tobacco smoke. She has never used smokeless tobacco. She reports current alcohol use of about 1.0 standard drink of alcohol per week. She reports that she does not use drugs.      MEDICATIONS & ALLERGIES:     Review of patient's allergies indicates:   Allergen Reactions    Succinimides Anaphylaxis     Son has MH    Vancomycin analogues Hives, Itching and Rash       Medications Ordered Prior to Encounter[1]     OBJECTIVE:     Vital Signs:  /61   Pulse 69   Ht 5' 2" (1.575 m)   Wt 71.2 kg (156 lb 15.5 oz)   BMI 28.71 kg/m²     Physical Exam:  Physical Exam  Constitutional:       " General: She is not in acute distress.     Appearance: Normal appearance. She is not ill-appearing or diaphoretic.      Comments: Well-appearing woman in NAD.   HENT:      Head: Normocephalic and atraumatic.      Nose: Nose normal.      Mouth/Throat:      Mouth: Mucous membranes are moist.      Pharynx: Oropharynx is clear.   Eyes:      Pupils: Pupils are equal, round, and reactive to light.   Cardiovascular:      Rate and Rhythm: Normal rate and regular rhythm.      Pulses: Normal pulses.      Heart sounds: Normal heart sounds. No murmur heard.     No friction rub. No gallop.   Pulmonary:      Effort: Pulmonary effort is normal. No respiratory distress.      Breath sounds: Normal breath sounds. No wheezing, rhonchi or rales.   Chest:      Chest wall: No tenderness.   Abdominal:      General: There is no distension.      Palpations: Abdomen is soft.      Tenderness: There is no abdominal tenderness.   Musculoskeletal:         General: No swelling or tenderness.      Cervical back: Normal range of motion.      Right lower leg: No edema.      Left lower leg: No edema.   Skin:     General: Skin is warm and dry.      Findings: No erythema, lesion or rash.   Neurological:      General: No focal deficit present.      Mental Status: She is alert and oriented to person, place, and time. Mental status is at baseline.      Motor: No weakness.      Gait: Gait normal.   Psychiatric:         Mood and Affect: Mood normal.         Behavior: Behavior normal.        Laboratory Data:  Lab Results   Component Value Date    WBC 9.28 05/10/2025    HGB 14.4 05/10/2025    HCT 43.5 05/10/2025    MCV 88 05/10/2025     05/10/2025     Lab Results   Component Value Date     (H) 05/10/2025     05/10/2025    K 4.1 05/10/2025     05/10/2025    CO2 24 05/10/2025    BUN 16 05/10/2025    CREATININE 0.7 05/10/2025    CALCIUM 8.7 05/10/2025    MG 2.2 05/10/2025     Lab Results   Component Value Date    INR 1.0 11/02/2022     INR 0.9 08/28/2020    INR 0.9 01/16/2020       Pertinent Cardiac Data:  Personal interpretation of today's ECG: Normal sinus rhythm with rate of 69 bpm, NE approximately 160 ms, QRS 86 ms, QT/QTc 348/372 ms, nonspecific STT changes.     Dobutamine Stress Echocardiogram - 6/26/2019:  There were no arrhythmias during stress.  The EKG portion of this study is negative for myocardial ischemia.  The patient reached the end of the protocol.  Mild right ventricular enlargement.  Normal left ventricular systolic function. The estimated ejection fraction is 60%  Normal LV diastolic function.  Normal right ventricular systolic function.  Normal central venous pressure (3 mm Hg).  The estimated PA systolic pressure is 25 mm Hg  The stress echo portion of this study is negative for myocardial ischemia.    Dobutamine Stress Echocardiogram - 2/18/2021:  Sensitivity is impaired due to the patient's failure to achieve target heart rate ( stopped due to hypotension).  There were no arrhythmias during stress.  The ECG portion of this study is negative for myocardial ischemia.  The left ventricle is normal in size with normal systolic function. The estimated ejection fraction is 58%  Normal left ventricular diastolic function.  Normal right ventricular size with normal right ventricular systolic function.  Normal central venous pressure (3 mmHg).  The estimated PA systolic pressure is 19 mmHg.  The stress echo portion of this study is negative for myocardial ischemia.    Resting 2D Transthoracic Echocardiogram - 10/31/2022:  The left ventricle is normal in size with low normal systolic function.  The estimated ejection fraction is 50%.  Indeterminate left ventricular diastolic function.  Mild left atrial enlargement.  Mild tricuspid regurgitation.  Mild right ventricular enlargement with low normal right ventricular systolic function.  Mild right atrial enlargement.  Tachycardia was present during the study, heart rates up to  120.  Poor endocardial visualization  The estimated PA systolic pressure is 20 mmHg.  Normal central venous pressure (3 mmHg).    SHAYLEE - 11/2/2022:  The left ventricle is normal in size with mildly decreased systolic function.  The estimated ejection fraction is 45%.  Moderate left atrial enlargement.  Moderate right atrial enlargement.  Normal appearing left atrial appendage. No thrombus is present in the appendage. Normal appendage velocities.  Grade 1 plaque present.  Left ventricular diastolic dysfunction.  Normal right ventricular size with normal right ventricular systolic function.    EPS with RFA CTI - 11/2/2022:    Successful ablation of atrial flutter (typical).    3D mapping performed with Ensite.    CS catheter placement and pacing.    His bundle recording.    Resting 2D Transthoracic Echocardiogram - 1/25/2023:  The left ventricle is normal in size with normal systolic function.  Normal central venous pressure (3 mmHg).  The estimated PA systolic pressure is 26 mmHg.  The estimated ejection fraction is 70%.  Normal left ventricular diastolic function.  Normal right ventricular size with normal right ventricular systolic function.    Resting 2D Transthoracic Echocardiogram - 4/13/2023:  The left ventricle is normal in size with normal systolic function.  The estimated ejection fraction is 60%.  Grade I left ventricular diastolic dysfunction.  Normal right ventricular size with normal right ventricular systolic function.    Resting 2D Transthoracic Echocardiogram - 8/4/2023:    Left Ventricle: The left ventricle is normal in size. Normal wall thickness. Normal wall motion. There is normal systolic function with a visually estimated ejection fraction of 55 - 60%. Unable to assess diastolic function due to poor image quality.    Right Ventricle: Systolic function is normal.    Mitral Valve: There is no stenosis.    Tricuspid Valve: There is mild regurgitation with a centrally directed jet.    Resting 2D  Transthoracic Echocardiogram - 5/7/2025:    Left Ventricle: The left ventricle is normal in size. Normal wall thickness. Normal wall motion. There is normal systolic function with a visually estimated ejection fraction of 60 - 65%. There is normal diastolic function. Normal left ventricular filling pressure.    Right Ventricle: The right ventricle is normal in size. Wall thickness is normal. Systolic function is normal.    Left Atrium: Normal left atrial size.    Right Atrium: Normal right atrial size.    Aorta: The aortic root is normal in size measuring 2.86 cm. The ascending aorta is normal in size measuring 2.7 cm.    Pulmonary Artery: The estimated pulmonary artery systolic pressure is 26 mmHg.    IVC/SVC: Normal venous pressure at 3 mmHg.    Pericardium: There is no pericardial effusion.      ASSESSMENT & PLAN:   Ms. Lucia Matias is a domo 64-year-old woman who presents today for evaluation and recommendations regarding episodes of SVT. She has a past medical history significant for periods of SVT, a history of typical atrial flutter s/p an EP study with radiofrequency catheter ablation of the cavotricuspid isthmus on 11/2/2022, COPD, hypertension, hyperlipidemia, left-sided breast cancer stage IA HR+ s/p bilateral mastectomy with reconstruction and maintained on anastrozole therapy, anxiety, depression, Rheumatoid arthritis, osteoarthritis, a history of tobacco use, a gastric sleeve surgery, and overweight BMI.     - We discussed the pathophysiology of SVT and reviewed that SVT is an umbrella term that encompasses several different types of atrial arrhythmias. We reviewed the results of her prior ECGs from her recent admission with events of SVT. On strip review, her SVT is a long-RP, narrow complex tachycardia, with rates ranging from 140-160bpm. There was an ECG obtained 5/6/2025 at 17:26 capturing termination of one of these events with restoration of normal sinus rhythm. Her QRS complex  appears unchanged in tachycardia, with clear difference in her atrial activity, possibly indicating that this is an atrial tachycardia versus an atypical AVNRT. A rudimentary diagram was drawn for the patient to assist in education.   - After 5/6/2025, she did not evidence any subsequent events of SVT over her admission, and remained in normal sinus rhythm on all subsequent ECGs. She has continued to use her Apple watch for ongoing monitoring and denies any recurrent events of tachycardia. We will obtain a 14-day ambulatory event monitor for ongoing surveillance.   - We discussed that her prior COPD exacerbation, or her nebulizer or antibiotic therapy may have served as a  for these events of SVT.   - We discussed the concept of undergoing an EP study in order to better characterize her SVT and for possible definitive ablation. We discussed undergoing radiofrequency ablation with possible atrial tachycardia ablation versus slow pathway modification. The procedure itself, risks, benefits, and alternative options were discussed. As Ms. Matias is no longer experiencing events of SVT, she would like to continue pharmacologic suppression with metoprolol and undergo ambulatory monitoring prior to consideration of EPS and ablation.   - We reviewed the results of her recent resting 2D transthoracic echocardiogram evaluation revealing preserved systolic ejection fraction with an estimated LVEF of 60-65% with normal diastolic function.   - She remains on uninterrupted oral anticoagulation with apixaban 5mg po BID and denies any adverse bleeding events while on this agent. She remains on metoprolol succinate 50mg po BID with no subsequent events of SVT.     This patient will return to clinic with me in four months with a 14-day ambulatory event monitor in the interim. All questions and concerns were addressed at this encounter. Total time spent in this patient encounter: 63 minutes.      Signing Physician:       NIELS Montes  MD Rosa Elena  Electrophysiology Attending         [1]   Current Outpatient Medications on File Prior to Visit   Medication Sig Dispense Refill    acetaminophen (TYLENOL) 500 MG tablet Take 2 tablets by mouth daily as needed for Pain.      anastrozole (ARIMIDEX) 1 mg Tab Take 1 tablet (1 mg total) by mouth once daily. 90 tablet 3    apixaban (ELIQUIS) 5 mg Tab Take 1 tablet (5 mg total) by mouth 2 (two) times daily. 180 tablet 3    atorvastatin (LIPITOR) 20 MG tablet Take 1 tablet (20 mg total) by mouth every evening. 90 tablet 3    B-complex with vitamin C (VITAMIN B COMPLEX-C ORAL) Take 1 tablet by mouth Daily.      BIOTIN-COLLAGEN-VIT C-E-HERBAL ORAL Take 2 tablets by mouth every evening.      calcium-vitamin D 250-100 mg-unit per tablet Take 1 tablet by mouth every morning.      cyanocobalamin 500 MCG tablet Take 500 mcg by mouth once daily.      diazePAM (VALIUM) 5 MG tablet TAKE 1 TABLET BY MOUTH DAILY AS NEEDED FOR ANXIETY. 30 tablet 5    diclofenac sodium (VOLTAREN) 1 % Gel Apply 2 g topically 4 (four) times daily. 100 g 1    fluocinonide-emollient (FLUOCINONIDE-E) 0.05 % Crea Apply to affected area of feet daily as needed for psoriasis. 60 g 3    fluticasone-umeclidin-vilanter (TRELEGY ELLIPTA) 100-62.5-25 mcg DsDv Inhale 1 puff into the lungs once daily. 180 each 3    ipratropium (ATROVENT) 0.02 % nebulizer solution Take 2.5 mLs (500 mcg total) by nebulization 4 (four) times daily as needed for Wheezing. Rescue 125 mL 1    levalbuterol (XOPENEX) 0.63 mg/3 mL nebulizer solution Take 3 mLs (0.63 mg total) by nebulization every 4 (four) hours as needed for Wheezing. Rescue 525 mL 3    magnesium 200 mg Tab Take 1 tablet by mouth nightly.      metoprolol succinate (TOPROL-XL) 50 MG 24 hr tablet Take 1 tablet (50 mg total) by mouth 2 (two) times daily. 180 tablet 3    multivitamin (THERAGRAN) per tablet Take 1 tablet by mouth once daily.      multivitamin with minerals (HAIR,SKIN AND NAILS ORAL) Take 1 tablet by  mouth Daily.      omeprazole (PRILOSEC) 40 MG capsule Take 1 capsule (40 mg total) by mouth every morning. 90 capsule 1    ondansetron (ZOFRAN) 4 MG tablet Take 2 tablets (8 mg total) by mouth every 12 (twelve) hours as needed for Nausea. 10 tablet 0    TALTZ AUTOINJECTOR 80 mg/mL AtIn Inject 80 mg into the skin every 28 days. 1 mL 6    triamcinolone acetonide 0.025% (KENALOG) 0.025 % cream Apply to affected area of skin folds twice a day as needed for psoriasis. 80 g 3    triamcinolone acetonide 0.1% (KENALOG) 0.1 % cream Apply to affected areas of body twice a day as needed for psoriasis. Do not use on face or skin folds. 454 g 1    venlafaxine (EFFEXOR-XR) 150 MG Cp24 Take 1 capsule (150 mg total) by mouth once daily. 90 capsule 3    zolpidem (AMBIEN) 10 mg Tab Take 1 tablet (10 mg total) by mouth nightly as needed (insomnia). 30 tablet 5    [DISCONTINUED] budesonide-formoterol 160-4.5 mcg (SYMBICORT) 160-4.5 mcg/actuation HFAA Inhale 2 puffs into the lungs every 12 (twelve) hours. Controller 30.6 g 2     No current facility-administered medications on file prior to visit.

## 2025-05-24 PROBLEM — Z98.890 H/O CARDIAC RADIOFREQUENCY ABLATION: Status: ACTIVE | Noted: 2025-05-24

## 2025-05-24 PROBLEM — I48.3 TYPICAL ATRIAL FLUTTER: Status: ACTIVE | Noted: 2024-01-03

## 2025-06-02 ENCOUNTER — OFFICE VISIT (OUTPATIENT)
Dept: PSYCHIATRY | Facility: CLINIC | Age: 65
End: 2025-06-02
Payer: COMMERCIAL

## 2025-06-02 DIAGNOSIS — F41.1 GAD (GENERALIZED ANXIETY DISORDER): ICD-10-CM

## 2025-06-02 DIAGNOSIS — G47.00 INSOMNIA, UNSPECIFIED TYPE: ICD-10-CM

## 2025-06-02 DIAGNOSIS — F33.40 MDD (RECURRENT MAJOR DEPRESSIVE DISORDER) IN REMISSION: ICD-10-CM

## 2025-06-02 DIAGNOSIS — F41.0 PANIC ATTACK: ICD-10-CM

## 2025-06-02 PROCEDURE — 1159F MED LIST DOCD IN RCRD: CPT | Mod: CPTII,95,, | Performed by: NURSE PRACTITIONER

## 2025-06-02 PROCEDURE — 98006 SYNCH AUDIO-VIDEO EST MOD 30: CPT | Mod: 95,,, | Performed by: NURSE PRACTITIONER

## 2025-06-02 PROCEDURE — 1160F RVW MEDS BY RX/DR IN RCRD: CPT | Mod: CPTII,95,, | Performed by: NURSE PRACTITIONER

## 2025-06-02 RX ORDER — ZOLPIDEM TARTRATE 10 MG/1
10 TABLET ORAL NIGHTLY PRN
Qty: 30 TABLET | Refills: 5 | Status: SHIPPED | OUTPATIENT
Start: 2025-06-02

## 2025-06-02 RX ORDER — DIAZEPAM 5 MG/1
TABLET ORAL
Qty: 30 TABLET | Refills: 5 | Status: SHIPPED | OUTPATIENT
Start: 2025-06-02

## 2025-06-02 RX ORDER — VENLAFAXINE HYDROCHLORIDE 150 MG/1
150 CAPSULE, EXTENDED RELEASE ORAL DAILY
Qty: 90 CAPSULE | Refills: 3 | Status: SHIPPED | OUTPATIENT
Start: 2025-06-02

## 2025-06-20 ENCOUNTER — PATIENT MESSAGE (OUTPATIENT)
Dept: SURGERY | Facility: CLINIC | Age: 65
End: 2025-06-20
Payer: COMMERCIAL

## 2025-07-02 ENCOUNTER — OFFICE VISIT (OUTPATIENT)
Dept: SURGERY | Facility: CLINIC | Age: 65
End: 2025-07-02
Payer: COMMERCIAL

## 2025-07-02 VITALS
BODY MASS INDEX: 28.71 KG/M2 | HEIGHT: 62 IN | WEIGHT: 156 LBS | DIASTOLIC BLOOD PRESSURE: 75 MMHG | HEART RATE: 92 BPM | SYSTOLIC BLOOD PRESSURE: 128 MMHG

## 2025-07-02 DIAGNOSIS — Z85.3 PERSONAL HISTORY OF BREAST CANCER: Primary | ICD-10-CM

## 2025-07-02 DIAGNOSIS — Z90.13 S/P MASTECTOMY, BILATERAL: ICD-10-CM

## 2025-07-02 PROCEDURE — 99999 PR PBB SHADOW E&M-EST. PATIENT-LVL IV: CPT | Mod: PBBFAC,,, | Performed by: PHYSICIAN ASSISTANT

## 2025-07-02 NOTE — PROGRESS NOTES
[facility]  Department of Surgery      Subjective:     Lucia Matias presents the [facility] on 2025 for consultation regarding 3 dimensional nipple tattoo for breast reconstruction. She is status post bilateral breast reconstruction using autologous tissue by Dr. Milian. Her last surgery was 24. She has a nice result and is pleased with the results of her breast reconstruction. She denies fever, chills, nausea, vomiting, chest pain or shortness of breath.     Review of patient's allergies indicates:   Allergen Reactions    Succinimides Anaphylaxis     Son has MH    Vancomycin analogues Hives, Itching and Rash     Medications Ordered Prior to Encounter[1]  Problem List[2]  Past Surgical History:   Procedure Laterality Date    ABLATION  2022    Cardiac Ablation for A Flutter, Successful    ADENOIDECTOMY  1966    Tonsillitis and adnoids    BILATERAL MASTECTOMY Bilateral 02/15/2023    Procedure: MASTECTOMY, BILATERAL;  Surgeon: aMrcela Meehan MD;  Location: [facility];  Service: General;  Laterality: Bilateral;  EMAIL SENT  @ 11:44 LK / 2.5 HOURS    BLADDER SUSPENSION      (x2)    BREAST REVISION SURGERY Bilateral 2023    Procedure: BREAST REVISION SURGERY / BILATERAL BREAST FLAP REVISION;  Surgeon: Ming Milian MD;  Location: [facility];  Service: Plastics;  Laterality: Bilateral;  90 MINUTES     SECTION      (x2)    COLONOSCOPY      DEBRIDEMENT, BREAST Bilateral 2023    Procedure: DEBRIDEMENT, BREAST;  Surgeon: Ming Milian MD;  Location: [facility];  Service: Plastics;  Laterality: Bilateral;    ESOPHAGOGASTRODUODENOSCOPY N/A 2021    Procedure: EGD (ESOPHAGOGASTRODUODENOSCOPY);  Surgeon: Vince Rodriguez MD;  Location: [address];  Service: General;  Laterality: N/A;    INCISION AND DRAINAGE OF BREAST Left 10/26/2023    Procedure: INCISION AND DRAINAGE, BREAST;  Surgeon: Ming Milian MD;  Location: [facility];  Service:  Plastics;  Laterality: Left;    INCISION AND DRAINAGE OF BREAST Left 04/04/2024    Procedure: INCISION AND DRAINAGE, BREAST;  Surgeon: Ming Milian MD;  Location: [facility];  Service: Plastics;  Laterality: Left;    INJECTION FOR SENTINEL NODE IDENTIFICATION Left 02/15/2023    Procedure: INJECTION, FOR SENTINEL NODE IDENTIFICATION;  Surgeon: Marcela Meehan MD;  Location: [facility];  Service: General;  Laterality: Left;    LAPAROSCOPIC SLEEVE GASTRECTOMY N/A 03/31/2021    Procedure: GASTRECTOMY, SLEEVE, LAPAROSCOPIC, with intraop EGD;  Surgeon: Vince Rodriguez MD;  Location: [facility] OR [address] [place] [address];  Service: General;  Laterality: N/A;    LIPOSUCTION W/ FAT INJECTION Bilateral 08/29/2023    Procedure: LIPOSUCTION, WITH FAT TRANSFER;  Surgeon: Ming Milian MD;  Location: [facility];  Service: Plastics;  Laterality: Bilateral;  / FAT GRAFTING TO BOTH BREAST    LUNG BIOPSY  09/2020    RECONSTRUCTION OF BREAST WITH DEEP INFERIOR EPIGASTRIC ARTERY  (NEHA) FREE FLAP Bilateral 02/15/2023    Procedure: RECONSTRUCTION, BREAST, USING NEHA FREE FLAP;  Surgeon: Ming Milian MD;  Location: [facility];  Service: Plastics;  Laterality: Bilateral;    RECONSTRUCTION OF BREAST WITH DEEP INFERIOR EPIGASTRIC ARTERY  (NEHA) FREE FLAP Left 11/13/2024    Procedure: RECONSTRUCTION, BREAST, USING FREE FLAP;  Surgeon: Ming Milian MD;  Location: [facility];  Service: Plastics;  Laterality: Left;  / LEFT PAP FLAP  5 HOURS / HX  malignant hyperthermia  used PAP flap from Right thigh    REVISION, FLAP, PREVIOUSLY CREATED FOR BREAST RECONSTRUCTION Bilateral 08/29/2023    Procedure: REVISION, FLAP, PREVIOUSLY CREATED FOR BREAST RECONSTRUCTION;  Surgeon: Ming Milian MD;  Location: [facility];  Service: Plastics;  Laterality: Bilateral;  4 HOURS    REVISION, SCAR, ABDOMINAL DONOR SITE N/A 08/29/2023    Procedure: REVISION, SCAR, ABDOMINAL DONOR SITE;  Surgeon: Ming Milian MD;  Location: [facility];  Service:  Plastics;  Laterality: N/A;    RHINOPLASTY      SENTINEL LYMPH NODE BIOPSY Left 02/15/2023    Procedure: BIOPSY, LYMPH NODE, SENTINEL;  Surgeon: Marcela Meehan MD;  Location: [facility] OR;  Service: General;  Laterality: Left;    TONSILLECTOMY      TRANSESOPHAGEAL ECHOCARDIOGRAPHY N/A 11/02/2022    Procedure: ECHOCARDIOGRAM, TRANSESOPHAGEAL;  Surgeon: Kellie Grover MD;  Location: [facility];  Service: Cardiology;  Laterality: N/A;     Social History[3]    ROS: status post breast reconstruction      Objective:     Vitals:    07/02/25 1044   BP: 128/75   Pulse: 92       PHYSICAL EXAMINATION:   Physical Exam   Vitals reviewed.  Constitutional: She is oriented to person, place, and time.   HENT:   Head: Normocephalic and atraumatic.   Nose: Nose normal.   Eyes: Pupils are equal, round, and reactive to light. Right eye exhibits no discharge. Left eye exhibits no discharge.   Pulmonary/Chest: Effort normal and breath sounds normal. No stridor. No respiratory distress. She exhibits no mass, no tenderness and no edema. Right breast exhibits no mass, no skin change and no tenderness. Left breast exhibits no mass, no skin change and no tenderness. No breast swelling or bleeding. Breasts are symmetrical.   Abdominal: Normal appearance.   Genitourinary: No breast swelling or bleeding.   Neurological: She is alert and oriented to person, place, and time.   Skin: Skin is warm and dry.     Psychiatric: Her behavior is normal. Mood, judgment and thought content normal.        Assessment:     64 y.o. female status post bilateral mastectomies with flap reconstruction here to discuss options for 3D nipple tattoo.     Plan:     - Patient is pleased with the outcome of her breast reconstruction and is now interested in 3D nipple tattooing.   - Patient is a good candidate for medical tattooing. She is well healed from her last surgical procedure.   - Procedure discussed in detail with the patient as were the risks and possible  complications. She understands that the risks include but are not limited to bleeding, scarring, infection, pain, numbness, asymmetry, deformity, allergic reaction, failure to take, infection, skin necrosis, wound dehiscence and need for second stage tattoo.   - Post op instructions were reviewed with verbal understanding. Print out provided.   - Patient would like to proceed, office staff to coordinate tattoo procedure date at patients convenience. All questions were answered. The patient was advised to call the clinic with any questions or concerns prior to their next visit.       Total time spent with the patient: 20.  15 minutes of face to face consultation and 5 minutes of chart review and coordination of care.                 [1]   Current Outpatient Medications on File Prior to Visit   Medication Sig Dispense Refill    acetaminophen (TYLENOL) 500 MG tablet Take 2 tablets by mouth daily as needed for Pain.      anastrozole (ARIMIDEX) 1 mg Tab Take 1 tablet (1 mg total) by mouth once daily. 90 tablet 3    apixaban (ELIQUIS) 5 mg Tab Take 1 tablet (5 mg total) by mouth 2 (two) times daily. 180 tablet 3    atorvastatin (LIPITOR) 20 MG tablet Take 1 tablet (20 mg total) by mouth every evening. 90 tablet 3    B-complex with vitamin C (VITAMIN B COMPLEX-C ORAL) Take 1 tablet by mouth Daily.      BIOTIN-COLLAGEN-VIT C-E-HERBAL ORAL Take 2 tablets by mouth every evening.      calcium-vitamin D 250-100 mg-unit per tablet Take 1 tablet by mouth every morning.      cyanocobalamin 500 MCG tablet Take 500 mcg by mouth once daily.      diazePAM (VALIUM) 5 MG tablet TAKE 1 TABLET BY MOUTH DAILY AS NEEDED FOR ANXIETY. 30 tablet 5    diclofenac sodium (VOLTAREN) 1 % Gel Apply 2 g topically 4 (four) times daily. 100 g 1    fluocinonide-emollient (FLUOCINONIDE-E) 0.05 % Crea Apply to affected area of feet daily as needed for psoriasis. 60 g 3    fluticasone-umeclidin-vilanter (TRELEGY ELLIPTA) 100-62.5-25 mcg DsDv Inhale 1 puff  into the lungs once daily. 180 each 3    ipratropium (ATROVENT) 0.02 % nebulizer solution Take 2.5 mLs (500 mcg total) by nebulization 4 (four) times daily as needed for Wheezing. Rescue 125 mL 1    levalbuterol (XOPENEX) 0.63 mg/3 mL nebulizer solution Take 3 mLs (0.63 mg total) by nebulization every 4 (four) hours as needed for Wheezing. Rescue 525 mL 3    magnesium 200 mg Tab Take 1 tablet by mouth nightly.      metoprolol succinate (TOPROL-XL) 50 MG 24 hr tablet Take 1 tablet (50 mg total) by mouth 2 (two) times daily. 180 tablet 3    multivitamin (THERAGRAN) per tablet Take 1 tablet by mouth once daily.      multivitamin with minerals (HAIR,SKIN AND NAILS ORAL) Take 1 tablet by mouth Daily.      omeprazole (PRILOSEC) 40 MG capsule Take 1 capsule (40 mg total) by mouth every morning. 90 capsule 1    ondansetron (ZOFRAN) 4 MG tablet Take 2 tablets (8 mg total) by mouth every 12 (twelve) hours as needed for Nausea. 10 tablet 0    TALTZ AUTOINJECTOR 80 mg/mL AtIn Inject 80 mg into the skin every 28 days. 1 mL 6    triamcinolone acetonide 0.025% (KENALOG) 0.025 % cream Apply to affected area of skin folds twice a day as needed for psoriasis. 80 g 3    triamcinolone acetonide 0.1% (KENALOG) 0.1 % cream Apply to affected areas of body twice a day as needed for psoriasis. Do not use on face or skin folds. 454 g 1    venlafaxine (EFFEXOR-XR) 150 MG Cp24 Take 1 capsule (150 mg total) by mouth once daily. 90 capsule 3    zolpidem (AMBIEN) 10 mg Tab Take 1 tablet (10 mg total) by mouth nightly as needed (insomnia). 30 tablet 5    [DISCONTINUED] budesonide-formoterol 160-4.5 mcg (SYMBICORT) 160-4.5 mcg/actuation HFAA Inhale 2 puffs into the lungs every 12 (twelve) hours. Controller 30.6 g 2     No current facility-administered medications on file prior to visit.   [2]   Patient Active Problem List  Diagnosis    Diverticular disease of colon    Psoriasis vulgaris    COPD (chronic obstructive pulmonary disease)    HLD  (hyperlipidemia)    Depression with anxiety    Insomnia    Long-term use of immunosuppressant medication    History of tobacco abuse    Primary hypertension    Multiple lung nodules on CT    Class 1 obesity due to excess calories without serious comorbidity with body mass index (BMI) of 31.0 to 31.9 in adult    History of sleeve gastrectomy    Rheumatoid arthritis    Atrial flutter    Psoriatic arthritis    Malignant neoplasm of central portion of left breast in female, estrogen receptor positive    Typical atrial flutter    SVT (supraventricular tachycardia)    H/O cardiac radiofrequency ablation   [3]   Social History  Socioeconomic History    Marital status:    Occupational History    Occupation: clinical research coordinator    Tobacco Use    Smoking status: Former     Current packs/day: 0.00     Average packs/day: 0.5 packs/day for 41.9 years (21.0 ttl pk-yrs)     Types: Cigarettes     Start date: 1979     Quit date: 2020     Years since quittin.5     Passive exposure: Current    Smokeless tobacco: Never   Substance and Sexual Activity    Alcohol use: Yes     Alcohol/week: 1.0 standard drink of alcohol     Types: 1 Glasses of wine per week     Comment: social-rarely    Drug use: No    Sexual activity: Yes     Partners: Male     Birth control/protection: Post-menopausal   Other Topics Concern    Are you pregnant or think you may be? No    Breast-feeding No     Social Drivers of Health     Financial Resource Strain: Low Risk  (2025)    Overall Financial Resource Strain (CARDIA)     Difficulty of Paying Living Expenses: Not hard at all   Food Insecurity: No Food Insecurity (2025)    Hunger Vital Sign     Worried About Running Out of Food in the Last Year: Never true     Ran Out of Food in the Last Year: Never true   Transportation Needs: No Transportation Needs (2025)    PRAPARE - Transportation     Lack of Transportation (Medical): No     Lack of Transportation (Non-Medical): No    Physical Activity: Sufficiently Active (5/7/2025)    Exercise Vital Sign     Days of Exercise per Week: 3 days     Minutes of Exercise per Session: 150+ min   Stress: Stress Concern Present (5/7/2025)    [employer] - Occupational Stress Questionnaire     Feeling of Stress : Rather much   Housing Stability: Low Risk  (5/7/2025)    Housing Stability Vital Sign     Unable to Pay for Housing in the Last Year: No     Homeless in the Last Year: No

## 2025-07-11 ENCOUNTER — TELEPHONE (OUTPATIENT)
Dept: CARDIOLOGY | Facility: CLINIC | Age: 65
End: 2025-07-11
Payer: COMMERCIAL

## 2025-07-11 NOTE — PROGRESS NOTES
Chart has been dictated using voice recognition software.  It is not been reviewed carefully for any transcriptional errors due to this technology.   Subjective:   Patient ID:  Lucia Matias is a 64 y.o. female who presents for evaluation of Hyperlipidemia, unspecified hyperlipidemia type      HPI: Patient ([facility]) with past medical history significant for periods of SVT, a history of typical atrial flutter s/p an EP study with radiofrequency catheter ablation of the cavotricuspid isthmus on 11/2/2022, COPD, hypertension, hyperlipidemia, left-sided breast cancer stage IA, HR+, s/p bilateral mastectomy with reconstruction and maintained on anastrozole therapy, anxiety, depression, Rheumatoid arthritis, osteoarthritis, a history of tobacco use, a gastric sleeve surgery, and overweight BMI.  The patient is on oral anticoagulation for stroke prophylaxis from her supraventricular arrhythmias.    She was previously admitted to [facility] from 6-10-May-2025 with complaints of dyspnea with exertion, worsened shortness of breath, and a persistent cough.  The patient was walking with her  in [place] when she developed severe shortness of breath became lightheaded and lost bladder control.  She notes that her heart was racing in her chest and her watch told her that her heart rate was around 160.  The patient has been came back to [place] and was hospitalized at [facility].  By time the patient arrived at [facility], she was back in sinus rhythm.  The patient was discharged with a diagnosis of exacerbation of COPD caused by upper respiratory infection..     The patient notes with exertion she will get chest tightness and shortness of breath.  The shortness of breath usually comes 1st.  She has decreased the amount of activity she has done such as when she does laundry, she only does have followed at the time instead of a full load.  Patient denies any dyspnea at rest, orthopnea, PND, or  edema.  Patient denies any palpitations, lightheadedness, or syncope since her hospitalization.     Cardiac risk factors: hyperlipidemia, hypertension, tobacco use (stop in )    Past Medical History:   Diagnosis Date    Adverse reaction to succinimides     son has malignant hyperthermia    Allergy     Anticoagulant long-term use     Anxiety     Arthritis     Atrial flutter     Colon polyps     COPD (chronic obstructive pulmonary disease)     COPD exacerbation 10/31/2022    Depression     Diverticular disease of colon 2017    Diverticulitis     H/O gastric sleeve     HLD (hyperlipidemia)     Hypertension     resolved with gastric slleve    Malignant neoplasm of central portion of left breast in female, estrogen receptor positive 2022    Monoallelic mutation of MUTYH gene 2022    Osteopenia     Pancreatitis     Paroxysmal A-fib 2024    Pneumothorax, unspecified     following mastectomy sx     Psoriasis     Rheumatoid arthritis     Severe obesity (BMI 35.0-39.9) with comorbidity     Wears contact lenses     not often  wears glasses usually    Wears prescription eyeglasses        Past Surgical History:   Procedure Laterality Date    ABLATION  2022    Cardiac Ablation for A Flutter, Successful    ADENOIDECTOMY  1966    Tonsillitis and adnoids    BILATERAL MASTECTOMY Bilateral 02/15/2023    Procedure: MASTECTOMY, BILATERAL;  Surgeon: Marcela Meehan MD;  Location: [facility];  Service: General;  Laterality: Bilateral;  EMAIL SENT  @ 11:44 LK / 2.5 HOURS    BLADDER SUSPENSION      (x2)    BREAST REVISION SURGERY Bilateral 2023    Procedure: BREAST REVISION SURGERY / BILATERAL BREAST FLAP REVISION;  Surgeon: Ming Milian MD;  Location: [facility];  Service: Plastics;  Laterality: Bilateral;  90 MINUTES     SECTION      (x2)    COLONOSCOPY      DEBRIDEMENT, BREAST Bilateral 2023    Procedure: DEBRIDEMENT, BREAST;  Surgeon: Ming Milian MD;  Location: [facility] OR;   Service: Plastics;  Laterality: Bilateral;    ESOPHAGOGASTRODUODENOSCOPY N/A 03/31/2021    Procedure: EGD (ESOPHAGOGASTRODUODENOSCOPY);  Surgeon: Vince Rodriguez MD;  [facility];  Service: General;  Laterality: N/A;    INCISION AND DRAINAGE OF BREAST Left 10/26/2023    Procedure: INCISION AND DRAINAGE, BREAST;  Surgeon: Ming Milian MD;  Location: [facility];  Service: Plastics;  Laterality: Left;    INCISION AND DRAINAGE OF BREAST Left 04/04/2024    Procedure: INCISION AND DRAINAGE, BREAST;  Surgeon: Ming Milian MD;  Location: [facility];  Service: Plastics;  Laterality: Left;    INJECTION FOR SENTINEL NODE IDENTIFICATION Left 02/15/2023    Procedure: INJECTION, FOR SENTINEL NODE IDENTIFICATION;  Surgeon: Marcela Meehan MD;  Location: [facility];  Service: General;  Laterality: Left;    LAPAROSCOPIC SLEEVE GASTRECTOMY N/A 03/31/2021    Procedure: GASTRECTOMY, SLEEVE, LAPAROSCOPIC, with intraop EGD;  Surgeon: Vince Rodriguez MD;  [facility];  Service: General;  Laterality: N/A;    LIPOSUCTION W/ FAT INJECTION Bilateral 08/29/2023    Procedure: LIPOSUCTION, WITH FAT TRANSFER;  Surgeon: Ming Milian MD;  Location: [facility];  Service: Plastics;  Laterality: Bilateral;  / FAT GRAFTING TO BOTH BREAST    LUNG BIOPSY  09/2020    RECONSTRUCTION OF BREAST WITH DEEP INFERIOR EPIGASTRIC ARTERY  (NEHA) FREE FLAP Bilateral 02/15/2023    Procedure: RECONSTRUCTION, BREAST, USING NEHA FREE FLAP;  Surgeon: Ming Milian MD;  Location: [facility];  Service: Plastics;  Laterality: Bilateral;    RECONSTRUCTION OF BREAST WITH DEEP INFERIOR EPIGASTRIC ARTERY  (NEHA) FREE FLAP Left 11/13/2024    Procedure: RECONSTRUCTION, BREAST, USING FREE FLAP;  Surgeon: Ming Milian MD;  Location: [facility];  Service: Plastics;  Laterality: Left;  / LEFT PAP FLAP  5 HOURS / HX  malignant hyperthermia  used PAP flap from Right thigh    REVISION, FLAP, PREVIOUSLY CREATED FOR  "BREAST RECONSTRUCTION Bilateral 08/29/2023    Procedure: REVISION, FLAP, PREVIOUSLY CREATED FOR BREAST RECONSTRUCTION;  Surgeon: Ming Milian MD;  Location: [facility] OR;  Service: Plastics;  Laterality: Bilateral;  4 HOURS    REVISION, SCAR, ABDOMINAL DONOR SITE N/A 08/29/2023    Procedure: REVISION, SCAR, ABDOMINAL DONOR SITE;  Surgeon: Ming Milian MD;  Location: [facility] OR;  Service: Plastics;  Laterality: N/A;    RHINOPLASTY      SENTINEL LYMPH NODE BIOPSY Left 02/15/2023    Procedure: BIOPSY, LYMPH NODE, SENTINEL;  Surgeon: Marcela Meehan MD;  Location: [facility];  Service: General;  Laterality: Left;    TONSILLECTOMY      TRANSESOPHAGEAL ECHOCARDIOGRAPHY N/A 11/02/2022    Procedure: ECHOCARDIOGRAM, TRANSESOPHAGEAL;  Surgeon: Kellie Grover MD;  Location: [facility] EP LAB;  Service: Cardiology;  Laterality: N/A;       Social History[1]    Medications Prior to Visit[2]    Review of patient's allergies indicates:   Allergen Reactions    Succinimides Anaphylaxis     Son has MH    Vancomycin analogues Hives, Itching and Rash       Review of Systems   Constitutional: Negative for weight gain and weight loss.   HENT:  Negative for nosebleeds.    Cardiovascular:         As above   Respiratory:  Negative for hemoptysis.    Hematologic/Lymphatic: Negative for bleeding problem. Bruises/bleeds easily.   Gastrointestinal:  Negative for change in bowel habit, hematemesis, hematochezia and melena.   Genitourinary:  Negative for hematuria.   Neurological:  Positive for focal weakness (loss of upper body strength since mastectomies). Negative for numbness and weakness.      Objective:   Physical Exam  Constitutional:       Appearance: Normal appearance.      Comments: BP (!) 123/59 (BP Location: Right arm, Patient Position: Sitting)   Pulse 66   Ht 5' 2" (1.575 m)   Wt 72.3 kg (159 lb 6.3 oz)   SpO2 97%   BMI 29.15 kg/m²    Neck:      Thyroid: No thyromegaly.      Vascular: No carotid bruit or JVD.   Cardiovascular: "      Rate and Rhythm: Normal rate and regular rhythm.      Pulses: Intact distal pulses.      Heart sounds: S1 normal and S2 normal. No murmur heard.     No friction rub. Gallop present. S4 (soft) sounds present.   Pulmonary:      Effort: Pulmonary effort is normal.      Breath sounds: Normal breath sounds. No wheezing or rales.   Abdominal:      General: Bowel sounds are normal. There is no abdominal bruit.      Palpations: Abdomen is soft.      Tenderness: There is no abdominal tenderness.   Musculoskeletal:      Cervical back: Neck supple.      Right lower leg: No edema.      Left lower leg: No edema.   Skin:     Nails: There is no clubbing.   Neurological:      Mental Status: She is alert and oriented to person, place, and time.           Lab Results   Component Value Date     05/10/2025     04/03/2024    K 4.1 05/10/2025    K 4.3 04/03/2024    BUN 16 05/10/2025    CREATININE 0.7 05/10/2025    EGFRNORACEVR >60 05/10/2025    EGFRNORACEVR >60 08/23/2024     (H) 05/10/2025    GLU 95 04/03/2024    HGBA1C 5.1 01/05/2024    CHOL 152 01/05/2024    TRIG 194 (H) 01/05/2024    HDL 44 01/05/2024    LDLCALC 69.2 01/05/2024    HGB 14.4 05/10/2025    HGB 15.7 11/07/2024    HCT 43.5 05/10/2025    HCT 48.3 11/07/2024    WBC 9.28 05/10/2025     05/10/2025     11/07/2024    INR 1.0 11/02/2022     ECG (today) showed normal sinus rhythm and was  a normal ECG.     Assessment:     1. Chest pain of unknown etiology    2. Atrial flutter, unspecified type    3. SVT (supraventricular tachycardia)    4. H/O cardiac radiofrequency ablation    5. Primary hypertension    6. Hyperlipidemia, unspecified hyperlipidemia type    7. Psoriatic arthritis    8. History of sleeve gastrectomy    9. Chronic bronchitis, unspecified chronic bronchitis type      The patient presents with a history of atrial flutter and now has chest tightness with exertion of undetermined etiology.  Given the patient's cardiac risk factors,  an exercise stress echo will be obtained to determine if there is any ischemia.  She is currently in sinus rhythm and long-term management of her atrial flutter will be per Dr. Cuba.  Her blood pressure at this time is well controlled as is her LDL cholesterol.  She does not have any evidence of diabetes.  The patient is maintaining lower weight since her gastrectomy.  Her psoriatic arthritis is being treated with immune therapy and appears to be controlled.     Unless there are intervening problems, patient should return for re-evaluation in 6 months.       Plan:     Lucia was seen today for hyperlipidemia, unspecified hyperlipidemia type.    Diagnoses and all orders for this visit:    Chest pain of unknown etiology  -     Stress Echo Which stress agent will be used? Treadmill Exercise; Future    Atrial flutter, unspecified type  -     Stress Echo Which stress agent will be used? Treadmill Exercise; Future    SVT (supraventricular tachycardia)  -     Ambulatory referral/consult to Cardiology    H/O cardiac radiofrequency ablation    Primary hypertension  -     Stress Echo Which stress agent will be used? Treadmill Exercise; Future    Hyperlipidemia, unspecified hyperlipidemia type    Psoriatic arthritis    History of sleeve gastrectomy    Chronic bronchitis, unspecified chronic bronchitis type          Marck Shah MD  Consultative Cardiology               [1]   Social History  Tobacco Use    Smoking status: Former     Current packs/day: 0.00     Average packs/day: 0.5 packs/day for 41.9 years (21.0 ttl pk-yrs)     Types: Cigarettes     Start date: 1979     Quit date: 2020     Years since quittin.6     Passive exposure: Current    Smokeless tobacco: Never   Substance Use Topics    Alcohol use: Yes     Alcohol/week: 1.0 standard drink of alcohol     Types: 1 Glasses of wine per week     Comment: social-rarely    Drug use: No   [2]   Outpatient Medications Prior to Visit   Medication Sig Dispense  Refill    acetaminophen (TYLENOL) 500 MG tablet Take 2 tablets by mouth daily as needed for Pain.      anastrozole (ARIMIDEX) 1 mg Tab Take 1 tablet (1 mg total) by mouth once daily. 90 tablet 3    apixaban (ELIQUIS) 5 mg Tab Take 1 tablet (5 mg total) by mouth 2 (two) times daily. 180 tablet 3    atorvastatin (LIPITOR) 20 MG tablet Take 1 tablet (20 mg total) by mouth every evening. 90 tablet 3    B-complex with vitamin C (VITAMIN B COMPLEX-C ORAL) Take 1 tablet by mouth Daily.      BIOTIN-COLLAGEN-VIT C-E-HERBAL ORAL Take 2 tablets by mouth every evening.      calcium-vitamin D 250-100 mg-unit per tablet Take 1 tablet by mouth every morning.      cyanocobalamin 500 MCG tablet Take 500 mcg by mouth once daily.      diazePAM (VALIUM) 5 MG tablet TAKE 1 TABLET BY MOUTH DAILY AS NEEDED FOR ANXIETY. 30 tablet 5    diclofenac sodium (VOLTAREN) 1 % Gel Apply 2 g topically 4 (four) times daily. 100 g 1    fluocinonide-emollient (FLUOCINONIDE-E) 0.05 % Crea Apply to affected area of feet daily as needed for psoriasis. 60 g 3    fluticasone-umeclidin-vilanter (TRELEGY ELLIPTA) 100-62.5-25 mcg DsDv Inhale 1 puff into the lungs once daily. 180 each 3    ipratropium (ATROVENT) 0.02 % nebulizer solution Take 2.5 mLs (500 mcg total) by nebulization 4 (four) times daily as needed for Wheezing. Rescue 125 mL 1    levalbuterol (XOPENEX) 0.63 mg/3 mL nebulizer solution Take 3 mLs (0.63 mg total) by nebulization every 4 (four) hours as needed for Wheezing. Rescue 525 mL 3    magnesium 200 mg Tab Take 1 tablet by mouth nightly.      metoprolol succinate (TOPROL-XL) 50 MG 24 hr tablet Take 1 tablet (50 mg total) by mouth 2 (two) times daily. 180 tablet 3    multivitamin (THERAGRAN) per tablet Take 1 tablet by mouth once daily.      multivitamin with minerals (HAIR,SKIN AND NAILS ORAL) Take 1 tablet by mouth Daily.      omeprazole (PRILOSEC) 40 MG capsule Take 1 capsule (40 mg total) by mouth every morning. 90 capsule 1    ondansetron  (ZOFRAN) 4 MG tablet Take 2 tablets (8 mg total) by mouth every 12 (twelve) hours as needed for Nausea. 10 tablet 0    TALTZ AUTOINJECTOR 80 mg/mL AtIn Inject 80 mg into the skin every 28 days. 1 mL 6    triamcinolone acetonide 0.025% (KENALOG) 0.025 % cream Apply to affected area of skin folds twice a day as needed for psoriasis. 80 g 3    triamcinolone acetonide 0.1% (KENALOG) 0.1 % cream Apply to affected areas of body twice a day as needed for psoriasis. Do not use on face or skin folds. 454 g 1    venlafaxine (EFFEXOR-XR) 150 MG Cp24 Take 1 capsule (150 mg total) by mouth once daily. 90 capsule 3    zolpidem (AMBIEN) 10 mg Tab Take 1 tablet (10 mg total) by mouth nightly as needed (insomnia). 30 tablet 5     No facility-administered medications prior to visit.

## 2025-07-14 ENCOUNTER — OFFICE VISIT (OUTPATIENT)
Dept: CARDIOLOGY | Facility: CLINIC | Age: 65
End: 2025-07-14
Payer: COMMERCIAL

## 2025-07-14 VITALS
HEART RATE: 66 BPM | HEIGHT: 62 IN | BODY MASS INDEX: 29.33 KG/M2 | WEIGHT: 159.38 LBS | DIASTOLIC BLOOD PRESSURE: 59 MMHG | SYSTOLIC BLOOD PRESSURE: 123 MMHG | OXYGEN SATURATION: 97 %

## 2025-07-14 DIAGNOSIS — Z90.3 HISTORY OF SLEEVE GASTRECTOMY: ICD-10-CM

## 2025-07-14 DIAGNOSIS — Z98.890 H/O CARDIAC RADIOFREQUENCY ABLATION: ICD-10-CM

## 2025-07-14 DIAGNOSIS — I48.0 PAROXYSMAL ATRIAL FIBRILLATION: ICD-10-CM

## 2025-07-14 DIAGNOSIS — I10 PRIMARY HYPERTENSION: ICD-10-CM

## 2025-07-14 DIAGNOSIS — L40.50 PSORIATIC ARTHRITIS: ICD-10-CM

## 2025-07-14 DIAGNOSIS — R00.2 PALPITATIONS: ICD-10-CM

## 2025-07-14 DIAGNOSIS — R07.9 CHEST PAIN OF UNKNOWN ETIOLOGY: Primary | ICD-10-CM

## 2025-07-14 DIAGNOSIS — I48.92 ATRIAL FLUTTER, UNSPECIFIED TYPE: ICD-10-CM

## 2025-07-14 DIAGNOSIS — J42 CHRONIC BRONCHITIS, UNSPECIFIED CHRONIC BRONCHITIS TYPE: Chronic | ICD-10-CM

## 2025-07-14 DIAGNOSIS — I47.10 SVT (SUPRAVENTRICULAR TACHYCARDIA): ICD-10-CM

## 2025-07-14 DIAGNOSIS — E78.5 HYPERLIPIDEMIA, UNSPECIFIED HYPERLIPIDEMIA TYPE: Chronic | ICD-10-CM

## 2025-07-14 PROCEDURE — 1159F MED LIST DOCD IN RCRD: CPT | Mod: CPTII,S$GLB,, | Performed by: INTERNAL MEDICINE

## 2025-07-14 PROCEDURE — 3074F SYST BP LT 130 MM HG: CPT | Mod: CPTII,S$GLB,, | Performed by: INTERNAL MEDICINE

## 2025-07-14 PROCEDURE — 1160F RVW MEDS BY RX/DR IN RCRD: CPT | Mod: CPTII,S$GLB,, | Performed by: INTERNAL MEDICINE

## 2025-07-14 PROCEDURE — 93000 ELECTROCARDIOGRAM COMPLETE: CPT | Mod: S$GLB,,, | Performed by: INTERNAL MEDICINE

## 2025-07-14 PROCEDURE — 3078F DIAST BP <80 MM HG: CPT | Mod: CPTII,S$GLB,, | Performed by: INTERNAL MEDICINE

## 2025-07-14 PROCEDURE — 3008F BODY MASS INDEX DOCD: CPT | Mod: CPTII,S$GLB,, | Performed by: INTERNAL MEDICINE

## 2025-07-14 PROCEDURE — 99999 PR PBB SHADOW E&M-EST. PATIENT-LVL V: CPT | Mod: PBBFAC,,, | Performed by: INTERNAL MEDICINE

## 2025-07-14 PROCEDURE — 99213 OFFICE O/P EST LOW 20 MIN: CPT | Mod: S$GLB,,, | Performed by: INTERNAL MEDICINE

## 2025-07-14 NOTE — Clinical Note
Thank you for referring Lucia Concepcion Rom Matias for evaluation of supraventricular arrhythmia. Please see my note for details of this encounter. If you have any questions, please contact me.  Thank you again for the referral.

## 2025-07-15 ENCOUNTER — TELEPHONE (OUTPATIENT)
Dept: CARDIOLOGY | Facility: CLINIC | Age: 65
End: 2025-07-15
Payer: COMMERCIAL

## 2025-07-15 DIAGNOSIS — R00.2 PALPITATIONS: Primary | ICD-10-CM

## 2025-07-15 LAB
OHS QRS DURATION: 82 MS
OHS QTC CALCULATION: 392 MS

## 2025-08-11 ENCOUNTER — CLINICAL SUPPORT (OUTPATIENT)
Dept: CARDIOLOGY | Facility: HOSPITAL | Age: 65
End: 2025-08-11
Attending: STUDENT IN AN ORGANIZED HEALTH CARE EDUCATION/TRAINING PROGRAM
Payer: COMMERCIAL

## 2025-08-11 ENCOUNTER — DOCUMENTATION ONLY (OUTPATIENT)
Dept: CARDIOLOGY | Facility: HOSPITAL | Age: 65
End: 2025-08-11
Payer: COMMERCIAL

## 2025-08-11 DIAGNOSIS — I47.10 SVT (SUPRAVENTRICULAR TACHYCARDIA): ICD-10-CM

## 2025-08-11 PROCEDURE — 93246 EXT ECG>7D<15D RECORDING: CPT

## 2025-08-25 PROBLEM — R07.9 CHEST PAIN OF UNKNOWN ETIOLOGY: Status: RESOLVED | Noted: 2025-07-14 | Resolved: 2025-08-25

## 2025-08-30 DIAGNOSIS — Z98.84 S/P LAPAROSCOPIC SLEEVE GASTRECTOMY: ICD-10-CM

## 2025-08-30 DIAGNOSIS — R63.4 WEIGHT LOSS: ICD-10-CM

## 2025-09-01 RX ORDER — OMEPRAZOLE 40 MG/1
40 CAPSULE, DELAYED RELEASE ORAL EVERY MORNING
Qty: 90 CAPSULE | Refills: 1 | Status: SHIPPED | OUTPATIENT
Start: 2025-09-01

## 2025-09-04 ENCOUNTER — TELEPHONE (OUTPATIENT)
Dept: ELECTROPHYSIOLOGY | Facility: CLINIC | Age: 65
End: 2025-09-04
Payer: COMMERCIAL

## (undated) DEVICE — HOLDER DRAIN POUCH PINK

## (undated) DEVICE — DRESSING HYDROFIBER 4X5IN

## (undated) DEVICE — DRESSING XEROFORM FOIL PK 1X8

## (undated) DEVICE — SUT MONOCRYL 3-0 PS-2 UND

## (undated) DEVICE — DRESSING CHG FOAM 4MM 1IN

## (undated) DEVICE — GLOVE BIOGEL SKINSENSE PI 7.0

## (undated) DEVICE — SOL LAC RINGERS 1000ML INJ

## (undated) DEVICE — PENCIL ELECTROSURG HOLST W/BLD

## (undated) DEVICE — DRAPE STERI INSTRUMENT 1018

## (undated) DEVICE — POSITIONER HEEL FOAM CONVOLTD

## (undated) DEVICE — SYR 10CC LUER LOCK

## (undated) DEVICE — PAD DEFIB CADENCE ADULT R2

## (undated) DEVICE — SPONGE LAP 18X18 PREWASHED

## (undated) DEVICE — SPONGE DERMACEA GAUZE 4X4

## (undated) DEVICE — SUT PROLENE 3-0 FS-1 MONO18

## (undated) DEVICE — NDL SAFETY 22G X 1.5 ECLIPSE

## (undated) DEVICE — PAD ABDOMINAL STERILE 8X10IN

## (undated) DEVICE — SUT 3-0 ETHILON 18 FS-1

## (undated) DEVICE — GLOVE BIOGEL SKINSENSE PI 7.5

## (undated) DEVICE — CORD BIPOLAR 12 FOOT

## (undated) DEVICE — COVER HANDLE UNIVERSAL 6X14

## (undated) DEVICE — APPLICATOR CHLORAPREP ORN 26ML

## (undated) DEVICE — ELECTRODE REM PLYHSV RETURN 9

## (undated) DEVICE — STAPLER SKIN ROTATING HEAD

## (undated) DEVICE — SOL NORMAL USPCA 0.9%

## (undated) DEVICE — POSITIONER HEAD DONUT 9IN FOAM

## (undated) DEVICE — BRA CLASSIC COMFORT 40 BLACK

## (undated) DEVICE — Device

## (undated) DEVICE — GOWN NONREINF SET-IN SLV XL

## (undated) DEVICE — TIP YANKAUERS BULB NO VENT

## (undated) DEVICE — BLADE SURG CARBON STEEL SZ11

## (undated) DEVICE — SYS PRINEO SKIN CLOSURE

## (undated) DEVICE — ADHESIVE DERMABOND ADVANCED

## (undated) DEVICE — DRESSING XEROFORM 1X8IN

## (undated) DEVICE — BLANKET HYPER ADULT 24X60IN

## (undated) DEVICE — SYR ONLY LUER LOCK 20CC

## (undated) DEVICE — TUBING HF INSUFFLATION W/ FLTR

## (undated) DEVICE — SUT VICRYL PLUS 2-0 CT1 18

## (undated) DEVICE — MARKER SKIN STND TIP BLUE BARR

## (undated) DEVICE — CLIP DOUBLE MICRO.

## (undated) DEVICE — DRAPE THREE-QTR REINF 53X77IN

## (undated) DEVICE — DRAPE CORETEMP FLD WRM 56X62IN

## (undated) DEVICE — SPONGE SUPER KERLIX 6X6.75IN

## (undated) DEVICE — SUT VICRYL PLUS 4-0 PS2 27

## (undated) DEVICE — SUT STRATAFIX PGAPCL 3 FS-1

## (undated) DEVICE — RELOAD ECHELON FLEX BLU 60MM

## (undated) DEVICE — SYS REVOLVE FAT PROCESSING

## (undated) DEVICE — TROCAR ENDOPATH XCEL 12MM 10CM

## (undated) DEVICE — APPLICATOR ARISTA FLEX XL

## (undated) DEVICE — GEL AQUASONIC 100 STERILE20GM

## (undated) DEVICE — GLOVE BIOGEL SKINSENSE PI 6.5

## (undated) DEVICE — BAG DRAIN ANTI REFLUX 2000ML

## (undated) DEVICE — BINDER ABD 12IN LG 63-74IN

## (undated) DEVICE — DRESSING MEPILEX BORDER AG 4X4

## (undated) DEVICE — BANDAGE ROLL COTTN 4.5INX4.1YD

## (undated) DEVICE — TRAY FOLEY 16FR INFECTION CONT

## (undated) DEVICE — TUBING SUC UNIV W/CONN 12FT

## (undated) DEVICE — SOL POVIDONE SCRUB IODINE 4 OZ

## (undated) DEVICE — SOL IRRI STRL WATER 1000ML

## (undated) DEVICE — POWDER ARISTA AH 3G

## (undated) DEVICE — DRESSING MEPILEX FLEX 4X4IN

## (undated) DEVICE — PACK UNIV PROCEDURE

## (undated) DEVICE — SKIN MARKER DEVON 160

## (undated) DEVICE — TUBE PAL ASPIR CONN STRL 12FT

## (undated) DEVICE — DRESSING MEPILEX 4X10IN

## (undated) DEVICE — PAD ABD 8X10 STERILE

## (undated) DEVICE — PAD GROUND UNIV STYLE CORD 9IN

## (undated) DEVICE — SUT 2/0 30IN SILK BLK BRAI

## (undated) DEVICE — CABLE EXT DOPPLER FLOW PROBE

## (undated) DEVICE — SHEARS HARMONIC 5CM 36CM

## (undated) DEVICE — DRAIN CHANNEL ROUND 15FR

## (undated) DEVICE — CLIPPER BLADE MOD 4406 (CAREF)

## (undated) DEVICE — DRESSING XEROFORM NONADH 1X8IN

## (undated) DEVICE — ELECTRODE BLD EXT INSUL 1

## (undated) DEVICE — TUBING INFILTRATION STRL DISP

## (undated) DEVICE — SUT STRATAFIX PDS 1 CTX 18IN

## (undated) DEVICE — GUIDE MICRO-GRID SIL GRN

## (undated) DEVICE — NDL HYPO REG 25G X 1 1/2

## (undated) DEVICE — PACK EP DRAPE OMC

## (undated) DEVICE — POSITIONER IV ARMBOARD FOAM

## (undated) DEVICE — GLOVE BIOGEL SKINSENSE PI 8.0

## (undated) DEVICE — DRAIN CHANNEL ROUND 19FR

## (undated) DEVICE — TOWEL OR DISP STRL BLUE 4/PK

## (undated) DEVICE — APPLIER CLIP LIAGCLIP 9.375IN

## (undated) DEVICE — SUT PDS II 0 CT VIL MONO 36

## (undated) DEVICE — BRA CLASSIC COMFORT 46 BEIGE

## (undated) DEVICE — MATERIAL GREEN BKGRND 50X2X1MM

## (undated) DEVICE — DRESSING ANTIMICROBIAL 1 INCH

## (undated) DEVICE — ELECTRODE BLADE INSULATED 1 IN

## (undated) DEVICE — PROBE FLOW DOPPLER

## (undated) DEVICE — CONTAINER SPECIMEN OR STER 4OZ

## (undated) DEVICE — INTRODUCER HEMOSTASIS 7.5F

## (undated) DEVICE — COLLAGEN CELLERATE ACTIVATED 1GM

## (undated) DEVICE — SHEATH RAMP FAST CATH 8.5F60CM

## (undated) DEVICE — SUT 9/0 5IN ETHILON BLK MON

## (undated) DEVICE — EVACUATOR WOUND BULB 100CC

## (undated) DEVICE — WAVEGUIDE BRITEFIELD DISP

## (undated) DEVICE — DRESSING TEGADERM CHG 3.5X4.5

## (undated) DEVICE — BINDER ABDOMINAL 9 46-62

## (undated) DEVICE — KIT ENSITE ELECTRODE SURFACE

## (undated) DEVICE — CLAMP SINGLE MICRO.

## (undated) DEVICE — LUBRICANT SURGILUBE 2 OZ

## (undated) DEVICE — GOWN ECLIPSE REINF LV4 XLNG XL

## (undated) DEVICE — SYR SALINE PREFILLED FLSH 10ML

## (undated) DEVICE — DRAIN BLAKE HUBLS 10F RD

## (undated) DEVICE — SUT GUT PL. 4-0 27 FS-2

## (undated) DEVICE — RELOAD ECHELON FLEX GRN 60MM

## (undated) DEVICE — SYR SALINE FLSH PRFL STRL 10ML

## (undated) DEVICE — APPLIER LIGACLIP MED 11IN

## (undated) DEVICE — BRA CLASSIC COMFORT 42BLACK

## (undated) DEVICE — CANNULA ENDOPATH XCEL 5X100MM

## (undated) DEVICE — SKINMARKER W/RULER DEVON

## (undated) DEVICE — DRESSING TRANS 4X4 TEGADERM

## (undated) DEVICE — TROCAR ENDOPATH XCEL 12X100MM

## (undated) DEVICE — DRAIN CHANNEL ROUND 10FR

## (undated) DEVICE — SUT 0 VICRYL / UR6 (J603)

## (undated) DEVICE — TRAY CATH FOL SIL URIMTR 16FR

## (undated) DEVICE — SUT MCRYL PLUS 3-0 PS2 27IN

## (undated) DEVICE — R CATH BIDIRECTIONL DF CRV 7FR

## (undated) DEVICE — SYR 50ML CATH TIP

## (undated) DEVICE — R CATH TRICSPD HALO XP 7FRX110

## (undated) DEVICE — BLADE PEAK PLASMA

## (undated) DEVICE — MARKER SKIN RULER STERILE

## (undated) DEVICE — SUT MCRYL PLUS 4-0 PS2 27IN

## (undated) DEVICE — SUT STRATAFIX PDO 2-0 SH

## (undated) DEVICE — GLOVE BIOGEL SKINSENSE PI 8.5

## (undated) DEVICE — UNDERGLOVES BIOGEL PI SZ 7 LF

## (undated) DEVICE — SUT VICRYL 3-0 27 SH

## (undated) DEVICE — PROTECTOR HEEL POSITIONING

## (undated) DEVICE — STAPLER ECHELON FLEX 60MM 44CM

## (undated) DEVICE — TROCAR ENDOPATH XCEL 5X100MM